# Patient Record
Sex: MALE | Race: WHITE | Employment: OTHER | URBAN - METROPOLITAN AREA
[De-identification: names, ages, dates, MRNs, and addresses within clinical notes are randomized per-mention and may not be internally consistent; named-entity substitution may affect disease eponyms.]

---

## 2017-01-03 ENCOUNTER — TELEPHONE (OUTPATIENT)
Dept: PULMONOLOGY | Age: 77
End: 2017-01-03

## 2017-01-04 ENCOUNTER — CARE COORDINATION (OUTPATIENT)
Dept: CARE COORDINATION | Age: 77
End: 2017-01-04

## 2017-01-05 DIAGNOSIS — J44.9 COPD (CHRONIC OBSTRUCTIVE PULMONARY DISEASE) (HCC): ICD-10-CM

## 2017-01-05 RX ORDER — FLUTICASONE PROPIONATE AND SALMETEROL XINAFOATE 230; 21 UG/1; UG/1
AEROSOL, METERED RESPIRATORY (INHALATION)
Qty: 1 INHALER | Refills: 3 | Status: SHIPPED | OUTPATIENT
Start: 2017-01-05 | End: 2017-02-23 | Stop reason: CLARIF

## 2017-01-10 ENCOUNTER — OFFICE VISIT (OUTPATIENT)
Dept: INTERNAL MEDICINE CLINIC | Age: 77
End: 2017-01-10

## 2017-01-10 VITALS
BODY MASS INDEX: 41.13 KG/M2 | RESPIRATION RATE: 16 BRPM | HEIGHT: 68 IN | HEART RATE: 91 BPM | SYSTOLIC BLOOD PRESSURE: 144 MMHG | WEIGHT: 271.4 LBS | DIASTOLIC BLOOD PRESSURE: 62 MMHG | OXYGEN SATURATION: 91 %

## 2017-01-10 DIAGNOSIS — A41.9 SEPSIS DUE TO PNEUMONIA (HCC): Primary | ICD-10-CM

## 2017-01-10 DIAGNOSIS — Z79.4 TYPE 2 DIABETES MELLITUS WITHOUT COMPLICATION, WITH LONG-TERM CURRENT USE OF INSULIN (HCC): Chronic | ICD-10-CM

## 2017-01-10 DIAGNOSIS — I10 ESSENTIAL HYPERTENSION: ICD-10-CM

## 2017-01-10 DIAGNOSIS — J18.9 SEPSIS DUE TO PNEUMONIA (HCC): Primary | ICD-10-CM

## 2017-01-10 DIAGNOSIS — N18.30 CKD (CHRONIC KIDNEY DISEASE) STAGE 3, GFR 30-59 ML/MIN (HCC): ICD-10-CM

## 2017-01-10 DIAGNOSIS — E11.9 TYPE 2 DIABETES MELLITUS WITHOUT COMPLICATION, WITH LONG-TERM CURRENT USE OF INSULIN (HCC): Chronic | ICD-10-CM

## 2017-01-10 LAB
A/G RATIO: 1.5 (ref 1.1–2.2)
ALBUMIN SERPL-MCNC: 3.6 G/DL (ref 3.4–5)
ALP BLD-CCNC: 69 U/L (ref 40–129)
ALT SERPL-CCNC: 17 U/L (ref 10–40)
ANION GAP SERPL CALCULATED.3IONS-SCNC: 15 MMOL/L (ref 3–16)
AST SERPL-CCNC: 14 U/L (ref 15–37)
BILIRUB SERPL-MCNC: 0.4 MG/DL (ref 0–1)
BUN BLDV-MCNC: 26 MG/DL (ref 7–20)
CALCIUM SERPL-MCNC: 8.5 MG/DL (ref 8.3–10.6)
CHLORIDE BLD-SCNC: 99 MMOL/L (ref 99–110)
CO2: 23 MMOL/L (ref 21–32)
CREAT SERPL-MCNC: 1.2 MG/DL (ref 0.8–1.3)
GFR AFRICAN AMERICAN: >60
GFR NON-AFRICAN AMERICAN: 59
GLOBULIN: 2.4 G/DL
GLUCOSE BLD-MCNC: 88 MG/DL (ref 70–99)
POTASSIUM SERPL-SCNC: 4.8 MMOL/L (ref 3.5–5.1)
SODIUM BLD-SCNC: 137 MMOL/L (ref 136–145)
TOTAL PROTEIN: 6 G/DL (ref 6.4–8.2)

## 2017-01-10 PROCEDURE — 99214 OFFICE O/P EST MOD 30 MIN: CPT | Performed by: INTERNAL MEDICINE

## 2017-01-12 ENCOUNTER — OFFICE VISIT (OUTPATIENT)
Dept: PULMONOLOGY | Age: 77
End: 2017-01-12

## 2017-01-12 VITALS
DIASTOLIC BLOOD PRESSURE: 60 MMHG | SYSTOLIC BLOOD PRESSURE: 136 MMHG | RESPIRATION RATE: 20 BRPM | HEART RATE: 81 BPM | OXYGEN SATURATION: 94 % | TEMPERATURE: 97.8 F | HEIGHT: 68 IN | BODY MASS INDEX: 40.77 KG/M2 | WEIGHT: 269 LBS

## 2017-01-12 DIAGNOSIS — J31.0 RHINOSINUSITIS: ICD-10-CM

## 2017-01-12 DIAGNOSIS — J18.9 PNEUMONIA OF RIGHT LOWER LOBE DUE TO INFECTIOUS ORGANISM: ICD-10-CM

## 2017-01-12 DIAGNOSIS — J32.9 RHINOSINUSITIS: ICD-10-CM

## 2017-01-12 DIAGNOSIS — J44.9 COPD WITH CHRONIC BRONCHITIS (HCC): Primary | ICD-10-CM

## 2017-01-12 PROCEDURE — 99213 OFFICE O/P EST LOW 20 MIN: CPT | Performed by: NURSE PRACTITIONER

## 2017-02-13 ENCOUNTER — TELEPHONE (OUTPATIENT)
Dept: CARDIOLOGY CLINIC | Age: 77
End: 2017-02-13

## 2017-02-16 ENCOUNTER — TELEPHONE (OUTPATIENT)
Dept: INTERNAL MEDICINE CLINIC | Age: 77
End: 2017-02-16

## 2017-02-16 DIAGNOSIS — J18.9 PNEUMONIA OF LEFT LOWER LOBE DUE TO INFECTIOUS ORGANISM: Primary | ICD-10-CM

## 2017-02-20 ENCOUNTER — HOSPITAL ENCOUNTER (OUTPATIENT)
Dept: CT IMAGING | Age: 77
Discharge: HOME | End: 2017-02-20
Attending: INTERNAL MEDICINE | Admitting: INTERNAL MEDICINE

## 2017-02-20 DIAGNOSIS — J18.9 PNEUMONIA OF LEFT LOWER LOBE DUE TO INFECTIOUS ORGANISM: ICD-10-CM

## 2017-02-20 DIAGNOSIS — J18.9 PNEUMONIA: ICD-10-CM

## 2017-02-21 ENCOUNTER — OFFICE VISIT (OUTPATIENT)
Dept: CARDIOLOGY CLINIC | Age: 77
End: 2017-02-21

## 2017-02-21 ENCOUNTER — TELEPHONE (OUTPATIENT)
Dept: INTERNAL MEDICINE CLINIC | Age: 77
End: 2017-02-21

## 2017-02-21 VITALS
OXYGEN SATURATION: 96 % | HEIGHT: 68 IN | WEIGHT: 272 LBS | HEART RATE: 82 BPM | BODY MASS INDEX: 41.22 KG/M2 | DIASTOLIC BLOOD PRESSURE: 64 MMHG | SYSTOLIC BLOOD PRESSURE: 124 MMHG

## 2017-02-21 DIAGNOSIS — G47.33 OSA (OBSTRUCTIVE SLEEP APNEA): ICD-10-CM

## 2017-02-21 DIAGNOSIS — I10 ESSENTIAL HYPERTENSION: ICD-10-CM

## 2017-02-21 DIAGNOSIS — J42 CHRONIC BRONCHITIS, UNSPECIFIED CHRONIC BRONCHITIS TYPE (HCC): ICD-10-CM

## 2017-02-21 DIAGNOSIS — I48.0 PAROXYSMAL ATRIAL FIBRILLATION (HCC): Primary | ICD-10-CM

## 2017-02-21 PROCEDURE — 99214 OFFICE O/P EST MOD 30 MIN: CPT | Performed by: INTERNAL MEDICINE

## 2017-02-21 PROCEDURE — 93000 ELECTROCARDIOGRAM COMPLETE: CPT | Performed by: INTERNAL MEDICINE

## 2017-02-21 RX ORDER — AMIODARONE HYDROCHLORIDE 200 MG/1
100 TABLET ORAL EVERY OTHER DAY
Qty: 15 TABLET | Refills: 1
Start: 2017-02-21 | End: 2017-04-28

## 2017-02-22 ENCOUNTER — TELEPHONE (OUTPATIENT)
Dept: PULMONOLOGY | Age: 77
End: 2017-02-22

## 2017-02-23 ENCOUNTER — OFFICE VISIT (OUTPATIENT)
Dept: SURGERY | Age: 77
End: 2017-02-23

## 2017-02-23 ENCOUNTER — PROCEDURE VISIT (OUTPATIENT)
Dept: SURGERY | Age: 77
End: 2017-02-23

## 2017-02-23 VITALS
BODY MASS INDEX: 41.37 KG/M2 | HEIGHT: 68 IN | DIASTOLIC BLOOD PRESSURE: 68 MMHG | WEIGHT: 273 LBS | SYSTOLIC BLOOD PRESSURE: 136 MMHG

## 2017-02-23 DIAGNOSIS — E78.00 PURE HYPERCHOLESTEROLEMIA: ICD-10-CM

## 2017-02-23 DIAGNOSIS — I65.23 CAROTID ARTERY STENOSIS WITHOUT CEREBRAL INFARCTION, BILATERAL: Primary | ICD-10-CM

## 2017-02-23 DIAGNOSIS — I10 ESSENTIAL HYPERTENSION: ICD-10-CM

## 2017-02-23 DIAGNOSIS — M79.89 LEG SWELLING: ICD-10-CM

## 2017-02-23 PROCEDURE — 99214 OFFICE O/P EST MOD 30 MIN: CPT | Performed by: NURSE PRACTITIONER

## 2017-02-23 PROCEDURE — 93880 EXTRACRANIAL BILAT STUDY: CPT | Performed by: SURGERY

## 2017-03-13 ENCOUNTER — TELEPHONE (OUTPATIENT)
Dept: INTERNAL MEDICINE CLINIC | Age: 77
End: 2017-03-13

## 2017-03-14 ENCOUNTER — OFFICE VISIT (OUTPATIENT)
Dept: INTERNAL MEDICINE CLINIC | Age: 77
End: 2017-03-14

## 2017-03-14 VITALS
DIASTOLIC BLOOD PRESSURE: 86 MMHG | OXYGEN SATURATION: 92 % | SYSTOLIC BLOOD PRESSURE: 165 MMHG | BODY MASS INDEX: 42.03 KG/M2 | WEIGHT: 276.4 LBS | HEART RATE: 84 BPM | RESPIRATION RATE: 16 BRPM | TEMPERATURE: 98 F

## 2017-03-14 DIAGNOSIS — J20.9 BRONCHITIS WITH BRONCHOSPASM: Primary | ICD-10-CM

## 2017-03-14 PROCEDURE — 99213 OFFICE O/P EST LOW 20 MIN: CPT | Performed by: INTERNAL MEDICINE

## 2017-03-14 RX ORDER — PREDNISONE 10 MG/1
TABLET ORAL
Qty: 22 TABLET | Refills: 0 | Status: SHIPPED | OUTPATIENT
Start: 2017-03-14 | End: 2017-03-20 | Stop reason: ALTCHOICE

## 2017-03-14 RX ORDER — DOXYCYCLINE HYCLATE 100 MG
100 TABLET ORAL 2 TIMES DAILY
Qty: 14 TABLET | Refills: 0 | Status: SHIPPED | OUTPATIENT
Start: 2017-03-14 | End: 2017-03-21

## 2017-03-20 ENCOUNTER — OFFICE VISIT (OUTPATIENT)
Dept: PULMONOLOGY | Age: 77
End: 2017-03-20

## 2017-03-20 VITALS
TEMPERATURE: 97.7 F | SYSTOLIC BLOOD PRESSURE: 132 MMHG | OXYGEN SATURATION: 95 % | BODY MASS INDEX: 40.92 KG/M2 | HEIGHT: 68 IN | RESPIRATION RATE: 16 BRPM | DIASTOLIC BLOOD PRESSURE: 60 MMHG | WEIGHT: 270 LBS | HEART RATE: 75 BPM

## 2017-03-20 DIAGNOSIS — J47.0 BRONCHIECTASIS WITH ACUTE LOWER RESPIRATORY INFECTION (HCC): ICD-10-CM

## 2017-03-20 DIAGNOSIS — J18.9 PNEUMONIA OF RIGHT LOWER LOBE DUE TO INFECTIOUS ORGANISM: ICD-10-CM

## 2017-03-20 DIAGNOSIS — J44.9 COPD WITH CHRONIC BRONCHITIS (HCC): Primary | ICD-10-CM

## 2017-03-20 LAB
IGA: 212 MG/DL (ref 70–400)
IGG: 639 MG/DL (ref 700–1600)
IGM: 11 MG/DL (ref 40–230)
RHEUMATOID FACTOR: <10 IU/ML

## 2017-03-20 PROCEDURE — 99214 OFFICE O/P EST MOD 30 MIN: CPT | Performed by: INTERNAL MEDICINE

## 2017-03-20 RX ORDER — PANTOPRAZOLE SODIUM 40 MG/1
40 TABLET, DELAYED RELEASE ORAL DAILY
Qty: 30 TABLET | Refills: 3 | Status: SHIPPED | OUTPATIENT
Start: 2017-03-20 | End: 2017-07-05 | Stop reason: SDUPTHER

## 2017-03-21 LAB
ANA INTERPRETATION: NORMAL
ANTI-NUCLEAR ANTIBODY (ANA): NEGATIVE

## 2017-03-22 LAB
ALPHA-1 ANTITRYPSIN PHENOTYPE: NORMAL
ALPHA-1 ANTITRYPSIN: 92 MG/DL (ref 90–200)
CCP IGG ANTIBODIES: 3 UNITS (ref 0–19)

## 2017-03-22 RX ORDER — LEVALBUTEROL INHALATION SOLUTION 1.25 MG/3ML
SOLUTION RESPIRATORY (INHALATION)
Qty: 216 ML | Refills: 0 | Status: SHIPPED | OUTPATIENT
Start: 2017-03-22 | End: 2017-05-25 | Stop reason: SDUPTHER

## 2017-03-23 ENCOUNTER — TELEPHONE (OUTPATIENT)
Dept: PULMONOLOGY | Age: 77
End: 2017-03-23

## 2017-03-24 LAB
ENA TO SSA (RO) ANTIBODY: NEGATIVE EU
ENA TO SSB (LA) ANTIBODY: NEGATIVE EU

## 2017-04-28 ENCOUNTER — OFFICE VISIT (OUTPATIENT)
Dept: INTERNAL MEDICINE CLINIC | Age: 77
End: 2017-04-28

## 2017-04-28 VITALS
DIASTOLIC BLOOD PRESSURE: 60 MMHG | HEIGHT: 68 IN | BODY MASS INDEX: 41.34 KG/M2 | HEART RATE: 84 BPM | OXYGEN SATURATION: 93 % | WEIGHT: 272.8 LBS | SYSTOLIC BLOOD PRESSURE: 132 MMHG | RESPIRATION RATE: 16 BRPM

## 2017-04-28 DIAGNOSIS — J44.9 CHRONIC OBSTRUCTIVE PULMONARY DISEASE, UNSPECIFIED COPD TYPE (HCC): ICD-10-CM

## 2017-04-28 DIAGNOSIS — I10 ESSENTIAL HYPERTENSION: ICD-10-CM

## 2017-04-28 DIAGNOSIS — N18.30 CKD (CHRONIC KIDNEY DISEASE) STAGE 3, GFR 30-59 ML/MIN (HCC): ICD-10-CM

## 2017-04-28 DIAGNOSIS — E11.9 TYPE 2 DIABETES MELLITUS WITHOUT COMPLICATION, WITH LONG-TERM CURRENT USE OF INSULIN (HCC): Primary | Chronic | ICD-10-CM

## 2017-04-28 DIAGNOSIS — Z79.4 TYPE 2 DIABETES MELLITUS WITHOUT COMPLICATION, WITH LONG-TERM CURRENT USE OF INSULIN (HCC): Primary | Chronic | ICD-10-CM

## 2017-04-28 LAB
A/G RATIO: 1.8 (ref 1.1–2.2)
ALBUMIN SERPL-MCNC: 3.9 G/DL (ref 3.4–5)
ALP BLD-CCNC: 78 U/L (ref 40–129)
ALT SERPL-CCNC: 16 U/L (ref 10–40)
ANION GAP SERPL CALCULATED.3IONS-SCNC: 16 MMOL/L (ref 3–16)
AST SERPL-CCNC: 14 U/L (ref 15–37)
BILIRUB SERPL-MCNC: <0.2 MG/DL (ref 0–1)
BUN BLDV-MCNC: 25 MG/DL (ref 7–20)
CALCIUM SERPL-MCNC: 8.7 MG/DL (ref 8.3–10.6)
CHLORIDE BLD-SCNC: 102 MMOL/L (ref 99–110)
CO2: 24 MMOL/L (ref 21–32)
CREAT SERPL-MCNC: 1.5 MG/DL (ref 0.8–1.3)
GFR AFRICAN AMERICAN: 55
GFR NON-AFRICAN AMERICAN: 45
GLOBULIN: 2.2 G/DL
GLUCOSE BLD-MCNC: 100 MG/DL (ref 70–99)
LDL CHOLESTEROL DIRECT: 46 MG/DL
POTASSIUM SERPL-SCNC: 4.4 MMOL/L (ref 3.5–5.1)
SODIUM BLD-SCNC: 142 MMOL/L (ref 136–145)
TOTAL PROTEIN: 6.1 G/DL (ref 6.4–8.2)

## 2017-04-28 PROCEDURE — 99214 OFFICE O/P EST MOD 30 MIN: CPT | Performed by: INTERNAL MEDICINE

## 2017-04-28 RX ORDER — AMIODARONE HYDROCHLORIDE 100 MG/1
100 TABLET ORAL EVERY OTHER DAY
COMMUNITY
Start: 2017-04-06 | End: 2017-05-11 | Stop reason: SDUPTHER

## 2017-04-28 ASSESSMENT — ENCOUNTER SYMPTOMS: RESPIRATORY NEGATIVE: 1

## 2017-04-29 LAB
ESTIMATED AVERAGE GLUCOSE: 137 MG/DL
HBA1C MFR BLD: 6.4 %

## 2017-05-03 DIAGNOSIS — J18.9 PNEUMONIA OF LEFT LOWER LOBE DUE TO INFECTIOUS ORGANISM: Primary | ICD-10-CM

## 2017-05-05 ENCOUNTER — TELEPHONE (OUTPATIENT)
Dept: INTERNAL MEDICINE CLINIC | Age: 77
End: 2017-05-05

## 2017-05-05 RX ORDER — MONTELUKAST SODIUM 10 MG/1
TABLET ORAL
Qty: 30 TABLET | Refills: 11 | Status: SHIPPED | OUTPATIENT
Start: 2017-05-05 | End: 2017-10-26 | Stop reason: ALTCHOICE

## 2017-05-11 RX ORDER — AMIODARONE HYDROCHLORIDE 100 MG/1
100 TABLET ORAL EVERY OTHER DAY
Qty: 30 TABLET | Refills: 2 | Status: SHIPPED | OUTPATIENT
Start: 2017-05-11 | End: 2018-03-20 | Stop reason: SDUPTHER

## 2017-05-16 ENCOUNTER — HOSPITAL ENCOUNTER (OUTPATIENT)
Dept: CT IMAGING | Age: 77
Discharge: HOME | End: 2017-05-16
Attending: INTERNAL MEDICINE | Admitting: INTERNAL MEDICINE

## 2017-05-16 DIAGNOSIS — J18.9 PNEUMONIA OF LEFT LOWER LOBE DUE TO INFECTIOUS ORGANISM: ICD-10-CM

## 2017-06-30 ENCOUNTER — TELEPHONE (OUTPATIENT)
Dept: INTERNAL MEDICINE CLINIC | Age: 77
End: 2017-06-30

## 2017-07-05 DIAGNOSIS — J47.0 BRONCHIECTASIS WITH ACUTE LOWER RESPIRATORY INFECTION (HCC): ICD-10-CM

## 2017-07-06 RX ORDER — PANTOPRAZOLE SODIUM 40 MG/1
TABLET, DELAYED RELEASE ORAL
Qty: 30 TABLET | Refills: 3 | Status: SHIPPED | OUTPATIENT
Start: 2017-07-06 | End: 2017-11-01 | Stop reason: SDUPTHER

## 2017-07-11 ENCOUNTER — OFFICE VISIT (OUTPATIENT)
Dept: PULMONOLOGY | Age: 77
End: 2017-07-11

## 2017-07-11 VITALS
WEIGHT: 269 LBS | OXYGEN SATURATION: 96 % | HEART RATE: 77 BPM | DIASTOLIC BLOOD PRESSURE: 52 MMHG | SYSTOLIC BLOOD PRESSURE: 120 MMHG | RESPIRATION RATE: 20 BRPM | HEIGHT: 68 IN | TEMPERATURE: 97.7 F | BODY MASS INDEX: 40.77 KG/M2

## 2017-07-11 DIAGNOSIS — J47.9 BRONCHIECTASIS WITHOUT COMPLICATION (HCC): ICD-10-CM

## 2017-07-11 DIAGNOSIS — J32.9 RHINOSINUSITIS: ICD-10-CM

## 2017-07-11 DIAGNOSIS — J44.9 COPD WITH CHRONIC BRONCHITIS (HCC): Primary | ICD-10-CM

## 2017-07-11 DIAGNOSIS — J31.0 RHINOSINUSITIS: ICD-10-CM

## 2017-07-11 PROCEDURE — 99214 OFFICE O/P EST MOD 30 MIN: CPT | Performed by: INTERNAL MEDICINE

## 2017-07-11 RX ORDER — AZELASTINE 1 MG/ML
1 SPRAY, METERED NASAL 2 TIMES DAILY
Qty: 1 BOTTLE | Refills: 3 | Status: SHIPPED | OUTPATIENT
Start: 2017-07-11 | End: 2018-02-28 | Stop reason: CLARIF

## 2017-07-18 ENCOUNTER — TELEPHONE (OUTPATIENT)
Dept: INTERNAL MEDICINE CLINIC | Age: 77
End: 2017-07-18

## 2017-07-27 DIAGNOSIS — J47.9 BRONCHIECTASIS WITHOUT COMPLICATION (HCC): ICD-10-CM

## 2017-07-27 DIAGNOSIS — J44.9 CHRONIC OBSTRUCTIVE PULMONARY DISEASE, UNSPECIFIED COPD TYPE (HCC): ICD-10-CM

## 2017-07-27 RX ORDER — ALBUTEROL SULFATE 90 UG/1
2 AEROSOL, METERED RESPIRATORY (INHALATION) EVERY 4 HOURS PRN
Qty: 1 INHALER | Refills: 3 | Status: SHIPPED | OUTPATIENT
Start: 2017-07-27 | End: 2018-06-23 | Stop reason: SDUPTHER

## 2017-07-28 RX ORDER — AMIODARONE HYDROCHLORIDE 100 MG/1
100 TABLET ORAL EVERY OTHER DAY
Qty: 15 TABLET | Refills: 3 | Status: SHIPPED | OUTPATIENT
Start: 2017-07-28 | End: 2017-08-18 | Stop reason: SDUPTHER

## 2017-07-29 LAB
CULTURE, RESPIRATORY: ABNORMAL
CULTURE, RESPIRATORY: ABNORMAL
GRAM STAIN RESULT: ABNORMAL
ORGANISM: ABNORMAL

## 2017-07-31 ENCOUNTER — TELEPHONE (OUTPATIENT)
Dept: PULMONOLOGY | Age: 77
End: 2017-07-31

## 2017-07-31 DIAGNOSIS — A49.8 PSEUDOMONAS INFECTION: Primary | ICD-10-CM

## 2017-07-31 RX ORDER — LEVOFLOXACIN 500 MG/1
500 TABLET, FILM COATED ORAL DAILY
Qty: 14 TABLET | Refills: 0 | Status: SHIPPED | OUTPATIENT
Start: 2017-07-31 | End: 2017-08-14

## 2017-08-02 ENCOUNTER — TELEPHONE (OUTPATIENT)
Dept: INFECTIOUS DISEASES | Age: 77
End: 2017-08-02

## 2017-08-02 ENCOUNTER — TELEPHONE (OUTPATIENT)
Dept: PULMONOLOGY | Age: 77
End: 2017-08-02

## 2017-08-02 DIAGNOSIS — I49.9 CARDIAC ARRHYTHMIA, UNSPECIFIED CARDIAC ARRHYTHMIA TYPE: ICD-10-CM

## 2017-08-02 DIAGNOSIS — A49.8 PSEUDOMONAS INFECTION: Primary | ICD-10-CM

## 2017-08-02 RX ORDER — LEVOFLOXACIN 500 MG/1
500 TABLET, FILM COATED ORAL DAILY
Qty: 21 TABLET | Refills: 0 | Status: SHIPPED | OUTPATIENT
Start: 2017-08-02 | End: 2017-08-23

## 2017-08-11 ENCOUNTER — HOSPITAL ENCOUNTER (OUTPATIENT)
Dept: OTHER | Age: 77
Discharge: HOME | End: 2017-08-11
Attending: INTERNAL MEDICINE | Admitting: INTERNAL MEDICINE

## 2017-08-11 ENCOUNTER — TELEPHONE (OUTPATIENT)
Dept: PULMONOLOGY | Age: 77
End: 2017-08-11

## 2017-08-11 LAB
EKG ATRIAL RATE: 82 BPM
EKG DIAGNOSIS: NORMAL
EKG P-R INTERVAL: 168 MS
EKG Q-T INTERVAL: 378 MS
EKG QRS DURATION: 96 MS
EKG QTC CALCULATION (BAZETT): 441 MS
EKG R AXIS: -37 DEGREES
EKG T AXIS: 40 DEGREES
EKG VENTRICULAR RATE: 82 BPM

## 2017-08-11 PROCEDURE — 93010 ELECTROCARDIOGRAM REPORT: CPT | Performed by: INTERNAL MEDICINE

## 2017-08-18 ENCOUNTER — OFFICE VISIT (OUTPATIENT)
Dept: CARDIOLOGY CLINIC | Age: 77
End: 2017-08-18

## 2017-08-18 VITALS
HEIGHT: 68 IN | BODY MASS INDEX: 40.32 KG/M2 | DIASTOLIC BLOOD PRESSURE: 60 MMHG | OXYGEN SATURATION: 97 % | HEART RATE: 84 BPM | WEIGHT: 266 LBS | SYSTOLIC BLOOD PRESSURE: 108 MMHG

## 2017-08-18 DIAGNOSIS — Z79.899 ON AMIODARONE THERAPY: ICD-10-CM

## 2017-08-18 DIAGNOSIS — G47.33 OBSTRUCTIVE SLEEP APNEA SYNDROME: ICD-10-CM

## 2017-08-18 DIAGNOSIS — J44.9 CHRONIC OBSTRUCTIVE PULMONARY DISEASE, UNSPECIFIED COPD TYPE (HCC): ICD-10-CM

## 2017-08-18 DIAGNOSIS — I48.0 PAROXYSMAL ATRIAL FIBRILLATION (HCC): Primary | ICD-10-CM

## 2017-08-18 DIAGNOSIS — N18.30 CKD (CHRONIC KIDNEY DISEASE) STAGE 3, GFR 30-59 ML/MIN (HCC): ICD-10-CM

## 2017-08-18 DIAGNOSIS — I10 ESSENTIAL HYPERTENSION: ICD-10-CM

## 2017-08-18 DIAGNOSIS — D64.9 ANEMIA, UNSPECIFIED TYPE: ICD-10-CM

## 2017-08-18 PROCEDURE — 99214 OFFICE O/P EST MOD 30 MIN: CPT | Performed by: NURSE PRACTITIONER

## 2017-08-23 ENCOUNTER — TELEPHONE (OUTPATIENT)
Dept: INFECTIOUS DISEASES | Age: 77
End: 2017-08-23

## 2017-08-23 ENCOUNTER — TELEPHONE (OUTPATIENT)
Dept: PULMONOLOGY | Age: 77
End: 2017-08-23

## 2017-08-23 DIAGNOSIS — J44.9 COPD WITH CHRONIC BRONCHITIS (HCC): ICD-10-CM

## 2017-08-31 ENCOUNTER — OFFICE VISIT (OUTPATIENT)
Dept: SURGERY | Age: 77
End: 2017-08-31

## 2017-08-31 ENCOUNTER — PROCEDURE VISIT (OUTPATIENT)
Dept: SURGERY | Age: 77
End: 2017-08-31

## 2017-08-31 VITALS
HEART RATE: 72 BPM | BODY MASS INDEX: 40.92 KG/M2 | SYSTOLIC BLOOD PRESSURE: 147 MMHG | HEIGHT: 68 IN | WEIGHT: 270 LBS | DIASTOLIC BLOOD PRESSURE: 70 MMHG

## 2017-08-31 DIAGNOSIS — M79.89 LEG SWELLING: ICD-10-CM

## 2017-08-31 DIAGNOSIS — I65.23 CAROTID ARTERY STENOSIS WITHOUT CEREBRAL INFARCTION, BILATERAL: Primary | ICD-10-CM

## 2017-08-31 DIAGNOSIS — E78.5 HYPERLIPIDEMIA, UNSPECIFIED HYPERLIPIDEMIA TYPE: ICD-10-CM

## 2017-08-31 DIAGNOSIS — I10 ESSENTIAL HYPERTENSION: ICD-10-CM

## 2017-08-31 PROCEDURE — 93880 EXTRACRANIAL BILAT STUDY: CPT | Performed by: SURGERY

## 2017-08-31 PROCEDURE — 99214 OFFICE O/P EST MOD 30 MIN: CPT | Performed by: NURSE PRACTITIONER

## 2017-09-01 ENCOUNTER — OFFICE VISIT (OUTPATIENT)
Dept: INTERNAL MEDICINE CLINIC | Age: 77
End: 2017-09-01

## 2017-09-01 VITALS
BODY MASS INDEX: 40.75 KG/M2 | SYSTOLIC BLOOD PRESSURE: 140 MMHG | DIASTOLIC BLOOD PRESSURE: 72 MMHG | HEART RATE: 85 BPM | WEIGHT: 268 LBS | RESPIRATION RATE: 24 BRPM | OXYGEN SATURATION: 93 %

## 2017-09-01 DIAGNOSIS — I48.20 CHRONIC ATRIAL FIBRILLATION (HCC): ICD-10-CM

## 2017-09-01 DIAGNOSIS — Z83.49 FAMILY HISTORY OF B12 DEFICIENCY: ICD-10-CM

## 2017-09-01 DIAGNOSIS — N18.30 CKD (CHRONIC KIDNEY DISEASE) STAGE 3, GFR 30-59 ML/MIN (HCC): ICD-10-CM

## 2017-09-01 DIAGNOSIS — Z79.4 TYPE 2 DIABETES MELLITUS WITHOUT COMPLICATION, WITH LONG-TERM CURRENT USE OF INSULIN (HCC): Primary | Chronic | ICD-10-CM

## 2017-09-01 DIAGNOSIS — E78.5 HYPERLIPIDEMIA, UNSPECIFIED HYPERLIPIDEMIA TYPE: ICD-10-CM

## 2017-09-01 DIAGNOSIS — E11.9 TYPE 2 DIABETES MELLITUS WITHOUT COMPLICATION, WITH LONG-TERM CURRENT USE OF INSULIN (HCC): Primary | Chronic | ICD-10-CM

## 2017-09-01 DIAGNOSIS — Z23 NEED FOR INFLUENZA VACCINATION: ICD-10-CM

## 2017-09-01 DIAGNOSIS — E55.9 VITAMIN D INSUFFICIENCY: ICD-10-CM

## 2017-09-01 DIAGNOSIS — I10 ESSENTIAL HYPERTENSION: ICD-10-CM

## 2017-09-01 LAB
A/G RATIO: 1.7 (ref 1.1–2.2)
ALBUMIN SERPL-MCNC: 3.9 G/DL (ref 3.4–5)
ALP BLD-CCNC: 72 U/L (ref 40–129)
ALT SERPL-CCNC: 14 U/L (ref 10–40)
ANION GAP SERPL CALCULATED.3IONS-SCNC: 16 MMOL/L (ref 3–16)
AST SERPL-CCNC: 15 U/L (ref 15–37)
BILIRUB SERPL-MCNC: 0.4 MG/DL (ref 0–1)
BUN BLDV-MCNC: 30 MG/DL (ref 7–20)
CALCIUM SERPL-MCNC: 8.8 MG/DL (ref 8.3–10.6)
CHLORIDE BLD-SCNC: 100 MMOL/L (ref 99–110)
CHOLESTEROL, TOTAL: 102 MG/DL (ref 0–199)
CO2: 24 MMOL/L (ref 21–32)
CREAT SERPL-MCNC: 1.5 MG/DL (ref 0.8–1.3)
CREATININE URINE: 90.3 MG/DL (ref 39–259)
GFR AFRICAN AMERICAN: 55
GFR NON-AFRICAN AMERICAN: 45
GLOBULIN: 2.3 G/DL
GLUCOSE BLD-MCNC: 96 MG/DL (ref 70–99)
HDLC SERPL-MCNC: 36 MG/DL (ref 40–60)
LDL CHOLESTEROL CALCULATED: 49 MG/DL
MICROALBUMIN UR-MCNC: 2.8 MG/DL
MICROALBUMIN/CREAT UR-RTO: 31 MG/G (ref 0–30)
POTASSIUM SERPL-SCNC: 4.4 MMOL/L (ref 3.5–5.1)
SODIUM BLD-SCNC: 140 MMOL/L (ref 136–145)
TOTAL PROTEIN: 6.2 G/DL (ref 6.4–8.2)
TRIGL SERPL-MCNC: 85 MG/DL (ref 0–150)
TSH SERPL DL<=0.05 MIU/L-ACNC: 1.96 UIU/ML (ref 0.27–4.2)
VITAMIN B-12: 159 PG/ML (ref 211–911)
VITAMIN D 25-HYDROXY: 19.6 NG/ML
VLDLC SERPL CALC-MCNC: 17 MG/DL

## 2017-09-01 PROCEDURE — 90686 IIV4 VACC NO PRSV 0.5 ML IM: CPT | Performed by: INTERNAL MEDICINE

## 2017-09-01 PROCEDURE — 90471 IMMUNIZATION ADMIN: CPT | Performed by: INTERNAL MEDICINE

## 2017-09-01 PROCEDURE — 99214 OFFICE O/P EST MOD 30 MIN: CPT | Performed by: INTERNAL MEDICINE

## 2017-09-02 LAB
ESTIMATED AVERAGE GLUCOSE: 128.4 MG/DL
HBA1C MFR BLD: 6.1 %

## 2017-09-11 RX ORDER — NAPROXEN SODIUM 220 MG
TABLET ORAL
Qty: 100 EACH | Refills: 5 | Status: SHIPPED | OUTPATIENT
Start: 2017-09-11 | End: 2018-07-09 | Stop reason: SDUPTHER

## 2017-10-10 ENCOUNTER — TELEPHONE (OUTPATIENT)
Dept: INTERNAL MEDICINE CLINIC | Age: 77
End: 2017-10-10

## 2017-10-11 NOTE — TELEPHONE ENCOUNTER
Pt states he is still congested and his head is \"on fire\" and he said he feels the same as last night    WW#[phone]

## 2017-10-11 NOTE — TELEPHONE ENCOUNTER
still sounds viral.  I would continue with the same treatment as I recommended yesterday. Does not sound like antibodies are needed. Have him see a doctor today if he feels he needs to. Or I can see him tomorrow.

## 2017-10-12 ENCOUNTER — OFFICE VISIT (OUTPATIENT)
Dept: INTERNAL MEDICINE CLINIC | Age: 77
End: 2017-10-12

## 2017-10-12 VITALS
OXYGEN SATURATION: 91 % | HEIGHT: 68 IN | BODY MASS INDEX: 39.92 KG/M2 | WEIGHT: 263.4 LBS | SYSTOLIC BLOOD PRESSURE: 130 MMHG | DIASTOLIC BLOOD PRESSURE: 70 MMHG | TEMPERATURE: 98.8 F | HEART RATE: 100 BPM | RESPIRATION RATE: 16 BRPM

## 2017-10-12 DIAGNOSIS — J44.1 COPD EXACERBATION (HCC): ICD-10-CM

## 2017-10-12 DIAGNOSIS — J20.9 BRONCHITIS WITH BRONCHOSPASM: Primary | ICD-10-CM

## 2017-10-12 PROCEDURE — 99213 OFFICE O/P EST LOW 20 MIN: CPT | Performed by: INTERNAL MEDICINE

## 2017-10-12 RX ORDER — DOXYCYCLINE HYCLATE 100 MG
100 TABLET ORAL 2 TIMES DAILY
Qty: 14 TABLET | Refills: 0 | Status: SHIPPED | OUTPATIENT
Start: 2017-10-12 | End: 2017-10-19

## 2017-10-12 RX ORDER — PREDNISONE 10 MG/1
TABLET ORAL
Qty: 22 TABLET | Refills: 0 | Status: SHIPPED | OUTPATIENT
Start: 2017-10-12 | End: 2017-10-26 | Stop reason: ALTCHOICE

## 2017-10-14 RX ORDER — POTASSIUM CHLORIDE 750 MG/1
TABLET, EXTENDED RELEASE ORAL
Qty: 30 TABLET | Refills: 0 | Status: SHIPPED | OUTPATIENT
Start: 2017-10-14 | End: 2017-11-13 | Stop reason: SDUPTHER

## 2017-10-26 ENCOUNTER — OFFICE VISIT (OUTPATIENT)
Dept: PULMONOLOGY | Age: 77
End: 2017-10-26

## 2017-10-26 VITALS
SYSTOLIC BLOOD PRESSURE: 134 MMHG | RESPIRATION RATE: 24 BRPM | WEIGHT: 267 LBS | DIASTOLIC BLOOD PRESSURE: 62 MMHG | BODY MASS INDEX: 40.47 KG/M2 | TEMPERATURE: 97.1 F | HEART RATE: 87 BPM | OXYGEN SATURATION: 94 % | HEIGHT: 68 IN

## 2017-10-26 DIAGNOSIS — J32.9 RHINOSINUSITIS: ICD-10-CM

## 2017-10-26 DIAGNOSIS — J31.0 RHINOSINUSITIS: ICD-10-CM

## 2017-10-26 DIAGNOSIS — J44.9 COPD WITH CHRONIC BRONCHITIS (HCC): Primary | ICD-10-CM

## 2017-10-26 DIAGNOSIS — A49.8 PSEUDOMONAS INFECTION: ICD-10-CM

## 2017-10-26 PROCEDURE — 99214 OFFICE O/P EST MOD 30 MIN: CPT | Performed by: INTERNAL MEDICINE

## 2017-10-26 NOTE — PROGRESS NOTES
Umeclidinium Bromide (INCRUSE ELLIPTA) 62.5 MCG/INH AEPB Inhale 1 puff into the lungs daily 1 each 3    ACCU-CHEK COMPACT PLUS strip TEST BLOOD SUGAR THREE TIMES DAILY 102 strip 5    metFORMIN (GLUCOPHAGE) 1000 MG tablet TAKE 1 TABLET BY MOUTH TWICE DAILY WITH FOOD 180 tablet 3    albuterol sulfate HFA (VENTOLIN HFA) 108 (90 Base) MCG/ACT inhaler Inhale 2 puffs into the lungs every 4 hours as needed for Wheezing 1 Inhaler 3    atorvastatin (LIPITOR) 40 MG tablet TAKE 1 TABLET BY MOUTH DAILY 30 tablet 11    cloNIDine (CATAPRES) 0.2 MG tablet TAKE 1 TABLET BY MOUTH TWICE DAILY 60 tablet 5    verapamil (CALAN SR) 240 MG extended release tablet TAKE 1 TABLET BY MOUTH TWICE DAILY 180 tablet 3    levalbuterol (XOPENEX) 1.25 MG/3ML nebulizer solution USE 1 VIAL VIA NEBULIZER FOUR TIMES DAILY 216 mL 3    amiodarone (PACERONE) 100 MG tablet Take 1 tablet by mouth every other day 30 tablet 2    LANTUS 100 UNIT/ML injection vial INJECT 50 UNITS SUBCUTANEOUS DAILY AS DIRECTED 20 mL 11    furosemide (LASIX) 20 MG tablet TAKE 1 TABLET BY MOUTH DAILY 30 tablet 11    valsartan-hydrochlorothiazide (DIOVAN-HCT) 320-25 MG per tablet TAKE 1 TABLET BY MOUTH EVERY DAY 30 tablet 11    Accu-Chek Softclix Lancets MISC USE AS DIRECTED 100 each 11    Misc. Devices (ACAPELLA) MISC 1 Device by Does not apply route as needed 1 each 0    alfuzosin (UROXATRAL) 10 MG SR tablet Take 10 mg by mouth daily.  aspirin EC 81 MG EC tablet Take 2 tablets by mouth daily. 30 tablet 3    ferrous sulfate 325 (65 FE) MG tablet Take 325 mg by mouth 3 times daily.  azelastine (ASTELIN) 0.1 % nasal spray 1 spray by Nasal route 2 times daily Use in each nostril as directed 1 Bottle 3    pantoprazole (PROTONIX) 40 MG tablet TAKE 1 TABLET BY MOUTH DAILY 30 tablet 3     No current facility-administered medications for this visit. ROS:  GENERAL:  No fevers, chills, or night sweats;    HEENT:  Continued sinus drainage, nose bleeding from

## 2017-10-26 NOTE — PATIENT INSTRUCTIONS
Continue with Incruse    Use xopenex as needed    Use astelin every other day or as needed     Pulmonary Rehab referral    Follow up in 3 months

## 2017-11-01 DIAGNOSIS — J47.0 BRONCHIECTASIS WITH ACUTE LOWER RESPIRATORY INFECTION (HCC): ICD-10-CM

## 2017-11-01 RX ORDER — PANTOPRAZOLE SODIUM 40 MG/1
TABLET, DELAYED RELEASE ORAL
Qty: 30 TABLET | Refills: 0 | Status: SHIPPED | OUTPATIENT
Start: 2017-11-01 | End: 2017-11-26 | Stop reason: SDUPTHER

## 2017-11-03 ENCOUNTER — HOSPITAL ENCOUNTER (OUTPATIENT)
Dept: CARDIAC REHAB | Age: 77
Discharge: HOME | End: 2017-11-03
Attending: INTERNAL MEDICINE | Admitting: INTERNAL MEDICINE

## 2017-11-03 DIAGNOSIS — J44.9 COPD, MILD (HCC): ICD-10-CM

## 2017-11-03 PROCEDURE — 94620 PR PULMONARY STRESS TESTING,SIMPLE: CPT | Performed by: INTERNAL MEDICINE

## 2017-11-03 NOTE — PROGRESS NOTES
[facility] Cardiopulmonary Rehabilitation   Six Minute Walk Test    Hang Francisco NERIB: 1940  Account Number: [de-identified]  AGE: 67 y.o.     OP test []   Pulmonary Rehab PRE [x]  POST   []    Diagnosis: Mild COPD      Referring Physician: Dr Phil Zhou    Gender [x]  male [] female AGE:76 Race:C    Height: 5 ft 6 in 167.5 cm    Weight: 267.5 lbs  121 kg  Blood Pressure 112/62    Medications affecting heart rate:  2 puffs albuterol Q4hrs prn, 240 mgs verapamil 2x daily      Supplemental O2 during the test [x]  no [] yes  lpm     [] continuous []  intermittent    Device : [] NC []  HFNC    []  oximizer  [] mask      Laps completed: 4 (1 lap = 100 ft)        Baseline (resting) Walking End of Test (recovery)      Heart Rate 90   98  80    BP  112/62   170/60  110/60    Dyspnea 0   4  0 (Amber scale)    Fatigue 0   0  0 (Amber scale)    SpO2  95%   90%  96%    Stopped or paused before 6 mins? []  no [x] yes How long? 55 secs    Symptoms at end of exercise:[] angina  [] dizziness  []  hip, leg, calf, back pain       []  no complaints [x]  Other bilateral knee pain    Number of laps 4 (x 30.48 meters) + final partial lap: Total distance walked in 6 mins: 122 meters    Predicted distance: 359.5 meters  Percent predicted:34%              [] normal       [x] reduced     Summary: This is a pre-rehab test, performed on room air. The patient ambulated 122 meters which is abnormal- 34-% predicted. His  heart rate response was normal, the blood pressure response was normal, oxygen saturations were normal.   The degree of symptoms based on the Amber Dyspnea/Fatigue scale were increased with testing. This test does not indicate the need for supplemental oxygen.           Hollie Telles DO  Pulmonary Rehab Director

## 2017-11-08 NOTE — PROGRESS NOTES
[address] [facility] PULMONARY REHABILITATION ORDER  Medical Director:  Dr. Travis Servin  (X)Phase II Pulmonary Rehabilitation [facility] Facility-based, supervised exercise with SpO2 / HR monitoring and Oxygen Titration (if necessary) each session, 2-3 times weekly, with individualized education sessions. Patient Name: Patricia Lemon  : 1940  Referring Physician: Dr. Anisa Crowder  Date: 2017    Medically Necessary Pulmonary Rehab for: Moderate COPD (from my dictation or the PFT report), which is GOLD Stage 2     Physician Prescribed Exercise:  Length of program:  Up to 36 sessions, subject to insurance limitations. Program to include aerobic endurance conditioning, resistance training (RT), step training (ST), flexibility training, and education (all relevant topics including psychosocial assessment). FITT Principles + Progression for Exercise Prescription (also found on the ITP):     Frequency: 2-3x / wk for up to 36 sessions    Intensity:   Set from Initial 6MWT (Feet achieved converted to METs)   Type:          Aerobic and Strength (Treadmill, AD, NuStep, SciFit, UBE, RT, ST)   Time:        15-60 min. of Treatment; Aerobic, RT, ST, Rests and Education  Progression:  1-2 minute increase in Time, per Type, per session, 5-20% increase in Intensity per week if SpO2 >88 AND Amber RPE/RPD is <14      Note:  These are guidelines. The Pulmonary Rehab staff may adjust the treatment to suit the patient's individual needs, goals, oxygen saturation and functional level. Plan of Care:  Pulmonary Rehab aerobic endurance and strength training sessions for a total of 30-91 min/day, including Education time, 2-3 days/week with suggested supplemented matching minutes of walking at home on most days not participating in Pulmonary Rehab. Patient is willing to cooperate and participate in the plan of care.  Patient is physically able, motivated, and willing to participate in IN, and I expect this patient to improve in a reasonable and predictable time frame. Other Program Components:   (X)6 Minute Walk Test (6 MWT) at program discharge   (X)Evaluation for oxygen needs while exercising   (X)Titrate Oxygen to maintain >88 SpO2   (X)Stop Exercise or Apply Oxygen if patient desaturates <88%   (x)May continue in the Maintenance Program upon completion    Measurable Endurance Goal:    -Aerobic endurance goal to be measured in minutes. Start endurance training per patient tolerance at 1-15 minutes per exercise type, progressing to a total of 31-60 minutes using various modes of training (see Exercise Prescription on Individualized Treatment Plan 'ITP'). -Measurable Muscular Strength Goal: Starting at 1-3 lbs x 8 reps, progressing daily/weekly to 5-10 lbs x 15 reps    NOTE: Gilmar Suresh's tobacco History:   History   Smoking Status    Former Smoker    Packs/day: 0.50    Years: 18.00    Quit date: 1/1/1988   Smokeless Tobacco    Never Used     If patient is a current smoker, patient will participate in tobacco cessation program during Pulmonary Rehab.       Physician Signature/Date/Time:

## 2017-11-09 ENCOUNTER — HOSPITAL ENCOUNTER (OUTPATIENT)
Dept: OTHER | Age: 77
Discharge: HOME | End: 2017-11-30
Attending: INTERNAL MEDICINE | Admitting: INTERNAL MEDICINE

## 2017-11-09 ENCOUNTER — HOSPITAL ENCOUNTER (OUTPATIENT)
Dept: CARDIAC REHAB | Age: 77
Discharge: HOME | End: 2017-11-14
Attending: INTERNAL MEDICINE | Admitting: INTERNAL MEDICINE

## 2017-11-09 VITALS — BODY MASS INDEX: 40.6 KG/M2 | WEIGHT: 267 LBS

## 2017-11-09 LAB
GLUCOSE BLD-MCNC: 118 MG/DL (ref 70–99)
GLUCOSE BLD-MCNC: 152 MG/DL (ref 70–99)
PERFORMED ON: ABNORMAL
PERFORMED ON: ABNORMAL

## 2017-11-09 NOTE — PROGRESS NOTES
other: Heart Rate 79             Resp Rate 18     Breath Sounds: Clear/ mild expir wheeze RUL    Blood Pressures Sitting: Rt arm 162/62  Left arm: 165/65       Standing Rt arm 164/55  Left arm: 151/58    Resting SpO2: 95  Resp effort/pattern: Easy/Regular      Peripheral pulses: Yes    Edema:  Yes 1+ RLE/2+ LLE    Numbness/tingling:  No    Orthopedic/exercise limitations:  No      Psychosocial assessment:    Support System:Family-lives with wife. Has 4 daughters and 1 son, several grandchildren. All local.    Physical/Behavioral signs of abuse/neglect:  No    Advance Directives:  No  [] info given    Learning Preferences: Primary Language:English        Preferred method of learning []  video []  handouts        [] listening/lecture   [x]  all    Memory impairment? No     EXERCISE  Initial Assessment  Day 1 EXERCISE  Intervention  Day 2-30 EXERCISE  Re-Assessment  Day 31-60 EXERCISE  Re-Assessment  Day 61-90+ EXERCISE  Last Day   Date:   11/9/17 Date:  Date:  Date:  Date:           Pre Rehab  6 MWT  400 ft = 34% pred (reduced)  1.6 METs  SpO2: 90%   0 .76  MPH   HR: 98bpm      RPD: 4, RPE: 0                                           Post Rehab   6 MWT  ft = % pred   METs  SpO2: %  MPH  HR:  bpm      RPD:  , RPE:                                        GOALS  List at least 2 patient specific goals    1. Would like to lose some weight    2. Would like to be able to walk further and do more with grandchildren without getting short of breath. Learning Barrier(s)  [] Speech           [] Literacy              [] Hearing          [] Cognitive            [] Vision             [x]  Ready to Learn          Balance Issues  [] Y  [x] N            Orthopedic Issues  [x]  Y[]  N  Osteoarthritis in both knees that produces pain.     Rate your Physical   Fitness (PF)?    6/10    Handgrip:  Right: 29  Left: 28    EDUCATION  [x]  Staff Introduction  []  Proper setup/O2 use  [x]  Equipment Poyen'n  [x] RPD/RPE Explain  [x]  SpO2/HR Explain  [x]  Breathing Retraining  [x]  Explain Intensity  [x] Initial Levels set GOALS                                        If Patient has a Learning Barrier, action:                   If pt has balance or orthopedic issues:  Interventions:                    Rate your physical   fitness (PF)?:   /10        -30 day PF goal:  /10    EDUCATION  [] Understands proper set/up O2 use  []  Understands SpO2 and RPD? [] Aerobic progression explained? []  Intensity progression explained? GOALS                                                                                           Rate your physical   fitness (PF)?:   /10        -30 day PF goal:  /10    EDUCATION   []  Home Activity education offered  [] Stretching/ Flexibility education offered  [] Attended Benefits of Exercise Class  []  Attended Resistance Training Class                                                    GOALS                                                                                           Rate your physical   fitness (PF)?:   /10    -30 day PF goal:  /10    EDUCATION  [] Attended all individually relevant exercise education sessions? []  Knows current exercise goals and recmndtns?:                                                  Goals Achieved? Rate your physical fitness now?:     /10  Handgrip:  Right:  Left:    Discharge  EDUCATION  []  Attended all individually relevant exercise education sessions?    [] Knows current exercise goals and recmndtns?:                                                                                        PHYSICIAN DIRECTED   EXERCISE RX     RPE : 11 - 14  Titrate O2 to keep SpO2 >89%    Frequency (F): 2-3x/wk in Rehab,         Initial Intensity : 1.6 METs (from 6MWT)   1.0-1.3 (60-80% of walk speed for TM)    Type (T): Aerobic (TM,NS, SF, AE, AB, RB, EL, Arc), RT 1-3#, 8-16 reps    CAN USE ALL EQUIPMENT EXCEPT       Time (Ti): Aerobic:   15-40 min               Progression: 1-2 min total time for TM, AB, NS, SF or Arm ergometer per session or when a steady state has occurred in RPE if  SpO2 and HR levels are acceptable           PHYSICIAN DIRECTED   EXERCISE RX       Titrate O2 to keep SpO2 >89%    Frequency (F): 2-3x/wk in Rehab, 1-3x/wk home walking       Intensity (I):  Initial + 5-10% increase each week or when a steady state has occurred in RPE if  SpO2 and HR levels are acceptable  Type (T)  Aerobic (TM,NS, SFR,SFS, AE, AB, RB), RT   8-16 reps    CAN USE ALL EQUIPMENT EXCEPT        Time (Ti): Aerobic:   30-40 min        Progression:   1-2 min total time for TM, AB, NS, SF or Arm ergometer per session or when a steady state has occurred in RPE if  SpO2 and HR levels are acceptable        Is pt. progressing in rehab?:               PHYSICIAN DIRECTED   EXERCISE RX       Titrate O2 to keep SpO2 >89%    Frequency (F): 2-3x/wk in Rehab, 1-3x/wk home walking       Intensity (I):  Initial + 5-10% increase each week when a steady state has occurred in RPE if  SpO2 and HR levels are acceptable  Type (T)  Aerobic (TM,NS, SFR,SFS, AE, AB, RB), RT   8-16 reps    CAN USE ALL EQUIPMENT EXCEPT        Time (Ti): Aerobic:   30-50 min     Progression:   1-2 min total time for TM, AB, NS, SF or Arm ergometer per session or when a steady state has occurred in RPE if  SpO2 and HR levels are acceptable              Is pt. progressing in rehab?:                 PHYSICIAN DIRECTED   EXERCISE RX       Titrate O2 to keep SpO2 >89%    Frequency (F): 2-3x/wk in Rehab, 1-3x/wk home walking       Intensity (I):  Initial + 5-10% increase each week when a steady state has occurred in RPE if  SpO2 and HR levels are acceptable  Type (T):   Aerobic (TM,NS, SFR,SFS, AE, AB, RB), RT   8-16 reps    CAN USE ALL EQUIPMENT EXCEPT          Time (Ti): Aerobic:   30-58 min         Progression:   1-2 min total time for TM, AB, NS, SF or Arm ergometer per session or when a steady state has occurred in RPE if  SpO2 and HR levels are acceptable            Is pt. progressing in rehab?:               Final Intensity (I): See below and final 6MWT above            ACHIEVED TOTAL AEROBIC EXERCISE TIME:  min         FINAL LEVELS:  [] TM: min  [] Nustep: level  min  [] Arm ergometer: west min  [] Scifit: level min  [] HW :  # x  reps  [] Dy:    X   reps  [] Cybex RT:    # x   Reps  [] Eliptical:level  X  Min  [] Arc: level   X    mins  [] RB:  Level  X   mins  [] AB: level   X     Min              Did pt. progress in rehab?:     30 DAY TARGET GOAL  [x] Start slowly but progress each week  [x] Increase duration on the equipment  [x] Start resistance training    -30 day PF goal: 7/10                 Current Home Exercise :None    Frequency:      Type:                     Health Knowledge   Test Score:  23/25     Current Home Exercise:   Frequency:      Type:         Time:  min                                  Current Home Exercise:   Frequency:       Type:         Time:  min                                Current Home Exercise:   Frequency:       Type:         Time:  min                                                      Discharge exercise plan                                  Repeat Health Knowledge Test Score :    /25     [facility]'s INDIVIDUAL TREATMENT PLAN  SMOKING AND   OTHER COMPONENTS    Smoking/Other  Components   Initial Assessment  Day 1 Smoking/Other  Components  Intervention  Day 2-30 Smoking/Other  Components  Re-Assessment  Day 31-60 Smoking/Other  Components  Re-Assessment Day 61-90+ Smoking/Other  Components  Discharge  Final Day   Tobacco Use Hx  [x] Y  [] N?:   Quit Date: 1/1/88  Cig/Day: 10  Years: 18  Tobacco Use  Any change in Smoking Status?:  no  []  not smoking  Quit Date:   (if applicable)  Intervention  []  Information/ed material given on cessation techniques  []  smoking cessation class schedule given Tobacco Use  Any change in Smoking Status?:      Quit Date:   (if applicable)          Intervention  [] Referral to Tobacco Cessation Specialist (TCS) Tobacco Use  Any change in Smoking Status?:     Quit Date:   (if applicable)        Intervention Tobacco Use  Any change in Smoking Status?:     Quit Date:   (if applicable)          Intervention Tobacco Use  Any change in Smoking Status?:   Quit Date:   (if applicable)            Intervention  [] Pt used TCS counseling           Other  Components:    [x]  Environmental Factors    [x]  Prevention Management of Exacerbations    []  CHF Management    []  Hypertension Management     Intervention/  Education                [facility]'s INDIVIDUAL TREATMENT PLAN  OXYGEN AND MEDICATIONS    OXYGEN  MEDICATIONS   Initial Assessment  Day 1 OXYGEN  MEDICATIONS  Intervention  Day 2-30 OXYGEN  MEDICATION  Re-Assessment  Day 31-60 OXYGEN  MEDICATION  ReAssessment Day 61-90+ OXYGEN  MEDICATION  Discharge  Final Day    11/9/17                Medications  [x]  Uses as prescribed  []  Non- compliant with prescribed meds    Respiratory Medications    [x]  Proper use   and technique of MDI, DPI and/or nebs  []  Incorrect use and technique of MDI, DPI and /or nebs    Hypoxemia  Problem:    [x]  No problem  [] Noncompliant with O2 use  []  Oxygen in [place] only  []  No home O2  []  No portable O2  []  Needs O2 prescription and O2 qualifying data sent for setup   ()Poor knowledge of O2 use/safety    Current Oxygen Prescription: N/A   l/min rest   l/min exercise   titration   plan/weaning plan:  CPAP/BIPAP:    Goals:     []  Manage Hypoxemia  []  receive adequate portable system  []  Compliant with use as prescribed  []  Learn O2 safety guidelines   Medications  []  Uses as prescribed  []  Non- compliant with prescribed meds    Respiratory Medications    [] Proper use and technique of MDI, DPI and/or nebs  []  Incorrect use and technique of MDI, DPI and /or nebs    Hypoxemia  Problem:      Current Oxygen Re-Assessment  Day 61-90+ Psychosocial Discharge  Final Day   Psychosocial Screening Tools    (X) PHQ-9 score:7      (X) SF-36: 68       (X) UCSD SOB score: 41         PHQ-9 Score: If score >or =15, Action:     SF-36 Mental Score:     UCSD Score: Any action on Screening Tools?:                  Psychosocial Screening   Tools    Repeat PHQ-9 Score if alfreda:    Repeat SF-36      Repeat UCSD SOB Score:       Assessment  []  S/S of depression identified? No    []  PCP notified of PHQ-9 results      []  On Anti-anxiety / anti-depression meds?: No           Intervention  If S/S of depression present, action taken:     Is the patient receiving support for the NM program at home?:  []  Y    [] N    Is the patient tolerating the intensity and duration of the NM program?:     [] Y   [] N Re-Assessment  [] S/S of depression present?   If [x] , action:         Med Changes?:   [] Y   [] N        Is the patient tolerating the intensity and duration of the NM program?:    []  Y    [] N  Re-Assessment  Psych Issue notes:        Is the patient receiving support for the NM program at home?:  []  Y  [] N    Is the patient tolerating the intensity and duration of the NM program?:  []  Y   []  N     Final Assessment                       Results of any Physician/ Counselor Intervention?:     [] Y   [] N         GOALS        30 DAY TARGET GOAL    [x]  Assess Mental Score using   SF-36 and PHQ-9   [x]  Teach PLB  [x]  Reassure pt that (s)he can stop and rest, adjust the speeds, and/or switch modalities from our suggestions  -    (0 being 'None', 10 being 'Very'),  -How would you rate your Anxiety Level: 3/10      -How would you rate your level of  depression: 0/10    -How would you rate your mood: (0 is negative all the time, 10 is positive all the time):  8/10          ADL's  Assess PF score on SF-36    Score= 25    From UCSD, ADL pt struggles with most: Bringing garbage cans in from curb    Out of 10, rate your ability to do use  []  review PFT  [] Inhalers   []Home Activity/Warm up/ stretches  Attend Classes:  [] Nutrition  [] Panic control, relaxation, managing depression  [] A&P of the Respiratory System   [] Environ. Issues+ Travel   [] Bronchial Hygiene / Preventing Infection  [] Resistance Training  []  Benefits of Exercise  [] Energy Cons        Education  Individual instruction  [] PLB  [] RPD/RPE   [] O2 use  []  review PFT  [] Inhalers   [] Home Activity/Warm up/ stretches  Attend Classes:  [] Nutrition  []  Panic conntrol, relaxation, managing depression  []  A&P of the Respiratory System   [] Environ. Issues+ Travel   [] Bronchial Hygiene / Preventing Infection  []  Resistance Training  [] Benefits of Exercise  [] Energy Cons    Education  Individual instruction  [] PLB  [] RPD/RPE   []  O2 use  []  review PFT  [] Inhalers   [] Home Activity/Warm up/ stretches  Attend Classes:  [] Nutrition  []  Panic conntrol, relaxation, managing depression  [] A&P of the Respiratory System   [] Environ. Issues+ Travel   [] Bronchial Hygiene / Preventing Infection  []  Resistance Training  [] Benefits of Exercise  [] Energy Cons   Education  []  Addressed pt questions on Education Topics                                                         Physician Interaction / Response:  (If none, continue on present care plan)     Physician Interaction / Response:   (If none, continue on present care plan)   Physician Interaction / Response:   (If none, continue on present care plan)   Physician Interaction / Response:   (If none, continue on present care plan)   Physician Interaction / Response:       Discharge the patient. Rehab Potential:  []  Good [x] Fair [] Poor    Summary: Vickye Libman is a 76yr old gentleman with Hx of DM, HTN, Hyperlipidemia, AFib, Asthma, JOANN and COPD. He is referred to Pulmonary Rehab by his pulmonologist. He c/o shortness of breath with exertion and walking distances.  He feels that some of his activities are

## 2017-11-09 NOTE — PROGRESS NOTES
Patient's Initial NH eval/interview performed. Pt instructed on class routine, warm ups/cool downs and participated in 20 min of exercise while being monitored. Patient remained in NSR. BS before and after stable. Patient tolerated well.

## 2017-11-14 ENCOUNTER — HOSPITAL ENCOUNTER (OUTPATIENT)
Dept: CARDIAC REHAB | Age: 77
Discharge: HOME | End: 2017-11-15
Admitting: INTERNAL MEDICINE

## 2017-11-14 VITALS — BODY MASS INDEX: 40.45 KG/M2 | WEIGHT: 266 LBS

## 2017-11-14 LAB
GLUCOSE BLD-MCNC: 124 MG/DL (ref 70–99)
GLUCOSE BLD-MCNC: 92 MG/DL (ref 70–99)
PERFORMED ON: ABNORMAL
PERFORMED ON: NORMAL

## 2017-11-16 ENCOUNTER — HOSPITAL ENCOUNTER (OUTPATIENT)
Dept: CARDIAC REHAB | Age: 77
Discharge: HOME | End: 2017-11-17
Admitting: INTERNAL MEDICINE

## 2017-11-16 VITALS — BODY MASS INDEX: 40.9 KG/M2 | WEIGHT: 269 LBS

## 2017-11-16 LAB
GLUCOSE BLD-MCNC: 106 MG/DL (ref 70–99)
GLUCOSE BLD-MCNC: 108 MG/DL (ref 70–99)
PERFORMED ON: ABNORMAL
PERFORMED ON: ABNORMAL

## 2017-11-21 ENCOUNTER — HOSPITAL ENCOUNTER (OUTPATIENT)
Dept: CARDIAC REHAB | Age: 77
Discharge: HOME | End: 2017-11-22
Admitting: INTERNAL MEDICINE

## 2017-11-21 VITALS — BODY MASS INDEX: 40.79 KG/M2 | WEIGHT: 268.3 LBS

## 2017-11-21 LAB
GLUCOSE BLD-MCNC: 94 MG/DL (ref 70–99)
PERFORMED ON: NORMAL

## 2017-11-26 DIAGNOSIS — J47.0 BRONCHIECTASIS WITH ACUTE LOWER RESPIRATORY INFECTION (HCC): ICD-10-CM

## 2017-11-27 RX ORDER — PANTOPRAZOLE SODIUM 40 MG/1
TABLET, DELAYED RELEASE ORAL
Qty: 30 TABLET | Refills: 0 | Status: SHIPPED | OUTPATIENT
Start: 2017-11-27 | End: 2017-12-24 | Stop reason: SDUPTHER

## 2017-11-28 ENCOUNTER — HOSPITAL ENCOUNTER (OUTPATIENT)
Dept: CARDIAC REHAB | Age: 77
Discharge: HOME | End: 2017-11-29
Admitting: INTERNAL MEDICINE

## 2017-11-28 LAB
GLUCOSE BLD-MCNC: 86 MG/DL (ref 70–99)
PERFORMED ON: NORMAL

## 2017-11-29 RX ORDER — AMIODARONE HYDROCHLORIDE 100 MG/1
100 TABLET ORAL EVERY OTHER DAY
Qty: 45 TABLET | Refills: 0 | Status: SHIPPED | OUTPATIENT
Start: 2017-11-29 | End: 2018-02-01 | Stop reason: SDUPTHER

## 2017-11-30 ENCOUNTER — HOSPITAL ENCOUNTER (OUTPATIENT)
Dept: CARDIAC REHAB | Age: 77
Discharge: HOME | End: 2017-12-01
Admitting: INTERNAL MEDICINE

## 2017-11-30 VITALS — WEIGHT: 266.3 LBS | BODY MASS INDEX: 40.49 KG/M2

## 2017-11-30 VITALS — WEIGHT: 267 LBS | BODY MASS INDEX: 40.6 KG/M2

## 2017-11-30 LAB
GLUCOSE BLD-MCNC: 106 MG/DL (ref 70–99)
PERFORMED ON: ABNORMAL

## 2017-12-01 ENCOUNTER — HOSPITAL ENCOUNTER (OUTPATIENT)
Dept: OTHER | Age: 77
Discharge: HOME | End: 2017-12-31
Attending: INTERNAL MEDICINE | Admitting: INTERNAL MEDICINE

## 2017-12-12 ENCOUNTER — HOSPITAL ENCOUNTER (OUTPATIENT)
Dept: CARDIAC REHAB | Age: 77
Discharge: HOME | End: 2017-12-13
Admitting: INTERNAL MEDICINE

## 2017-12-12 VITALS — WEIGHT: 264 LBS | BODY MASS INDEX: 40.14 KG/M2

## 2017-12-14 ENCOUNTER — HOSPITAL ENCOUNTER (OUTPATIENT)
Dept: CARDIAC REHAB | Age: 77
Discharge: HOME | End: 2017-12-15
Admitting: INTERNAL MEDICINE

## 2017-12-14 VITALS — WEIGHT: 266.5 LBS | BODY MASS INDEX: 40.52 KG/M2

## 2017-12-19 ENCOUNTER — OFFICE VISIT (OUTPATIENT)
Dept: INTERNAL MEDICINE CLINIC | Age: 77
End: 2017-12-19

## 2017-12-19 VITALS
RESPIRATION RATE: 16 BRPM | SYSTOLIC BLOOD PRESSURE: 140 MMHG | WEIGHT: 264.6 LBS | OXYGEN SATURATION: 90 % | TEMPERATURE: 98.8 F | BODY MASS INDEX: 40.1 KG/M2 | HEIGHT: 68 IN | DIASTOLIC BLOOD PRESSURE: 60 MMHG | HEART RATE: 96 BPM

## 2017-12-19 DIAGNOSIS — J44.1 COPD EXACERBATION (HCC): Primary | ICD-10-CM

## 2017-12-19 PROCEDURE — 99213 OFFICE O/P EST LOW 20 MIN: CPT | Performed by: INTERNAL MEDICINE

## 2017-12-19 RX ORDER — PREDNISONE 10 MG/1
TABLET ORAL
Qty: 22 TABLET | Refills: 0 | Status: SHIPPED | OUTPATIENT
Start: 2017-12-19 | End: 2018-01-02

## 2017-12-19 RX ORDER — CEFDINIR 300 MG/1
300 CAPSULE ORAL 2 TIMES DAILY
Qty: 14 CAPSULE | Refills: 0 | Status: SHIPPED | OUTPATIENT
Start: 2017-12-19 | End: 2017-12-26

## 2017-12-19 ASSESSMENT — PATIENT HEALTH QUESTIONNAIRE - PHQ9
1. LITTLE INTEREST OR PLEASURE IN DOING THINGS: 0
SUM OF ALL RESPONSES TO PHQ9 QUESTIONS 1 & 2: 0
SUM OF ALL RESPONSES TO PHQ QUESTIONS 1-9: 0
2. FEELING DOWN, DEPRESSED OR HOPELESS: 0

## 2017-12-19 NOTE — PROGRESS NOTES
Subjective:      Patient ID: Rosanna Quick is a 67 y.o. male. HPI  Here for evaluation of a URI. He has been with cough and sputum, turning purulent, with some chills and wheezing. Pulse ox is a little lower than usual, now running 90% mostly. No fever noted at home. Review of Systems   All other systems reviewed and are negative.    meds reviewed    Objective:   Physical Exam   Constitutional: He appears well-developed and well-nourished. HENT:   Head: Normocephalic and atraumatic. Left Ear: External ear normal.   Nose: Nose normal.   Mouth/Throat: Oropharynx is clear and moist. No oropharyngeal exudate. Neck: Normal range of motion. Neck supple. No thyromegaly present. Cardiovascular: Normal rate, regular rhythm and normal heart sounds. Pulmonary/Chest: Effort normal and breath sounds normal.   SOB with minimal activity. Scattered rhonchi bilaterally without caleb wheezing   Lymphadenopathy:     He has no cervical adenopathy. Vitals reviewed. Assessment:      COPD exacerbation-  Prednisone and omnicef.       Plan:      As above

## 2017-12-21 DIAGNOSIS — J44.9 CHRONIC OBSTRUCTIVE PULMONARY DISEASE, UNSPECIFIED COPD TYPE (HCC): ICD-10-CM

## 2017-12-21 RX ORDER — ALBUTEROL SULFATE 90 UG/1
2 AEROSOL, METERED RESPIRATORY (INHALATION) EVERY 4 HOURS PRN
Qty: 1 INHALER | Refills: 5 | Status: SHIPPED | OUTPATIENT
Start: 2017-12-21 | End: 2018-06-08 | Stop reason: SDUPTHER

## 2017-12-24 DIAGNOSIS — J47.0 BRONCHIECTASIS WITH ACUTE LOWER RESPIRATORY INFECTION (HCC): ICD-10-CM

## 2017-12-26 RX ORDER — PANTOPRAZOLE SODIUM 40 MG/1
TABLET, DELAYED RELEASE ORAL
Qty: 30 TABLET | Refills: 0 | Status: SHIPPED | OUTPATIENT
Start: 2017-12-26 | End: 2018-01-26 | Stop reason: SDUPTHER

## 2018-01-01 ENCOUNTER — HOSPITAL ENCOUNTER (OUTPATIENT)
Dept: OTHER | Age: 78
Discharge: HOME | End: 2018-01-31
Attending: INTERNAL MEDICINE | Admitting: INTERNAL MEDICINE

## 2018-01-02 ENCOUNTER — OFFICE VISIT (OUTPATIENT)
Dept: INTERNAL MEDICINE CLINIC | Age: 78
End: 2018-01-02

## 2018-01-02 VITALS
HEART RATE: 92 BPM | RESPIRATION RATE: 16 BRPM | BODY MASS INDEX: 40.48 KG/M2 | WEIGHT: 266.2 LBS | SYSTOLIC BLOOD PRESSURE: 128 MMHG | OXYGEN SATURATION: 91 % | DIASTOLIC BLOOD PRESSURE: 58 MMHG

## 2018-01-02 DIAGNOSIS — J44.9 CHRONIC OBSTRUCTIVE PULMONARY DISEASE, UNSPECIFIED COPD TYPE (HCC): Primary | ICD-10-CM

## 2018-01-02 DIAGNOSIS — E53.8 B12 DEFICIENCY: ICD-10-CM

## 2018-01-02 DIAGNOSIS — I10 ESSENTIAL HYPERTENSION: ICD-10-CM

## 2018-01-02 DIAGNOSIS — Z79.4 TYPE 2 DIABETES MELLITUS WITHOUT COMPLICATION, WITH LONG-TERM CURRENT USE OF INSULIN (HCC): Chronic | ICD-10-CM

## 2018-01-02 DIAGNOSIS — E11.9 TYPE 2 DIABETES MELLITUS WITHOUT COMPLICATION, WITH LONG-TERM CURRENT USE OF INSULIN (HCC): Chronic | ICD-10-CM

## 2018-01-02 DIAGNOSIS — N18.30 CKD (CHRONIC KIDNEY DISEASE) STAGE 3, GFR 30-59 ML/MIN (HCC): ICD-10-CM

## 2018-01-02 DIAGNOSIS — E55.9 VITAMIN D DEFICIENCY: ICD-10-CM

## 2018-01-02 DIAGNOSIS — N18.30 CKD (CHRONIC KIDNEY DISEASE) STAGE 3, GFR 30-59 ML/MIN (HCC): Primary | ICD-10-CM

## 2018-01-02 LAB
A/G RATIO: 1.7 (ref 1.1–2.2)
ALBUMIN SERPL-MCNC: 3.8 G/DL (ref 3.4–5)
ALP BLD-CCNC: 62 U/L (ref 40–129)
ALT SERPL-CCNC: 15 U/L (ref 10–40)
ANION GAP SERPL CALCULATED.3IONS-SCNC: 19 MMOL/L (ref 3–16)
AST SERPL-CCNC: 13 U/L (ref 15–37)
BILIRUB SERPL-MCNC: 0.4 MG/DL (ref 0–1)
BUN BLDV-MCNC: 38 MG/DL (ref 7–20)
CALCIUM SERPL-MCNC: 9.1 MG/DL (ref 8.3–10.6)
CHLORIDE BLD-SCNC: 98 MMOL/L (ref 99–110)
CO2: 24 MMOL/L (ref 21–32)
CREAT SERPL-MCNC: 1.7 MG/DL (ref 0.8–1.3)
ESTIMATED AVERAGE GLUCOSE: 134.1 MG/DL
GFR AFRICAN AMERICAN: 47
GFR NON-AFRICAN AMERICAN: 39
GLOBULIN: 2.3 G/DL
GLUCOSE BLD-MCNC: 89 MG/DL (ref 70–99)
HBA1C MFR BLD: 6.3 %
POTASSIUM SERPL-SCNC: 4.8 MMOL/L (ref 3.5–5.1)
SODIUM BLD-SCNC: 141 MMOL/L (ref 136–145)
TOTAL PROTEIN: 6.1 G/DL (ref 6.4–8.2)
VITAMIN B-12: 594 PG/ML (ref 211–911)
VITAMIN D 25-HYDROXY: 32.6 NG/ML

## 2018-01-02 PROCEDURE — 99214 OFFICE O/P EST MOD 30 MIN: CPT | Performed by: INTERNAL MEDICINE

## 2018-01-02 RX ORDER — ALFUZOSIN HYDROCHLORIDE 10 MG/1
10 TABLET, EXTENDED RELEASE ORAL DAILY
Qty: 90 TABLET | Refills: 3 | Status: SHIPPED | OUTPATIENT
Start: 2018-01-02 | End: 2018-08-19 | Stop reason: SDUPTHER

## 2018-01-02 NOTE — PROGRESS NOTES
Subjective:      Patient ID: Jessie Lua is a 65 y.o. male. HPI  Here for f/u of his htn and COPD and DM. He has been doing ok. Continues to cough with white sputum. No fever. He is not fasting at this point. 1. Chronic obstructive pulmonary disease, unspecified COPD type ([address]  - still with some sputum but acute exacerbation looks ok   2. Essential hypertension  - Denies chest pain or shortness of breath. Denies PND or orthopnea. No lower extremity edema noted. 3. Type 2 diabetes mellitus without complication, with long-term current use of insulin (HCC)  - No chest pain or shortness of breath. Denies polyuria, polydipsia, or polyphagia. The sugars have been up a little higher with the steroids obviously   4. CKD (chronic kidney disease) stage 3, GFR 30-59 ml/min  - no edema   5. B12 deficiency  - taking b12 supplements   6. Vitamin D deficiency  - taking supplements as well      Review of Systems   All other systems reviewed and are negative.     Current Outpatient Prescriptions   Medication Sig Dispense Refill    pantoprazole (PROTONIX) 40 MG tablet TAKE 1 TABLET BY MOUTH DAILY 30 tablet 0    ACCU-CHEK SOFTCLIX LANCETS MISC USE AS DIRECTED 100 each 11    albuterol sulfate HFA (VENTOLIN HFA) 108 (90 Base) MCG/ACT inhaler Inhale 2 puffs into the lungs every 4 hours as needed for Wheezing or Shortness of Breath 1 Inhaler 5    furosemide (LASIX) 20 MG tablet TAKE 1 TABLET BY MOUTH DAILY 30 tablet 5    amiodarone (PACERONE) 100 MG tablet TAKE 1 TABLET BY MOUTH EVERY OTHER DAY 45 tablet 0    potassium chloride (KLOR-CON M) 10 MEQ extended release tablet TAKE 1 TABLET BY MOUTH DAILY 30 tablet 11    valsartan-hydrochlorothiazide (DIOVAN-HCT) 320-25 MG per tablet TAKE 1 TABLET BY MOUTH EVERY DAY 30 tablet 11    Cyanocobalamin (VITAMIN B-12 PO) Take by mouth      Cholecalciferol (VITAMIN D3 PO) Take by mouth      Insulin Syringe-Needle U-100 (INSULIN SYRINGE .5CC/31GX5/16\") 31G X 5/16\" 0.5 ML MISC USE AS DIRECTED 100 each 5    Umeclidinium Bromide (INCRUSE ELLIPTA) 62.5 MCG/INH AEPB Inhale 1 puff into the lungs daily 1 each 3    ACCU-CHEK COMPACT PLUS strip TEST BLOOD SUGAR THREE TIMES DAILY 102 strip 5    metFORMIN (GLUCOPHAGE) 1000 MG tablet TAKE 1 TABLET BY MOUTH TWICE DAILY WITH FOOD 180 tablet 3    albuterol sulfate HFA (VENTOLIN HFA) 108 (90 Base) MCG/ACT inhaler Inhale 2 puffs into the lungs every 4 hours as needed for Wheezing 1 Inhaler 3    atorvastatin (LIPITOR) 40 MG tablet TAKE 1 TABLET BY MOUTH DAILY 30 tablet 11    cloNIDine (CATAPRES) 0.2 MG tablet TAKE 1 TABLET BY MOUTH TWICE DAILY 60 tablet 5    verapamil (CALAN SR) 240 MG extended release tablet TAKE 1 TABLET BY MOUTH TWICE DAILY 180 tablet 3    azelastine (ASTELIN) 0.1 % nasal spray 1 spray by Nasal route 2 times daily Use in each nostril as directed 1 Bottle 3    levalbuterol (XOPENEX) 1.25 MG/3ML nebulizer solution USE 1 VIAL VIA NEBULIZER FOUR TIMES DAILY 216 mL 3    amiodarone (PACERONE) 100 MG tablet Take 1 tablet by mouth every other day 30 tablet 2    LANTUS 100 UNIT/ML injection vial INJECT 50 UNITS SUBCUTANEOUS DAILY AS DIRECTED 20 mL 11    Misc. Devices (ACAPELLA) MISC 1 Device by Does not apply route as needed 1 each 0    alfuzosin (UROXATRAL) 10 MG SR tablet Take 10 mg by mouth daily.  aspirin EC 81 MG EC tablet Take 2 tablets by mouth daily. 30 tablet 3    ferrous sulfate 325 (65 FE) MG tablet Take 325 mg by mouth 3 times daily. No current facility-administered medications for this visit. Allergies:  Hytrin [terazosin hcl]; Penicillins; Shrimp flavor; Sulfa antibiotics;  Tekturna [aliskiren fumarate]; and Vancomycin      Past Medical History:   Diagnosis Date    Allergic rhinitis     Asthma     Atrial fibrillation (HCC)     Hyperlipidemia     Hypertension     Iron deficiency anemia     Obstructive sleep apnea     Type II or unspecified type diabetes mellitus without mention of complication, not stated as uncontrolled          Past Surgical History:   Procedure Laterality Date    CATARACT REMOVAL  2/2012    bilateral    COLONOSCOPY  7/10/2009    diverticulosis    hemorrhoids    GALLBLADDER SURGERY  1981    UPPER GASTROINTESTINAL ENDOSCOPY  7-2005    7/10/2009    normal         Social History   Substance Use Topics    Smoking status: Former Smoker     Packs/day: 0.50     Years: 18.00     Quit date: 1/1/1988    Smokeless tobacco: Never Used    Alcohol use No         Family History   Problem Relation Age of Onset    Heart Disease Mother     Stroke Mother     Vision Loss Mother     High Blood Pressure Father     High Blood Pressure Brother     Arthritis Paternal Grandmother     Diabetes Paternal Grandmother          Vitals:    01/02/18 1003 01/02/18 1005 01/02/18 1019   BP: (!) 160/60 (!) 160/60 (!) 128/58   Site: Right Arm     Position: Sitting     Cuff Size: Medium Adult     Pulse: 92     Resp: 16     SpO2: 91%     Weight: 266 lb 3.2 oz (120.7 kg)          Objective:   Physical Exam   Constitutional: He is oriented to person, place, and time. He appears well-developed and well-nourished. Cardiovascular: Normal rate, regular rhythm and normal heart sounds. Pulmonary/Chest: Effort normal and breath sounds normal. He has no wheezes. He has no rales. Musculoskeletal: He exhibits no edema. Neurological: He is alert and oriented to person, place, and time. Vitals reviewed. Assessment:      1. Chronic obstructive pulmonary disease, unspecified COPD type ([address]  -he has chronic sputum, but it does not seem purulent. Add Mucinex. Will humidify the air in his bedroom. 2. Essential hypertension  - is well controlled. No changes are needed. Follow.    3. Type 2 diabetes mellitus without complication, with long-term current use of insulin (HCC)  - blood sugars were high with the steroids but seem to be improving now repeat A1c.   4. CKD (chronic kidney disease) stage 3, GFR 30-59 ml/min  - minimal edema with compression stockings. Continue the current regimen. 5. B12 deficiency  - has been taking supplements as directed. Repeat the level today. 6. Vitamin D deficiency  -has been taking supplements as directed. Repeat the level today.           Plan:      As above    F/u in 4 months

## 2018-01-22 ENCOUNTER — TELEPHONE (OUTPATIENT)
Dept: INTERNAL MEDICINE CLINIC | Age: 78
End: 2018-01-22

## 2018-01-26 DIAGNOSIS — J47.0 BRONCHIECTASIS WITH ACUTE LOWER RESPIRATORY INFECTION (HCC): ICD-10-CM

## 2018-01-26 RX ORDER — PANTOPRAZOLE SODIUM 40 MG/1
TABLET, DELAYED RELEASE ORAL
Qty: 30 TABLET | Refills: 0 | Status: SHIPPED | OUTPATIENT
Start: 2018-01-26 | End: 2018-02-28 | Stop reason: CLARIF

## 2018-02-01 ENCOUNTER — OFFICE VISIT (OUTPATIENT)
Dept: PULMONOLOGY | Age: 78
End: 2018-02-01

## 2018-02-01 VITALS
RESPIRATION RATE: 20 BRPM | SYSTOLIC BLOOD PRESSURE: 144 MMHG | TEMPERATURE: 97.3 F | BODY MASS INDEX: 40.77 KG/M2 | HEART RATE: 78 BPM | WEIGHT: 269 LBS | OXYGEN SATURATION: 94 % | DIASTOLIC BLOOD PRESSURE: 69 MMHG | HEIGHT: 68 IN

## 2018-02-01 DIAGNOSIS — J40 BRONCHITIS: ICD-10-CM

## 2018-02-01 DIAGNOSIS — J44.9 COPD WITH CHRONIC BRONCHITIS (HCC): Primary | ICD-10-CM

## 2018-02-01 DIAGNOSIS — J31.0 RHINOSINUSITIS: ICD-10-CM

## 2018-02-01 DIAGNOSIS — J32.9 RHINOSINUSITIS: ICD-10-CM

## 2018-02-01 DIAGNOSIS — K21.9 GASTROESOPHAGEAL REFLUX DISEASE WITHOUT ESOPHAGITIS: ICD-10-CM

## 2018-02-01 DIAGNOSIS — J47.0 BRONCHIECTASIS WITH ACUTE LOWER RESPIRATORY INFECTION (HCC): ICD-10-CM

## 2018-02-01 PROCEDURE — 99214 OFFICE O/P EST MOD 30 MIN: CPT | Performed by: INTERNAL MEDICINE

## 2018-02-01 RX ORDER — PANTOPRAZOLE SODIUM 40 MG/1
40 TABLET, DELAYED RELEASE ORAL DAILY
Qty: 30 TABLET | Refills: 6 | Status: SHIPPED | OUTPATIENT
Start: 2018-02-01 | End: 2018-09-23 | Stop reason: SDUPTHER

## 2018-02-01 RX ORDER — DOXYCYCLINE HYCLATE 100 MG/1
100 CAPSULE ORAL 2 TIMES DAILY
Qty: 20 CAPSULE | Refills: 0 | Status: SHIPPED | OUTPATIENT
Start: 2018-02-01 | End: 2018-02-28 | Stop reason: CLARIF

## 2018-02-01 NOTE — PROGRESS NOTES
Chief complaint  This is a 65y.o. year old male  who comes to see me with a chief complaint of   Chief Complaint   Patient presents with    COPD       HPI  Here with follow up on COPD, chronic bronchitis     He had been doing well until he came down with bronchitis at the end of December. Sputum was green and yellow. Was on omnicef and sputum cleared up but then started up again with more mucus. Mucus is thick and white. Previously he had prolonged course of levaquin for recurrent pseudomonal infection. He was doing pulmonary rehab and was doing well. He is not in rehab since the bronchitis and feels like he is losing some of his gains. He is using incruse and albuterol.   Stopped using astelin due to bloody nose    Past Medical History:   Diagnosis Date    Allergic rhinitis     Asthma     Atrial fibrillation ([address]     Hyperlipidemia     Hypertension     Iron deficiency anemia     Obstructive sleep apnea     Type II or unspecified type diabetes mellitus without mention of complication, not stated as uncontrolled        Past Surgical History:   Procedure Laterality Date    CATARACT REMOVAL  2/2012    bilateral    COLONOSCOPY  7/10/2009    diverticulosis    hemorrhoids    GALLBLADDER SURGERY  1981    UPPER GASTROINTESTINAL ENDOSCOPY  7-2005    7/10/2009    normal     Current Outpatient Prescriptions   Medication Sig Dispense Refill    pantoprazole (PROTONIX) 40 MG tablet TAKE 1 TABLET BY MOUTH DAILY 30 tablet 0    cloNIDine (CATAPRES) 0.2 MG tablet TAKE 1 TABLET BY MOUTH TWICE DAILY 60 tablet 5    alfuzosin (UROXATRAL) 10 MG extended release tablet Take 1 tablet by mouth daily 90 tablet 3    ACCU-CHEK SOFTCLIX LANCETS MISC USE AS DIRECTED 100 each 11    albuterol sulfate HFA (VENTOLIN HFA) 108 (90 Base) MCG/ACT inhaler Inhale 2 puffs into the lungs every 4 hours as needed for Wheezing or Shortness of Breath 1 Inhaler 5    furosemide (LASIX) 20 MG tablet TAKE 1 TABLET BY MOUTH DAILY 30 tablet 5    potassium chloride (KLOR-CON M) 10 MEQ extended release tablet TAKE 1 TABLET BY MOUTH DAILY 30 tablet 11    valsartan-hydrochlorothiazide (DIOVAN-HCT) 320-25 MG per tablet TAKE 1 TABLET BY MOUTH EVERY DAY 30 tablet 11    Cyanocobalamin (VITAMIN B-12 PO) Take by mouth      Cholecalciferol (VITAMIN D3 PO) Take by mouth      Insulin Syringe-Needle U-100 (INSULIN SYRINGE .5CC/31GX5/16\") 31G X 5/16\" 0.5 ML MISC USE AS DIRECTED 100 each 5    Umeclidinium Bromide (INCRUSE ELLIPTA) 62.5 MCG/INH AEPB Inhale 1 puff into the lungs daily 1 each 3    ACCU-CHEK COMPACT PLUS strip TEST BLOOD SUGAR THREE TIMES DAILY 102 strip 5    albuterol sulfate HFA (VENTOLIN HFA) 108 (90 Base) MCG/ACT inhaler Inhale 2 puffs into the lungs every 4 hours as needed for Wheezing 1 Inhaler 3    atorvastatin (LIPITOR) 40 MG tablet TAKE 1 TABLET BY MOUTH DAILY 30 tablet 11    verapamil (CALAN SR) 240 MG extended release tablet TAKE 1 TABLET BY MOUTH TWICE DAILY 180 tablet 3    azelastine (ASTELIN) 0.1 % nasal spray 1 spray by Nasal route 2 times daily Use in each nostril as directed 1 Bottle 3    levalbuterol (XOPENEX) 1.25 MG/3ML nebulizer solution USE 1 VIAL VIA NEBULIZER FOUR TIMES DAILY 216 mL 3    amiodarone (PACERONE) 100 MG tablet Take 1 tablet by mouth every other day 30 tablet 2    LANTUS 100 UNIT/ML injection vial INJECT 50 UNITS SUBCUTANEOUS DAILY AS DIRECTED 20 mL 11    Misc. Devices (ACAPELLA) MISC 1 Device by Does not apply route as needed 1 each 0    aspirin EC 81 MG EC tablet Take 2 tablets by mouth daily. 30 tablet 3    ferrous sulfate 325 (65 FE) MG tablet Take 325 mg by mouth 3 times daily. No current facility-administered medications for this visit. ROS:  GENERAL:  No fevers, chills, or night sweats;    HEENT:  Still with sinus issues, stopped astelin due to nose bleeding  HEART:  No chest pain, no palpitations  LUNGS:  Bronchitis again, more mucus although it is white, more SOB  ABDOMEN:  No abdominal pains, no changes in stools  GENITOURINARY:  No increased frequency, no blood in urine  EXTREMITIES:  no swelling, no lesions  NEURO:  No numbness or tingling, no seizures  SKIN:  No new rashes, no changes in hair or nails  LYMPH:  No masses, no swelling neck, armpits, or groin    PHYSICAL EXAM:  Vitals:    02/01/18 1052   BP: (!) 143/66   Pulse: 78   Resp: 20   Temp: 97.3 °F (36.3 °C)   SpO2: 94%       GENERAL:  Well nourished, alert, appears stated age, no distress; obese  HEENT:  No scleral icterus, no conjunctival irritation; Mallampati IV  NECK:  No thyromegaly, no bruits  LYMPH:  No cervical or supraclavicular adenopathy  HEART:  Regular rate and rhythm, no murmurs  LUNGS:  Few wheezes and rhonchi  ABDOMEN:  No distention, no organomegaly  EXTREMITIES:  No edema  no digital clubbing  NEURO:  No localizing deficits    Pulmonary Function Testing 8/2012  FEV1 1.87 L at 69% predicted (reduced)  FVC 3.36 L at 90% predicted (normal)  FEV1/FVC ratio at 56 (reduced)  TLC 6.07 L at 110% predicted (normal)  DLCO 23.5 at 98% predicted (normal)  Overall Mild obstruction    Chest imaging from 5/2017 is reviewed. Mild bronchiectasis with nodular opacities and patchy infiltrates. Improved from previous image    ECHO  Left ventricle size is normal.  Normal left ventricular wall thickness. Ejection fraction is visually estimated to be 55-60 %. No regional wall motion abnormalities are noted. There is reversal of E/A inflow velocities across the mitral valve  suggesting impaired left ventricular relaxation. The right ventricle is not well visualized.   Lab Results   Component Value Date    WBC 6.4 01/03/2017    HGB 10.4 (L) 01/03/2017    HCT 32.4 (L) 01/03/2017    MCV 78.3 (L) 01/03/2017     01/03/2017       No results found for: BNP    Lab Results   Component Value Date    CREATININE 1.6 (H) 01/19/2018    BUN 34 (H) 01/19/2018     01/19/2018    K 4.7 01/19/2018     01/19/2018    CO2 26

## 2018-02-05 LAB
CULTURE, RESPIRATORY: NORMAL
GRAM STAIN RESULT: NORMAL

## 2018-02-06 ENCOUNTER — TELEPHONE (OUTPATIENT)
Dept: INTERNAL MEDICINE CLINIC | Age: 78
End: 2018-02-06

## 2018-02-19 RX ORDER — INSULIN GLARGINE 100 [IU]/ML
INJECTION, SOLUTION SUBCUTANEOUS
Qty: 30 ML | Refills: 11 | Status: SHIPPED | OUTPATIENT
Start: 2018-02-19 | End: 2018-05-13

## 2018-02-20 ENCOUNTER — TELEPHONE (OUTPATIENT)
Dept: INTERNAL MEDICINE CLINIC | Age: 78
End: 2018-02-20

## 2018-02-28 ENCOUNTER — OFFICE VISIT (OUTPATIENT)
Dept: SURGERY | Age: 78
End: 2018-02-28

## 2018-02-28 ENCOUNTER — PROCEDURE VISIT (OUTPATIENT)
Dept: SURGERY | Age: 78
End: 2018-02-28

## 2018-02-28 VITALS
HEART RATE: 82 BPM | HEIGHT: 68 IN | BODY MASS INDEX: 40.32 KG/M2 | DIASTOLIC BLOOD PRESSURE: 66 MMHG | SYSTOLIC BLOOD PRESSURE: 125 MMHG | WEIGHT: 266 LBS

## 2018-02-28 DIAGNOSIS — M79.89 LEG SWELLING: ICD-10-CM

## 2018-02-28 DIAGNOSIS — I10 ESSENTIAL HYPERTENSION: ICD-10-CM

## 2018-02-28 DIAGNOSIS — I65.23 BILATERAL CAROTID ARTERY STENOSIS WITHOUT CEREBRAL INFARCTION: Primary | ICD-10-CM

## 2018-02-28 DIAGNOSIS — E78.5 HYPERLIPIDEMIA, UNSPECIFIED HYPERLIPIDEMIA TYPE: ICD-10-CM

## 2018-02-28 PROCEDURE — 93880 EXTRACRANIAL BILAT STUDY: CPT | Performed by: SURGERY

## 2018-02-28 PROCEDURE — 99214 OFFICE O/P EST MOD 30 MIN: CPT | Performed by: NURSE PRACTITIONER

## 2018-02-28 NOTE — PATIENT INSTRUCTIONS
PATIENT EDUCATION focused on signs/symptoms of stroke:  - Watch for one eye going dark like a shade was pulled down. - Watch for one side of the body or face not functioning correctly. - Difficulty with speech, such as slurring your words. - Difficulty finding the right words, such as calling an object by the wrong name when you know the real name. If you have any of these symptoms, do not call us or your family doctor. Call 911 and go immediately to the hospital.  You have a 4-6 hour window from the point when symptoms start to get to the hospital, get a CT scan done and initiate clot dissolving drugs. Return in about 6 months (around 8/28/2018) for CDS and office visit.

## 2018-02-28 NOTE — PROGRESS NOTES
Alison Knox MD   albuterol sulfate HFA (VENTOLIN HFA) 108 (90 Base) MCG/ACT inhaler Inhale 2 puffs into the lungs every 4 hours as needed for Wheezing or Shortness of Breath Yes Saman Nguyen DO   furosemide (LASIX) 20 MG tablet TAKE 1 TABLET BY MOUTH DAILY Yes Alison Knox MD   potassium chloride (KLOR-CON M) 10 MEQ extended release tablet TAKE 1 TABLET BY MOUTH DAILY Yes Alison Knox MD   valsartan-hydrochlorothiazide (DIOVAN-HCT) 320-25 MG per tablet TAKE 1 TABLET BY MOUTH EVERY DAY Yes Alison Knox MD   Cyanocobalamin (VITAMIN B-12 PO) Take by mouth Yes Historical Provider, MD   Cholecalciferol (VITAMIN D3 PO) Take by mouth Yes Historical Provider, MD   Insulin Syringe-Needle U-100 (INSULIN SYRINGE .5CC/31GX5/16\") 31G X 5/16\" 0.5 ML MISC USE AS DIRECTED Yes Alison Knox MD   Umeclidinium Bromide (INCRUSE ELLIPTA) 62.5 MCG/INH AEPB Inhale 1 puff into the lungs daily Yes Saman Nguyen DO   ACCU-CHEK COMPACT PLUS strip TEST BLOOD SUGAR THREE TIMES DAILY Yes Alison Knox MD   albuterol sulfate HFA (VENTOLIN HFA) 108 (90 Base) MCG/ACT inhaler Inhale 2 puffs into the lungs every 4 hours as needed for Wheezing Yes Saman Nguyen DO   atorvastatin (LIPITOR) 40 MG tablet TAKE 1 TABLET BY MOUTH DAILY Yes Alison Knox MD   verapamil (CALAN SR) 240 MG extended release tablet TAKE 1 TABLET BY MOUTH TWICE DAILY Yes Alison Knox MD   levalbuterol (XOPENEX) 1.25 MG/3ML nebulizer solution USE 1 VIAL VIA NEBULIZER FOUR TIMES DAILY Yes Alison Knox MD   amiodarone (PACERONE) 100 MG tablet Take 1 tablet by mouth every other day Yes Caryle Knoll, MD   Misc. Devices (ACAPELLA) MISC 1 Device by Does not apply route as needed Yes Saman Nguyen DO   aspirin EC 81 MG EC tablet Take 2 tablets by mouth daily.  Yes Jacquie Barnett MD   ferrous sulfate 325 (65 FE) MG tablet Take 325

## 2018-03-21 RX ORDER — AMIODARONE HYDROCHLORIDE 100 MG/1
100 TABLET ORAL EVERY OTHER DAY
Qty: 30 TABLET | Refills: 6 | Status: SHIPPED | OUTPATIENT
Start: 2018-03-21 | End: 2018-06-18 | Stop reason: SDUPTHER

## 2018-04-12 ENCOUNTER — OFFICE VISIT (OUTPATIENT)
Dept: INTERNAL MEDICINE CLINIC | Age: 78
End: 2018-04-12

## 2018-04-12 VITALS
BODY MASS INDEX: 40.68 KG/M2 | DIASTOLIC BLOOD PRESSURE: 52 MMHG | TEMPERATURE: 98.8 F | WEIGHT: 268.4 LBS | HEIGHT: 68 IN | HEART RATE: 88 BPM | SYSTOLIC BLOOD PRESSURE: 126 MMHG | OXYGEN SATURATION: 92 % | RESPIRATION RATE: 16 BRPM

## 2018-04-12 DIAGNOSIS — J20.9 BRONCHITIS WITH BRONCHOSPASM: Primary | ICD-10-CM

## 2018-04-12 PROCEDURE — 99213 OFFICE O/P EST LOW 20 MIN: CPT | Performed by: INTERNAL MEDICINE

## 2018-04-12 RX ORDER — CEFDINIR 300 MG/1
300 CAPSULE ORAL 2 TIMES DAILY
Qty: 14 CAPSULE | Refills: 0 | Status: SHIPPED | OUTPATIENT
Start: 2018-04-12 | End: 2018-04-19

## 2018-04-12 RX ORDER — PREDNISONE 10 MG/1
TABLET ORAL
Qty: 22 TABLET | Refills: 0 | Status: SHIPPED | OUTPATIENT
Start: 2018-04-12 | End: 2018-05-04

## 2018-04-12 ASSESSMENT — ENCOUNTER SYMPTOMS: RHINORRHEA: 1

## 2018-04-19 RX ORDER — MONTELUKAST SODIUM 10 MG/1
TABLET ORAL
Qty: 30 TABLET | Refills: 0 | Status: SHIPPED | OUTPATIENT
Start: 2018-04-19 | End: 2018-05-22 | Stop reason: SDUPTHER

## 2018-05-04 ENCOUNTER — OFFICE VISIT (OUTPATIENT)
Dept: INTERNAL MEDICINE CLINIC | Age: 78
End: 2018-05-04

## 2018-05-04 VITALS
HEART RATE: 88 BPM | BODY MASS INDEX: 40.8 KG/M2 | HEIGHT: 68 IN | WEIGHT: 269.2 LBS | SYSTOLIC BLOOD PRESSURE: 122 MMHG | RESPIRATION RATE: 16 BRPM | DIASTOLIC BLOOD PRESSURE: 52 MMHG | OXYGEN SATURATION: 90 %

## 2018-05-04 DIAGNOSIS — E78.00 PURE HYPERCHOLESTEROLEMIA: ICD-10-CM

## 2018-05-04 DIAGNOSIS — E53.8 B12 DEFICIENCY: ICD-10-CM

## 2018-05-04 DIAGNOSIS — K21.9 GASTROESOPHAGEAL REFLUX DISEASE, ESOPHAGITIS PRESENCE NOT SPECIFIED: ICD-10-CM

## 2018-05-04 DIAGNOSIS — N18.30 CKD (CHRONIC KIDNEY DISEASE) STAGE 3, GFR 30-59 ML/MIN (HCC): ICD-10-CM

## 2018-05-04 DIAGNOSIS — I65.23 BILATERAL CAROTID ARTERY STENOSIS WITHOUT CEREBRAL INFARCTION: ICD-10-CM

## 2018-05-04 DIAGNOSIS — I48.20 CHRONIC ATRIAL FIBRILLATION (HCC): ICD-10-CM

## 2018-05-04 DIAGNOSIS — D50.9 MICROCYTIC ANEMIA: ICD-10-CM

## 2018-05-04 DIAGNOSIS — Z79.4 TYPE 2 DIABETES MELLITUS WITHOUT COMPLICATION, WITH LONG-TERM CURRENT USE OF INSULIN (HCC): Primary | Chronic | ICD-10-CM

## 2018-05-04 DIAGNOSIS — J44.9 CHRONIC OBSTRUCTIVE PULMONARY DISEASE, UNSPECIFIED COPD TYPE (HCC): ICD-10-CM

## 2018-05-04 DIAGNOSIS — E11.9 TYPE 2 DIABETES MELLITUS WITHOUT COMPLICATION, WITH LONG-TERM CURRENT USE OF INSULIN (HCC): Primary | Chronic | ICD-10-CM

## 2018-05-04 DIAGNOSIS — I10 ESSENTIAL HYPERTENSION: ICD-10-CM

## 2018-05-04 LAB
A/G RATIO: 1.5 (ref 1.1–2.2)
ALBUMIN SERPL-MCNC: 3.6 G/DL (ref 3.4–5)
ALP BLD-CCNC: 78 U/L (ref 40–129)
ALT SERPL-CCNC: 24 U/L (ref 10–40)
ANION GAP SERPL CALCULATED.3IONS-SCNC: 17 MMOL/L (ref 3–16)
AST SERPL-CCNC: 17 U/L (ref 15–37)
BASOPHILS ABSOLUTE: 0 K/UL (ref 0–0.2)
BASOPHILS RELATIVE PERCENT: 0.3 %
BILIRUB SERPL-MCNC: 0.4 MG/DL (ref 0–1)
BUN BLDV-MCNC: 37 MG/DL (ref 7–20)
CALCIUM SERPL-MCNC: 8.3 MG/DL (ref 8.3–10.6)
CHLORIDE BLD-SCNC: 100 MMOL/L (ref 99–110)
CHOLESTEROL, TOTAL: 109 MG/DL (ref 0–199)
CO2: 23 MMOL/L (ref 21–32)
CREAT SERPL-MCNC: 1.7 MG/DL (ref 0.8–1.3)
CREATININE URINE: 62.8 MG/DL (ref 39–259)
EOSINOPHILS ABSOLUTE: 0.2 K/UL (ref 0–0.6)
EOSINOPHILS RELATIVE PERCENT: 2.7 %
FERRITIN: 61.4 NG/ML (ref 30–400)
GFR AFRICAN AMERICAN: 47
GFR NON-AFRICAN AMERICAN: 39
GLOBULIN: 2.4 G/DL
GLUCOSE BLD-MCNC: 106 MG/DL (ref 70–99)
HCT VFR BLD CALC: 35.4 % (ref 40.5–52.5)
HDLC SERPL-MCNC: 39 MG/DL (ref 40–60)
HEMOGLOBIN: 11.5 G/DL (ref 13.5–17.5)
IRON SATURATION: 15 % (ref 20–50)
IRON: 46 UG/DL (ref 59–158)
LDL CHOLESTEROL CALCULATED: 49 MG/DL
LYMPHOCYTES ABSOLUTE: 1.3 K/UL (ref 1–5.1)
LYMPHOCYTES RELATIVE PERCENT: 20.8 %
MCH RBC QN AUTO: 25.2 PG (ref 26–34)
MCHC RBC AUTO-ENTMCNC: 32.5 G/DL (ref 31–36)
MCV RBC AUTO: 77.5 FL (ref 80–100)
MICROALBUMIN UR-MCNC: <1.2 MG/DL
MICROALBUMIN/CREAT UR-RTO: NORMAL MG/G (ref 0–30)
MONOCYTES ABSOLUTE: 0.5 K/UL (ref 0–1.3)
MONOCYTES RELATIVE PERCENT: 8.6 %
NEUTROPHILS ABSOLUTE: 4.3 K/UL (ref 1.7–7.7)
NEUTROPHILS RELATIVE PERCENT: 67.6 %
PDW BLD-RTO: 17.8 % (ref 12.4–15.4)
PLATELET # BLD: 163 K/UL (ref 135–450)
PMV BLD AUTO: 8.1 FL (ref 5–10.5)
POTASSIUM SERPL-SCNC: 4.2 MMOL/L (ref 3.5–5.1)
RBC # BLD: 4.56 M/UL (ref 4.2–5.9)
SODIUM BLD-SCNC: 140 MMOL/L (ref 136–145)
TOTAL IRON BINDING CAPACITY: 312 UG/DL (ref 260–445)
TOTAL PROTEIN: 6 G/DL (ref 6.4–8.2)
TRIGL SERPL-MCNC: 103 MG/DL (ref 0–150)
TSH SERPL DL<=0.05 MIU/L-ACNC: 1.7 UIU/ML (ref 0.27–4.2)
VITAMIN B-12: 1204 PG/ML (ref 211–911)
VITAMIN D 25-HYDROXY: 38.5 NG/ML
VLDLC SERPL CALC-MCNC: 21 MG/DL
WBC # BLD: 6.4 K/UL (ref 4–11)

## 2018-05-04 PROCEDURE — 99215 OFFICE O/P EST HI 40 MIN: CPT | Performed by: INTERNAL MEDICINE

## 2018-05-04 ASSESSMENT — ENCOUNTER SYMPTOMS
BLOOD IN STOOL: 0
WHEEZING: 1
SHORTNESS OF BREATH: 1

## 2018-05-05 LAB
ESTIMATED AVERAGE GLUCOSE: 159.9 MG/DL
HBA1C MFR BLD: 7.2 %

## 2018-05-08 ENCOUNTER — TELEPHONE (OUTPATIENT)
Dept: INTERNAL MEDICINE CLINIC | Age: 78
End: 2018-05-08

## 2018-05-11 ENCOUNTER — TELEPHONE (OUTPATIENT)
Dept: INTERNAL MEDICINE CLINIC | Age: 78
End: 2018-05-11

## 2018-05-17 ENCOUNTER — TELEPHONE (OUTPATIENT)
Dept: INTERNAL MEDICINE CLINIC | Age: 78
End: 2018-05-17

## 2018-05-17 RX ORDER — INSULIN GLARGINE 100 [IU]/ML
INJECTION, SOLUTION SUBCUTANEOUS
Qty: 20 ML | Refills: 5 | Status: SHIPPED | OUTPATIENT
Start: 2018-05-17 | End: 2018-10-19

## 2018-05-29 ENCOUNTER — OFFICE VISIT (OUTPATIENT)
Dept: INTERNAL MEDICINE CLINIC | Age: 78
End: 2018-05-29

## 2018-05-29 VITALS
WEIGHT: 267.2 LBS | OXYGEN SATURATION: 90 % | HEIGHT: 68 IN | SYSTOLIC BLOOD PRESSURE: 178 MMHG | RESPIRATION RATE: 16 BRPM | HEART RATE: 94 BPM | BODY MASS INDEX: 40.5 KG/M2 | TEMPERATURE: 100.3 F | DIASTOLIC BLOOD PRESSURE: 58 MMHG

## 2018-05-29 DIAGNOSIS — J44.1 COPD EXACERBATION (HCC): Primary | ICD-10-CM

## 2018-05-29 PROCEDURE — 99213 OFFICE O/P EST LOW 20 MIN: CPT | Performed by: INTERNAL MEDICINE

## 2018-05-29 RX ORDER — CEFDINIR 300 MG/1
300 CAPSULE ORAL 2 TIMES DAILY
Qty: 14 CAPSULE | Refills: 0 | Status: SHIPPED | OUTPATIENT
Start: 2018-05-29 | End: 2018-06-05

## 2018-05-29 RX ORDER — DOXYCYCLINE HYCLATE 100 MG
100 TABLET ORAL 2 TIMES DAILY
Qty: 14 TABLET | Refills: 0 | Status: SHIPPED | OUTPATIENT
Start: 2018-05-29 | End: 2018-06-05

## 2018-05-29 RX ORDER — PREDNISONE 10 MG/1
TABLET ORAL
Qty: 22 TABLET | Refills: 0 | Status: SHIPPED | OUTPATIENT
Start: 2018-05-29 | End: 2018-06-08 | Stop reason: ALTCHOICE

## 2018-06-01 ENCOUNTER — TELEPHONE (OUTPATIENT)
Dept: INTERNAL MEDICINE CLINIC | Age: 78
End: 2018-06-01

## 2018-06-08 PROBLEM — J18.9 PNEUMONIA OF RIGHT LOWER LOBE DUE TO INFECTIOUS ORGANISM: Status: ACTIVE | Noted: 2018-06-08

## 2018-06-08 PROBLEM — R91.1 PULMONARY NODULE: Status: ACTIVE | Noted: 2018-06-08

## 2018-06-08 PROBLEM — J18.9 PNEUMONIA: Status: ACTIVE | Noted: 2018-06-08

## 2018-06-08 PROBLEM — J18.9 PNEUMONIA: Status: RESOLVED | Noted: 2018-06-08 | Resolved: 2018-06-08

## 2018-06-11 ENCOUNTER — TELEPHONE (OUTPATIENT)
Dept: INTERNAL MEDICINE CLINIC | Age: 78
End: 2018-06-11

## 2018-06-11 ENCOUNTER — TELEPHONE (OUTPATIENT)
Dept: SLEEP MEDICINE | Age: 78
End: 2018-06-11

## 2018-06-12 RX ORDER — AMIODARONE HYDROCHLORIDE 100 MG/1
100 TABLET ORAL EVERY OTHER DAY
Qty: 45 TABLET | Refills: 0 | Status: SHIPPED | OUTPATIENT
Start: 2018-06-12 | End: 2018-06-18 | Stop reason: SDUPTHER

## 2018-06-15 ENCOUNTER — OFFICE VISIT (OUTPATIENT)
Dept: INTERNAL MEDICINE CLINIC | Age: 78
End: 2018-06-15

## 2018-06-15 VITALS
HEIGHT: 68 IN | RESPIRATION RATE: 16 BRPM | SYSTOLIC BLOOD PRESSURE: 132 MMHG | OXYGEN SATURATION: 92 % | HEART RATE: 93 BPM | DIASTOLIC BLOOD PRESSURE: 52 MMHG | BODY MASS INDEX: 41.52 KG/M2 | WEIGHT: 274 LBS

## 2018-06-15 DIAGNOSIS — N18.30 CKD (CHRONIC KIDNEY DISEASE) STAGE 3, GFR 30-59 ML/MIN (HCC): ICD-10-CM

## 2018-06-15 DIAGNOSIS — J44.1 COPD EXACERBATION (HCC): ICD-10-CM

## 2018-06-15 DIAGNOSIS — Z79.4 TYPE 2 DIABETES MELLITUS WITHOUT COMPLICATION, WITH LONG-TERM CURRENT USE OF INSULIN (HCC): Chronic | ICD-10-CM

## 2018-06-15 DIAGNOSIS — R91.1 PULMONARY NODULE: ICD-10-CM

## 2018-06-15 DIAGNOSIS — I48.20 CHRONIC ATRIAL FIBRILLATION (HCC): ICD-10-CM

## 2018-06-15 DIAGNOSIS — J47.0 BRONCHIECTASIS WITH ACUTE LOWER RESPIRATORY INFECTION (HCC): ICD-10-CM

## 2018-06-15 DIAGNOSIS — E11.9 TYPE 2 DIABETES MELLITUS WITHOUT COMPLICATION, WITH LONG-TERM CURRENT USE OF INSULIN (HCC): Chronic | ICD-10-CM

## 2018-06-15 DIAGNOSIS — J18.9 PNEUMONIA OF RIGHT LOWER LOBE DUE TO INFECTIOUS ORGANISM: Primary | ICD-10-CM

## 2018-06-15 PROCEDURE — 99214 OFFICE O/P EST MOD 30 MIN: CPT | Performed by: INTERNAL MEDICINE

## 2018-06-18 ENCOUNTER — OFFICE VISIT (OUTPATIENT)
Dept: CARDIOLOGY CLINIC | Age: 78
End: 2018-06-18

## 2018-06-18 VITALS
HEIGHT: 68 IN | SYSTOLIC BLOOD PRESSURE: 112 MMHG | HEART RATE: 85 BPM | DIASTOLIC BLOOD PRESSURE: 64 MMHG | OXYGEN SATURATION: 94 % | BODY MASS INDEX: 40.83 KG/M2 | WEIGHT: 269.4 LBS

## 2018-06-18 DIAGNOSIS — I65.29 CAROTID ARTERY STENOSIS WITHOUT CEREBRAL INFARCTION, UNSPECIFIED LATERALITY: ICD-10-CM

## 2018-06-18 DIAGNOSIS — I87.2 CHRONIC VENOUS INSUFFICIENCY: ICD-10-CM

## 2018-06-18 DIAGNOSIS — N18.30 CKD (CHRONIC KIDNEY DISEASE) STAGE 3, GFR 30-59 ML/MIN (HCC): ICD-10-CM

## 2018-06-18 DIAGNOSIS — I48.0 PAROXYSMAL ATRIAL FIBRILLATION (HCC): Primary | ICD-10-CM

## 2018-06-18 DIAGNOSIS — I10 ESSENTIAL HYPERTENSION: ICD-10-CM

## 2018-06-18 PROCEDURE — 99214 OFFICE O/P EST MOD 30 MIN: CPT | Performed by: NURSE PRACTITIONER

## 2018-06-18 RX ORDER — AMIODARONE HYDROCHLORIDE 100 MG/1
100 TABLET ORAL EVERY OTHER DAY
Qty: 30 TABLET | Refills: 6 | Status: SHIPPED | OUTPATIENT
Start: 2018-06-18 | End: 2018-09-01 | Stop reason: SDUPTHER

## 2018-06-22 ENCOUNTER — OFFICE VISIT (OUTPATIENT)
Dept: PULMONOLOGY | Age: 78
End: 2018-06-22

## 2018-06-22 VITALS
SYSTOLIC BLOOD PRESSURE: 124 MMHG | BODY MASS INDEX: 40.41 KG/M2 | RESPIRATION RATE: 20 BRPM | WEIGHT: 266.6 LBS | HEART RATE: 84 BPM | OXYGEN SATURATION: 92 % | HEIGHT: 68 IN | TEMPERATURE: 97.7 F | DIASTOLIC BLOOD PRESSURE: 62 MMHG

## 2018-06-22 DIAGNOSIS — G47.10 HYPERSOMNIA: ICD-10-CM

## 2018-06-22 DIAGNOSIS — J18.9 PNEUMONIA OF RIGHT LOWER LOBE DUE TO INFECTIOUS ORGANISM: Primary | ICD-10-CM

## 2018-06-22 DIAGNOSIS — J47.0 BRONCHIECTASIS WITH ACUTE LOWER RESPIRATORY INFECTION (HCC): ICD-10-CM

## 2018-06-22 DIAGNOSIS — J18.9 PNEUMONIA OF RIGHT LOWER LOBE DUE TO INFECTIOUS ORGANISM: ICD-10-CM

## 2018-06-22 DIAGNOSIS — J44.9 COPD WITH CHRONIC BRONCHITIS (HCC): ICD-10-CM

## 2018-06-22 DIAGNOSIS — R06.83 SNORING: ICD-10-CM

## 2018-06-22 DIAGNOSIS — G47.33 OSA (OBSTRUCTIVE SLEEP APNEA): ICD-10-CM

## 2018-06-22 DIAGNOSIS — I48.20 CHRONIC A-FIB (HCC): ICD-10-CM

## 2018-06-22 LAB
BASOPHILS ABSOLUTE: 0 K/UL (ref 0–0.2)
BASOPHILS RELATIVE PERCENT: 0.4 %
EOSINOPHILS ABSOLUTE: 0.1 K/UL (ref 0–0.6)
EOSINOPHILS RELATIVE PERCENT: 1.8 %
HCT VFR BLD CALC: 37.9 % (ref 40.5–52.5)
HEMOGLOBIN: 12.3 G/DL (ref 13.5–17.5)
LYMPHOCYTES ABSOLUTE: 0.9 K/UL (ref 1–5.1)
LYMPHOCYTES RELATIVE PERCENT: 12.9 %
MCH RBC QN AUTO: 25.5 PG (ref 26–34)
MCHC RBC AUTO-ENTMCNC: 32.6 G/DL (ref 31–36)
MCV RBC AUTO: 78.4 FL (ref 80–100)
MONOCYTES ABSOLUTE: 0.7 K/UL (ref 0–1.3)
MONOCYTES RELATIVE PERCENT: 10.6 %
NEUTROPHILS ABSOLUTE: 4.9 K/UL (ref 1.7–7.7)
NEUTROPHILS RELATIVE PERCENT: 74.3 %
PDW BLD-RTO: 19.7 % (ref 12.4–15.4)
PLATELET # BLD: 158 K/UL (ref 135–450)
PMV BLD AUTO: 8.3 FL (ref 5–10.5)
RBC # BLD: 4.84 M/UL (ref 4.2–5.9)
WBC # BLD: 6.6 K/UL (ref 4–11)

## 2018-06-22 PROCEDURE — 99215 OFFICE O/P EST HI 40 MIN: CPT | Performed by: INTERNAL MEDICINE

## 2018-06-22 RX ORDER — SODIUM CHLORIDE FOR INHALATION 3 %
4 VIAL, NEBULIZER (ML) INHALATION 2 TIMES DAILY
Qty: 240 ML | Refills: 3 | Status: SHIPPED | OUTPATIENT
Start: 2018-06-22 | End: 2019-06-24 | Stop reason: ALTCHOICE

## 2018-06-22 RX ORDER — CIPROFLOXACIN 500 MG/1
500 TABLET, FILM COATED ORAL 2 TIMES DAILY
Qty: 28 TABLET | Refills: 0 | Status: SHIPPED | OUTPATIENT
Start: 2018-06-22 | End: 2018-07-06

## 2018-06-23 DIAGNOSIS — J44.9 CHRONIC OBSTRUCTIVE PULMONARY DISEASE, UNSPECIFIED COPD TYPE (HCC): ICD-10-CM

## 2018-06-25 ENCOUNTER — TELEPHONE (OUTPATIENT)
Dept: PULMONOLOGY | Age: 78
End: 2018-06-25

## 2018-06-25 RX ORDER — LEVOFLOXACIN 500 MG/1
500 TABLET, FILM COATED ORAL DAILY
Qty: 7 TABLET | Refills: 0 | Status: SHIPPED | OUTPATIENT
Start: 2018-06-25 | End: 2018-07-02

## 2018-06-26 ENCOUNTER — TELEPHONE (OUTPATIENT)
Dept: PULMONOLOGY | Age: 78
End: 2018-06-26

## 2018-07-09 RX ORDER — LANCETS
EACH MISCELLANEOUS
Qty: 100 EACH | Refills: 11 | Status: SHIPPED | OUTPATIENT
Start: 2018-07-09 | End: 2018-07-10 | Stop reason: SDUPTHER

## 2018-07-09 RX ORDER — LEVALBUTEROL INHALATION SOLUTION 1.25 MG/3ML
SOLUTION RESPIRATORY (INHALATION)
Qty: 360 ML | Refills: 11 | Status: SHIPPED | OUTPATIENT
Start: 2018-07-09 | End: 2018-07-10 | Stop reason: SDUPTHER

## 2018-07-09 RX ORDER — VERAPAMIL HYDROCHLORIDE 240 MG/1
TABLET, FILM COATED, EXTENDED RELEASE ORAL
Qty: 180 TABLET | Refills: 3 | Status: SHIPPED | OUTPATIENT
Start: 2018-07-09 | End: 2019-06-22 | Stop reason: SDUPTHER

## 2018-07-09 RX ORDER — NAPROXEN SODIUM 220 MG
TABLET ORAL
Qty: 100 EACH | Refills: 5 | Status: SHIPPED | OUTPATIENT
Start: 2018-07-09 | End: 2019-10-14

## 2018-07-09 RX ORDER — CLONIDINE HYDROCHLORIDE 0.2 MG/1
TABLET ORAL
Qty: 180 TABLET | Refills: 3 | Status: SHIPPED | OUTPATIENT
Start: 2018-07-09 | End: 2018-07-17 | Stop reason: SDUPTHER

## 2018-07-10 ENCOUNTER — TELEPHONE (OUTPATIENT)
Dept: INTERNAL MEDICINE CLINIC | Age: 78
End: 2018-07-10

## 2018-07-10 DIAGNOSIS — J44.1 COPD EXACERBATION (HCC): ICD-10-CM

## 2018-07-10 DIAGNOSIS — E11.9 DIABETES MELLITUS WITHOUT COMPLICATION (HCC): Primary | ICD-10-CM

## 2018-07-10 RX ORDER — LANCETS
EACH MISCELLANEOUS
Qty: 100 EACH | Refills: 11 | Status: SHIPPED | OUTPATIENT
Start: 2018-07-10 | End: 2018-07-14 | Stop reason: SDUPTHER

## 2018-07-10 RX ORDER — LEVALBUTEROL INHALATION SOLUTION 1.25 MG/3ML
SOLUTION RESPIRATORY (INHALATION)
Qty: 360 ML | Refills: 11 | Status: SHIPPED | OUTPATIENT
Start: 2018-07-10 | End: 2018-10-01 | Stop reason: SDUPTHER

## 2018-07-10 NOTE — TELEPHONE ENCOUNTER
Patient said pharmacy advised for these prescriptions  they must have the dx and the dx code on them, states it's required by the insurance company. The prescriptions were written yesterday. 1. Accu check softclix lancets  2. accu check test strips  3. lev albuterol 1.25     Please advise. Thanks      [facility]Y #[id number] [id number].  Gabe Rick [phone] - F [phone]

## 2018-07-14 DIAGNOSIS — E11.9 DIABETES MELLITUS WITHOUT COMPLICATION (HCC): ICD-10-CM

## 2018-07-14 RX ORDER — LANCETS
EACH MISCELLANEOUS
Qty: 100 EACH | Refills: 11 | Status: SHIPPED | OUTPATIENT
Start: 2018-07-14 | End: 2018-07-16 | Stop reason: SDUPTHER

## 2018-07-16 DIAGNOSIS — E11.9 DIABETES MELLITUS WITHOUT COMPLICATION (HCC): ICD-10-CM

## 2018-07-16 RX ORDER — LANCETS
EACH MISCELLANEOUS
Qty: 100 EACH | Refills: 11 | Status: SHIPPED | OUTPATIENT
Start: 2018-07-16 | End: 2018-07-17

## 2018-07-17 ENCOUNTER — OFFICE VISIT (OUTPATIENT)
Dept: INTERNAL MEDICINE CLINIC | Age: 78
End: 2018-07-17

## 2018-07-17 VITALS
DIASTOLIC BLOOD PRESSURE: 58 MMHG | BODY MASS INDEX: 40.25 KG/M2 | WEIGHT: 265.6 LBS | RESPIRATION RATE: 16 BRPM | SYSTOLIC BLOOD PRESSURE: 108 MMHG | OXYGEN SATURATION: 92 % | HEART RATE: 88 BPM | HEIGHT: 68 IN

## 2018-07-17 DIAGNOSIS — I10 ESSENTIAL HYPERTENSION: ICD-10-CM

## 2018-07-17 DIAGNOSIS — J44.9 CHRONIC OBSTRUCTIVE PULMONARY DISEASE, UNSPECIFIED COPD TYPE (HCC): Primary | ICD-10-CM

## 2018-07-17 PROCEDURE — 1036F TOBACCO NON-USER: CPT | Performed by: INTERNAL MEDICINE

## 2018-07-17 PROCEDURE — G8598 ASA/ANTIPLAT THER USED: HCPCS | Performed by: INTERNAL MEDICINE

## 2018-07-17 PROCEDURE — 99213 OFFICE O/P EST LOW 20 MIN: CPT | Performed by: INTERNAL MEDICINE

## 2018-07-17 PROCEDURE — G8417 CALC BMI ABV UP PARAM F/U: HCPCS | Performed by: INTERNAL MEDICINE

## 2018-07-17 PROCEDURE — G8427 DOCREV CUR MEDS BY ELIG CLIN: HCPCS | Performed by: INTERNAL MEDICINE

## 2018-07-17 PROCEDURE — 3023F SPIROM DOC REV: CPT | Performed by: INTERNAL MEDICINE

## 2018-07-17 PROCEDURE — 1101F PT FALLS ASSESS-DOCD LE1/YR: CPT | Performed by: INTERNAL MEDICINE

## 2018-07-17 PROCEDURE — 4040F PNEUMOC VAC/ADMIN/RCVD: CPT | Performed by: INTERNAL MEDICINE

## 2018-07-17 PROCEDURE — G8926 SPIRO NO PERF OR DOC: HCPCS | Performed by: INTERNAL MEDICINE

## 2018-07-17 PROCEDURE — 1123F ACP DISCUSS/DSCN MKR DOCD: CPT | Performed by: INTERNAL MEDICINE

## 2018-07-17 RX ORDER — CLONIDINE HYDROCHLORIDE 0.1 MG/1
TABLET ORAL
Qty: 180 TABLET | Refills: 3 | Status: SHIPPED | OUTPATIENT
Start: 2018-07-17 | End: 2019-06-22 | Stop reason: SDUPTHER

## 2018-07-18 ENCOUNTER — OFFICE VISIT (OUTPATIENT)
Dept: PULMONOLOGY | Age: 78
End: 2018-07-18

## 2018-07-18 VITALS
WEIGHT: 268 LBS | OXYGEN SATURATION: 96 % | BODY MASS INDEX: 40.75 KG/M2 | HEART RATE: 75 BPM | TEMPERATURE: 98 F | DIASTOLIC BLOOD PRESSURE: 76 MMHG | SYSTOLIC BLOOD PRESSURE: 122 MMHG | RESPIRATION RATE: 20 BRPM

## 2018-07-18 DIAGNOSIS — J44.9 COPD WITH CHRONIC BRONCHITIS (HCC): Primary | ICD-10-CM

## 2018-07-18 DIAGNOSIS — J47.9 BRONCHIECTASIS WITHOUT COMPLICATION (HCC): ICD-10-CM

## 2018-07-18 DIAGNOSIS — R93.89 ABNORMAL CT OF THE CHEST: ICD-10-CM

## 2018-07-18 DIAGNOSIS — G47.33 OSA (OBSTRUCTIVE SLEEP APNEA): ICD-10-CM

## 2018-07-18 PROCEDURE — G8598 ASA/ANTIPLAT THER USED: HCPCS | Performed by: INTERNAL MEDICINE

## 2018-07-18 PROCEDURE — 3023F SPIROM DOC REV: CPT | Performed by: INTERNAL MEDICINE

## 2018-07-18 PROCEDURE — 99214 OFFICE O/P EST MOD 30 MIN: CPT | Performed by: INTERNAL MEDICINE

## 2018-07-18 PROCEDURE — G8427 DOCREV CUR MEDS BY ELIG CLIN: HCPCS | Performed by: INTERNAL MEDICINE

## 2018-07-18 PROCEDURE — G8926 SPIRO NO PERF OR DOC: HCPCS | Performed by: INTERNAL MEDICINE

## 2018-07-18 PROCEDURE — 4040F PNEUMOC VAC/ADMIN/RCVD: CPT | Performed by: INTERNAL MEDICINE

## 2018-07-18 PROCEDURE — 1036F TOBACCO NON-USER: CPT | Performed by: INTERNAL MEDICINE

## 2018-07-18 PROCEDURE — 1123F ACP DISCUSS/DSCN MKR DOCD: CPT | Performed by: INTERNAL MEDICINE

## 2018-07-18 PROCEDURE — 1101F PT FALLS ASSESS-DOCD LE1/YR: CPT | Performed by: INTERNAL MEDICINE

## 2018-07-18 PROCEDURE — G8417 CALC BMI ABV UP PARAM F/U: HCPCS | Performed by: INTERNAL MEDICINE

## 2018-07-18 NOTE — PROGRESS NOTES
Chief complaint  This is a 65y.o. year old male  who comes to see me with a chief complaint of   Chief Complaint   Patient presents with    Pneumonia     follow up        HPI  Here with follow up on COPD    Last time I added two more weeks of ciprofloxacin due to history of pseudomonas and took him off trelegy as I did not want him to be on an ICS due to recurrent infections. He is here for follow up after finishing therapy    He had to change from cipro to levaquin because he may have been having a drug reaction. He is much better. He used sodium chloride nebs with good results and wants to stop them know since his cough and mucus is clear. Using Anoro and xopenex. Feels good. Doing his HST next week. No other issues. He said Dr. Jon Jeronimo will be getting CT Chest in the next month or so.           Past Medical History:   Diagnosis Date    Allergic rhinitis     Asthma     Atrial fibrillation (HCC)     Hyperlipidemia     Hypertension     Iron deficiency anemia     Obstructive sleep apnea     Type II or unspecified type diabetes mellitus without mention of complication, not stated as uncontrolled        Past Surgical History:   Procedure Laterality Date    CATARACT REMOVAL  2/2012    bilateral    COLONOSCOPY  7/10/2009    diverticulosis    hemorrhoids    GALLBLADDER SURGERY  1981    UPPER GASTROINTESTINAL ENDOSCOPY  7-2005    7/10/2009    normal     Current Outpatient Prescriptions   Medication Sig Dispense Refill    cloNIDine (CATAPRES) 0.1 MG tablet TAKE 1 TABLET BY MOUTH TWICE DAILY 180 tablet 3    levalbuterol (XOPENEX) 1.25 MG/3ML nebulizer solution USE 1 VIAL VIA NEBULIZER FOUR TIMES DAILY 360 mL 11    verapamil (CALAN SR) 240 MG extended release tablet TAKE 1 TABLET BY MOUTH TWICE DAILY 180 tablet 3    Insulin Syringe-Needle U-100 (INSULIN SYRINGE .5CC/31GX5/16\") 31G X 5/16\" 0.5 ML MISC USE AS DIRECTED 100 each 5    VENTOLIN  (90 Base) MCG/ACT inhaler INHALE 2 PUFFS INTO THE LUNGS EVERY 4 HOURS AS NEEDED FOR WHEEZING 1 Inhaler 3    sodium chloride, Inhalant, 3 % nebulizer solution Take 4 mLs by nebulization 2 times daily 240 mL 3    umeclidinium-vilanterol (ANORO ELLIPTA) 62.5-25 MCG/INH AEPB inhaler Inhale 1 puff into the lungs daily One puff daily 1 each 11    umeclidinium-vilanterol (ANORO ELLIPTA) 62.5-25 MCG/INH AEPB inhaler Inhale 1 puff into the lungs daily 1 each 0    amiodarone (PACERONE) 100 MG tablet Take 1 tablet by mouth every other day 30 tablet 6    furosemide (LASIX) 20 MG tablet TAKE 1 TABLET BY MOUTH DAILY 30 tablet 5    polyethylene glycol (GLYCOLAX) packet Take 17 g by mouth daily      montelukast (SINGULAIR) 10 MG tablet TAKE 1 TABLET BY MOUTH DAILY 30 tablet 11    insulin glargine (LANTUS) 100 UNIT/ML injection vial INJECT 50 UNITS SUBCUTANEOUS DAILY OR AS DIRECTED (Patient taking differently: 36 Units 2 times daily ) 20 mL 5    pantoprazole (PROTONIX) 40 MG tablet Take 1 tablet by mouth daily 30 tablet 6    alfuzosin (UROXATRAL) 10 MG extended release tablet Take 1 tablet by mouth daily 90 tablet 3    valsartan-hydrochlorothiazide (DIOVAN-HCT) 320-25 MG per tablet TAKE 1 TABLET BY MOUTH EVERY DAY 30 tablet 11    Cyanocobalamin (VITAMIN B-12 PO) Take 500 mcg by mouth every other day       Cholecalciferol (VITAMIN D3 PO) Take 2,000 Units by mouth daily       atorvastatin (LIPITOR) 40 MG tablet TAKE 1 TABLET BY MOUTH DAILY 30 tablet 11    Misc. Devices (ACAPELLA) MISC 1 Device by Does not apply route as needed 1 each 0    aspirin EC 81 MG EC tablet Take 2 tablets by mouth daily. 30 tablet 3    ferrous sulfate 325 (65 FE) MG tablet Take 325 mg by mouth 2 times daily        No current facility-administered medications for this visit.         ROS:  GENERAL:  No fevers, chills, or night sweats; fever from cipro  HEENT:  Less sinus congestion  HEART:  No chest pain, no palpitations  LUNGS:  Less SOB, less coughing, less mucus  ABDOMEN:  No abdominal pains, no changes in stools  GENITOURINARY:  No increased frequency, no blood in urine  EXTREMITIES:  no swelling, no lesions  NEURO:  No numbness or tingling, no seizures  SKIN:  No new rashes, no changes in hair or nails  LYMPH:  No masses, no swelling neck, armpits, or groin    PHYSICAL EXAM:  Vitals:    07/18/18 1331   BP: 122/76   Pulse: 75   Resp: 20   Temp: 98 °F (36.7 °C)   SpO2: 96%       GENERAL:  Well nourished, alert, appears stated age, no distress; obese  HEENT:  No scleral icterus, no conjunctival irritation; Mallampati IV  NECK:  No thyromegaly, no bruits  LYMPH:  No cervical or supraclavicular adenopathy  HEART:  Regular rate and rhythm, no murmurs  LUNGS:  Clearing up, faint rhonchi in the RLL  ABDOMEN:  No distention, no organomegaly  EXTREMITIES:  Trace edema  no digital clubbing  NEURO:  No localizing deficits    Pulmonary Function Testing 8/2012  FEV1 1.87 L at 69% predicted (reduced)  FVC 3.36 L at 90% predicted (normal)  FEV1/FVC ratio at 56 (reduced)  TLC 6.07 L at 110% predicted (normal)  DLCO 23.5 at 98% predicted (normal)  Overall Mild obstruction    Chest imaging from 6/2018 is reviewed. Patchy, nodular opacities in the RUL, RML and RLL. Bilateral lower lobe bronchiectasis    ECHO 2014  Left ventricle size is normal.  Normal left ventricular wall thickness. Ejection fraction is visually estimated to be 55-60 %. No regional wall motion abnormalities are noted. There is reversal of E/A inflow velocities across the mitral valve  suggesting impaired left ventricular relaxation. The right ventricle is not well visualized.   Lab Results   Component Value Date    WBC 6.6 06/22/2018    HGB 12.3 (L) 06/22/2018    HCT 37.9 (L) 06/22/2018    MCV 78.4 (L) 06/22/2018     06/22/2018       No results found for: BNP    Lab Results   Component Value Date    CREATININE 1.7 (H) 06/10/2018    BUN 62 (H) 06/10/2018     (L) 06/10/2018    K 5.8 (H) 06/10/2018    CL 96 (L) 06/10/2018    CO2 20 (L)

## 2018-08-15 ENCOUNTER — HOSPITAL ENCOUNTER (OUTPATIENT)
Dept: OTHER | Age: 78
Discharge: HOME | End: 2018-08-17
Attending: INTERNAL MEDICINE | Admitting: INTERNAL MEDICINE

## 2018-08-16 PROCEDURE — 95806 SLEEP STUDY UNATT&RESP EFFT: CPT | Performed by: PSYCHIATRY & NEUROLOGY

## 2018-08-21 DIAGNOSIS — G47.10 HYPERSOMNIA: ICD-10-CM

## 2018-08-21 DIAGNOSIS — R06.83 SNORING: ICD-10-CM

## 2018-08-21 DIAGNOSIS — G47.33 OSA (OBSTRUCTIVE SLEEP APNEA): ICD-10-CM

## 2018-08-21 DIAGNOSIS — I48.20 CHRONIC A-FIB (HCC): ICD-10-CM

## 2018-08-22 ENCOUNTER — TELEPHONE (OUTPATIENT)
Dept: PULMONOLOGY | Age: 78
End: 2018-08-22

## 2018-09-01 DIAGNOSIS — I48.0 PAROXYSMAL ATRIAL FIBRILLATION (HCC): ICD-10-CM

## 2018-09-04 ENCOUNTER — TELEPHONE (OUTPATIENT)
Dept: INTERNAL MEDICINE CLINIC | Age: 78
End: 2018-09-04

## 2018-09-04 ENCOUNTER — TELEPHONE (OUTPATIENT)
Dept: PULMONOLOGY | Age: 78
End: 2018-09-04

## 2018-09-04 NOTE — TELEPHONE ENCOUNTER
He just had questions about using the machine when being sick. I answered his questions and told him if he does not use it for a few days while sick, it likely won't affect his compliance.

## 2018-09-04 NOTE — TELEPHONE ENCOUNTER
Patient called in over weekend and Dr. Khari Arriaza on call Dr prescribed for him an antibiotic Levoxloxacin 500 mg  to be taken for 7 days     Patient took two antibiotic tablets and stopped taking amiodarone 100 mg.     Patient would like to discuss medication with Dr. Laure Villasenor #[phone]    Please Advise

## 2018-09-05 RX ORDER — AMIODARONE HYDROCHLORIDE 100 MG/1
100 TABLET ORAL EVERY OTHER DAY
Qty: 15 TABLET | Refills: 1 | Status: SHIPPED | OUTPATIENT
Start: 2018-09-05 | End: 2018-10-29 | Stop reason: SDUPTHER

## 2018-09-06 ENCOUNTER — TELEPHONE (OUTPATIENT)
Dept: INTERNAL MEDICINE CLINIC | Age: 78
End: 2018-09-06

## 2018-09-06 NOTE — TELEPHONE ENCOUNTER
Patient was asked to call Dr Emily Carrillo let him know how he was feeling    Breathing is fine it is in the mid 90's  Patient still has  some sinus congestion  Would like to know if he could use flonase for that    Please Advise

## 2018-09-08 DIAGNOSIS — R91.1 PULMONARY NODULE: Primary | ICD-10-CM

## 2018-09-10 ENCOUNTER — TELEPHONE (OUTPATIENT)
Dept: INTERNAL MEDICINE CLINIC | Age: 78
End: 2018-09-10

## 2018-09-10 NOTE — TELEPHONE ENCOUNTER
----- Message from Andrea Alonso MD sent at 9/8/2018 11:20 AM EDT -----  Please get the CT scan set up. The order has been entered.   ----- Message -----  From: Andrea Alonso MD  Sent: 9/1/2018  To: Andrea Alonso MD    Repeat CT of the chest in 3 months

## 2018-09-20 ENCOUNTER — TELEPHONE (OUTPATIENT)
Dept: INTERNAL MEDICINE CLINIC | Age: 78
End: 2018-09-20

## 2018-09-20 ENCOUNTER — OFFICE VISIT (OUTPATIENT)
Dept: INTERNAL MEDICINE CLINIC | Age: 78
End: 2018-09-20

## 2018-09-20 VITALS
HEART RATE: 90 BPM | TEMPERATURE: 98.6 F | WEIGHT: 272.8 LBS | OXYGEN SATURATION: 92 % | RESPIRATION RATE: 16 BRPM | BODY MASS INDEX: 41.48 KG/M2 | SYSTOLIC BLOOD PRESSURE: 138 MMHG | DIASTOLIC BLOOD PRESSURE: 60 MMHG

## 2018-09-20 DIAGNOSIS — J20.9 BRONCHITIS WITH BRONCHOSPASM: Primary | ICD-10-CM

## 2018-09-20 PROCEDURE — G8417 CALC BMI ABV UP PARAM F/U: HCPCS | Performed by: INTERNAL MEDICINE

## 2018-09-20 PROCEDURE — 99213 OFFICE O/P EST LOW 20 MIN: CPT | Performed by: INTERNAL MEDICINE

## 2018-09-20 PROCEDURE — G8598 ASA/ANTIPLAT THER USED: HCPCS | Performed by: INTERNAL MEDICINE

## 2018-09-20 PROCEDURE — 1123F ACP DISCUSS/DSCN MKR DOCD: CPT | Performed by: INTERNAL MEDICINE

## 2018-09-20 PROCEDURE — 1101F PT FALLS ASSESS-DOCD LE1/YR: CPT | Performed by: INTERNAL MEDICINE

## 2018-09-20 PROCEDURE — 4040F PNEUMOC VAC/ADMIN/RCVD: CPT | Performed by: INTERNAL MEDICINE

## 2018-09-20 PROCEDURE — G8427 DOCREV CUR MEDS BY ELIG CLIN: HCPCS | Performed by: INTERNAL MEDICINE

## 2018-09-20 PROCEDURE — 1036F TOBACCO NON-USER: CPT | Performed by: INTERNAL MEDICINE

## 2018-09-20 RX ORDER — PREDNISONE 10 MG/1
TABLET ORAL
Qty: 22 TABLET | Refills: 0 | Status: SHIPPED | OUTPATIENT
Start: 2018-09-20 | End: 2018-10-19

## 2018-09-20 RX ORDER — CEFDINIR 300 MG/1
300 CAPSULE ORAL 2 TIMES DAILY
Qty: 14 CAPSULE | Refills: 0 | Status: SHIPPED | OUTPATIENT
Start: 2018-09-20 | End: 2018-09-27

## 2018-09-20 ASSESSMENT — PATIENT HEALTH QUESTIONNAIRE - PHQ9
SUM OF ALL RESPONSES TO PHQ QUESTIONS 1-9: 0
SUM OF ALL RESPONSES TO PHQ9 QUESTIONS 1 & 2: 0
1. LITTLE INTEREST OR PLEASURE IN DOING THINGS: 0
SUM OF ALL RESPONSES TO PHQ QUESTIONS 1-9: 0
2. FEELING DOWN, DEPRESSED OR HOPELESS: 0

## 2018-09-20 NOTE — PROGRESS NOTES
Subjective:      Patient ID: Dustin Brunner is a 65 y.o. male. HPI  Here with one week of chest and nasal congestion. On 9/3 was given steroids and levaquin for a cough and bronchitis. He improved nicely and now in the last few days has cough and purulent coryza and post nasal drip. No fever noted. Some wheezing and mild SOB.     Current Outpatient Prescriptions on File Prior to Visit   Medication Sig Dispense Refill    amiodarone (PACERONE) 100 MG tablet TAKE 1 TABLET BY MOUTH EVERY OTHER DAY 15 tablet 1    alfuzosin (UROXATRAL) 10 MG extended release tablet TAKE 1 TABLET BY MOUTH EVERY DAY 90 tablet 3    atorvastatin (LIPITOR) 40 MG tablet TAKE 1 TABLET BY MOUTH DAILY 90 tablet 3    cloNIDine (CATAPRES) 0.1 MG tablet TAKE 1 TABLET BY MOUTH TWICE DAILY 180 tablet 3    levalbuterol (XOPENEX) 1.25 MG/3ML nebulizer solution USE 1 VIAL VIA NEBULIZER FOUR TIMES DAILY 360 mL 11    verapamil (CALAN SR) 240 MG extended release tablet TAKE 1 TABLET BY MOUTH TWICE DAILY 180 tablet 3    Insulin Syringe-Needle U-100 (INSULIN SYRINGE .5CC/31GX5/16\") 31G X 5/16\" 0.5 ML MISC USE AS DIRECTED 100 each 5    VENTOLIN  (90 Base) MCG/ACT inhaler INHALE 2 PUFFS INTO THE LUNGS EVERY 4 HOURS AS NEEDED FOR WHEEZING 1 Inhaler 3    sodium chloride, Inhalant, 3 % nebulizer solution Take 4 mLs by nebulization 2 times daily 240 mL 3    umeclidinium-vilanterol (ANORO ELLIPTA) 62.5-25 MCG/INH AEPB inhaler Inhale 1 puff into the lungs daily One puff daily 1 each 11    umeclidinium-vilanterol (ANORO ELLIPTA) 62.5-25 MCG/INH AEPB inhaler Inhale 1 puff into the lungs daily 1 each 0    furosemide (LASIX) 20 MG tablet TAKE 1 TABLET BY MOUTH DAILY 30 tablet 5    polyethylene glycol (GLYCOLAX) packet Take 17 g by mouth daily      montelukast (SINGULAIR) 10 MG tablet TAKE 1 TABLET BY MOUTH DAILY 30 tablet 11    insulin glargine (LANTUS) 100 UNIT/ML injection vial INJECT 50 UNITS SUBCUTANEOUS DAILY OR AS DIRECTED (Patient

## 2018-09-23 DIAGNOSIS — K21.9 GASTROESOPHAGEAL REFLUX DISEASE WITHOUT ESOPHAGITIS: ICD-10-CM

## 2018-09-24 RX ORDER — PANTOPRAZOLE SODIUM 40 MG/1
40 TABLET, DELAYED RELEASE ORAL DAILY
Qty: 30 TABLET | Refills: 2 | Status: SHIPPED | OUTPATIENT
Start: 2018-09-24 | End: 2018-10-29 | Stop reason: SDUPTHER

## 2018-09-25 ENCOUNTER — HOSPITAL ENCOUNTER (OUTPATIENT)
Dept: CT IMAGING | Age: 78
Discharge: HOME | End: 2018-09-25
Payer: MEDICARE

## 2018-09-25 DIAGNOSIS — R91.1 PULMONARY NODULE: ICD-10-CM

## 2018-09-25 PROCEDURE — 71250 CT THORAX DX C-: CPT

## 2018-10-01 ENCOUNTER — PROCEDURE VISIT (OUTPATIENT)
Dept: SURGERY | Age: 78
End: 2018-10-01
Payer: MEDICARE

## 2018-10-01 ENCOUNTER — TELEPHONE (OUTPATIENT)
Dept: SURGERY | Age: 78
End: 2018-10-01

## 2018-10-01 ENCOUNTER — OFFICE VISIT (OUTPATIENT)
Dept: SURGERY | Age: 78
End: 2018-10-01
Payer: MEDICARE

## 2018-10-01 VITALS
WEIGHT: 276 LBS | DIASTOLIC BLOOD PRESSURE: 64 MMHG | HEIGHT: 68 IN | BODY MASS INDEX: 41.83 KG/M2 | SYSTOLIC BLOOD PRESSURE: 136 MMHG

## 2018-10-01 DIAGNOSIS — I10 ESSENTIAL HYPERTENSION: ICD-10-CM

## 2018-10-01 DIAGNOSIS — M79.89 LEG SWELLING: ICD-10-CM

## 2018-10-01 DIAGNOSIS — E78.00 PURE HYPERCHOLESTEROLEMIA: ICD-10-CM

## 2018-10-01 DIAGNOSIS — I65.23 BILATERAL CAROTID ARTERY STENOSIS WITHOUT CEREBRAL INFARCTION: Primary | ICD-10-CM

## 2018-10-01 PROCEDURE — G8484 FLU IMMUNIZE NO ADMIN: HCPCS | Performed by: NURSE PRACTITIONER

## 2018-10-01 PROCEDURE — 1101F PT FALLS ASSESS-DOCD LE1/YR: CPT | Performed by: NURSE PRACTITIONER

## 2018-10-01 PROCEDURE — G8417 CALC BMI ABV UP PARAM F/U: HCPCS | Performed by: NURSE PRACTITIONER

## 2018-10-01 PROCEDURE — 4040F PNEUMOC VAC/ADMIN/RCVD: CPT | Performed by: NURSE PRACTITIONER

## 2018-10-01 PROCEDURE — 99214 OFFICE O/P EST MOD 30 MIN: CPT | Performed by: NURSE PRACTITIONER

## 2018-10-01 PROCEDURE — G8598 ASA/ANTIPLAT THER USED: HCPCS | Performed by: NURSE PRACTITIONER

## 2018-10-01 PROCEDURE — 93880 EXTRACRANIAL BILAT STUDY: CPT | Performed by: SURGERY

## 2018-10-01 PROCEDURE — G8427 DOCREV CUR MEDS BY ELIG CLIN: HCPCS | Performed by: NURSE PRACTITIONER

## 2018-10-01 PROCEDURE — 1036F TOBACCO NON-USER: CPT | Performed by: NURSE PRACTITIONER

## 2018-10-01 PROCEDURE — 1123F ACP DISCUSS/DSCN MKR DOCD: CPT | Performed by: NURSE PRACTITIONER

## 2018-10-01 NOTE — TELEPHONE ENCOUNTER
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 Pt would like you to send an RX to [facility].

## 2018-10-01 NOTE — LETTER
[facility]  [facility] 935  Phone: [phone]  Fax: [phone]    Emiliano Buckner CNP        October 1, 2018      [address] [place] [address], [address] [place] [address]     Patient: Jayden Epperson    MR Number: [id number]    YOB: 1940    Date of Visit: 10/01/2018        Dear [address] [place] [address] Road:    Dr. Ector Brody patient, was in the office today following his carotid duplex scan. My assessment is as follows:    Carotid artery stenosis, w/o mention of cerebral infarction   Current plans       * The patient has a 50-80% DERICK stenosis by velocity criteria; DERICK 59% by             image. * The patient has a 93-13% LICA stenosis by velocity criteria; LICA 98% by              image. * The patient has not experienced any symptoms related to his carotid disease. * Follow up in 6 months with carotid doppler scan and office visit. I will keep you posted regarding his progress.     Sincerely,      Emiliano Buckner CNP

## 2018-10-01 NOTE — PROGRESS NOTES
Subjective:      Patient ID: Raj Pearce is a 65 y.o. male. Pain Assessment  Gilmar Suresh has a pain level on 0/10 scale:  0  Location:  BLE  Description:  pinching  Radiation:   No  Duration:  4 week(s)  Time:  intermittent      HPI    The patient is a 65 y.o. male who presents with a complaint of carotid stenosis follow up. The patient has been previously diagnosed with Left carotid artery stenosis and Right carotid artery stenosis. Date last seen: 2/28/2018. Patient denies numbness/weakness on any one side, speech disturbances or visual disturbances. Since last visit patient has had left CDS to be review today and right CDS to be reviewed today. The patient has not previously undergone surgical intervention for this problem. Review of Systems   HENT: Positive for hearing loss. Eyes: Negative for visual disturbance. Cardiovascular: Leg swelling: controlled with compression stockings. Musculoskeletal: Negative for neck pain. Neurological: Negative for dizziness, speech difficulty, weakness, light-headedness, numbness and headaches. All other systems reviewed and are negative. Allergies   Allergen Reactions    Hytrin [Terazosin Hcl]      Ineffective for BP control    Penicillins      Unknown reaction during childhood; Patient tolerated cephalosporins in the past    Shrimp Flavor Hives    Sulfa Antibiotics      Unknown reaction in childhood    Tekturna [Aliskiren Fumarate]      Ineffective    Vancomycin      Fever    Ciprofloxacin Rash     Fever and rash          Prior to Visit Medications    Medication Sig Taking?  Authorizing Provider   pantoprazole (PROTONIX) 40 MG tablet TAKE 1 TABLET BY MOUTH DAILY Yes Loree Rico DO   predniSONE (DELTASONE) 10 MG tablet Take 4 tabs po today in a single dose, then 2 po bid for 3 days, then 1 po bid for 3 days Yes Randy Pagan MD   amiodarone (PACERONE) 100 MG tablet TAKE 1 TABLET BY MOUTH EVERY OTHER DAY Yes Steffen Martinez, APRN - CNP   alfuzosin (UROXATRAL) 10 MG extended release tablet TAKE 1 TABLET BY MOUTH EVERY DAY Yes Jesus Bradford MD   atorvastatin (LIPITOR) 40 MG tablet TAKE 1 TABLET BY MOUTH DAILY Yes Jesus Bradford MD   cloNIDine (CATAPRES) 0.1 MG tablet TAKE 1 TABLET BY MOUTH TWICE DAILY  Patient taking differently: 0.1 mg TAKE 1 TABLET BY MOUTH TWICE DAILY Yes Jesus Bradford MD   levalbuterol [facility]) 1.25 MG/3ML nebulizer solution USE 1 VIAL VIA NEBULIZER FOUR TIMES DAILY Yes Jesus Bradford MD   verapamil (CALAN SR) 240 MG extended release tablet TAKE 1 TABLET BY MOUTH TWICE DAILY Yes Jesus Bradford MD   Insulin Syringe-Needle U-100 (INSULIN SYRINGE .5CC/31GX5/16\") 31G X 5/16\" 0.5 ML MISC USE AS DIRECTED Yes Jesus Bradford MD   VENTOLIN  (90 Base) MCG/ACT inhaler INHALE 2 PUFFS INTO THE LUNGS EVERY 4 HOURS AS NEEDED FOR WHEEZING Yes Tasia Franks, APRN - CNP   sodium chloride, Inhalant, 3 % nebulizer solution Take 4 mLs by nebulization 2 times daily Yes Latasha Knutson DO   umeclidinium-vilanterol (ANORO ELLIPTA) 62.5-25 MCG/INH AEPB inhaler Inhale 1 puff into the lungs daily One puff daily Yes Latasha Knutson DO   umeclidinium-vilanterol (ANORO ELLIPTA) 62.5-25 MCG/INH AEPB inhaler Inhale 1 puff into the lungs daily Yes Latasha Knutson DO   furosemide (LASIX) 20 MG tablet TAKE 1 TABLET BY MOUTH DAILY Yes Jesus Bradford MD   polyethylene glycol (GLYCOLAX) packet Take 17 g by mouth daily Yes Historical Provider, MD   montelukast (SINGULAIR) 10 MG tablet TAKE 1 TABLET BY MOUTH DAILY Yes Jesus Bradford MD   insulin glargine (LANTUS) 100 UNIT/ML injection vial INJECT 50 UNITS SUBCUTANEOUS DAILY OR AS DIRECTED  Patient taking differently: 36 Units 2 times daily  Yes Jesus Bradford MD   valsartan-hydrochlorothiazide (DIOVAN-HCT) 320-25 MG per tablet TAKE 1 TABLET BY MOUTH EVERY DAY Yes Silviano Muñoz MD   Cyanocobalamin (VITAMIN B-12 PO) Take 500 mcg by mouth every other day  Yes Historical Provider, MD   Cholecalciferol (VITAMIN D3 PO) Take 2,000 Units by mouth daily  Yes Historical Provider, MD   Misc. Devices (ACAPELLA) MISC 1 Device by Does not apply route as needed Yes Delilah Common, DO   aspirin EC 81 MG EC tablet Take 2 tablets by mouth daily. Yes Thais Minaya MD   ferrous sulfate 325 (65 FE) MG tablet Take 325 mg by mouth 2 times daily  Yes Historical Provider, MD     History reviewed. Objective:   Physical Exam   Constitutional: He is oriented to person, place, and time. He appears well-developed and well-nourished. He is active and cooperative. No distress. obese   HENT:   Head: Normocephalic. Right Ear: Decreased hearing is noted. Left Ear: Decreased hearing (bilateral hearing aids) is noted. Eyes: Pupils are equal, round, and reactive to light. Conjunctivae and lids are normal.   Neck: Trachea normal and normal range of motion. Neck supple. Carotid bruit is not present. No tracheal deviation present. Cardiovascular: Normal rate, regular rhythm and normal heart sounds. Exam reveals no gallop and no friction rub. No murmur heard. Pulses:       Carotid pulses are 2+ on the right side, and 2+ on the left side. Radial pulses are 2+ on the right side, and 2+ on the left side. Femoral pulses are 2+ on the right side, and 2+ on the left side. Popliteal pulses are 2+ on the right side, and 2+ on the left side. Dorsalis pedis pulses are 2+ on the right side, and 2+ on the left side. Posterior tibial pulses are 0 on the right side, and 0 on the left side. Pulmonary/Chest: Effort normal. No respiratory distress. Abdominal: Soft. Normal appearance and bowel sounds are normal. He exhibits no distension and no mass. There is no tenderness. There is no rigidity, no rebound and no guarding.    Musculoskeletal: Normal

## 2018-10-12 PROBLEM — J18.9 PNEUMONIA OF RIGHT LOWER LOBE DUE TO INFECTIOUS ORGANISM: Status: RESOLVED | Noted: 2018-06-08 | Resolved: 2018-10-12

## 2018-10-23 ENCOUNTER — OFFICE VISIT (OUTPATIENT)
Dept: PULMONOLOGY | Age: 78
End: 2018-10-23
Payer: MEDICARE

## 2018-10-23 VITALS
RESPIRATION RATE: 20 BRPM | OXYGEN SATURATION: 95 % | SYSTOLIC BLOOD PRESSURE: 138 MMHG | WEIGHT: 271 LBS | BODY MASS INDEX: 41.07 KG/M2 | TEMPERATURE: 97.8 F | HEIGHT: 68 IN | HEART RATE: 73 BPM | DIASTOLIC BLOOD PRESSURE: 67 MMHG

## 2018-10-23 DIAGNOSIS — G47.33 OSA ON CPAP: Primary | ICD-10-CM

## 2018-10-23 DIAGNOSIS — Z99.89 OSA ON CPAP: Primary | ICD-10-CM

## 2018-10-23 DIAGNOSIS — J44.9 COPD WITH CHRONIC BRONCHITIS (HCC): ICD-10-CM

## 2018-10-23 DIAGNOSIS — R93.89 ABNORMAL CT OF THE CHEST: ICD-10-CM

## 2018-10-23 PROCEDURE — 1123F ACP DISCUSS/DSCN MKR DOCD: CPT | Performed by: INTERNAL MEDICINE

## 2018-10-23 PROCEDURE — 1036F TOBACCO NON-USER: CPT | Performed by: INTERNAL MEDICINE

## 2018-10-23 PROCEDURE — G8482 FLU IMMUNIZE ORDER/ADMIN: HCPCS | Performed by: INTERNAL MEDICINE

## 2018-10-23 PROCEDURE — G8417 CALC BMI ABV UP PARAM F/U: HCPCS | Performed by: INTERNAL MEDICINE

## 2018-10-23 PROCEDURE — G8926 SPIRO NO PERF OR DOC: HCPCS | Performed by: INTERNAL MEDICINE

## 2018-10-23 PROCEDURE — G8598 ASA/ANTIPLAT THER USED: HCPCS | Performed by: INTERNAL MEDICINE

## 2018-10-23 PROCEDURE — 1101F PT FALLS ASSESS-DOCD LE1/YR: CPT | Performed by: INTERNAL MEDICINE

## 2018-10-23 PROCEDURE — G8427 DOCREV CUR MEDS BY ELIG CLIN: HCPCS | Performed by: INTERNAL MEDICINE

## 2018-10-23 PROCEDURE — 3023F SPIROM DOC REV: CPT | Performed by: INTERNAL MEDICINE

## 2018-10-23 PROCEDURE — 99214 OFFICE O/P EST MOD 30 MIN: CPT | Performed by: INTERNAL MEDICINE

## 2018-10-23 PROCEDURE — 4040F PNEUMOC VAC/ADMIN/RCVD: CPT | Performed by: INTERNAL MEDICINE

## 2018-10-23 ASSESSMENT — SLEEP AND FATIGUE QUESTIONNAIRES
HOW LIKELY ARE YOU TO NOD OFF OR FALL ASLEEP WHILE SITTING AND TALKING TO SOMEONE: 0
NECK CIRCUMFERENCE (INCHES): 18.5
HOW LIKELY ARE YOU TO NOD OFF OR FALL ASLEEP WHILE WATCHING TV: 1
HOW LIKELY ARE YOU TO NOD OFF OR FALL ASLEEP IN A CAR, WHILE STOPPED FOR A FEW MINUTES IN TRAFFIC: 0
HOW LIKELY ARE YOU TO NOD OFF OR FALL ASLEEP WHEN YOU ARE A PASSENGER IN A CAR FOR AN HOUR WITHOUT A BREAK: 0
HOW LIKELY ARE YOU TO NOD OFF OR FALL ASLEEP WHILE SITTING QUIETLY AFTER LUNCH WITHOUT ALCOHOL: 1
ESS TOTAL SCORE: 4
HOW LIKELY ARE YOU TO NOD OFF OR FALL ASLEEP WHILE SITTING INACTIVE IN A PUBLIC PLACE: 0
HOW LIKELY ARE YOU TO NOD OFF OR FALL ASLEEP WHILE LYING DOWN TO REST IN THE AFTERNOON WHEN CIRCUMSTANCES PERMIT: 2
HOW LIKELY ARE YOU TO NOD OFF OR FALL ASLEEP WHILE SITTING AND READING: 0

## 2018-10-23 NOTE — PROGRESS NOTES
Surgical History:   Procedure Laterality Date    CATARACT REMOVAL  2/2012    bilateral    COLONOSCOPY  7/10/2009    diverticulosis    hemorrhoids    GALLBLADDER SURGERY  1981    UPPER GASTROINTESTINAL ENDOSCOPY  7-2005    7/10/2009    normal     Current Outpatient Prescriptions   Medication Sig Dispense Refill    insulin glargine (BASAGLAR KWIKPEN) 100 UNIT/ML injection pen Inject 36 Units into the skin 2 times daily 10 pen 11    Respiratory Therapy Supplies (NEBULIZER/TUBING/MOUTHPIECE) KIT 1 kit by Does not apply route daily as needed (SOB) 1 kit 11    levalbuterol (XOPENEX) 1.25 MG/3ML nebulizer solution USE 1 VIAL VIA NEBULIZER FOUR TIMES DAILY 360 mL 11    pantoprazole (PROTONIX) 40 MG tablet TAKE 1 TABLET BY MOUTH DAILY 30 tablet 2    amiodarone (PACERONE) 100 MG tablet TAKE 1 TABLET BY MOUTH EVERY OTHER DAY 15 tablet 1    alfuzosin (UROXATRAL) 10 MG extended release tablet TAKE 1 TABLET BY MOUTH EVERY DAY 90 tablet 3    atorvastatin (LIPITOR) 40 MG tablet TAKE 1 TABLET BY MOUTH DAILY 90 tablet 3    cloNIDine (CATAPRES) 0.1 MG tablet TAKE 1 TABLET BY MOUTH TWICE DAILY (Patient taking differently: 0.1 mg TAKE 1 TABLET BY MOUTH TWICE DAILY) 180 tablet 3    verapamil (CALAN SR) 240 MG extended release tablet TAKE 1 TABLET BY MOUTH TWICE DAILY 180 tablet 3    Insulin Syringe-Needle U-100 (INSULIN SYRINGE .5CC/31GX5/16\") 31G X 5/16\" 0.5 ML MISC USE AS DIRECTED 100 each 5    VENTOLIN  (90 Base) MCG/ACT inhaler INHALE 2 PUFFS INTO THE LUNGS EVERY 4 HOURS AS NEEDED FOR WHEEZING 1 Inhaler 3    sodium chloride, Inhalant, 3 % nebulizer solution Take 4 mLs by nebulization 2 times daily 240 mL 3    umeclidinium-vilanterol (ANORO ELLIPTA) 62.5-25 MCG/INH AEPB inhaler Inhale 1 puff into the lungs daily One puff daily 1 each 11    furosemide (LASIX) 20 MG tablet TAKE 1 TABLET BY MOUTH DAILY 30 tablet 5    polyethylene glycol (GLYCOLAX) packet Take 17 g by mouth daily      montelukast (SINGULAIR) 10

## 2018-10-25 ENCOUNTER — TELEPHONE (OUTPATIENT)
Dept: PULMONOLOGY | Age: 78
End: 2018-10-25

## 2018-10-25 ENCOUNTER — HOSPITAL ENCOUNTER (OUTPATIENT)
Dept: ULTRASOUND IMAGING | Age: 78
Discharge: HOME | End: 2018-10-25
Payer: MEDICARE

## 2018-10-25 DIAGNOSIS — N17.9 AKI (ACUTE KIDNEY INJURY) (HCC): ICD-10-CM

## 2018-10-25 PROCEDURE — 76770 US EXAM ABDO BACK WALL COMP: CPT

## 2018-10-29 DIAGNOSIS — K21.9 GASTROESOPHAGEAL REFLUX DISEASE WITHOUT ESOPHAGITIS: ICD-10-CM

## 2018-10-29 DIAGNOSIS — I48.0 PAROXYSMAL ATRIAL FIBRILLATION (HCC): ICD-10-CM

## 2018-10-29 RX ORDER — AMIODARONE HYDROCHLORIDE 100 MG/1
100 TABLET ORAL EVERY OTHER DAY
Qty: 15 TABLET | Refills: 2 | Status: SHIPPED | OUTPATIENT
Start: 2018-10-29 | End: 2018-11-13 | Stop reason: SDUPTHER

## 2018-10-29 RX ORDER — AMIODARONE HYDROCHLORIDE 100 MG/1
100 TABLET ORAL EVERY OTHER DAY
Qty: 15 TABLET | Refills: 2 | Status: SHIPPED | OUTPATIENT
Start: 2018-10-29 | End: 2018-10-30 | Stop reason: SDUPTHER

## 2018-10-29 RX ORDER — PANTOPRAZOLE SODIUM 40 MG/1
40 TABLET, DELAYED RELEASE ORAL DAILY
Qty: 90 TABLET | Refills: 1 | Status: SHIPPED | OUTPATIENT
Start: 2018-10-29 | End: 2019-04-22 | Stop reason: SDUPTHER

## 2018-10-30 ENCOUNTER — TELEPHONE (OUTPATIENT)
Dept: LAB | Age: 78
End: 2018-10-30

## 2018-10-30 DIAGNOSIS — I48.0 PAROXYSMAL ATRIAL FIBRILLATION (HCC): ICD-10-CM

## 2018-11-01 RX ORDER — AMIODARONE HYDROCHLORIDE 100 MG/1
100 TABLET ORAL EVERY OTHER DAY
Qty: 15 TABLET | Refills: 2 | Status: SHIPPED | OUTPATIENT
Start: 2018-11-01 | End: 2019-07-19

## 2018-11-13 ENCOUNTER — TELEPHONE (OUTPATIENT)
Dept: SURGERY | Age: 78
End: 2018-11-13

## 2018-11-13 DIAGNOSIS — I48.0 PAROXYSMAL ATRIAL FIBRILLATION (HCC): ICD-10-CM

## 2018-11-13 RX ORDER — AMIODARONE HYDROCHLORIDE 100 MG/1
100 TABLET ORAL EVERY OTHER DAY
Qty: 15 TABLET | Refills: 2 | Status: SHIPPED | OUTPATIENT
Start: 2018-11-13 | End: 2018-11-19 | Stop reason: SDUPTHER

## 2018-11-13 NOTE — TELEPHONE ENCOUNTER
Patient is calling today because an RX was written incorrectly for his compression stockings. He wears 20/30 on a daily basis and the RX was written for 30/40. He would like a new RX sent to [facility] for the 20/30. He would also like this corrected in his chart.

## 2018-11-19 ENCOUNTER — OFFICE VISIT (OUTPATIENT)
Dept: CARDIOLOGY CLINIC | Age: 78
End: 2018-11-19
Payer: MEDICARE

## 2018-11-19 VITALS
HEIGHT: 68 IN | BODY MASS INDEX: 41.22 KG/M2 | HEART RATE: 74 BPM | DIASTOLIC BLOOD PRESSURE: 64 MMHG | WEIGHT: 272 LBS | SYSTOLIC BLOOD PRESSURE: 138 MMHG | OXYGEN SATURATION: 96 %

## 2018-11-19 DIAGNOSIS — I48.0 PAROXYSMAL ATRIAL FIBRILLATION (HCC): Primary | ICD-10-CM

## 2018-11-19 DIAGNOSIS — I10 ESSENTIAL HYPERTENSION: ICD-10-CM

## 2018-11-19 DIAGNOSIS — E78.5 HYPERLIPIDEMIA LDL GOAL <70: ICD-10-CM

## 2018-11-19 DIAGNOSIS — G47.33 OSA (OBSTRUCTIVE SLEEP APNEA): ICD-10-CM

## 2018-11-19 DIAGNOSIS — J44.9 CHRONIC OBSTRUCTIVE PULMONARY DISEASE, UNSPECIFIED COPD TYPE (HCC): ICD-10-CM

## 2018-11-19 PROCEDURE — G8417 CALC BMI ABV UP PARAM F/U: HCPCS | Performed by: INTERNAL MEDICINE

## 2018-11-19 PROCEDURE — G8598 ASA/ANTIPLAT THER USED: HCPCS | Performed by: INTERNAL MEDICINE

## 2018-11-19 PROCEDURE — 1036F TOBACCO NON-USER: CPT | Performed by: INTERNAL MEDICINE

## 2018-11-19 PROCEDURE — 1123F ACP DISCUSS/DSCN MKR DOCD: CPT | Performed by: INTERNAL MEDICINE

## 2018-11-19 PROCEDURE — 3023F SPIROM DOC REV: CPT | Performed by: INTERNAL MEDICINE

## 2018-11-19 PROCEDURE — 99214 OFFICE O/P EST MOD 30 MIN: CPT | Performed by: INTERNAL MEDICINE

## 2018-11-19 PROCEDURE — 1101F PT FALLS ASSESS-DOCD LE1/YR: CPT | Performed by: INTERNAL MEDICINE

## 2018-11-19 PROCEDURE — 4040F PNEUMOC VAC/ADMIN/RCVD: CPT | Performed by: INTERNAL MEDICINE

## 2018-11-19 PROCEDURE — G8926 SPIRO NO PERF OR DOC: HCPCS | Performed by: INTERNAL MEDICINE

## 2018-11-19 PROCEDURE — G8482 FLU IMMUNIZE ORDER/ADMIN: HCPCS | Performed by: INTERNAL MEDICINE

## 2018-11-19 PROCEDURE — G8427 DOCREV CUR MEDS BY ELIG CLIN: HCPCS | Performed by: INTERNAL MEDICINE

## 2018-11-19 PROCEDURE — 93000 ELECTROCARDIOGRAM COMPLETE: CPT | Performed by: INTERNAL MEDICINE

## 2018-11-19 NOTE — PATIENT INSTRUCTIONS
a pneumococcal vaccine shot. If you have had one before, ask your doctor whether you need another dose. Get a flu shot every year. If you must be around people with colds or flu, wash your hands often. Activity    · If your doctor recommends it, get more exercise. Walking is a good choice. Bit by bit, increase the amount you walk every day. Try for at least 30 minutes on most days of the week. You also may want to swim, bike, or do other activities. Your doctor may suggest that you join a cardiac rehabilitation program so that you can have help increasing your physical activity safely.     · Start light exercise if your doctor says it is okay. Even a small amount will help you get stronger, have more energy, and manage stress. Walking is an easy way to get exercise. Start out by walking a little more than you did in the hospital. Gradually increase the amount you walk.     · When you exercise, watch for signs that your heart is working too hard. You are pushing too hard if you cannot talk while you are exercising. If you become short of breath or dizzy or have chest pain, sit down and rest immediately.     · Check your pulse regularly. Place two fingers on the artery at the palm side of your wrist, in line with your thumb. If your heartbeat seems uneven or fast, talk to your doctor. When should you call for help? Call 911 anytime you think you may need emergency care. For example, call if:    · You have symptoms of a heart attack. These may include:  ? Chest pain or pressure, or a strange feeling in the chest.  ? Sweating. ? Shortness of breath. ? Nausea or vomiting. ? Pain, pressure, or a strange feeling in the back, neck, jaw, or upper belly or in one or both shoulders or arms. ? Lightheadedness or sudden weakness. ? A fast or irregular heartbeat. After you call 911, the  may tell you to chew 1 adult-strength or 2 to 4 low-dose aspirin. Wait for an ambulance.  Do not try to drive yourself.

## 2018-11-29 ENCOUNTER — OFFICE VISIT (OUTPATIENT)
Dept: ORTHOPEDIC SURGERY | Age: 78
End: 2018-11-29
Payer: MEDICARE

## 2018-11-29 VITALS
SYSTOLIC BLOOD PRESSURE: 155 MMHG | TEMPERATURE: 97.4 F | WEIGHT: 272 LBS | BODY MASS INDEX: 41.22 KG/M2 | HEART RATE: 76 BPM | DIASTOLIC BLOOD PRESSURE: 72 MMHG | HEIGHT: 68 IN

## 2018-11-29 DIAGNOSIS — M25.562 PAIN IN BOTH KNEES, UNSPECIFIED CHRONICITY: Primary | ICD-10-CM

## 2018-11-29 DIAGNOSIS — M17.0 PRIMARY OSTEOARTHRITIS OF BOTH KNEES: ICD-10-CM

## 2018-11-29 DIAGNOSIS — M25.561 PAIN IN BOTH KNEES, UNSPECIFIED CHRONICITY: Primary | ICD-10-CM

## 2018-11-29 PROCEDURE — G8598 ASA/ANTIPLAT THER USED: HCPCS | Performed by: ORTHOPAEDIC SURGERY

## 2018-11-29 PROCEDURE — G8482 FLU IMMUNIZE ORDER/ADMIN: HCPCS | Performed by: ORTHOPAEDIC SURGERY

## 2018-11-29 PROCEDURE — G8427 DOCREV CUR MEDS BY ELIG CLIN: HCPCS | Performed by: ORTHOPAEDIC SURGERY

## 2018-11-29 PROCEDURE — 1036F TOBACCO NON-USER: CPT | Performed by: ORTHOPAEDIC SURGERY

## 2018-11-29 PROCEDURE — G8417 CALC BMI ABV UP PARAM F/U: HCPCS | Performed by: ORTHOPAEDIC SURGERY

## 2018-11-29 PROCEDURE — 1101F PT FALLS ASSESS-DOCD LE1/YR: CPT | Performed by: ORTHOPAEDIC SURGERY

## 2018-11-29 PROCEDURE — 1123F ACP DISCUSS/DSCN MKR DOCD: CPT | Performed by: ORTHOPAEDIC SURGERY

## 2018-11-29 PROCEDURE — 4040F PNEUMOC VAC/ADMIN/RCVD: CPT | Performed by: ORTHOPAEDIC SURGERY

## 2018-11-29 PROCEDURE — 99203 OFFICE O/P NEW LOW 30 MIN: CPT | Performed by: ORTHOPAEDIC SURGERY

## 2018-11-29 NOTE — LETTER
CONSENT, PATIENT RESPONSIBILITY AND NOTICE OF PRIVACY PRACTICE. Gilmar JENNINGS Kerwin    1940        Patients Signature: ____________________________________________________         DATE: ____/____/___      [facility] / Support Persons Signature (if applicable) _________________________     DATE: __/__/__    **Each person will be asked to sign this commitment before each Shared Medical Appointment. Thank You ! SURGERY SCHEDULING TIMES                                                                                                                                                      Gilmar JENNINGS Kerwin                                                                                       1940                                                                           SURGERY DATE: ___/___/___                                                                      SURGERY TIME:  ___:___ AM/PM                                                                      ARRIVAL TIME:   ___:___  AM/PM                                                                                                                        **PLEASE REPORT TO THE                                                       INFORMATION DESK IN THE MAIN LOBBY                                                          OF THE HOSPITAL.         [address] [facility]                                                          JET INFO     PT NAME:  Ron Overton              Patient Phone:  [phone] (home)                                           1940    PCP:  PCP:  Matthew Boyle MD                APPT DATE:  ___/___/___      DATE:  18                                                              DR. Ivana Martinez   ____

## 2018-12-26 ENCOUNTER — APPOINTMENT (OUTPATIENT)
Dept: CT IMAGING | Age: 78
DRG: 872 | End: 2018-12-26
Payer: MEDICARE

## 2018-12-26 ENCOUNTER — APPOINTMENT (OUTPATIENT)
Dept: GENERAL RADIOLOGY | Age: 78
DRG: 872 | End: 2018-12-26
Payer: MEDICARE

## 2018-12-26 ENCOUNTER — HOSPITAL ENCOUNTER (INPATIENT)
Age: 78
LOS: 5 days | Discharge: HOME HEALTH | DRG: 872 | End: 2018-12-31
Attending: EMERGENCY MEDICINE | Admitting: INTERNAL MEDICINE
Payer: MEDICARE

## 2018-12-26 DIAGNOSIS — N17.9 AKI (ACUTE KIDNEY INJURY) (HCC): ICD-10-CM

## 2018-12-26 DIAGNOSIS — N39.0 URINARY TRACT INFECTION WITHOUT HEMATURIA, SITE UNSPECIFIED: Primary | ICD-10-CM

## 2018-12-26 PROBLEM — J44.9 COPD, MILD (HCC): Status: RESOLVED | Noted: 2017-11-03 | Resolved: 2018-12-26

## 2018-12-26 PROBLEM — N30.90 CYSTITIS: Status: ACTIVE | Noted: 2018-12-26

## 2018-12-26 PROBLEM — K86.89 PANCREATIC MASS: Status: ACTIVE | Noted: 2018-12-26

## 2018-12-26 PROBLEM — K59.01 SLOW TRANSIT CONSTIPATION: Status: ACTIVE | Noted: 2018-12-26

## 2018-12-26 LAB
A/G RATIO: 1.4 (ref 1.1–2.2)
ALBUMIN SERPL-MCNC: 3.8 G/DL (ref 3.4–5)
ALP BLD-CCNC: 103 U/L (ref 40–129)
ALT SERPL-CCNC: 23 U/L (ref 10–40)
ANION GAP SERPL CALCULATED.3IONS-SCNC: 16 MMOL/L (ref 3–16)
AST SERPL-CCNC: 18 U/L (ref 15–37)
BACTERIA: ABNORMAL /HPF
BASOPHILS ABSOLUTE: 0 K/UL (ref 0–0.2)
BASOPHILS RELATIVE PERCENT: 0.3 %
BILIRUB SERPL-MCNC: 0.7 MG/DL (ref 0–1)
BILIRUBIN URINE: NEGATIVE
BLOOD, URINE: ABNORMAL
BUN BLDV-MCNC: 49 MG/DL (ref 7–20)
CALCIUM SERPL-MCNC: 9 MG/DL (ref 8.3–10.6)
CHLORIDE BLD-SCNC: 98 MMOL/L (ref 99–110)
CLARITY: ABNORMAL
CO2: 24 MMOL/L (ref 21–32)
COLOR: YELLOW
CREAT SERPL-MCNC: 2.1 MG/DL (ref 0.8–1.3)
EOSINOPHILS ABSOLUTE: 0 K/UL (ref 0–0.6)
EOSINOPHILS RELATIVE PERCENT: 0.2 %
EPITHELIAL CELLS, UA: 0 /HPF (ref 0–5)
GFR AFRICAN AMERICAN: 37
GFR NON-AFRICAN AMERICAN: 31
GLOBULIN: 2.8 G/DL
GLUCOSE BLD-MCNC: 103 MG/DL (ref 70–99)
GLUCOSE BLD-MCNC: 107 MG/DL (ref 70–99)
GLUCOSE BLD-MCNC: 114 MG/DL (ref 70–99)
GLUCOSE BLD-MCNC: 118 MG/DL (ref 70–99)
GLUCOSE BLD-MCNC: 123 MG/DL (ref 70–99)
GLUCOSE URINE: NEGATIVE MG/DL
HCT VFR BLD CALC: 39.5 % (ref 40.5–52.5)
HEMOGLOBIN: 12.5 G/DL (ref 13.5–17.5)
HYALINE CASTS: 9 /LPF (ref 0–8)
KETONES, URINE: 15 MG/DL
LACTIC ACID: 1.2 MMOL/L (ref 0.4–2)
LEUKOCYTE ESTERASE, URINE: ABNORMAL
LIPASE: 20 U/L (ref 13–60)
LYMPHOCYTES ABSOLUTE: 0.8 K/UL (ref 1–5.1)
LYMPHOCYTES RELATIVE PERCENT: 5.1 %
MCH RBC QN AUTO: 26.3 PG (ref 26–34)
MCHC RBC AUTO-ENTMCNC: 31.8 G/DL (ref 31–36)
MCV RBC AUTO: 82.9 FL (ref 80–100)
MICROSCOPIC EXAMINATION: YES
MONOCYTES ABSOLUTE: 1.1 K/UL (ref 0–1.3)
MONOCYTES RELATIVE PERCENT: 7.3 %
NEUTROPHILS ABSOLUTE: 13.6 K/UL (ref 1.7–7.7)
NEUTROPHILS RELATIVE PERCENT: 87.1 %
NITRITE, URINE: POSITIVE
PDW BLD-RTO: 17.9 % (ref 12.4–15.4)
PERFORMED ON: ABNORMAL
PH UA: 5.5
PLATELET # BLD: 157 K/UL (ref 135–450)
PMV BLD AUTO: 8.2 FL (ref 5–10.5)
POTASSIUM REFLEX MAGNESIUM: 4.6 MMOL/L (ref 3.5–5.1)
PROTEIN UA: ABNORMAL MG/DL
RBC # BLD: 4.76 M/UL (ref 4.2–5.9)
RBC UA: 1 /HPF (ref 0–4)
SODIUM BLD-SCNC: 138 MMOL/L (ref 136–145)
SPECIFIC GRAVITY UA: 1.02
TOTAL PROTEIN: 6.6 G/DL (ref 6.4–8.2)
TSH REFLEX FT4: 1.31 UIU/ML (ref 0.27–4.2)
URINE REFLEX TO CULTURE: YES
URINE TYPE: ABNORMAL
UROBILINOGEN, URINE: 1 E.U./DL
WBC # BLD: 15.6 K/UL (ref 4–11)
WBC UA: 41 /HPF (ref 0–5)

## 2018-12-26 PROCEDURE — 6360000002 HC RX W HCPCS: Performed by: INTERNAL MEDICINE

## 2018-12-26 PROCEDURE — 94664 DEMO&/EVAL PT USE INHALER: CPT

## 2018-12-26 PROCEDURE — 83690 ASSAY OF LIPASE: CPT

## 2018-12-26 PROCEDURE — 94761 N-INVAS EAR/PLS OXIMETRY MLT: CPT

## 2018-12-26 PROCEDURE — 74022 RADEX COMPL AQT ABD SERIES: CPT

## 2018-12-26 PROCEDURE — 84443 ASSAY THYROID STIM HORMONE: CPT

## 2018-12-26 PROCEDURE — 99285 EMERGENCY DEPT VISIT HI MDM: CPT

## 2018-12-26 PROCEDURE — 51798 US URINE CAPACITY MEASURE: CPT

## 2018-12-26 PROCEDURE — 93010 ELECTROCARDIOGRAM REPORT: CPT | Performed by: INTERNAL MEDICINE

## 2018-12-26 PROCEDURE — 87086 URINE CULTURE/COLONY COUNT: CPT

## 2018-12-26 PROCEDURE — 87186 SC STD MICRODIL/AGAR DIL: CPT

## 2018-12-26 PROCEDURE — 6360000002 HC RX W HCPCS: Performed by: PHYSICIAN ASSISTANT

## 2018-12-26 PROCEDURE — 87040 BLOOD CULTURE FOR BACTERIA: CPT

## 2018-12-26 PROCEDURE — 94640 AIRWAY INHALATION TREATMENT: CPT

## 2018-12-26 PROCEDURE — 6370000000 HC RX 637 (ALT 250 FOR IP): Performed by: INTERNAL MEDICINE

## 2018-12-26 PROCEDURE — 2580000003 HC RX 258: Performed by: INTERNAL MEDICINE

## 2018-12-26 PROCEDURE — 83605 ASSAY OF LACTIC ACID: CPT

## 2018-12-26 PROCEDURE — 74176 CT ABD & PELVIS W/O CONTRAST: CPT

## 2018-12-26 PROCEDURE — 99223 1ST HOSP IP/OBS HIGH 75: CPT | Performed by: INTERNAL MEDICINE

## 2018-12-26 PROCEDURE — 87077 CULTURE AEROBIC IDENTIFY: CPT

## 2018-12-26 PROCEDURE — 1200000000 HC SEMI PRIVATE

## 2018-12-26 PROCEDURE — 81001 URINALYSIS AUTO W/SCOPE: CPT

## 2018-12-26 PROCEDURE — 80053 COMPREHEN METABOLIC PANEL: CPT

## 2018-12-26 PROCEDURE — 2580000003 HC RX 258: Performed by: PHYSICIAN ASSISTANT

## 2018-12-26 PROCEDURE — 6370000000 HC RX 637 (ALT 250 FOR IP): Performed by: PHYSICIAN ASSISTANT

## 2018-12-26 PROCEDURE — 96365 THER/PROPH/DIAG IV INF INIT: CPT

## 2018-12-26 PROCEDURE — 36415 COLL VENOUS BLD VENIPUNCTURE: CPT

## 2018-12-26 PROCEDURE — 96360 HYDRATION IV INFUSION INIT: CPT

## 2018-12-26 PROCEDURE — 94760 N-INVAS EAR/PLS OXIMETRY 1: CPT

## 2018-12-26 PROCEDURE — 93005 ELECTROCARDIOGRAM TRACING: CPT | Performed by: PHYSICIAN ASSISTANT

## 2018-12-26 PROCEDURE — 85025 COMPLETE CBC W/AUTO DIFF WBC: CPT

## 2018-12-26 RX ORDER — POLYETHYLENE GLYCOL 3350 17 G/17G
17 POWDER, FOR SOLUTION ORAL 3 TIMES DAILY
Status: DISCONTINUED | OUTPATIENT
Start: 2018-12-26 | End: 2018-12-26

## 2018-12-26 RX ORDER — AMIODARONE HYDROCHLORIDE 200 MG/1
100 TABLET ORAL EVERY OTHER DAY
Status: DISCONTINUED | OUTPATIENT
Start: 2018-12-26 | End: 2018-12-31 | Stop reason: HOSPADM

## 2018-12-26 RX ORDER — MONTELUKAST SODIUM 10 MG/1
10 TABLET ORAL NIGHTLY
Status: DISCONTINUED | OUTPATIENT
Start: 2018-12-26 | End: 2018-12-31 | Stop reason: HOSPADM

## 2018-12-26 RX ORDER — VERAPAMIL HYDROCHLORIDE 240 MG/1
240 TABLET, FILM COATED, EXTENDED RELEASE ORAL 2 TIMES DAILY
Status: DISCONTINUED | OUTPATIENT
Start: 2018-12-26 | End: 2018-12-31 | Stop reason: HOSPADM

## 2018-12-26 RX ORDER — INSULIN GLARGINE 100 [IU]/ML
36 INJECTION, SOLUTION SUBCUTANEOUS 2 TIMES DAILY
Status: DISCONTINUED | OUTPATIENT
Start: 2018-12-26 | End: 2018-12-26

## 2018-12-26 RX ORDER — FORMOTEROL FUMARATE 20 UG/2ML
20 SOLUTION RESPIRATORY (INHALATION) 2 TIMES DAILY
Status: DISCONTINUED | OUTPATIENT
Start: 2018-12-26 | End: 2018-12-31 | Stop reason: HOSPADM

## 2018-12-26 RX ORDER — SODIUM CHLORIDE 9 MG/ML
INJECTION, SOLUTION INTRAVENOUS CONTINUOUS
Status: DISCONTINUED | OUTPATIENT
Start: 2018-12-26 | End: 2018-12-27

## 2018-12-26 RX ORDER — INSULIN GLARGINE 100 [IU]/ML
20 INJECTION, SOLUTION SUBCUTANEOUS NIGHTLY
Status: DISCONTINUED | OUTPATIENT
Start: 2018-12-27 | End: 2018-12-29

## 2018-12-26 RX ORDER — NICOTINE POLACRILEX 4 MG
15 LOZENGE BUCCAL PRN
Status: DISCONTINUED | OUTPATIENT
Start: 2018-12-26 | End: 2018-12-31 | Stop reason: HOSPADM

## 2018-12-26 RX ORDER — SODIUM CHLORIDE FOR INHALATION 3 %
4 VIAL, NEBULIZER (ML) INHALATION 2 TIMES DAILY
Status: DISCONTINUED | OUTPATIENT
Start: 2018-12-26 | End: 2018-12-31 | Stop reason: HOSPADM

## 2018-12-26 RX ORDER — VALSARTAN 160 MG/1
320 TABLET ORAL DAILY
Status: DISCONTINUED | OUTPATIENT
Start: 2018-12-26 | End: 2018-12-31 | Stop reason: HOSPADM

## 2018-12-26 RX ORDER — 0.9 % SODIUM CHLORIDE 0.9 %
1000 INTRAVENOUS SOLUTION INTRAVENOUS ONCE
Status: COMPLETED | OUTPATIENT
Start: 2018-12-26 | End: 2018-12-26

## 2018-12-26 RX ORDER — DEXTROSE MONOHYDRATE 50 MG/ML
100 INJECTION, SOLUTION INTRAVENOUS PRN
Status: DISCONTINUED | OUTPATIENT
Start: 2018-12-26 | End: 2018-12-31 | Stop reason: HOSPADM

## 2018-12-26 RX ORDER — PANTOPRAZOLE SODIUM 40 MG/1
40 TABLET, DELAYED RELEASE ORAL DAILY
Status: DISCONTINUED | OUTPATIENT
Start: 2018-12-26 | End: 2018-12-31 | Stop reason: HOSPADM

## 2018-12-26 RX ORDER — ATORVASTATIN CALCIUM 40 MG/1
40 TABLET, FILM COATED ORAL DAILY
Status: DISCONTINUED | OUTPATIENT
Start: 2018-12-26 | End: 2018-12-31 | Stop reason: HOSPADM

## 2018-12-26 RX ORDER — DEXTROSE MONOHYDRATE 25 G/50ML
12.5 INJECTION, SOLUTION INTRAVENOUS PRN
Status: DISCONTINUED | OUTPATIENT
Start: 2018-12-26 | End: 2018-12-31 | Stop reason: HOSPADM

## 2018-12-26 RX ORDER — SODIUM CHLORIDE 0.9 % (FLUSH) 0.9 %
10 SYRINGE (ML) INJECTION PRN
Status: DISCONTINUED | OUTPATIENT
Start: 2018-12-26 | End: 2018-12-31 | Stop reason: HOSPADM

## 2018-12-26 RX ORDER — ONDANSETRON 2 MG/ML
4 INJECTION INTRAMUSCULAR; INTRAVENOUS EVERY 6 HOURS PRN
Status: DISCONTINUED | OUTPATIENT
Start: 2018-12-26 | End: 2018-12-31 | Stop reason: HOSPADM

## 2018-12-26 RX ORDER — ACETAMINOPHEN 325 MG/1
650 TABLET ORAL EVERY 4 HOURS PRN
Status: DISCONTINUED | OUTPATIENT
Start: 2018-12-26 | End: 2018-12-31 | Stop reason: HOSPADM

## 2018-12-26 RX ORDER — CLONIDINE HYDROCHLORIDE 0.1 MG/1
0.1 TABLET ORAL 2 TIMES DAILY
Status: DISCONTINUED | OUTPATIENT
Start: 2018-12-26 | End: 2018-12-31 | Stop reason: HOSPADM

## 2018-12-26 RX ORDER — ALBUTEROL SULFATE 2.5 MG/3ML
2.5 SOLUTION RESPIRATORY (INHALATION) 4 TIMES DAILY
Status: DISCONTINUED | OUTPATIENT
Start: 2018-12-26 | End: 2018-12-28

## 2018-12-26 RX ORDER — TAMSULOSIN HYDROCHLORIDE 0.4 MG/1
0.4 CAPSULE ORAL DAILY
Status: DISCONTINUED | OUTPATIENT
Start: 2018-12-26 | End: 2018-12-31 | Stop reason: HOSPADM

## 2018-12-26 RX ORDER — SODIUM CHLORIDE 0.9 % (FLUSH) 0.9 %
10 SYRINGE (ML) INJECTION EVERY 12 HOURS SCHEDULED
Status: DISCONTINUED | OUTPATIENT
Start: 2018-12-26 | End: 2018-12-31 | Stop reason: HOSPADM

## 2018-12-26 RX ADMIN — ALBUTEROL SULFATE 2.5 MG: 2.5 SOLUTION RESPIRATORY (INHALATION) at 13:31

## 2018-12-26 RX ADMIN — PANTOPRAZOLE SODIUM 40 MG: 40 TABLET, DELAYED RELEASE ORAL at 12:28

## 2018-12-26 RX ADMIN — CLONIDINE HYDROCHLORIDE 0.1 MG: 0.1 TABLET ORAL at 20:40

## 2018-12-26 RX ADMIN — SODIUM CHLORIDE: 9 INJECTION, SOLUTION INTRAVENOUS at 12:37

## 2018-12-26 RX ADMIN — VERAPAMIL HYDROCHLORIDE 240 MG: 240 TABLET, FILM COATED, EXTENDED RELEASE ORAL at 12:28

## 2018-12-26 RX ADMIN — Medication 10 ML: at 12:31

## 2018-12-26 RX ADMIN — ALBUTEROL SULFATE 2.5 MG: 2.5 SOLUTION RESPIRATORY (INHALATION) at 16:53

## 2018-12-26 RX ADMIN — ACETAMINOPHEN 650 MG: 325 TABLET ORAL at 16:11

## 2018-12-26 RX ADMIN — ATORVASTATIN CALCIUM 40 MG: 40 TABLET, FILM COATED ORAL at 12:28

## 2018-12-26 RX ADMIN — SODIUM CHLORIDE SOLN NEBU 3% 4 ML: 3 NEBU SOLN at 20:14

## 2018-12-26 RX ADMIN — CLONIDINE HYDROCHLORIDE 0.1 MG: 0.1 TABLET ORAL at 12:28

## 2018-12-26 RX ADMIN — SODIUM CHLORIDE SOLN NEBU 3% 4 ML: 3 NEBU SOLN at 13:31

## 2018-12-26 RX ADMIN — ALBUTEROL SULFATE 2.5 MG: 2.5 SOLUTION RESPIRATORY (INHALATION) at 20:14

## 2018-12-26 RX ADMIN — ENOXAPARIN SODIUM 40 MG: 40 INJECTION SUBCUTANEOUS at 20:40

## 2018-12-26 RX ADMIN — MONTELUKAST SODIUM 10 MG: 10 TABLET, FILM COATED ORAL at 20:40

## 2018-12-26 RX ADMIN — AMIODARONE HYDROCHLORIDE 100 MG: 200 TABLET ORAL at 12:28

## 2018-12-26 RX ADMIN — POLYETHYLENE GLYCOL 3350, SODIUM SULFATE ANHYDROUS, SODIUM BICARBONATE, SODIUM CHLORIDE, POTASSIUM CHLORIDE 4000 ML: 236; 22.74; 6.74; 5.86; 2.97 POWDER, FOR SOLUTION ORAL at 16:14

## 2018-12-26 RX ADMIN — SODIUM CHLORIDE 1000 ML: 9 INJECTION, SOLUTION INTRAVENOUS at 09:38

## 2018-12-26 RX ADMIN — POLYETHYLENE GLYCOL 3350 17 G: 17 POWDER, FOR SOLUTION ORAL at 12:29

## 2018-12-26 RX ADMIN — TAMSULOSIN HYDROCHLORIDE 0.4 MG: 0.4 CAPSULE ORAL at 12:28

## 2018-12-26 RX ADMIN — FORMOTEROL FUMARATE DIHYDRATE 20 MCG: 20 SOLUTION RESPIRATORY (INHALATION) at 13:31

## 2018-12-26 RX ADMIN — FORMOTEROL FUMARATE DIHYDRATE 20 MCG: 20 SOLUTION RESPIRATORY (INHALATION) at 20:14

## 2018-12-26 RX ADMIN — SODIUM CHLORIDE: 9 INJECTION, SOLUTION INTRAVENOUS at 22:44

## 2018-12-26 RX ADMIN — VALSARTAN 320 MG: 160 TABLET, FILM COATED ORAL at 16:11

## 2018-12-26 RX ADMIN — TIOTROPIUM BROMIDE 18 MCG: 18 CAPSULE ORAL; RESPIRATORY (INHALATION) at 13:31

## 2018-12-26 RX ADMIN — CEFTRIAXONE 1 G: 1 INJECTION, POWDER, FOR SOLUTION INTRAMUSCULAR; INTRAVENOUS at 09:38

## 2018-12-26 RX ADMIN — VERAPAMIL HYDROCHLORIDE 240 MG: 240 TABLET, FILM COATED, EXTENDED RELEASE ORAL at 20:40

## 2018-12-26 ASSESSMENT — PAIN DESCRIPTION - FREQUENCY: FREQUENCY: CONTINUOUS

## 2018-12-26 ASSESSMENT — PAIN SCALES - GENERAL
PAINLEVEL_OUTOF10: 0
PAINLEVEL_OUTOF10: 4
PAINLEVEL_OUTOF10: 2
PAINLEVEL_OUTOF10: 3
PAINLEVEL_OUTOF10: 5

## 2018-12-26 ASSESSMENT — PAIN DESCRIPTION - LOCATION: LOCATION: ABDOMEN

## 2018-12-26 ASSESSMENT — PAIN DESCRIPTION - PAIN TYPE: TYPE: ACUTE PAIN

## 2018-12-26 ASSESSMENT — PAIN DESCRIPTION - DESCRIPTORS: DESCRIPTORS: PRESSURE

## 2018-12-26 ASSESSMENT — PAIN DESCRIPTION - ORIENTATION: ORIENTATION: LOWER

## 2018-12-27 ENCOUNTER — APPOINTMENT (OUTPATIENT)
Dept: MRI IMAGING | Age: 78
DRG: 872 | End: 2018-12-27
Payer: MEDICARE

## 2018-12-27 ENCOUNTER — APPOINTMENT (OUTPATIENT)
Dept: GENERAL RADIOLOGY | Age: 78
DRG: 872 | End: 2018-12-27
Payer: MEDICARE

## 2018-12-27 LAB
ANION GAP SERPL CALCULATED.3IONS-SCNC: 16 MMOL/L (ref 3–16)
BUN BLDV-MCNC: 44 MG/DL (ref 7–20)
CALCIUM SERPL-MCNC: 7.7 MG/DL (ref 8.3–10.6)
CHLORIDE BLD-SCNC: 97 MMOL/L (ref 99–110)
CO2: 21 MMOL/L (ref 21–32)
CREAT SERPL-MCNC: 2 MG/DL (ref 0.8–1.3)
EKG ATRIAL RATE: 87 BPM
EKG DIAGNOSIS: NORMAL
EKG P-R INTERVAL: 192 MS
EKG Q-T INTERVAL: 382 MS
EKG QRS DURATION: 144 MS
EKG QTC CALCULATION (BAZETT): 459 MS
EKG R AXIS: -58 DEGREES
EKG T AXIS: 17 DEGREES
EKG VENTRICULAR RATE: 87 BPM
GFR AFRICAN AMERICAN: 39
GFR NON-AFRICAN AMERICAN: 32
GLUCOSE BLD-MCNC: 110 MG/DL (ref 70–99)
GLUCOSE BLD-MCNC: 116 MG/DL (ref 70–99)
GLUCOSE BLD-MCNC: 117 MG/DL (ref 70–99)
GLUCOSE BLD-MCNC: 119 MG/DL (ref 70–99)
GLUCOSE BLD-MCNC: 121 MG/DL (ref 70–99)
HCT VFR BLD CALC: 32.6 % (ref 40.5–52.5)
HEMOGLOBIN: 10.6 G/DL (ref 13.5–17.5)
MCH RBC QN AUTO: 26.7 PG (ref 26–34)
MCHC RBC AUTO-ENTMCNC: 32.4 G/DL (ref 31–36)
MCV RBC AUTO: 82.5 FL (ref 80–100)
PDW BLD-RTO: 18.1 % (ref 12.4–15.4)
PERFORMED ON: ABNORMAL
PLATELET # BLD: 134 K/UL (ref 135–450)
PMV BLD AUTO: 8.2 FL (ref 5–10.5)
POTASSIUM REFLEX MAGNESIUM: 3.8 MMOL/L (ref 3.5–5.1)
RBC # BLD: 3.95 M/UL (ref 4.2–5.9)
SODIUM BLD-SCNC: 134 MMOL/L (ref 136–145)
WBC # BLD: 9.7 K/UL (ref 4–11)

## 2018-12-27 PROCEDURE — 1200000000 HC SEMI PRIVATE

## 2018-12-27 PROCEDURE — 6370000000 HC RX 637 (ALT 250 FOR IP): Performed by: INTERNAL MEDICINE

## 2018-12-27 PROCEDURE — 85027 COMPLETE CBC AUTOMATED: CPT

## 2018-12-27 PROCEDURE — 94760 N-INVAS EAR/PLS OXIMETRY 1: CPT

## 2018-12-27 PROCEDURE — 36415 COLL VENOUS BLD VENIPUNCTURE: CPT

## 2018-12-27 PROCEDURE — 74181 MRI ABDOMEN W/O CONTRAST: CPT

## 2018-12-27 PROCEDURE — 6360000002 HC RX W HCPCS: Performed by: INTERNAL MEDICINE

## 2018-12-27 PROCEDURE — 80048 BASIC METABOLIC PNL TOTAL CA: CPT

## 2018-12-27 PROCEDURE — 74018 RADEX ABDOMEN 1 VIEW: CPT

## 2018-12-27 PROCEDURE — 2580000003 HC RX 258: Performed by: INTERNAL MEDICINE

## 2018-12-27 PROCEDURE — 94640 AIRWAY INHALATION TREATMENT: CPT

## 2018-12-27 PROCEDURE — 99233 SBSQ HOSP IP/OBS HIGH 50: CPT | Performed by: INTERNAL MEDICINE

## 2018-12-27 RX ORDER — SODIUM CHLORIDE AND POTASSIUM CHLORIDE .9; .15 G/100ML; G/100ML
SOLUTION INTRAVENOUS CONTINUOUS
Status: DISCONTINUED | OUTPATIENT
Start: 2018-12-27 | End: 2018-12-29

## 2018-12-27 RX ADMIN — MONTELUKAST SODIUM 10 MG: 10 TABLET, FILM COATED ORAL at 20:34

## 2018-12-27 RX ADMIN — VERAPAMIL HYDROCHLORIDE 240 MG: 240 TABLET, FILM COATED, EXTENDED RELEASE ORAL at 11:26

## 2018-12-27 RX ADMIN — SODIUM CHLORIDE SOLN NEBU 3% 4 ML: 3 NEBU SOLN at 09:35

## 2018-12-27 RX ADMIN — ALBUTEROL SULFATE 2.5 MG: 2.5 SOLUTION RESPIRATORY (INHALATION) at 13:27

## 2018-12-27 RX ADMIN — TAMSULOSIN HYDROCHLORIDE 0.4 MG: 0.4 CAPSULE ORAL at 11:26

## 2018-12-27 RX ADMIN — ACETAMINOPHEN 650 MG: 325 TABLET ORAL at 20:33

## 2018-12-27 RX ADMIN — Medication 10 ML: at 11:30

## 2018-12-27 RX ADMIN — TIOTROPIUM BROMIDE 18 MCG: 18 CAPSULE ORAL; RESPIRATORY (INHALATION) at 09:36

## 2018-12-27 RX ADMIN — ALBUTEROL SULFATE 2.5 MG: 2.5 SOLUTION RESPIRATORY (INHALATION) at 15:37

## 2018-12-27 RX ADMIN — VERAPAMIL HYDROCHLORIDE 240 MG: 240 TABLET, FILM COATED, EXTENDED RELEASE ORAL at 20:34

## 2018-12-27 RX ADMIN — FORMOTEROL FUMARATE DIHYDRATE 20 MCG: 20 SOLUTION RESPIRATORY (INHALATION) at 09:35

## 2018-12-27 RX ADMIN — ATORVASTATIN CALCIUM 40 MG: 40 TABLET, FILM COATED ORAL at 11:26

## 2018-12-27 RX ADMIN — CLONIDINE HYDROCHLORIDE 0.1 MG: 0.1 TABLET ORAL at 20:33

## 2018-12-27 RX ADMIN — ENOXAPARIN SODIUM 40 MG: 40 INJECTION SUBCUTANEOUS at 20:33

## 2018-12-27 RX ADMIN — SODIUM CHLORIDE: 9 INJECTION, SOLUTION INTRAVENOUS at 07:56

## 2018-12-27 RX ADMIN — VALSARTAN 320 MG: 160 TABLET, FILM COATED ORAL at 11:28

## 2018-12-27 RX ADMIN — ACETAMINOPHEN 650 MG: 325 TABLET ORAL at 11:34

## 2018-12-27 RX ADMIN — CEFTRIAXONE 1 G: 1 INJECTION, POWDER, FOR SOLUTION INTRAMUSCULAR; INTRAVENOUS at 08:43

## 2018-12-27 RX ADMIN — CLONIDINE HYDROCHLORIDE 0.1 MG: 0.1 TABLET ORAL at 11:29

## 2018-12-27 RX ADMIN — PANTOPRAZOLE SODIUM 40 MG: 40 TABLET, DELAYED RELEASE ORAL at 11:28

## 2018-12-27 RX ADMIN — POTASSIUM CHLORIDE AND SODIUM CHLORIDE: 900; 150 INJECTION, SOLUTION INTRAVENOUS at 22:17

## 2018-12-27 RX ADMIN — ALBUTEROL SULFATE 2.5 MG: 2.5 SOLUTION RESPIRATORY (INHALATION) at 09:35

## 2018-12-27 RX ADMIN — ACETAMINOPHEN 650 MG: 325 TABLET ORAL at 04:30

## 2018-12-27 RX ADMIN — POTASSIUM CHLORIDE AND SODIUM CHLORIDE: 900; 150 INJECTION, SOLUTION INTRAVENOUS at 11:23

## 2018-12-27 ASSESSMENT — PAIN DESCRIPTION - LOCATION: LOCATION: BACK

## 2018-12-27 ASSESSMENT — PAIN DESCRIPTION - ORIENTATION: ORIENTATION: LOWER

## 2018-12-27 ASSESSMENT — PAIN SCALES - GENERAL
PAINLEVEL_OUTOF10: 3
PAINLEVEL_OUTOF10: 0
PAINLEVEL_OUTOF10: 3
PAINLEVEL_OUTOF10: 0
PAINLEVEL_OUTOF10: 1
PAINLEVEL_OUTOF10: 2
PAINLEVEL_OUTOF10: 0

## 2018-12-27 ASSESSMENT — PAIN DESCRIPTION - ONSET: ONSET: GRADUAL

## 2018-12-27 ASSESSMENT — PAIN DESCRIPTION - PAIN TYPE: TYPE: ACUTE PAIN

## 2018-12-27 ASSESSMENT — PAIN DESCRIPTION - DESCRIPTORS: DESCRIPTORS: ACHING

## 2018-12-27 ASSESSMENT — PAIN DESCRIPTION - FREQUENCY: FREQUENCY: INTERMITTENT

## 2018-12-28 ENCOUNTER — APPOINTMENT (OUTPATIENT)
Dept: GENERAL RADIOLOGY | Age: 78
DRG: 872 | End: 2018-12-28
Payer: MEDICARE

## 2018-12-28 LAB
ANION GAP SERPL CALCULATED.3IONS-SCNC: 16 MMOL/L (ref 3–16)
BUN BLDV-MCNC: 38 MG/DL (ref 7–20)
CALCIUM SERPL-MCNC: 8.1 MG/DL (ref 8.3–10.6)
CHLORIDE BLD-SCNC: 100 MMOL/L (ref 99–110)
CO2: 21 MMOL/L (ref 21–32)
CREAT SERPL-MCNC: 1.7 MG/DL (ref 0.8–1.3)
GFR AFRICAN AMERICAN: 47
GFR NON-AFRICAN AMERICAN: 39
GLUCOSE BLD-MCNC: 112 MG/DL (ref 70–99)
GLUCOSE BLD-MCNC: 115 MG/DL (ref 70–99)
GLUCOSE BLD-MCNC: 97 MG/DL (ref 70–99)
ORGANISM: ABNORMAL
PERFORMED ON: ABNORMAL
PERFORMED ON: NORMAL
POTASSIUM REFLEX MAGNESIUM: 4.3 MMOL/L (ref 3.5–5.1)
SODIUM BLD-SCNC: 137 MMOL/L (ref 136–145)
URINE CULTURE, ROUTINE: ABNORMAL
URINE CULTURE, ROUTINE: ABNORMAL

## 2018-12-28 PROCEDURE — 80048 BASIC METABOLIC PNL TOTAL CA: CPT

## 2018-12-28 PROCEDURE — 2580000003 HC RX 258: Performed by: INTERNAL MEDICINE

## 2018-12-28 PROCEDURE — G8988 SELF CARE GOAL STATUS: HCPCS

## 2018-12-28 PROCEDURE — 94760 N-INVAS EAR/PLS OXIMETRY 1: CPT

## 2018-12-28 PROCEDURE — 94640 AIRWAY INHALATION TREATMENT: CPT

## 2018-12-28 PROCEDURE — 71045 X-RAY EXAM CHEST 1 VIEW: CPT

## 2018-12-28 PROCEDURE — 97162 PT EVAL MOD COMPLEX 30 MIN: CPT

## 2018-12-28 PROCEDURE — 6360000002 HC RX W HCPCS: Performed by: INTERNAL MEDICINE

## 2018-12-28 PROCEDURE — 97535 SELF CARE MNGMENT TRAINING: CPT

## 2018-12-28 PROCEDURE — 6370000000 HC RX 637 (ALT 250 FOR IP): Performed by: PHYSICIAN ASSISTANT

## 2018-12-28 PROCEDURE — G8978 MOBILITY CURRENT STATUS: HCPCS

## 2018-12-28 PROCEDURE — 36415 COLL VENOUS BLD VENIPUNCTURE: CPT

## 2018-12-28 PROCEDURE — 97530 THERAPEUTIC ACTIVITIES: CPT

## 2018-12-28 PROCEDURE — 6370000000 HC RX 637 (ALT 250 FOR IP): Performed by: INTERNAL MEDICINE

## 2018-12-28 PROCEDURE — G8987 SELF CARE CURRENT STATUS: HCPCS

## 2018-12-28 PROCEDURE — G8989 SELF CARE D/C STATUS: HCPCS

## 2018-12-28 PROCEDURE — 1200000000 HC SEMI PRIVATE

## 2018-12-28 PROCEDURE — 99233 SBSQ HOSP IP/OBS HIGH 50: CPT | Performed by: INTERNAL MEDICINE

## 2018-12-28 PROCEDURE — G8979 MOBILITY GOAL STATUS: HCPCS

## 2018-12-28 PROCEDURE — 97165 OT EVAL LOW COMPLEX 30 MIN: CPT

## 2018-12-28 PROCEDURE — 97116 GAIT TRAINING THERAPY: CPT

## 2018-12-28 RX ORDER — POLYETHYLENE GLYCOL 3350 17 G/17G
17 POWDER, FOR SOLUTION ORAL 2 TIMES DAILY
Status: DISCONTINUED | OUTPATIENT
Start: 2018-12-28 | End: 2018-12-28

## 2018-12-28 RX ORDER — POLYETHYLENE GLYCOL 3350 17 G/17G
17 POWDER, FOR SOLUTION ORAL 3 TIMES DAILY
Status: DISCONTINUED | OUTPATIENT
Start: 2018-12-28 | End: 2018-12-31 | Stop reason: HOSPADM

## 2018-12-28 RX ORDER — ALBUTEROL SULFATE 2.5 MG/3ML
2.5 SOLUTION RESPIRATORY (INHALATION) EVERY 4 HOURS PRN
Status: DISCONTINUED | OUTPATIENT
Start: 2018-12-28 | End: 2018-12-29

## 2018-12-28 RX ADMIN — VERAPAMIL HYDROCHLORIDE 240 MG: 240 TABLET, FILM COATED, EXTENDED RELEASE ORAL at 08:48

## 2018-12-28 RX ADMIN — TAMSULOSIN HYDROCHLORIDE 0.4 MG: 0.4 CAPSULE ORAL at 08:48

## 2018-12-28 RX ADMIN — INSULIN GLARGINE 20 UNITS: 100 INJECTION, SOLUTION SUBCUTANEOUS at 02:04

## 2018-12-28 RX ADMIN — MEROPENEM 500 MG: 500 INJECTION, POWDER, FOR SOLUTION INTRAVENOUS at 15:58

## 2018-12-28 RX ADMIN — VALSARTAN 320 MG: 160 TABLET, FILM COATED ORAL at 08:49

## 2018-12-28 RX ADMIN — SODIUM CHLORIDE SOLN NEBU 3% 4 ML: 3 NEBU SOLN at 20:06

## 2018-12-28 RX ADMIN — SODIUM CHLORIDE SOLN NEBU 3% 4 ML: 3 NEBU SOLN at 08:21

## 2018-12-28 RX ADMIN — POLYETHYLENE GLYCOL 3350 17 G: 17 POWDER, FOR SOLUTION ORAL at 08:49

## 2018-12-28 RX ADMIN — INSULIN GLARGINE 20 UNITS: 100 INJECTION, SOLUTION SUBCUTANEOUS at 21:20

## 2018-12-28 RX ADMIN — AMIODARONE HYDROCHLORIDE 100 MG: 200 TABLET ORAL at 08:48

## 2018-12-28 RX ADMIN — ENOXAPARIN SODIUM 40 MG: 40 INJECTION SUBCUTANEOUS at 21:13

## 2018-12-28 RX ADMIN — POLYETHYLENE GLYCOL 3350 17 G: 17 POWDER, FOR SOLUTION ORAL at 21:13

## 2018-12-28 RX ADMIN — MEROPENEM 500 MG: 500 INJECTION, POWDER, FOR SOLUTION INTRAVENOUS at 08:47

## 2018-12-28 RX ADMIN — FORMOTEROL FUMARATE DIHYDRATE 20 MCG: 20 SOLUTION RESPIRATORY (INHALATION) at 08:21

## 2018-12-28 RX ADMIN — FORMOTEROL FUMARATE DIHYDRATE 20 MCG: 20 SOLUTION RESPIRATORY (INHALATION) at 20:06

## 2018-12-28 RX ADMIN — VERAPAMIL HYDROCHLORIDE 240 MG: 240 TABLET, FILM COATED, EXTENDED RELEASE ORAL at 21:13

## 2018-12-28 RX ADMIN — CLONIDINE HYDROCHLORIDE 0.1 MG: 0.1 TABLET ORAL at 21:13

## 2018-12-28 RX ADMIN — CLONIDINE HYDROCHLORIDE 0.1 MG: 0.1 TABLET ORAL at 08:48

## 2018-12-28 RX ADMIN — TIOTROPIUM BROMIDE 18 MCG: 18 CAPSULE ORAL; RESPIRATORY (INHALATION) at 08:21

## 2018-12-28 RX ADMIN — MONTELUKAST SODIUM 10 MG: 10 TABLET, FILM COATED ORAL at 21:13

## 2018-12-28 RX ADMIN — PANTOPRAZOLE SODIUM 40 MG: 40 TABLET, DELAYED RELEASE ORAL at 08:48

## 2018-12-28 RX ADMIN — ATORVASTATIN CALCIUM 40 MG: 40 TABLET, FILM COATED ORAL at 08:48

## 2018-12-28 RX ADMIN — POLYETHYLENE GLYCOL 3350 17 G: 17 POWDER, FOR SOLUTION ORAL at 14:05

## 2018-12-28 ASSESSMENT — PAIN SCALES - GENERAL: PAINLEVEL_OUTOF10: 2

## 2018-12-28 ASSESSMENT — PAIN DESCRIPTION - ORIENTATION: ORIENTATION: RIGHT;LEFT

## 2018-12-28 ASSESSMENT — PAIN DESCRIPTION - LOCATION: LOCATION: ABDOMEN

## 2018-12-29 ENCOUNTER — APPOINTMENT (OUTPATIENT)
Dept: GENERAL RADIOLOGY | Age: 78
DRG: 872 | End: 2018-12-29
Payer: MEDICARE

## 2018-12-29 LAB
A/G RATIO: 1.2 (ref 1.1–2.2)
ALBUMIN SERPL-MCNC: 3.4 G/DL (ref 3.4–5)
ALP BLD-CCNC: 96 U/L (ref 40–129)
ALT SERPL-CCNC: 76 U/L (ref 10–40)
ANION GAP SERPL CALCULATED.3IONS-SCNC: 13 MMOL/L (ref 3–16)
AST SERPL-CCNC: 71 U/L (ref 15–37)
BASOPHILS ABSOLUTE: 0 K/UL (ref 0–0.2)
BASOPHILS RELATIVE PERCENT: 0.5 %
BILIRUB SERPL-MCNC: 0.4 MG/DL (ref 0–1)
BUN BLDV-MCNC: 32 MG/DL (ref 7–20)
CALCIUM SERPL-MCNC: 8.9 MG/DL (ref 8.3–10.6)
CHLORIDE BLD-SCNC: 105 MMOL/L (ref 99–110)
CO2: 20 MMOL/L (ref 21–32)
CREAT SERPL-MCNC: 1.4 MG/DL (ref 0.8–1.3)
EOSINOPHILS ABSOLUTE: 0.1 K/UL (ref 0–0.6)
EOSINOPHILS RELATIVE PERCENT: 2.4 %
GFR AFRICAN AMERICAN: 59
GFR NON-AFRICAN AMERICAN: 49
GLOBULIN: 2.8 G/DL
GLUCOSE BLD-MCNC: 105 MG/DL (ref 70–99)
GLUCOSE BLD-MCNC: 108 MG/DL (ref 70–99)
GLUCOSE BLD-MCNC: 108 MG/DL (ref 70–99)
GLUCOSE BLD-MCNC: 87 MG/DL (ref 70–99)
GLUCOSE BLD-MCNC: 90 MG/DL (ref 70–99)
HCT VFR BLD CALC: 37.8 % (ref 40.5–52.5)
HEMOGLOBIN: 12.3 G/DL (ref 13.5–17.5)
LYMPHOCYTES ABSOLUTE: 0.8 K/UL (ref 1–5.1)
LYMPHOCYTES RELATIVE PERCENT: 16.9 %
MCH RBC QN AUTO: 26.4 PG (ref 26–34)
MCHC RBC AUTO-ENTMCNC: 32.4 G/DL (ref 31–36)
MCV RBC AUTO: 81.5 FL (ref 80–100)
MONOCYTES ABSOLUTE: 0.8 K/UL (ref 0–1.3)
MONOCYTES RELATIVE PERCENT: 16.7 %
NEUTROPHILS ABSOLUTE: 2.9 K/UL (ref 1.7–7.7)
NEUTROPHILS RELATIVE PERCENT: 63.5 %
PDW BLD-RTO: 18 % (ref 12.4–15.4)
PERFORMED ON: ABNORMAL
PERFORMED ON: ABNORMAL
PERFORMED ON: NORMAL
PERFORMED ON: NORMAL
PLATELET # BLD: 155 K/UL (ref 135–450)
PMV BLD AUTO: 8.1 FL (ref 5–10.5)
POTASSIUM REFLEX MAGNESIUM: 4.8 MMOL/L (ref 3.5–5.1)
POTASSIUM SERPL-SCNC: 4.8 MMOL/L (ref 3.5–5.1)
RBC # BLD: 4.64 M/UL (ref 4.2–5.9)
SODIUM BLD-SCNC: 138 MMOL/L (ref 136–145)
TOTAL PROTEIN: 6.2 G/DL (ref 6.4–8.2)
WBC # BLD: 4.6 K/UL (ref 4–11)

## 2018-12-29 PROCEDURE — 6360000002 HC RX W HCPCS: Performed by: INTERNAL MEDICINE

## 2018-12-29 PROCEDURE — 85025 COMPLETE CBC W/AUTO DIFF WBC: CPT

## 2018-12-29 PROCEDURE — 2580000003 HC RX 258: Performed by: INTERNAL MEDICINE

## 2018-12-29 PROCEDURE — 94760 N-INVAS EAR/PLS OXIMETRY 1: CPT

## 2018-12-29 PROCEDURE — 6370000000 HC RX 637 (ALT 250 FOR IP): Performed by: INTERNAL MEDICINE

## 2018-12-29 PROCEDURE — 1200000000 HC SEMI PRIVATE

## 2018-12-29 PROCEDURE — 6370000000 HC RX 637 (ALT 250 FOR IP): Performed by: PHYSICIAN ASSISTANT

## 2018-12-29 PROCEDURE — 80053 COMPREHEN METABOLIC PANEL: CPT

## 2018-12-29 PROCEDURE — 99233 SBSQ HOSP IP/OBS HIGH 50: CPT | Performed by: INTERNAL MEDICINE

## 2018-12-29 PROCEDURE — 71045 X-RAY EXAM CHEST 1 VIEW: CPT

## 2018-12-29 PROCEDURE — 94640 AIRWAY INHALATION TREATMENT: CPT

## 2018-12-29 PROCEDURE — 94664 DEMO&/EVAL PT USE INHALER: CPT

## 2018-12-29 RX ORDER — INSULIN GLARGINE 100 [IU]/ML
10 INJECTION, SOLUTION SUBCUTANEOUS NIGHTLY
Status: DISCONTINUED | OUTPATIENT
Start: 2018-12-29 | End: 2018-12-31 | Stop reason: HOSPADM

## 2018-12-29 RX ORDER — SODIUM CHLORIDE FOR INHALATION 0.9 %
3 VIAL, NEBULIZER (ML) INHALATION EVERY 8 HOURS PRN
Status: DISCONTINUED | OUTPATIENT
Start: 2018-12-29 | End: 2018-12-31 | Stop reason: HOSPADM

## 2018-12-29 RX ORDER — LEVALBUTEROL 1.25 MG/.5ML
1.25 SOLUTION, CONCENTRATE RESPIRATORY (INHALATION) EVERY 8 HOURS PRN
Status: DISCONTINUED | OUTPATIENT
Start: 2018-12-29 | End: 2018-12-31 | Stop reason: HOSPADM

## 2018-12-29 RX ADMIN — CLONIDINE HYDROCHLORIDE 0.1 MG: 0.1 TABLET ORAL at 08:59

## 2018-12-29 RX ADMIN — POTASSIUM CHLORIDE AND SODIUM CHLORIDE: 900; 150 INJECTION, SOLUTION INTRAVENOUS at 09:31

## 2018-12-29 RX ADMIN — MONTELUKAST SODIUM 10 MG: 10 TABLET, FILM COATED ORAL at 21:03

## 2018-12-29 RX ADMIN — INSULIN GLARGINE 10 UNITS: 100 INJECTION, SOLUTION SUBCUTANEOUS at 21:07

## 2018-12-29 RX ADMIN — POTASSIUM CHLORIDE AND SODIUM CHLORIDE: 900; 150 INJECTION, SOLUTION INTRAVENOUS at 00:46

## 2018-12-29 RX ADMIN — MEROPENEM 500 MG: 500 INJECTION, POWDER, FOR SOLUTION INTRAVENOUS at 15:45

## 2018-12-29 RX ADMIN — FORMOTEROL FUMARATE DIHYDRATE 20 MCG: 20 SOLUTION RESPIRATORY (INHALATION) at 20:33

## 2018-12-29 RX ADMIN — SODIUM CHLORIDE SOLN NEBU 3% 4 ML: 3 NEBU SOLN at 12:15

## 2018-12-29 RX ADMIN — FORMOTEROL FUMARATE DIHYDRATE 20 MCG: 20 SOLUTION RESPIRATORY (INHALATION) at 12:15

## 2018-12-29 RX ADMIN — VERAPAMIL HYDROCHLORIDE 240 MG: 240 TABLET, FILM COATED, EXTENDED RELEASE ORAL at 21:03

## 2018-12-29 RX ADMIN — TAMSULOSIN HYDROCHLORIDE 0.4 MG: 0.4 CAPSULE ORAL at 08:59

## 2018-12-29 RX ADMIN — VERAPAMIL HYDROCHLORIDE 240 MG: 240 TABLET, FILM COATED, EXTENDED RELEASE ORAL at 08:59

## 2018-12-29 RX ADMIN — VALSARTAN 320 MG: 160 TABLET, FILM COATED ORAL at 09:30

## 2018-12-29 RX ADMIN — Medication 10 ML: at 21:04

## 2018-12-29 RX ADMIN — MEROPENEM 500 MG: 500 INJECTION, POWDER, FOR SOLUTION INTRAVENOUS at 08:59

## 2018-12-29 RX ADMIN — TIOTROPIUM BROMIDE 18 MCG: 18 CAPSULE ORAL; RESPIRATORY (INHALATION) at 12:15

## 2018-12-29 RX ADMIN — CLONIDINE HYDROCHLORIDE 0.1 MG: 0.1 TABLET ORAL at 21:03

## 2018-12-29 RX ADMIN — POLYETHYLENE GLYCOL 3350 17 G: 17 POWDER, FOR SOLUTION ORAL at 13:50

## 2018-12-29 RX ADMIN — PANTOPRAZOLE SODIUM 40 MG: 40 TABLET, DELAYED RELEASE ORAL at 08:59

## 2018-12-29 RX ADMIN — ATORVASTATIN CALCIUM 40 MG: 40 TABLET, FILM COATED ORAL at 08:59

## 2018-12-29 RX ADMIN — POLYETHYLENE GLYCOL 3350 17 G: 17 POWDER, FOR SOLUTION ORAL at 08:59

## 2018-12-29 RX ADMIN — MEROPENEM 500 MG: 500 INJECTION, POWDER, FOR SOLUTION INTRAVENOUS at 00:46

## 2018-12-29 RX ADMIN — SODIUM CHLORIDE SOLN NEBU 3% 4 ML: 3 NEBU SOLN at 20:33

## 2018-12-29 RX ADMIN — ENOXAPARIN SODIUM 40 MG: 40 INJECTION SUBCUTANEOUS at 21:02

## 2018-12-30 LAB
ANION GAP SERPL CALCULATED.3IONS-SCNC: 12 MMOL/L (ref 3–16)
BUN BLDV-MCNC: 25 MG/DL (ref 7–20)
CALCIUM SERPL-MCNC: 8.9 MG/DL (ref 8.3–10.6)
CHLORIDE BLD-SCNC: 101 MMOL/L (ref 99–110)
CO2: 23 MMOL/L (ref 21–32)
CREAT SERPL-MCNC: 1.5 MG/DL (ref 0.8–1.3)
GFR AFRICAN AMERICAN: 55
GFR NON-AFRICAN AMERICAN: 45
GLUCOSE BLD-MCNC: 100 MG/DL (ref 70–99)
GLUCOSE BLD-MCNC: 108 MG/DL (ref 70–99)
GLUCOSE BLD-MCNC: 117 MG/DL (ref 70–99)
GLUCOSE BLD-MCNC: 127 MG/DL (ref 70–99)
GLUCOSE BLD-MCNC: 92 MG/DL (ref 70–99)
PERFORMED ON: ABNORMAL
PERFORMED ON: NORMAL
POTASSIUM REFLEX MAGNESIUM: 4.6 MMOL/L (ref 3.5–5.1)
SODIUM BLD-SCNC: 136 MMOL/L (ref 136–145)

## 2018-12-30 PROCEDURE — 94640 AIRWAY INHALATION TREATMENT: CPT

## 2018-12-30 PROCEDURE — 6360000002 HC RX W HCPCS: Performed by: INTERNAL MEDICINE

## 2018-12-30 PROCEDURE — 2580000003 HC RX 258: Performed by: INTERNAL MEDICINE

## 2018-12-30 PROCEDURE — 1200000000 HC SEMI PRIVATE

## 2018-12-30 PROCEDURE — 6370000000 HC RX 637 (ALT 250 FOR IP): Performed by: INTERNAL MEDICINE

## 2018-12-30 PROCEDURE — 6370000000 HC RX 637 (ALT 250 FOR IP): Performed by: PHYSICIAN ASSISTANT

## 2018-12-30 PROCEDURE — 99232 SBSQ HOSP IP/OBS MODERATE 35: CPT | Performed by: INTERNAL MEDICINE

## 2018-12-30 PROCEDURE — 99223 1ST HOSP IP/OBS HIGH 75: CPT | Performed by: INTERNAL MEDICINE

## 2018-12-30 PROCEDURE — 80048 BASIC METABOLIC PNL TOTAL CA: CPT

## 2018-12-30 RX ORDER — LIDOCAINE HYDROCHLORIDE 10 MG/ML
5 INJECTION, SOLUTION EPIDURAL; INFILTRATION; INTRACAUDAL; PERINEURAL ONCE
Status: DISCONTINUED | OUTPATIENT
Start: 2018-12-30 | End: 2018-12-31 | Stop reason: HOSPADM

## 2018-12-30 RX ORDER — SODIUM CHLORIDE 0.9 % (FLUSH) 0.9 %
10 SYRINGE (ML) INJECTION EVERY 12 HOURS SCHEDULED
Status: DISCONTINUED | OUTPATIENT
Start: 2018-12-30 | End: 2018-12-30 | Stop reason: SDUPTHER

## 2018-12-30 RX ORDER — SODIUM CHLORIDE 0.9 % (FLUSH) 0.9 %
10 SYRINGE (ML) INJECTION PRN
Status: DISCONTINUED | OUTPATIENT
Start: 2018-12-30 | End: 2018-12-30 | Stop reason: SDUPTHER

## 2018-12-30 RX ORDER — LACTOBACILLUS RHAMNOSUS GG 10B CELL
1 CAPSULE ORAL 2 TIMES DAILY WITH MEALS
Status: DISCONTINUED | OUTPATIENT
Start: 2018-12-30 | End: 2018-12-31 | Stop reason: HOSPADM

## 2018-12-30 RX ADMIN — VALSARTAN 320 MG: 160 TABLET, FILM COATED ORAL at 07:39

## 2018-12-30 RX ADMIN — CLONIDINE HYDROCHLORIDE 0.1 MG: 0.1 TABLET ORAL at 19:57

## 2018-12-30 RX ADMIN — CLONIDINE HYDROCHLORIDE 0.1 MG: 0.1 TABLET ORAL at 07:39

## 2018-12-30 RX ADMIN — AMIODARONE HYDROCHLORIDE 100 MG: 200 TABLET ORAL at 07:39

## 2018-12-30 RX ADMIN — VERAPAMIL HYDROCHLORIDE 240 MG: 240 TABLET, FILM COATED, EXTENDED RELEASE ORAL at 19:58

## 2018-12-30 RX ADMIN — SODIUM CHLORIDE SOLN NEBU 3% 4 ML: 3 NEBU SOLN at 20:40

## 2018-12-30 RX ADMIN — PANTOPRAZOLE SODIUM 40 MG: 40 TABLET, DELAYED RELEASE ORAL at 07:39

## 2018-12-30 RX ADMIN — Medication 1 CAPSULE: at 10:46

## 2018-12-30 RX ADMIN — FORMOTEROL FUMARATE DIHYDRATE 20 MCG: 20 SOLUTION RESPIRATORY (INHALATION) at 08:24

## 2018-12-30 RX ADMIN — VERAPAMIL HYDROCHLORIDE 240 MG: 240 TABLET, FILM COATED, EXTENDED RELEASE ORAL at 07:39

## 2018-12-30 RX ADMIN — FORMOTEROL FUMARATE DIHYDRATE 20 MCG: 20 SOLUTION RESPIRATORY (INHALATION) at 20:40

## 2018-12-30 RX ADMIN — POLYETHYLENE GLYCOL 3350 17 G: 17 POWDER, FOR SOLUTION ORAL at 07:39

## 2018-12-30 RX ADMIN — ATORVASTATIN CALCIUM 40 MG: 40 TABLET, FILM COATED ORAL at 07:40

## 2018-12-30 RX ADMIN — MEROPENEM 500 MG: 500 INJECTION, POWDER, FOR SOLUTION INTRAVENOUS at 00:15

## 2018-12-30 RX ADMIN — TAMSULOSIN HYDROCHLORIDE 0.4 MG: 0.4 CAPSULE ORAL at 07:39

## 2018-12-30 RX ADMIN — MONTELUKAST SODIUM 10 MG: 10 TABLET, FILM COATED ORAL at 19:55

## 2018-12-30 RX ADMIN — TIOTROPIUM BROMIDE 18 MCG: 18 CAPSULE ORAL; RESPIRATORY (INHALATION) at 08:24

## 2018-12-30 RX ADMIN — POLYETHYLENE GLYCOL 3350 17 G: 17 POWDER, FOR SOLUTION ORAL at 19:54

## 2018-12-30 RX ADMIN — INSULIN GLARGINE 10 UNITS: 100 INJECTION, SOLUTION SUBCUTANEOUS at 19:59

## 2018-12-30 RX ADMIN — Medication 10 ML: at 08:45

## 2018-12-30 RX ADMIN — Medication 10 ML: at 19:59

## 2018-12-30 RX ADMIN — ENOXAPARIN SODIUM 40 MG: 40 INJECTION SUBCUTANEOUS at 19:58

## 2018-12-30 RX ADMIN — SODIUM CHLORIDE SOLN NEBU 3% 4 ML: 3 NEBU SOLN at 08:24

## 2018-12-30 RX ADMIN — Medication 1 CAPSULE: at 16:58

## 2018-12-30 RX ADMIN — POLYETHYLENE GLYCOL 3350 17 G: 17 POWDER, FOR SOLUTION ORAL at 13:34

## 2018-12-30 RX ADMIN — MEROPENEM 500 MG: 500 INJECTION, POWDER, FOR SOLUTION INTRAVENOUS at 23:55

## 2018-12-30 RX ADMIN — MEROPENEM 500 MG: 500 INJECTION, POWDER, FOR SOLUTION INTRAVENOUS at 07:39

## 2018-12-30 RX ADMIN — MEROPENEM 500 MG: 500 INJECTION, POWDER, FOR SOLUTION INTRAVENOUS at 15:36

## 2018-12-30 ASSESSMENT — ENCOUNTER SYMPTOMS
DIARRHEA: 0
ABDOMINAL PAIN: 0
CONSTIPATION: 0
RHINORRHEA: 0
SHORTNESS OF BREATH: 0
EYE DISCHARGE: 0
BACK PAIN: 0
WHEEZING: 0
NAUSEA: 0
COUGH: 0
SORE THROAT: 0
EYE REDNESS: 0
TROUBLE SWALLOWING: 0

## 2018-12-31 ENCOUNTER — APPOINTMENT (OUTPATIENT)
Dept: GENERAL RADIOLOGY | Age: 78
DRG: 872 | End: 2018-12-31
Payer: MEDICARE

## 2018-12-31 VITALS
OXYGEN SATURATION: 94 % | SYSTOLIC BLOOD PRESSURE: 152 MMHG | DIASTOLIC BLOOD PRESSURE: 80 MMHG | HEIGHT: 68 IN | RESPIRATION RATE: 14 BRPM | HEART RATE: 76 BPM | BODY MASS INDEX: 39.13 KG/M2 | TEMPERATURE: 98.3 F | WEIGHT: 258.16 LBS

## 2018-12-31 LAB
ANION GAP SERPL CALCULATED.3IONS-SCNC: 13 MMOL/L (ref 3–16)
BLOOD CULTURE, ROUTINE: NORMAL
BUN BLDV-MCNC: 26 MG/DL (ref 7–20)
CALCIUM SERPL-MCNC: 8.6 MG/DL (ref 8.3–10.6)
CHLORIDE BLD-SCNC: 102 MMOL/L (ref 99–110)
CO2: 21 MMOL/L (ref 21–32)
CREAT SERPL-MCNC: 1.3 MG/DL (ref 0.8–1.3)
CULTURE, BLOOD 2: NORMAL
GFR AFRICAN AMERICAN: >60
GFR NON-AFRICAN AMERICAN: 53
GLUCOSE BLD-MCNC: 89 MG/DL (ref 70–99)
GLUCOSE BLD-MCNC: 91 MG/DL (ref 70–99)
GLUCOSE BLD-MCNC: 97 MG/DL (ref 70–99)
PERFORMED ON: NORMAL
PERFORMED ON: NORMAL
POTASSIUM REFLEX MAGNESIUM: 4.6 MMOL/L (ref 3.5–5.1)
SODIUM BLD-SCNC: 136 MMOL/L (ref 136–145)

## 2018-12-31 PROCEDURE — 36415 COLL VENOUS BLD VENIPUNCTURE: CPT

## 2018-12-31 PROCEDURE — C1769 GUIDE WIRE: HCPCS

## 2018-12-31 PROCEDURE — 99239 HOSP IP/OBS DSCHRG MGMT >30: CPT | Performed by: INTERNAL MEDICINE

## 2018-12-31 PROCEDURE — 80048 BASIC METABOLIC PNL TOTAL CA: CPT

## 2018-12-31 PROCEDURE — 76937 US GUIDE VASCULAR ACCESS: CPT

## 2018-12-31 PROCEDURE — 36569 INSJ PICC 5 YR+ W/O IMAGING: CPT

## 2018-12-31 PROCEDURE — 6370000000 HC RX 637 (ALT 250 FOR IP): Performed by: INTERNAL MEDICINE

## 2018-12-31 PROCEDURE — 2580000003 HC RX 258: Performed by: INTERNAL MEDICINE

## 2018-12-31 PROCEDURE — C1751 CATH, INF, PER/CENT/MIDLINE: HCPCS

## 2018-12-31 PROCEDURE — 6370000000 HC RX 637 (ALT 250 FOR IP): Performed by: PHYSICIAN ASSISTANT

## 2018-12-31 PROCEDURE — 71045 X-RAY EXAM CHEST 1 VIEW: CPT

## 2018-12-31 PROCEDURE — 02HV33Z INSERTION OF INFUSION DEVICE INTO SUPERIOR VENA CAVA, PERCUTANEOUS APPROACH: ICD-10-PCS | Performed by: INTERNAL MEDICINE

## 2018-12-31 PROCEDURE — 6360000002 HC RX W HCPCS: Performed by: INTERNAL MEDICINE

## 2018-12-31 RX ORDER — POLYETHYLENE GLYCOL 3350 17 G/17G
17 POWDER, FOR SOLUTION ORAL DAILY
Qty: 1530 G | Refills: 1 | Status: SHIPPED | OUTPATIENT
Start: 2018-12-31 | End: 2019-09-17 | Stop reason: SDUPTHER

## 2018-12-31 RX ORDER — LACTOBACILLUS RHAMNOSUS GG 10B CELL
1 CAPSULE ORAL 2 TIMES DAILY WITH MEALS
Qty: 60 CAPSULE | Refills: 0 | Status: SHIPPED | OUTPATIENT
Start: 2018-12-31 | End: 2019-02-10 | Stop reason: SDUPTHER

## 2018-12-31 RX ADMIN — VERAPAMIL HYDROCHLORIDE 240 MG: 240 TABLET, FILM COATED, EXTENDED RELEASE ORAL at 10:54

## 2018-12-31 RX ADMIN — POLYETHYLENE GLYCOL 3350 17 G: 17 POWDER, FOR SOLUTION ORAL at 14:32

## 2018-12-31 RX ADMIN — ATORVASTATIN CALCIUM 40 MG: 40 TABLET, FILM COATED ORAL at 10:54

## 2018-12-31 RX ADMIN — PANTOPRAZOLE SODIUM 40 MG: 40 TABLET, DELAYED RELEASE ORAL at 10:54

## 2018-12-31 RX ADMIN — VALSARTAN 320 MG: 160 TABLET, FILM COATED ORAL at 11:00

## 2018-12-31 RX ADMIN — CLONIDINE HYDROCHLORIDE 0.1 MG: 0.1 TABLET ORAL at 10:54

## 2018-12-31 RX ADMIN — TAMSULOSIN HYDROCHLORIDE 0.4 MG: 0.4 CAPSULE ORAL at 10:54

## 2018-12-31 RX ADMIN — MEROPENEM 500 MG: 500 INJECTION, POWDER, FOR SOLUTION INTRAVENOUS at 14:32

## 2018-12-31 RX ADMIN — MEROPENEM 500 MG: 500 INJECTION, POWDER, FOR SOLUTION INTRAVENOUS at 10:49

## 2018-12-31 RX ADMIN — Medication 10 ML: at 10:49

## 2018-12-31 RX ADMIN — POLYETHYLENE GLYCOL 3350 17 G: 17 POWDER, FOR SOLUTION ORAL at 10:49

## 2018-12-31 RX ADMIN — Medication 1 CAPSULE: at 10:54

## 2019-01-03 ENCOUNTER — TELEPHONE (OUTPATIENT)
Dept: INFECTIOUS DISEASES | Age: 79
End: 2019-01-03

## 2019-01-03 ENCOUNTER — HOSPITAL ENCOUNTER (EMERGENCY)
Age: 79
Discharge: HOME | End: 2019-01-03
Attending: EMERGENCY MEDICINE
Payer: MEDICARE

## 2019-01-03 VITALS
RESPIRATION RATE: 16 BRPM | HEIGHT: 68 IN | TEMPERATURE: 98.2 F | HEART RATE: 75 BPM | BODY MASS INDEX: 38.96 KG/M2 | OXYGEN SATURATION: 97 % | WEIGHT: 257.06 LBS | SYSTOLIC BLOOD PRESSURE: 154 MMHG | DIASTOLIC BLOOD PRESSURE: 98 MMHG

## 2019-01-03 DIAGNOSIS — R33.9 URINARY RETENTION: Primary | ICD-10-CM

## 2019-01-03 DIAGNOSIS — N18.9 CHRONIC KIDNEY DISEASE, UNSPECIFIED CKD STAGE: ICD-10-CM

## 2019-01-03 LAB
ANION GAP SERPL CALCULATED.3IONS-SCNC: 13 MMOL/L (ref 3–16)
BASOPHILS ABSOLUTE: 0.1 K/UL (ref 0–0.2)
BASOPHILS RELATIVE PERCENT: 0.8 %
BILIRUBIN URINE: NEGATIVE
BLOOD, URINE: NEGATIVE
BUN BLDV-MCNC: 31 MG/DL (ref 7–20)
CALCIUM SERPL-MCNC: 8.8 MG/DL (ref 8.3–10.6)
CHLORIDE BLD-SCNC: 100 MMOL/L (ref 99–110)
CLARITY: CLEAR
CO2: 23 MMOL/L (ref 21–32)
COLOR: YELLOW
CREAT SERPL-MCNC: 1.4 MG/DL (ref 0.8–1.3)
EOSINOPHILS ABSOLUTE: 0.2 K/UL (ref 0–0.6)
EOSINOPHILS RELATIVE PERCENT: 1.1 %
GFR AFRICAN AMERICAN: 59
GFR NON-AFRICAN AMERICAN: 49
GLUCOSE BLD-MCNC: 101 MG/DL (ref 70–99)
GLUCOSE URINE: NEGATIVE MG/DL
HCT VFR BLD CALC: 37.3 % (ref 40.5–52.5)
HEMOGLOBIN: 11.9 G/DL (ref 13.5–17.5)
KETONES, URINE: NEGATIVE MG/DL
LEUKOCYTE ESTERASE, URINE: NEGATIVE
LYMPHOCYTES ABSOLUTE: 1.2 K/UL (ref 1–5.1)
LYMPHOCYTES RELATIVE PERCENT: 7.8 %
MCH RBC QN AUTO: 26.2 PG (ref 26–34)
MCHC RBC AUTO-ENTMCNC: 32 G/DL (ref 31–36)
MCV RBC AUTO: 81.9 FL (ref 80–100)
MICROSCOPIC EXAMINATION: NORMAL
MONOCYTES ABSOLUTE: 0.7 K/UL (ref 0–1.3)
MONOCYTES RELATIVE PERCENT: 4.5 %
NEUTROPHILS ABSOLUTE: 12.7 K/UL (ref 1.7–7.7)
NEUTROPHILS RELATIVE PERCENT: 85.8 %
NITRITE, URINE: NEGATIVE
PDW BLD-RTO: 17.8 % (ref 12.4–15.4)
PH UA: 5.5
PLATELET # BLD: 208 K/UL (ref 135–450)
PMV BLD AUTO: 7.3 FL (ref 5–10.5)
POTASSIUM SERPL-SCNC: 4.4 MMOL/L (ref 3.5–5.1)
PROTEIN UA: NEGATIVE MG/DL
RBC # BLD: 4.55 M/UL (ref 4.2–5.9)
SODIUM BLD-SCNC: 136 MMOL/L (ref 136–145)
SPECIFIC GRAVITY UA: 1.01
URINE REFLEX TO CULTURE: NORMAL
URINE TYPE: NORMAL
UROBILINOGEN, URINE: 0.2 E.U./DL
WBC # BLD: 14.8 K/UL (ref 4–11)

## 2019-01-03 PROCEDURE — 85025 COMPLETE CBC W/AUTO DIFF WBC: CPT

## 2019-01-03 PROCEDURE — 80048 BASIC METABOLIC PNL TOTAL CA: CPT

## 2019-01-03 PROCEDURE — 51798 US URINE CAPACITY MEASURE: CPT

## 2019-01-03 PROCEDURE — 99283 EMERGENCY DEPT VISIT LOW MDM: CPT

## 2019-01-03 PROCEDURE — 81003 URINALYSIS AUTO W/O SCOPE: CPT

## 2019-01-03 PROCEDURE — 51702 INSERT TEMP BLADDER CATH: CPT

## 2019-01-03 ASSESSMENT — PAIN DESCRIPTION - ORIENTATION: ORIENTATION: LOWER

## 2019-01-03 ASSESSMENT — PAIN DESCRIPTION - PAIN TYPE: TYPE: ACUTE PAIN

## 2019-01-03 ASSESSMENT — PAIN SCALES - GENERAL: PAINLEVEL_OUTOF10: 4

## 2019-01-03 ASSESSMENT — PAIN DESCRIPTION - LOCATION: LOCATION: ABDOMEN

## 2019-01-10 ENCOUNTER — OFFICE VISIT (OUTPATIENT)
Dept: INFECTIOUS DISEASES | Age: 79
End: 2019-01-10
Payer: MEDICARE

## 2019-01-10 VITALS
HEIGHT: 68 IN | WEIGHT: 255 LBS | HEART RATE: 76 BPM | BODY MASS INDEX: 38.65 KG/M2 | OXYGEN SATURATION: 96 % | DIASTOLIC BLOOD PRESSURE: 60 MMHG | TEMPERATURE: 97.6 F | SYSTOLIC BLOOD PRESSURE: 126 MMHG

## 2019-01-10 DIAGNOSIS — Z79.4 TYPE 2 DIABETES MELLITUS WITHOUT COMPLICATION, WITH LONG-TERM CURRENT USE OF INSULIN (HCC): Chronic | ICD-10-CM

## 2019-01-10 DIAGNOSIS — Z79.2 RECEIVING INTRAVENOUS ANTIBIOTIC TREATMENT AS OUTPATIENT: ICD-10-CM

## 2019-01-10 DIAGNOSIS — K86.89 PANCREATIC MASS: ICD-10-CM

## 2019-01-10 DIAGNOSIS — Z71.3 WEIGHT LOSS COUNSELING, ENCOUNTER FOR: ICD-10-CM

## 2019-01-10 DIAGNOSIS — R91.1 PULMONARY NODULE: ICD-10-CM

## 2019-01-10 DIAGNOSIS — G47.33 OSA (OBSTRUCTIVE SLEEP APNEA): ICD-10-CM

## 2019-01-10 DIAGNOSIS — I48.20 CHRONIC ATRIAL FIBRILLATION (HCC): ICD-10-CM

## 2019-01-10 DIAGNOSIS — J44.9 CHRONIC OBSTRUCTIVE PULMONARY DISEASE, UNSPECIFIED COPD TYPE (HCC): ICD-10-CM

## 2019-01-10 DIAGNOSIS — E53.8 B12 DEFICIENCY: ICD-10-CM

## 2019-01-10 DIAGNOSIS — I65.23 BILATERAL CAROTID ARTERY STENOSIS WITHOUT CEREBRAL INFARCTION: ICD-10-CM

## 2019-01-10 DIAGNOSIS — B96.29 UTI DUE TO EXTENDED-SPECTRUM BETA LACTAMASE (ESBL) PRODUCING ESCHERICHIA COLI: Primary | ICD-10-CM

## 2019-01-10 DIAGNOSIS — N39.0 COMPLICATED UTI (URINARY TRACT INFECTION): ICD-10-CM

## 2019-01-10 DIAGNOSIS — I10 ESSENTIAL HYPERTENSION: ICD-10-CM

## 2019-01-10 DIAGNOSIS — E66.9 OBESITY, CLASS II, BMI 35-39.9: ICD-10-CM

## 2019-01-10 DIAGNOSIS — N39.0 UTI DUE TO EXTENDED-SPECTRUM BETA LACTAMASE (ESBL) PRODUCING ESCHERICHIA COLI: Primary | ICD-10-CM

## 2019-01-10 DIAGNOSIS — Z45.2 PICC (PERIPHERALLY INSERTED CENTRAL CATHETER) REMOVAL: ICD-10-CM

## 2019-01-10 DIAGNOSIS — R80.9 MICROALBUMINURIA: ICD-10-CM

## 2019-01-10 DIAGNOSIS — Z16.12 UTI DUE TO EXTENDED-SPECTRUM BETA LACTAMASE (ESBL) PRODUCING ESCHERICHIA COLI: Primary | ICD-10-CM

## 2019-01-10 DIAGNOSIS — K21.9 GASTROESOPHAGEAL REFLUX DISEASE, ESOPHAGITIS PRESENCE NOT SPECIFIED: ICD-10-CM

## 2019-01-10 DIAGNOSIS — E11.9 TYPE 2 DIABETES MELLITUS WITHOUT COMPLICATION, WITH LONG-TERM CURRENT USE OF INSULIN (HCC): Chronic | ICD-10-CM

## 2019-01-10 DIAGNOSIS — J44.9 MODERATE COPD (CHRONIC OBSTRUCTIVE PULMONARY DISEASE) (HCC): ICD-10-CM

## 2019-01-10 PROCEDURE — G8599 NO ASA/ANTIPLAT THER USE RNG: HCPCS | Performed by: INTERNAL MEDICINE

## 2019-01-10 PROCEDURE — 1123F ACP DISCUSS/DSCN MKR DOCD: CPT | Performed by: INTERNAL MEDICINE

## 2019-01-10 PROCEDURE — G8427 DOCREV CUR MEDS BY ELIG CLIN: HCPCS | Performed by: INTERNAL MEDICINE

## 2019-01-10 PROCEDURE — 4040F PNEUMOC VAC/ADMIN/RCVD: CPT | Performed by: INTERNAL MEDICINE

## 2019-01-10 PROCEDURE — G8926 SPIRO NO PERF OR DOC: HCPCS | Performed by: INTERNAL MEDICINE

## 2019-01-10 PROCEDURE — 3023F SPIROM DOC REV: CPT | Performed by: INTERNAL MEDICINE

## 2019-01-10 PROCEDURE — 1101F PT FALLS ASSESS-DOCD LE1/YR: CPT | Performed by: INTERNAL MEDICINE

## 2019-01-10 PROCEDURE — 1111F DSCHRG MED/CURRENT MED MERGE: CPT | Performed by: INTERNAL MEDICINE

## 2019-01-10 PROCEDURE — 99214 OFFICE O/P EST MOD 30 MIN: CPT | Performed by: INTERNAL MEDICINE

## 2019-01-10 PROCEDURE — 1036F TOBACCO NON-USER: CPT | Performed by: INTERNAL MEDICINE

## 2019-01-10 PROCEDURE — G8482 FLU IMMUNIZE ORDER/ADMIN: HCPCS | Performed by: INTERNAL MEDICINE

## 2019-01-10 PROCEDURE — G8417 CALC BMI ABV UP PARAM F/U: HCPCS | Performed by: INTERNAL MEDICINE

## 2019-01-10 ASSESSMENT — ENCOUNTER SYMPTOMS
SHORTNESS OF BREATH: 0
BACK PAIN: 0
VOMITING: 0
DIARRHEA: 0
CONSTIPATION: 0
EYE REDNESS: 0
WHEEZING: 0
COUGH: 0
EYE DISCHARGE: 0
RHINORRHEA: 0
ABDOMINAL PAIN: 0
ABDOMINAL PAIN: 0
NAUSEA: 0
SORE THROAT: 0
BACK PAIN: 0
TROUBLE SWALLOWING: 0
NAUSEA: 0

## 2019-01-12 DIAGNOSIS — I48.0 PAROXYSMAL ATRIAL FIBRILLATION (HCC): ICD-10-CM

## 2019-01-15 RX ORDER — AMIODARONE HYDROCHLORIDE 100 MG/1
100 TABLET ORAL EVERY OTHER DAY
Qty: 15 TABLET | Refills: 2 | Status: SHIPPED | OUTPATIENT
Start: 2019-01-15 | End: 2019-01-30

## 2019-01-17 DIAGNOSIS — I48.0 PAROXYSMAL ATRIAL FIBRILLATION (HCC): ICD-10-CM

## 2019-01-21 RX ORDER — AMIODARONE HYDROCHLORIDE 100 MG/1
100 TABLET ORAL EVERY OTHER DAY
Qty: 15 TABLET | Refills: 2 | Status: SHIPPED | OUTPATIENT
Start: 2019-01-21 | End: 2019-11-25 | Stop reason: SDUPTHER

## 2019-02-25 ENCOUNTER — OFFICE VISIT (OUTPATIENT)
Dept: PULMONOLOGY | Age: 79
End: 2019-02-25
Payer: MEDICARE

## 2019-02-25 VITALS
BODY MASS INDEX: 35.92 KG/M2 | WEIGHT: 237 LBS | DIASTOLIC BLOOD PRESSURE: 65 MMHG | RESPIRATION RATE: 20 BRPM | SYSTOLIC BLOOD PRESSURE: 131 MMHG | HEART RATE: 76 BPM | TEMPERATURE: 97 F | OXYGEN SATURATION: 95 % | HEIGHT: 68 IN

## 2019-02-25 DIAGNOSIS — J44.9 COPD WITH CHRONIC BRONCHITIS (HCC): ICD-10-CM

## 2019-02-25 DIAGNOSIS — G47.33 OSA ON CPAP: Primary | ICD-10-CM

## 2019-02-25 DIAGNOSIS — Z01.818 PRE-OP EVALUATION: ICD-10-CM

## 2019-02-25 DIAGNOSIS — Z99.89 OSA ON CPAP: Primary | ICD-10-CM

## 2019-02-25 PROCEDURE — 1036F TOBACCO NON-USER: CPT | Performed by: INTERNAL MEDICINE

## 2019-02-25 PROCEDURE — 4040F PNEUMOC VAC/ADMIN/RCVD: CPT | Performed by: INTERNAL MEDICINE

## 2019-02-25 PROCEDURE — G8599 NO ASA/ANTIPLAT THER USE RNG: HCPCS | Performed by: INTERNAL MEDICINE

## 2019-02-25 PROCEDURE — 99214 OFFICE O/P EST MOD 30 MIN: CPT | Performed by: INTERNAL MEDICINE

## 2019-02-25 PROCEDURE — G8482 FLU IMMUNIZE ORDER/ADMIN: HCPCS | Performed by: INTERNAL MEDICINE

## 2019-02-25 PROCEDURE — 3023F SPIROM DOC REV: CPT | Performed by: INTERNAL MEDICINE

## 2019-02-25 PROCEDURE — 1123F ACP DISCUSS/DSCN MKR DOCD: CPT | Performed by: INTERNAL MEDICINE

## 2019-02-25 PROCEDURE — G8427 DOCREV CUR MEDS BY ELIG CLIN: HCPCS | Performed by: INTERNAL MEDICINE

## 2019-02-25 PROCEDURE — 1101F PT FALLS ASSESS-DOCD LE1/YR: CPT | Performed by: INTERNAL MEDICINE

## 2019-02-25 PROCEDURE — G8417 CALC BMI ABV UP PARAM F/U: HCPCS | Performed by: INTERNAL MEDICINE

## 2019-02-25 PROCEDURE — G8926 SPIRO NO PERF OR DOC: HCPCS | Performed by: INTERNAL MEDICINE

## 2019-02-25 ASSESSMENT — SLEEP AND FATIGUE QUESTIONNAIRES
HOW LIKELY ARE YOU TO NOD OFF OR FALL ASLEEP WHILE SITTING AND TALKING TO SOMEONE: 1
HOW LIKELY ARE YOU TO NOD OFF OR FALL ASLEEP WHILE LYING DOWN TO REST IN THE AFTERNOON WHEN CIRCUMSTANCES PERMIT: 2
HOW LIKELY ARE YOU TO NOD OFF OR FALL ASLEEP WHILE SITTING INACTIVE IN A PUBLIC PLACE: 1
HOW LIKELY ARE YOU TO NOD OFF OR FALL ASLEEP WHILE SITTING AND READING: 1
HOW LIKELY ARE YOU TO NOD OFF OR FALL ASLEEP WHILE WATCHING TV: 2
HOW LIKELY ARE YOU TO NOD OFF OR FALL ASLEEP WHILE SITTING QUIETLY AFTER LUNCH WITHOUT ALCOHOL: 1
HOW LIKELY ARE YOU TO NOD OFF OR FALL ASLEEP WHEN YOU ARE A PASSENGER IN A CAR FOR AN HOUR WITHOUT A BREAK: 1
ESS TOTAL SCORE: 9
HOW LIKELY ARE YOU TO NOD OFF OR FALL ASLEEP IN A CAR, WHILE STOPPED FOR A FEW MINUTES IN TRAFFIC: 0

## 2019-04-08 ENCOUNTER — PROCEDURE VISIT (OUTPATIENT)
Dept: SURGERY | Age: 79
End: 2019-04-08
Payer: MEDICARE

## 2019-04-08 ENCOUNTER — OFFICE VISIT (OUTPATIENT)
Dept: SURGERY | Age: 79
End: 2019-04-08
Payer: MEDICARE

## 2019-04-08 VITALS
BODY MASS INDEX: 36.68 KG/M2 | HEART RATE: 80 BPM | HEIGHT: 68 IN | DIASTOLIC BLOOD PRESSURE: 70 MMHG | SYSTOLIC BLOOD PRESSURE: 145 MMHG | WEIGHT: 242 LBS

## 2019-04-08 DIAGNOSIS — I65.23 BILATERAL CAROTID ARTERY STENOSIS WITHOUT CEREBRAL INFARCTION: Primary | ICD-10-CM

## 2019-04-08 DIAGNOSIS — I10 ESSENTIAL HYPERTENSION: ICD-10-CM

## 2019-04-08 DIAGNOSIS — M79.89 LEG SWELLING: ICD-10-CM

## 2019-04-08 DIAGNOSIS — E78.00 PURE HYPERCHOLESTEROLEMIA: ICD-10-CM

## 2019-04-08 PROCEDURE — G8417 CALC BMI ABV UP PARAM F/U: HCPCS | Performed by: NURSE PRACTITIONER

## 2019-04-08 PROCEDURE — 93880 EXTRACRANIAL BILAT STUDY: CPT | Performed by: SURGERY

## 2019-04-08 PROCEDURE — 4040F PNEUMOC VAC/ADMIN/RCVD: CPT | Performed by: NURSE PRACTITIONER

## 2019-04-08 PROCEDURE — 1036F TOBACCO NON-USER: CPT | Performed by: NURSE PRACTITIONER

## 2019-04-08 PROCEDURE — G8427 DOCREV CUR MEDS BY ELIG CLIN: HCPCS | Performed by: NURSE PRACTITIONER

## 2019-04-08 PROCEDURE — 99214 OFFICE O/P EST MOD 30 MIN: CPT | Performed by: NURSE PRACTITIONER

## 2019-04-08 PROCEDURE — G8599 NO ASA/ANTIPLAT THER USE RNG: HCPCS | Performed by: NURSE PRACTITIONER

## 2019-04-08 PROCEDURE — 1123F ACP DISCUSS/DSCN MKR DOCD: CPT | Performed by: NURSE PRACTITIONER

## 2019-04-08 NOTE — PATIENT INSTRUCTIONS
PATIENT EDUCATION focused on signs/symptoms of stroke:  - Watch for one eye going dark like a shade was pulled down. - Watch for one side of the body or face not functioning correctly. - Difficulty with speech, such as slurring your words. - Difficulty finding the right words, such as calling an object by the wrong name when you know the real name. If you have any of these symptoms, do not call us or your family doctor. Call 911 and go immediately to the hospital.  You have a 4-6 hour window from the point when symptoms start to get to the hospital, get a CT scan done and initiate clot dissolving drugs. Return in about 6 months (around 10/8/2019) for CDS and office visit.

## 2019-04-08 NOTE — PROGRESS NOTES
DAY Yes Vera Gomes MD   Lactobacillus Rhamnosus, GG, ([employer] PROBIOTIC, LACTOBACILLUS,) CAPS TAKE 1 CAPSULE BY MOUTH 2 TIMES DAILY (WITH MEALS) Yes Vera Gomes MD   amiodarone (PACERONE) 100 MG tablet TAKE 1 TABLET BY MOUTH EVERY OTHER DAY Yes Carol Pascal MD   linaclotide Reginia Stefania) 145 MCG capsule Take 1 capsule by mouth every morning (before breakfast) Yes Vera Gomes MD   insulin glargine (BASAGLAR KWIKPEN) 100 UNIT/ML injection pen Inject 10 Units into the skin nightly Yes Vera Gomes MD   amiodarone (PACERONE) 100 MG tablet Take 1 tablet by mouth every other day Yes Carol Pascal MD   pantoprazole (PROTONIX) 40 MG tablet Take 1 tablet by mouth daily Yes Cami Gonzales DO   furosemide (LASIX) 20 MG tablet TAKE 1 TABLET BY MOUTH DAILY  Patient taking differently: TAKE 1 TABLET  Every other day Yes Vera Gomes MD   Respiratory Therapy Supplies (NEBULIZER/TUBING/MOUTHPIECE) KIT 1 kit by Does not apply route daily as needed (SOB) Yes Vera Gomes MD   levalbuterol Lani Odor) 1.25 MG/3ML nebulizer solution USE 1 VIAL VIA NEBULIZER FOUR TIMES DAILY Yes Vera Gomes MD   alfuzosin (UROXATRAL) 10 MG extended release tablet TAKE 1 TABLET BY MOUTH EVERY DAY Yes Vera Gomes MD   atorvastatin (LIPITOR) 40 MG tablet TAKE 1 TABLET BY MOUTH DAILY Yes Vera Gomes MD   cloNIDine (CATAPRES) 0.1 MG tablet TAKE 1 TABLET BY MOUTH TWICE DAILY  Patient taking differently: 0.1 mg TAKE 1 TABLET BY MOUTH TWICE DAILY Yes Vera Gomes MD   verapamil (CALAN SR) 240 MG extended release tablet TAKE 1 TABLET BY MOUTH TWICE DAILY Yes Vera Gomes MD   Insulin Syringe-Needle U-100 (INSULIN SYRINGE .5CC/31GX5/16\") 31G X 5/16\" 0.5 ML MISC USE AS DIRECTED Yes Vera Gomes MD   VENTOLIN  (90 Base) MCG/ACT inhaler INHALE 2 PUFFS INTO THE LUNGS EVERY 4 HOURS AS NEEDED FOR WHEEZING Yes Yesenia Garrett APRN - CNP   sodium chloride, Inhalant, 3 % nebulizer solution Take 4 mLs by nebulization 2 times daily Yes Ron Kirk, DO   umeclidinium-vilanterol (ANORO ELLIPTA) 62.5-25 MCG/INH AEPB inhaler Inhale 1 puff into the lungs daily One puff daily Yes Ron Kirk, DO   polyethylene glycol (GLYCOLAX) packet Take 17 g by mouth daily Yes Historical Provider, MD   montelukast (SINGULAIR) 10 MG tablet TAKE 1 TABLET BY MOUTH DAILY Yes Ree Harris MD   Cyanocobalamin (VITAMIN B-12 PO) Take 500 mcg by mouth every other day  Yes Historical Provider, MD   Cholecalciferol (VITAMIN D3 PO) Take 2,000 Units by mouth daily  Yes Historical Provider, MD   Misc. Devices (ACAPELLA) MISC 1 Device by Does not apply route as needed Yes Ron Kirk, DO   aspirin EC 81 MG EC tablet Take 2 tablets by mouth daily. Yes Gideon Azar MD   ferrous sulfate 325 (65 FE) MG tablet Take 325 mg by mouth 2 times daily  Yes Historical Provider, MD     History reviewed. Objective:   Physical Exam   Constitutional: He is oriented to person, place, and time. He appears well-developed and well-nourished. He is active and cooperative. No distress. obese   HENT:   Head: Normocephalic. Right Ear: Decreased hearing is noted. Left Ear: Decreased hearing (bilateral hearing aids) is noted. Eyes: Pupils are equal, round, and reactive to light. Conjunctivae and lids are normal.   Neck: Trachea normal and normal range of motion. Neck supple. Carotid bruit is not present. No tracheal deviation present. Cardiovascular: Normal rate, regular rhythm and normal heart sounds. Exam reveals no gallop and no friction rub. No murmur heard. Pulses:       Carotid pulses are 2+ on the right side, and 2+ on the left side. Radial pulses are 2+ on the right side, and 2+ on the left side. Femoral pulses are 2+ on the right side, and 2+ on the left side.        Popliteal pulses are 2+ on the right side, and 2+ on the left side. Dorsalis pedis pulses are 2+ on the right side, and 2+ on the left side. Posterior tibial pulses are 0 on the right side, and 0 on the left side. Pulmonary/Chest: Effort normal. No respiratory distress. Abdominal: Soft. Normal appearance and bowel sounds are normal. He exhibits no distension and no mass. There is no tenderness. There is no rigidity, no rebound and no guarding. Musculoskeletal: Normal range of motion. He exhibits edema (controlled by compression stockings; right ankle measures 21.2 cm, calf 40.1 cm; left ankle measures 21.4 cm, calf 37.7 cm). He exhibits no tenderness. Neurological: He is alert and oriented to person, place, and time. He has normal strength. No cranial nerve deficit or sensory deficit. Coordination and gait normal.   Skin: Skin is warm, dry and intact. No bruising and no rash noted. No erythema. Psychiatric: He has a normal mood and affect. His speech is normal and behavior is normal. Thought content normal. Cognition and memory are normal.   Vitals reviewed. Assessment:      1. Carotid artery stenosis, bilateral     * The patient has a 50-80% DERICK stenosis by velocity criteria; DERICK 59% by image. * The patient has a 98-58% LICA stenosis by velocity criteria; LICA 75% by image. * The patient has not experienced any symptoms related to his carotid disease. * Follow up in 6 months with carotid doppler scan and office visit. 2. Hyperlipidemia - stable, on Lipitor 40 mg   3. Hypertension, benign - stable, on Diovan-HCT, verapamil 240 mg, clonidine 0.1 mg, furosemide 20 mg   4. Leg swelling - wears compression stockings (20-30 mmHg) on a daily basis      PATIENT EDUCATION focused on signs/symptoms of stroke:  - Watch for one eye going dark like a shade was pulled down. - Watch for one side of the body or face not functioning correctly. - Difficulty with speech, such as slurring your words.   - Difficulty finding the right words, such as calling an object by the wrong name when you know the real name. If you have any of these symptoms, do not call us or your family doctor. Call 911 and go immediately to the hospital.  You have a 4-6 hour window from the point when symptoms start to get to the hospital, get a CT scan done and initiate clot dissolving drugs. Plan:       Return in about 6 months (around 10/8/2019) for CDS and office visit. Omar Felipe MA am scribing for and in the presence of Chloe Bourne CNP on this date of 04/08/19 at 10:31 AM    I Chloe Bourne CNP, personally performed the services described in this documentation as scribed by the Medical Assistant Cameron Louie in my presence and it is both accurate and complete.

## 2019-04-08 NOTE — LETTER
[facility]  [facility] [address]35  Phone: [phone]  Fax: [phone]    Nuris Taveras CNP        April 8, 2019      [address], [address] [facility]     Patient: Alison Abrams    MR Number: [id number]    YOB: 1940    Date of Visit: 04/08/2019        Dear [address]:    Dr. Blake Coughlin patient, was in the office today following his carotid duplex scan. My assessment is as follows:    Carotid artery stenosis, w/o mention of cerebral infarction   Current plans       * The patient has a 50-80% DERICK stenosis by velocity criteria; DERICK 59% by             image. * The patient has a 38-58% LICA stenosis by velocity criteria; LICA 12% by              image. * The patient has not experienced any symptoms related to his carotid disease. * Follow up in 6 months with carotid doppler scan and office visit. I will keep you posted regarding his progress.     Sincerely,      Nuris Taveras CNP

## 2019-04-22 DIAGNOSIS — K21.9 GASTROESOPHAGEAL REFLUX DISEASE WITHOUT ESOPHAGITIS: ICD-10-CM

## 2019-04-22 RX ORDER — PANTOPRAZOLE SODIUM 40 MG/1
TABLET, DELAYED RELEASE ORAL
Qty: 90 TABLET | Refills: 1 | Status: SHIPPED | OUTPATIENT
Start: 2019-04-22 | End: 2019-10-11 | Stop reason: SDUPTHER

## 2019-06-24 ENCOUNTER — OFFICE VISIT (OUTPATIENT)
Dept: PULMONOLOGY | Age: 79
End: 2019-06-24
Payer: MEDICARE

## 2019-06-24 VITALS
BODY MASS INDEX: 37.59 KG/M2 | OXYGEN SATURATION: 96 % | TEMPERATURE: 97.3 F | WEIGHT: 248 LBS | RESPIRATION RATE: 20 BRPM | HEIGHT: 68 IN | SYSTOLIC BLOOD PRESSURE: 139 MMHG | DIASTOLIC BLOOD PRESSURE: 72 MMHG | HEART RATE: 76 BPM

## 2019-06-24 DIAGNOSIS — J44.9 COPD WITH CHRONIC BRONCHITIS (HCC): Primary | ICD-10-CM

## 2019-06-24 PROCEDURE — G8926 SPIRO NO PERF OR DOC: HCPCS | Performed by: INTERNAL MEDICINE

## 2019-06-24 PROCEDURE — G8599 NO ASA/ANTIPLAT THER USE RNG: HCPCS | Performed by: INTERNAL MEDICINE

## 2019-06-24 PROCEDURE — 4040F PNEUMOC VAC/ADMIN/RCVD: CPT | Performed by: INTERNAL MEDICINE

## 2019-06-24 PROCEDURE — G8417 CALC BMI ABV UP PARAM F/U: HCPCS | Performed by: INTERNAL MEDICINE

## 2019-06-24 PROCEDURE — 99213 OFFICE O/P EST LOW 20 MIN: CPT | Performed by: INTERNAL MEDICINE

## 2019-06-24 PROCEDURE — 3023F SPIROM DOC REV: CPT | Performed by: INTERNAL MEDICINE

## 2019-06-24 PROCEDURE — 1123F ACP DISCUSS/DSCN MKR DOCD: CPT | Performed by: INTERNAL MEDICINE

## 2019-06-24 PROCEDURE — 1036F TOBACCO NON-USER: CPT | Performed by: INTERNAL MEDICINE

## 2019-06-24 PROCEDURE — G8427 DOCREV CUR MEDS BY ELIG CLIN: HCPCS | Performed by: INTERNAL MEDICINE

## 2019-06-24 RX ORDER — VERAPAMIL HYDROCHLORIDE 240 MG/1
TABLET, FILM COATED, EXTENDED RELEASE ORAL
Qty: 180 TABLET | Refills: 3 | Status: SHIPPED | OUTPATIENT
Start: 2019-06-24 | End: 2020-06-07

## 2019-06-24 RX ORDER — DOCUSATE SODIUM 100 MG/1
CAPSULE, LIQUID FILLED ORAL DAILY
Status: ON HOLD | COMMUNITY

## 2019-06-24 RX ORDER — CLONIDINE HYDROCHLORIDE 0.1 MG/1
TABLET ORAL
Qty: 180 TABLET | Refills: 3 | Status: SHIPPED | OUTPATIENT
Start: 2019-06-24 | End: 2020-06-07

## 2019-06-24 NOTE — PROGRESS NOTES
tablet 3    Docusate Sodium (COLACE PO) Take by mouth daily      LINZESS 145 MCG capsule TAKE 1 CAPSULE BY MOUTH EVERY DAY IN THE MORNING BEFORE BREAKFAST 90 capsule 3    pantoprazole (PROTONIX) 40 MG tablet TAKE 1 TABLET BY MOUTH EVERY DAY 90 tablet 1    ACCU-CHEK GUIDE strip TEST AS DIRECTED 3 TIMES A  strip 5    montelukast (SINGULAIR) 10 MG tablet TAKE 1 TABLET BY MOUTH EVERY DAY 30 tablet 9    Insulin Pen Needle 31G X 5 MM MISC 1 each by Does not apply route 3 times daily 100 each 3    valsartan-hydrochlorothiazide (DIOVAN-HCT) 320-25 MG per tablet TAKE 1 TABLET BY MOUTH EVERY DAY 90 tablet 3    Lactobacillus Rhamnosus, GG, ([employer]S PROBIOTIC, LACTOBACILLUS,) CAPS TAKE 1 CAPSULE BY MOUTH 2 TIMES DAILY (WITH MEALS) 60 capsule 0    amiodarone (PACERONE) 100 MG tablet TAKE 1 TABLET BY MOUTH EVERY OTHER DAY 15 tablet 2    insulin glargine (BASAGLAR KWIKPEN) 100 UNIT/ML injection pen Inject 10 Units into the skin nightly 10 pen 11    amiodarone (PACERONE) 100 MG tablet Take 1 tablet by mouth every other day 15 tablet 2    furosemide (LASIX) 20 MG tablet TAKE 1 TABLET BY MOUTH DAILY (Patient taking differently: TAKE 1 TABLET  Every other day) 30 tablet 5    Respiratory Therapy Supplies (NEBULIZER/TUBING/MOUTHPIECE) KIT 1 kit by Does not apply route daily as needed (SOB) 1 kit 11    levalbuterol (XOPENEX) 1.25 MG/3ML nebulizer solution USE 1 VIAL VIA NEBULIZER FOUR TIMES DAILY 360 mL 11    alfuzosin (UROXATRAL) 10 MG extended release tablet TAKE 1 TABLET BY MOUTH EVERY DAY 90 tablet 3    atorvastatin (LIPITOR) 40 MG tablet TAKE 1 TABLET BY MOUTH DAILY 90 tablet 3    Insulin Syringe-Needle U-100 (INSULIN SYRINGE .5CC/31GX5/16\") 31G X 5/16\" 0.5 ML MISC USE AS DIRECTED 100 each 5    VENTOLIN  (90 Base) MCG/ACT inhaler INHALE 2 PUFFS INTO THE LUNGS EVERY 4 HOURS AS NEEDED FOR WHEEZING 1 Inhaler 3    polyethylene glycol (GLYCOLAX) packet Take 17 g by mouth daily      Cyanocobalamin (VITAMIN B-12 PO) Take 500 mcg by mouth every other day       Cholecalciferol (VITAMIN D3 PO) Take 2,000 Units by mouth daily       Misc. Devices (ACAPELLA) MISC 1 Device by Does not apply route as needed 1 each 0    aspirin EC 81 MG EC tablet Take 2 tablets by mouth daily. 30 tablet 3    ferrous sulfate 325 (65 FE) MG tablet Take 325 mg by mouth 2 times daily        No current facility-administered medications for this visit. PHYSICAL EXAM:  Vitals:    06/24/19 0955   BP: (!) 150/71   Pulse: 76   Resp: 20   Temp: 97.3 °F (36.3 °C)   SpO2: 96%       GENERAL:  Well nourished, alert, appears stated age, no distress  HEENT:  No scleral icterus, no conjunctival irritation; Mallampati IV, elongated uvula  NECK:  No thyromegaly, no bruits  LYMPH:  No cervical or supraclavicular adenopathy  HEART:  Regular rate and rhythm, no murmurs. Distant heart sounds   LUNGS:  Scattered wheezing   ABDOMEN:  No distention, no organomegaly  EXTREMITIES:  Trace edema  no digital clubbing  NEURO:  No localizing deficits    Pulmonary Function Testing 8/2012  FEV1 1.87 L at 69% predicted (reduced)  FVC 3.36 L at 90% predicted (normal)  FEV1/FVC ratio at 56 (reduced)  TLC 6.07 L at 110% predicted (normal)  DLCO 23.5 at 98% predicted (normal)  Overall Mild obstruction    Chest imaging from 12/2018 is reviewed. My impression is scarring in the lower lobes bilaterally. More so on the right     ECHO 2014  Left ventricle size is normal.  Normal left ventricular wall thickness. Ejection fraction is visually estimated to be 55-60 %. No regional wall motion abnormalities are noted. There is reversal of E/A inflow velocities across the mitral valve  suggesting impaired left ventricular relaxation. The right ventricle is not well visualized.   Lab Results   Component Value Date    WBC 6.5 04/10/2019    HGB 12.3 (L) 04/10/2019    HCT 38.2 (L) 04/10/2019    MCV 83.1 04/10/2019     04/10/2019       No results found for: BNP    Lab Results Component Value Date    CREATININE 1.8 (H) 04/10/2019    BUN 44 (H) 04/10/2019     04/10/2019    K 4.6 04/10/2019     04/10/2019    CO2 25 04/10/2019     Alpha-1 Anti-trypsin levels:  92, Phenotype:  M1S       I reviewed the above labs and images    COPD Assessment Test    1. I never cough vs I cough all the time:  2  2. I have no phlegm vs I am completely full of phlegm. 2  3. My chest does not feel tight vs my chest feel vs tight. 2  4. Not breathless with inclines vs breathless with inclines. 2  5. Not limited with ADLs vs Very limited with ADLs. 3  6. Confidence leaving home vs no confidence. 1  7. I sleep soundly vs I don't sleep soundly. 1  8. I have lots of energy vs I have no energy. 2    TOTAL POINTS:  15    Scoring:    A) >30. Very high impact on quality of life. Optimize therapy including rehab  B) >20. High impact on quality of life. C) 10-20. Medium impact on qualify of life  D) <10. Low impact on life  E)  5.  Upper limit of normal in health non-smoker    Assessment/Plan:  1. COPD with chronic bronchitis ([address]  Controlled for now. Using xopenex prn. I told him to resume Anoro if using xopenex every day. Has sodium chloride nebs to use prn. He is not using them a lot. Avoid ICS due to repeated infections. Pulmonary Rehab:  Was enrolled in the past    Lung Cancer Screening CT:  Does not qualify  History of pseudomonas infection.   Needs pseudomonal coverage with antibiotics when he has bronchitis     Avoid ICS due to repeated infections    Might benefit from chest vest but girth of body may be too big for vest    Follow up in 4 months

## 2019-06-24 NOTE — PATIENT INSTRUCTIONS
Ok to use xopenex as needed.   If using it a lot, need to go back to [facility] active    Use CPAP at night    Follow up in 4 months

## 2019-06-27 ENCOUNTER — HOSPITAL ENCOUNTER (OUTPATIENT)
Age: 79
Discharge: HOME | End: 2019-06-27
Payer: MEDICARE

## 2019-06-27 ENCOUNTER — HOSPITAL ENCOUNTER (OUTPATIENT)
Dept: GENERAL RADIOLOGY | Age: 79
Discharge: HOME | End: 2019-06-27
Payer: MEDICARE

## 2019-06-27 DIAGNOSIS — S61.412A LACERATION OF LEFT HAND, FOREIGN BODY PRESENCE UNSPECIFIED, INITIAL ENCOUNTER: ICD-10-CM

## 2019-06-27 PROCEDURE — 73130 X-RAY EXAM OF HAND: CPT

## 2019-07-23 RX ORDER — ATORVASTATIN CALCIUM 40 MG/1
TABLET, FILM COATED ORAL
Qty: 90 TABLET | Refills: 3 | Status: SHIPPED | OUTPATIENT
Start: 2019-07-23 | End: 2020-07-17

## 2019-07-31 RX ORDER — ALFUZOSIN HYDROCHLORIDE 10 MG/1
TABLET, EXTENDED RELEASE ORAL
Qty: 90 TABLET | Refills: 3 | Status: SHIPPED | OUTPATIENT
Start: 2019-07-31 | End: 2020-07-19

## 2019-08-14 NOTE — PROGRESS NOTES
differently: TAKE 1 TABLET  Every other day  Opal Ward MD   valsartan-hydrochlorothiazide (DIOVAN-HCT) 320-25 MG per tablet TAKE 1 TABLET BY MOUTH EVERY DAY  Opal Ward MD   insulin glargine [facility]) 100 UNIT/ML injection pen Inject 36 Units into the skin 2 times daily  Opal Ward MD   Respiratory Therapy Supplies (NEBULIZER/TUBING/MOUTHPIECE) KIT 1 kit by Does not apply route daily as needed (SOB)  Opal Ward MD   levalbuterol Nissa Main) 1.25 MG/3ML nebulizer solution USE 1 VIAL VIA NEBULIZER FOUR TIMES DAILY  Opal Ward MD   alfuzosin (UROXATRAL) 10 MG extended release tablet TAKE 1 TABLET BY MOUTH EVERY DAY  Opal Ward MD   atorvastatin (LIPITOR) 40 MG tablet TAKE 1 TABLET BY MOUTH DAILY  Opal Ward MD   cloNIDine (CATAPRES) 0.1 MG tablet TAKE 1 TABLET BY MOUTH TWICE DAILY  Opal Ward MD   verapamil (CALAN SR) 240 MG extended release tablet TAKE 1 TABLET BY MOUTH TWICE DAILY  Opal Ward MD   Insulin Syringe-Needle U-100 (INSULIN SYRINGE .5CC/31GX5/16\") 31G X 5/16\" 0.5 ML MISC USE AS DIRECTED  Opal Ward MD   VENTOLIN  (90 Base) MCG/ACT inhaler INHALE 2 PUFFS INTO THE LUNGS EVERY 4 HOURS AS NEEDED FOR WHEEZING  Tasia Franks, APRN - CNP   sodium chloride, Inhalant, 3 % nebulizer solution Take 4 mLs by nebulization 2 times daily  Kimberly Fernandes DO   umeclidinium-vilanterol (ANORO ELLIPTA) 62.5-25 MCG/INH AEPB inhaler Inhale 1 puff into the lungs daily One puff daily  Kimberly Fernandes DO   polyethylene glycol (GLYCOLAX) packet Take 17 g by mouth daily  Historical Provider, MD   montelukast (SINGULAIR) 10 MG tablet TAKE 1 TABLET BY MOUTH DAILY  Opal Ward MD   Cyanocobalamin (VITAMIN B-12 PO) Take 500 mcg by mouth every other day   Historical Provider, MD   Cholecalciferol (VITAMIN D3 PO) Take 2,000 Units by mouth daily   Historical preoperative evaluation and its postoperative pain management and follow-up. We went over the surgical procedure risks and complications including bleeding, infection,  neurovascular injury, decreased range of motion, persistent pain, instability, DVT, pulmonary embolism, leg length inequality, change in mental status, and need for further surgical procedures. The patient understands this and we have answered all of their questions and concerns. This patient has a preoperative comorbid risk including BMI of over 40. We discussed with the patient the increased chance of infection related to this and we gave him some weight management options and he needs to follow through with this before any surgical correction is in order. We will follow him up in a preoperative shared medical appointment. Patient

## 2019-08-20 ENCOUNTER — TELEPHONE (OUTPATIENT)
Dept: ORTHOPEDIC SURGERY | Age: 79
End: 2019-08-20

## 2019-10-11 DIAGNOSIS — K21.9 GASTROESOPHAGEAL REFLUX DISEASE WITHOUT ESOPHAGITIS: ICD-10-CM

## 2019-10-11 RX ORDER — PANTOPRAZOLE SODIUM 40 MG/1
TABLET, DELAYED RELEASE ORAL
Qty: 90 TABLET | Refills: 1 | Status: SHIPPED | OUTPATIENT
Start: 2019-10-11 | End: 2020-03-30

## 2019-10-14 ENCOUNTER — OFFICE VISIT (OUTPATIENT)
Dept: SURGERY | Age: 79
End: 2019-10-14
Payer: MEDICARE

## 2019-10-14 ENCOUNTER — PROCEDURE VISIT (OUTPATIENT)
Dept: SURGERY | Age: 79
End: 2019-10-14
Payer: MEDICARE

## 2019-10-14 VITALS
HEART RATE: 70 BPM | BODY MASS INDEX: 39.84 KG/M2 | SYSTOLIC BLOOD PRESSURE: 131 MMHG | WEIGHT: 262 LBS | DIASTOLIC BLOOD PRESSURE: 68 MMHG

## 2019-10-14 DIAGNOSIS — M79.89 LEG SWELLING: ICD-10-CM

## 2019-10-14 DIAGNOSIS — I10 ESSENTIAL HYPERTENSION: ICD-10-CM

## 2019-10-14 DIAGNOSIS — I65.23 BILATERAL CAROTID ARTERY STENOSIS WITHOUT CEREBRAL INFARCTION: Primary | ICD-10-CM

## 2019-10-14 DIAGNOSIS — E78.5 HYPERLIPIDEMIA, UNSPECIFIED HYPERLIPIDEMIA TYPE: ICD-10-CM

## 2019-10-14 PROCEDURE — 1036F TOBACCO NON-USER: CPT | Performed by: NURSE PRACTITIONER

## 2019-10-14 PROCEDURE — 93880 EXTRACRANIAL BILAT STUDY: CPT | Performed by: SURGERY

## 2019-10-14 PROCEDURE — 1123F ACP DISCUSS/DSCN MKR DOCD: CPT | Performed by: NURSE PRACTITIONER

## 2019-10-14 PROCEDURE — G8417 CALC BMI ABV UP PARAM F/U: HCPCS | Performed by: NURSE PRACTITIONER

## 2019-10-14 PROCEDURE — G8427 DOCREV CUR MEDS BY ELIG CLIN: HCPCS | Performed by: NURSE PRACTITIONER

## 2019-10-14 PROCEDURE — 4040F PNEUMOC VAC/ADMIN/RCVD: CPT | Performed by: NURSE PRACTITIONER

## 2019-10-14 PROCEDURE — G8599 NO ASA/ANTIPLAT THER USE RNG: HCPCS | Performed by: NURSE PRACTITIONER

## 2019-10-14 PROCEDURE — 99214 OFFICE O/P EST MOD 30 MIN: CPT | Performed by: NURSE PRACTITIONER

## 2019-10-14 PROCEDURE — G8484 FLU IMMUNIZE NO ADMIN: HCPCS | Performed by: NURSE PRACTITIONER

## 2019-10-17 ENCOUNTER — OFFICE VISIT (OUTPATIENT)
Dept: PULMONOLOGY | Age: 79
End: 2019-10-17
Payer: MEDICARE

## 2019-10-17 VITALS
BODY MASS INDEX: 39.86 KG/M2 | OXYGEN SATURATION: 94 % | RESPIRATION RATE: 16 BRPM | SYSTOLIC BLOOD PRESSURE: 136 MMHG | WEIGHT: 263 LBS | HEART RATE: 76 BPM | HEIGHT: 68 IN | DIASTOLIC BLOOD PRESSURE: 68 MMHG | TEMPERATURE: 96.9 F

## 2019-10-17 DIAGNOSIS — J44.9 COPD WITH CHRONIC BRONCHITIS (HCC): Primary | ICD-10-CM

## 2019-10-17 DIAGNOSIS — Z99.89 OSA ON CPAP: ICD-10-CM

## 2019-10-17 DIAGNOSIS — G47.33 OSA ON CPAP: ICD-10-CM

## 2019-10-17 PROCEDURE — 99213 OFFICE O/P EST LOW 20 MIN: CPT | Performed by: INTERNAL MEDICINE

## 2019-10-17 PROCEDURE — 4040F PNEUMOC VAC/ADMIN/RCVD: CPT | Performed by: INTERNAL MEDICINE

## 2019-10-17 PROCEDURE — G8599 NO ASA/ANTIPLAT THER USE RNG: HCPCS | Performed by: INTERNAL MEDICINE

## 2019-10-17 PROCEDURE — G8417 CALC BMI ABV UP PARAM F/U: HCPCS | Performed by: INTERNAL MEDICINE

## 2019-10-17 PROCEDURE — G8427 DOCREV CUR MEDS BY ELIG CLIN: HCPCS | Performed by: INTERNAL MEDICINE

## 2019-10-17 PROCEDURE — G8926 SPIRO NO PERF OR DOC: HCPCS | Performed by: INTERNAL MEDICINE

## 2019-10-17 PROCEDURE — 3023F SPIROM DOC REV: CPT | Performed by: INTERNAL MEDICINE

## 2019-10-17 PROCEDURE — 1123F ACP DISCUSS/DSCN MKR DOCD: CPT | Performed by: INTERNAL MEDICINE

## 2019-10-17 PROCEDURE — 1036F TOBACCO NON-USER: CPT | Performed by: INTERNAL MEDICINE

## 2019-10-17 PROCEDURE — G8484 FLU IMMUNIZE NO ADMIN: HCPCS | Performed by: INTERNAL MEDICINE

## 2019-10-17 ASSESSMENT — SLEEP AND FATIGUE QUESTIONNAIRES
HOW LIKELY ARE YOU TO NOD OFF OR FALL ASLEEP WHILE SITTING AND READING: 1
HOW LIKELY ARE YOU TO NOD OFF OR FALL ASLEEP WHILE SITTING INACTIVE IN A PUBLIC PLACE: 1
ESS TOTAL SCORE: 7
HOW LIKELY ARE YOU TO NOD OFF OR FALL ASLEEP WHILE SITTING AND TALKING TO SOMEONE: 0
HOW LIKELY ARE YOU TO NOD OFF OR FALL ASLEEP WHILE LYING DOWN TO REST IN THE AFTERNOON WHEN CIRCUMSTANCES PERMIT: 2
HOW LIKELY ARE YOU TO NOD OFF OR FALL ASLEEP WHEN YOU ARE A PASSENGER IN A CAR FOR AN HOUR WITHOUT A BREAK: 1
HOW LIKELY ARE YOU TO NOD OFF OR FALL ASLEEP WHILE SITTING QUIETLY AFTER LUNCH WITHOUT ALCOHOL: 1
HOW LIKELY ARE YOU TO NOD OFF OR FALL ASLEEP WHILE WATCHING TV: 1
HOW LIKELY ARE YOU TO NOD OFF OR FALL ASLEEP IN A CAR, WHILE STOPPED FOR A FEW MINUTES IN TRAFFIC: 0

## 2019-11-25 ENCOUNTER — OFFICE VISIT (OUTPATIENT)
Dept: CARDIOLOGY CLINIC | Age: 79
End: 2019-11-25
Payer: MEDICARE

## 2019-11-25 VITALS
DIASTOLIC BLOOD PRESSURE: 65 MMHG | BODY MASS INDEX: 39.56 KG/M2 | WEIGHT: 261 LBS | HEIGHT: 68 IN | SYSTOLIC BLOOD PRESSURE: 130 MMHG | HEART RATE: 76 BPM | OXYGEN SATURATION: 94 %

## 2019-11-25 DIAGNOSIS — I48.0 PAROXYSMAL ATRIAL FIBRILLATION (HCC): Primary | ICD-10-CM

## 2019-11-25 DIAGNOSIS — E66.01 MORBID OBESITY (HCC): ICD-10-CM

## 2019-11-25 DIAGNOSIS — G47.33 OSA (OBSTRUCTIVE SLEEP APNEA): ICD-10-CM

## 2019-11-25 PROCEDURE — G8427 DOCREV CUR MEDS BY ELIG CLIN: HCPCS | Performed by: INTERNAL MEDICINE

## 2019-11-25 PROCEDURE — 93000 ELECTROCARDIOGRAM COMPLETE: CPT | Performed by: INTERNAL MEDICINE

## 2019-11-25 PROCEDURE — 99214 OFFICE O/P EST MOD 30 MIN: CPT | Performed by: INTERNAL MEDICINE

## 2019-11-25 PROCEDURE — G8599 NO ASA/ANTIPLAT THER USE RNG: HCPCS | Performed by: INTERNAL MEDICINE

## 2019-11-25 PROCEDURE — G8484 FLU IMMUNIZE NO ADMIN: HCPCS | Performed by: INTERNAL MEDICINE

## 2019-11-25 PROCEDURE — G8417 CALC BMI ABV UP PARAM F/U: HCPCS | Performed by: INTERNAL MEDICINE

## 2019-11-25 PROCEDURE — 1123F ACP DISCUSS/DSCN MKR DOCD: CPT | Performed by: INTERNAL MEDICINE

## 2019-11-25 PROCEDURE — 4040F PNEUMOC VAC/ADMIN/RCVD: CPT | Performed by: INTERNAL MEDICINE

## 2019-11-25 PROCEDURE — 1036F TOBACCO NON-USER: CPT | Performed by: INTERNAL MEDICINE

## 2019-11-25 RX ORDER — AMIODARONE HYDROCHLORIDE 100 MG/1
100 TABLET ORAL EVERY OTHER DAY
Qty: 45 TABLET | Refills: 3 | Status: SHIPPED | OUTPATIENT
Start: 2019-11-25 | End: 2020-02-20

## 2019-12-02 ENCOUNTER — TELEPHONE (OUTPATIENT)
Dept: PULMONOLOGY | Age: 79
End: 2019-12-02

## 2019-12-06 ENCOUNTER — TELEPHONE (OUTPATIENT)
Dept: CARDIOLOGY CLINIC | Age: 79
End: 2019-12-06

## 2019-12-06 DIAGNOSIS — I48.0 PAROXYSMAL ATRIAL FIBRILLATION (HCC): Primary | ICD-10-CM

## 2019-12-10 ENCOUNTER — TELEPHONE (OUTPATIENT)
Dept: CARDIOLOGY CLINIC | Age: 79
End: 2019-12-10

## 2019-12-12 ENCOUNTER — TELEPHONE (OUTPATIENT)
Dept: CARDIOLOGY CLINIC | Age: 79
End: 2019-12-12

## 2019-12-12 PROCEDURE — 0296T PR EXT ECG > 48HR TO 21 DAY RCRD W/CONECT INTL RCRD: CPT | Performed by: INTERNAL MEDICINE

## 2019-12-24 ENCOUNTER — HOSPITAL ENCOUNTER (OUTPATIENT)
Age: 79
Discharge: HOME | End: 2019-12-24
Payer: MEDICARE

## 2019-12-24 ENCOUNTER — HOSPITAL ENCOUNTER (OUTPATIENT)
Dept: GENERAL RADIOLOGY | Age: 79
Discharge: HOME | End: 2019-12-24
Payer: MEDICARE

## 2019-12-24 DIAGNOSIS — J40 BRONCHITIS: ICD-10-CM

## 2019-12-24 PROCEDURE — 71046 X-RAY EXAM CHEST 2 VIEWS: CPT

## 2019-12-31 ENCOUNTER — TELEPHONE (OUTPATIENT)
Dept: CARDIOLOGY CLINIC | Age: 79
End: 2019-12-31

## 2019-12-31 NOTE — TELEPHONE ENCOUNTER
Pt called this morning to ask about his results from his cardiac event monitor. He states he turned it back into us on 12/19.     Please call Gilmar jones at [phone]

## 2020-01-02 NOTE — TELEPHONE ENCOUNTER
Pt called this morning stating he still hasn't received a call about results and would like a call back today about it.     Please call pt [phone]

## 2020-01-03 PROCEDURE — 0298T PR EXT ECG > 48HR TO 21 DAY REVIEW AND INTERPRETATN: CPT | Performed by: INTERNAL MEDICINE

## 2020-02-03 ENCOUNTER — TELEPHONE (OUTPATIENT)
Dept: CARDIOLOGY CLINIC | Age: 80
End: 2020-02-03

## 2020-02-03 NOTE — TELEPHONE ENCOUNTER
Form filled out (scanned under media from Dr Tubbs [place] office). Will fax to [facility] after Dr Dee Palomino signs.

## 2020-02-10 NOTE — TELEPHONE ENCOUNTER
Pt called to check on the paperwork for [facility]. Pt states he \"is not pleased with the way this is being handled and it has been going on for a while when it should have already been done\"    Pt states he would like to speak to someone about this.     Please call Gilmar at [phone]

## 2020-02-20 ENCOUNTER — TELEPHONE (OUTPATIENT)
Dept: CARDIOLOGY CLINIC | Age: 80
End: 2020-02-20

## 2020-02-20 ENCOUNTER — OFFICE VISIT (OUTPATIENT)
Dept: PULMONOLOGY | Age: 80
End: 2020-02-20
Payer: MEDICARE

## 2020-02-20 VITALS
BODY MASS INDEX: 40.92 KG/M2 | SYSTOLIC BLOOD PRESSURE: 139 MMHG | RESPIRATION RATE: 16 BRPM | OXYGEN SATURATION: 97 % | WEIGHT: 270 LBS | HEART RATE: 79 BPM | DIASTOLIC BLOOD PRESSURE: 68 MMHG | HEIGHT: 68 IN | TEMPERATURE: 97.1 F

## 2020-02-20 PROCEDURE — 3023F SPIROM DOC REV: CPT | Performed by: INTERNAL MEDICINE

## 2020-02-20 PROCEDURE — 1123F ACP DISCUSS/DSCN MKR DOCD: CPT | Performed by: INTERNAL MEDICINE

## 2020-02-20 PROCEDURE — G8427 DOCREV CUR MEDS BY ELIG CLIN: HCPCS | Performed by: INTERNAL MEDICINE

## 2020-02-20 PROCEDURE — G8417 CALC BMI ABV UP PARAM F/U: HCPCS | Performed by: INTERNAL MEDICINE

## 2020-02-20 PROCEDURE — G8484 FLU IMMUNIZE NO ADMIN: HCPCS | Performed by: INTERNAL MEDICINE

## 2020-02-20 PROCEDURE — G8926 SPIRO NO PERF OR DOC: HCPCS | Performed by: INTERNAL MEDICINE

## 2020-02-20 PROCEDURE — 1036F TOBACCO NON-USER: CPT | Performed by: INTERNAL MEDICINE

## 2020-02-20 PROCEDURE — 4040F PNEUMOC VAC/ADMIN/RCVD: CPT | Performed by: INTERNAL MEDICINE

## 2020-02-20 PROCEDURE — 99213 OFFICE O/P EST LOW 20 MIN: CPT | Performed by: INTERNAL MEDICINE

## 2020-02-20 RX ORDER — AMIODARONE HYDROCHLORIDE 200 MG/1
100 TABLET ORAL EVERY OTHER DAY
Qty: 45 TABLET | Refills: 1 | Status: SHIPPED | OUTPATIENT
Start: 2020-02-20 | End: 2020-10-12

## 2020-02-20 ASSESSMENT — SLEEP AND FATIGUE QUESTIONNAIRES
HOW LIKELY ARE YOU TO NOD OFF OR FALL ASLEEP WHILE SITTING QUIETLY AFTER LUNCH WITHOUT ALCOHOL: 0
HOW LIKELY ARE YOU TO NOD OFF OR FALL ASLEEP IN A CAR, WHILE STOPPED FOR A FEW MINUTES IN TRAFFIC: 0
HOW LIKELY ARE YOU TO NOD OFF OR FALL ASLEEP WHILE LYING DOWN TO REST IN THE AFTERNOON WHEN CIRCUMSTANCES PERMIT: 1
HOW LIKELY ARE YOU TO NOD OFF OR FALL ASLEEP WHILE SITTING INACTIVE IN A PUBLIC PLACE: 0
HOW LIKELY ARE YOU TO NOD OFF OR FALL ASLEEP WHILE SITTING AND READING: 1
HOW LIKELY ARE YOU TO NOD OFF OR FALL ASLEEP WHILE SITTING AND TALKING TO SOMEONE: 0
HOW LIKELY ARE YOU TO NOD OFF OR FALL ASLEEP WHILE WATCHING TV: 1
HOW LIKELY ARE YOU TO NOD OFF OR FALL ASLEEP WHEN YOU ARE A PASSENGER IN A CAR FOR AN HOUR WITHOUT A BREAK: 0
ESS TOTAL SCORE: 3

## 2020-02-20 NOTE — PROGRESS NOTES
Chief complaint  This is a [de-identified]y.o. year old male  who comes to see me with a chief complaint of   Chief Complaint   Patient presents with    Follow-up     copd & cpap        HPI  Here with follow up on JOANN and COPD    Had really bad bronchitis/sinus infection for at least the past month. Coughing with mucus. Finally better. Still with residual cough and nasal congestion. Has not been able to use CPAP much because of that. Remains on xopenex and sodium chloride nebs. Sodium not helping as much as it did in the past.  Feels better. Wife is present     Machine:  Patient is using a APAP machine. Average usage is 4 hrs 1 min 00 sec. He is meeting 4 or more hours per night 33% of the time. Average AHI is 0.8.   Having trouble using it due to sinus congestion     Did get flu shot       Sleep Medicine 2/20/2020 10/17/2019 2/25/2019 10/23/2018   Sitting and reading 1 1 1 0   Watching TV 1 1 2 1   Sitting, inactive in a public place (e.g. a theatre or a meeting) 0 1 1 0   As a passenger in a car for an hour without a break 0 1 1 0   Lying down to rest in the afternoon when circumstances permit 1 2 2 2   Sitting and talking to someone 0 0 1 0   Sitting quietly after a lunch without alcohol 0 1 1 1   In a car, while stopped for a few minutes in traffic 0 0 0 0   Total score 3 7 9 4   Neck circumference - - - 18.5         Past Medical History:   Diagnosis Date    Allergic rhinitis     Asthma     Atrial fibrillation ([address]     COPD ([facility]ronic obstructive pulmonary disease) [facility]     ESBL (extended spectrum beta-lactamase) producing bacteria infection 12/26/2018    urine    Hyperlipidemia     Hypertension     Iron deficiency anemia     Obstructive sleep apnea     Type II or unspecified type diabetes mellitus without mention of complication, not stated as uncontrolled        Past Surgical History:   Procedure Laterality Date    CATARACT REMOVAL  2/2012    bilateral    COLONOSCOPY  7/10/2009    diverticulosis hemorrhoids    GALLBLADDER SURGERY  1981    UPPER GASTROINTESTINAL ENDOSCOPY  7-2005    7/10/2009    normal     Current Outpatient Medications   Medication Sig Dispense Refill    blood glucose test strips (ACCU-CHEK GUIDE) strip TEST AS DIRECTED 3 TIMES A  strip 5    montelukast (SINGULAIR) 10 MG tablet TAKE 1 TABLET BY MOUTH EVERY DAY 90 tablet 3    levalbuterol (XOPENEX) 1.25 MG/3ML nebulizer solution USE 1 VIAL VIA NEBULIZER FOUR TIMES DAILY 360 mL 11    albuterol sulfate HFA (VENTOLIN HFA) 108 (90 Base) MCG/ACT inhaler Inhale 2 puffs into the lungs every 4 hours as needed for Wheezing 1 Inhaler 11    amiodarone (PACERONE) 100 MG tablet Take 1 tablet by mouth every other day 45 tablet 3    fluticasone (FLONASE) 50 MCG/ACT nasal spray 2 sprays by Nasal route daily 1 Bottle 5    ACCU-CHEK FASTCLIX LANCETS MISC USE TO TEST 3 TIMES A DAY dx code E11.9 300 each 5    insulin glargine (BASAGLAR KWIKPEN) 100 UNIT/ML injection pen Inject 36 Units into the skin 2 times daily 10 pen 11    pantoprazole (PROTONIX) 40 MG tablet TAKE 1 TABLET BY MOUTH EVERY DAY 90 tablet 1    CVS PURELAX powder TAKE 17 G BY MOUTH DAILY 1020 g 3    B-D UF III MINI PEN NEEDLES 31G X 5 MM MISC 1 EACH BY DOES NOT APPLY ROUTE 3 TIMES DAILY 100 each 11    alfuzosin (UROXATRAL) 10 MG extended release tablet TAKE 1 TABLET BY MOUTH EVERY DAY 90 tablet 3    atorvastatin (LIPITOR) 40 MG tablet TAKE 1 TABLET BY MOUTH EVERY DAY 90 tablet 3    verapamil (CALAN SR) 240 MG extended release tablet TAKE 1 TABLET BY MOUTH TWICE A  tablet 3    cloNIDine (CATAPRES) 0.1 MG tablet TAKE 1 TABLET BY MOUTH TWICE A  tablet 3    Docusate Sodium (COLACE PO) Take by mouth daily      LINZESS 145 MCG capsule TAKE 1 CAPSULE BY MOUTH EVERY DAY IN THE MORNING BEFORE BREAKFAST 90 capsule 3    valsartan-hydrochlorothiazide (DIOVAN-HCT) 320-25 MG per tablet TAKE 1 TABLET BY MOUTH EVERY DAY 90 tablet 3    Lactobacillus Rhamnosus, GG, (CVS PROBIOTIC, LACTOBACILLUS,) CAPS TAKE 1 CAPSULE BY MOUTH 2 TIMES DAILY (WITH MEALS) 60 capsule 0    furosemide (LASIX) 20 MG tablet TAKE 1 TABLET BY MOUTH DAILY (Patient taking differently: TAKE 1 TABLET  Every other day) 30 tablet 5    Respiratory Therapy Supplies (NEBULIZER/TUBING/MOUTHPIECE) KIT 1 kit by Does not apply route daily as needed (SOB) 1 kit 11    Cyanocobalamin (VITAMIN B-12 PO) Take 500 mcg by mouth every other day       Cholecalciferol (VITAMIN D3 PO) Take 2,000 Units by mouth daily       Misc. Devices (ACAPELLA) MISC 1 Device by Does not apply route as needed 1 each 0    aspirin EC 81 MG EC tablet Take 2 tablets by mouth daily. 30 tablet 3    ferrous sulfate 325 (65 FE) MG tablet Take 325 mg by mouth 2 times daily        No current facility-administered medications for this visit. PHYSICAL EXAM:  Vitals:    02/20/20 1056   BP: 139/68   Pulse:    Resp:    Temp:    SpO2:        GENERAL:  Well nourished, alert, appears stated age, no distress  HEENT:  No scleral icterus, no conjunctival irritation; Mallampati IV, elongated uvula  NECK:  No thyromegaly, no bruits  LYMPH:  No cervical or supraclavicular adenopathy  HEART:  Regular rate and rhythm, no murmurs. Distant heart sounds   LUNGS:  Upper airway rhonchi, clears with coughing   ABDOMEN:  No distention, no organomegaly  EXTREMITIES:  Trace edema  no digital clubbing  NEURO:  No localizing deficits    Pulmonary Function Testing 8/2012  FEV1 1.87 L at 69% predicted (reduced)  FVC 3.36 L at 90% predicted (normal)  FEV1/FVC ratio at 56 (reduced)  TLC 6.07 L at 110% predicted (normal)  DLCO 23.5 at 98% predicted (normal)  Overall Mild obstruction    Chest imaging from 12/2018 is reviewed. My impression is scarring in the lower lobes bilaterally. More so on the right     ECHO 2014  Left ventricle size is normal.  Normal left ventricular wall thickness. Ejection fraction is visually estimated to be 55-60 %.   No regional wall motion

## 2020-02-27 RX ORDER — AMIODARONE HYDROCHLORIDE 100 MG/1
100 TABLET ORAL EVERY OTHER DAY
Qty: 45 TABLET | Refills: 0 | OUTPATIENT
Start: 2020-02-27

## 2020-03-30 RX ORDER — PANTOPRAZOLE SODIUM 40 MG/1
40 TABLET, DELAYED RELEASE ORAL DAILY
Qty: 90 TABLET | Refills: 1 | Status: SHIPPED | OUTPATIENT
Start: 2020-03-30 | End: 2020-08-10

## 2020-05-26 NOTE — PROGRESS NOTES
Diagnosis Date    Allergic rhinitis     Asthma     Atrial fibrillation (HCC)     COPD (chronic obstructive pulmonary disease) (HCC)     ESBL (extended spectrum beta-lactamase) producing bacteria infection 2018    urine    Hyperlipidemia     Hypertension     Iron deficiency anemia     Obstructive sleep apnea     Type II or unspecified type diabetes mellitus without mention of complication, not stated as uncontrolled      Past Surgical History:   Procedure Laterality Date    CATARACT REMOVAL  2012    bilateral    COLONOSCOPY  7/10/2009    diverticulosis    hemorrhoids    GALLBLADDER SURGERY  1981    UPPER GASTROINTESTINAL ENDOSCOPY  7-2005    7/10/2009    normal     Family History   Problem Relation Age of Onset    Heart Disease Mother     Stroke Mother     Vision Loss Mother     High Blood Pressure Father     High Blood Pressure Brother     Diabetes Brother     Sleep Apnea Brother     Arthritis Paternal Grandmother     Diabetes Paternal Grandmother      Social History     Tobacco Use    Smoking status: Former Smoker     Packs/day: 0.50     Years: 18.00     Pack years: 9.00     Start date: 1958     Last attempt to quit: 1988     Years since quittin.4    Smokeless tobacco: Never Used   Substance Use Topics    Alcohol use: No     Alcohol/week: 0.0 standard drinks    Drug use: No       Allergies   Allergen Reactions    Hytrin [Terazosin Hcl]      Ineffective for BP control    Penicillins      Unknown reaction during childhood; Patient tolerated cephalosporins in the past    Shrimp Flavor Hives    Sulfa Antibiotics      Unknown reaction in childhood    Tekturna [Aliskiren Fumarate]      Ineffective    Vancomycin      Fever    Ciprofloxacin Rash     Fever and rash      Current Outpatient Medications   Medication Sig Dispense Refill    Ascorbic Acid (SUPER C COMPLEX PO) Take by mouth daily      Multiple Vitamins-Minerals (MULTIVITAMIN ADULT PO) Take by mouth daily apnea)  -uses CPAP nightly    4. Hyperlipidemia LDL goal <70  -on statin  -LDL @ goal on labs from Feb 2020    5. Bilateral carotid artery stenosis without cerebral infarction  -last carotid US 10/2019  -follows vascular surgery    6. Morbid obesity (HCC)  -BMI 40  -weight loss encouraged  6. Morbid Obesity    Plan:  Labs from 2/2020 reviewed  Continue verapamil, Diovan/HCT, lasix, clonidine, statin, ASA and low dose amiodarone  Discussed low fat/low sodium diet and reinforced regular activity/exercise as tolerated (limited by arthritic knee pain)  Carotid US scheduled for July 2020 with Vascular surgery  Will d/w Dr. Tabitha Gilliam:  ? Why he has not been on anticoagulation; has maintained SR on low dose amiodarone  Follow up with Dr. Tabitha Gilliam in 6 months or sooner if needed    Return in about 6 months (around 11/28/2020) for with Dr. Tabitha Gilliam. Thanks for allowing me to participate in the care of this patient.       Xavier Quintanilla, APRN-CNP  [facility], [address] [place] [address] [address] [place] [address]) [place], [address] [place], [place] [address]  Office: [phone]  Fax: [phone]

## 2020-05-28 ENCOUNTER — OFFICE VISIT (OUTPATIENT)
Dept: CARDIOLOGY CLINIC | Age: 80
End: 2020-05-28
Payer: MEDICARE

## 2020-05-28 VITALS
SYSTOLIC BLOOD PRESSURE: 128 MMHG | HEART RATE: 72 BPM | OXYGEN SATURATION: 96 % | DIASTOLIC BLOOD PRESSURE: 70 MMHG | BODY MASS INDEX: 40.77 KG/M2 | HEIGHT: 68 IN | TEMPERATURE: 98.5 F | WEIGHT: 269 LBS

## 2020-05-28 PROCEDURE — 1123F ACP DISCUSS/DSCN MKR DOCD: CPT | Performed by: NURSE PRACTITIONER

## 2020-05-28 PROCEDURE — 4040F PNEUMOC VAC/ADMIN/RCVD: CPT | Performed by: NURSE PRACTITIONER

## 2020-05-28 PROCEDURE — G8417 CALC BMI ABV UP PARAM F/U: HCPCS | Performed by: NURSE PRACTITIONER

## 2020-05-28 PROCEDURE — 99213 OFFICE O/P EST LOW 20 MIN: CPT | Performed by: NURSE PRACTITIONER

## 2020-05-28 PROCEDURE — 1036F TOBACCO NON-USER: CPT | Performed by: NURSE PRACTITIONER

## 2020-05-28 PROCEDURE — G8427 DOCREV CUR MEDS BY ELIG CLIN: HCPCS | Performed by: NURSE PRACTITIONER

## 2020-06-07 RX ORDER — VERAPAMIL HYDROCHLORIDE 240 MG/1
TABLET, FILM COATED, EXTENDED RELEASE ORAL
Qty: 180 TABLET | Refills: 3 | Status: SHIPPED | OUTPATIENT
Start: 2020-06-07 | End: 2021-03-22

## 2020-06-07 RX ORDER — CLONIDINE HYDROCHLORIDE 0.1 MG/1
TABLET ORAL
Qty: 180 TABLET | Refills: 3 | Status: SHIPPED | OUTPATIENT
Start: 2020-06-07 | End: 2021-03-22

## 2020-06-17 ENCOUNTER — OFFICE VISIT (OUTPATIENT)
Dept: PULMONOLOGY | Age: 80
End: 2020-06-17
Payer: MEDICARE

## 2020-06-17 VITALS
HEIGHT: 68 IN | BODY MASS INDEX: 41.07 KG/M2 | DIASTOLIC BLOOD PRESSURE: 67 MMHG | WEIGHT: 271 LBS | OXYGEN SATURATION: 94 % | HEART RATE: 95 BPM | TEMPERATURE: 98.2 F | RESPIRATION RATE: 20 BRPM | SYSTOLIC BLOOD PRESSURE: 179 MMHG

## 2020-06-17 PROCEDURE — G8427 DOCREV CUR MEDS BY ELIG CLIN: HCPCS | Performed by: INTERNAL MEDICINE

## 2020-06-17 PROCEDURE — 3023F SPIROM DOC REV: CPT | Performed by: INTERNAL MEDICINE

## 2020-06-17 PROCEDURE — 4040F PNEUMOC VAC/ADMIN/RCVD: CPT | Performed by: INTERNAL MEDICINE

## 2020-06-17 PROCEDURE — G8926 SPIRO NO PERF OR DOC: HCPCS | Performed by: INTERNAL MEDICINE

## 2020-06-17 PROCEDURE — 99213 OFFICE O/P EST LOW 20 MIN: CPT | Performed by: INTERNAL MEDICINE

## 2020-06-17 PROCEDURE — 1036F TOBACCO NON-USER: CPT | Performed by: INTERNAL MEDICINE

## 2020-06-17 PROCEDURE — G8417 CALC BMI ABV UP PARAM F/U: HCPCS | Performed by: INTERNAL MEDICINE

## 2020-06-17 PROCEDURE — 1123F ACP DISCUSS/DSCN MKR DOCD: CPT | Performed by: INTERNAL MEDICINE

## 2020-06-17 RX ORDER — LEVALBUTEROL INHALATION SOLUTION 1.25 MG/3ML
SOLUTION RESPIRATORY (INHALATION)
Qty: 360 ML | Refills: 11 | Status: SHIPPED | OUTPATIENT
Start: 2020-06-17 | End: 2021-06-21

## 2020-06-17 NOTE — PROGRESS NOTES
extended release tablet TAKE 1 TABLET BY MOUTH EVERY DAY 90 tablet 3    atorvastatin (LIPITOR) 40 MG tablet TAKE 1 TABLET BY MOUTH EVERY DAY 90 tablet 3    Docusate Sodium (COLACE PO) Take by mouth daily      Respiratory Therapy Supplies (NEBULIZER/TUBING/MOUTHPIECE) KIT 1 kit by Does not apply route daily as needed (SOB) 1 kit 11    Cyanocobalamin (VITAMIN B-12 PO) Take 500 mcg by mouth every other day       Cholecalciferol (VITAMIN D3 PO) Take 2,000 Units by mouth daily       Misc. Devices (ACAPELLA) MISC 1 Device by Does not apply route as needed 1 each 0    aspirin EC 81 MG EC tablet Take 2 tablets by mouth daily. 30 tablet 3    ferrous sulfate 325 (65 FE) MG tablet Take 325 mg by mouth 2 times daily        No current facility-administered medications for this visit. PHYSICAL EXAM:  Vitals:    06/17/20 1020   BP: (!) 179/67   Pulse: 95   Resp: 20   Temp: 98.2 °F (36.8 °C)   SpO2: 94%       GENERAL:  Well nourished, alert, appears stated age, no distress  HEENT:  No scleral icterus, no conjunctival irritation; Mallampati IV, elongated uvula  NECK:  No thyromegaly, no bruits  LYMPH:  No cervical or supraclavicular adenopathy  HEART:  Regular rate and rhythm, no murmurs. Distant heart sounds   LUNGS:  Expiratory wheezing that improves with coughing   ABDOMEN:  No distention, no organomegaly  EXTREMITIES:  Trace edema  no digital clubbing  NEURO:  No localizing deficits    Pulmonary Function Testing 8/2012  FEV1 1.87 L at 69% predicted (reduced)  FVC 3.36 L at 90% predicted (normal)  FEV1/FVC ratio at 56 (reduced)  TLC 6.07 L at 110% predicted (normal)  DLCO 23.5 at 98% predicted (normal)  Overall Mild obstruction    Chest imaging from 12/2018 is reviewed. My impression is scarring in the lower lobes bilaterally. More so on the right     ECHO 2014  Left ventricle size is normal.  Normal left ventricular wall thickness. Ejection fraction is visually estimated to be 55-60 %.   No regional wall motion

## 2020-07-17 RX ORDER — ATORVASTATIN CALCIUM 40 MG/1
TABLET, FILM COATED ORAL
Qty: 90 TABLET | Refills: 3 | Status: SHIPPED | OUTPATIENT
Start: 2020-07-17 | End: 2021-04-13

## 2020-07-19 RX ORDER — ALFUZOSIN HYDROCHLORIDE 10 MG/1
TABLET, EXTENDED RELEASE ORAL
Qty: 90 TABLET | Refills: 3 | Status: SHIPPED | OUTPATIENT
Start: 2020-07-19 | End: 2021-04-13

## 2020-07-21 ENCOUNTER — PROCEDURE VISIT (OUTPATIENT)
Dept: SURGERY | Age: 80
End: 2020-07-21
Payer: MEDICARE

## 2020-07-21 PROCEDURE — 93880 EXTRACRANIAL BILAT STUDY: CPT | Performed by: SURGERY

## 2020-07-22 ENCOUNTER — TELEPHONE (OUTPATIENT)
Dept: SURGERY | Age: 80
End: 2020-07-22

## 2020-07-22 NOTE — TELEPHONE ENCOUNTER
Informed patient of his Carotid Study Results and to follow up in six months. Patient aware and agreed.

## 2020-08-10 RX ORDER — PANTOPRAZOLE SODIUM 40 MG/1
TABLET, DELAYED RELEASE ORAL
Qty: 90 TABLET | Refills: 1 | Status: SHIPPED | OUTPATIENT
Start: 2020-08-10 | End: 2021-03-01

## 2020-10-12 RX ORDER — AMIODARONE HYDROCHLORIDE 200 MG/1
TABLET ORAL
Qty: 15 TABLET | Refills: 5 | Status: SHIPPED | OUTPATIENT
Start: 2020-10-12 | End: 2021-01-28

## 2020-10-28 ENCOUNTER — OFFICE VISIT (OUTPATIENT)
Dept: PULMONOLOGY | Age: 80
End: 2020-10-28
Payer: MEDICARE

## 2020-10-28 VITALS
TEMPERATURE: 98.2 F | DIASTOLIC BLOOD PRESSURE: 64 MMHG | BODY MASS INDEX: 41.34 KG/M2 | WEIGHT: 272.8 LBS | OXYGEN SATURATION: 95 % | HEIGHT: 68 IN | SYSTOLIC BLOOD PRESSURE: 133 MMHG | HEART RATE: 78 BPM | RESPIRATION RATE: 20 BRPM

## 2020-10-28 PROCEDURE — G8427 DOCREV CUR MEDS BY ELIG CLIN: HCPCS | Performed by: INTERNAL MEDICINE

## 2020-10-28 PROCEDURE — 3023F SPIROM DOC REV: CPT | Performed by: INTERNAL MEDICINE

## 2020-10-28 PROCEDURE — G8482 FLU IMMUNIZE ORDER/ADMIN: HCPCS | Performed by: INTERNAL MEDICINE

## 2020-10-28 PROCEDURE — 1123F ACP DISCUSS/DSCN MKR DOCD: CPT | Performed by: INTERNAL MEDICINE

## 2020-10-28 PROCEDURE — 1036F TOBACCO NON-USER: CPT | Performed by: INTERNAL MEDICINE

## 2020-10-28 PROCEDURE — 4040F PNEUMOC VAC/ADMIN/RCVD: CPT | Performed by: INTERNAL MEDICINE

## 2020-10-28 PROCEDURE — 99213 OFFICE O/P EST LOW 20 MIN: CPT | Performed by: INTERNAL MEDICINE

## 2020-10-28 PROCEDURE — G8417 CALC BMI ABV UP PARAM F/U: HCPCS | Performed by: INTERNAL MEDICINE

## 2020-10-28 PROCEDURE — G8926 SPIRO NO PERF OR DOC: HCPCS | Performed by: INTERNAL MEDICINE

## 2020-10-28 NOTE — PROGRESS NOTES
Chief complaint  This is a [de-identified]y.o. year old male  who comes to see me with a chief complaint of   Chief Complaint   Patient presents with    Follow-up     copd & JOANN        HPI  Here with follow up on JOANN and COPD    Doing well. Remains on xopenex and albuterol. Stopped Anoro months ago on his own and has not noticed any change for the worse. Feels good. Occasional cough. Minimal mucus at this time     Machine:  Patient is using a APAP machine. Average usage is 6 hrs 16 min 00 sec. He is meeting 4 or more hours per night 94% of the time. Average AHI is 0.8.    Sleeps well with machine     Flu shot up to date     Sleep Medicine 2/20/2020 10/17/2019 2/25/2019 10/23/2018   Sitting and reading 1 1 1 0   Watching TV 1 1 2 1   Sitting, inactive in a public place (e.g. a theatre or a meeting) 0 1 1 0   As a passenger in a car for an hour without a break 0 1 1 0   Lying down to rest in the afternoon when circumstances permit 1 2 2 2   Sitting and talking to someone 0 0 1 0   Sitting quietly after a lunch without alcohol 0 1 1 1   In a car, while stopped for a few minutes in traffic 0 0 0 0   Total score 3 7 9 4   Neck circumference - - - 18.5         Past Medical History:   Diagnosis Date    Allergic rhinitis     Asthma     Atrial fibrillation ([address]     COPD (chronic obstructive pulmonary disease) ([address]     CPAP (continuous positive airway pressure) dependence     ESBL (extended spectrum beta-lactamase) producing bacteria infection 12/26/2018    urine    Hyperlipidemia     Hypertension     Iron deficiency anemia     Obstructive sleep apnea     Type II or unspecified type diabetes mellitus without mention of complication, not stated as uncontrolled        Past Surgical History:   Procedure Laterality Date    CATARACT REMOVAL  2/2012    bilateral    COLONOSCOPY  7/10/2009    diverticulosis    hemorrhoids    GALLBLADDER SURGERY  1981    UPPER GASTROINTESTINAL ENDOSCOPY  7-2005    7/10/2009    normal Current Outpatient Medications   Medication Sig Dispense Refill    CPAP Machine MISC by Does not apply route      montelukast (SINGULAIR) 10 MG tablet TAKE 1 TABLET BY MOUTH EVERY DAY 90 tablet 3    amiodarone (CORDARONE) 200 MG tablet TAKE 1 TABLET BY MOUTH EVERY OTHER DAY 15 tablet 5    pantoprazole (PROTONIX) 40 MG tablet TAKE 1 TABLET BY MOUTH EVERY DAY 90 tablet 1    B-D UF III MINI PEN NEEDLES 31G X 5 MM MISC USE AS DIRECTED 3 TIMES A  each 11    alfuzosin (UROXATRAL) 10 MG extended release tablet TAKE 1 TABLET BY MOUTH EVERY DAY 90 tablet 3    atorvastatin (LIPITOR) 40 MG tablet TAKE 1 TABLET BY MOUTH EVERY DAY 90 tablet 3    levalbuterol (XOPENEX) 1.25 MG/3ML nebulizer solution USE 1 VIAL VIA NEBULIZER FOUR TIMES DAILY 360 mL 11    cloNIDine (CATAPRES) 0.1 MG tablet TAKE 1 TABLET BY MOUTH TWICE A  tablet 3    verapamil (CALAN SR) 240 MG extended release tablet TAKE 1 TABLET BY MOUTH TWICE A  tablet 3    Ascorbic Acid (SUPER C COMPLEX PO) Take by mouth daily      Multiple Vitamins-Minerals (MULTIVITAMIN ADULT PO) Take by mouth daily      valsartan-hydrochlorothiazide (DIOVAN-HCT) 320-25 MG per tablet TAKE 1 TABLET BY MOUTH EVERY DAY 90 tablet 3    [facility] PURELAX powder TAKE 17 G BY MOUTH DAILY 510 g 7    LINZESS 145 MCG capsule TAKE 1 CAPSULE BY MOUTH EVERY DAY IN THE MORNING BEFORE BREAKFAST 30 capsule 11    Lactobacillus Rhamnosus, GG, ([facility]S PROBIOTIC, LACTOBACILLUS,) CAPS TAKE 1 CAPSULE BY MOUTH 2 TIMES DAILY (WITH MEALS) 60 capsule 11    furosemide (LASIX) 20 MG tablet TAKE 1 TABLET BY MOUTH EVERY DAY 90 tablet 3    albuterol sulfate HFA (VENTOLIN HFA) 108 (90 Base) MCG/ACT inhaler Inhale 2 puffs into the lungs every 4 hours as needed for Wheezing 1 Inhaler 11    fluticasone (FLONASE) 50 MCG/ACT nasal spray 2 sprays by Nasal route daily 1 Bottle 5    insulin glargine (BASAGLAR KWIKPEN) 100 UNIT/ML injection pen Inject 36 Units into the skin 2 times daily 10 pen 11  Docusate Sodium (COLACE PO) Take by mouth daily      Cyanocobalamin (VITAMIN B-12 PO) Take 500 mcg by mouth every other day       Cholecalciferol (VITAMIN D3 PO) Take 2,000 Units by mouth daily       aspirin EC 81 MG EC tablet Take 2 tablets by mouth daily. 30 tablet 3    ferrous sulfate 325 (65 FE) MG tablet Take 325 mg by mouth 2 times daily        No current facility-administered medications for this visit. PHYSICAL EXAM:  Vitals:    10/28/20 1112   BP: 133/64   Pulse:    Resp:    Temp:    SpO2:        GENERAL:  Well nourished, alert, appears stated age, no distress  HEENT:  No scleral icterus, no conjunctival irritation; Mallampati IV, elongated uvula  NECK:  No thyromegaly, no bruits  LYMPH:  No cervical or supraclavicular adenopathy  HEART:  Regular rate and rhythm, no murmurs. Distant heart sounds   LUNGS:  Right expiratory wheezing that clears with coughing   ABDOMEN:  No distention, no organomegaly  EXTREMITIES:  Trace edema  no digital clubbing  NEURO:  No localizing deficits    Pulmonary Function Testing 8/2012  FEV1 1.87 L at 69% predicted (reduced)  FVC 3.36 L at 90% predicted (normal)  FEV1/FVC ratio at 56 (reduced)  TLC 6.07 L at 110% predicted (normal)  DLCO 23.5 at 98% predicted (normal)  Overall Mild obstruction    Chest imaging from 12/2018 is reviewed. My impression is scarring in the lower lobes bilaterally. More so on the right     ECHO 2014  Left ventricle size is normal.  Normal left ventricular wall thickness. Ejection fraction is visually estimated to be 55-60 %. No regional wall motion abnormalities are noted. There is reversal of E/A inflow velocities across the mitral valve  suggesting impaired left ventricular relaxation. The right ventricle is not well visualized.   Lab Results   Component Value Date    WBC 6.4 02/12/2020    HGB 13.1 (L) 02/12/2020    HCT 40.5 02/12/2020    MCV 86.3 02/12/2020     02/12/2020       No results found for: BNP    Lab Results Component Value Date    CREATININE 1.9 (H) 07/28/2020    BUN 41 (H) 07/28/2020     07/28/2020    K 4.7 07/28/2020    CL 99 07/28/2020    CO2 23 07/28/2020     Alpha-1 Anti-trypsin levels:  92, Phenotype:  M1S       I reviewed the above labs and images      Assessment/Plan:  1. COPD with chronic bronchitis ([address]  Controlled. On an abnormal regimen but it works. Continue with xopenex nebs and albuterol HFA. He will continue to hold off on Anoro. No ICS inhalers due to repeated infections in the past when on them    2. JOANN on CPAP  Benefiting and compliant. Benefiting from therapy by a low Lazbuddie score, good energy levels, low fatigue, and control of chronic medical problems    3. Rhinosinusitis  Flonase    Flu shot up to date     Pulmonary Rehab:  Completed a portion of it before    Lung Cancer Screening CT:  Does not qualify    History of pseudomonas infection.   Needs pseudomonal coverage when he has bronchitis     Avoid ICS inhalers due to repeated infections    Follow up in 4 months

## 2020-11-03 PROBLEM — E66.9 OBESITY: Status: RESOLVED | Noted: 2020-11-03 | Resolved: 2020-11-03

## 2020-12-03 NOTE — PROGRESS NOTES
[address] - Cardiology    CC: \"I feel well\"     HPI:   Kaylin Lindo is a [de-identified] y.o. patient with a past medical history significant for obesity hypoventilation syndrome as well as moderate COPD of asthma type who presents for the evaluation of atrial fibrillation noted during pulmonary rehabilitation. This was noted incidentally on telemetry monitoring during therapy. The patient did have some shortness of breath with it that he felt was out of proportion to his level of exertion even with his pulmonary disease. The episodes were short-lived and resolved spontaneously with rest. He does not notice any abnormality in his heart rhythm at rest. He denies any palpitations. He is on amiodarone 100mg daily and aspirin 162mg daily for his atrial fibrillation. He returns to the office today in follow-up. Today, he reports feeling well overall. He states his breathing has been comfortable overall. He has now been following with Dr. Lara Villagomez for pulmonary. He reports only occasional feelings of palpitations with over exertion. Patient denies exertional chest pain/pressure, dyspnea at rest, GIBBONS, PND, orthopnea, palpitations, lightheadedness, weight changes, changes in LE edema, and syncope. He reports medication compliance and is tolerating. Review of Systems:  Constitutional: No fatigue, weakness, night sweats or fever. HEENT: No new vision difficulties or ringing in the ears. Respiratory: No new SOB, PND, orthopnea or cough. Cardiovascular: See HPI   GI: No n/v, diarrhea, constipation, abdominal pain or changes in bowel habits. No melena, no hematochezia  : No urinary frequency, urgency, incontinence, hematuria or dysuria. Skin: No cyanosis or skin lesions. Musculoskeletal: No new muscle or joint pain. Neurological: No syncope or TIA-like symptoms.   Psychiatric: No anxiety, insomnia or depression     Current Outpatient Medications   Medication Sig Dispense Refill tablet Take 2 tablets by mouth daily. 30 tablet 3    ferrous sulfate 325 (65 FE) MG tablet Take 325 mg by mouth 2 times daily       alfuzosin (UROXATRAL) 10 MG extended release tablet TAKE 1 TABLET BY MOUTH EVERY DAY 90 tablet 3     No current facility-administered medications for this visit. [id number] Score for Atrial Fibrillation Stroke Risk   Risk   Factors  Component Value   C CHF No 0   H HTN Yes 1   A2 Age >= 76 Yes,  [de-identified] y.o.) 2   D DM Yes 1   S2 Prior Stroke/TIA No 0   V Vascular Disease No 0   A Age 74-69 No,  [de-identified] y.o.) 0   Sc Sex male 0    [id number]  Score  4   Score last updated 80/41/03 06:34 AM    Click here for a link to the UpToDate guideline \"Atrial Fibrillation: Anticoagulation therapy to prevent embolization    Disclaimer: Risk Score calculation is dependent on accuracy of patient problem list and past encounter diagnosis.       Allergies   Allergen Reactions    Hytrin [Terazosin Hcl]      Ineffective for BP control    Penicillins      Unknown reaction during childhood; Patient tolerated cephalosporins in the past    Shrimp Flavor Hives    Sulfa Antibiotics      Unknown reaction in childhood    Tekturna [Aliskiren Fumarate]      Ineffective    Vancomycin      Fever    Ciprofloxacin Rash     Fever and rash        Physical Exam:  BP (!) 148/62   Pulse 73   Temp 98.2 °F (36.8 °C)   Ht 5' 8\" (1.727 m)   Wt 272 lb 9.6 oz (123.7 kg)   SpO2 96%   BMI 41.45 kg/m²    Wt Readings from Last 2 Encounters:   12/04/20 272 lb 9.6 oz (123.7 kg)   10/28/20 272 lb 12.8 oz (123.7 kg)     General Appearance:  Alert, cooperative, no distress, appears stated age, obese   Head:  Normocephalic, without obvious abnormality, atraumatic   Eyes:  PERRL, conjunctiva/corneas clear       Nose: Nares normal, no drainage or sinus tenderness   Throat: Lips, mucosa, and tongue normal   Neck: Supple, symmetrical, trachea midline, no adenopathy, thyroid: not enlarged, symmetric, no tenderness/mass/nodules, no

## 2020-12-04 ENCOUNTER — OFFICE VISIT (OUTPATIENT)
Dept: CARDIOLOGY CLINIC | Age: 80
End: 2020-12-04
Payer: MEDICARE

## 2020-12-04 VITALS
SYSTOLIC BLOOD PRESSURE: 148 MMHG | DIASTOLIC BLOOD PRESSURE: 62 MMHG | OXYGEN SATURATION: 96 % | HEART RATE: 73 BPM | HEIGHT: 68 IN | WEIGHT: 272.6 LBS | TEMPERATURE: 98.2 F | BODY MASS INDEX: 41.31 KG/M2

## 2020-12-04 PROCEDURE — 99214 OFFICE O/P EST MOD 30 MIN: CPT | Performed by: INTERNAL MEDICINE

## 2020-12-04 PROCEDURE — 1123F ACP DISCUSS/DSCN MKR DOCD: CPT | Performed by: INTERNAL MEDICINE

## 2020-12-04 PROCEDURE — G8417 CALC BMI ABV UP PARAM F/U: HCPCS | Performed by: INTERNAL MEDICINE

## 2020-12-04 PROCEDURE — 93000 ELECTROCARDIOGRAM COMPLETE: CPT | Performed by: INTERNAL MEDICINE

## 2020-12-04 PROCEDURE — 1036F TOBACCO NON-USER: CPT | Performed by: INTERNAL MEDICINE

## 2020-12-04 PROCEDURE — 4040F PNEUMOC VAC/ADMIN/RCVD: CPT | Performed by: INTERNAL MEDICINE

## 2020-12-04 PROCEDURE — G8482 FLU IMMUNIZE ORDER/ADMIN: HCPCS | Performed by: INTERNAL MEDICINE

## 2020-12-04 PROCEDURE — G8427 DOCREV CUR MEDS BY ELIG CLIN: HCPCS | Performed by: INTERNAL MEDICINE

## 2021-01-22 ENCOUNTER — IMMUNIZATION (OUTPATIENT)
Dept: PRIMARY CARE CLINIC | Age: 81
End: 2021-01-22
Payer: MEDICARE

## 2021-01-22 PROCEDURE — 91300 COVID-19, PFIZER VACCINE 30MCG/0.3ML DOSE: CPT | Performed by: FAMILY MEDICINE

## 2021-01-22 PROCEDURE — 0001A COVID-19, PFIZER VACCINE 30MCG/0.3ML DOSE: CPT | Performed by: FAMILY MEDICINE

## 2021-01-25 DIAGNOSIS — N17.9 ACUTE RENAL FAILURE SUPERIMPOSED ON STAGE 3 CHRONIC KIDNEY DISEASE, UNSPECIFIED ACUTE RENAL FAILURE TYPE, UNSPECIFIED WHETHER STAGE 3A OR 3B CKD (HCC): ICD-10-CM

## 2021-01-25 DIAGNOSIS — D64.9 ANEMIA, UNSPECIFIED TYPE: ICD-10-CM

## 2021-01-25 DIAGNOSIS — N18.30 ACUTE RENAL FAILURE SUPERIMPOSED ON STAGE 3 CHRONIC KIDNEY DISEASE, UNSPECIFIED ACUTE RENAL FAILURE TYPE, UNSPECIFIED WHETHER STAGE 3A OR 3B CKD (HCC): ICD-10-CM

## 2021-01-25 LAB — FOLATE: 17.23 NG/ML (ref 4.78–24.2)

## 2021-01-26 LAB
KAPPA, FREE LIGHT CHAINS, SERUM: 35.25 MG/L (ref 3.3–19.4)
KAPPA/LAMBDA RATIO: 1.49 (ref 0.26–1.65)
KAPPA/LAMBDA TEST COMMENT: ABNORMAL
LAMBDA, FREE LIGHT CHAINS, SERUM: 23.7 MG/L (ref 5.71–26.3)

## 2021-01-27 LAB
ALBUMIN SERPL-MCNC: 3.2 G/DL (ref 3.1–4.9)
ALPHA-1-GLOBULIN: 0.3 G/DL (ref 0.2–0.4)
ALPHA-2-GLOBULIN: 1 G/DL (ref 0.4–1.1)
BETA GLOBULIN: 1.2 G/DL (ref 0.9–1.6)
GAMMA GLOBULIN: 0.7 G/DL (ref 0.6–1.8)
SPE/IFE INTERPRETATION: NORMAL
TOTAL PROTEIN: 6.3 G/DL (ref 6.4–8.2)

## 2021-01-28 DIAGNOSIS — I48.0 PAROXYSMAL ATRIAL FIBRILLATION (HCC): ICD-10-CM

## 2021-01-28 RX ORDER — AMIODARONE HYDROCHLORIDE 200 MG/1
TABLET ORAL
Qty: 45 TABLET | Refills: 1 | Status: SHIPPED | OUTPATIENT
Start: 2021-01-28 | End: 2021-08-02

## 2021-01-28 NOTE — TELEPHONE ENCOUNTER
Last OV: 5/28/2020  Next OV: 6/4/2021  Labs: TSH-1/21/2021, CMP-1/21/2021  Last EKG (if needed): 12/4/2020

## 2021-02-12 ENCOUNTER — IMMUNIZATION (OUTPATIENT)
Dept: PRIMARY CARE CLINIC | Age: 81
End: 2021-02-12
Payer: MEDICARE

## 2021-02-12 PROCEDURE — 91300 COVID-19, PFIZER VACCINE 30MCG/0.3ML DOSE: CPT | Performed by: FAMILY MEDICINE

## 2021-02-12 PROCEDURE — 0002A COVID-19, PFIZER VACCINE 30MCG/0.3ML DOSE: CPT | Performed by: FAMILY MEDICINE

## 2021-02-24 ENCOUNTER — PROCEDURE VISIT (OUTPATIENT)
Dept: SURGERY | Age: 81
End: 2021-02-24
Payer: MEDICARE

## 2021-02-24 ENCOUNTER — OFFICE VISIT (OUTPATIENT)
Dept: SURGERY | Age: 81
End: 2021-02-24
Payer: MEDICARE

## 2021-02-24 ENCOUNTER — TELEPHONE (OUTPATIENT)
Dept: PULMONOLOGY | Age: 81
End: 2021-02-24

## 2021-02-24 VITALS
DIASTOLIC BLOOD PRESSURE: 70 MMHG | WEIGHT: 274 LBS | BODY MASS INDEX: 41.52 KG/M2 | HEIGHT: 68 IN | SYSTOLIC BLOOD PRESSURE: 170 MMHG

## 2021-02-24 DIAGNOSIS — I65.23 BILATERAL CAROTID ARTERY STENOSIS WITHOUT CEREBRAL INFARCTION: Primary | ICD-10-CM

## 2021-02-24 DIAGNOSIS — M79.89 LEG SWELLING: ICD-10-CM

## 2021-02-24 DIAGNOSIS — I10 ESSENTIAL HYPERTENSION: ICD-10-CM

## 2021-02-24 DIAGNOSIS — E78.5 HYPERLIPIDEMIA, UNSPECIFIED HYPERLIPIDEMIA TYPE: ICD-10-CM

## 2021-02-24 PROCEDURE — G8482 FLU IMMUNIZE ORDER/ADMIN: HCPCS | Performed by: NURSE PRACTITIONER

## 2021-02-24 PROCEDURE — 93880 EXTRACRANIAL BILAT STUDY: CPT | Performed by: SURGERY

## 2021-02-24 PROCEDURE — G8417 CALC BMI ABV UP PARAM F/U: HCPCS | Performed by: NURSE PRACTITIONER

## 2021-02-24 PROCEDURE — 1036F TOBACCO NON-USER: CPT | Performed by: NURSE PRACTITIONER

## 2021-02-24 PROCEDURE — G8427 DOCREV CUR MEDS BY ELIG CLIN: HCPCS | Performed by: NURSE PRACTITIONER

## 2021-02-24 PROCEDURE — 99214 OFFICE O/P EST MOD 30 MIN: CPT | Performed by: NURSE PRACTITIONER

## 2021-02-24 PROCEDURE — 1123F ACP DISCUSS/DSCN MKR DOCD: CPT | Performed by: NURSE PRACTITIONER

## 2021-02-24 PROCEDURE — 4040F PNEUMOC VAC/ADMIN/RCVD: CPT | Performed by: NURSE PRACTITIONER

## 2021-02-24 NOTE — LETTER
[address] [facility]  [address]  Phone: [phone]  Fax: [phone]    Lilliana Uribe CNP        February 24, 2021      [address] [facility]     Patient: Lora Garcia    MR Number: [id number]    YOB: 1940    Date of Visit: 2/24/2021        Dear Corinna Peoples:    Gela Irvin, Dr. Cristóbal Gomez patient, was in the office for a carotid duplex scan. The assessment is as follows:    Carotid artery stenosis, w/o mention of cerebral infarction   Current plans       * The patient has a 50-80% DERICK stenosis by velocity criteria; DERICK 67% by             image. * The patient has a 32-73% LICA stenosis by velocity criteria; B-Mode                      measurement of stenosis could not be obtained due to acoustic shadowing           from calcified plaque. There has been some increase in LICA velocities                compared to the previous study of 7/21/20. * The patient has not experienced any symptoms related to his carotid disease. * Follow up in 6 months with carotid doppler scan and office visit. I will keep you posted regarding his progress.     Sincerely,      Lilliana Uribe CNP

## 2021-02-24 NOTE — PATIENT INSTRUCTIONS
Return in about 6 months (around 8/24/2021) for CDS and office visit. PATIENT EDUCATION focused on signs/symptoms of stroke:  - Watch for one eye going dark like a shade was pulled down. - Watch for one side of the body or face not functioning correctly. - Difficulty with speech, such as slurring your words. - Difficulty finding the right words, such as calling an object by the wrong name when you know the real name. If you have any of these symptoms, do not call us or your family doctor. Call 911 and go immediately to the hospital.  You have a 4-6 hour window from the point when symptoms start to get to the hospital, get a CT scan done and initiate clot dissolving drugs.

## 2021-02-24 NOTE — PROGRESS NOTES
Subjective:      Patient ID: Boogie Cox is a 79 y.o. male. Chief Complaint   Patient presents with    6 Month Follow-Up     Pt is here today for a 6 mo followup visit. Pain Assessment  Gilmar Suresh has a pain level on 0/10 scale:  3  Location:  BLE knees  Description:  aching  Radiation:   No  Duration:+  years  Time:  intermittent    HPI     The patient is a 79 y.o. male who presents with a complaint of carotid stenosis follow up. The patient has been previously diagnosed with Left carotid artery stenosis and Right carotid artery stenosis. Date last seen: 10/14/2019. Patient denies numbness/weakness on any one side, speech disturbances or visual disturbances. Since last visit patient has had left CDS to be review today and right CDS to be reviewed today. The patient has not previously undergone surgical intervention for this problem. Review of Systems   HENT: Positive for hearing loss. Eyes: Negative for visual disturbance. Cardiovascular: Leg swelling: controlled with compression stockings. Musculoskeletal: Negative for neck pain. Neurological: Negative for dizziness, speech difficulty, weakness, light-headedness, numbness and headaches. All other systems reviewed and are negative. Allergies   Allergen Reactions    Hytrin [Terazosin Hcl]      Ineffective for BP control    Penicillins      Unknown reaction during childhood; Patient tolerated cephalosporins in the past    Shrimp Flavor Hives    Sulfa Antibiotics      Unknown reaction in childhood    Tekturna [Aliskiren Fumarate]      Ineffective    Vancomycin      Fever    Ciprofloxacin Rash     Fever and rash          Prior to Visit Medications    Medication Sig Taking?  Authorizing Provider   amiodarone (CORDARONE) 200 MG tablet TAKE 1 TABLET BY MOUTH EVERY OTHER DAY Yes Fortino Ngo MD   blood glucose test strips (ACCU-CHEK GUIDE) strip TEST AS DIRECTED 3 TIMES A DAY Yes Vladimir Marti MD   albuterol sulfate  (90 Base) MCG/ACT inhaler INHALE 2 PUFFS BY MOUTH EVERY 4 HOURS AS NEEDED FOR WHEEZE Yes Frida Purchase, MD   ZINC PO Take by mouth Yes Historical Provider, MD   CPAP Machine MISC by Does not apply route Yes Historical Provider, MD   montelukast (SINGULAIR) 10 MG tablet TAKE 1 TABLET BY MOUTH EVERY DAY Yes Frida Mederos MD   pantoprazole ([address]) 40 MG tablet TAKE 1 TABLET BY MOUTH EVERY DAY Yes Juan M Echevarria,    B-D UF III MINI PEN NEEDLES 31G X 5 MM MISC USE AS DIRECTED 3 TIMES A DAY Yes SILVIANO Herron - CNP   alfuzosin (UROXATRAL) 10 MG extended release tablet TAKE 1 TABLET BY MOUTH EVERY DAY Yes Frida Mederos MD   atorvastatin (LIPITOR) 40 MG tablet TAKE 1 TABLET BY MOUTH EVERY DAY Yes Frida Mederos MD   levalbuterol (XOPENEX) 1.25 MG/3ML nebulizer solution USE 1 VIAL VIA NEBULIZER FOUR TIMES DAILY Yes Juan M Echevarria DO   cloNIDine (CATAPRES) 0.1 MG tablet TAKE 1 TABLET BY MOUTH TWICE A DAY Yes Frida Mederos MD   verapamil (CALAN SR) 240 MG extended release tablet TAKE 1 TABLET BY MOUTH TWICE A DAY Yes Frdia Mederos MD   Ascorbic Acid (SUPER C COMPLEX PO) Take by mouth daily Yes Historical Provider, MD   Multiple Vitamins-Minerals (MULTIVITAMIN ADULT PO) Take by mouth daily Yes Historical Provider, MD   valsartan-hydrochlorothiazide (DIOVAN-HCT) 320-25 MG per tablet TAKE 1 Silva Orosco Yes Frida Mederos MD   [facility]S PURELAX powder TAKE 17 G BY MOUTH DAILY Yes MD MOY Le 145 MCG capsule TAKE 1 CAPSULE BY MOUTH EVERY DAY IN THE MORNING BEFORE BREAKFAST Yes Frida Mederos MD   Lactobacillus Rhamnosus, GG, (CVS PROBIOTIC, LACTOBACILLUS,) CAPS TAKE 1 CAPSULE BY MOUTH 2 TIMES DAILY (WITH MEALS) Yes Frida Mederos MD   furosemide (LASIX) 20 MG tablet TAKE 1 TABLET BY MOUTH EVERY DAY Yes Frida Mederos MD   insulin glargine (BASAGLAR KWIKPEN) 100 UNIT/ML injection pen Inject 36 Units into the skin 2 times daily Yes Xiomara Burgos MD   Docusate Sodium (COLACE PO) Take by mouth daily Yes Historical Provider, MD   Cyanocobalamin (VITAMIN B-12 PO) Take 500 mcg by mouth every other day  Yes Historical Provider, MD   Cholecalciferol (VITAMIN D3 PO) Take 2,000 Units by mouth daily  Yes Historical Provider, MD   aspirin EC 81 MG EC tablet Take 2 tablets by mouth daily. Yes Samuel Madden MD   ferrous sulfate 325 (65 FE) MG tablet Take 325 mg by mouth 2 times daily  Yes Historical Provider, MD     History reviewed. Objective:   Physical Exam  Vitals signs reviewed. Constitutional:       General: He is not in acute distress. Appearance: Normal appearance. He is well-developed. Comments: obese   HENT:      Head: Normocephalic. Right Ear: Decreased hearing noted. Left Ear: Decreased hearing (bilateral hearing aids) noted. Eyes:      General: Lids are normal.      Conjunctiva/sclera: Conjunctivae normal.      Pupils: Pupils are equal, round, and reactive to light. Neck:      Musculoskeletal: Normal range of motion and neck supple. Vascular: No carotid bruit. Trachea: Trachea normal. No tracheal deviation. Cardiovascular:      Rate and Rhythm: Normal rate and regular rhythm. Pulses:           Carotid pulses are 2+ on the right side and 2+ on the left side. Radial pulses are 2+ on the right side and 2+ on the left side. Femoral pulses are 2+ on the right side and 2+ on the left side. Popliteal pulses are 2+ on the right side and 2+ on the left side. Dorsalis pedis pulses are 2+ on the right side and 2+ on the left side. Posterior tibial pulses are 0 on the right side and 0 on the left side. Heart sounds: Normal heart sounds. No murmur. No friction rub. No gallop. Pulmonary:      Effort: Pulmonary effort is normal. No respiratory distress.    Musculoskeletal: Normal range of motion. General: No tenderness. Right lower leg: Right lower leg edema: controlled with compression stockings; right ankle measures 22.3  cm; calf 39 cm. Left lower leg: Left lower leg edema: controlled with compression stockings; left ankle measures 24.2 cm, calf 38.9 cm. Skin:     General: Skin is warm and dry. Findings: No bruising, erythema or rash. Neurological:      Mental Status: He is alert and oriented to person, place, and time. Cranial Nerves: No cranial nerve deficit. Sensory: No sensory deficit. Coordination: Coordination normal.      Gait: Gait normal.   Psychiatric:         Speech: Speech normal.         Behavior: Behavior normal. Behavior is cooperative. Thought Content: Thought content normal.         Assessment:      1. Carotid artery stenosis, bilateral     * The patient has a 50-80% DERICK stenosis by velocity criteria; DERICK 67% by image. * The patient has a 32-63% LICA stenosis by velocity criteria. * The patient has not experienced any symptoms related to his carotid disease. * Follow up in 6 months with carotid doppler scan and office visit. 2. Hyperlipidemia - stable, on Lipitor 40 mg   3. Hypertension, benign - stable, on Diovan--25 mg, verapamil 240 mg, clonidine 0.1 mg, furosemide 20 mg   4. Leg swelling - wears compression stockings (20-30 mmHg) on a daily basis  He does not need a new prescription for stockings today        PATIENT EDUCATION focused on signs/symptoms of stroke:  - Watch for one eye going dark like a shade was pulled down. - Watch for one side of the body or face not functioning correctly. - Difficulty with speech, such as slurring your words. - Difficulty finding the right words, such as calling an object by the wrong name when you know the real name. If you have any of these symptoms, do not call us or your family doctor.   Call 911 and go immediately to the hospital.  You have a 4-6 hour window from the point when symptoms start to get to the hospital, get a CT scan done and initiate clot dissolving drugs. Plan:         Return in about 6 months (around 8/24/2021) for CDS and office visit.

## 2021-02-24 NOTE — TELEPHONE ENCOUNTER
Gilmar called to request Rx for cpap supplies be faxed to [facility]  Rx faxed

## 2021-03-01 ENCOUNTER — OFFICE VISIT (OUTPATIENT)
Dept: PULMONOLOGY | Age: 81
End: 2021-03-01
Payer: MEDICARE

## 2021-03-01 VITALS
RESPIRATION RATE: 14 BRPM | HEART RATE: 76 BPM | OXYGEN SATURATION: 96 % | WEIGHT: 277 LBS | DIASTOLIC BLOOD PRESSURE: 60 MMHG | BODY MASS INDEX: 41.98 KG/M2 | TEMPERATURE: 97.5 F | SYSTOLIC BLOOD PRESSURE: 130 MMHG | HEIGHT: 68 IN

## 2021-03-01 DIAGNOSIS — Z99.89 OSA ON CPAP: ICD-10-CM

## 2021-03-01 DIAGNOSIS — K21.9 GASTROESOPHAGEAL REFLUX DISEASE WITHOUT ESOPHAGITIS: ICD-10-CM

## 2021-03-01 DIAGNOSIS — J32.9 RHINOSINUSITIS: ICD-10-CM

## 2021-03-01 DIAGNOSIS — J44.9 COPD WITH CHRONIC BRONCHITIS (HCC): Primary | ICD-10-CM

## 2021-03-01 DIAGNOSIS — J31.0 RHINOSINUSITIS: ICD-10-CM

## 2021-03-01 DIAGNOSIS — G47.33 OSA ON CPAP: ICD-10-CM

## 2021-03-01 PROCEDURE — 1036F TOBACCO NON-USER: CPT | Performed by: INTERNAL MEDICINE

## 2021-03-01 PROCEDURE — 3023F SPIROM DOC REV: CPT | Performed by: INTERNAL MEDICINE

## 2021-03-01 PROCEDURE — 99213 OFFICE O/P EST LOW 20 MIN: CPT | Performed by: INTERNAL MEDICINE

## 2021-03-01 PROCEDURE — G8482 FLU IMMUNIZE ORDER/ADMIN: HCPCS | Performed by: INTERNAL MEDICINE

## 2021-03-01 PROCEDURE — 1123F ACP DISCUSS/DSCN MKR DOCD: CPT | Performed by: INTERNAL MEDICINE

## 2021-03-01 PROCEDURE — 4040F PNEUMOC VAC/ADMIN/RCVD: CPT | Performed by: INTERNAL MEDICINE

## 2021-03-01 PROCEDURE — G8926 SPIRO NO PERF OR DOC: HCPCS | Performed by: INTERNAL MEDICINE

## 2021-03-01 PROCEDURE — G8417 CALC BMI ABV UP PARAM F/U: HCPCS | Performed by: INTERNAL MEDICINE

## 2021-03-01 PROCEDURE — G8427 DOCREV CUR MEDS BY ELIG CLIN: HCPCS | Performed by: INTERNAL MEDICINE

## 2021-03-01 ASSESSMENT — COPD QUESTIONNAIRES
QUESTION1_COUGHFREQUENCY: 2
QUESTION5_HOMEACTIVITIES: 3
QUESTION7_SLEEPQUALITY: 2
QUESTION6_LEAVINGHOUSE: 1
CAT_TOTALSCORE: 15

## 2021-03-01 ASSESSMENT — SLEEP AND FATIGUE QUESTIONNAIRES
ESS TOTAL SCORE: 3
HOW LIKELY ARE YOU TO NOD OFF OR FALL ASLEEP WHILE SITTING AND TALKING TO SOMEONE: 0
HOW LIKELY ARE YOU TO NOD OFF OR FALL ASLEEP WHILE LYING DOWN TO REST IN THE AFTERNOON WHEN CIRCUMSTANCES PERMIT: 0
HOW LIKELY ARE YOU TO NOD OFF OR FALL ASLEEP IN A CAR, WHILE STOPPED FOR A FEW MINUTES IN TRAFFIC: 0

## 2021-03-01 NOTE — PROGRESS NOTES
Chief complaint  This is a 79y.o. year old male  who comes to see me with a chief complaint of   Chief Complaint   Patient presents with    Sleep Apnea    COPD       HPI  Here with follow up on JOANN and COPD    Doing well with albuterol and xopenex. Uses is semi daily. More use during the colder months than warmer months. Machine:  Patient is using a APAP machine. Average usage is 6 hrs 47 min 00 sec. He is meeting 4 or more hours per night 90% of the time. Average AHI is 0.8.         No other changes     Sleep Medicine 3/1/2021 2/20/2020 10/17/2019 2/25/2019 10/23/2018   Sitting and reading 1 1 1 1 0   Watching TV 1 1 1 2 1   Sitting, inactive in a public place (e.g. a theatre or a meeting) 0 0 1 1 0   As a passenger in a car for an hour without a break 1 0 1 1 0   Lying down to rest in the afternoon when circumstances permit 0 1 2 2 2   Sitting and talking to someone 0 0 0 1 0   Sitting quietly after a lunch without alcohol 0 0 1 1 1   In a car, while stopped for a few minutes in traffic 0 0 0 0 0   Total score 3 3 7 9 4   Neck circumference - - - - 18.5         Past Medical History:   Diagnosis Date    Allergic rhinitis     Asthma     Atrial fibrillation (HCC)     Carotid artery stenosis     Chronic kidney disease     COPD (chronic obstructive pulmonary disease) ([facility] [address]     CPAP (continuous positive airway pressure) dependence     ESBL (extended spectrum beta-lactamase) producing bacteria infection 12/26/2018    urine    Hyperlipidemia     Hypertension     Iron deficiency anemia     Obstructive sleep apnea     Type II or unspecified type diabetes mellitus without mention of complication, not stated as uncontrolled        Past Surgical History:   Procedure Laterality Date    CATARACT REMOVAL  2/2012    bilateral    COLONOSCOPY  7/10/2009    diverticulosis    hemorrhoids    GALLBLADDER SURGERY  1981    UPPER GASTROINTESTINAL ENDOSCOPY  7-2005    7/10/2009    normal     Current Outpatient Medications   Medication Sig Dispense Refill    amiodarone (CORDARONE) 200 MG tablet TAKE 1 TABLET BY MOUTH EVERY OTHER DAY 45 tablet 1    blood glucose test strips (ACCU-CHEK GUIDE) strip TEST AS DIRECTED 3 TIMES A  strip 5    albuterol sulfate  (90 Base) MCG/ACT inhaler INHALE 2 PUFFS BY MOUTH EVERY 4 HOURS AS NEEDED FOR WHEEZE 18 Inhaler 11    ZINC PO Take by mouth      CPAP Machine MISC by Does not apply route      montelukast (SINGULAIR) 10 MG tablet TAKE 1 TABLET BY MOUTH EVERY DAY 90 tablet 3    pantoprazole (PROTONIX) 40 MG tablet TAKE 1 TABLET BY MOUTH EVERY DAY 90 tablet 1    B-D UF III MINI PEN NEEDLES 31G X 5 MM MISC USE AS DIRECTED 3 TIMES A  each 11    alfuzosin (UROXATRAL) 10 MG extended release tablet TAKE 1 TABLET BY MOUTH EVERY DAY 90 tablet 3    atorvastatin (LIPITOR) 40 MG tablet TAKE 1 TABLET BY MOUTH EVERY DAY 90 tablet 3    levalbuterol (XOPENEX) 1.25 MG/3ML nebulizer solution USE 1 VIAL VIA NEBULIZER FOUR TIMES DAILY 360 mL 11    cloNIDine (CATAPRES) 0.1 MG tablet TAKE 1 TABLET BY MOUTH TWICE A  tablet 3    verapamil (CALAN SR) 240 MG extended release tablet TAKE 1 TABLET BY MOUTH TWICE A  tablet 3    Ascorbic Acid (SUPER C COMPLEX PO) Take by mouth daily      Multiple Vitamins-Minerals (MULTIVITAMIN ADULT PO) Take by mouth daily      valsartan-hydrochlorothiazide (DIOVAN-HCT) 320-25 MG per tablet TAKE 1 TABLET BY MOUTH EVERY DAY 90 tablet 3    [employer]S PURELAX powder TAKE 17 G BY MOUTH DAILY 510 g 7    LINZESS 145 MCG capsule TAKE 1 CAPSULE BY MOUTH EVERY DAY IN THE MORNING BEFORE BREAKFAST 30 capsule 11    Lactobacillus Rhamnosus, GG, (CVS PROBIOTIC, LACTOBACILLUS,) CAPS TAKE 1 CAPSULE BY MOUTH 2 TIMES DAILY (WITH MEALS) 60 capsule 11    furosemide (LASIX) 20 MG tablet TAKE 1 TABLET BY MOUTH EVERY DAY 90 tablet 3    insulin glargine (BASAGLAR KWIKPEN) 100 UNIT/ML injection pen Inject 36 Units into the skin 2 times daily 10 pen 11    Docusate Sodium (COLACE PO) Take by mouth daily      Cyanocobalamin (VITAMIN B-12 PO) Take 500 mcg by mouth every other day       Cholecalciferol (VITAMIN D3 PO) Take 2,000 Units by mouth daily       aspirin EC 81 MG EC tablet Take 2 tablets by mouth daily. 30 tablet 3    ferrous sulfate 325 (65 FE) MG tablet Take 325 mg by mouth 2 times daily        No current facility-administered medications for this visit. PHYSICAL EXAM:  Vitals:    03/01/21 1052   BP: 130/60   Pulse: 76   Resp: 14   Temp: 97.5 °F (36.4 °C)   SpO2: 96%       GENERAL:  Well nourished, alert, appears stated age, no distress  HEENT:  No scleral icterus, no conjunctival irritation; Mallampati IV, elongated uvula  NECK:  No thyromegaly, no bruits  LYMPH:  No cervical or supraclavicular adenopathy  HEART:  Regular rate and rhythm, no murmurs. Distant heart sounds   LUNGS:  Better aeration with less wheezing   ABDOMEN:  No distention, no organomegaly  EXTREMITIES:  Trace edema  no digital clubbing  NEURO:  No localizing deficits    Pulmonary Function Testing 8/2012  FEV1 1.87 L at 69% predicted (reduced)  FVC 3.36 L at 90% predicted (normal)  FEV1/FVC ratio at 56 (reduced)  TLC 6.07 L at 110% predicted (normal)  DLCO 23.5 at 98% predicted (normal)  Overall Mild obstruction    Chest imaging from 12/2018 is reviewed. My impression is scarring in the lower lobes bilaterally. More so on the right     ECHO 2014  Left ventricle size is normal.  Normal left ventricular wall thickness. Ejection fraction is visually estimated to be 55-60 %. No regional wall motion abnormalities are noted. There is reversal of E/A inflow velocities across the mitral valve  suggesting impaired left ventricular relaxation. The right ventricle is not well visualized.   Lab Results   Component Value Date    WBC 6.2 01/21/2021    HGB 10.9 (L) 01/21/2021    HCT 34.3 (L) 01/21/2021    MCV 87.1 01/21/2021     01/21/2021       No results found for: BNP    Lab Results   Component Value Date    CREATININE 2.0 (H) 01/21/2021    BUN 47 (H) 01/21/2021     01/21/2021    K 4.8 01/21/2021     01/21/2021    CO2 26 01/21/2021     Alpha-1 Anti-trypsin levels:  92, Phenotype:  M1S       I reviewed the above labs and images      Assessment/Plan:  1. COPD with chronic bronchitis (HCC)  Stable on albuterol/xopenex    2. JOANN on CPAP  Benefiting and compliant. Benefiting from therapy by a low Sabula score, good energy levels, low fatigue, and control of chronic medical problems    3. Rhinosinusitis  Flonase       Flu shot/COVID vaccine up to date     Pulmonary Rehab:  Completed a portion in the past     Lung Cancer Screening CT:  Does not qualify    History of pseudomonas infection.   Needs pseudomonal coverage when he has bronchitis     Avoid ICS inhalers due to repeated infections    Follow up in 4 months

## 2021-03-02 RX ORDER — PANTOPRAZOLE SODIUM 40 MG/1
40 TABLET, DELAYED RELEASE ORAL DAILY
Qty: 90 TABLET | Refills: 1 | Status: SHIPPED | OUTPATIENT
Start: 2021-03-02 | End: 2021-06-21

## 2021-03-22 RX ORDER — POLYETHYLENE GLYCOL 3350 17 G/DOSE
POWDER (GRAM) ORAL
Qty: 510 G | Refills: 3 | Status: SHIPPED | OUTPATIENT
Start: 2021-03-22 | End: 2021-08-05

## 2021-03-22 RX ORDER — VERAPAMIL HYDROCHLORIDE 240 MG/1
TABLET, FILM COATED, EXTENDED RELEASE ORAL
Qty: 180 TABLET | Refills: 3 | Status: SHIPPED | OUTPATIENT
Start: 2021-03-22 | End: 2022-02-14

## 2021-03-22 RX ORDER — CLONIDINE HYDROCHLORIDE 0.1 MG/1
TABLET ORAL
Qty: 180 TABLET | Refills: 3 | Status: SHIPPED | OUTPATIENT
Start: 2021-03-22 | End: 2022-02-04

## 2021-04-13 RX ORDER — ATORVASTATIN CALCIUM 40 MG/1
TABLET, FILM COATED ORAL
Qty: 90 TABLET | Refills: 3 | Status: SHIPPED | OUTPATIENT
Start: 2021-04-13 | End: 2022-04-26

## 2021-04-13 RX ORDER — ALFUZOSIN HYDROCHLORIDE 10 MG/1
TABLET, EXTENDED RELEASE ORAL
Qty: 90 TABLET | Refills: 3 | Status: SHIPPED | OUTPATIENT
Start: 2021-04-13 | End: 2022-04-04

## 2021-05-19 DIAGNOSIS — N18.32 TYPE 2 DIABETES MELLITUS WITH STAGE 3B CHRONIC KIDNEY DISEASE, WITH LONG-TERM CURRENT USE OF INSULIN (HCC): ICD-10-CM

## 2021-05-19 DIAGNOSIS — Z79.4 TYPE 2 DIABETES MELLITUS WITH STAGE 3B CHRONIC KIDNEY DISEASE, WITH LONG-TERM CURRENT USE OF INSULIN (HCC): ICD-10-CM

## 2021-05-19 DIAGNOSIS — E11.22 TYPE 2 DIABETES MELLITUS WITH STAGE 3B CHRONIC KIDNEY DISEASE, WITH LONG-TERM CURRENT USE OF INSULIN (HCC): ICD-10-CM

## 2021-05-19 DIAGNOSIS — E78.00 PURE HYPERCHOLESTEROLEMIA: ICD-10-CM

## 2021-05-19 DIAGNOSIS — D50.9 MICROCYTIC ANEMIA: ICD-10-CM

## 2021-05-19 DIAGNOSIS — N18.32 STAGE 3B CHRONIC KIDNEY DISEASE (HCC): ICD-10-CM

## 2021-05-19 LAB
A/G RATIO: 1.7 (ref 1.1–2.2)
ALBUMIN SERPL-MCNC: 3.9 G/DL (ref 3.4–5)
ALP BLD-CCNC: 122 U/L (ref 40–129)
ALT SERPL-CCNC: 17 U/L (ref 10–40)
ANION GAP SERPL CALCULATED.3IONS-SCNC: 15 MMOL/L (ref 3–16)
AST SERPL-CCNC: 17 U/L (ref 15–37)
BASOPHILS ABSOLUTE: 0 K/UL (ref 0–0.2)
BASOPHILS RELATIVE PERCENT: 0.4 %
BILIRUB SERPL-MCNC: 0.4 MG/DL (ref 0–1)
BUN BLDV-MCNC: 37 MG/DL (ref 7–20)
CALCIUM SERPL-MCNC: 8.6 MG/DL (ref 8.3–10.6)
CHLORIDE BLD-SCNC: 101 MMOL/L (ref 99–110)
CO2: 24 MMOL/L (ref 21–32)
CREAT SERPL-MCNC: 1.7 MG/DL (ref 0.8–1.3)
EOSINOPHILS ABSOLUTE: 0.2 K/UL (ref 0–0.6)
EOSINOPHILS RELATIVE PERCENT: 3.5 %
GFR AFRICAN AMERICAN: 47
GFR NON-AFRICAN AMERICAN: 39
GLOBULIN: 2.3 G/DL
GLUCOSE BLD-MCNC: 74 MG/DL (ref 70–99)
HCT VFR BLD CALC: 35.2 % (ref 40.5–52.5)
HEMOGLOBIN: 11.3 G/DL (ref 13.5–17.5)
LDL CHOLESTEROL DIRECT: 52 MG/DL
LYMPHOCYTES ABSOLUTE: 1.2 K/UL (ref 1–5.1)
LYMPHOCYTES RELATIVE PERCENT: 17.3 %
MCH RBC QN AUTO: 27.5 PG (ref 26–34)
MCHC RBC AUTO-ENTMCNC: 32.1 G/DL (ref 31–36)
MCV RBC AUTO: 85.6 FL (ref 80–100)
MONOCYTES ABSOLUTE: 0.6 K/UL (ref 0–1.3)
MONOCYTES RELATIVE PERCENT: 8.8 %
NEUTROPHILS ABSOLUTE: 5 K/UL (ref 1.7–7.7)
NEUTROPHILS RELATIVE PERCENT: 70 %
PDW BLD-RTO: 16.4 % (ref 12.4–15.4)
PLATELET # BLD: 155 K/UL (ref 135–450)
PMV BLD AUTO: 8.9 FL (ref 5–10.5)
POTASSIUM SERPL-SCNC: 4.9 MMOL/L (ref 3.5–5.1)
RBC # BLD: 4.11 M/UL (ref 4.2–5.9)
SODIUM BLD-SCNC: 140 MMOL/L (ref 136–145)
TOTAL PROTEIN: 6.2 G/DL (ref 6.4–8.2)
WBC # BLD: 7.2 K/UL (ref 4–11)

## 2021-05-20 LAB
ESTIMATED AVERAGE GLUCOSE: 125.5 MG/DL
HBA1C MFR BLD: 6 %

## 2021-06-04 ENCOUNTER — OFFICE VISIT (OUTPATIENT)
Dept: CARDIOLOGY CLINIC | Age: 81
End: 2021-06-04
Payer: MEDICARE

## 2021-06-04 VITALS
WEIGHT: 266 LBS | BODY MASS INDEX: 40.32 KG/M2 | OXYGEN SATURATION: 98 % | SYSTOLIC BLOOD PRESSURE: 128 MMHG | DIASTOLIC BLOOD PRESSURE: 60 MMHG | HEART RATE: 73 BPM | HEIGHT: 68 IN

## 2021-06-04 DIAGNOSIS — I48.0 PAROXYSMAL ATRIAL FIBRILLATION (HCC): Primary | ICD-10-CM

## 2021-06-04 DIAGNOSIS — R06.02 SOB (SHORTNESS OF BREATH): ICD-10-CM

## 2021-06-04 PROCEDURE — 1036F TOBACCO NON-USER: CPT | Performed by: INTERNAL MEDICINE

## 2021-06-04 PROCEDURE — G8417 CALC BMI ABV UP PARAM F/U: HCPCS | Performed by: INTERNAL MEDICINE

## 2021-06-04 PROCEDURE — 1123F ACP DISCUSS/DSCN MKR DOCD: CPT | Performed by: INTERNAL MEDICINE

## 2021-06-04 PROCEDURE — 93000 ELECTROCARDIOGRAM COMPLETE: CPT | Performed by: INTERNAL MEDICINE

## 2021-06-04 PROCEDURE — 99214 OFFICE O/P EST MOD 30 MIN: CPT | Performed by: INTERNAL MEDICINE

## 2021-06-04 PROCEDURE — G8427 DOCREV CUR MEDS BY ELIG CLIN: HCPCS | Performed by: INTERNAL MEDICINE

## 2021-06-04 PROCEDURE — 4040F PNEUMOC VAC/ADMIN/RCVD: CPT | Performed by: INTERNAL MEDICINE

## 2021-06-04 RX ORDER — VALSARTAN 40 MG/1
TABLET ORAL
COMMUNITY
End: 2021-10-13

## 2021-06-04 NOTE — PROGRESS NOTES
10 MG extended release tablet TAKE 1 TABLET BY MOUTH EVERY DAY 90 tablet 3    BASAGLAR KWIKPEN 100 UNIT/ML injection pen INJECT 36 UNITS INTO THE SKIN 2 TIMES DAILY 5 pen 4    LINZESS 145 MCG capsule TAKE 1 CAPSULE BY MOUTH EVERY DAY IN THE MORNING BEFORE BREAKFAST 30 capsule 11    cloNIDine (CATAPRES) 0.1 MG tablet TAKE 1 TABLET BY MOUTH TWICE A  tablet 3    verapamil (CALAN SR) 240 MG extended release tablet TAKE 1 TABLET BY MOUTH TWICE A  tablet 3    CVS PURELAX 17 GM/SCOOP powder TAKE 17G BY MOUTH DAILY MIX WITH 8 OZ OF WATER 510 g 3    pantoprazole (PROTONIX) 40 MG tablet Take 1 tablet by mouth daily 90 tablet 1    furosemide (LASIX) 20 MG tablet TAKE 1 TABLET BY MOUTH EVERY DAY 90 tablet 3    amiodarone (CORDARONE) 200 MG tablet TAKE 1 TABLET BY MOUTH EVERY OTHER DAY (Patient taking differently: Patent takes 0.5 tablet every other day) 45 tablet 1    blood glucose test strips (ACCU-CHEK GUIDE) strip TEST AS DIRECTED 3 TIMES A  strip 5    albuterol sulfate  (90 Base) MCG/ACT inhaler INHALE 2 PUFFS BY MOUTH EVERY 4 HOURS AS NEEDED FOR WHEEZE 18 Inhaler 11    ZINC PO Take by mouth      CPAP Machine MISC by Does not apply route      montelukast (SINGULAIR) 10 MG tablet TAKE 1 TABLET BY MOUTH EVERY DAY 90 tablet 3    B-D UF III MINI PEN NEEDLES 31G X 5 MM MISC USE AS DIRECTED 3 TIMES A  each 11    levalbuterol (XOPENEX) 1.25 MG/3ML nebulizer solution USE 1 VIAL VIA NEBULIZER FOUR TIMES DAILY 360 mL 11    Ascorbic Acid (SUPER C COMPLEX PO) Take by mouth daily      Multiple Vitamins-Minerals (MULTIVITAMIN ADULT PO) Take by mouth daily      Lactobacillus Rhamnosus, GG, (CVS PROBIOTIC, LACTOBACILLUS,) CAPS TAKE 1 CAPSULE BY MOUTH 2 TIMES DAILY (WITH MEALS) 60 capsule 11    Docusate Sodium (COLACE PO) Take by mouth daily      Cyanocobalamin (VITAMIN B-12 PO) Take 500 mcg by mouth every other day       Cholecalciferol (VITAMIN D3 PO) Take 2,000 Units by mouth daily  aspirin EC 81 MG EC tablet Take 2 tablets by mouth daily. 30 tablet 3    ferrous sulfate 325 (65 FE) MG tablet Take 325 mg by mouth 2 times daily        No current facility-administered medications for this visit. [id number] Score for Atrial Fibrillation Stroke Risk   Risk   Factors  Component Value   C CHF No 0   H HTN Yes 1   A2 Age >= 76 Yes,  (79 y.o.) 2   D DM Yes 1   S2 Prior Stroke/TIA No 0   V Vascular Disease No 0   A Age 74-69 No,  (79 y.o.) 0   Sc Sex male 0    [id number]  Score  4   Score last updated 6/4/21 83:27 PM EDT    Click here for a link to the UpToDate guideline \"Atrial Fibrillation: Anticoagulation therapy to prevent embolization    Disclaimer: Risk Score calculation is dependent on accuracy of patient problem list and past encounter diagnosis.        Allergies   Allergen Reactions    Hytrin [Terazosin Hcl]      Ineffective for BP control    Penicillins      Unknown reaction during childhood; Patient tolerated cephalosporins in the past    Shrimp Flavor Hives    Sulfa Antibiotics      Unknown reaction in childhood    Tekturna [Aliskiren Fumarate]      Ineffective    Vancomycin      Fever    Ciprofloxacin Rash     Fever and rash        Physical Exam:  /60   Pulse 73   Ht 5' 8\" (1.727 m)   Wt 266 lb (120.7 kg)   SpO2 98%   BMI 40.45 kg/m²    Wt Readings from Last 2 Encounters:   06/04/21 266 lb (120.7 kg)   05/25/21 266 lb 6.4 oz (120.8 kg)     General Appearance:  Alert, cooperative, no distress, appears stated age, obese   Head:  Normocephalic, without obvious abnormality, atraumatic   Eyes:  PERRL, conjunctiva/corneas clear       Nose: Nares normal, no drainage or sinus tenderness   Throat: Lips, mucosa, and tongue normal   Neck: Supple, symmetrical, trachea midline, no adenopathy, thyroid: not enlarged, symmetric, no tenderness/mass/nodules, no carotid bruit or JVD       Lungs:   Clear to auscultation bilaterally, respirations unlabored   Chest Wall:  No tenderness or deformity   Heart:  Regular rate and rhythm, S1, S2 normal, no murmur, rub or gallop   Abdomen:   Soft, non-tender, bowel sounds active all four quadrants,  no masses, no organomegaly   Extremities: Extremities normal, atraumatic, no cyanosis or edema   Pulses: Carotid      L   2      R2  Radial       L    2     R2     Skin: Skin color, texture, turgor normal, no rashes or lesions   Pysch: Normal mood and affect   Neurologic: Normal     Lab Results   Component Value Date    TRIG 46 01/21/2021    HDL 45 01/21/2021    HDL 42 12/15/2011    LDLCALC 35 01/21/2021    LDLDIRECT 52 05/19/2021    LABVLDL 9 01/21/2021     Lab Results   Component Value Date     05/19/2021    K 4.9 05/19/2021    K 4.6 12/31/2018    BUN 37 05/19/2021    CREATININE 1.7 05/19/2021       EKG 11/19/2018 Sinus rhythm   EKG 12/4/20: Sinus rhythm   EKG 6/4/21: Sinus rhythm     7 day event monitor 12/12-12/18/19  No episodes of atrial fibrillation noted       Assessment:  1. Paroxysmal Atrial Fibrillation  2. Shortness of breath  2. Essential Hypertension  4. Hyperlipidemia with LDL goal of <70 mg/dL  5. COPD  6. Sleep apnea  7. Morbid Obesity    Plan:  He continues in a regular rhythm by EKG today in the office. He continues with aspirin 162 mg daily as his history of atrial fibrillation was a lone documented episode. Given his worsening shortness of breath, I will have him complete a stress perfusion to rule out obstructive coronary disease. I will see him in office for follow up in 6 months. This note was scribed in the presence of Amadeo Acuna MD by [employer], RN. Physician Attestation:  The scribes documentation has been prepared under my direction and personally reviewed by me in its entirety.      I, Dr. Brunilda Fairbanks personally performed the services described in this documentation as scribed by my RN in my presence, and I confirm that the note above accurately reflects all work, treatment, procedures, and

## 2021-06-18 ENCOUNTER — HOSPITAL ENCOUNTER (OUTPATIENT)
Dept: NON INVASIVE DIAGNOSTICS | Age: 81
Discharge: HOME | End: 2021-06-18
Payer: MEDICARE

## 2021-06-18 DIAGNOSIS — R06.02 SOB (SHORTNESS OF BREATH): ICD-10-CM

## 2021-06-18 LAB
LV EF: 76 %
LVEF MODALITY: NORMAL

## 2021-06-18 PROCEDURE — 78452 HT MUSCLE IMAGE SPECT MULT: CPT

## 2021-06-18 PROCEDURE — 3430000000 HC RX DIAGNOSTIC RADIOPHARMACEUTICAL: Performed by: INTERNAL MEDICINE

## 2021-06-18 PROCEDURE — A9502 TC99M TETROFOSMIN: HCPCS | Performed by: INTERNAL MEDICINE

## 2021-06-18 PROCEDURE — 6360000002 HC RX W HCPCS: Performed by: INTERNAL MEDICINE

## 2021-06-18 PROCEDURE — 93017 CV STRESS TEST TRACING ONLY: CPT

## 2021-06-18 RX ADMIN — REGADENOSON 0.4 MG: 0.08 INJECTION, SOLUTION INTRAVENOUS at 10:30

## 2021-06-18 RX ADMIN — TETROFOSMIN 10 MILLICURIE: 1.38 INJECTION, POWDER, LYOPHILIZED, FOR SOLUTION INTRAVENOUS at 08:50

## 2021-06-18 RX ADMIN — TETROFOSMIN 30 MILLICURIE: 1.38 INJECTION, POWDER, LYOPHILIZED, FOR SOLUTION INTRAVENOUS at 10:33

## 2021-06-20 DIAGNOSIS — K21.9 GASTROESOPHAGEAL REFLUX DISEASE WITHOUT ESOPHAGITIS: ICD-10-CM

## 2021-06-20 DIAGNOSIS — J44.9 COPD WITH CHRONIC BRONCHITIS (HCC): ICD-10-CM

## 2021-06-21 RX ORDER — PANTOPRAZOLE SODIUM 40 MG/1
TABLET, DELAYED RELEASE ORAL
Qty: 90 TABLET | Refills: 1 | Status: SHIPPED | OUTPATIENT
Start: 2021-06-21 | End: 2021-12-07

## 2021-06-21 RX ORDER — LEVALBUTEROL INHALATION SOLUTION 1.25 MG/3ML
SOLUTION RESPIRATORY (INHALATION)
Qty: 360 ML | Refills: 11 | Status: ON HOLD | OUTPATIENT
Start: 2021-06-21

## 2021-06-25 ENCOUNTER — TELEPHONE (OUTPATIENT)
Dept: CARDIOLOGY CLINIC | Age: 81
End: 2021-06-25

## 2021-06-25 DIAGNOSIS — R06.02 SOB (SHORTNESS OF BREATH): Primary | ICD-10-CM

## 2021-06-25 DIAGNOSIS — R94.39 ABNORMAL CARDIOVASCULAR STRESS TEST: ICD-10-CM

## 2021-06-25 DIAGNOSIS — R06.02 SOB (SHORTNESS OF BREATH): ICD-10-CM

## 2021-06-25 LAB
ANION GAP SERPL CALCULATED.3IONS-SCNC: 13 MMOL/L (ref 3–16)
BUN BLDV-MCNC: 49 MG/DL (ref 7–20)
CALCIUM SERPL-MCNC: 8.5 MG/DL (ref 8.3–10.6)
CHLORIDE BLD-SCNC: 107 MMOL/L (ref 99–110)
CO2: 23 MMOL/L (ref 21–32)
CREAT SERPL-MCNC: 1.8 MG/DL (ref 0.8–1.3)
GFR AFRICAN AMERICAN: 44
GFR NON-AFRICAN AMERICAN: 36
GLUCOSE BLD-MCNC: 106 MG/DL (ref 70–99)
HCT VFR BLD CALC: 37.6 % (ref 40.5–52.5)
HEMOGLOBIN: 12.2 G/DL (ref 13.5–17.5)
MCH RBC QN AUTO: 27.8 PG (ref 26–34)
MCHC RBC AUTO-ENTMCNC: 32.5 G/DL (ref 31–36)
MCV RBC AUTO: 85.5 FL (ref 80–100)
PDW BLD-RTO: 16.3 % (ref 12.4–15.4)
PLATELET # BLD: 155 K/UL (ref 135–450)
PMV BLD AUTO: 8.4 FL (ref 5–10.5)
POTASSIUM SERPL-SCNC: 4.8 MMOL/L (ref 3.5–5.1)
RBC # BLD: 4.39 M/UL (ref 4.2–5.9)
SODIUM BLD-SCNC: 143 MMOL/L (ref 136–145)
WBC # BLD: 8.2 K/UL (ref 4–11)

## 2021-06-25 NOTE — TELEPHONE ENCOUNTER
Scheduled for left heart catheterization on 7/1/21 at 1:00 pm with 11:30 am arrival time. The morning of your procedure you will park in the hospital parking lot and report directly to the cath lab to check in.     Pre-Procedure Instructions   1. You will need to fast for at least 8 hours prior to procedure. No caffeine the morning of.   2. Hold your diuretic, Lasix the morning of procedure. 3. All other medications can be taken in the morning with sips of water. 4. You will need to take 325 mg aspirin the morning of. If you are currently taking 81 mg please take 4 tablets that morning. 5. Do not use any lotions, creams or perfume the morning of procedure. 6. Pre-procedure lab work will need to be completed 5-7 days prior to procedure. 7. Please have a responsible adult to drive you home after procedure. We advise you have someone stay with you for the first 24 hours for precautionary measures. Depending on your procedure you may require an overnight stay. 8. Cath lab will provide you with all post procedure instructions. If you have any questions regarding the procedure itself or medications, please call [phone] and ask to speak with a nurse. Case published to Rachid and form emailed to Eyal Herron in the cath lab.

## 2021-07-01 ENCOUNTER — HOSPITAL ENCOUNTER (OUTPATIENT)
Dept: CARDIAC CATH/INVASIVE PROCEDURES | Age: 81
Discharge: HOME | End: 2021-07-01
Payer: MEDICARE

## 2021-07-01 VITALS
WEIGHT: 268 LBS | HEART RATE: 77 BPM | BODY MASS INDEX: 40.62 KG/M2 | DIASTOLIC BLOOD PRESSURE: 55 MMHG | RESPIRATION RATE: 13 BRPM | OXYGEN SATURATION: 97 % | SYSTOLIC BLOOD PRESSURE: 137 MMHG | TEMPERATURE: 97.9 F | HEIGHT: 68 IN

## 2021-07-01 DIAGNOSIS — R06.02 SHORTNESS OF BREATH: ICD-10-CM

## 2021-07-01 DIAGNOSIS — R94.39 ABNORMAL CARDIOVASCULAR STRESS TEST: ICD-10-CM

## 2021-07-01 PROCEDURE — 2500000003 HC RX 250 WO HCPCS

## 2021-07-01 PROCEDURE — 99152 MOD SED SAME PHYS/QHP 5/>YRS: CPT

## 2021-07-01 PROCEDURE — 93460 R&L HRT ART/VENTRICLE ANGIO: CPT | Performed by: INTERNAL MEDICINE

## 2021-07-01 PROCEDURE — 2709999900 HC NON-CHARGEABLE SUPPLY

## 2021-07-01 PROCEDURE — C1887 CATHETER, GUIDING: HCPCS

## 2021-07-01 PROCEDURE — C1769 GUIDE WIRE: HCPCS

## 2021-07-01 PROCEDURE — C1894 INTRO/SHEATH, NON-LASER: HCPCS

## 2021-07-01 PROCEDURE — 99152 MOD SED SAME PHYS/QHP 5/>YRS: CPT | Performed by: INTERNAL MEDICINE

## 2021-07-01 PROCEDURE — 6360000004 HC RX CONTRAST MEDICATION: Performed by: INTERNAL MEDICINE

## 2021-07-01 PROCEDURE — 6360000002 HC RX W HCPCS

## 2021-07-01 PROCEDURE — 2580000003 HC RX 258

## 2021-07-01 PROCEDURE — C1751 CATH, INF, PER/CENT/MIDLINE: HCPCS

## 2021-07-01 PROCEDURE — 99153 MOD SED SAME PHYS/QHP EA: CPT

## 2021-07-01 PROCEDURE — 93460 R&L HRT ART/VENTRICLE ANGIO: CPT

## 2021-07-01 RX ORDER — ACETAMINOPHEN 325 MG/1
650 TABLET ORAL EVERY 4 HOURS PRN
Status: DISCONTINUED | OUTPATIENT
Start: 2021-07-01 | End: 2021-07-02 | Stop reason: HOSPADM

## 2021-07-01 RX ORDER — SODIUM CHLORIDE 0.9 % (FLUSH) 0.9 %
5-40 SYRINGE (ML) INJECTION EVERY 12 HOURS SCHEDULED
Status: DISCONTINUED | OUTPATIENT
Start: 2021-07-01 | End: 2021-07-01 | Stop reason: SDUPTHER

## 2021-07-01 RX ORDER — SODIUM CHLORIDE 9 MG/ML
25 INJECTION, SOLUTION INTRAVENOUS PRN
Status: DISCONTINUED | OUTPATIENT
Start: 2021-07-01 | End: 2021-07-01 | Stop reason: SDUPTHER

## 2021-07-01 RX ORDER — SODIUM CHLORIDE 9 MG/ML
25 INJECTION, SOLUTION INTRAVENOUS PRN
Status: DISCONTINUED | OUTPATIENT
Start: 2021-07-01 | End: 2021-07-02 | Stop reason: HOSPADM

## 2021-07-01 RX ORDER — SODIUM CHLORIDE 0.9 % (FLUSH) 0.9 %
5-40 SYRINGE (ML) INJECTION PRN
Status: DISCONTINUED | OUTPATIENT
Start: 2021-07-01 | End: 2021-07-01 | Stop reason: SDUPTHER

## 2021-07-01 RX ORDER — SODIUM CHLORIDE 0.9 % (FLUSH) 0.9 %
5-40 SYRINGE (ML) INJECTION EVERY 12 HOURS SCHEDULED
Status: DISCONTINUED | OUTPATIENT
Start: 2021-07-01 | End: 2021-07-02 | Stop reason: HOSPADM

## 2021-07-01 RX ORDER — SODIUM CHLORIDE 0.9 % (FLUSH) 0.9 %
5-40 SYRINGE (ML) INJECTION PRN
Status: DISCONTINUED | OUTPATIENT
Start: 2021-07-01 | End: 2021-07-02 | Stop reason: HOSPADM

## 2021-07-01 RX ORDER — SODIUM CHLORIDE 9 MG/ML
INJECTION, SOLUTION INTRAVENOUS CONTINUOUS
Status: DISCONTINUED | OUTPATIENT
Start: 2021-07-01 | End: 2021-07-02 | Stop reason: HOSPADM

## 2021-07-01 RX ADMIN — IOPAMIDOL 85 ML: 755 INJECTION, SOLUTION INTRAVENOUS at 15:48

## 2021-07-01 NOTE — PROCEDURES
[address] [place] [address]                            CARDIAC CATHETERIZATION    PATIENT NAME: Manjeet Davies                    :        1940  MED REC NO:   [id number]                          ROOM:  ACCOUNT NO:   [de-identified]                           ADMIT DATE: 2021  PROVIDER:     Sarita Mccarthy MD    DATE OF PROCEDURE:  2021    INDICATION FOR PROCEDURE:  Abnormal cardiovascular stress test,  shortness of breath. PROCEDURE:  The risks and benefits of the procedure were explained to  the patient and informed consent was obtained. An Austin Art test had been  performed on the right wrist to ensure an intact palmar arch. Anesthesia was provided in the volar surface of the right wrist in the  antecubital fossa with 1% lidocaine injected subcutaneously. Using a  micropuncture kit, the right radial artery was accessed and cannulated  and a 5-Rwandan sheath inserted using Seldinger technique. We then used  ultrasound guidance and a micropuncture kit to access the  brachiocephalic vein. A 7-Rwandan sheath was then inserted using  Seldinger technique. The pulmonary artery catheter was then advanced into the superior vena  cava for an oxygen saturation and down to the right atrium for an oxygen  saturation. Catheter was flushed and placed on pressure. After right  atrial pressure tracings were recorded, the catheter was advanced  sequentially into the right ventricle, pulmonary artery and pulmonary  capillary wedge positions. The balloon was deflated and a pulmonary  arterial oxygen saturation was obtained. At this point, cardiac outputs  by thermodilution were performed. The pulmonary artery catheter was  then removed. Next, we turned our attention towards the coronary  arteries.     Over the 0.035 J-wire, the JL3.5, 5-Rwandan diagnostic catheter was  advanced to the ostium of the left main coronary artery. Coronary  angiography was performed to this system. There was difficulty imaging  this due to tortuosity and calcification in the right subclavian and  innominate arteries. Ultimately, we put in a 6-Portuguese 45-cm Destination  sheath to aid us. We were able to cross the aortic valve with a  4-Portuguese AL1 catheter and obtain left ventricular end-diastolic  pressures. No left ventriculogram was done due to concerns over  contrast usage. We used a JR4 catheter to adequately visualize the  right coronary artery although this was quite difficult. Of note, I  would not access for coronary angiography from the arm anymore. The catheter was removed as was the Destination sheath. A nonocclusive  pressure band was applied for hemostasis. The right venous sheath was  also removed and manual pressure applied for hemostasis. There were no  complications from the procedure and estimated blood loss was less than  50 mL. Total contrast usage was 85 mL. Moderate sedation was performed for the procedure. The patient had an  ASA grade of III and a Mallampati score of III. He received a total of  2 mg of IV Versed and 100 mcg of fentanyl. Vital signs were monitored  throughout the sedation period and remained stable. Sedation was  administered by an independent agent at my direction and supervision,  and I was present the entire time during the case. There were no  complications from sedation. FINDINGS:  1. Right-dominant coronary arterial circulation. The right coronary  artery is not as well visualized as the left but there are no  obstructive lesions present in this vessel. There is moderate  calcification. In the left system, there is no left main disease or  circumflex disease. There is moderate calcification in the left  anterior descending artery in the proximal and midportions. There is  tortuosity in the left anterior descending artery with a 50% mid lesion.   No left ventriculogram was performed. 2.  Elevated right-sided filling pressures with a right atrial pressure  of 10 and a pulmonary capillary wedge pressure of 18 mmHg. This  corresponds to a left ventricular end-diastolic pressure of 16 mmHg. 3.  Pulmonary hypertension with an instantaneous pressure of 50/18 for a  mean pulmonary arterial pressure of 28 mmHg. 4.  Cardiac output by thermodilution 8.53 liters per minute with a  cardiac index of 3.68 liters per kilogram per minute. 5.  Pulmonary vascular resistance 104 dynes per second. 6.  Normal mixed venous oxygen saturations. Systemic arterial  saturation was 91% on room air. This procedure was performed on room  air. SVC was 69% and pulmonary artery was 69%.         Juliette Cifuentes MD    D: 07/01/2021 16:07:16       T: 07/01/2021 16:15:13     [email or web address]  Job#: [id number]     Doc#: [id number]    CC:  Tata Garcia MD

## 2021-07-01 NOTE — ANESTHESIA PRE-OP
Brief Pre-Op Note/Sedation Assessment      Gilmar Suresh  1940  Room/bed info not found      [id number]  2:16 PM    Planned Procedure: Cardiac Catheterization Procedure    Post Procedure Plan: Return to same level of care    Consent: I have discussed with the patient and/or the patient representative the indication, alternatives, and the possible risks and/or complications of the planned procedure and the anesthesia methods. The patient and/or patient representative appear to understand and agree to proceed. Chief Complaint: Dyspnea on Exertion      Indications for Cath Procedure: Worsening Angina  Anginal Classification within 2 weeks:  CCS III - Symptoms with everyday living activities, i.e., moderate limitation  NYHA Heart Failure Class within 2 weeks: Class II - Symptoms of HF on ordinary exertion, Newly Diagnosed? No, Heart Failure Type: Diastolic  Is Cath Lab Visit Valve-related?: No  Surgical Risk: N/A  Functional Type: N/A    Anti- Anginal Meds within 2 weeks:   Yes: Aspirin and Statin (Any)    Stress or Imaging Studies Performed (within 6 months):  Stress Test with SPECT Result: Positive:  inferior Risk/Extent of Ischemia:  Intermediate     Vital Signs:  BP (!) 137/55   Pulse 77   Temp 97.9 °F (36.6 °C) (Infrared)   Resp 13   Ht 5' 8\" (1.727 m)   Wt 268 lb (121.6 kg)   SpO2 97%   BMI 40.75 kg/m²     Allergies:   Allergies   Allergen Reactions    Hytrin [Terazosin Hcl]      Ineffective for BP control    Penicillins      Unknown reaction during childhood; Patient tolerated cephalosporins in the past    Shrimp Flavor Hives    Sulfa Antibiotics      Unknown reaction in childhood    Tekturna [Aliskiren Fumarate]      Ineffective    Vancomycin      Fever    Ciprofloxacin Rash     Fever and rash        Past Medical History:  Past Medical History:   Diagnosis Date    Allergic rhinitis     Asthma     Atrial fibrillation (HCC)     Carotid artery stenosis     Chronic kidney disease  COPD (chronic obstructive pulmonary disease) (HCC)     CPAP (continuous positive airway pressure) dependence     ESBL (extended spectrum beta-lactamase) producing bacteria infection 12/26/2018    urine    Hyperlipidemia     Hypertension     Iron deficiency anemia     Obstructive sleep apnea     Type II or unspecified type diabetes mellitus without mention of complication, not stated as uncontrolled          Surgical History:  Past Surgical History:   Procedure Laterality Date    CATARACT REMOVAL  2/2012    bilateral    COLONOSCOPY  7/10/2009    diverticulosis    hemorrhoids    GALLBLADDER SURGERY  1981    UPPER GASTROINTESTINAL ENDOSCOPY  7-2005    7/10/2009    normal         Medications:  Current Outpatient Medications   Medication Sig Dispense Refill    pantoprazole (PROTONIX) 40 MG tablet TAKE 1 TABLET BY MOUTH EVERY DAY 90 tablet 1    levalbuterol (XOPENEX) 1.25 MG/3ML nebulizer solution USE 1 VIAL VIA NEBULIZER FOUR TIMES DAILY 360 mL 11    valsartan (DIOVAN) 40 MG tablet Take by mouth      valsartan-hydroCHLOROthiazide (DIOVAN-HCT) 320-25 MG per tablet TAKE 1 TABLET BY MOUTH EVERY DAY 90 tablet 3    atorvastatin (LIPITOR) 40 MG tablet TAKE 1 TABLET BY MOUTH EVERY DAY 90 tablet 3    alfuzosin (UROXATRAL) 10 MG extended release tablet TAKE 1 TABLET BY MOUTH EVERY DAY 90 tablet 3    BASAGLAR KWIKPEN 100 UNIT/ML injection pen INJECT 36 UNITS INTO THE SKIN 2 TIMES DAILY 5 pen 4    LINZESS 145 MCG capsule TAKE 1 CAPSULE BY MOUTH EVERY DAY IN THE MORNING BEFORE BREAKFAST 30 capsule 11    cloNIDine (CATAPRES) 0.1 MG tablet TAKE 1 TABLET BY MOUTH TWICE A  tablet 3    verapamil (CALAN SR) 240 MG extended release tablet TAKE 1 TABLET BY MOUTH TWICE A  tablet 3    CVS PURELAX 17 GM/SCOOP powder TAKE 17G BY MOUTH DAILY MIX WITH 8 OZ OF WATER 510 g 3    furosemide (LASIX) 20 MG tablet TAKE 1 TABLET BY MOUTH EVERY DAY 90 tablet 3    amiodarone (CORDARONE) 200 MG tablet TAKE 1 TABLET none    Modified Mallampati:  II (soft palate, uvula, fauces visible)    ASA Classification:  Class 3 - A patient with severe systemic disease that limits activity but is not incapacitating      Clau Scale: Activity:  2 - Able to move 4 extremities voluntarily on command  Respiration:  2 - Able to breathe deeply and cough freely  Circulation:  2 - BP+/- 20mmHg of normal  Consciousness:  2 - Fully awake  Oxygen Saturation (color):  2 - Able to maintain oxygen saturation >92% on room air    Sedation/Anesthesia Plan:  Guard the patient's safety and welfare. Minimize physical discomfort and pain. Minimize negative psychological responses to treatment by providing sedation and analgesia and maximize the potential amnesia. Patient to meet pre-procedure discharge plan.     Medication Planned:  midazolam intravenously and fentanyl intravenously    Patient is an appropriate candidate for plan of sedation: yes      Electronically signed by Varun Sanford MD on 7/1/2021 at 2:16 PM

## 2021-07-01 NOTE — H&P
[facility] - Cardiology     CC: \"I feel my heart race at times\"      HPI:   Daniel Jiménez is a 79 y.o. patient with a past medical history significant for obesity, hypoventilation syndrome as well as moderate COPD of asthma type and atrial fibrillation noted during pulmonary rehabilitation. His episode of atrial fibrillation was noted incidentally on telemetry monitoring during therapy. He is on amiodarone 100mg daily and aspirin 162mg daily for his atrial fibrillation. He returns to the office today in follow-up.      Today, he reports feeling well overall. He admits to occasionally feeling his heart racing. This will present with overexertion. He denies chest pain with rest or exertion. He admits to some chest discomfort when his breathing becomes more difficult. This will resolve with use of his inhalers. He presents with his wife and she states he is unable to walk longer distances due to shortness of breath. He continues to follow with Dr. Raven Miranda for pulmonary.      Review of Systems:  Constitutional: No fatigue, weakness, night sweats or fever. HEENT: No new vision difficulties or ringing in the ears. Respiratory: No new SOB, PND, orthopnea or cough. Cardiovascular: See HPI   GI: No n/v, diarrhea, constipation, abdominal pain or changes in bowel habits. No melena, no hematochezia  : No urinary frequency, urgency, incontinence, hematuria or dysuria. Skin: No cyanosis or skin lesions. Musculoskeletal: No new muscle or joint pain. Neurological: No syncope or TIA-like symptoms.   Psychiatric: No anxiety, insomnia or depression     Current Facility-Administered Medications          Current Outpatient Medications   Medication Sig Dispense Refill    valsartan (DIOVAN) 40 MG tablet Take by mouth        valsartan-hydroCHLOROthiazide (DIOVAN-HCT) 320-25 MG per tablet TAKE 1 TABLET BY MOUTH EVERY DAY 90 tablet 3    atorvastatin (LIPITOR) 40 MG tablet TAKE 1 TABLET BY MOUTH EVERY DAY 90 tablet 3    alfuzosin (UROXATRAL) 10 MG extended release tablet TAKE 1 TABLET BY MOUTH EVERY DAY 90 tablet 3    BASAGLAR KWIKPEN 100 UNIT/ML injection pen INJECT 36 UNITS INTO THE SKIN 2 TIMES DAILY 5 pen 4    LINZESS 145 MCG capsule TAKE 1 CAPSULE BY MOUTH EVERY DAY IN THE MORNING BEFORE BREAKFAST 30 capsule 11    cloNIDine (CATAPRES) 0.1 MG tablet TAKE 1 TABLET BY MOUTH TWICE A  tablet 3    verapamil (CALAN SR) 240 MG extended release tablet TAKE 1 TABLET BY MOUTH TWICE A  tablet 3    [employer] PURELAX 17 GM/SCOOP powder TAKE 17G BY MOUTH DAILY MIX WITH 8 OZ OF WATER 510 g 3    pantoprazole (PROTONIX) 40 MG tablet Take 1 tablet by mouth daily 90 tablet 1    furosemide (LASIX) 20 MG tablet TAKE 1 TABLET BY MOUTH EVERY DAY 90 tablet 3    amiodarone (CORDARONE) 200 MG tablet TAKE 1 TABLET BY MOUTH EVERY OTHER DAY (Patient taking differently: Patent takes 0.5 tablet every other day) 45 tablet 1    blood glucose test strips (ACCU-CHEK GUIDE) strip TEST AS DIRECTED 3 TIMES A  strip 5    albuterol sulfate  (90 Base) MCG/ACT inhaler INHALE 2 PUFFS BY MOUTH EVERY 4 HOURS AS NEEDED FOR WHEEZE 18 Inhaler 11    ZINC PO Take by mouth        CPAP Machine MISC by Does not apply route        montelukast (SINGULAIR) 10 MG tablet TAKE 1 TABLET BY MOUTH EVERY DAY 90 tablet 3    B-D UF III MINI PEN NEEDLES 31G X 5 MM MISC USE AS DIRECTED 3 TIMES A  each 11    levalbuterol (XOPENEX) 1.25 MG/3ML nebulizer solution USE 1 VIAL VIA NEBULIZER FOUR TIMES DAILY 360 mL 11    Ascorbic Acid (SUPER C COMPLEX PO) Take by mouth daily        Multiple Vitamins-Minerals (MULTIVITAMIN ADULT PO) Take by mouth daily        Lactobacillus Rhamnosus, GG, (CVS PROBIOTIC, LACTOBACILLUS,) CAPS TAKE 1 CAPSULE BY MOUTH 2 TIMES DAILY (WITH MEALS) 60 capsule 11    Docusate Sodium (COLACE PO) Take by mouth daily        Cyanocobalamin (VITAMIN B-12 PO) Take 500 mcg by mouth every other day       Cholecalciferol (VITAMIN D3 PO) Take 2,000 Units by mouth daily         aspirin EC 81 MG EC tablet Take 2 tablets by mouth daily. 30 tablet 3    ferrous sulfate 325 (65 FE) MG tablet Take 325 mg by mouth 2 times daily           No current facility-administered medications for this visit.         [id number] Score for Atrial Fibrillation Stroke Risk    Risk   Factors   Component Value   C CHF No 0   H HTN Yes 1   A2 Age >= 76 Yes,  (79 y.o.) 2   D DM Yes 1   S2 Prior Stroke/TIA No 0   V Vascular Disease No 0   A Age 74-69 No,  (79 y.o.) 0   Sc Sex male 0     [id number]  Score   4   Score last updated 6/4/21 15:39 PM EDT     Click here for a link to the UpToDate guideline \"Atrial Fibrillation: Anticoagulation therapy to prevent embolization     Disclaimer: Risk Score calculation is dependent on accuracy of patient problem list and past encounter diagnosis.                Allergies   Allergen Reactions    Hytrin [Terazosin Hcl]         Ineffective for BP control    Penicillins         Unknown reaction during childhood; Patient tolerated cephalosporins in the past    Shrimp Flavor Hives    Sulfa Antibiotics         Unknown reaction in childhood    Tekturna [Aliskiren Fumarate]         Ineffective    Vancomycin         Fever    Ciprofloxacin Rash       Fever and rash          Physical Exam:  /60   Pulse 73   Ht 5' 8\" (1.727 m)   Wt 266 lb (120.7 kg)   SpO2 98%   BMI 40.45 kg/m²        Wt Readings from Last 2 Encounters:   06/04/21 266 lb (120.7 kg)   05/25/21 266 lb 6.4 oz (120.8 kg)      General Appearance:  Alert, cooperative, no distress, appears stated age, obese   Head:  Normocephalic, without obvious abnormality, atraumatic   Eyes:  PERRL, conjunctiva/corneas clear         Nose: Nares normal, no drainage or sinus tenderness   Throat: Lips, mucosa, and tongue normal   Neck: Supple, symmetrical, trachea midline, no adenopathy, thyroid: not enlarged, symmetric, no tenderness/mass/nodules, no carotid bruit or JVD         Lungs:   Clear to auscultation bilaterally, respirations unlabored   Chest Wall:  No tenderness or deformity   Heart:  Regular rate and rhythm, S1, S2 normal, no murmur, rub or gallop   Abdomen:   Soft, non-tender, bowel sounds active all four quadrants,  no masses, no organomegaly   Extremities: Extremities normal, atraumatic, no cyanosis or edema   Pulses: Carotid      L   2      R2  Radial       L    2     R2      Skin: Skin color, texture, turgor normal, no rashes or lesions   Pysch: Normal mood and affect   Neurologic: Normal            Lab Results   Component Value Date     TRIG 46 01/21/2021     HDL 45 01/21/2021     HDL 42 12/15/2011     LDLCALC 35 01/21/2021     LDLDIRECT 52 05/19/2021     LABVLDL 9 01/21/2021            Lab Results   Component Value Date      05/19/2021     K 4.9 05/19/2021     K 4.6 12/31/2018     BUN 37 05/19/2021     CREATININE 1.7 05/19/2021         EKG 11/19/2018 Sinus rhythm   EKG 12/4/20: Sinus rhythm   EKG 6/4/21: Sinus rhythm      7 day event monitor 12/12-12/18/19  No episodes of atrial fibrillation noted         Assessment:  1. Paroxysmal Atrial Fibrillation  2. Shortness of breath  2. Essential Hypertension  4. Hyperlipidemia with LDL goal of <70 mg/dL  5. COPD  6. Sleep apnea  7. Morbid Obesity     Plan:  He continues in a regular rhythm by EKG today in the office. He continues with aspirin 162 mg daily as his history of atrial fibrillation was a lone documented episode. Given his worsening shortness of breath, I will have him complete a stress perfusion to rule out obstructive coronary disease.      I will see him in office for follow up in 6 months. Vitals:    07/01/21 1230   BP: (!) 137/55   Pulse: 77   Resp: 13   Temp: 97.9 °F (36.6 °C)   SpO2: 97%     I have reviewed the most recent H&P for this patient and independently examined the patient. There have been no changes. We will proceed with the planned procedure.     Shraddha Almaraz, MD

## 2021-07-06 ENCOUNTER — OFFICE VISIT (OUTPATIENT)
Dept: PULMONOLOGY | Age: 81
End: 2021-07-06
Payer: MEDICARE

## 2021-07-06 VITALS
SYSTOLIC BLOOD PRESSURE: 146 MMHG | DIASTOLIC BLOOD PRESSURE: 66 MMHG | WEIGHT: 264 LBS | RESPIRATION RATE: 16 BRPM | TEMPERATURE: 97 F | HEART RATE: 88 BPM | OXYGEN SATURATION: 93 % | HEIGHT: 68 IN | BODY MASS INDEX: 40.01 KG/M2

## 2021-07-06 DIAGNOSIS — G47.33 OSA ON CPAP: ICD-10-CM

## 2021-07-06 DIAGNOSIS — I27.20 PULMONARY HTN (HCC): ICD-10-CM

## 2021-07-06 DIAGNOSIS — J31.0 RHINOSINUSITIS: ICD-10-CM

## 2021-07-06 DIAGNOSIS — J44.9 COPD WITH CHRONIC BRONCHITIS (HCC): Primary | ICD-10-CM

## 2021-07-06 DIAGNOSIS — Z99.89 OSA ON CPAP: ICD-10-CM

## 2021-07-06 DIAGNOSIS — J32.9 RHINOSINUSITIS: ICD-10-CM

## 2021-07-06 DIAGNOSIS — R06.09 DOE (DYSPNEA ON EXERTION): ICD-10-CM

## 2021-07-06 LAB
ANION GAP SERPL CALCULATED.3IONS-SCNC: 14 MMOL/L (ref 3–16)
BUN BLDV-MCNC: 49 MG/DL (ref 7–20)
CALCIUM SERPL-MCNC: 8.4 MG/DL (ref 8.3–10.6)
CHLORIDE BLD-SCNC: 101 MMOL/L (ref 99–110)
CO2: 23 MMOL/L (ref 21–32)
CREAT SERPL-MCNC: 2 MG/DL (ref 0.8–1.3)
GFR AFRICAN AMERICAN: 39
GFR NON-AFRICAN AMERICAN: 32
GLUCOSE BLD-MCNC: 114 MG/DL (ref 70–99)
POTASSIUM SERPL-SCNC: 4.6 MMOL/L (ref 3.5–5.1)
SODIUM BLD-SCNC: 138 MMOL/L (ref 136–145)

## 2021-07-06 PROCEDURE — 3023F SPIROM DOC REV: CPT | Performed by: INTERNAL MEDICINE

## 2021-07-06 PROCEDURE — G8417 CALC BMI ABV UP PARAM F/U: HCPCS | Performed by: INTERNAL MEDICINE

## 2021-07-06 PROCEDURE — 1036F TOBACCO NON-USER: CPT | Performed by: INTERNAL MEDICINE

## 2021-07-06 PROCEDURE — 1123F ACP DISCUSS/DSCN MKR DOCD: CPT | Performed by: INTERNAL MEDICINE

## 2021-07-06 PROCEDURE — 4040F PNEUMOC VAC/ADMIN/RCVD: CPT | Performed by: INTERNAL MEDICINE

## 2021-07-06 PROCEDURE — 99214 OFFICE O/P EST MOD 30 MIN: CPT | Performed by: INTERNAL MEDICINE

## 2021-07-06 PROCEDURE — G8427 DOCREV CUR MEDS BY ELIG CLIN: HCPCS | Performed by: INTERNAL MEDICINE

## 2021-07-06 PROCEDURE — G8926 SPIRO NO PERF OR DOC: HCPCS | Performed by: INTERNAL MEDICINE

## 2021-07-06 ASSESSMENT — COPD QUESTIONNAIRES
CAT_TOTALSCORE: 17
QUESTION1_COUGHFREQUENCY: 3
QUESTION6_LEAVINGHOUSE: 0
QUESTION5_HOMEACTIVITIES: 3
QUESTION4_WALKINCLINE: 3
QUESTION2_CHESTPHLEGM: 3
QUESTION8_ENERGYLEVEL: 3
QUESTION7_SLEEPQUALITY: 2
QUESTION3_CHESTTIGHTNESS: 0

## 2021-07-06 NOTE — PROGRESS NOTES
Chief complaint  This is a 79y.o. year old male  who comes to see me with a chief complaint of   Chief Complaint   Patient presents with    COPD    Sleep Apnea       HPI  Here with follow up on JOANN and COPD    Breathing is doing well. No new issues. Still SOB with exertion but that has not changed in some time. Machine:  Patient is using a APAP machine. Average usage is 5 hrs 38 min 00 sec. He is meeting 4 or more hours per night 79% of the time. Average AHI is 0.8. Recently had an angiogram and has bruising on arm.   No significant findings on said angiogram     Sleep Medicine 3/1/2021 2/20/2020 10/17/2019 2/25/2019 10/23/2018   Sitting and reading 1 1 1 1 0   Watching TV 1 1 1 2 1   Sitting, inactive in a public place (e.g. a theatre or a meeting) 0 0 1 1 0   As a passenger in a car for an hour without a break 1 0 1 1 0   Lying down to rest in the afternoon when circumstances permit 0 1 2 2 2   Sitting and talking to someone 0 0 0 1 0   Sitting quietly after a lunch without alcohol 0 0 1 1 1   In a car, while stopped for a few minutes in traffic 0 0 0 0 0   Total score 3 3 7 9 4   Neck circumference - - - - 18.5         Current Outpatient Medications   Medication Sig Dispense Refill    pantoprazole (PROTONIX) 40 MG tablet TAKE 1 TABLET BY MOUTH EVERY DAY 90 tablet 1    levalbuterol (XOPENEX) 1.25 MG/3ML nebulizer solution USE 1 VIAL VIA NEBULIZER FOUR TIMES DAILY 360 mL 11    valsartan (DIOVAN) 40 MG tablet Take by mouth      valsartan-hydroCHLOROthiazide (DIOVAN-HCT) 320-25 MG per tablet TAKE 1 TABLET BY MOUTH EVERY DAY 90 tablet 3    atorvastatin (LIPITOR) 40 MG tablet TAKE 1 TABLET BY MOUTH EVERY DAY 90 tablet 3    alfuzosin (UROXATRAL) 10 MG extended release tablet TAKE 1 TABLET BY MOUTH EVERY DAY 90 tablet 3    BASAGLAR KWIKPEN 100 UNIT/ML injection pen INJECT 36 UNITS INTO THE SKIN 2 TIMES DAILY 5 pen 4    LINZESS 145 MCG capsule TAKE 1 CAPSULE BY MOUTH EVERY DAY IN THE MORNING BEFORE BREAKFAST 30 capsule 11    cloNIDine (CATAPRES) 0.1 MG tablet TAKE 1 TABLET BY MOUTH TWICE A  tablet 3    verapamil (CALAN SR) 240 MG extended release tablet TAKE 1 TABLET BY MOUTH TWICE A  tablet 3    [employer]S PURELAX 17 GM/SCOOP powder TAKE 17G BY MOUTH DAILY MIX WITH 8 OZ OF WATER 510 g 3    furosemide (LASIX) 20 MG tablet TAKE 1 TABLET BY MOUTH EVERY DAY 90 tablet 3    amiodarone (CORDARONE) 200 MG tablet TAKE 1 TABLET BY MOUTH EVERY OTHER DAY (Patient taking differently: Patent takes 0.5 tablet every other day) 45 tablet 1    blood glucose test strips (ACCU-CHEK GUIDE) strip TEST AS DIRECTED 3 TIMES A  strip 5    albuterol sulfate  (90 Base) MCG/ACT inhaler INHALE 2 PUFFS BY MOUTH EVERY 4 HOURS AS NEEDED FOR WHEEZE 18 Inhaler 11    ZINC PO Take by mouth      CPAP Machine MISC by Does not apply route      montelukast (SINGULAIR) 10 MG tablet TAKE 1 TABLET BY MOUTH EVERY DAY 90 tablet 3    B-D UF III MINI PEN NEEDLES 31G X 5 MM MISC USE AS DIRECTED 3 TIMES A  each 11    Ascorbic Acid (SUPER C COMPLEX PO) Take by mouth daily      Multiple Vitamins-Minerals (MULTIVITAMIN ADULT PO) Take by mouth daily      Lactobacillus Rhamnosus, GG, (CVS PROBIOTIC, LACTOBACILLUS,) CAPS TAKE 1 CAPSULE BY MOUTH 2 TIMES DAILY (WITH MEALS) 60 capsule 11    Docusate Sodium (COLACE PO) Take by mouth daily      Cyanocobalamin (VITAMIN B-12 PO) Take 500 mcg by mouth every other day       Cholecalciferol (VITAMIN D3 PO) Take 2,000 Units by mouth daily       aspirin EC 81 MG EC tablet Take 2 tablets by mouth daily. 30 tablet 3    ferrous sulfate 325 (65 FE) MG tablet Take 325 mg by mouth 2 times daily        No current facility-administered medications for this visit.        PHYSICAL EXAM:  Vitals:    07/06/21 1311   BP: (!) 146/66   Pulse: 88   Resp: 16   Temp: 97 °F (36.1 °C)   SpO2: 93%       GENERAL:  Well nourished, alert, appears stated age, no distress  HEENT:  No scleral icterus, no conjunctival irritation; Mallampati IV, elongated uvula  NECK:  No thyromegaly, no bruits  LYMPH:  No cervical or supraclavicular adenopathy  HEART:  Regular rate and rhythm, no murmurs. Distant heart sounds   LUNGS:  Scattered wheezing   ABDOMEN:  No distention, no organomegaly  EXTREMITIES:  Trace edema  no digital clubbing  NEURO:  No localizing deficits    Pulmonary Function Testing 8/2012  FEV1 1.87 L at 69% predicted (reduced)  FVC 3.36 L at 90% predicted (normal)  FEV1/FVC ratio at 56 (reduced)  TLC 6.07 L at 110% predicted (normal)  DLCO 23.5 at 98% predicted (normal)  Overall Mild obstruction    Chest imaging from 12/2018 is reviewed. My impression is scarring in the lower lobes bilaterally. More so on the right     Alpha-1 Anti-trypsin levels:  92, Phenotype:  M1S       I reviewed the above labs and images      Assessment/Plan:  1. COPD with chronic bronchitis ([address]  Seemingly controlled with albuterol and xopenex    2. JOANN on CPAP  Benefiting and compliant. Benefiting from therapy by a low [place] score, good energy levels, low fatigue, and control of chronic medical problems    3. Rhinosinusitis  Flonase       Pulmonary Rehab:  Completed a portion in the past     Lung Cancer Screening CT:  Does not qualify    History of pseudomonas infection.   Needs pseudomonal coverage when he has bronchitis     Avoid ICS inhalers due to repeated infections    Follow up in 6 months

## 2021-07-12 NOTE — PROGRESS NOTES
[address]  Office Visit    Lynette Mcpherson  1940    July 15, 2021    CC:   Chief Complaint   Patient presents with    Follow-up     sob on exertion      HPI:  The patient is 79 y.o. male with a past medical history significant for PAF, SOB, HTN, HLP, COPD, sleep apnea and morbid obesity who is here for follow up after undergoing R and LHC on 7/1/2021. He was recently seen by Dr. Marta Sinha and c/o Angel Manzano along with chest pain on occasion and therefore he was sent for stress perfusion. Results were abnormal and he had a R and L heart cath on 7/1/2021 at direction of Dr. Marta Sinha. Overall doing okay. He had large area of ecchymosis on his R forearm after procedure and is slowly improving. Has some soreness with movement. SOB okay as long as he doesn't walk any distance (> 50 feet). Mobility limited by bilateral knee pain. Has SOB related to pain as well. Denies chest pain/discomfort, orthopnea/PND, cough, palpitations, dizziness, syncope,  weight change or claudication. He increased his lasix for 1 week and then decreased Lasix to 2 tablets alternating with 1 tablet every other day. Edema worsens as day progresses, but down in the AM. Wears compression hose. Review of Systems:  Constitutional: Denies  fatigue, weakness, night sweats or fever. HEENT: Denies new visual changes, ringing in ears, nosebleeds,nasal congestion  Respiratory: Denies new or change in SOB, PND, orthopnea or cough. Cardiovascular: see HPI  GI: Denies N/V, diarrhea, constipation, abdominal pain, change in bowel habits, melena or hematochezia  : Denies urinary frequency, urgency, incontinence, hematuria or dysuria. Skin: Denies rash, hives, or cyanosis  Musculoskeletal: + bilateral knee pain  Neurological: Denies syncope or TIA-like symptoms.   Psychiatric: Denies anxiety, insomnia or depression     Past Medical History:   Diagnosis Date    Allergic rhinitis     Asthma     Atrial fibrillation ([facility] [address] [facility]     Carotid artery stenosis     Chronic kidney disease     COPD (chronic obstructive pulmonary disease) (HCC)     CPAP (continuous positive airway pressure) dependence     ESBL (extended spectrum beta-lactamase) producing bacteria infection 2018    urine    Hyperlipidemia     Hypertension     Iron deficiency anemia     Obstructive sleep apnea     Type II or unspecified type diabetes mellitus without mention of complication, not stated as uncontrolled      Past Surgical History:   Procedure Laterality Date    CATARACT REMOVAL  2012    bilateral    COLONOSCOPY  7/10/2009    diverticulosis    hemorrhoids    GALLBLADDER SURGERY  1981    UPPER GASTROINTESTINAL ENDOSCOPY  7-2005    7/10/2009    normal     Family History   Problem Relation Age of Onset    Heart Disease Mother     Stroke Mother     Vision Loss Mother     High Blood Pressure Father     High Blood Pressure Brother     Diabetes Brother     Sleep Apnea Brother     Arthritis Paternal Grandmother     Diabetes Paternal Grandmother      Social History     Tobacco Use    Smoking status: Former Smoker     Packs/day: 0.50     Years: 18.00     Pack years: 9.00     Start date: 1958     Quit date: 1988     Years since quittin.5    Smokeless tobacco: Never Used   Vaping Use    Vaping Use: Never used   Substance Use Topics    Alcohol use: No     Alcohol/week: 0.0 standard drinks    Drug use: No       Allergies   Allergen Reactions    Hytrin [Terazosin Hcl]      Ineffective for BP control    Penicillins      Unknown reaction during childhood; Patient tolerated cephalosporins in the past    Shrimp Flavor Hives    Sulfa Antibiotics      Unknown reaction in childhood    Tekturna [Aliskiren Fumarate]      Ineffective    Vancomycin      Fever    Ciprofloxacin Rash     Fever and rash      Current Outpatient Medications   Medication Sig Dispense Refill    pantoprazole (PROTONIX) 40 MG tablet TAKE 1 TABLET BY MOUTH EVERY DAY 90 tablet 1    levalbuterol (XOPENEX) 1.25 MG/3ML nebulizer solution USE 1 VIAL VIA NEBULIZER FOUR TIMES DAILY 360 mL 11    valsartan (DIOVAN) 40 MG tablet Take by mouth      valsartan-hydroCHLOROthiazide (DIOVAN-HCT) 320-25 MG per tablet TAKE 1 TABLET BY MOUTH EVERY DAY 90 tablet 3    atorvastatin (LIPITOR) 40 MG tablet TAKE 1 TABLET BY MOUTH EVERY DAY 90 tablet 3    alfuzosin (UROXATRAL) 10 MG extended release tablet TAKE 1 TABLET BY MOUTH EVERY DAY 90 tablet 3    BASAGLAR KWIKPEN 100 UNIT/ML injection pen INJECT 36 UNITS INTO THE SKIN 2 TIMES DAILY 5 pen 4    LINZESS 145 MCG capsule TAKE 1 CAPSULE BY MOUTH EVERY DAY IN THE MORNING BEFORE BREAKFAST 30 capsule 11    cloNIDine (CATAPRES) 0.1 MG tablet TAKE 1 TABLET BY MOUTH TWICE A  tablet 3    verapamil (CALAN SR) 240 MG extended release tablet TAKE 1 TABLET BY MOUTH TWICE A  tablet 3    [employer] PURELAX 17 GM/SCOOP powder TAKE 17G BY MOUTH DAILY MIX WITH 8 OZ OF WATER 510 g 3    furosemide (LASIX) 20 MG tablet TAKE 1 TABLET BY MOUTH EVERY DAY (Patient taking differently: TAKE 40mg every other day for one week and 20mg on the days that pt is not taking 40mg.) 90 tablet 3    amiodarone (CORDARONE) 200 MG tablet TAKE 1 TABLET BY MOUTH EVERY OTHER DAY (Patient taking differently: Patent takes 0.5 tablet every other day) 45 tablet 1    blood glucose test strips (ACCU-CHEK GUIDE) strip TEST AS DIRECTED 3 TIMES A  strip 5    albuterol sulfate  (90 Base) MCG/ACT inhaler INHALE 2 PUFFS BY MOUTH EVERY 4 HOURS AS NEEDED FOR WHEEZE 18 Inhaler 11    ZINC PO Take by mouth      CPAP Machine MISC by Does not apply route      montelukast (SINGULAIR) 10 MG tablet TAKE 1 TABLET BY MOUTH EVERY DAY 90 tablet 3    B-D UF III MINI PEN NEEDLES 31G X 5 MM MISC USE AS DIRECTED 3 TIMES A  each 11    Ascorbic Acid (SUPER C COMPLEX PO) Take by mouth daily      Multiple Vitamins-Minerals (MULTIVITAMIN ADULT PO) Take by mouth daily      Lactobacillus Rhamnosus, GG, (CVS PROBIOTIC, LACTOBACILLUS,) CAPS TAKE 1 CAPSULE BY MOUTH 2 TIMES DAILY (WITH MEALS) 60 capsule 11    Docusate Sodium (COLACE PO) Take by mouth daily      Cyanocobalamin (VITAMIN B-12 PO) Take 500 mcg by mouth every other day       Cholecalciferol (VITAMIN D3 PO) Take 2,000 Units by mouth daily       aspirin EC 81 MG EC tablet Take 2 tablets by mouth daily. 30 tablet 3    ferrous sulfate 325 (65 FE) MG tablet Take 325 mg by mouth 2 times daily        No current facility-administered medications for this visit. Physical Exam:   /60   Pulse 72   Ht 5' 8\" (1.727 m)   Wt 266 lb (120.7 kg)   SpO2 97%   BMI 40.45 kg/m²   Wt Readings from Last 2 Encounters:   07/15/21 266 lb (120.7 kg)   07/06/21 267 lb 3.2 oz (121.2 kg)     Constitutional: He is oriented to person, place, and time. He appears well-developed and well-nourished. In no acute distress. HEENT: Normocephalic and atraumatic. Sclerae anicteric. No xanthelasmas. EOM's intact. Neck: Supple. No JVD present. No thyromegaly present. Cardiovascular: RRR, normal S1 and S2; soft systolic murmur  Pulmonary/Chest: Effort normal.  Lungs clear to auscultation. Chest wall nontender  Abdominal: obese,soft, nontender, nondistended. + bowel sounds  Extremities: No cyanosis, or clubbing. Pulses are 2+ radial/brachial/carotid/DP bilaterally. Cap refill brisk. R forearm with large area of fading ecchymosis extending from  R wrist to above the elbow. No hematoma. Neurological: No focal deficit. Skin: Skin is warm and dry. Psychiatric: He has a normal mood and affect.  His speech is normal and behavior is normal.     Lab Review:   Lab Results   Component Value Date    TRIG 46 01/21/2021    HDL 45 01/21/2021    HDL 42 12/15/2011    LDLCALC 35 01/21/2021    LDLDIRECT 52 05/19/2021    LABVLDL 9 01/21/2021     Lab Results   Component Value Date     07/13/2021    K 5.0 07/13/2021    K 4.6 12/31/2018     07/13/2021 CO2 22 07/13/2021    BUN 58 07/13/2021    CREATININE 2.3 07/13/2021    GLUCOSE 106 07/13/2021    GLUCOSE 96 06/16/2011    CALCIUM 8.1 07/13/2021      Lab Results   Component Value Date    WBC 8.2 06/25/2021    HGB 12.2 (L) 06/25/2021    HCT 37.6 (L) 06/25/2021    MCV 85.5 06/25/2021     06/25/2021 7/1/2021 R and L heart cath:  1. Right-dominant coronary arterial circulation. The right coronary  artery is not as well visualized as the left but there are no  obstructive lesions present in this vessel. There is moderate  calcification. In the left system, there is no left main disease or  circumflex disease. There is moderate calcification in the left  anterior descending artery in the proximal and midportions. There is  tortuosity in the left anterior descending artery with a 50% mid lesion. No left ventriculogram was performed. 2.  Elevated right-sided filling pressures with a right atrial pressure  of 10 and a pulmonary capillary wedge pressure of 18 mmHg. This  corresponds to a left ventricular end-diastolic pressure of 16 mmHg. 3.  Pulmonary hypertension with an instantaneous pressure of 50/18 for a  mean pulmonary arterial pressure of 28 mmHg. 4.  Cardiac output by thermodilution 8.53 liters per minute with a  cardiac index of 3.68 liters per kilogram per minute. 5.  Pulmonary vascular resistance 104 dynes per second. 6.  Normal mixed venous oxygen saturations. Systemic arterial  saturation was 91% on room air. This procedure was performed on room  air. SVC was 69% and pulmonary artery was 69%. 6/18/2021 Lexiscan-Myoview:   Moderate sized moderate intensity reversible inferior wall defect suggestive    of ischemia        Normal LV size and systolic function.    Overall findings represent a intermediate risk study. 12/2019 CAM monitor:  SR without atrial fib noted    9/26/2014 Echo:  Left ventricle size is normal.   Normal left ventricular wall thickness.    Ejection fraction is visually estimated to be 55-60 %. No regional wall motion abnormalities are noted. There is reversal of E/A inflow velocities across the mitral valve   suggesting impaired left ventricular relaxation. The right ventricle is not well visualized. Assessment:    1. PAF (paroxysmal atrial fibrillation) (HCC)  -maintaining SR on amiodarone  -continue verapamil  -continue ASA    2. SOB (shortness of breath)  -chronic and without much change  -multifactorial and secondary to COPD+obesity+ deconditioning  -continue lasix  -continue inhaler    3. Chronic obstructive pulmonary disease, unspecified COPD type ([facility] [address]  -Rx per Primary MD    4. [facility]  -noted on recent RHC  -multifactorial and secondary to obesity+ JOANN+ COPD  -continue diuretic and avoid hypoxia    5. JOANN (obstructive sleep apnea)  -uses CPAP    6. Essential hypertension  -controlled  -continue medial management    7. Hyperlipidemia LDL goal <70  -continue statin     Plan:  Continue amiodarone, ASA, statin, clonidine, lasix, valsartan/HCTZ and verapamil  Discussed low fat/low sodium diet and reinforced regular aerobic exercise (recognize limited by pain). Follow up with 6 months or sooner with Dr. Terry Diggs    Return in about 6 months (around 1/15/2022) for with Dr. Terry Diggs. Thanks for allowing me to participate in the care of this patient.       FELICIA Gonsalves  [address], [address], [place] [address]  Office: [phone]  Fax: [phone]      Electronically signed by SILVIANO Easley CNP on 7/15/2021 at 5:01 PM

## 2021-07-13 DIAGNOSIS — I27.20 PULMONARY HTN (HCC): ICD-10-CM

## 2021-07-13 LAB
ANION GAP SERPL CALCULATED.3IONS-SCNC: 17 MMOL/L (ref 3–16)
BUN BLDV-MCNC: 58 MG/DL (ref 7–20)
CALCIUM SERPL-MCNC: 8.1 MG/DL (ref 8.3–10.6)
CHLORIDE BLD-SCNC: 105 MMOL/L (ref 99–110)
CO2: 22 MMOL/L (ref 21–32)
CREAT SERPL-MCNC: 2.3 MG/DL (ref 0.8–1.3)
GFR AFRICAN AMERICAN: 33
GFR NON-AFRICAN AMERICAN: 27
GLUCOSE BLD-MCNC: 106 MG/DL (ref 70–99)
POTASSIUM SERPL-SCNC: 5 MMOL/L (ref 3.5–5.1)
SODIUM BLD-SCNC: 144 MMOL/L (ref 136–145)

## 2021-07-15 ENCOUNTER — OFFICE VISIT (OUTPATIENT)
Dept: CARDIOLOGY CLINIC | Age: 81
End: 2021-07-15
Payer: MEDICARE

## 2021-07-15 VITALS
WEIGHT: 266 LBS | SYSTOLIC BLOOD PRESSURE: 138 MMHG | HEART RATE: 72 BPM | HEIGHT: 68 IN | BODY MASS INDEX: 40.32 KG/M2 | OXYGEN SATURATION: 97 % | DIASTOLIC BLOOD PRESSURE: 60 MMHG

## 2021-07-15 DIAGNOSIS — R06.02 SOB (SHORTNESS OF BREATH): ICD-10-CM

## 2021-07-15 DIAGNOSIS — I10 ESSENTIAL HYPERTENSION: ICD-10-CM

## 2021-07-15 DIAGNOSIS — G47.33 OSA (OBSTRUCTIVE SLEEP APNEA): ICD-10-CM

## 2021-07-15 DIAGNOSIS — I48.0 PAF (PAROXYSMAL ATRIAL FIBRILLATION) (HCC): Primary | ICD-10-CM

## 2021-07-15 DIAGNOSIS — J44.9 CHRONIC OBSTRUCTIVE PULMONARY DISEASE, UNSPECIFIED COPD TYPE (HCC): ICD-10-CM

## 2021-07-15 DIAGNOSIS — E78.5 HYPERLIPIDEMIA LDL GOAL <70: ICD-10-CM

## 2021-07-15 DIAGNOSIS — I27.20 PULMONARY HTN (HCC): ICD-10-CM

## 2021-07-15 PROCEDURE — 3023F SPIROM DOC REV: CPT | Performed by: NURSE PRACTITIONER

## 2021-07-15 PROCEDURE — 1036F TOBACCO NON-USER: CPT | Performed by: NURSE PRACTITIONER

## 2021-07-15 PROCEDURE — G8417 CALC BMI ABV UP PARAM F/U: HCPCS | Performed by: NURSE PRACTITIONER

## 2021-07-15 PROCEDURE — 4040F PNEUMOC VAC/ADMIN/RCVD: CPT | Performed by: NURSE PRACTITIONER

## 2021-07-15 PROCEDURE — G8926 SPIRO NO PERF OR DOC: HCPCS | Performed by: NURSE PRACTITIONER

## 2021-07-15 PROCEDURE — 1123F ACP DISCUSS/DSCN MKR DOCD: CPT | Performed by: NURSE PRACTITIONER

## 2021-07-15 PROCEDURE — G8427 DOCREV CUR MEDS BY ELIG CLIN: HCPCS | Performed by: NURSE PRACTITIONER

## 2021-07-15 PROCEDURE — 99213 OFFICE O/P EST LOW 20 MIN: CPT | Performed by: NURSE PRACTITIONER

## 2021-07-19 DIAGNOSIS — N18.32 STAGE 3B CHRONIC KIDNEY DISEASE (HCC): ICD-10-CM

## 2021-07-19 LAB
ANION GAP SERPL CALCULATED.3IONS-SCNC: 15 MMOL/L (ref 3–16)
BUN BLDV-MCNC: 50 MG/DL (ref 7–20)
CALCIUM SERPL-MCNC: 8.4 MG/DL (ref 8.3–10.6)
CHLORIDE BLD-SCNC: 99 MMOL/L (ref 99–110)
CO2: 24 MMOL/L (ref 21–32)
CREAT SERPL-MCNC: 2 MG/DL (ref 0.8–1.3)
GFR AFRICAN AMERICAN: 39
GFR NON-AFRICAN AMERICAN: 32
GLUCOSE BLD-MCNC: 88 MG/DL (ref 70–99)
POTASSIUM SERPL-SCNC: 4.8 MMOL/L (ref 3.5–5.1)
SODIUM BLD-SCNC: 138 MMOL/L (ref 136–145)

## 2021-08-01 DIAGNOSIS — I48.0 PAROXYSMAL ATRIAL FIBRILLATION (HCC): ICD-10-CM

## 2021-08-02 DIAGNOSIS — N18.32 STAGE 3B CHRONIC KIDNEY DISEASE (HCC): ICD-10-CM

## 2021-08-02 LAB
ANION GAP SERPL CALCULATED.3IONS-SCNC: 16 MMOL/L (ref 3–16)
BUN BLDV-MCNC: 37 MG/DL (ref 7–20)
CALCIUM SERPL-MCNC: 8.5 MG/DL (ref 8.3–10.6)
CHLORIDE BLD-SCNC: 103 MMOL/L (ref 99–110)
CO2: 24 MMOL/L (ref 21–32)
CREAT SERPL-MCNC: 1.9 MG/DL (ref 0.8–1.3)
GFR AFRICAN AMERICAN: 41
GFR NON-AFRICAN AMERICAN: 34
GLUCOSE BLD-MCNC: 95 MG/DL (ref 70–99)
POTASSIUM SERPL-SCNC: 4.4 MMOL/L (ref 3.5–5.1)
SODIUM BLD-SCNC: 143 MMOL/L (ref 136–145)

## 2021-08-02 NOTE — TELEPHONE ENCOUNTER
Last OV: 7/15/21  Next OV: 6 mth f/u 1/2022  Last refill:1/28/21  Most recent Labs: 7/27/21  Last PT/INR (if needed):  Last EKG (if needed):

## 2021-08-03 RX ORDER — AMIODARONE HYDROCHLORIDE 200 MG/1
TABLET ORAL
Qty: 90 TABLET | Refills: 2 | Status: SHIPPED | OUTPATIENT
Start: 2021-08-03 | End: 2022-08-16

## 2021-08-05 RX ORDER — POLYETHYLENE GLYCOL 3350 17 G/17G
POWDER, FOR SOLUTION ORAL
Qty: 510 G | Refills: 3 | Status: SHIPPED | OUTPATIENT
Start: 2021-08-05 | End: 2021-12-06

## 2021-09-03 ENCOUNTER — OFFICE VISIT (OUTPATIENT)
Dept: SURGERY | Age: 81
End: 2021-09-03
Payer: MEDICARE

## 2021-09-03 ENCOUNTER — PROCEDURE VISIT (OUTPATIENT)
Dept: SURGERY | Age: 81
End: 2021-09-03
Payer: MEDICARE

## 2021-09-03 VITALS — SYSTOLIC BLOOD PRESSURE: 150 MMHG | DIASTOLIC BLOOD PRESSURE: 58 MMHG

## 2021-09-03 DIAGNOSIS — I10 ESSENTIAL HYPERTENSION: ICD-10-CM

## 2021-09-03 DIAGNOSIS — I65.23 BILATERAL CAROTID ARTERY STENOSIS WITHOUT CEREBRAL INFARCTION: Primary | ICD-10-CM

## 2021-09-03 DIAGNOSIS — E78.5 HYPERLIPIDEMIA, UNSPECIFIED HYPERLIPIDEMIA TYPE: ICD-10-CM

## 2021-09-03 DIAGNOSIS — M79.89 LEG SWELLING: ICD-10-CM

## 2021-09-03 PROCEDURE — 1036F TOBACCO NON-USER: CPT | Performed by: NURSE PRACTITIONER

## 2021-09-03 PROCEDURE — 93880 EXTRACRANIAL BILAT STUDY: CPT | Performed by: SURGERY

## 2021-09-03 PROCEDURE — 99214 OFFICE O/P EST MOD 30 MIN: CPT | Performed by: NURSE PRACTITIONER

## 2021-09-03 PROCEDURE — 1123F ACP DISCUSS/DSCN MKR DOCD: CPT | Performed by: NURSE PRACTITIONER

## 2021-09-03 PROCEDURE — G8417 CALC BMI ABV UP PARAM F/U: HCPCS | Performed by: NURSE PRACTITIONER

## 2021-09-03 PROCEDURE — 4040F PNEUMOC VAC/ADMIN/RCVD: CPT | Performed by: NURSE PRACTITIONER

## 2021-09-03 PROCEDURE — G8427 DOCREV CUR MEDS BY ELIG CLIN: HCPCS | Performed by: NURSE PRACTITIONER

## 2021-09-03 NOTE — PATIENT INSTRUCTIONS
PATIENT EDUCATION focused on signs/symptoms of stroke:  - Watch for one eye going dark like a shade was pulled down. - Watch for one side of the body or face not functioning correctly. - Difficulty with speech, such as slurring your words. - Difficulty finding the right words, such as calling an object by the wrong name when you know the real name. If you have any of these symptoms, do not call us or your family doctor. Call 911 and go immediately to the hospital.  You have a 4-6 hour window from the point when symptoms start to get to the hospital, get a CT scan done and initiate clot dissolving drugs. Return in about 6 months (around 3/3/2022) for CDS and office visit .

## 2021-09-03 NOTE — LETTER
[address] [facility]  [facility] [address]  [place] [address] [address]  Phone: [phone]  Fax: [phone]    SILVIANO Valderrama CNP        September 3, 2021      [address] [place] [address], [address]     Patient: Daria Cameron    MR Number: <[id number]>    YOB: 1940    Date of Visit: 9/3/2021        Dear [address] [place] [address] Road:    Dr. Lorraine Woodson patient, was in the office today following his carotid duplex scan. My assessment is as follows:    Carotid artery stenosis, w/o mention of cerebral infarction  Current plans       * The patient has a 50-80% DERICK stenosis by velocity criteria; DERICK 67% by            image. * The patient has a 56-72% LICA stenosis by velocity criteria. * The patient has not experienced any symptoms related to his carotid disease. * Follow up in 6 months with carotid doppler scan and office visit. I will keep you posted regarding his progress.     Sincerely,      SILVIANO Valderrama CNP

## 2021-09-04 NOTE — PROGRESS NOTES
Subjective:      Patient ID: Michael Hinojosa is a 79 y.o. male. Chief Complaint   Patient presents with    Carotid Disease     6 month follow up for CDS and office visit        Pain Assessment  Gilmar Suresh has a pain level on 0/10 scale:  5  Location:  BLE knees  Description:  aching  Radiation:   No  Duration:+  years  Time:  intermittent    HPI     The patient is a 79 y.o. male who presents with a complaint of carotid stenosis follow up. The patient has been previously diagnosed with Left carotid artery stenosis and Right carotid artery stenosis. Date last seen: 2/24/2021. Patient denies numbness/weakness on any one side, speech disturbances or visual disturbances. Since last visit patient has had left CDS to be review today and right CDS to be reviewed today. The patient has not previously undergone surgical intervention for this problem. Review of Systems   HENT: Positive for hearing loss. Eyes: Negative for visual disturbance. Cardiovascular: Leg swelling: controlled with compression stockings. Musculoskeletal: Positive for arthralgias (bilateral knees - plans to discuss ablation with Dr. Sb Ochoa). Negative for neck pain. Neurological: Negative for dizziness, speech difficulty, weakness, light-headedness, numbness and headaches. All other systems reviewed and are negative. Allergies   Allergen Reactions    Hytrin [Terazosin Hcl]      Ineffective for BP control    Penicillins      Unknown reaction during childhood; Patient tolerated cephalosporins in the past    Shrimp Flavor Hives    Sulfa Antibiotics      Unknown reaction in childhood    Tekturna [Aliskiren Fumarate]      Ineffective    Vancomycin      Fever    Ciprofloxacin Rash     Fever and rash          Prior to Visit Medications    Medication Sig Taking?  Authorizing Provider   B-D UF III MINI PEN NEEDLES 31G X 5 MM MISC USE AS DIRECTED 3 TIMES A DAY Yes Juan Miguel Lopes MD   polyethylene glycol [facility]) 17 GM/SCOOP powder TAKE 17G BY MOUTH DAILY MIX WITH 8 OZ OF WATER Yes Stephanie Pena MD   amiodarone (CORDARONE) 200 MG tablet TAKE 1 TABLET BY MOUTH Darek Villegas MD   furosemide (LASIX) 20 MG tablet TAKE 40mg every other day for one week and 20mg on the days that pt is not taking 40mg.  Yes Stephanie Pena MD   Accu-Chek FastClix Lancets MISC USE TO TEST 3 TIMES A DAY DX CODE E11.9 Yes Stephanie Pena MD   pantoprazole (PROTONIX) 40 MG tablet TAKE 1 TABLET BY MOUTH EVERY DAY Yes Ga Jackson DO   levalbuterol (XOPENEX) 1.25 MG/3ML nebulizer solution USE 1 VIAL VIA NEBULIZER FOUR TIMES DAILY Yes Ga Jackson DO   valsartan (DIOVAN) 40 MG tablet Take by mouth Yes Historical Provider, MD   valsartan-hydroCHLOROthiazide (DIOVAN-HCT) 320-25 MG per tablet TAKE 1 TABLET BY MOUTH EVERY DAY Yes Stephanie Pena MD   atorvastatin (LIPITOR) 40 MG tablet TAKE 1 TABLET BY MOUTH EVERY DAY Yes Stephanie Pena MD   alfuzosin (UROXATRAL) 10 MG extended release tablet TAKE 1 TABLET BY MOUTH EVERY DAY Yes Stephanie Pena MD   BASAGLAR KWIKPEN 100 UNIT/ML injection pen INJECT 36 UNITS INTO THE SKIN 2 TIMES DAILY Yes Stephanie Pena MD   LINZESS 145 MCG capsule TAKE 1 CAPSULE BY MOUTH EVERY DAY IN THE MORNING BEFORE BREAKFAST Yes Stephanie Pena MD   cloNIDine (CATAPRES) 0.1 MG tablet TAKE 1 TABLET BY MOUTH TWICE A DAY Yes Stephanie Pena MD   verapamil (CALAN SR) 240 MG extended release tablet TAKE 1 TABLET BY MOUTH TWICE A DAY Yes Stephanie Pena MD   blood glucose test strips (ACCU-CHEK GUIDE) strip TEST AS DIRECTED 3 TIMES A DAY Yes Lincoln Norris MD   albuterol sulfate  (90 Base) MCG/ACT inhaler INHALE 2 PUFFS BY MOUTH EVERY 4 HOURS AS NEEDED FOR WHEEZE Yes Stephanie Pena MD   ZINC PO Take by mouth Yes Historical Provider, MD   CPAP Machine MISC by Does not apply route Yes Historical Provider, MD   montelukast (SINGULAIR) 10 MG tablet TAKE 1 TABLET BY MOUTH EVERY DAY Yes Addi Watson MD   Ascorbic Acid (SUPER C COMPLEX PO) Take by mouth daily Yes Historical Provider, MD   Multiple Vitamins-Minerals (MULTIVITAMIN ADULT PO) Take by mouth daily Yes Historical Provider, MD   Lactobacillus Rhamnosus, GG, ([employer]S PROBIOTIC, LACTOBACILLUS,) CAPS TAKE 1 CAPSULE BY MOUTH 2 TIMES DAILY (WITH MEALS) Yes Addi Watson MD   Docusate Sodium (COLACE PO) Take by mouth daily Yes Historical Provider, MD   Cyanocobalamin (VITAMIN B-12 PO) Take 500 mcg by mouth every other day  Yes Historical Provider, MD   Cholecalciferol (VITAMIN D3 PO) Take 2,000 Units by mouth daily  Yes Historical Provider, MD   aspirin EC 81 MG EC tablet Take 2 tablets by mouth daily. Yes Kishan Bautista MD   ferrous sulfate 325 (65 FE) MG tablet Take 325 mg by mouth 2 times daily  Yes Historical Provider, MD     History reviewed. Objective:   Physical Exam  Vitals reviewed. Constitutional:       General: He is not in acute distress. Appearance: Normal appearance. He is well-developed. Comments: obese   HENT:      Head: Normocephalic. Right Ear: Decreased hearing noted. Left Ear: Decreased hearing (bilateral hearing aids) noted. Eyes:      General: Lids are normal.      Conjunctiva/sclera: Conjunctivae normal.      Pupils: Pupils are equal, round, and reactive to light. Neck:      Vascular: No carotid bruit. Trachea: Trachea normal. No tracheal deviation. Cardiovascular:      Rate and Rhythm: Normal rate and regular rhythm. Pulses:           Carotid pulses are 2+ on the right side and 2+ on the left side. Radial pulses are 2+ on the right side and 2+ on the left side. Popliteal pulses are 2+ on the right side and 2+ on the left side. Dorsalis pedis pulses are 2+ on the right side and 2+ on the left side.         Posterior tibial pulses are 0 on the right side and 0 on the left side. Heart sounds: Normal heart sounds. No murmur heard. No friction rub. No gallop. Pulmonary:      Effort: Pulmonary effort is normal. No respiratory distress. Breath sounds: Normal breath sounds. Musculoskeletal:         General: No tenderness. Normal range of motion. Cervical back: Normal range of motion and neck supple. Right lower leg: Right lower leg edema: controlled with compression stockings; right ankle measures 20.9 cm; calf 36.6 cm. Left lower leg: Left lower leg edema: controlled with compression stockings; left ankle measures 22.3 cm, calf 37.7 cm. Skin:     General: Skin is warm and dry. Findings: No bruising, erythema or rash. Neurological:      Mental Status: He is alert and oriented to person, place, and time. Cranial Nerves: No cranial nerve deficit. Sensory: No sensory deficit. Coordination: Coordination normal.      Gait: Gait normal.   Psychiatric:         Speech: Speech normal.         Behavior: Behavior normal. Behavior is cooperative. Thought Content: Thought content normal.         Assessment:      1. Carotid artery stenosis, bilateral     * The patient has a 50-80% DERICK stenosis by velocity criteria; DERICK 67% by image. * The patient has a 22-77% LICA stenosis by velocity criteria. * The patient has not experienced any symptoms related to his carotid disease. * Follow up in 6 months with carotid doppler scan and office visit. 2. Hyperlipidemia - stable, on Lipitor 40 mg   3. Hypertension, benign - stable, on Diovan 40 mg, verapamil 240 mg, clonidine 0.1 mg, furosemide 20 mg   4. Leg swelling - wears compression stockings (20-30 mmHg) on a daily basis  He does not need a new prescription for stockings today        PATIENT EDUCATION focused on signs/symptoms of stroke:  - Watch for one eye going dark like a shade was pulled down. - Watch for one side of the body or face not functioning correctly.   - Difficulty with speech, such as slurring your words. - Difficulty finding the right words, such as calling an object by the wrong name when you know the real name. If you have any of these symptoms, do not call us or your family doctor. Call 911 and go immediately to the hospital.  You have a 4-6 hour window from the point when symptoms start to get to the hospital, get a CT scan done and initiate clot dissolving drugs. Plan:         Return in about 6 months (around 3/3/2022) for CDS and office visit .

## 2021-09-07 ENCOUNTER — OFFICE VISIT (OUTPATIENT)
Dept: ORTHOPEDIC SURGERY | Age: 81
End: 2021-09-07
Payer: MEDICARE

## 2021-09-07 VITALS
BODY MASS INDEX: 39.25 KG/M2 | SYSTOLIC BLOOD PRESSURE: 170 MMHG | HEART RATE: 78 BPM | DIASTOLIC BLOOD PRESSURE: 71 MMHG | WEIGHT: 259 LBS | HEIGHT: 68 IN

## 2021-09-07 DIAGNOSIS — M17.11 PRIMARY OSTEOARTHRITIS OF RIGHT KNEE: ICD-10-CM

## 2021-09-07 DIAGNOSIS — M17.12 PRIMARY OSTEOARTHRITIS OF LEFT KNEE: Primary | ICD-10-CM

## 2021-09-07 PROCEDURE — G8417 CALC BMI ABV UP PARAM F/U: HCPCS | Performed by: ORTHOPAEDIC SURGERY

## 2021-09-07 PROCEDURE — 1036F TOBACCO NON-USER: CPT | Performed by: ORTHOPAEDIC SURGERY

## 2021-09-07 PROCEDURE — 99213 OFFICE O/P EST LOW 20 MIN: CPT | Performed by: ORTHOPAEDIC SURGERY

## 2021-09-07 PROCEDURE — G8427 DOCREV CUR MEDS BY ELIG CLIN: HCPCS | Performed by: ORTHOPAEDIC SURGERY

## 2021-09-07 PROCEDURE — 1123F ACP DISCUSS/DSCN MKR DOCD: CPT | Performed by: ORTHOPAEDIC SURGERY

## 2021-09-07 PROCEDURE — 4040F PNEUMOC VAC/ADMIN/RCVD: CPT | Performed by: ORTHOPAEDIC SURGERY

## 2021-09-07 NOTE — PROGRESS NOTES
INITIAL EVALUATION OF KNEE                                                                    9/7/2021  Gilmar DICK Suresh     1940       History of Present Illness:    Martina Berg is seen for Knee Pain (Bilateral Knee Pain  LOV 10/2/15)      Martina Berg is now 79years of age and returns after 6 years for evaluation of his bilateral knee pain with right worse than left. In 2015 a right total knee replacement was suggested to the patient however because of his severe pulmonary disease no operation was done and he was treated with a steroid injection. About 18 months ago he did have a Visco series at [facility] which also gave him no lasting relief. He complains of intermittent ache with no pain at rest but pain up to 7 with walking twisting standing. He does require the use of a cane for ambulation. His left knee pain is similar but not as severe. He is limited in ambulation to about 5 minutes at one time. Medical history was reviewed. His chief problem is COPD and obstructive sleep apnea. This makes it very difficult for him to lie flat for any procedures. He also has a history of hypertension and type 2 diabetes. He states that he quit smoking  In 1980. I have today reviewed with Shaji Patel the clinically relevant past medical history, medications, allergies, family history, and Review of Systems from the patients most recent history form, and I have documented any details relevant to today's presenting complaints in my history above. The patient's self recorded documents concerning the above have been scanned  into the chart under the \"Media\" tab.        BP (!) 174/71   Pulse 76   Ht 5' 8\" (1.727 m)   Wt 259 lb (117.5 kg)   BMI 39.38 kg/m²     Physical examination:    General Appearance: no acute distress, alert, oriented x 3  Limps when walking: yes - waddling gait, Right                                       Left  Swelling moderate moderate   Effusion  mild mild   Ecchymosis neg neg   Errythemia neg neg   Warmth neg neg   Deformity Varus  12 Varus  15 degrees    Crepitus mild mild   Patellar Subluxation neg neg   Tenderness  Medial  Medial    Log Roll neg neg   Patellar Apprehension Positive  neg   Patellar Grind positive neg   Extension Lag neg neg     Neurovascular Status:   Range of Motion Extension Flexion Pulses (0-4) Dorsalis  Pedis Posterior  Tibialis   Right 5  113        Degrees Right   2+    Left   10      115    Degrees Left 2+                          Test Including Ligamentous Stability/ Positive or Negative                                       Test          Right         Left   Valgus    neg               neg                Varus neg  5   Lachman neg neg   Anterior Drawer neg 1 +   Posterior Drawer neg neg   Maria D     Pivot Shift     Quadraceps Active     Atrophy                    cm                   cm       X-Ray Findings taken in Office: 4 views of the right knees were read by myself and shows complete loss of medial joint space and dishing out of his medial tibial plateau. Tricompartmental osteoarthritic changes are noted. Sommers angle shows 17 degrees varus. X-Rays of the left knee: 4 views done in the office were read by myself and shows complete loss of medial joint space and marked wearing of his medial plateau. Tricompartmental osteophytic changes are noted. Sommers angle shows varus deformity of 22 degrees. Impression:   1. Severe bilateral osteoarthritis of knee with varus deformity with right knee more symptomatic than left. 2.  COPD which apparently is not related to smoking. 3.  Severe sleep apnea. 4.  Diabetes mellitus and coronary artery disease. Plan/Treatment:   1. Because of his inability to lie flat he is likely not an operative candidate for knee replacement.   2.  Other treatment options including steroid injections and Visco injections have not been helpful. 3.  He is interested in a trial to see if he is a candidate for geniculate nerve ablation procedure. 4.  Expected results were described to the patient. He is aware that his prognosis for pain relief is limited in his case by the severity of disease. 5.  After discussion he has elected to return for a geniculate nerve block to see if he is a candidate for the ablation procedure of his right knee. Lorrie Cano MD  9/7/2021    This dictation was done with [employer] dictation and may contain mechanical errors related to translation.

## 2021-09-07 NOTE — PATIENT INSTRUCTIONS
Impression:   1. Severe bilateral osteoarthritis of knee with varus deformity with right knee more symptomatic than left. 2.  COPD which apparently is not related to smoking. 3.  Severe sleep apnea. 4.  Diabetes mellitus and coronary artery disease. Plan/Treatment:   1. Because of his inability to lie flat he is likely not an operative candidate for knee replacement. 2.  Other treatment options including steroid injections and Visco injections have not been helpful. 3.  He is interested in a trial to see if he is a candidate for geniculate nerve ablation procedure. 4.  Expected results were described to the patient. He is aware that his prognosis for pain relief is limited in his case by the severity of disease. 5.  After discussion he has elected to return for a geniculate nerve block to see if he is a candidate for the ablation procedure of his right knee.           Jarek Peng MD  9/7/2021

## 2021-09-28 ENCOUNTER — OFFICE VISIT (OUTPATIENT)
Dept: ORTHOPEDIC SURGERY | Age: 81
End: 2021-09-28
Payer: MEDICARE

## 2021-09-28 VITALS
BODY MASS INDEX: 39.25 KG/M2 | DIASTOLIC BLOOD PRESSURE: 62 MMHG | HEART RATE: 67 BPM | WEIGHT: 259 LBS | SYSTOLIC BLOOD PRESSURE: 126 MMHG | HEIGHT: 68 IN

## 2021-09-28 DIAGNOSIS — M17.11 PRIMARY OSTEOARTHRITIS OF RIGHT KNEE: Primary | ICD-10-CM

## 2021-09-28 PROCEDURE — 64454 NJX AA&/STRD GNCLR NRV BRNCH: CPT | Performed by: ORTHOPAEDIC SURGERY

## 2021-09-28 RX ORDER — LIDOCAINE HYDROCHLORIDE 20 MG/ML
8 INJECTION, SOLUTION INFILTRATION; PERINEURAL ONCE
Status: COMPLETED | OUTPATIENT
Start: 2021-09-28 | End: 2021-09-28

## 2021-09-28 RX ADMIN — LIDOCAINE HYDROCHLORIDE 8 ML: 20 INJECTION, SOLUTION INFILTRATION; PERINEURAL at 12:12

## 2021-09-28 NOTE — PATIENT INSTRUCTIONS
Impression:  right knee osteoarthritis. Plan:  Geniculate nerve block was recommended to the patient who understands that this is a trial to determine the appropriateness for Cooled Radiofrequency ablation of the knee. Procedures:  I. Inferomedial geniculate nerve block. 2. Superomedial geniculate nerve block. 3. Superolateral geniculate nerve block. 4. Suprapateller geniculate nerve block from the Vastus Lateralis branch of the Femoral nerve . Anesthesia: local    Procedure narrartive:  1. The geniculate nerve block procedure for the  right knee using ultrasound visualization was explained to the patient  . He understands the risks and benefits of the injection procedure,  and describes no potential allergies. Time out was performed to verify correct person, correct procedure, and correct site. 2. A bolster was placed under the  right knee and the knee was draped and cleansed with ChloroPrep. 3. Ultrasound visualization was first performed utilizing the [employer] Ultrasound unit using  18/4 MHz Linear Probe with sterile drape in long axes to the tibia, finding the neurovascular bundle of the inferomedial geniculate nerve at the junction of the diaphysis and metaphysis below the medial tibial plateau. The ultrasound doppler function was utilized to aid in location of the bundle. The location of the needle insertion was determined anterior to the geniculate nerve and the skin overlying the  site was anesthetized with 1 mL of 2% Lidocaine using a 25 gauge needle. After sufficient time was allowed for anesthesia, a second 25-gauge  needle was then introduced under ultrasound control to Inferomedial geniculate neurovascular bundle using out of plane technique. Once the needle was in position, the nerve was injected with 1 mL  of 2% Lidocaine. 4. Attention was turned to the medial aspect of the distal femur.   The transducer was utilized in long axis to identify the superomedial geniculate neurovascular bundle just proximal to  the adductor sharda insertion into the medial femoral epicondyle at the distal femoral metaphyseal diaphyseal junction. Once the injection site was identified, and the skin overlying the site was anesthetized with 1ml of 2% Lidocaine. After allowing sufficient time for analgesia,the superomedial geniculate nerve was injected with 1 ml of 2% Lidocaine using out of plane technique using a 25 gauge needle. 5. Next the attention was turned to the lateral aspect of the distal femur where the superolateral geniculate neurovascular bundle was identified using ultrasound at the distal femoral metaphyseal diaphyseal junction. The overlying skin was anesthetized using a 25 gauge needle with 1ml of 2% Lidocaine, and after allowing sufficient time for analgesia,  the nerve was injected with 1 ml of 2%  Lidocaine using a 25 gauge needle in identical manner. 6. Attention was turned to the Suprapatellar branch of the femoral nerve/ Nerve to the Vastus Lateralis which was located with ultrasound  just superficial to the vastus intermedius muscle and proximal to the supra patellar pouch. The skin over the injection site was first anesthetized with 1ml of 2% Lidocaine using a 25 gauge needle, and after allowing sufficient time for analgesia,  the suprapatellar geniculate nerve anesthetized with 1 ml of 2% lidocaine and 25 gauge needle using in plane technique. 7. Bandaids were applied to the injection sites, and the patient was assisted in transfer to a chair. 8.  Gilmar tolerated the procedure well and  did report 90% relief of  the right knee pain within 5 minutes of the injection. 9.  Based on the injection results, Kaylie Good is  a candidate for the Cooled Radio Frequency Geniculate Nerve Ablation Procedure. 10.  He is aware of risks and benefits of the procedure and desires to proceed.     Porter Palomino MD  9/28/2021

## 2021-09-28 NOTE — PROGRESS NOTES
ULTRASOUND GUIDED GENICULATE NERVE BLOCK  Surgeon: MD Susan Yeh    September 28, 2021      Chief Complaint   Patient presents with    Follow-up     Rt Knee Coolief Nerve Block        /62   Pulse 67   Ht 5' 8\" (1.727 m)   Wt 259 lb (117.5 kg)   BMI 39.38 kg/m²     Gilmar is here for a diagnostic geniculate nerve block of his right knee. He is scheduled for this procedure to determine if he is a candidate for a Cooled Radiofrequency Ablation Procedure for his chronic knee pain. His pain is approximately same as at last office visit of 9/7/2021. He has pain up to 5 with ambulation. He is not a candidate for total knee replacement because of his severe pulmonary disease and inability to lie flat. I have today reviewed with Susan Carrera the clinically relevant past medical history, medications, allergies, family history, and Review of Systems from the patients most recent history form, and I have documented any details relevant to today's presenting complaints in my history above. The patient's self recorded documents concerning the above have been scanned  into the chart under the \"Media\" tab. Physical Exam:   Examination of the right knee shows no sign  of synovitis   No Lesion of the skin is noted over the injection sites    Impression:  right knee osteoarthritis. Plan:  Geniculate nerve block was recommended to the patient who understands that this is a trial to determine the appropriateness for Cooled Radiofrequency ablation of the knee. Procedures:  I. Inferomedial geniculate nerve block. 2. Superomedial geniculate nerve block. 3. Superolateral geniculate nerve block. 4. Suprapateller geniculate nerve block from the Vastus Lateralis branch of the Femoral nerve . Anesthesia: local    Procedure narrartive:  1. The geniculate nerve block procedure for the  right knee using ultrasound visualization was explained to the patient  . He understands the risks and benefits of the injection procedure,  and describes no potential allergies. Time out was performed to verify correct person, correct procedure, and correct site. 2. A bolster was placed under the  right knee and the knee was draped and cleansed with ChloroPrep. 3. Ultrasound visualization was first performed utilizing the [employer] Ultrasound unit using  18/4 MHz Linear Probe with sterile drape in long axes to the tibia, finding the neurovascular bundle of the inferomedial geniculate nerve at the junction of the diaphysis and metaphysis below the medial tibial plateau. The ultrasound doppler function was utilized to aid in location of the bundle. The location of the needle insertion was determined anterior to the geniculate nerve and the skin overlying the  site was anesthetized with 1 mL of 2% Lidocaine using a 25 gauge needle. After sufficient time was allowed for anesthesia, a second 25-gauge  needle was then introduced under ultrasound control to Inferomedial geniculate neurovascular bundle using out of plane technique. Once the needle was in position, the nerve was injected with 1 mL  of 2% Lidocaine. 4. Attention was turned to the medial aspect of the distal femur. The transducer was utilized in long axis to identify the superomedial geniculate neurovascular bundle just proximal to  the adductor sharda insertion into the medial femoral epicondyle at the distal femoral metaphyseal diaphyseal junction. Once the injection site was identified, and the skin overlying the site was anesthetized with 1ml of 2% Lidocaine. After allowing sufficient time for analgesia,the superomedial geniculate nerve was injected with 1 ml of 2% Lidocaine using out of plane technique using a 25 gauge needle.    5. Next the attention was turned to the lateral aspect of the distal femur where the superolateral geniculate neurovascular bundle was identified using ultrasound at the distal femoral metaphyseal diaphyseal junction. The overlying skin was anesthetized using a 25 gauge needle with 1ml of 2% Lidocaine, and after allowing sufficient time for analgesia,  the nerve was injected with 1 ml of 2%  Lidocaine using a 25 gauge needle in identical manner. 6. Attention was turned to the Suprapatellar branch of the femoral nerve/ Nerve to the Vastus Lateralis which was located with ultrasound  just superficial to the vastus intermedius muscle and proximal to the supra patellar pouch. The skin over the injection site was first anesthetized with 1ml of 2% Lidocaine using a 25 gauge needle, and after allowing sufficient time for analgesia,  the suprapatellar geniculate nerve anesthetized with 1 ml of 2% lidocaine and 25 gauge needle using in plane technique. 7. Bandaids were applied to the injection sites, and the patient was assisted in transfer to a chair. 8.  Gilmar tolerated the procedure well and  did report 90% relief of  the right knee pain within 5 minutes of the injection. 9.  Based on the injection results, Estuardo Le is  a candidate for the Cooled Radio Frequency Geniculate Nerve Ablation Procedure. 10.  He is aware of risks and benefits of the procedure and desires to proceed.     Ramonita Silver MD  9/28/2021

## 2021-10-13 NOTE — FLOWSHEET NOTE
[facility] time__10/21/21 1100__________        Surgery time_____1200_______    Take the following medications with a sip of water:    Do not eat or drink anything after 12:00 midnight prior to your surgery. This includes water chewing gum, mints and ice chips. You may brush your teeth and gargle the morning of your surgery, but do not swallow the water     Please see your family doctor/pediatrician for a history and physical and/or concerning medications. Bring any test results/reports from your physicians office. If you are under the care of a heart doctor or specialist doctor, please be aware that you may be asked to them for clearance    You may be asked to stop blood thinners such as Coumadin, Plavix, Fragmin, Lovenox, etc., or any anti-inflammatories such as:  Aspirin, Ibuprofen, Advil, Naproxen prior to your surgery. We also ask that you stop any OTC medications such as fish oil, vitamin E, glucosamine, garlic, Multivitamins, COQ 10, etc.    We ask that you do not smoke 24 hours prior to surgery  We ask that you do not  drink any alcoholic beverages 24 hours prior to surgery     You must make arrangements for a responsible adult to take you home after your surgery. For your safety you will not be allowed to leave alone or drive yourself home. Your surgery will be cancelled if you do not have a ride home. Also for your safety, it is strongly suggested that someone stay with you the first 24 hours after your surgery. A parent or legal guardian must accompany a child scheduled for surgery and plan to stay at the hospital until the child is discharged. Please do not bring other children with you. For your comfort, please wear simple loose fitting clothing to the hospital.  Please do not bring valuables.     Do not wear any make-up or nail polish on your fingers or toes      For your safety, please do not wear any jewelry or body piercing's on the day of surgery. All jewelry must be removed. If you have dentures, they will be removed before going to operating room. For your convenience, we will provide you with a container. If you wear contact lenses or glasses, they will be removed, please bring a case for them. If you have a living will and a durable power of  for healthcare, please bring in a copy. As part of our patient safety program to minimize surgical site infections, we ask you to do the following:    · Please notify your surgeon if you develop any illness between         now and the  day of your surgery. · This includes a cough, cold, fever, sore throat, nausea,         or vomiting, and diarrhea, etc.  ·  Please notify your surgeon if you experience dizziness, shortness         of breath or blurred vision between now and the time of your surgery. Do not shave your operative site 96 hours prior to surgery. For face and neck surgery, men may use an electric razor 48 hours   prior to surgery. You may shower the night before surgery or the morning of   your surgery with an antibacterial soap. You will need to bring a photo ID and insurance card    [facility] has an onsite pharmacy, would you like to utilize our pharmacy     If you will be staying overnight and use a C-pap machine, please bring   your C-pap to hospital     Our goal is to provide you with excellent care, therefore, visitors will be limited to two(2) in the room at a time so that we may focus on providing this care for you. Please contact pre-admission testing if you have any further questions. [facility] phone number:  [phone]  Please note these are generalized instructions for all surgical cases, you may be provided with more specific instructions according to your surgery.

## 2021-10-20 ENCOUNTER — HOSPITAL ENCOUNTER (OUTPATIENT)
Age: 81
Setting detail: OUTPATIENT SURGERY
Discharge: HOME | End: 2021-10-20
Attending: ORTHOPAEDIC SURGERY | Admitting: ORTHOPAEDIC SURGERY
Payer: MEDICARE

## 2021-10-20 VITALS
SYSTOLIC BLOOD PRESSURE: 152 MMHG | RESPIRATION RATE: 20 BRPM | TEMPERATURE: 96.8 F | WEIGHT: 265.25 LBS | OXYGEN SATURATION: 96 % | HEIGHT: 68 IN | BODY MASS INDEX: 40.2 KG/M2 | DIASTOLIC BLOOD PRESSURE: 64 MMHG | HEART RATE: 70 BPM

## 2021-10-20 PROCEDURE — 64624 DSTRJ NULYT AGT GNCLR NRV: CPT | Performed by: ORTHOPAEDIC SURGERY

## 2021-10-20 PROCEDURE — 3610000056 HC PAIN LEVEL 4 BASE (NON-OR): Performed by: ORTHOPAEDIC SURGERY

## 2021-10-20 PROCEDURE — 2709999900 HC NON-CHARGEABLE SUPPLY: Performed by: ORTHOPAEDIC SURGERY

## 2021-10-20 PROCEDURE — 2500000003 HC RX 250 WO HCPCS: Performed by: ORTHOPAEDIC SURGERY

## 2021-10-20 PROCEDURE — 3610000057 HC PAIN LEVEL 4 ADDL 15 MIN (NON-OR): Performed by: ORTHOPAEDIC SURGERY

## 2021-10-20 PROCEDURE — 2720000010 HC SURG SUPPLY STERILE: Performed by: ORTHOPAEDIC SURGERY

## 2021-10-20 RX ORDER — BUPIVACAINE HYDROCHLORIDE 5 MG/ML
INJECTION, SOLUTION EPIDURAL; INTRACAUDAL
Status: COMPLETED | OUTPATIENT
Start: 2021-10-20 | End: 2021-10-20

## 2021-10-20 RX ORDER — LIDOCAINE HYDROCHLORIDE 20 MG/ML
INJECTION, SOLUTION EPIDURAL; INFILTRATION; INTRACAUDAL; PERINEURAL
Status: COMPLETED | OUTPATIENT
Start: 2021-10-20 | End: 2021-10-20

## 2021-10-20 ASSESSMENT — PAIN SCALES - GENERAL: PAINLEVEL_OUTOF10: 0

## 2021-10-20 ASSESSMENT — PAIN - FUNCTIONAL ASSESSMENT: PAIN_FUNCTIONAL_ASSESSMENT: 0-10

## 2021-10-20 NOTE — H&P
Patient:  Rina Barrow  YOB: 1940  Medical Record #:  [id number]   Place: [address] [facility]  Date:  10/20/2021   Physician:  Murray Gomez MD    History Obtained From: electronic medical record    HISTORY OF Gen Crum is an 79year old getleman with a long history of  Right knee pain associated with radiographic evidence of severe grade 4 osteoarthritis. He has failed conservative treatment including a visco series . He is not a candidate for total knee replacement because of his history of pulmonary disease. He had a successful geniculate nerve block on 7-28-21, and is scheduled for the Coolief procedure. Past Medical History:        Diagnosis Date    Allergic rhinitis     Asthma     Atrial fibrillation (HCC)     Carotid artery stenosis     Chronic kidney disease     COPD (chronic obstructive pulmonary disease) (HCC)     CPAP (continuous positive airway pressure) dependence     ESBL (extended spectrum beta-lactamase) producing bacteria infection 12/26/2018    urine    Hyperlipidemia     Hypertension     Iron deficiency anemia     Obstructive sleep apnea     Type II or unspecified type diabetes mellitus without mention of complication, not stated as uncontrolled      Past Surgical History:        Procedure Laterality Date    CATARACT REMOVAL  2/2012    bilateral    COLONOSCOPY  7/10/2009    diverticulosis    hemorrhoids    GALLBLADDER SURGERY  1981    UPPER GASTROINTESTINAL ENDOSCOPY  7-2005    7/10/2009    normal     Medications Prior to Admission:   No current facility-administered medications on file prior to encounter.      Current Outpatient Medications on File Prior to Encounter   Medication Sig Dispense Refill    B-D UF III MINI PEN NEEDLES 31G X 5 MM MISC USE AS DIRECTED 3 TIMES A  each 11    polyethylene glycol (GLYCOLAX) 17 GM/SCOOP powder TAKE 17G BY MOUTH DAILY MIX WITH 8 OZ OF WATER 510 g 3    amiodarone (CORDARONE) 200 MG tablet TAKE 1 TABLET BY MOUTH EVERY OTHER DAY 90 tablet 2    furosemide (LASIX) 20 MG tablet TAKE 40mg every other day for one week and 20mg on the days that pt is not taking 40mg.  90 tablet 3    Accu-Chek FastClix Lancets MISC USE TO TEST 3 TIMES A DAY DX CODE E11.9 102 each 17    pantoprazole (PROTONIX) 40 MG tablet TAKE 1 TABLET BY MOUTH EVERY DAY 90 tablet 1    levalbuterol (XOPENEX) 1.25 MG/3ML nebulizer solution USE 1 VIAL VIA NEBULIZER FOUR TIMES DAILY 360 mL 11    valsartan-hydroCHLOROthiazide (DIOVAN-HCT) 320-25 MG per tablet TAKE 1 TABLET BY MOUTH EVERY DAY 90 tablet 3    atorvastatin (LIPITOR) 40 MG tablet TAKE 1 TABLET BY MOUTH EVERY DAY 90 tablet 3    alfuzosin (UROXATRAL) 10 MG extended release tablet TAKE 1 TABLET BY MOUTH EVERY DAY 90 tablet 3    BASAGLAR KWIKPEN 100 UNIT/ML injection pen INJECT 36 UNITS INTO THE SKIN 2 TIMES DAILY 5 pen 4    LINZESS 145 MCG capsule TAKE 1 CAPSULE BY MOUTH EVERY DAY IN THE MORNING BEFORE BREAKFAST 30 capsule 11    cloNIDine (CATAPRES) 0.1 MG tablet TAKE 1 TABLET BY MOUTH TWICE A  tablet 3    verapamil (CALAN SR) 240 MG extended release tablet TAKE 1 TABLET BY MOUTH TWICE A  tablet 3    blood glucose test strips (ACCU-CHEK GUIDE) strip TEST AS DIRECTED 3 TIMES A  strip 5    albuterol sulfate  (90 Base) MCG/ACT inhaler INHALE 2 PUFFS BY MOUTH EVERY 4 HOURS AS NEEDED FOR WHEEZE 18 Inhaler 11    ZINC PO Take by mouth      CPAP Machine MISC by Does not apply route      montelukast (SINGULAIR) 10 MG tablet TAKE 1 TABLET BY MOUTH EVERY DAY 90 tablet 3    Ascorbic Acid (SUPER C COMPLEX PO) Take by mouth daily      Multiple Vitamins-Minerals (MULTIVITAMIN ADULT PO) Take by mouth daily      Lactobacillus Rhamnosus, GG, ([employer]S PROBIOTIC, LACTOBACILLUS,) CAPS TAKE 1 CAPSULE BY MOUTH 2 TIMES DAILY (WITH MEALS) 60 capsule 11    Docusate Sodium (COLACE PO) Take by mouth daily      Cyanocobalamin (VITAMIN B-12 PO) Take 500 mcg by mouth every other day       Cholecalciferol (VITAMIN D3 PO) Take 2,000 Units by mouth daily       aspirin EC 81 MG EC tablet Take 2 tablets by mouth daily.  30 tablet 3    ferrous sulfate 325 (65 FE) MG tablet Take 325 mg by mouth 2 times daily        Allergies:  Hytrin [terazosin hcl], Penicillins, Shrimp flavor, Sulfa antibiotics, Tekturna [aliskiren fumarate], Vancomycin, and Ciprofloxacin  Social History     Socioeconomic History    Marital status:      Spouse name: Not on file    Number of children: 11    Years of education: Not on file    Highest education level: Not on file   Occupational History    Occupation: retired   Tobacco Use    Smoking status: Former Smoker     Packs/day: 0.50     Years: 18.00     Pack years: 9.00     Start date: 1958     Quit date: 1988     Years since quittin.8    Smokeless tobacco: Never Used   Vaping Use    Vaping Use: Never used   Substance and Sexual Activity    Alcohol use: No     Alcohol/week: 0.0 standard drinks    Drug use: No    Sexual activity: Not Currently   Other Topics Concern    Not on file   Social History Narrative    Not on file     Social Determinants of Health     Financial Resource Strain: Unknown    Difficulty of Paying Living Expenses: Patient refused   Food Insecurity: Unknown    Worried About Running Out of Food in the Last Year: Patient refused   [address] in the Last Year: Patient refused   Transportation Needs: Unknown    Lack of Transportation (Medical): Patient refused    Lack of Transportation (Non-Medical): Patient refused   Physical Activity:     Days of Exercise per Week:     Minutes of Exercise per Session:    Stress:     Feeling of Stress :    Social Connections:     Frequency of Communication with Friends and Family:     Frequency of Social Gatherings with Friends and Family:     Attends Congregational Services:     Active Member of Clubs or Organizations:     Attends Club or Organization Meetings:  Marital Status:    Intimate Partner Violence:     Fear of Current or Ex-Partner:     Emotionally Abused:     Physically Abused:     Sexually Abused:      Family History   Problem Relation Age of Onset    Heart Disease Mother     Stroke Mother     Vision Loss Mother     High Blood Pressure Father     High Blood Pressure Brother     Diabetes Brother     Sleep Apnea Brother     Arthritis Paternal Grandmother     Diabetes Paternal Grandmother      Review of System:    I have today reviewed with Gilmar Suresh the clinically relevant past medical history, medications, allergies, family history, and Review of Systems from the patients most recent history form, and I have documented any details relevant to today's presenting complaints in my history above. The patient's self recorded documents concerning the above have been scanned  into the chart under the \"Media\" tab. PHYSICAL EXAM:      BP (!) 171/50   Pulse 80   Temp 97.6 °F (36.4 °C) (Temporal)   Resp 28   Ht 5' 8\" (1.727 m)   Wt 265 lb 4 oz (120.3 kg)   SpO2 98%   BMI 40.33 kg/m²  I    General Appearance: no acute distress, alert, oriented x 3  Examination of the right knee reveals no sign of synovitis. Vascular exam of right foot intact. Cardiac: Regular rate and rhythm without murmur  Pulmonary: Lungs clear to ausculation bilaterally    ASSESSMENT        1. Osteorarthritis of  right knee       PLAN:  Procedure(s):  COOLIEF RADIOFREQUENCY ABLATION - RIGHT KNEE    1. Coolief Cooled Geniculate Nerve Ablation right      No name on file.

## 2021-10-20 NOTE — OP NOTE
Operative Note      Patient: Merrily Mcardle  YOB: 1940  MRN: [id number]    Date of Procedure: 10/20/2021    Pre-Op Diagnosis: Osteoarthritis of right knee    Post-Op Diagnosis: Same       Procedure(s):  COOLIEF RADIOFREQUENCY ABLATION - RIGHT KNEE    Surgeon(s):  Dawson Kamara MD    Anesthesia: Local    Estimated Blood Loss (mL): 0 ml    Complications: None     Specimens: None    Implants: None      Drains: N/A    Procedure narrartive:  1. The geniculate nerve ablation procedure for the right knee using ultrasound visualization was explained to the patient  . He understands the risks and benefits of the injection procedure,  and describes no potential allergies. Time out was performed to verify correct person, correct procedure, and correct site. 2. A bolster was placed under the  right knee and the knee was prepped with ChloroPrep and draped in a sterile manner   3. Ultrasound visualization was first performed utilizing the [facility] Ultrasound unit using  18/4 MHz Linear Probe with sterile drape in long axes to the tibia, finding the neurovascular bundle of the inferomedial geniculate nerve at the junction of the diaphysis and metaphysis below the medial tibial plateau. The ultrasound doppler function was utilized to aid in location of the bundle. The location of the needle insertion was determined anterior to the geniculate nerve and the site was anesthetized with 4 ml of a 50/50 mixture of 2% Lidocaine, and 0.5 % Marcaine using a 25 gauge needle. After sufficient time was allowed for anesthesia, the Coolief procedure needle was then introduced under ultrasound control to Inferomedial geniculate neurovascular bundle using out of plane technique. Once the needle was in position, the radiofrequency probe was placed through the needle. Using the [facility], ablation was performed at 80 degrees for 2.5 minutes.   20 mg of Depomedrol was introduced into the site prior to removing the needle. 4. Attention was turned to the medial aspect of the distal femur. The transducer was utilized in long axis to identify the superomedial geniculate neurovascular bundle just proximal to  the adductor sharda insertion into the medial femoral epicondyle at the distal femoral metaphyseal diaphyseal junction. The ultrasound doppler function was utilized to aid in location of the bundle. The location of the needle insertion was determined anterior to the geniculate nerve and the site was anesthetized with 4 ml of a 50/50 mixture of 2% Lidocaine, and 0.5 % Marcaine using a 25 gauge needle. After sufficient time was allowed for anesthesia, the Coolief procedure needle was then introduced under ultrasound control to superomedial geniculate neurovascular bundle using out of plane technique. Once the needle was in position, the radiofrequency probe was placed through the needle. Using the [facility] Unit, ablation was performed at 80 degrees for 2.5 minutes. 20 mg of Depomedrol was introduced into the site prior to removing the needle. 5. Next the attention was turned to the lateral aspect of the distal femur where the superolateral geniculate neurovascular bundle was identified using ultrasound at the distal femoral metaphyseal diaphyseal junction. The ultrasound doppler function was utilized to aid in location of the bundle. The location of the needle insertion was determined anterior to the geniculate nerve and the site was anesthetized with 4 ml of a 50/50 mixture of 2% Lidocaine, and 0.5 % Marcaine using a 25 gauge needle. After sufficient time was allowed for anesthesia, the Coolief procedure needle was then introduced under ultrasound control to the superolaeral geniculate neurovascular bundle using out of plane technique. Once the needle was in position, the radiofrequency probe was placed through the needle. Using the [facility] Unit, ablation was performed at 80 degrees for 2.5 minutes.  20 mg of Depomedrol was introduced into the site prior to removing the needle. 6. Attention was turned to the Suprapatellar branch of the femoral nerve/ Nerve to the vastus lateralis which was located with ultrasound  just superficial to the vastus intermedius muscle and proximal to the supra patellar pouch. The location of the needle insertion was determined lateral to the geniculate nerve and the site was anesthetized with 4 ml of a 50/50 mixture of 2% Lidocaine  and 0.5 % Marcaine using a 25 gauge needle. After sufficient time was allowed for anesthesia, the Coolief procedure needle was then introduced under ultrasound control to the suprapatellar geniculate neurovascular bundle utilizing in-plane technique. Using the [facility], ablation was performed at 80 degrees for 2.5 minutes. 20 mg of Depomedrol was introduced into the site prior to removing the needle. 7. Bandaids were applied to the injection sites, and the patient was taken to the recovery room in satisfactory condition.      Electronically signed by Nina Guillory MD on 10/20/2021 at 12:41 PM

## 2021-10-27 ENCOUNTER — IMMUNIZATION (OUTPATIENT)
Dept: PRIMARY CARE CLINIC | Age: 81
End: 2021-10-27
Payer: MEDICARE

## 2021-10-27 PROCEDURE — 91300 COVID-19, PFIZER VACCINE 30MCG/0.3ML DOSE: CPT | Performed by: FAMILY MEDICINE

## 2021-10-27 PROCEDURE — 0004A COVID-19, PFIZER VACCINE 30MCG/0.3ML DOSE: CPT | Performed by: FAMILY MEDICINE

## 2021-11-23 ENCOUNTER — OFFICE VISIT (OUTPATIENT)
Dept: ORTHOPEDIC SURGERY | Age: 81
End: 2021-11-23
Payer: MEDICARE

## 2021-11-23 VITALS — WEIGHT: 259 LBS | BODY MASS INDEX: 39.25 KG/M2 | HEIGHT: 68 IN

## 2021-11-23 DIAGNOSIS — M17.12 PRIMARY OSTEOARTHRITIS OF LEFT KNEE: Primary | ICD-10-CM

## 2021-11-23 PROCEDURE — G8484 FLU IMMUNIZE NO ADMIN: HCPCS | Performed by: ORTHOPAEDIC SURGERY

## 2021-11-23 PROCEDURE — 1036F TOBACCO NON-USER: CPT | Performed by: ORTHOPAEDIC SURGERY

## 2021-11-23 PROCEDURE — 64454 NJX AA&/STRD GNCLR NRV BRNCH: CPT | Performed by: ORTHOPAEDIC SURGERY

## 2021-11-23 PROCEDURE — G8417 CALC BMI ABV UP PARAM F/U: HCPCS | Performed by: ORTHOPAEDIC SURGERY

## 2021-11-23 PROCEDURE — 1123F ACP DISCUSS/DSCN MKR DOCD: CPT | Performed by: ORTHOPAEDIC SURGERY

## 2021-11-23 PROCEDURE — G8427 DOCREV CUR MEDS BY ELIG CLIN: HCPCS | Performed by: ORTHOPAEDIC SURGERY

## 2021-11-23 PROCEDURE — 4040F PNEUMOC VAC/ADMIN/RCVD: CPT | Performed by: ORTHOPAEDIC SURGERY

## 2021-11-23 PROCEDURE — 99212 OFFICE O/P EST SF 10 MIN: CPT | Performed by: ORTHOPAEDIC SURGERY

## 2021-11-23 RX ORDER — LIDOCAINE HYDROCHLORIDE 20 MG/ML
8 INJECTION, SOLUTION INFILTRATION; PERINEURAL ONCE
Status: COMPLETED | OUTPATIENT
Start: 2021-11-23 | End: 2021-11-23

## 2021-11-23 RX ADMIN — LIDOCAINE HYDROCHLORIDE 8 ML: 20 INJECTION, SOLUTION INFILTRATION; PERINEURAL at 10:45

## 2021-11-23 NOTE — PROGRESS NOTES
ULTRASOUND GUIDED GENICULATE NERVE BLOCK  Surgeon: MD Camille Cole    November 23, 2021      Chief Complaint   Patient presents with   [facility] Knee Pain     RT Knee Coolief done on 10/20/21    Knee Pain     LT Knee GNB       Ht 5' 8\" (1.727 m)   Wt 259 lb (117.5 kg)   BMI 39.38 kg/m²     Gilmar is now approaching 4 weeks post Coolief geniculate nerve ablation procedure for his right knee. He states that he is about 80% overall improved for his right knee pain. He does have some persistent medial joint and medial plateau pain with activities. He states that he had soreness only for a couple of days. He had no drainage redness or any signs of infection. He states he did not have any bruising. Because of his good results he wishes to have the same procedure now for his left knee. Sofya Rodriguez is here for a diagnostic geniculate nerve block of his left knee. He is scheduled for this procedure to determine if he is a candidate for a Cooled Radiofrequency Ablation Procedure for his chronic knee pain. He has a long history of severe left knee osteoarthritis with grade 4 Kellgren-Atilio classification radiographic changes. He has been treated conservatively in the past and utilizes a cane for ambulation. He does complain of pain up to 6 with ambulation and especially pivoting motions. He is not a candidate for total knee replacement because of his severe pulmonary disease apparently not related to smoking. He cannot lie flat and always sleeps in a chair. I have today reviewed with Camille Snowden the clinically relevant past medical history, medications, allergies, family history, and Review of Systems from the patients most recent history form, and I have documented any details relevant to today's presenting complaints in my history above. The patient's self recorded documents concerning the above have been scanned  into the chart under the \"Media\" tab.   Physical Exam: Examination of the right knee shows his puncture wounds from the ablation procedure are well-healed without signs of infection there is no ecchymosis or hematomas present. Examination of the left knee shows varus deformity with mild to moderate swelling but no intra-articular effusion. No Lesion of the skin is noted over the injection sites    Impression:  1. Successful geniculate nerve ablation procedure for severe osteoarthritis of his right knee with 80% improvement. 2.  Severe grade 4 Kellgren-Atilio classification left knee osteoarthritis. Based upon results of today's geniculate nerve block for his left knee he is a candidate for the geniculate nerve ablation procedure. 3.  He is not an operative candidate because of severe pulmonary disease. Plan:  Geniculate nerve block was recommended to the patient who understands that this is a trial to determine the appropriateness for Cooled Radiofrequency ablation of the knee. Procedures:  I. Inferomedial geniculate nerve block. 2. Superomedial geniculate nerve block. 3. Superolateral geniculate nerve block. Anesthesia: local    Procedure narrartive:  1. The geniculate nerve block procedure for the  left knee using ultrasound visualization was explained to the patient  . He understands the risks and benefits of the injection procedure,  and describes no potential allergies. Time out was performed to verify correct person, correct procedure, and correct site. 2. A bolster was placed under the  left knee and the knee was draped and cleansed with ChloroPrep. 3. Ultrasound visualization was first performed utilizing the [facility] Ultrasound unit using  18/4 MHz Linear Probe with sterile drape in long axes to the tibia, finding the neurovascular bundle of the inferomedial geniculate nerve at the junction of the diaphysis and metaphysis below the medial tibial plateau.  The ultrasound doppler function was utilized to aid in location of the injection results, Genesis Snowden is  a candidate for the Cooled Radio Frequency Geniculate Nerve Ablation Procedure. 10.  He is aware of risks and benefits and will be scheduled for a cool leaf procedure for his left knee in the near future. We plan on doing 2 ablation for his inferomedial geniculate nerve, in addition to the superior medial and superior lateral genicular nerve.     Ronnie Hamilton MD  11/23/2021

## 2021-11-23 NOTE — PATIENT INSTRUCTIONS
Impression:  1. Successful geniculate nerve ablation procedure for severe osteoarthritis of his right knee with 80% improvement. 2.  Severe grade 4 Kellgren-Atilio classification left knee osteoarthritis. Based upon results of today's geniculate nerve block for his left knee he is a candidate for the geniculate nerve ablation procedure. 3.  He is not an operative candidate because of severe pulmonary disease. Plan:  Geniculate nerve block was recommended to the patient who understands that this is a trial to determine the appropriateness for Cooled Radiofrequency ablation of the knee. Procedures:  I. Inferomedial geniculate nerve block. 2. Superomedial geniculate nerve block. 3. Superolateral geniculate nerve block. Anesthesia: local    Procedure narrartive:  1. The geniculate nerve block procedure for the  left knee using ultrasound visualization was explained to the patient  . He understands the risks and benefits of the injection procedure,  and describes no potential allergies. Time out was performed to verify correct person, correct procedure, and correct site. 2. A bolster was placed under the  left knee and the knee was draped and cleansed with ChloroPrep. 3. Ultrasound visualization was first performed utilizing the [facility] Ultrasound unit using  18/4 MHz Linear Probe with sterile drape in long axes to the tibia, finding the neurovascular bundle of the inferomedial geniculate nerve at the junction of the diaphysis and metaphysis below the medial tibial plateau. The ultrasound doppler function was utilized to aid in location of the bundle. The location of the needle insertion was determined anterior to the geniculate nerve and the skin overlying the  site was anesthetized with 1 mL of 2% Lidocaine using a 25 gauge needle.  After sufficient time was allowed for anesthesia, a second 25-gauge  needle was then introduced under ultrasound control to Inferomedial geniculate neurovascular bundle using out of plane technique. Once the needle was in position, the nerve was injected with 1 mL  of 2% Lidocaine. 4. Attention was turned to the medial aspect of the distal femur. The transducer was utilized in long axis to identify the superomedial geniculate neurovascular bundle just proximal to  the adductor sharda insertion into the medial femoral epicondyle at the distal femoral metaphyseal diaphyseal junction. Once the injection site was identified, and the skin overlying the site was anesthetized with 1ml of 2% Lidocaine. After allowing sufficient time for analgesia,the superomedial geniculate nerve was injected with 1 ml of 2% Lidocaine using out of plane technique using a 25 gauge needle. 5. Next the attention was turned to the lateral aspect of the distal femur where the superolateral geniculate neurovascular bundle was identified using ultrasound at the distal femoral metaphyseal diaphyseal junction. The overlying skin was anesthetized using a 25 gauge needle with 1ml of 2% Lidocaine, and after allowing sufficient time for analgesia,  the nerve was injected with 1 ml of 2%  Lidocaine using a 25 gauge needle in identical manner. 6.  The the branch to the vastus lateralis was not anesthetized today as I believe he would benefit from a double ablation to the inferomedial geniculate nerve. 7. Bandaids were applied to the injection sites, and the patient was assisted in transfer to a chair. 8.  Gilmar tolerated the procedure well and  did report complete relief of  the left knee pain within 5 minutes of the injection. 9.  Based on the injection results, Coleen Ortiz is  a candidate for the Cooled Radio Frequency Geniculate Nerve Ablation Procedure. 10.  He is aware of risks and benefits and will be scheduled for a cool leaf procedure for his left knee in the near future.   We plan on doing 2 ablation for his inferomedial geniculate nerve, in addition to the superior medial and superior lateral genicular nerve.     Stan Antunez MD  11/23/2021

## 2021-12-06 DIAGNOSIS — K21.9 GASTROESOPHAGEAL REFLUX DISEASE WITHOUT ESOPHAGITIS: ICD-10-CM

## 2021-12-06 RX ORDER — POLYETHYLENE GLYCOL 3350 17 G/DOSE
POWDER (GRAM) ORAL
Qty: 510 G | Refills: 3 | Status: SHIPPED | OUTPATIENT
Start: 2021-12-06 | End: 2022-03-23

## 2021-12-06 NOTE — PROGRESS NOTES
[facility] [facility] time_____12/8/21 1300_______        Surgery time___1400_________    Take the following medications with a sip of water:    Do not eat or drink anything after 12:00 midnight prior to your surgery. This includes water chewing gum, mints and ice chips. You may brush your teeth and gargle the morning of your surgery, but do not swallow the water     Please see your family doctor/pediatrician for a history and physical and/or concerning medications. Bring any test results/reports from your physicians office. If you are under the care of a heart doctor or specialist doctor, please be aware that you may be asked to them for clearance    You may be asked to stop blood thinners such as Coumadin, Plavix, Fragmin, Lovenox, etc., or any anti-inflammatories such as:  Aspirin, Ibuprofen, Advil, Naproxen prior to your surgery. We also ask that you stop any OTC medications such as fish oil, vitamin E, glucosamine, garlic, Multivitamins, COQ 10, etc.    We ask that you do not smoke 24 hours prior to surgery  We ask that you do not  drink any alcoholic beverages 24 hours prior to surgery     You must make arrangements for a responsible adult to take you home after your surgery. For your safety you will not be allowed to leave alone or drive yourself home. Your surgery will be cancelled if you do not have a ride home. Also for your safety, it is strongly suggested that someone stay with you the first 24 hours after your surgery. A parent or legal guardian must accompany a child scheduled for surgery and plan to stay at the hospital until the child is discharged. Please do not bring other children with you. For your comfort, please wear simple loose fitting clothing to the hospital.  Please do not bring valuables.     Do not wear any make-up or nail polish on your fingers or toes      For your safety, please do not wear any jewelry or body piercing's on the day of surgery. All jewelry must be removed. If you have dentures, they will be removed before going to operating room. For your convenience, we will provide you with a container. If you wear contact lenses or glasses, they will be removed, please bring a case for them. If you have a living will and a durable power of  for healthcare, please bring in a copy. As part of our patient safety program to minimize surgical site infections, we ask you to do the following:    · Please notify your surgeon if you develop any illness between         now and the  day of your surgery. · This includes a cough, cold, fever, sore throat, nausea,         or vomiting, and diarrhea, etc.  ·  Please notify your surgeon if you experience dizziness, shortness         of breath or blurred vision between now and the time of your surgery. Do not shave your operative site 96 hours prior to surgery. For face and neck surgery, men may use an electric razor 48 hours   prior to surgery. You may shower the night before surgery or the morning of   your surgery with an antibacterial soap. You will need to bring a photo ID and insurance card    [facility] has an onsite pharmacy, would you like to utilize our pharmacy     If you will be staying overnight and use a C-pap machine, please bring   your C-pap to hospital     Our goal is to provide you with excellent care, therefore, visitors will be limited to two(2) in the room at a time so that we may focus on providing this care for you. Please contact pre-admission testing if you have any further questions. [facility] phone number:  [phone]  Please note these are generalized instructions for all surgical cases, you may be provided with more specific instructions according to your surgery.

## 2021-12-07 RX ORDER — PANTOPRAZOLE SODIUM 40 MG/1
TABLET, DELAYED RELEASE ORAL
Qty: 90 TABLET | Refills: 1 | Status: SHIPPED | OUTPATIENT
Start: 2021-12-07 | End: 2022-08-15

## 2021-12-08 ENCOUNTER — HOSPITAL ENCOUNTER (OUTPATIENT)
Age: 81
Setting detail: OUTPATIENT SURGERY
Discharge: HOME | End: 2021-12-08
Attending: ORTHOPAEDIC SURGERY | Admitting: ORTHOPAEDIC SURGERY
Payer: MEDICARE

## 2021-12-08 VITALS
WEIGHT: 270 LBS | HEART RATE: 75 BPM | RESPIRATION RATE: 14 BRPM | BODY MASS INDEX: 40.92 KG/M2 | HEIGHT: 68 IN | SYSTOLIC BLOOD PRESSURE: 170 MMHG | DIASTOLIC BLOOD PRESSURE: 63 MMHG | TEMPERATURE: 97.2 F | OXYGEN SATURATION: 97 %

## 2021-12-08 PROCEDURE — 3610000056 HC PAIN LEVEL 4 BASE (NON-OR): Performed by: ORTHOPAEDIC SURGERY

## 2021-12-08 PROCEDURE — 64624 DSTRJ NULYT AGT GNCLR NRV: CPT | Performed by: ORTHOPAEDIC SURGERY

## 2021-12-08 PROCEDURE — 2709999900 HC NON-CHARGEABLE SUPPLY: Performed by: ORTHOPAEDIC SURGERY

## 2021-12-08 PROCEDURE — 2500000003 HC RX 250 WO HCPCS: Performed by: ORTHOPAEDIC SURGERY

## 2021-12-08 PROCEDURE — 2720000010 HC SURG SUPPLY STERILE: Performed by: ORTHOPAEDIC SURGERY

## 2021-12-08 PROCEDURE — 3610000057 HC PAIN LEVEL 4 ADDL 15 MIN (NON-OR): Performed by: ORTHOPAEDIC SURGERY

## 2021-12-08 RX ORDER — LIDOCAINE HYDROCHLORIDE 20 MG/ML
INJECTION, SOLUTION EPIDURAL; INFILTRATION; INTRACAUDAL; PERINEURAL
Status: COMPLETED | OUTPATIENT
Start: 2021-12-08 | End: 2021-12-08

## 2021-12-08 RX ORDER — BUPIVACAINE HYDROCHLORIDE 5 MG/ML
INJECTION, SOLUTION EPIDURAL; INTRACAUDAL
Status: COMPLETED | OUTPATIENT
Start: 2021-12-08 | End: 2021-12-08

## 2021-12-08 ASSESSMENT — PAIN SCALES - GENERAL: PAINLEVEL_OUTOF10: 0

## 2021-12-08 NOTE — OP NOTE
Postoperative Note      Patient: Jb Stanley  YOB: 1940  MRN: [id number]    Date of Procedure: 12/8/2021    Pre-Op Diagnosis: Osteoarthritis of left knee    Post-Op Diagnosis: Same       Procedure(s):  COOLIEF RADIOFREQUENCY ABLATION - LEFT KNEE    Surgeon(s):  Vladimir Basurto MD    Anesthesia: Local    Estimated Blood Loss (mL): 0 ml    Complications: None     Specimens: None    Implants: None      Drains: N/A    Procedure narrartive:  1. The geniculate nerve ablation procedure for the left knee using ultrasound visualization was explained to the patient  . He understands the risks and benefits of the injection procedure,  and describes no potential allergies. Time out was performed to verify correct person, correct procedure, and correct site. 2. A bolster was placed under the  left knee and the knee was prepped with ChloroPrep and draped in a sterile manner   3. Ultrasound visualization was first performed utilizing the [facility] Ultrasound unit using  18/4 MHz Linear Probe with sterile drape in long axes to the tibia, finding the neurovascular bundle of the inferomedial geniculate nerve at the junction of the diaphysis and metaphysis below the medial tibial plateau. The ultrasound doppler function was utilized to aid in location of the bundle. The location of the needle insertion was determined anterior to the geniculate nerve and the site was anesthetized with 4 ml of a 50/50 mixture of 2% Lidocaine, and 0.5 % Marcaine using a 25 gauge needle. After sufficient time was allowed for anesthesia, the Coolief procedure needle was then introduced under ultrasound control to Inferomedial geniculate neurovascular bundle using out of plane technique. Once the needle was in position, the radiofrequency probe was placed through the needle. Using the [facility], ablation was performed at 80 degrees for 2.5 minutes.   20 mg of Depomedrol was introduced into the site prior to removing the needle. A second ablation was performed  in identical manner  5mm posterior to the first ablation   4. Attention was turned to the medial aspect of the distal femur. The transducer was utilized in long axis to identify the superomedial geniculate neurovascular bundle just proximal to  the adductor sharda insertion into the medial femoral epicondyle at the distal femoral metaphyseal diaphyseal junction. The ultrasound doppler function was utilized to aid in location of the bundle. The location of the needle insertion was determined anterior to the geniculate nerve and the site was anesthetized with 4 ml of a 50/50 mixture of 2% Lidocaine, and 0.5 % Marcaine using a 25 gauge needle. After sufficient time was allowed for anesthesia, the Coolief procedure needle was then introduced under ultrasound control to superomedial geniculate neurovascular bundle using out of plane technique. Once the needle was in position, the radiofrequency probe was placed through the needle. Using the [facility] Unit, ablation was performed at 80 degrees for 2.5 minutes. 20 mg of Depomedrol was introduced into the site prior to removing the needle. 5. Next the attention was turned to the lateral aspect of the distal femur where the superolateral geniculate neurovascular bundle was identified using ultrasound at the distal femoral metaphyseal diaphyseal junction. The ultrasound doppler function was utilized to aid in location of the bundle. The location of the needle insertion was determined anterior to the geniculate nerve and the site was anesthetized with 4 ml of a 50/50 mixture of 2% Lidocaine, and 0.5 % Marcaine using a 25 gauge needle. After sufficient time was allowed for anesthesia, the Coolief procedure needle was then introduced under ultrasound control to the superolaeral geniculate neurovascular bundle using out of plane technique. Once the needle was in position, the radiofrequency probe was placed through the needle.   Using the Coolief Unit, ablation was performed at 80 degrees for 2.5 minutes. 20 mg of Depomedrol was introduced into the site prior to removing the needle. 7. Bandaids were applied to the injection sites, and the patient was taken to the recovery room in satisfactory condition.    Electronically signed by Bakari Nur MD on 12/8/2021 at 2:52 PM

## 2021-12-08 NOTE — H&P
Patient:  Monalisa Hogan  YOB: 1940  Medical Record #:  [id number]   Place: [address] [place] [address]  Date:  12/8/2021   Physician:  Pino Hernandez MD    History Obtained From: electronic medical record    HISTORY OF Lennette Crigler is an 79year old male with a long history of left knee pain treated conservatively. He has radiographic evidence of grade 4 osteoarthritis, he had a successful geniculate nerve block  on 11-23-21 with complete relief of his left knee. He had a previous Coolief procedure done on the right knee on 10-20-21 and desires the same for the left knee. Past Medical History:        Diagnosis Date    Allergic rhinitis     Asthma     Atrial fibrillation (HCC)     Carotid artery stenosis     Chronic kidney disease     [facility] obstructive pulmonary disease) [facility])     CPAP (continuous positive airway pressure) dependence     ESBL (extended spectrum beta-lactamase) producing bacteria infection 12/26/2018    urine    Hyperlipidemia     Hypertension     Iron deficiency anemia     Obstructive sleep apnea     Type II or unspecified type diabetes mellitus without mention of complication, not stated as uncontrolled      Past Surgical History:        Procedure Laterality Date    CATARACT REMOVAL  2/2012    bilateral    COLONOSCOPY  7/10/2009    diverticulosis    hemorrhoids    GALLBLADDER SURGERY  1981    PAIN MANAGEMENT PROCEDURE Right 10/20/2021    COOLIEF RADIOFREQUENCY ABLATION - RIGHT KNEE performed by Marion Nathan MD at [address] [place] [address]  7-2005    7/10/2009    normal     Medications Prior to Admission:   No current facility-administered medications on file prior to encounter.      Current Outpatient Medications on File Prior to Encounter   Medication Sig Dispense Refill    insulin glargine (BASAGLAR KWIKPEN) 100 UNIT/ML injection pen Inject 50 Units into the skin 2 times daily (or as directed) 10 pen 5    montelukast (SINGULAIR) 10 MG tablet TAKE 1 TABLET BY MOUTH EVERY DAY 90 tablet 3    furosemide (LASIX) 20 MG tablet TAKE 40MG EVERY OTHER DAY FOR ONE WEEK AND 20MG ON THE DAYS THAT PT IS NOT TAKING 40MG. 135 tablet 2    B-D UF III MINI PEN NEEDLES 31G X 5 MM MISC USE AS DIRECTED 3 TIMES A  each 11    amiodarone (CORDARONE) 200 MG tablet TAKE 1 TABLET BY MOUTH EVERY OTHER DAY 90 tablet 2    levalbuterol (XOPENEX) 1.25 MG/3ML nebulizer solution USE 1 VIAL VIA NEBULIZER FOUR TIMES DAILY 360 mL 11    valsartan-hydroCHLOROthiazide (DIOVAN-HCT) 320-25 MG per tablet TAKE 1 TABLET BY MOUTH EVERY DAY 90 tablet 3    atorvastatin (LIPITOR) 40 MG tablet TAKE 1 TABLET BY MOUTH EVERY DAY 90 tablet 3    alfuzosin (UROXATRAL) 10 MG extended release tablet TAKE 1 TABLET BY MOUTH EVERY DAY 90 tablet 3    LINZESS 145 MCG capsule TAKE 1 CAPSULE BY MOUTH EVERY DAY IN THE MORNING BEFORE BREAKFAST 30 capsule 11    cloNIDine (CATAPRES) 0.1 MG tablet TAKE 1 TABLET BY MOUTH TWICE A  tablet 3    verapamil (CALAN SR) 240 MG extended release tablet TAKE 1 TABLET BY MOUTH TWICE A  tablet 3    blood glucose test strips (ACCU-CHEK GUIDE) strip TEST AS DIRECTED 3 TIMES A  strip 5    ZINC PO Take by mouth      CPAP Machine MISC by Does not apply route      Ascorbic Acid (SUPER C COMPLEX PO) Take by mouth daily      Multiple Vitamins-Minerals (MULTIVITAMIN ADULT PO) Take by mouth daily      Lactobacillus Rhamnosus, GG, (CVS PROBIOTIC, LACTOBACILLUS,) CAPS TAKE 1 CAPSULE BY MOUTH 2 TIMES DAILY (WITH MEALS) 60 capsule 11    Docusate Sodium (COLACE PO) Take by mouth daily      Cyanocobalamin (VITAMIN B-12 PO) Take 500 mcg by mouth every other day       Cholecalciferol (VITAMIN D3 PO) Take 2,000 Units by mouth daily       aspirin EC 81 MG EC tablet Take 2 tablets by mouth daily.  30 tablet 3    ferrous sulfate 325 (65 FE) MG tablet Take 325 mg by mouth 2 times daily       Accu-Chek FastClix Lancets MISC USE TO TEST 3 TIMES A DAY DX CODE E11.9 102 each 17     Allergies:  Hytrin [terazosin hcl], Penicillins, Shrimp flavor, Sulfa antibiotics, Tekturna [aliskiren fumarate], Vancomycin, and Ciprofloxacin  Social History     Socioeconomic History    Marital status:      Spouse name: Not on file    Number of children: 11    Years of education: Not on file    Highest education level: Not on file   Occupational History    Occupation: retired   Tobacco Use    Smoking status: Former Smoker     Packs/day: 0.50     Years: 18.00     Pack years: 9.00     Start date: 1958     Quit date: 1988     Years since quittin.9    Smokeless tobacco: Never Used   Vaping Use    Vaping Use: Never used   Substance and Sexual Activity    Alcohol use: No     Alcohol/week: 0.0 standard drinks    Drug use: No    Sexual activity: Yes     Partners: Female   Other Topics Concern    Not on file   Social History Narrative    Not on file     Social Determinants of Health     Financial Resource Strain: Unknown    Difficulty of Paying Living Expenses: Patient refused   Food Insecurity: Unknown    Worried About Running Out of Food in the Last Year: Patient refused   Shaka Fatoumata in the Last Year: Patient refused   Transportation Needs: Unknown    Lack of Transportation (Medical): Patient refused    Lack of Transportation (Non-Medical): Patient refused   Physical Activity:     Days of Exercise per Week: Not on file   [employer] of Exercise per Session: Not on file   Stress:     Feeling of Stress : Not on file   Social Connections:     Frequency of Communication with Friends and Family: Not on file    Frequency of Social Gatherings with Friends and Family: Not on file    Attends Voodoo Services: Not on file    Active Member of [address] [place] [address] [employer] or Organizations: Not on file    Attends Club or Organization Meetings: Not on file    Marital Status: Not on file   Intimate Partner Violence:     Fear of Current or Ex-Partner: Not on file    Emotionally Abused: Not on file    Physically Abused: Not on file    Sexually Abused: Not on file   Housing Stability:     Unable to Pay for Housing in the Last Year: Not on file    Number of Places Lived in the Last Year: Not on file    Unstable Housing in the Last Year: Not on file     Family History   Problem Relation Age of Onset    Heart Disease Mother     Stroke Mother     Vision Loss Mother     High Blood Pressure Father     High Blood Pressure Brother     Diabetes Brother     Sleep Apnea Brother     Arthritis Paternal Grandmother     Diabetes Paternal Grandmother      Review of System:    I have today reviewed with Jimena Jennings the clinically relevant past medical history, medications, allergies, family history, and Review of Systems from the patients most recent history form, and I have documented any details relevant to today's presenting complaints in my history above. The patient's self recorded documents concerning the above have been scanned  into the chart under the \"Media\" tab. PHYSICAL EXAM:      BP (!) 159/57   Pulse 84   Temp 97.1 °F (36.2 °C) (Temporal)   Resp 18   Ht 5' 8\" (1.727 m)   Wt 270 lb (122.5 kg)   SpO2 98%   BMI 41.05 kg/m²  I    General Appearance: no acute distress, alert, oriented x 3  Examination of the left knee reveals no sign of synovitis. Vascular exam of left foot intact. Cardiac: Regular rate and rhythm without murmur  Pulmonary: Lungs clear to ausculation bilaterally    ASSESSMENT        1. Osteorarthritis of  left knee       PLAN:  Procedure(s):  COOLIEF RADIOFREQUENCY ABLATION - LEFT KNEE    1. Coolief Cooled Geniculate Nerve Ablation left      No name on file.

## 2022-01-01 ENCOUNTER — CLINICAL DOCUMENTATION ONLY (OUTPATIENT)
Facility: CLINIC | Age: 82
End: 2022-01-01

## 2022-01-11 ENCOUNTER — OFFICE VISIT (OUTPATIENT)
Dept: ORTHOPEDIC SURGERY | Age: 82
End: 2022-01-11
Payer: MEDICARE

## 2022-01-11 ENCOUNTER — OFFICE VISIT (OUTPATIENT)
Dept: PULMONOLOGY | Age: 82
End: 2022-01-11
Payer: MEDICARE

## 2022-01-11 VITALS
WEIGHT: 268 LBS | OXYGEN SATURATION: 95 % | TEMPERATURE: 96 F | HEART RATE: 78 BPM | DIASTOLIC BLOOD PRESSURE: 60 MMHG | BODY MASS INDEX: 40.62 KG/M2 | RESPIRATION RATE: 20 BRPM | HEIGHT: 68 IN | SYSTOLIC BLOOD PRESSURE: 142 MMHG

## 2022-01-11 VITALS — WEIGHT: 260 LBS | BODY MASS INDEX: 39.4 KG/M2 | HEIGHT: 68 IN

## 2022-01-11 DIAGNOSIS — G47.33 OSA ON CPAP: ICD-10-CM

## 2022-01-11 DIAGNOSIS — S90.02XA CONTUSION OF LEFT ANKLE, INITIAL ENCOUNTER: Primary | ICD-10-CM

## 2022-01-11 DIAGNOSIS — Z99.89 OSA ON CPAP: ICD-10-CM

## 2022-01-11 DIAGNOSIS — J44.9 COPD WITH CHRONIC BRONCHITIS (HCC): Primary | ICD-10-CM

## 2022-01-11 DIAGNOSIS — M19.072 ARTHRITIS OF LEFT ANKLE: ICD-10-CM

## 2022-01-11 DIAGNOSIS — M17.11 PRIMARY OSTEOARTHRITIS OF RIGHT KNEE: ICD-10-CM

## 2022-01-11 DIAGNOSIS — M17.12 PRIMARY OSTEOARTHRITIS OF LEFT KNEE: ICD-10-CM

## 2022-01-11 DIAGNOSIS — M25.572 ACUTE LEFT ANKLE PAIN: ICD-10-CM

## 2022-01-11 DIAGNOSIS — J31.0 RHINOSINUSITIS: ICD-10-CM

## 2022-01-11 DIAGNOSIS — J32.9 RHINOSINUSITIS: ICD-10-CM

## 2022-01-11 PROCEDURE — G8484 FLU IMMUNIZE NO ADMIN: HCPCS | Performed by: ORTHOPAEDIC SURGERY

## 2022-01-11 PROCEDURE — 1123F ACP DISCUSS/DSCN MKR DOCD: CPT | Performed by: INTERNAL MEDICINE

## 2022-01-11 PROCEDURE — 3023F SPIROM DOC REV: CPT | Performed by: INTERNAL MEDICINE

## 2022-01-11 PROCEDURE — 1036F TOBACCO NON-USER: CPT | Performed by: INTERNAL MEDICINE

## 2022-01-11 PROCEDURE — 1036F TOBACCO NON-USER: CPT | Performed by: ORTHOPAEDIC SURGERY

## 2022-01-11 PROCEDURE — G8484 FLU IMMUNIZE NO ADMIN: HCPCS | Performed by: INTERNAL MEDICINE

## 2022-01-11 PROCEDURE — 99213 OFFICE O/P EST LOW 20 MIN: CPT | Performed by: ORTHOPAEDIC SURGERY

## 2022-01-11 PROCEDURE — 99214 OFFICE O/P EST MOD 30 MIN: CPT | Performed by: INTERNAL MEDICINE

## 2022-01-11 PROCEDURE — 1123F ACP DISCUSS/DSCN MKR DOCD: CPT | Performed by: ORTHOPAEDIC SURGERY

## 2022-01-11 PROCEDURE — G8417 CALC BMI ABV UP PARAM F/U: HCPCS | Performed by: INTERNAL MEDICINE

## 2022-01-11 PROCEDURE — G8417 CALC BMI ABV UP PARAM F/U: HCPCS | Performed by: ORTHOPAEDIC SURGERY

## 2022-01-11 PROCEDURE — G8427 DOCREV CUR MEDS BY ELIG CLIN: HCPCS | Performed by: ORTHOPAEDIC SURGERY

## 2022-01-11 PROCEDURE — 4040F PNEUMOC VAC/ADMIN/RCVD: CPT | Performed by: ORTHOPAEDIC SURGERY

## 2022-01-11 PROCEDURE — 4040F PNEUMOC VAC/ADMIN/RCVD: CPT | Performed by: INTERNAL MEDICINE

## 2022-01-11 PROCEDURE — G8428 CUR MEDS NOT DOCUMENT: HCPCS | Performed by: INTERNAL MEDICINE

## 2022-01-11 ASSESSMENT — COPD QUESTIONNAIRES
CAT_TOTALSCORE: 16
QUESTION3_CHESTTIGHTNESS: 2
QUESTION6_LEAVINGHOUSE: 1
QUESTION2_CHESTPHLEGM: 2
QUESTION8_ENERGYLEVEL: 2
QUESTION7_SLEEPQUALITY: 1
QUESTION5_HOMEACTIVITIES: 3
QUESTION4_WALKINCLINE: 3
QUESTION1_COUGHFREQUENCY: 2
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
## 2022-01-11 NOTE — PROGRESS NOTES
Chief complaint  This is a 79y.o. year old male  who comes to see me with a chief complaint of   Chief Complaint   Patient presents with    Follow-up       HPI  Here with follow up on JOANN and COPD      Machine:  Patient is using a APAP machine. Average usage is 5 hrs 35 min 00 sec. He is meeting 4 or more hours per night 80% of the time. Average AHI is 0.8. Still sleeps mostly every night with machine and it does help    Breathing is about the same. No new issues. Uses albuterol or xopenex when needed. Also has nebulizer as well    Fully vaccinated       Sleep Medicine 3/1/2021 2/20/2020 10/17/2019 2/25/2019 10/23/2018   Sitting and reading 1 1 1 1 0   Watching TV 1 1 1 2 1   Sitting, inactive in a public place (e.g. a theatre or a meeting) 0 0 1 1 0   As a passenger in a car for an hour without a break 1 0 1 1 0   Lying down to rest in the afternoon when circumstances permit 0 1 2 2 2   Sitting and talking to someone 0 0 0 1 0   Sitting quietly after a lunch without alcohol 0 0 1 1 1   In a car, while stopped for a few minutes in traffic 0 0 0 0 0   Total score 3 3 7 9 4   Neck circumference - - - - 18.5         Current Outpatient Medications   Medication Sig Dispense Refill    pantoprazole (PROTONIX) 40 MG tablet TAKE 1 TABLET BY MOUTH EVERY DAY 90 tablet 1    [facility]S PURELAX 17 GM/SCOOP powder TAKE 17G BY MOUTH DAILY MIX WITH 8 OZ OF WATER 510 g 3    albuterol sulfate  (90 Base) MCG/ACT inhaler INHALE 2 PUFFS BY MOUTH EVERY 4 HOURS AS NEEDED FOR WHEEZING 18 each 11    insulin glargine (BASAGLAR KWIKPEN) 100 UNIT/ML injection pen Inject 50 Units into the skin 2 times daily (or as directed) 10 pen 5    montelukast (SINGULAIR) 10 MG tablet TAKE 1 TABLET BY MOUTH EVERY DAY 90 tablet 3    furosemide (LASIX) 20 MG tablet TAKE 40MG EVERY OTHER DAY FOR ONE WEEK AND 20MG ON THE DAYS THAT PT IS NOT TAKING 40MG.  135 tablet 2    B-D UF III MINI PEN NEEDLES 31G X 5 MM MISC USE AS DIRECTED 3 TIMES A  each 11    amiodarone (CORDARONE) 200 MG tablet TAKE 1 TABLET BY MOUTH EVERY OTHER DAY 90 tablet 2    Accu-Chek FastClix Lancets MISC USE TO TEST 3 TIMES A DAY DX CODE E11.9 102 each 17    levalbuterol (XOPENEX) 1.25 MG/3ML nebulizer solution USE 1 VIAL VIA NEBULIZER FOUR TIMES DAILY 360 mL 11    valsartan-hydroCHLOROthiazide (DIOVAN-HCT) 320-25 MG per tablet TAKE 1 TABLET BY MOUTH EVERY DAY 90 tablet 3    atorvastatin (LIPITOR) 40 MG tablet TAKE 1 TABLET BY MOUTH EVERY DAY 90 tablet 3    alfuzosin (UROXATRAL) 10 MG extended release tablet TAKE 1 TABLET BY MOUTH EVERY DAY 90 tablet 3    LINZESS 145 MCG capsule TAKE 1 CAPSULE BY MOUTH EVERY DAY IN THE MORNING BEFORE BREAKFAST 30 capsule 11    cloNIDine (CATAPRES) 0.1 MG tablet TAKE 1 TABLET BY MOUTH TWICE A  tablet 3    verapamil (CALAN SR) 240 MG extended release tablet TAKE 1 TABLET BY MOUTH TWICE A  tablet 3    blood glucose test strips (ACCU-CHEK GUIDE) strip TEST AS DIRECTED 3 TIMES A  strip 5    ZINC PO Take by mouth      CPAP Machine MISC by Does not apply route      Ascorbic Acid (SUPER C COMPLEX PO) Take by mouth daily      Multiple Vitamins-Minerals (MULTIVITAMIN ADULT PO) Take by mouth daily      Lactobacillus Rhamnosus, GG, (CVS PROBIOTIC, LACTOBACILLUS,) CAPS TAKE 1 CAPSULE BY MOUTH 2 TIMES DAILY (WITH MEALS) 60 capsule 11    Cyanocobalamin (VITAMIN B-12 PO) Take 500 mcg by mouth every other day       Cholecalciferol (VITAMIN D3 PO) Take 2,000 Units by mouth daily       aspirin EC 81 MG EC tablet Take 2 tablets by mouth daily. 30 tablet 3    ferrous sulfate 325 (65 FE) MG tablet Take 325 mg by mouth 2 times daily       Docusate Sodium (COLACE PO) Take by mouth daily       No current facility-administered medications for this visit.        PHYSICAL EXAM:  Vitals:    01/11/22 1323   BP: (!) 142/60   Pulse: 78   Resp: 20   Temp: 96 °F (35.6 °C)   SpO2: 95%       GENERAL:  Well nourished, alert, appears stated age, no

## 2022-01-11 NOTE — PATIENT INSTRUCTIONS
Impression:   1. He is doing well at 3 months post geniculate nerve ablation procedure of his right knee. 2.  He is doing well at approaching 5 weeks post geniculate nerve ablation procedure of his left knee although not quite as good as the right. 3. He has improved function as he is now ambulating with a cane. 4. contusion with secondary hematoma formation medial aspect of his left ankle which occurred 5 days ago. This is superimposed on moderate osteoarthritis of his left ankle joint. Plan/Treatment:   1. He was reassured concerning his improvement related to the geniculate nerve ablation procedures of his severely arthritic knees. 2.  Long-term prognosis was discussed. Unfortunately he is likely not a candidate for total knee replacements due to his severe pulmonary disease. A repeat ablation procedure for bilateral knee arthritis is still possible. 3.  He was reassured concerning his injury to his left ankle. He is placed on an exercise program including range of motion and strengthening with use of Thera-Band. He was given an exercise sheet as well as a portion of Thera-Band for these exercises. 4.  He will return as needed.       Annika Escobar MD  1/11/2022

## 2022-01-15 ENCOUNTER — CLINICAL DOCUMENTATION (OUTPATIENT)
Dept: OTHER | Age: 82
End: 2022-01-15

## 2022-01-27 NOTE — PROGRESS NOTES
[address] - Cardiology    CC: \"I feel good\"     HPI: Faraz Locke is a 79 y.o. patient with a past medical history significant for CAD, obesity, hypoventilation syndrome as well as moderate COPD of asthma type and atrial fibrillation noted during pulmonary rehabilitation. His episode of atrial fibrillation was noted incidentally on telemetry monitoring during therapy. He is on amiodarone 100mg daily and aspirin 162mg daily for his atrial fibrillation. He complete stress perfusion study with abnormal results and underwent LHC on 7/1/21 revealing nonobstructive disease. He presents today for follow up. He reports feeling well overall. His breathing has been overall comfortable without overt shortness of breath. He does have to use his inhaler on occasion. He continues to follow with Dr. Blaine Pinon for pulmonology. Patient denies exertional chest pain/pressure, dyspnea at rest, GIBBONS, PND, orthopnea, palpitations, lightheadedness, weight changes, changes in LE edema, and syncope. He reports medication compliance and is tolerating. Review of Systems:  Constitutional: No fatigue, weakness, night sweats or fever. HEENT: No new vision difficulties or ringing in the ears. Respiratory: No new SOB, PND, orthopnea or cough. Cardiovascular: See HPI   GI: No n/v, diarrhea, constipation, abdominal pain or changes in bowel habits. No melena, no hematochezia  : No urinary frequency, urgency, incontinence, hematuria or dysuria. Skin: No cyanosis or skin lesions. Musculoskeletal: No new muscle or joint pain. Neurological: No syncope or TIA-like symptoms.   Psychiatric: No anxiety, insomnia or depression     Current Outpatient Medications   Medication Sig Dispense Refill    ACCU-CHEK GUIDE strip TEST AS DIRECTED 3 TIMES A  strip 5    pantoprazole (PROTONIX) 40 MG tablet TAKE 1 TABLET BY MOUTH EVERY DAY 90 tablet 1    [employer]S PURELAX 17 GM/SCOOP powder TAKE 17G BY MOUTH DAILY MIX WITH 8 OZ OF WATER 510 g 3    albuterol sulfate  (90 Base) MCG/ACT inhaler INHALE 2 PUFFS BY MOUTH EVERY 4 HOURS AS NEEDED FOR WHEEZING 18 each 11    insulin glargine (BASAGLAR KWIKPEN) 100 UNIT/ML injection pen Inject 50 Units into the skin 2 times daily (or as directed) 10 pen 5    montelukast (SINGULAIR) 10 MG tablet TAKE 1 TABLET BY MOUTH EVERY DAY 90 tablet 3    furosemide (LASIX) 20 MG tablet TAKE 40MG EVERY OTHER DAY FOR ONE WEEK AND 20MG ON THE DAYS THAT PT IS NOT TAKING 40MG.  135 tablet 2    B-D UF III MINI PEN NEEDLES 31G X 5 MM MISC USE AS DIRECTED 3 TIMES A  each 11    amiodarone (CORDARONE) 200 MG tablet TAKE 1 TABLET BY MOUTH EVERY OTHER DAY 90 tablet 2    Accu-Chek FastClix Lancets MISC USE TO TEST 3 TIMES A DAY DX CODE E11.9 102 each 17    levalbuterol (XOPENEX) 1.25 MG/3ML nebulizer solution USE 1 VIAL VIA NEBULIZER FOUR TIMES DAILY 360 mL 11    valsartan-hydroCHLOROthiazide (DIOVAN-HCT) 320-25 MG per tablet TAKE 1 TABLET BY MOUTH EVERY DAY 90 tablet 3    atorvastatin (LIPITOR) 40 MG tablet TAKE 1 TABLET BY MOUTH EVERY DAY 90 tablet 3    alfuzosin (UROXATRAL) 10 MG extended release tablet TAKE 1 TABLET BY MOUTH EVERY DAY 90 tablet 3    LINZESS 145 MCG capsule TAKE 1 CAPSULE BY MOUTH EVERY DAY IN THE MORNING BEFORE BREAKFAST 30 capsule 11    cloNIDine (CATAPRES) 0.1 MG tablet TAKE 1 TABLET BY MOUTH TWICE A  tablet 3    verapamil (CALAN SR) 240 MG extended release tablet TAKE 1 TABLET BY MOUTH TWICE A  tablet 3    ZINC PO Take by mouth      CPAP Machine MISC by Does not apply route      Ascorbic Acid (SUPER C COMPLEX PO) Take by mouth daily      Multiple Vitamins-Minerals (MULTIVITAMIN ADULT PO) Take by mouth daily      Lactobacillus Rhamnosus, GG, (CVS PROBIOTIC, LACTOBACILLUS,) CAPS TAKE 1 CAPSULE BY MOUTH 2 TIMES DAILY (WITH MEALS) 60 capsule 11    Docusate Sodium (COLACE PO) Take by mouth daily      Cyanocobalamin (VITAMIN B-12 PO) Take 500 mcg by mouth every other day       Cholecalciferol (VITAMIN D3 PO) Take 2,000 Units by mouth daily       aspirin EC 81 MG EC tablet Take 2 tablets by mouth daily. 30 tablet 3    ferrous sulfate 325 (65 FE) MG tablet Take 325 mg by mouth daily        No current facility-administered medications for this visit. [id number] Score for Atrial Fibrillation Stroke Risk   Risk   Factors  Component Value   C CHF No 0   H HTN Yes 1   A2 Age >= 76 Yes,  (79 y.o.) 2   D DM Yes 1   S2 Prior Stroke/TIA No 0   V Vascular Disease No 0   A Age 74-69 No,  (79 y.o.) 0   Sc Sex male 0    [id number]  Score  4   Score last updated 2/2/22 0:53 PM EST    Click here for a link to the UpToDate guideline \"Atrial Fibrillation: Anticoagulation therapy to prevent embolization    Disclaimer: Risk Score calculation is dependent on accuracy of patient problem list and past encounter diagnosis.     Allergies   Allergen Reactions    Hytrin [Terazosin Hcl]      Ineffective for BP control    Penicillins      Unknown reaction during childhood; Patient tolerated cephalosporins in the past    Shrimp Flavor Hives    Sulfa Antibiotics      Unknown reaction in childhood    Tekturna [Aliskiren Fumarate]      Ineffective    Vancomycin      Fever    Ciprofloxacin Rash     Fever and rash        Physical Exam:  /76   Pulse 85   Ht 5' 8\" (1.727 m)   Wt 264 lb (119.7 kg)   SpO2 95%   BMI 40.14 kg/m²    Wt Readings from Last 2 Encounters:   02/02/22 264 lb (119.7 kg)   01/11/22 260 lb (117.9 kg)     General Appearance:  Alert, cooperative, no distress, appears stated age, obese   Head:  Normocephalic, without obvious abnormality, atraumatic   Eyes:  PERRL, conjunctiva/corneas clear       Nose: Nares normal, no drainage or sinus tenderness   Throat: Lips, mucosa, and tongue normal   Neck: Supple, symmetrical, trachea midline, no adenopathy, thyroid: not enlarged, symmetric, no tenderness/mass/nodules, no carotid bruit or JVD Lungs:   Clear to auscultation bilaterally, respirations unlabored   Chest Wall:  No tenderness or deformity   Heart:  Regular rate and rhythm, S1, S2 normal, no murmur, rub or gallop   Abdomen:   Soft, non-tender, bowel sounds active all four quadrants,  no masses, no organomegaly   Extremities: Extremities normal, atraumatic, no cyanosis or edema   Pulses: Carotid      L   2      R2  Radial       L    2     R2     Skin: Skin color, texture, turgor normal, no rashes or lesions   Pysch: Normal mood and affect   Neurologic: Normal     Lab Results   Component Value Date    TRIG 76 11/17/2021    HDL 47 11/17/2021    HDL 42 12/15/2011    LDLCALC 54 11/17/2021    LDLDIRECT 52 05/19/2021    LABVLDL 15 11/17/2021     Lab Results   Component Value Date     11/17/2021    K 5.0 11/17/2021    K 4.6 12/31/2018    BUN 52 11/17/2021    CREATININE 2.0 11/17/2021     Personally reviewed and interpreted   EKG 11/19/2018 Sinus rhythm   EKG 12/4/20: Sinus rhythm   EKG 6/4/21: Sinus rhythm   EKG 2/2/22: Sinus rhythm with normal axis and intervals      7 day event monitor 12/12-12/18/19  No episodes of atrial fibrillation noted       Procedures:     [facility] 7/1/21  1. Right-dominant coronary arterial circulation. The right coronary  artery is not as well visualized as the left but there are no  obstructive lesions present in this vessel. There is moderate  calcification. In the left system, there is no left main disease or  circumflex disease. There is moderate calcification in the left  anterior descending artery in the proximal and midportions. There is  tortuosity in the left anterior descending artery with a 50% mid lesion. No left ventriculogram was performed. 2.  Elevated right-sided filling pressures with a right atrial pressure  of 10 and a pulmonary capillary wedge pressure of 18 mmHg. This  corresponds to a left ventricular end-diastolic pressure of 16 mmHg.   3.  Pulmonary hypertension with an instantaneous pressure of 50/18 for a  mean pulmonary arterial pressure of 28 mmHg. 4.  Cardiac output by thermodilution 8.53 liters per minute with a  cardiac index of 3.68 liters per kilogram per minute. 5.  Pulmonary vascular resistance 104 dynes per second. 6.  Normal mixed venous oxygen saturations. Systemic arterial  saturation was 91% on room air. This procedure was performed on room  air. SVC was 69% and pulmonary artery was 69%. Imaging:     Stress perfusion 6/18/21  Moderate sized moderate intensity reversible inferior wall defect suggestive    of ischemia        Normal LV size and systolic function.    Overall findings represent a intermediate risk study. Assessment:  1. Paroxysmal Atrial Fibrillation  2. Essential Hypertension  3. Hyperlipidemia with LDL goal of <70 mg/dL  4. COPD  5. JOANN   6. Morbid Obesity  7. CAD of native coronary arteries without angina    Plan:  He is not endorsing any symptoms representing angina and his blood pressure is well controlled today in the office. He remains in a regular rhythm today by EKG and is currently taking aspirin 162 mg daily as he has not had recurrence of atrial fibrillation since his initial episode. I have encouraged him to increase his aerobic activity as tolerated and adhere to a heart healthy diet. I have personally reviewed all previous testing for this visit today including imaging, lab results and EKG as detailed above. I will see him in office for follow up in 1 year. This note was scribed in the presence of Kika Elmore MD by [employer], RN. Physician Attestation:  The scribes documentation has been prepared under my direction and personally reviewed by me in its entirety. I, Dr. Gray Busch personally performed the services described in this documentation as scribed by my RN in my presence, and I confirm that the note above accurately reflects all work, treatment, procedures, and medical decision making performed by me.

## 2022-02-02 ENCOUNTER — OFFICE VISIT (OUTPATIENT)
Dept: CARDIOLOGY CLINIC | Age: 82
End: 2022-02-02
Payer: MEDICARE

## 2022-02-02 VITALS
DIASTOLIC BLOOD PRESSURE: 76 MMHG | SYSTOLIC BLOOD PRESSURE: 124 MMHG | HEART RATE: 85 BPM | OXYGEN SATURATION: 95 % | WEIGHT: 264 LBS | BODY MASS INDEX: 40.01 KG/M2 | HEIGHT: 68 IN

## 2022-02-02 DIAGNOSIS — I48.0 PAROXYSMAL ATRIAL FIBRILLATION (HCC): Primary | ICD-10-CM

## 2022-02-02 DIAGNOSIS — I25.10 CORONARY ARTERY DISEASE INVOLVING NATIVE CORONARY ARTERY OF NATIVE HEART WITHOUT ANGINA PECTORIS: ICD-10-CM

## 2022-02-02 DIAGNOSIS — I10 ESSENTIAL HYPERTENSION: ICD-10-CM

## 2022-02-02 DIAGNOSIS — E78.5 HYPERLIPIDEMIA LDL GOAL <70: ICD-10-CM

## 2022-02-02 DIAGNOSIS — I48.0 PAROXYSMAL ATRIAL FIBRILLATION (HCC): ICD-10-CM

## 2022-02-02 DIAGNOSIS — G47.33 OSA (OBSTRUCTIVE SLEEP APNEA): ICD-10-CM

## 2022-02-02 LAB
ALBUMIN SERPL-MCNC: 4.1 G/DL (ref 3.4–5)
ALP BLD-CCNC: 122 U/L (ref 40–129)
ALT SERPL-CCNC: 30 U/L (ref 10–40)
AST SERPL-CCNC: 25 U/L (ref 15–37)
BILIRUB SERPL-MCNC: 0.3 MG/DL (ref 0–1)
BILIRUBIN DIRECT: <0.2 MG/DL (ref 0–0.3)
BILIRUBIN, INDIRECT: NORMAL MG/DL (ref 0–1)
TOTAL PROTEIN: 6.8 G/DL (ref 6.4–8.2)
TSH REFLEX: 2.52 UIU/ML (ref 0.27–4.2)

## 2022-02-02 PROCEDURE — G8427 DOCREV CUR MEDS BY ELIG CLIN: HCPCS | Performed by: INTERNAL MEDICINE

## 2022-02-02 PROCEDURE — 93000 ELECTROCARDIOGRAM COMPLETE: CPT | Performed by: INTERNAL MEDICINE

## 2022-02-02 PROCEDURE — 1123F ACP DISCUSS/DSCN MKR DOCD: CPT | Performed by: INTERNAL MEDICINE

## 2022-02-02 PROCEDURE — G8417 CALC BMI ABV UP PARAM F/U: HCPCS | Performed by: INTERNAL MEDICINE

## 2022-02-02 PROCEDURE — 1036F TOBACCO NON-USER: CPT | Performed by: INTERNAL MEDICINE

## 2022-02-02 PROCEDURE — 99214 OFFICE O/P EST MOD 30 MIN: CPT | Performed by: INTERNAL MEDICINE

## 2022-02-02 PROCEDURE — 4040F PNEUMOC VAC/ADMIN/RCVD: CPT | Performed by: INTERNAL MEDICINE

## 2022-02-02 PROCEDURE — G8484 FLU IMMUNIZE NO ADMIN: HCPCS | Performed by: INTERNAL MEDICINE

## 2022-05-05 ENCOUNTER — OFFICE VISIT (OUTPATIENT)
Dept: VASCULAR SURGERY | Age: 82
End: 2022-05-05
Payer: MEDICARE

## 2022-05-05 ENCOUNTER — PROCEDURE VISIT (OUTPATIENT)
Dept: SURGERY | Age: 82
End: 2022-05-05
Payer: MEDICARE

## 2022-05-05 VITALS
HEIGHT: 68 IN | DIASTOLIC BLOOD PRESSURE: 70 MMHG | SYSTOLIC BLOOD PRESSURE: 148 MMHG | WEIGHT: 263 LBS | BODY MASS INDEX: 39.86 KG/M2

## 2022-05-05 DIAGNOSIS — I65.23 BILATERAL CAROTID ARTERY STENOSIS WITHOUT CEREBRAL INFARCTION: Primary | ICD-10-CM

## 2022-05-05 DIAGNOSIS — E78.5 HYPERLIPIDEMIA, UNSPECIFIED HYPERLIPIDEMIA TYPE: ICD-10-CM

## 2022-05-05 DIAGNOSIS — I10 ESSENTIAL HYPERTENSION: ICD-10-CM

## 2022-05-05 DIAGNOSIS — M79.89 LEG SWELLING: ICD-10-CM

## 2022-05-05 PROCEDURE — 93880 EXTRACRANIAL BILAT STUDY: CPT | Performed by: SURGERY

## 2022-05-05 PROCEDURE — 99214 OFFICE O/P EST MOD 30 MIN: CPT | Performed by: NURSE PRACTITIONER

## 2022-05-05 PROCEDURE — 1123F ACP DISCUSS/DSCN MKR DOCD: CPT | Performed by: NURSE PRACTITIONER

## 2022-05-05 PROCEDURE — 1036F TOBACCO NON-USER: CPT | Performed by: NURSE PRACTITIONER

## 2022-05-05 PROCEDURE — 4040F PNEUMOC VAC/ADMIN/RCVD: CPT | Performed by: NURSE PRACTITIONER

## 2022-05-05 PROCEDURE — G8427 DOCREV CUR MEDS BY ELIG CLIN: HCPCS | Performed by: NURSE PRACTITIONER

## 2022-05-05 PROCEDURE — G8417 CALC BMI ABV UP PARAM F/U: HCPCS | Performed by: NURSE PRACTITIONER

## 2022-05-05 RX ORDER — B-COMPLEX WITH VITAMIN C
TABLET ORAL
COMMUNITY
End: 2022-05-05

## 2022-05-05 NOTE — LETTER
[address] [facility] Vascular Surgery  [facility] [address]  Phone: [phone]  Fax: [phone]    Michael Finley CNP        May 5, 2022      [address] [place] [address], [place] [address] [address] [facility][1]     Patient: Kaylin Lindo    MR Number: [id number]    YOB: 1940    Date of Visit: 5/5/2022        Dear [address] [place] [address] Road:    Kinza Jaime, Dr. Felicity Rodriguez patient, was in the office for a carotid duplex scan. The assessment is as follows:    Carotid artery stenosis, w/o mention of cerebral infarction   Current plans       * The patient has a 50-80% DERICK stenosis by velocity criteria; DERICK 61% by             image. * The patient has a 37-35% LICA stenosis by velocity criteria; B-Mode                      measurement of stenosis could not be obtained due to acoustic shadowing           from calcified plaque. There has been some increase in LICA velocities                compared to the previous study of 9/3/21. * The patient has not experienced any symptoms related to his carotid disease. * Follow up in 6 months with carotid doppler scan and office visit. I will keep you posted regarding his progress.     Sincerely,      Michael Finley CNP

## 2022-05-05 NOTE — PROGRESS NOTES
Subjective:      Patient ID: Yasmany Sam is a 79 y.o. male. Chief Complaint   Patient presents with    Carotid Disease     six month follow up for CDS and office visit       Pain Assessment  Gilmar Suresh has a pain level on 0/10 scale:  5  Location:  BLE knees  Description:  aching  Radiation:   No  Duration:+  years  Time:  intermittent    HPI     The patient is a 79 y.o. male who presents with a complaint of carotid stenosis follow up. The patient has been previously diagnosed with Left carotid artery stenosis and Right carotid artery stenosis. Date last seen: 9/3/2021. Patient denies numbness/weakness on any one side, speech disturbances or visual disturbances. Since last visit patient has had left CDS to be review today and right CDS to be reviewed today. The patient has not previously undergone surgical intervention for this problem. Review of Systems   HENT: Positive for hearing loss. Eyes: Negative for visual disturbance. Cardiovascular: Leg swelling: controlled with compression stockings. Musculoskeletal: Positive for arthralgias (bilateral knees - plans to discuss ablation with Dr. Rufino Braden). Negative for neck pain. Neurological: Negative for dizziness, speech difficulty, weakness, light-headedness, numbness and headaches. All other systems reviewed and are negative. Allergies   Allergen Reactions    Hytrin [Terazosin Hcl]      Ineffective for BP control    Penicillins      Unknown reaction during childhood; Patient tolerated cephalosporins in the past    Shrimp Flavor Hives    Sulfa Antibiotics      Unknown reaction in childhood    Tekturna [Aliskiren Fumarate]      Ineffective    Vancomycin      Fever    Ciprofloxacin Rash     Fever and rash          Prior to Visit Medications    Medication Sig Taking?  Authorizing Provider   atorvastatin (LIPITOR) 40 MG tablet TAKE 1 TABLET BY MOUTH EVERY DAY Yes Shawn Gaitan MD   alfuzosin (UROXATRAL) 10 MG extended release tablet TAKE 1 TABLET BY MOUTH EVERY DAY Yes Adriano Alicia MD   CVS PURELAX 17 GM/SCOOP powder TAKE 17G BY MOUTH DAILY MIX WITH 8 OZ OF WATER Yes Adriano Alicia MD   LINZESS 145 MCG capsule TAKE 1 CAPSULE BY MOUTH EVERY DAY IN THE MORNING BEFORE BREAKFAST Yes Adriano Alicia MD   verapamil (CALAN SR) 240 MG extended release tablet TAKE 1 TABLET BY MOUTH TWICE A DAY Yes Adriano Alicia MD   valsartan-hydroCHLOROthiazide (DIOVAN-HCT) 320-25 MG per tablet TAKE 1 TABLET BY MOUTH EVERY DAY Yes Adriano Alicia MD   cloNIDine (CATAPRES) 0.1 MG tablet TAKE 1 TABLET BY MOUTH TWICE A DAY Yes Adriano Alicia MD   ACCU-CHEGALLO GUIDE strip TEST AS DIRECTED 3 TIMES A DAY Yes Adriano Alicia MD   pantoprazole (PROTONIX) 40 MG tablet TAKE 1 TABLET BY MOUTH EVERY DAY Yes Micaela José, DO   albuterol sulfate  (90 Base) MCG/ACT inhaler INHALE 2 PUFFS BY MOUTH EVERY 4 HOURS AS NEEDED FOR WHEEZING Yes Adriano Alicia MD   insulin glargine (BASAGLAR KWIKPEN) 100 UNIT/ML injection pen Inject 50 Units into the skin 2 times daily (or as directed) Yes Adriano Alicia MD   montelukast (SINGULAIR) 10 MG tablet TAKE 1 TABLET BY MOUTH EVERY DAY Yes Adriano Alicia MD   furosemide (LASIX) 20 MG tablet TAKE 40MG EVERY OTHER DAY FOR ONE WEEK AND 20MG ON THE DAYS THAT PT IS NOT TAKING 40MG.  Yes Adriano Alicia MD   B-D UF III MINI PEN NEEDLES 31G X 5 MM MISC USE AS DIRECTED 3 TIMES A DAY Yes Adriano Alicia MD   amiodarone (CORDARONE) 200 MG tablet TAKE 1 TABLET BY MOUTH EVERY OTHER DAY Yes Ruthann Kunz MD   Accu-Chek FastClix Lancets MISC USE TO TEST 3 TIMES A DAY DX CODE E11.9 Yes Adriano Alicia MD   levalbuterol Gladys Locket) 1.25 MG/3ML nebulizer solution USE 1 VIAL VIA NEBULIZER FOUR TIMES DAILY Yes Micaela José, DO   ZINC PO Take by mouth Yes Historical Provider, MD   CPAP Machine MISC by Does not apply route Yes Historical Provider, MD   Ascorbic Acid (SUPER C COMPLEX PO) Take by mouth daily Yes Historical Provider, MD   Multiple Vitamins-Minerals (MULTIVITAMIN ADULT PO) Take by mouth daily Yes Historical Provider, MD   Lactobacillus Rhamnosus, GG, (CVS PROBIOTIC, LACTOBACILLUS,) CAPS TAKE 1 CAPSULE BY MOUTH 2 TIMES DAILY (WITH MEALS) Yes Edyta Hewitt MD   Docusate Sodium (COLACE PO) Take by mouth daily Yes Historical Provider, MD   Cyanocobalamin (VITAMIN B-12 PO) Take 500 mcg by mouth every other day  Yes Historical Provider, MD   Cholecalciferol (VITAMIN D3 PO) Take 2,000 Units by mouth daily  Yes Historical Provider, MD   aspirin EC 81 MG EC tablet Take 2 tablets by mouth daily. Yes Phong Pereira MD   ferrous sulfate 325 (65 FE) MG tablet Take 325 mg by mouth daily  Yes Historical Provider, MD     History reviewed. Objective:   Physical Exam  Vitals reviewed. Constitutional:       General: He is not in acute distress. Appearance: Normal appearance. He is well-developed. Comments: obese   HENT:      Head: Normocephalic. Right Ear: Decreased hearing noted. Left Ear: Decreased hearing (bilateral hearing aids) noted. Eyes:      General: Lids are normal.      Conjunctiva/sclera: Conjunctivae normal.      Pupils: Pupils are equal, round, and reactive to light. Neck:      Vascular: No carotid bruit. Trachea: Trachea normal. No tracheal deviation. Cardiovascular:      Rate and Rhythm: Normal rate and regular rhythm. Pulses:           Carotid pulses are 2+ on the right side and 2+ on the left side. Radial pulses are 2+ on the right side and 2+ on the left side. Popliteal pulses are 2+ on the right side and 2+ on the left side. Dorsalis pedis pulses are 2+ on the right side and 2+ on the left side. Posterior tibial pulses are 0 on the right side and 0 on the left side. Heart sounds: Normal heart sounds. No murmur heard.   No friction rub. No gallop. Pulmonary:      Effort: Pulmonary effort is normal. No respiratory distress. Breath sounds: Normal breath sounds. Musculoskeletal:         General: No tenderness. Normal range of motion. Cervical back: Normal range of motion and neck supple. Right lower leg: Right lower leg edema: controlled with compression stockings; right ankle measures 21.8 cm; calf 38 cm. Left lower leg: Left lower leg edema: controlled with compression stockings; left ankle measures 23 cm, calf 38.4 cm. Skin:     General: Skin is warm and dry. Findings: No bruising, erythema or rash. Neurological:      Mental Status: He is alert and oriented to person, place, and time. Cranial Nerves: No cranial nerve deficit. Sensory: No sensory deficit. Coordination: Coordination normal.      Gait: Gait normal.   Psychiatric:         Speech: Speech normal.         Behavior: Behavior normal. Behavior is cooperative. Thought Content: Thought content normal.         Assessment:      1. Carotid artery stenosis, bilateral     * The patient has a 50-80% DERICK stenosis by velocity criteria; DERICK 61% by image. * The patient has a 58-94% LICA stenosis by velocity criteria. * The patient has not experienced any symptoms related to his carotid disease. * Follow up in 6 months with carotid doppler scan and office visit. 2. Hyperlipidemia - stable, on Lipitor 40 mg   3. Essential Hypertension - stable, on Diovan/HCTZ 320/25 mg, verapamil 240 mg, clonidine 0.1 mg, furosemide 20 mg   4. Leg swelling - wears compression stockings (20-30 mmHg) on a daily basis  Contacted [facility] regarding stockings that he has ordered in the past.  Gave patient [facility]'s card and prescription for new stockings, 20-30 mmHg. PATIENT EDUCATION focused on signs/symptoms of stroke:  - Watch for one eye going dark like a shade was pulled down.   - Watch for one side of the body or face not functioning correctly. - Difficulty with speech, such as slurring your words. - Difficulty finding the right words, such as calling an object by the wrong name when you know the real name. If you have any of these symptoms, do not call us or your family doctor. Call 911 and go immediately to the hospital.  You have a 4-6 hour window from the point when symptoms start to get to the hospital, get a CT scan done and initiate clot dissolving drugs. Plan:         Return in about 6 months (around 11/5/2022) for bilateral CDS and office visit.

## 2022-05-05 NOTE — PATIENT INSTRUCTIONS
Return in six months for a bilateral carotid duplex scan and office visit. Signs/Symptoms of Stroke:  - Watch for one eye going dark like a shade was pulled down over it. - Watch for one side of the body or face not working.  - Difficulty with speech such as slurring your words  - Difficulty finding the right words, i.e. Calling an object by the wrong name when you know the real name. If you have any of these symptoms, do not call us, or your family doctor. Call 911, and go immediately to the hospital. You have a 4-6 hour window from the point when symptoms start to get to the hospital, get a CT scan done, and initiate clot dissolving drugs.

## 2022-07-14 ENCOUNTER — OFFICE VISIT (OUTPATIENT)
Dept: PULMONOLOGY | Age: 82
End: 2022-07-14
Payer: MEDICARE

## 2022-07-14 VITALS
WEIGHT: 256 LBS | DIASTOLIC BLOOD PRESSURE: 60 MMHG | BODY MASS INDEX: 38.8 KG/M2 | HEIGHT: 68 IN | OXYGEN SATURATION: 96 % | HEART RATE: 96 BPM | RESPIRATION RATE: 16 BRPM | TEMPERATURE: 97.1 F | SYSTOLIC BLOOD PRESSURE: 130 MMHG

## 2022-07-14 DIAGNOSIS — G47.33 OSA ON CPAP: ICD-10-CM

## 2022-07-14 DIAGNOSIS — J32.9 RHINOSINUSITIS: ICD-10-CM

## 2022-07-14 DIAGNOSIS — J44.9 COPD WITH CHRONIC BRONCHITIS (HCC): Primary | ICD-10-CM

## 2022-07-14 DIAGNOSIS — Z99.89 OSA ON CPAP: ICD-10-CM

## 2022-07-14 DIAGNOSIS — J31.0 RHINOSINUSITIS: ICD-10-CM

## 2022-07-14 PROCEDURE — 1123F ACP DISCUSS/DSCN MKR DOCD: CPT | Performed by: INTERNAL MEDICINE

## 2022-07-14 PROCEDURE — 99214 OFFICE O/P EST MOD 30 MIN: CPT | Performed by: INTERNAL MEDICINE

## 2022-07-14 PROCEDURE — G8417 CALC BMI ABV UP PARAM F/U: HCPCS | Performed by: INTERNAL MEDICINE

## 2022-07-14 PROCEDURE — 3023F SPIROM DOC REV: CPT | Performed by: INTERNAL MEDICINE

## 2022-07-14 PROCEDURE — G8427 DOCREV CUR MEDS BY ELIG CLIN: HCPCS | Performed by: INTERNAL MEDICINE

## 2022-07-14 PROCEDURE — 1036F TOBACCO NON-USER: CPT | Performed by: INTERNAL MEDICINE

## 2022-07-14 ASSESSMENT — SLEEP AND FATIGUE QUESTIONNAIRES
HOW LIKELY ARE YOU TO NOD OFF OR FALL ASLEEP IN A CAR, WHILE STOPPED FOR A FEW MINUTES IN TRAFFIC: 0
HOW LIKELY ARE YOU TO NOD OFF OR FALL ASLEEP WHILE SITTING AND READING: 1
HOW LIKELY ARE YOU TO NOD OFF OR FALL ASLEEP WHEN YOU ARE A PASSENGER IN A CAR FOR AN HOUR WITHOUT A BREAK: 0
ESS TOTAL SCORE: 3
HOW LIKELY ARE YOU TO NOD OFF OR FALL ASLEEP WHILE WATCHING TV: 1
HOW LIKELY ARE YOU TO NOD OFF OR FALL ASLEEP WHILE SITTING INACTIVE IN A PUBLIC PLACE: 0
HOW LIKELY ARE YOU TO NOD OFF OR FALL ASLEEP WHILE SITTING QUIETLY AFTER LUNCH WITHOUT ALCOHOL: 0
HOW LIKELY ARE YOU TO NOD OFF OR FALL ASLEEP WHILE SITTING AND TALKING TO SOMEONE: 0
HOW LIKELY ARE YOU TO NOD OFF OR FALL ASLEEP WHILE LYING DOWN TO REST IN THE AFTERNOON WHEN CIRCUMSTANCES PERMIT: 1

## 2022-07-14 ASSESSMENT — COPD QUESTIONNAIRES
QUESTION1_COUGHFREQUENCY: 2
QUESTION8_ENERGYLEVEL: 1
QUESTION6_LEAVINGHOUSE: 1
QUESTION4_WALKINCLINE: 2
CAT_TOTALSCORE: 12
QUESTION3_CHESTTIGHTNESS: 1
QUESTION5_HOMEACTIVITIES: 2
QUESTION2_CHESTPHLEGM: 2
QUESTION7_SLEEPQUALITY: 1

## 2022-07-14 NOTE — PATIENT INSTRUCTIONS
Continue with inhalers.   May need to use nebuilzers more often    Continue with singulair and flonase    Use machine every night    Follow up in 6 months

## 2022-08-12 DIAGNOSIS — I48.0 PAROXYSMAL ATRIAL FIBRILLATION (HCC): ICD-10-CM

## 2022-08-12 DIAGNOSIS — K21.9 GASTROESOPHAGEAL REFLUX DISEASE WITHOUT ESOPHAGITIS: ICD-10-CM

## 2022-08-15 RX ORDER — PANTOPRAZOLE SODIUM 40 MG/1
TABLET, DELAYED RELEASE ORAL
Qty: 90 TABLET | Refills: 1 | Status: ON HOLD | OUTPATIENT
Start: 2022-08-15

## 2022-08-16 RX ORDER — AMIODARONE HYDROCHLORIDE 200 MG/1
TABLET ORAL
Qty: 45 TABLET | Refills: 3 | Status: ON HOLD | OUTPATIENT
Start: 2022-08-16

## 2022-08-16 NOTE — TELEPHONE ENCOUNTER
Last OV: 2/2/22  Next OV: 1 y rf/u 2023  Last refill:8/3/21  Most recent Labs: 5/27/22  Last EKG (if needed):2/24/22

## 2022-08-29 ENCOUNTER — HOSPITAL ENCOUNTER (OUTPATIENT)
Dept: GENERAL RADIOLOGY | Age: 82
Discharge: HOME | End: 2022-08-29
Payer: MEDICARE

## 2022-08-29 ENCOUNTER — HOSPITAL ENCOUNTER (OUTPATIENT)
Age: 82
Discharge: HOME | End: 2022-08-29
Payer: MEDICARE

## 2022-08-29 DIAGNOSIS — R07.81 RIB PAIN ON RIGHT SIDE: ICD-10-CM

## 2022-08-29 DIAGNOSIS — M25.561 ACUTE PAIN OF RIGHT KNEE: ICD-10-CM

## 2022-08-29 PROCEDURE — 71101 X-RAY EXAM UNILAT RIBS/CHEST: CPT

## 2022-08-29 PROCEDURE — 73560 X-RAY EXAM OF KNEE 1 OR 2: CPT

## 2022-09-07 ENCOUNTER — TELEPHONE (OUTPATIENT)
Dept: PULMONOLOGY | Age: 82
End: 2022-09-07

## 2022-09-08 NOTE — TELEPHONE ENCOUNTER
Gilmar called in [employer] does not have his order. Advised him it was faxed about 20 minutes ago, and that it can take anywhere from 5-45 minutes for a success page to come back to us that it is confirmed.

## 2022-10-30 ENCOUNTER — HOSPITAL ENCOUNTER (INPATIENT)
Age: 82
LOS: 8 days | Discharge: HOME HEALTH | DRG: 698 | End: 2022-11-08
Attending: EMERGENCY MEDICINE | Admitting: STUDENT IN AN ORGANIZED HEALTH CARE EDUCATION/TRAINING PROGRAM
Payer: MEDICARE

## 2022-10-30 ENCOUNTER — APPOINTMENT (OUTPATIENT)
Dept: GENERAL RADIOLOGY | Age: 82
DRG: 698 | End: 2022-10-30
Payer: MEDICARE

## 2022-10-30 DIAGNOSIS — R53.1 GENERAL WEAKNESS: Primary | ICD-10-CM

## 2022-10-30 DIAGNOSIS — R77.8 ELEVATED TROPONIN: ICD-10-CM

## 2022-10-30 LAB
ALBUMIN SERPL-MCNC: 3.9 G/DL (ref 3.4–5)
ALP BLD-CCNC: 120 U/L (ref 40–129)
ALT SERPL-CCNC: 41 U/L (ref 10–40)
ANION GAP SERPL CALCULATED.3IONS-SCNC: 14 MMOL/L (ref 3–16)
AST SERPL-CCNC: 28 U/L (ref 15–37)
BASE EXCESS VENOUS: 1.9 MMOL/L
BASOPHILS ABSOLUTE: 0 K/UL (ref 0–0.2)
BASOPHILS RELATIVE PERCENT: 0.4 %
BILIRUB SERPL-MCNC: <0.2 MG/DL (ref 0–1)
BILIRUBIN DIRECT: <0.2 MG/DL (ref 0–0.3)
BILIRUBIN, INDIRECT: ABNORMAL MG/DL (ref 0–1)
BUN BLDV-MCNC: 61 MG/DL (ref 7–20)
CALCIUM SERPL-MCNC: 8.7 MG/DL (ref 8.3–10.6)
CARBOXYHEMOGLOBIN: 1.9 %
CHLORIDE BLD-SCNC: 102 MMOL/L (ref 99–110)
CO2: 25 MMOL/L (ref 21–32)
CREAT SERPL-MCNC: 2.5 MG/DL (ref 0.8–1.3)
EOSINOPHILS ABSOLUTE: 0.1 K/UL (ref 0–0.6)
EOSINOPHILS RELATIVE PERCENT: 1.2 %
GFR SERPL CREATININE-BSD FRML MDRD: 25 ML/MIN/{1.73_M2}
GLUCOSE BLD-MCNC: 133 MG/DL (ref 70–99)
HCO3 VENOUS: 27 MMOL/L (ref 23–29)
HCT VFR BLD CALC: 26.7 % (ref 40.5–52.5)
HEMOGLOBIN: 8.4 G/DL (ref 13.5–17.5)
LYMPHOCYTES ABSOLUTE: 0.7 K/UL (ref 1–5.1)
LYMPHOCYTES RELATIVE PERCENT: 13.8 %
MCH RBC QN AUTO: 27.5 PG (ref 26–34)
MCHC RBC AUTO-ENTMCNC: 31.6 G/DL (ref 31–36)
MCV RBC AUTO: 87 FL (ref 80–100)
METHEMOGLOBIN VENOUS: 0.8 %
MONOCYTES ABSOLUTE: 0.4 K/UL (ref 0–1.3)
MONOCYTES RELATIVE PERCENT: 7.2 %
NEUTROPHILS ABSOLUTE: 4 K/UL (ref 1.7–7.7)
NEUTROPHILS RELATIVE PERCENT: 77.4 %
O2 SAT, VEN: 94 %
O2 THERAPY: NORMAL
PCO2, VEN: 46.3 MMHG (ref 40–50)
PDW BLD-RTO: 18.2 % (ref 12.4–15.4)
PH VENOUS: 7.38 (ref 7.35–7.45)
PLATELET # BLD: 137 K/UL (ref 135–450)
PMV BLD AUTO: 8.2 FL (ref 5–10.5)
PO2, VEN: 68 MMHG
POTASSIUM REFLEX MAGNESIUM: 4.8 MMOL/L (ref 3.5–5.1)
PRO-BNP: 517 PG/ML (ref 0–449)
RAPID INFLUENZA  B AGN: NEGATIVE
RAPID INFLUENZA A AGN: NEGATIVE
RBC # BLD: 3.07 M/UL (ref 4.2–5.9)
SARS-COV-2, NAAT: NOT DETECTED
SODIUM BLD-SCNC: 141 MMOL/L (ref 136–145)
TCO2 CALC VENOUS: 29 MMOL/L
TOTAL PROTEIN: 6.6 G/DL (ref 6.4–8.2)
TROPONIN: 0.03 NG/ML
WBC # BLD: 5.2 K/UL (ref 4–11)

## 2022-10-30 PROCEDURE — 93005 ELECTROCARDIOGRAM TRACING: CPT | Performed by: EMERGENCY MEDICINE

## 2022-10-30 PROCEDURE — 80076 HEPATIC FUNCTION PANEL: CPT

## 2022-10-30 PROCEDURE — 80048 BASIC METABOLIC PNL TOTAL CA: CPT

## 2022-10-30 PROCEDURE — 71045 X-RAY EXAM CHEST 1 VIEW: CPT

## 2022-10-30 PROCEDURE — 83880 ASSAY OF NATRIURETIC PEPTIDE: CPT

## 2022-10-30 PROCEDURE — 84484 ASSAY OF TROPONIN QUANT: CPT

## 2022-10-30 PROCEDURE — 82803 BLOOD GASES ANY COMBINATION: CPT

## 2022-10-30 PROCEDURE — 87804 INFLUENZA ASSAY W/OPTIC: CPT

## 2022-10-30 PROCEDURE — 87635 SARS-COV-2 COVID-19 AMP PRB: CPT

## 2022-10-30 PROCEDURE — 85025 COMPLETE CBC W/AUTO DIFF WBC: CPT

## 2022-10-30 PROCEDURE — 82550 ASSAY OF CK (CPK): CPT

## 2022-10-30 PROCEDURE — 99285 EMERGENCY DEPT VISIT HI MDM: CPT

## 2022-10-31 PROBLEM — N30.90 CYSTITIS: Status: RESOLVED | Noted: 2018-12-26 | Resolved: 2022-01-01

## 2022-10-31 PROBLEM — R53.1 GENERALIZED WEAKNESS: Status: ACTIVE | Noted: 2022-10-31

## 2022-10-31 PROBLEM — R94.39 ABNORMAL CARDIOVASCULAR STRESS TEST: Status: RESOLVED | Noted: 2021-07-01 | Resolved: 2022-01-01

## 2022-10-31 PROBLEM — R06.02 SHORTNESS OF BREATH: Status: RESOLVED | Noted: 2021-07-01 | Resolved: 2022-01-01

## 2022-10-31 LAB
A/G RATIO: 1.4 (ref 1.1–2.2)
ALBUMIN SERPL-MCNC: 3.7 G/DL (ref 3.4–5)
ALP BLD-CCNC: 126 U/L (ref 40–129)
ALT SERPL-CCNC: 41 U/L (ref 10–40)
ANION GAP SERPL CALCULATED.3IONS-SCNC: 12 MMOL/L (ref 3–16)
AST SERPL-CCNC: 27 U/L (ref 15–37)
BACTERIA: NORMAL /HPF
BASOPHILS ABSOLUTE: 0 K/UL (ref 0–0.2)
BASOPHILS RELATIVE PERCENT: 0.2 %
BILIRUB SERPL-MCNC: <0.2 MG/DL (ref 0–1)
BILIRUBIN URINE: NEGATIVE
BLOOD, URINE: NEGATIVE
BUN BLDV-MCNC: 69 MG/DL (ref 7–20)
CALCIUM SERPL-MCNC: 8.7 MG/DL (ref 8.3–10.6)
CHLORIDE BLD-SCNC: 105 MMOL/L (ref 99–110)
CLARITY: CLEAR
CO2: 26 MMOL/L (ref 21–32)
COLOR: YELLOW
CREAT SERPL-MCNC: 2.5 MG/DL (ref 0.8–1.3)
EKG ATRIAL RATE: 80 BPM
EKG DIAGNOSIS: NORMAL
EKG P-R INTERVAL: 230 MS
EKG Q-T INTERVAL: 410 MS
EKG QRS DURATION: 154 MS
EKG QTC CALCULATION (BAZETT): 472 MS
EKG R AXIS: 222 DEGREES
EKG T AXIS: 196 DEGREES
EKG VENTRICULAR RATE: 80 BPM
EOSINOPHILS ABSOLUTE: 0 K/UL (ref 0–0.6)
EOSINOPHILS RELATIVE PERCENT: 0.3 %
EPITHELIAL CELLS, UA: 0 /HPF (ref 0–5)
GFR SERPL CREATININE-BSD FRML MDRD: 25 ML/MIN/{1.73_M2}
GLUCOSE BLD-MCNC: 116 MG/DL (ref 70–99)
GLUCOSE BLD-MCNC: 131 MG/DL (ref 70–99)
GLUCOSE BLD-MCNC: 163 MG/DL (ref 70–99)
GLUCOSE BLD-MCNC: 263 MG/DL (ref 70–99)
GLUCOSE URINE: NEGATIVE MG/DL
HCT VFR BLD CALC: 25.6 % (ref 40.5–52.5)
HEMOGLOBIN: 8.2 G/DL (ref 13.5–17.5)
HYALINE CASTS: 1 /LPF (ref 0–8)
KETONES, URINE: NEGATIVE MG/DL
LEUKOCYTE ESTERASE, URINE: NEGATIVE
LYMPHOCYTES ABSOLUTE: 0.4 K/UL (ref 1–5.1)
LYMPHOCYTES RELATIVE PERCENT: 6.9 %
MCH RBC QN AUTO: 27.8 PG (ref 26–34)
MCHC RBC AUTO-ENTMCNC: 32.2 G/DL (ref 31–36)
MCV RBC AUTO: 86.4 FL (ref 80–100)
MICROSCOPIC EXAMINATION: YES
MONOCYTES ABSOLUTE: 0.1 K/UL (ref 0–1.3)
MONOCYTES RELATIVE PERCENT: 1.2 %
NEUTROPHILS ABSOLUTE: 5.3 K/UL (ref 1.7–7.7)
NEUTROPHILS RELATIVE PERCENT: 91.4 %
NITRITE, URINE: NEGATIVE
PDW BLD-RTO: 18.1 % (ref 12.4–15.4)
PERFORMED ON: ABNORMAL
PH UA: 6 (ref 5–8)
PLATELET # BLD: 129 K/UL (ref 135–450)
PMV BLD AUTO: 8.5 FL (ref 5–10.5)
POTASSIUM REFLEX MAGNESIUM: 5.1 MMOL/L (ref 3.5–5.1)
PROTEIN UA: ABNORMAL MG/DL
RBC # BLD: 2.96 M/UL (ref 4.2–5.9)
RBC UA: 0 /HPF (ref 0–4)
SODIUM BLD-SCNC: 143 MMOL/L (ref 136–145)
SPECIFIC GRAVITY UA: 1.01 (ref 1–1.03)
TOTAL CK: 47 U/L (ref 39–308)
TOTAL PROTEIN: 6.4 G/DL (ref 6.4–8.2)
TROPONIN: 0.02 NG/ML
TROPONIN: 0.03 NG/ML
URINE REFLEX TO CULTURE: ABNORMAL
URINE TYPE: ABNORMAL
UROBILINOGEN, URINE: 0.2 E.U./DL
WBC # BLD: 5.9 K/UL (ref 4–11)
WBC UA: 0 /HPF (ref 0–5)

## 2022-10-31 PROCEDURE — 6370000000 HC RX 637 (ALT 250 FOR IP): Performed by: EMERGENCY MEDICINE

## 2022-10-31 PROCEDURE — 36415 COLL VENOUS BLD VENIPUNCTURE: CPT

## 2022-10-31 PROCEDURE — 6360000002 HC RX W HCPCS: Performed by: STUDENT IN AN ORGANIZED HEALTH CARE EDUCATION/TRAINING PROGRAM

## 2022-10-31 PROCEDURE — 93010 ELECTROCARDIOGRAM REPORT: CPT | Performed by: INTERNAL MEDICINE

## 2022-10-31 PROCEDURE — 94640 AIRWAY INHALATION TREATMENT: CPT

## 2022-10-31 PROCEDURE — 2700000000 HC OXYGEN THERAPY PER DAY

## 2022-10-31 PROCEDURE — 85025 COMPLETE CBC W/AUTO DIFF WBC: CPT

## 2022-10-31 PROCEDURE — 97535 SELF CARE MNGMENT TRAINING: CPT

## 2022-10-31 PROCEDURE — 80053 COMPREHEN METABOLIC PANEL: CPT

## 2022-10-31 PROCEDURE — 2580000003 HC RX 258: Performed by: STUDENT IN AN ORGANIZED HEALTH CARE EDUCATION/TRAINING PROGRAM

## 2022-10-31 PROCEDURE — 99223 1ST HOSP IP/OBS HIGH 75: CPT | Performed by: INTERNAL MEDICINE

## 2022-10-31 PROCEDURE — 84484 ASSAY OF TROPONIN QUANT: CPT

## 2022-10-31 PROCEDURE — 1200000000 HC SEMI PRIVATE

## 2022-10-31 PROCEDURE — 6370000000 HC RX 637 (ALT 250 FOR IP): Performed by: INTERNAL MEDICINE

## 2022-10-31 PROCEDURE — 97530 THERAPEUTIC ACTIVITIES: CPT

## 2022-10-31 PROCEDURE — 94760 N-INVAS EAR/PLS OXIMETRY 1: CPT

## 2022-10-31 PROCEDURE — 2580000003 HC RX 258: Performed by: INTERNAL MEDICINE

## 2022-10-31 PROCEDURE — 97166 OT EVAL MOD COMPLEX 45 MIN: CPT

## 2022-10-31 PROCEDURE — 81001 URINALYSIS AUTO W/SCOPE: CPT

## 2022-10-31 RX ORDER — MONTELUKAST SODIUM 10 MG/1
10 TABLET ORAL DAILY
Status: DISCONTINUED | OUTPATIENT
Start: 2022-10-31 | End: 2022-11-09 | Stop reason: HOSPADM

## 2022-10-31 RX ORDER — SODIUM CHLORIDE 0.9 % (FLUSH) 0.9 %
5-40 SYRINGE (ML) INJECTION PRN
Status: DISCONTINUED | OUTPATIENT
Start: 2022-10-31 | End: 2022-11-09 | Stop reason: HOSPADM

## 2022-10-31 RX ORDER — VERAPAMIL HYDROCHLORIDE 240 MG/1
240 TABLET, FILM COATED, EXTENDED RELEASE ORAL 2 TIMES DAILY
Status: DISCONTINUED | OUTPATIENT
Start: 2022-10-31 | End: 2022-11-09 | Stop reason: HOSPADM

## 2022-10-31 RX ORDER — ASPIRIN 81 MG/1
162 TABLET ORAL DAILY
Status: DISCONTINUED | OUTPATIENT
Start: 2022-10-31 | End: 2022-11-09 | Stop reason: HOSPADM

## 2022-10-31 RX ORDER — ENOXAPARIN SODIUM 100 MG/ML
40 INJECTION SUBCUTANEOUS DAILY
Status: DISCONTINUED | OUTPATIENT
Start: 2022-10-31 | End: 2022-10-31 | Stop reason: DRUGHIGH

## 2022-10-31 RX ORDER — ENOXAPARIN SODIUM 100 MG/ML
30 INJECTION SUBCUTANEOUS DAILY
Status: DISCONTINUED | OUTPATIENT
Start: 2022-10-31 | End: 2022-11-02

## 2022-10-31 RX ORDER — ASPIRIN 325 MG
325 TABLET ORAL ONCE
Status: COMPLETED | OUTPATIENT
Start: 2022-10-31 | End: 2022-10-31

## 2022-10-31 RX ORDER — IPRATROPIUM BROMIDE AND ALBUTEROL SULFATE 2.5; .5 MG/3ML; MG/3ML
1 SOLUTION RESPIRATORY (INHALATION) ONCE
Status: COMPLETED | OUTPATIENT
Start: 2022-10-31 | End: 2022-10-31

## 2022-10-31 RX ORDER — AMIODARONE HYDROCHLORIDE 200 MG/1
200 TABLET ORAL DAILY
Status: DISCONTINUED | OUTPATIENT
Start: 2022-10-31 | End: 2022-11-09 | Stop reason: HOSPADM

## 2022-10-31 RX ORDER — ACETAMINOPHEN 325 MG/1
650 TABLET ORAL EVERY 6 HOURS PRN
Status: DISCONTINUED | OUTPATIENT
Start: 2022-10-31 | End: 2022-11-09 | Stop reason: HOSPADM

## 2022-10-31 RX ORDER — ONDANSETRON 2 MG/ML
4 INJECTION INTRAMUSCULAR; INTRAVENOUS EVERY 6 HOURS PRN
Status: DISCONTINUED | OUTPATIENT
Start: 2022-10-31 | End: 2022-11-09 | Stop reason: HOSPADM

## 2022-10-31 RX ORDER — PREDNISONE 20 MG/1
60 TABLET ORAL ONCE
Status: COMPLETED | OUTPATIENT
Start: 2022-10-31 | End: 2022-10-31

## 2022-10-31 RX ORDER — LACTOBACILLUS RHAMNOSUS GG 10B CELL
1 CAPSULE ORAL 2 TIMES DAILY WITH MEALS
Status: DISCONTINUED | OUTPATIENT
Start: 2022-10-31 | End: 2022-11-09 | Stop reason: HOSPADM

## 2022-10-31 RX ORDER — PANTOPRAZOLE SODIUM 40 MG/1
40 TABLET, DELAYED RELEASE ORAL DAILY
Status: DISCONTINUED | OUTPATIENT
Start: 2022-10-31 | End: 2022-11-09 | Stop reason: HOSPADM

## 2022-10-31 RX ORDER — INSULIN LISPRO 100 [IU]/ML
0-4 INJECTION, SOLUTION INTRAVENOUS; SUBCUTANEOUS NIGHTLY
Status: DISCONTINUED | OUTPATIENT
Start: 2022-10-31 | End: 2022-11-09 | Stop reason: HOSPADM

## 2022-10-31 RX ORDER — IPRATROPIUM BROMIDE AND ALBUTEROL SULFATE 2.5; .5 MG/3ML; MG/3ML
1 SOLUTION RESPIRATORY (INHALATION)
Status: DISCONTINUED | OUTPATIENT
Start: 2022-10-31 | End: 2022-11-09 | Stop reason: HOSPADM

## 2022-10-31 RX ORDER — PREDNISONE 20 MG/1
40 TABLET ORAL DAILY
Status: COMPLETED | OUTPATIENT
Start: 2022-10-31 | End: 2022-11-04

## 2022-10-31 RX ORDER — TAMSULOSIN HYDROCHLORIDE 0.4 MG/1
0.4 CAPSULE ORAL DAILY
Status: DISCONTINUED | OUTPATIENT
Start: 2022-10-31 | End: 2022-11-07

## 2022-10-31 RX ORDER — POLYETHYLENE GLYCOL 3350 17 G/17G
17 POWDER, FOR SOLUTION ORAL DAILY
Status: DISCONTINUED | OUTPATIENT
Start: 2022-10-31 | End: 2022-11-09 | Stop reason: HOSPADM

## 2022-10-31 RX ORDER — ATORVASTATIN CALCIUM 40 MG/1
40 TABLET, FILM COATED ORAL DAILY
Status: DISCONTINUED | OUTPATIENT
Start: 2022-10-31 | End: 2022-11-09 | Stop reason: HOSPADM

## 2022-10-31 RX ORDER — SODIUM CHLORIDE 9 MG/ML
INJECTION, SOLUTION INTRAVENOUS PRN
Status: DISCONTINUED | OUTPATIENT
Start: 2022-10-31 | End: 2022-11-09 | Stop reason: HOSPADM

## 2022-10-31 RX ORDER — POLYETHYLENE GLYCOL 3350 17 G/17G
17 POWDER, FOR SOLUTION ORAL DAILY PRN
Status: DISCONTINUED | OUTPATIENT
Start: 2022-10-31 | End: 2022-11-09 | Stop reason: HOSPADM

## 2022-10-31 RX ORDER — ACETAMINOPHEN 650 MG/1
650 SUPPOSITORY RECTAL EVERY 6 HOURS PRN
Status: DISCONTINUED | OUTPATIENT
Start: 2022-10-31 | End: 2022-11-09 | Stop reason: HOSPADM

## 2022-10-31 RX ORDER — INSULIN LISPRO 100 [IU]/ML
0-4 INJECTION, SOLUTION INTRAVENOUS; SUBCUTANEOUS
Status: DISCONTINUED | OUTPATIENT
Start: 2022-10-31 | End: 2022-11-09 | Stop reason: HOSPADM

## 2022-10-31 RX ORDER — DOCUSATE SODIUM 100 MG/1
100 CAPSULE, LIQUID FILLED ORAL DAILY
Status: DISCONTINUED | OUTPATIENT
Start: 2022-10-31 | End: 2022-11-09 | Stop reason: HOSPADM

## 2022-10-31 RX ORDER — ONDANSETRON 4 MG/1
4 TABLET, ORALLY DISINTEGRATING ORAL EVERY 8 HOURS PRN
Status: DISCONTINUED | OUTPATIENT
Start: 2022-10-31 | End: 2022-11-09 | Stop reason: HOSPADM

## 2022-10-31 RX ORDER — FERROUS SULFATE TAB EC 324 MG (65 MG FE EQUIVALENT) 324 (65 FE) MG
324 TABLET DELAYED RESPONSE ORAL DAILY
Status: DISCONTINUED | OUTPATIENT
Start: 2022-10-31 | End: 2022-11-09 | Stop reason: HOSPADM

## 2022-10-31 RX ORDER — SODIUM CHLORIDE 0.9 % (FLUSH) 0.9 %
5-40 SYRINGE (ML) INJECTION EVERY 12 HOURS SCHEDULED
Status: DISCONTINUED | OUTPATIENT
Start: 2022-10-31 | End: 2022-11-09 | Stop reason: HOSPADM

## 2022-10-31 RX ORDER — VALSARTAN 160 MG/1
320 TABLET ORAL DAILY
Status: DISCONTINUED | OUTPATIENT
Start: 2022-10-31 | End: 2022-11-01

## 2022-10-31 RX ORDER — SODIUM CHLORIDE 9 MG/ML
INJECTION, SOLUTION INTRAVENOUS CONTINUOUS
Status: ACTIVE | OUTPATIENT
Start: 2022-10-31 | End: 2022-10-31

## 2022-10-31 RX ORDER — INSULIN GLARGINE 100 [IU]/ML
40 INJECTION, SOLUTION SUBCUTANEOUS 2 TIMES DAILY
Status: DISCONTINUED | OUTPATIENT
Start: 2022-10-31 | End: 2022-11-01

## 2022-10-31 RX ORDER — CLONIDINE HYDROCHLORIDE 0.1 MG/1
0.1 TABLET ORAL 2 TIMES DAILY
Status: DISCONTINUED | OUTPATIENT
Start: 2022-10-31 | End: 2022-11-09 | Stop reason: HOSPADM

## 2022-10-31 RX ORDER — DEXTROSE MONOHYDRATE 100 MG/ML
INJECTION, SOLUTION INTRAVENOUS CONTINUOUS PRN
Status: DISCONTINUED | OUTPATIENT
Start: 2022-10-31 | End: 2022-11-09 | Stop reason: HOSPADM

## 2022-10-31 RX ADMIN — FERROUS SULFATE TAB EC 324 MG (65 MG FE EQUIVALENT) 324 MG: 324 (65 FE) TABLET DELAYED RESPONSE at 10:17

## 2022-10-31 RX ADMIN — PREDNISONE 40 MG: 20 TABLET ORAL at 10:16

## 2022-10-31 RX ADMIN — SODIUM CHLORIDE, PRESERVATIVE FREE 10 ML: 5 INJECTION INTRAVENOUS at 10:19

## 2022-10-31 RX ADMIN — PREDNISONE 60 MG: 20 TABLET ORAL at 00:51

## 2022-10-31 RX ADMIN — Medication 1 CAPSULE: at 10:16

## 2022-10-31 RX ADMIN — INSULIN GLARGINE 40 UNITS: 100 INJECTION, SOLUTION SUBCUTANEOUS at 10:20

## 2022-10-31 RX ADMIN — SODIUM CHLORIDE: 9 INJECTION, SOLUTION INTRAVENOUS at 19:51

## 2022-10-31 RX ADMIN — INSULIN GLARGINE 40 UNITS: 100 INJECTION, SOLUTION SUBCUTANEOUS at 20:22

## 2022-10-31 RX ADMIN — CLONIDINE HYDROCHLORIDE 0.1 MG: 0.1 TABLET ORAL at 10:17

## 2022-10-31 RX ADMIN — IPRATROPIUM BROMIDE AND ALBUTEROL SULFATE 1 AMPULE: .5; 3 SOLUTION RESPIRATORY (INHALATION) at 16:24

## 2022-10-31 RX ADMIN — PANTOPRAZOLE SODIUM 40 MG: 40 TABLET, DELAYED RELEASE ORAL at 10:17

## 2022-10-31 RX ADMIN — ASPIRIN 162 MG: 81 TABLET, COATED ORAL at 10:16

## 2022-10-31 RX ADMIN — Medication 1 CAPSULE: at 16:38

## 2022-10-31 RX ADMIN — VALSARTAN 320 MG: 160 TABLET, FILM COATED ORAL at 10:16

## 2022-10-31 RX ADMIN — AMIODARONE HYDROCHLORIDE 200 MG: 200 TABLET ORAL at 10:16

## 2022-10-31 RX ADMIN — ASPIRIN 325 MG: 325 TABLET ORAL at 00:52

## 2022-10-31 RX ADMIN — SODIUM CHLORIDE: 9 INJECTION, SOLUTION INTRAVENOUS at 10:54

## 2022-10-31 RX ADMIN — DOCUSATE SODIUM 100 MG: 100 CAPSULE, LIQUID FILLED ORAL at 10:16

## 2022-10-31 RX ADMIN — CLONIDINE HYDROCHLORIDE 0.1 MG: 0.1 TABLET ORAL at 20:22

## 2022-10-31 RX ADMIN — ATORVASTATIN CALCIUM 40 MG: 40 TABLET, FILM COATED ORAL at 10:16

## 2022-10-31 RX ADMIN — VERAPAMIL HYDROCHLORIDE 240 MG: 240 TABLET, FILM COATED, EXTENDED RELEASE ORAL at 10:16

## 2022-10-31 RX ADMIN — INSULIN LISPRO 2 UNITS: 100 INJECTION, SOLUTION INTRAVENOUS; SUBCUTANEOUS at 11:49

## 2022-10-31 RX ADMIN — IPRATROPIUM BROMIDE AND ALBUTEROL SULFATE 1 AMPULE: .5; 3 SOLUTION RESPIRATORY (INHALATION) at 12:29

## 2022-10-31 RX ADMIN — TAMSULOSIN HYDROCHLORIDE 0.4 MG: 0.4 CAPSULE ORAL at 10:17

## 2022-10-31 RX ADMIN — ENOXAPARIN SODIUM 30 MG: 100 INJECTION SUBCUTANEOUS at 10:15

## 2022-10-31 RX ADMIN — IPRATROPIUM BROMIDE AND ALBUTEROL SULFATE 1 AMPULE: .5; 3 SOLUTION RESPIRATORY (INHALATION) at 00:34

## 2022-10-31 RX ADMIN — IPRATROPIUM BROMIDE AND ALBUTEROL SULFATE 1 AMPULE: .5; 3 SOLUTION RESPIRATORY (INHALATION) at 08:41

## 2022-10-31 RX ADMIN — POLYETHYLENE GLYCOL 3350 17 G: 17 POWDER, FOR SOLUTION ORAL at 10:18

## 2022-10-31 RX ADMIN — VERAPAMIL HYDROCHLORIDE 240 MG: 240 TABLET, FILM COATED, EXTENDED RELEASE ORAL at 20:22

## 2022-10-31 RX ADMIN — MONTELUKAST 10 MG: 10 TABLET, FILM COATED ORAL at 10:16

## 2022-10-31 ASSESSMENT — ENCOUNTER SYMPTOMS
RHINORRHEA: 0
PHOTOPHOBIA: 0
SHORTNESS OF BREATH: 1
NAUSEA: 0
WHEEZING: 1
VOMITING: 0
COLOR CHANGE: 0
COUGH: 1
BACK PAIN: 0

## 2022-10-31 NOTE — PLAN OF CARE
Problem: Discharge Planning  Goal: Discharge to home or other facility with appropriate resources  Outcome: Progressing  Flowsheets  Taken 10/31/2022 1045 by Mercedes Garcia RN  Discharge to home or other facility with appropriate resources: Identify barriers to discharge with patient and caregiver  Taken 10/31/2022 0349 by Ignacia Jacobsen RN  Discharge to home or other facility with appropriate resources: Identify barriers to discharge with patient and caregiver     Problem: Safety - Adult  Goal: Free from fall injury  Outcome: Progressing  Flowsheets (Taken 10/31/2022 1251)  Free From Fall Injury: Instruct family/caregiver on patient safety     Problem: ABCDS Injury Assessment  Goal: Absence of physical injury  Outcome: Progressing  Flowsheets (Taken 10/31/2022 1251)  Absence of Physical Injury: Implement safety measures based on patient assessment     Problem: Skin/Tissue Integrity  Goal: Absence of new skin breakdown  Description: 1. Monitor for areas of redness and/or skin breakdown  2. Assess vascular access sites hourly  3. Every 4-6 hours minimum:  Change oxygen saturation probe site  4. Every 4-6 hours:  If on nasal continuous positive airway pressure, respiratory therapy assess nares and determine need for appliance change or resting period.   Outcome: Progressing

## 2022-10-31 NOTE — PROGRESS NOTES
10/31/22 1501   Encounter Summary   Encounter Overview/Reason  Spiritual/Emotional Needs   Service Provided For: Patient and family together   Referral/Consult From: Rounding   Support System Spouse; Children   Last Encounter  10/31/22  (follow up ,provided HCPOA form to the patient, fr tabares)   Complexity of Encounter Low   Begin Time 1420   End Time  1455   Total Time Calculated 35 min   Encounter    Type Follow up   Spiritual/Emotional needs   Type Spiritual Support   Rituals, Rites and Sacraments   Type   (patient still waiting for his  for sos)   Advance Care Planning   Type ACP conversation   Assessment/Intervention/Outcome   Assessment Calm;Coping; Hopeful;Peaceful   Intervention Active listening;Confronted/Challenged   Outcome Comfort;Coping;Encouraged;Engaged in conversation;Expressed feelings, needs, and concerns;Expressed Gratitude   Electronically signed by Graeme Wade on 10/31/2022 at 3:08 PM

## 2022-10-31 NOTE — PROGRESS NOTES
Occupational Therapy  Facility/Department: [address] [facility]  Occupational Therapy Initial Assessment    Name: León Reno  : 1940  MRN: [id number]  Date of Service: 10/31/2022    Discharge Recommendations:  Continue to assess pending progress, 2-3 sessions per week, 3-5 sessions per week  OT Equipment Recommendations  Other: TBD     Gilmar Suresh scored a 16/24 on the AM-PAC ADL Inpatient form. Current research shows that an AM-PAC score of 17 or less is typically not associated with a discharge to the patient's home setting. Based on the patient's AM-PAC score and their current ADL deficits, it is recommended that the patient have 3-5 sessions per week vs 2-3 sessions per week pending progress of Occupational Therapy at d/c to increase the patient's independence. Please see assessment section for further patient specific details. If patient discharges prior to next session this note will serve as a discharge summary. Please see below for the latest assessment towards goals. Patient Diagnosis(es): The primary encounter diagnosis was General weakness. A diagnosis of Elevated troponin was also pertinent to this visit. 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 Past Surgical History:  has a past surgical history that includes Gallbladder surgery (); Upper gastrointestinal endoscopy (7-2005    7/10/2009); Colonoscopy (7/10/2009); Cataract removal (2012);  Pain management procedure (Right, 10/20/2021); and Pain management procedure (Left, 12/8/2021). Assessment   Performance deficits / Impairments: Decreased functional mobility ; Decreased safe awareness;Decreased balance;Decreased coordination;Decreased ADL status; Decreased endurance;Decreased high-level IADLs  Assessment: Pt is an 80 yo male admitted with generalized weakness. He stated he just feels wiped out, noted tremors throughout when attempting ambulation. Pt lives with his spouse, stated he was completely indep wiht self care, was the main cook, completed all medication management and bill paying. Pt is currently functioning below baseline in above areas. Anticipate he will require max assist LB bathing and dressing, toileting, Required min assist for transfers and functional mobility using RW with notable tremors UE and LE when up. Pt required max assist for bed mobiltiy. Pt wants to return home, but will need to determine discharge plan pending progress - 3-5 vs 2-3 times per week  Prognosis: Good  Decision Making: Medium Complexity  REQUIRES OT FOLLOW-UP: Yes  Activity Tolerance  Activity Tolerance: Patient limited by fatigue;Treatment limited secondary to medical complications (free text)  Activity Tolerance Comments: SOB although oxygen sats 96%        Plan   Occupational Therapy Plan  Times Per Week: 3-5  Times Per Day: Once a day  Current Treatment Recommendations: Balance training, Functional mobility training, Endurance training, Safety education & training, Patient/Caregiver education & training, Equipment evaluation, education, & procurement, Self-Care / ADL     Restrictions  Restrictions/Precautions  Restrictions/Precautions: Contact Precautions (urine)  Position Activity Restriction  Other position/activity restrictions: IV left hand, heart monitor,  oxygen 1 L per NC, Contact prec ESBL urine    Subjective   General  Chart Reviewed: Yes, Orders, Progress Notes, History and Physical  Additional Pertinent Hx:  This is an 40-year-old white male with multiple medical problems who presented to the emergency room with weakness and shakiness with walking. Patient stated that over the last several days he is in has not been feeling himself and has had a hard time walking. He has had no specific symptoms but just overall weakness. He states that things progressed over the course of 2-3 days and so eventually last night came to the emergency room. He denied any fever or chills. No cough or sputum aside from his usual.  He is chronically short of breath and this was no different. He had no increase in his leg edema. He denied chest pain. No dysuria. No change in his bowel habits. He has been using his CPAP normally. Sugars have been okay. (copied per note Dr Toan Tracy 10/30)  Family / Caregiver Present: No  Referring Practitioner: Toan Tracy  Diagnosis: generalized weakness  Subjective  Subjective: pt met bedside, agreeable to OT     Social/Functional History  Social/Functional History  Lives With: Spouse  Type of Home: House  Home Layout: Able to Live on Main level with bedroom/bathroom, Multi-level ([facility] 1 1/2 story)  Home Access: Stairs to enter with rails  Entrance Stairs - Number of Steps: 1+1  Entrance Stairs - Rails: Both  Bathroom Shower/Tub: Tub/Shower unit  Bathroom Toilet: Standard (comfort height commode)  Bathroom Equipment: Shower chair (sliding doors on shower with bars)  Home Equipment: Cane  ADL Assistance: Independent  Homemaking Assistance: Independent (but wife cleans.   Pt completes bill paying, medication management)  Ambulation Assistance: Independent  Transfer Assistance: Independent  Active : Yes  Occupation: Retired  Additional Comments: Pt states he is completely indep, but does have assist of family if needed       Objective                   Bed Mobility Training  Bed Mobility Training: Yes  Balance  Sitting: Impaired  Sitting - Static: Fair (occasional)  Sitting - Dynamic: Fair (occasional)  Standing: Impaired  Standing - Static: Constant support  Standing - Dynamic: Constant support     AROM: Within functional limits  Strength: Within functional limits  Coordination: Generally decreased, functional (tremors BUE - stated he had occassional tremors LUE PTA)  Sensation: Intact  ADL  Feeding: Independent  Feeding Skilled Clinical Factors: pt eating lunch when OT arrived, had completed set up per self  LE Dressing Skilled Clinical Factors: pt is unable to reach feet for LB dressing - he did cross his legs,but very difficult and becomes SOB. Additional Comments: full lADL not complete , but anticipate pt will require min assist for grooming and UB dressing, but max for LB dressing, toileting as seen by decreased activity tolerance, balance, tremors        Bed mobility  Supine to Sit: Moderate assistance (assist to scoot hips around to get feet onto the floor)  Sit to Supine: Maximum assistance (difficulty getting legs all the way into bed, scooting to center of bed)  Bed Mobility Comments: pt has grea difficulty moving his body on the bed, gets stuck in the fold of the bed, feet do not touch the floor. SOB when lying flat  Transfers  Sit to stand: Minimal assistance  Stand to sit: Minimal assistance  Transfer Comments: pt stood from bed with min assist, amb 2 ft forward, then backward to bed using rolling walker. Appeared to have tremors in BUE and BLE, unsteady on his feet, so did not amb further.   Pt requested to return to bed as he stated he hasn't slept for 2 days  Vision  Vision: Within Functional Limits (had cataract surgery)  Hearing  Hearing: Exceptions to [facility]  Hearing Exceptions: Bilateral hearing aid;Hard of hearing/hearing concerns  Cognition  Overall Cognitive Status: [facility]  Cognition Comment: answers all questions easily  Orientation  Overall Orientation Status: Within Functional Limits                  Education Given To: Patient  Education Provided: Role of Therapy;Plan of Care;Transfer Training;Equipment  Education Method: Demonstration;Verbal  Barriers to Learning: None  Education Outcome: Verbalized understanding;Demonstrated understanding;Continued education needed                                                                          AM-PAC Score        AM-PAC Inpatient Daily Activity Raw Score: 16 (10/31/22 1531)  AM-PAC Inpatient ADL T-Scale Score : 35.96 (10/31/22 1531)  ADL Inpatient CMS 0-100% Score: 53.32 (10/31/22 1531)  ADL Inpatient CMS G-Code Modifier : CK (10/31/22 1531)           Goals  Short Term Goals  Time Frame for Short Term Goals: by discharge  Short Term Goal 1: self care with SBA , AD as needed  Short Term Goal 2: toilet with SBA and AD  Short Term Goal 3: transfer with with SBA and AD  Short Term Goal 4: functional mobility with SBA and AD  Short Term Goal 5: increase activity tolerance to stand 3 min for ADL tasks  Short Term Goal 6: decrease UE tremors to complete self care and mobiltiy with SBA  Short Term Goal 7: bed mobility with SBA  Long Term Goals  Time Frame for Long Term Goals : same as stg  Patient Goals   Patient goals : Pt stated he wants to go home, wants the tremors to go away       Therapy Time   Individual Concurrent Group Co-treatment   Time In 1145         Time Out 1230         Minutes 45         Timed Code Treatment Minutes: MOR MarcanoR/L 770

## 2022-10-31 NOTE — H&P
[address] [facility] Internal Medicine  History and Physical      CHIEF COMPLAINT:  I feel shaky and weak    History of Present Illness: This is an 79-year-old white male with multiple medical problems who presented to the emergency room with weakness and shakiness with walking. Patient stated that over the last several days he is in has not been feeling himself and has had a hard time walking. He has had no specific symptoms but just overall weakness. He states that things progressed over the course of 2-3 days and so eventually last night came to the emergency room. He denied any fever or chills. No cough or sputum aside from his usual.  He is chronically short of breath and this was no different. He had no increase in his leg edema. He denied chest pain. No dysuria. No change in his bowel habits. He has been using his CPAP normally. Sugars have been okay. In the ER is found to have worsened anemia and mildly elevated creatinine from baseline. Otherwise no significant abnormality was identified.         Past Medical History:   Diagnosis Date    Allergic rhinitis     Asthma     Atrial fibrillation ([address]     Carotid artery stenosis     Chronic kidney disease     COPD (chronic obstructive pulmonary disease) ([facility])     CPAP (continuous positive airway pressure) dependence     ESBL (extended spectrum beta-lactamase) producing bacteria infection 12/26/2018    urine    Hyperlipidemia     Hypertension     Iron deficiency anemia     Obstructive sleep apnea     Type II or unspecified type diabetes mellitus without mention of complication, not stated as uncontrolled          Past Surgical History:   Procedure Laterality Date    CATARACT REMOVAL  2/2012    bilateral    COLONOSCOPY  7/10/2009    diverticulosis    hemorrhoids    GALLBLADDER SURGERY  1981    PAIN MANAGEMENT PROCEDURE Right 10/20/2021    COOLIEF RADIOFREQUENCY ABLATION - RIGHT KNEE performed by Keshia Bacon MD at [place] (MULTIVITAMIN ADULT PO), Take by mouth daily  Lactobacillus Rhamnosus, GG, ([employer] PROBIOTIC, LACTOBACILLUS,) CAPS, TAKE 1 CAPSULE BY MOUTH 2 TIMES DAILY (WITH MEALS)  Docusate Sodium (COLACE PO), Take by mouth daily  Cyanocobalamin (VITAMIN B-12 PO), Take 500 mcg by mouth every other day   Cholecalciferol (VITAMIN D3 PO), Take 2,000 Units by mouth daily   aspirin EC 81 MG EC tablet, Take 2 tablets by mouth daily. ferrous sulfate 325 (65 FE) MG tablet, Take 325 mg by mouth daily     Allergies:    Hytrin [terazosin hcl], Penicillins, Shrimp flavor, Sulfa antibiotics, Tekturna [aliskiren fumarate], Vancomycin, and Ciprofloxacin    Social History:    reports that he quit smoking about 34 years ago. His smoking use included cigarettes. He started smoking about 64 years ago. He has a 9.00 pack-year smoking history. He has never used smokeless tobacco. He reports that he does not drink alcohol and does not use drugs. Family History:   family history includes Arthritis in his paternal grandmother; Diabetes in his brother and paternal grandmother; Heart Disease in his mother; High Blood Pressure in his brother and father; Sleep Apnea in his brother; Stroke in his mother; Vision Loss in his mother. REVIEW OF SYSTEMS:  As above in the HPI, otherwise negative    PHYSICAL EXAM:    Vitals:  BP (!) 160/88   Pulse 66   Temp 97.3 °F (36.3 °C) (Oral)   Resp 16   Ht 5' 8\" (1.727 m)   Wt 251 lb 12.3 oz (114.2 kg)   SpO2 95%   BMI 38.28 kg/m²   Temp  Av.6 °F (36.4 °C)  Min: 97.3 °F (36.3 °C)  Max: 97.9 °F (36.6 °C)    General:  Awake, alert, oriented X 3. Well developed, well nourished. No apparent distress. Morbidly obese. HEENT:  Normocephalic, atraumatic. Pupils equal, round, reactive to light. No scleral icterus. No conjunctival injection. Normal lips, teeth, and gums. No nasal discharge. Neck:  Supple. No carotid bruit, carotid upstroke normal.  Heart:  RRR, no murmurs, gallops, or rubs.     Lungs:  CTA bilaterally, bilat symmetrical expansion, no rales or rhonchi. He does have a few scattered wheezes. Respirations easy. Abdomen: Bowel sounds present, normoactive. Soft, nontender/nondistended. No masses, no peritoneal signs. Extremities:  No clubbing, cyanosis, or edema. Peripheral pulses 2+ x4 extremities. Skin:  Warm and dry, no open lesions or rash. No evidence of cellulitis. Neuro:  Cranial nerves 2-12 intact, no focal deficits. Moves all extremities without difficulty.     LABS and Diagnostics reviewed:    Recent Results (from the past 24 hour(s))   EKG 12 Lead    Collection Time: 10/30/22  9:38 PM   Result Value Ref Range    Ventricular Rate 80 BPM    Atrial Rate 80 BPM    P-R Interval 230 ms    QRS Duration 154 ms    Q-T Interval 410 ms    QTc Calculation (Bazett) 472 ms    R Axis 222 degrees    T Axis 196 degrees    Diagnosis       Sinus rhythm with 1st degree A-V block with Premature atrial complexesRight bundle branch blockSuspect limb lead reversalConfirmed by Moises BLAND MD ([id number]) on 10/31/2022 8:01:13 AM   CBC with Auto Differential    Collection Time: 10/30/22  9:56 PM   Result Value Ref Range    WBC 5.2 4.0 - 11.0 K/uL    RBC 3.07 (L) 4.20 - 5.90 M/uL    Hemoglobin 8.4 (L) 13.5 - 17.5 g/dL    Hematocrit 26.7 (L) 40.5 - 52.5 %    MCV 87.0 80.0 - 100.0 fL    MCH 27.5 26.0 - 34.0 pg    MCHC 31.6 31.0 - 36.0 g/dL    RDW 18.2 (H) 12.4 - 15.4 %    Platelets 259 649 - 657 K/uL    MPV 8.2 5.0 - 10.5 fL    Neutrophils % 77.4 %    Lymphocytes % 13.8 %    Monocytes % 7.2 %    Eosinophils % 1.2 %    Basophils % 0.4 %    Neutrophils Absolute 4.0 1.7 - 7.7 K/uL    Lymphocytes Absolute 0.7 (L) 1.0 - 5.1 K/uL    Monocytes Absolute 0.4 0.0 - 1.3 K/uL    Eosinophils Absolute 0.1 0.0 - 0.6 K/uL    Basophils Absolute 0.0 0.0 - 0.2 K/uL   Basic Metabolic Panel w/ Reflex to MG    Collection Time: 10/30/22  9:56 PM   Result Value Ref Range    Sodium 141 136 - 145 mmol/L    Potassium reflex Magnesium 4.8 3.5 - 5.1 mmol/L    Chloride 102 99 - 110 mmol/L    CO2 25 21 - 32 mmol/L    Anion Gap 14 3 - 16    Glucose 133 (H) 70 - 99 mg/dL    BUN 61 (H) 7 - 20 mg/dL    Creatinine 2.5 (H) 0.8 - 1.3 mg/dL    Est, Glom Filt Rate 25 (A) >60    Calcium 8.7 8.3 - 10.6 mg/dL   COVID-19, Rapid    Collection Time: 10/30/22  9:56 PM    Specimen: Nasopharyngeal Swab   Result Value Ref Range    SARS-CoV-2, NAAT Not Detected Not Detected   Rapid influenza A/B antigens    Collection Time: 10/30/22  9:56 PM    Specimen: Nasopharyngeal   Result Value Ref Range    Rapid Influenza A Ag Negative Negative    Rapid Influenza B Ag Negative Negative   Brain Natriuretic Peptide    Collection Time: 10/30/22  9:56 PM   Result Value Ref Range    Pro- (H) 0 - 449 pg/mL   Troponin    Collection Time: 10/30/22  9:56 PM   Result Value Ref Range    Troponin 0.03 (H) <0.01 ng/mL   Hepatic Function Panel    Collection Time: 10/30/22  9:56 PM   Result Value Ref Range    Total Protein 6.6 6.4 - 8.2 g/dL    Albumin 3.9 3.4 - 5.0 g/dL    Alkaline Phosphatase 120 40 - 129 U/L    ALT 41 (H) 10 - 40 U/L    AST 28 15 - 37 U/L    Total Bilirubin <0.2 0.0 - 1.0 mg/dL    Bilirubin, Direct <0.2 0.0 - 0.3 mg/dL    Bilirubin, Indirect see below 0.0 - 1.0 mg/dL   CK    Collection Time: 10/30/22  9:56 PM   Result Value Ref Range    Total CK 47 39 - 308 U/L   Blood Gas, Venous    Collection Time: 10/30/22 10:13 PM   Result Value Ref Range    pH, Jarrett 7.379 7.350 - 7.450    pCO2, Jarrett 46.3 40.0 - 50.0 mmHg    pO2, Jarrett 68 Not Established mmHg    HCO3, Venous 27 23 - 29 mmol/L    Base Excess, Jarrett 1.9 Not Established mmol/L    O2 Sat, Jarrett 94 Not Established %    Carboxyhemoglobin 1.9 %    MetHgb, Jarrett 0.8 <1.5 %    TC02 (Calc), Jarrett 29 Not Established mmol/L    O2 Therapy Unknown    CBC with Auto Differential    Collection Time: 10/31/22  6:05 AM   Result Value Ref Range    WBC 5.9 4.0 - 11.0 K/uL    RBC 2.96 (L) 4.20 - 5.90 M/uL    Hemoglobin 8.2 (L) 13.5 - 17.5 g/dL Hematocrit 25.6 (L) 40.5 - 52.5 %    MCV 86.4 80.0 - 100.0 fL    MCH 27.8 26.0 - 34.0 pg    MCHC 32.2 31.0 - 36.0 g/dL    RDW 18.1 (H) 12.4 - 15.4 %    Platelets 916 (L) 392 - 450 K/uL    MPV 8.5 5.0 - 10.5 fL    Neutrophils % 91.4 %    Lymphocytes % 6.9 %    Monocytes % 1.2 %    Eosinophils % 0.3 %    Basophils % 0.2 %    Neutrophils Absolute 5.3 1.7 - 7.7 K/uL    Lymphocytes Absolute 0.4 (L) 1.0 - 5.1 K/uL    Monocytes Absolute 0.1 0.0 - 1.3 K/uL    Eosinophils Absolute 0.0 0.0 - 0.6 K/uL    Basophils Absolute 0.0 0.0 - 0.2 K/uL   Comprehensive Metabolic Panel w/ Reflex to MG    Collection Time: 10/31/22  6:05 AM   Result Value Ref Range    Sodium 143 136 - 145 mmol/L    Potassium reflex Magnesium 5.1 3.5 - 5.1 mmol/L    Chloride 105 99 - 110 mmol/L    CO2 26 21 - 32 mmol/L    Anion Gap 12 3 - 16    Glucose 131 (H) 70 - 99 mg/dL    BUN 69 (H) 7 - 20 mg/dL    Creatinine 2.5 (H) 0.8 - 1.3 mg/dL    Est, Glom Filt Rate 25 (A) >60    Calcium 8.7 8.3 - 10.6 mg/dL    Total Protein 6.4 6.4 - 8.2 g/dL    Albumin 3.7 3.4 - 5.0 g/dL    Albumin/Globulin Ratio 1.4 1.1 - 2.2    Total Bilirubin <0.2 0.0 - 1.0 mg/dL    Alkaline Phosphatase 126 40 - 129 U/L    ALT 41 (H) 10 - 40 U/L    AST 27 15 - 37 U/L       ASSESSMENT:      Principal Problem:    Generalized weakness  Active Problems:    DEISY (acute kidney injury) (HCC)    JOANN (obstructive sleep apnea)    Type 2 diabetes mellitus with stage 3 chronic kidney disease, with long-term current use of insulin (HCC)    Microcytic anemia    Stage 3b chronic kidney disease (HCC)    Essential hypertension    Moderate COPD (chronic obstructive pulmonary disease) (HCC)    Slow transit constipation    Chronic GERD    Hyperlipidemia    PLAN:    Cause of his symptoms is likely multifactorial.  Consult Dr. Tricia Jean-Baptiste for possible erythropoietin injections. Follow blood counts. Hydrate with 1 L of IV fluids and repeat renal function in the morning. Hold diuretics.   Continue insulin and add sliding scale insulin. Dose reduced while he is in the hospital since diet will be controlled mostly. Rest insulin doses as needed. Consider renal ultrasound if renal function does not improve  Send urinalysis. Home blood pressure regimen reviewed and reordered. Family to bring CPAP from home. Consider nephrology consultation. PT/OT will evaluate and treat. Send serial troponins given mildly elevated troponin, although this is below his baseline.       Abel Eldridge MD  8:39 AM  10/31/2022

## 2022-10-31 NOTE — PROGRESS NOTES
4 Eyes Skin Assessment     NAME:  Gilmar Suresh  YOB: 1940  MEDICAL RECORD NUMBER:  [id number]    The patient is being assess for  Admission    I agree that 2 RN's have performed a thorough Head to Toe Skin Assessment on the patient. ALL assessment sites listed below have been assessed. Areas assessed by both nurses:    Head, Face, Ears, Shoulders, Back, Chest, Arms, Elbows, Hands, Sacrum. Buttock, Coccyx, Ischium, and Legs. Feet and Heels        Does the Patient have a Wound?  No noted wound(s)       Jerardo Prevention initiated:  Yes   Wound Care Orders initiated:  No    Pressure Injury (Stage 3,4, Unstageable, DTI, NWPT, and Complex wounds) if present place referral/consult order under [de-identified] No    New and Established Ostomies if present place consult order under : No      Nurse 1 eSignature: Electronically signed by Dawson Fierro RN on 10/31/22 at 5:40 AM EDT    **SHARE this note so that the co-signing nurse is able to place an eSignature**    Nurse 2 eSignature: Electronically signed by Ciara Angelo RN on 10/31/22 at 6:08 AM EDT

## 2022-10-31 NOTE — PROGRESS NOTES
Clinical Pharmacy Note  Renal Dose Adjustment    Gilmar DICK Suresh is receiving enoxaparin. This medication is renally eliminated. Based on the patient's Estimated Creatinine Clearance: 28 mL/min (A) (based on SCr of 2.5 mg/dL (H)). and weight, the dose has been adjusted to enoxaparin 30mg daily per protocol. Pharmacy will continue to monitor and adjust dose as needed for changes in renal function.       Anne-Marie Almeida, PharmD  10/31/2022 4:27 AM

## 2022-10-31 NOTE — ED PROVIDER NOTES
[facility]  EMERGENCY DEPARTMENTENCOUNTER      Pt Name: Sasha Clinton  MRN: [id number]  [place] 1940  Date ofevaluation: 10/30/2022  Provider: Kallie Knowles MD    CHIEF COMPLAINT       Chief Complaint   Patient presents with    Dizziness     States he has been feeling light headed intermittently since saturday    Extremity Weakness    Shortness of Breath     Hx of copd, states always SOB but today more than usual         HISTORY OF PRESENT ILLNESS   (Location/Symptom, Timing/Onset,Context/Setting, Quality, Duration, Modifying Factors, Severity)  Note limiting factors. Sasha Clinton is a 79 y.o. male  who  has a past medical history of Allergic rhinitis, Asthma, Atrial fibrillation ([address] Carotid artery stenosis, Chronic kidney disease, COPD (chronic obstructive pulmonary disease) ([facility] [address] CPAP (continuous positive airway pressure) dependence, ESBL (extended spectrum beta-lactamase) producing bacteria infection, Hyperlipidemia, Hypertension, Iron deficiency anemia, Obstructive sleep apnea, and Type II or unspecified type diabetes mellitus without mention of complication, not stated as uncontrolled. who presents to the emergency department with chief complaint of generalized fatigue and weakness in the lower extremities. Patient reports that since Thursday has noticed weakness and shaking when he ambulates or exerts himself. States it has progressively worsened. Reports he has a history of COPD which appears to be at baseline. States this is a nonproductive cough at the baseline. Denies chest pains or abdominal pains. Denies fevers nausea or vomiting. Denies close sick contacts. Denies a history of previous. Denies unilateral weakness or numbness. HPI    NursingNotes were reviewed. REVIEW OF SYSTEMS    (2-9 systems for level 4, 10 or more for level 5)     Review of Systems   Constitutional:  Positive for fatigue.  Negative for activity change, chills and fever.   HENT:  Negative for congestion and rhinorrhea. Eyes:  Negative for photophobia and visual disturbance. Respiratory:  Positive for cough, shortness of breath and wheezing. Cardiovascular:  Negative for chest pain, palpitations and leg swelling. Gastrointestinal:  Negative for nausea and vomiting. Endocrine: Negative for polydipsia and polyuria. Genitourinary:  Negative for difficulty urinating and frequency. Musculoskeletal:  Negative for back pain and gait problem. Skin:  Negative for color change and rash. Neurological:  Positive for tremors and weakness. Negative for light-headedness, numbness and headaches. Psychiatric/Behavioral:  Negative for confusion. The patient is not nervous/anxious. Except as noted above the remainder of the review of systems was reviewed and negative.        PAST MEDICAL HISTORY     Past Medical History:   Diagnosis Date    Allergic rhinitis     Asthma     Atrial fibrillation ([facility] [address]     Carotid artery stenosis     Chronic kidney disease     COPD (chronic obstructive pulmonary disease) ([facility])     CPAP (continuous positive airway pressure) dependence     ESBL (extended spectrum beta-lactamase) producing bacteria infection 12/26/2018    urine    Hyperlipidemia     Hypertension     Iron deficiency anemia     Obstructive sleep apnea     Type II or unspecified type diabetes mellitus without mention of complication, not stated as uncontrolled          SURGICALHISTORY       Past Surgical History:   Procedure Laterality Date    CATARACT REMOVAL  2/2012    bilateral    COLONOSCOPY  7/10/2009    diverticulosis    hemorrhoids    GALLBLADDER SURGERY  1981    PAIN MANAGEMENT PROCEDURE Right 10/20/2021    COOLIEF RADIOFREQUENCY ABLATION - RIGHT KNEE performed by Eliceo Kam MD at [address] Left 12/8/2021    [address] - LEFT KNEE performed by Eliceo Kam MD at [address] GASTROINTESTINAL ENDOSCOPY  7-2005    7/10/2009    normal         CURRENT MEDICATIONS       Previous Medications    ACCU-CHEK FASTCLIX LANCETS MISC    USE TO TEST 3 TIMES A DAY DX CODE E11.9    ACCU-CHEK GUIDE STRIP    TEST AS DIRECTED 3 TIMES A DAY    ALBUTEROL SULFATE HFA (PROVENTIL;VENTOLIN;PROAIR) 108 (90 BASE) MCG/ACT INHALER    INHALE 2 PUFFS BY MOUTH EVERY 4 HOURS AS NEEDED FOR WHEEZING    ALFUZOSIN (UROXATRAL) 10 MG EXTENDED RELEASE TABLET    TAKE 1 TABLET BY MOUTH EVERY DAY    AMIODARONE (CORDARONE) 200 MG TABLET    TAKE 1 TABLET BY MOUTH EVERY OTHER DAY    ASCORBIC ACID (SUPER C COMPLEX PO)    Take by mouth daily    ASPIRIN EC 81 MG EC TABLET    Take 2 tablets by mouth daily. ATORVASTATIN (LIPITOR) 40 MG TABLET    TAKE 1 TABLET BY MOUTH EVERY DAY    B-D UF III MINI PEN NEEDLES 31G X 5 MM MISC    USE AS DIRECTED 3 TIMES A DAY    CHOLECALCIFEROL (VITAMIN D3 PO)    Take 2,000 Units by mouth daily     CLONIDINE (CATAPRES) 0.1 MG TABLET    TAKE 1 TABLET BY MOUTH TWICE A DAY    CPAP MACHINE MISC    by Does not apply route    [employer] PURELAX 17 GM/SCOOP POWDER    TAKE 17G BY MOUTH DAILY MIX WITH 8 OZ OF WATER    CYANOCOBALAMIN (VITAMIN B-12 PO)    Take 500 mcg by mouth every other day     DOCUSATE SODIUM (COLACE PO)    Take by mouth daily    FERROUS SULFATE 325 (65 FE) MG TABLET    Take 325 mg by mouth daily     FUROSEMIDE (LASIX) 20 MG TABLET    TAKE 40MG EVERY OTHER DAY FOR ONE WEEK AND 20MG ON THE DAYS THAT PT IS NOT TAKING 40MG.     INSULIN GLARGINE (BASAGLAR KWIKPEN) 100 UNIT/ML INJECTION PEN    Inject 50 Units into the skin 2 times daily (or as directed)    LACTOBACILLUS RHAMNOSUS, GG, ([employer] PROBIOTIC, LACTOBACILLUS,) CAPS    TAKE 1 CAPSULE BY MOUTH 2 TIMES DAILY (WITH MEALS)    LEVALBUTEROL (XOPENEX) 1.25 MG/3ML NEBULIZER SOLUTION    USE 1 VIAL VIA NEBULIZER FOUR TIMES DAILY    LINZESS 145 MCG CAPSULE    TAKE 1 CAPSULE BY MOUTH EVERY DAY IN THE MORNING BEFORE BREAKFAST    MONTELUKAST (SINGULAIR) 10 MG TABLET TAKE 1 TABLET BY MOUTH EVERY DAY    MULTIPLE VITAMINS-MINERALS (MULTIVITAMIN ADULT PO)    Take by mouth daily    PANTOPRAZOLE (PROTONIX) 40 MG TABLET    TAKE 1 TABLET BY MOUTH EVERY DAY    VALSARTAN-HYDROCHLOROTHIAZIDE (DIOVAN-HCT) 320-25 MG PER TABLET    TAKE 1 TABLET BY MOUTH EVERY DAY    VERAPAMIL (CALAN SR) 240 MG EXTENDED RELEASE TABLET    TAKE 1 TABLET BY MOUTH TWICE A DAY    ZINC PO    Take by mouth            Hytrin [terazosin hcl], Penicillins, Shrimp flavor, Sulfa antibiotics, [place] [aliskiren fumarate], Vancomycin, and Ciprofloxacin    FAMILY HISTORY       Family History   Problem Relation Age of Onset    Heart Disease Mother     Stroke Mother     Vision Loss Mother     High Blood Pressure Father     High Blood Pressure Brother     Diabetes Brother     Sleep Apnea Brother     Arthritis Paternal Grandmother     Diabetes Paternal Grandmother           SOCIAL HISTORY       Social History     Socioeconomic History    Marital status:      Spouse name: None    Number of children: 5    Years of education: None    Highest education level: None   Occupational History    Occupation: retired   Tobacco Use    Smoking status: Former     Packs/day: 0.50     Years: 18.00     Pack years: 9.00     Types: Cigarettes     Start date: 1958     Quit date: 1988     Years since quittin.8    Smokeless tobacco: Never   Vaping Use    Vaping Use: Never used   Substance and Sexual Activity    Alcohol use: No     Alcohol/week: 0.0 standard drinks    Drug use: No    Sexual activity: Yes     Partners: Female     Social Determinants of Health     Financial Resource Strain: Low Risk     Difficulty of Paying Living Expenses: Not hard at all   Food Insecurity: No Food Insecurity    Worried About Running Out of Food in the Last Year: Never true    Ran Out of Food in the Last Year: Never true       SCREENINGS    [place] Coma Scale  Eye Opening: Spontaneous  Best Verbal Response: Oriented  Best Motor Response: Obeys commands  [place] Coma Scale Score: 15        PHYSICAL EXAM    (up to 7 for level 4, 8 or more for level 5)     ED Triage Vitals [10/30/22 2130]   BP Temp Temp Source Heart Rate Resp SpO2 Height Weight   (!) 164/66 97.9 °F (36.6 °C) Temporal 80 18 98 % -- --       Physical Exam  Constitutional:       General: He is not in acute distress. Appearance: He is well-developed. HENT:      Head: Normocephalic and atraumatic. Eyes:      Conjunctiva/sclera: Conjunctivae normal.   Neck:      Trachea: No tracheal deviation. Cardiovascular:      Rate and Rhythm: Normal rate and regular rhythm. Pulmonary:      Effort: Pulmonary effort is normal. Tachypnea present. Breath sounds: Wheezing present. No decreased breath sounds or rales. Abdominal:      General: There is no distension. Palpations: Abdomen is soft. Tenderness: There is no abdominal tenderness. Musculoskeletal:         General: No deformity. Normal range of motion. Cervical back: Normal range of motion. Skin:     General: Skin is warm and dry. Capillary Refill: Capillary refill takes less than 2 seconds. Neurological:      General: No focal deficit present. Mental Status: He is alert. He is disoriented. RESULTS     EKG: All EKG's are interpreted by the Emergency Department Physician who either signs or Co-signsthis chart in the absence of a cardiologist.    EKG demonstrates a sinus rhythm at a rate of 80 bpm.  ND interval is prolonged QTc interval within normal limits. Patient has left axis deviation. Patient has known right bundle branch block. There are no significant ST elevations or depressions EKG is nondiagnostic for ACS. Milady Paul Compared EKG from 2/22/2022 there are nonspecific ST changes to the inferior leads.      RADIOLOGY:   Non-plain filmimages such as CT, Ultrasound and MRI are read by the radiologist. Plain radiographic images are visualized and preliminarily interpreted by the emergency physician with the below findings:      Interpretation per the Radiologist below, if available at the time ofthis note:    XR CHEST PORTABLE   Final Result   Chronic pattern of interstitial change. Perihilar and mild bibasilar   ground-glass opacities have improved suggesting a mild degree of underlying   edema. ED BEDSIDE ULTRASOUND:   Performed by ED Physician - none    LABS:  Labs Reviewed   CBC WITH AUTO DIFFERENTIAL - Abnormal; Notable for the following components:       Result Value    RBC 3.07 (*)     Hemoglobin 8.4 (*)     Hematocrit 26.7 (*)     RDW 18.2 (*)     Lymphocytes Absolute 0.7 (*)     All other components within normal limits   BASIC METABOLIC PANEL W/ REFLEX TO MG FOR LOW K - Abnormal; Notable for the following components:    Glucose 133 (*)     BUN 61 (*)     Creatinine 2.5 (*)     Est, Glom Filt Rate 25 (*)     All other components within normal limits   BRAIN NATRIURETIC PEPTIDE - Abnormal; Notable for the following components:    Pro- (*)     All other components within normal limits   TROPONIN - Abnormal; Notable for the following components:    Troponin 0.03 (*)     All other components within normal limits   HEPATIC FUNCTION PANEL - Abnormal; Notable for the following components:    ALT 41 (*)     All other components within normal limits   COVID-19, RAPID   RAPID INFLUENZA A/B ANTIGENS   BLOOD GAS, VENOUS   CK       All other labs were within normal range or not returned as of this dictation.     EMERGENCY DEPARTMENT COURSE and DIFFERENTIAL DIAGNOSIS/MDM:   Vitals:    Vitals:    10/30/22 2130   BP: (!) 164/66   Pulse: 80   Resp: 18   Temp: 97.9 °F (36.6 °C)   TempSrc: Temporal   SpO2: 98%       Patient was given thefollowing medications:  Medications   sodium chloride flush 0.9 % injection 5-40 mL (has no administration in time range)   sodium chloride flush 0.9 % injection 5-40 mL (has no administration in time range)   0.9 % sodium chloride infusion (has no administration in time range)   enoxaparin (LOVENOX) injection 40 mg (has no administration in time range)   ondansetron (ZOFRAN-ODT) disintegrating tablet 4 mg (has no administration in time range)     Or   ondansetron (ZOFRAN) injection 4 mg (has no administration in time range)   polyethylene glycol (GLYCOLAX) packet 17 g (has no administration in time range)   acetaminophen (TYLENOL) tablet 650 mg (has no administration in time range)     Or   acetaminophen (TYLENOL) suppository 650 mg (has no administration in time range)   ipratropium-albuterol (DUONEB) nebulizer solution 1 ampule (1 ampule Inhalation Given 10/31/22 0034)   predniSONE (DELTASONE) tablet 60 mg (60 mg Oral Given 10/31/22 0051)   aspirin tablet 325 mg (325 mg Oral Given 10/31/22 0052)       ED COURSE & MEDICAL DECISION MAKING    Pertinent Labs & Imaging studies reviewed. (See chart for details)   -  Patient seen and evaluated in the emergency department. -  Triage and nursing notes reviewed and incorporated. -  Old chart records reviewed and incorporated. -  Differential diagnosis includes: Differential Diagnosis: Acute Coronary Syndrome, Congestive Heart Failure, Myocardial Infarction, Pulmonary Embolus, Pneumonia, Pneumothorax, other    -  Work-up included:  See above  -  ED treatment included: See above  -  Results discussed with patient. 79-year-old male with a known history of COPD presents to the ED for evaluation of generalized fatigue and weakness as well as shortness of breath. Reports since Thursday he has noticed bilateral lower extremity weakness that is worse with exertion. Patient became hypoxic when ambulating in the back. On exam patient has no focal deficits. No drift in the upper or lower extremities. He has palpable pulses in all extremities. labs show elevated troponin at 0.03 which is elevated compared to previous. Renal function slightly elevated compared to previous. VBG unremarkable without signs of hypercapnia.   BNP slightly elevated. . Imaging studies show no acute cardiopulmonary disease. Due to patient's generalized fatigue elevated troponin and shortness of breath.  patient feels slightly improved on reevaluation. The patient is agreeable with plan of care and disposition. Is this patient to be included in the SEP-1 Core Measure due to severe sepsis or septic shock? No   Exclusion criteria - the patient is NOT to be included for SEP-1 Core Measure due to: Infection is not suspected      REASSESSMENT          CRITICAL CARE TIME   Total Critical Care time was 30 minutes, excluding separately reportable procedures. There was a high probability of clinically significant/life threatening deterioration in the patient's condition which required my urgent intervention. CONSULTS:  IP CONSULT TO PRIMARY CARE PROVIDER    PROCEDURES:  Unless otherwise noted below, none     Procedures    FINAL IMPRESSION      1. General weakness    2. Elevated troponin          DISPOSITION/PLAN   DISPOSITION Admitted 10/31/2022 12:33:37 AM      PATIENT REFERREDTO:  No follow-up provider specified.     DISCHARGEMEDICATIONS:  New Prescriptions    No medications on file          (Please note that portions of this note were completed with a voice recognition program.  Efforts were made to edit the dictations but occasionally words are mis-transcribed.)    Veto Tang MD (electronically signed)  Attending Emergency Physician          Veto Tang MD  10/31/22 9918

## 2022-10-31 NOTE — PROGRESS NOTES
Pt admitted to [facility] from ED via stretcher with SOB and general weakness under Dr. Naima Cochran. Pt awake and AAO. Denies pain. Pt up from stretcher to bed with CGA. Gait is wobbly. Lungs decreased with exp wheezes noted posteriorly. No ocugh. SOB at times. Pt does use CPAP at home but does not have it with him. Belly round and distended with active BS. Continent B and B. No edema noted. Skin intact. HL to L wrist WDL. Pt and wife and daughter at bedside. Oriented to room, call light, and phone and POC. Call light in reach.

## 2022-10-31 NOTE — PROGRESS NOTES
Patient admitted to [facility] with generalized weakness. A/Ox4. On reg diet, tolerating well. Medications taken whole with thins. On Lovenox & ASA for DVT prophylaxis. Skin: intact. Oxygen: 1 L nasal canula. LDA: PIV L hand, running 100 ml/hr normal saline. Has been continent of bowel and continent of bladder. Being followed bu Dr. Davin Peguero hem/onc. Uses CPAP from home at night. Needs to be NPO after midnight d/t bone biopsy tomorrow. Chair/bed alarms in use and call light in reach.

## 2022-10-31 NOTE — ED NOTES
When moving patient from wheel chair to bed his O2 levels were between 88 and 91% after sitting for about 5 to 7 mins his O2 went up to 97%     [employer]  10/30/22 9412

## 2022-10-31 NOTE — PROGRESS NOTES
10/31/22 1132   Encounter Summary   Encounter Overview/Reason  Initial Encounter   Service Provided For: Patient   Referral/Consult From: Nurse   Support System Spouse; Children   Last Encounter  10/31/22  (prayer , patient said his  is coming for sos,fr cm)   Complexity of Encounter Moderate   Begin Time 1120   End Time  1135   Total Time Calculated 15 min   Encounter    Type Initial Screen/Assessment   Spiritual/Emotional needs   Type Spiritual Support   Assessment/Intervention/Outcome   Assessment Calm;Coping; Hopeful   Intervention Active listening;Confronted/Challenged;Explored/Affirmed feelings, thoughts, concerns;Explored Coping Skills/Resources   Outcome Comfort;Coping;Encouraged;Engaged in conversation;Expressed feelings, needs, and concerns   Electronically signed by Michelle Mcgee on 10/31/2022 at 11:37 AM

## 2022-11-01 ENCOUNTER — APPOINTMENT (OUTPATIENT)
Dept: ULTRASOUND IMAGING | Age: 82
DRG: 698 | End: 2022-11-01
Payer: MEDICARE

## 2022-11-01 ENCOUNTER — APPOINTMENT (OUTPATIENT)
Dept: CT IMAGING | Age: 82
DRG: 698 | End: 2022-11-01
Payer: MEDICARE

## 2022-11-01 LAB
ANION GAP SERPL CALCULATED.3IONS-SCNC: 12 MMOL/L (ref 3–16)
BACTERIA: NORMAL /HPF
BASOPHILS ABSOLUTE: 0 K/UL (ref 0–0.2)
BASOPHILS RELATIVE PERCENT: 0.1 %
BILIRUBIN URINE: NEGATIVE
BLOOD, URINE: NEGATIVE
BUN BLDV-MCNC: 82 MG/DL (ref 7–20)
CALCIUM SERPL-MCNC: 8 MG/DL (ref 8.3–10.6)
CHLORIDE BLD-SCNC: 101 MMOL/L (ref 99–110)
CLARITY: CLEAR
CO2: 24 MMOL/L (ref 21–32)
COLOR: YELLOW
CREAT SERPL-MCNC: 2.8 MG/DL (ref 0.8–1.3)
CREATININE URINE: 100.8 MG/DL (ref 39–259)
CREATININE URINE: 101.3 MG/DL (ref 39–259)
EOSINOPHILS ABSOLUTE: 0 K/UL (ref 0–0.6)
EOSINOPHILS RELATIVE PERCENT: 0 %
EPITHELIAL CELLS, UA: 1 /HPF (ref 0–5)
FERRITIN: 256.2 NG/ML (ref 30–400)
FOLATE: 10.24 NG/ML (ref 4.78–24.2)
GFR SERPL CREATININE-BSD FRML MDRD: 22 ML/MIN/{1.73_M2}
GLUCOSE BLD-MCNC: 103 MG/DL (ref 70–99)
GLUCOSE BLD-MCNC: 111 MG/DL (ref 70–99)
GLUCOSE BLD-MCNC: 193 MG/DL (ref 70–99)
GLUCOSE BLD-MCNC: 35 MG/DL (ref 70–99)
GLUCOSE BLD-MCNC: 51 MG/DL (ref 70–99)
GLUCOSE BLD-MCNC: 85 MG/DL (ref 70–99)
GLUCOSE BLD-MCNC: 85 MG/DL (ref 70–99)
GLUCOSE URINE: NEGATIVE MG/DL
HAPTOGLOBIN: 196 MG/DL (ref 30–200)
HCT VFR BLD CALC: 24.3 % (ref 40.5–52.5)
HCT VFR BLD CALC: 24.4 % (ref 40.5–52.5)
HEMOGLOBIN: 7.7 G/DL (ref 13.5–17.5)
HYALINE CASTS: 7 /LPF (ref 0–8)
IMMATURE RETIC FRACT: 0.62 (ref 0.21–0.37)
INR BLD: 1.38 (ref 0.87–1.14)
IRON SATURATION: 22 % (ref 20–50)
IRON: 51 UG/DL (ref 59–158)
KAPPA, FREE LIGHT CHAINS, SERUM: 74.01 MG/L (ref 3.3–19.4)
KAPPA/LAMBDA RATIO: 2.38 (ref 0.26–1.65)
KAPPA/LAMBDA TEST COMMENT: ABNORMAL
KETONES, URINE: NEGATIVE MG/DL
LAMBDA, FREE LIGHT CHAINS, SERUM: 31.1 MG/L (ref 5.71–26.3)
LEUKOCYTE ESTERASE, URINE: ABNORMAL
LYMPHOCYTES ABSOLUTE: 0.6 K/UL (ref 1–5.1)
LYMPHOCYTES RELATIVE PERCENT: 7.3 %
MCH RBC QN AUTO: 27.7 PG (ref 26–34)
MCHC RBC AUTO-ENTMCNC: 31.6 G/DL (ref 31–36)
MCV RBC AUTO: 87.6 FL (ref 80–100)
MICROALBUMIN UR-MCNC: 3.1 MG/DL
MICROALBUMIN/CREAT UR-RTO: 30.6 MG/G (ref 0–30)
MICROSCOPIC EXAMINATION: YES
MONOCYTES ABSOLUTE: 0.6 K/UL (ref 0–1.3)
MONOCYTES RELATIVE PERCENT: 6.3 %
NEUTROPHILS ABSOLUTE: 7.7 K/UL (ref 1.7–7.7)
NEUTROPHILS RELATIVE PERCENT: 86.3 %
NITRITE, URINE: NEGATIVE
PDW BLD-RTO: 18 % (ref 12.4–15.4)
PERFORMED ON: ABNORMAL
PERFORMED ON: NORMAL
PERFORMED ON: NORMAL
PH UA: 5 (ref 5–8)
PLATELET # BLD: 128 K/UL (ref 135–450)
PMV BLD AUTO: 8.2 FL (ref 5–10.5)
POTASSIUM SERPL-SCNC: 4.6 MMOL/L (ref 3.5–5.1)
PROTEIN UA: NEGATIVE MG/DL
PROTHROMBIN TIME: 16.9 SEC (ref 11.7–14.5)
RBC # BLD: 2.77 M/UL (ref 4.2–5.9)
RBC UA: 0 /HPF (ref 0–4)
RETICULOCYTE ABSOLUTE COUNT: 0.07 M/UL
RETICULOCYTE COUNT PCT: 2.33 % (ref 0.5–2.18)
SODIUM BLD-SCNC: 137 MMOL/L (ref 136–145)
SODIUM URINE: 35 MMOL/L
SPECIFIC GRAVITY UA: 1.01 (ref 1–1.03)
TOTAL IRON BINDING CAPACITY: 231 UG/DL (ref 260–445)
TROPONIN: 0.02 NG/ML
URINE REFLEX TO CULTURE: ABNORMAL
URINE TYPE: ABNORMAL
UROBILINOGEN, URINE: 0.2 E.U./DL
VITAMIN B-12: 1711 PG/ML (ref 211–911)
WBC # BLD: 8.9 K/UL (ref 4–11)
WBC UA: 1 /HPF (ref 0–5)

## 2022-11-01 PROCEDURE — 81001 URINALYSIS AUTO W/SCOPE: CPT

## 2022-11-01 PROCEDURE — 94640 AIRWAY INHALATION TREATMENT: CPT

## 2022-11-01 PROCEDURE — 84484 ASSAY OF TROPONIN QUANT: CPT

## 2022-11-01 PROCEDURE — 94760 N-INVAS EAR/PLS OXIMETRY 1: CPT

## 2022-11-01 PROCEDURE — 77012 CT SCAN FOR NEEDLE BIOPSY: CPT

## 2022-11-01 PROCEDURE — 36415 COLL VENOUS BLD VENIPUNCTURE: CPT

## 2022-11-01 PROCEDURE — 2700000000 HC OXYGEN THERAPY PER DAY

## 2022-11-01 PROCEDURE — 88184 FLOWCYTOMETRY/ TC 1 MARKER: CPT

## 2022-11-01 PROCEDURE — 82607 VITAMIN B-12: CPT

## 2022-11-01 PROCEDURE — 82728 ASSAY OF FERRITIN: CPT

## 2022-11-01 PROCEDURE — 88185 FLOWCYTOMETRY/TC ADD-ON: CPT

## 2022-11-01 PROCEDURE — 9990000010 HC NO CHARGE VISIT

## 2022-11-01 PROCEDURE — 83540 ASSAY OF IRON: CPT

## 2022-11-01 PROCEDURE — 85025 COMPLETE CBC W/AUTO DIFF WBC: CPT

## 2022-11-01 PROCEDURE — 80048 BASIC METABOLIC PNL TOTAL CA: CPT

## 2022-11-01 PROCEDURE — 88311 DECALCIFY TISSUE: CPT

## 2022-11-01 PROCEDURE — 1200000000 HC SEMI PRIVATE

## 2022-11-01 PROCEDURE — 84300 ASSAY OF URINE SODIUM: CPT

## 2022-11-01 PROCEDURE — 2580000003 HC RX 258: Performed by: INTERNAL MEDICINE

## 2022-11-01 PROCEDURE — 85610 PROTHROMBIN TIME: CPT

## 2022-11-01 PROCEDURE — 88313 SPECIAL STAINS GROUP 2: CPT

## 2022-11-01 PROCEDURE — 82043 UR ALBUMIN QUANTITATIVE: CPT

## 2022-11-01 PROCEDURE — 6360000002 HC RX W HCPCS: Performed by: STUDENT IN AN ORGANIZED HEALTH CARE EDUCATION/TRAINING PROGRAM

## 2022-11-01 PROCEDURE — 38221 DX BONE MARROW BIOPSIES: CPT

## 2022-11-01 PROCEDURE — 51798 US URINE CAPACITY MEASURE: CPT

## 2022-11-01 PROCEDURE — 82746 ASSAY OF FOLIC ACID SERUM: CPT

## 2022-11-01 PROCEDURE — 6370000000 HC RX 637 (ALT 250 FOR IP): Performed by: INTERNAL MEDICINE

## 2022-11-01 PROCEDURE — 84155 ASSAY OF PROTEIN SERUM: CPT

## 2022-11-01 PROCEDURE — 83550 IRON BINDING TEST: CPT

## 2022-11-01 PROCEDURE — 76770 US EXAM ABDO BACK WALL COMP: CPT

## 2022-11-01 PROCEDURE — 88305 TISSUE EXAM BY PATHOLOGIST: CPT

## 2022-11-01 PROCEDURE — 99232 SBSQ HOSP IP/OBS MODERATE 35: CPT | Performed by: INTERNAL MEDICINE

## 2022-11-01 PROCEDURE — 85045 AUTOMATED RETICULOCYTE COUNT: CPT

## 2022-11-01 PROCEDURE — 84165 PROTEIN E-PHORESIS SERUM: CPT

## 2022-11-01 PROCEDURE — 07DR3ZX EXTRACTION OF ILIAC BONE MARROW, PERCUTANEOUS APPROACH, DIAGNOSTIC: ICD-10-PCS | Performed by: STUDENT IN AN ORGANIZED HEALTH CARE EDUCATION/TRAINING PROGRAM

## 2022-11-01 PROCEDURE — 83883 ASSAY NEPHELOMETRY NOT SPEC: CPT

## 2022-11-01 PROCEDURE — 82570 ASSAY OF URINE CREATININE: CPT

## 2022-11-01 PROCEDURE — 83010 ASSAY OF HAPTOGLOBIN QUANT: CPT

## 2022-11-01 RX ORDER — INSULIN GLARGINE 100 [IU]/ML
20 INJECTION, SOLUTION SUBCUTANEOUS 2 TIMES DAILY
Status: DISCONTINUED | OUTPATIENT
Start: 2022-11-01 | End: 2022-11-09 | Stop reason: HOSPADM

## 2022-11-01 RX ORDER — DEXTROSE AND SODIUM CHLORIDE 5; .9 G/100ML; G/100ML
INJECTION, SOLUTION INTRAVENOUS CONTINUOUS
Status: DISCONTINUED | OUTPATIENT
Start: 2022-11-01 | End: 2022-11-03

## 2022-11-01 RX ORDER — FENTANYL CITRATE 50 UG/ML
INJECTION, SOLUTION INTRAMUSCULAR; INTRAVENOUS DAILY PRN
Status: COMPLETED | OUTPATIENT
Start: 2022-11-01 | End: 2022-11-01

## 2022-11-01 RX ORDER — MIDAZOLAM HYDROCHLORIDE 1 MG/ML
INJECTION INTRAMUSCULAR; INTRAVENOUS DAILY PRN
Status: COMPLETED | OUTPATIENT
Start: 2022-11-01 | End: 2022-11-01

## 2022-11-01 RX ADMIN — Medication 1 CAPSULE: at 17:27

## 2022-11-01 RX ADMIN — DOCUSATE SODIUM 100 MG: 100 CAPSULE, LIQUID FILLED ORAL at 09:19

## 2022-11-01 RX ADMIN — TAMSULOSIN HYDROCHLORIDE 0.4 MG: 0.4 CAPSULE ORAL at 09:19

## 2022-11-01 RX ADMIN — PANTOPRAZOLE SODIUM 40 MG: 40 TABLET, DELAYED RELEASE ORAL at 09:19

## 2022-11-01 RX ADMIN — FENTANYL CITRATE 50 MCG: 50 INJECTION INTRAMUSCULAR; INTRAVENOUS at 13:54

## 2022-11-01 RX ADMIN — ATORVASTATIN CALCIUM 40 MG: 40 TABLET, FILM COATED ORAL at 09:19

## 2022-11-01 RX ADMIN — AMIODARONE HYDROCHLORIDE 200 MG: 200 TABLET ORAL at 09:19

## 2022-11-01 RX ADMIN — Medication 1 CAPSULE: at 09:19

## 2022-11-01 RX ADMIN — IPRATROPIUM BROMIDE AND ALBUTEROL SULFATE 1 AMPULE: .5; 3 SOLUTION RESPIRATORY (INHALATION) at 16:56

## 2022-11-01 RX ADMIN — MONTELUKAST 10 MG: 10 TABLET, FILM COATED ORAL at 09:19

## 2022-11-01 RX ADMIN — DEXTROSE AND SODIUM CHLORIDE: 5; 900 INJECTION, SOLUTION INTRAVENOUS at 12:03

## 2022-11-01 RX ADMIN — MIDAZOLAM 1 MG: 1 INJECTION INTRAMUSCULAR; INTRAVENOUS at 13:54

## 2022-11-01 RX ADMIN — FERROUS SULFATE TAB EC 324 MG (65 MG FE EQUIVALENT) 324 MG: 324 (65 FE) TABLET DELAYED RESPONSE at 09:19

## 2022-11-01 RX ADMIN — IPRATROPIUM BROMIDE AND ALBUTEROL SULFATE 1 AMPULE: .5; 3 SOLUTION RESPIRATORY (INHALATION) at 08:12

## 2022-11-01 RX ADMIN — INSULIN GLARGINE 20 UNITS: 100 INJECTION, SOLUTION SUBCUTANEOUS at 22:00

## 2022-11-01 RX ADMIN — DEXTROSE AND SODIUM CHLORIDE: 5; 900 INJECTION, SOLUTION INTRAVENOUS at 12:06

## 2022-11-01 RX ADMIN — IPRATROPIUM BROMIDE AND ALBUTEROL SULFATE 1 AMPULE: .5; 3 SOLUTION RESPIRATORY (INHALATION) at 20:20

## 2022-11-01 RX ADMIN — VERAPAMIL HYDROCHLORIDE 240 MG: 240 TABLET, FILM COATED, EXTENDED RELEASE ORAL at 22:00

## 2022-11-01 RX ADMIN — MIDAZOLAM 1 MG: 1 INJECTION INTRAMUSCULAR; INTRAVENOUS at 13:45

## 2022-11-01 RX ADMIN — FENTANYL CITRATE 50 MCG: 50 INJECTION INTRAMUSCULAR; INTRAVENOUS at 13:45

## 2022-11-01 RX ADMIN — PREDNISONE 40 MG: 20 TABLET ORAL at 09:18

## 2022-11-01 RX ADMIN — CLONIDINE HYDROCHLORIDE 0.1 MG: 0.1 TABLET ORAL at 22:00

## 2022-11-01 NOTE — CONSULTS
Hematology Consult    See dictation    A/P:  Anemia. His baseline Hb is in the 10 range. The reason for the acute drop is unclear--? DEISY effect. Will order BM biopsy to rule out various BM conditions and check labs. Will order EPO after BM biopsy. Thanks for the consult. Will follow closely.     Rosas An MD

## 2022-11-01 NOTE — PLAN OF CARE
Problem: Safety - Adult  Goal: Free from fall injury  11/1/2022 1049 by Jacques Turner RN  Outcome: Progressing     Problem: ABCDS Injury Assessment  Goal: Absence of physical injury  11/1/2022 1049 by Jacques Turner RN  Outcome: Progressing

## 2022-11-01 NOTE — PROGRESS NOTES
[facility] Internal Medicine Note      Chief Complaint: I feel stronger    Subjective/Interval History: This morning the patient is sitting up in bed. He has no new complaints. Eating and drinking okay. Breathing is okay. He states he feels stronger. N.p.o. this morning for bone marrow biopsy. Dr London Izaguirre note appreciated. OT score patient  on [facility] score yesterday. PT note still pending. Patient states he cannot go somewhere for rehab. He must go home because he is worried about his wife's mild cognitive impairment. No chest pain or shortness breath. No cough or sputum. No nausea, vomiting, diarrhea. No abdominal pain. No dysuria. The remainder of the review of systems is negative. PMH, PSH, FH/SH reviewed and unchanged as documented in the H&P personally documented at admission 10/31/22    Medication list reviewed    Objective:    BP (!) 117/50   Pulse 66   Temp 97.8 °F (36.6 °C) (Oral)   Resp 18   Ht 5' 8\" (1.727 m)   Wt 255 lb 11.7 oz (116 kg)   SpO2 94%   BMI 38.88 kg/m²   Temp  Av.9 °F (36.6 °C)  Min: 97.6 °F (36.4 °C)  Max: 98.3 °F (36.8 °C)    RRR  Chest-respirations are easy. There are no wheezes or rhonchi or crackles.   Abd- BS+, soft, NTND  Ext- no edema    The Following Labs Were Reviewed Today:    Most of the labs pending for this morning    Recent Results (from the past 24 hour(s))   Urinalysis with Reflex to Culture    Collection Time: 10/31/22 10:48 AM    Specimen: Urine, clean catch   Result Value Ref Range    Color, UA Yellow Straw/Yellow    Clarity, UA Clear Clear    Glucose, Ur Negative Negative mg/dL    Bilirubin Urine Negative Negative    Ketones, Urine Negative Negative mg/dL    Specific Gravity, UA 1.013 1.005 - 1.030    Blood, Urine Negative Negative    pH, UA 6.0 5.0 - 8.0    Protein, UA TRACE (A) Negative mg/dL    Urobilinogen, Urine 0.2 <2.0 E.U./dL    Nitrite, Urine Negative Negative    Leukocyte Esterase, Urine Negative Negative Microscopic Examination YES     Urine Type NotGiven     Urine Reflex to Culture Not Indicated    Microscopic Urinalysis    Collection Time: 10/31/22 10:48 AM   Result Value Ref Range    Bacteria, UA None Seen None Seen /HPF    Hyaline Casts, UA 1 0 - 8 /LPF    WBC, UA 0 0 - 5 /HPF    RBC, UA 0 0 - 4 /HPF    Epithelial Cells, UA 0 0 - 5 /HPF   POCT Glucose    Collection Time: 10/31/22 11:31 AM   Result Value Ref Range    POC Glucose 263 (H) 70 - 99 mg/dl    Performed on ACCU-CHEK    Troponin    Collection Time: 10/31/22  2:21 PM   Result Value Ref Range    Troponin 0.03 (H) <0.01 ng/mL   POCT Glucose    Collection Time: 10/31/22  4:27 PM   Result Value Ref Range    POC Glucose 116 (H) 70 - 99 mg/dl    Performed on ACCU-CHEK    Troponin    Collection Time: 10/31/22  8:02 PM   Result Value Ref Range    Troponin 0.02 (H) <0.01 ng/mL   POCT Glucose    Collection Time: 10/31/22  8:20 PM   Result Value Ref Range    POC Glucose 163 (H) 70 - 99 mg/dl    Performed on ACCU-CHEK    [place]/Lambda Quantitative Free Light Chains, Serum    Collection Time: 10/31/22 10:49 PM   Result Value Ref Range    KAPPA/LAMBDA TEST COMMENT see below    Troponin    Collection Time: 11/01/22  2:20 AM   Result Value Ref Range    Troponin 0.02 (H) <0.01 ng/mL   POCT Glucose    Collection Time: 11/01/22  6:53 AM   Result Value Ref Range    POC Glucose 85 70 - 99 mg/dl    Performed on ACCU-CHEK        ASSESSMENT/PLAN:      Principal Problem:    Generalized weakness-likely multifactorial, being contributed to primarily by anemia and acute kidney injury/dehydration. Await therapy reevaluation today. Active Problems:    DEISY -patient did receive 1 L of IV fluids yesterday. Await repeat kidney function. Diuretics on hold. JOANN (obstructive sleep apnea)-used CPAP last night without difficulty. Type 2 diabetes mellitus with stage 3 chronic kidney disease, with long-term current use of insulin-blood sugars low today. Hold insulin this morning. N.p.o. for bone marrow biopsy. Lantus dose reduced. Microcytic [id number] and bone marrow biopsy pending currently. Appreciate Dr. Paulina Dudley input. Stage 3b chronic kidney disease -await repeat renal function    Essential hypertension-blood pressure is doing okay today. Continue with current regimen. Moderate COPD-seems to be at baseline. Continue current regimen. Slow transit constipation-continue MiraLAX and Linzess. Chronic GERD-currently asymptomatic. Hyperlipidemia-stable on current statin dosing. Disposition-continue to monitor kidney function and blood counts. Await further therapy input and may be able to discharge as soon as tomorrow. Patient fairly adamant about going home, not going to a rehab center.     Time > 25 minutes reviewing chart and patient data, examining and interviewing patient, and discussing with nursing staff, family, etc.        Evan Morgan MD, FACP  8:00 AM  11/1/2022

## 2022-11-01 NOTE — PROGRESS NOTES
Dr. Zoraida Lesch paged regarding blood glucose level and morning scheduled Lantus. Pt to have BM biopsy completed today and is NPO. Dr. Zoraida Lesch to make to adjustments order. Will continue to monitor.

## 2022-11-01 NOTE — CARE COORDINATION
Chart Reviewed. Order rec'd for dc planning needs. Gowned and gloved to meet with patient to introduce  role, initiate dc planning and to complete ACP. During this visit; wife and daughter arrived to the room. INITIAL CASE MANAGEMENT ASSESSMENT    Reviewed chart, met with patient to assess possible discharge needs. Explained Case Management role/services. Living Situation: Pt and wife live in a house with 1 + 1 step entry; first floor living. Wife has memory issues and he is her caretaker. He is very stoic about his answers; his plan is to return home. Dgtr in room is concerned about his ability. 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 He ambulates with cane and uses a shower chair. DME: cane, shower chair, bipap    PT/OT Recs: to be done this morning     Active Services: none     Transportation: family     Medications: [facility] [address]    PCP: [facility]      HD/PD: none    PLAN/COMMENTS: Dgtr reports she is concerned about his ability to manage at home with his weakness. He cesilia have therapies this morning.     [facility]     Case Management   [phone]    11/1/2022  8:56 AM

## 2022-11-01 NOTE — CONSULTS
Nephrology Consult Note  [phone] [address] [facility]        Reason for Consult:  DEISY    HISTORY OF PRESENT ILLNESS:                This is a patient with significant past medical history of DM2, anemia,Afib, COPD, and CKD baseline Cr looks to be 2.0 going back to 2021 who presents with weakness,dyspnea  and worsening anemia   We are asked to see him for worsening azotemia and Cr up to 2.8, he was on Diovan  until today . being seen by Hematology and will have BM biopsy today     Past Medical History:        Diagnosis Date    Allergic rhinitis     Asthma     Atrial fibrillation ([facility] [address]     Carotid artery stenosis     Chronic kidney disease     COPD (chronic obstructive pulmonary disease) ([facility])     CPAP (continuous positive airway pressure) dependence     ESBL (extended spectrum beta-lactamase) producing bacteria infection 12/26/2018    urine    Hyperlipidemia     Hypertension     Iron deficiency anemia     Obstructive sleep apnea     Type II or unspecified type diabetes mellitus without mention of complication, not stated as uncontrolled        Past Surgical History:        Procedure Laterality Date    CATARACT REMOVAL  2/2012    bilateral    COLONOSCOPY  7/10/2009    diverticulosis    hemorrhoids    GALLBLADDER SURGERY  1981    PAIN MANAGEMENT PROCEDURE Right 10/20/2021    COOLIEF RADIOFREQUENCY ABLATION - RIGHT KNEE performed by Ana Eisenberg MD at [address] Left 12/8/2021    [address] - LEFT KNEE performed by Ana Eisenberg MD at [address]  7-2005    7/10/2009    normal       Current Medications:    No current facility-administered medications on file prior to encounter.      Current Outpatient Medications on File Prior to Encounter   Medication Sig Dispense Refill    albuterol sulfate HFA (PROVENTIL;VENTOLIN;PROAIR) 108 (90 Base) MCG/ACT inhaler INHALE 2 PUFFS BY MOUTH EVERY 4 HOURS AS NEEDED FOR WHEEZING 18 each 11    montelukast (SINGULAIR) 10 MG tablet TAKE 1 TABLET BY MOUTH EVERY DAY 90 tablet 3    [employer] PURELAX 17 GM/SCOOP powder TAKE 17G BY MOUTH DAILY MIX WITH 8 OZ OF WATER 510 g 3    amiodarone (CORDARONE) 200 MG tablet TAKE 1 TABLET BY MOUTH EVERY OTHER DAY 45 tablet 3    pantoprazole (PROTONIX) 40 MG tablet TAKE 1 TABLET BY MOUTH EVERY DAY 90 tablet 1    B-D UF III MINI PEN NEEDLES 31G X 5 MM MISC USE AS DIRECTED 3 TIMES A  each 3    furosemide (LASIX) 20 MG tablet TAKE 40MG EVERY OTHER DAY FOR ONE WEEK AND 20MG ON THE DAYS THAT PT IS NOT TAKING 40MG.  135 tablet 2    atorvastatin (LIPITOR) 40 MG tablet TAKE 1 TABLET BY MOUTH EVERY DAY 90 tablet 3    alfuzosin (UROXATRAL) 10 MG extended release tablet TAKE 1 TABLET BY MOUTH EVERY DAY 90 tablet 3    LINZESS 145 MCG capsule TAKE 1 CAPSULE BY MOUTH EVERY DAY IN THE MORNING BEFORE BREAKFAST 30 capsule 11    verapamil (CALAN SR) 240 MG extended release tablet TAKE 1 TABLET BY MOUTH TWICE A  tablet 3    valsartan-hydroCHLOROthiazide (DIOVAN-HCT) 320-25 MG per tablet TAKE 1 TABLET BY MOUTH EVERY DAY 90 tablet 3    cloNIDine (CATAPRES) 0.1 MG tablet TAKE 1 TABLET BY MOUTH TWICE A  tablet 3    ACCU-CHEK GUIDE strip TEST AS DIRECTED 3 TIMES A  strip 5    insulin glargine (BASAGLAR KWIKPEN) 100 UNIT/ML injection pen Inject 50 Units into the skin 2 times daily (or as directed) 10 pen 5    Accu-Chek FastClix Lancets MISC USE TO TEST 3 TIMES A DAY DX CODE E11.9 102 each 17    levalbuterol (XOPENEX) 1.25 MG/3ML nebulizer solution USE 1 VIAL VIA NEBULIZER FOUR TIMES DAILY 360 mL 11    ZINC PO Take by mouth      CPAP Machine MISC by Does not apply route      Ascorbic Acid (SUPER C COMPLEX PO) Take by mouth daily      Multiple Vitamins-Minerals (MULTIVITAMIN ADULT PO) Take by mouth daily      Lactobacillus Rhamnosus, GG, (CVS PROBIOTIC, LACTOBACILLUS,) CAPS TAKE 1 CAPSULE BY MOUTH 2 TIMES DAILY (WITH MEALS) 60 capsule 11 Docusate Sodium (COLACE PO) Take by mouth daily      Cyanocobalamin (VITAMIN B-12 PO) Take 500 mcg by mouth every other day       Cholecalciferol (VITAMIN D3 PO) Take 2,000 Units by mouth daily       aspirin EC 81 MG EC tablet Take 2 tablets by mouth daily.  30 tablet 3    ferrous sulfate 325 (65 FE) MG tablet Take 325 mg by mouth daily          Allergies:  Hytrin [terazosin hcl], Penicillins, Shrimp flavor, Sulfa antibiotics, Tekturna [aliskiren fumarate], Vancomycin, and Ciprofloxacin    Social History:    Social History     Socioeconomic History    Marital status:      Spouse name: Not on file    Number of children: 5    Years of education: Not on file    Highest education level: Not on file   Occupational History    Occupation: retired   Tobacco Use    Smoking status: Former     Packs/day: 0.50     Years: 18.00     Pack years: 9.00     Types: Cigarettes     Start date: 1958     Quit date: 1988     Years since quittin.8    Smokeless tobacco: Never   Vaping Use    Vaping Use: Never used   Substance and Sexual Activity    Alcohol use: No     Alcohol/week: 0.0 standard drinks    Drug use: No    Sexual activity: Yes     Partners: Female   Other Topics Concern    Not on file   Social History Narrative    Not on file     Social Determinants of Health     Financial Resource Strain: Low Risk     Difficulty of Paying Living Expenses: Not hard at all   Food Insecurity: No Food Insecurity    Worried About Running Out of Food in the Last Year: Never true    Ran Out of Food in the Last Year: Never true   Transportation Needs: Not on file   Physical Activity: Not on file   Stress: Not on file   Social Connections: Not on file   Intimate Partner Violence: Not on file   Housing Stability: Not on file       Family History:       Problem Relation Age of Onset    Heart Disease Mother     Stroke Mother     Vision Loss Mother     High Blood Pressure Father     High Blood Pressure Brother     Diabetes Brother Sleep Apnea Brother     Arthritis Paternal Grandmother     Diabetes Paternal Grandmother          Review of Systems:  a comprehensive Review of systems was negative except as noted in HPI     PHYSICAL EXAM:    Vitals:    BP (!) 118/54   Pulse 61   Temp 97.2 °F (36.2 °C) (Axillary)   Resp 16   Ht 5' 8\" (1.727 m)   Wt 255 lb 11.7 oz (116 kg)   SpO2 95%   BMI 38.88 kg/m²   I/O last 3 completed shifts: In: 8974 [P. O.:660; I.V.:512]  Out: 800 [Urine:800]  I/O this shift:  In: 359.4 [I.V.:359.4]  Out: 0     Physical Exam:  Gen: Resting in bed, NAD. HEENT: MMM, OP clear. CV: RRR no m/r/g. No S3.  Lungs: Good respiratory effort, clear air entry   Abd: S/NT +BS  Ext: No edema, no cyanosis  Skin: Warm. No rashes appreciated. : No TTP over bladder, nondistended. Neuro: Alert and oriented x 3, nonfocal.  Joints: No erythema noted over joints.     DATA:    CBC:   Lab Results   Component Value Date/Time    WBC 8.9 11/01/2022 07:25 AM    RBC 2.77 11/01/2022 07:25 AM    HGB 7.7 11/01/2022 07:25 AM    HCT 24.4 11/01/2022 08:27 AM    MCV 87.6 11/01/2022 07:25 AM    MCH 27.7 11/01/2022 07:25 AM    MCHC 31.6 11/01/2022 07:25 AM    RDW 18.0 11/01/2022 07:25 AM     11/01/2022 07:25 AM    MPV 8.2 11/01/2022 07:25 AM     BMP:    Lab Results   Component Value Date/Time     11/01/2022 07:25 AM    K 4.6 11/01/2022 07:25 AM    K 5.1 10/31/2022 06:05 AM     11/01/2022 07:25 AM    CO2 24 11/01/2022 07:25 AM    BUN 82 11/01/2022 07:25 AM    LABALBU 3.7 10/31/2022 06:05 AM    CREATININE 2.8 11/01/2022 07:25 AM    CALCIUM 8.0 11/01/2022 07:25 AM    GFRAA 37 05/27/2022 02:05 PM    GFRAA >60 04/19/2013 10:46 AM    LABGLOM 22 11/01/2022 07:25 AM    GLUCOSE 35 11/01/2022 07:25 AM    GLUCOSE 96 06/16/2011 10:37 AM       IMPRESSION/RECOMMENDATIONS:      DEISY looks to have a pre renal component, STOP Diovan/HCT for now   Give IV fluids   Obtain renal U/S   Check urine chemistry   Urine for UPCR   Rule out Myeloma   DM2 he is running low blood sugars, per RN got lantus last night and has been NPO today will give D5W for now ,may need to reduce Insulin for now will leave this to attending team   Anemia per hematology   CKD suspect due to diabetes    Thank you for allowing me to participate in the care of this patient. I will continue to follow along. Please call with questions.     Regine Bosch MD

## 2022-11-01 NOTE — PROGRESS NOTES
Patient with worsening renal function and critical BUN today, glucose critically low as well. Nephrology consulted. Hold Diovan. Check urinalysis, urine sodium and urine creatinine. Check renal ultrasound. Check postvoid residual.  Lantus insulin placed on hold.   Electronically signed by Kal Manning MD on 11/1/2022 at 9:06 AM

## 2022-11-01 NOTE — PROGRESS NOTES
Hematology Note    Pt out of room-presumably at bone marrow biopsy. Iron studies, B12, folate, haptoglobin ok. Will start EPO after BM biopsy.     Dusty Vazquez MD

## 2022-11-01 NOTE — PROGRESS NOTES
Physical Therapy-pt unavailable  Varun Gupta  [phone]  Patient not available for therapy at this time. Pt off floor for procedure. Will likely follow up 11-2-22 for services. If patient is discharged prior to the next Physical Therapy visit, please see last written PT note for discharge status.   Electronically signed by Pratik Becker PT on 11/1/2022 at 2:07 PM

## 2022-11-01 NOTE — PROGRESS NOTES
Critical results received from Lab at 8:47 am, regarding BUN 81.7 and Glucose 35. Finger stick completed and results were 51. Dextrose 10% 125 mL bolus given via IV. Dr. Zoraida Lesch notified of results, new orders place. Recheck of glucose after 15 mins, results of 111. Pt remains NPO. Pt currently of off unit for renal US with telemetry monitor attached.

## 2022-11-01 NOTE — PROGRESS NOTES
Patient admitted to room 3263. Patient is alert and oriented x4. New diet order placed, regular 4 carbs. Patient updated on new diet order. IV dextrose 5%, NS 0.9% infusing at 75 mL/hr. Patient lying in bed post bone marrow biopsy, there are no complaints or concerns voiced. Will continue to monitor.

## 2022-11-01 NOTE — PLAN OF CARE
Problem: Safety - Adult  Goal: Free from fall injury  10/31/2022 2130 by Porter Hutton RN  Outcome: Progressing  Note: Fall risk band on patient. Orange light on outside of room. Non skid footwear in place. Alarms used appropriately. Patient instructed to call and wait for staff before getting up. Rounding done to anticipate needs. Appropriate safety devices used for transfers.

## 2022-11-01 NOTE — ACP (ADVANCE CARE PLANNING)
Advance Care Planning     Advance Care Planning Activator (Inpatient)  Conversation Note      Date of ACP Conversation: 11/1/2022     Conversation Conducted with: Korina Jiang with Decision Making Capacity    ACP Activator: [id number] [facility] Decision Maker:     Current Designated Health Care Decision Maker:     Primary Decision Maker: Roselia Suresh - Spouse - [phone]  Click here to complete Healthcare Decision Makers including section of the Healthcare Decision Maker Relationship (ie \"Primary\")      Care Preferences    Ventilation: \"If you were in your present state of health and suddenly became very ill and were unable to breathe on your own, what would your preference be about the use of a ventilator (breathing machine) if it were available to you? \"      Would the patient desire the use of ventilator (breathing machine)?: yes    \"If your health worsens and it becomes clear that your chance of recovery is unlikely, what would your preference be about the use of a ventilator (breathing machine) if it were available to you? \"     Would the patient desire the use of ventilator (breathing machine)?: No      Resuscitation  \"CPR works best to restart the heart when there is a sudden event, like a heart attack, in someone who is otherwise healthy. Unfortunately, CPR does not typically restart the heart for people who have serious health conditions or who are very sick. \"    \"In the event your heart stopped as a result of an underlying serious health condition, would you want attempts to be made to restart your heart (answer \"yes\" for attempt to resuscitate) or would you prefer a natural death (answer \"no\" for do not attempt to resuscitate)? \" yes       [] Yes   [] No   Educated Patient / Bela Dowd regarding differences between Advance Directives and portable DNR orders.     Length of ACP Conversation in minutes:  1.5 minute    Conversation Outcomes:  [x] ACP discussion completed  [] Existing advance directive reviewed with patient; no changes to patient's previously recorded wishes  [] New Advance Directive completed  [] Portable Do Not Rescitate prepared for Provider review and signature  [] POLST/POST/MOLST/MOST prepared for Provider review and signature      Follow-up plan:    [] Schedule follow-up conversation to continue planning  [] Referred individual to Provider for additional questions/concerns   [] Advised patient/agent/surrogate to review completed ACP document and update if needed with changes in condition, patient preferences or care setting    [x] This note routed to one or more involved healthcare providers      Routed to Dr Simon Lan.       [place], [place]     Case Management   [phone]    11/1/2022  8:52 AM

## 2022-11-01 NOTE — PRE SEDATION
Sedation Pre-Procedure Note    Patient Name: Alejandro Hobson   YOB: 1940  Room/Bed: D3Q-1558/3263-01  Medical Record Number: [id number]  Date: 11/1/2022   Time: 1:37 PM       Indication:  anemia    Consent: I have discussed with the patient and/or the patient representative the indication, alternatives, and the possible risks and/or complications of the planned procedure and the anesthesia methods. The patient and/or patient representative appear to understand and agree to proceed. Vital Signs:   Vitals:    11/01/22 1157   BP: 122/60   Pulse: 74   Resp: 18   Temp: 98 °F (36.7 °C)   SpO2: 97%       Past Medical History:   has a past medical history of Allergic rhinitis, Asthma, Atrial fibrillation ([facility] [address] Carotid artery stenosis, Chronic kidney disease, COPD (chronic obstructive pulmonary disease) ([facility]), CPAP (continuous positive airway pressure) dependence, ESBL (extended spectrum beta-lactamase) producing bacteria infection, Hyperlipidemia, Hypertension, Iron deficiency anemia, Obstructive sleep apnea, and Type II or unspecified type diabetes mellitus without mention of complication, not stated as uncontrolled. Past Surgical History:   has a past surgical history that includes Gallbladder surgery (1981); Upper gastrointestinal endoscopy (7-2005    7/10/2009); Colonoscopy (7/10/2009); Cataract removal (2/2012); Pain management procedure (Right, 10/20/2021); and Pain management procedure (Left, 12/8/2021).     Medications:   Scheduled Meds:    insulin glargine  20 Units SubCUTAneous BID    sodium chloride flush  5-40 mL IntraVENous 2 times per day    [Held by provider] enoxaparin  30 mg SubCUTAneous Daily    insulin lispro  0-4 Units SubCUTAneous TID WC    insulin lispro  0-4 Units SubCUTAneous Nightly    ipratropium-albuterol  1 ampule Inhalation Q4H WA    predniSONE  40 mg Oral Daily    tamsulosin  0.4 mg Oral Daily    amiodarone  200 mg Oral Daily    aspirin EC  162 mg Oral Daily    atorvastatin 40 mg Oral Daily    cloNIDine  0.1 mg Oral BID    polyethylene glycol  17 g Oral Daily    docusate sodium  100 mg Oral Daily    ferrous sulfate  324 mg Oral Daily    lactobacillus  1 capsule Oral BID WC    linaclotide  145 mcg Oral QAM AC    montelukast  10 mg Oral Daily    pantoprazole  40 mg Oral Daily    verapamil  240 mg Oral BID     Continuous Infusions:    dextrose 5 % and 0.9 % NaCl 75 mL/hr at 11/01/22 1206    sodium chloride Stopped (11/01/22 1205)    dextrose Stopped (11/01/22 0852)     PRN Meds: sodium chloride flush, sodium chloride, ondansetron **OR** ondansetron, polyethylene glycol, acetaminophen **OR** acetaminophen, glucose, dextrose bolus **OR** dextrose bolus, glucagon (rDNA), dextrose  Home Meds:   Prior to Admission medications    Medication Sig Start Date End Date Taking? Authorizing Provider   albuterol sulfate HFA (PROVENTIL;VENTOLIN;PROAIR) 108 (90 Base) MCG/ACT inhaler INHALE 2 PUFFS BY MOUTH EVERY 4 HOURS AS NEEDED FOR WHEEZING 10/21/22   Good Carrillo MD   montelukast (SINGULAIR) 10 MG tablet TAKE 1 TABLET BY MOUTH EVERY DAY 10/21/22   Good Carrillo MD   [employer]S PURELAX 17 GM/SCOOP powder TAKE 17G BY MOUTH DAILY MIX WITH 8 OZ OF WATER 9/6/22   Good Carrillo MD   amiodarone (CORDARONE) 200 MG tablet TAKE 1 TABLET BY MOUTH EVERY OTHER DAY 8/16/22   Altaf Humphrey MD   pantoprazole (PROTONIX) 40 MG tablet TAKE 1 TABLET BY MOUTH EVERY DAY 8/15/22   Carolyn Yanez DO   B-D UF III MINI PEN NEEDLES 31G X 5 MM MISC USE AS DIRECTED 3 TIMES A DAY 8/13/22   Michael Kramer MD   furosemide (LASIX) 20 MG tablet TAKE 40MG EVERY OTHER DAY FOR ONE WEEK AND 20MG ON THE DAYS THAT PT IS NOT TAKING 40MG.  8/12/22   Good Carrillo MD   atorvastatin (LIPITOR) 40 MG tablet TAKE 1 TABLET BY MOUTH EVERY DAY 4/26/22   Good Carrillo MD   alfuzosin (UROXATRAL) 10 MG extended release tablet TAKE 1 TABLET BY MOUTH EVERY DAY 4/4/22   [facility] Karen Zelaya MD   LINZESS 145 MCG capsule TAKE 1 CAPSULE BY MOUTH EVERY DAY IN THE MORNING BEFORE BREAKFAST 3/23/22   Good Carrillo MD   verapamil (CALAN SR) 240 MG extended release tablet TAKE 1 TABLET BY MOUTH TWICE A DAY 2/14/22   Good Carrillo MD   valsartan-hydroCHLOROthiazide (DIOVAN-HCT) 320-25 MG per tablet TAKE 1 TABLET BY MOUTH EVERY DAY 2/14/22   Good Carrillo MD   cloNIDine (CATAPRES) 0.1 MG tablet TAKE 1 TABLET BY MOUTH TWICE A DAY 2/4/22   Good Carrillo MD   ACCU-CHEK GUIDE strip TEST AS DIRECTED 3 TIMES A DAY 1/29/22   Good Carrillo MD   insulin glargine [facility]) 100 UNIT/ML injection pen Inject 50 Units into the skin 2 times daily (or as directed) 11/23/21   Good Carrillo MD   Accu-Chek FastClix Lancets MISC USE TO TEST 3 TIMES A DAY DX CODE E11.9 7/20/21   Good Carrillo MD   levalbuterol Tasha Mutters) 1.25 MG/3ML nebulizer solution USE 1 VIAL VIA NEBULIZER FOUR TIMES DAILY 6/21/21   Carolyn Yanez DO   ZINC PO Take by mouth    Historical Provider, MD   CPAP Machine MISC by Does not apply route    Historical Provider, MD   Ascorbic Acid (SUPER C COMPLEX PO) Take by mouth daily    Historical Provider, MD   Multiple Vitamins-Minerals (MULTIVITAMIN ADULT PO) Take by mouth daily    Historical Provider, MD   Lactobacillus Rhamnosus, GG, (CVS PROBIOTIC, LACTOBACILLUS,) CAPS TAKE 1 CAPSULE BY MOUTH 2 TIMES DAILY (WITH MEALS) 3/15/20   Good Carrillo MD   Docusate Sodium (COLACE PO) Take by mouth daily    Historical Provider, MD   Cyanocobalamin (VITAMIN B-12 PO) Take 500 mcg by mouth every other day     Historical Provider, MD   Cholecalciferol (VITAMIN D3 PO) Take 2,000 Units by mouth daily     Historical Provider, MD   aspirin EC 81 MG EC tablet Take 2 tablets by mouth daily.  1/24/13   Altaf Humphrey MD   ferrous sulfate 325 (65 FE) MG tablet Take 325 mg by mouth daily     Historical Provider, MD Coumadin Use Last 7 Days:  no  Antiplatelet drug therapy use last 7 days: no  Other anticoagulant use last 7 days: no  Additional Medication Information:  N/A      Pre-Sedation Documentation and Exam:   I have personally completed a history, physical exam & review of systems for this patient (see notes).     Mallampati Airway Assessment:  normal, Mallampati Class III - (soft palate & base of uvula are visible)    Prior History of Anesthesia Complications:   none    ASA Classification:  Class 2 - A normal healthy patient with mild systemic disease    Sedation/ Anesthesia Plan:   intravenous sedation    Medications Planned:   midazolam (Versed) intravenously and fentanyl intravenously    Patient is an appropriate candidate for plan of sedation: yes    Electronically signed by Rea Winston DO on 11/1/2022 at 1:37 PM

## 2022-11-01 NOTE — PROGRESS NOTES
Patient has a carb control diet ordered. FSBS every 4 hrs discontinued. Resume FSBS every AC and HS.

## 2022-11-01 NOTE — CONSULTS
[address] [place] [address]                                  CONSULTATION    PATIENT NAME: Shorty Dunham                    :        1940  MED REC NO:   [id number]                          ROOM:       3263  ACCOUNT NO:   [de-identified]                           ADMIT DATE: 10/30/2022  PROVIDER:     Brandi Thomason MD    CONSULT DATE:  10/31/2022    HEMATOLOGY CONSULTATION    CONSULTING PROVIDER:  Keary Seip, MD    REASON FOR CONSULTATION:  Worsening anemia. HISTORY OF PRESENT ILLNESS:  The patient is an 79-year-old gentleman  that I followed for anemia who presented to the hospital with worsening  dyspnea on exertion, fatigue, weakness, dizziness. His hemoglobin was  8.4, down from his baseline in the 10-11 range. Therefore, Hematology  was consulted. He denies any recent melena, hematochezia. He denies  any fevers, chills, or sweats. He was also diagnosed with acute kidney  injury. PAST MEDICAL HISTORY:  1. Anemia. 2.  Chronic kidney disease  3. Atrial fibrillation. 4.  COPD. 5.  Diabetes. 6.  Morbid obesity  7. Hypertension. 8.  Hypercholesterolemia. PAST SURGICAL HISTORY:  As above. FAMILY HISTORY:  Noncontributory. SOCIAL HISTORY:  He is . He does not smoke. He drinks  occasionally. He is retired. MEDICATIONS:  Please see the list in the chart.     REVIEW OF SYSTEMS:  He denies any recent fever, chills, sweats, nausea,  vomiting, abdominal pain, chest pain, shortness of breath, headaches,  any new bone aches, dysphagia, odynophagia, diarrhea, constipation,  hemoptysis, hematemesis, change in vision/hearing/smell/taste,  neuropathy, skin rashes, productive cough, urinary or bowel prolapse or  incontinence, petechiae, skin rashes, pruritus, hallucinations, nasal  congestion or drainage, seizure, stroke, syncope, depression, anxiety,  suicidal ideations, melena, or hematochezia. He has mild-to-moderate  fatigue and mild-to-moderate generalized weakness. His 10-system review  of systems is otherwise negative. PHYSICAL EXAMINATION:  GENERAL:  He is in no acute distress. VITAL SIGNS:  He is afebrile with normal vital signs. HEENT:  His pupils are round and reactive to light and accommodation. Extraocular muscles are intact. His oropharynx is clear. NECK:  He has no jugular venous distention. No thyromegaly. He has no  carotid bruits. He has no palpable lymphadenopathy. CHEST:  Lungs are clear to auscultation bilaterally. CARDIOVASCULAR:  Heart is regular rate and rhythm. ABDOMEN:  Nondistended and nontender with bowel sounds x4. No  hepatosplenomegaly. EXTREMITIES:  He has trace lower extremities edema bilaterally. NEUROLOGIC:  Nonfocal.    LABORATORY DATA:  His white blood cell count is 5.9, hemoglobin 8.2, and  platelets of 647. ASSESSMENT AND PLAN:  Worsening anemia. His baseline hemoglobin is in  the 10-11 range. His anemia perhaps has been attributed to his chronic  kidney disease. It is acutely worse. This could be partially due to  his acute kidney injury. I will check volume deficiency as well as B12  and folate deficiency. I will check the haptoglobin for hemolysis. I  will check an SPEP and light chain analysis to rule out myeloma. I have  also ordered bone marrow biopsy to rule out myelodysplastic syndrome and  other bone marrow conditions. I will order erythropoietin after the  bone marrow biopsy since I do not want affect the results. Thank you for the consultation. I will follow closely. Alfonso Vaz MD    D: 10/31/2022 23:02:34       T: 11/01/2022 5:01:12     [email or web address]  Job#: [id number]     Doc#: [id number]    CC:   Sole Martin MD

## 2022-11-01 NOTE — PROGRESS NOTES
Physical Therapy-Attempt/Hold, ?try later  Bry Caceres  [phone]  Attempted PT evaluation with pt this morning, however, nursing reported pt not able to participate in PT session at this time due to low blood sugars and nursing present for meds. Will attempt another time if/as schedule permits. If patient is discharged prior to the next Physical Therapy visit, please see last written PT note for discharge status.     Electronically signed by Deep Bansal PT on 11/1/2022 at 8:50 AM

## 2022-11-01 NOTE — CARE COORDINATION
Was called back to the room to request his children listed as contacts:   London Uriarte:  [address]:   [facility] [address] [place] [address] [phone]  Tariq Hernández, [place]     Case [facility]   [phone]    11/1/2022  9:42 AM

## 2022-11-01 NOTE — PROGRESS NOTES
Physician Progress Note      Tangela Diaz  CSN #:                  [id number]  :                       1940  ADMIT DATE:       10/30/2022 9:36 PM  [address] [place] DATE:  RESPONDING  PROVIDER #:        Lorenz Gaucher MD          QUERY TEXT:    Patient admitted with DEISY, noted to have atrial fibrillation. If possible,   please document in progress notes and discharge summary further specificity   regarding the type of atrial fibrillation: The medical record reflects the following:  Risk Factors: hx of afib  Clinical Indicators: weakness - SR- 80  Treatment: amiodarone, EKG    Chronic: nonspecific term that could be referring to paroxysmal, persistent,   or permanent  Longstanding persistent: persistent and continuous, lasting > 1 year. Paroxysmal - self-terminating or intermittent; resolves with or without   intervention within 7 days of onset; may recur with various frequency. Persistent - Fails to terminate within 7 days; Often requires meds or   cardioversion to restore to NSR. Permanent - longstanding & persistent; Medication has been ineffective in   restoring NSR &/or cardioversion is contraindicated    Definitions per MS-DRG Training Guide and Quick Reference Guide, Devaughn Her[facility] 112 5   Diseases and Disorders of the Circulatory System; 2019; [employer]. Software content   from the [employer]? Advanced CDI Transformation Program  Options provided:  -- Paroxysmal Atrial Fibrillation  -- Longstanding Persistent Atrial Fibrillation  -- Permanent Atrial Fibrillation  -- Persistent Atrial Fibrillation  -- Chronic Atrial Fibrillation, unspecified  -- Other - I will add my own diagnosis  -- Disagree - Not applicable / Not valid  -- Disagree - Clinically unable to determine / Unknown  -- Refer to Clinical Documentation Reviewer    PROVIDER RESPONSE TEXT:    This patient has unspecified chronic atrial fibrillation.     Query created by: Buster Crooks on 10/31/2022 12:08 PM      Electronically signed by:  Siomara Perez Ambrosio Pedraza MD 11/1/2022 7:24 AM

## 2022-11-02 LAB
ALBUMIN SERPL-MCNC: 3.2 G/DL (ref 3.1–4.9)
ALBUMIN SERPL-MCNC: 3.9 G/DL (ref 3.4–5)
ALPHA-1-GLOBULIN: 0.3 G/DL (ref 0.2–0.4)
ALPHA-2-GLOBULIN: 0.8 G/DL (ref 0.4–1.1)
ANION GAP SERPL CALCULATED.3IONS-SCNC: 14 MMOL/L (ref 3–16)
BASOPHILS ABSOLUTE: 0 K/UL (ref 0–0.2)
BASOPHILS RELATIVE PERCENT: 0.1 %
BETA GLOBULIN: 1.1 G/DL (ref 0.9–1.6)
BUN BLDV-MCNC: 85 MG/DL (ref 7–20)
CALCIUM SERPL-MCNC: 7.8 MG/DL (ref 8.3–10.6)
CHLORIDE BLD-SCNC: 105 MMOL/L (ref 99–110)
CO2: 21 MMOL/L (ref 21–32)
CREAT SERPL-MCNC: 2.6 MG/DL (ref 0.8–1.3)
EOSINOPHILS ABSOLUTE: 0 K/UL (ref 0–0.6)
EOSINOPHILS RELATIVE PERCENT: 0.1 %
GAMMA GLOBULIN: 0.6 G/DL (ref 0.6–1.8)
GFR SERPL CREATININE-BSD FRML MDRD: 24 ML/MIN/{1.73_M2}
GLUCOSE BLD-MCNC: 118 MG/DL (ref 70–99)
GLUCOSE BLD-MCNC: 143 MG/DL (ref 70–99)
GLUCOSE BLD-MCNC: 145 MG/DL (ref 70–99)
GLUCOSE BLD-MCNC: 191 MG/DL (ref 70–99)
GLUCOSE BLD-MCNC: 215 MG/DL (ref 70–99)
HCT VFR BLD CALC: 22.8 % (ref 40.5–52.5)
HEMOGLOBIN: 7.5 G/DL (ref 13.5–17.5)
LYMPHOCYTES ABSOLUTE: 0.7 K/UL (ref 1–5.1)
LYMPHOCYTES RELATIVE PERCENT: 9.3 %
MCH RBC QN AUTO: 28 PG (ref 26–34)
MCHC RBC AUTO-ENTMCNC: 33 G/DL (ref 31–36)
MCV RBC AUTO: 84.9 FL (ref 80–100)
MONOCYTES ABSOLUTE: 0.6 K/UL (ref 0–1.3)
MONOCYTES RELATIVE PERCENT: 7.8 %
NEUTROPHILS ABSOLUTE: 6.4 K/UL (ref 1.7–7.7)
NEUTROPHILS RELATIVE PERCENT: 82.7 %
PDW BLD-RTO: 18 % (ref 12.4–15.4)
PERFORMED ON: ABNORMAL
PHOSPHORUS: 4.2 MG/DL (ref 2.5–4.9)
PLATELET # BLD: 127 K/UL (ref 135–450)
PMV BLD AUTO: 8.6 FL (ref 5–10.5)
POTASSIUM SERPL-SCNC: 4.5 MMOL/L (ref 3.5–5.1)
RBC # BLD: 2.69 M/UL (ref 4.2–5.9)
SODIUM BLD-SCNC: 140 MMOL/L (ref 136–145)
SPE/IFE INTERPRETATION: NORMAL
TOTAL PROTEIN: 5.9 G/DL (ref 6.4–8.2)
WBC # BLD: 7.8 K/UL (ref 4–11)

## 2022-11-02 PROCEDURE — 6370000000 HC RX 637 (ALT 250 FOR IP): Performed by: STUDENT IN AN ORGANIZED HEALTH CARE EDUCATION/TRAINING PROGRAM

## 2022-11-02 PROCEDURE — 94761 N-INVAS EAR/PLS OXIMETRY MLT: CPT

## 2022-11-02 PROCEDURE — 2580000003 HC RX 258: Performed by: STUDENT IN AN ORGANIZED HEALTH CARE EDUCATION/TRAINING PROGRAM

## 2022-11-02 PROCEDURE — 6370000000 HC RX 637 (ALT 250 FOR IP): Performed by: INTERNAL MEDICINE

## 2022-11-02 PROCEDURE — 97162 PT EVAL MOD COMPLEX 30 MIN: CPT

## 2022-11-02 PROCEDURE — 6360000002 HC RX W HCPCS: Performed by: INTERNAL MEDICINE

## 2022-11-02 PROCEDURE — 97530 THERAPEUTIC ACTIVITIES: CPT

## 2022-11-02 PROCEDURE — 97116 GAIT TRAINING THERAPY: CPT

## 2022-11-02 PROCEDURE — 1200000000 HC SEMI PRIVATE

## 2022-11-02 PROCEDURE — 94640 AIRWAY INHALATION TREATMENT: CPT

## 2022-11-02 PROCEDURE — 82668 ASSAY OF ERYTHROPOIETIN: CPT

## 2022-11-02 PROCEDURE — 99232 SBSQ HOSP IP/OBS MODERATE 35: CPT | Performed by: INTERNAL MEDICINE

## 2022-11-02 PROCEDURE — 36415 COLL VENOUS BLD VENIPUNCTURE: CPT

## 2022-11-02 PROCEDURE — 97535 SELF CARE MNGMENT TRAINING: CPT

## 2022-11-02 PROCEDURE — 85025 COMPLETE CBC W/AUTO DIFF WBC: CPT

## 2022-11-02 PROCEDURE — 80069 RENAL FUNCTION PANEL: CPT

## 2022-11-02 PROCEDURE — 2580000003 HC RX 258: Performed by: INTERNAL MEDICINE

## 2022-11-02 PROCEDURE — 2700000000 HC OXYGEN THERAPY PER DAY

## 2022-11-02 PROCEDURE — 94660 CPAP INITIATION&MGMT: CPT

## 2022-11-02 RX ORDER — HEPARIN SODIUM 5000 [USP'U]/ML
5000 INJECTION, SOLUTION INTRAVENOUS; SUBCUTANEOUS EVERY 8 HOURS SCHEDULED
Status: DISCONTINUED | OUTPATIENT
Start: 2022-11-02 | End: 2022-11-09 | Stop reason: HOSPADM

## 2022-11-02 RX ADMIN — POLYETHYLENE GLYCOL 3350 17 G: 17 POWDER, FOR SOLUTION ORAL at 08:26

## 2022-11-02 RX ADMIN — Medication 1 CAPSULE: at 08:18

## 2022-11-02 RX ADMIN — CLONIDINE HYDROCHLORIDE 0.1 MG: 0.1 TABLET ORAL at 08:19

## 2022-11-02 RX ADMIN — VERAPAMIL HYDROCHLORIDE 240 MG: 240 TABLET, FILM COATED, EXTENDED RELEASE ORAL at 20:50

## 2022-11-02 RX ADMIN — EPOETIN ALFA-EPBX 40000 UNITS: 40000 INJECTION, SOLUTION INTRAVENOUS; SUBCUTANEOUS at 09:00

## 2022-11-02 RX ADMIN — AMIODARONE HYDROCHLORIDE 200 MG: 200 TABLET ORAL at 08:20

## 2022-11-02 RX ADMIN — PREDNISONE 40 MG: 20 TABLET ORAL at 08:20

## 2022-11-02 RX ADMIN — FERROUS SULFATE TAB EC 324 MG (65 MG FE EQUIVALENT) 324 MG: 324 (65 FE) TABLET DELAYED RESPONSE at 08:20

## 2022-11-02 RX ADMIN — POLYETHYLENE GLYCOL 3350 17 G: 17 POWDER, FOR SOLUTION ORAL at 20:47

## 2022-11-02 RX ADMIN — VERAPAMIL HYDROCHLORIDE 240 MG: 240 TABLET, FILM COATED, EXTENDED RELEASE ORAL at 08:18

## 2022-11-02 RX ADMIN — CLONIDINE HYDROCHLORIDE 0.1 MG: 0.1 TABLET ORAL at 20:50

## 2022-11-02 RX ADMIN — SODIUM CHLORIDE, PRESERVATIVE FREE 10 ML: 5 INJECTION INTRAVENOUS at 20:52

## 2022-11-02 RX ADMIN — PANTOPRAZOLE SODIUM 40 MG: 40 TABLET, DELAYED RELEASE ORAL at 08:20

## 2022-11-02 RX ADMIN — IPRATROPIUM BROMIDE AND ALBUTEROL SULFATE 1 AMPULE: .5; 3 SOLUTION RESPIRATORY (INHALATION) at 20:23

## 2022-11-02 RX ADMIN — INSULIN GLARGINE 20 UNITS: 100 INJECTION, SOLUTION SUBCUTANEOUS at 08:34

## 2022-11-02 RX ADMIN — IPRATROPIUM BROMIDE AND ALBUTEROL SULFATE 1 AMPULE: .5; 3 SOLUTION RESPIRATORY (INHALATION) at 08:30

## 2022-11-02 RX ADMIN — IPRATROPIUM BROMIDE AND ALBUTEROL SULFATE 1 AMPULE: .5; 3 SOLUTION RESPIRATORY (INHALATION) at 11:54

## 2022-11-02 RX ADMIN — HEPARIN SODIUM 5000 UNITS: 5000 INJECTION INTRAVENOUS; SUBCUTANEOUS at 22:08

## 2022-11-02 RX ADMIN — DEXTROSE AND SODIUM CHLORIDE: 5; 900 INJECTION, SOLUTION INTRAVENOUS at 00:55

## 2022-11-02 RX ADMIN — ASPIRIN 162 MG: 81 TABLET, COATED ORAL at 08:18

## 2022-11-02 RX ADMIN — IPRATROPIUM BROMIDE AND ALBUTEROL SULFATE 1 AMPULE: .5; 3 SOLUTION RESPIRATORY (INHALATION) at 15:57

## 2022-11-02 RX ADMIN — Medication 1 CAPSULE: at 17:00

## 2022-11-02 RX ADMIN — HEPARIN SODIUM 5000 UNITS: 5000 INJECTION INTRAVENOUS; SUBCUTANEOUS at 15:05

## 2022-11-02 RX ADMIN — MONTELUKAST 10 MG: 10 TABLET, FILM COATED ORAL at 08:20

## 2022-11-02 RX ADMIN — TAMSULOSIN HYDROCHLORIDE 0.4 MG: 0.4 CAPSULE ORAL at 08:26

## 2022-11-02 RX ADMIN — SODIUM CHLORIDE, PRESERVATIVE FREE 10 ML: 5 INJECTION INTRAVENOUS at 08:34

## 2022-11-02 RX ADMIN — ATORVASTATIN CALCIUM 40 MG: 40 TABLET, FILM COATED ORAL at 08:20

## 2022-11-02 RX ADMIN — DOCUSATE SODIUM 100 MG: 100 CAPSULE, LIQUID FILLED ORAL at 08:19

## 2022-11-02 NOTE — PLAN OF CARE
Problem: Safety - Adult  Goal: Free from fall injury  11/2/2022 0048 by Antony Bernal RN  Outcome: Progressing  Note: Fall risk band on patient. Orange light on outside of room. Non skid footwear in place. Alarms used appropriately. Patient instructed to call and wait for staff before getting up. Rounding done to anticipate needs. Appropriate safety devices used for transfers.

## 2022-11-02 NOTE — PROGRESS NOTES
Pt up to chair with therapy using front wheel walker, Tolerated fairly well. No complaints of pain. Call light and BS table in reach. Will continue to monitor.  Electronically signed by Alma Hatchet, RN on 11/2/2022 at 9:10 AM

## 2022-11-02 NOTE — PROGRESS NOTES
Hematology Oncology Daily Progress Note    Admit Date: 10/30/2022  Hospital day several    Subjective:     Patient has complaints of stable fatigue and GIBBONS--denies sob/cp  Medication side effects: none    Scheduled Meds:   insulin glargine  20 Units SubCUTAneous BID    sodium chloride flush  5-40 mL IntraVENous 2 times per day    [Held by provider] enoxaparin  30 mg SubCUTAneous Daily    insulin lispro  0-4 Units SubCUTAneous TID WC    insulin lispro  0-4 Units SubCUTAneous Nightly    ipratropium-albuterol  1 ampule Inhalation Q4H WA    predniSONE  40 mg Oral Daily    tamsulosin  0.4 mg Oral Daily    amiodarone  200 mg Oral Daily    aspirin EC  162 mg Oral Daily    atorvastatin  40 mg Oral Daily    cloNIDine  0.1 mg Oral BID    polyethylene glycol  17 g Oral Daily    docusate sodium  100 mg Oral Daily    ferrous sulfate  324 mg Oral Daily    lactobacillus  1 capsule Oral BID WC    linaclotide  145 mcg Oral QAM AC    montelukast  10 mg Oral Daily    pantoprazole  40 mg Oral Daily    verapamil  240 mg Oral BID     Continuous Infusions:   dextrose 5 % and 0.9 % NaCl 75 mL/hr at 11/02/22 0055    sodium chloride Stopped (11/01/22 1205)    dextrose Stopped (11/01/22 0852)     PRN Meds:sodium chloride flush, sodium chloride, ondansetron **OR** ondansetron, polyethylene glycol, acetaminophen **OR** acetaminophen, glucose, dextrose bolus **OR** dextrose bolus, glucagon (rDNA), dextrose    Review of Systems  Pertinent items are noted in HPI. REVIEW OF SYSTEMS:         Constitutional: Denies fever, sweats, weight loss     Eyes: No visual changes or diplopia. No scleral icterus. ENT: No Headaches, hearing loss or vertigo. No mouth sores or sore throat. Cardiovascular: No chest pain, dyspnea on exertion, palpitations or loss of consciousness. Respiratory: No cough or wheezing, no sputum production. No hemoptysis. .    Gastrointestinal: No abdominal pain, appetite loss, blood in stools.  No change in bowel habits. Genitourinary: No dysuria, trouble voiding, or hematuria. Musculoskeletal:  Generalized weakness. No joint complaints. Integumentary: No rash or pruritis. Neurological: No headache, diplopia. No change in gait, balance, or coordination. No paresthesias. Endocrine: No temperature intolerance. No excessive thirst, fluid intake, or urination. Hematologic/Lymphatic: No abnormal bruising or ecchymoses, blood clots or swollen lymph nodes. Allergic/Immunologic: No nasal congestion or hives. Objective:     Patient Vitals for the past 8 hrs:   BP Temp Temp src Pulse Resp SpO2 Weight   11/02/22 0815 (!) 133/90 98.6 °F (37 °C) Oral 65 18 97 % --   11/02/22 0501 113/68 97.6 °F (36.4 °C) Oral 67 18 95 % 256 lb 9.9 oz (116.4 kg)   11/02/22 0024 (!) 147/64 97.8 °F (36.6 °C) Oral 72 18 93 % --     I/O last 3 completed shifts: In: 979.1 [P.O.:240; I.V.:739.1]  Out: 400 [Urine:400]  No intake/output data recorded.     BP (!) 133/90   Pulse 65   Temp 98.6 °F (37 °C) (Oral)   Resp 18   Ht 5' 8\" (1.727 m)   Wt 256 lb 9.9 oz (116.4 kg)   SpO2 97%   BMI 39.02 kg/m²     General Appearance:    Alert, cooperative, no distress, appears stated age   Head:    Normocephalic, without obvious abnormality, atraumatic   Eyes:    PERRL, conjunctiva/corneas clear, EOM's intact, fundi     benign, both eyes        Ears:    Normal TM's and external ear canals, both ears   Nose:   Nares normal, septum midline, mucosa normal, no drainage    or sinus tenderness   Throat:   Lips, mucosa, and tongue normal; teeth and gums normal   Neck:   Supple, symmetrical, trachea midline, no adenopathy;        thyroid:  No enlargement/tenderness/nodules; no carotid    bruit or JVD   Back:     Symmetric, no curvature, ROM normal, no CVA tenderness   Lungs:     Clear to auscultation bilaterally, respirations unlabored   Chest wall:    No tenderness or deformity   Heart:    Regular rate and rhythm, S1 and S2 normal, no murmur, rub   or gallop Abdomen:     Soft, non-tender, bowel sounds active all four quadrants,     no masses, no organomegaly           Extremities:   Extremities normal, atraumatic, no cyanosis or edema   Pulses:   2+ and symmetric all extremities   Skin:   Skin color, texture, turgor normal, no rashes or lesions   Lymph nodes:   Cervical, supraclavicular, and axillary nodes normal   Neurologic:   CNII-XII intact. Normal strength, sensation and reflexes       throughout         Data ReviewCBC:   Lab Results   Component Value Date/Time    WBC 8.9 11/01/2022 07:25 AM    RBC 2.77 11/01/2022 07:25 AM       Assessment:     Principal Problem:    Generalized weakness  Active Problems:    JOANN (obstructive sleep apnea)    Type 2 diabetes mellitus with stage 3 chronic kidney disease, with long-term current use of insulin ([facility])    Microcytic anemia    Chronic GERD    Hyperlipidemia    Stage 3b chronic kidney disease (HCC)    Essential hypertension    Moderate COPD (chronic obstructive pulmonary disease) ([facility])    DEISY (acute kidney injury) ([address]    Slow transit constipation  Resolved Problems:    * No resolved hospital problems. *      Plan:     1. Anemia. His bone marrow biopsy was done yesterday. His laboratory work-up was relatively unremarkable. I will go ahead and give a dose of Procrit due to his chronic kidney disease. He will need to continue this every 2 weeks as an outpatient. It may take 2 to 3 doses to start working. I explained the potential side effects including a slightly higher risk of strokes, heart attacks, blood clots and he is willing to proceed.         Electronically signed by Samantha Miranda MD on 11/2/2022 at 8:17 AM

## 2022-11-02 NOTE — PLAN OF CARE
Problem: Discharge Planning  Goal: Discharge to home or other facility with appropriate resources  11/2/2022 0908 by Tramaine Carter RN  Outcome: Progressing  11/2/2022 0048 by Kevon Barr RN  Outcome: Progressing  Flowsheets (Taken 11/1/2022 2200)  Discharge to home or other facility with appropriate resources: Identify barriers to discharge with patient and caregiver     Problem: Safety - Adult  Goal: Free from fall injury  11/2/2022 0908 by Tramaine Carter RN  Outcome: Progressing  11/2/2022 0048 by Kevon Barr RN  Outcome: Progressing  Note: Fall risk band on patient. Orange light on outside of room. Non skid footwear in place. Alarms used appropriately. Patient instructed to call and wait for staff before getting up. Rounding done to anticipate needs. Appropriate safety devices used for transfers. Problem: ABCDS Injury Assessment  Goal: Absence of physical injury  11/2/2022 0908 by Tramaine Carter RN  Outcome: Progressing  11/2/2022 0048 by Kevon Barr RN  Outcome: Progressing     Problem: Skin/Tissue Integrity  Goal: Absence of new skin breakdown  Description: 1. Monitor for areas of redness and/or skin breakdown  2. Assess vascular access sites hourly  3. Every 4-6 hours minimum:  Change oxygen saturation probe site  4. Every 4-6 hours:  If on nasal continuous positive airway pressure, respiratory therapy assess nares and determine need for appliance change or resting period.   11/2/2022 0908 by Tramaine Carter RN  Outcome: Progressing  11/2/2022 0048 by Kevon Barr RN  Outcome: Progressing

## 2022-11-02 NOTE — PROGRESS NOTES
Occupational Therapy  Facility/Department: [facility]N   Occupational Daily Treatment Note      Name: Edyta Piper  : 1940  MRN: [id number]  Date of Service: 2022    Discharge Recommendations:  3-5 sessions per week          Patient Diagnosis(es): The primary encounter diagnosis was General weakness. A diagnosis of Elevated troponin was also pertinent to this visit. Past Medical History:  has a past medical history of Allergic rhinitis, Asthma, Atrial fibrillation ([facility] Carotid artery stenosis, Chronic kidney disease, COPD (chronic obstructive pulmonary disease) ([facility] CPAP (continuous positive airway pressure) dependence, ESBL (extended spectrum beta-lactamase) producing bacteria infection, Hyperlipidemia, Hypertension, Iron deficiency anemia, Obstructive sleep apnea, and Type II or unspecified type diabetes mellitus without mention of complication, not stated as uncontrolled. Past Surgical History:  has a past surgical history that includes Gallbladder surgery (); Upper gastrointestinal endoscopy (7-2005    7/10/2009); Colonoscopy (7/10/2009); Cataract removal (2012); Pain management procedure (Right, 10/20/2021); Pain management procedure (Left, 2021); and CT BIOPSY BONE MARROW (2022). Assessment   Performance deficits / Impairments: Decreased functional mobility ; Decreased safe awareness;Decreased balance;Decreased coordination;Decreased ADL status; Decreased endurance;Decreased high-level IADLs  Assessment: Discussed with OTR am pac score is 16 which indicates need for continued skilled OT to incresae [place] and decrease caregiver burden. Patient completed supine to sit with Mod A with HOB elevated and side rail. Min A for sit<>stand from EOB to RW to recliner chair with cues for hand placement. Functional mobility with RW short distance from bed to recliner chair and ~10 feet with one turn, no overt LOB noted.  SOB after mobility, o2 sats on RA 88%, RN in room and placed patient on 1L of o2. Patient is a high fall risk due to decreased endurance and noted tremors. Anticipate that patient would benefit from low to moderate therapy to increase [place], decrease caregiver burden and increase endurance. Cont with POC. REQUIRES OT FOLLOW-UP: Yes  Activity Tolerance  Activity Tolerance: Patient Tolerated treatment well  Activity Tolerance Comments: SOB oxygen sats 88%, RN placed on 1L of o2          Gilmar Suresh scored a 16/24 on the AM-PAC ADL Inpatient form. Current research shows that an AM-PAC score of 17 or less is typically not associated with a discharge to the patient's home setting. Based on the patient's AM-PAC score and their current ADL deficits, it is recommended that the patient have 3-5 sessions per week of Occupational Therapy at d/c to increase the patient's independence. Please see assessment section for further patient specific details. If patient discharges prior to next session this note will serve as a discharge summary. Please see below for the latest assessment towards goals. Plan   Occupational Therapy Plan  Times Per Week: 3-5  Times Per Day: Once a day  Current Treatment Recommendations: Balance training, Functional mobility training, Endurance training, Safety education & training, Patient/Caregiver education & training, Equipment evaluation, education, & procurement, Self-Care / ADL     Restrictions  Restrictions/Precautions  Restrictions/Precautions: Contact Precautions  Position Activity Restriction  Other position/activity restrictions: IV left hand, heart monitor,  oxygen 1 L per NC, Contact prec ESBL urine    Subjective   General  Chart Reviewed: Yes  Patient assessed for rehabilitation services?: Yes  Additional Pertinent Hx: This is an 79-year-old white male with multiple medical problems who presented to the emergency room with weakness and shakiness with walking.   Patient stated that over the last several days he is in has not been feeling himself and has had a hard time walking. He has had no specific symptoms but just overall weakness. He states that things progressed over the course of 2-3 days and so eventually last night came to the emergency room. He denied any fever or chills. No cough or sputum aside from his usual.  He is chronically short of breath and this was no different. He had no increase in his leg edema. He denied chest pain. No dysuria. No change in his bowel habits. He has been using his CPAP normally. Sugars have been okay. (copied per note Dr Rizwan Shi 10/30)  Response to previous treatment: Patient with no complaints from previous session  Family / Caregiver Present: No  Referring Practitioner: Rizwan Shi  Diagnosis: generalized weakness  Subjective  Subjective: Patient supine in bed upon arrival to room. Patient agreeable to OT. No reports of pain     Social/Functional History  Social/Functional History  Lives With: Spouse  Type of Home: House  Home Layout: Able to Live on Main level with bedroom/bathroom, Multi-level ([facility] 1[address] story)  Home Access: Stairs to enter with rails  Entrance Stairs - Number of Steps: 1+1  Entrance Stairs - Rails: Both  Bathroom Shower/Tub: Tub/Shower unit  Bathroom Toilet: Standard (comfort height commode)  Bathroom Equipment: Shower chair (sliding doors on shower with bars)  Home Equipment: Cane  ADL Assistance: Independent  Homemaking Assistance: Independent (but wife cleans. Pt completes bill paying, medication management)  Ambulation Assistance: Independent  Transfer Assistance: Independent  Active : Yes  Occupation: Retired  Additional Comments: Pt states he is completely indep, but does have assist of family if needed       Objective      Safety Devices  Type of Devices: Call light within reach; Chair alarm in place; Left in chair;Nurse notified  Balance  Sitting: Intact  Standing: With support (Stood with RW with Min A)        ADL  Grooming: Setup;Stand by assistance  Grooming Skilled Clinical Factors: seated in recliner chair to wash face and hands  Additional Comments: full lADL not complete , but anticipate pt will require Max for LB dressing, toileting as seen by decreased activity tolerance, balance, tremors        Bed mobility  Supine to Sit: Moderate assistance (HOB elevated and side rail)  Sit to Supine: Unable to assess (up in chair at end of session)  Transfers  Sit to stand: Minimal assistance  Stand to sit: Minimal assistance  Transfer Comments: Min A for sit<>stand from EOB to RW to recliner chair with cues for hand placement. Functional mobility with RW short distance from bed to recliner chair and from recliner chair ~10 feet with one turn. Completed mobility on RA. SOB after mobility. RN in room, o2 check 88%.  RN placed patient on 1L of o2  Vision  Vision: Within Functional Limits (had cataract surgery)  Hearing  Hearing: Exceptions to [facility]  Hearing Exceptions: Bilateral hearing aid;Hard of hearing/hearing concerns  Cognition  Overall Cognitive Status: [facility]  Cognition Comment: answers all questions easily  Orientation  Overall Orientation Status: Within Functional Limits                  Education Given To: Patient  Education Provided: Role of Therapy;Plan of Care;Transfer Training;Equipment         AM-PAC Score        AM-PAC Inpatient Daily Activity Raw Score: 16 (11/02/22 1145)  AM-PAC Inpatient ADL T-Scale Score : 35.96 (11/02/22 1145)  ADL Inpatient CMS 0-100% Score: 53.32 (11/02/22 1145)  ADL Inpatient CMS G-Code Modifier : CK (11/02/22 1145)        Goals  Short Term Goals  Time Frame for Short Term Goals: by discharge: all goals ongoing  Short Term Goal 1: self care with SBA , AD as needed  Short Term Goal 2: toilet with SBA and AD  Short Term Goal 3: transfer with with SBA and AD  Short Term Goal 4: functional mobility with SBA and AD  Short Term Goal 5: increase activity tolerance to stand 3 min for ADL tasks  Short Term Goal 6: decrease UE tremors to complete self care and mobiltiy with SBA  Short Term Goal 7: bed mobility with SBA  Long Term Goals  Time Frame for Long Term Goals : same as stg  Patient Goals   Patient goals : Pt stated he wants to go home, wants the tremors to go away       Therapy Time   Individual Concurrent Group Co-treatment   Time In [address]         Time Out 0835         Minutes 40                 Electronically signed by Ilda Chapman, [id number] on 11/2/2022 at 11:59 AM

## 2022-11-02 NOTE — PROGRESS NOTES
Physical Therapy  Facility/Department: [facility]  Physical Therapy Initial Assessment    Name: Maritza Neville  : 1940  MRN: [id number]  Date of Service: 2022    Discharge Recommendations:  Home with Home health PT, Patient would benefit from continued therapy after discharge   PT Equipment Recommendations  Equipment Needed: Yes  Mobility Devices: [place]: Vashti Neville scored a 18/24 on the AM-PAC short mobility form. Current research shows that an AM-PAC score of 18 or greater is typically associated with a discharge to the patient's home setting. Based on the patient's AM-PAC score and their current functional mobility deficits, it is recommended that the patient have 2-3 sessions per week of Physical Therapy at d/c to increase the patient's independence. At this time, this patient demonstrates the endurance and safety to discharge home with home PT and a follow up treatment frequency of 2-3x/wk. Please see assessment section for further patient specific details. If patient discharges prior to next session this note will serve as a discharge summary. Please see below for the latest assessment towards goals. Patient Diagnosis(es): The primary encounter diagnosis was General weakness. A diagnosis of Elevated troponin was also pertinent to this visit. 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   Past Surgical History:  has a past surgical history that includes Gallbladder surgery (); Upper gastrointestinal endoscopy (7-2005    7/10/2009); Colonoscopy (7/10/2009); Cataract removal (2/2012); Pain management procedure (Right, 10/20/2021); Pain management procedure (Left, 12/8/2021); and CT BIOPSY BONE MARROW (11/1/2022). Assessment   Body Structures, Functions, Activity Limitations Requiring Skilled Therapeutic Intervention: Decreased functional mobility   Assessment: Pt is an 78 y/o male admitted with generalized weakness and anemia. He lives with his spouse on the main level of the house with 1 EZEQUIEL and was indep ambulating with a cane PTA. Today, pt limited by decreased activity tolerance but only required CGA for transfers and 80' of ambulation with RW. Pt is below his functional baseline but anticipate that he will be safe to d/c home with increased assistance from family, use of RW and home health PT. Will continue to follow. Treatment Diagnosis: decreased activity tolerance  Therapy Prognosis: Good  Decision Making: Medium Complexity  History: Per H&P, \"This is an 28-year-old white male with multiple medical problems who presented to the emergency room with weakness and shakiness with walking. Patient stated that over the last several days he is in has not been feeling himself and has had a hard time walking. He has had no specific symptoms but just overall weakness. He states that things progressed over the course of 2-3 days and so eventually last night came to the emergency room. He denied any fever or chills. No cough or sputum aside from his usual. He is chronically short of breath and this was no different. He had no increase in his leg edema. He denied chest pain. No dysuria. No change in his bowel habits. He has been using his CPAP normally. \" Diagnosed with General Weakness.   Exam: functional mobility, ROM, strength  Clinical Presentation: evolving  Barriers to Learning: [place]  Requires PT Follow-Up: Yes  Activity Tolerance  Activity Tolerance: Patient tolerated evaluation without incident     Plan   Physcial Therapy Plan  General Plan: 3-5 times per week  Current Treatment Recommendations: Strengthening, ROM, Balance training, Functional mobility training, Gait training, Transfer training, Endurance training, Home exercise program, Equipment evaluation, education, & procurement, Therapeutic activities, Patient/Caregiver education & training, Safety education & training  Safety Devices  Type of Devices: Call light within reach, Chair alarm in place, Left in chair, Nurse notified  Restraints  Restraints Initially in Place: No     Restrictions  Restrictions/Precautions  Restrictions/Precautions: Contact Precautions  Position Activity Restriction  Other position/activity restrictions: IV left hand, heart monitor, room air, Contact prec ESBL urine     Subjective   General  Chart Reviewed: Yes  Patient assessed for rehabilitation services?: Yes  Additional Pertinent Hx: Per H&P, \"This is an 42-year-old white male with multiple medical problems who presented to the emergency room with weakness and shakiness with walking. Patient stated that over the last several days he is in has not been feeling himself and has had a hard time walking. He has had no specific symptoms but just overall weakness. He states that things progressed over the course of 2-3 days and so eventually last night came to the emergency room. He denied any fever or chills. No cough or sputum aside from his usual.  He is chronically short of breath and this was no different. He had no increase in his leg edema. He denied chest pain. No dysuria. No change in his bowel habits. He has been using his CPAP normally. \"  Diagnosed with General Weakness.   Response To Previous Treatment: Not applicable  Family / Caregiver Present: No  Referring Practitioner: Hermes Liao MD  Referral Date : 10/31/22  Diagnosis: generalized weakness, anemia  Follows Commands: Within Functional Limits  Subjective  Subjective: Pt pleasant and agreeable to PT eval and treat. No c/o pain. Now on room air. In recliner chair upon arrival.         Social/Functional History  Social/Functional History  Lives With: Spouse  Type of Home: House  Home Layout: Able to Live on Main level with bedroom/bathroom, Multi-level ([place] 1 1/2 story)  Home Access: Stairs to enter with rails  Entrance Stairs - Number of Steps: 1+1  Entrance Stairs - Rails: Both  Bathroom Shower/Tub: Tub/Shower unit  Bathroom Toilet: Standard (comfort height commode)  Bathroom Equipment: Shower chair (sliding doors on shower with bars)  Home Equipment: Jackelyn Billy  Has the patient had two or more falls in the past year or any fall with injury in the past year?:  (one recent fall- tripped)  ADL Assistance: [address] [place] [address] Avenue: Independent (but wife cleans.   Pt completes bill paying, medication management)  Ambulation Assistance: Independent  Transfer Assistance: Independent  Active : Yes  Occupation: Retired  Additional Comments: Pt states he is completely indep, but does have assist of family if needed  Vision/Hearing  Vision  Vision: Within Functional Limits (had cataract surgery)  Hearing  Hearing: Exceptions to [facility]  Hearing Exceptions: Bilateral hearing aid;Hard of hearing/hearing concerns    Cognition   Orientation  Overall Orientation Status: Within Functional Limits     Objective         Gross Assessment  Sensation: Intact (reports \"twitching\" in feet that he attributes to diabetes but denies numbness)     AROM RLE (degrees)  RLE AROM: WFL  AROM LLE (degrees)  LLE AROM : WFL  Strength RLE  Strength RLE: WFL  Strength LLE  Strength LLE: WFL        Bed mobility  Bed Mobility Comments: not observed    Transfers  Sit to Stand: Contact guard assistance  Stand to Sit: Contact guard assistance    Ambulation  Surface: Level tile  Device: Rolling Walker  Other Apparatus:  (therapist managed IV and tele)  Assistance: Contact guard assistance  Quality of Gait: shaky but no LOB  Gait Deviations: Slow Riri;Decreased step length;Decreased step height  Distance: approx [de-identified]' with multiple turns  Comments: mild GIBBONS; Sp02 92% on room air after ambulation  More Ambulation?: No  Stairs/Curb  Stairs?: No     Balance  Posture: Fair  Sitting - Static: Good  Sitting - Dynamic: Good  Standing - Static: Fair  Standing - Dynamic: Fair           AM-PAC Score  AM-PAC Inpatient Mobility Raw Score : 18 (11/02/22 1507)  AM-PAC Inpatient T-Scale Score : 43.63 (11/02/22 1507)  Mobility Inpatient CMS 0-100% Score: 46.58 (11/02/22 1507)  Mobility Inpatient CMS G-Code Modifier : CK (11/02/22 1507)             Goals  Short Term Goals  Time Frame for Short Term Goals: upon d/c  Short Term Goal 1: bed mobility mod I  Short Term Goal 2: transfers mod I  Short Term Goal 3: ambulate 48' with LRAD and mod I  Patient Goals   Patient Goals : \"to go home\"       Education  Patient Education  Education Given To: Patient; Family  Education Provided: Equipment;Home Exercise Program;Role of Therapy;Plan of Care;Transfer Training;Energy Conservation  Education Provided Comments: use of RW upon d/c  Education Method: Verbal  Barriers to Learning: Hearing  Education Outcome: Verbalized understanding;Continued education needed      Therapy Time   Individual Concurrent Group Co-treatment   Time In 1430         Time Out 1508         Minutes 38         Timed Code Treatment Minutes: 23 Minutes       Electronically signed by Iris Peacock, PT [id number] on 11/2/2022 at 3:12 PM

## 2022-11-02 NOTE — PROGRESS NOTES
[facility] Internal Medicine Note      Chief Complaint: I feel ok    Subjective/Interval History: This morning the patient was able to ambulate from the bathroom to the bedside chair. I personally escorted him along the walk and he is somewhat shaky in ambulating but was able to do with a walker. Patient is feeling constipated but no other complaints. He denies pain. He is eating and drinking okay. We discussed his therapy eval's and again the potential need to go for additional rehab at an [facility]. He is somewhat open to the idea at this point. No new problems otherwise. Discussed with nursing. No new concerns overnight. No chest pain or shortness breath. No cough or sputum. No nausea, vomiting, diarrhea. No abdominal pain. No dysuria. The remainder of the review of systems is negative. PMH, PSH, FH/SH reviewed and unchanged as documented in the H&P personally documented at admission 10/31/22    Medication list reviewed    Objective:    BP (!) 133/90   Pulse 65   Temp 98.6 °F (37 °C) (Oral)   Resp 18   Ht 5' 8\" (1.727 m)   Wt 256 lb 9.9 oz (116.4 kg)   SpO2 96%   BMI 39.02 kg/m²   Temp  Av °F (36.7 °C)  Min: 97.6 °F (36.4 °C)  Max: 98.6 °F (37 °C)    Exam unremarkable:  RRR  Chest-respirations are easy. There are no wheezes or rhonchi or crackles.   Abd- BS+, soft, NTND  Ext- no edema    The Following Labs Were Reviewed Today:    Recent Results (from the past 24 hour(s))   POCT Glucose    Collection Time: 22 11:09 AM   Result Value Ref Range    POC Glucose 85 70 - 99 mg/dl    Performed on ACCU-CHEK    Urinalysis with Reflex to Culture    Collection Time: 22 11:35 AM    Specimen: Urine, clean catch   Result Value Ref Range    Color, UA Yellow Straw/Yellow    Clarity, UA Clear Clear    Glucose, Ur Negative Negative mg/dL    Bilirubin Urine Negative Negative    Ketones, Urine Negative Negative mg/dL    Specific Gravity, UA 1.014 1.005 - 1.030    Blood, Urine Negative Negative pH, UA 5.0 5.0 - 8.0    Protein, UA Negative Negative mg/dL    Urobilinogen, Urine 0.2 <2.0 E.U./dL    Nitrite, Urine Negative Negative    Leukocyte Esterase, Urine TRACE (A) Negative    Microscopic Examination YES     Urine Type NotGiven     Urine Reflex to Culture Not Indicated    Sodium, urine, random    Collection Time: 11/01/22 11:35 AM   Result Value Ref Range    Sodium, Ur 35 Not Established mmol/L   Creatinine, Random Urine    Collection Time: 11/01/22 11:35 AM   Result Value Ref Range    Creatinine, Ur 100.8 39.0 - 259.0 mg/dL   Microalbumin / Creatinine Urine Ratio    Collection Time: 11/01/22 11:35 AM   Result Value Ref Range    Microalbumin, Random Urine 3.10 (H) <2.0 mg/dL    Creatinine, Ur 101.3 39.0 - 259.0 mg/dL    Microalbumin Creatinine Ratio 30.6 (H) 0.0 - 30.0 mg/g   Microscopic Urinalysis    Collection Time: 11/01/22 11:35 AM   Result Value Ref Range    Bacteria, UA None Seen None Seen /HPF    Hyaline Casts, UA 7 0 - 8 /LPF    WBC, UA 1 0 - 5 /HPF    RBC, UA 0 0 - 4 /HPF    Epithelial Cells, UA 1 0 - 5 /HPF   POCT Glucose    Collection Time: 11/01/22  4:25 PM   Result Value Ref Range    POC Glucose 103 (H) 70 - 99 mg/dl    Performed on ACCU-CHEK    POCT Glucose    Collection Time: 11/01/22  8:45 PM   Result Value Ref Range    POC Glucose 193 (H) 70 - 99 mg/dl    Performed on ACCU-CHEK    CBC with Auto Differential    Collection Time: 11/02/22  6:35 AM   Result Value Ref Range    WBC 7.8 4.0 - 11.0 K/uL    RBC 2.69 (L) 4.20 - 5.90 M/uL    Hemoglobin 7.5 (L) 13.5 - 17.5 g/dL    Hematocrit 22.8 (L) 40.5 - 52.5 %    MCV 84.9 80.0 - 100.0 fL    MCH 28.0 26.0 - 34.0 pg    MCHC 33.0 31.0 - 36.0 g/dL    RDW 18.0 (H) 12.4 - 15.4 %    Platelets 561 (L) 575 - 450 K/uL    MPV 8.6 5.0 - 10.5 fL    Neutrophils % 82.7 %    Lymphocytes % 9.3 %    Monocytes % 7.8 %    Eosinophils % 0.1 %    Basophils % 0.1 %    Neutrophils Absolute 6.4 1.7 - 7.7 K/uL    Lymphocytes Absolute 0.7 (L) 1.0 - 5.1 K/uL    Monocytes Absolute 0.6 0.0 - 1.3 K/uL    Eosinophils Absolute 0.0 0.0 - 0.6 K/uL    Basophils Absolute 0.0 0.0 - 0.2 K/uL   Renal Function Panel    Collection Time: 11/02/22  6:35 AM   Result Value Ref Range    Sodium 140 136 - 145 mmol/L    Potassium 4.5 3.5 - 5.1 mmol/L    Chloride 105 99 - 110 mmol/L    CO2 21 21 - 32 mmol/L    Anion Gap 14 3 - 16    Glucose 145 (H) 70 - 99 mg/dL    BUN 85 (HH) 7 - 20 mg/dL    Creatinine 2.6 (H) 0.8 - 1.3 mg/dL    Est, Glom Filt Rate 24 (A) >60    Calcium 7.8 (L) 8.3 - 10.6 mg/dL    Phosphorus 4.2 2.5 - 4.9 mg/dL    Albumin 3.9 3.4 - 5.0 g/dL   POCT Glucose    Collection Time: 11/02/22  7:28 AM   Result Value Ref Range    POC Glucose 143 (H) 70 - 99 mg/dl    Performed on ACCU-CHEK        ASSESSMENT/PLAN:      Principal Problem:    Generalized weakness-continue therapy. Likely multifactorial related to anemia and dehydration. Active Problems:    DEISY -appreciate nephrology input. Continue with IV fluids. Renal function slightly improved today. Hypoglycemia-remains on D5. Hold Lantus for now. JOANN (obstructive sleep apnea)-used CPAP last night without difficulty. Type 2 diabetes mellitus with stage 3 chronic kidney disease, with long-term current use of insulin-as above. Continue to hold Lantus. Sliding scale as needed. Consider stopping D5 soon. Microcytic anemia-bone marrow biopsy pending. Stage 3b chronic kidney disease -slightly improved today. Continue to monitor. Essential hypertension-blood pressure is doing okay today. ARB remains on hold. Moderate COPD-seems to be at baseline. Continue current regimen. Slow transit constipation-continue MiraLAX and Linzess. Chronic GERD-currently asymptomatic. Hyperlipidemia-stable on current statin dosing. Disposition-patient now somewhat open to the idea of possibly needing rehab. Await return of renal function to baseline and await ongoing therapy eval's.     Time > 25 minutes reviewing chart and patient data, examining and interviewing patient, and discussing with nursing staff, family, etc.        Stephenie Westbrook MD, [place]   10:34 AM  11/2/2022

## 2022-11-02 NOTE — PROGRESS NOTES
Department of Internal Medicine  Nephrology Progress Note  This is a patient with significant past medical history of DM2, anemia,Afib, COPD, and CKD baseline Cr looks to be 2.0 going back to 2021 who presents with weakness,dyspnea  and worsening anemia   We are asked to see him for worsening azotemia and Cr up to 2.8. REVIEW OF SYSTEMS:  No CP/SOB , feels weak . Appetite poor ? Physical Exam:    VITALS:  BP (!) 133/90   Pulse 65   Temp 98.6 °F (37 °C) (Oral)   Resp 18   Ht 5' 8\" (1.727 m)   Wt 256 lb 9.9 oz (116.4 kg)   SpO2 96%   BMI 39.02 kg/m²   24HR INTAKE/OUTPUT:    Intake/Output Summary (Last 24 hours) at 11/2/2022 1139  Last data filed at 11/2/2022 1021  Gross per 24 hour   Intake 739.72 ml   Output 400 ml   Net 339.72 ml       Constitutional:  resting   Respiratory:  CTA  Gastrointestinal:  No  tenderness.   Normal Bowel Sounds  Cardiovascular:  S1, S2 RRR   Edema:  Lower Extremity has no edema    DATA:    CBC:  Lab Results   Component Value Date/Time    WBC 7.8 11/02/2022 06:35 AM    RBC 2.69 11/02/2022 06:35 AM    HGB 7.5 11/02/2022 06:35 AM    HCT 22.8 11/02/2022 06:35 AM    MCV 84.9 11/02/2022 06:35 AM    MCH 28.0 11/02/2022 06:35 AM    MCHC 33.0 11/02/2022 06:35 AM    RDW 18.0 11/02/2022 06:35 AM     11/02/2022 06:35 AM    MPV 8.6 11/02/2022 06:35 AM     CMP:  Lab Results   Component Value Date/Time     11/02/2022 06:35 AM    K 4.5 11/02/2022 06:35 AM    K 5.1 10/31/2022 06:05 AM     11/02/2022 06:35 AM    CO2 21 11/02/2022 06:35 AM    BUN 85 11/02/2022 06:35 AM    CREATININE 2.6 11/02/2022 06:35 AM    GFRAA 37 05/27/2022 02:05 PM    GFRAA >60 04/19/2013 10:46 AM    AGRATIO 1.4 10/31/2022 06:05 AM    LABGLOM 24 11/02/2022 06:35 AM    GLUCOSE 145 11/02/2022 06:35 AM    GLUCOSE 96 06/16/2011 10:37 AM    PROT 5.9 11/01/2022 07:25 AM    PROT 6.4 12/12/2012 08:05 AM    CALCIUM 7.8 11/02/2022 06:35 AM    BILITOT <0.2 10/31/2022 06:05 AM    ALKPHOS 126 10/31/2022 06:05 AM AST 27 10/31/2022 06:05 AM    ALT 41 10/31/2022 06:05 AM      Hepatic Function Panel:   Lab Results   Component Value Date/Time    ALKPHOS 126 10/31/2022 06:05 AM    ALT 41 10/31/2022 06:05 AM    AST 27 10/31/2022 06:05 AM    PROT 5.9 11/01/2022 07:25 AM    PROT 6.4 12/12/2012 08:05 AM    BILITOT <0.2 10/31/2022 06:05 AM    BILIDIR <0.2 10/30/2022 09:56 PM    IBILI see below 10/30/2022 09:56 PM      Phosphorus:   Lab Results   Component Value Date/Time    PHOS 4.2 11/02/2022 06:35 AM       ASSESSMENT:  Principal Problem:    Generalized weakness  Active Problems:    JOANN (obstructive sleep apnea)    Type 2 diabetes mellitus with stage 3 chronic kidney disease, with long-term current use of insulin (HCC)    Microcytic anemia    Chronic GERD    Hyperlipidemia    Stage 3b chronic kidney disease (HCC)    Essential hypertension    Moderate COPD (chronic obstructive pulmonary disease) (HCC)    DEISY (acute kidney injury) ([facility] [address]    Slow transit constipation  Resolved Problems:    * No resolved hospital problems. *      PLAN:  DEISY looks to have a pre renal R/o ATN , off Diovan/HCT . Tolerating IVF . renal U/S No hydro . Rule out Myeloma   DM2 plan per admitting team .  Anemia per hematology   CKD suspect due to diabetes. Out pt follow up advised .        Carmen Padilla MD, FACP

## 2022-11-03 LAB
ALBUMIN SERPL-MCNC: 4.1 G/DL (ref 3.4–5)
ALP BLD-CCNC: 112 U/L (ref 40–129)
ALT SERPL-CCNC: 36 U/L (ref 10–40)
ANION GAP SERPL CALCULATED.3IONS-SCNC: 11 MMOL/L (ref 3–16)
AST SERPL-CCNC: 26 U/L (ref 15–37)
BASOPHILS ABSOLUTE: 0 K/UL (ref 0–0.2)
BASOPHILS RELATIVE PERCENT: 0 %
BILIRUB SERPL-MCNC: 0.3 MG/DL (ref 0–1)
BILIRUBIN DIRECT: <0.2 MG/DL (ref 0–0.3)
BILIRUBIN, INDIRECT: ABNORMAL MG/DL (ref 0–1)
BUN BLDV-MCNC: 78 MG/DL (ref 7–20)
CALCIUM SERPL-MCNC: 7.7 MG/DL (ref 8.3–10.6)
CHLORIDE BLD-SCNC: 103 MMOL/L (ref 99–110)
CO2: 23 MMOL/L (ref 21–32)
CREAT SERPL-MCNC: 2.3 MG/DL (ref 0.8–1.3)
EOSINOPHILS ABSOLUTE: 0 K/UL (ref 0–0.6)
EOSINOPHILS RELATIVE PERCENT: 0.1 %
ERYTHROPOIETIN: 21 MU/ML (ref 4–27)
GFR SERPL CREATININE-BSD FRML MDRD: 28 ML/MIN/{1.73_M2}
GLUCOSE BLD-MCNC: 118 MG/DL (ref 70–99)
GLUCOSE BLD-MCNC: 133 MG/DL (ref 70–99)
GLUCOSE BLD-MCNC: 139 MG/DL (ref 70–99)
GLUCOSE BLD-MCNC: 179 MG/DL (ref 70–99)
GLUCOSE BLD-MCNC: 207 MG/DL (ref 70–99)
HCT VFR BLD CALC: 24.7 % (ref 40.5–52.5)
HEMOGLOBIN: 7.9 G/DL (ref 13.5–17.5)
LYMPHOCYTES ABSOLUTE: 0.8 K/UL (ref 1–5.1)
LYMPHOCYTES RELATIVE PERCENT: 8.3 %
MCH RBC QN AUTO: 28 PG (ref 26–34)
MCHC RBC AUTO-ENTMCNC: 32.1 G/DL (ref 31–36)
MCV RBC AUTO: 87.1 FL (ref 80–100)
MONOCYTES ABSOLUTE: 0.8 K/UL (ref 0–1.3)
MONOCYTES RELATIVE PERCENT: 8.7 %
NEUTROPHILS ABSOLUTE: 7.6 K/UL (ref 1.7–7.7)
NEUTROPHILS RELATIVE PERCENT: 82.9 %
PDW BLD-RTO: 17.9 % (ref 12.4–15.4)
PERFORMED ON: ABNORMAL
PHOSPHORUS: 4 MG/DL (ref 2.5–4.9)
PLATELET # BLD: 130 K/UL (ref 135–450)
PMV BLD AUTO: 8.3 FL (ref 5–10.5)
POTASSIUM SERPL-SCNC: 4.4 MMOL/L (ref 3.5–5.1)
PRO-BNP: 1221 PG/ML (ref 0–449)
RBC # BLD: 2.83 M/UL (ref 4.2–5.9)
SODIUM BLD-SCNC: 137 MMOL/L (ref 136–145)
TOTAL PROTEIN: 6.3 G/DL (ref 6.4–8.2)
WBC # BLD: 9.1 K/UL (ref 4–11)

## 2022-11-03 PROCEDURE — 6370000000 HC RX 637 (ALT 250 FOR IP): Performed by: INTERNAL MEDICINE

## 2022-11-03 PROCEDURE — 1200000000 HC SEMI PRIVATE

## 2022-11-03 PROCEDURE — 6360000002 HC RX W HCPCS: Performed by: INTERNAL MEDICINE

## 2022-11-03 PROCEDURE — 36415 COLL VENOUS BLD VENIPUNCTURE: CPT

## 2022-11-03 PROCEDURE — 80076 HEPATIC FUNCTION PANEL: CPT

## 2022-11-03 PROCEDURE — 85025 COMPLETE CBC W/AUTO DIFF WBC: CPT

## 2022-11-03 PROCEDURE — 97535 SELF CARE MNGMENT TRAINING: CPT

## 2022-11-03 PROCEDURE — 94640 AIRWAY INHALATION TREATMENT: CPT

## 2022-11-03 PROCEDURE — 80069 RENAL FUNCTION PANEL: CPT

## 2022-11-03 PROCEDURE — 94760 N-INVAS EAR/PLS OXIMETRY 1: CPT

## 2022-11-03 PROCEDURE — 97530 THERAPEUTIC ACTIVITIES: CPT

## 2022-11-03 PROCEDURE — 2580000003 HC RX 258: Performed by: INTERNAL MEDICINE

## 2022-11-03 PROCEDURE — 83880 ASSAY OF NATRIURETIC PEPTIDE: CPT

## 2022-11-03 PROCEDURE — 97110 THERAPEUTIC EXERCISES: CPT

## 2022-11-03 PROCEDURE — 99232 SBSQ HOSP IP/OBS MODERATE 35: CPT | Performed by: INTERNAL MEDICINE

## 2022-11-03 PROCEDURE — 2580000003 HC RX 258: Performed by: STUDENT IN AN ORGANIZED HEALTH CARE EDUCATION/TRAINING PROGRAM

## 2022-11-03 PROCEDURE — 97116 GAIT TRAINING THERAPY: CPT

## 2022-11-03 RX ADMIN — IPRATROPIUM BROMIDE AND ALBUTEROL SULFATE 1 AMPULE: .5; 3 SOLUTION RESPIRATORY (INHALATION) at 07:46

## 2022-11-03 RX ADMIN — VERAPAMIL HYDROCHLORIDE 240 MG: 240 TABLET, FILM COATED, EXTENDED RELEASE ORAL at 20:51

## 2022-11-03 RX ADMIN — DOCUSATE SODIUM 100 MG: 100 CAPSULE, LIQUID FILLED ORAL at 08:39

## 2022-11-03 RX ADMIN — IPRATROPIUM BROMIDE AND ALBUTEROL SULFATE 1 AMPULE: .5; 3 SOLUTION RESPIRATORY (INHALATION) at 16:46

## 2022-11-03 RX ADMIN — Medication 1 CAPSULE: at 17:32

## 2022-11-03 RX ADMIN — IPRATROPIUM BROMIDE AND ALBUTEROL SULFATE 1 AMPULE: .5; 3 SOLUTION RESPIRATORY (INHALATION) at 11:28

## 2022-11-03 RX ADMIN — VERAPAMIL HYDROCHLORIDE 240 MG: 240 TABLET, FILM COATED, EXTENDED RELEASE ORAL at 08:40

## 2022-11-03 RX ADMIN — HEPARIN SODIUM 5000 UNITS: 5000 INJECTION INTRAVENOUS; SUBCUTANEOUS at 14:50

## 2022-11-03 RX ADMIN — ASPIRIN 162 MG: 81 TABLET, COATED ORAL at 08:39

## 2022-11-03 RX ADMIN — Medication 1 CAPSULE: at 08:40

## 2022-11-03 RX ADMIN — CLONIDINE HYDROCHLORIDE 0.1 MG: 0.1 TABLET ORAL at 08:40

## 2022-11-03 RX ADMIN — BISACODYL 5 MG: 5 TABLET, COATED ORAL at 20:53

## 2022-11-03 RX ADMIN — PREDNISONE 40 MG: 20 TABLET ORAL at 08:40

## 2022-11-03 RX ADMIN — SODIUM CHLORIDE, PRESERVATIVE FREE 10 ML: 5 INJECTION INTRAVENOUS at 20:57

## 2022-11-03 RX ADMIN — DEXTROSE AND SODIUM CHLORIDE 1000 ML: 5; 900 INJECTION, SOLUTION INTRAVENOUS at 10:51

## 2022-11-03 RX ADMIN — INSULIN LISPRO 1 UNITS: 100 INJECTION, SOLUTION INTRAVENOUS; SUBCUTANEOUS at 17:33

## 2022-11-03 RX ADMIN — FERROUS SULFATE TAB EC 324 MG (65 MG FE EQUIVALENT) 324 MG: 324 (65 FE) TABLET DELAYED RESPONSE at 08:40

## 2022-11-03 RX ADMIN — MONTELUKAST 10 MG: 10 TABLET, FILM COATED ORAL at 08:40

## 2022-11-03 RX ADMIN — ATORVASTATIN CALCIUM 40 MG: 40 TABLET, FILM COATED ORAL at 08:40

## 2022-11-03 RX ADMIN — POLYETHYLENE GLYCOL 3350 17 G: 17 POWDER, FOR SOLUTION ORAL at 08:40

## 2022-11-03 RX ADMIN — IPRATROPIUM BROMIDE AND ALBUTEROL SULFATE 1 AMPULE: .5; 3 SOLUTION RESPIRATORY (INHALATION) at 20:21

## 2022-11-03 RX ADMIN — CLONIDINE HYDROCHLORIDE 0.1 MG: 0.1 TABLET ORAL at 20:51

## 2022-11-03 RX ADMIN — TAMSULOSIN HYDROCHLORIDE 0.4 MG: 0.4 CAPSULE ORAL at 08:40

## 2022-11-03 RX ADMIN — AMIODARONE HYDROCHLORIDE 200 MG: 200 TABLET ORAL at 10:40

## 2022-11-03 RX ADMIN — HEPARIN SODIUM 5000 UNITS: 5000 INJECTION INTRAVENOUS; SUBCUTANEOUS at 20:58

## 2022-11-03 RX ADMIN — PANTOPRAZOLE SODIUM 40 MG: 40 TABLET, DELAYED RELEASE ORAL at 08:40

## 2022-11-03 RX ADMIN — HEPARIN SODIUM 5000 UNITS: 5000 INJECTION INTRAVENOUS; SUBCUTANEOUS at 05:37

## 2022-11-03 NOTE — PROGRESS NOTES
[facility] Internal Medicine Note      Chief Complaint: When can I go home? Subjective/Interval History: This morning the patient is up in the bedside chair. He looks and sounds better each day. Patient performed better on PT yesterday but OT is still suggesting patient might benefit from ECF. PT stating home care would be okay. Patient eating and drinking well. He did move his bowels yesterday but still would like to try Dulcolax as well. No other new problems overnight. Nursing at the bedside. No new concerns from a nursing perspective. No chest pain or shortness breath. No cough or sputum. No nausea, vomiting, diarrhea. No abdominal pain. No dysuria. The remainder of the review of systems is negative. PMH, PSH, FH/SH reviewed and unchanged as documented in the H&P personally documented at admission 10/31/22    Medication list reviewed    Objective:    BP (!) 153/70   Pulse 68   Temp 97.5 °F (36.4 °C) (Oral)   Resp 16   Ht 5' 8\" (1.727 m)   Wt 257 lb 11.5 oz (116.9 kg)   SpO2 96%   BMI 39.19 kg/m²   Temp  Av.5 °F (36.4 °C)  Min: 97.2 °F (36.2 °C)  Max: 97.7 °F (36.5 °C)    Exam unremarkable:  RRR  Chest-respirations are easy. The lungs are clear. There are no wheezes or rhonchi or crackles. Abd- BS+, soft, NTND  Ext-trace pretibial edema today.     The Following Labs Were Reviewed Today:    Recent Results (from the past 24 hour(s))   POCT Glucose    Collection Time: 22 11:18 AM   Result Value Ref Range    POC Glucose 118 (H) 70 - 99 mg/dl    Performed on ACCU-CHEK    POCT Glucose    Collection Time: 22  4:29 PM   Result Value Ref Range    POC Glucose 191 (H) 70 - 99 mg/dl    Performed on ACCU-CHEK    POCT Glucose    Collection Time: 22  8:28 PM   Result Value Ref Range    POC Glucose 215 (H) 70 - 99 mg/dl    Performed on ACCU-CHEK    POCT Glucose    Collection Time: 22  6:57 AM   Result Value Ref Range    POC Glucose 139 (H) 70 - 99 mg/dl    Performed on ACCU-CHEK    CBC with Auto Differential    Collection Time: 11/03/22  7:31 AM   Result Value Ref Range    WBC 9.1 4.0 - 11.0 K/uL    RBC 2.83 (L) 4.20 - 5.90 M/uL    Hemoglobin 7.9 (L) 13.5 - 17.5 g/dL    Hematocrit 24.7 (L) 40.5 - 52.5 %    MCV 87.1 80.0 - 100.0 fL    MCH 28.0 26.0 - 34.0 pg    MCHC 32.1 31.0 - 36.0 g/dL    RDW 17.9 (H) 12.4 - 15.4 %    Platelets 016 (L) 831 - 450 K/uL    MPV 8.3 5.0 - 10.5 fL    Neutrophils % 82.9 %    Lymphocytes % 8.3 %    Monocytes % 8.7 %    Eosinophils % 0.1 %    Basophils % 0.0 %    Neutrophils Absolute 7.6 1.7 - 7.7 K/uL    Lymphocytes Absolute 0.8 (L) 1.0 - 5.1 K/uL    Monocytes Absolute 0.8 0.0 - 1.3 K/uL    Eosinophils Absolute 0.0 0.0 - 0.6 K/uL    Basophils Absolute 0.0 0.0 - 0.2 K/uL   Brain Natriuretic Peptide    Collection Time: 11/03/22  7:31 AM   Result Value Ref Range    Pro-BNP 1,221 (H) 0 - 449 pg/mL       ASSESSMENT/PLAN:      Principal Problem:    Generalized weakness-continue therapy. Likely multifactorial related to anemia and dehydration. Continue therapy. Will discuss plan for disposition with family. Patient would much prefer to go home. Active Problems:    DEISY -renal function pending for today. Nephrology to manage IV fluids. Hypoglycemia-remains on D5. Lantus remains on hold. Continue with sliding scale. JOANN (obstructive sleep apnea)-using CPAP. Type 2 diabetes mellitus with stage 3 chronic kidney disease, with long-term current use of insulin-as above. Watch sugars today. Will likely need Lantus again once he goes back home to his normal diet. Microcytic anemia-bone marrow biopsy without any evidence of malignancy. Await input from Dr. Phan Sullivan but I suspect his renal function is the primary factor in his chronic anemia. We will need to continue Procrit as an outpatient. Stage 3b chronic kidney disease -labs pending for today. Essential hypertension-blood pressure is elevated the last 24 hours.   Await nephrology input.    Moderate COPD-seems to be at baseline. Continue current regimen. Slow transit constipation-continue MiraLAX and Linzess. Add Dulcolax as needed. Chronic GERD-currently asymptomatic. Hyperlipidemia-stable on current statin dosing. Disposition-renal function still pending. I suspect we will give him 1 more day to ensure that his renal function is stable. Probably would benefit from 1 more day of inpatient therapy before we make a decision about ECF or not. Patient would likely be opposed to placement.     Time > 25 minutes reviewing chart and patient data, examining and interviewing patient, and discussing with nursing staff, family, etc.        Genoveva De La Rosa MD, FACP  9:02 AM  11/3/2022

## 2022-11-03 NOTE — PROGRESS NOTES
Patient admitted with generalized weakness. A/Ox4. Transfers with walker x1. Mobility restrictions: weakness. On regular, 4 carb diet, tolerating well. Medications taken whole with thins. On heparin for DVT prophylaxis. Skin: intact. Oxygen: CPAP at HS. LDA: PIV LFA. Has been continent of bowel and continent of bladder. LBM 11/3/22. Chair/bed alarms in use and call light in reach. Will monitor for safety.  Electronically signed by Glenn Hernández RN on 11/3/2022 at 1:12 PM

## 2022-11-03 NOTE — CARE COORDINATION
11/03/22 1207   IMM Letter   IMM Letter given to Patient/Family/Significant other/Guardian/POA/by: presented to patient and family in the room, patient and family verbalized understanding by Stan Angel   IMM Letter date given: 11/03/22   IMM Letter time given: 1204

## 2022-11-03 NOTE — PROGRESS NOTES
Occupational Therapy  [facility]   Occupational Daily Treatment Note      Name: Kelly James  : 1940  MRN: [id number]  Date of Service: 11/3/2022    Discharge Recommendations:  24 hour supervision or assist, 2-3 sessions per week, S Level 3          Patient Diagnosis(es): The primary encounter diagnosis was General weakness. A diagnosis of Elevated troponin was also pertinent to this visit. Past Medical History:  has a past medical history of Allergic rhinitis, Asthma, Atrial fibrillation ([address] Carotid artery stenosis, Chronic kidney disease, COPD (chronic obstructive pulmonary disease) ([facility] [address] CPAP (continuous positive airway pressure) dependence, ESBL (extended spectrum beta-lactamase) producing bacteria infection, Hyperlipidemia, Hypertension, Iron deficiency anemia, Obstructive sleep apnea, and Type II or unspecified type diabetes mellitus without mention of complication, not stated as uncontrolled. Past Surgical History:  has a past surgical history that includes Gallbladder surgery (); Upper gastrointestinal endoscopy (7-2005    7/10/2009); Colonoscopy (7/10/2009); Cataract removal (2012); Pain management procedure (Right, 10/20/2021); Pain management procedure (Left, 2021); and CT BIOPSY BONE MARROW (2022). Gilmar JENNINGS Kerwin scored a 19/24 on the AM-PAC ADL Inpatient form. Current research shows that an AM-PAC score of 18 or greater is typically associated with a discharge to the patient's home setting. Based on the patient's AM-PAC score, and their current ADL deficits, it is recommended that the patient have 2-3 sessions per week of Occupational Therapy at d/c to increase the patient's independence. At this time, this patient demonstrates the endurance and safety to discharge home with home (home vs OP services) and a follow up treatment frequency of 2-3x/wk. Please see assessment section for further patient specific details.     If patient discharges prior to next session this note will serve as a discharge summary. Please see below for the latest assessment towards goals. HOME HEALTH CARE: LEVEL 3 SAFETY       -Initial home health evaluation to occur within 24-48 hours, in patient home   -Home health agency to establish plan of care for patient over 60 day period   -Medication Reconciliation   -PT/OT/Speech evaluations in home within 24-48 hours of discharge; including  -DME and home safety   -Frontload therapy 5 days, then 3x a week   -OT to evaluate if patient has [address] needs for personal care   - evaluation within 24-48 hours, includes evaluation of resources   and insurance to determine [place], [place], LTC, and Medicaid options   -PCP Visit scheduled within three to seven days of discharge         Assessment   Performance deficits / Impairments: Decreased functional mobility ; Decreased safe awareness;Decreased balance;Decreased coordination;Decreased ADL status; Decreased endurance;Decreased high-level IADLs  Assessment: Discussed with OTR am pac score has incresaed to 19. Anticipate thart patient will be safe to return home with family support and home OT. Patient completed sit<>stand from recliner chair to RW to recliner chair to rollator to recliner chair with mild cues for hand placement. Patient completed functional mobility with RW and rollator with slow steady gait, no overt LOB noted. Less SOB noted with rollator and no standing rest break required. Donned pants seated in recliner chair with SBA, SBA/CGA for over hips. Cont with POC. REQUIRES OT FOLLOW-UP: Yes  Activity Tolerance  Activity Tolerance: Patient Tolerated treatment well        Plan   Occupational Therapy Plan  Times Per Week: 3-5  Times Per Day:  Once a day  Current Treatment Recommendations: Balance training, Functional mobility training, Endurance training, Safety education & training, Patient/Caregiver education & training, Equipment evaluation, education, & procurement, Self-Care / ADL     Restrictions  Restrictions/Precautions  Restrictions/Precautions: Contact Precautions, Fall Risk  Position Activity Restriction  Other position/activity restrictions: IV left hand, room air, Contact prec ESBL urine    Subjective   General  Chart Reviewed: Yes  Patient assessed for rehabilitation services?: Yes  Additional Pertinent Hx: This is an 79-year-old white male with multiple medical problems who presented to the emergency room with weakness and shakiness with walking. Patient stated that over the last several days he is in has not been feeling himself and has had a hard time walking. He has had no specific symptoms but just overall weakness. He states that things progressed over the course of 2-3 days and so eventually last night came to the emergency room. He denied any fever or chills. No cough or sputum aside from his usual.  He is chronically short of breath and this was no different. He had no increase in his leg edema. He denied chest pain. No dysuria. No change in his bowel habits. He has been using his CPAP normally. Sugars have been okay. (copied per note Dr Román Barraza 10/30)  Response to previous treatment: Patient with no complaints from previous session  Family / Caregiver Present: Yes (wife and daughter)  Referring Practitioner: Román Barraza  Diagnosis: generalized weakness  Subjective  Subjective: Patient seated in recliner chair upon arrival to room. Patient agreeable to therapy.  No reports of pain     Social/Functional History  Social/Functional History  Lives With: Spouse  Type of Home: House  Home Layout: Able to Live on Main level with bedroom/bathroom, Multi-level ([place] 1 1/2 story)  Home Access: Stairs to enter with rails  Entrance Stairs - Number of Steps: 1+1  Entrance Stairs - Rails: Both  Bathroom Shower/Tub: Tub/Shower unit  Bathroom Toilet: Standard (comfort height commode)  Bathroom Equipment: Shower chair (sliding doors on shower with bars)  Home Equipment: Joseline Melendez  Has the patient had two or more falls in the past year or any fall with injury in the past year?:  (one recent fall- tripped)  ADL Assistance: Rina: Independent (but wife cleans. Pt completes bill paying, medication management)  Ambulation Assistance: Independent  Transfer Assistance: Independent  Active : Yes  Occupation: Retired  Additional Comments: Pt states he is completely indep, but does have assist of family if needed       Objective     Safety Devices  Type of Devices: Call light within reach; Chair alarm in place; Left in chair;Nurse notified  Restraints  Restraints Initially in Place: No  Balance  Sitting: Intact  Standing: With support (SBA/close SBA with RW)        ADL  LE Dressing: Stand by assistance;Contact guard assistance  LE Dressing Skilled Clinical Factors: Able to thread pants over feet with SBA, SBA/close SBA for balance for over hips     Activity Tolerance  Activity Tolerance: Patient limited by endurance  Activity Tolerance Comments: Limited by GIBBONS, SpO2 90% or > throughout session with Spot checks while on RA  Bed mobility  Supine to Sit: Unable to assess  Sit to Supine: Unable to assess  Bed Mobility Comments: UP in chair pre/post session  Transfers  Sit to stand: Stand by assistance  Stand to sit: Stand by assistance  Transfer Comments: SBA for sit<>stand from recliner chair to RW and to rollator with cues for hand placement and safety. Functional mobility with RW with close SBA, slow steady gait with standing rest break and noted SOB. Completed functional mobility with rollator with close SBA, slow steady gait, no needed rest break, mild cues for safety. Cognition  Overall Cognitive Status: [facility]  Cognition Comment: answers all questions easily  Orientation  Overall Orientation Status: Within Functional Limits                  Education Given To: Patient; Family  Education Provided: Role of Therapy;Plan of Care;Transfer Training;Equipment;ADL Adaptive Strategies; Energy Conservation         AM-PAC Score        AM-PAC Inpatient Daily Activity Raw Score: 19 (11/03/22 1451)  AM-PAC Inpatient ADL T-Scale Score : 40.22 (11/03/22 1451)  ADL Inpatient CMS 0-100% Score: 42.8 (11/03/22 1451)  ADL Inpatient CMS G-Code Modifier : CK (11/03/22 1451)      Goals  Short Term Goals  Time Frame for Short Term Goals: by discharge: all goals ongoing  Short Term Goal 1: self care with SBA , AD as needed  Short Term Goal 2: toilet with SBA and AD  Short Term Goal 3: transfer with with SBA and AD  Short Term Goal 4: functional mobility with SBA and AD  Short Term Goal 5: increase activity tolerance to stand 3 min for ADL tasks  Short Term Goal 6: decrease UE tremors to complete self care and mobiltiy with SBA  Short Term Goal 7: bed mobility with SBA  Long Term Goals  Time Frame for Long Term Goals : same as stg  Patient Goals   Patient goals : Pt stated he wants to go home, wants the tremors to go away       Therapy Time   Individual Concurrent Group Co-treatment   Time In 1400         Time Out 1453         Minutes 53                 Electronically signed by Alli Mendez [id number] on 11/3/2022 at 3:03 PM

## 2022-11-03 NOTE — PLAN OF CARE
Problem: Discharge Planning  Goal: Discharge to home or other facility with appropriate resources  11/3/2022 1307 by Emile Mcadams RN  Outcome: Progressing  Flowsheets  Taken 11/3/2022 0830 by Emile Mcadams RN  Discharge to home or other facility with appropriate resources:   Identify barriers to discharge with patient and caregiver   Arrange for needed discharge resources and transportation as appropriate   Identify discharge learning needs (meds, wound care, etc)   Refer to discharge planning if patient needs post-hospital services based on physician order or complex needs related to functional status, cognitive ability or social support system    Problem: Safety - Adult  Goal: Free from fall injury  11/3/2022 1307 by Emile Mcadams RN  Outcome: Progressing  Flowsheets (Taken 11/3/2022 1258)  Free From Fall Injury: Instruct family/caregiver on patient safety     Problem: ABCDS Injury Assessment  Goal: Absence of physical injury  11/3/2022 1307 by Emile Mcadams RN  Outcome: Progressing  Flowsheets (Taken 11/3/2022 1258)  Absence of Physical Injury: Implement safety measures based on patient assessment     Problem: Skin/Tissue Integrity  Goal: Absence of new skin breakdown  Description: 1. Monitor for areas of redness and/or skin breakdown  2. Assess vascular access sites hourly  3. Every 4-6 hours minimum:  Change oxygen saturation probe site  4. Every 4-6 hours:  If on nasal continuous positive airway pressure, respiratory therapy assess nares and determine need for appliance change or resting period.   11/3/2022 1307 by Emile Mcadams RN  Outcome: Progressing

## 2022-11-03 NOTE — CARE COORDINATION
Referral made to Nicole of [address] [place] [address] for 4 wh rollator. Will need MD order.   [facility]     Case Management   [phone]    11/3/2022  3:21 PM    [facility] Can accept this referral.  [facility]     Case [facility]   [phone]    11/3/2022  3:21 PM

## 2022-11-03 NOTE — CARE COORDINATION
Gowned and gloved to enter room to review DC planning. Informed them of recommendation for continued therapy at either home vs snf. Pt states he is going home and wants home care. Provided them with CMS star rated list of agencies for his review. Education given regarding CMS Star rating system. He wants to use [facility]. IMM letter presnted;pt and family verbalized understanding. [place], [place]     Case Management   [phone]    11/3/2022  12:10 PM    The Plan for Transition of Care is related to the following treatment goals: to continue his progress in personal care and ambulation needs in home setting      The Patient and/or patient representative pt, wife and daughter was provided with a choice of provider and agrees   with the discharge plan. [x] Yes [] No    Freedom of choice list was provided with basic dialogue that supports the patient's individualized plan of care/goals, treatment preferences and shares the quality data associated with the providers.  [x] Yes [] No  [place], [place]     Case Management   [phone]    11/3/2022  12:13 PM

## 2022-11-03 NOTE — DISCHARGE INSTR - COC
Continuity of Care Form    Patient Name: Suze Castaneda   :  1940  MRN:  [id number]    Admit date:  10/30/2022  Discharge date:  2022    Code Status Order: Full Code   Advance Directives:     Admitting Physician:  Dameon Gonzales MD  PCP: Mono Hernández MD    Discharging Nurse: [facility] Unit/Room#: V7E-3837/1614-51  Discharging Unit Phone Number: [phone]    Emergency Contact:   Extended Emergency Contact Information  Primary Emergency Contact: Roselia Suresh  Address: [address] [place] [address] AnitaErickson [address] Phone: [phone]  Work Phone: [phone]  Mobile Phone: [phone]  Relation: Spouse  Secondary Emergency Contact: AngieJosephine estrada  Mobile Phone: [phone]  Relation: Child   needed?  No    Past Surgical History:  Past Surgical History:   Procedure Laterality Date    CATARACT REMOVAL  2012    bilateral    COLONOSCOPY  7/10/2009    diverticulosis    hemorrhoids    CT BONE MARROW BIOPSY  2022    CT BONE MARROW BIOPSY 2022 [facility] CT    GALLBLADDER SURGERY      PAIN MANAGEMENT PROCEDURE Right 10/20/2021    COOLIEF RADIOFREQUENCY ABLATION - RIGHT KNEE performed by Rich Quiñones MD at [address] Left 2021    [address] - LEFT KNEE performed by Rich Quiñones MD at [facility]  7-2005    7/10/2009    normal       Immunization History:   Immunization History   Administered Date(s) Administered    COVID-19, PFIZER GRAY top, DO NOT Dilute, (age 15 y+), IM, 30 mcg/0.3 mL 2022    COVID-19, PFIZER PURPLE top, DILUTE for use, (age 15 y+), 30mcg/0.3mL 2021, 2021, 10/27/2021    Influenza 10/19/2011, 10/09/2013    Influenza A ([id number]) Vaccine PF IM 2009    Influenza Vaccine, unspecified formulation 2009, 10/25/2010, 10/19/2011, 10/09/2013, 10/21/2016, 09/01/2017    Influenza Virus Vaccine 09/15/2012, 10/01/2014, 11/07/2014, 09/11/2015    Influenza Whole 10/25/2010    Influenza, AFLURIA (age 1 yrs+), FLUZONE, (age 10 mo+), MDV, 0.5mL 09/15/2012, 10/01/2014    Influenza, FLUAD, (age 72 y+), Adjuvanted, 0.5mL 09/27/2021, 09/14/2022    Influenza, FLUARIX, FLULAVAL, FLUZONE (age 10 mo+) AND AFLURIA, (age 1 y+), PF, 0.5mL 10/21/2016, 09/01/2017    Influenza, FLUZONE (age 72 y+), High Dose, 0.7mL 08/19/2020    Influenza, High Dose (Fluzone 65 yrs and older) 10/11/2018, 09/05/2019, 08/19/2020    PPD Test 06/18/2005    Pneumococcal Conjugate 13-valent ([id number]) 12/01/2014, 01/02/2015    Pneumococcal Polysaccharide ([id number]) 10/19/2011, 01/01/2014    Td, unspecified formulation 11/03/2010    Tdap (Boostrix, Adacel) 11/03/2010    Zoster Live (Zostavax) 07/01/2012, 09/01/2012       Active Problems:  Patient Active Problem List   Diagnosis Code    JOANN (obstructive sleep apnea) G47.33    Chronic GERD K21.9    Type 2 diabetes mellitus with stage 3 chronic kidney disease, with long-term current use of insulin ([facility]) E11.22, N18.30, Z79.4    Microcytic anemia D50.9    Microalbuminuria R80.9    Hyperlipidemia E78.5    A-fib I48.91    Edema R60.9    Bronchitis J40    Carotid artery stenosis without cerebral infarction I65.29    Leg swelling M79.89    Chronic venous insufficiency I87.2    Stage 3b chronic kidney disease (HCC) N18.32    Essential hypertension I10    B12 deficiency E53.8    Pulmonary nodule R91.1    Moderate COPD (chronic obstructive pulmonary disease) ([facility] [address] J44.9    DEISY (acute kidney injury) ([facility] [address] N17.9    Slow transit constipation K59.01    Pancreatic mass K86.89    Obesity, Class II, BMI 35-39.9 E66.9    Generalized weakness R53.1       Isolation/Infection:   Isolation            Contact          Patient Infection Status       Infection Onset Added Last Indicated Last Indicated By Review Planned Expiration Resolved Resolved By    ESBL (Extended Spectrum Beta Lactamase) 12/26/18 12/28/18 12/26/18 Urine Culture        Resolved    COVID-19 (Rule Out) 10/30/22 10/30/22 10/30/22 COVID-19, Rapid (Ordered)   10/30/22 Rule-Out Test Resulted            Nurse Assessment:  Last Vital Signs: BP (!) 153/70   Pulse 68   Temp 97.5 °F (36.4 °C) (Oral)   Resp 16   Ht 5' 8\" (1.727 m)   Wt 257 lb 11.5 oz (116.9 kg)   SpO2 96%   BMI 39.19 kg/m²     Last documented pain score (0-10 scale):    Last Weight:   Wt Readings from Last 1 Encounters:   11/03/22 257 lb 11.5 oz (116.9 kg)     Mental Status:  oriented and alert    IV Access:  - None    Nursing Mobility/ADLs:  Walking   Assisted  Transfer  Assisted  Bathing  Assisted  Dressing  Assisted  Toileting  Independent  Feeding  [address]  Independent  Med Delivery   whole    Wound Care Documentation and Therapy:        Elimination:  Continence: Bowel: Yes  Bladder: Yes  Urinary Catheter: None   Colostomy/Ileostomy/Ileal Conduit: No       Date of Last BM: 11/8/22    Intake/Output Summary (Last 24 hours) at 11/3/2022 1522  Last data filed at 11/3/2022 1220  Gross per 24 hour   Intake 3380.08 ml   Output 800 ml   Net 2580.08 ml     I/O last 3 completed shifts: In: 1080 [P.O.:1080]  Out: 1200 [Urine:1200]    Safety Concerns: At Risk for Falls    Impairments/Disabilities:      Hearing    Nutrition Therapy:  Current Nutrition Therapy:   - Oral Diet:  General and Carb Control 4 carbs/meal (1800kcals/day)    Routes of Feeding: Oral  Liquids: No Restrictions  Daily Fluid Restriction: no  Last Modified Barium Swallow with Video (Video Swallowing Test): not done    Treatments at the Time of Hospital Discharge:   Respiratory Treatments: incentive spirometer  Oxygen Therapy:  is on oxygen at 2 L/min per nasal cannula.   Ventilator:    - No ventilator support    Rehab Therapies: Physical Therapy and Occupational Therapy  Weight Bearing Status/Restrictions: No weight bearing restrictions  Other Medical Equipment (for information only, NOT a DME order):  walker  Other Treatments:     Patient's personal belongings (please select all that are sent with patient):  Hearing Aides bilateral    RN SIGNATURE:  Electronically signed by Sosa Barnes RN on 11/8/22 at 5:51 PM EST    CASE MANAGEMENT/SOCIAL WORK SECTION    Inpatient Status Date: 10-    Readmission Risk Assessment Score:  Readmission Risk              Risk of Unplanned Readmission:  30       Discharging to Facility/ Agency   Name:  [facility]    Address: [address] [place] [address]., [address] [place] [address]., [place] [address]  Phone: [phone]  Fax: [phone] [facility]ION [facility]Y:  Name:     [facility]  Address: [facility]. [facility]. [address]  Phone:    [phone]  Fax:        [phone]    / signature: Electronically signed by Marilia Jasso on 11/3/22 at 3:22 PM EDT    PHYSICIAN SECTION    Prognosis: Fair    Condition at Discharge: Stable    Rehab Potential (if transferring to Rehab): Fair    Recommended Labs or Other Treatments After Discharge: CBC, BMP 93/68/79    Physician Certification: I certify the above information and transfer of Alejandro Hobson  is necessary for the continuing treatment of the diagnosis listed and that he requires [address] for less 30 days.      Update Admission H&P: No change in H&P    PHYSICIAN SIGNATURE:  Electronically signed by Hemalatha Garza MD on 11/8/22 at 5:34 PM EST

## 2022-11-03 NOTE — PROGRESS NOTES
Physical Therapy  Facility/Department: [facility]  Physical Therapy Treatment session  Name: Cinthya Blanc  : 1940  MRN: [id number]  Date of Service: 11/3/2022  Cinthya Blanc scored a 18/24 on the AM-PAC short mobility form. Current research shows that an AM-PAC score of 18 or greater is typically associated with a discharge to the patient's home setting. Based on the patient's AM-PAC score and their current functional mobility deficits, it is recommended that the patient have 2-3 sessions per week of Physical Therapy at d/c to increase the patient's independence. At this time, this patient demonstrates the endurance and safety to discharge home with Home PT and a follow up treatment frequency of 2-3x/wk. Please see assessment section for further patient specific details. HOME HEALTH CARE: LEVEL 1 STANDARD    - Initial home health evaluation to occur within 24-48 hours, in patient home   - Therapy to evaluate with goal of regaining prior level of functioning   - Therapy to evaluate if patient has [address] needs for personal care     If patient discharges prior to next session this note will serve as a discharge summary. Please see below for the latest assessment towards goals. Discharge Recommendations:  Home with Home health PT, Patient would benefit from continued therapy after discharge (2-3x)   PT Equipment Recommendations  Equipment Needed: Yes  Mobility Devices: Jerardo Leaver: Rolling  Other: Pt. will now need FWW to ambulate safely in the home      Patient Diagnosis(es): The primary encounter diagnosis was General weakness. A diagnosis of Elevated troponin was also pertinent to this visit.   Past Medical History:  has a past medical history of Allergic rhinitis, Asthma, Atrial fibrillation ([address] Carotid artery stenosis, Chronic kidney disease, COPD (chronic obstructive pulmonary disease) ([facility] [address] CPAP (continuous positive airway pressure) dependence, ESBL (extended spectrum beta-lactamase) producing bacteria infection, Hyperlipidemia, Hypertension, Iron deficiency anemia, Obstructive sleep apnea, and Type II or unspecified type diabetes mellitus without mention of complication, not stated as uncontrolled. Past Surgical History:  has a past surgical history that includes Gallbladder surgery (1981); Upper gastrointestinal endoscopy (7-2005    7/10/2009); Colonoscopy (7/10/2009); Cataract removal (2/2012); Pain management procedure (Right, 10/20/2021); Pain management procedure (Left, 12/8/2021); and CT BIOPSY BONE MARROW (11/1/2022). Assessment   Assessment: Pt is an 78 y/o male admitted with generalized weakness and anemia. He lives with his spouse on the main level of the house with 1 EZEQUIEL and was indep ambulating with a cane PTA. Today, 11/3, Pt. is improving with mobility. Now at a Supervision level and tolerating a bit more ambulation with FWW. Pt. is highly motivated to return home and is agreable to use of RW and home health PT. Will continue to follow.   Treatment Diagnosis: decreased activity tolerance  Therapy Prognosis: Good  Activity Tolerance  Activity Tolerance: Patient limited by endurance  Activity Tolerance Comments: Limited by GIBBONS, SpO2 90% or > throughout session with Spot checks while on RA     Plan   Physcial Therapy Plan  General Plan: 3-5 times per week  Current Treatment Recommendations: Strengthening, ROM, Balance training, Functional mobility training, Gait training, Transfer training, Endurance training, Home exercise program, Equipment evaluation, education, & procurement, Therapeutic activities, Patient/Caregiver education & training, Safety education & training  Safety Devices  Type of Devices: Call light within reach, Chair alarm in place, Left in chair, Nurse notified  Restraints  Restraints Initially in Place: No     Restrictions  Restrictions/Precautions  Restrictions/Precautions: Contact Precautions, Fall Risk (ESBL)  Position Activity Restriction  Other position/activity restrictions: IV left hand, heart monitor, room air, Contact prec ESBL urine     Subjective   General  Chart Reviewed: Yes  Additional Pertinent Hx: Per H&P, \"This is an 58-year-old white male with multiple medical problems who presented to the emergency room with weakness and shakiness with walking. Patient stated that over the last several days he is in has not been feeling himself and has had a hard time walking. He has had no specific symptoms but just overall weakness. He states that things progressed over the course of 2-3 days and so eventually last night came to the emergency room. He denied any fever or chills. No cough or sputum aside from his usual.  He is chronically short of breath and this was no different. He had no increase in his leg edema. He denied chest pain. No dysuria. No change in his bowel habits. He has been using his CPAP normally. \"  Diagnosed with General Weakness. Response To Previous Treatment: Patient with no complaints from previous session. Family / Caregiver Present: No  Diagnosis: generalized weakness, anemia  Follows Commands: Within Functional Limits  Subjective  Subjective: Pt pleasant and agreeable to PT eval and treat. No c/o pain. Now on room air.   In recliner chair upon arrival.         Social/Functional History  Social/Functional History  Lives With: Spouse  Type of Home: House  Home Layout: Able to Live on Main level with bedroom/bathroom, Multi-level ([facility] 1 1/2 story)  Home Access: Stairs to enter with rails  Entrance Stairs - Number of Steps: 1+1  Entrance Stairs - Rails: Both  Bathroom Shower/Tub: Tub/Shower unit  Bathroom Toilet: Standard (comfort height commode)  Bathroom Equipment: Shower chair (sliding doors on shower with bars)  Home Equipment: Sandee Helm  Has the patient had two or more falls in the past year or any fall with injury in the past year?:  (one recent fall- tripped)  ADL Assistance: Independent  Homemaking Assistance: Independent (but wife cleans. Pt completes bill paying, medication management)  Ambulation Assistance: Independent  Transfer Assistance: Independent  Active : Yes  Occupation: Retired  Additional Comments: Pt states he is completely indep, but does have assist of family if needed  Vision/Hearing       Cognition   Orientation  Overall Orientation Status: Within Functional Limits  Cognition  Overall Cognitive Status: WFL     Objective      Observation/Palpation  Observation: Distended abdomen, SOB with activity                       Bed mobility  Supine to Sit: Unable to assess  Sit to Supine: Unable to assess  Bed Mobility Comments: UP in chair pre/post session  Transfers  Sit to Stand: Supervision  Stand to Sit: Supervision  Ambulation  Surface: Level tile  Device: Rolling Walker  Assistance: Supervision (Close Supervision)  Quality of Gait: mild shaky but no LOB, severe genu varum B LE and fallen arches B LE  Gait Deviations: Slow Riri;Decreased step length;Decreased step height  Distance: 90' x 1 and 120' x 1 with multiple turns  Comments: GIBBONS; Sp02 96% on room air at rest and 90 % after ambulation, Recovers to 92% within a minute  More Ambulation?: No  Stairs/Curb  Stairs?: No     Balance  Posture: Fair (Rounded shoulders)  Sitting - Static: Good  Sitting - Dynamic: Good  Standing - Static: Good;-  Standing - Dynamic: Fair;+  Comments: Pt. steady with Walker with Supervision  Exercise Treatment: Seated exercises:  Marching x 10, LAQ x 10 and AP x 10;  Standing exercises:  marching x 10, heel raises x 10, hip abd/add x 10.   Needs increased rest break after completing all 3 exercises of seated and standing d/t SOB                                                        AM-PAC Score  AM-PAC Inpatient Mobility Raw Score : 18 (11/03/22 1010)  AM-PAC Inpatient T-Scale Score : 43.63 (11/03/22 1010)  Mobility Inpatient CMS 0-100% Score: 46.58 (11/03/22 1010)  Mobility Inpatient CMS G-Code Modifier : CK (11/03/22 1010)                 Goals  Short Term Goals  Time Frame for Short Term Goals: upon d/c - goals ongoing 11/3  Short Term Goal 1: bed mobility mod I  Short Term Goal 2: transfers mod I  Short Term Goal 3: ambulate 48' with LRAD and mod I  Patient Goals   Patient Goals : \"to go home\"       Education  Patient Education  Education Given To: Patient; Family  Education Provided: Equipment;Home Exercise Program;Role of Therapy;Plan of Care;Transfer Training;Energy Conservation  Education Provided Comments: use of RW upon d/c, PLB  Education Method: Verbal  Barriers to Learning: Hearing  Education Outcome: Verbalized understanding;Continued education needed      Therapy Time   Individual Concurrent Group Co-treatment   Time In 0945         Time Out 1010         Minutes 25         Timed Code Treatment Minutes: [address] [place], [place], [id number]

## 2022-11-03 NOTE — PROGRESS NOTES
Department of Internal Medicine  Nephrology Progress Note  This is a patient with significant past medical history of DM2, anemia,Afib, COPD, and CKD baseline Cr looks to be 2.0 going back to 2021 who presents with weakness,dyspnea  and worsening anemia   We are asked to see him for worsening azotemia and Cr up to 2.8. REVIEW OF SYSTEMS:  No CP, some GIBBONS/ SOB , feels weak . Appetite better . Physical Exam:    VITALS:  BP (!) 153/70   Pulse 68   Temp 97.5 °F (36.4 °C) (Oral)   Resp 16   Ht 5' 8\" (1.727 m)   Wt 257 lb 11.5 oz (116.9 kg)   SpO2 96%   BMI 39.19 kg/m²   24HR INTAKE/OUTPUT:    Intake/Output Summary (Last 24 hours) at 11/3/2022 1230  Last data filed at 11/3/2022 1220  Gross per 24 hour   Intake 3500.08 ml   Output 800 ml   Net 2700.08 ml         Constitutional:  resting   Respiratory:  CTA  Gastrointestinal:  No  tenderness.   Normal Bowel Sounds  Cardiovascular:  S1, S2 RRR   Edema:  +  edema    DATA:    CBC:  Lab Results   Component Value Date/Time    WBC 9.1 11/03/2022 07:31 AM    RBC 2.83 11/03/2022 07:31 AM    HGB 7.9 11/03/2022 07:31 AM    HCT 24.7 11/03/2022 07:31 AM    MCV 87.1 11/03/2022 07:31 AM    MCH 28.0 11/03/2022 07:31 AM    MCHC 32.1 11/03/2022 07:31 AM    RDW 17.9 11/03/2022 07:31 AM     11/03/2022 07:31 AM    MPV 8.3 11/03/2022 07:31 AM     CMP:  Lab Results   Component Value Date/Time     11/03/2022 07:31 AM    K 4.4 11/03/2022 07:31 AM    K 5.1 10/31/2022 06:05 AM     11/03/2022 07:31 AM    CO2 23 11/03/2022 07:31 AM    BUN 78 11/03/2022 07:31 AM    CREATININE 2.3 11/03/2022 07:31 AM    GFRAA 37 05/27/2022 02:05 PM    GFRAA >60 04/19/2013 10:46 AM    AGRATIO 1.4 10/31/2022 06:05 AM    LABGLOM 28 11/03/2022 07:31 AM    GLUCOSE 133 11/03/2022 07:31 AM    GLUCOSE 96 06/16/2011 10:37 AM    PROT 6.3 11/03/2022 07:31 AM    PROT 6.4 12/12/2012 08:05 AM    CALCIUM 7.7 11/03/2022 07:31 AM    BILITOT 0.3 11/03/2022 07:31 AM    ALKPHOS 112 11/03/2022 07:31 AM AST 26 11/03/2022 07:31 AM    ALT 36 11/03/2022 07:31 AM      Hepatic Function Panel:   Lab Results   Component Value Date/Time    ALKPHOS 112 11/03/2022 07:31 AM    ALT 36 11/03/2022 07:31 AM    AST 26 11/03/2022 07:31 AM    PROT 6.3 11/03/2022 07:31 AM    PROT 6.4 12/12/2012 08:05 AM    BILITOT 0.3 11/03/2022 07:31 AM    BILIDIR <0.2 11/03/2022 07:31 AM    IBILI see below 11/03/2022 07:31 AM      Phosphorus:   Lab Results   Component Value Date/Time    PHOS 4.0 11/03/2022 07:31 AM       ASSESSMENT:  Principal Problem:    Generalized weakness  Active Problems:    JOANN (obstructive sleep apnea)    Type 2 diabetes mellitus with stage 3 chronic kidney disease, with long-term current use of insulin (HCC)    Microcytic anemia    Chronic GERD    Hyperlipidemia    Stage 3b chronic kidney disease (HCC)    Essential hypertension    Moderate COPD (chronic obstructive pulmonary disease) (HCC)    DEISY (acute kidney injury) ([facility] [address]    Slow transit constipation  Resolved Problems:    * No resolved hospital problems. *      PLAN:  DEISY looks to have a pre renal R/o ATN , off Diovan/HCT . DC IVF . renal U/S No hydro . Rule out Myeloma   DM2 plan per admitting team .  Anemia per hematology   CKD suspect due to diabetes. Out pt follow up advised . D/w pt and family in detail .       Simon Jeffery MD, FACP

## 2022-11-03 NOTE — PLAN OF CARE
Problem: Discharge Planning  Goal: Discharge to home or other facility with appropriate resources  Outcome: Progressing     Problem: Safety - Adult  Goal: Free from fall injury  Outcome: Progressing     Problem: ABCDS Injury Assessment  Goal: Absence of physical injury  Outcome: Progressing     Problem: Skin/Tissue Integrity  Goal: Absence of new skin breakdown  Description: 1. Monitor for areas of redness and/or skin breakdown  2. Assess vascular access sites hourly  3. Every 4-6 hours minimum:  Change oxygen saturation probe site  4. Every 4-6 hours:  If on nasal continuous positive airway pressure, respiratory therapy assess nares and determine need for appliance change or resting period.   Outcome: Progressing     Problem: Neurosensory - Adult  Goal: Achieves stable or improved neurological status  Outcome: Progressing  Goal: Absence of seizures  Outcome: Progressing  Goal: Remains free of injury related to seizures activity  Outcome: Progressing  Goal: Achieves maximal functionality and self care  Outcome: Progressing     Problem: Respiratory - Adult  Goal: Achieves optimal ventilation and oxygenation  Outcome: Progressing     Problem: Cardiovascular - Adult  Goal: Maintains optimal cardiac output and hemodynamic stability  Outcome: Progressing  Goal: Absence of cardiac dysrhythmias or at baseline  Outcome: Progressing     Problem: Skin/Tissue Integrity - Adult  Goal: Skin integrity remains intact  Outcome: Progressing  Goal: Incisions, wounds, or drain sites healing without S/S of infection  Outcome: Progressing  Goal: Oral mucous membranes remain intact  Outcome: Progressing     Problem: Musculoskeletal - Adult  Goal: Return mobility to safest level of function  Outcome: Progressing  Goal: Maintain proper alignment of affected body part  Outcome: Progressing  Goal: Return ADL status to a safe level of function  Outcome: Progressing     Problem: Gastrointestinal - Adult  Goal: Minimal or absence of nausea and vomiting  Outcome: Progressing  Goal: Maintains or returns to baseline bowel function  Outcome: Progressing  Goal: Maintains adequate nutritional intake  Outcome: Progressing  Goal: Establish and maintain optimal ostomy function  Outcome: Progressing     Problem: Genitourinary - Adult  Goal: Absence of urinary retention  Outcome: Progressing  Goal: Urinary catheter remains patent  Outcome: Progressing     Problem: Infection - Adult  Goal: Absence of infection at discharge  Outcome: Progressing  Goal: Absence of infection during hospitalization  Outcome: Progressing  Goal: Absence of fever/infection during anticipated neutropenic period  Outcome: Progressing     Problem: Metabolic/Fluid and Electrolytes - Adult  Goal: Electrolytes maintained within normal limits  Outcome: Progressing  Goal: Hemodynamic stability and optimal renal function maintained  Outcome: Progressing  Goal: Glucose maintained within prescribed range  Outcome: Progressing     Problem: Hematologic - Adult  Goal: Maintains hematologic stability  Outcome: Progressing

## 2022-11-03 NOTE — FLOWSHEET NOTE
Patient had fair sleep last night. Used his CPAP without any asst needed. No pain complaint. Up only once in attempt to use his urinal  without calling for asst, triggering the bed alarm. Patient was reminded to use call light. Needs asst in standing up to use his urinal while holding on to his walker. Patient is on telemonitoring which is on sinus rhythm. He is a CG x 1 with a walker when going to the bathroom. HE stated he had a BM the day before 11/1/22. He is on routine glycolax during the day, but he asked for this prn last night. He stated he would like the doctor to prescribe him dulcolax tablet to help him move his bowels everyday as he said that is his regular routine. Leaving note for Dr. Walter Casas, his attending doc as [facility] patient.

## 2022-11-04 LAB
ALBUMIN SERPL-MCNC: 3.7 G/DL (ref 3.4–5)
ANION GAP SERPL CALCULATED.3IONS-SCNC: 13 MMOL/L (ref 3–16)
BASOPHILS ABSOLUTE: 0 K/UL (ref 0–0.2)
BASOPHILS RELATIVE PERCENT: 0 %
BILIRUBIN URINE: NEGATIVE
BLOOD, URINE: NEGATIVE
BUN BLDV-MCNC: 81 MG/DL (ref 7–20)
CALCIUM SERPL-MCNC: 7.9 MG/DL (ref 8.3–10.6)
CHLORIDE BLD-SCNC: 102 MMOL/L (ref 99–110)
CLARITY: CLEAR
CO2: 21 MMOL/L (ref 21–32)
COLOR: YELLOW
CREAT SERPL-MCNC: 2.7 MG/DL (ref 0.8–1.3)
EOSINOPHILS ABSOLUTE: 0 K/UL (ref 0–0.6)
EOSINOPHILS RELATIVE PERCENT: 0.1 %
GFR SERPL CREATININE-BSD FRML MDRD: 23 ML/MIN/{1.73_M2}
GLUCOSE BLD-MCNC: 104 MG/DL (ref 70–99)
GLUCOSE BLD-MCNC: 124 MG/DL (ref 70–99)
GLUCOSE BLD-MCNC: 140 MG/DL (ref 70–99)
GLUCOSE BLD-MCNC: 193 MG/DL (ref 70–99)
GLUCOSE BLD-MCNC: 233 MG/DL (ref 70–99)
GLUCOSE URINE: NEGATIVE MG/DL
HCT VFR BLD CALC: 25.5 % (ref 40.5–52.5)
HEMOGLOBIN: 8.1 G/DL (ref 13.5–17.5)
KETONES, URINE: NEGATIVE MG/DL
LEUKOCYTE ESTERASE, URINE: NEGATIVE
LYMPHOCYTES ABSOLUTE: 0.8 K/UL (ref 1–5.1)
LYMPHOCYTES RELATIVE PERCENT: 8.3 %
MCH RBC QN AUTO: 27.7 PG (ref 26–34)
MCHC RBC AUTO-ENTMCNC: 31.7 G/DL (ref 31–36)
MCV RBC AUTO: 87.4 FL (ref 80–100)
MICROSCOPIC EXAMINATION: NORMAL
MONOCYTES ABSOLUTE: 0.9 K/UL (ref 0–1.3)
MONOCYTES RELATIVE PERCENT: 9.1 %
NEUTROPHILS ABSOLUTE: 7.9 K/UL (ref 1.7–7.7)
NEUTROPHILS RELATIVE PERCENT: 82.5 %
NITRITE, URINE: NEGATIVE
PDW BLD-RTO: 18.6 % (ref 12.4–15.4)
PERFORMED ON: ABNORMAL
PH UA: 5 (ref 5–8)
PHOSPHORUS: 4 MG/DL (ref 2.5–4.9)
PLATELET # BLD: 132 K/UL (ref 135–450)
PMV BLD AUTO: 8.1 FL (ref 5–10.5)
POTASSIUM SERPL-SCNC: 4.5 MMOL/L (ref 3.5–5.1)
PROTEIN UA: NEGATIVE MG/DL
RBC # BLD: 2.92 M/UL (ref 4.2–5.9)
SODIUM BLD-SCNC: 136 MMOL/L (ref 136–145)
SPECIFIC GRAVITY UA: 1.01 (ref 1–1.03)
URINE REFLEX TO CULTURE: NORMAL
URINE TYPE: NORMAL
UROBILINOGEN, URINE: 0.2 E.U./DL
WBC # BLD: 9.6 K/UL (ref 4–11)

## 2022-11-04 PROCEDURE — 6360000002 HC RX W HCPCS: Performed by: INTERNAL MEDICINE

## 2022-11-04 PROCEDURE — 80069 RENAL FUNCTION PANEL: CPT

## 2022-11-04 PROCEDURE — 99233 SBSQ HOSP IP/OBS HIGH 50: CPT | Performed by: INTERNAL MEDICINE

## 2022-11-04 PROCEDURE — 2700000000 HC OXYGEN THERAPY PER DAY

## 2022-11-04 PROCEDURE — 6370000000 HC RX 637 (ALT 250 FOR IP): Performed by: INTERNAL MEDICINE

## 2022-11-04 PROCEDURE — 2580000003 HC RX 258: Performed by: STUDENT IN AN ORGANIZED HEALTH CARE EDUCATION/TRAINING PROGRAM

## 2022-11-04 PROCEDURE — 51798 US URINE CAPACITY MEASURE: CPT

## 2022-11-04 PROCEDURE — 85025 COMPLETE CBC W/AUTO DIFF WBC: CPT

## 2022-11-04 PROCEDURE — 9990000010 HC NO CHARGE VISIT

## 2022-11-04 PROCEDURE — 2580000003 HC RX 258: Performed by: INTERNAL MEDICINE

## 2022-11-04 PROCEDURE — 36415 COLL VENOUS BLD VENIPUNCTURE: CPT

## 2022-11-04 PROCEDURE — 81003 URINALYSIS AUTO W/O SCOPE: CPT

## 2022-11-04 PROCEDURE — 94640 AIRWAY INHALATION TREATMENT: CPT

## 2022-11-04 PROCEDURE — 1200000000 HC SEMI PRIVATE

## 2022-11-04 PROCEDURE — 94760 N-INVAS EAR/PLS OXIMETRY 1: CPT

## 2022-11-04 RX ORDER — SODIUM CHLORIDE 9 MG/ML
INJECTION, SOLUTION INTRAVENOUS CONTINUOUS
Status: ACTIVE | OUTPATIENT
Start: 2022-11-04 | End: 2022-11-05

## 2022-11-04 RX ORDER — PREDNISONE 20 MG/1
40 TABLET ORAL DAILY
Status: DISPENSED | OUTPATIENT
Start: 2022-11-04 | End: 2022-11-05

## 2022-11-04 RX ADMIN — ASPIRIN 162 MG: 81 TABLET, COATED ORAL at 09:45

## 2022-11-04 RX ADMIN — POLYETHYLENE GLYCOL 3350 17 G: 17 POWDER, FOR SOLUTION ORAL at 08:27

## 2022-11-04 RX ADMIN — ATORVASTATIN CALCIUM 40 MG: 40 TABLET, FILM COATED ORAL at 08:26

## 2022-11-04 RX ADMIN — IPRATROPIUM BROMIDE AND ALBUTEROL SULFATE 1 AMPULE: .5; 3 SOLUTION RESPIRATORY (INHALATION) at 12:35

## 2022-11-04 RX ADMIN — FERROUS SULFATE TAB EC 324 MG (65 MG FE EQUIVALENT) 324 MG: 324 (65 FE) TABLET DELAYED RESPONSE at 08:26

## 2022-11-04 RX ADMIN — AMIODARONE HYDROCHLORIDE 200 MG: 200 TABLET ORAL at 08:27

## 2022-11-04 RX ADMIN — Medication 1 CAPSULE: at 08:26

## 2022-11-04 RX ADMIN — VERAPAMIL HYDROCHLORIDE 240 MG: 240 TABLET, FILM COATED, EXTENDED RELEASE ORAL at 21:36

## 2022-11-04 RX ADMIN — MONTELUKAST 10 MG: 10 TABLET, FILM COATED ORAL at 08:26

## 2022-11-04 RX ADMIN — PANTOPRAZOLE SODIUM 40 MG: 40 TABLET, DELAYED RELEASE ORAL at 08:26

## 2022-11-04 RX ADMIN — HEPARIN SODIUM 5000 UNITS: 5000 INJECTION INTRAVENOUS; SUBCUTANEOUS at 13:13

## 2022-11-04 RX ADMIN — HEPARIN SODIUM 5000 UNITS: 5000 INJECTION INTRAVENOUS; SUBCUTANEOUS at 21:36

## 2022-11-04 RX ADMIN — IPRATROPIUM BROMIDE AND ALBUTEROL SULFATE 1 AMPULE: .5; 3 SOLUTION RESPIRATORY (INHALATION) at 08:05

## 2022-11-04 RX ADMIN — DOCUSATE SODIUM 100 MG: 100 CAPSULE, LIQUID FILLED ORAL at 08:26

## 2022-11-04 RX ADMIN — IPRATROPIUM BROMIDE AND ALBUTEROL SULFATE 1 AMPULE: .5; 3 SOLUTION RESPIRATORY (INHALATION) at 19:34

## 2022-11-04 RX ADMIN — CLONIDINE HYDROCHLORIDE 0.1 MG: 0.1 TABLET ORAL at 21:36

## 2022-11-04 RX ADMIN — HEPARIN SODIUM 5000 UNITS: 5000 INJECTION INTRAVENOUS; SUBCUTANEOUS at 05:29

## 2022-11-04 RX ADMIN — PREDNISONE 40 MG: 20 TABLET ORAL at 08:27

## 2022-11-04 RX ADMIN — CLONIDINE HYDROCHLORIDE 0.1 MG: 0.1 TABLET ORAL at 08:26

## 2022-11-04 RX ADMIN — SODIUM CHLORIDE, PRESERVATIVE FREE 10 ML: 5 INJECTION INTRAVENOUS at 08:27

## 2022-11-04 RX ADMIN — Medication 1 CAPSULE: at 17:48

## 2022-11-04 RX ADMIN — IPRATROPIUM BROMIDE AND ALBUTEROL SULFATE 1 AMPULE: .5; 3 SOLUTION RESPIRATORY (INHALATION) at 16:21

## 2022-11-04 RX ADMIN — VERAPAMIL HYDROCHLORIDE 240 MG: 240 TABLET, FILM COATED, EXTENDED RELEASE ORAL at 08:26

## 2022-11-04 RX ADMIN — SODIUM CHLORIDE 500 ML: 9 INJECTION, SOLUTION INTRAVENOUS at 13:19

## 2022-11-04 RX ADMIN — TAMSULOSIN HYDROCHLORIDE 0.4 MG: 0.4 CAPSULE ORAL at 08:27

## 2022-11-04 NOTE — PROGRESS NOTES
Hematology Oncology Daily Progress Note    Admit Date: 10/30/2022  Hospital day several    Subjective:     Patient has complaints of improved fatigue/GIBBONS--denies sob/cp. Medication side effects: none    Scheduled Meds:   heparin (porcine)  5,000 Units SubCUTAneous 3 times per day    [Held by provider] insulin glargine  20 Units SubCUTAneous BID    sodium chloride flush  5-40 mL IntraVENous 2 times per day    insulin lispro  0-4 Units SubCUTAneous TID WC    insulin lispro  0-4 Units SubCUTAneous Nightly    ipratropium-albuterol  1 ampule Inhalation Q4H WA    predniSONE  40 mg Oral Daily    tamsulosin  0.4 mg Oral Daily    amiodarone  200 mg Oral Daily    aspirin EC  162 mg Oral Daily    atorvastatin  40 mg Oral Daily    cloNIDine  0.1 mg Oral BID    polyethylene glycol  17 g Oral Daily    docusate sodium  100 mg Oral Daily    ferrous sulfate  324 mg Oral Daily    lactobacillus  1 capsule Oral BID WC    linaclotide  145 mcg Oral QAM AC    montelukast  10 mg Oral Daily    pantoprazole  40 mg Oral Daily    verapamil  240 mg Oral BID     Continuous Infusions:   sodium chloride Stopped (11/01/22 1205)    dextrose Stopped (11/01/22 0844)     PRN Meds:bisacodyl, sodium chloride flush, sodium chloride, ondansetron **OR** ondansetron, polyethylene glycol, acetaminophen **OR** acetaminophen, glucose, dextrose bolus **OR** dextrose bolus, glucagon (rDNA), dextrose    Review of Systems  Pertinent items are noted in HPI. REVIEW OF SYSTEMS:         Constitutional: Denies fever, sweats, weight loss     Eyes: No visual changes or diplopia. No scleral icterus. ENT: No Headaches, hearing loss or vertigo. No mouth sores or sore throat. Cardiovascular: No chest pain, dyspnea on exertion, palpitations or loss of consciousness. Respiratory: No cough or wheezing, no sputum production. No hemoptysis. .    Gastrointestinal: No abdominal pain, appetite loss, blood in stools. No change in bowel habits.   Genitourinary: No dysuria, trouble voiding, or hematuria. Musculoskeletal:  Generalized weakness. No joint complaints. Integumentary: No rash or pruritis. Neurological: No headache, diplopia. No change in gait, balance, or coordination. No paresthesias. Endocrine: No temperature intolerance. No excessive thirst, fluid intake, or urination. Hematologic/Lymphatic: No abnormal bruising or ecchymoses, blood clots or swollen lymph nodes. Allergic/Immunologic: No nasal congestion or hives. Objective:     Patient Vitals for the past 8 hrs:   BP Temp Temp src Pulse Resp SpO2 Weight   11/04/22 0810 -- -- -- 70 16 95 % --   11/04/22 0626 -- -- -- -- -- -- 258 lb (117 kg)   11/04/22 0545 (!) 154/72 97.3 °F (36.3 °C) Oral 72 16 93 % --     I/O last 3 completed shifts:   In: 3740.1 [P.O.:1440; I.V.:2300.1]  Out: 800 [Urine:800]  I/O this shift:  In: 300 [P.O.:300]  Out: -     BP (!) 154/72   Pulse 70   Temp 97.3 °F (36.3 °C) (Oral)   Resp 16   Ht 5' 8\" (1.727 m)   Wt 258 lb (117 kg)   SpO2 95%   BMI 39.23 kg/m²     General Appearance:    Alert, cooperative, no distress, appears stated age   Head:    Normocephalic, without obvious abnormality, atraumatic   Eyes:    PERRL, conjunctiva/corneas clear, EOM's intact, fundi     benign, both eyes        Ears:    Normal TM's and external ear canals, both ears   Nose:   Nares normal, septum midline, mucosa normal, no drainage    or sinus tenderness   Throat:   Lips, mucosa, and tongue normal; teeth and gums normal   Neck:   Supple, symmetrical, trachea midline, no adenopathy;        thyroid:  No enlargement/tenderness/nodules; no carotid    bruit or JVD   Back:     Symmetric, no curvature, ROM normal, no CVA tenderness   Lungs:     Clear to auscultation bilaterally, respirations unlabored   Chest wall:    No tenderness or deformity   Heart:    Regular rate and rhythm, S1 and S2 normal, no murmur, rub   or gallop   Abdomen:     Soft, non-tender, bowel sounds active all four quadrants,     no masses, no organomegaly           Extremities:   Extremities normal, atraumatic, no cyanosis or edema   Pulses:   2+ and symmetric all extremities   Skin:   Skin color, texture, turgor normal, no rashes or lesions   Lymph nodes:   Cervical, supraclavicular, and axillary nodes normal   Neurologic:   CNII-XII intact. Normal strength, sensation and reflexes       throughout         Data ReviewCBC:   Lab Results   Component Value Date/Time    WBC 9.1 11/03/2022 07:31 AM    RBC 2.83 11/03/2022 07:31 AM       Assessment:     Principal Problem:    Generalized weakness  Active Problems:    JOANN (obstructive sleep apnea)    Type 2 diabetes mellitus with stage 3 chronic kidney disease, with long-term current use of insulin ([facility])    Microcytic anemia    Chronic GERD    Hyperlipidemia    Stage 3b chronic kidney disease (HCC)    Essential hypertension    Moderate COPD (chronic obstructive pulmonary disease) ([facility])    DEISY (acute kidney injury) ([facility] [address]    Slow transit constipation  Resolved Problems:    * No resolved hospital problems. *      Plan:     1. Anemia. I reviewed his bone marrow biopsy which was relatively unremarkable with no bone marrow malignancies and no signs of dysplasia. I do feel that his anemia is likely due to his chronic kidney disease. He did get a dose of Retacrit 2 days ago. His CBC is pending. He will need to continue this in my office every few weeks. It can take 2 to 3 doses before it starts working. He is very interested in going home today. That is okay from my perspective. He should follow-up with me next week. 2.  Acute kidney injury on chronic kidney disease. His creatinine from today is pending. Nephrology is following.         Electronically signed by Samantha Miranda MD on 11/4/2022 at 8:21 AM

## 2022-11-04 NOTE — PROGRESS NOTES
[facility] Internal Medicine Note      Chief Complaint: When can I go home? Subjective/Interval History: This morning the patient states he is feeling well. He is hoping to go home today. No new complaints noted. Feels like he is getting stronger with therapy. Eating and drinking without difficulty. Feels like he is voiding without difficulty. No chest pain or shortness breath. No cough or sputum. No nausea, vomiting, diarrhea. No abdominal pain. No dysuria. The remainder of the review of systems is negative. PMH, PSH, FH/SH reviewed and unchanged as documented in the H&P personally documented at admission 10/31/22    Medication list reviewed    Objective:    BP (!) 151/87   Pulse 68   Temp 97.6 °F (36.4 °C) (Oral)   Resp 16   Ht 5' 8\" (1.727 m)   Wt 258 lb (117 kg)   SpO2 93%   BMI 39.23 kg/m²   Temp  Av.5 °F (36.4 °C)  Min: 97.3 °F (36.3 °C)  Max: 97.6 °F (36.4 °C)    Exam unremarkable:  RRR  Chest-respirations are easy. The lungs are clear. There are no wheezes or rhonchi or crackles.   Abd- BS+, soft, NTND  Ext- no edema today    The Following Labs Were Reviewed Today:    Recent Results (from the past 24 hour(s))   POCT Glucose    Collection Time: 22 11:22 AM   Result Value Ref Range    POC Glucose 118 (H) 70 - 99 mg/dl    Performed on ACCU-CHEK    POCT Glucose    Collection Time: 22  4:27 PM   Result Value Ref Range    POC Glucose 207 (H) 70 - 99 mg/dl    Performed on ACCU-CHEK    POCT Glucose    Collection Time: 22  8:08 PM   Result Value Ref Range    POC Glucose 179 (H) 70 - 99 mg/dl    Performed on ACCU-CHEK    POCT Glucose    Collection Time: 22  7:07 AM   Result Value Ref Range    POC Glucose 124 (H) 70 - 99 mg/dl    Performed on ACCU-CHEK    CBC with Auto Differential    Collection Time: 22  7:39 AM   Result Value Ref Range    WBC 9.6 4.0 - 11.0 K/uL    RBC 2.92 (L) 4.20 - 5.90 M/uL    Hemoglobin 8.1 (L) 13.5 - 17.5 g/dL    Hematocrit 25.5 (L) 40.5 - 52.5 %    MCV 87.4 80.0 - 100.0 fL    MCH 27.7 26.0 - 34.0 pg    MCHC 31.7 31.0 - 36.0 g/dL    RDW 18.6 (H) 12.4 - 15.4 %    Platelets 820 (L) 958 - 450 K/uL    MPV 8.1 5.0 - 10.5 fL    Neutrophils % 82.5 %    Lymphocytes % 8.3 %    Monocytes % 9.1 %    Eosinophils % 0.1 %    Basophils % 0.0 %    Neutrophils Absolute 7.9 (H) 1.7 - 7.7 K/uL    Lymphocytes Absolute 0.8 (L) 1.0 - 5.1 K/uL    Monocytes Absolute 0.9 0.0 - 1.3 K/uL    Eosinophils Absolute 0.0 0.0 - 0.6 K/uL    Basophils Absolute 0.0 0.0 - 0.2 K/uL       ASSESSMENT/PLAN:      Principal Problem:    Generalized weakness-continue therapy. PT and OT now saying patient is okay for home. Active Problems:    DEISY -creatinine is worse again today off of IV fluids. Hold discharge and discussed with nephrology. Hypoglycemia-off D5. Glucose 104 today. Continue sliding scale. Continue to hold Lantus. JOANN (obstructive sleep apnea)-using CPAP. Type 2 diabetes mellitus with stage 3 chronic kidney disease, with long-term current use of insulin-as above. Watch sugars today. Will likely need Lantus again once he goes back home to his normal diet. Microcytic anemia-bone marrow biopsy without any evidence of malignancy. Continue outpatient follow-up and continue erythropoietin injections likely. Stage 3b chronic kidney disease -creatinine worse again today. Await nephrology input. Essential hypertension-blood pressure is elevated the last 24 hours. Await nephrology input. Moderate COPD-seems to be at baseline. Continue current regimen. Slow transit constipation-continue MiraLAX and Linzess. Add Dulcolax as needed. Chronic GERD-currently asymptomatic. Hyperlipidemia-stable on current statin dosing. Disposition-patient now able to go home with therapy but creatinine worse today. Hold discharge for now.     Time > 35 minutes reviewing chart and patient data, examining and interviewing patient, and discussing with nursing staff, family, etc.        Michelle Arellano MD, Ashley Galdamez  8:37 AM  11/4/2022

## 2022-11-04 NOTE — PROGRESS NOTES
Medications were dropped on the floor after they had been scanned and opened. I went to [facility] and took out all medications again and went through medication verification process again to ensure safety. Medications given with supervision to ensure safety and patient tolerated well. Call light within reach. Will monitor for safety.  Electronically signed by Andrew Alan RN on 11/4/2022 at 10:18 AM

## 2022-11-04 NOTE — CARE COORDINATION
Called Dr. David Reyes office for order for Rollator prior to possible weekend discharge.   Keith Roberts [facility]  [phone]

## 2022-11-04 NOTE — PLAN OF CARE
Problem: Discharge Planning  Goal: Discharge to home or other facility with appropriate resources  11/4/2022 1421 by Hawa Rivera RN  Outcome: Progressing  Flowsheets  Taken 11/4/2022 0815 by Hawa Rivera RN  Discharge to home or other facility with appropriate resources:   Identify barriers to discharge with patient and caregiver   Arrange for needed discharge resources and transportation as appropriate   Identify discharge learning needs (meds, wound care, etc)   Refer to discharge planning if patient needs post-hospital services based on physician order or complex needs related to functional status, cognitive ability or social support system    Problem: Safety - Adult  Goal: Free from fall injury  11/4/2022 1421 by Hawa Rivera RN  Outcome: Progressing  Flowsheets (Taken 11/4/2022 1420)  Free From Fall Injury: Instruct family/caregiver on patient safety     Problem: Skin/Tissue Integrity  Goal: Absence of new skin breakdown  Description: 1. Monitor for areas of redness and/or skin breakdown  2. Assess vascular access sites hourly  3. Every 4-6 hours minimum:  Change oxygen saturation probe site  4. Every 4-6 hours:  If on nasal continuous positive airway pressure, respiratory therapy assess nares and determine need for appliance change or resting period.   11/4/2022 1421 by Hawa Rivera RN  Outcome: Progressing     Problem: Metabolic/Fluid and Electrolytes - Adult  Goal: Electrolytes maintained within normal limits  11/4/2022 1421 by Hawa Rivera RN  Outcome: Progressing  Flowsheets  Taken 11/4/2022 0815 by Hawa Rivera RN  Electrolytes maintained within normal limits: Monitor labs and assess patient for signs and symptoms of electrolyte imbalances    Problem: Metabolic/Fluid and Electrolytes - Adult  Goal: Hemodynamic stability and optimal renal function maintained  11/4/2022 1421 by Hawa Rivera RN  Outcome: Progressing  Flowsheets  Taken 11/4/2022 0815 by Hawa Rivera RN  Hemodynamic stability and optimal renal function maintained: Monitor labs and assess for signs and symptoms of volume excess or deficit

## 2022-11-04 NOTE — FLOWSHEET NOTE
Patient admitted as Med Surg patient to room 3263 on 10/30/22 with admitted Dx of generalized weakness and elevated troponin. Came to the ER for SOB and dizziness as well. He is still currently on room air. Uses his own CPAP from home without assistance. Prefers to sleep on his recliner tonight. He only asked for his prn dulcolax tablet as he likes to have a regular BM daily. He had a BM during dayshift. He calls appropriately when he needs to use a urinal, He needs to stand up and use his walker to steady self x 1 asst.    Walks to BR x 1 with gaitbelt. Pleasant, no pain complaint. Awaiting results of bone marrow biopsy from 11/3/22. IVF d/c'd . Blood sugar WNL.

## 2022-11-04 NOTE — PROGRESS NOTES
Physical Therapy  Attempt Note    Name:Gilmar Suresh  :1940  MXA:[id number]  Room: [place] [address]    Date of Service: 2022    Patient chart reviewed. PT attempted to see for PT tx at this time. Pt sitting in recliner upon arrival to room, refused PT initially since it has been a bad day and does not want any therapy at this time. Reports frustration about d/c. Educated on benefit of PT treatment session for practice with rollator at this time. Family (spouse and dtr) present reports patient with poor activity tolerance needing rest break every 20-40 feet needing seated rest breaks and would really benefit from purchase of rollator. Agreed with assessment at this time, patient continues to refuse any participation with therapy at this time. Will attempt again as therapy schedule permits. If patient is discharged prior to the next physical therapy visit; Please see last written PT note for discharge status.        Electronically signed by Tam Aguirre, PT on 2022 at 1:29 PM        German Chamorro PT, DPT [id number] 22 1:33 PM

## 2022-11-04 NOTE — PROGRESS NOTES
Patient admitted with generalized weakness. A/Ox4. Transfers with walker x1. On regular; 4 carb diet, tolerating well. Medications taken whole with thins. On heparin for DVT prophylaxis. Skin: intact. Oxygen: RA. LDA: PIV LFA. Has been continent of bowel and continent of bladder. LBM 11/3/22. Chair/bed alarms in use and call light in reach. Will monitor for safety.  Electronically signed by Stanley Salazar RN on 11/4/2022 at 2:23 PM

## 2022-11-04 NOTE — PLAN OF CARE
Problem: Discharge Planning  Goal: Discharge to home or other facility with appropriate resources  11/4/2022 0127 by Lissy Riddle RN  Outcome: Progressing  11/3/2022 1307 by Michelle Lovett RN  Outcome: Progressing  Flowsheets  Taken 11/3/2022 0830 by Michelle Lovett RN  Discharge to home or other facility with appropriate resources:   Identify barriers to discharge with patient and caregiver   Arrange for needed discharge resources and transportation as appropriate   Identify discharge learning needs (meds, wound care, etc)   Refer to discharge planning if patient needs post-hospital services based on physician order or complex needs related to functional status, cognitive ability or social support system  Taken 11/3/2022 0038 by Lissy Riddle RN  Discharge to home or other facility with appropriate resources: Identify barriers to discharge with patient and caregiver     Problem: Safety - Adult  Goal: Free from fall injury  11/4/2022 0127 by Lissy Riddle RN  Outcome: Progressing  11/3/2022 1307 by Michelle Lovett RN  Outcome: Progressing  Flowsheets (Taken 11/3/2022 1258)  Free From Fall Injury: Instruct family/caregiver on patient safety     Problem: ABCDS Injury Assessment  Goal: Absence of physical injury  11/4/2022 0127 by Lissy Riddle RN  Outcome: Progressing  11/3/2022 1307 by Michelle Lovett RN  Outcome: Progressing  Flowsheets (Taken 11/3/2022 1258)  Absence of Physical Injury: Implement safety measures based on patient assessment     Problem: Skin/Tissue Integrity  Goal: Absence of new skin breakdown  Description: 1. Monitor for areas of redness and/or skin breakdown  2. Assess vascular access sites hourly  3. Every 4-6 hours minimum:  Change oxygen saturation probe site  4. Every 4-6 hours:  If on nasal continuous positive airway pressure, respiratory therapy assess nares and determine need for appliance change or resting period.   11/4/2022 0127 by Lissy Riddle RN  Outcome: Progressing  11/3/2022 1307 by Carole Hollingsworth RN  Outcome: Progressing     Problem: Neurosensory - Adult  Goal: Achieves stable or improved neurological status  11/4/2022 0127 by Manju Cardona RN  Outcome: Progressing  11/3/2022 1307 by Carole Hollingsworth RN  Outcome: Progressing  Flowsheets  Taken 11/3/2022 0830 by Carole Hollingsworth RN  Achieves stable or improved neurological status: Assess for and report changes in neurological status  Taken 11/3/2022 0038 by Manju Cardona RN  Achieves stable or improved neurological status: Assess for and report changes in neurological status  Goal: Absence of seizures  11/4/2022 0127 by Manju Cardona RN  Outcome: Progressing  11/3/2022 1307 by Carole Hollingsworth RN  Outcome: Progressing  Flowsheets (Taken 11/3/2022 0038 by Manju Cardona RN)  Absence of seizures: If seizure occurs, turn head to side and suction secretions as needed  Goal: Remains free of injury related to seizures activity  11/4/2022 0127 by Manju Cardona RN  Outcome: Progressing  11/3/2022 1307 by Carole Hollingsworth RN  Outcome: Progressing  Flowsheets (Taken 11/3/2022 0038 by Manju Cardona RN)  Remains free of injury related to seizure activity: If seizure occurs, turn patient to side and suction secretions as needed  Goal: Achieves maximal functionality and self care  11/4/2022 0127 by Manju Cardona RN  Outcome: Progressing  11/3/2022 1307 by Carole Hollingsworth RN  Outcome: Progressing  Flowsheets (Taken 11/3/2022 0038 by Manju Cardona RN)  Achieves maximal functionality and self care: Encourage and assist patient to increase activity and self care with guidance from physical therapy/occupational therapy     Problem: Respiratory - Adult  Goal: Achieves optimal ventilation and oxygenation  11/4/2022 0127 by Manju Cardona RN  Outcome: Progressing  11/3/2022 1307 by Carole Hollingsworth RN  Outcome: Progressing     Problem: Cardiovascular - Adult  Goal: Maintains optimal cardiac output and hemodynamic stability  11/4/2022 0127 by William Russell RN  Outcome: Progressing  11/3/2022 1307 by Hawa Rivera RN  Outcome: Progressing  Flowsheets  Taken 11/3/2022 0830 by Hawa Rivera RN  Maintains optimal cardiac output and hemodynamic stability: Monitor blood pressure and heart rate  Taken 11/3/2022 0038 by William Russell RN  Maintains optimal cardiac output and hemodynamic stability: Monitor blood pressure and heart rate  Goal: Absence of cardiac dysrhythmias or at baseline  11/4/2022 0127 by William Russell RN  Outcome: Progressing  11/3/2022 1307 by Hawa Rivera RN  Outcome: Progressing  Flowsheets  Taken 11/3/2022 0830 by Hawa Rivera RN  Absence of cardiac dysrhythmias or at baseline:   Assess for signs of decreased cardiac output   Monitor cardiac rate and rhythm  Taken 11/3/2022 0038 by William Russell RN  Absence of cardiac dysrhythmias or at baseline: Monitor cardiac rate and rhythm     Problem: Skin/Tissue Integrity - Adult  Goal: Skin integrity remains intact  11/4/2022 0127 by William Russell RN  Outcome: Progressing  11/3/2022 1307 by Hawa Rivera RN  Outcome: Progressing  Flowsheets  Taken 11/3/2022 1258 by Hawa Rivera RN  Skin Integrity Remains Intact: Monitor for areas of redness and/or skin breakdown  Taken 11/3/2022 0830 by Hawa Rivera RN  Skin Integrity Remains Intact: Monitor for areas of redness and/or skin breakdown  Taken 11/3/2022 0038 by William Russell RN  Skin Integrity Remains Intact: Monitor for areas of redness and/or skin breakdown  Goal: Incisions, wounds, or drain sites healing without S/S of infection  11/4/2022 0127 by William Russell RN  Outcome: Progressing  11/3/2022 1307 by Hawa Rivera RN  Outcome: Progressing  Flowsheets (Taken 11/3/2022 1258)  Incisions, Wounds, or Drain Sites Healing Without Sign and Symptoms of Infection: ADMISSION and DAILY: Assess and document risk factors for pressure ulcer development  Goal: Oral mucous membranes remain intact  11/4/2022 0127 by Iliana Parker RN  Outcome: Progressing  11/3/2022 1307 by Liss Hanson RN  Outcome: Progressing  Flowsheets  Taken 11/3/2022 1258 by Liss Hanson RN  Oral Mucous Membranes Remain Intact: Assess oral mucosa and hygiene practices  Taken 11/3/2022 0830 by Liss Hanson RN  Oral Mucous Membranes Remain Intact: Assess oral mucosa and hygiene practices  Taken 11/3/2022 0038 by Iliana Parker RN  Oral Mucous Membranes Remain Intact: Assess oral mucosa and hygiene practices     Problem: Musculoskeletal - Adult  Goal: Return mobility to safest level of function  11/4/2022 0127 by Iliana Parker RN  Outcome: Progressing  11/3/2022 1307 by Liss Hanson RN  Outcome: Progressing  Flowsheets  Taken 11/3/2022 0830 by Liss Hanson RN  Return Mobility to Safest Level of Function:   Assess patient stability and activity tolerance for standing, transferring and ambulating with or without assistive devices   Assist with transfers and ambulation using safe patient handling equipment as needed  Taken 11/3/2022 0038 by Iliana Parker RN  Return Mobility to Safest Level of Function: Assess patient stability and activity tolerance for standing, transferring and ambulating with or without assistive devices  Goal: Maintain proper alignment of affected body part  11/4/2022 0127 by Iliana Parker RN  Outcome: Progressing  11/3/2022 1307 by Liss Hanson RN  Outcome: Progressing  Flowsheets  Taken 11/3/2022 0830 by Liss Hanson RN  Maintain proper alignment of affected body part: Support and protect limb and body alignment per provider's orders  Taken 11/3/2022 0038 by Iliana Parker RN  Maintain proper alignment of affected body part: Support and protect limb and body alignment per provider's orders  Goal: Return ADL status to a safe level of function  11/4/2022 0127 by Iliana Parker RN  Outcome: Progressing  11/3/2022 1307 by Liss Hanson RN  Outcome: Progressing  Flowsheets  Taken 11/3/2022 0830 by Willard Avelar Enrique Grewal RN  Return ADL Status to a Safe Level of Function:   Administer medication as ordered   Assess activities of daily living deficits and provide assistive devices as needed  Taken 11/3/2022 0038 by Elizabeth Claire RN  Return ADL Status to a Safe Level of Function: Assist and instruct patient to increase activity and self care as tolerated     Problem: Gastrointestinal - Adult  Goal: Minimal or absence of nausea and vomiting  11/4/2022 0127 by Elizabeth Claire RN  Outcome: Progressing  11/3/2022 1307 by Gio Fang RN  Outcome: Progressing  Flowsheets  Taken 11/3/2022 0830 by Gio Fang RN  Minimal or absence of nausea and vomiting: Administer IV fluids as ordered to ensure adequate hydration  Taken 11/3/2022 0038 by Elizabeth Claire RN  Minimal or absence of nausea and vomiting: Administer ordered antiemetic medications as needed  Goal: Maintains or returns to baseline bowel function  11/4/2022 0127 by Elizabeth Claire RN  Outcome: Progressing  11/3/2022 1307 by Gio Fang RN  Outcome: Progressing  Flowsheets  Taken 11/3/2022 0830 by Gio Fang RN  Maintains or returns to baseline bowel function:   Assess bowel function   Encourage oral fluids to ensure adequate hydration   Administer IV fluids as ordered to ensure adequate hydration   Administer ordered medications as needed   Encourage mobilization and activity  Taken 11/3/2022 0038 by Elizabeth Claire [address] [place] [address] or returns to baseline bowel function: Assess bowel function  Goal: Maintains adequate nutritional intake  11/4/2022 0127 by Elizabeth Claire RN  Outcome: Progressing  11/3/2022 1307 by Gio Fang RN  Outcome: Progressing  Flowsheets  Taken 11/3/2022 0830 by Gio Fang RN  Maintains adequate nutritional intake:   Monitor percentage of each meal consumed   Monitor intake and output, weight and lab values  Taken 11/3/2022 0038 by Elizabeth Claire RN  Maintains adequate nutritional intake: Monitor percentage of each meal consumed  Goal: Establish and maintain optimal ostomy function  11/4/2022 0127 by Candi Savage RN  Outcome: Progressing  11/3/2022 1307 by Travon Armando RN  Outcome: Progressing     Problem: Genitourinary - Adult  Goal: Absence of urinary retention  11/4/2022 0127 by Candi Savage RN  Outcome: Progressing  11/3/2022 1307 by Travon Armando RN  Outcome: Progressing  Flowsheets  Taken 11/3/2022 0830 by Travon Armando RN  Absence of urinary retention: Assess patients ability to void and empty bladder  Taken 11/3/2022 0038 by Candi Savage RN  Absence of urinary retention: Assess patients ability to void and empty bladder  Goal: Urinary catheter remains patent  11/4/2022 0127 by Candi Savage RN  Outcome: Progressing  11/3/2022 1307 by Travon Armando RN  Outcome: Progressing     Problem: Infection - Adult  Goal: Absence of infection at discharge  11/4/2022 0127 by Cnadi Savage RN  Outcome: Progressing  11/3/2022 1307 by Travon Armando RN  Outcome: Progressing  Flowsheets  Taken 11/3/2022 0830 by Travon Armando RN  Absence of infection at discharge: Assess and monitor for signs and symptoms of infection  Taken 11/3/2022 0038 by Candi Savage RN  Absence of infection at discharge: Assess and monitor for signs and symptoms of infection  Goal: Absence of infection during hospitalization  11/4/2022 0127 by Candi Savage RN  Outcome: Progressing  11/3/2022 1307 by Travon Armando RN  Outcome: Progressing  Flowsheets  Taken 11/3/2022 0830 by Travon Armando RN  Absence of infection during hospitalization: Assess and monitor for signs and symptoms of infection  Taken 11/3/2022 0038 by Candi Savage RN  Absence of infection during hospitalization: Assess and monitor for signs and symptoms of infection  Goal: Absence of fever/infection during anticipated neutropenic period  11/4/2022 0127 by Candi Savage RN  Outcome: Progressing  11/3/2022 1307 by Travon Armando RN  Outcome: Progressing  Flowsheets (Taken 11/3/2022 0830)  Absence of fever/infection during anticipated neutropenic period: Monitor white blood cell count     Problem: Metabolic/Fluid and Electrolytes - Adult  Goal: Electrolytes maintained within normal limits  11/4/2022 0127 by Edmond Sims RN  Outcome: Progressing  11/3/2022 1307 by Gokul Rodriguez RN  Outcome: Progressing  Flowsheets  Taken 11/3/2022 0830 by Gokul Rodriguez RN  Electrolytes maintained within normal limits: Monitor labs and assess patient for signs and symptoms of electrolyte imbalances  Taken 11/3/2022 0038 by Edmond Sims RN  Electrolytes maintained within normal limits: Monitor labs and assess patient for signs and symptoms of electrolyte imbalances  Goal: Hemodynamic stability and optimal renal function maintained  11/4/2022 0127 by Edmond Sims RN  Outcome: Progressing  11/3/2022 1307 by Gokul Rodriguez RN  Outcome: Progressing  Flowsheets  Taken 11/3/2022 0830 by Gokul Rodriguez RN  Hemodynamic stability and optimal renal function maintained: Monitor labs and assess for signs and symptoms of volume excess or deficit  Taken 11/3/2022 0038 by Edmond Sims RN  Hemodynamic stability and optimal renal function maintained: Monitor intake, output and patient weight  Goal: Glucose maintained within prescribed range  11/4/2022 0127 by Edmond Sims RN  Outcome: Progressing  11/3/2022 1307 by Gokul Rodriguez RN  Outcome: Progressing  Flowsheets  Taken 11/3/2022 0830 by Gokul Rodriguez RN  Glucose maintained within prescribed range:   Monitor blood glucose as ordered   Assess for signs and symptoms of hyperglycemia and hypoglycemia   Administer ordered medications to maintain glucose within target range  Taken 11/3/2022 0038 by Edmond Sims RN  Glucose maintained within prescribed range: Monitor blood glucose as ordered     Problem: Hematologic - Adult  Goal: Maintains hematologic stability  11/4/2022 0127 by Edmond Sims RN  Outcome: Progressing  11/3/2022 1307 by Ora Neil Mehrdad Hill RN  Outcome: Progressing  Flowsheets  Taken 11/3/2022 0830 by Salvador Cervantes RN  Maintains hematologic stability: Assess for signs and symptoms of bleeding or hemorrhage  Taken 11/3/2022 0038 by Rodríguez Tejeda RN  Maintains hematologic stability: Assess for signs and symptoms of bleeding or hemorrhage

## 2022-11-04 NOTE — PROGRESS NOTES
Department of Internal Medicine  Nephrology Progress Note  This is a patient with significant past medical history of DM2, anemia,Afib, COPD, and CKD baseline Cr looks to be 2.0 going back to 2021 who presents with weakness,dyspnea  and worsening anemia   We are asked to see him for worsening azotemia and Cr up to 2.8. Labs noted ,UOP ? ? REVIEW OF SYSTEMS:  No CP, some GIBBONS/ SOB , feels weak . Appetite better . Physical Exam:    VITALS:  BP (!) 151/87   Pulse 68   Temp 97.6 °F (36.4 °C) (Oral)   Resp 16   Ht 5' 8\" (1.727 m)   Wt 258 lb (117 kg)   SpO2 93%   BMI 39.23 kg/m²   24HR INTAKE/OUTPUT:    Intake/Output Summary (Last 24 hours) at 11/4/2022 1125  Last data filed at 11/4/2022 0819  Gross per 24 hour   Intake 960 ml   Output --   Net 960 ml         Constitutional:  resting   Respiratory:  CTA  Gastrointestinal:  No  tenderness.   Normal Bowel Sounds  Cardiovascular:  S1, S2 RRR   Edema:  +  edema    DATA:    CBC:  Lab Results   Component Value Date/Time    WBC 9.6 11/04/2022 07:39 AM    RBC 2.92 11/04/2022 07:39 AM    HGB 8.1 11/04/2022 07:39 AM    HCT 25.5 11/04/2022 07:39 AM    MCV 87.4 11/04/2022 07:39 AM    MCH 27.7 11/04/2022 07:39 AM    MCHC 31.7 11/04/2022 07:39 AM    RDW 18.6 11/04/2022 07:39 AM     11/04/2022 07:39 AM    MPV 8.1 11/04/2022 07:39 AM     CMP:  Lab Results   Component Value Date/Time     11/04/2022 07:39 AM    K 4.5 11/04/2022 07:39 AM    K 5.1 10/31/2022 06:05 AM     11/04/2022 07:39 AM    CO2 21 11/04/2022 07:39 AM    BUN 81 11/04/2022 07:39 AM    CREATININE 2.7 11/04/2022 07:39 AM    GFRAA 37 05/27/2022 02:05 PM    GFRAA >60 04/19/2013 10:46 AM    AGRATIO 1.4 10/31/2022 06:05 AM    LABGLOM 23 11/04/2022 07:39 AM    GLUCOSE 104 11/04/2022 07:39 AM    GLUCOSE 96 06/16/2011 10:37 AM    PROT 6.3 11/03/2022 07:31 AM    PROT 6.4 12/12/2012 08:05 AM    CALCIUM 7.9 11/04/2022 07:39 AM    BILITOT 0.3 11/03/2022 07:31 AM    ALKPHOS 112 11/03/2022 07:31 AM AST 26 11/03/2022 07:31 AM    ALT 36 11/03/2022 07:31 AM      Hepatic Function Panel:   Lab Results   Component Value Date/Time    ALKPHOS 112 11/03/2022 07:31 AM    ALT 36 11/03/2022 07:31 AM    AST 26 11/03/2022 07:31 AM    PROT 6.3 11/03/2022 07:31 AM    PROT 6.4 12/12/2012 08:05 AM    BILITOT 0.3 11/03/2022 07:31 AM    BILIDIR <0.2 11/03/2022 07:31 AM    IBILI see below 11/03/2022 07:31 AM      Phosphorus:   Lab Results   Component Value Date/Time    PHOS 4.0 11/04/2022 07:39 AM       ASSESSMENT:  Principal Problem:    Generalized weakness  Active Problems:    JOANN (obstructive sleep apnea)    Type 2 diabetes mellitus with stage 3 chronic kidney disease, with long-term current use of insulin (HCC)    Microcytic anemia    Chronic GERD    Hyperlipidemia    Stage 3b chronic kidney disease (HCC)    Essential hypertension    Moderate COPD (chronic obstructive pulmonary disease) ([facility])    DEISY (acute kidney injury) ([facility] [address]    Slow transit constipation  Resolved Problems:    * No resolved hospital problems. *      PLAN:  DEISY etiology pre renal R/o ATN , off Diovan/HCT . Increase Cr , etiology ? Chck PVR  , resume IVF . renal U/S No hydro. DM2 plan per admitting team .  Anemia per hematology . Bone marrow neg for MM . CKD suspect due to diabetes. Out pt follow up advised . D/w pt and family in detail . Hold dc home  . May need kidney Bx  vs RRT if decline continue . Plan dw pt ,family and Dr Karen Zelaya .        Tova Verde MD, FACP

## 2022-11-05 LAB
ALBUMIN SERPL-MCNC: 3.4 G/DL (ref 3.4–5)
ANION GAP SERPL CALCULATED.3IONS-SCNC: 11 MMOL/L (ref 3–16)
BUN BLDV-MCNC: 76 MG/DL (ref 7–20)
CALCIUM SERPL-MCNC: 7.7 MG/DL (ref 8.3–10.6)
CHLORIDE BLD-SCNC: 104 MMOL/L (ref 99–110)
CO2: 22 MMOL/L (ref 21–32)
CREAT SERPL-MCNC: 2.4 MG/DL (ref 0.8–1.3)
CREATININE URINE: 75.1 MG/DL (ref 39–259)
GFR SERPL CREATININE-BSD FRML MDRD: 26 ML/MIN/{1.73_M2}
GLUCOSE BLD-MCNC: 129 MG/DL (ref 70–99)
GLUCOSE BLD-MCNC: 131 MG/DL (ref 70–99)
GLUCOSE BLD-MCNC: 132 MG/DL (ref 70–99)
GLUCOSE BLD-MCNC: 161 MG/DL (ref 70–99)
GLUCOSE BLD-MCNC: 171 MG/DL (ref 70–99)
HCT VFR BLD CALC: 23.5 % (ref 40.5–52.5)
HEMOGLOBIN: 7.7 G/DL (ref 13.5–17.5)
MCH RBC QN AUTO: 27.8 PG (ref 26–34)
MCHC RBC AUTO-ENTMCNC: 32.6 G/DL (ref 31–36)
MCV RBC AUTO: 85.4 FL (ref 80–100)
PDW BLD-RTO: 18.4 % (ref 12.4–15.4)
PERFORMED ON: ABNORMAL
PHOSPHORUS: 4.6 MG/DL (ref 2.5–4.9)
PLATELET # BLD: 123 K/UL (ref 135–450)
PMV BLD AUTO: 8.6 FL (ref 5–10.5)
POTASSIUM SERPL-SCNC: 5 MMOL/L (ref 3.5–5.1)
PROTEIN PROTEIN: 12 MG/DL
PROTEIN/CREAT RATIO: 0.2 MG/DL
RBC # BLD: 2.75 M/UL (ref 4.2–5.9)
SODIUM BLD-SCNC: 137 MMOL/L (ref 136–145)
WBC # BLD: 9 K/UL (ref 4–11)

## 2022-11-05 PROCEDURE — 36415 COLL VENOUS BLD VENIPUNCTURE: CPT

## 2022-11-05 PROCEDURE — 99232 SBSQ HOSP IP/OBS MODERATE 35: CPT | Performed by: STUDENT IN AN ORGANIZED HEALTH CARE EDUCATION/TRAINING PROGRAM

## 2022-11-05 PROCEDURE — 94640 AIRWAY INHALATION TREATMENT: CPT

## 2022-11-05 PROCEDURE — 51798 US URINE CAPACITY MEASURE: CPT

## 2022-11-05 PROCEDURE — 2580000003 HC RX 258: Performed by: STUDENT IN AN ORGANIZED HEALTH CARE EDUCATION/TRAINING PROGRAM

## 2022-11-05 PROCEDURE — 1200000000 HC SEMI PRIVATE

## 2022-11-05 PROCEDURE — 6360000002 HC RX W HCPCS: Performed by: INTERNAL MEDICINE

## 2022-11-05 PROCEDURE — 80069 RENAL FUNCTION PANEL: CPT

## 2022-11-05 PROCEDURE — 82570 ASSAY OF URINE CREATININE: CPT

## 2022-11-05 PROCEDURE — 6370000000 HC RX 637 (ALT 250 FOR IP): Performed by: INTERNAL MEDICINE

## 2022-11-05 PROCEDURE — 94760 N-INVAS EAR/PLS OXIMETRY 1: CPT

## 2022-11-05 PROCEDURE — 84156 ASSAY OF PROTEIN URINE: CPT

## 2022-11-05 PROCEDURE — 2700000000 HC OXYGEN THERAPY PER DAY

## 2022-11-05 PROCEDURE — 85027 COMPLETE CBC AUTOMATED: CPT

## 2022-11-05 RX ADMIN — ATORVASTATIN CALCIUM 40 MG: 40 TABLET, FILM COATED ORAL at 08:37

## 2022-11-05 RX ADMIN — SODIUM CHLORIDE, PRESERVATIVE FREE 5 ML: 5 INJECTION INTRAVENOUS at 08:41

## 2022-11-05 RX ADMIN — POLYETHYLENE GLYCOL 3350 17 G: 17 POWDER, FOR SOLUTION ORAL at 08:35

## 2022-11-05 RX ADMIN — HEPARIN SODIUM 5000 UNITS: 5000 INJECTION INTRAVENOUS; SUBCUTANEOUS at 21:08

## 2022-11-05 RX ADMIN — TAMSULOSIN HYDROCHLORIDE 0.4 MG: 0.4 CAPSULE ORAL at 08:38

## 2022-11-05 RX ADMIN — SODIUM CHLORIDE, PRESERVATIVE FREE 10 ML: 5 INJECTION INTRAVENOUS at 21:10

## 2022-11-05 RX ADMIN — MONTELUKAST 10 MG: 10 TABLET, FILM COATED ORAL at 08:37

## 2022-11-05 RX ADMIN — IPRATROPIUM BROMIDE AND ALBUTEROL SULFATE 1 AMPULE: .5; 3 SOLUTION RESPIRATORY (INHALATION) at 11:37

## 2022-11-05 RX ADMIN — CLONIDINE HYDROCHLORIDE 0.1 MG: 0.1 TABLET ORAL at 21:09

## 2022-11-05 RX ADMIN — FERROUS SULFATE TAB EC 324 MG (65 MG FE EQUIVALENT) 324 MG: 324 (65 FE) TABLET DELAYED RESPONSE at 08:37

## 2022-11-05 RX ADMIN — ASPIRIN 162 MG: 81 TABLET, COATED ORAL at 08:36

## 2022-11-05 RX ADMIN — CLONIDINE HYDROCHLORIDE 0.1 MG: 0.1 TABLET ORAL at 08:38

## 2022-11-05 RX ADMIN — PANTOPRAZOLE SODIUM 40 MG: 40 TABLET, DELAYED RELEASE ORAL at 08:37

## 2022-11-05 RX ADMIN — VERAPAMIL HYDROCHLORIDE 240 MG: 240 TABLET, FILM COATED, EXTENDED RELEASE ORAL at 21:09

## 2022-11-05 RX ADMIN — IPRATROPIUM BROMIDE AND ALBUTEROL SULFATE 1 AMPULE: .5; 3 SOLUTION RESPIRATORY (INHALATION) at 19:37

## 2022-11-05 RX ADMIN — HEPARIN SODIUM 5000 UNITS: 5000 INJECTION INTRAVENOUS; SUBCUTANEOUS at 05:03

## 2022-11-05 RX ADMIN — DOCUSATE SODIUM 100 MG: 100 CAPSULE, LIQUID FILLED ORAL at 08:35

## 2022-11-05 RX ADMIN — Medication 1 CAPSULE: at 17:10

## 2022-11-05 RX ADMIN — VERAPAMIL HYDROCHLORIDE 240 MG: 240 TABLET, FILM COATED, EXTENDED RELEASE ORAL at 08:37

## 2022-11-05 RX ADMIN — AMIODARONE HYDROCHLORIDE 200 MG: 200 TABLET ORAL at 08:37

## 2022-11-05 RX ADMIN — IPRATROPIUM BROMIDE AND ALBUTEROL SULFATE 1 AMPULE: .5; 3 SOLUTION RESPIRATORY (INHALATION) at 07:36

## 2022-11-05 RX ADMIN — HEPARIN SODIUM 5000 UNITS: 5000 INJECTION INTRAVENOUS; SUBCUTANEOUS at 15:00

## 2022-11-05 RX ADMIN — Medication 1 CAPSULE: at 08:37

## 2022-11-05 RX ADMIN — IPRATROPIUM BROMIDE AND ALBUTEROL SULFATE 1 AMPULE: .5; 3 SOLUTION RESPIRATORY (INHALATION) at 16:09

## 2022-11-05 NOTE — PLAN OF CARE
Problem: Safety - Adult  Goal: Free from fall injury  11/5/2022 0003 by Edilma Farah RN  Outcome: Progressing  11/4/2022 1421 by Teetee Rain RN  Outcome: Progressing  Flowsheets (Taken 11/4/2022 1420)  Free From Fall Injury: Instruct family/caregiver on patient safety     Problem: ABCDS Injury Assessment  Goal: Absence of physical injury  11/5/2022 0003 by Edilma Farah RN  Outcome: Progressing  11/4/2022 1421 by Teetee Rain RN  Outcome: Progressing  Flowsheets (Taken 11/4/2022 1420)  Absence of Physical Injury: Implement safety measures based on patient assessment     Problem: Skin/Tissue Integrity  Goal: Absence of new skin breakdown  Description: 1. Monitor for areas of redness and/or skin breakdown  2. Assess vascular access sites hourly  3. Every 4-6 hours minimum:  Change oxygen saturation probe site  4. Every 4-6 hours:  If on nasal continuous positive airway pressure, respiratory therapy assess nares and determine need for appliance change or resting period.   11/5/2022 0003 by Edilma Farah RN  Outcome: Progressing  11/4/2022 1421 by Teetee Rain RN  Outcome: Progressing

## 2022-11-05 NOTE — PROGRESS NOTES
Department of Internal Medicine  Nephrology Progress Note  This is a patient with significant past medical history of DM2, anemia,Afib, COPD, and CKD baseline Cr looks to be 2.0 going back to 2021 who presents with weakness,dyspnea  and worsening anemia   We are asked to see him for worsening azotemia and Cr up to 2.8.    SUbjective:  -pt seen and examined  -PMSHx and meds reviewed  -family at bedside      No acute events ON  Cr improved    REVIEW OF SYSTEMS:  No CP, some GIBBONS/ SOB , feels weak . Appetite better . Physical Exam:    VITALS:  /68   Pulse 64   Temp 97.4 °F (36.3 °C) (Oral)   Resp 18   Ht 5' 8\" (1.727 m)   Wt 258 lb (117 kg)   SpO2 95%   BMI 39.23 kg/m²   24HR INTAKE/OUTPUT:    Intake/Output Summary (Last 24 hours) at 11/5/2022 1545  Last data filed at 11/5/2022 0910  Gross per 24 hour   Intake 240 ml   Output 200 ml   Net 40 ml         Constitutional:  resting, obese  HEENT: anciteric, NC/AT  Neck: supple  Respiratory:  CTA  Gastrointestinal:  No  tenderness.   Normal Bowel Sounds  Cardiovascular:  S1, S2 RRR   Edema:  +  edema    DATA:    CBC:  Lab Results   Component Value Date/Time    WBC 9.0 11/05/2022 06:32 AM    RBC 2.75 11/05/2022 06:32 AM    HGB 7.7 11/05/2022 06:32 AM    HCT 23.5 11/05/2022 06:32 AM    MCV 85.4 11/05/2022 06:32 AM    MCH 27.8 11/05/2022 06:32 AM    MCHC 32.6 11/05/2022 06:32 AM    RDW 18.4 11/05/2022 06:32 AM     11/05/2022 06:32 AM    MPV 8.6 11/05/2022 06:32 AM     CMP:  Lab Results   Component Value Date/Time     11/05/2022 06:32 AM    K 5.0 11/05/2022 06:32 AM    K 5.1 10/31/2022 06:05 AM     11/05/2022 06:32 AM    CO2 22 11/05/2022 06:32 AM    BUN 76 11/05/2022 06:32 AM    CREATININE 2.4 11/05/2022 06:32 AM    GFRAA 37 05/27/2022 02:05 PM    GFRAA >60 04/19/2013 10:46 AM    AGRATIO 1.4 10/31/2022 06:05 AM    LABGLOM 26 11/05/2022 06:32 AM    GLUCOSE 129 11/05/2022 06:32 AM    GLUCOSE 96 06/16/2011 10:37 AM    PROT 6.3 11/03/2022 07:31 AM PROT 6.4 12/12/2012 08:05 AM    CALCIUM 7.7 11/05/2022 06:32 AM    BILITOT 0.3 11/03/2022 07:31 AM    ALKPHOS 112 11/03/2022 07:31 AM    AST 26 11/03/2022 07:31 AM    ALT 36 11/03/2022 07:31 AM      Hepatic Function Panel:   Lab Results   Component Value Date/Time    ALKPHOS 112 11/03/2022 07:31 AM    ALT 36 11/03/2022 07:31 AM    AST 26 11/03/2022 07:31 AM    PROT 6.3 11/03/2022 07:31 AM    PROT 6.4 12/12/2012 08:05 AM    BILITOT 0.3 11/03/2022 07:31 AM    BILIDIR <0.2 11/03/2022 07:31 AM    IBILI see below 11/03/2022 07:31 AM      Phosphorus:   Lab Results   Component Value Date/Time    PHOS 4.6 11/05/2022 06:32 AM       ASSESSMENT:  Principal Problem:    Generalized weakness  Active Problems:    JOANN (obstructive sleep apnea)    Type 2 diabetes mellitus with stage 3 chronic kidney disease, with long-term current use of insulin (HCC)    Microcytic anemia    Chronic GERD    Hyperlipidemia    Stage 3b chronic kidney disease (HCC)    Essential hypertension    Moderate COPD (chronic obstructive pulmonary disease) ([facility])    DEISY (acute kidney injury) ([facility] [address]    Slow transit constipation  Resolved Problems:    * No resolved hospital problems. *      PLAN:  DEISY on CKD: base Cr 2.0-2.1-peak Cr 2.8-likely pre-renal   -UA is bland, off Diovan/HCT . -will check UPC/SPEP negative  -off IVF  -further work up based on urine studies    CKD stage 3b: baseline Cr low 2 range, presumed DKD/HTN NS/obesity  -follow up with Dr. Ely Berger at discharge    DM2 plan per admitting team .    Anemia per hematology . Bone marrow neg for MM .          Jaya Stewart MD,

## 2022-11-05 NOTE — PROGRESS NOTES
Name: Adalberto Horn  /Age/Sex: 1940 (79 y.o. male)   MRN & CSN: [id number] & [id number]  Admission Date/Time: 10/30/2022  9:36 PM   Location: [id number]  Current Hospital Day: Hospital Day: 7   Principal Problem: Generalized weakness  HPI     Patient seen and examined. No issues overnight. Patient reports poor appetite today. He is frustrated he is in the hospital.  Says it is hard to drink fluids. He is afebrile. He was on 2 L of nasal cannula overnight. Discussed with family at bedside. All other review of systems negative unless noted above.   VITALS     Vitals:    22 0734   BP:    Pulse:    Resp:    Temp:    SpO2: 96%         PHYSICAL EXAM     General: No acute distress   HEENT: PERRL, EOMI  Cardiac: Regular rate and rhythm, no murmurs  Lungs: Clear to auscultation bilaterally  Abdomen: Soft nontender, nondistended  Musculoskeletal: No joint effusions  Neuro: Nonfocal    LABS   BMP  Recent Labs     22  0731 22  0739    136   K 4.4 4.5    102   CO2 23 21   BUN 78* 81*   CREATININE 2.3* 2.7*   CALCIUM 7.7* 7.9*   PHOS 4.0 4.0     CBC/COAGS  Recent Labs     22  0731 22  0739 22  0632   WBC 9.1 9.6 9.0   HCT 24.7* 25.5* 23.5*   * 132* 123*     Liver & Pancreas  Recent Labs     22  0731   AST 26   ALT 36   ALKPHOS 112   BILIDIR <0.2          IMAGING   No new imaging   Above studies and images were personally reviewed by myself    MEDS   Scheduled Meds:   predniSONE  40 mg Oral Daily    heparin (porcine)  5,000 Units SubCUTAneous 3 times per day    [Held by provider] insulin glargine  20 Units SubCUTAneous BID    sodium chloride flush  5-40 mL IntraVENous 2 times per day    insulin lispro  0-4 Units SubCUTAneous TID WC    insulin lispro  0-4 Units SubCUTAneous Nightly    ipratropium-albuterol  1 ampule Inhalation Q4H WA    tamsulosin  0.4 mg Oral Daily    amiodarone  200 mg Oral Daily    aspirin EC  162 mg Oral Daily atorvastatin  40 mg Oral Daily    cloNIDine  0.1 mg Oral BID    polyethylene glycol  17 g Oral Daily    docusate sodium  100 mg Oral Daily    ferrous sulfate  324 mg Oral Daily    lactobacillus  1 capsule Oral BID WC    linaclotide  145 mcg Oral QAM AC    montelukast  10 mg Oral Daily    pantoprazole  40 mg Oral Daily    verapamil  240 mg Oral BID     Continuous Infusions:   sodium chloride Stopped (11/01/22 1205)    dextrose Stopped (11/01/22 0852)     PRN Meds:bisacodyl, sodium chloride flush, sodium chloride, ondansetron **OR** ondansetron, polyethylene glycol, acetaminophen **OR** acetaminophen, glucose, dextrose bolus **OR** dextrose bolus, glucagon (rDNA), dextrose     CURRENT HOSPITAL PROBLEMS   Principal Problem:    Generalized weakness  -He may be able to go home with PT/OT    DEISY (acute kidney injury) ([facility] [address]  -His creatinine worsened when IV fluids were discontinued, they were resumed yesterday at 40 cc/h for 13 hours. His creatinine has improved slightly today. He may require a biopsy this admission. Appreciate nephrology assistance    JOANN (obstructive sleep apnea)  -Continue home CPAP    Type 2 diabetes mellitus with stage 3 chronic kidney disease, with long-term current use of insulin ([facility])  -He is on sliding scale insulin, his long-acting Lantus has been held. His blood sugars are well controlled this morning. Microcytic anemia  -Patient had a bone marrow biopsy that was unremarkable this admission. Anemia suspected from CKD.   He has received a dose of this admission    Chronic GERD    Hyperlipidemia    Stage 3b chronic kidney disease ([facility] [address]    Essential hypertension    Moderate COPD (chronic obstructive pulmonary disease) ([facility])  -Continue current medications, not in exacerbation    Slow transit constipation  -Continue MiraLAX           Dispo: Pending stability and renal function    Jaspal Joel MD 11/5/2022 7:50 AM

## 2022-11-05 NOTE — PLAN OF CARE
Problem: Skin/Tissue Integrity  Goal: Absence of new skin breakdown  Description: 1. Monitor for areas of redness and/or skin breakdown  2. Assess vascular access sites hourly  3. Every 4-6 hours minimum:  Change oxygen saturation probe site  4. Every 4-6 hours:  If on nasal continuous positive airway pressure, respiratory therapy assess nares and determine need for appliance change or resting period.   Outcome: Progressing     Problem: Cardiovascular - Adult  Goal: Maintains optimal cardiac output and hemodynamic stability  Outcome: Progressing     Problem: Musculoskeletal - Adult  Goal: Return mobility to safest level of function  Outcome: Progressing     Problem: Gastrointestinal - Adult  Goal: Minimal or absence of nausea and vomiting  Outcome: Progressing

## 2022-11-05 NOTE — PROGRESS NOTES
This is a 79 y.o.  male admitted on 10/30/2022 with Generalized weakness. Pt A&O X4. Denies any chest pain or shortness of breath. Abd soft and nontender. Active bowel sounds. Last BM 11/03/22. Continent of bowel & bladder. Depends on. Denies any abd discomfort. Saline lock to PIV LFA. Transfers with walker x1. Pills whole with thin. HS medication given. Tolerated well. Call light in reach. Patient instructed to call if there is any needs or changes.   Electronically signed by Guicho Rodriguze RN on 11/5/2022 at 4:15 AM

## 2022-11-06 ENCOUNTER — APPOINTMENT (OUTPATIENT)
Dept: GENERAL RADIOLOGY | Age: 82
DRG: 698 | End: 2022-11-06
Payer: MEDICARE

## 2022-11-06 LAB
ALBUMIN SERPL-MCNC: 3.4 G/DL (ref 3.4–5)
ANION GAP SERPL CALCULATED.3IONS-SCNC: 12 MMOL/L (ref 3–16)
BASE EXCESS VENOUS: -4.8 MMOL/L
BASOPHILS ABSOLUTE: 0 K/UL (ref 0–0.2)
BASOPHILS RELATIVE PERCENT: 0 %
BUN BLDV-MCNC: 75 MG/DL (ref 7–20)
CALCIUM SERPL-MCNC: 7.8 MG/DL (ref 8.3–10.6)
CARBOXYHEMOGLOBIN: 1.8 %
CHLORIDE BLD-SCNC: 106 MMOL/L (ref 99–110)
CO2: 20 MMOL/L (ref 21–32)
CREAT SERPL-MCNC: 2.3 MG/DL (ref 0.8–1.3)
EOSINOPHILS ABSOLUTE: 0 K/UL (ref 0–0.6)
EOSINOPHILS RELATIVE PERCENT: 0.4 %
GFR SERPL CREATININE-BSD FRML MDRD: 28 ML/MIN/{1.73_M2}
GLUCOSE BLD-MCNC: 122 MG/DL (ref 70–99)
GLUCOSE BLD-MCNC: 122 MG/DL (ref 70–99)
GLUCOSE BLD-MCNC: 123 MG/DL (ref 70–99)
GLUCOSE BLD-MCNC: 152 MG/DL (ref 70–99)
GLUCOSE BLD-MCNC: 154 MG/DL (ref 70–99)
HCO3 VENOUS: 22 MMOL/L (ref 23–29)
HCT VFR BLD CALC: 24.1 % (ref 40.5–52.5)
HEMOGLOBIN: 7.9 G/DL (ref 13.5–17.5)
LACTIC ACID: 1.5 MMOL/L (ref 0.4–2)
LYMPHOCYTES ABSOLUTE: 0.5 K/UL (ref 1–5.1)
LYMPHOCYTES RELATIVE PERCENT: 4.8 %
MCH RBC QN AUTO: 27.8 PG (ref 26–34)
MCHC RBC AUTO-ENTMCNC: 32.9 G/DL (ref 31–36)
MCV RBC AUTO: 84.6 FL (ref 80–100)
METHEMOGLOBIN VENOUS: 1.1 %
MONOCYTES ABSOLUTE: 0.8 K/UL (ref 0–1.3)
MONOCYTES RELATIVE PERCENT: 6.7 %
NEUTROPHILS ABSOLUTE: 10 K/UL (ref 1.7–7.7)
NEUTROPHILS RELATIVE PERCENT: 88.1 %
O2 SAT, VEN: 73 %
O2 THERAPY: ABNORMAL
PCO2, VEN: 44.9 MMHG (ref 40–50)
PDW BLD-RTO: 18.5 % (ref 12.4–15.4)
PERFORMED ON: ABNORMAL
PH VENOUS: 7.29 (ref 7.35–7.45)
PHOSPHORUS: 4.2 MG/DL (ref 2.5–4.9)
PLATELET # BLD: 126 K/UL (ref 135–450)
PMV BLD AUTO: 8.6 FL (ref 5–10.5)
PO2, VEN: 44 MMHG
POTASSIUM SERPL-SCNC: 4.7 MMOL/L (ref 3.5–5.1)
PRO-BNP: 1515 PG/ML (ref 0–449)
PROCALCITONIN: 0.31 NG/ML (ref 0–0.15)
RBC # BLD: 2.84 M/UL (ref 4.2–5.9)
SODIUM BLD-SCNC: 138 MMOL/L (ref 136–145)
TCO2 CALC VENOUS: 23 MMOL/L
WBC # BLD: 11.3 K/UL (ref 4–11)

## 2022-11-06 PROCEDURE — 94760 N-INVAS EAR/PLS OXIMETRY 1: CPT

## 2022-11-06 PROCEDURE — 36415 COLL VENOUS BLD VENIPUNCTURE: CPT

## 2022-11-06 PROCEDURE — 82803 BLOOD GASES ANY COMBINATION: CPT

## 2022-11-06 PROCEDURE — 6360000002 HC RX W HCPCS: Performed by: INTERNAL MEDICINE

## 2022-11-06 PROCEDURE — 6370000000 HC RX 637 (ALT 250 FOR IP): Performed by: INTERNAL MEDICINE

## 2022-11-06 PROCEDURE — 99233 SBSQ HOSP IP/OBS HIGH 50: CPT | Performed by: INTERNAL MEDICINE

## 2022-11-06 PROCEDURE — 83605 ASSAY OF LACTIC ACID: CPT

## 2022-11-06 PROCEDURE — 80069 RENAL FUNCTION PANEL: CPT

## 2022-11-06 PROCEDURE — 2500000003 HC RX 250 WO HCPCS: Performed by: INTERNAL MEDICINE

## 2022-11-06 PROCEDURE — 2700000000 HC OXYGEN THERAPY PER DAY

## 2022-11-06 PROCEDURE — 87205 SMEAR GRAM STAIN: CPT

## 2022-11-06 PROCEDURE — 87040 BLOOD CULTURE FOR BACTERIA: CPT

## 2022-11-06 PROCEDURE — 85025 COMPLETE CBC W/AUTO DIFF WBC: CPT

## 2022-11-06 PROCEDURE — 83880 ASSAY OF NATRIURETIC PEPTIDE: CPT

## 2022-11-06 PROCEDURE — 87070 CULTURE OTHR SPECIMN AEROBIC: CPT

## 2022-11-06 PROCEDURE — 87641 MR-STAPH DNA AMP PROBE: CPT

## 2022-11-06 PROCEDURE — 94640 AIRWAY INHALATION TREATMENT: CPT

## 2022-11-06 PROCEDURE — 2580000003 HC RX 258: Performed by: STUDENT IN AN ORGANIZED HEALTH CARE EDUCATION/TRAINING PROGRAM

## 2022-11-06 PROCEDURE — 71046 X-RAY EXAM CHEST 2 VIEWS: CPT

## 2022-11-06 PROCEDURE — 1200000000 HC SEMI PRIVATE

## 2022-11-06 PROCEDURE — 84145 PROCALCITONIN (PCT): CPT

## 2022-11-06 PROCEDURE — 51798 US URINE CAPACITY MEASURE: CPT

## 2022-11-06 PROCEDURE — 2580000003 HC RX 258: Performed by: INTERNAL MEDICINE

## 2022-11-06 RX ORDER — FUROSEMIDE 10 MG/ML
40 INJECTION INTRAMUSCULAR; INTRAVENOUS ONCE
Status: COMPLETED | OUTPATIENT
Start: 2022-11-06 | End: 2022-11-06

## 2022-11-06 RX ORDER — CEFEPIME HYDROCHLORIDE 1 G/1
2000 INJECTION, POWDER, FOR SOLUTION INTRAMUSCULAR; INTRAVENOUS EVERY 12 HOURS
Status: DISCONTINUED | OUTPATIENT
Start: 2022-11-06 | End: 2022-11-06 | Stop reason: SDUPTHER

## 2022-11-06 RX ADMIN — TAMSULOSIN HYDROCHLORIDE 0.4 MG: 0.4 CAPSULE ORAL at 08:42

## 2022-11-06 RX ADMIN — HEPARIN SODIUM 5000 UNITS: 5000 INJECTION INTRAVENOUS; SUBCUTANEOUS at 05:41

## 2022-11-06 RX ADMIN — POLYETHYLENE GLYCOL 3350 17 G: 17 POWDER, FOR SOLUTION ORAL at 08:41

## 2022-11-06 RX ADMIN — CLONIDINE HYDROCHLORIDE 0.1 MG: 0.1 TABLET ORAL at 22:14

## 2022-11-06 RX ADMIN — PANTOPRAZOLE SODIUM 40 MG: 40 TABLET, DELAYED RELEASE ORAL at 08:42

## 2022-11-06 RX ADMIN — DOCUSATE SODIUM 100 MG: 100 CAPSULE, LIQUID FILLED ORAL at 08:42

## 2022-11-06 RX ADMIN — ATORVASTATIN CALCIUM 40 MG: 40 TABLET, FILM COATED ORAL at 08:42

## 2022-11-06 RX ADMIN — IPRATROPIUM BROMIDE AND ALBUTEROL SULFATE 1 AMPULE: .5; 3 SOLUTION RESPIRATORY (INHALATION) at 08:36

## 2022-11-06 RX ADMIN — IPRATROPIUM BROMIDE AND ALBUTEROL SULFATE 1 AMPULE: .5; 3 SOLUTION RESPIRATORY (INHALATION) at 16:19

## 2022-11-06 RX ADMIN — HEPARIN SODIUM 5000 UNITS: 5000 INJECTION INTRAVENOUS; SUBCUTANEOUS at 14:43

## 2022-11-06 RX ADMIN — VERAPAMIL HYDROCHLORIDE 240 MG: 240 TABLET, FILM COATED, EXTENDED RELEASE ORAL at 08:42

## 2022-11-06 RX ADMIN — SODIUM CHLORIDE 25 ML: 9 INJECTION, SOLUTION INTRAVENOUS at 13:38

## 2022-11-06 RX ADMIN — CLONIDINE HYDROCHLORIDE 0.1 MG: 0.1 TABLET ORAL at 08:42

## 2022-11-06 RX ADMIN — FUROSEMIDE 40 MG: 10 INJECTION, SOLUTION INTRAMUSCULAR; INTRAVENOUS at 12:04

## 2022-11-06 RX ADMIN — SODIUM CHLORIDE, PRESERVATIVE FREE 10 ML: 5 INJECTION INTRAVENOUS at 08:46

## 2022-11-06 RX ADMIN — SODIUM CHLORIDE: 9 INJECTION, SOLUTION INTRAVENOUS at 12:45

## 2022-11-06 RX ADMIN — MONTELUKAST 10 MG: 10 TABLET, FILM COATED ORAL at 08:42

## 2022-11-06 RX ADMIN — HEPARIN SODIUM 5000 UNITS: 5000 INJECTION INTRAVENOUS; SUBCUTANEOUS at 21:05

## 2022-11-06 RX ADMIN — VERAPAMIL HYDROCHLORIDE 240 MG: 240 TABLET, FILM COATED, EXTENDED RELEASE ORAL at 22:14

## 2022-11-06 RX ADMIN — FERROUS SULFATE TAB EC 324 MG (65 MG FE EQUIVALENT) 324 MG: 324 (65 FE) TABLET DELAYED RESPONSE at 08:42

## 2022-11-06 RX ADMIN — Medication 1 CAPSULE: at 16:33

## 2022-11-06 RX ADMIN — IPRATROPIUM BROMIDE AND ALBUTEROL SULFATE 1 AMPULE: .5; 3 SOLUTION RESPIRATORY (INHALATION) at 20:41

## 2022-11-06 RX ADMIN — Medication 1 CAPSULE: at 08:42

## 2022-11-06 RX ADMIN — AMIODARONE HYDROCHLORIDE 200 MG: 200 TABLET ORAL at 08:42

## 2022-11-06 RX ADMIN — IPRATROPIUM BROMIDE AND ALBUTEROL SULFATE 1 AMPULE: .5; 3 SOLUTION RESPIRATORY (INHALATION) at 12:17

## 2022-11-06 RX ADMIN — CEFEPIME 2000 MG: 2 INJECTION, POWDER, FOR SOLUTION INTRAVENOUS at 12:50

## 2022-11-06 RX ADMIN — ASPIRIN 162 MG: 81 TABLET, COATED ORAL at 08:42

## 2022-11-06 RX ADMIN — DOXYCYCLINE 100 MG: 100 INJECTION, POWDER, LYOPHILIZED, FOR SOLUTION INTRAVENOUS at 13:41

## 2022-11-06 RX ADMIN — SODIUM CHLORIDE, PRESERVATIVE FREE 10 ML: 5 INJECTION INTRAVENOUS at 22:15

## 2022-11-06 NOTE — PROGRESS NOTES
This is a 79 y.o.  male admitted on 10/30/2022 with Generalized weakness. Pt A&O X4. Pt. in bed eye closed. Appears comfortable. Respiration unlabored. Bed alarm. Call light and personal belonging in reach. Hourly rounding. Will continue to monitor.    Electronically signed by Keo Pereira RN on 11/6/2022 at 2:38 AM

## 2022-11-06 NOTE — PLAN OF CARE
Problem: Safety - Adult  Goal: Free from fall injury  Outcome: Progressing     Problem: ABCDS Injury Assessment  Goal: Absence of physical injury  Outcome: Progressing     Problem: Skin/Tissue Integrity  Goal: Absence of new skin breakdown  Description: 1. Monitor for areas of redness and/or skin breakdown  2. Assess vascular access sites hourly  3. Every 4-6 hours minimum:  Change oxygen saturation probe site  4. Every 4-6 hours:  If on nasal continuous positive airway pressure, respiratory therapy assess nares and determine need for appliance change or resting period.   11/5/2022 2257 by Isabel Sesay RN  Outcome: Progressing  11/5/2022 1546 by Paramjit Hollis RN  Outcome: Progressing

## 2022-11-06 NOTE — PROGRESS NOTES
Department of Internal Medicine  Nephrology Progress Note  This is a patient with significant past medical history of DM2, anemia,Afib, COPD, and CKD baseline Cr looks to be 2.0 going back to 2021 who presents with weakness,dyspnea  and worsening anemia   We are asked to see him for worsening azotemia and Cr up to 2.8.    SUbjective:  -pt seen and examined  -PMSHx and meds reviewed  -family at bedside      No acute events ON  Cr improving    REVIEW OF SYSTEMS:  No CP, some GIBBONS/ SOB , feels weak . Appetite better . Physical Exam:    VITALS:  BP (!) 151/63   Pulse 75   Temp 98.5 °F (36.9 °C) (Oral)   Resp 18   Ht 5' 8\" (1.727 m)   Wt 255 lb 4.7 oz (115.8 kg)   SpO2 (!) 87% Comment: returned to 2lpm O2  BMI 38.82 kg/m²   24HR INTAKE/OUTPUT:    Intake/Output Summary (Last 24 hours) at 11/6/2022 1425  Last data filed at 11/6/2022 1331  Gross per 24 hour   Intake 410 ml   Output 1050 ml   Net -640 ml         Constitutional:  resting, obese  HEENT: anciteric, NC/AT  Neck: supple  Respiratory:  CTA  Gastrointestinal:  No  tenderness.   Normal Bowel Sounds  Cardiovascular:  S1, S2 RRR   Edema:  +  edema    DATA:    CBC:  Lab Results   Component Value Date/Time    WBC 11.3 11/06/2022 06:58 AM    RBC 2.84 11/06/2022 06:58 AM    HGB 7.9 11/06/2022 06:58 AM    HCT 24.1 11/06/2022 06:58 AM    MCV 84.6 11/06/2022 06:58 AM    MCH 27.8 11/06/2022 06:58 AM    MCHC 32.9 11/06/2022 06:58 AM    RDW 18.5 11/06/2022 06:58 AM     11/06/2022 06:58 AM    MPV 8.6 11/06/2022 06:58 AM     CMP:  Lab Results   Component Value Date/Time     11/06/2022 06:58 AM    K 4.7 11/06/2022 06:58 AM    K 5.1 10/31/2022 06:05 AM     11/06/2022 06:58 AM    CO2 20 11/06/2022 06:58 AM    BUN 75 11/06/2022 06:58 AM    CREATININE 2.3 11/06/2022 06:58 AM    GFRAA 37 05/27/2022 02:05 PM    GFRAA >60 04/19/2013 10:46 AM    AGRATIO 1.4 10/31/2022 06:05 AM    LABGLOM 28 11/06/2022 06:58 AM    GLUCOSE 122 11/06/2022 06:58 AM    GLUCOSE 96 06/16/2011 10:37 AM    PROT 6.3 11/03/2022 07:31 AM    PROT 6.4 12/12/2012 08:05 AM    CALCIUM 7.8 11/06/2022 06:58 AM    BILITOT 0.3 11/03/2022 07:31 AM    ALKPHOS 112 11/03/2022 07:31 AM    AST 26 11/03/2022 07:31 AM    ALT 36 11/03/2022 07:31 AM      Hepatic Function Panel:   Lab Results   Component Value Date/Time    ALKPHOS 112 11/03/2022 07:31 AM    ALT 36 11/03/2022 07:31 AM    AST 26 11/03/2022 07:31 AM    PROT 6.3 11/03/2022 07:31 AM    PROT 6.4 12/12/2012 08:05 AM    BILITOT 0.3 11/03/2022 07:31 AM    BILIDIR <0.2 11/03/2022 07:31 AM    IBILI see below 11/03/2022 07:31 AM      Phosphorus:   Lab Results   Component Value Date/Time    PHOS 4.2 11/06/2022 06:58 AM       ASSESSMENT:  Principal Problem:    Generalized weakness  Active Problems:    JOANN (obstructive sleep apnea)    Type 2 diabetes mellitus with stage 3 chronic kidney disease, with long-term current use of insulin (HCC)    Microcytic anemia    Chronic GERD    Hyperlipidemia    Stage 3b chronic kidney disease (HCC)    Essential hypertension    Moderate COPD (chronic obstructive pulmonary disease) (HCC)    DEISY (acute kidney injury) ([facility] [address]    Slow transit constipation  Resolved Problems:    * No resolved hospital problems. *      PLAN:  DEISY on CKD: base Cr 2.0-2.1-peak Cr 2.8-likely pre-renal   -UA is bland, off Diovan/HCT . -does not have overt proteinuria, recent ACR only 30  -no indication for kidney biopsy at this time  -continue supportive care    CKD stage 3b: baseline Cr low 2 range, presumed DKD/HTN NS/obesity  -follow up with Dr. Teodora Alejandre at discharge  -minimal albuminuria, no overt proteinuria  -SPEP/UPEP is pending-though had recent bone marrow biopsy that was negative  -continue BP and BS control    DM2 plan per admitting team .    Anemia per hematology . Bone marrow neg for MM .      Thrombocytopenia: stable    Dispo: can be discharged from nephrology standpoint-follow up with Dr. Cole Roth MD,

## 2022-11-06 NOTE — PROGRESS NOTES
Name: Reese Deshpande  /Age/Sex: 1940 (79 y.o. male)   MRN & CSN: [id number] & [id number]  Admission Date/Time: 10/30/2022  9:36 PM   Location: [id number]  Current Hospital Day: Hospital Day: 8   Principal Problem: Generalized weakness  HPI   Up in chair . Reports has started with a wheeze and a cough productive of green phlegm. Doesn't get much relief from the duonebs. Has history of pneumonia multiple x in the past.  No fever or chills but does have sob. Also has to sleep sitting up prefers recliner to bd. Never sleeps supine. Is not currently on diuretic, decision to be made tomorrow regarding kidney bx      All other review of systems negative unless noted above. VITALS     Vitals:    22 0842   BP: (!) 151/63   Pulse: 75   Resp: 18   Temp: 98.5 °F (36.9 °C)   SpO2: 97%         PHYSICAL EXAM     General: up in chair, audibly wheezing, looks a little uncomfortable  HEENT: PERRL, EOMI  Cardiac: Regular rate and rhythm, no murmurs muffled S1 S2  Lungs: insp and exp wheeze and rhonchi diffusely  Abdomen: Soft nontender, nondistended  Musculoskeletal: No joint effusions  Neuro: Nonfocal    LABS   BMP  Recent Labs     22  0739 22  0632 22  0658    137 138   K 4.5 5.0 4.7    104 106   CO2 21 22 20*   BUN 81* 76* 75*   CREATININE 2.7* 2.4* 2.3*   CALCIUM 7.9* 7.7* 7.8*   PHOS 4.0 4.6 4.2       CBC/COAGS  Recent Labs     22  0739 22  0632 22  0658   WBC 9.6 9.0 11.3*   HCT 25.5* 23.5* 24.1*   * 123* 126*       Liver & Pancreas  No results for input(s): AST, ALT, ALKPHOS, BILI, BILIDIR, ALBUMIN, AMYLASE, LIPASE in the last 72 hours.          IMAGING   No new imaging   Above studies and images were personally reviewed by myself    MEDS   Scheduled Meds:   cefepime  1,000 mg IntraVENous Q12H    heparin (porcine)  5,000 Units SubCUTAneous 3 times per day    [Held by provider] insulin glargine  20 Units SubCUTAneous BID    sodium chloride flush  5-40 mL IntraVENous 2 times per day    insulin lispro  0-4 Units SubCUTAneous TID     insulin lispro  0-4 Units SubCUTAneous Nightly    ipratropium-albuterol  1 ampule Inhalation Q4H WA    tamsulosin  0.4 mg Oral Daily    amiodarone  200 mg Oral Daily    aspirin EC  162 mg Oral Daily    atorvastatin  40 mg Oral Daily    cloNIDine  0.1 mg Oral BID    polyethylene glycol  17 g Oral Daily    docusate sodium  100 mg Oral Daily    ferrous sulfate  324 mg Oral Daily    lactobacillus  1 capsule Oral BID     linaclotide  145 mcg Oral QAM AC    montelukast  10 mg Oral Daily    pantoprazole  40 mg Oral Daily    verapamil  240 mg Oral BID     Continuous Infusions:   sodium chloride Stopped (11/01/22 1205)    dextrose Stopped (11/01/22 0852)     PRN Meds:bisacodyl, sodium chloride flush, sodium chloride, ondansetron **OR** ondansetron, polyethylene glycol, acetaminophen **OR** acetaminophen, glucose, dextrose bolus **OR** dextrose bolus, glucagon (rDNA), dextrose     CURRENT HOSPITAL PROBLEMS   Principal Problem:    Generalized weakness  -He may be able to go home with PT/OT eventually    DEISY (acute kidney injury) ([facility] [address]  -His creatinine worsened when IV fluids were discontinued, they were resumed yesterday at 40 cc/h for 13 hours. His creatinine has improved slightly today. He may require a biopsy this admission. Appreciate nephrology assistance    JOANN (obstructive sleep apnea)  -Continue home CPAP    Type 2 diabetes mellitus with stage 3 chronic kidney disease, with long-term current use of insulin ([facility])  -He is on sliding scale insulin, his long-acting Lantus has been held. His blood sugars are well controlled this morning. Microcytic anemia  -Patient had a bone marrow biopsy that was unremarkable this admission. Anemia suspected from CKD.   He has received a dose of epo this admission    Chronic GERD    Hyperlipidemia    Stage 3b chronic kidney disease (HCC)    Essential hypertension    Moderate COPD (chronic obstructive pulmonary disease) (HCC)  -Continue current medications, not in exacerbation    Slow transit constipation  -Continue MiraLAX     Productive cough : a new development will check cxr procalcitonin and bnp    Dispo: Pending stability and renal function    Malgorzata Abreu MD 11/6/2022 11:39 AM      Addendum : cxr reveals bibasilar opacities and pleural effusions. Procalcitonin is a little elevated. BNP not really elevated. Will culture up and start cefepime. Has allergy to vanco listed will check nasal swab for MRSA. Will also give 1 dose of lasix.

## 2022-11-06 NOTE — PROGRESS NOTES
Patient admitted to room 3263. Patient alert and oriented x4 ; oriented to call light system, telephone, television, thermostat, bed controls, bathroom, and emergency cord. Patient verbalized and demonstrated understanding of all. Call light in reach, bed in lowest position, alarm activated, and bedside table in reach. Patient has no complaints or concerns voiced. Will continue to monitor.

## 2022-11-06 NOTE — PLAN OF CARE
Problem: Safety - Adult  Goal: Free from fall injury  11/6/2022 0900 by Annabelle Em RN  Outcome: Progressing     Problem: ABCDS Injury Assessment  Goal: Absence of physical injury  11/6/2022 0900 by Annabelle Em RN  Outcome: Progressing     Problem: Skin/Tissue Integrity  Goal: Absence of new skin breakdown  Description: 1. Monitor for areas of redness and/or skin breakdown  2. Assess vascular access sites hourly  3. Every 4-6 hours minimum:  Change oxygen saturation probe site  4. Every 4-6 hours:  If on nasal continuous positive airway pressure, respiratory therapy assess nares and determine need for appliance change or resting period.   11/6/2022 0900 by Annabelle Em RN  Outcome: Progressing     Problem: Hematologic - Adult  Goal: Maintains hematologic stability  11/6/2022 0900 by Annabelle Em RN  Outcome: Progressing

## 2022-11-07 ENCOUNTER — APPOINTMENT (OUTPATIENT)
Dept: GENERAL RADIOLOGY | Age: 82
DRG: 698 | End: 2022-11-07
Payer: MEDICARE

## 2022-11-07 LAB
ALBUMIN SERPL-MCNC: 3.3 G/DL (ref 3.4–5)
ANION GAP SERPL CALCULATED.3IONS-SCNC: 17 MMOL/L (ref 3–16)
BASOPHILS ABSOLUTE: 0 K/UL (ref 0–0.2)
BASOPHILS RELATIVE PERCENT: 0.1 %
BUN BLDV-MCNC: 81 MG/DL (ref 7–20)
CALCIUM SERPL-MCNC: 7.9 MG/DL (ref 8.3–10.6)
CHLORIDE BLD-SCNC: 101 MMOL/L (ref 99–110)
CO2: 17 MMOL/L (ref 21–32)
CREAT SERPL-MCNC: 2.8 MG/DL (ref 0.8–1.3)
EOSINOPHILS ABSOLUTE: 0.1 K/UL (ref 0–0.6)
EOSINOPHILS RELATIVE PERCENT: 0.9 %
GFR SERPL CREATININE-BSD FRML MDRD: 22 ML/MIN/{1.73_M2}
GLUCOSE BLD-MCNC: 116 MG/DL (ref 70–99)
GLUCOSE BLD-MCNC: 122 MG/DL (ref 70–99)
GLUCOSE BLD-MCNC: 133 MG/DL (ref 70–99)
GLUCOSE BLD-MCNC: 150 MG/DL (ref 70–99)
GLUCOSE BLD-MCNC: 176 MG/DL (ref 70–99)
HCT VFR BLD CALC: 24.8 % (ref 40.5–52.5)
HEMOGLOBIN: 7.9 G/DL (ref 13.5–17.5)
LYMPHOCYTES ABSOLUTE: 0.7 K/UL (ref 1–5.1)
LYMPHOCYTES RELATIVE PERCENT: 7.1 %
MCH RBC QN AUTO: 27.9 PG (ref 26–34)
MCHC RBC AUTO-ENTMCNC: 31.8 G/DL (ref 31–36)
MCV RBC AUTO: 87.5 FL (ref 80–100)
MONOCYTES ABSOLUTE: 0.8 K/UL (ref 0–1.3)
MONOCYTES RELATIVE PERCENT: 8.2 %
MRSA SCREEN RT-PCR: NORMAL
NEUTROPHILS ABSOLUTE: 7.9 K/UL (ref 1.7–7.7)
NEUTROPHILS RELATIVE PERCENT: 83.7 %
PDW BLD-RTO: 18.9 % (ref 12.4–15.4)
PERFORMED ON: ABNORMAL
PHOSPHORUS: 4.6 MG/DL (ref 2.5–4.9)
PLATELET # BLD: 127 K/UL (ref 135–450)
PMV BLD AUTO: 8 FL (ref 5–10.5)
POTASSIUM SERPL-SCNC: 4.3 MMOL/L (ref 3.5–5.1)
PROCALCITONIN: 0.51 NG/ML (ref 0–0.15)
RBC # BLD: 2.83 M/UL (ref 4.2–5.9)
SODIUM BLD-SCNC: 135 MMOL/L (ref 136–145)
WBC # BLD: 9.4 K/UL (ref 4–11)

## 2022-11-07 PROCEDURE — 97116 GAIT TRAINING THERAPY: CPT

## 2022-11-07 PROCEDURE — 97530 THERAPEUTIC ACTIVITIES: CPT

## 2022-11-07 PROCEDURE — 6360000002 HC RX W HCPCS: Performed by: INTERNAL MEDICINE

## 2022-11-07 PROCEDURE — 94760 N-INVAS EAR/PLS OXIMETRY 1: CPT

## 2022-11-07 PROCEDURE — 51798 US URINE CAPACITY MEASURE: CPT

## 2022-11-07 PROCEDURE — 80069 RENAL FUNCTION PANEL: CPT

## 2022-11-07 PROCEDURE — 1200000000 HC SEMI PRIVATE

## 2022-11-07 PROCEDURE — 2700000000 HC OXYGEN THERAPY PER DAY

## 2022-11-07 PROCEDURE — 6370000000 HC RX 637 (ALT 250 FOR IP): Performed by: INTERNAL MEDICINE

## 2022-11-07 PROCEDURE — 71045 X-RAY EXAM CHEST 1 VIEW: CPT

## 2022-11-07 PROCEDURE — 2500000003 HC RX 250 WO HCPCS: Performed by: INTERNAL MEDICINE

## 2022-11-07 PROCEDURE — 84145 PROCALCITONIN (PCT): CPT

## 2022-11-07 PROCEDURE — 2580000003 HC RX 258: Performed by: INTERNAL MEDICINE

## 2022-11-07 PROCEDURE — 36415 COLL VENOUS BLD VENIPUNCTURE: CPT

## 2022-11-07 PROCEDURE — 85025 COMPLETE CBC W/AUTO DIFF WBC: CPT

## 2022-11-07 PROCEDURE — 94640 AIRWAY INHALATION TREATMENT: CPT

## 2022-11-07 PROCEDURE — 99233 SBSQ HOSP IP/OBS HIGH 50: CPT | Performed by: INTERNAL MEDICINE

## 2022-11-07 PROCEDURE — 97535 SELF CARE MNGMENT TRAINING: CPT

## 2022-11-07 PROCEDURE — 2580000003 HC RX 258: Performed by: STUDENT IN AN ORGANIZED HEALTH CARE EDUCATION/TRAINING PROGRAM

## 2022-11-07 RX ORDER — SODIUM BICARBONATE 650 MG/1
650 TABLET ORAL 3 TIMES DAILY
Status: DISCONTINUED | OUTPATIENT
Start: 2022-11-07 | End: 2022-11-09 | Stop reason: HOSPADM

## 2022-11-07 RX ORDER — TAMSULOSIN HYDROCHLORIDE 0.4 MG/1
0.8 CAPSULE ORAL DAILY
Status: DISCONTINUED | OUTPATIENT
Start: 2022-11-07 | End: 2022-11-09 | Stop reason: HOSPADM

## 2022-11-07 RX ADMIN — CLONIDINE HYDROCHLORIDE 0.1 MG: 0.1 TABLET ORAL at 20:05

## 2022-11-07 RX ADMIN — DOCUSATE SODIUM 100 MG: 100 CAPSULE, LIQUID FILLED ORAL at 09:20

## 2022-11-07 RX ADMIN — POLYETHYLENE GLYCOL 3350 17 G: 17 POWDER, FOR SOLUTION ORAL at 09:20

## 2022-11-07 RX ADMIN — SODIUM CHLORIDE 25 ML: 9 INJECTION, SOLUTION INTRAVENOUS at 15:13

## 2022-11-07 RX ADMIN — FERROUS SULFATE TAB EC 324 MG (65 MG FE EQUIVALENT) 324 MG: 324 (65 FE) TABLET DELAYED RESPONSE at 09:20

## 2022-11-07 RX ADMIN — MONTELUKAST 10 MG: 10 TABLET, FILM COATED ORAL at 09:20

## 2022-11-07 RX ADMIN — PANTOPRAZOLE SODIUM 40 MG: 40 TABLET, DELAYED RELEASE ORAL at 09:20

## 2022-11-07 RX ADMIN — VERAPAMIL HYDROCHLORIDE 240 MG: 240 TABLET, FILM COATED, EXTENDED RELEASE ORAL at 09:20

## 2022-11-07 RX ADMIN — SODIUM CHLORIDE: 9 INJECTION, SOLUTION INTRAVENOUS at 00:32

## 2022-11-07 RX ADMIN — HEPARIN SODIUM 5000 UNITS: 5000 INJECTION INTRAVENOUS; SUBCUTANEOUS at 22:22

## 2022-11-07 RX ADMIN — HEPARIN SODIUM 5000 UNITS: 5000 INJECTION INTRAVENOUS; SUBCUTANEOUS at 15:03

## 2022-11-07 RX ADMIN — IPRATROPIUM BROMIDE AND ALBUTEROL SULFATE 1 AMPULE: .5; 3 SOLUTION RESPIRATORY (INHALATION) at 08:51

## 2022-11-07 RX ADMIN — IPRATROPIUM BROMIDE AND ALBUTEROL SULFATE 1 AMPULE: .5; 3 SOLUTION RESPIRATORY (INHALATION) at 15:53

## 2022-11-07 RX ADMIN — IPRATROPIUM BROMIDE AND ALBUTEROL SULFATE 1 AMPULE: .5; 3 SOLUTION RESPIRATORY (INHALATION) at 19:45

## 2022-11-07 RX ADMIN — DOXYCYCLINE 100 MG: 100 INJECTION, POWDER, LYOPHILIZED, FOR SOLUTION INTRAVENOUS at 00:33

## 2022-11-07 RX ADMIN — SODIUM CHLORIDE: 9 INJECTION, SOLUTION INTRAVENOUS at 02:03

## 2022-11-07 RX ADMIN — SODIUM BICARBONATE 650 MG: 650 TABLET ORAL at 15:03

## 2022-11-07 RX ADMIN — SODIUM BICARBONATE 650 MG: 650 TABLET ORAL at 20:05

## 2022-11-07 RX ADMIN — SODIUM CHLORIDE, PRESERVATIVE FREE 10 ML: 5 INJECTION INTRAVENOUS at 09:24

## 2022-11-07 RX ADMIN — SODIUM CHLORIDE 25 ML: 9 INJECTION, SOLUTION INTRAVENOUS at 13:01

## 2022-11-07 RX ADMIN — CEFEPIME 2000 MG: 2 INJECTION, POWDER, FOR SOLUTION INTRAVENOUS at 02:04

## 2022-11-07 RX ADMIN — ATORVASTATIN CALCIUM 40 MG: 40 TABLET, FILM COATED ORAL at 09:20

## 2022-11-07 RX ADMIN — IPRATROPIUM BROMIDE AND ALBUTEROL SULFATE 1 AMPULE: .5; 3 SOLUTION RESPIRATORY (INHALATION) at 12:15

## 2022-11-07 RX ADMIN — Medication 1 CAPSULE: at 09:20

## 2022-11-07 RX ADMIN — CLONIDINE HYDROCHLORIDE 0.1 MG: 0.1 TABLET ORAL at 09:19

## 2022-11-07 RX ADMIN — HEPARIN SODIUM 5000 UNITS: 5000 INJECTION INTRAVENOUS; SUBCUTANEOUS at 05:53

## 2022-11-07 RX ADMIN — CEFEPIME 1000 MG: 1 INJECTION, POWDER, FOR SOLUTION INTRAMUSCULAR; INTRAVENOUS at 15:14

## 2022-11-07 RX ADMIN — Medication 1 CAPSULE: at 18:05

## 2022-11-07 RX ADMIN — TAMSULOSIN HYDROCHLORIDE 0.8 MG: 0.4 CAPSULE ORAL at 09:19

## 2022-11-07 RX ADMIN — EPOETIN ALFA-EPBX 20000 UNITS: 10000 INJECTION, SOLUTION INTRAVENOUS; SUBCUTANEOUS at 10:32

## 2022-11-07 RX ADMIN — AMIODARONE HYDROCHLORIDE 200 MG: 200 TABLET ORAL at 09:20

## 2022-11-07 RX ADMIN — ASPIRIN 162 MG: 81 TABLET, COATED ORAL at 09:20

## 2022-11-07 RX ADMIN — VERAPAMIL HYDROCHLORIDE 240 MG: 240 TABLET, FILM COATED, EXTENDED RELEASE ORAL at 20:05

## 2022-11-07 RX ADMIN — DOXYCYCLINE 100 MG: 100 INJECTION, POWDER, LYOPHILIZED, FOR SOLUTION INTRAVENOUS at 13:03

## 2022-11-07 NOTE — PROGRESS NOTES
Hematology Oncology Daily Progress Note    Admit Date: 10/30/2022  Hospital day several    Subjective:     Patient has complaints of improved GIBBONS/gen weakness--denies sob/cp. Medication side effects: none    Scheduled Meds:   tamsulosin  0.8 mg Oral Daily    doxycycline (VIBRAMYCIN) IV  100 mg IntraVENous Q12H    cefepime  2,000 mg IntraVENous Q12H    heparin (porcine)  5,000 Units SubCUTAneous 3 times per day    [Held by provider] insulin glargine  20 Units SubCUTAneous BID    sodium chloride flush  5-40 mL IntraVENous 2 times per day    insulin lispro  0-4 Units SubCUTAneous TID     insulin lispro  0-4 Units SubCUTAneous Nightly    ipratropium-albuterol  1 ampule Inhalation Q4H WA    amiodarone  200 mg Oral Daily    aspirin EC  162 mg Oral Daily    atorvastatin  40 mg Oral Daily    cloNIDine  0.1 mg Oral BID    polyethylene glycol  17 g Oral Daily    docusate sodium  100 mg Oral Daily    ferrous sulfate  324 mg Oral Daily    lactobacillus  1 capsule Oral BID WC    linaclotide  145 mcg Oral QAM AC    montelukast  10 mg Oral Daily    pantoprazole  40 mg Oral Daily    verapamil  240 mg Oral BID     Continuous Infusions:   sodium chloride 21 mL/hr at 11/07/22 0203    dextrose Stopped (11/01/22 0852)     PRN Meds:bisacodyl, sodium chloride flush, sodium chloride, ondansetron **OR** ondansetron, polyethylene glycol, acetaminophen **OR** acetaminophen, glucose, dextrose bolus **OR** dextrose bolus, glucagon (rDNA), dextrose    Review of Systems  Pertinent items are noted in HPI. REVIEW OF SYSTEMS:         Constitutional: Denies fever, sweats, weight loss     Eyes: No visual changes or diplopia. No scleral icterus. ENT: No Headaches, hearing loss or vertigo. No mouth sores or sore throat. Cardiovascular: No chest pain, dyspnea on exertion, palpitations or loss of consciousness. Respiratory: No cough or wheezing, no sputum production. No hemoptysis. .    Gastrointestinal: No abdominal pain, appetite loss, blood in stools. No change in bowel habits. Genitourinary: No dysuria, trouble voiding, or hematuria. Musculoskeletal:  Generalized weakness. No joint complaints. Integumentary: No rash or pruritis. Neurological: No headache, diplopia. No change in gait, balance, or coordination. No paresthesias. Endocrine: No temperature intolerance. No excessive thirst, fluid intake, or urination. Hematologic/Lymphatic: No abnormal bruising or ecchymoses, blood clots or swollen lymph nodes. Allergic/Immunologic: No nasal congestion or hives. Objective:     Patient Vitals for the past 8 hrs:   BP Temp Temp src Pulse Resp SpO2 Height Weight   11/07/22 0550 127/76 97.2 °F (36.2 °C) Oral 62 16 94 % 5' 8\" (1.727 m) 255 lb 4.7 oz (115.8 kg)     I/O last 3 completed shifts: In: 765.6 [P.O.:600; I.V.:15.6; IV Piggyback:150]  Out: 750 [Urine:750]  No intake/output data recorded.     /76   Pulse 62   Temp 97.2 °F (36.2 °C) (Oral)   Resp 16   Ht 5' 8\" (1.727 m)   Wt 255 lb 4.7 oz (115.8 kg)   SpO2 94%   BMI 38.82 kg/m²     General Appearance:    Alert, cooperative, no distress, appears stated age   Head:    Normocephalic, without obvious abnormality, atraumatic   Eyes:    PERRL, conjunctiva/corneas clear, EOM's intact, fundi     benign, both eyes        Ears:    Normal TM's and external ear canals, both ears   Nose:   Nares normal, septum midline, mucosa normal, no drainage    or sinus tenderness   Throat:   Lips, mucosa, and tongue normal; teeth and gums normal   Neck:   Supple, symmetrical, trachea midline, no adenopathy;        thyroid:  No enlargement/tenderness/nodules; no carotid    bruit or JVD   Back:     Symmetric, no curvature, ROM normal, no CVA tenderness   Lungs:     Clear to auscultation bilaterally, respirations unlabored   Chest wall:    No tenderness or deformity   Heart:    Regular rate and rhythm, S1 and S2 normal, no murmur, rub   or gallop   Abdomen:     Soft, non-tender, bowel sounds active all four quadrants,     no masses, no organomegaly           Extremities:   Extremities normal, atraumatic, no cyanosis or edema   Pulses:   2+ and symmetric all extremities   Skin:   Skin color, texture, turgor normal, no rashes or lesions   Lymph nodes:   Cervical, supraclavicular, and axillary nodes normal   Neurologic:   Stable          Data ReviewCBC:   Lab Results   Component Value Date/Time    WBC 9.4 11/07/2022 06:53 AM    RBC 2.83 11/07/2022 06:53 AM       Assessment:     Principal Problem:    Generalized weakness  Active Problems:    JOANN (obstructive sleep apnea)    Type 2 diabetes mellitus with stage 3 chronic kidney disease, with long-term current use of insulin (HCC)    Microcytic anemia    Chronic GERD    Hyperlipidemia    Stage 3b chronic kidney disease ([facility])    Essential hypertension    Moderate COPD (chronic obstructive pulmonary disease) ([facility])    DEISY (acute kidney injury) ([facility] [address]    Slow transit constipation  Resolved Problems:    * No resolved hospital problems. *      Plan:     1. Anemia. His bone marrow biopsy was relatively unremarkable with no signs of myelodysplasia. His anemia is at least partially due to his chronic kidney disease. Procrit was started last week. I will give another dose today. It can take several doses to start working. It is okay to discharge at any time from my standpoint.         Electronically signed by Reyes Busch MD on 11/7/2022 at 8:29 AM

## 2022-11-07 NOTE — PROGRESS NOTES
Occupational Therapy  Facility/Department: [facility]   Occupational Therapy Initial Assessment    Name: Marcelino Carson  : 1940  MRN: [id number]  Date of Service: 2022    Discharge Recommendations:  24 hour supervision or assist, 2-3 sessions per week, S Level 3          Patient Diagnosis(es): The primary encounter diagnosis was General weakness. A diagnosis of Elevated troponin was also pertinent to this visit. Past Medical History:  has a past medical history of Allergic rhinitis, Asthma, Atrial fibrillation ([address] Carotid artery stenosis, Chronic kidney disease, COPD (chronic obstructive pulmonary disease) ([facility] [address] CPAP (continuous positive airway pressure) dependence, ESBL (extended spectrum beta-lactamase) producing bacteria infection, Hyperlipidemia, Hypertension, Iron deficiency anemia, Obstructive sleep apnea, and Type II or unspecified type diabetes mellitus without mention of complication, not stated as uncontrolled. Past Surgical History:  has a past surgical history that includes Gallbladder surgery (); Upper gastrointestinal endoscopy (7-2005    7/10/2009); Colonoscopy (7/10/2009); Cataract removal (2012); Pain management procedure (Right, 10/20/2021); Pain management procedure (Left, 2021); and CT BIOPSY BONE MARROW (2022). Gilmar DICK Suresh scored a 19/24 on the AM-PAC ADL Inpatient form. Current research shows that an AM-PAC score of 18 or greater is typically associated with a discharge to the patient's home setting. Based on the patient's AM-PAC score, and their current ADL deficits, it is recommended that the patient have 2-3 sessions per week of Occupational Therapy at d/c to increase the patient's independence. At this time, this patient demonstrates the endurance and safety to discharge home with home (home vs OP services) and a follow up treatment frequency of 2-3x/wk. Please see assessment section for further patient specific details.     If patient discharges prior to next session this note will serve as a discharge summary. Please see below for the latest assessment towards goals. HOME HEALTH CARE: LEVEL 3 SAFETY       -Initial home health evaluation to occur within 24-48 hours, in patient home   -Home health agency to establish plan of care for patient over 60 day period   -Medication Reconciliation   -PT/OT/Speech evaluations in home within 24-48 hours of discharge; including  -DME and home safety   -Frontload therapy 5 days, then 3x a week   -OT to evaluate if patient has [address] needs for personal care   - evaluation within 24-48 hours, includes evaluation of resources   and insurance to determine [place], [place], LTC, and Medicaid options   -PCP Visit scheduled within three to seven days of discharge         Assessment   Performance deficits / Impairments: Decreased functional mobility ; Decreased safe awareness;Decreased balance;Decreased coordination;Decreased ADL status; Decreased endurance;Decreased high-level IADLs  Assessment: Discussed with OTR am pac score has incresaed to 19. Anticipate thart patient will be safe to return home with family support and home OT. Patient completed sit<>stand from recliner chair to recliner to rollator to recliner chair with mild cues for hand placement. Patient completed functional mobility with rollator with slow steady gait, no overt LOB noted. Less SOB noted with rollator and no standing rest break required. Sit<>stand from locked rollator with close SBA, completes safely. Seated to wash face and hands with SBA. Cont with POC. REQUIRES OT FOLLOW-UP: Yes  Activity Tolerance  Activity Tolerance: Patient limited by fatigue  Activity Tolerance Comments: completed IS with cues for tech, 5 reps        Plan   Occupational Therapy Plan  Times Per Week: 3-5  Times Per Day:  Once a day  Current Treatment Recommendations: Balance training, Functional mobility training, Endurance training, Safety education & training, Patient/Caregiver education & training, Equipment evaluation, education, & procurement, Self-Care / ADL     Restrictions  Restrictions/Precautions  Restrictions/Precautions: Contact Precautions, Fall Risk  Position Activity Restriction  Other position/activity restrictions: IV left hand, room air, Contact prec ESBL urine    Subjective   General  Chart Reviewed: Yes  Patient assessed for rehabilitation services?: Yes  Additional Pertinent Hx: This is an 57-year-old white male with multiple medical problems who presented to the emergency room with weakness and shakiness with walking. Patient stated that over the last several days he is in has not been feeling himself and has had a hard time walking. He has had no specific symptoms but just overall weakness. He states that things progressed over the course of 2-3 days and so eventually last night came to the emergency room. He denied any fever or chills. No cough or sputum aside from his usual.  He is chronically short of breath and this was no different. He had no increase in his leg edema. He denied chest pain. No dysuria. No change in his bowel habits. He has been using his CPAP normally. Sugars have been okay. (copied per note Dr Huma Don 10/30)  Response to previous treatment: Patient reporting fatigue but able to participate  Family / Caregiver Present: Yes (wife)  Referring Practitioner: Huma Don  Diagnosis: generalized weakness  Subjective  Subjective: Patient seated in recliner chair upon arrival to room. Patient agreeable to therapy.  No reports of pain     Social/Functional History  Social/Functional History  Lives With: Spouse  Type of Home: House  Home Layout: Able to Live on Main level with bedroom/bathroom, Multi-level ([place] 1 1/2 story)  Home Access: Stairs to enter with rails  Entrance Stairs - Number of Steps: 1+1  Entrance Stairs - Rails: Both  Bathroom Shower/Tub: Tub/Shower unit  Bathroom Toilet: Standard (comfort height commode)  Bathroom Equipment: Shower chair (sliding doors on shower with bars)  Home Equipment: [address]  Has the patient had two or more falls in the past year or any fall with injury in the past year?:  (one recent fall- tripped)  ADL Assistance: [address]: Independent (but wife cleans. Pt completes bill paying, medication management)  Ambulation Assistance: Independent  Transfer Assistance: Independent  Active : Yes  Occupation: Retired  Additional Comments: Pt states he is completely indep, but does have assist of family if needed       Objective      Safety Devices  Type of Devices: Gait belt; All fall risk precautions in place; Patient at risk for falls; Left in chair;Nurse notified; Chair alarm in place;Call light within reach  Restraints  Restraints Initially in Place: No  Balance  Sitting: Intact  Standing: With support (close SBA with rollator)        ADL  Grooming: Setup;Stand by assistance  Grooming Skilled Clinical Factors: seated in recliner chair to wash face and hands  Additional Comments: Declined further ADL tasks     Activity Tolerance  Activity Tolerance: Patient limited by endurance  Activity Tolerance Comments: Limited by GIBBONS, requires seated rest break on rollator due to significant fatigue and SOB with short ambulation distances. Bed mobility  Supine to Sit: Unable to assess  Sit to Supine: Unable to assess  Bed Mobility Comments: UP in chair pre/post session  Transfers  Sit to stand: Stand by assistance  Stand to sit: Stand by assistance  Transfer Comments: SBA for sit<>stand from recliner chair  to rollator with cues for hand placement and safety. Functional mobility with rollator with close SBA, slow steady gait, no needed rest break, mild cues for safety.  Sit<>stand from locked rollator with close SBA, completes safely     Cognition  Overall Cognitive Status: [facility]  Cognition Comment: answers all questions easily  Orientation  Overall Orientation Status: Within

## 2022-11-07 NOTE — PLAN OF CARE
Problem: Safety - Adult  Goal: Free from fall injury  Outcome: Progressing     Problem: ABCDS Injury Assessment  Goal: Absence of physical injury  Outcome: Progressing     Problem: Skin/Tissue Integrity  Goal: Absence of new skin breakdown  Description: 1. Monitor for areas of redness and/or skin breakdown  2. Assess vascular access sites hourly  3. Every 4-6 hours minimum:  Change oxygen saturation probe site  4. Every 4-6 hours:  If on nasal continuous positive airway pressure, respiratory therapy assess nares and determine need for appliance change or resting period.   Outcome: Progressing     Problem: Neurosensory - Adult  Goal: Achieves stable or improved neurological status  Outcome: Progressing  Goal: Absence of seizures  Outcome: Progressing  Goal: Remains free of injury related to seizures activity  Outcome: Progressing  Goal: Achieves maximal functionality and self care  Outcome: Progressing

## 2022-11-07 NOTE — CARE COORDINATION
Chart Reviewed. Will need IV ABx upon discharge. Gowned and gloved to meet with patient and wife to review. Informed them that [facility] Does NOT do home infusion services and that he will need to select another agency. He chose [facility]. The Plan for Transition of Care is related to the following treatment goals: to continue his infusion needs in home setting    The Patient and/or patient representative wife was provided with a choice of provider and agrees   with the discharge plan. [x] Yes [] No    Freedom of choice list was provided with basic dialogue that supports the patient's individualized plan of care/goals, treatment preferences and shares the quality data associated with the providers. [x] Yes [] No  He is hopeful for DC tomorrow. IMM letter presented.   Referrals made to [facility] for IV ABX costs, to [facility], to make referral for home infusion needs and to [facility] to cancel the original referral.  Helio Julian [place]     Case Management   [phone]    11/7/2022  1:07 PM

## 2022-11-07 NOTE — PROGRESS NOTES
Patient admitted with generalized weakness. A/Ox4. Transfers with walker with x1 assist. On regular, 4 carb diet, tolerating well. Medications taken whole with thins. Skin: scattered bruising. Oxygen: 1 L NC. LDA: PIV LFA. Has been continent of bowel and continent of bladder. LBM 11/6/22. Chair/bed alarms in use and call light in reach. Will monitor for safety.  Electronically signed by Nathanael Sidhu RN on 11/7/2022 at 11:57 AM

## 2022-11-07 NOTE — PROGRESS NOTES
[facility] Internal Medicine Note      Chief Complaint: I'm feeling better    Subjective/Interval History:    Events of the weekend reviewed. Renal function has improved but the patient developed cough and increased shortness of breath yesterday. Chest x-ray revealed bibasilar infiltrates and the patient has an increased procalcitonin suggestive of pneumonia. BNP only mildly elevated. Patient states his cough is minimally productive today. States he feels a bit better. Patient denies PND or orthopnea. He feels like he is getting a bit stronger. Eating and drinking okay. He does report difficulty with urinating today. No chest pain or shortness breath. No cough or sputum. No nausea, vomiting, diarrhea. No abdominal pain. No dysuria. The remainder of the review of systems is negative. PMH, PSH, FH/SH reviewed and unchanged as documented in the H&P personally documented at admission 10/31/22    Medication list reviewed    Objective:    /76   Pulse 62   Temp 97.2 °F (36.2 °C) (Oral)   Resp 16   Ht 5' 8\" (1.727 m)   Wt 255 lb 4.7 oz (115.8 kg)   SpO2 94%   BMI 38.82 kg/m²   Temp  Av.7 °F (36.5 °C)  Min: 97.2 °F (36.2 °C)  Max: 98.5 °F (36.9 °C)    RRR  Chest-respirations are easy. No wheezes or rhonchi or crackles are noted. Lungs are clear. Abd- BS+, soft, NTND  Ext- no edema today    The Following Labs Were Reviewed Today:    Recent Results (from the past 24 hour(s))   POCT Glucose    Collection Time: 22 11:16 AM   Result Value Ref Range    POC Glucose 152 (H) 70 - 99 mg/dl    Performed on ACCU-CHEK    MRSA DNA Probe, Nasal    Collection Time: 22 12:12 PM    Specimen: Nares   Result Value Ref Range    MRSA SCREEN RT-PCR       Negative  MRSA DNA not detected.   Normal Range: Not detected     Blood Gas, Venous    Collection Time: 22 12:29 PM   Result Value Ref Range    pH, Jarrett 7.289 (L) 7.350 - 7.450    pCO2, Jarrett 44.9 40.0 - 50.0 mmHg    pO2, Jarrett 44 Not Established mmHg    HCO3, Venous 22 (L) 23 - 29 mmol/L    Base Excess, Jarrett -4.8 Not Established mmol/L    O2 Sat, Jarrett 73 Not Established %    Carboxyhemoglobin 1.8 %    MetHgb, Jarrett 1.1 <1.5 %    TC02 (Calc), Jarrett 23 Not Established mmol/L    O2 Therapy Unknown    Lactic Acid    Collection Time: 11/06/22 12:29 PM   Result Value Ref Range    Lactic Acid 1.5 0.4 - 2.0 mmol/L   POCT Glucose    Collection Time: 11/06/22  4:21 PM   Result Value Ref Range    POC Glucose 154 (H) 70 - 99 mg/dl    Performed on ACCU-CHEK    POCT Glucose    Collection Time: 11/06/22  9:00 PM   Result Value Ref Range    POC Glucose 123 (H) 70 - 99 mg/dl    Performed on ACCU-CHEK    CBC with Auto Differential    Collection Time: 11/07/22  6:53 AM   Result Value Ref Range    WBC 9.4 4.0 - 11.0 K/uL    RBC 2.83 (L) 4.20 - 5.90 M/uL    Hemoglobin 7.9 (L) 13.5 - 17.5 g/dL    Hematocrit 24.8 (L) 40.5 - 52.5 %    MCV 87.5 80.0 - 100.0 fL    MCH 27.9 26.0 - 34.0 pg    MCHC 31.8 31.0 - 36.0 g/dL    RDW 18.9 (H) 12.4 - 15.4 %    Platelets 732 (L) 643 - 450 K/uL    MPV 8.0 5.0 - 10.5 fL    Neutrophils % 83.7 %    Lymphocytes % 7.1 %    Monocytes % 8.2 %    Eosinophils % 0.9 %    Basophils % 0.1 %    Neutrophils Absolute 7.9 (H) 1.7 - 7.7 K/uL    Lymphocytes Absolute 0.7 (L) 1.0 - 5.1 K/uL    Monocytes Absolute 0.8 0.0 - 1.3 K/uL    Eosinophils Absolute 0.1 0.0 - 0.6 K/uL    Basophils Absolute 0.0 0.0 - 0.2 K/uL   Procalcitonin    Collection Time: 11/07/22  6:53 AM   Result Value Ref Range    Procalcitonin 0.51 (H) 0.00 - 0.15 ng/mL   POCT Glucose    Collection Time: 11/07/22  6:56 AM   Result Value Ref Range    POC Glucose 122 (H) 70 - 99 mg/dl    Performed on ACCU-CHEK        ASSESSMENT/PLAN:      Principal Problem:    Generalized weakness-overall improved. Continue with PT/OT at discharge. Active Problems:    DEISY -renal function is pending for today but kidney function did improve over the weekend.   Nephrology was considering renal biopsy but currently the thought is on hold. Bibasilar pneumonia-patient is on doxycycline and cefepime currently. Repeat chest x-ray today. Continue with duo nebs. JOANN (obstructive sleep apnea)-using CPAP. Type 2 diabetes mellitus with stage 3 chronic kidney disease, with long-term current use of insulin-Lantus remains on hold. Blood sugars doing fairly well with sliding scale only. Continue to monitor. Microcytic anemia-bone marrow biopsy without any evidence of malignancy. Hemoglobin 7.9 today. Stage 3b chronic kidney disease -awaiting renal function for today. We will obviously need outpatient follow-up    Essential hypertension- blood pressures currently fairly well controlled    Moderate COPD-seems to be at baseline. Continue current regimen. Slow transit constipation-continue MiraLAX and Linzess. Add Dulcolax as needed. Chronic GERD-currently asymptomatic. Hyperlipidemia-stable on current statin dosing. Disposition-await repeat chest x-ray and monitor respiratory status today. May be able to be discharged tomorrow.     Time > 35 minutes reviewing chart and patient data, examining and interviewing patient, and discussing with nursing staff, family, etc.        Stephenie Westbrook MD, FACP  7:59 AM  11/7/2022

## 2022-11-07 NOTE — PROGRESS NOTES
Comprehensive Nutrition Assessment    Type and Reason for Visit:  Initial, RD Nutrition Re-Screen/LOS    Nutrition Recommendations/Plan:   Continue CCC (4) diet     Malnutrition Assessment:  Malnutrition Status:  No malnutrition (11/07/22 1056)    Context:  Acute Illness         Nutrition Assessment:    Pt screened for LOS. PMH includes; DM, CKD (3), COPD, HLD. Pt adm r/t generalized fatigue and BLE weakness. Diet adv to [facility] (4). Pt does not care for the food served but is eating > 50 % most meals. Pt with no nutritional concerns at this time. Will continue to follow. Nutrition Related Findings:    Labs reviewed. Noted BM on 11/6. Noted +1 generalized edema. Wound Type: None       Current Nutrition Intake & Therapies:    Average Meal Intake: 51-75%, %     ADULT DIET; Regular; 4 carb choices (60 gm/meal)    Anthropometric Measures:  Height: 5' 8\" (172.7 cm)  Ideal Body Weight (IBW): 154 lbs (70 kg)    Admission Body Weight: 251 lb (113.9 kg)  Current Body Weight: 255 lb (115.7 kg), 165.6 % IBW.  Weight Source: Bed Scale  Current BMI (kg/m2): 38.8                          BMI Categories: Obese Class 2 (BMI 35.0 -39.9)        Nutrition Diagnosis:   No nutrition diagnosis at this time related to   as evidenced by      Nutrition Interventions:   Food and/or Nutrient Delivery: Continue Current Diet  Nutrition Education/Counseling: Education not indicated  Coordination of Nutrition Care: Continue to monitor while inpatient       Goals:     Goals: PO intake 50% or greater       Nutrition Monitoring and Evaluation:   Behavioral-Environmental Outcomes: None Identified  Food/Nutrient Intake Outcomes: Food and Nutrient Intake  Physical Signs/Symptoms Outcomes: Biochemical Data, Constipation, Diarrhea, Fluid Status or Edema, Skin, Weight    Discharge Planning:    Continue current diet     Irma Stewart, [address],   Contact: [phone]

## 2022-11-07 NOTE — PROGRESS NOTES
This is a 79 y.o.  male admitted on 10/30/2022 with Generalized weakness. Pt A&O X4. Pt. In chair eye closed. Appears comfortable. Respiration unlabored. Chair alarm. Call light and personal belonging in reach. Hourly rounding. Will continue to monitor.    Electronically signed by Joselo Zarate RN on 11/7/2022 at 2:31 AM

## 2022-11-07 NOTE — CARE COORDINATION
11/07/22 1301   IMM Letter   IMM Letter given to Patient/Family/Significant other/Guardian/POA/by: presented to patient and wife by magdalena Roth   IMM Letter date given: 11/07/22   IMM Letter time given: 1300

## 2022-11-07 NOTE — PROGRESS NOTES
Assisted pt at bedside. Pt couldn't void. Bladder scanned 423 ml. There is no order to straight cath. [facility]  Nish Perez MD. Waiting for response.    Electronically signed by Mac Alarcon RN on 11/7/2022 at 7:16 AM

## 2022-11-07 NOTE — PLAN OF CARE
Problem: Discharge Planning  Goal: Discharge to home or other facility with appropriate resources  11/7/2022 1155 by Glenn Hernández RN  Outcome: Progressing  Flowsheets (Taken 11/7/2022 0900)  Discharge to home or other facility with appropriate resources:   Identify barriers to discharge with patient and caregiver   Arrange for needed discharge resources and transportation as appropriate   Identify discharge learning needs (meds, wound care, etc)   Refer to discharge planning if patient needs post-hospital services based on physician order or complex needs related to functional status, cognitive ability or social support system     Problem: Safety - Adult  Goal: Free from fall injury  11/7/2022 1155 by Glenn Hernández RN  Outcome: Progressing  Flowsheets (Taken 11/7/2022 1151)  Free From Fall Injury: Instruct family/caregiver on patient safety     Problem: ABCDS Injury Assessment  Goal: Absence of physical injury  11/7/2022 1155 by Glenn Hernández RN  Outcome: Progressing  Flowsheets (Taken 11/7/2022 1151)  Absence of Physical Injury: Implement safety measures based on patient assessment     Problem: Skin/Tissue Integrity  Goal: Absence of new skin breakdown  Description: 1. Monitor for areas of redness and/or skin breakdown  2. Assess vascular access sites hourly  3. Every 4-6 hours minimum:  Change oxygen saturation probe site  4. Every 4-6 hours:  If on nasal continuous positive airway pressure, respiratory therapy assess nares and determine need for appliance change or resting period.   11/7/2022 1155 by Glenn Hernández RN  Outcome: Progressing     Problem: Respiratory - Adult  Goal: Achieves optimal ventilation and oxygenation  11/7/2022 1155 by Glenn Hernández RN  Outcome: Progressing  Flowsheets (Taken 11/7/2022 0900)  Achieves optimal ventilation and oxygenation:   Assess for changes in respiratory status   Assess for changes in mentation and behavior   Position to facilitate oxygenation and minimize

## 2022-11-07 NOTE — PROGRESS NOTES
Department of Internal Medicine  Nephrology Progress Note    This is a patient with significant past medical history of DM2, anemia, Afib, COPD, and CKD IV - baseline Cr looks to be 2.0 going back to 2021 who presents with weakness,dyspnea  and worsening anemia   We are asked to see him for worsening azotemia and Cr up to 2.8. Subjective:  -pt seen and examined  -PMSHx and meds reviewed  -family at bedside      REVIEW OF SYSTEMS:  No CP, some GIBBONS / SOB, feels weak. Appetite better . Physical Exam:    VITALS:  /63   Pulse 72   Temp 97.3 °F (36.3 °C) (Oral)   Resp 16   Ht 5' 8\" (1.727 m)   Wt 255 lb 4.7 oz (115.8 kg)   SpO2 93%   BMI 38.82 kg/m²   24HR INTAKE/OUTPUT:    Intake/Output Summary (Last 24 hours) at 11/7/2022 1248  Last data filed at 11/7/2022 1215  Gross per 24 hour   Intake 765.64 ml   Output 250 ml   Net 515.64 ml         Constitutional:  awake, alert, obese  HEENT: anciteric, NC/AT  Neck: supple  Respiratory:  CTA  Gastrointestinal:  No  tenderness.   Normal Bowel Sounds  Cardiovascular:  RRR   Edema:  +  edema    Medications:   tamsulosin  0.8 mg Oral Daily    cefepime  1,000 mg IntraVENous Q12H    sodium bicarbonate  650 mg Oral TID    doxycycline (VIBRAMYCIN) IV  100 mg IntraVENous Q12H    heparin (porcine)  5,000 Units SubCUTAneous 3 times per day    [Held by provider] insulin glargine  20 Units SubCUTAneous BID    sodium chloride flush  5-40 mL IntraVENous 2 times per day    insulin lispro  0-4 Units SubCUTAneous TID     insulin lispro  0-4 Units SubCUTAneous Nightly    ipratropium-albuterol  1 ampule Inhalation Q4H WA    amiodarone  200 mg Oral Daily    aspirin EC  162 mg Oral Daily    atorvastatin  40 mg Oral Daily    cloNIDine  0.1 mg Oral BID    polyethylene glycol  17 g Oral Daily    docusate sodium  100 mg Oral Daily    ferrous sulfate  324 mg Oral Daily    lactobacillus  1 capsule Oral BID WC    linaclotide  145 mcg Oral QAM AC    montelukast  10 mg Oral Daily pantoprazole  40 mg Oral Daily    verapamil  240 mg Oral BID       DATA:    CBC:  Lab Results   Component Value Date/Time    WBC 9.4 11/07/2022 06:53 AM    RBC 2.83 11/07/2022 06:53 AM    HGB 7.9 11/07/2022 06:53 AM    HCT 24.8 11/07/2022 06:53 AM    MCV 87.5 11/07/2022 06:53 AM    MCH 27.9 11/07/2022 06:53 AM    MCHC 31.8 11/07/2022 06:53 AM    RDW 18.9 11/07/2022 06:53 AM     11/07/2022 06:53 AM    MPV 8.0 11/07/2022 06:53 AM     CMP:  Lab Results   Component Value Date/Time     11/07/2022 06:53 AM    K 4.3 11/07/2022 06:53 AM    K 5.1 10/31/2022 06:05 AM     11/07/2022 06:53 AM    CO2 17 11/07/2022 06:53 AM    BUN 81 11/07/2022 06:53 AM    CREATININE 2.8 11/07/2022 06:53 AM    GFRAA 37 05/27/2022 02:05 PM    GFRAA >60 04/19/2013 10:46 AM    AGRATIO 1.4 10/31/2022 06:05 AM    LABGLOM 22 11/07/2022 06:53 AM    GLUCOSE 116 11/07/2022 06:53 AM    GLUCOSE 96 06/16/2011 10:37 AM    PROT 6.3 11/03/2022 07:31 AM    PROT 6.4 12/12/2012 08:05 AM    CALCIUM 7.9 11/07/2022 06:53 AM    BILITOT 0.3 11/03/2022 07:31 AM    ALKPHOS 112 11/03/2022 07:31 AM    AST 26 11/03/2022 07:31 AM    ALT 36 11/03/2022 07:31 AM      Hepatic Function Panel:   Lab Results   Component Value Date/Time    ALKPHOS 112 11/03/2022 07:31 AM    ALT 36 11/03/2022 07:31 AM    AST 26 11/03/2022 07:31 AM    PROT 6.3 11/03/2022 07:31 AM    PROT 6.4 12/12/2012 08:05 AM    BILITOT 0.3 11/03/2022 07:31 AM    BILIDIR <0.2 11/03/2022 07:31 AM    IBILI see below 11/03/2022 07:31 AM      Phosphorus:   Lab Results   Component Value Date/Time    PHOS 4.6 11/07/2022 06:53 AM       ASSESSMENT:  Principal Problem:    Generalized weakness  Active Problems:    JOANN (obstructive sleep apnea)    Type 2 diabetes mellitus with stage 3 chronic kidney disease, with long-term current use of insulin ([facility])    Microcytic anemia    Chronic GERD    Hyperlipidemia    Stage 3b chronic kidney disease ([facility] [address]    Essential hypertension    Moderate COPD (chronic obstructive

## 2022-11-07 NOTE — CARE COORDINATION
[facility]    Referral received from  to follow for home care services. I will follow for needs, and speak with patient to verify demos.       Eder Caba RN, BSN CTN  [facility] [phone]

## 2022-11-07 NOTE — PROGRESS NOTES
Occupational Therapy    Ginny Luna  [phone]  [id number]    Attempted to see for OT follow up session this am. Patient eating lunch and just completed PT. Will see later this date for OT session. If discharged prior to next OT session please see last daily note for discharge status.     Electronically signed by DANA [id number] on 11/7/2022 at 11:51 AM

## 2022-11-07 NOTE — PROGRESS NOTES
Physical Therapy  Facility/Department: [facility]  Physical Therapy Treatment Session    Name: Magan Castelan  : 1940  MRN: [id number]  Date of Service: 2022    Discharge Recommendations:  Home with Home health PT, Patient would benefit from continued therapy after discharge (2-3x)   PT Equipment Recommendations  Equipment Needed: Yes  Mobility Devices: Omega Slim: Rollator ([phone])  Other: baratric seat to allow for increased hip clearance for safety with functional mobility tasks promoting independence with use      Patient Diagnosis(es): The primary encounter diagnosis was General weakness. A diagnosis of Elevated troponin was also pertinent to this visit. Past Medical History:  has a past medical history of Allergic rhinitis, Asthma, Atrial fibrillation ([facility] [address] Carotid artery stenosis, Chronic kidney disease, COPD (chronic obstructive pulmonary disease) ([facility] [address] CPAP (continuous positive airway pressure) dependence, ESBL (extended spectrum beta-lactamase) producing bacteria infection, Hyperlipidemia, Hypertension, Iron deficiency anemia, Obstructive sleep apnea, and Type II or unspecified type diabetes mellitus without mention of complication, not stated as uncontrolled. Past Surgical History:  has a past surgical history that includes Gallbladder surgery (); Upper gastrointestinal endoscopy (7-2005    7/10/2009); Colonoscopy (7/10/2009); Cataract removal (2012); Pain management procedure (Right, 10/20/2021); Pain management procedure (Left, 2021); and CT BIOPSY BONE MARROW (2022). Assessment   Body Structures, Functions, Activity Limitations Requiring Skilled Therapeutic Intervention: Decreased functional mobility ; Decreased endurance;Decreased strength  Assessment: Pt is an 78 y/o male admitted with generalized weakness and anemia. He lives with his spouse on the main level of the house with 1 EZEQUIEL and was indep ambulating with a cane PTA. Today, , Pt.  is improving with mobility but continues to be significantly limited with mobility distances due to GIBBONS with desaturation requires seated reast break on device to allow for recovery and safety throughout. Standard rollator does not fit patient proper requires significant assistance to stand for safety at this time, recommend bariatric rollator aero care rep informed this morning. Now at a [address] level with proper fitted equipment  but continues to be limited with ambulation significant benefit of rollator for energy conservation and safety with mobility tasks. Pt. is highly motivated to return home and is agreable to use of RW and home health PT. Will continue to follow. Treatment Diagnosis: decreased activity tolerance  Therapy Prognosis: Good  Decision Making: Medium Complexity  Barriers to Learning: [place]  Requires PT Follow-Up: Yes  Activity Tolerance  Activity Tolerance: Patient limited by endurance  Activity Tolerance Comments: Limited by GIBBONS, requires seated rest break on rollator due to significant fatigue and SOB with short ambulation distances.      Plan   Physcial Therapy Plan  General Plan: 3-5 times per week  Current Treatment Recommendations: Strengthening, ROM, Balance training, Functional mobility training, Gait training, Transfer training, Endurance training, Home exercise program, Equipment evaluation, education, & procurement, Therapeutic activities, Patient/Caregiver education & training, Safety education & training  Safety Devices  Type of Devices: Gait belt, All fall risk precautions in place, Patient at risk for falls, Left in chair, Nurse notified, Chair alarm in place, Call light within reach  Restraints  Restraints Initially in Place: No     Restrictions  Restrictions/Precautions  Restrictions/Precautions: Contact Precautions, Fall Risk  Position Activity Restriction  Other position/activity restrictions: IV left hand, room air, Contact prec ESBL urine     Subjective   General  Chart Reviewed: Yes  Patient assessed for rehabilitation services?: Yes  Additional Pertinent Hx: Per H&P, \"This is an 79-year-old white male with multiple medical problems who presented to the emergency room with weakness and shakiness with walking. Patient stated that over the last several days he is in has not been feeling himself and has had a hard time walking. He has had no specific symptoms but just overall weakness. He states that things progressed over the course of 2-3 days and so eventually last night came to the emergency room. He denied any fever or chills. No cough or sputum aside from his usual.  He is chronically short of breath and this was no different. He had no increase in his leg edema. He denied chest pain. No dysuria. No change in his bowel habits. He has been using his CPAP normally. \"  Diagnosed with General Weakness. Response To Previous Treatment: Patient with no complaints from previous session. Family / Caregiver Present: Yes (wife present in room)  Referring Practitioner: Lexx Agarwal MD  Referral Date : 10/31/22  Diagnosis: generalized weakness, anemia  Follows Commands: Within Functional Limits  Subjective  Subjective: Pt reports frustration with still being in hospital now with diagnosis of pneumonia. Pt agreeable to PT treatment to assess use of rollator. Pt reports increased fatigue at this time.          Social/Functional History  Social/Functional History  Lives With: Spouse  Type of Home: House  Home Layout: Able to Live on Main level with bedroom/bathroom, Multi-level ([facility] 1 1/2 story)  Home Access: Stairs to enter with rails  Entrance Stairs - Number of Steps: 1+1  Entrance Stairs - Rails: Both  Bathroom Shower/Tub: Tub/Shower unit  Bathroom Toilet: Standard (comfort height commode)  Bathroom Equipment: Shower chair (sliding doors on shower with bars)  Home Equipment: [employer]  Has the patient had two or more falls in the past year or any fall with injury in the past year?: (one recent fall- tripped)  ADL Assistance: [address]: Independent (but wife cleans. Pt completes bill paying, medication management)  Ambulation Assistance: Independent  Transfer Assistance: Independent  Active : Yes  Occupation: Retired  Additional Comments: Pt states he is completely indep, but does have assist of family if needed      Cognition   Orientation  Overall Orientation Status: Within Functional Limits  Cognition  Overall Cognitive Status: WFL  Cognition Comment: answers all questions easily     Objective                                Bed mobility  Supine to Sit: Unable to assess  Sit to Supine: Unable to assess  Bed Mobility Comments: UP in chair pre/post session  Transfers  Sit to Stand: Supervision; Moderate Assistance (from standard recliner chair, due to poor clearance/fit of rollator at this time patient requires up to moderate assistance to stand after sitting on rollator due to body make up with wide hips)  Stand to Sit: Supervision (rollator and recliner)  Ambulation  Surface: Level tile  Device: Rollator (standard)  Assistance: Stand by assistance  Quality of Gait: mild shaky but no LOB, severe genu varum B LE and fallen arches B LE, quickly SOB with mobility with slight wheezing noted  Gait Deviations: Slow Riri;Decreased step length;Decreased step height  Distance: 30' X 2 trials (requires seated rest break on seat of rollator due to significant GIBBONS with fatigue. Comments: GIBBONS room aire throughout session 86-87% with cues for pursed lip improved to 92% within a minute. Provided patient with IS for breathing exercises, performed X 5 trials initially 750-100 ml improving 1250ml with practice. Pt receptive to training at this time.  MAHNAZ Waggoner Mail informed of respiratory status  More Ambulation?: No  Stairs/Curb  Stairs?: No     Balance  Posture: Fair (rounded shoulders)  Sitting - Static: Good  Sitting - Dynamic: Good  Standing - Static: Good;-  Standing - Dynamic: Fair;+  Comments: Pt able to stand at RW able to turn and sit onto seat with supervision, difficulty with sit to stand from rollotar due to width of hips does not allow for proper clearance of seat, once standing turns with SBA increased time. AM-PAC Score  AM-PAC Inpatient Mobility Raw Score : 18 (11/07/22 1323)  AM-PAC Inpatient T-Scale Score : 43.63 (11/07/22 1323)  Mobility Inpatient CMS 0-100% Score: 46.58 (11/07/22 1323)  Mobility Inpatient CMS G-Code Modifier : CK (11/07/22 1323)        Gilmar Suresh scored a 18/24 on the AM-PAC short mobility form. Current research shows that an AM-PAC score of 18 or greater is typically associated with a discharge to the patient's home setting. Based on the patient's AM-PAC score and their current functional mobility deficits, it is recommended that the patient have 2-3 sessions per week of Physical Therapy at d/c to increase the patient's independence. At this time, this patient demonstrates the endurance and safety to discharge home with home PT level 1 (home vs OP services) and a follow up treatment frequency of 2-3x/wk. Please see assessment section for further patient specific details. If patient discharges prior to next session this note will serve as a discharge summary. Please see below for the latest assessment towards goals. Goals  Short Term Goals  Time Frame for Short Term Goals: upon d/c - goals ongoing 11/3  Short Term Goal 1: bed mobility mod I  Short Term Goal 2: transfers mod I  Short Term Goal 3: ambulate 48' with LRAD and mod I  Patient Goals   Patient Goals : \"to go home\"       Education  Patient Education  Education Given To: Patient; Family  Education Provided: Equipment;Home Exercise Program;Role of Therapy;Plan of Care;Transfer Training;Energy Conservation  Education Provided Comments: proper use of rollator brakes/equipment for all mobility tasks including placement for safety with transfers, use of IS for breathing exercises.   Education Method: Verbal;Demonstration  Barriers to Learning: Hearing  Education Outcome: Verbalized understanding;Continued education needed;Demonstrated understanding      Therapy Time   Individual Concurrent Group Co-treatment   Time In 1125         Time Out 1150         Minutes 25         Timed Code Treatment Minutes: [address] [place], [place] [id number] 11/07/22 1:25 PM

## 2022-11-08 VITALS
OXYGEN SATURATION: 94 % | RESPIRATION RATE: 16 BRPM | HEART RATE: 67 BPM | HEIGHT: 68 IN | WEIGHT: 267.42 LBS | DIASTOLIC BLOOD PRESSURE: 70 MMHG | BODY MASS INDEX: 40.53 KG/M2 | TEMPERATURE: 97.3 F | SYSTOLIC BLOOD PRESSURE: 146 MMHG

## 2022-11-08 LAB
ABO/RH: NORMAL
ABO/RH: NORMAL
ALBUMIN SERPL-MCNC: 3.1 G/DL (ref 3.4–5)
ANION GAP SERPL CALCULATED.3IONS-SCNC: 11 MMOL/L (ref 3–16)
ANTIBODY SCREEN: NORMAL
BASOPHILS ABSOLUTE: 0 K/UL (ref 0–0.2)
BASOPHILS RELATIVE PERCENT: 0.2 %
BLOOD BANK DISPENSE STATUS: NORMAL
BLOOD BANK PRODUCT CODE: NORMAL
BPU ID: NORMAL
BUN BLDV-MCNC: 82 MG/DL (ref 7–20)
CALCIUM SERPL-MCNC: 7.7 MG/DL (ref 8.3–10.6)
CHLORIDE BLD-SCNC: 102 MMOL/L (ref 99–110)
CO2: 20 MMOL/L (ref 21–32)
CREAT SERPL-MCNC: 2.9 MG/DL (ref 0.8–1.3)
CULTURE, RESPIRATORY: NORMAL
DESCRIPTION BLOOD BANK: NORMAL
EOSINOPHILS ABSOLUTE: 0.1 K/UL (ref 0–0.6)
EOSINOPHILS RELATIVE PERCENT: 1.3 %
GFR SERPL CREATININE-BSD FRML MDRD: 21 ML/MIN/{1.73_M2}
GLUCOSE BLD-MCNC: 124 MG/DL (ref 70–99)
GLUCOSE BLD-MCNC: 126 MG/DL (ref 70–99)
GLUCOSE BLD-MCNC: 133 MG/DL (ref 70–99)
GLUCOSE BLD-MCNC: 134 MG/DL (ref 70–99)
GRAM STAIN RESULT: NORMAL
HCT VFR BLD CALC: 21.4 % (ref 40.5–52.5)
HEMOGLOBIN: 7 G/DL (ref 13.5–17.5)
LYMPHOCYTES ABSOLUTE: 0.5 K/UL (ref 1–5.1)
LYMPHOCYTES RELATIVE PERCENT: 6.9 %
MCH RBC QN AUTO: 28 PG (ref 26–34)
MCHC RBC AUTO-ENTMCNC: 33 G/DL (ref 31–36)
MCV RBC AUTO: 85 FL (ref 80–100)
MONOCYTES ABSOLUTE: 0.5 K/UL (ref 0–1.3)
MONOCYTES RELATIVE PERCENT: 7.5 %
NEUTROPHILS ABSOLUTE: 5.9 K/UL (ref 1.7–7.7)
NEUTROPHILS RELATIVE PERCENT: 84.1 %
PDW BLD-RTO: 18.4 % (ref 12.4–15.4)
PERFORMED ON: ABNORMAL
PHOSPHORUS: 4.9 MG/DL (ref 2.5–4.9)
PLATELET # BLD: 122 K/UL (ref 135–450)
PMV BLD AUTO: 8.6 FL (ref 5–10.5)
POTASSIUM SERPL-SCNC: 4.1 MMOL/L (ref 3.5–5.1)
RBC # BLD: 2.51 M/UL (ref 4.2–5.9)
SODIUM BLD-SCNC: 133 MMOL/L (ref 136–145)
WBC # BLD: 7 K/UL (ref 4–11)

## 2022-11-08 PROCEDURE — 86900 BLOOD TYPING SEROLOGIC ABO: CPT

## 2022-11-08 PROCEDURE — 6360000002 HC RX W HCPCS: Performed by: INTERNAL MEDICINE

## 2022-11-08 PROCEDURE — 6370000000 HC RX 637 (ALT 250 FOR IP): Performed by: INTERNAL MEDICINE

## 2022-11-08 PROCEDURE — 1200000000 HC SEMI PRIVATE

## 2022-11-08 PROCEDURE — 94640 AIRWAY INHALATION TREATMENT: CPT

## 2022-11-08 PROCEDURE — 86901 BLOOD TYPING SEROLOGIC RH(D): CPT

## 2022-11-08 PROCEDURE — 2580000003 HC RX 258: Performed by: INTERNAL MEDICINE

## 2022-11-08 PROCEDURE — 99239 HOSP IP/OBS DSCHRG MGMT >30: CPT | Performed by: INTERNAL MEDICINE

## 2022-11-08 PROCEDURE — 85025 COMPLETE CBC W/AUTO DIFF WBC: CPT

## 2022-11-08 PROCEDURE — 86923 COMPATIBILITY TEST ELECTRIC: CPT

## 2022-11-08 PROCEDURE — 36415 COLL VENOUS BLD VENIPUNCTURE: CPT

## 2022-11-08 PROCEDURE — 2580000003 HC RX 258: Performed by: STUDENT IN AN ORGANIZED HEALTH CARE EDUCATION/TRAINING PROGRAM

## 2022-11-08 PROCEDURE — 2700000000 HC OXYGEN THERAPY PER DAY

## 2022-11-08 PROCEDURE — 51798 US URINE CAPACITY MEASURE: CPT

## 2022-11-08 PROCEDURE — P9016 RBC LEUKOCYTES REDUCED: HCPCS

## 2022-11-08 PROCEDURE — 94760 N-INVAS EAR/PLS OXIMETRY 1: CPT

## 2022-11-08 PROCEDURE — 80069 RENAL FUNCTION PANEL: CPT

## 2022-11-08 PROCEDURE — 2500000003 HC RX 250 WO HCPCS: Performed by: INTERNAL MEDICINE

## 2022-11-08 PROCEDURE — 86850 RBC ANTIBODY SCREEN: CPT

## 2022-11-08 PROCEDURE — 36430 TRANSFUSION BLD/BLD COMPNT: CPT

## 2022-11-08 RX ORDER — DOXYCYCLINE HYCLATE 100 MG
100 TABLET ORAL 2 TIMES DAILY
Qty: 14 TABLET | Refills: 0 | Status: SHIPPED | OUTPATIENT
Start: 2022-11-08 | End: 2022-11-15

## 2022-11-08 RX ORDER — INSULIN GLARGINE 100 [IU]/ML
10 INJECTION, SOLUTION SUBCUTANEOUS NIGHTLY
Qty: 10 ML | Refills: 3 | COMMUNITY
Start: 2022-11-08 | End: 2022-11-09 | Stop reason: SDUPTHER

## 2022-11-08 RX ORDER — CEFDINIR 300 MG/1
300 CAPSULE ORAL DAILY
Qty: 7 CAPSULE | Refills: 0 | Status: SHIPPED | OUTPATIENT
Start: 2022-11-08 | End: 2022-11-15

## 2022-11-08 RX ORDER — SODIUM BICARBONATE 650 MG/1
650 TABLET ORAL 3 TIMES DAILY
Qty: 90 TABLET | Refills: 5 | Status: ON HOLD | OUTPATIENT
Start: 2022-11-08 | End: 2022-11-30

## 2022-11-08 RX ORDER — FUROSEMIDE 10 MG/ML
20 INJECTION INTRAMUSCULAR; INTRAVENOUS ONCE
Status: COMPLETED | OUTPATIENT
Start: 2022-11-08 | End: 2022-11-08

## 2022-11-08 RX ORDER — SODIUM CHLORIDE 9 MG/ML
INJECTION, SOLUTION INTRAVENOUS PRN
Status: COMPLETED | OUTPATIENT
Start: 2022-11-08 | End: 2022-11-08

## 2022-11-08 RX ADMIN — FERROUS SULFATE TAB EC 324 MG (65 MG FE EQUIVALENT) 324 MG: 324 (65 FE) TABLET DELAYED RESPONSE at 09:50

## 2022-11-08 RX ADMIN — TAMSULOSIN HYDROCHLORIDE 0.8 MG: 0.4 CAPSULE ORAL at 09:50

## 2022-11-08 RX ADMIN — SODIUM CHLORIDE, PRESERVATIVE FREE 10 ML: 5 INJECTION INTRAVENOUS at 09:52

## 2022-11-08 RX ADMIN — IPRATROPIUM BROMIDE AND ALBUTEROL SULFATE 1 AMPULE: .5; 3 SOLUTION RESPIRATORY (INHALATION) at 09:24

## 2022-11-08 RX ADMIN — PANTOPRAZOLE SODIUM 40 MG: 40 TABLET, DELAYED RELEASE ORAL at 09:50

## 2022-11-08 RX ADMIN — Medication 1 CAPSULE: at 16:59

## 2022-11-08 RX ADMIN — AMIODARONE HYDROCHLORIDE 200 MG: 200 TABLET ORAL at 09:50

## 2022-11-08 RX ADMIN — ASPIRIN 162 MG: 81 TABLET, COATED ORAL at 09:50

## 2022-11-08 RX ADMIN — CLONIDINE HYDROCHLORIDE 0.1 MG: 0.1 TABLET ORAL at 09:51

## 2022-11-08 RX ADMIN — SODIUM CHLORIDE: 9 INJECTION, SOLUTION INTRAVENOUS at 13:09

## 2022-11-08 RX ADMIN — Medication 1 CAPSULE: at 10:00

## 2022-11-08 RX ADMIN — ATORVASTATIN CALCIUM 40 MG: 40 TABLET, FILM COATED ORAL at 09:50

## 2022-11-08 RX ADMIN — FUROSEMIDE 20 MG: 10 INJECTION, SOLUTION INTRAMUSCULAR; INTRAVENOUS at 10:00

## 2022-11-08 RX ADMIN — SODIUM CHLORIDE 25 ML: 9 INJECTION, SOLUTION INTRAVENOUS at 01:14

## 2022-11-08 RX ADMIN — MONTELUKAST 10 MG: 10 TABLET, FILM COATED ORAL at 09:50

## 2022-11-08 RX ADMIN — CEFEPIME 1000 MG: 1 INJECTION, POWDER, FOR SOLUTION INTRAMUSCULAR; INTRAVENOUS at 02:35

## 2022-11-08 RX ADMIN — VERAPAMIL HYDROCHLORIDE 240 MG: 240 TABLET, FILM COATED, EXTENDED RELEASE ORAL at 09:49

## 2022-11-08 RX ADMIN — DOXYCYCLINE 100 MG: 100 INJECTION, POWDER, LYOPHILIZED, FOR SOLUTION INTRAVENOUS at 01:16

## 2022-11-08 RX ADMIN — IPRATROPIUM BROMIDE AND ALBUTEROL SULFATE 1 AMPULE: .5; 3 SOLUTION RESPIRATORY (INHALATION) at 16:08

## 2022-11-08 RX ADMIN — DOCUSATE SODIUM 100 MG: 100 CAPSULE, LIQUID FILLED ORAL at 09:51

## 2022-11-08 RX ADMIN — SODIUM BICARBONATE 650 MG: 650 TABLET ORAL at 14:55

## 2022-11-08 RX ADMIN — SODIUM BICARBONATE 650 MG: 650 TABLET ORAL at 09:50

## 2022-11-08 RX ADMIN — HEPARIN SODIUM 5000 UNITS: 5000 INJECTION INTRAVENOUS; SUBCUTANEOUS at 06:06

## 2022-11-08 NOTE — DISCHARGE SUMMARY
[address] [facility]                               DISCHARGE SUMMARY    PATIENT NAME: Ceferino Current                    :        1940  MED REC NO:   [id number]                          ROOM:       3263  ACCOUNT NO:   [de-identified]                           ADMIT DATE: 10/30/2022  PROVIDER:     Patel Jaffe MD                  DISCHARGE DATE:  2022    REASON FOR ADMISSION:  Weakness, acute kidney injury. HISTORY OF PRESENT ILLNESS:  This is an 79-year-old white male with  multiple problems, who presented to the emergency room with weakness and  shakiness with walking. The patient stated over the last several days  He just was not feeling himself and had a hard-time walking. He had no specific  symptoms, just felt bad everywhere. He sees things progressed over the  two to three days eventually came to the ER. He denied any fever or  chills. He had no cough or sputum. He denied chest pain or dysuria. In the ER, he was found to have a worsened anemia and a mildly elevated  creatinine from baseline. No other significant abnormality was found. Hematology was consulted as was Nephrology. Renal ultrasound was  ordered and was negative. HOSPITAL COURSE:  1.  Generalized weakness. This was felt to be multifactorial related to  his anemia, probably some dehydration and the patient did noted to have  a pneumonia, which was treated. Over the course of the evaluation  physical therapy and occupational therapy worked with the patient and he  was deemed safe enough to go home with a rollator walker. 2.  Acute-on-chronic kidney disease. The patient's baseline creatinine  is approximately 2.1, he presented with a creatinine of 2.4. The  patient was given a liter of IV fluids and his angiotensin receptor  blocker and his diuretics were held. With a liter of fluid, his  creatinine did worsen up to 2.8. Nephrology was consulted. Renal  ultrasound failed to show any evidence of hydronephrosis. Nephrology  did give the patient some slow IV fluids and his renal function  improved, but once again came back to a creatinine of 2.8. It is felt  that his renal function probably is just some gradual worsening of his  chronic kidney disease stage IV and he will need close outpatient follow  up with Nephrology. He has been placed on a sodium bicarbonate tablets  and his Diovan and diuretics will remain on hold currently. 3.  Anemia. The patient was seen by Dr. Kemar Washington and due to his  worsening anemia a bone marrow biopsy was performed. No evidence of  malignancy was identified. The patient was given a dose of Procrit here  in the hospital.  His blood counts were monitored throughout the  hospital stay and on the day of discharge, the patient was noted to have  a hemoglobin of 7. The patient did receive a second dose of Procrit and  he did get transfused 1 unit of packed red blood cells and followup will  be with Dr. Kemar Washington as an outpatient. 4.  Diabetes mellitus type 2. The patient was typically on basal  insulin 40 units twice daily, but he became very hypoglycemic here in  the hospital.  The basal insulin was discontinued and the patient  actually did need some IV D5W for a period of time until the insulin was  clear from the system. The patient was followed with just a sliding  scale insulin during the remainder of the hospital stay and be  discharged to home on 10 units of Lantus at nighttime only with the  anticipation that the patient's diet at home will be much different and  he will need more insulin in a relatively short period time. We will  monitor that closely. 5.  Hypertension. The patient's blood pressures have been reasonably  well controlled even though he has been off diuretics and off his  valsartan. 6.  Obstructive sleep apnea. The patient will continue to use a CPAP at  home.   7. Bibasilar pneumonia. Two days prior to discharge the patient did  develop a cough and sputum and chest x-ray showed bibasilar infiltrates  and the patient developed an oxygen requirement. The patient was placed  on cefepime and doxycycline and the patient did have symptomatic  improvement with some improvement in his chest x-ray over the course of  the next two days. The patient will be sent home on Omnicef 300 mg once  daily and doxycycline 100 mg twice daily for seven more days. The  Omnicef has been dose adjusted for his creatinine clearance, which is  calculated at 25. The remainder of the patient's chronic medical conditions are stable at  the time of discharge. Followup will be with Dr. Cindy Palmer in one week,  Dr. Bishop Perry in two weeks from Nephrology, and Dr. Varsha Schulz in  one week for followup of his anemia. Home care has been ordered as well  as home oxygen. Condition on discharge is guarded.         Shannan Kowalski MD    D: 11/08/2022 17:47:34       T: 11/08/2022 17:51:34     [id number]  Job#: [id number]     Doc#: [id number]    CC:

## 2022-11-08 NOTE — PLAN OF CARE
Problem: Discharge Planning  Goal: Discharge to home or other facility with appropriate resources  11/7/2022 2012 by Vladimir Jenkins RN  Outcome: Progressing  11/7/2022 1155 by Tianna Bucio RN  Outcome: Progressing  Flowsheets (Taken 11/7/2022 0900)  Discharge to home or other facility with appropriate resources:   Identify barriers to discharge with patient and caregiver   Arrange for needed discharge resources and transportation as appropriate   Identify discharge learning needs (meds, wound care, etc)   Refer to discharge planning if patient needs post-hospital services based on physician order or complex needs related to functional status, cognitive ability or social support system     Problem: Safety - Adult  Goal: Free from fall injury  11/7/2022 2012 by Vladimir Jenkins RN  Outcome: Progressing  11/7/2022 1155 by Tianna Bucio RN  Outcome: Progressing  Flowsheets (Taken 11/7/2022 1151)  Free From Fall Injury: Instruct family/caregiver on patient safety     Problem: ABCDS Injury Assessment  Goal: Absence of physical injury  11/7/2022 2012 by Vladimir Jenkins RN  Outcome: Progressing  11/7/2022 1155 by Tianna Bucio RN  Outcome: Progressing  Flowsheets (Taken 11/7/2022 1151)  Absence of Physical Injury: Implement safety measures based on patient assessment     Problem: Skin/Tissue Integrity  Goal: Absence of new skin breakdown  Description: 1. Monitor for areas of redness and/or skin breakdown  2. Assess vascular access sites hourly  3. Every 4-6 hours minimum:  Change oxygen saturation probe site  4. Every 4-6 hours:  If on nasal continuous positive airway pressure, respiratory therapy assess nares and determine need for appliance change or resting period.   11/7/2022 2012 by Vladimir Jenkins RN  Outcome: Progressing  11/7/2022 1155 by Tianna Bucio RN  Outcome: Progressing     Problem: Neurosensory - Adult  Goal: Achieves stable or improved neurological status  11/7/2022 2012 by John Mckinley MAHNAZ Wells  Outcome: Progressing  11/7/2022 1155 by Travon Armando RN  Outcome: Progressing  Flowsheets (Taken 11/7/2022 0900)  Achieves stable or improved neurological status: Assess for and report changes in neurological status  Goal: Absence of seizures  11/7/2022 2012 by Saul Shea RN  Outcome: Progressing  11/7/2022 1155 by Travon Armando RN  Outcome: Progressing  Goal: Remains free of injury related to seizures activity  11/7/2022 2012 by Saul Shea RN  Outcome: Progressing  11/7/2022 1155 by Travon Armando RN  Outcome: Progressing  Goal: Achieves maximal functionality and self care  11/7/2022 2012 by Saul Shea RN  Outcome: Progressing  11/7/2022 1155 by Travon Armando RN  Outcome: Progressing     Problem: Respiratory - Adult  Goal: Achieves optimal ventilation and oxygenation  11/7/2022 2012 by Saul Shea RN  Outcome: Progressing  11/7/2022 1155 by Travon Armando RN  Outcome: Progressing  Flowsheets (Taken 11/7/2022 0900)  Achieves optimal ventilation and oxygenation:   Assess for changes in respiratory status   Assess for changes in mentation and behavior   Position to facilitate oxygenation and minimize respiratory effort   Oxygen supplementation based on oxygen saturation or arterial blood gases   Assess and instruct to report shortness of breath or any respiratory difficulty     Problem: Cardiovascular - Adult  Goal: Maintains optimal cardiac output and hemodynamic stability  11/7/2022 2012 by Saul Shea RN  Outcome: Progressing  11/7/2022 1155 by Travon Armando RN  Outcome: Progressing  Flowsheets (Taken 11/7/2022 0900)  Maintains optimal cardiac output and hemodynamic stability: Monitor blood pressure and heart rate  Goal: Absence of cardiac dysrhythmias or at baseline  11/7/2022 2012 by Saul Shea RN  Outcome: Progressing  11/7/2022 1155 by Travon Armando RN  Outcome: Progressing  Flowsheets (Taken 11/7/2022 0900)  Absence of cardiac dysrhythmias or at baseline: Assess for signs of decreased cardiac output     Problem: Skin/Tissue Integrity - Adult  Goal: Skin integrity remains intact  11/7/2022 2012 by Adebayo Phillips RN  Outcome: Progressing  11/7/2022 1155 by Liss Hanson RN  Outcome: Progressing  Flowsheets  Taken 11/7/2022 1151  Skin Integrity Remains Intact: Monitor for areas of redness and/or skin breakdown  Taken 11/7/2022 0900  Skin Integrity Remains Intact: Monitor for areas of redness and/or skin breakdown  Goal: Incisions, wounds, or drain sites healing without S/S of infection  11/7/2022 2012 by Adebayo Phillips RN  Outcome: Progressing  11/7/2022 1155 by Liss Hanson RN  Outcome: Progressing  Flowsheets  Taken 11/7/2022 1151  Incisions, Wounds, or Drain Sites Healing Without Sign and Symptoms of Infection:   ADMISSION and DAILY: Assess and document risk factors for pressure ulcer development   TWICE DAILY: Assess and document skin integrity  Taken 11/7/2022 0900  Incisions, Wounds, or Drain Sites Healing Without Sign and Symptoms of Infection:   ADMISSION and DAILY: Assess and document risk factors for pressure ulcer development   TWICE DAILY: Assess and document skin integrity  Goal: Oral mucous membranes remain intact  11/7/2022 2012 by Adebayo Phillips RN  Outcome: Progressing  11/7/2022 1155 by Liss Hanson RN  Outcome: Progressing  Flowsheets  Taken 11/7/2022 1151  Oral Mucous Membranes Remain Intact: Assess oral mucosa and hygiene practices  Taken 11/7/2022 0900  Oral Mucous Membranes Remain Intact: Assess oral mucosa and hygiene practices     Problem: Musculoskeletal - Adult  Goal: Return mobility to safest level of function  11/7/2022 2012 by Adebayo Phillips RN  Outcome: Progressing  11/7/2022 1155 by Liss Hanson RN  Outcome: Progressing  Flowsheets (Taken 11/7/2022 0900)  Return Mobility to Safest Level of Function:   Assess patient stability and activity tolerance for standing, transferring and ambulating with or without assistive devices   Assist with transfers and ambulation using safe patient handling equipment as needed  Goal: Maintain proper alignment of affected body part  11/7/2022 2012 by Chandler Maldonado RN  Outcome: Progressing  11/7/2022 1155 by Teetee Rain RN  Outcome: Progressing  Flowsheets (Taken 11/7/2022 0900)  Maintain proper alignment of affected body part: Support and protect limb and body alignment per provider's orders  Goal: Return ADL status to a safe level of function  11/7/2022 2012 by Chandler Maldonado RN  Outcome: Progressing  11/7/2022 1155 by Teetee Rain RN  Outcome: Progressing  Flowsheets (Taken 11/7/2022 0900)  Return ADL Status to a Safe Level of Function:   Administer medication as ordered   Assess activities of daily living deficits and provide assistive devices as needed     Problem: Gastrointestinal - Adult  Goal: Minimal or absence of nausea and vomiting  11/7/2022 2012 by Chandler Maldonado RN  Outcome: Progressing  11/7/2022 1155 by Teetee Rain RN  Outcome: Progressing  Flowsheets (Taken 11/7/2022 0900)  Minimal or absence of nausea and vomiting: Administer IV fluids as ordered to ensure adequate hydration  Goal: Maintains or returns to baseline bowel function  11/7/2022 2012 by Chandler Maldonado RN  Outcome: Progressing  11/7/2022 1155 by Teetee Rain RN  Outcome: Progressing  Flowsheets (Taken 11/7/2022 0900)  Maintains or returns to baseline bowel function:   Assess bowel function   Encourage oral fluids to ensure adequate hydration   Administer IV fluids as ordered to ensure adequate hydration  Goal: Maintains adequate nutritional intake  11/7/2022 2012 by Chandler Maldonado RN  Outcome: Progressing  11/7/2022 1155 by Teetee Rain RN  Outcome: Progressing  Flowsheets (Taken 11/7/2022 0900)  Maintains adequate nutritional intake:   Monitor percentage of each meal consumed   Monitor intake and output, weight and lab values  Goal: Establish and maintain optimal ostomy function  11/7/2022 2012 by Alessandra Morrow RN  Outcome: Progressing  11/7/2022 1155 by Glenn Hernández RN  Outcome: Progressing     Problem: Genitourinary - Adult  Goal: Absence of urinary retention  11/7/2022 2012 by Alessandra Morrow RN  Outcome: Progressing  11/7/2022 1155 by Glenn Hernández RN  Outcome: Progressing  Flowsheets (Taken 11/7/2022 0900)  Absence of urinary retention: Assess patients ability to void and empty bladder  Goal: Urinary catheter remains patent  11/7/2022 2012 by Alessandra Morrow RN  Outcome: Progressing  11/7/2022 1155 by Glenn Hernández RN  Outcome: Progressing     Problem: Infection - Adult  Goal: Absence of infection at discharge  11/7/2022 2012 by Alessandra Morrow RN  Outcome: Progressing  11/7/2022 1155 by Glenn Hernández RN  Outcome: Progressing  Flowsheets (Taken 11/7/2022 0900)  Absence of infection at discharge: Assess and monitor for signs and symptoms of infection  Goal: Absence of infection during hospitalization  11/7/2022 2012 by Alessandra Morrow RN  Outcome: Progressing  11/7/2022 1155 by Glenn Hernández RN  Outcome: Progressing  Flowsheets (Taken 11/7/2022 0900)  Absence of infection during hospitalization: Assess and monitor for signs and symptoms of infection  Goal: Absence of fever/infection during anticipated neutropenic period  11/7/2022 2012 by Alessandra Morrow RN  Outcome: Progressing  11/7/2022 1155 by Glenn Hernández RN  Outcome: Progressing  Flowsheets (Taken 11/7/2022 0900)  Absence of fever/infection during anticipated neutropenic period: Monitor white blood cell count     Problem: Metabolic/Fluid and Electrolytes - Adult  Goal: Electrolytes maintained within normal limits  11/7/2022 2012 by Alessandra Morrow RN  Outcome: Progressing  11/7/2022 1155 by Glenn Hernández RN  Outcome: Progressing  Flowsheets (Taken 11/7/2022 0900)  Electrolytes maintained within normal limits: Monitor labs and assess patient for signs and symptoms of electrolyte imbalances  Goal: Hemodynamic stability and optimal renal function maintained  11/7/2022 2012 by Renetta Arzola RN  Outcome: Progressing  11/7/2022 1155 by Eliberto Bernheim, RN  Outcome: Progressing  Flowsheets (Taken 11/7/2022 0900)  Hemodynamic stability and optimal renal function maintained: Monitor labs and assess for signs and symptoms of volume excess or deficit  Goal: Glucose maintained within prescribed range  11/7/2022 2012 by Renetta Arzola RN  Outcome: Progressing  11/7/2022 1155 by Eliberto Bernheim, RN  Outcome: Progressing  Flowsheets (Taken 11/7/2022 0900)  Glucose maintained within prescribed range:   Monitor blood glucose as ordered   Assess for signs and symptoms of hyperglycemia and hypoglycemia     Problem: Hematologic - Adult  Goal: Maintains hematologic stability  11/7/2022 2012 by Renetta Arzola RN  Outcome: Progressing  11/7/2022 1155 by Eliberto Bernheim, RN  Outcome: Progressing  Flowsheets (Taken 11/7/2022 0900)  Maintains hematologic stability: Assess for signs and symptoms of bleeding or hemorrhage

## 2022-11-08 NOTE — DISCHARGE SUMMARY
Discharge Summary Dictated    Dictation # [id number]    Time > 30 minutes coordinating discharge,  reviewing chart and patient data, examining and interviewing patient, and discussing with nursing staff, case management/social work, family, etc.

## 2022-11-08 NOTE — PROGRESS NOTES
Patient admitted with generalized weakness. A/Ox4. Transfers with walker with x1 assist. On regular, 4 carb diet, tolerating well. Medications taken whole with thins. Skin: scattered bruising. Oxygen: 1 L NC. LDA: PIV LFA. Has been continent of bowel and continent of bladder. LBM 11/6/22. Chair/bed alarms in use and call light in reach. Will monitor for safety. Electronically signed by Felecia Reid RN on 11/7/2022 at 8:15 PM

## 2022-11-08 NOTE — PROGRESS NOTES
Patient exerted after walking to bathroom. 02 93 percent on 1 L of Oxygen. Lung sounds diminished. Patient want to go to bed, HOB at semi fowlers for comfort. Bedside table and call light in reach. Will continue to monitor.  Electronically signed by Isidro Barry RN on 11/7/2022 at 10:37 PM

## 2022-11-08 NOTE — PLAN OF CARE
Problem: Discharge Planning  Goal: Discharge to home or other facility with appropriate resources  Outcome: Progressing  Flowsheets (Taken 11/8/2022 0945)  Discharge to home or other facility with appropriate resources:   Identify barriers to discharge with patient and caregiver   Arrange for needed discharge resources and transportation as appropriate   Identify discharge learning needs (meds, wound care, etc)   Refer to discharge planning if patient needs post-hospital services based on physician order or complex needs related to functional status, cognitive ability or social support system     Problem: Safety - Adult  Goal: Free from fall injury  Outcome: Progressing  Flowsheets (Taken 11/8/2022 1348)  Free From Fall Injury: Instruct family/caregiver on patient safety     Problem: ABCDS Injury Assessment  Goal: Absence of physical injury  Outcome: Progressing  Flowsheets (Taken 11/8/2022 1348)  Absence of Physical Injury: Implement safety measures based on patient assessment     Problem: Skin/Tissue Integrity  Goal: Absence of new skin breakdown  Description: 1. Monitor for areas of redness and/or skin breakdown  2. Assess vascular access sites hourly  3. Every 4-6 hours minimum:  Change oxygen saturation probe site  4. Every 4-6 hours:  If on nasal continuous positive airway pressure, respiratory therapy assess nares and determine need for appliance change or resting period.   Outcome: Progressing     Problem: Musculoskeletal - Adult  Goal: Return mobility to safest level of function  Outcome: Progressing  Flowsheets (Taken 11/8/2022 0908)  Return Mobility to Safest Level of Function:   Assess patient stability and activity tolerance for standing, transferring and ambulating with or without assistive devices   Assist with transfers and ambulation using safe patient handling equipment as needed     Problem: Metabolic/Fluid and Electrolytes - Adult  Goal: Hemodynamic stability and optimal renal function maintained  Outcome: Progressing  Flowsheets (Taken 11/8/2022 9328)  Hemodynamic stability and optimal renal function maintained:   Monitor labs and assess for signs and symptoms of volume excess or deficit   Monitor intake, output and patient weight

## 2022-11-08 NOTE — CARE COORDINATION
Will need oxygen orders if going home per respiratory therapy.   Tariq Hernández [place]     Case Management   [phone]    11/8/2022  4:07 PM

## 2022-11-08 NOTE — PROGRESS NOTES
Patient admitted with generalized weakness. A/Ox4. Transfers with walker with assist x1. Mobility restrictions: none. On regular, 4 carb diet, tolerating well. Medications taken whole with thins. On heparin for DVT prophylaxis. Skin: intact; scattered bruising. Oxygen: 2L NC. LDA: PIV RFA. Has been continent of bowel and continent of bladder. LBM 11/6/22. Chair/bed alarms in use and call light in reach. Will monitor for safety.  Electronically signed by Eliberto Bernheim, RN on 11/8/2022 at 3:00 PM

## 2022-11-08 NOTE — PROGRESS NOTES
Hematology Oncology Daily Progress Note    Admit Date: 10/30/2022  Hospital day several    Subjective:     Patient has complaints of stable gen weakness--denies sob/cp. Medication side effects: none    Scheduled Meds:   tamsulosin  0.8 mg Oral Daily    cefepime  1,000 mg IntraVENous Q12H    sodium bicarbonate  650 mg Oral TID    doxycycline (VIBRAMYCIN) IV  100 mg IntraVENous Q12H    heparin (porcine)  5,000 Units SubCUTAneous 3 times per day    [Held by provider] insulin glargine  20 Units SubCUTAneous BID    sodium chloride flush  5-40 mL IntraVENous 2 times per day    insulin lispro  0-4 Units SubCUTAneous TID WC    insulin lispro  0-4 Units SubCUTAneous Nightly    ipratropium-albuterol  1 ampule Inhalation Q4H WA    amiodarone  200 mg Oral Daily    aspirin EC  162 mg Oral Daily    atorvastatin  40 mg Oral Daily    cloNIDine  0.1 mg Oral BID    polyethylene glycol  17 g Oral Daily    docusate sodium  100 mg Oral Daily    ferrous sulfate  324 mg Oral Daily    lactobacillus  1 capsule Oral BID WC    linaclotide  145 mcg Oral QAM AC    montelukast  10 mg Oral Daily    pantoprazole  40 mg Oral Daily    verapamil  240 mg Oral BID     Continuous Infusions:   sodium chloride      sodium chloride 25 mL (11/08/22 0114)    dextrose Stopped (11/01/22 0852)     PRN Meds:sodium chloride, bisacodyl, sodium chloride flush, sodium chloride, ondansetron **OR** ondansetron, polyethylene glycol, acetaminophen **OR** acetaminophen, glucose, dextrose bolus **OR** dextrose bolus, glucagon (rDNA), dextrose    Review of Systems  Pertinent items are noted in HPI. REVIEW OF SYSTEMS:         Constitutional: Denies fever, sweats, weight loss     Eyes: No visual changes or diplopia. No scleral icterus. ENT: No Headaches, hearing loss or vertigo. No mouth sores or sore throat. Cardiovascular: No chest pain, dyspnea on exertion, palpitations or loss of consciousness. Respiratory: No cough or wheezing, no sputum production.  No hemoptysis. .    Gastrointestinal: No abdominal pain, appetite loss, blood in stools. No change in bowel habits. Genitourinary: No dysuria, trouble voiding, or hematuria. Musculoskeletal:  Generalized weakness. No joint complaints. Integumentary: No rash or pruritis. Neurological: No headache, diplopia. No change in gait, balance, or coordination. No paresthesias. Endocrine: No temperature intolerance. No excessive thirst, fluid intake, or urination. Hematologic/Lymphatic: No abnormal bruising or ecchymoses, blood clots or swollen lymph nodes. Allergic/Immunologic: No nasal congestion or hives. Objective:     Patient Vitals for the past 8 hrs:   BP Temp Temp src Pulse Resp SpO2 Weight   11/08/22 0945 137/69 97.3 °F (36.3 °C) Oral 64 17 96 % --   11/08/22 0927 -- -- -- 82 18 94 % --   11/08/22 0435 (!) 128/58 (!) 96.7 °F (35.9 °C) Oral 70 19 94 % 267 lb 6.7 oz (121.3 kg)     I/O last 3 completed shifts: In: 120 [P.O.:120]  Out: 364 [Urine:364]  No intake/output data recorded.     /69   Pulse 64   Temp 97.3 °F (36.3 °C) (Oral)   Resp 17   Ht 5' 8\" (1.727 m)   Wt 267 lb 6.7 oz (121.3 kg)   SpO2 96%   BMI 40.66 kg/m²     General Appearance:    Alert, cooperative, no distress, appears stated age   Head:    Normocephalic, without obvious abnormality, atraumatic   Eyes:    PERRL, conjunctiva/corneas clear, EOM's intact, fundi     benign, both eyes        Ears:    Normal TM's and external ear canals, both ears   Nose:   Nares normal, septum midline, mucosa normal, no drainage    or sinus tenderness   Throat:   Lips, mucosa, and tongue normal; teeth and gums normal   Neck:   Supple, symmetrical, trachea midline, no adenopathy;        thyroid:  No enlargement/tenderness/nodules; no carotid    bruit or JVD   Back:     Symmetric, no curvature, ROM normal, no CVA tenderness   Lungs:     Clear to auscultation bilaterally, respirations unlabored   Chest wall:    No tenderness or deformity   Heart: Regular rate and rhythm, S1 and S2 normal, no murmur, rub   or gallop   Abdomen:     Soft, non-tender, bowel sounds active all four quadrants,     no masses, no organomegaly           Extremities:   Extremities normal, atraumatic, no cyanosis or edema   Pulses:   2+ and symmetric all extremities   Skin:   Skin color, texture, turgor normal, no rashes or lesions   Lymph nodes:   Cervical, supraclavicular, and axillary nodes normal   Neurologic:   CNII-XII intact. Normal strength, sensation and reflexes       throughout       Data ReviewCBC:   Lab Results   Component Value Date/Time    WBC 7.0 11/08/2022 07:14 AM    RBC 2.51 11/08/2022 07:14 AM       Assessment:     Principal Problem:    Generalized weakness  Active Problems:    JOANN (obstructive sleep apnea)    Type 2 diabetes mellitus with stage 3 chronic kidney disease, with long-term current use of insulin (HCC)    Microcytic anemia    Chronic GERD    Hyperlipidemia    Stage 3b chronic kidney disease (HCC)    Essential hypertension    Moderate COPD (chronic obstructive pulmonary disease) ([facility])    DEISY (acute kidney injury) ([facility] [address]    Slow transit constipation  Resolved Problems:    * No resolved hospital problems. *      Plan:     `1. Anemia--partially due to DEISY on CKD. BM biopsy unremarkable. I will order 1 unit rbc. He could be discharged later today after blood if desired. It can take 4 to 6 weeks for Procrit to start working.         Electronically signed by Ericka Johnson MD on 11/8/2022 at 10:39 AM

## 2022-11-08 NOTE — PROGRESS NOTES
Department of Internal Medicine  Nephrology Progress Note    This is a patient with significant past medical history of DM2, anemia, Afib, COPD, and CKD IV - baseline Cr looks to be 2.0 going back to 2021 who presents with weakness,dyspnea  and worsening anemia   We are asked to see him for worsening azotemia and Cr up to 2.8. Subjective:  -pt seen and examined  -PMSHx and meds reviewed  -family at bedside      REVIEW OF SYSTEMS:  No CP, some GIBBONS / SOB, feels weak. Appetite better . Physical Exam:    VITALS:  /69   Pulse 64   Temp 97.3 °F (36.3 °C) (Oral)   Resp 17   Ht 5' 8\" (1.727 m)   Wt 267 lb 6.7 oz (121.3 kg)   SpO2 96%   BMI 40.66 kg/m²   24HR INTAKE/OUTPUT:    Intake/Output Summary (Last 24 hours) at 11/8/2022 1200  Last data filed at 11/7/2022 2238  Gross per 24 hour   Intake --   Output 364 ml   Net -364 ml         Constitutional:  awake, alert, obese  HEENT: anciteric, NC/AT  Neck: supple  Respiratory:  CTA  Gastrointestinal:  No  tenderness.   Normal Bowel Sounds  Cardiovascular:  RRR   Edema:  +  edema    Medications:   tamsulosin  0.8 mg Oral Daily    cefepime  1,000 mg IntraVENous Q12H    sodium bicarbonate  650 mg Oral TID    doxycycline (VIBRAMYCIN) IV  100 mg IntraVENous Q12H    heparin (porcine)  5,000 Units SubCUTAneous 3 times per day    [Held by provider] insulin glargine  20 Units SubCUTAneous BID    sodium chloride flush  5-40 mL IntraVENous 2 times per day    insulin lispro  0-4 Units SubCUTAneous TID WC    insulin lispro  0-4 Units SubCUTAneous Nightly    ipratropium-albuterol  1 ampule Inhalation Q4H WA    amiodarone  200 mg Oral Daily    aspirin EC  162 mg Oral Daily    atorvastatin  40 mg Oral Daily    cloNIDine  0.1 mg Oral BID    polyethylene glycol  17 g Oral Daily    docusate sodium  100 mg Oral Daily    ferrous sulfate  324 mg Oral Daily    lactobacillus  1 capsule Oral BID WC    linaclotide  145 mcg Oral QAM AC    montelukast  10 mg Oral Daily    pantoprazole  40 mg Oral Daily    verapamil  240 mg Oral BID       DATA:    CBC:  Lab Results   Component Value Date/Time    WBC 7.0 11/08/2022 07:14 AM    RBC 2.51 11/08/2022 07:14 AM    HGB 7.0 11/08/2022 07:14 AM    HCT 21.4 11/08/2022 07:14 AM    MCV 85.0 11/08/2022 07:14 AM    MCH 28.0 11/08/2022 07:14 AM    MCHC 33.0 11/08/2022 07:14 AM    RDW 18.4 11/08/2022 07:14 AM     11/08/2022 07:14 AM    MPV 8.6 11/08/2022 07:14 AM     CMP:  Lab Results   Component Value Date/Time     11/08/2022 07:14 AM    K 4.1 11/08/2022 07:14 AM    K 5.1 10/31/2022 06:05 AM     11/08/2022 07:14 AM    CO2 20 11/08/2022 07:14 AM    BUN 82 11/08/2022 07:14 AM    CREATININE 2.9 11/08/2022 07:14 AM    GFRAA 37 05/27/2022 02:05 PM    GFRAA >60 04/19/2013 10:46 AM    AGRATIO 1.4 10/31/2022 06:05 AM    LABGLOM 21 11/08/2022 07:14 AM    GLUCOSE 126 11/08/2022 07:14 AM    GLUCOSE 96 06/16/2011 10:37 AM    PROT 6.3 11/03/2022 07:31 AM    PROT 6.4 12/12/2012 08:05 AM    CALCIUM 7.7 11/08/2022 07:14 AM    BILITOT 0.3 11/03/2022 07:31 AM    ALKPHOS 112 11/03/2022 07:31 AM    AST 26 11/03/2022 07:31 AM    ALT 36 11/03/2022 07:31 AM      Hepatic Function Panel:   Lab Results   Component Value Date/Time    ALKPHOS 112 11/03/2022 07:31 AM    ALT 36 11/03/2022 07:31 AM    AST 26 11/03/2022 07:31 AM    PROT 6.3 11/03/2022 07:31 AM    PROT 6.4 12/12/2012 08:05 AM    BILITOT 0.3 11/03/2022 07:31 AM    BILIDIR <0.2 11/03/2022 07:31 AM    IBILI see below 11/03/2022 07:31 AM      Phosphorus:   Lab Results   Component Value Date/Time    PHOS 4.9 11/08/2022 07:14 AM       ASSESSMENT:  Principal Problem:    Generalized weakness  Active Problems:    JOANN (obstructive sleep apnea)    Type 2 diabetes mellitus with stage 3 chronic kidney disease, with long-term current use of insulin (HCC)    Microcytic anemia    Chronic GERD    Hyperlipidemia    Stage 3b chronic kidney disease (HCC)    Essential hypertension    Moderate COPD (chronic obstructive pulmonary disease) ([facility]    DEISY (acute kidney injury) ([facility]    Slow transit constipation  Resolved Problems:    * No resolved hospital problems. *      PLAN:  DEISY on CKD: base Cr 2.0-2.1-Improved then worsened again without obvious cause - flomax restarted 11/7/22 - Creatinine relatively stable since yesterday and should improve with unit of PRBC that he is scheduled to receive later today  -UA is bland, off Diovan/HCT - maintain off both for now  -does not have overt proteinuria, recent ACR only 30  -no indication for kidney biopsy at this time  -continue supportive care    CKD stage 3b: baseline Cr low 2 range, presumed DKD/HTN NS/obesity  -follow up with Dr. Brynn Viramontes at discharge  -minimal albuminuria, no overt proteinuria  -SPEP no monoclonal protein-though had recent bone marrow biopsy that was negative  -continue BP and BS control    DM2 plan per admitting team .    Anemia per hematology . Bone marrow neg for MM .      Thrombocytopenia: stable    Acidosis - Improved after starting sodium bicarbonate tablets    Can be discharged off Diovan HCTZ and follow up with Dr Juany Lopez MD,

## 2022-11-08 NOTE — PROGRESS NOTES
[facility]    Respiratory Therapy   Home Oxygen Evaluation        Name: Alyssa Yost Record Number: [id number]  Age: 79 y.o.   Gender:  male   : 1940  Today's date: 2022  Room: O4T-1992/3263-01      Assessment        BP (!) 128/58   Pulse 82   Temp (!) 96.7 °F (35.9 °C) (Oral)   Resp 18   Ht 5' 8\" (1.727 m)   Wt 267 lb 6.7 oz (121.3 kg)   SpO2 94%   BMI 40.66 kg/m²     Patient Active Problem List   Diagnosis    JOANN (obstructive sleep apnea)    Chronic GERD    Type 2 diabetes mellitus with stage 3 chronic kidney disease, with long-term current use of insulin (HCC)    Microcytic anemia    Microalbuminuria    Hyperlipidemia    A-fib    Edema    Bronchitis    Carotid artery stenosis without cerebral infarction    Leg swelling    Chronic venous insufficiency    Stage 3b chronic kidney disease (HCC)    Essential hypertension    B12 deficiency    Pulmonary nodule    Moderate COPD (chronic obstructive pulmonary disease) (HCC)    DEISY (acute kidney injury) ([facility])    Slow transit constipation    Pancreatic mass    Obesity, Class II, BMI 35-39.9    Generalized weakness       Social History:  Social History     Tobacco Use    Smoking status: Former     Packs/day: 0.50     Years: 18.00     Pack years: 9.00     Types: Cigarettes     Start date: 1958     Quit date: 1988     Years since quittin.8    Smokeless tobacco: Never   Vaping Use    Vaping Use: Never used   Substance Use Topics    Alcohol use: No     Alcohol/week: 0.0 standard drinks    Drug use: No       Patient Room Air saturation at rest 86  %    Patent refused to ambulate stating that he was tired of doing that and he gets short of breath when he walsk so \"just deal with it\"     Oxygen saturations of 88% or less on RA qualifies patient for Home Oxygen    Patient resting on 2  lmp  with an oxygen saturation of  93 %       Qualifying patient for home oxygen with ambulation and continuous flow  @ 2 lpm. In your clinical assessment does the Patient Require Portable Oxygen Tanks?     Yes              [facility] [place] contacted to follow care of patients home oxygen needs on 11/8/2022 at 9:29 AM         Carlos Wilson RCP on 11/8/2022 at 9:29 AM

## 2022-11-08 NOTE — PROGRESS NOTES
Patient has been discharged per MD orders. Patient verbalizes understanding of all instructions, medications, and follow up. IV has been removed, catheter intact, patient tolerated well. All belongings have been gathered and packed.  Family in route to transport patient from hospital. Electronically signed by Carole Hollingsworth RN on 11/8/2022 at 6:47 PM

## 2022-11-08 NOTE — PROGRESS NOTES
[facility] Internal Medicine Note      Chief Complaint: I'm feeling ok    Subjective/Interval History: This morning the patient states he is feeling okay. He denies pain. Breathing is okay, still on 1 L of oxygen. Coughing up some sputum. It is blood-tinged at times. Eating and drinking okay. Not getting good sleep here in the hospital.  No other new complaints. No chest pain or shortness breath. No cough or sputum. No nausea, vomiting, diarrhea. No abdominal pain. No dysuria. The remainder of the review of systems is negative. PMH, PSH, FH/SH reviewed and unchanged as documented in the H&P personally documented at admission 10/31/22    Medication list reviewed    Objective:    BP (!) 128/58   Pulse 70   Temp (!) 96.7 °F (35.9 °C) (Oral)   Resp 19   Ht 5' 8\" (1.727 m)   Wt 267 lb 6.7 oz (121.3 kg)   SpO2 94%   BMI 40.66 kg/m²   Temp  Av.2 °F (36.2 °C)  Min: 96.7 °F (35.9 °C)  Max: 97.4 °F (36.3 °C)    Exam unchanged:  RRR  Chest-respirations are easy. No wheezes or rhonchi or crackles are noted. Lungs are clear. Abd- BS+, soft, NTND  Ext- no edema today    The Following Labs Were Reviewed Today:    Recent Results (from the past 24 hour(s))   POCT Glucose    Collection Time: 22 11:26 AM   Result Value Ref Range    POC Glucose 133 (H) 70 - 99 mg/dl    Performed on ACCU-CHEK    POCT Glucose    Collection Time: 22  4:21 PM   Result Value Ref Range    POC Glucose 150 (H) 70 - 99 mg/dl    Performed on ACCU-CHEK    POCT Glucose    Collection Time: 22  9:08 PM   Result Value Ref Range    POC Glucose 176 (H) 70 - 99 mg/dl    Performed on ACCU-CHEK        ASSESSMENT/PLAN:      Principal Problem:    Generalized weakness-overall improved. Continue with PT/OT at discharge. Active Problems:    DEISY -renal function pending for today. If stable or improved we will likely discharge. Bibasilar pneumonia-patient is on doxycycline and cefepime currently.   Likely home on doxycycline and Omnicef. Sputum culture is pending. JOANN (obstructive sleep apnea)-using CPAP. Type 2 diabetes mellitus with stage 3 chronic kidney disease, with long-term current use of insulin-basal insulin remains on hold here in the hospital but likely he will have a much different diet at home. Will send home on a reduced dose of Lantus and adjust as needed. Microcytic anemia-bone marrow biopsy without any evidence of malignancy. Patient received another dose of Procrit yesterday. Will need outpatient follow-up with Dr. Ching Barahona    Stage 3b chronic kidney disease -renal function pending for today. Essential hypertension- blood pressures currently fairly well controlled, continue to monitor as an outpatient. Moderate COPD-seems to be at baseline. Continue current regimen. Slow transit constipation-continue MiraLAX and Linzess with as needed Dulcolax    Chronic GERD-currently asymptomatic. Hyperlipidemia-stable on current statin dosing. Disposition-hopefully discharge later today. Await renal function.       Carmen Garcia MD, FACP  7:39 AM  11/8/2022

## 2022-11-09 ENCOUNTER — TELEPHONE (OUTPATIENT)
Dept: CARDIOLOGY CLINIC | Age: 82
End: 2022-11-09

## 2022-11-09 ENCOUNTER — CARE COORDINATION (OUTPATIENT)
Dept: CASE MANAGEMENT | Age: 82
End: 2022-11-09

## 2022-11-09 DIAGNOSIS — I48.0 PAROXYSMAL ATRIAL FIBRILLATION (HCC): ICD-10-CM

## 2022-11-09 DIAGNOSIS — I87.2 CHRONIC VENOUS INSUFFICIENCY: Primary | ICD-10-CM

## 2022-11-09 PROCEDURE — 1111F DSCHRG MED/CURRENT MED MERGE: CPT | Performed by: INTERNAL MEDICINE

## 2022-11-09 NOTE — TELEPHONE ENCOUNTER
Pt states he was d/c from the hospital yesterday and his AMIODARONE was originally 100mg every other day. Now it states 200 mg every other day. He wants to know if this correct.  Dr Noreen Ruiz d/c him from the   [facility] # [phone]

## 2022-11-09 NOTE — TELEPHONE ENCOUNTER
Dr. Magalis Ma,      Please review and advise? Thanks. Patient recent hospitalization. Called because he reports his amiodarone dose changed. ... from 100 mg every other day to 200 mg every other day. Upon further review of the chart. Amiodarone has not been 100 mg every other day since 12/2018. Then amiodarone 200 mg every day, now 200 mg every other day as reported in your last progress note 2/2022 with no changes made at this time. Next f/u planned 2/2023.      TSH2.54 5/27/22  No recent PFT

## 2022-11-09 NOTE — CARE COORDINATION
[facility] Care Transitions Initial Follow Up Call    Call within 2 business days of discharge: Yes    LPN Care Coordinator contacted the patient by telephone to perform post hospital discharge assessment. Verified name and  with patient as identifiers. Provided introduction to self, and explanation of the LPN Care Coordinator role. Patient: Magan Castelan Patient : 1940   MRN: [id number]  Reason for Admission: weakness  Discharge Date: 22 RARS: Readmission Risk Score: 22.9      Last Discharge [address]       Date Complaint Diagnosis Description Type Department Provider    10/30/22 Dizziness; Extremity Weakness; Shortness of Breath General weakness . .. ED to Hosp-Admission (Discharged) (ADMITTED) NITZA Hamiltno MD; Salas Kimbrough . .. Was this an external facility discharge? No Discharge Facility: NA    Challenges to be reviewed by the provider   Additional needs identified to be addressed with provider: No  none               Method of communication with provider: none. OLGA WINSTON spoke with patient. States he is fine. States he still has some weakness, some dizziness. LPN CC encouraged the use of compression stockings & cautious hydration (48-64 oz per day). SPO2 96-97% on 2 lpm NC, 92-94% RA at rest. Monitors glucose 3x per days. States he has not had a chance to check it today. Has BP cuff at home. States he monitors BP occ. SOB is at baseline. Denies CP, palpitations. Appetite good. Denies problems with bowels or bladder. Full medication reconciliation completed. Has not heard from [facility] [employer] and is not sure which agency he is with. OLGA WINSTON spoke with [facility] who states they have referral for patient's care & that they are planning on seeing him today or tomorrow. Advised patient that [facility] [employer] [facility]. will call to schedule [facility] for today or tomorrow. Verbalized understanding. Denies needs. States he will call nephro & [facility] for scheduling f/u. PCP f/u scheduled.    Adair Regalado LPN CC  Care Transitions  [phone]    LPN Care Coordinator reviewed discharge instructions, medical action plan, and red flags with patient who verbalized understanding. The patient was given an opportunity to ask questions and does not have any further questions or concerns at this time. Were discharge instructions available to patient? Yes. Reviewed appropriate site of care based on symptoms and resources available to patient including: PCP  Specialist  Urgent care clinics  [place] [facility]  When to call 911. The patient agrees to contact the PCP office for questions related to their healthcare. Advance Care Planning:   Does patient have an Advance Directive: not on file. Medication reconciliation was performed with patient, who verbalizes understanding of administration of home medications. Medications reviewed, 1111F entered: yes    Was patient discharged with a pulse oximeter? no    Non-face-to-face services provided:  Obtained and reviewed discharge summary and/or continuity of care documents  Education of patient/family/caregiver/guardian to support self-management-BP monitoring, glucose monitoring, f/u  Assessment and support for treatment adherence and medication management-full medication reconciliation completed    Offered patient enrollment in the Remote Patient Monitoring (RPM) program for in-home monitoring: Yes, but did not enroll at this time.     Care Transitions 24 Hour Call    Do you have a copy of your discharge instructions?: Yes  Do you have all of your prescriptions and are they filled?: Yes  Have you been contacted by a [address]?: No  Have you scheduled your follow up appointment?: Yes  How are you going to get to your appointment?: Car - family or friend to transport  Do you feel like you have everything you need to keep you well at home?: Yes  Care Transitions Interventions         Follow Up  Future Appointments   Date Time Provider Damian Oviedo   11/10/2022  2:00 PM SCHEDULE, VASCULAR LAB 1 [place] VASC/EN [facility]   11/10/2022  2:45 PM Yuki Figueroa APRN - CNP [place] VASC/EN [facility]   11/17/2022  2:45 PM Dash Hubbard MD [facility]   12/1/2022  1:00 PM Dash Hubbard MD [facility]   1/26/2023  1:00 PM Dereje [facility] Care Coordinator provided contact information. Plan for follow-up call in 5-7 days based on severity of symptoms and risk factors. Plan for next call: symptom management-weakness, SOB, cough, CP, fever/chills  self management-O2 use, SPO2, glucose, BP, ambulation rollator  follow-up appointment-PCP 11/17, nephro? [facility]?   medication management-new or changed?  community resources-Tere VERDE  RPM, ACP docs    Aggie Jain [facility]

## 2022-11-09 NOTE — CARE COORDINATION
Noted DC last night. Send orders for home care to [facility] as he desired. Sent [employer] the order for Home oxygen.   [place], [place]     Case Management   [phone] 11/9/2022  8:39 AM

## 2022-11-10 LAB
BLOOD CULTURE, ROUTINE: NORMAL
CULTURE, BLOOD 2: NORMAL

## 2022-11-11 RX ORDER — AMIODARONE HYDROCHLORIDE 200 MG/1
100 TABLET ORAL DAILY
Qty: 45 TABLET | Refills: 3 | Status: ON HOLD
Start: 2022-11-11

## 2022-11-11 NOTE — TELEPHONE ENCOUNTER
Called and spoke to patient and relayed Dr. Eliezer Klein instructions, he verbalized understanding. Medication list states 200 mg every other day,   but patient taking 100mg (1/2 tablet) daily.

## 2022-11-14 LAB
Lab: NORMAL
REPORT: NORMAL
THIS TEST SENT TO: NORMAL

## 2022-11-15 ENCOUNTER — APPOINTMENT (OUTPATIENT)
Dept: GENERAL RADIOLOGY | Age: 82
DRG: 682 | End: 2022-11-15
Payer: MEDICARE

## 2022-11-15 ENCOUNTER — HOSPITAL ENCOUNTER (INPATIENT)
Age: 82
LOS: 14 days | Discharge: NURSING, REHAB OR LONG-TERM CARE | DRG: 682 | End: 2022-11-30
Attending: EMERGENCY MEDICINE | Admitting: INTERNAL MEDICINE
Payer: MEDICARE

## 2022-11-15 ENCOUNTER — TELEPHONE (OUTPATIENT)
Dept: OTHER | Facility: CLINIC | Age: 82
End: 2022-11-15

## 2022-11-15 DIAGNOSIS — N17.9 ACUTE RENAL FAILURE SUPERIMPOSED ON CHRONIC KIDNEY DISEASE, UNSPECIFIED CKD STAGE, UNSPECIFIED ACUTE RENAL FAILURE TYPE (HCC): ICD-10-CM

## 2022-11-15 DIAGNOSIS — R09.02 HYPOXIA: Primary | ICD-10-CM

## 2022-11-15 DIAGNOSIS — J44.9 COPD WITH CHRONIC BRONCHITIS (HCC): ICD-10-CM

## 2022-11-15 DIAGNOSIS — T68.XXXA HYPOTHERMIA, INITIAL ENCOUNTER: ICD-10-CM

## 2022-11-15 DIAGNOSIS — N18.9 ACUTE RENAL FAILURE SUPERIMPOSED ON CHRONIC KIDNEY DISEASE, UNSPECIFIED CKD STAGE, UNSPECIFIED ACUTE RENAL FAILURE TYPE (HCC): ICD-10-CM

## 2022-11-15 DIAGNOSIS — R60.1 ANASARCA: ICD-10-CM

## 2022-11-15 LAB
A/G RATIO: 1 (ref 1.1–2.2)
ALBUMIN SERPL-MCNC: 3 G/DL (ref 3.4–5)
ALP BLD-CCNC: 109 U/L (ref 40–129)
ALT SERPL-CCNC: 105 U/L (ref 10–40)
ANION GAP SERPL CALCULATED.3IONS-SCNC: 11 MMOL/L (ref 3–16)
AST SERPL-CCNC: 43 U/L (ref 15–37)
BASE EXCESS VENOUS: -2.8 MMOL/L
BASOPHILS ABSOLUTE: 0 K/UL (ref 0–0.2)
BASOPHILS RELATIVE PERCENT: 0.3 %
BETA-HYDROXYBUTYRATE: 0.42 MMOL/L (ref 0–0.27)
BILIRUB SERPL-MCNC: 0.4 MG/DL (ref 0–1)
BUN BLDV-MCNC: 93 MG/DL (ref 7–20)
CALCIUM SERPL-MCNC: 8.7 MG/DL (ref 8.3–10.6)
CARBOXYHEMOGLOBIN: 2 %
CHLORIDE BLD-SCNC: 106 MMOL/L (ref 99–110)
CO2: 22 MMOL/L (ref 21–32)
CREAT SERPL-MCNC: 2.8 MG/DL (ref 0.8–1.3)
EOSINOPHILS ABSOLUTE: 0.1 K/UL (ref 0–0.6)
EOSINOPHILS RELATIVE PERCENT: 1.1 %
GFR SERPL CREATININE-BSD FRML MDRD: 22 ML/MIN/{1.73_M2}
GLUCOSE BLD-MCNC: 160 MG/DL (ref 70–99)
HCO3 VENOUS: 25 MMOL/L (ref 23–29)
HCT VFR BLD CALC: 26.7 % (ref 40.5–52.5)
HEMOGLOBIN: 8.3 G/DL (ref 13.5–17.5)
INR BLD: 1.51 (ref 0.87–1.14)
LACTIC ACID, SEPSIS: 1.3 MMOL/L (ref 0.4–1.9)
LYMPHOCYTES ABSOLUTE: 0.5 K/UL (ref 1–5.1)
LYMPHOCYTES RELATIVE PERCENT: 8.5 %
MCH RBC QN AUTO: 27.3 PG (ref 26–34)
MCHC RBC AUTO-ENTMCNC: 30.9 G/DL (ref 31–36)
MCV RBC AUTO: 88.3 FL (ref 80–100)
METHEMOGLOBIN VENOUS: 0.6 %
MONOCYTES ABSOLUTE: 0.3 K/UL (ref 0–1.3)
MONOCYTES RELATIVE PERCENT: 5 %
NEUTROPHILS ABSOLUTE: 4.6 K/UL (ref 1.7–7.7)
NEUTROPHILS RELATIVE PERCENT: 85.1 %
O2 SAT, VEN: 70 %
O2 THERAPY: ABNORMAL
PCO2, VEN: 58 MMHG (ref 40–50)
PDW BLD-RTO: 18.6 % (ref 12.4–15.4)
PH VENOUS: 7.24 (ref 7.35–7.45)
PLATELET # BLD: 116 K/UL (ref 135–450)
PMV BLD AUTO: 8.5 FL (ref 5–10.5)
PO2, VEN: 41 MMHG
POTASSIUM SERPL-SCNC: 5.6 MMOL/L (ref 3.5–5.1)
PRO-BNP: 755 PG/ML (ref 0–449)
PROCALCITONIN: 0.15 NG/ML (ref 0–0.15)
PROTHROMBIN TIME: 18.2 SEC (ref 11.7–14.5)
RAPID INFLUENZA  B AGN: NEGATIVE
RAPID INFLUENZA A AGN: NEGATIVE
RBC # BLD: 3.02 M/UL (ref 4.2–5.9)
SARS-COV-2, NAAT: NOT DETECTED
SODIUM BLD-SCNC: 139 MMOL/L (ref 136–145)
TCO2 CALC VENOUS: 27 MMOL/L
TOTAL PROTEIN: 6 G/DL (ref 6.4–8.2)
TROPONIN: 0.02 NG/ML
TSH REFLEX: 2.05 UIU/ML (ref 0.27–4.2)
WBC # BLD: 5.4 K/UL (ref 4–11)

## 2022-11-15 PROCEDURE — 99285 EMERGENCY DEPT VISIT HI MDM: CPT

## 2022-11-15 PROCEDURE — 96374 THER/PROPH/DIAG INJ IV PUSH: CPT

## 2022-11-15 PROCEDURE — 94761 N-INVAS EAR/PLS OXIMETRY MLT: CPT

## 2022-11-15 PROCEDURE — 6360000002 HC RX W HCPCS: Performed by: EMERGENCY MEDICINE

## 2022-11-15 PROCEDURE — 80053 COMPREHEN METABOLIC PANEL: CPT

## 2022-11-15 PROCEDURE — 85025 COMPLETE CBC W/AUTO DIFF WBC: CPT

## 2022-11-15 PROCEDURE — 71045 X-RAY EXAM CHEST 1 VIEW: CPT

## 2022-11-15 PROCEDURE — 94640 AIRWAY INHALATION TREATMENT: CPT

## 2022-11-15 PROCEDURE — 83880 ASSAY OF NATRIURETIC PEPTIDE: CPT

## 2022-11-15 PROCEDURE — 85610 PROTHROMBIN TIME: CPT

## 2022-11-15 PROCEDURE — 84484 ASSAY OF TROPONIN QUANT: CPT

## 2022-11-15 PROCEDURE — 82010 KETONE BODYS QUAN: CPT

## 2022-11-15 PROCEDURE — 87040 BLOOD CULTURE FOR BACTERIA: CPT

## 2022-11-15 PROCEDURE — 82803 BLOOD GASES ANY COMBINATION: CPT

## 2022-11-15 PROCEDURE — 2700000000 HC OXYGEN THERAPY PER DAY

## 2022-11-15 PROCEDURE — 83605 ASSAY OF LACTIC ACID: CPT

## 2022-11-15 PROCEDURE — 84443 ASSAY THYROID STIM HORMONE: CPT

## 2022-11-15 PROCEDURE — 87635 SARS-COV-2 COVID-19 AMP PRB: CPT

## 2022-11-15 PROCEDURE — 36415 COLL VENOUS BLD VENIPUNCTURE: CPT

## 2022-11-15 PROCEDURE — 84145 PROCALCITONIN (PCT): CPT

## 2022-11-15 PROCEDURE — 93005 ELECTROCARDIOGRAM TRACING: CPT | Performed by: PHYSICIAN ASSISTANT

## 2022-11-15 PROCEDURE — 6370000000 HC RX 637 (ALT 250 FOR IP): Performed by: PHYSICIAN ASSISTANT

## 2022-11-15 PROCEDURE — 87804 INFLUENZA ASSAY W/OPTIC: CPT

## 2022-11-15 RX ORDER — FUROSEMIDE 10 MG/ML
40 INJECTION INTRAMUSCULAR; INTRAVENOUS ONCE
Status: COMPLETED | OUTPATIENT
Start: 2022-11-15 | End: 2022-11-15

## 2022-11-15 RX ORDER — IPRATROPIUM BROMIDE AND ALBUTEROL SULFATE 2.5; .5 MG/3ML; MG/3ML
2 SOLUTION RESPIRATORY (INHALATION)
Status: COMPLETED | OUTPATIENT
Start: 2022-11-15 | End: 2022-11-15

## 2022-11-15 RX ORDER — LEVALBUTEROL INHALATION SOLUTION 1.25 MG/3ML
SOLUTION RESPIRATORY (INHALATION)
Qty: 360 ML | Refills: 11 | Status: ON HOLD | OUTPATIENT
Start: 2022-11-15

## 2022-11-15 RX ADMIN — IPRATROPIUM BROMIDE AND ALBUTEROL SULFATE 2 AMPULE: .5; 2.5 SOLUTION RESPIRATORY (INHALATION) at 22:36

## 2022-11-15 RX ADMIN — FUROSEMIDE 40 MG: 10 INJECTION, SOLUTION INTRAMUSCULAR; INTRAVENOUS at 23:07

## 2022-11-15 ASSESSMENT — PAIN SCALES - GENERAL: PAINLEVEL_OUTOF10: 0

## 2022-11-16 PROBLEM — N28.9 WORSENING RENAL FUNCTION: Status: ACTIVE | Noted: 2022-11-16

## 2022-11-16 PROBLEM — R60.1 ANASARCA: Status: ACTIVE | Noted: 2022-11-16

## 2022-11-16 LAB
ANION GAP SERPL CALCULATED.3IONS-SCNC: 12 MMOL/L (ref 3–16)
BACTERIA: ABNORMAL /HPF
BILIRUBIN URINE: NEGATIVE
BLOOD, URINE: ABNORMAL
BUN BLDV-MCNC: 95 MG/DL (ref 7–20)
CALCIUM SERPL-MCNC: 8.4 MG/DL (ref 8.3–10.6)
CHLORIDE BLD-SCNC: 105 MMOL/L (ref 99–110)
CLARITY: ABNORMAL
CO2: 23 MMOL/L (ref 21–32)
COLOR: YELLOW
CREAT SERPL-MCNC: 3 MG/DL (ref 0.8–1.3)
CREATININE URINE: 72.7 MG/DL (ref 39–259)
EKG DIAGNOSIS: NORMAL
EKG Q-T INTERVAL: 468 MS
EKG QRS DURATION: 156 MS
EKG QTC CALCULATION (BAZETT): 459 MS
EKG R AXIS: -26 DEGREES
EKG T AXIS: 10 DEGREES
EKG VENTRICULAR RATE: 58 BPM
EPITHELIAL CELLS, UA: 2 /HPF (ref 0–5)
GFR SERPL CREATININE-BSD FRML MDRD: 20 ML/MIN/{1.73_M2}
GLUCOSE BLD-MCNC: 100 MG/DL (ref 70–99)
GLUCOSE BLD-MCNC: 102 MG/DL (ref 70–99)
GLUCOSE BLD-MCNC: 123 MG/DL (ref 70–99)
GLUCOSE BLD-MCNC: 139 MG/DL (ref 70–99)
GLUCOSE BLD-MCNC: 84 MG/DL (ref 70–99)
GLUCOSE BLD-MCNC: 94 MG/DL (ref 70–99)
GLUCOSE URINE: NEGATIVE MG/DL
HYALINE CASTS: 1 /LPF (ref 0–8)
KETONES, URINE: NEGATIVE MG/DL
LACTIC ACID, SEPSIS: 0.7 MMOL/L (ref 0.4–1.9)
LEUKOCYTE ESTERASE, URINE: ABNORMAL
LV EF: 63 %
LVEF MODALITY: NORMAL
MICROSCOPIC EXAMINATION: YES
NITRITE, URINE: NEGATIVE
PERFORMED ON: ABNORMAL
PERFORMED ON: NORMAL
PH UA: 5 (ref 5–8)
POTASSIUM SERPL-SCNC: 5.3 MMOL/L (ref 3.5–5.1)
PROTEIN UA: 30 MG/DL
RBC UA: 823 /HPF (ref 0–4)
SODIUM BLD-SCNC: 140 MMOL/L (ref 136–145)
SODIUM URINE: 43 MMOL/L
SPECIFIC GRAVITY UA: 1.01 (ref 1–1.03)
UREA NITROGEN, UR: 660.2 MG/DL (ref 800–1666)
URINE REFLEX TO CULTURE: ABNORMAL
URINE TYPE: ABNORMAL
UROBILINOGEN, URINE: 0.2 E.U./DL
WBC UA: 8 /HPF (ref 0–5)

## 2022-11-16 PROCEDURE — C8929 TTE W OR WO FOL WCON,DOPPLER: HCPCS

## 2022-11-16 PROCEDURE — 6360000002 HC RX W HCPCS: Performed by: STUDENT IN AN ORGANIZED HEALTH CARE EDUCATION/TRAINING PROGRAM

## 2022-11-16 PROCEDURE — 6360000002 HC RX W HCPCS: Performed by: INTERNAL MEDICINE

## 2022-11-16 PROCEDURE — 82570 ASSAY OF URINE CREATININE: CPT

## 2022-11-16 PROCEDURE — 97530 THERAPEUTIC ACTIVITIES: CPT

## 2022-11-16 PROCEDURE — 84300 ASSAY OF URINE SODIUM: CPT

## 2022-11-16 PROCEDURE — 1200000000 HC SEMI PRIVATE

## 2022-11-16 PROCEDURE — 51798 US URINE CAPACITY MEASURE: CPT

## 2022-11-16 PROCEDURE — 6360000004 HC RX CONTRAST MEDICATION: Performed by: INTERNAL MEDICINE

## 2022-11-16 PROCEDURE — 2580000003 HC RX 258: Performed by: STUDENT IN AN ORGANIZED HEALTH CARE EDUCATION/TRAINING PROGRAM

## 2022-11-16 PROCEDURE — 94760 N-INVAS EAR/PLS OXIMETRY 1: CPT

## 2022-11-16 PROCEDURE — 97166 OT EVAL MOD COMPLEX 45 MIN: CPT

## 2022-11-16 PROCEDURE — 2700000000 HC OXYGEN THERAPY PER DAY

## 2022-11-16 PROCEDURE — 80048 BASIC METABOLIC PNL TOTAL CA: CPT

## 2022-11-16 PROCEDURE — 6370000000 HC RX 637 (ALT 250 FOR IP): Performed by: STUDENT IN AN ORGANIZED HEALTH CARE EDUCATION/TRAINING PROGRAM

## 2022-11-16 PROCEDURE — 36415 COLL VENOUS BLD VENIPUNCTURE: CPT

## 2022-11-16 PROCEDURE — 97535 SELF CARE MNGMENT TRAINING: CPT

## 2022-11-16 PROCEDURE — 81001 URINALYSIS AUTO W/SCOPE: CPT

## 2022-11-16 PROCEDURE — 99223 1ST HOSP IP/OBS HIGH 75: CPT | Performed by: INTERNAL MEDICINE

## 2022-11-16 PROCEDURE — 93010 ELECTROCARDIOGRAM REPORT: CPT | Performed by: INTERNAL MEDICINE

## 2022-11-16 PROCEDURE — 83605 ASSAY OF LACTIC ACID: CPT

## 2022-11-16 PROCEDURE — 94640 AIRWAY INHALATION TREATMENT: CPT

## 2022-11-16 PROCEDURE — 97162 PT EVAL MOD COMPLEX 30 MIN: CPT

## 2022-11-16 PROCEDURE — 84540 ASSAY OF URINE/UREA-N: CPT

## 2022-11-16 RX ORDER — CLONIDINE HYDROCHLORIDE 0.1 MG/1
0.1 TABLET ORAL 2 TIMES DAILY
Status: DISCONTINUED | OUTPATIENT
Start: 2022-11-16 | End: 2022-11-27

## 2022-11-16 RX ORDER — SODIUM CHLORIDE 0.9 % (FLUSH) 0.9 %
5-40 SYRINGE (ML) INJECTION PRN
Status: DISCONTINUED | OUTPATIENT
Start: 2022-11-16 | End: 2022-11-30 | Stop reason: HOSPADM

## 2022-11-16 RX ORDER — ASPIRIN 81 MG/1
162 TABLET ORAL DAILY
Status: DISCONTINUED | OUTPATIENT
Start: 2022-11-16 | End: 2022-11-30 | Stop reason: HOSPADM

## 2022-11-16 RX ORDER — LEVALBUTEROL 1.25 MG/.5ML
1.25 SOLUTION, CONCENTRATE RESPIRATORY (INHALATION) 3 TIMES DAILY
Status: DISCONTINUED | OUTPATIENT
Start: 2022-11-16 | End: 2022-11-17

## 2022-11-16 RX ORDER — SODIUM BICARBONATE 650 MG/1
650 TABLET ORAL DAILY
Status: DISCONTINUED | OUTPATIENT
Start: 2022-11-17 | End: 2022-11-18

## 2022-11-16 RX ORDER — SODIUM CHLORIDE 9 MG/ML
INJECTION, SOLUTION INTRAVENOUS PRN
Status: DISCONTINUED | OUTPATIENT
Start: 2022-11-16 | End: 2022-11-30 | Stop reason: HOSPADM

## 2022-11-16 RX ORDER — ALBUTEROL SULFATE 90 UG/1
2 AEROSOL, METERED RESPIRATORY (INHALATION) EVERY 4 HOURS PRN
Status: DISCONTINUED | OUTPATIENT
Start: 2022-11-16 | End: 2022-11-30 | Stop reason: HOSPADM

## 2022-11-16 RX ORDER — HEPARIN SODIUM 5000 [USP'U]/ML
5000 INJECTION, SOLUTION INTRAVENOUS; SUBCUTANEOUS EVERY 8 HOURS SCHEDULED
Status: DISCONTINUED | OUTPATIENT
Start: 2022-11-16 | End: 2022-11-16

## 2022-11-16 RX ORDER — POLYETHYLENE GLYCOL 3350 17 G/17G
17 POWDER, FOR SOLUTION ORAL DAILY PRN
Status: DISCONTINUED | OUTPATIENT
Start: 2022-11-16 | End: 2022-11-29

## 2022-11-16 RX ORDER — FUROSEMIDE 10 MG/ML
10 INJECTION INTRAMUSCULAR; INTRAVENOUS 2 TIMES DAILY
Status: DISCONTINUED | OUTPATIENT
Start: 2022-11-16 | End: 2022-11-19

## 2022-11-16 RX ORDER — MONTELUKAST SODIUM 10 MG/1
10 TABLET ORAL DAILY
Status: DISCONTINUED | OUTPATIENT
Start: 2022-11-16 | End: 2022-11-30 | Stop reason: HOSPADM

## 2022-11-16 RX ORDER — ENOXAPARIN SODIUM 100 MG/ML
30 INJECTION SUBCUTANEOUS DAILY
Status: DISCONTINUED | OUTPATIENT
Start: 2022-11-16 | End: 2022-11-16

## 2022-11-16 RX ORDER — VERAPAMIL HYDROCHLORIDE 240 MG/1
240 TABLET, FILM COATED, EXTENDED RELEASE ORAL 2 TIMES DAILY
Status: DISCONTINUED | OUTPATIENT
Start: 2022-11-16 | End: 2022-11-30 | Stop reason: HOSPADM

## 2022-11-16 RX ORDER — ONDANSETRON 4 MG/1
4 TABLET, ORALLY DISINTEGRATING ORAL EVERY 8 HOURS PRN
Status: DISCONTINUED | OUTPATIENT
Start: 2022-11-16 | End: 2022-11-30 | Stop reason: HOSPADM

## 2022-11-16 RX ORDER — TAMSULOSIN HYDROCHLORIDE 0.4 MG/1
0.4 CAPSULE ORAL DAILY
Status: DISCONTINUED | OUTPATIENT
Start: 2022-11-16 | End: 2022-11-30 | Stop reason: HOSPADM

## 2022-11-16 RX ORDER — FERROUS SULFATE TAB EC 324 MG (65 MG FE EQUIVALENT) 324 (65 FE) MG
325 TABLET DELAYED RESPONSE ORAL DAILY
Status: DISCONTINUED | OUTPATIENT
Start: 2022-11-16 | End: 2022-11-16

## 2022-11-16 RX ORDER — ACETAMINOPHEN 650 MG/1
650 SUPPOSITORY RECTAL EVERY 6 HOURS PRN
Status: DISCONTINUED | OUTPATIENT
Start: 2022-11-16 | End: 2022-11-30 | Stop reason: HOSPADM

## 2022-11-16 RX ORDER — INSULIN LISPRO 100 [IU]/ML
0-4 INJECTION, SOLUTION INTRAVENOUS; SUBCUTANEOUS NIGHTLY
Status: DISCONTINUED | OUTPATIENT
Start: 2022-11-16 | End: 2022-11-30 | Stop reason: HOSPADM

## 2022-11-16 RX ORDER — ONDANSETRON 2 MG/ML
4 INJECTION INTRAMUSCULAR; INTRAVENOUS EVERY 6 HOURS PRN
Status: DISCONTINUED | OUTPATIENT
Start: 2022-11-16 | End: 2022-11-30 | Stop reason: HOSPADM

## 2022-11-16 RX ORDER — SODIUM CHLORIDE FOR INHALATION 0.9 %
3 VIAL, NEBULIZER (ML) INHALATION EVERY 8 HOURS PRN
Status: DISCONTINUED | OUTPATIENT
Start: 2022-11-16 | End: 2022-11-16

## 2022-11-16 RX ORDER — SODIUM CHLORIDE 0.9 % (FLUSH) 0.9 %
5-40 SYRINGE (ML) INJECTION EVERY 12 HOURS SCHEDULED
Status: DISCONTINUED | OUTPATIENT
Start: 2022-11-16 | End: 2022-11-30 | Stop reason: HOSPADM

## 2022-11-16 RX ORDER — PANTOPRAZOLE SODIUM 40 MG/1
40 TABLET, DELAYED RELEASE ORAL DAILY
Status: DISCONTINUED | OUTPATIENT
Start: 2022-11-16 | End: 2022-11-30 | Stop reason: HOSPADM

## 2022-11-16 RX ORDER — ACETAMINOPHEN 325 MG/1
650 TABLET ORAL EVERY 6 HOURS PRN
Status: DISCONTINUED | OUTPATIENT
Start: 2022-11-16 | End: 2022-11-30 | Stop reason: HOSPADM

## 2022-11-16 RX ORDER — ATORVASTATIN CALCIUM 40 MG/1
40 TABLET, FILM COATED ORAL DAILY
Status: DISCONTINUED | OUTPATIENT
Start: 2022-11-16 | End: 2022-11-30 | Stop reason: HOSPADM

## 2022-11-16 RX ORDER — HEPARIN SODIUM 5000 [USP'U]/ML
5000 INJECTION, SOLUTION INTRAVENOUS; SUBCUTANEOUS EVERY 8 HOURS SCHEDULED
Status: DISCONTINUED | OUTPATIENT
Start: 2022-11-16 | End: 2022-11-23

## 2022-11-16 RX ORDER — DEXTROSE MONOHYDRATE 100 MG/ML
INJECTION, SOLUTION INTRAVENOUS CONTINUOUS PRN
Status: DISCONTINUED | OUTPATIENT
Start: 2022-11-16 | End: 2022-11-30 | Stop reason: HOSPADM

## 2022-11-16 RX ORDER — INSULIN GLARGINE 100 [IU]/ML
10 INJECTION, SOLUTION SUBCUTANEOUS NIGHTLY
COMMUNITY

## 2022-11-16 RX ORDER — INSULIN LISPRO 100 [IU]/ML
0-4 INJECTION, SOLUTION INTRAVENOUS; SUBCUTANEOUS
Status: DISCONTINUED | OUTPATIENT
Start: 2022-11-16 | End: 2022-11-30 | Stop reason: HOSPADM

## 2022-11-16 RX ORDER — AMIODARONE HYDROCHLORIDE 200 MG/1
100 TABLET ORAL DAILY
Status: DISCONTINUED | OUTPATIENT
Start: 2022-11-16 | End: 2022-11-30 | Stop reason: HOSPADM

## 2022-11-16 RX ORDER — SODIUM BICARBONATE 650 MG/1
650 TABLET ORAL 3 TIMES DAILY
Status: DISCONTINUED | OUTPATIENT
Start: 2022-11-16 | End: 2022-11-16

## 2022-11-16 RX ADMIN — SODIUM CHLORIDE, PRESERVATIVE FREE 10 ML: 5 INJECTION INTRAVENOUS at 23:18

## 2022-11-16 RX ADMIN — MONTELUKAST SODIUM 10 MG: 10 TABLET, COATED ORAL at 09:54

## 2022-11-16 RX ADMIN — SODIUM BICARBONATE 650 MG: 650 TABLET ORAL at 09:53

## 2022-11-16 RX ADMIN — PANTOPRAZOLE SODIUM 40 MG: 40 TABLET, DELAYED RELEASE ORAL at 09:53

## 2022-11-16 RX ADMIN — VERAPAMIL HYDROCHLORIDE 240 MG: 240 TABLET, FILM COATED, EXTENDED RELEASE ORAL at 09:53

## 2022-11-16 RX ADMIN — HEPARIN SODIUM 5000 UNITS: 5000 INJECTION INTRAVENOUS; SUBCUTANEOUS at 05:51

## 2022-11-16 RX ADMIN — VERAPAMIL HYDROCHLORIDE 240 MG: 240 TABLET, FILM COATED, EXTENDED RELEASE ORAL at 21:54

## 2022-11-16 RX ADMIN — LEVALBUTEROL 1.25 MG: 1.25 SOLUTION, CONCENTRATE RESPIRATORY (INHALATION) at 19:48

## 2022-11-16 RX ADMIN — ATORVASTATIN CALCIUM 40 MG: 40 TABLET, FILM COATED ORAL at 09:53

## 2022-11-16 RX ADMIN — SODIUM CHLORIDE, PRESERVATIVE FREE 10 ML: 5 INJECTION INTRAVENOUS at 09:00

## 2022-11-16 RX ADMIN — PERFLUTREN 1.5 ML: 6.52 INJECTION, SUSPENSION INTRAVENOUS at 10:56

## 2022-11-16 RX ADMIN — HEPARIN SODIUM 5000 UNITS: 5000 INJECTION INTRAVENOUS; SUBCUTANEOUS at 15:10

## 2022-11-16 RX ADMIN — POLYETHYLENE GLYCOL 3350 17 G: 17 POWDER, FOR SOLUTION ORAL at 21:54

## 2022-11-16 RX ADMIN — FERROUS SULFATE TAB EC 324 MG (65 MG FE EQUIVALENT) 325 MG: 324 (65 FE) TABLET DELAYED RESPONSE at 09:54

## 2022-11-16 RX ADMIN — AMIODARONE HYDROCHLORIDE 100 MG: 200 TABLET ORAL at 09:53

## 2022-11-16 RX ADMIN — FUROSEMIDE 10 MG: 10 INJECTION, SOLUTION INTRAMUSCULAR; INTRAVENOUS at 18:53

## 2022-11-16 RX ADMIN — ASPIRIN 162 MG: 81 TABLET, COATED ORAL at 09:53

## 2022-11-16 RX ADMIN — CLONIDINE HYDROCHLORIDE 0.1 MG: 0.1 TABLET ORAL at 09:54

## 2022-11-16 RX ADMIN — HEPARIN SODIUM 5000 UNITS: 5000 INJECTION INTRAVENOUS; SUBCUTANEOUS at 21:54

## 2022-11-16 RX ADMIN — CLONIDINE HYDROCHLORIDE 0.1 MG: 0.1 TABLET ORAL at 21:54

## 2022-11-16 RX ADMIN — EPOETIN ALFA-EPBX 10000 UNITS: 10000 INJECTION, SOLUTION INTRAVENOUS; SUBCUTANEOUS at 16:24

## 2022-11-16 RX ADMIN — TAMSULOSIN HYDROCHLORIDE 0.4 MG: 0.4 CAPSULE ORAL at 09:54

## 2022-11-16 RX ADMIN — CLONIDINE HYDROCHLORIDE 0.1 MG: 0.1 TABLET ORAL at 01:23

## 2022-11-16 RX ADMIN — FUROSEMIDE 10 MG: 10 INJECTION, SOLUTION INTRAMUSCULAR; INTRAVENOUS at 15:10

## 2022-11-16 ASSESSMENT — PAIN SCALES - GENERAL
PAINLEVEL_OUTOF10: 0
PAINLEVEL_OUTOF10: 0

## 2022-11-16 NOTE — ED PROVIDER NOTES
I was not asked to see this patient. I was available for consultation if deemed necessary. I was only asked to interpret the EKG. Please see URVASHI Weiner's and Dr. Heike Mcclellan notes for care of the patient while in the emergency department and for final disposition. The Ekg interpreted by me shows  Wide QRS rhythm most likely sinus rhythm  with a rate of 58  Axis is   Normal  QTc is  within an acceptable range  Intervals and Durations are unremarkable.       ST Segments: no acute change and nonspecific changes  No significant change from prior EKG dated - 10/30/22  No STEMI, RBBB present today similar to old EKG          Leonela Loredo MD  11/16/22 [id number]

## 2022-11-16 NOTE — CARE COORDINATION
INITIAL CASE MANAGEMENT ASSESSMENT    Met with patient to assess possible discharge needs. Explained Case Management role/services. Living Situation: Lives with his spouse in [place] style house with 1+1 EZEQUIEL. ADLs: Independent with ADL's, normally takes care of the bills and cooking. For the last week his son has had to assist him with his bathing. DME: rollator, shower transfer bench with back, cane, oxygen at 2L, continuous, supplier is [employer]. Has a cpap machine however has not been using it since he discharged with oxygen. PT/OT Recs: Recommendations for 3-5 sessions per week, skilled nursing facility. Active Services: Active with [facility] for PT/OT/RN     Transportation: Active . Medications: [facility] on [address]. Active with [facility] for medications. PCP: Richard Potter MD      HD/PD: N/A    PLAN/COMMENTS: Current discharge plan is to a skilled nursing facility for continued therapy and medical care. [facility] score is 9/24 for PT. List of SNF's provided to patient. Requested Tere, asked to review list and choose two more facilities. Referral faxed to [facility]. No precert needed. The Plan for Transition of Care is related to the following treatment goals: SNF    The Patient and/or patient representative was provided with a choice of provider and agrees   with the discharge plan. [x] Yes [] No    Freedom of choice list was provided with basic dialogue that supports the patient's individualized plan of care/goals, treatment preferences and shares the quality data associated with the providers. [x] Yes [] No      Provided contact information for patient or family to call with any questions. Will follow and assist as needed.     Adrian ANDERSENN RN  Case Management  [phone]    Electronically signed by Adrian Adame RN on 11/16/2022 at 3:25 PM

## 2022-11-16 NOTE — ACP (ADVANCE CARE PLANNING)
Advance Care Planning     Advance Care Planning Activator (Inpatient)  Conversation Note      Date of ACP Conversation: 11/16/2022     Conversation Conducted with: Patient with Decision Making Capacity    ACP Activator: Cayla Pérez 44 Decision Maker:     Current Designated Health Care Decision Maker:     Primary Decision Maker: Ruben Ely Spouse - [phone]    Care Preferences    Ventilation: \"If you were in your present state of health and suddenly became very ill and were unable to breathe on your own, what would your preference be about the use of a ventilator (breathing machine) if it were available to you? \"      Would the patient desire the use of ventilator (breathing machine)?: yes    \"If your health worsens and it becomes clear that your chance of recovery is unlikely, what would your preference be about the use of a ventilator (breathing machine) if it were available to you? \"     Would the patient desire the use of ventilator (breathing machine)?: No      Resuscitation  \"CPR works best to restart the heart when there is a sudden event, like a heart attack, in someone who is otherwise healthy. Unfortunately, CPR does not typically restart the heart for people who have serious health conditions or who are very sick. \"    \"In the event your heart stopped as a result of an underlying serious health condition, would you want attempts to be made to restart your heart (answer \"yes\" for attempt to resuscitate) or would you prefer a natural death (answer \"no\" for do not attempt to resuscitate)? \" yes       [] Yes   [x] No   Educated Patient / Bibi Dsouza regarding differences between Advance Directives and portable DNR orders.     Length of ACP Conversation in minutes:  5    Conversation Outcomes:  [x] ACP discussion completed  [] Existing advance directive reviewed with patient; no changes to patient's previously recorded wishes  [] New Advance Directive completed  [] Portable Do Not Med rec complete per patient.     Rescitate prepared for Provider review and signature  [] POLST/POST/MOLST/MOST prepared for Provider review and signature      Follow-up plan:    [] Schedule follow-up conversation to continue planning  [] Referred individual to Provider for additional questions/concerns   [] Advised patient/agent/surrogate to review completed ACP document and update if needed with changes in condition, patient preferences or care setting    [] This note routed to one or more involved healthcare providers    Darya GALINDO RN  Case Management  [phone]    Electronically signed by Darya Byrne RN on 11/16/2022 at 3:35 PM

## 2022-11-16 NOTE — PROGRESS NOTES
Physical Therapy  Facility/Department: [address] MED SURG  Physical Therapy Initial Assessment  If patient discharges prior to next session this note will serve as a discharge summary. Please see below for the latest assessment towards goals. Name: Francesca Mcginnis  : 1940  MRN: [id number]  Date of Service: 2022    Discharge Recommendations:  Patient would benefit from continued therapy after discharge (3-5sessions/week)   Francesca Mcginnis scored a / on the AM-PAC short mobility form. Current research shows that an AM-PAC score of 17 or less is typically not associated with a discharge to the patient's home setting. Based on the patient's AM-PAC score and their current functional mobility deficits, it is recommended that the patient have 3-5 sessions per week of Physical Therapy at d/c to increase the patient's independence. Please see assessment section for further patient specific details. PT Equipment Recommendations  Equipment Needed: No      Patient Diagnosis(es): The primary encounter diagnosis was Hypoxia. Diagnoses of Anasarca, Acute renal failure superimposed on chronic kidney disease, unspecified CKD stage, unspecified acute renal failure type ([address] and Hypothermia, initial encounter were also pertinent to this visit. 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 Past Surgical History:  has a past surgical history that includes Gallbladder surgery ();  Upper gastrointestinal endoscopy (7-2005    7/10/2009); Colonoscopy (7/10/2009); Cataract removal (2/2012); Pain management procedure (Right, 10/20/2021); Pain management procedure (Left, 12/8/2021); and CT BIOPSY BONE MARROW (11/1/2022). Assessment   Assessment: The pt is an 80 yo male who presented to the ED with weakness, increasing edema and inability to urinate. The pt recently in the hospital for weakness and DEISY and pna. In the ED the pt's renal function wa worse and he was volume overloaded; he was SOB with crackles. The pt just home for 1 week from recent hospital stay. He was needing asisst for all mobility and self-care and was using a rollator for ambulation. Prior to last hospital stay the pt was indep in mobility using a cane; indep in self-care, drove and assisted with homemaking tasks. PMHx: asthma, a-fib, carotid artery stenosis, CKD, COPD, HTN, anemia, JOANN on c-pap, DM    Today, the pt demonstrated that he is functioning well below his baseline and below the level he was d/c to home with 1 week ago. Today, the pt is limited by his decreased activity tolerance, his generalized weakness and his need for LIFT equipment for mobility. Today, the pt required mod A of 2 for bed mobility, mod A of 2 for transfers and the stedy was used to get the pt to the commode and then to the chair. Anticipate that the pt will need con't skilled PT services at d/c at a moderate intensity. The pt and family are agreeable. Will con't to follow.   Therapy Prognosis: Good  Decision Making: Medium Complexity  Barriers to Learning: fatigue  Requires PT Follow-Up: Yes  Activity Tolerance  Activity Tolerance: Patient limited by fatigue;Patient limited by endurance  Activity Tolerance Comments: the pt on 2L O2 and appeared to get SOB with activity; difficulty getting SpO2 readings     Plan   Physcial Therapy Plan  General Plan: 3-5 times per week  Current Treatment Recommendations: Strengthening, Balance training, Functional mobility training, Transfer training, Gait training, Therapeutic activities, Patient/Caregiver education & training, Safety education & training  Safety Devices  Type of Devices: Call light within reach, Chair alarm in place, Gait belt, Patient at risk for falls, Left in chair, Nurse notified     Restrictions  Restrictions/Precautions  Restrictions/Precautions: Fall Risk, Bed Alarm, Up as Tolerated  Position Activity Restriction  Other position/activity restrictions: 2L O2; fluid restriction, low sodium/low potassium diet     Subjective   General  Chart Reviewed: Yes  Additional Pertinent Hx: Per Brooke Currie MD H&P on 11-: The pt is an 78 yo male who presented to the ED with weakness, increasing edema and inability to urinate. The pt recently in the hospital for weakness and DEISY and pna. In the ED the pt's renal function wa worse and he was volume overloaded; he was SOB with crackles.     PMHx: asthma, a-fib, carotid artery stenosis, CKD, COPD, HTN, anemia, JOANN on c-pap, DM  Response To Previous Treatment: Not applicable  Family / Caregiver Present: Yes (2 family members)  Referring Practitioner: Brooke Currie MD  Referral Date : 11/16/22  Diagnosis: Anasarca, generalized weakness, worsening renal function  Follows Commands: Within Functional Limits  Subjective  Subjective: the pt was found to be in the bed and requesting to get up to the commode for a BM         Social/Functional History  Social/Functional History  Lives With: Spouse  Type of Home: House  Home Layout: Able to Live on Main level with bedroom/bathroom, Multi-level ([facility] 1 1/2 story)  Home Access: Stairs to enter with rails  Entrance Stairs - Number of Steps: 1+1  Entrance Stairs - Rails: Both  Bathroom Shower/Tub: Tub/Shower unit  Bathroom Toilet: Standard (comfort height commode)  Bathroom Equipment: Shower chair (sliding doors on shower with bars)  Home Equipment: Cane, Rollator, Oxygen (c-pap; home O2 since last hospital stay, 2L continuous)  Has the patient had two or more falls in the past year or any fall with injury in the past year?: No  Receives Help From: Home health (home PT and OT)  ADL Assistance: Needs assistance (son assisting with shower in the past week; used BSC for BM and urinals (prior to last hospital stay the pt was indep))  Homemaking Assistance: Independent (wife cleans. Pt completes bill paying, medication management, cooking  (needing assist for all in the past week))  Ambulation Assistance: Independent (the pt using the rollator in the past week but used a cane prior to last hospital stay)  Transfer Assistance: Independent  Active : Yes (not since last hospital stay)  Occupation: Retired  IADL Comments: sleeping in a [address]: Impaired (had cataract surgery)  Hearing  Hearing: Exceptions to [facility]  Hearing Exceptions: Bilateral hearing aid;Hard of hearing/hearing concerns    Cognition   Orientation  Overall Orientation Status: Within Functional Limits  Cognition  Safety Judgement: Decreased awareness of need for assistance (slightly impulsive)  Insights: Decreased awareness of deficits     Objective        Observation/Palpation  Observation: noted to have tremors in B UE's with L arm being worse and new tremors in B LE's  Gross Assessment  AROM: Generally decreased, functional  Strength: Generally decreased, functional  Coordination: Generally decreased, functional  Sensation: Intact     Bed mobility  Supine to Sit: Moderate assistance;2 Person assistance (HOB elevated, use of rail)  Sit to Supine: Unable to assess (pt seated in recliner at end of session)  Scooting: Stand by assistance  Transfers  Sit to Stand:  Moderate Assistance;2 Person Assistance  Stand to Sit: Moderate Assistance;2 Person Assistance  Bed to Chair: Dependent/Total  Comment: caden used to get the pt to the commode in the bathroom and then to the recliner chair        Balance  Sitting - Static: Good (on EOB once feet on the floor)  Sitting - Dynamic: Fair  Standing - Static: Fair;+ (in the stedy)  Comments: CGA/min A static standing in the stedy x 45 seconds for niko-care             AM-PAC Score  AM-PAC Inpatient Mobility Raw Score : 9 (11/16/22 1354)  AM-PAC Inpatient T-Scale Score : 30.55 (11/16/22 1354)  Mobility Inpatient CMS 0-100% Score: 81.38 (11/16/22 1354)  Mobility Inpatient CMS G-Code Modifier : CM (11/16/22 1354)          Goals  Short Term Goals  Time Frame for Short Term Goals: upon d/c  Short Term Goal 1: Bed mobility with min A. Short Term Goal 2: Transfers sit <> stand with min A. Short Term Goal 3: Ambulate with walker 25 feet with min A.   Patient Goals   Patient Goals : to be able to walk again       Education  Patient Education  Education Given To: Patient  Education Provided: Role of Therapy;Plan of Care  Education Method: Verbal;Demonstration  Barriers to Learning: None  Education Outcome: Demonstrated understanding;Continued education needed      Therapy Time   Individual Concurrent Group Co-treatment   Time In 1300         Time Out 1355         Minutes 55         Timed Code Treatment Minutes: 40 Minutes     Electronically signed by Marlin Greenwood [id number] on 11/16/2022 at 2:03 PM

## 2022-11-16 NOTE — PLAN OF CARE
Problem: Discharge Planning  Goal: Discharge to home or other facility with appropriate resources  Outcome: Progressing     Problem: Pain  Goal: Verbalizes/displays adequate comfort level or baseline comfort level  Outcome: Progressing     Problem: Safety - Adult  Goal: Free from fall injury  Outcome: Progressing     Problem: ABCDS Injury Assessment  Goal: Absence of physical injury  Outcome: Progressing     Problem: Respiratory - Adult  Goal: Achieves optimal ventilation and oxygenation  Outcome: Progressing     Problem: Cardiovascular - Adult  Goal: Maintains optimal cardiac output and hemodynamic stability  Outcome: Progressing  Goal: Absence of cardiac dysrhythmias or at baseline  Outcome: Progressing     Problem: Skin/Tissue Integrity - Adult  Goal: Skin integrity remains intact  Outcome: Progressing  Goal: Incisions, wounds, or drain sites healing without S/S of infection  Outcome: Progressing     Problem: Musculoskeletal - Adult  Goal: Return mobility to safest level of function  Outcome: Progressing  Goal: Maintain proper alignment of affected body part  Outcome: Progressing  Goal: Return ADL status to a safe level of function  Outcome: Progressing     Problem: Genitourinary - Adult  Goal: Absence of urinary retention  Outcome: Progressing  Goal: Urinary catheter remains patent  Outcome: Progressing     Problem: Infection - Adult  Goal: Absence of infection at discharge  Outcome: Progressing  Goal: Absence of infection during hospitalization  Outcome: Progressing  Goal: Absence of fever/infection during anticipated neutropenic period  Outcome: Progressing     Problem: Metabolic/Fluid and Electrolytes - Adult  Goal: Electrolytes maintained within normal limits  Outcome: Progressing  Goal: Hemodynamic stability and optimal renal function maintained  Outcome: Progressing  Goal: Glucose maintained within prescribed range  Outcome: Progressing

## 2022-11-16 NOTE — ED PROVIDER NOTES
[address]        Pt Name: Leesa Ramirez  MRN: [id number]  [place] 1940  Date of evaluation: 11/15/2022  Provider: Kennth Moritz, PA-C  PCP: Lissette Neff MD  Note Started: 8:51 PM EST11/16/2022        I have seen and evaluated this patient with my supervising physician Beau Grewal MD.      Chepe Alarcon [address]       Chief Complaint   Patient presents with    Shortness of Breath     Pt in via EMS from home with \"shortness of breath and weakness for the last couple of days\". Pt denies any pain but reports shortness of breath that worsens with exertion. Pt recently admitted and discharged on 2 L nasal cannula for home. Pt presents to ED on 6 L nasal cannula with oxygen saturation of \"90%\" at this time. HISTORY OF PRESENT ILLNESS   (Location/Symptom, Timing/Onset, Context/Setting, Quality, Duration, Modifying Factors, Severity)  Note limiting factors. Chief Complaint: Shortness of breath weakness, general malaise for the last couple of days or so after being discharged home    Leesa Ramirez is a 79 y.o. male who presents stating that last week he was in the hospital in this area for pneumonia and to check on his kidney function. He states that by the end of his hospital stay he was feeling quite a bit better. However they still needed to discharge him home on 2 L oxygen supplementation which was not previously case for him. He states that over the last few days his appetite has been down, he does not know if he might have a little bit more swelling in the legs than usual, but he feels short of breath compared to usual with general malaise. No pain in the chest.  No abdominal pain or vomiting. No extremity acute loss of range of motion or strength. No syncope or near syncope. Because he was not doing as well he decided to come back to the ER. His wife is present at the bedside as well.   Patient indicates that when he was in the hospital he was getting heparin shots to keep from getting DVTs. He states that is why he has the purplish bruising on his abdomen and arm. He states that he takes a couple of aspirin by mouth daily as he has for quite some time and they did not send him home on any other anticoagulant. Nursing Notes were all reviewed and agreed with or any disagreements were addressed in the HPI. REVIEW OF SYSTEMS    (2-9 systems for level 4, 10 or more for level 5)     Review of Systems  Positive history as above with no fevers or chills, positive for cough with some productive mucus and a few bloody streaks he states from his nose. He does not have moisturized home oxygen supplementation but just the plain O2 supplementation. No chest pain. No syncope or near syncope or abdominal pain or vomiting or diarrhea. No burning in urination or extremity acute loss range of motion or strength or sensation.       PAST MEDICAL HISTORY     Past Medical History:   Diagnosis Date    Allergic rhinitis     Asthma     Atrial fibrillation ([facility] [address]     Carotid artery stenosis     Chronic kidney disease     COPD (chronic obstructive pulmonary disease) ([facility])     CPAP (continuous positive airway pressure) dependence     ESBL (extended spectrum beta-lactamase) producing bacteria infection 12/26/2018    urine    Hyperlipidemia     Hypertension     Iron deficiency anemia     Obstructive sleep apnea     Type II or unspecified type diabetes mellitus without mention of complication, not stated as uncontrolled        SURGICAL HISTORY     Past Surgical History:   Procedure Laterality Date    CATARACT REMOVAL  2/2012    bilateral    COLONOSCOPY  7/10/2009    diverticulosis    hemorrhoids    CT BONE MARROW BIOPSY  11/1/2022    CT BONE MARROW BIOPSY 11/1/2022 [facility] CT    GALLBLADDER SURGERY  1981    PAIN MANAGEMENT PROCEDURE Right 10/20/2021    COOLIEF RADIOFREQUENCY ABLATION - RIGHT KNEE performed by [employer] Jamie Barr MD at [address] Left 12/8/2021    [address] - LEFT KNEE performed by Eliceo Kam MD at [address]  7-2005    7/10/2009    normal       CURRENTMEDICATIONS       Previous Medications    ACCU-CHEK FASTCLIX LANCETS MISC    USE TO TEST 3 TIMES A DAY DX CODE E11.9    ACCU-CHEK GUIDE STRIP    TEST AS DIRECTED 3 TIMES A DAY    ALBUTEROL SULFATE HFA (PROVENTIL;VENTOLIN;PROAIR) 108 (90 BASE) MCG/ACT INHALER    INHALE 2 PUFFS BY MOUTH EVERY 4 HOURS AS NEEDED FOR WHEEZING    ALFUZOSIN (UROXATRAL) 10 MG EXTENDED RELEASE TABLET    TAKE 1 TABLET BY MOUTH EVERY DAY    AMIODARONE (CORDARONE) 200 MG TABLET    Take 0.5 tablets by mouth daily    ASCORBIC ACID (SUPER C COMPLEX PO)    Take by mouth daily    ASPIRIN EC 81 MG EC TABLET    Take 2 tablets by mouth daily.     ATORVASTATIN (LIPITOR) 40 MG TABLET    TAKE 1 TABLET BY MOUTH EVERY DAY    B-D UF III MINI PEN NEEDLES 31G X 5 MM MISC    USE AS DIRECTED 3 TIMES A DAY    CHOLECALCIFEROL (VITAMIN D3 PO)    Take 2,000 Units by mouth daily     CLONIDINE (CATAPRES) 0.1 MG TABLET    TAKE 1 TABLET BY MOUTH TWICE A DAY    CPAP MACHINE MISC    by Does not apply route    [employer] PURELAX 17 GM/SCOOP POWDER    TAKE 17G BY MOUTH DAILY MIX WITH 8 OZ OF WATER    CYANOCOBALAMIN (VITAMIN B-12 PO)    Take 500 mcg by mouth every other day     DOCUSATE SODIUM (COLACE) 100 MG CAPSULE    Take by mouth daily    FERROUS SULFATE 325 (65 FE) MG TABLET    Take 325 mg by mouth daily     INSULIN GLARGINE (LANTUS) 100 UNIT/ML INJECTION VIAL    Inject 10 Units into the skin nightly    INSULIN SYRINGE .5CC/29G 29G X 1/2\" 0.5 ML MISC    1 each by Does not apply route daily    LACTOBACILLUS RHAMNOSUS, GG, ([employer] PROBIOTIC, LACTOBACILLUS,) CAPS    TAKE 1 CAPSULE BY MOUTH 2 TIMES DAILY (WITH MEALS)    LEVALBUTEROL (XOPENEX) 1.25 MG/3ML NEBULIZER SOLUTION    USE 1 VIAL VIA NEBULIZER FOUR TIMES DAILY LINZESS 145 MCG CAPSULE    TAKE 1 CAPSULE BY MOUTH EVERY DAY IN THE MORNING BEFORE BREAKFAST    MONTELUKAST (SINGULAIR) 10 MG TABLET    TAKE 1 TABLET BY MOUTH EVERY DAY    MULTIPLE VITAMINS-MINERALS (MULTIVITAMIN ADULT PO)    Take by mouth daily    PANTOPRAZOLE (PROTONIX) 40 MG TABLET    TAKE 1 TABLET BY MOUTH EVERY DAY    SODIUM BICARBONATE 650 MG TABLET    Take 1 tablet by mouth 3 times daily    VERAPAMIL (CALAN SR) 240 MG EXTENDED RELEASE TABLET    TAKE 1 TABLET BY MOUTH TWICE A DAY    ZINC PO    Take by mouth       ALLERGIES     Hytrin [terazosin hcl], Penicillins, Shrimp flavor, Sulfa antibiotics, Sulfasalazine, Tekturna [aliskiren fumarate], Vancomycin, and Ciprofloxacin    FAMILYHISTORY       Family History   Problem Relation Age of Onset    Heart Disease Mother     Stroke Mother     Vision Loss Mother     High Blood Pressure Father     High Blood Pressure Brother     Diabetes Brother     Sleep Apnea Brother     Arthritis Paternal Grandmother     Diabetes Paternal Grandmother         SOCIAL HISTORY       Social History     Socioeconomic History    Marital status:      Spouse name: None    Number of children: 5    Years of education: None    Highest education level: None   Occupational History    Occupation: retired   Tobacco Use    Smoking status: Former     Packs/day: 0.50     Years: 18.00     Pack years: 9.00     Types: Cigarettes     Start date: 1958     Quit date: 1988     Years since quittin.8    Smokeless tobacco: Never   Vaping Use    Vaping Use: Never used   Substance and Sexual Activity    Alcohol use: No     Alcohol/week: 0.0 standard drinks    Drug use: No    Sexual activity: Yes     Partners: Female     Social Determinants of Health     Financial Resource Strain: Low Risk     Difficulty of Paying Living Expenses: Not hard at all   Food Insecurity: No Food Insecurity    Worried About Running Out of Food in the Last Year: Never true    Ran Out of Food in the Last Year: Never true SCREENINGS    Iza Coma Scale  Eye Opening: Spontaneous  Best Verbal Response: Oriented  Best Motor Response: Obeys commands  Iza Coma Scale Score: 15        PHYSICAL EXAM    (up to 7 for level 4, 8 or more for level 5)     ED Triage Vitals [11/15/22 2030]   BP Temp Temp src Heart Rate Resp SpO2 Height Weight   (!) 137/48 -- -- 60 18 91 % -- 274 lb 0.5 oz (124.3 kg)       Physical Exam  Vitals and nursing note reviewed. Constitutional:       Appearance: He is obese. He is ill-appearing. He is not diaphoretic. HENT:      Head: Normocephalic and atraumatic. Right Ear: External ear normal.      Left Ear: External ear normal.      Nose: Nose normal.   Eyes:      General:         Right eye: No discharge. Left eye: No discharge. Conjunctiva/sclera: Conjunctivae normal.   Cardiovascular:      Rate and Rhythm: Normal rate and regular rhythm. Pulses: Normal pulses. Pulmonary:      Effort: Respiratory distress present. Breath sounds: Decreased breath sounds present. No rales. Comments: Patient requiring 6 L O2 supplementation to keep oxygen at around 90%, mild increased respiratory effort still at this level of support  Abdominal:      General: Bowel sounds are normal.      Palpations: Abdomen is soft. Tenderness: There is no abdominal tenderness. Musculoskeletal:         General: Normal range of motion. Cervical back: Normal range of motion and neck supple. No rigidity. Right lower leg: No tenderness. Edema present. Left lower leg: No tenderness. Edema present. Skin:     General: Skin is warm and dry. Findings: Rash present. Comments: Ecchymosis on the anterior abdomen and right upper extremity   Neurological:      General: No focal deficit present. Mental Status: He is alert and oriented to person, place, and time.    Psychiatric:         Mood and Affect: Mood normal.         Behavior: Behavior normal.       DIAGNOSTIC RESULTS LABS:    Labs Reviewed   CBC WITH AUTO DIFFERENTIAL - Abnormal; Notable for the following components:       Result Value    RBC 3.02 (*)     Hemoglobin 8.3 (*)     Hematocrit 26.7 (*)     MCHC 30.9 (*)     RDW 18.6 (*)     Platelets 283 (*)     Lymphocytes Absolute 0.5 (*)     All other components within normal limits   COMPREHENSIVE METABOLIC PANEL - Abnormal; Notable for the following components:    Potassium 5.6 (*)     Glucose 160 (*)     BUN 93 (*)     Creatinine 2.8 (*)     Est, Glom Filt Rate 22 (*)     Total Protein 6.0 (*)     Albumin 3.0 (*)     Albumin/Globulin Ratio 1.0 (*)      (*)     AST 43 (*)     All other components within normal limits    Narrative:     CALL  Travon AJ tel. [phone],  Chemistry results called to and read back by Jayla Willis RN, 11/15/2022  22:14, by Levern Bence - Abnormal; Notable for the following components:    Protime 18.2 (*)     INR 1.51 (*)     All other components within normal limits   BRAIN NATRIURETIC PEPTIDE - Abnormal; Notable for the following components:    Pro- (*)     All other components within normal limits   TROPONIN - Abnormal; Notable for the following components:    Troponin 0.02 (*)     All other components within normal limits   BETA-HYDROXYBUTYRATE - Abnormal; Notable for the following components:    Beta-Hydroxybutyrate 0.42 (*)     All other components within normal limits    Narrative:     James Edna tel. [phone],  Chemistry results called to and read back by Jayla Willis RN, 11/15/2022  22:14, by [facility]   BLOOD GAS, VENOUS - Abnormal; Notable for the following components:    pH, Jarrett 7.243 (*)     pCO2, Jarrett 58.0 (*)     All other components within normal limits   COVID-19, RAPID   RAPID INFLUENZA A/B ANTIGENS   CULTURE, BLOOD 1   CULTURE, BLOOD 2   LACTATE, SEPSIS   PROCALCITONIN   TSH WITH REFLEX    Narrative:     add on   LACTATE, SEPSIS       When ordered, only abnormal lab results are displayed.  All other labs were within normal range or not returned as of this dictation. EKG: When ordered, EKG's are interpreted by the Emergency Department Physician in the absence of a cardiologist.  Please see their note for interpretation of EKG. RADIOLOGY:   Non-plain film images such as CT, Ultrasound and MRI are read by the radiologist. Plain radiographic images are visualized andpreliminarily interpreted by the  ED Provider with the below findings:        Interpretation [facility] Radiologist below, if available at the time of this note:    XR CHEST PORTABLE   Final Result   Cardiomegaly with vascular congestion and patchy mid and lower lung field   infiltrates, worsened from the previous exam with suspected small effusions. Worsening patchy infiltrates may represent atelectasis or pneumonia. No results found. PROCEDURES   Unless otherwise noted below, none     Procedures    CRITICAL CARE TIME   Critical Care  There was a high probability of life-threatening clinical deterioration in the patient's condition requiring my urgent intervention. I personally saw the patient and independently provided greater than 40 minutes of non-concurrent critical care out of the total shared critical care time provided. Critical care required due to patients acute hypoxia requiring increased O2 supplementation, fluid overload requiring diuresis, hypothermia requiring Ronan hugger, and patient requiring inpatient admission for further care and treatment in particular with regard to his acute kidney injury on chronic kidney disease.       CONSULTS:  IP CONSULT TO PRIMARY CARE PROVIDER      EMERGENCY DEPARTMENT COURSE and DIFFERENTIAL DIAGNOSIS/MDM:   Vitals:    Vitals:    11/15/22 2030 11/15/22 2230 11/15/22 2236 11/15/22 2245   BP: (!) 137/48 (!) 164/66  (!) 175/70   Pulse: 60 57 58 58   Resp: 18 22 22 20   Temp: (!) 95.8 °F (35.4 °C)   (!) 92.5 °F (33.6 °C)   TempSrc: Temporal   Rectal   SpO2: 91% 100% 98% 97%   Weight: 274 lb 0.5 oz (124.3 kg)          Patient was given thefollowing medications:  Medications   ipratropium-albuterol (DUONEB) nebulizer solution 2 ampule (2 ampules Inhalation Given 11/15/22 2236)   furosemide (LASIX) injection 40 mg (40 mg IntraVENous Given 11/15/22 2307)         Is this patient to be included in the SEP-1 Core Measure due to severe sepsis or septic shock? No   Exclusion criteria - the patient is NOT to be included for SEP-1 Core Measure due to: Infection is not suspected    This patient presents as above and evaluation and treatment is begun here. Chest x-ray obtained as well as EKG. EKG interpreted by ED physician. Please see that note for details. No evidence for atrial fibrillation or STEMI. Patient remains on 6 L oxygen O2 supplementation which is more than what has been needed at home with a baseline of 2 L O2 supplementation. Venous blood gas shows venous pH of 7.243 with CO2 of 58. CBC shows hemoglobin 8.3, hematocrit 6.7 no elevated white cell count, platelets 715. INR 1.5 and prothrombin time 18.2. Chest x-ray reveals worse basilar infiltrates per radiology, possibly fluid overload versus infection. Please see that note above. Patient's troponin of 0.02 is consistent with his baseline according electronic medical record. Patient's kidney function has comes back shows acute kidney injury on chronic kidney disease with BUN 93 and creatinine 2.8. Glucose only 160. Patient's BNP is high at 755. No lactic acid elevation. Diuresis begun by Dr. Claudine Jacobsen. Procalcitonin not elevated. Along with no elevated lactic acid and a picture appearing more like fluid overload than the likely bacterial pneumonia, no suspicion of sepsis at this time. Influenza testing is negative as well as COVID-19. Patient is somewhat hypothermic with no low TSH. It could potentially be from patient's home environment. Patient is given a bear hugger here.   Patient does need inpatient further care and treatment for his hypoxia requiring increased oxygen supplementation with anasarca acute renal failure superimposed on chronic kidney disease and hyperthermia and Dr. Shima Cheng consults the hospitalist for admission. Patient is in fair condition at this time. I am the Primary Clinician of Record. FINAL IMPRESSION      1. Hypoxia    2. Anasarca    3. Acute renal failure superimposed on chronic kidney disease, unspecified CKD stage, unspecified acute renal failure type ([facility] [address]    4. Hypothermia, initial encounter          DISPOSITION/PLAN   DISPOSITION Decision To Admit 11/16/2022 12:03:40 AM      PATIENT REFERREDTO:  No follow-up provider specified.     DISCHARGE MEDICATIONS:  New Prescriptions    No medications on file       DISCONTINUED MEDICATIONS:  Discontinued Medications    No medications on file              (Please note that portions ofthis note were completed with a voice recognition program.  Efforts were made to edit the dictations but occasionally words are mis-transcribed.)    Paulette Aranda PA-C (electronically signed)             Paulette Aranda PA-C  11/16/22 0019

## 2022-11-16 NOTE — ED PROVIDER NOTES
I independently performed a history and physical on Gilmar Surseh. All diagnostic, treatment, and disposition decisions were made by myself in conjunction with the advanced practice provider. For further details of [facility] emergency department encounter, please see Puneet Zambrano PA-C's documentation. Patient is here for worsening shortness of breath. He was admitted to the hospital recently related to some anemia and a fatigue and while in the hospital he was diagnosed with pneumonia. He was discharged on the ninth and he states he improved for several days although the family reports he was still very weak. For the last 3 days however he has been unable to get up and walk even with a walker. He has been sitting in a chair all the time. He denies any chest pain or fevers. He reports that his diuretic was stopped while he was in the hospital and he was never told to restart it. For this reason he has not been taking his Lasix. He has been on 2 L of oxygen since discharge from the hospital which is new for him following that admission. On exam he is obese but also has pitting lower extremity edema and even pitting abdominal wall edema. He does have rales on lung exam as well. Labs Reviewed   CBC WITH AUTO DIFFERENTIAL - Abnormal; Notable for the following components:       Result Value    RBC 3.02 (*)     Hemoglobin 8.3 (*)     Hematocrit 26.7 (*)     MCHC 30.9 (*)     RDW 18.6 (*)     Platelets 450 (*)     Lymphocytes Absolute 0.5 (*)     All other components within normal limits   COMPREHENSIVE METABOLIC PANEL - Abnormal; Notable for the following components:    Potassium 5.6 (*)     Glucose 160 (*)     BUN 93 (*)     Creatinine 2.8 (*)     Est, Glom Filt Rate 22 (*)     Total Protein 6.0 (*)     Albumin 3.0 (*)     Albumin/Globulin Ratio 1.0 (*)      (*)     AST 43 (*)     All other components within normal limits    Narrative:     CALL  Cool  SKERK tel. [phone],  Chemistry results called to and read back by Andre Smith RN, 11/15/2022  22:14, by Faisal Bryant - Abnormal; Notable for the following components:    Protime 18.2 (*)     INR 1.51 (*)     All other components within normal limits   BRAIN NATRIURETIC PEPTIDE - Abnormal; Notable for the following components:    Pro- (*)     All other components within normal limits   TROPONIN - Abnormal; Notable for the following components:    Troponin 0.02 (*)     All other components within normal limits   BETA-HYDROXYBUTYRATE - Abnormal; Notable for the following components:    Beta-Hydroxybutyrate 0.42 (*)     All other components within normal limits    Narrative:     Giovani Stauffer tel. [phone],  Chemistry results called to and read back by Andre Smith RN, 11/15/2022  22:14, by [facility]   BLOOD GAS, VENOUS - Abnormal; Notable for the following components:    pH, Jarrett 7.243 (*)     pCO2, Jarrett 58.0 (*)     All other components within normal limits   LACTATE, SEPSIS   LACTATE, SEPSIS     XR CHEST PORTABLE   Final Result   Cardiomegaly with vascular congestion and patchy mid and lower lung field   infiltrates, worsened from the previous exam with suspected small effusions. Worsening patchy infiltrates may represent atelectasis or pneumonia. I personally saw this patient and performed a substantive portion of the visit including all aspects of the medical decision making. MDM  It appears that the patient is primarily having a fluid overload/CHF crisis at this time. I believe this is the reason for his hypoxia and his severe edema diffusely. For this reason I did give him IV Lasix. From respiratory standpoint he is stable on 2 L at this time    I personally saw this patient and independently provided 45 minutes of non-concurrent critical care out of the total shared critical care time provided. I spoke with Dr. Aditya Dowling.  We thoroughly discussed the history, physical exam, laboratory and imaging studies, as well as, emergency department course. Based upon that discussion, we've decided to admit Hyde Smoke for further observation and evaluation of Gilmar Suresh's dyspnea. As I have deemed necessary from their history, physical, and studies, I have considered and evaluated Hyde Smoke for the following diagnoses:  ACUTE CORONARY SYNDROME, CHRONIC OBSTRUCTIVE PULMONARY DISEASE, CONGESTIVE HEART FAILURE, PERICARDIAL TAMPONADE, PNEUMONIA, PNEUMOTHORAX, PULMONARY EMBOLISM, SEPSIS, and THORACIC DISSECTION. FINAL IMPRESSION  1. Hypoxia    2. Anasarca    3. Acute renal failure superimposed on chronic kidney disease, unspecified CKD stage, unspecified acute renal failure type ([facility] [address]    4. Hypothermia, initial encounter        Vitals:  Blood pressure (!) 143/86, pulse 65, temperature (!) 93.5 °F (34.2 °C), temperature source Rectal, resp. rate 18, weight 274 lb 0.5 oz (124.3 kg), SpO2 95 %.        Celio Yanez MD  11/16/22 0088

## 2022-11-16 NOTE — PROGRESS NOTES
Occupational Therapy  Facility/Department: [facility]  Occupational Therapy Initial Assessment    Name: Marcelino Carson  : 1940  MRN: [id number]  Date of Service: 2022    Discharge Recommendations:  3-5 sessions per week, Patient would benefit from continued therapy after discharge        Marcelino Carson scored a  on the AM-PAC ADL Inpatient form. Current research shows that an AM-PAC score of 17 or less is typically not associated with a discharge to the patient's home setting. Based on the patient's AM-PAC score and their current ADL deficits, it is recommended that the patient have 3-5 sessions per week of Occupational Therapy at d/c to increase the patient's independence. Please see assessment section for further patient specific details. If patient discharges prior to next session this note will serve as a discharge summary. Please see below for the latest assessment towards goals. Patient Diagnosis(es): The primary encounter diagnosis was Hypoxia. Diagnoses of Anasarca, Acute renal failure superimposed on chronic kidney disease, unspecified CKD stage, unspecified acute renal failure type ([address] and Hypothermia, initial encounter were also pertinent to this visit. 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 Past Surgical History:  has a past surgical history that includes Gallbladder surgery (); Upper gastrointestinal endoscopy (7-2005    7/10/2009); Colonoscopy (7/10/2009);  Cataract removal (2/2012); Pain management procedure (Right, 10/20/2021); Pain management procedure (Left, 12/8/2021); and CT BIOPSY BONE MARROW (11/1/2022). Assessment   Performance deficits / Impairments: Decreased functional mobility ; Decreased ADL status; Decreased endurance;Decreased fine motor control;Decreased balance;Decreased strength  Assessment: Pt is an 79 y.o. male Anasarca, Worsening renal function, Generalized weakness. Pt recent admit with weakness and DEISY, d/c'd home on 11/8. Prior to recent admits, pt living with wife and independent ADLs and fxl mobility with cane. Since last admission, pt has been requiring assist for ADLs and use of rollator for fxl mobility. Pt currently functioning well below baseline d/t the above deficits, today requiring mod A x2 for bed mobility, mod A x2 for fxl transfers with benoit stedy lift, total A toileting, and anticipate pt would require overall max A for ADLs. Pt demonstrates need for ongoing skilled OT at d/c to maximize pt's safety and independence prior to return home. Prognosis: Fair;Good  Decision Making: Medium Complexity  History: see above  REQUIRES OT FOLLOW-UP: Yes  Activity Tolerance  Activity Tolerance: Patient limited by fatigue        Plan   Occupational Therapy Plan  Times Per Week: 3-5  Times Per Day: Once a day  Current Treatment Recommendations: Balance training, Functional mobility training, Endurance training, Strengthening, Safety education & training, Self-Care / ADL, Equipment evaluation, education, & procurement, Patient/Caregiver education & training, ROM     Restrictions  Restrictions/Precautions  Restrictions/Precautions: Fall Risk, Bed Alarm, Up as Tolerated  Position Activity Restriction  Other position/activity restrictions: 2L O2; fluid restriction, low sodium/low potassium diet    Subjective   General  Chart Reviewed:  Yes  Additional Pertinent Hx: Per Farnaz Forrester MD's H&P 11/16/2022: \"This is an 79-year-old white male who was recently admitted to the hospital with weakness and acute kidney injury who presented back to the emergency room with diffuse weakness, inability to urinate and increasing edema. Patient was just admitted with weakness and worsened kidney function. Patient was felt to be intravascularly dry although he did have some peripheral edema. He was given some fluids with some improvement in his renal function but if he received any diuretics he would have a worsening of his kidney function. Patient's Diovan and diuretics were held at discharge. Additionally the patient had developed a bibasilar pneumonia and this was treated with antibiotics and he seemed to recover well. Patient presented back to the emergency room last evening with approximately 24 hours of increasing weakness and difficulty with urination. Upon presentation his renal function was again a bit worse and his fluid volume overload was worsened. He was short of breath and does have some crackles. \"  Family / Caregiver Present: Yes (spouse, daughter)  Referring Practitioner: Roberto Leonard MD  Diagnosis: Anasarca, Worsening renal function, Generalized weakness  Subjective  Subjective: Pt met b/s for OT eval/cotx with PT. Pt in bed on arrival, reporting he needs to have a BM. Pt agreeable to participate in therapy and OOB to BR for toileting.      Social/Functional History  Social/Functional History  Lives With: Spouse  Type of Home: House  Home Layout: Able to Live on Main level with bedroom/bathroom, Multi-level ([facility] 1 1/2 story)  Home Access: Stairs to enter with rails  Entrance Stairs - Number of Steps: 1+1  Entrance Stairs - Rails: Both  Bathroom Shower/Tub: Tub/Shower unit  Bathroom Toilet: Standard (comfort height commode)  Bathroom Equipment: Shower chair (sliding doors on shower with bars)  Home Equipment: Cane, Rollator, Oxygen (c-pap; home O2 since last hospital stay, 2L continuous)  Has the patient had two or more falls in the past year or any fall with injury in the past year?: No  Receives Help From: Home health (home PT and OT)  ADL Assistance: Needs assistance (son assisting with shower in the past week; used BSC for BM and urinals (prior to last hospital stay the pt was indep))  Homemaking Assistance: Independent (wife cleans. Pt completes bill paying, medication management, cooking  (needing assist for all in the past week))  Ambulation Assistance: Independent (the pt using the rollator in the past week but used a cane prior to last hospital stay)  Transfer Assistance: Independent  Active : Yes (not since last hospital stay)  Occupation: Retired  IADL Comments: sleeping in a recliner       Objective       Observation/Palpation  Observation: noted to have tremors in B UE's with L arm being worse and new tremors in B LE's    Safety Devices  Type of Devices: Call light within reach; Chair alarm in place;Gait belt;Patient at risk for falls; Left in chair;Nurse notified    Balance  Sitting:  (seated EOB with SBA, UE support on bedrail)  Standing: With support (CGA/min A in benoit negrete ~45 seconds for toileting hygiene)    Toilet Transfers  Toilet Transfer: Moderate assistance;2 Person assistance  Toilet Transfers Comments: with benoit negrete    AROM: Within functional limits  Strength: Within functional limits  Coordination: Generally decreased, functional (tremors B UE's L>R)    ADL  Feeding: Setup  Toileting: Dependent/Total  Toileting Skilled Clinical Factors: small BM at commode, no clothing management, assist for hygiene in stance in stedy  Additional Comments: Anticipate pt is independent feeding, total A LB ADLs, mod A UB ADLs, min A grooming based on ROM/strength, balance, endurance.     Activity Tolerance  Activity Tolerance: Patient limited by fatigue;Patient limited by endurance  Activity Tolerance Comments: the pt on 2L O2 and appeared to get SOB with activity; difficulty getting SpO2 readings    Bed mobility  Supine to Sit: Moderate assistance;2 Person assistance (HOB elevated, use of rail)  Sit to Supine: Unable to assess (pt seated in recliner at end of session)  Scooting: Stand by assistance    Transfers  Sit to stand: Moderate assistance;2 Person assistance (EOB and commode >benoit stedy)  Stand to sit: Moderate assistance;2 Person assistance (benoit stedy>commode and recliner)  Transfer Comments: total A with benoit stedy bed>BR commode>recliner    Vision  Vision: Impaired (had cataract surgery)  Hearing  Hearing: Exceptions to [facility]  Hearing Exceptions: Bilateral hearing aid;Hard of hearing/hearing concerns    Cognition  Overall Cognitive Status: Exceptions  Safety Judgement: Decreased awareness of need for assistance (slighty impulsive)  Insights: Decreased awareness of deficits  Orientation  Overall Orientation Status: Within Functional Limits    Education Given To: Patient  Education Provided: Role of Therapy;Plan of Care;Transfer Training;ADL Adaptive Strategies  Barriers to Learning: Cognition  Education Outcome: Verbalized understanding;Demonstrated understanding                          AM-PAC Score        AM-[facility] Inpatient Daily Activity Raw Score: 12 (11/16/22 [facility])  AM-PAC Inpatient ADL T-Scale Score : 30.6 (11/16/22 [facility])  ADL Inpatient CMS 0-100% Score: 66.57 (11/16/22 [facility])  ADL Inpatient CMS G-Code Modifier : CL (11/16/22 1353)           Goals  Short Term Goals  Time Frame for Short Term Goals: Prior to d/c:  Short Term Goal 1: Pt will complete UB bathing/dressing with SBA. Short Term Goal 2: Pt will complete LB bathing/dressing with mod A using A/E and DME. Short Term Goal 3: Pt will complete toileting with mod A. Short Term Goal 4: Pt will complete fxl mobility and fxl transfers to/from ADL surfaces with min A x1-2 using AD. Short Term Goal 5: Pt will increase activity tolerance to achieve the above goals.   Long Term Goals  Time Frame for Long Term Goals : STGs=LTGs  Patient Goals   Patient goals : to eventually return home.        Therapy Time   Individual Concurrent Group Co-treatment   Time In 1300         Time Out 1355         Minutes 55         Timed Code Treatment Minutes: [address], OTR/L 7111

## 2022-11-16 NOTE — CARE COORDINATION
11/16/22 1257   Readmission Assessment   Number of Days since last admission? 8-30 days   Previous Disposition Home with Home Health   Who is being Interviewed Caregiver   What was the patient's/caregiver's perception as to why they think they needed to return back to the hospital? Other (Comment)  (SOB)   Did you visit your Primary Care Physician after you left the hospital, before you returned this time? Yes  (Multiple phone calls to PCP)   Did you see a specialist, such as Cardiac, Pulmonary, Orthopedic Physician, etc. after you left the hospital? No   Who advised the patient to return to the hospital? Self-referral   Does the patient report anything that got in the way of taking their medications? No   In our efforts to provide the best possible care to you and others like you, can you think of anything that we could have done to help you after you left the hospital the first time, so that you might not have needed to return so soon?  Other (Comment)  (No)

## 2022-11-16 NOTE — CARE COORDINATION
Discharge Planning:  SW received a call from [facility] stating this facility is able to accept this pt for skilled care upon d/c.  DUDLEY Foster LSHEENA  [phone]  Electronically signed by Nicholas Murillo on 11/16/2022 at 4:26 PM

## 2022-11-16 NOTE — ED NOTES
Pt placed on Ronan Hugger at this time due to rectal temperature of 92.5 F.  Verbal order from Laura Coronado RN  11/15/22 1237

## 2022-11-16 NOTE — ED NOTES
Report called to Rosalia Rangel RN on 4W. Denies further questions.      Mihaela Tabor RN  11/16/22 0104

## 2022-11-16 NOTE — TELEPHONE ENCOUNTER
Writer contacted Dr. Cammy Granda to inform of 30 day readmission risk. Dr. Cammy Granda informed writer of readmission.

## 2022-11-16 NOTE — CONSULTS
Nephrology ([facility]) Consult Note    Reshma Ochoa is a 79 y.o. male whom we were asked to see for DEISY on CKD. The patient presents back with worsened weakness and worsened edema. He reports not being able to urinate. During the last hospitalization, his serum creatinine fluctuated between 2.4 and 2.8, and he received IVF. In the ER, he was noted to be hypothermic. Normal procalcitonin and lack of leukocytosis would argue against infection. CXR suggestive of pulmonary edema. His daughter reports that he had a \"scan\" of 300 cc before gordon was placed. He reports that he is on his \"second bag\". Currently frustrated because he hasn't been getting meals, so we discussed calling his order down, but he didn't get a new patient folder, so he didn't have a menu. He wants to get up to go have a bowel movement, but he has to wait until he is evaluated by PT/OT. I spoke to them, and he is on their schedule to be see at 1 PM today, and this was relayed to family. He reports worsened tremors. He has been sleeping poorly and all the time. Past Medical History:  CKD   - baseline Cr 2.0  AF  COPD  HTN  HLP  DM2  JOANN    Review of System:  Otherwise unremarkable    Allergies:  Hytrin [terazosin hcl], Penicillins, Shrimp flavor, Sulfa antibiotics, Sulfasalazine, Tekturna [aliskiren fumarate], Vancomycin, and Ciprofloxacin    Medications:  Current medications reviewed. Social History:  former tobacco  Family Medical History:  Negative for kidney disase    Physical Exam:  Blood pressure (!) 144/64, pulse 75, temperature 97.3 °F (36.3 °C), temperature source Oral, resp. rate 16, height 5' 8\" (1.727 m), weight 270 lb 4.5 oz (122.6 kg), SpO2 96 %.     General:  NAD, A+Ox3, ill-appearing, obese body habitus  HEENT:  PERRL, EOMI  Neck:  Supple, normal range of movement, thyroid unremarkable  Chest:  CTAB, good respiratory effort, good air movement  CV:  RRR, no murmurs or rubs, no carotid bruit, no abdominal bruits  Abdomen:  NTND, soft, +BS, no hepatosplenomegaly  Extremities:  1+ edema  Neurological:  Moving all four extremities, CN II-XII grossly intact  Lymphatics:  No palpable lymph nodes  Skin:  No rash, no jaundice  Psychiatric:  Normal insight and judgement, good recall    Laboratory Studies:  Lab Results   Component Value Date/Time     11/16/2022 05:03 AM    K 5.3 (H) 11/16/2022 05:03 AM    K 5.1 10/31/2022 06:05 AM     11/16/2022 05:03 AM    CO2 23 11/16/2022 05:03 AM    BUN 95 (HH) 11/16/2022 05:03 AM    CREATININE 3.0 (H) 11/16/2022 05:03 AM    CALCIUM 8.4 11/16/2022 05:03 AM    PHOS 4.9 11/08/2022 07:14 AM    MG 2.10 01/21/2021 07:50 AM     Lab Results   Component Value Date/Time    WBC 5.4 11/15/2022 09:04 PM    HGB 8.3 (L) 11/15/2022 09:04 PM    HCT 26.7 (L) 11/15/2022 09:04 PM     (L) 11/15/2022 09:04 PM       Assessment/Plan:  Reviewed old records and labs.      1) DEISY on CKD   - baseline Cr 2.0   - ddx:  obstructive uropathy vs. CRS vs. pre-renal vs. ATN   - check urine studies   - patient is total body volume overloaded and will diurese   - will monitor renal function and may need to tolerate some degree of worsened renal function as he does seem sensitive to lasix    2) obstructive uropathy   - gordon in place   - on flomax    3) CHF   - agree with echo   - discussed concept of fluid balance   - start fluid restriction of 1.2 liters/day   - discussed lower sodium diet    4) respiratory   - wean O2 as tolerated    5) anemia   - iron studies okay earlier this month  - will start retacrit     6) FEN  - hyperkalemia   - potassium restricted diet  - metabolic acidosis   - decrease NaHCO3    6) JOANN   - CPAP    d/w Dr. Dale Stallings

## 2022-11-16 NOTE — H&P
225 [facility] [place] Internal Medicine  History and Physical      CHIEF COMPLAINT:  I'm so weak    History of Present Illness: This is an 50-year-old white male who was recently admitted to the hospital with weakness and acute kidney injury who presented back to the emergency room with diffuse weakness, inability to urinate and increasing edema. Patient was just admitted with weakness and worsened kidney function. Patient was felt to be intravascularly dry although he did have some peripheral edema. He was given some fluids with some improvement in his renal function but if he received any diuretics he would have a worsening of his kidney function. Patient's Diovan and diuretics were held at discharge. Additionally the patient had developed a bibasilar pneumonia and this was treated with antibiotics and he seemed to recover well. Patient presented back to the emergency room last evening with approximately 24 hours of increasing weakness and difficulty with urination. Upon presentation his renal function was again a bit worse and his fluid volume overload was worsened. He was short of breath and does have some crackles. He was given a single dose of Lasix, septic work-up so far has been unremarkable. He has a low procalcitonin. BUN and creatinine again are slightly worse today. Patient had shortness of breath but denied chest pain. He is very weak all over. He has no nausea or vomiting or diarrhea. He does report some constipation. Does report some urine retention.         Past Medical History:   Diagnosis Date    Allergic rhinitis     Asthma     Atrial fibrillation (HCC)     Carotid artery stenosis     Chronic kidney disease     COPD (chronic obstructive pulmonary disease) ([facility])     CPAP (continuous positive airway pressure) dependence     ESBL (extended spectrum beta-lactamase) producing bacteria infection 12/26/2018    urine    Hyperlipidemia     Hypertension     Iron deficiency anemia     Obstructive sleep apnea     Type II or unspecified type diabetes mellitus without mention of complication, not stated as uncontrolled          Past Surgical History:   Procedure Laterality Date    CATARACT REMOVAL  2/2012    bilateral    COLONOSCOPY  7/10/2009    diverticulosis    hemorrhoids    CT BONE MARROW BIOPSY  11/1/2022    CT BONE MARROW BIOPSY 11/1/2022 [facility] CT    GALLBLADDER SURGERY  1981    PAIN MANAGEMENT PROCEDURE Right 10/20/2021    COOLIEF RADIOFREQUENCY ABLATION - RIGHT KNEE performed by Ana Carbajal MD at [address] Left 12/8/2021    [address] - LEFT KNEE performed by Ana Carbajal MD at [address] [facility]  7-2005    7/10/2009    normal       Medications Prior to Admission:    Medications Prior to Admission: insulin glargine (BASAGLAR KWIKPEN) 100 UNIT/ML injection pen, Inject 10 Units into the skin nightly  levalbuterol (XOPENEX) 1.25 MG/3ML nebulizer solution, USE 1 VIAL VIA NEBULIZER FOUR TIMES DAILY  amiodarone (CORDARONE) 200 MG tablet, Take 0.5 tablets by mouth daily  INSULIN SYRINGE .5CC/29G 29G X 1/2\" 0.5 ML MISC, 1 each by Does not apply route daily (Patient not taking: Reported on 11/9/2022)  [DISCONTINUED] insulin glargine (LANTUS) 100 UNIT/ML injection vial, Inject 10 Units into the skin nightly (Patient not taking: Reported on 11/9/2022)  sodium bicarbonate 650 MG tablet, Take 1 tablet by mouth 3 times daily  albuterol sulfate HFA (PROVENTIL;VENTOLIN;PROAIR) 108 (90 Base) MCG/ACT inhaler, INHALE 2 PUFFS BY MOUTH EVERY 4 HOURS AS NEEDED FOR WHEEZING  montelukast (SINGULAIR) 10 MG tablet, TAKE 1 TABLET BY MOUTH EVERY DAY  [employer] PURELAX 17 GM/SCOOP powder, TAKE 17G BY MOUTH DAILY MIX WITH 8 OZ OF WATER  pantoprazole (PROTONIX) 40 MG tablet, TAKE 1 TABLET BY MOUTH EVERY DAY  B-D UF III MINI PEN NEEDLES 31G X 5 MM MISC, USE AS DIRECTED 3 TIMES A DAY  atorvastatin (LIPITOR) 40 MG tablet, TAKE 1 TABLET BY MOUTH EVERY DAY  alfuzosin (UROXATRAL) 10 MG extended release tablet, TAKE 1 TABLET BY MOUTH EVERY DAY  LINZESS 145 MCG capsule, TAKE 1 CAPSULE BY MOUTH EVERY DAY IN THE MORNING BEFORE BREAKFAST  verapamil (CALAN SR) 240 MG extended release tablet, TAKE 1 TABLET BY MOUTH TWICE A DAY  cloNIDine (CATAPRES) 0.1 MG tablet, TAKE 1 TABLET BY MOUTH TWICE A DAY  ACCU-CHEK GUIDE strip, TEST AS DIRECTED 3 TIMES A DAY  Accu-Chek FastClix Lancets MISC, USE TO TEST 3 TIMES A DAY DX CODE E11.9  ZINC PO, Take by mouth  CPAP Machine MISC, by Does not apply route  Ascorbic Acid (SUPER C COMPLEX PO), Take by mouth daily  Multiple Vitamins-Minerals (MULTIVITAMIN ADULT PO), Take by mouth daily  Lactobacillus Rhamnosus, GG, ([employer]S PROBIOTIC, LACTOBACILLUS,) CAPS, TAKE 1 CAPSULE BY MOUTH 2 TIMES DAILY (WITH MEALS)  docusate sodium (COLACE) 100 MG capsule, Take by mouth daily  Cyanocobalamin (VITAMIN B-12 PO), Take 500 mcg by mouth every other day   Cholecalciferol (VITAMIN D3 PO), Take 2,000 Units by mouth daily   aspirin EC 81 MG EC tablet, Take 2 tablets by mouth daily. ferrous sulfate 325 (65 FE) MG tablet, Take 325 mg by mouth daily     Allergies:    Hytrin [terazosin hcl], Penicillins, Shrimp flavor, Sulfa antibiotics, Sulfasalazine, Tekturna [aliskiren fumarate], Vancomycin, and Ciprofloxacin    Social History:    reports that he quit smoking about 34 years ago. His smoking use included cigarettes. He started smoking about 64 years ago. He has a 9.00 pack-year smoking history. He has never used smokeless tobacco. He reports that he does not drink alcohol and does not use drugs. Family History:   family history includes Arthritis in his paternal grandmother; Diabetes in his brother and paternal grandmother; Heart Disease in his mother; High Blood Pressure in his brother and father; Sleep Apnea in his brother; Stroke in his mother; Vision Loss in his mother.     REVIEW OF SYSTEMS:  As above in the HPI, otherwise negative    PHYSICAL EXAM:    Vitals:  BP (!) 144/64   Pulse 75   Temp 97.3 °F (36.3 °C) (Oral)   Resp 16   Ht 5' 8\" (1.727 m)   Wt 270 lb 4.5 oz (122.6 kg)   SpO2 96%   BMI 41.10 kg/m²   Temp  Av °F (35 °C)  Min: 92.5 °F (33.6 °C)  Max: 97.4 °F (36.3 °C)    General:  Awake, alert, oriented X 3. Well developed, well nourished. No apparent distress. HEENT:  Normocephalic, atraumatic. Pupils equal, round, reactive to light. No scleral icterus. No conjunctival injection. Normal lips, teeth, and gums. No nasal discharge. Neck:  Supple. No carotid bruit, carotid upstroke normal.  Heart:  REGULAR RHYTHM, no murmurs, gallops, or rubs. PMI difficult to assess. Lungs:  CTA bilaterally, bilat symmetrical expansion, no wheeze, rales, or rhonchi. Respirations easy. Abdomen: Bowel sounds present, normoactive. Soft, nontender/nondistended. No masses, no peritoneal signs. Extremities:  No clubbing, cyanosis. With anasarca from his umbilicus to his feet. Skin:  Warm and dry, no open lesions or rash. Neuro:  Cranial nerves 2-12 intact, no focal deficits. Moves all extremities without difficulty.     LABS AND DIAGNOSTIC REVIEWED:    Recent Results (from the past 24 hour(s))   EKG 12 Lead    Collection Time: 11/15/22  8:35 PM   Result Value Ref Range    Ventricular Rate 58 BPM    QRS Duration 156 ms    Q-T Interval 468 ms    QTc Calculation (Bazett) 459 ms    R Axis -26 degrees    T Axis 10 degrees    Diagnosis       Sinus bradycardia with First degree AV blockRight bundle branch blockConfirmed by Amadeo BLAND MD ([id number]) on 2022 8:30:15 AM   CBC with Auto Differential    Collection Time: 11/15/22  9:04 PM   Result Value Ref Range    WBC 5.4 4.0 - 11.0 K/uL    RBC 3.02 (L) 4.20 - 5.90 M/uL    Hemoglobin 8.3 (L) 13.5 - 17.5 g/dL    Hematocrit 26.7 (L) 40.5 - 52.5 %    MCV 88.3 80.0 - 100.0 fL    MCH 27.3 26.0 - 34.0 pg    MCHC 30.9 (L) 31.0 - 36.0 g/dL    RDW 18.6 (H) 12.4 - 15.4 %    Platelets 193 (L) 135 - 450 K/uL    MPV 8.5 5.0 - 10.5 fL    Neutrophils % 85.1 %    Lymphocytes % 8.5 %    Monocytes % 5.0 %    Eosinophils % 1.1 %    Basophils % 0.3 %    Neutrophils Absolute 4.6 1.7 - 7.7 K/uL    Lymphocytes Absolute 0.5 (L) 1.0 - 5.1 K/uL    Monocytes Absolute 0.3 0.0 - 1.3 K/uL    Eosinophils Absolute 0.1 0.0 - 0.6 K/uL    Basophils Absolute 0.0 0.0 - 0.2 K/uL   Comprehensive Metabolic Panel    Collection Time: 11/15/22  9:04 PM   Result Value Ref Range    Sodium 139 136 - 145 mmol/L    Potassium 5.6 (H) 3.5 - 5.1 mmol/L    Chloride 106 99 - 110 mmol/L    CO2 22 21 - 32 mmol/L    Anion Gap 11 3 - 16    Glucose 160 (H) 70 - 99 mg/dL    BUN 93 (HH) 7 - 20 mg/dL    Creatinine 2.8 (H) 0.8 - 1.3 mg/dL    Est, Glom Filt Rate 22 (A) >60    Calcium 8.7 8.3 - 10.6 mg/dL    Total Protein 6.0 (L) 6.4 - 8.2 g/dL    Albumin 3.0 (L) 3.4 - 5.0 g/dL    Albumin/Globulin Ratio 1.0 (L) 1.1 - 2.2    Total Bilirubin 0.4 0.0 - 1.0 mg/dL    Alkaline Phosphatase 109 40 - 129 U/L     (H) 10 - 40 U/L    AST 43 (H) 15 - 37 U/L   Protime-INR    Collection Time: 11/15/22  9:04 PM   Result Value Ref Range    Protime 18.2 (H) 11.7 - 14.5 sec    INR 1.51 (H) 0.87 - 1.14   Brain Natriuretic Peptide    Collection Time: 11/15/22  9:04 PM   Result Value Ref Range    Pro- (H) 0 - 449 pg/mL   Troponin    Collection Time: 11/15/22  9:04 PM   Result Value Ref Range    Troponin 0.02 (H) <0.01 ng/mL   Lactate, Sepsis    Collection Time: 11/15/22  9:04 PM   Result Value Ref Range    Lactic Acid, Sepsis 1.3 0.4 - 1.9 mmol/L   Beta-Hydroxybutyrate    Collection Time: 11/15/22  9:04 PM   Result Value Ref Range    Beta-Hydroxybutyrate 0.42 (H) 0.00 - 0.27 mmol/L   Procalcitonin    Collection Time: 11/15/22  9:04 PM   Result Value Ref Range    Procalcitonin 0.15 0.00 - 0.15 ng/mL   TSH with Reflex    Collection Time: 11/15/22  9:04 PM   Result Value Ref Range    TSH 2.05 0.27 - 4.20 uIU/mL   Blood Gas, Venous    Collection Time: 11/15/22 9:05 PM   Result Value Ref Range    pH, Jarrett 7.243 (L) 7.350 - 7.450    pCO2, Jarrett 58.0 (H) 40.0 - 50.0 mmHg    pO2, Jarrett 41 Not Established mmHg    HCO3, Venous 25 23 - 29 mmol/L    Base Excess, Jarrett -2.8 Not Established mmol/L    O2 Sat, Jarrett 70 Not Established %    Carboxyhemoglobin 2.0 %    MetHgb, Jarrett 0.6 <1.5 %    TC02 (Calc), Jarrett 27 Not Established mmol/L    O2 Therapy Unknown    COVID-19, Rapid    Collection Time: 11/15/22 10:34 PM    Specimen: Nasopharyngeal Swab   Result Value Ref Range    SARS-CoV-2, NAAT Not Detected Not Detected   Rapid influenza A/B antigens    Collection Time: 11/15/22 10:34 PM    Specimen: Nasopharyngeal   Result Value Ref Range    Rapid Influenza A Ag Negative Negative    Rapid Influenza B Ag Negative Negative   POCT Glucose    Collection Time: 11/16/22  1:34 AM   Result Value Ref Range    POC Glucose 123 (H) 70 - 99 mg/dl    Performed on ACCU-CHEK    Lactate, Sepsis    Collection Time: 11/16/22  2:02 AM   Result Value Ref Range    Lactic Acid, Sepsis 0.7 0.4 - 1.9 mmol/L   Basic Metabolic Panel    Collection Time: 11/16/22  5:03 AM   Result Value Ref Range    Sodium 140 136 - 145 mmol/L    Potassium 5.3 (H) 3.5 - 5.1 mmol/L    Chloride 105 99 - 110 mmol/L    CO2 23 21 - 32 mmol/L    Anion Gap 12 3 - 16    Glucose 94 70 - 99 mg/dL    BUN 95 (HH) 7 - 20 mg/dL    Creatinine 3.0 (H) 0.8 - 1.3 mg/dL    Est, Glom Filt Rate 20 (A) >60    Calcium 8.4 8.3 - 10.6 mg/dL       ASSESSMENT:      Principal Problem:    Anasarca  Active Problems:    Worsening renal function    Generalized weakness    JOANN (obstructive sleep apnea)    Type 2 diabetes mellitus with stage 4 chronic kidney disease, with long-term current use of insulin (HCC)    Paroxysmal atrial fibrillation (HCC)    Essential hypertension    Slow transit constipation    Hypothermia    PLAN:    Patient was initially admitted with a warming blanket given his hypothermia.   Procalcitonin was low and his lactic acid was normal.  Patient has significantly worsened edema/anasarca. His renal function is a little bit worse again today. He did receive a single dose of IV Lasix in the ER last night  Nephrology has been consulted. He may need dialysis soon. Patient is not opposed to dialysis if needed. Continue with antihypertensives. Sliding scale has been ordered, basal insulin currently on hold. Troponin just outside of the normal range but this is at his baseline. BNP was essentially normal.  Beta hydroxybutyrate was mildly elevated but no anion gap is present and blood sugars remain fairly low. PT/OT will be consulted. CODE STATUS discussed with the patient. He prefers to be full code as well.       Raegan Bueno MD  8:31 AM  11/16/2022

## 2022-11-17 LAB
ANION GAP SERPL CALCULATED.3IONS-SCNC: 15 MMOL/L (ref 3–16)
ANISOCYTOSIS: ABNORMAL
BASOPHILS ABSOLUTE: 0 K/UL (ref 0–0.2)
BASOPHILS RELATIVE PERCENT: 0 %
BUN BLDV-MCNC: 93 MG/DL (ref 7–20)
CALCIUM SERPL-MCNC: 8.9 MG/DL (ref 8.3–10.6)
CHLORIDE BLD-SCNC: 104 MMOL/L (ref 99–110)
CO2: 22 MMOL/L (ref 21–32)
CREAT SERPL-MCNC: 3 MG/DL (ref 0.8–1.3)
EOSINOPHILS ABSOLUTE: 0.2 K/UL (ref 0–0.6)
EOSINOPHILS RELATIVE PERCENT: 3 %
GFR SERPL CREATININE-BSD FRML MDRD: 20 ML/MIN/{1.73_M2}
GLUCOSE BLD-MCNC: 105 MG/DL (ref 70–99)
GLUCOSE BLD-MCNC: 108 MG/DL (ref 70–99)
GLUCOSE BLD-MCNC: 137 MG/DL (ref 70–99)
GLUCOSE BLD-MCNC: 140 MG/DL (ref 70–99)
GLUCOSE BLD-MCNC: 162 MG/DL (ref 70–99)
HCT VFR BLD CALC: 27 % (ref 40.5–52.5)
HEMOGLOBIN: 8.4 G/DL (ref 13.5–17.5)
LYMPHOCYTES ABSOLUTE: 0.7 K/UL (ref 1–5.1)
LYMPHOCYTES RELATIVE PERCENT: 13 %
MCH RBC QN AUTO: 27.3 PG (ref 26–34)
MCHC RBC AUTO-ENTMCNC: 31.2 G/DL (ref 31–36)
MCV RBC AUTO: 87.5 FL (ref 80–100)
METAMYELOCYTES RELATIVE PERCENT: 1 %
MONOCYTES ABSOLUTE: 0.3 K/UL (ref 0–1.3)
MONOCYTES RELATIVE PERCENT: 5 %
NEUTROPHILS ABSOLUTE: 4.1 K/UL (ref 1.7–7.7)
NEUTROPHILS RELATIVE PERCENT: 78 %
NUCLEATED RED BLOOD CELLS: 3 /100 WBC
OVALOCYTES: ABNORMAL
PDW BLD-RTO: 18.1 % (ref 12.4–15.4)
PERFORMED ON: ABNORMAL
PLATELET # BLD: 126 K/UL (ref 135–450)
PLATELET SLIDE REVIEW: ABNORMAL
PMV BLD AUTO: 8.5 FL (ref 5–10.5)
POIKILOCYTES: ABNORMAL
POTASSIUM SERPL-SCNC: 5.3 MMOL/L (ref 3.5–5.1)
RBC # BLD: 3.09 M/UL (ref 4.2–5.9)
SLIDE REVIEW: ABNORMAL
SODIUM BLD-SCNC: 141 MMOL/L (ref 136–145)
WBC # BLD: 5.2 K/UL (ref 4–11)

## 2022-11-17 PROCEDURE — 6370000000 HC RX 637 (ALT 250 FOR IP): Performed by: INTERNAL MEDICINE

## 2022-11-17 PROCEDURE — 6370000000 HC RX 637 (ALT 250 FOR IP): Performed by: STUDENT IN AN ORGANIZED HEALTH CARE EDUCATION/TRAINING PROGRAM

## 2022-11-17 PROCEDURE — 6360000002 HC RX W HCPCS: Performed by: INTERNAL MEDICINE

## 2022-11-17 PROCEDURE — 1200000000 HC SEMI PRIVATE

## 2022-11-17 PROCEDURE — 94761 N-INVAS EAR/PLS OXIMETRY MLT: CPT

## 2022-11-17 PROCEDURE — 2580000003 HC RX 258: Performed by: STUDENT IN AN ORGANIZED HEALTH CARE EDUCATION/TRAINING PROGRAM

## 2022-11-17 PROCEDURE — 80048 BASIC METABOLIC PNL TOTAL CA: CPT

## 2022-11-17 PROCEDURE — 36415 COLL VENOUS BLD VENIPUNCTURE: CPT

## 2022-11-17 PROCEDURE — 99232 SBSQ HOSP IP/OBS MODERATE 35: CPT | Performed by: INTERNAL MEDICINE

## 2022-11-17 PROCEDURE — 94640 AIRWAY INHALATION TREATMENT: CPT

## 2022-11-17 PROCEDURE — 6360000002 HC RX W HCPCS: Performed by: STUDENT IN AN ORGANIZED HEALTH CARE EDUCATION/TRAINING PROGRAM

## 2022-11-17 PROCEDURE — 2700000000 HC OXYGEN THERAPY PER DAY

## 2022-11-17 PROCEDURE — 85025 COMPLETE CBC W/AUTO DIFF WBC: CPT

## 2022-11-17 RX ORDER — LEVALBUTEROL 1.25 MG/.5ML
1.25 SOLUTION, CONCENTRATE RESPIRATORY (INHALATION) EVERY 6 HOURS
Status: DISCONTINUED | OUTPATIENT
Start: 2022-11-17 | End: 2022-11-18

## 2022-11-17 RX ORDER — SODIUM CHLORIDE FOR INHALATION 0.9 %
3 VIAL, NEBULIZER (ML) INHALATION 3 TIMES DAILY
Status: DISCONTINUED | OUTPATIENT
Start: 2022-11-17 | End: 2022-11-30 | Stop reason: HOSPADM

## 2022-11-17 RX ADMIN — LEVALBUTEROL 1.25 MG: 1.25 SOLUTION, CONCENTRATE RESPIRATORY (INHALATION) at 19:49

## 2022-11-17 RX ADMIN — VERAPAMIL HYDROCHLORIDE 240 MG: 240 TABLET, FILM COATED, EXTENDED RELEASE ORAL at 20:58

## 2022-11-17 RX ADMIN — HEPARIN SODIUM 5000 UNITS: 5000 INJECTION INTRAVENOUS; SUBCUTANEOUS at 20:58

## 2022-11-17 RX ADMIN — CLONIDINE HYDROCHLORIDE 0.1 MG: 0.1 TABLET ORAL at 20:58

## 2022-11-17 RX ADMIN — HEPARIN SODIUM 5000 UNITS: 5000 INJECTION INTRAVENOUS; SUBCUTANEOUS at 15:12

## 2022-11-17 RX ADMIN — CLONIDINE HYDROCHLORIDE 0.1 MG: 0.1 TABLET ORAL at 09:52

## 2022-11-17 RX ADMIN — LEVALBUTEROL 1.25 MG: 1.25 SOLUTION, CONCENTRATE RESPIRATORY (INHALATION) at 13:35

## 2022-11-17 RX ADMIN — LEVALBUTEROL 1.25 MG: 1.25 SOLUTION, CONCENTRATE RESPIRATORY (INHALATION) at 09:05

## 2022-11-17 RX ADMIN — ASPIRIN 162 MG: 81 TABLET, COATED ORAL at 09:52

## 2022-11-17 RX ADMIN — SODIUM CHLORIDE, PRESERVATIVE FREE 10 ML: 5 INJECTION INTRAVENOUS at 09:53

## 2022-11-17 RX ADMIN — MONTELUKAST SODIUM 10 MG: 10 TABLET, COATED ORAL at 09:52

## 2022-11-17 RX ADMIN — ISODIUM CHLORIDE 3 ML: 0.03 SOLUTION RESPIRATORY (INHALATION) at 13:35

## 2022-11-17 RX ADMIN — SODIUM CHLORIDE, PRESERVATIVE FREE 10 ML: 5 INJECTION INTRAVENOUS at 20:58

## 2022-11-17 RX ADMIN — HEPARIN SODIUM 5000 UNITS: 5000 INJECTION INTRAVENOUS; SUBCUTANEOUS at 06:34

## 2022-11-17 RX ADMIN — LEVALBUTEROL 1.25 MG: 1.25 SOLUTION, CONCENTRATE RESPIRATORY (INHALATION) at 09:06

## 2022-11-17 RX ADMIN — ATORVASTATIN CALCIUM 40 MG: 40 TABLET, FILM COATED ORAL at 09:52

## 2022-11-17 RX ADMIN — FUROSEMIDE 10 MG: 10 INJECTION, SOLUTION INTRAMUSCULAR; INTRAVENOUS at 18:52

## 2022-11-17 RX ADMIN — PANTOPRAZOLE SODIUM 40 MG: 40 TABLET, DELAYED RELEASE ORAL at 09:52

## 2022-11-17 RX ADMIN — ISODIUM CHLORIDE 3 ML: 0.03 SOLUTION RESPIRATORY (INHALATION) at 19:49

## 2022-11-17 RX ADMIN — SODIUM BICARBONATE 650 MG: 650 TABLET ORAL at 09:52

## 2022-11-17 RX ADMIN — VERAPAMIL HYDROCHLORIDE 240 MG: 240 TABLET, FILM COATED, EXTENDED RELEASE ORAL at 09:52

## 2022-11-17 RX ADMIN — TAMSULOSIN HYDROCHLORIDE 0.4 MG: 0.4 CAPSULE ORAL at 09:52

## 2022-11-17 RX ADMIN — ISODIUM CHLORIDE 3 ML: 0.03 SOLUTION RESPIRATORY (INHALATION) at 09:05

## 2022-11-17 RX ADMIN — FUROSEMIDE 10 MG: 10 INJECTION, SOLUTION INTRAMUSCULAR; INTRAVENOUS at 09:52

## 2022-11-17 ASSESSMENT — PAIN SCALES - GENERAL: PAINLEVEL_OUTOF10: 0

## 2022-11-17 NOTE — PROGRESS NOTES
Nephrology ([facility]) Progress Note    CC: DEISY on CKD    Subjective:    HPI:  Breathing unchanged. No CP.  O > I. Renal function stable. ROS:  In bed. No fever. [address]:  medications reviewed. Objective:  Blood pressure 134/67, pulse 69, temperature 97.5 °F (36.4 °C), temperature source Oral, resp. rate 16, height 5' 8\" (1.727 m), weight 268 lb 4.8 oz (121.7 kg), SpO2 92 %. Intake/Output Summary (Last 24 hours) at 11/17/2022 1641  Last data filed at 11/17/2022 1426  Gross per 24 hour   Intake 600 ml   Output 1725 ml   Net -1125 ml     General:  NAD, A+Ox3  Chest:  CTAB  CVS:  RRR  Abdominal:  NTND, soft, +BS  Extremities:  2+ edema  Skin:  no rash    Labs:  Renal panel:  Lab Results   Component Value Date/Time     11/17/2022 06:04 AM    K 5.3 (H) 11/17/2022 06:04 AM    K 5.1 10/31/2022 06:05 AM    CO2 22 11/17/2022 06:04 AM    BUN 93 (HH) 11/17/2022 06:04 AM    CREATININE 3.0 (H) 11/17/2022 06:04 AM    CALCIUM 8.9 11/17/2022 06:04 AM    PHOS 4.9 11/08/2022 07:14 AM    MG 2.10 01/21/2021 07:50 AM     CBC:  Lab Results   Component Value Date/Time    WBC 5.2 11/17/2022 06:04 AM    HGB 8.4 (L) 11/17/2022 06:04 AM    HCT 27.0 (L) 11/17/2022 06:04 AM     (L) 11/17/2022 06:04 AM       Assessment/Plan:  Reviewed old records and labs. 1) DEISY on CKD              - baseline Cr 2.0              - ddx:  CRS vs. ATN              - patient is total body volume overloaded and will diurese              - renal function stable.    - will switch to lasix gtt     2) obstructive uropathy              - gordon in place              - on flomax     3) ACDHF              - echo shows LVEF 83-28%, diastolic dysfunction, elevated PASP 40 mmHg              - discussed concept of fluid balance              - fluid restriction of 1.2 liters/day              - discussed lower sodium diet     4) respiratory              - wean O2 as tolerated     5) anemia              - iron studies okay earlier this month  - will start retacrit      6) FEN  - hyperkalemia              - potassium restricted diet  - metabolic acidosis              - decrease NaHCO3     6) JOANN              - CPAP

## 2022-11-17 NOTE — CARE COORDINATION
KRYSTAL placed phone call to [facility] to find out if they have space to accommodate this patient. SW left a message requesting a call back. Respectfully submitted,    ERENDIRA Puentes  [facility]   [phone]    Electronically signed by ERENDIRA Melissa on 11/17/2022 at 4:28 PM

## 2022-11-17 NOTE — PROGRESS NOTES
225 [facility] Internal Medicine Note      Chief Complaint: I am a little SOB    Subjective/Interval History: This morning the patient is sitting up in bed, remains on 2 L of oxygen. He appears to be a little short of breath at rest, his wife is at the bedside. No new problems overnight. Patient received Lasix twice yesterday. Weight is down slightly. I's/O's -1.5 L since admission, weight is down 2 pounds from yesterday. No chest pain. No cough or sputum. No nausea, vomiting, diarrhea. No abdominal pain. No dysuria. The remainder of the review of systems is negative. PMH, PSH, FH/SH reviewed and unchanged as documented in the H&P personally documented at admission 22    Medication list reviewed    Objective:    BP (!) 114/58   Pulse 66   Temp 97.4 °F (36.3 °C) (Axillary)   Resp 22   Ht 5' 8\" (1.727 m)   Wt 268 lb 4.8 oz (121.7 kg)   SpO2 95%   BMI 40.79 kg/m²   Temp  Av.4 °F (36.3 °C)  Min: 97.4 °F (36.3 °C)  Max: 97.5 °F (36.4 °C)    RRR  Chest-patient with scattered wheezing throughout. Few crackles in the bases. Oxygen at 2 L. Abd- BS+, soft, NTND  Ext -patient with anasarca extending all the way up to his nipple line. Extensive edema of all extremities.     The Following Labs Were Reviewed Today:    Recent Results (from the past 24 hour(s))   POCT Glucose    Collection Time: 22 12:31 PM   Result Value Ref Range    POC Glucose 100 (H) 70 - 99 mg/dl    Performed on ACCU-CHEK    Creatinine, Random Urine    Collection Time: 22  2:50 PM   Result Value Ref Range    Creatinine, Ur 72.7 39.0 - 259.0 mg/dL   Urinalysis with Reflex to Culture    Collection Time: 22  2:56 PM    Specimen: Urine, clean catch   Result Value Ref Range    Color, UA Yellow Straw/Yellow    Clarity, UA CLOUDY (A) Clear    Glucose, Ur Negative Negative mg/dL    Bilirubin Urine Negative Negative    Ketones, Urine Negative Negative mg/dL    Specific Gravity, UA 1.013 1.005 - 1.030    Blood, Urine LARGE (A) Negative    pH, UA 5.0 5.0 - 8.0    Protein, UA 30 (A) Negative mg/dL    Urobilinogen, Urine 0.2 <2.0 E.U./dL    Nitrite, Urine Negative Negative    Leukocyte Esterase, Urine SMALL (A) Negative    Microscopic Examination YES     Urine Type NotGiven     Urine Reflex to Culture Not Indicated    Sodium, Urine, Random    Collection Time: 11/16/22  2:56 PM   Result Value Ref Range    Sodium, Ur 43 Not Established mmol/L   Urea Nitrogen, Urine    Collection Time: 11/16/22  2:56 PM   Result Value Ref Range    Urea Nitrogen, Ur 660.2 (L) 800.0 - 1666.0 mg/dL   Microscopic Urinalysis    Collection Time: 11/16/22  2:56 PM   Result Value Ref Range    Bacteria, UA None Seen None Seen /HPF    Hyaline Casts, UA 1 0 - 8 /LPF    WBC, UA 8 (H) 0 - 5 /HPF    RBC,  (H) 0 - 4 /HPF    Epithelial Cells, UA 2 0 - 5 /HPF   POCT Glucose    Collection Time: 11/16/22  4:08 PM   Result Value Ref Range    POC Glucose 102 (H) 70 - 99 mg/dl    Performed on ACCU-CHEK    POCT Glucose    Collection Time: 11/16/22  8:21 PM   Result Value Ref Range    POC Glucose 139 (H) 70 - 99 mg/dl    Performed on ACCU-CHEK    Basic Metabolic Panel    Collection Time: 11/17/22  6:04 AM   Result Value Ref Range    Sodium 141 136 - 145 mmol/L    Potassium 5.3 (H) 3.5 - 5.1 mmol/L    Chloride 104 99 - 110 mmol/L    CO2 22 21 - 32 mmol/L    Anion Gap 15 3 - 16    Glucose 105 (H) 70 - 99 mg/dL    BUN 93 (HH) 7 - 20 mg/dL    Creatinine 3.0 (H) 0.8 - 1.3 mg/dL    Est, Glom Filt Rate 20 (A) >60    Calcium 8.9 8.3 - 10.6 mg/dL   CBC with Auto Differential    Collection Time: 11/17/22  6:04 AM   Result Value Ref Range    RBC 3.09 (L) 4.20 - 5.90 M/uL    Hemoglobin 8.4 (L) 13.5 - 17.5 g/dL    Hematocrit 27.0 (L) 40.5 - 52.5 %    MCV 87.5 80.0 - 100.0 fL    MCH 27.3 26.0 - 34.0 pg    MCHC 31.2 31.0 - 36.0 g/dL    RDW 18.1 (H) 12.4 - 15.4 %    Platelets 156 (L) 566 - 450 K/uL    MPV 8.5 5.0 - 10.5 fL   POCT Glucose    Collection Time: 11/17/22  7:44 AM   Result Value Ref Range    POC Glucose 108 (H) 70 - 99 mg/dl    Performed on ACCU-CHEK        ASSESSMENT/PLAN:      Principal Problem:    Anasarca-appreciate nephrology input. Continue with diuresis as the patient's renal function tolerates. Echocardiogram yesterday revealed normal ejection fraction. Active Problems:    Worsening renal function-BUN/creatinine stable today. Patient has had some reduction in weight since yesterday. Generalized weakness-PT/OT have worked with the patient. JOANN (obstructive sleep apnea)-continues with CPAP at night. Type 2 diabetes mellitus with stage 4 chronic kidney disease, with long-term current use of insulin-continue with sliding scale. Sugars okay    Paroxysmal atrial fibrillation-appears to be in sinus rhythm today. Patient is not on long-term anticoagulation. Continue with subcu heparin for DVT prophylaxis. Essential hypertension-blood pressure is well controlled. No changes needed. Slow transit constipation-continue with Linzess. Anemia-  continue with Procrit, follow.     Time > 25 minutes reviewing chart and patient data, examining and interviewing patient, and discussing with nursing staff, family, etc.     Nicolle Francis MD, [address]  8:51 AM  11/17/2022

## 2022-11-17 NOTE — PROGRESS NOTES
Pt did not tolerate v60 CPAP. Pt said he does not like the full face mask and prefers his nasal pillows on his home unit. Spoke with family members about bringing in pt's home machine. They said they will be able to bring it in the next day or so. Pt resting on 2 L NC, SpO2 95%. RN notified. Will continue to monitor.

## 2022-11-18 LAB
ANION GAP SERPL CALCULATED.3IONS-SCNC: 14 MMOL/L (ref 3–16)
BUN BLDV-MCNC: 86 MG/DL (ref 7–20)
CALCIUM SERPL-MCNC: 8.5 MG/DL (ref 8.3–10.6)
CHLORIDE BLD-SCNC: 106 MMOL/L (ref 99–110)
CO2: 23 MMOL/L (ref 21–32)
CREAT SERPL-MCNC: 3.4 MG/DL (ref 0.8–1.3)
GFR SERPL CREATININE-BSD FRML MDRD: 17 ML/MIN/{1.73_M2}
GLUCOSE BLD-MCNC: 106 MG/DL (ref 70–99)
GLUCOSE BLD-MCNC: 121 MG/DL (ref 70–99)
GLUCOSE BLD-MCNC: 129 MG/DL (ref 70–99)
GLUCOSE BLD-MCNC: 137 MG/DL (ref 70–99)
GLUCOSE BLD-MCNC: 186 MG/DL (ref 70–99)
PERFORMED ON: ABNORMAL
POTASSIUM SERPL-SCNC: 5.3 MMOL/L (ref 3.5–5.1)
SODIUM BLD-SCNC: 143 MMOL/L (ref 136–145)

## 2022-11-18 PROCEDURE — 6370000000 HC RX 637 (ALT 250 FOR IP): Performed by: INTERNAL MEDICINE

## 2022-11-18 PROCEDURE — 80048 BASIC METABOLIC PNL TOTAL CA: CPT

## 2022-11-18 PROCEDURE — 6370000000 HC RX 637 (ALT 250 FOR IP): Performed by: STUDENT IN AN ORGANIZED HEALTH CARE EDUCATION/TRAINING PROGRAM

## 2022-11-18 PROCEDURE — 6360000002 HC RX W HCPCS: Performed by: STUDENT IN AN ORGANIZED HEALTH CARE EDUCATION/TRAINING PROGRAM

## 2022-11-18 PROCEDURE — 36415 COLL VENOUS BLD VENIPUNCTURE: CPT

## 2022-11-18 PROCEDURE — 6360000002 HC RX W HCPCS: Performed by: INTERNAL MEDICINE

## 2022-11-18 PROCEDURE — 94640 AIRWAY INHALATION TREATMENT: CPT

## 2022-11-18 PROCEDURE — 97116 GAIT TRAINING THERAPY: CPT

## 2022-11-18 PROCEDURE — 1200000000 HC SEMI PRIVATE

## 2022-11-18 PROCEDURE — 2580000003 HC RX 258: Performed by: STUDENT IN AN ORGANIZED HEALTH CARE EDUCATION/TRAINING PROGRAM

## 2022-11-18 PROCEDURE — 94760 N-INVAS EAR/PLS OXIMETRY 1: CPT

## 2022-11-18 PROCEDURE — 97530 THERAPEUTIC ACTIVITIES: CPT

## 2022-11-18 PROCEDURE — 2700000000 HC OXYGEN THERAPY PER DAY

## 2022-11-18 PROCEDURE — 99233 SBSQ HOSP IP/OBS HIGH 50: CPT | Performed by: INTERNAL MEDICINE

## 2022-11-18 RX ORDER — LEVALBUTEROL 1.25 MG/.5ML
1.25 SOLUTION, CONCENTRATE RESPIRATORY (INHALATION) 3 TIMES DAILY
Status: DISCONTINUED | OUTPATIENT
Start: 2022-11-18 | End: 2022-11-30 | Stop reason: HOSPADM

## 2022-11-18 RX ADMIN — LEVALBUTEROL 1.25 MG: 1.25 SOLUTION, CONCENTRATE RESPIRATORY (INHALATION) at 20:20

## 2022-11-18 RX ADMIN — FUROSEMIDE 10 MG: 10 INJECTION, SOLUTION INTRAMUSCULAR; INTRAVENOUS at 08:31

## 2022-11-18 RX ADMIN — HEPARIN SODIUM 5000 UNITS: 5000 INJECTION INTRAVENOUS; SUBCUTANEOUS at 22:16

## 2022-11-18 RX ADMIN — VERAPAMIL HYDROCHLORIDE 240 MG: 240 TABLET, FILM COATED, EXTENDED RELEASE ORAL at 22:13

## 2022-11-18 RX ADMIN — LEVALBUTEROL 1.25 MG: 1.25 SOLUTION, CONCENTRATE RESPIRATORY (INHALATION) at 08:26

## 2022-11-18 RX ADMIN — VERAPAMIL HYDROCHLORIDE 240 MG: 240 TABLET, FILM COATED, EXTENDED RELEASE ORAL at 08:30

## 2022-11-18 RX ADMIN — MONTELUKAST SODIUM 10 MG: 10 TABLET, COATED ORAL at 08:31

## 2022-11-18 RX ADMIN — ISODIUM CHLORIDE 3 ML: 0.03 SOLUTION RESPIRATORY (INHALATION) at 13:02

## 2022-11-18 RX ADMIN — FUROSEMIDE 10 MG: 10 INJECTION, SOLUTION INTRAMUSCULAR; INTRAVENOUS at 17:51

## 2022-11-18 RX ADMIN — ATORVASTATIN CALCIUM 40 MG: 40 TABLET, FILM COATED ORAL at 08:31

## 2022-11-18 RX ADMIN — LEVALBUTEROL 1.25 MG: 1.25 SOLUTION, CONCENTRATE RESPIRATORY (INHALATION) at 13:02

## 2022-11-18 RX ADMIN — HEPARIN SODIUM 5000 UNITS: 5000 INJECTION INTRAVENOUS; SUBCUTANEOUS at 06:38

## 2022-11-18 RX ADMIN — CLONIDINE HYDROCHLORIDE 0.1 MG: 0.1 TABLET ORAL at 08:31

## 2022-11-18 RX ADMIN — ISODIUM CHLORIDE 3 ML: 0.03 SOLUTION RESPIRATORY (INHALATION) at 20:20

## 2022-11-18 RX ADMIN — AMIODARONE HYDROCHLORIDE 100 MG: 200 TABLET ORAL at 08:30

## 2022-11-18 RX ADMIN — CLONIDINE HYDROCHLORIDE 0.1 MG: 0.1 TABLET ORAL at 22:13

## 2022-11-18 RX ADMIN — ISODIUM CHLORIDE 3 ML: 0.03 SOLUTION RESPIRATORY (INHALATION) at 08:26

## 2022-11-18 RX ADMIN — SODIUM CHLORIDE, PRESERVATIVE FREE 10 ML: 5 INJECTION INTRAVENOUS at 22:00

## 2022-11-18 RX ADMIN — SODIUM BICARBONATE 650 MG: 650 TABLET ORAL at 08:31

## 2022-11-18 RX ADMIN — ASPIRIN 162 MG: 81 TABLET, COATED ORAL at 08:30

## 2022-11-18 RX ADMIN — PANTOPRAZOLE SODIUM 40 MG: 40 TABLET, DELAYED RELEASE ORAL at 08:31

## 2022-11-18 RX ADMIN — SODIUM CHLORIDE, PRESERVATIVE FREE 10 ML: 5 INJECTION INTRAVENOUS at 08:35

## 2022-11-18 RX ADMIN — HEPARIN SODIUM 5000 UNITS: 5000 INJECTION INTRAVENOUS; SUBCUTANEOUS at 15:36

## 2022-11-18 RX ADMIN — TAMSULOSIN HYDROCHLORIDE 0.4 MG: 0.4 CAPSULE ORAL at 08:30

## 2022-11-18 NOTE — PROGRESS NOTES
Occupational Therapy  Facility/Department: [facility]  Occupational Therapy Daily Treatment Note    Name: Candace Stallings  : 1940  MRN: [id number]  Date of Service: 2022    Discharge Recommendations:  3-5 sessions per week, Patient would benefit from continued therapy after discharge          Patient Diagnosis(es): The primary encounter diagnosis was Hypoxia. Diagnoses of Anasarca, Acute renal failure superimposed on chronic kidney disease, unspecified CKD stage, unspecified acute renal failure type ([address] and Hypothermia, initial encounter were also pertinent to this visit. Past Medical History:  has a past medical history of Allergic rhinitis, Asthma, Atrial fibrillation ([address] Carotid artery stenosis, Chronic kidney disease, COPD (chronic obstructive pulmonary disease) ([facility] CPAP (continuous positive airway pressure) dependence, ESBL (extended spectrum beta-lactamase) producing bacteria infection, Hyperlipidemia, Hypertension, Iron deficiency anemia, Obstructive sleep apnea, and Type II or unspecified type diabetes mellitus without mention of complication, not stated as uncontrolled. Past Surgical History:  has a past surgical history that includes Gallbladder surgery (); Upper gastrointestinal endoscopy (7-2005    7/10/2009); Colonoscopy (7/10/2009); Cataract removal (2012); Pain management procedure (Right, 10/20/2021); Pain management procedure (Left, 2021); and CT BIOPSY BONE MARROW (2022). Assessment   Performance deficits / Impairments: Decreased functional mobility ; Decreased ADL status; Decreased endurance;Decreased fine motor control;Decreased balance;Decreased strength  Assessment: Pt is an 79 y.o. male Anasarca, Worsening renal function, Generalized weakness. Pt recent admit with weakness and DESIY, d/c'd home on . Prior to recent admits, pt living with wife and independent ADLs and fxl mobility with cane.  Since last admission, pt has been requiring assist for ADLs and use of rollator for fxl mobility. Pt currently functioning well below baseline d/t the above deficits, today requiring max A x2 for bed mobility, min A x2 to stand to the walker, min assist to stand to the stedy, total A toileting. Pt's oxygen sats dropped to 82% with activity and required 3-4 mintues to recover to 90%. Pt demonstrates need for ongoing skilled OT at d/c to maximize pt's safety and independence prior to return home. Prognosis: Fair;Good  Activity Tolerance  Activity Tolerance: Patient limited by fatigue  Activity Tolerance Comments: Pt's oxygen sats dropped to 82% with activity. Recovered to 90% within 3-4 minutes of rest.        Plan   Occupational Therapy Plan  Times Per Week: 3-5  Times Per Day: Once a day  Current Treatment Recommendations: Balance training, Functional mobility training, Endurance training, Strengthening, Safety education & training, Self-Care / ADL, Equipment evaluation, education, & procurement, Patient/Caregiver education & training, ROM     Restrictions  Restrictions/Precautions  Restrictions/Precautions: Fall Risk, Bed Alarm, Up as Tolerated  Position Activity Restriction  Other position/activity restrictions: 2L O2; fluid restriction, low sodium/low potassium diet    Subjective   General  Chart Reviewed: Yes, Progress Notes  Additional Pertinent Hx: Per Carter Mcrae MD's H&P 11/16/2022: \"This is an 79-year-old white male who was recently admitted to the hospital with weakness and acute kidney injury who presented back to the emergency room with diffuse weakness, inability to urinate and increasing edema. Patient was just admitted with weakness and worsened kidney function. Patient was felt to be intravascularly dry although he did have some peripheral edema. He was given some fluids with some improvement in his renal function but if he received any diuretics he would have a worsening of his kidney function.   Patient's Diovan and diuretics were held at discharge. Additionally the patient had developed a bibasilar pneumonia and this was treated with antibiotics and he seemed to recover well. Patient presented back to the emergency room last evening with approximately 24 hours of increasing weakness and difficulty with urination. Upon presentation his renal function was again a bit worse and his fluid volume overload was worsened. He was short of breath and does have some crackles. \"  Family / Caregiver Present: Yes (spouse)  Referring Practitioner: Farnaz Forrester MD  Diagnosis: Anasarca, Worsening renal function, Generalized weakness  Subjective  Subjective: Pt seen bedside and agreed to OT treatment. Social/Functional History  Social/Functional History  Lives With: Spouse  Type of Home: House  Home Layout: Able to Live on Main level with bedroom/bathroom, Multi-level ([place] 1 1/2 story)  Home Access: Stairs to enter with rails  Entrance Stairs - Number of Steps: 1+1  Entrance Stairs - Rails: Both  Bathroom Shower/Tub: Tub/Shower unit  Bathroom Toilet: Standard (comfort height commode)  Bathroom Equipment: Shower chair (sliding doors on shower with bars)  Home Equipment: Cane, Rollator, Oxygen (c-pap; home O2 since last hospital stay, 2L continuous)  Has the patient had two or more falls in the past year or any fall with injury in the past year?: No  Receives Help From: Home health (home PT and OT)  ADL Assistance: Needs assistance (son assisting with shower in the past week; used BSC for BM and urinals (prior to last hospital stay the pt was indep))  Homemaking Assistance: Independent (wife cleans.   Pt completes bill paying, medication management, cooking  (needing assist for all in the past week))  Ambulation Assistance: Independent (the pt using the rollator in the past week but used a cane prior to last hospital stay)  Transfer Assistance: Independent  Active : Yes (not since last hospital stay)  Occupation: (11/18/22 1411)  ADL Inpatient CMS G-Code Modifier : CL (11/18/22 1411)      Goals  Short Term Goals  Time Frame for Short Term Goals: Prior to d/c:  Short Term Goal 1: Pt will complete UB bathing/dressing with SBA. Short Term Goal 2: Pt will complete LB bathing/dressing with mod A using A/E and DME. Short Term Goal 3: Pt will complete toileting with mod A. Short Term Goal 4: Pt will complete fxl mobility and fxl transfers to/from ADL surfaces with min A x1-2 using AD. Short Term Goal 5: Pt will increase activity tolerance to achieve the above goals. Long Term Goals  Time Frame for Long Term Goals : STGs=LTGs  Patient Goals   Patient goals : to eventually return home. Therapy Time   Individual Concurrent Group Co-treatment   Time In 1300         Time Out 1345         Minutes 45             This note to serve as OT d/c summary if pt is d/c-ed from hospital prior to next OT session.       Bry Maddox, [address] [place] [address]

## 2022-11-18 NOTE — PROGRESS NOTES
Nephrology ([facility]) Progress Note    CC: DEISY on CKD    Subjective:    HPI:  Breathing unchanged. No CP.  O > I. Renal function worsening. ROS:  In chair. No fever. [address]:  medications reviewed. Wife present. Objective:  Blood pressure (!) 142/95, pulse 67, temperature 97.5 °F (36.4 °C), temperature source Oral, resp. rate 18, height 5' 8\" (1.727 m), weight 268 lb 8.3 oz (121.8 kg), SpO2 92 %. Intake/Output Summary (Last 24 hours) at 11/18/2022 1215  Last data filed at 11/17/2022 2336  Gross per 24 hour   Intake 240 ml   Output 1925 ml   Net -1685 ml       General:  NAD, A+Ox3  Chest:  CTAB  CVS:  RRR  Abdominal:  NTND, soft, +BS  Extremities:  2+ edema  Skin:  no rash    Labs:  Renal panel:  Lab Results   Component Value Date/Time     11/18/2022 04:40 AM    K 5.3 (H) 11/18/2022 04:40 AM    K 5.1 10/31/2022 06:05 AM    CO2 23 11/18/2022 04:40 AM    BUN 86 (HH) 11/18/2022 04:40 AM    CREATININE 3.4 (H) 11/18/2022 04:40 AM    CALCIUM 8.5 11/18/2022 04:40 AM    PHOS 4.9 11/08/2022 07:14 AM    MG 2.10 01/21/2021 07:50 AM     CBC:  Lab Results   Component Value Date/Time    WBC 5.2 11/17/2022 06:04 AM    HGB 8.4 (L) 11/17/2022 06:04 AM    HCT 27.0 (L) 11/17/2022 06:04 AM     (L) 11/17/2022 06:04 AM       Assessment/Plan:  Reviewed old records and labs. 1) DEISY on CKD              - baseline Cr 2.0              - ddx:  CRS vs. ATN   - check urine protein although UA would argue that it is not significant              - patient is total body volume overloaded and will diurese              - renal function worsening, and we may need to tolerate it in order to diurese patient.    - leaving on lasix 10 mg IV bid   - concerned about patient needing dialysis     2) obstructive uropathy              - gordon in place              - on flomax     3) ACDHF              - echo shows LVEF 63-90%, diastolic dysfunction, elevated PASP 40 mmHg              - discussed concept of fluid balance              - fluid restriction of 1.2 liters/day              - discussed lower sodium diet   - *bed scale* weight going down (if it is to be believed), but need standing weights   - given his total gross body volume overload, I think we are committed to diuresing him at (almost) any cost because his anasarca needs to be addressed, and the patient and his wife agree     4) respiratory              - wean O2 as tolerated     5) anemia              - iron studies okay earlier this month  - retacrit 10,000 units qweek     6) FEN  - hyperkalemia              - potassium restricted diet  - metabolic acidosis              - d/c NaHCO3     6) JOANN              - CPAP has not been worn because he doesn't like the face mash   - he will have his home CPAP brought in   - we discussed the importance of using his CPAP as I wonder if the cor pulmonale is contributing to his intolerance to diuresis

## 2022-11-18 NOTE — PROGRESS NOTES
Physical Therapy  Facility/Department: [address] [facility]  Physical Therapy Daily Note  ([facility])  If patient discharges prior to next session this note will serve as a discharge summary. Please see below for the latest assessment towards goals. Name: Sasha Clinton  : 1940  MRN: [id number]  Date of Service: 2022    Discharge Recommendations:  Patient would benefit from continued therapy after discharge (3-5sessions/week)   Sasha Clinton scored a  on the AM-PAC short mobility form. Current research shows that an AM-PAC score of 17 or less is typically not associated with a discharge to the patient's home setting. Based on the patient's AM-PAC score and their current functional mobility deficits, it is recommended that the patient have 3-5 sessions per week of Physical Therapy at d/c to increase the patient's independence. Please see assessment section for further patient specific details. PT Equipment Recommendations  Other: will monitor      Patient Diagnosis(es): The primary encounter diagnosis was Hypoxia. Diagnoses of Anasarca, Acute renal failure superimposed on chronic kidney disease, unspecified CKD stage, unspecified acute renal failure type ([address] and Hypothermia, initial encounter were also pertinent to this visit. 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 Past Surgical History:  has a past surgical history that includes Gallbladder surgery ();  Upper gastrointestinal endoscopy (7-2005    7/10/2009); Colonoscopy (7/10/2009); Cataract removal (2/2012); Pain management procedure (Right, 10/20/2021); Pain management procedure (Left, 12/8/2021); and CT BIOPSY BONE MARROW (11/1/2022). Assessment   Assessment: Today, the pt showed slight improvement and was able to stand to a walker but was only able to take 2 small steps forward and then 2 small steps backward with min A of 2. With activity, the pt's SpO2 on 2L went down to 82/83% and he took 2-3 minutes for recovery. The pt con't to demonstrate that he is functioning well below his functional baseline and is needing significant assist for mobility. Anticipate that he will need con't skilled PT services at d/c to address his deficits and to maximize his functional potential with the goal of returning home. Will con't to follow. Therapy Prognosis: Good  Barriers to Learning: fatigue; decreased activity tolerance  Requires PT Follow-Up: Yes  Activity Tolerance  Activity Tolerance: Patient limited by fatigue;Patient limited by endurance  Activity Tolerance Comments: SpO2 on 2L O2 with activity down to 82/83% with activity needing ~2-3 minute recovery with PLB     Plan   Physcial Therapy Plan  General Plan: 3-5 times per week  Current Treatment Recommendations: Strengthening, Balance training, Functional mobility training, Transfer training, Gait training, Therapeutic activities, Patient/Caregiver education & training, Safety education & training  Safety Devices  Type of Devices: Bed alarm in place, Call light within reach, Gait belt, Patient at risk for falls, Left in bed, Nurse notified     Restrictions  Restrictions/Precautions  Restrictions/Precautions: Fall Risk, Bed Alarm, Up as Tolerated  Position Activity Restriction  Other position/activity restrictions: 2L O2; fluid restriction, low sodium/low potassium diet     Subjective   General  Chart Reviewed:  Yes  Additional Pertinent Hx: Cortez Stacy MD H&P on 11-: The pt is an 80 yo male who presented to the ED with weakness, increasing edema and inability to urinate. The pt recently in the hospital for weakness and DEISY and pna. In the ED the pt's renal function wa worse and he was volume overloaded; he was SOB with crackles. PMHx: asthma, a-fib, carotid artery stenosis, CKD, COPD, HTN, anemia, JOANN on c-pap, DM  Response To Previous Treatment: Patient with no complaints from previous session. Family / Caregiver Present: Yes (wife)  Referring Practitioner: Sully Nolen MD  Referral Date : 11/16/22  Diagnosis: Anasarca, generalized weakness, worsening renal function  Follows Commands: Within Functional Limits  Subjective  Subjective: the pt was found to be up in the chair upon arrival to the room; the pt was agreeable to therapy; no reports of pain         Social/Functional History  Social/Functional History  Lives With: Spouse  Type of Home: House  Home Layout: Able to Live on Main level with bedroom/bathroom, Multi-level ([place] 1 1/2 story)  Home Access: Stairs to enter with rails  Entrance Stairs - Number of Steps: 1+1  Entrance Stairs - Rails: Both  Bathroom Shower/Tub: Tub/Shower unit  Bathroom Toilet: Standard (comfort height commode)  Bathroom Equipment: Shower chair (sliding doors on shower with bars)  Home Equipment: Cane, Rollator, Oxygen (c-pap; home O2 since last hospital stay, 2L continuous)  Has the patient had two or more falls in the past year or any fall with injury in the past year?: No  Receives Help From: Home health (home PT and OT)  ADL Assistance: Needs assistance (son assisting with shower in the past week; used BSC for BM and urinals (prior to last hospital stay the pt was indep))  Homemaking Assistance: Independent (wife cleans.   Pt completes bill paying, medication management, cooking  (needing assist for all in the past week))  Ambulation Assistance: Independent (the pt using the rollator in the past week but used a cane prior to last hospital stay)  Transfer Assistance: Independent  Active : Yes (not since last hospital stay)  Occupation: Retired  IADL Comments: sleeping in a recliner      Objective     Bed mobility  Supine to Sit: Unable to assess (the pt started session up in the chair)  Sit to Supine: Maximum assistance;2 Person assistance  Scooting: Maximal assistance;2 Person assistance  Transfers  Sit to Stand: Minimal Assistance;2 Person Assistance (min A of 1 from the chair > stedy but min A of 2 from chair > walker)  Stand to Sit: Minimal Assistance  Comment: stedy used to get the pt from the chair > commode and from the commode > bed; the pt unable to walk that distance  Ambulation  Surface: Level tile  Device: [address]: Minimal assistance;2 Person assistance  Quality of Gait: VERY shaky while standing and taking steps; easily fatigued  Distance: 2 steps forward and 2 steps backward from the chair  Comments: SpO2 on 2L O2 with activity down to 82/83% with activity needing ~2-3 minute recovery with PLB  More Ambulation?: No  Stairs/Curb  Stairs?: No     Balance  Sitting - Static: Good  Sitting - Dynamic: Fair;+ (on the EOB)  Standing - Static: Good;- (static standing in the stedy; fair- at the walker due to tremors)  Standing - Dynamic: Fair;- (at the walker)  Comments: static standing in the stedy for niko-care following a BM with CGA; static standing at the walker with at least min A due to worsening tremors          AM-PAC Score  -PAC Inpatient Mobility Raw Score : 9 (11/18/22 1349)  AM-PAC Inpatient T-Scale Score : 30.55 (11/18/22 1349)  Mobility Inpatient CMS 0-100% Score: 81.38 (11/18/22 1349)  Mobility Inpatient CMS G-Code Modifier : CM (11/18/22 1349)          Goals  Short Term Goals  Time Frame for Short Term Goals: upon d/c  (goals are ongoing)  Short Term Goal 1: Bed mobility with min A.   Short Term Goal 2: Transfers sit <> stand with min A. (met when standing to the stedy)  Short Term Goal 3: Ambulate with walker 25 feet with min A.   Patient Goals   Patient Goals : to be able to walk again       Education  Patient Education  Education Given To: Patient  Education Provided: Role of Therapy;Plan of Care;Energy Conservation  Education Provided Comments: PLB  Education Method: Verbal;Demonstration  Barriers to Learning: None  Education Outcome: Demonstrated understanding;Continued education needed      Therapy Time   Individual Concurrent Group Co-treatment   Time In 1300         Time Out 1345         Minutes 45            Electronically signed by Cinthya Sommers, PT [id number] on 11/18/2022 at 2:20 PM

## 2022-11-18 NOTE — PLAN OF CARE
Problem: Pain  Goal: Verbalizes/displays adequate comfort level or baseline comfort level  11/17/2022 2322 by Denys Mccloud RN  Outcome: Progressing  11/17/2022 1641 by Gabe Webb RN  Outcome: Progressing

## 2022-11-18 NOTE — PROGRESS NOTES
Pt refusing our bipap machine tonight. Cannot tolerate our mask. Pt family to bring in home cpap machine.

## 2022-11-18 NOTE — PROGRESS NOTES
[facility] Internal Medicine Note      Chief Complaint: I am ok    Subjective/Interval History: This morning the patient is up in the bedside chair. His breathing seems comfortable but he seems a bit nervous. No new problems overnight. States he is eating okay. He denies pain. Salazar catheter remains in place. I's/O's -3 L since admission, weight is unchanged from yesterday. No chest pain. No cough or sputum. No nausea, vomiting, diarrhea. No abdominal pain. No dysuria. The remainder of the review of systems is negative. PMH, PSH, FH/SH reviewed and unchanged as documented in the H&P personally documented at admission 22    Medication list reviewed    Objective:    BP (!) 142/95   Pulse 67   Temp 97.5 °F (36.4 °C) (Oral)   Resp 18   Ht 5' 8\" (1.727 m)   Wt 268 lb 8.3 oz (121.8 kg)   SpO2 92%   BMI 40.83 kg/m²   Temp  Av.5 °F (36.4 °C)  Min: 97.4 °F (36.3 °C)  Max: 97.5 °F (36.4 °C)    RRR  Chest-respirations are easy. No wheezing noted today. Diminished in the bases. No crackles. Oxygen at 2 L. Abd- BS+, soft, NTND  Ext -patient with anasarca extending all the way up to his umbilicus. Extensive edema of all 4 extremities.     The Following Labs Were Reviewed Today:    Recent Results (from the past 24 hour(s))   POCT Glucose    Collection Time: 22 11:27 AM   Result Value Ref Range    POC Glucose 137 (H) 70 - 99 mg/dl    Performed on ACCU-CHEK    POCT Glucose    Collection Time: 22  5:53 PM   Result Value Ref Range    POC Glucose 162 (H) 70 - 99 mg/dl    Performed on ACCU-CHEK    POCT Glucose    Collection Time: 22  7:23 PM   Result Value Ref Range    POC Glucose 140 (H) 70 - 99 mg/dl    Performed on ACCU-CHEK    Basic Metabolic Panel    Collection Time: 22  4:40 AM   Result Value Ref Range    Sodium 143 136 - 145 mmol/L    Potassium 5.3 (H) 3.5 - 5.1 mmol/L    Chloride 106 99 - 110 mmol/L    CO2 23 21 - 32 mmol/L    Anion Gap 14 3 - 16    Glucose 106 (H) 70 - 99 mg/dL    BUN 86 (HH) 7 - 20 mg/dL    Creatinine 3.4 (H) 0.8 - 1.3 mg/dL    Est, Glom Filt Rate 17 (A) >60    Calcium 8.5 8.3 - 10.6 mg/dL   POCT Glucose    Collection Time: 11/18/22  8:20 AM   Result Value Ref Range    POC Glucose 121 (H) 70 - 99 mg/dl    Performed on ACCU-CHEK        ASSESSMENT/PLAN:      Principal Problem:    Anasarca/Acute on Chronic Diastolic CHF-nephrology considering Lasix drip. Lasix yesterday was 10 mg IV twice daily and creatinine still bumped with an approximately 1.5 L negative fluid balance since yesterday. Await nephrology input. Patient's renal function has been very tenuous and very sensitive to any Lasix administration. Active Problems:    Worsening renal function-creatinine little worse today. Continue to monitor daily. Generalized weakness-PT/OT have worked with the patient. JOANN (obstructive sleep apnea)-continues with CPAP at night. Type 2 diabetes mellitus with stage 4 chronic kidney disease, with long-term current use of insulin-continue with sliding scale. Sugars okay    Paroxysmal atrial fibrillation-clinically seems to be in sinus rhythm. Essential hypertension-blood pressure is well controlled. No changes needed. Slow transit constipation-continue with Linzess. Anemia-  continue with Procrit, follow.     Time > 35 minutes reviewing chart and patient data, examining and interviewing patient, and discussing with nursing staff, family, etc.     Susan Walter MD, FACP  8:28 AM  11/18/2022

## 2022-11-18 NOTE — DISCHARGE INSTR - COC
Continuity of Care Form    Patient Name: Kelly Jamse   :  1940  MRN:  [id number]    Admit date:  11/15/2022  Discharge date:  ***    Code Status Order: Full Code   Advance Directives:     Admitting Physician:  Kika Tsang MD  PCP: Genoveva De La Rosa MD    Discharging Nurse: [facility] Unit/Room#: G4Q-3574/1742-23  Discharging Unit Phone Number: ***    Emergency Contact:   Extended Emergency Contact Information  Primary Emergency Contact: Roselia Suresh  Address: [address] [place] [address] Phone: [phone]  Work Phone: [phone]  Mobile Phone: [phone]  Relation: Spouse  Secondary Emergency Contact: Josephine Suresh  Mobile Phone: [phone]  Relation: Child   needed?  No    Past Surgical History:  Past Surgical History:   Procedure Laterality Date    CATARACT REMOVAL  2012    bilateral    COLONOSCOPY  7/10/2009    diverticulosis    hemorrhoids    CT BONE MARROW BIOPSY  2022    CT BONE MARROW BIOPSY 2022 [facility]    GALLBLADDER SURGERY  1981    PAIN MANAGEMENT PROCEDURE Right 10/20/2021    COOLIEF RADIOFREQUENCY ABLATION - RIGHT KNEE performed by Eliceo Kam MD at [address] Left 2021    [address] - LEFT KNEE performed by Eliceo Kam MD at [facility] 13  -2005    7/10/2009    normal       Immunization History:   Immunization History   Administered Date(s) Administered    COVID-19, PFIZER GRAY top, DO NOT Dilute, (age 15 y+), IM, 30 mcg/0.3 mL 2022    COVID-19, PFIZER PURPLE top, DILUTE for use, (age 15 y+), 30mcg/0.3mL 2021, 2021, 10/27/2021    Influenza 10/19/2011, 10/09/2013    Influenza A (V1S1-55) Vaccine PF IM 2009    Influenza Vaccine, unspecified formulation 2009, 10/25/2010, 10/19/2011, 10/09/2013, 10/21/2016, 2017 Influenza Virus Vaccine 09/15/2012, 10/01/2014, 11/07/2014, 09/11/2015    Influenza Whole 10/25/2010    Influenza, AFLURIA (age 1 yrs+), FLUZONE, (age 10 mo+), MDV, 0.5mL 09/15/2012, 10/01/2014    Influenza, FLUAD, (age 72 y+), Adjuvanted, 0.5mL 09/27/2021, 09/14/2022    Influenza, FLUARIX, FLULAVAL, FLUZONE (age 10 mo+) AND AFLURIA, (age 1 y+), PF, 0.5mL 10/21/2016, 09/01/2017    Influenza, FLUZONE (age 72 y+), High Dose, 0.7mL 08/19/2020    Influenza, High Dose (Fluzone 65 yrs and older) 10/11/2018, 09/05/2019, 08/19/2020    PPD Test 06/18/2005    Pneumococcal Conjugate 13-valent (Mpjeekz12) 12/01/2014, 01/02/2015    Pneumococcal Polysaccharide (Ysyqulcme48) 10/19/2011, 01/01/2014    Td, unspecified formulation 11/03/2010    Tdap (Boostrix, Adacel) 11/03/2010    Zoster Live (Zostavax) 07/01/2012, 09/01/2012       Active Problems:  Patient Active Problem List   Diagnosis Code    JOANN (obstructive sleep apnea) G47.33    Chronic GERD K21.9    Type 2 diabetes mellitus with stage 4 chronic kidney disease, with long-term current use of insulin ([facility]) E11.22, N18.4, Z79.4    Microcytic anemia D50.9    Microalbuminuria R80.9    Hyperlipidemia E78.5    Paroxysmal atrial fibrillation (HCC) I48.0    Edema R60.9    Bronchitis J40    Carotid artery stenosis without cerebral infarction I65.29    Leg swelling M79.89    Chronic venous insufficiency I87.2    Stage 3b chronic kidney disease ([facility]) N18.32    Essential hypertension I10    B12 deficiency E53.8    Pulmonary nodule R91.1    Moderate COPD (chronic obstructive pulmonary disease) ([facility] J44.9    DEISY (acute kidney injury) ([facility] N17.9    Slow transit constipation K59.01    Pancreatic mass K86.89    Obesity, Class II, BMI 35-39.9 E66.9    Generalized weakness R53.1    Anasarca R60.1    Worsening renal function N28.9       Isolation/Infection:   Isolation            Contact          Patient Infection Status       Infection Onset Added Last Indicated Last Indicated By Review Planned Expiration Resolved Resolved By    ESBL (Extended Spectrum Beta Lactamase) 12/26/18 12/28/18 12/26/18 Urine Culture        Resolved    COVID-19 (Rule Out) 11/15/22 11/15/22 11/15/22 COVID-19, Rapid (Ordered)   11/15/22 Rule-Out Test Resulted    COVID-19 (Rule Out) 10/30/22 10/30/22 10/30/22 COVID-19, Rapid (Ordered)   10/30/22 Rule-Out Test Resulted            Nurse Assessment:  Last Vital Signs: BP (!) 167/77   Pulse 64   Temp 97.4 °F (36.3 °C) (Oral)   Resp 16   Ht 5' 8\" (1.727 m)   Wt 268 lb 8.3 oz (121.8 kg)   SpO2 94%   BMI 40.83 kg/m²     Last documented pain score (0-10 scale): Pain Level: 0  Last Weight:   Wt Readings from Last 1 Encounters:   11/18/22 268 lb 8.3 oz (121.8 kg)     Mental Status:  {IP PT MENTAL STATUS:20030}    IV Access:  { TRUONG IV ACCESS:597155962}    Nursing Mobility/ADLs:  Walking   {CHP DME QDFC:282520381}  Transfer  {P DME LMTY:625538308}  Bathing  {CHP DME ERPX:763127082}  Dressing  {CHP DME GGMW:770232216}  Toileting  {CHP DME YRVS:666890773}  Feeding  {P DME NNNV:417755905}  Med Admin  {P DME JIGP:951543501}  Med Delivery   { TRUONG MED Delivery:613399419}    Wound Care Documentation and Therapy:        Elimination:  Continence: Bowel: {YES / OS:65009}  Bladder: {YES / HT:23523}  Urinary Catheter: {Urinary Catheter:569534863}   Colostomy/Ileostomy/Ileal Conduit: {YES / PO:00075}       Date of Last BM: ***    Intake/Output Summary (Last 24 hours) at 11/18/2022 1613  Last data filed at 11/17/2022 2336  Gross per 24 hour   Intake 120 ml   Output 1450 ml   Net -1330 ml     I/O last 3 completed shifts:   In: 5 [P.O.:720]  Out: 3525 [Urine:2775]    Safety Concerns:     [address] TRUONG Safety Concerns:502191639}    Impairments/Disabilities:      [address] GUERIN Impairments/Disabilities:091455538}    Nutrition Therapy:  Current Nutrition Therapy:   [address] GUERIN Diet List:108219592}    Routes of Feeding: {CHP DME Other Feedings:314703327}  Liquids: {Slp liquid thickness:72045}  Daily Fluid Restriction: {CHP DME Yes amt example:347645102}  Last Modified Barium Swallow with Video (Video Swallowing Test): {Done Not Done SOYF:598256260}    Treatments at the Time of Hospital Discharge:   Respiratory Treatments: ***  Oxygen Therapy:  {Therapy; copd oxygen:88033}  Ventilator:    { CC Vent WNSZ:359762514}    Rehab Therapies: {THERAPEUTIC INTERVENTION:2735285132}  Weight Bearing Status/Restrictions: { CC Weight Bearin}  Other Medical Equipment (for information only, NOT a DME order):  {EQUIPMENT:644100140}  Other Treatments:    Heart Failure Instructions for Daily Management  Patient was treated for acute on chronic diastolic heart failure. he  will require the following:    Please weigh daily on the same scale and approximately the same time of day. Report weight gain of 3 pounds/day or 5 pounds/week to : genevieve BEARDEN and Deanna Bauman [phone]. Please use hospital discharge weight as baseline reference. Please monitor for signs and symptoms of and report to MD:  Worsening Heart Failure: sudden weight gain, shortness of breath, lower extremity or general edema/swelling, abdominal bloating/swelling, inability to lie flat, intolerance to usual activity, or cough (especially at night). Report these finding even if no increase in weight. Dehydration:  having difficulty or a decrease in urination, dizziness, worsening fatigue, or new onset/worsening of generalized weakness. Please continue a LOW SODIUM diet and LIMIT fluid intake to 48 - 64 ounces ( 1.5 - 2 liters) per day. Call Deanna Bauman [phone] and genevieve BEARDEN and/or [address] @ [phone] with any questions or concerns. Please continue heart failure education to patient and family/support system. See After Visit Summary for hospital follow up appointment details.   Consider spiritual care referral for support and/or completion of advance directives [phone]. Consider: having the facility MD complete required 7 day follow up and palliative care consult for ongoing goals of care, end of life, and/or chronic disease management discussions. Patient's personal belongings (please select all that are sent with patient):  {CHP DME Belongings:125610274}    RN SIGNATURE:  {Esignature:547155155}    CASE MANAGEMENT/SOCIAL WORK SECTION    Inpatient Status Date: 11/16/22    Readmission Risk Assessment Score:  Readmission Risk              Risk of Unplanned Readmission:  27           Discharging to Facility/ [address]    Dialysis Facility (if applicable)   Name:  Address:  Dialysis Schedule:  Phone:  Fax:    / signature: Electronically signed by Yaneli Cruz RN on 11/30/22 at 9:29 AM EST    PHYSICIAN SECTION    Prognosis: {Prognosis:8374002082}    Condition at Discharge: Namrata Arcos Patient Condition:653080763}    Rehab Potential (if transferring to Rehab): {Prognosis:8600203668}    Recommended Labs or Other Treatments After Discharge: ***    Physician Certification: I certify the above information and transfer of Reese Deshpande  is necessary for the continuing treatment of the diagnosis listed and that he requires {Admit to Appropriate Level of Care:11739} for {GREATER/LESS:813717285} 30 days.      Update Admission H&P: {CHP DME Changes in OONAM:221216343}    PHYSICIAN SIGNATURE:  {Esignature:788725829}

## 2022-11-19 LAB
ANION GAP SERPL CALCULATED.3IONS-SCNC: 12 MMOL/L (ref 3–16)
BUN BLDV-MCNC: 84 MG/DL (ref 7–20)
CALCIUM SERPL-MCNC: 8.9 MG/DL (ref 8.3–10.6)
CHLORIDE BLD-SCNC: 102 MMOL/L (ref 99–110)
CO2: 25 MMOL/L (ref 21–32)
CREAT SERPL-MCNC: 2.9 MG/DL (ref 0.8–1.3)
GFR SERPL CREATININE-BSD FRML MDRD: 21 ML/MIN/{1.73_M2}
GLUCOSE BLD-MCNC: 110 MG/DL (ref 70–99)
GLUCOSE BLD-MCNC: 117 MG/DL (ref 70–99)
GLUCOSE BLD-MCNC: 117 MG/DL (ref 70–99)
GLUCOSE BLD-MCNC: 130 MG/DL (ref 70–99)
GLUCOSE BLD-MCNC: 138 MG/DL (ref 70–99)
PERFORMED ON: ABNORMAL
POTASSIUM REFLEX MAGNESIUM: 5 MMOL/L (ref 3.5–5.1)
SODIUM BLD-SCNC: 139 MMOL/L (ref 136–145)

## 2022-11-19 PROCEDURE — 2580000003 HC RX 258: Performed by: INTERNAL MEDICINE

## 2022-11-19 PROCEDURE — 2580000003 HC RX 258: Performed by: STUDENT IN AN ORGANIZED HEALTH CARE EDUCATION/TRAINING PROGRAM

## 2022-11-19 PROCEDURE — 6360000002 HC RX W HCPCS: Performed by: INTERNAL MEDICINE

## 2022-11-19 PROCEDURE — 94640 AIRWAY INHALATION TREATMENT: CPT

## 2022-11-19 PROCEDURE — 94660 CPAP INITIATION&MGMT: CPT

## 2022-11-19 PROCEDURE — 6370000000 HC RX 637 (ALT 250 FOR IP): Performed by: STUDENT IN AN ORGANIZED HEALTH CARE EDUCATION/TRAINING PROGRAM

## 2022-11-19 PROCEDURE — 94760 N-INVAS EAR/PLS OXIMETRY 1: CPT

## 2022-11-19 PROCEDURE — 36415 COLL VENOUS BLD VENIPUNCTURE: CPT

## 2022-11-19 PROCEDURE — 99232 SBSQ HOSP IP/OBS MODERATE 35: CPT | Performed by: STUDENT IN AN ORGANIZED HEALTH CARE EDUCATION/TRAINING PROGRAM

## 2022-11-19 PROCEDURE — 6370000000 HC RX 637 (ALT 250 FOR IP): Performed by: INTERNAL MEDICINE

## 2022-11-19 PROCEDURE — 6360000002 HC RX W HCPCS: Performed by: STUDENT IN AN ORGANIZED HEALTH CARE EDUCATION/TRAINING PROGRAM

## 2022-11-19 PROCEDURE — 1200000000 HC SEMI PRIVATE

## 2022-11-19 PROCEDURE — 2700000000 HC OXYGEN THERAPY PER DAY

## 2022-11-19 PROCEDURE — 80048 BASIC METABOLIC PNL TOTAL CA: CPT

## 2022-11-19 RX ADMIN — VERAPAMIL HYDROCHLORIDE 240 MG: 240 TABLET, FILM COATED, EXTENDED RELEASE ORAL at 20:27

## 2022-11-19 RX ADMIN — PANTOPRAZOLE SODIUM 40 MG: 40 TABLET, DELAYED RELEASE ORAL at 09:56

## 2022-11-19 RX ADMIN — ISODIUM CHLORIDE 3 ML: 0.03 SOLUTION RESPIRATORY (INHALATION) at 20:54

## 2022-11-19 RX ADMIN — ISODIUM CHLORIDE 3 ML: 0.03 SOLUTION RESPIRATORY (INHALATION) at 09:48

## 2022-11-19 RX ADMIN — FUROSEMIDE 10 MG: 10 INJECTION, SOLUTION INTRAMUSCULAR; INTRAVENOUS at 09:56

## 2022-11-19 RX ADMIN — HEPARIN SODIUM 5000 UNITS: 5000 INJECTION INTRAVENOUS; SUBCUTANEOUS at 07:11

## 2022-11-19 RX ADMIN — LEVALBUTEROL 1.25 MG: 1.25 SOLUTION, CONCENTRATE RESPIRATORY (INHALATION) at 20:53

## 2022-11-19 RX ADMIN — ASPIRIN 162 MG: 81 TABLET, COATED ORAL at 09:56

## 2022-11-19 RX ADMIN — SODIUM CHLORIDE, PRESERVATIVE FREE 10 ML: 5 INJECTION INTRAVENOUS at 09:57

## 2022-11-19 RX ADMIN — AMIODARONE HYDROCHLORIDE 100 MG: 200 TABLET ORAL at 09:56

## 2022-11-19 RX ADMIN — FUROSEMIDE 5 MG/HR: 10 INJECTION, SOLUTION INTRAMUSCULAR; INTRAVENOUS at 15:08

## 2022-11-19 RX ADMIN — TAMSULOSIN HYDROCHLORIDE 0.4 MG: 0.4 CAPSULE ORAL at 09:56

## 2022-11-19 RX ADMIN — ATORVASTATIN CALCIUM 40 MG: 40 TABLET, FILM COATED ORAL at 09:56

## 2022-11-19 RX ADMIN — HEPARIN SODIUM 5000 UNITS: 5000 INJECTION INTRAVENOUS; SUBCUTANEOUS at 20:27

## 2022-11-19 RX ADMIN — CLONIDINE HYDROCHLORIDE 0.1 MG: 0.1 TABLET ORAL at 09:56

## 2022-11-19 RX ADMIN — MONTELUKAST SODIUM 10 MG: 10 TABLET, COATED ORAL at 09:56

## 2022-11-19 RX ADMIN — CLONIDINE HYDROCHLORIDE 0.1 MG: 0.1 TABLET ORAL at 20:27

## 2022-11-19 RX ADMIN — HEPARIN SODIUM 5000 UNITS: 5000 INJECTION INTRAVENOUS; SUBCUTANEOUS at 15:04

## 2022-11-19 RX ADMIN — LEVALBUTEROL 1.25 MG: 1.25 SOLUTION, CONCENTRATE RESPIRATORY (INHALATION) at 13:36

## 2022-11-19 RX ADMIN — ISODIUM CHLORIDE 3 ML: 0.03 SOLUTION RESPIRATORY (INHALATION) at 13:36

## 2022-11-19 RX ADMIN — LEVALBUTEROL 1.25 MG: 1.25 SOLUTION, CONCENTRATE RESPIRATORY (INHALATION) at 09:48

## 2022-11-19 RX ADMIN — VERAPAMIL HYDROCHLORIDE 240 MG: 240 TABLET, FILM COATED, EXTENDED RELEASE ORAL at 09:56

## 2022-11-19 RX ADMIN — SODIUM CHLORIDE, PRESERVATIVE FREE 10 ML: 5 INJECTION INTRAVENOUS at 20:27

## 2022-11-19 ASSESSMENT — PAIN SCALES - GENERAL: PAINLEVEL_OUTOF10: 0

## 2022-11-19 NOTE — PROGRESS NOTES
[facility]    Patient:  Hernán Escobar 79 y.o. male MRN: [id number]     Date of Service: 11/19/2022    Allergy: Hytrin [terazosin hcl], Penicillins, Shrimp flavor, Sulfa antibiotics, Sulfasalazine, Tekturna [aliskiren fumarate], Vancomycin, and Ciprofloxacin  CC: F/u:   Brief Course   Is an 69-year-old male who was recently admitted to the hospital for weakness and inability to urinate and increased edema. He has been treated with IV Lasix. Nephrology is following. Creatinine has bumped with Lasix    24 hr interval hx:  Patient feels well today. He feels like he is breathing better. He reports he feels better than he did yesterday. Creatinine is down to 2.9 from above 3. His weight did go up but it was a bad weight. Objective     Physical Exam:  Vitals: BP (!) 141/73   Pulse 67   Temp (!) 94.2 °F (34.6 °C) (Oral)   Resp 22   Ht 5' 8\" (1.727 m)   Wt 272 lb 3.2 oz (123.5 kg)   SpO2 94%   BMI 41.39 kg/m²     Physical Exam  Constitutional:       General: He is not in acute distress. HENT:      Mouth/Throat:      Mouth: Mucous membranes are moist.   Eyes:      Pupils: Pupils are equal, round, and reactive to light. Cardiovascular:      Rate and Rhythm: Normal rate and regular rhythm. Pulses: Normal pulses. Pulmonary:      Effort: Pulmonary effort is normal. No respiratory distress. Breath sounds: Normal breath sounds. No wheezing, rhonchi or rales. Abdominal:      General: Abdomen is flat. There is no distension. Palpations: Abdomen is soft. Tenderness: There is no abdominal tenderness. Musculoskeletal:      Right lower leg: Edema present. Left lower leg: Edema present. Skin:     General: Skin is warm and dry. Coloration: Skin is not jaundiced or pale. Findings: No erythema. Neurological:      General: No focal deficit present. Mental Status: He is alert and oriented to person, place, and time.          Pertinent/ New Labs and Imaging Studies   CBC:   Lab Results   Component Value Date/Time    WBC 5.2 11/17/2022 06:04 AM    RBC 3.09 11/17/2022 06:04 AM    HGB 8.4 11/17/2022 06:04 AM    HCT 27.0 11/17/2022 06:04 AM    MCV 87.5 11/17/2022 06:04 AM    MCH 27.3 11/17/2022 06:04 AM    MCHC 31.2 11/17/2022 06:04 AM    RDW 18.1 11/17/2022 06:04 AM     11/17/2022 06:04 AM    MPV 8.5 11/17/2022 06:04 AM     BMP:    Lab Results   Component Value Date/Time     11/19/2022 09:04 AM    K 5.0 11/19/2022 09:04 AM     11/19/2022 09:04 AM    CO2 25 11/19/2022 09:04 AM    BUN 84 11/19/2022 09:04 AM    LABALBU 3.0 11/15/2022 09:04 PM    CREATININE 2.9 11/19/2022 09:04 AM    CALCIUM 8.9 11/19/2022 09:04 AM    GFRAA 37 05/27/2022 02:05 PM    GFRAA >60 04/19/2013 10:46 AM    LABGLOM 21 11/19/2022 09:04 AM    GLUCOSE 110 11/19/2022 09:04 AM    GLUCOSE 96 06/16/2011 10:37 AM       Imaging:  XR CHEST (2 VW)    Result Date: 11/6/2022  EXAMINATION: TWO XRAY VIEWS OF THE CHEST 11/6/2022 9:37 am COMPARISON: 10/30/2022 HISTORY: ORDERING SYSTEM PROVIDED HISTORY: productive cough wheeze ho pneumonia TECHNOLOGIST PROVIDED HISTORY: Reason for exam:->productive cough wheeze ho pneumonia FINDINGS: Increasing basilar opacities and small pleural effusions appear increased. No pneumothorax identified. No acute osseous abnormality appreciated. Cardiac and mediastinal contours appear unchanged. Increasing basilar opacities and small pleural effusions, for which multifocal infection or developing edema are considerations.      CT GUIDED NEEDLE PLACEMENT    Addendum Date: 11/11/2022    ADDENDUM: Estimated blood loss: Minimal (less than 5 mL)     Result Date: 11/11/2022  PROCEDURE: CT GUIDED BONE MARROW ASPIRATION AND CORE NEEDLE BONE BIOPSY OF THE RIGHT ILIAC BONE CT GUIDED NDL PLACEMENT MODERATE CONSCIOUS SEDATION 11/1/2022 HISTORY: ORDERING SYSTEM PROVIDED HISTORY: anemia PHYSICIANS: Devendra Leon D.O. CONTRAST: None SEDATION: A total of 2 mg versed and 100 mcg fentanyl were titrated intravenously for moderate sedation monitored under my direction. Total intra-service time of sedation was 15 minutes. The patient's vital signs were monitored throughout the procedure and recorded in the patient's medical record by the nurse. Sedation monitoring started at 1:45 p.m. and ended at 2:00 p.m. TECHNIQUE: Informed consent was obtained following a detailed explanation of the procedure including risks, benefits, and alternatives. Universal protocol was followed. Sterile gowns, masks, hats and gloves utilized for maximal sterile barrier. Axial images were obtained through the iliac bones using CT guidance and a suitable skin site was prepped and draped in sterile fashion. Local anesthesia was achieved with lidocaine. An 11 gauge [facility] bone marrow biopsy needle was advanced into the right iliac bone and approximately 15 mL of bone marrow aspirate was obtained. A single core biopsy specimen was obtained and the biopsy needle was removed. The patient tolerated the procedure well. Dose modulation, iterative reconstruction, and/or weight based adjustment of the mA/kV was utilized to reduce the radiation dose to as low as reasonably achievable. Total exam DLP: 246.31 mGy-cm     Successful CT guided bone marrow aspiration and core biopsy of the right iliac bone. US RENAL COMPLETE    Result Date: 11/1/2022  EXAMINATION: RETROPERITONEAL ULTRASOUND OF THE KIDNEYS AND URINARY BLADDER 11/1/2022 COMPARISON: None HISTORY: ORDERING SYSTEM PROVIDED HISTORY: DEISY TECHNOLOGIST PROVIDED HISTORY: Reason for exam:->DEISY FINDINGS: Kidneys: The right kidney measures 10.4 cm in length and the left kidney measures 8 cm in length. Kidneys demonstrate increased cortical echogenicity. No evidence of hydronephrosis or intrarenal stones. Bladder: The bladder is decompressed and thus not evaluated. Medical renal disease.      XR CHEST PORTABLE    Result Date: 11/15/2022  EXAMINATION: ONE XRAY VIEW OF THE CHEST 11/15/2022 8:46 pm COMPARISON: 11/07/2022 HISTORY: ORDERING SYSTEM PROVIDED HISTORY: palpitations TECHNOLOGIST PROVIDED HISTORY: Reason for exam:->palpitations FINDINGS: Cardiomegaly. Vascular congestion. Patchy infiltrates seen in the mid and lower lung fields. No pneumothorax. Small pleural effusions. No acute osseous abnormality. Degenerative changes seen in the spine and shoulders. Cardiomegaly with vascular congestion and patchy mid and lower lung field infiltrates, worsened from the previous exam with suspected small effusions. Worsening patchy infiltrates may represent atelectasis or pneumonia. XR CHEST PORTABLE    Result Date: 11/7/2022  EXAMINATION: ONE XRAY VIEW OF THE CHEST 11/7/2022 10:02 am COMPARISON: 11/06/2022 radiograph HISTORY: ORDERING SYSTEM PROVIDED HISTORY: F/U Pneumonia TECHNOLOGIST PROVIDED HISTORY: Reason for exam:->F/U Pneumonia Reason for Exam: F/U Pneumonia FINDINGS: Borderline cardiomegaly. Mediastinum is normal.  Improving aeration in the lower lung zones bilaterally. Persistent bibasilar ground-glass opacities with probable trace pleural fluid on the left. Bibasilar airspace opacities with improving aeration from prior exam.     XR CHEST PORTABLE    Result Date: 10/30/2022  EXAMINATION: ONE XRAY VIEW OF THE CHEST 10/30/2022 9:50 pm COMPARISON: 08/29/2022 radiograph HISTORY: ORDERING SYSTEM PROVIDED HISTORY: sob TECHNOLOGIST PROVIDED HISTORY: Reason for exam:->sob Reason for Exam: Dizziness; Extremity Weakness; Shortness of Breath FINDINGS: Heart and mediastinum normal.  Mild perihilar opacities have slightly improved centrally. Minimal bibasilar ground-glass opacities also improved. The lungs are otherwise lucent and mildly hyperinflated. No significant skeletal finding. Chronic pattern of interstitial change. Perihilar and mild bibasilar ground-glass opacities have improved suggesting a mild degree of underlying edema.      CT BIOPSY BONE following.  -Strict I's and O's  -Daily weights  -Daily renal panel    #Type 2 diabetes with stage IV kidney disease  -Continue low-dose sliding scale insulin    #Atrial fibrillation  -Continue amiodarone 100 mg p.o. daily and verapamil 240 mg p.o. daily    #Obstructive sleep apnea  -CPAP therapy at night    #Slow transit constipation  -Continue linaclotide 145 mcg p.o. daily        DVT Prophylaxis: Heparin  Code: Full  Disposition: Continued admission    Fabrizio Vargas MD  [facility]  Office [phone]  Cell [phone]

## 2022-11-19 NOTE — PLAN OF CARE
Problem: Pain  Goal: Verbalizes/displays adequate comfort level or baseline comfort level  11/19/2022 1203 by Marilyn Jauregui RN  Outcome: Progressing     Problem: Safety - Adult  Goal: Free from fall injury  11/19/2022 1203 by Marilyn Jauregui RN  Outcome: Progressing     Problem: ABCDS Injury Assessment  Goal: Absence of physical injury  11/19/2022 1203 by Marilyn Jauregui RN  Outcome: Progressing     Problem: Respiratory - Adult  Goal: Achieves optimal ventilation and oxygenation  11/19/2022 1203 by Marilyn Jauregui RN  Outcome: Progressing     Problem: Cardiovascular - Adult  Goal: Maintains optimal cardiac output and hemodynamic stability  11/19/2022 1203 by Marilyn Jauregui RN  Outcome: Progressing     Problem: Cardiovascular - Adult  Goal: Absence of cardiac dysrhythmias or at baseline  11/19/2022 1203 by Marilyn Jauregui RN  Outcome: Progressing     Problem: Skin/Tissue Integrity - Adult  Goal: Skin integrity remains intact  11/19/2022 1203 by Marilyn Jauregui RN  Outcome: Progressing     Problem: Skin/Tissue Integrity - Adult  Goal: Incisions, wounds, or drain sites healing without S/S of infection  11/19/2022 1203 by Marilyn Jauregui RN  Outcome: Progressing     Problem: Musculoskeletal - Adult  Goal: Return mobility to safest level of function  11/19/2022 1203 by Marilyn Jauregui RN  Outcome: Progressing     Problem: Musculoskeletal - Adult  Goal: Maintain proper alignment of affected body part  11/19/2022 1203 by Marilyn Jauregui RN  Outcome: Progressing     Problem: Musculoskeletal - Adult  Goal: Return ADL status to a safe level of function  11/19/2022 1203 by Marilyn Jauregui RN  Outcome: Progressing     Problem: Genitourinary - Adult  Goal: Absence of urinary retention  11/19/2022 1203 by Marilyn Jauregui RN  Outcome: Progressing     Problem: Genitourinary - Adult  Goal: Urinary catheter remains patent  11/19/2022 1203 by Chiquita Garduno Jakob Busch RN  Outcome: Progressing     Problem: Infection - Adult  Goal: Absence of infection at discharge  11/19/2022 1203 by Josey Perdue RN  Outcome: Progressing     Problem: Infection - Adult  Goal: Absence of infection during hospitalization  11/19/2022 1203 by Josey Perdue RN  Outcome: Progressing     Problem: Infection - Adult  Goal: Absence of fever/infection during anticipated neutropenic period  11/19/2022 1203 by Josey Perdue RN  Outcome: Progressing     Problem: Metabolic/Fluid and Electrolytes - Adult  Goal: Electrolytes maintained within normal limits  11/19/2022 1203 by Josey Perdue RN  Outcome: Progressing     Problem: Metabolic/Fluid and Electrolytes - Adult  Goal: Hemodynamic stability and optimal renal function maintained  11/19/2022 1203 by Josey Perdue RN  Outcome: Progressing     Problem: Metabolic/Fluid and Electrolytes - Adult  Goal: Glucose maintained within prescribed range  11/19/2022 1203 by Josey Perdue RN  Outcome: Progressing     Problem: Skin/Tissue Integrity  Goal: Absence of new skin breakdown  Description: 1. Monitor for areas of redness and/or skin breakdown  2. Assess vascular access sites hourly  3. Every 4-6 hours minimum:  Change oxygen saturation probe site  4. Every 4-6 hours:  If on nasal continuous positive airway pressure, respiratory therapy assess nares and determine need for appliance change or resting period.   11/19/2022 1203 by Josey Perdue RN  Outcome: Progressing     Problem: Discharge Planning  Goal: Discharge to home or other facility with appropriate resources  11/19/2022 1203 by Josey Perdue RN  Outcome: Progressing

## 2022-11-19 NOTE — PROGRESS NOTES
Nephrology ([facility] Progress Note    CC: DEISY on CKD    Subjective:    HPI:  Breathing unchanged. He used the CPAP last night. Weight is up but I/O's were negative. ROS:  Sujey fever, no chest pain. [address]:  Wife present. Objective:  Blood pressure (!) 141/73, pulse 67, temperature (!) 94.2 °F (34.6 °C), temperature source Oral, resp. rate 18, height 5' 8\" (1.727 m), weight 272 lb 3.2 oz (123.5 kg), SpO2 92 %. Intake/Output Summary (Last 24 hours) at 11/19/2022 1336  Last data filed at 11/19/2022 1008  Gross per 24 hour   Intake 240 ml   Output 1975 ml   Net -1735 ml       General:  NAD, A+Ox3, in chair. Chest:  CTAB  CVS:  RRR, no S3. Abdominal:  NTND, soft, +BS  Extremities:  2-3+ pitting edema  Skin:  no rash, warm. : +gordon    Labs:  Renal panel:  Lab Results   Component Value Date/Time     11/19/2022 09:04 AM    K 5.0 11/19/2022 09:04 AM    CO2 25 11/19/2022 09:04 AM    BUN 84 (HH) 11/19/2022 09:04 AM    CREATININE 2.9 (H) 11/19/2022 09:04 AM    CALCIUM 8.9 11/19/2022 09:04 AM    PHOS 4.9 11/08/2022 07:14 AM    MG 2.10 01/21/2021 07:50 AM     CBC:  Lab Results   Component Value Date/Time    WBC 5.2 11/17/2022 06:04 AM    HGB 8.4 (L) 11/17/2022 06:04 AM    HCT 27.0 (L) 11/17/2022 06:04 AM     (L) 11/17/2022 06:04 AM       Assessment/Plan:  Reviewed old records and labs.     1) DEISY on CKD              - baseline Cr 2.0              - ddx:  CRS vs. ATN              - patient is total body volume overloaded              - renal function better today - watch closely as we diurese.   - change to a low dose lasix gtt - 5mg/hr   - concerned about patient needing dialysis     2) obstructive uropathy              - gordon in place              - on flomax     3) ACDHF              - echo shows LVEF 23-40%, diastolic dysfunction, elevated PASP 40 mmHg              - continue fluid restriction of 1.2 liters/day              - low sodium diet   - weight up today despite negative on I/O's - will continue to trend.   - given his total gross body volume overload, I think we are committed to diuresing him at (almost) any cost because his anasarca needs to be addressed, and the patient and his wife agree     4) respiratory              - wean O2 as tolerated     5) anemia              - iron studies okay earlier this month  - retacrit 10,000 units qweek     6) FEN  - hyperkalemia              - potassium restricted diet  - metabolic acidosis              - off NaHCO3     6) JOANN              - CPAP has not been worn because he doesn't like the face mash   - using his CPAP now - the cor pulmonale is probably contributing to his intolerance to diuresis    Nalini Us MD

## 2022-11-19 NOTE — PLAN OF CARE
Problem: Discharge Planning  Goal: Discharge to home or other facility with appropriate resources  11/19/2022 0651 by Winston Bartlett RN  Outcome: Progressing  11/18/2022 1717 by Ron Arguelles RN  Outcome: Progressing  Flowsheets (Taken 11/18/2022 1659)  Discharge to home or other facility with appropriate resources:   Identify barriers to discharge with patient and caregiver   Arrange for needed discharge resources and transportation as appropriate   Identify discharge learning needs (meds, wound care, etc)  Note: Pt planning to return home with wife     Problem: Pain  Goal: Verbalizes/displays adequate comfort level or baseline comfort level  11/19/2022 0651 by Winston Bartlett RN  Outcome: Progressing  11/18/2022 1717 by Ron Arguelles RN  Outcome: Progressing  Flowsheets (Taken 11/18/2022 1659)  Verbalizes/displays adequate comfort level or baseline comfort level:   Encourage patient to monitor pain and request assistance   Assess pain using appropriate pain scale  Note: Pt denied pain this shift     Problem: Safety - Adult  Goal: Free from fall injury  11/19/2022 0651 by Winston Bartlett RN  Outcome: Progressing  11/18/2022 1717 by Ron Arguelles RN  Outcome: Progressing  [address] (Taken 11/18/2022 1659)  Free From Fall Injury: Instruct family/caregiver on patient safety  Note: Talked about safety with benoit magaly     Problem: ABCDS Injury Assessment  Goal: Absence of physical injury  11/19/2022 0651 by Winston Bartlett RN  Outcome: Progressing  11/18/2022 1717 by Ron Arguelles RN  Outcome: Progressing     Problem: Respiratory - Adult  Goal: Achieves optimal ventilation and oxygenation  11/19/2022 0651 by Winston Bartlett RN  Outcome: Progressing  Flowsheets (Taken 11/18/2022 2110)  Achieves optimal ventilation and oxygenation:   Assess for changes in respiratory status   Assess for changes in mentation and behavior   Oxygen supplementation based on oxygen saturation or arterial blood gases  11/18/2022 1717 by Simeon Hammonds RN  Outcome: Progressing     Problem: Cardiovascular - Adult  Goal: Maintains optimal cardiac output and hemodynamic stability  11/19/2022 0651 by Stan Rutledge RN  Outcome: Progressing  Flowsheets (Taken 11/18/2022 2110)  Maintains optimal cardiac output and hemodynamic stability: Monitor urine output and notify Licensed Independent Practitioner for values outside of normal range  11/18/2022 1717 by Simeon Hammonds RN  Outcome: Progressing  Goal: Absence of cardiac dysrhythmias or at baseline  11/19/2022 0651 by Stan Rutledge RN  Outcome: Progressing  11/18/2022 1717 by Simeon Hammonds RN  Outcome: Progressing     Problem: Skin/Tissue Integrity - Adult  Goal: Skin integrity remains intact  11/19/2022 0651 by Stan Rutledge RN  Outcome: Progressing  11/18/2022 1717 by Simeon Hammonds RN  Outcome: Progressing  Goal: Incisions, wounds, or drain sites healing without S/S of infection  11/19/2022 0651 by Stan Rutledge RN  Outcome: Progressing  11/18/2022 1717 by Simeon Hammonds RN  Outcome: Progressing     Problem: Musculoskeletal - Adult  Goal: Return mobility to safest level of function  11/19/2022 0651 by Stan Rutledge RN  Outcome: Progressing  11/18/2022 1717 by Simeon Hammonds RN  Outcome: Progressing  Goal: Maintain proper alignment of affected body part  11/19/2022 0651 by Stan Rutledge RN  Outcome: Progressing  11/18/2022 1717 by Simeon Hammonds RN  Outcome: Progressing  Goal: Return ADL status to a safe level of function  11/19/2022 0651 by Stan Rutledge RN  Outcome: Progressing  11/18/2022 1717 by Simeon Hammonds RN  Outcome: Progressing     Problem: Genitourinary - Adult  Goal: Absence of urinary retention  11/19/2022 0651 by Stan Rutledge RN  Outcome: Progressing  11/18/2022 1717 by Simeon Hammonds RN  Outcome: Progressing  Goal: Urinary catheter remains patent  11/19/2022 0651 by Stan Rutledge RN  Outcome: Progressing  11/18/2022 1717 by Simeon Hammonds RN  Outcome: Progressing Problem: Infection - Adult  Goal: Absence of infection at discharge  11/19/2022 0651 by Guille Humphrey RN  Outcome: Progressing  11/18/2022 1717 by Frantz Lal RN  Outcome: Progressing  Goal: Absence of infection during hospitalization  11/19/2022 0651 by Guille Humphrey RN  Outcome: Progressing  11/18/2022 1717 by Frantz Lal RN  Outcome: Progressing  Goal: Absence of fever/infection during anticipated neutropenic period  11/19/2022 0651 by Guille Humphrey RN  Outcome: Progressing  11/18/2022 1717 by Frantz Lal RN  Outcome: Progressing     Problem: Metabolic/Fluid and Electrolytes - Adult  Goal: Electrolytes maintained within normal limits  11/19/2022 0651 by Guille Humphrey RN  Outcome: Progressing  11/18/2022 1717 by Frantz Lal RN  Outcome: Progressing  Goal: Hemodynamic stability and optimal renal function maintained  11/19/2022 0651 by Guille Humphrey RN  Outcome: Progressing  11/18/2022 1717 by Frantz Lal RN  Outcome: Progressing  Goal: Glucose maintained within prescribed range  11/19/2022 0651 by Guille Humphrey RN  Outcome: Progressing  11/18/2022 1717 by Frantz Lal RN  Outcome: Progressing     Problem: Skin/Tissue Integrity  Goal: Absence of new skin breakdown  Description: 1. Monitor for areas of redness and/or skin breakdown  2. Assess vascular access sites hourly  3. Every 4-6 hours minimum:  Change oxygen saturation probe site  4. Every 4-6 hours:  If on nasal continuous positive airway pressure, respiratory therapy assess nares and determine need for appliance change or resting period.   11/19/2022 0651 by Guille Humphrey RN  Outcome: Progressing  11/18/2022 1717 by Frantz Lal RN  Outcome: Progressing

## 2022-11-20 ENCOUNTER — APPOINTMENT (OUTPATIENT)
Dept: GENERAL RADIOLOGY | Age: 82
DRG: 682 | End: 2022-11-20
Payer: MEDICARE

## 2022-11-20 LAB
ANION GAP SERPL CALCULATED.3IONS-SCNC: 13 MMOL/L (ref 3–16)
BASOPHILS ABSOLUTE: 0 K/UL (ref 0–0.2)
BASOPHILS RELATIVE PERCENT: 0.4 %
BLOOD CULTURE, ROUTINE: NORMAL
BUN BLDV-MCNC: 89 MG/DL (ref 7–20)
CALCIUM SERPL-MCNC: 8.7 MG/DL (ref 8.3–10.6)
CHLORIDE BLD-SCNC: 106 MMOL/L (ref 99–110)
CO2: 25 MMOL/L (ref 21–32)
CREAT SERPL-MCNC: 3.1 MG/DL (ref 0.8–1.3)
CULTURE, BLOOD 2: NORMAL
EOSINOPHILS ABSOLUTE: 0.1 K/UL (ref 0–0.6)
EOSINOPHILS RELATIVE PERCENT: 1.4 %
GFR SERPL CREATININE-BSD FRML MDRD: 19 ML/MIN/{1.73_M2}
GLUCOSE BLD-MCNC: 113 MG/DL (ref 70–99)
GLUCOSE BLD-MCNC: 118 MG/DL (ref 70–99)
GLUCOSE BLD-MCNC: 122 MG/DL (ref 70–99)
GLUCOSE BLD-MCNC: 127 MG/DL (ref 70–99)
GLUCOSE BLD-MCNC: 138 MG/DL (ref 70–99)
HCT VFR BLD CALC: 24.3 % (ref 40.5–52.5)
HEMOGLOBIN: 7.7 G/DL (ref 13.5–17.5)
LYMPHOCYTES ABSOLUTE: 0.5 K/UL (ref 1–5.1)
LYMPHOCYTES RELATIVE PERCENT: 11.2 %
MAGNESIUM: 2.5 MG/DL (ref 1.8–2.4)
MCH RBC QN AUTO: 27.1 PG (ref 26–34)
MCHC RBC AUTO-ENTMCNC: 31.5 G/DL (ref 31–36)
MCV RBC AUTO: 85.9 FL (ref 80–100)
MONOCYTES ABSOLUTE: 0.4 K/UL (ref 0–1.3)
MONOCYTES RELATIVE PERCENT: 8.2 %
NEUTROPHILS ABSOLUTE: 3.7 K/UL (ref 1.7–7.7)
NEUTROPHILS RELATIVE PERCENT: 78.8 %
PDW BLD-RTO: 17.9 % (ref 12.4–15.4)
PERFORMED ON: ABNORMAL
PLATELET # BLD: 108 K/UL (ref 135–450)
PMV BLD AUTO: 8.1 FL (ref 5–10.5)
POTASSIUM REFLEX MAGNESIUM: 4.7 MMOL/L (ref 3.5–5.1)
RBC # BLD: 2.83 M/UL (ref 4.2–5.9)
SODIUM BLD-SCNC: 144 MMOL/L (ref 136–145)
WBC # BLD: 4.7 K/UL (ref 4–11)

## 2022-11-20 PROCEDURE — 80048 BASIC METABOLIC PNL TOTAL CA: CPT

## 2022-11-20 PROCEDURE — 1200000000 HC SEMI PRIVATE

## 2022-11-20 PROCEDURE — 6360000002 HC RX W HCPCS: Performed by: INTERNAL MEDICINE

## 2022-11-20 PROCEDURE — 2580000003 HC RX 258: Performed by: STUDENT IN AN ORGANIZED HEALTH CARE EDUCATION/TRAINING PROGRAM

## 2022-11-20 PROCEDURE — 6370000000 HC RX 637 (ALT 250 FOR IP): Performed by: INTERNAL MEDICINE

## 2022-11-20 PROCEDURE — 99232 SBSQ HOSP IP/OBS MODERATE 35: CPT | Performed by: STUDENT IN AN ORGANIZED HEALTH CARE EDUCATION/TRAINING PROGRAM

## 2022-11-20 PROCEDURE — 85025 COMPLETE CBC W/AUTO DIFF WBC: CPT

## 2022-11-20 PROCEDURE — 94640 AIRWAY INHALATION TREATMENT: CPT

## 2022-11-20 PROCEDURE — 94761 N-INVAS EAR/PLS OXIMETRY MLT: CPT

## 2022-11-20 PROCEDURE — 83735 ASSAY OF MAGNESIUM: CPT

## 2022-11-20 PROCEDURE — 36415 COLL VENOUS BLD VENIPUNCTURE: CPT

## 2022-11-20 PROCEDURE — 6370000000 HC RX 637 (ALT 250 FOR IP): Performed by: STUDENT IN AN ORGANIZED HEALTH CARE EDUCATION/TRAINING PROGRAM

## 2022-11-20 PROCEDURE — 94660 CPAP INITIATION&MGMT: CPT

## 2022-11-20 PROCEDURE — 71045 X-RAY EXAM CHEST 1 VIEW: CPT

## 2022-11-20 PROCEDURE — 6360000002 HC RX W HCPCS: Performed by: STUDENT IN AN ORGANIZED HEALTH CARE EDUCATION/TRAINING PROGRAM

## 2022-11-20 PROCEDURE — 2700000000 HC OXYGEN THERAPY PER DAY

## 2022-11-20 RX ORDER — FUROSEMIDE 10 MG/ML
20 INJECTION INTRAMUSCULAR; INTRAVENOUS 2 TIMES DAILY
Status: DISCONTINUED | OUTPATIENT
Start: 2022-11-20 | End: 2022-11-21

## 2022-11-20 RX ADMIN — MONTELUKAST SODIUM 10 MG: 10 TABLET, COATED ORAL at 08:14

## 2022-11-20 RX ADMIN — ISODIUM CHLORIDE 3 ML: 0.03 SOLUTION RESPIRATORY (INHALATION) at 20:59

## 2022-11-20 RX ADMIN — SODIUM CHLORIDE, PRESERVATIVE FREE 10 ML: 5 INJECTION INTRAVENOUS at 08:18

## 2022-11-20 RX ADMIN — CLONIDINE HYDROCHLORIDE 0.1 MG: 0.1 TABLET ORAL at 21:30

## 2022-11-20 RX ADMIN — HEPARIN SODIUM 5000 UNITS: 5000 INJECTION INTRAVENOUS; SUBCUTANEOUS at 15:22

## 2022-11-20 RX ADMIN — LEVALBUTEROL 1.25 MG: 1.25 SOLUTION, CONCENTRATE RESPIRATORY (INHALATION) at 09:01

## 2022-11-20 RX ADMIN — AMIODARONE HYDROCHLORIDE 100 MG: 200 TABLET ORAL at 08:14

## 2022-11-20 RX ADMIN — ATORVASTATIN CALCIUM 40 MG: 40 TABLET, FILM COATED ORAL at 08:14

## 2022-11-20 RX ADMIN — CLONIDINE HYDROCHLORIDE 0.1 MG: 0.1 TABLET ORAL at 08:14

## 2022-11-20 RX ADMIN — HEPARIN SODIUM 5000 UNITS: 5000 INJECTION INTRAVENOUS; SUBCUTANEOUS at 06:10

## 2022-11-20 RX ADMIN — ISODIUM CHLORIDE 3 ML: 0.03 SOLUTION RESPIRATORY (INHALATION) at 09:01

## 2022-11-20 RX ADMIN — LEVALBUTEROL 1.25 MG: 1.25 SOLUTION, CONCENTRATE RESPIRATORY (INHALATION) at 21:00

## 2022-11-20 RX ADMIN — ASPIRIN 162 MG: 81 TABLET, COATED ORAL at 08:14

## 2022-11-20 RX ADMIN — ISODIUM CHLORIDE 3 ML: 0.03 SOLUTION RESPIRATORY (INHALATION) at 13:35

## 2022-11-20 RX ADMIN — LEVALBUTEROL 1.25 MG: 1.25 SOLUTION, CONCENTRATE RESPIRATORY (INHALATION) at 13:35

## 2022-11-20 RX ADMIN — FUROSEMIDE 20 MG: 10 INJECTION, SOLUTION INTRAMUSCULAR; INTRAVENOUS at 17:19

## 2022-11-20 RX ADMIN — PANTOPRAZOLE SODIUM 40 MG: 40 TABLET, DELAYED RELEASE ORAL at 08:14

## 2022-11-20 RX ADMIN — VERAPAMIL HYDROCHLORIDE 240 MG: 240 TABLET, FILM COATED, EXTENDED RELEASE ORAL at 21:30

## 2022-11-20 RX ADMIN — TAMSULOSIN HYDROCHLORIDE 0.4 MG: 0.4 CAPSULE ORAL at 08:14

## 2022-11-20 RX ADMIN — VERAPAMIL HYDROCHLORIDE 240 MG: 240 TABLET, FILM COATED, EXTENDED RELEASE ORAL at 08:14

## 2022-11-20 RX ADMIN — SODIUM CHLORIDE, PRESERVATIVE FREE 10 ML: 5 INJECTION INTRAVENOUS at 21:33

## 2022-11-20 ASSESSMENT — PAIN SCALES - GENERAL: PAINLEVEL_OUTOF10: 0

## 2022-11-20 NOTE — PROGRESS NOTES
[facility] Internal Medicine    Patient:  Fabiana Huff 79 y.o. male MRN: [id number]     Date of Service: 11/20/2022    Allergy: Hytrin [terazosin hcl], Penicillins, Shrimp flavor, Sulfa antibiotics, Sulfasalazine, Tekturna [aliskiren fumarate], Vancomycin, and Ciprofloxacin  CC: F/u:   Brief Course   Is an 41-year-old male who was recently admitted to the hospital for weakness and inability to urinate and increased edema. He has been treated with IV Lasix. Nephrology is following. Creatinine has bumped with Lasix    24 hr interval hx:  Patient is complaining of hemoptysis. He reports his hemoptysis has been present since admission. He denies any fevers or chills. He did have increased oxygen requirement overnight to 3 L. His creatinine is relatively stable. His weight is down 2 pounds. Objective     Physical Exam:  Vitals: BP (!) 146/64   Pulse 64   Temp 98.3 °F (36.8 °C) (Axillary)   Resp 20   Ht 5' 8\" (1.727 m)   Wt 270 lb 11.6 oz (122.8 kg)   SpO2 97%   BMI 41.16 kg/m²     Physical Exam  Constitutional:       General: He is not in acute distress. HENT:      Mouth/Throat:      Mouth: Mucous membranes are moist.   Eyes:      Pupils: Pupils are equal, round, and reactive to light. Cardiovascular:      Rate and Rhythm: Normal rate and regular rhythm. Pulses: Normal pulses. Pulmonary:      Effort: Pulmonary effort is normal. No respiratory distress. Breath sounds: Normal breath sounds. No wheezing, rhonchi or rales. Abdominal:      General: Abdomen is flat. There is no distension. Palpations: Abdomen is soft. Tenderness: There is no abdominal tenderness. Musculoskeletal:      Right lower leg: Edema present. Left lower leg: Edema present. Skin:     General: Skin is warm and dry. Coloration: Skin is not jaundiced or pale. Findings: No erythema. Neurological:      General: No focal deficit present.       Mental Status: He is alert and oriented to person, place, and time. Pertinent/ New Labs and Imaging Studies   CBC:   Lab Results   Component Value Date/Time    WBC 4.7 11/20/2022 06:02 AM    RBC 2.83 11/20/2022 06:02 AM    HGB 7.7 11/20/2022 06:02 AM    HCT 24.3 11/20/2022 06:02 AM    MCV 85.9 11/20/2022 06:02 AM    MCH 27.1 11/20/2022 06:02 AM    MCHC 31.5 11/20/2022 06:02 AM    RDW 17.9 11/20/2022 06:02 AM     11/20/2022 06:02 AM    MPV 8.1 11/20/2022 06:02 AM     BMP:    Lab Results   Component Value Date/Time     11/20/2022 06:02 AM    K 4.7 11/20/2022 06:02 AM     11/20/2022 06:02 AM    CO2 25 11/20/2022 06:02 AM    BUN 89 11/20/2022 06:02 AM    LABALBU 3.0 11/15/2022 09:04 PM    CREATININE 3.1 11/20/2022 06:02 AM    CALCIUM 8.7 11/20/2022 06:02 AM    GFRAA 37 05/27/2022 02:05 PM    GFRAA >60 04/19/2013 10:46 AM    LABGLOM 19 11/20/2022 06:02 AM    GLUCOSE 118 11/20/2022 06:02 AM    GLUCOSE 96 06/16/2011 10:37 AM       Imaging:  XR CHEST (2 VW)    Result Date: 11/6/2022  EXAMINATION: TWO XRAY VIEWS OF THE CHEST 11/6/2022 9:37 am COMPARISON: 10/30/2022 HISTORY: ORDERING SYSTEM PROVIDED HISTORY: productive cough wheeze ho pneumonia TECHNOLOGIST PROVIDED HISTORY: Reason for exam:->productive cough wheeze ho pneumonia FINDINGS: Increasing basilar opacities and small pleural effusions appear increased. No pneumothorax identified. No acute osseous abnormality appreciated. Cardiac and mediastinal contours appear unchanged. Increasing basilar opacities and small pleural effusions, for which multifocal infection or developing edema are considerations.      CT GUIDED NEEDLE PLACEMENT    Addendum Date: 11/11/2022    ADDENDUM: Estimated blood loss: Minimal (less than 5 mL)     Result Date: 11/11/2022  PROCEDURE: CT GUIDED BONE MARROW ASPIRATION AND CORE NEEDLE BONE BIOPSY OF THE RIGHT ILIAC BONE CT GUIDED NDL PLACEMENT MODERATE CONSCIOUS SEDATION 11/1/2022 HISTORY: ORDERING SYSTEM PROVIDED HISTORY: anemia PHYSICIANS: Ardeth Peabody, D.O. CONTRAST: None SEDATION: A total of 2 mg versed and 100 mcg fentanyl were titrated intravenously for moderate sedation monitored under my direction. Total intra-service time of sedation was 15 minutes. The patient's vital signs were monitored throughout the procedure and recorded in the patient's medical record by the nurse. Sedation monitoring started at 1:45 p.m. and ended at 2:00 p.m. TECHNIQUE: Informed consent was obtained following a detailed explanation of the procedure including risks, benefits, and alternatives. Universal protocol was followed. Sterile gowns, masks, hats and gloves utilized for maximal sterile barrier. Axial images were obtained through the iliac bones using CT guidance and a suitable skin site was prepped and draped in sterile fashion. Local anesthesia was achieved with lidocaine. An 11 gauge [employer] bone marrow biopsy needle was advanced into the right iliac bone and approximately 15 mL of bone marrow aspirate was obtained. A single core biopsy specimen was obtained and the biopsy needle was removed. The patient tolerated the procedure well. Dose modulation, iterative reconstruction, and/or weight based adjustment of the mA/kV was utilized to reduce the radiation dose to as low as reasonably achievable. Total exam DLP: 246.31 mGy-cm     Successful CT guided bone marrow aspiration and core biopsy of the right iliac bone. US RENAL COMPLETE    Result Date: 11/1/2022  EXAMINATION: RETROPERITONEAL ULTRASOUND OF THE KIDNEYS AND URINARY BLADDER 11/1/2022 COMPARISON: None HISTORY: ORDERING SYSTEM PROVIDED HISTORY: DEISY TECHNOLOGIST PROVIDED HISTORY: Reason for exam:->DEISY FINDINGS: Kidneys: The right kidney measures 10.4 cm in length and the left kidney measures 8 cm in length. Kidneys demonstrate increased cortical echogenicity. No evidence of hydronephrosis or intrarenal stones. Bladder: The bladder is decompressed and thus not evaluated. Medical renal disease.      XR CHEST PORTABLE    Result Date: 11/15/2022  EXAMINATION: ONE XRAY VIEW OF THE CHEST 11/15/2022 8:46 pm COMPARISON: 11/07/2022 HISTORY: ORDERING SYSTEM PROVIDED HISTORY: palpitations TECHNOLOGIST PROVIDED HISTORY: Reason for exam:->palpitations FINDINGS: Cardiomegaly. Vascular congestion. Patchy infiltrates seen in the mid and lower lung fields. No pneumothorax. Small pleural effusions. No acute osseous abnormality. Degenerative changes seen in the spine and shoulders. Cardiomegaly with vascular congestion and patchy mid and lower lung field infiltrates, worsened from the previous exam with suspected small effusions. Worsening patchy infiltrates may represent atelectasis or pneumonia. XR CHEST PORTABLE    Result Date: 11/7/2022  EXAMINATION: ONE XRAY VIEW OF THE CHEST 11/7/2022 10:02 am COMPARISON: 11/06/2022 radiograph HISTORY: ORDERING SYSTEM PROVIDED HISTORY: F/U Pneumonia TECHNOLOGIST PROVIDED HISTORY: Reason for exam:->F/U Pneumonia Reason for Exam: F/U Pneumonia FINDINGS: Borderline cardiomegaly. Mediastinum is normal.  Improving aeration in the lower lung zones bilaterally. Persistent bibasilar ground-glass opacities with probable trace pleural fluid on the left. Bibasilar airspace opacities with improving aeration from prior exam.     XR CHEST PORTABLE    Result Date: 10/30/2022  EXAMINATION: ONE XRAY VIEW OF THE CHEST 10/30/2022 9:50 pm COMPARISON: 08/29/2022 radiograph HISTORY: ORDERING SYSTEM PROVIDED HISTORY: sob TECHNOLOGIST PROVIDED HISTORY: Reason for exam:->sob Reason for Exam: Dizziness; Extremity Weakness; Shortness of Breath FINDINGS: Heart and mediastinum normal.  Mild perihilar opacities have slightly improved centrally. Minimal bibasilar ground-glass opacities also improved. The lungs are otherwise lucent and mildly hyperinflated. No significant skeletal finding. Chronic pattern of interstitial change.   Perihilar and mild bibasilar ground-glass opacities have improved suggesting a mild degree of underlying edema. CT BIOPSY BONE MARROW    Addendum Date: 11/11/2022    ADDENDUM: Estimated blood loss: Minimal (less than 5 mL)     Result Date: 11/11/2022  PROCEDURE: CT GUIDED BONE MARROW ASPIRATION AND CORE NEEDLE BONE BIOPSY OF THE RIGHT ILIAC BONE CT GUIDED NDL PLACEMENT MODERATE CONSCIOUS SEDATION 11/1/2022 HISTORY: ORDERING SYSTEM PROVIDED HISTORY: anemia PHYSICIANS: Augustus Hartmann D.O. CONTRAST: None SEDATION: A total of 2 mg versed and 100 mcg fentanyl were titrated intravenously for moderate sedation monitored under my direction. Total intra-service time of sedation was 15 minutes. The patient's vital signs were monitored throughout the procedure and recorded in the patient's medical record by the nurse. Sedation monitoring started at 1:45 p.m. and ended at 2:00 p.m. TECHNIQUE: Informed consent was obtained following a detailed explanation of the procedure including risks, benefits, and alternatives. Universal protocol was followed. Sterile gowns, masks, hats and gloves utilized for maximal sterile barrier. Axial images were obtained through the iliac bones using CT guidance and a suitable skin site was prepped and draped in sterile fashion. Local anesthesia was achieved with lidocaine. An 11 gauge [employer] bone marrow biopsy needle was advanced into the right iliac bone and approximately 15 mL of bone marrow aspirate was obtained. A single core biopsy specimen was obtained and the biopsy needle was removed. The patient tolerated the procedure well. Dose modulation, iterative reconstruction, and/or weight based adjustment of the mA/kV was utilized to reduce the radiation dose to as low as reasonably achievable. Total exam DLP: 246.31 mGy-cm     Successful CT guided bone marrow aspiration and core biopsy of the right iliac bone. Assessment and Plan     #Anasarca  Patient with continued swelling.   Nephrology is following.  -Continue Lasix drip per nephrology  -Strict I's and O's  -Daily weights    #CKD  -Nephrology following.  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 daily and verapamil 240 mg p.o. daily    #Obstructive sleep apnea  -CPAP therapy at night    #Slow transit constipation  Patient reports constipation.  -Continue linaclotide 145 mcg p.o. daily  -Continue as needed MiraLAX        DVT Prophylaxis: Heparin  Code: Full  Disposition: Continued admission    Gary Peralta MD  [facility] Internal Medicine  Office [phone]  Cell [phone]

## 2022-11-20 NOTE — PLAN OF CARE
Problem: Discharge Planning  Goal: Discharge to home or other facility with appropriate resources  Outcome: Progressing  Flowsheets (Taken 11/20/2022 0920)  Discharge to home or other facility with appropriate resources:   Identify barriers to discharge with patient and caregiver   Arrange for needed discharge resources and transportation as appropriate   Identify discharge learning needs (meds, wound care, etc)     Problem: Pain  Goal: Verbalizes/displays adequate comfort level or baseline comfort level  Outcome: Progressing     Problem: Safety - Adult  Goal: Free from fall injury  Outcome: Progressing  Flowsheets (Taken 11/20/2022 0920)  Free From Fall Injury: Instruct family/caregiver on patient safety     Problem: ABCDS Injury Assessment  Goal: Absence of physical injury  Outcome: Progressing  Flowsheets (Taken 11/20/2022 0920)  Absence of Physical Injury: Implement safety measures based on patient assessment     Problem: Respiratory - Adult  Goal: Achieves optimal ventilation and oxygenation  Outcome: Progressing     Problem: Cardiovascular - Adult  Goal: Maintains optimal cardiac output and hemodynamic stability  Outcome: Progressing  Flowsheets (Taken 11/20/2022 0920)  Maintains optimal cardiac output and hemodynamic stability: Monitor blood pressure and heart rate  Goal: Absence of cardiac dysrhythmias or at baseline  Outcome: Progressing  Flowsheets (Taken 11/20/2022 0920)  Absence of cardiac dysrhythmias or at baseline: Monitor cardiac rate and rhythm     Problem: Skin/Tissue Integrity - Adult  Goal: Skin integrity remains intact  Outcome: Progressing  Flowsheets (Taken 11/20/2022 0920)  Skin Integrity Remains Intact: Monitor for areas of redness and/or skin breakdown  Goal: Incisions, wounds, or drain sites healing without S/S of infection  Outcome: Progressing     Problem: Musculoskeletal - Adult  Goal: Return mobility to safest level of function  Outcome: Progressing  Flowsheets (Taken 11/20/2022 3463)  Return Mobility to Safest Level of Function: Assess patient stability and activity tolerance for standing, transferring and ambulating with or without assistive devices  Goal: Maintain proper alignment of affected body part  Outcome: Progressing  Flowsheets (Taken 11/20/2022 0920)  Maintain proper alignment of affected body part: Support and protect limb and body alignment per provider's orders  Goal: Return ADL status to a safe level of function  Outcome: Progressing     Problem: Genitourinary - Adult  Goal: Absence of urinary retention  Outcome: Progressing  Flowsheets (Taken 11/20/2022 0920)  Absence of urinary retention: Assess patients ability to void and empty bladder  Goal: Urinary catheter remains patent  Outcome: Progressing  Flowsheets (Taken 11/20/2022 0920)  Urinary catheter remains patent: Assess patency of urinary catheter     Problem: Infection - Adult  Goal: Absence of infection at discharge  Outcome: Progressing  Flowsheets (Taken 11/20/2022 0920)  Absence of infection at discharge:   Assess and monitor for signs and symptoms of infection   Monitor lab/diagnostic results  Goal: Absence of infection during hospitalization  Outcome: Progressing  Flowsheets (Taken 11/20/2022 0920)  Absence of infection during hospitalization:   Assess and monitor for signs and symptoms of infection   Monitor lab/diagnostic results  Goal: Absence of fever/infection during anticipated neutropenic period  Outcome: Progressing  Flowsheets (Taken 11/20/2022 0920)  Absence of fever/infection during anticipated neutropenic period: Monitor white blood cell count     Problem: Metabolic/Fluid and Electrolytes - Adult  Goal: Electrolytes maintained within normal limits  Outcome: Progressing  Flowsheets (Taken 11/20/2022 0920)  Electrolytes maintained within normal limits: Monitor labs and assess patient for signs and symptoms of electrolyte imbalances  Goal: Hemodynamic stability and optimal renal function maintained  Outcome: Progressing  Goal: Glucose maintained within prescribed range  Outcome: Progressing  Flowsheets (Taken 11/20/2022 0920)  Glucose maintained within prescribed range:   Monitor blood glucose as ordered   Assess for signs and symptoms of hyperglycemia and hypoglycemia   Administer ordered medications to maintain glucose within target range   Instruct patient on self management of diabetes and initiate consult as needed   Assess barriers to adequate nutritional intake and initiate nutrition consult as needed     Problem: Skin/Tissue Integrity  Goal: Absence of new skin breakdown  Description: 1. Monitor for areas of redness and/or skin breakdown  2. Assess vascular access sites hourly  3. Every 4-6 hours minimum:  Change oxygen saturation probe site  4. Every 4-6 hours:  If on nasal continuous positive airway pressure, respiratory therapy assess nares and determine need for appliance change or resting period.   Outcome: Progressing     Problem: Neurosensory - Adult  Goal: Achieves stable or improved neurological status  Outcome: Progressing  Flowsheets (Taken 11/20/2022 0920)  Achieves stable or improved neurological status: Assess for and report changes in neurological status  Goal: Achieves maximal functionality and self care  Outcome: Progressing     Problem: Gastrointestinal - Adult  Goal: Minimal or absence of nausea and vomiting  Outcome: Progressing  Goal: Maintains or returns to baseline bowel function  Outcome: Progressing  Flowsheets (Taken 11/20/2022 0920)  Maintains or returns to baseline bowel function:   Assess bowel function   Administer IV fluids as ordered to ensure adequate hydration   Administer ordered medications as needed   Encourage mobilization and activity  Goal: Maintains adequate nutritional intake  Outcome: Progressing  Flowsheets (Taken 11/20/2022 0920)  Maintains adequate nutritional intake:   Monitor intake and output, weight and lab values   Assist with meals as needed

## 2022-11-20 NOTE — PROGRESS NOTES
Nephrology ([facility]) Progress Note    CC: DEISY on CKD    Subjective:    HPI:  Breathing unchanged. He used the CPAP last night. Weight is down a few pounds. ROS:  No fever, no chest pain. [address]:  Wife and daughter present. Objective:  Blood pressure (!) 146/64, pulse 64, temperature 98.3 °F (36.8 °C), temperature source Axillary, resp. rate 20, height 5' 8\" (1.727 m), weight 270 lb 11.6 oz (122.8 kg), SpO2 94 %. Intake/Output Summary (Last 24 hours) at 11/20/2022 1338  Last data filed at 11/20/2022 0609  Gross per 24 hour   Intake 960 ml   Output 1875 ml   Net -915 ml       General:  NAD, A+Ox3, in chair. Chest:  CTAB  CVS:  RRR, no S3. Abdominal:  NTND, soft, +BS  Extremities:  2-3+ pitting edema  Skin:  no rash, warm. : +gordon    Labs:  Renal panel:  Lab Results   Component Value Date/Time     11/20/2022 06:02 AM    K 4.7 11/20/2022 06:02 AM    CO2 25 11/20/2022 06:02 AM    BUN 89 (HH) 11/20/2022 06:02 AM    CREATININE 3.1 (H) 11/20/2022 06:02 AM    CALCIUM 8.7 11/20/2022 06:02 AM    PHOS 4.9 11/08/2022 07:14 AM    MG 2.50 (H) 11/20/2022 06:02 AM     CBC:  Lab Results   Component Value Date/Time    WBC 4.7 11/20/2022 06:02 AM    HGB 7.7 (L) 11/20/2022 06:02 AM    HCT 24.3 (L) 11/20/2022 06:02 AM     (L) 11/20/2022 06:02 AM       Assessment/Plan:  Reviewed old records and labs. 1) DEISY on CKD              - baseline Cr 2.0              - ddx:  CRS vs. ATN              - patient is total body volume overloaded              - renal function slightly worse today - watch closely as we diurese. - Lost a few lbs with lasix gtt - 5mg/hr, will go back to low dose IV push today.    - concerned about patient needing dialysis     2) obstructive uropathy              - gordon in place              - on flomax     3) ACDHF              - echo shows LVEF 95-23%, diastolic dysfunction, elevated PASP 40 mmHg              - continue fluid restriction of 1.2 liters/day - low sodium diet   - given his total gross body volume overload, I think we are committed to diuresing him at (almost) any cost because his anasarca needs to be addressed, and the patient and his wife agree     4) respiratory              - wean O2 as tolerated     5) anemia              - iron studies okay earlier this month  - retacrit 10,000 units qweek     6) FEN  - hyperkalemia              - potassium restricted diet  - metabolic acidosis              - off NaHCO3     6) JOANN              - CPAP has not been worn because he doesn't like the face mash   - using his CPAP now - the cor pulmonale is probably contributing to his intolerance to diuresis    Fredo Cole MD

## 2022-11-20 NOTE — PLAN OF CARE
retention  11/20/2022 0054 by Eliseo Owusu RN  Outcome: Progressing     Problem: Genitourinary - Adult  Goal: Urinary catheter remains patent  11/20/2022 0054 by Eliseo Owusu RN  Outcome: Progressing     Problem: Infection - Adult  Goal: Absence of infection at discharge  11/20/2022 0054 by Eliseo Owusu RN  Outcome: Progressing     Problem: Infection - Adult  Goal: Absence of infection during hospitalization  11/20/2022 0054 by Eliseo Owusu RN  Outcome: Progressing     Problem: Infection - Adult  Goal: Absence of fever/infection during anticipated neutropenic period  11/20/2022 0054 by Eliseo Owusu RN  Outcome: Progressing     Problem: Metabolic/Fluid and Electrolytes - Adult  Goal: Electrolytes maintained within normal limits  11/20/2022 0054 by Eliseo Owusu RN  Outcome: Progressing     Problem: Metabolic/Fluid and Electrolytes - Adult  Goal: Hemodynamic stability and optimal renal function maintained  11/20/2022 0054 by Eliseo Owusu RN  Outcome: Progressing     Problem: Metabolic/Fluid and Electrolytes - Adult  Goal: Glucose maintained within prescribed range  11/20/2022 0054 by Eliseo Owusu RN  Outcome: Progressing     Problem: Neurosensory - Adult  Goal: Achieves stable or improved neurological status  Outcome: Progressing     Problem: Neurosensory - Adult  Goal: Achieves maximal functionality and self care  Outcome: Progressing     Problem: Gastrointestinal - Adult  Goal: Minimal or absence of nausea and vomiting  Outcome: Progressing  Goal: Maintains or returns to baseline bowel function  Outcome: Progressing  Goal: Maintains adequate nutritional intake  Outcome: Progressing     Problem: Skin/Tissue Integrity  Goal: Absence of new skin breakdown  Description: 1. Monitor for areas of redness and/or skin breakdown  2. Assess vascular access sites hourly  3. Every 4-6 hours minimum:  Change oxygen saturation probe site  4.   Every 4-6 hours:  If on nasal continuous positive airway pressure, respiratory therapy assess nares and determine need for appliance change or resting period.   11/20/2022 0054 by Anastasiya Larson RN  Outcome: Progressing

## 2022-11-21 LAB
ANION GAP SERPL CALCULATED.3IONS-SCNC: 12 MMOL/L (ref 3–16)
BUN BLDV-MCNC: 76 MG/DL (ref 7–20)
CALCIUM SERPL-MCNC: 8.6 MG/DL (ref 8.3–10.6)
CHLORIDE BLD-SCNC: 105 MMOL/L (ref 99–110)
CO2: 26 MMOL/L (ref 21–32)
CREAT SERPL-MCNC: 2.9 MG/DL (ref 0.8–1.3)
GFR SERPL CREATININE-BSD FRML MDRD: 21 ML/MIN/{1.73_M2}
GLUCOSE BLD-MCNC: 105 MG/DL (ref 70–99)
GLUCOSE BLD-MCNC: 110 MG/DL (ref 70–99)
GLUCOSE BLD-MCNC: 114 MG/DL (ref 70–99)
GLUCOSE BLD-MCNC: 131 MG/DL (ref 70–99)
GLUCOSE BLD-MCNC: 139 MG/DL (ref 70–99)
PERFORMED ON: ABNORMAL
POTASSIUM REFLEX MAGNESIUM: 4.8 MMOL/L (ref 3.5–5.1)
SODIUM BLD-SCNC: 143 MMOL/L (ref 136–145)

## 2022-11-21 PROCEDURE — 6370000000 HC RX 637 (ALT 250 FOR IP): Performed by: STUDENT IN AN ORGANIZED HEALTH CARE EDUCATION/TRAINING PROGRAM

## 2022-11-21 PROCEDURE — 2580000003 HC RX 258: Performed by: STUDENT IN AN ORGANIZED HEALTH CARE EDUCATION/TRAINING PROGRAM

## 2022-11-21 PROCEDURE — 99233 SBSQ HOSP IP/OBS HIGH 50: CPT | Performed by: INTERNAL MEDICINE

## 2022-11-21 PROCEDURE — 6360000002 HC RX W HCPCS: Performed by: STUDENT IN AN ORGANIZED HEALTH CARE EDUCATION/TRAINING PROGRAM

## 2022-11-21 PROCEDURE — 80048 BASIC METABOLIC PNL TOTAL CA: CPT

## 2022-11-21 PROCEDURE — 2700000000 HC OXYGEN THERAPY PER DAY

## 2022-11-21 PROCEDURE — 6370000000 HC RX 637 (ALT 250 FOR IP): Performed by: INTERNAL MEDICINE

## 2022-11-21 PROCEDURE — 2580000003 HC RX 258: Performed by: INTERNAL MEDICINE

## 2022-11-21 PROCEDURE — 6360000002 HC RX W HCPCS: Performed by: INTERNAL MEDICINE

## 2022-11-21 PROCEDURE — 94660 CPAP INITIATION&MGMT: CPT

## 2022-11-21 PROCEDURE — 36415 COLL VENOUS BLD VENIPUNCTURE: CPT

## 2022-11-21 PROCEDURE — 1200000000 HC SEMI PRIVATE

## 2022-11-21 PROCEDURE — 94640 AIRWAY INHALATION TREATMENT: CPT

## 2022-11-21 PROCEDURE — 94761 N-INVAS EAR/PLS OXIMETRY MLT: CPT

## 2022-11-21 RX ADMIN — ISODIUM CHLORIDE 3 ML: 0.03 SOLUTION RESPIRATORY (INHALATION) at 20:31

## 2022-11-21 RX ADMIN — FUROSEMIDE 20 MG: 10 INJECTION, SOLUTION INTRAMUSCULAR; INTRAVENOUS at 08:27

## 2022-11-21 RX ADMIN — ASPIRIN 162 MG: 81 TABLET, COATED ORAL at 08:26

## 2022-11-21 RX ADMIN — MONTELUKAST SODIUM 10 MG: 10 TABLET, COATED ORAL at 08:26

## 2022-11-21 RX ADMIN — TAMSULOSIN HYDROCHLORIDE 0.4 MG: 0.4 CAPSULE ORAL at 08:27

## 2022-11-21 RX ADMIN — HEPARIN SODIUM 5000 UNITS: 5000 INJECTION INTRAVENOUS; SUBCUTANEOUS at 22:15

## 2022-11-21 RX ADMIN — LEVALBUTEROL 1.25 MG: 1.25 SOLUTION, CONCENTRATE RESPIRATORY (INHALATION) at 20:31

## 2022-11-21 RX ADMIN — HEPARIN SODIUM 5000 UNITS: 5000 INJECTION INTRAVENOUS; SUBCUTANEOUS at 14:40

## 2022-11-21 RX ADMIN — CLONIDINE HYDROCHLORIDE 0.1 MG: 0.1 TABLET ORAL at 21:05

## 2022-11-21 RX ADMIN — FUROSEMIDE 20 MG/HR: 10 INJECTION, SOLUTION INTRAMUSCULAR; INTRAVENOUS at 19:53

## 2022-11-21 RX ADMIN — VERAPAMIL HYDROCHLORIDE 240 MG: 240 TABLET, FILM COATED, EXTENDED RELEASE ORAL at 21:05

## 2022-11-21 RX ADMIN — ATORVASTATIN CALCIUM 40 MG: 40 TABLET, FILM COATED ORAL at 08:27

## 2022-11-21 RX ADMIN — LEVALBUTEROL 1.25 MG: 1.25 SOLUTION, CONCENTRATE RESPIRATORY (INHALATION) at 12:21

## 2022-11-21 RX ADMIN — LEVALBUTEROL 1.25 MG: 1.25 SOLUTION, CONCENTRATE RESPIRATORY (INHALATION) at 08:11

## 2022-11-21 RX ADMIN — AMIODARONE HYDROCHLORIDE 100 MG: 200 TABLET ORAL at 08:27

## 2022-11-21 RX ADMIN — VERAPAMIL HYDROCHLORIDE 240 MG: 240 TABLET, FILM COATED, EXTENDED RELEASE ORAL at 08:27

## 2022-11-21 RX ADMIN — PANTOPRAZOLE SODIUM 40 MG: 40 TABLET, DELAYED RELEASE ORAL at 08:27

## 2022-11-21 RX ADMIN — FUROSEMIDE 20 MG: 10 INJECTION, SOLUTION INTRAMUSCULAR; INTRAVENOUS at 17:20

## 2022-11-21 RX ADMIN — ISODIUM CHLORIDE 3 ML: 0.03 SOLUTION RESPIRATORY (INHALATION) at 08:11

## 2022-11-21 RX ADMIN — CLONIDINE HYDROCHLORIDE 0.1 MG: 0.1 TABLET ORAL at 08:27

## 2022-11-21 RX ADMIN — ISODIUM CHLORIDE 3 ML: 0.03 SOLUTION RESPIRATORY (INHALATION) at 12:21

## 2022-11-21 RX ADMIN — SODIUM CHLORIDE, PRESERVATIVE FREE 10 ML: 5 INJECTION INTRAVENOUS at 09:08

## 2022-11-21 ASSESSMENT — PAIN SCALES - GENERAL: PAINLEVEL_OUTOF10: 0

## 2022-11-21 NOTE — PROGRESS NOTES
[facility] Internal Medicine Note      Chief Complaint: I am frustrated    Subjective/Interval History: This morning the patient is sitting up in bed. He is frustrated with the length of his hospitalization and some of the care he is receiving. Mostly I think this is just frustration with not feeling well. He states his breathing is okay but he does still have an oxygen requirement. Patient complained of some hemoptysis over the weekend, chest x-ray was fairly stable. Oxygen is at 3 L. He remains afebrile    I's/O's -6 L since admission, weight is essentially unchanged from yesterday. No chest pain. No cough or sputum. No nausea, vomiting, diarrhea. No abdominal pain. No dysuria. The remainder of the review of systems is negative. PMH, PSH, FH/SH reviewed and unchanged as documented in the H&P personally documented at admission 22    Medication list reviewed    Objective:    BP (!) 155/72   Pulse 74   Temp 98 °F (36.7 °C) (Oral)   Resp 18   Ht 5' 8\" (1.727 m)   Wt 271 lb 9.7 oz (123.2 kg)   SpO2 92%   BMI 41.30 kg/m²   Temp  Av.3 °F (36.3 °C)  Min: 95.4 °F (35.2 °C)  Max: 98 °F (36.7 °C)    RRR  Chest-respirations are easy. Few wheezes noted throughout. Remains diminished at the bases. Abd- BS+, soft, NTND  Ext -patient with anasarca extending all the way up to his umbilicus. Extensive edema of all 4 extremities essentially unchanged.     The Following Labs Were Reviewed Today:    Recent Results (from the past 24 hour(s))   POCT Glucose    Collection Time: 22  3:45 PM   Result Value Ref Range    POC Glucose 138 (H) 70 - 99 mg/dl    Performed on ACCU-CHEK    POCT Glucose    Collection Time: 22  8:58 PM   Result Value Ref Range    POC Glucose 127 (H) 70 - 99 mg/dl    Performed on ACCU-CHEK    POCT Glucose    Collection Time: 22  7:51 AM   Result Value Ref Range    POC Glucose 114 (H) 70 - 99 mg/dl    Performed on ACCU-CHEK    Basic Metabolic Panel w/ Reflex to MG Collection Time: 11/21/22  9:12 AM   Result Value Ref Range    Sodium 143 136 - 145 mmol/L    Potassium reflex Magnesium 4.8 3.5 - 5.1 mmol/L    Chloride 105 99 - 110 mmol/L    CO2 26 21 - 32 mmol/L    Anion Gap 12 3 - 16    Glucose 105 (H) 70 - 99 mg/dL    BUN 76 (H) 7 - 20 mg/dL    Creatinine 2.9 (H) 0.8 - 1.3 mg/dL    Est, Glom Filt Rate 21 (A) >60    Calcium 8.6 8.3 - 10.6 mg/dL   POCT Glucose    Collection Time: 11/21/22 11:38 AM   Result Value Ref Range    POC Glucose 131 (H) 70 - 99 mg/dl    Performed on ACCU-CHEK        ASSESSMENT/PLAN:      Principal Problem:    Anasarca/Acute on Chronic Diastolic CHF-creatinine a touch better today. Not certain we are making any significant headway in his swelling. Await nephrology input today. Active Problems:    Hemoptysis-mild hemoptysis over the weekend. Chest x-ray chest showed groundglass opacities. No obvious infection at this point. Repeat procalcitonin tomorrow. Worsening renal function-creatinine a little better today. Continue to monitor    Generalized weakness-PT/OT have worked with the patient. He will need skilled care at discharge. JOANN (obstructive sleep apnea)-continues with CPAP at night. Type 2 diabetes mellitus with stage 4 chronic kidney disease, with long-term current use of insulin-continue with sliding scale. Sugars okay. Remains off basal insulin. Paroxysmal atrial fibrillation-he remains in sinus rhythm. Essential hypertension-blood pressure is well controlled. No change is needed. Slow transit constipation-continue with Linzess. Anemia-  continue with Procrit, follow.     Time > 35 minutes reviewing chart and patient data, examining and interviewing patient, and discussing with nursing staff, family, etc.     Renate Eid MD, FACP  1:00 PM  11/21/2022

## 2022-11-21 NOTE — PLAN OF CARE
Problem: Discharge Planning  Goal: Discharge to home or other facility with appropriate resources  11/21/2022 0021 by Kaushik Crum RN  Outcome: Progressing     Problem: Pain  Goal: Verbalizes/displays adequate comfort level or baseline comfort level  11/21/2022 0021 by Kaushik Crum RN  Outcome: Progressing     Problem: Safety - Adult  Goal: Free from fall injury  11/21/2022 0021 by Kaushik Crum RN  Outcome: Progressing     Problem: ABCDS Injury Assessment  Goal: Absence of physical injury  11/21/2022 0021 by Kaushik Crum RN  Outcome: Progressing     Problem: Respiratory - Adult  Goal: Achieves optimal ventilation and oxygenation  11/21/2022 0021 by Kaushik Crum RN  Outcome: Progressing     Problem: Cardiovascular - Adult  Goal: Maintains optimal cardiac output and hemodynamic stability  11/21/2022 0021 by Kaushik Crum RN  Outcome: Progressing     Problem: Cardiovascular - Adult  Goal: Absence of cardiac dysrhythmias or at baseline  11/21/2022 0021 by Kaushik Crum RN  Outcome: Progressing     Problem: Skin/Tissue Integrity - Adult  Goal: Skin integrity remains intact  11/21/2022 0021 by Kaushik Crum RN  Outcome: Progressing     Problem: Skin/Tissue Integrity - Adult  Goal: Incisions, wounds, or drain sites healing without S/S of infection  11/21/2022 0021 by Kaushik Crum RN  Outcome: Progressing     Problem: Musculoskeletal - Adult  Goal: Return mobility to safest level of function  11/21/2022 0021 by Kaushik Crum RN  Outcome: Progressing     Problem: Musculoskeletal - Adult  Goal: Maintain proper alignment of affected body part  11/21/2022 0021 by Kaushik Crum RN  Outcome: Progressing     Problem: Musculoskeletal - Adult  Goal: Return ADL status to a safe level of function  11/21/2022 0021 by Kaushik Crum RN  Outcome: Progressing     Problem: Genitourinary - Adult  Goal: Absence of urinary retention  11/21/2022 0021 by Anastasiya Larson RN  Outcome: Progressing     Problem: Genitourinary - Adult  Goal: Urinary catheter remains patent  11/21/2022 0021 by Anastasiya Larson RN  Outcome: Progressing     Problem: Infection - Adult  Goal: Absence of infection at discharge  11/21/2022 0021 by Anastasiya Larson RN  Outcome: Progressing     Problem: Infection - Adult  Goal: Absence of infection during hospitalization  11/21/2022 0021 by Anastasiya Larson RN  Outcome: Progressing     Problem: Infection - Adult  Goal: Absence of fever/infection during anticipated neutropenic period  11/21/2022 0021 by Anastasiya Larson RN  Outcome: Progressing     Problem: Metabolic/Fluid and Electrolytes - Adult  Goal: Electrolytes maintained within normal limits  11/21/2022 0021 by Anastasiya Larson RN  Outcome: Progressing     Problem: Metabolic/Fluid and Electrolytes - Adult  Goal: Hemodynamic stability and optimal renal function maintained  11/21/2022 0021 by Anastasiya Larson RN  Outcome: Progressing     Problem: Metabolic/Fluid and Electrolytes - Adult  Goal: Glucose maintained within prescribed range  11/21/2022 0021 by Anastasiya Larson RN  Outcome: Progressing     Problem: Neurosensory - Adult  Goal: Achieves stable or improved neurological status  11/21/2022 0021 by Anastasiya Larson RN  Outcome: Progressing     Problem: Neurosensory - Adult  Goal: Achieves maximal functionality and self care  11/21/2022 0021 by Anastasiya Larson RN  Outcome: Progressing     Problem: Gastrointestinal - Adult  Goal: Minimal or absence of nausea and vomiting  11/21/2022 0021 by Anastasiya Larson RN  Outcome: Progressing     Problem: Gastrointestinal - Adult  Goal: Maintains or returns to baseline bowel function  11/21/2022 0021 by Anastasiya Larson RN  Outcome: Progressing     Problem: Gastrointestinal - Adult  Goal: Maintains adequate nutritional intake  11/21/2022 0021 by Laisha Martinez, RN  Outcome: Progressing     Problem: Skin/Tissue Integrity  Goal: Absence of new skin breakdown  Description: 1. Monitor for areas of redness and/or skin breakdown  2. Assess vascular access sites hourly  3. Every 4-6 hours minimum:  Change oxygen saturation probe site  4. Every 4-6 hours:  If on nasal continuous positive airway pressure, respiratory therapy assess nares and determine need for appliance change or resting period.   11/21/2022 0021 by aLisha Martinez, RN  Outcome: Progressing

## 2022-11-21 NOTE — PROGRESS NOTES
Nephrology ([facility] Progress Note    CC: DEISY on CKD    Subjective:    HPI:  Breathing unchanged. Using CPAP. Weight is unchanged. Renal function unchanged. ROS:  No fever, no chest pain. [address]:  medications reviewed. Wife present. Objective:  Blood pressure (!) 147/65, pulse 71, temperature 97.8 °F (36.6 °C), temperature source Oral, resp. rate 16, height 5' 8\" (1.727 m), weight 271 lb 9.7 oz (123.2 kg), SpO2 95 %. Intake/Output Summary (Last 24 hours) at 11/21/2022 1647  Last data filed at 11/21/2022 1411  Gross per 24 hour   Intake 720.06 ml   Output 750 ml   Net -29.94 ml       General:  NAD, A+Ox3, in chair. Chest:  CTAB  CVS:  RRR, no S3. Abdominal:  NTND, soft, +BS  Extremities:  2-3+ pitting edema  Skin:  no rash, warm. : +gordon    Labs:  Renal panel:  Lab Results   Component Value Date/Time     11/21/2022 09:12 AM    K 4.8 11/21/2022 09:12 AM    CO2 26 11/21/2022 09:12 AM    BUN 76 (H) 11/21/2022 09:12 AM    CREATININE 2.9 (H) 11/21/2022 09:12 AM    CALCIUM 8.6 11/21/2022 09:12 AM    PHOS 4.9 11/08/2022 07:14 AM    MG 2.50 (H) 11/20/2022 06:02 AM     CBC:  Lab Results   Component Value Date/Time    WBC 4.7 11/20/2022 06:02 AM    HGB 7.7 (L) 11/20/2022 06:02 AM    HCT 24.3 (L) 11/20/2022 06:02 AM     (L) 11/20/2022 06:02 AM       Assessment/Plan:  Reviewed old records and labs.     1) DEISY on CKD              - baseline Cr 2.0              - ddx:  CRS vs. ATN              - patient is total body volume overloaded              - renal function is unchanged     2) obstructive uropathy              - gordon in place              - on flomax     3) ACDHF              - echo shows LVEF 36-16%, diastolic dysfunction, elevated PASP 40 mmHg              - continue fluid restriction of 1.2 liters/day              - low sodium diet   - given his total gross body volume overload, I think we are committed to diuresing him at (almost) any cost because his anasarca needs to be addressed, and the patient and his wife agree     4) respiratory              - wean O2 as tolerated     5) anemia              - iron studies okay earlier this month  - retacrit 10,000 units qweek     6) FEN  - hyperkalemia              - potassium restricted diet  - metabolic acidosis              - off NaHCO3     6) JOANN              - CPAP has not been worn because he doesn't like the face mash   - using his CPAP now as the cor pulmonale is probably contributing to his intolerance to diuresis      Had a long discussion with patient, wife, and other daughter regarding expectations and goals of care. Option 1) discharge home as is, which I don't recommend as he will very likely be right back. Option 2) continue gentle diuresis, which hasn't worked so far, so I wouldn't expect a different result from the current lack of progress. Option 3) commit to more aggressive diuresis with higher risk of worsened renal function, so resuming a high dose lasix gtt. They were agreeable to option 3 and understood the risk.   [time 60+ min]

## 2022-11-21 NOTE — CARE COORDINATION
11/21 Discharge Plan: [facility]. No precert required. Will need HENS and call for COVID req. From home with wife. Continued diuresis. O2 3L/M. (Baseline is 2L/M). Follow Nephrology for possible need for HD.  Electronically signed by Jeannette Aguirre RN on 11/21/2022 at 11:08 AM

## 2022-11-21 NOTE — PLAN OF CARE
Problem: Discharge Planning  Goal: Discharge to home or other facility with appropriate resources  11/21/2022 1028 by Ginny Greenberg RN  Outcome: Progressing  11/21/2022 1028 by Ginny Greenberg RN  Outcome: Progressing  11/21/2022 0021 by Laisha Martinez RN  Outcome: Progressing     Problem: Pain  Goal: Verbalizes/displays adequate comfort level or baseline comfort level  11/21/2022 1028 by Ginny Greenberg RN  Outcome: Progressing  11/21/2022 1028 by Ginny Greenberg RN  Outcome: Progressing  11/21/2022 0021 by Laisha Martinez RN  Outcome: Progressing     Problem: Safety - Adult  Goal: Free from fall injury  11/21/2022 1028 by Ginny Greenberg RN  Outcome: Progressing  11/21/2022 1028 by Ginny Greenberg RN  Outcome: Progressing  11/21/2022 0021 by Laisha Martinez RN  Outcome: Progressing     Problem: ABCDS Injury Assessment  Goal: Absence of physical injury  11/21/2022 1028 by Ginny Greenberg RN  Outcome: Progressing  11/21/2022 1028 by Ginny Greenberg RN  Outcome: Progressing  11/21/2022 0021 by Laisha Martinez RN  Outcome: Progressing     Problem: Respiratory - Adult  Goal: Achieves optimal ventilation and oxygenation  11/21/2022 1028 by Ginny Greenberg RN  Outcome: Progressing  11/21/2022 1028 by Ginny Greenberg RN  Outcome: Progressing  11/21/2022 0021 by Laisha Martinez RN  Outcome: Progressing     Problem: Cardiovascular - Adult  Goal: Maintains optimal cardiac output and hemodynamic stability  11/21/2022 1028 by Ginny Greenberg RN  Outcome: Progressing  11/21/2022 1028 by Ginny Greenberg RN  Outcome: Progressing  11/21/2022 0021 by Laisha Martinez RN  Outcome: Progressing  Goal: Absence of cardiac dysrhythmias or at baseline  11/21/2022 1028 by Ginny Greenberg RN  Outcome: Progressing  11/21/2022 1028 by Ginny Greenberg RN  Outcome: Progressing  11/21/2022 0021 by Laisha Martinez RN  Outcome: Progressing     Problem: Skin/Tissue Integrity - Adult  Goal: Skin integrity remains intact  11/21/2022 1028 by Maria Dolores Paredes RN  Outcome: Progressing  11/21/2022 1028 by Maria Dolores Paredes RN  Outcome: Progressing  11/21/2022 0021 by Janina Nascimento RN  Outcome: Progressing  Goal: Incisions, wounds, or drain sites healing without S/S of infection  11/21/2022 1028 by Maria Dolores Paredes RN  Outcome: Progressing  11/21/2022 1028 by Maria Dolores Paredes RN  Outcome: Progressing  11/21/2022 0021 by Janina Nascimento RN  Outcome: Progressing     Problem: Musculoskeletal - Adult  Goal: Return mobility to safest level of function  11/21/2022 1028 by Maria Dolores Paredes RN  Outcome: Progressing  11/21/2022 1028 by Maria Dolores Paredes RN  Outcome: Progressing  11/21/2022 0021 by Janina Nascimento RN  Outcome: Progressing  Goal: Maintain proper alignment of affected body part  11/21/2022 1028 by Maria Dolores Paredes RN  Outcome: Progressing  11/21/2022 1028 by Maria Dolores Paredes RN  Outcome: Progressing  11/21/2022 0021 by Jainna Nascimento RN  Outcome: Progressing  Goal: Return ADL status to a safe level of function  11/21/2022 1028 by Maria Dolores Paredes RN  Outcome: Progressing  11/21/2022 1028 by Maria Dolores Paredes RN  Outcome: Progressing  11/21/2022 0021 by Janina Nascimento RN  Outcome: Progressing     Problem: Genitourinary - Adult  Goal: Absence of urinary retention  11/21/2022 1028 by Maria Dolores Paredes RN  Outcome: Progressing  11/21/2022 1028 by Maria Dolores Paredes RN  Outcome: Progressing  11/21/2022 0021 by Janina Nascimento RN  Outcome: Progressing  Goal: Urinary catheter remains patent  11/21/2022 1028 by Maria Dolores Paredes RN  Outcome: Progressing  11/21/2022 1028 by Maria Dolores Paredes RN  Outcome: Progressing  11/21/2022 0021 by Janina Nascimento RN  Outcome: Progressing     Problem: Infection - Adult  Goal: Absence of infection at discharge  11/21/2022 1028 by Maria Dolores Paredes RN  Outcome: Progressing  11/21/2022 1028 by Maria Dolores Paredes RN  Outcome: Progressing  11/21/2022 0021 by Vianca Mendez RN  Outcome: Progressing  Goal: Absence of infection during hospitalization  11/21/2022 1028 by Ruchi Krueger RN  Outcome: Progressing  11/21/2022 1028 by Ruchi Krueger RN  Outcome: Progressing  11/21/2022 0021 by Vianca Mendez RN  Outcome: Progressing  Goal: Absence of fever/infection during anticipated neutropenic period  11/21/2022 1028 by Ruchi Krueger RN  Outcome: Progressing  11/21/2022 1028 by Ruchi Krueger RN  Outcome: Progressing  11/21/2022 0021 by Vianca Mendez RN  Outcome: Progressing     Problem: Metabolic/Fluid and Electrolytes - Adult  Goal: Electrolytes maintained within normal limits  11/21/2022 1028 by Ruchi Krueger RN  Outcome: Progressing  11/21/2022 1028 by Ruchi Krueger RN  Outcome: Progressing  11/21/2022 0021 by Vianca Mendez RN  Outcome: Progressing  Goal: Hemodynamic stability and optimal renal function maintained  11/21/2022 1028 by Ruchi Krueger RN  Outcome: Progressing  11/21/2022 1028 by Ruchi Krueger RN  Outcome: Progressing  11/21/2022 0021 by Vianca Mendez RN  Outcome: Progressing  Goal: Glucose maintained within prescribed range  11/21/2022 1028 by Ruchi Krueger RN  Outcome: Progressing  11/21/2022 1028 by Ruchi Krueger RN  Outcome: Progressing  11/21/2022 0021 by Vianca Mendez RN  Outcome: Progressing     Problem: Skin/Tissue Integrity  Goal: Absence of new skin breakdown  Description: 1. Monitor for areas of redness and/or skin breakdown  2. Assess vascular access sites hourly  3. Every 4-6 hours minimum:  Change oxygen saturation probe site  4. Every 4-6 hours:  If on nasal continuous positive airway pressure, respiratory therapy assess nares and determine need for appliance change or resting period.   11/21/2022 1028 by Ruchi Krueger RN  Outcome: Progressing  11/21/2022 1028 by Ruchi Krueger RN  Outcome: Progressing  11/21/2022 0021 by Adi Valenzuela RN  Outcome: Progressing     Problem: Neurosensory - Adult  Goal: Achieves stable or improved neurological status  11/21/2022 1028 by Donald Vazquez RN  Outcome: Progressing  11/21/2022 0021 by Adi Valenzuela RN  Outcome: Progressing  Goal: Achieves maximal functionality and self care  11/21/2022 1028 by Donald Vazquez RN  Outcome: Progressing  11/21/2022 0021 by Adi Valenzuela RN  Outcome: Progressing     Problem: Gastrointestinal - Adult  Goal: Minimal or absence of nausea and vomiting  11/21/2022 1028 by Donald Vazquez RN  Outcome: Progressing  11/21/2022 0021 by Adi Valenzuela RN  Outcome: Progressing  Goal: Maintains or returns to baseline bowel function  11/21/2022 1028 by Donald Vazquez RN  Outcome: Progressing  11/21/2022 0021 by Adi Valenzuela RN  Outcome: Progressing  Goal: Maintains adequate nutritional intake  11/21/2022 1028 by Donald Vazquez RN  Outcome: Progressing  11/21/2022 0021 by Adi Valenzuela RN  Outcome: Progressing

## 2022-11-22 LAB
ANION GAP SERPL CALCULATED.3IONS-SCNC: 9 MMOL/L (ref 3–16)
BASOPHILS ABSOLUTE: 0 K/UL (ref 0–0.2)
BASOPHILS RELATIVE PERCENT: 0.4 %
BUN BLDV-MCNC: 82 MG/DL (ref 7–20)
CALCIUM SERPL-MCNC: 8.6 MG/DL (ref 8.3–10.6)
CHLORIDE BLD-SCNC: 104 MMOL/L (ref 99–110)
CO2: 30 MMOL/L (ref 21–32)
CREAT SERPL-MCNC: 2.9 MG/DL (ref 0.8–1.3)
EOSINOPHILS ABSOLUTE: 0.1 K/UL (ref 0–0.6)
EOSINOPHILS RELATIVE PERCENT: 1.8 %
GFR SERPL CREATININE-BSD FRML MDRD: 21 ML/MIN/{1.73_M2}
GLUCOSE BLD-MCNC: 122 MG/DL (ref 70–99)
GLUCOSE BLD-MCNC: 124 MG/DL (ref 70–99)
GLUCOSE BLD-MCNC: 126 MG/DL (ref 70–99)
GLUCOSE BLD-MCNC: 130 MG/DL (ref 70–99)
GLUCOSE BLD-MCNC: 149 MG/DL (ref 70–99)
HCT VFR BLD CALC: 23.3 % (ref 40.5–52.5)
HEMOGLOBIN: 7.3 G/DL (ref 13.5–17.5)
LYMPHOCYTES ABSOLUTE: 0.5 K/UL (ref 1–5.1)
LYMPHOCYTES RELATIVE PERCENT: 11.5 %
MCH RBC QN AUTO: 27.2 PG (ref 26–34)
MCHC RBC AUTO-ENTMCNC: 31.5 G/DL (ref 31–36)
MCV RBC AUTO: 86.3 FL (ref 80–100)
MONOCYTES ABSOLUTE: 0.3 K/UL (ref 0–1.3)
MONOCYTES RELATIVE PERCENT: 7.7 %
NEUTROPHILS ABSOLUTE: 3.3 K/UL (ref 1.7–7.7)
NEUTROPHILS RELATIVE PERCENT: 78.6 %
PDW BLD-RTO: 17.9 % (ref 12.4–15.4)
PERFORMED ON: ABNORMAL
PLATELET # BLD: 117 K/UL (ref 135–450)
PMV BLD AUTO: 9 FL (ref 5–10.5)
POTASSIUM REFLEX MAGNESIUM: 4.4 MMOL/L (ref 3.5–5.1)
PROCALCITONIN: 0.18 NG/ML (ref 0–0.15)
RBC # BLD: 2.7 M/UL (ref 4.2–5.9)
SODIUM BLD-SCNC: 143 MMOL/L (ref 136–145)
WBC # BLD: 4.2 K/UL (ref 4–11)

## 2022-11-22 PROCEDURE — 6360000002 HC RX W HCPCS: Performed by: STUDENT IN AN ORGANIZED HEALTH CARE EDUCATION/TRAINING PROGRAM

## 2022-11-22 PROCEDURE — 97116 GAIT TRAINING THERAPY: CPT

## 2022-11-22 PROCEDURE — 1200000000 HC SEMI PRIVATE

## 2022-11-22 PROCEDURE — 94640 AIRWAY INHALATION TREATMENT: CPT

## 2022-11-22 PROCEDURE — 97535 SELF CARE MNGMENT TRAINING: CPT

## 2022-11-22 PROCEDURE — 85025 COMPLETE CBC W/AUTO DIFF WBC: CPT

## 2022-11-22 PROCEDURE — 6360000002 HC RX W HCPCS: Performed by: INTERNAL MEDICINE

## 2022-11-22 PROCEDURE — 80048 BASIC METABOLIC PNL TOTAL CA: CPT

## 2022-11-22 PROCEDURE — 94760 N-INVAS EAR/PLS OXIMETRY 1: CPT

## 2022-11-22 PROCEDURE — 84145 PROCALCITONIN (PCT): CPT

## 2022-11-22 PROCEDURE — 6370000000 HC RX 637 (ALT 250 FOR IP): Performed by: STUDENT IN AN ORGANIZED HEALTH CARE EDUCATION/TRAINING PROGRAM

## 2022-11-22 PROCEDURE — 97530 THERAPEUTIC ACTIVITIES: CPT

## 2022-11-22 PROCEDURE — 6370000000 HC RX 637 (ALT 250 FOR IP): Performed by: INTERNAL MEDICINE

## 2022-11-22 PROCEDURE — 2580000003 HC RX 258: Performed by: STUDENT IN AN ORGANIZED HEALTH CARE EDUCATION/TRAINING PROGRAM

## 2022-11-22 PROCEDURE — 2580000003 HC RX 258: Performed by: INTERNAL MEDICINE

## 2022-11-22 PROCEDURE — 2700000000 HC OXYGEN THERAPY PER DAY

## 2022-11-22 PROCEDURE — 36415 COLL VENOUS BLD VENIPUNCTURE: CPT

## 2022-11-22 PROCEDURE — 99233 SBSQ HOSP IP/OBS HIGH 50: CPT | Performed by: INTERNAL MEDICINE

## 2022-11-22 RX ADMIN — PANTOPRAZOLE SODIUM 40 MG: 40 TABLET, DELAYED RELEASE ORAL at 09:03

## 2022-11-22 RX ADMIN — POLYETHYLENE GLYCOL 3350 17 G: 17 POWDER, FOR SOLUTION ORAL at 20:37

## 2022-11-22 RX ADMIN — FUROSEMIDE 20 MG/HR: 10 INJECTION, SOLUTION INTRAMUSCULAR; INTRAVENOUS at 22:37

## 2022-11-22 RX ADMIN — HEPARIN SODIUM 5000 UNITS: 5000 INJECTION INTRAVENOUS; SUBCUTANEOUS at 21:40

## 2022-11-22 RX ADMIN — HEPARIN SODIUM 5000 UNITS: 5000 INJECTION INTRAVENOUS; SUBCUTANEOUS at 14:50

## 2022-11-22 RX ADMIN — FUROSEMIDE 20 MG/HR: 10 INJECTION, SOLUTION INTRAMUSCULAR; INTRAVENOUS at 05:49

## 2022-11-22 RX ADMIN — LEVALBUTEROL 1.25 MG: 1.25 SOLUTION, CONCENTRATE RESPIRATORY (INHALATION) at 08:54

## 2022-11-22 RX ADMIN — LEVALBUTEROL 1.25 MG: 1.25 SOLUTION, CONCENTRATE RESPIRATORY (INHALATION) at 13:52

## 2022-11-22 RX ADMIN — LEVALBUTEROL 1.25 MG: 1.25 SOLUTION, CONCENTRATE RESPIRATORY (INHALATION) at 20:10

## 2022-11-22 RX ADMIN — MONTELUKAST SODIUM 10 MG: 10 TABLET, COATED ORAL at 09:03

## 2022-11-22 RX ADMIN — ISODIUM CHLORIDE 3 ML: 0.03 SOLUTION RESPIRATORY (INHALATION) at 08:55

## 2022-11-22 RX ADMIN — VERAPAMIL HYDROCHLORIDE 240 MG: 240 TABLET, FILM COATED, EXTENDED RELEASE ORAL at 20:37

## 2022-11-22 RX ADMIN — CLONIDINE HYDROCHLORIDE 0.1 MG: 0.1 TABLET ORAL at 20:37

## 2022-11-22 RX ADMIN — ISODIUM CHLORIDE 3 ML: 0.03 SOLUTION RESPIRATORY (INHALATION) at 20:10

## 2022-11-22 RX ADMIN — TAMSULOSIN HYDROCHLORIDE 0.4 MG: 0.4 CAPSULE ORAL at 09:03

## 2022-11-22 RX ADMIN — HEPARIN SODIUM 5000 UNITS: 5000 INJECTION INTRAVENOUS; SUBCUTANEOUS at 07:02

## 2022-11-22 RX ADMIN — VERAPAMIL HYDROCHLORIDE 240 MG: 240 TABLET, FILM COATED, EXTENDED RELEASE ORAL at 09:03

## 2022-11-22 RX ADMIN — AMIODARONE HYDROCHLORIDE 100 MG: 200 TABLET ORAL at 09:03

## 2022-11-22 RX ADMIN — ATORVASTATIN CALCIUM 40 MG: 40 TABLET, FILM COATED ORAL at 09:03

## 2022-11-22 RX ADMIN — ASPIRIN 162 MG: 81 TABLET, COATED ORAL at 09:04

## 2022-11-22 RX ADMIN — SODIUM CHLORIDE, PRESERVATIVE FREE 10 ML: 5 INJECTION INTRAVENOUS at 09:11

## 2022-11-22 RX ADMIN — ISODIUM CHLORIDE 3 ML: 0.03 SOLUTION RESPIRATORY (INHALATION) at 13:52

## 2022-11-22 RX ADMIN — SODIUM CHLORIDE, PRESERVATIVE FREE 10 ML: 5 INJECTION INTRAVENOUS at 20:37

## 2022-11-22 RX ADMIN — CLONIDINE HYDROCHLORIDE 0.1 MG: 0.1 TABLET ORAL at 09:03

## 2022-11-22 RX ADMIN — FUROSEMIDE 20 MG/HR: 10 INJECTION, SOLUTION INTRAMUSCULAR; INTRAVENOUS at 01:01

## 2022-11-22 ASSESSMENT — PAIN SCALES - GENERAL
PAINLEVEL_OUTOF10: 0
PAINLEVEL_OUTOF10: 0

## 2022-11-22 NOTE — PROGRESS NOTES
Occupational Therapy  Facility/Department: [facility]  Occupational Therapy Daily Treatment Note    Name: Kelly James  : 1940  MRN: [id number]  Date of Service: 2022    Discharge Recommendations:  Patient would benefit from continued therapy after discharge, 5-7 sessions per week      Kelly James scored a 13/24 on the AM-PAC ADL Inpatient form. Current research shows that an AM-PAC score of 17 or less is typically not associated with a discharge to the patient's home setting. Based on the patient's AM-PAC score and their current ADL deficits, it is recommended that the patient have 5-7 sessions per week of Occupational Therapy at d/c to increase the patient's independence. At this time, this patient demonstrates complex nursing, medical, and rehabilitative needs, and would benefit from intensive rehabilitation services upon discharge from the Inpatient setting. This patient demonstrates the ability to participate in and benefit from an intensive therapy program with a coordinated interdisciplinary team approach to foster frequent, structured, and documented communication among disciplines, who will work together to establish, prioritize, and achieve treatment goals. Please see assessment section for further patient specific details. If patient discharges prior to next session this note will serve as a discharge summary. Please see below for the latest assessment towards goals. Patient Diagnosis(es): The primary encounter diagnosis was Hypoxia. Diagnoses of Anasarca, Acute renal failure superimposed on chronic kidney disease, unspecified CKD stage, unspecified acute renal failure type ([address] and Hypothermia, initial encounter were also pertinent to this visit.   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 Past Surgical History:  has a past surgical history that includes Gallbladder surgery (1981); Upper gastrointestinal endoscopy (7-2005    7/10/2009); Colonoscopy (7/10/2009); Cataract removal (2/2012); Pain management procedure (Right, 10/20/2021); Pain management procedure (Left, 12/8/2021); and CT BIOPSY BONE MARROW (11/1/2022). Assessment   Performance deficits / Impairments: Decreased functional mobility ; Decreased ADL status; Decreased endurance;Decreased fine motor control;Decreased balance;Decreased strength  Assessment: Pt is an 79 y.o. male Anasarca, Worsening renal function, Generalized weakness. Pt recent admit with weakness and DEISY, d/c'd home on 11/8. Prior to recent admits, pt living with wife and independent ADLs and fxl mobility with cane. Since last admission, pt has been requiring assist for ADLs and use of rollator for fxl mobility. Pt currently functioning below baseline d/t the above deficits. Today - pt increased bed mobility to mod A x1. Pt still requiring assist x2 stand to the walker, but demo'd improved fxl mobility to ambulate room distances with RW and Min A. Pt still needing up to max A for ADLs d/t decreased strength/ROM/tremors B UE's, B UE/LE edema, and decreased endurance. Pt demonstrates need for ongoing skilled OT at d/c to maximize pt's safety and independence prior to return home. Prognosis: Fair;Good  History: see above  REQUIRES OT FOLLOW-UP: Yes  Activity Tolerance  Activity Tolerance: Patient limited by fatigue        Plan   Occupational Therapy Plan  Times Per Week: 3-5  Times Per Day:  Once a day  Current Treatment Recommendations: Balance training, Functional mobility training, Endurance training, Strengthening, Safety education & training, Self-Care / ADL, Equipment evaluation, education, & procurement, Patient/Caregiver education & training, ROM     Restrictions  Restrictions/Precautions  Restrictions/Precautions: Fall Risk, Bed Alarm, Up as Tolerated  Position Activity Restriction  Other position/activity restrictions: 3 L O2; fluid restriction, low sodium/low potassium diet; IV    Subjective   General  Chart Reviewed: Yes, Progress Notes  Additional Pertinent Hx: Per Travis Zurita MD's H&P 11/16/2022: \"This is an 58-year-old white male who was recently admitted to the hospital with weakness and acute kidney injury who presented back to the emergency room with diffuse weakness, inability to urinate and increasing edema. Patient was just admitted with weakness and worsened kidney function. Patient was felt to be intravascularly dry although he did have some peripheral edema. He was given some fluids with some improvement in his renal function but if he received any diuretics he would have a worsening of his kidney function. Patient's Diovan and diuretics were held at discharge. Additionally the patient had developed a bibasilar pneumonia and this was treated with antibiotics and he seemed to recover well. Patient presented back to the emergency room last evening with approximately 24 hours of increasing weakness and difficulty with urination. Upon presentation his renal function was again a bit worse and his fluid volume overload was worsened. He was short of breath and does have some crackles. \"  Family / Caregiver Present: Yes (spouse)  Referring Practitioner: Travis Zurita MD  Diagnosis: Anasarca, Worsening renal function, Generalized weakness  Subjective  Subjective: Pt met b/s for OT cotx with PT. Pt in bed on arrival, agreeable to participate in therapy. Pt denies pain. Pt reports L UE edema.  .     Social/Functional History  Social/Functional History  Lives With: Spouse  Type of Home: House  Home Layout: Able to Live on Main level with bedroom/bathroom, Multi-level ([place] 1 1/2 story)  Home Access: Stairs to enter with rails  Entrance Stairs - Number of Steps: 1+1  Entrance Stairs - Rails: Both  Bathroom Shower/Tub: Tub/Shower unit  Bathroom Toilet: Standard (comfort height commode)  Bathroom Equipment: Shower chair (sliding doors on shower with bars)  Home Equipment: Cane, Rollator, Oxygen (c-pap; home O2 since last hospital stay, 2L continuous)  Has the patient had two or more falls in the past year or any fall with injury in the past year?: No  Receives Help From: Home health (home PT and OT)  ADL Assistance: Needs assistance (son assisting with shower in the past week; used BSC for BM and urinals (prior to last hospital stay the pt was indep))  Homemaking Assistance: Independent (wife cleans.   Pt completes bill paying, medication management, cooking  (needing assist for all in the past week))  Ambulation Assistance: Independent (the pt using the rollator in the past week but used a cane prior to last hospital stay)  Transfer Assistance: Independent  Active : Yes (not since last hospital stay)  Occupation: Retired  IADL Comments: sleeping in a recliner       Objective       Observation/Palpation  Observation: noted to have tremors in B UE's  Edema: L arm edematous    Safety Devices  Type of Devices: Call light within reach;Gait belt;Left in chair;Chair alarm in place;Nurse notified    Balance  Sitting:  (seated EOB SBA)  Standing: With support (CGA at sink, RW)  Gait  Overall Level of Assistance: Minimum assistance (Pt completed fxl mobility ~10 ft, ~3-4 ft, ~20 ft with RW and Min A.)  Assistive Device: Walker, rolling    Transfers  Sit to stand: Minimal assistance;2 Person assistance (EOB >standing scale, EOB/commode/armchair >RW)  Stand to sit: Minimal assistance (standing scale >EOB, RW >commode, armchair, and recliner)  Toilet Transfers  Toilet - Technique: Ambulating  Equipment Used: Grab bars  Toilet Transfer: Minimal assistance;2 Person assistance    ADL  Grooming: Setup;Stand by assistance  Grooming Skilled Clinical Factors: seated at sink to brush teeth, use mouthwash, wash face, comb hair  UE Bathing: Moderate assistance  UE Bathing Skilled Clinical Factors: sponge bath seated at sink  LE Bathing: Dependent/Total  LE Bathing Skilled Clinical Factors: to wash periarea/buttocks in stance at sink, pt required B UE support on sink for balance  UE Dressing Skilled Clinical Factors: assist to change hospital gown  Toileting: Dependent/Total  Toileting Skilled Clinical Factors: Pt attempted a bowel movement on the toilet without success. Pt did have smear of bowel so assist given for hygiene. Pt has gordon catheter. Activity Tolerance  Activity Tolerance: Patient limited by fatigue;Patient limited by endurance  Activity Tolerance Comments: SpO2 levels on 3L O2 down 89% with activity    Bed mobility  Supine to Sit: Moderate assistance (HOB elevated; the pt needed to pull up on therapist's hands)  Sit to Supine: Unable to assess  Scooting:  Moderate assistance;2 Person assistance (to the EOB)    Vision  Vision: Impaired (had cataract surgery)  Hearing  Hearing: Exceptions to [facility]  Hearing Exceptions: Bilateral hearing aid;Hard of hearing/hearing concerns    Cognition  Overall Cognitive Status: [facility]      Education Given To: Patient  Education Provided: Role of Therapy;Plan of Care;Transfer Training;ADL Adaptive Strategies  Education Outcome: Verbalized understanding;Demonstrated understanding                            AM-PAC Score        AM-PAC Inpatient Daily Activity Raw Score: 13 (11/22/22 1047)  AM-PAC Inpatient ADL T-Scale Score : 32.03 (11/22/22 1047)  ADL Inpatient CMS 0-100% Score: 63.03 (11/22/22 1047)  ADL Inpatient CMS G-Code Modifier : CL (11/22/22 1047)           Goals  Short Term Goals  Time Frame for Short Term Goals: Prior to d/c: status goals 11/22: all goals ongoing  Short Term Goal 1: Pt will complete UB bathing/dressing with SBA. Short Term Goal 2: Pt will complete LB bathing/dressing with mod A using A/E and DME. Short Term Goal 3: Pt will complete toileting with mod A. Short Term Goal 4: Pt will complete fxl mobility and fxl transfers to/from ADL surfaces with min A x1-2 using AD. Short Term Goal 5: Pt will increase activity tolerance to achieve the above goals. Long Term Goals  Time Frame for Long Term Goals : STGs=LTGs  Patient Goals   Patient goals : to eventually return home.        Therapy Time   Individual Concurrent Group Co-treatment   Time In [facility]         Time Out [place] [id number]

## 2022-11-22 NOTE — PLAN OF CARE
Pt A/O, denies pain, or dizziness, and VSS. Meds given per eMAR. Lasix gtt infusing @ 20 ml/hr. Pt is on 4L O2 via NC. Pt is resting with no other complaints at this time. Call light within reach.     Problem: Discharge Planning  Goal: Discharge to home or other facility with appropriate resources  Outcome: Progressing     Problem: Pain  Goal: Verbalizes/displays adequate comfort level or baseline comfort level  Outcome: Progressing     Problem: Safety - Adult  Goal: Free from fall injury  Outcome: Progressing     Problem: ABCDS Injury Assessment  Goal: Absence of physical injury  Outcome: Progressing     Problem: Respiratory - Adult  Goal: Achieves optimal ventilation and oxygenation  Outcome: Progressing     Problem: Cardiovascular - Adult  Goal: Maintains optimal cardiac output and hemodynamic stability  Outcome: Progressing  Flowsheets (Taken 11/21/2022 2100)  Maintains optimal cardiac output and hemodynamic stability:   Monitor blood pressure and heart rate   Monitor urine output and notify Licensed Independent Practitioner for values outside of normal range  Goal: Absence of cardiac dysrhythmias or at baseline  Outcome: Progressing     Problem: Skin/Tissue Integrity - Adult  Goal: Skin integrity remains intact  Outcome: Progressing  Flowsheets (Taken 11/21/2022 2100)  Skin Integrity Remains Intact: Monitor for areas of redness and/or skin breakdown  Goal: Incisions, wounds, or drain sites healing without S/S of infection  Outcome: Progressing     Problem: Musculoskeletal - Adult  Goal: Return mobility to safest level of function  Outcome: Progressing  Flowsheets (Taken 11/21/2022 2100)  Return Mobility to Safest Level of Function: Assess patient stability and activity tolerance for standing, transferring and ambulating with or without assistive devices  Goal: Maintain proper alignment of affected body part  Outcome: Progressing  Goal: Return ADL status to a safe level of function  Outcome: Progressing Problem: Genitourinary - Adult  Goal: Absence of urinary retention  Outcome: Progressing  Goal: Urinary catheter remains patent  Outcome: Progressing     Problem: Infection - Adult  Goal: Absence of infection at discharge  Outcome: Progressing  Flowsheets (Taken 11/21/2022 2100)  Absence of infection at discharge:   Assess and monitor for signs and symptoms of infection   Monitor lab/diagnostic results  Goal: Absence of infection during hospitalization  Outcome: Progressing  Flowsheets (Taken 11/21/2022 2100)  Absence of infection during hospitalization: Assess and monitor for signs and symptoms of infection  Goal: Absence of fever/infection during anticipated neutropenic period  Outcome: Progressing     Problem: Metabolic/Fluid and Electrolytes - Adult  Goal: Electrolytes maintained within normal limits  Outcome: Progressing  Flowsheets (Taken 11/21/2022 2100)  Electrolytes maintained within normal limits: Monitor labs and assess patient for signs and symptoms of electrolyte imbalances  Goal: Hemodynamic stability and optimal renal function maintained  Outcome: Progressing  Goal: Glucose maintained within prescribed range  Outcome: Progressing  Flowsheets (Taken 11/21/2022 2100)  Glucose maintained within prescribed range: Monitor blood glucose as ordered     Problem: Skin/Tissue Integrity  Goal: Absence of new skin breakdown  Description: 1. Monitor for areas of redness and/or skin breakdown  2. Assess vascular access sites hourly  3. Every 4-6 hours minimum:  Change oxygen saturation probe site  4. Every 4-6 hours:  If on nasal continuous positive airway pressure, respiratory therapy assess nares and determine need for appliance change or resting period.   Outcome: Progressing     Problem: Neurosensory - Adult  Goal: Achieves stable or improved neurological status  Outcome: Progressing  Goal: Achieves maximal functionality and self care  Outcome: Progressing     Problem: Gastrointestinal - Adult  Goal:

## 2022-11-22 NOTE — PROGRESS NOTES
Nephrology ([facility] Progress Note    CC: DEISY on CKD    Subjective:    HPI:  Breathing unchanged. Using CPAP. Weight different because it is now standing. Renal function unchanged. ROS:  No fever, no chest pain. [address]:  medications reviewed. Wife present. Objective:  Blood pressure (!) 146/69, pulse 64, temperature 97.5 °F (36.4 °C), temperature source Axillary, resp. rate 18, height 5' 8\" (1.727 m), weight 250 lb (113.4 kg), SpO2 94 %. Intake/Output Summary (Last 24 hours) at 11/22/2022 1245  Last data filed at 11/22/2022 1239  Gross per 24 hour   Intake 460 ml   Output 3050 ml   Net -2590 ml       General:  NAD, A+Ox3, in chair. Chest:  CTAB  CVS:  RRR, no S3. Abdominal:  NTND, soft, +BS  Extremities:  2-3+ pitting edema  Skin:  no rash, warm. : +gordon    Labs:  Renal panel:  Lab Results   Component Value Date/Time     11/22/2022 06:56 AM    K 4.4 11/22/2022 06:56 AM    CO2 30 11/22/2022 06:56 AM    BUN 82 (HH) 11/22/2022 06:56 AM    CREATININE 2.9 (H) 11/22/2022 06:56 AM    CALCIUM 8.6 11/22/2022 06:56 AM    PHOS 4.9 11/08/2022 07:14 AM    MG 2.50 (H) 11/20/2022 06:02 AM     CBC:  Lab Results   Component Value Date/Time    WBC 4.2 11/22/2022 06:56 AM    HGB 7.3 (L) 11/22/2022 06:56 AM    HCT 23.3 (L) 11/22/2022 06:56 AM     (L) 11/22/2022 06:56 AM       Assessment/Plan:  Reviewed old records and labs.     1) DEISY on CKD              - baseline Cr 2.0              - ddx:  CRS vs. ATN              - patient is total body volume overloaded              - renal function is unchanged     2) obstructive uropathy              - gordon in place              - on flomax     3) ACDHF              - echo shows LVEF 75-60%, diastolic dysfunction, elevated PASP 40 mmHg              - continue fluid restriction of 1.2 liters/day              - low sodium diet   - given his total gross body volume overload, I think we are committed to diuresing him at (almost) any cost because

## 2022-11-22 NOTE — PROGRESS NOTES
[address] [facility] Internal Medicine Note      Chief Complaint: I am constipated    Subjective/Interval History: This morning the patient continues to be frustrated with his hospital stay. He reports that he is constipated although nursing reports he had multiple bowel movements overnight. Patient is eating some. No other new problems overnight. Nephrology note reviewed from yesterday. Case discussed with his daughter Amanda Kilgore last night (who is a pediatrician). She was part of the conversation with Dr. Aretha Box yesterday evening about increasing the diuresis at risk of worsening renal function. She is in agreement. Discussed with nursing this morning. No other new problems noted. Oxygen remains at 3 L. He remains afebrile    I's/O's -7.7 L since admission, weight is essentially unchanged from yesterday, this once again was a bed weight. No chest pain. No cough or sputum. No nausea, vomiting, diarrhea. No abdominal pain. No dysuria. The remainder of the review of systems is negative. PMH, PSH, FH/SH reviewed and unchanged as documented in the H&P personally documented at admission 22    Medication list reviewed    Objective:    BP (!) 152/57   Pulse 63   Temp 97.4 °F (36.3 °C) (Oral)   Resp 24   Ht 5' 8\" (1.727 m)   Wt 272 lb 4.3 oz (123.5 kg)   SpO2 93%   BMI 41.40 kg/m²   Temp  Av.5 °F (36.4 °C)  Min: 97.4 °F (36.3 °C)  Max: 97.8 °F (36.6 °C)    RRR  Chest-respirations are easy. Few wheezes noted throughout again. Remains diminished at the bases. Abd- BS+, soft, NTND  Ext -patient with anasarca extending all the way up to his umbilicus. Extensive edema of all 4 extremities essentially unchanged.     The Following Labs Were Reviewed Today:    Recent Results (from the past 24 hour(s))   Basic Metabolic Panel w/ Reflex to MG    Collection Time: 22  9:12 AM   Result Value Ref Range    Sodium 143 136 - 145 mmol/L    Potassium reflex Magnesium 4.8 3.5 - 5.1 mmol/L    Chloride 105 99 - 110 mmol/L    CO2 26 21 - 32 mmol/L    Anion Gap 12 3 - 16    Glucose 105 (H) 70 - 99 mg/dL    BUN 76 (H) 7 - 20 mg/dL    Creatinine 2.9 (H) 0.8 - 1.3 mg/dL    Est, Glom Filt Rate 21 (A) >60    Calcium 8.6 8.3 - 10.6 mg/dL   POCT Glucose    Collection Time: 11/21/22 11:38 AM   Result Value Ref Range    POC Glucose 131 (H) 70 - 99 mg/dl    Performed on ACCU-CHEK    POCT Glucose    Collection Time: 11/21/22  3:09 PM   Result Value Ref Range    POC Glucose 110 (H) 70 - 99 mg/dl    Performed on ACCU-CHEK    POCT Glucose    Collection Time: 11/21/22  9:11 PM   Result Value Ref Range    POC Glucose 139 (H) 70 - 99 mg/dl    Performed on ACCU-CHEK    Basic Metabolic Panel w/ Reflex to MG    Collection Time: 11/22/22  6:56 AM   Result Value Ref Range    Sodium 143 136 - 145 mmol/L    Potassium reflex Magnesium 4.4 3.5 - 5.1 mmol/L    Chloride 104 99 - 110 mmol/L    CO2 30 21 - 32 mmol/L    Anion Gap 9 3 - 16    Glucose 124 (H) 70 - 99 mg/dL    BUN 82 (HH) 7 - 20 mg/dL    Creatinine 2.9 (H) 0.8 - 1.3 mg/dL    Est, Glom Filt Rate 21 (A) >60    Calcium 8.6 8.3 - 10.6 mg/dL   Procalcitonin    Collection Time: 11/22/22  6:56 AM   Result Value Ref Range    Procalcitonin 0.18 (H) 0.00 - 0.15 ng/mL   CBC with Auto Differential    Collection Time: 11/22/22  6:56 AM   Result Value Ref Range    WBC 4.2 4.0 - 11.0 K/uL    RBC 2.70 (L) 4.20 - 5.90 M/uL    Hemoglobin 7.3 (L) 13.5 - 17.5 g/dL    Hematocrit 23.3 (L) 40.5 - 52.5 %    MCV 86.3 80.0 - 100.0 fL    MCH 27.2 26.0 - 34.0 pg    MCHC 31.5 31.0 - 36.0 g/dL    RDW 17.9 (H) 12.4 - 15.4 %    Platelets 794 (L) 784 - 450 K/uL    MPV 9.0 5.0 - 10.5 fL    Neutrophils % 78.6 %    Lymphocytes % 11.5 %    Monocytes % 7.7 %    Eosinophils % 1.8 %    Basophils % 0.4 %    Neutrophils Absolute 3.3 1.7 - 7.7 K/uL    Lymphocytes Absolute 0.5 (L) 1.0 - 5.1 K/uL    Monocytes Absolute 0.3 0.0 - 1.3 K/uL    Eosinophils Absolute 0.1 0.0 - 0.6 K/uL    Basophils Absolute 0.0 0.0 - 0.2 K/uL   POCT Glucose    Collection Time: 11/22/22  7:52 AM   Result Value Ref Range    POC Glucose 122 (H) 70 - 99 mg/dl    Performed on ACCU-CHEK        ASSESSMENT/PLAN:      Principal Problem:    Anasarca/Acute on Chronic Diastolic CHF-creatinine hang in there despite starting on Lasix drip last night. Some progress in urine output since yesterday. Will asked nursing to get a standing weight on the patient today. Active Problems:    Hemoptysis-mild hemoptysis over the weekend. Chest x-ray chest showed groundglass opacities. No obvious infection at this point. No further hemoptysis. Procalcitonin not suggestive of bacterial infection. Patient remains afebrile. Worsening renal function-creatinine stable from yesterday. Generalized weakness-PT/OT have worked with the patient. He will need skilled care at discharge. JOANN (obstructive sleep apnea)-continues with CPAP at night. Type 2 diabetes mellitus with stage 4 chronic kidney disease, with long-term current use of insulin-continue with sliding scale. Sugars okay. Remains off basal insulin. Paroxysmal atrial fibrillation-he remains in sinus rhythm. Essential hypertension-blood pressure is mildly elevated. Continue to monitor. No change is needed. Slow transit constipation-continue with Linzess. Pharmacy now has this and nursing will administer this morning    Anemia-  continue with Procrit, follow. May need to transfuse 1 unit.   Repeat CBC tomorrow    Time > 35 minutes reviewing chart and patient data, examining and interviewing patient, and discussing with nursing staff, family, etc.     Stephenie Westbrook MD, FACP  8:12 AM  11/22/2022

## 2022-11-22 NOTE — CARE COORDINATION
11/22 Back on Lasix  gtt. Follow Nephrology for poss. HD. From home w/Wife. Plan: Tere, zafar precert. Will need HENS.  Electronically signed by Jerad Bolanos RN on 11/22/2022 at 1:54 PM

## 2022-11-22 NOTE — PROGRESS NOTES
Physical Therapy  Facility/Department: [address] MED SURG  Physical Therapy Daily Note  ([facility])  If patient discharges prior to next session this note will serve as a discharge summary. Please see below for the latest assessment towards goals. Name: Sanjuanita Lopez  : 1940  MRN: [id number]  Date of Service: 2022    Discharge Recommendations:  Patient would benefit from continued therapy after discharge, Patient able to tolerate 3hrs of therapy a day   Sanjuanita Lopez scored a 13/24 on the AM-PAC short mobility form. Current research shows that an AM-PAC score of 17 or less is typically not associated with a discharge to the patient's home setting. Based on the patient's AM-PAC score and their current functional mobility deficits, it is recommended that the patient have 5-7 sessions per week of Physical Therapy at d/c to increase the patient's independence. At this time, this patient demonstrates complex nursing, medical, and rehabilitative needs, and would benefit from intensive rehabilitation services upon discharge from the Inpatient setting. This patient demonstrates the ability to participate in and benefit from an intensive therapy program with a coordinated interdisciplinary team approach to foster frequent, structured, and documented communication among disciplines, who will work together to establish, prioritize, and achieve treatment goals. Please see assessment section for further patient specific details. PT Equipment Recommendations  Other: will monitor      Patient Diagnosis(es): The primary encounter diagnosis was Hypoxia. Diagnoses of Anasarca, Acute renal failure superimposed on chronic kidney disease, unspecified CKD stage, unspecified acute renal failure type ([address] and Hypothermia, initial encounter were also pertinent to this visit.   Past Medical History:  has a past medical history of Allergic rhinitis, Asthma, Atrial fibrillation ([address] Carotid artery stenosis, Chronic kidney disease, COPD (chronic obstructive pulmonary disease) (HCC), CPAP (continuous positive airway pressure) dependence, ESBL (extended spectrum beta-lactamase) producing bacteria infection, Hyperlipidemia, Hypertension, Iron deficiency anemia, Obstructive sleep apnea, and Type II or unspecified type diabetes mellitus without mention of complication, not stated as uncontrolled. Past Surgical History:  has a past surgical history that includes Gallbladder surgery (1981); Upper gastrointestinal endoscopy (7-2005    7/10/2009); Colonoscopy (7/10/2009); Cataract removal (2/2012); Pain management procedure (Right, 10/20/2021); Pain management procedure (Left, 12/8/2021); and CT BIOPSY BONE MARROW (11/1/2022). Assessment   Assessment: Today, the pt showed improvement in his ability to walk with a walker and in his tolerance for activity. The pt con't to be on 3L O2 but dropped down to just 89% with activity. He was able to walk in the room with a wh walker and min A of 1-2, 25 feet and he completed a seated sponge bath with asisst from OT. The pt is well motivated and is doing better; anticipate that he could tolerate and benefit from high intensity skilled PT at d/c prior to going home. Will con't to follow.   Therapy Prognosis: Good  Barriers to Learning: decreased activity tolerance  Requires PT Follow-Up: Yes  Activity Tolerance  Activity Tolerance: Patient limited by fatigue;Patient limited by endurance  Activity Tolerance Comments: SpO2 levels on 3L O2 down 89% with activity     Plan   Physcial Therapy Plan  General Plan: 3-5 times per week  Current Treatment Recommendations: Strengthening, Balance training, Functional mobility training, Transfer training, Gait training, Therapeutic activities, Patient/Caregiver education & training, Safety education & training  Safety Devices  Type of Devices: Call light within reach, Gait belt, Left in chair, Chair alarm in place, Nurse notified Restrictions  Restrictions/Precautions  Restrictions/Precautions: Fall Risk, Bed Alarm, Up as Tolerated  Position Activity Restriction  Other position/activity restrictions: 3 L O2; fluid restriction, low sodium/low potassium diet; IV     Subjective   General  Chart Reviewed: Yes  Additional Pertinent Hx: Per Kian Tristan MD H&P on 11-: The pt is an 80 yo male who presented to the ED with weakness, increasing edema and inability to urinate. The pt recently in the hospital for weakness and DEISY and pna. In the ED the pt's renal function wa worse and he was volume overloaded; he was SOB with crackles. PMHx: asthma, a-fib, carotid artery stenosis, CKD, COPD, HTN, anemia, JOANN on c-pap, DM  Response To Previous Treatment: Patient with no complaints from previous session.   Family / Caregiver Present: Yes (wife)  Referring Practitioner: Kian Tristan MD  Referral Date : 11/16/22  Diagnosis: Anasarca, generalized weakness, worsening renal function  Follows Commands: Within Functional Limits  General Comment  Comments: asked earlier by Dr. Pako Maddox to assist pt with bathing today and to get a standing weight  Subjective  Subjective: the pt was found to be up in the chair upon arrival to the room; the pt agreeable to therapy; reporting he does not like the specialty bed he is in; did not report pain and does ask for rest breaks due to his breathing         Social/Functional History  Social/Functional History  Lives With: Spouse  Type of Home: House  Home Layout: Able to Live on Main level with bedroom/bathroom, Multi-level ([place] 1 1/2 story)  Home Access: Stairs to enter with rails  Entrance Stairs - Number of Steps: 1+1  Entrance Stairs - Rails: Both  Bathroom Shower/Tub: Tub/Shower unit  Bathroom Toilet: Standard (comfort height commode)  Bathroom Equipment: Shower chair (sliding doors on shower with bars)  Home Equipment: Cane, Rollator, Oxygen (c-pap; home O2 since last hospital stay, 2L continuous)  Has the patient had two or more falls in the past year or any fall with injury in the past year?: No  Receives Help From: Home health (home PT and OT)  ADL Assistance: Needs assistance (son assisting with shower in the past week; used BSC for BM and urinals (prior to last hospital stay the pt was indep))  Homemaking Assistance: Independent (wife cleans. Pt completes bill paying, medication management, cooking  (needing assist for all in the past week))  Ambulation Assistance: Independent (the pt using the rollator in the past week but used a cane prior to last hospital stay)  Transfer Assistance: Independent  Active : Yes (not since last hospital stay)  Occupation: Retired  IADL Comments: sleeping in a [address]: Impaired (had cataract surgery)  Hearing  Hearing: Exceptions to [facility]  Hearing Exceptions: Bilateral hearing aid;Hard of hearing/hearing concerns        Objective        Observation/Palpation  Observation: noted to have tremors in B UE's  Edema: L arm edematous        Bed mobility  Supine to Sit: Moderate assistance (HOB elevated; the pt needed to pull up on therapist's hands)  Sit to Supine: Unable to assess  Scooting:  Moderate assistance;2 Person assistance (to the EOB)  Transfers  Sit to Stand: Minimal Assistance;2 Person Assistance  Stand to Sit: Contact guard assistance;Minimal Assistance  Comment: cues for hand placement  Ambulation  Surface: Level tile  Device: Rolling Walker  Assistance: Minimal assistance (of 1 and another to manage the multiple lines)  Quality of Gait: shaky but able to advance B feet and manage the walker; distance limited by tolerance  Distance: 10 feet x 1; 2-3 feet to the sink and then 20 feet to the chair  Comments: SpO2 on 3L O2 with activity down to 89% with activity and recovered to 93% within 1 minute  More Ambulation?: No  Stairs/Curb  Stairs?: No     Balance  Sitting - Static: Good  Sitting - Dynamic: Good;-  Standing - Static: Good;- (at the sink)  Standing - Dynamic: Fair (with walker)  Comments: static standing at the sink with CGA and with the walker min A due to shakiness           AM-PAC Score  AM-PAC Inpatient Mobility Raw Score : 13 (11/22/22 1031)  AM-PAC Inpatient T-Scale Score : 36.74 (11/22/22 1031)  Mobility Inpatient CMS 0-100% Score: 64.91 (11/22/22 1031)  Mobility Inpatient CMS G-Code Modifier : CL (11/22/22 1031)          Goals  Short Term Goals  Time Frame for Short Term Goals: upon d/c  (goals are ongoing)  Short Term Goal 1: Bed mobility with min A.   Short Term Goal 2: Transfers sit <> stand with min A. (met when standing to the stedy)  Short Term Goal 3: Ambulate with walker 25 feet with min A. (partially met)  Patient Goals   Patient Goals : to be able to walk again       Education  Patient Education  Education Given To: Patient  Education Provided: Role of Therapy;Plan of Care;Equipment;Transfer Training;Energy Conservation  Education Provided Comments: PLB  Education Method: Verbal;Demonstration  Barriers to Learning: None  Education Outcome: Demonstrated understanding;Continued education needed      Therapy Time   Individual Concurrent Group Co-treatment   Time In 0941         Time Out 1041         Minutes 60               Electronically signed by Brenda Stewart, PT [id number] on 11/22/2022 at 10:50 AM

## 2022-11-22 NOTE — PLAN OF CARE
Problem: Discharge Planning  Goal: Discharge to home or other facility with appropriate resources  11/22/2022 1048 by Ruchi Krueger RN  Outcome: Progressing  11/22/2022 0436 by Roxanne Zuñiga RN  Outcome: Progressing     Problem: Pain  Goal: Verbalizes/displays adequate comfort level or baseline comfort level  11/22/2022 1048 by Ruchi Krueger RN  Outcome: Progressing  11/22/2022 0436 by Roxanne Zuñiga RN  Outcome: Progressing     Problem: Safety - Adult  Goal: Free from fall injury  11/22/2022 1048 by Ruchi Krueger RN  Outcome: Progressing  11/22/2022 0436 by Roxanne Zuñiga RN  Outcome: Progressing     Problem: ABCDS Injury Assessment  Goal: Absence of physical injury  11/22/2022 1048 by Ruchi Krueger RN  Outcome: Progressing  11/22/2022 0436 by Roxanne Zuñiga RN  Outcome: Progressing     Problem: Respiratory - Adult  Goal: Achieves optimal ventilation and oxygenation  11/22/2022 1048 by Ruchi Krueger RN  Outcome: Progressing  11/22/2022 0436 by Roxanne Zuñiga RN  Outcome: Progressing     Problem: Cardiovascular - Adult  Goal: Maintains optimal cardiac output and hemodynamic stability  11/22/2022 1048 by Ruchi Krueger RN  Outcome: Progressing  11/22/2022 0436 by Roxanne Zuñiga RN  Outcome: Progressing  [address] (Taken 11/21/2022 2100)  Maintains optimal cardiac output and hemodynamic stability:   Monitor blood pressure and heart rate   Monitor urine output and notify Licensed Independent Practitioner for values outside of normal range  Goal: Absence of cardiac dysrhythmias or at baseline  11/22/2022 1048 by Ruchi Krueger RN  Outcome: Progressing  11/22/2022 0436 by Roxanne Zuñiga RN  Outcome: Progressing     Problem: Skin/Tissue Integrity - Adult  Goal: Skin integrity remains intact  11/22/2022 1048 by Ruchi Krueger RN  Outcome: Progressing  11/22/2022 0436 by Roxanne Zuñiga RN  Outcome: Progressing  Flowsheets (Taken 11/21/2022 2100)  Skin Integrity Remains Intact: Monitor for areas of redness and/or skin breakdown  Goal: Incisions, wounds, or drain sites healing without S/S of infection  11/22/2022 1048 by Charmaine Bird RN  Outcome: Progressing  11/22/2022 0436 by Yasmeen Alarcon RN  Outcome: Progressing     Problem: Musculoskeletal - Adult  Goal: Return mobility to safest level of function  11/22/2022 1048 by Charmaine Bird RN  Outcome: Progressing  11/22/2022 0436 by Yasmeen Alarcon RN  Outcome: Progressing  Flowsheets (Taken 11/21/2022 2100)  Return Mobility to Safest Level of Function: Assess patient stability and activity tolerance for standing, transferring and ambulating with or without assistive devices  Goal: Maintain proper alignment of affected body part  11/22/2022 1048 by Charmaine Bird RN  Outcome: Progressing  11/22/2022 0436 by Yasmeen Alarcon RN  Outcome: Progressing  Goal: Return ADL status to a safe level of function  11/22/2022 1048 by Charmaine Bird RN  Outcome: Progressing  11/22/2022 0436 by Yasmeen Alarcon RN  Outcome: Progressing     Problem: Genitourinary - Adult  Goal: Absence of urinary retention  11/22/2022 1048 by Charmaine Bird RN  Outcome: Progressing  11/22/2022 0436 by Yasmeen Alarcon RN  Outcome: Progressing  Goal: Urinary catheter remains patent  11/22/2022 1048 by Charmaine Bird RN  Outcome: Progressing  11/22/2022 0436 by Yasmeen Alarcon RN  Outcome: Progressing     Problem: Infection - Adult  Goal: Absence of infection at discharge  11/22/2022 1048 by Charmaine Bird RN  Outcome: Progressing  11/22/2022 0436 by Yasmeen Alarcon RN  Outcome: Progressing  [address] (Taken 11/21/2022 2100)  Absence of infection at discharge:   Assess and monitor for signs and symptoms of infection   Monitor lab/diagnostic results  Goal: Absence of infection during hospitalization  11/22/2022 1048 by Charmaine Bird RN  Outcome: Progressing  11/22/2022 0436 by Yasmeen Alarcon RN  Outcome: Progressing  Flowsheets (Taken 11/21/2022 2100)  Absence of infection during hospitalization: Assess and monitor for signs and symptoms of infection  Goal: Absence of fever/infection during anticipated neutropenic period  11/22/2022 1048 by Maria Dolores Paredes RN  Outcome: Progressing  11/22/2022 0436 by Hipolito Morris RN  Outcome: Progressing     Problem: Metabolic/Fluid and Electrolytes - Adult  Goal: Electrolytes maintained within normal limits  11/22/2022 1048 by Maria Dolores Paredes RN  Outcome: Progressing  11/22/2022 0436 by Hipolito Morris RN  Outcome: Progressing  [address] (Taken 11/21/2022 2100)  Electrolytes maintained within normal limits: Monitor labs and assess patient for signs and symptoms of electrolyte imbalances  Goal: Hemodynamic stability and optimal renal function maintained  11/22/2022 1048 by Maria Dolores Paredes RN  Outcome: Progressing  11/22/2022 0436 by Hipolito Morris RN  Outcome: Progressing  Goal: Glucose maintained within prescribed range  11/22/2022 1048 by Maria Dolores Paredes RN  Outcome: Progressing  11/22/2022 0436 by Hipolito Morris RN  Outcome: Progressing  Flowsheets (Taken 11/21/2022 2100)  Glucose maintained within prescribed range: Monitor blood glucose as ordered     Problem: Skin/Tissue Integrity  Goal: Absence of new skin breakdown  Description: 1. Monitor for areas of redness and/or skin breakdown  2. Assess vascular access sites hourly  3. Every 4-6 hours minimum:  Change oxygen saturation probe site  4. Every 4-6 hours:  If on nasal continuous positive airway pressure, respiratory therapy assess nares and determine need for appliance change or resting period.   11/22/2022 1048 by Maria Dolores Paredes RN  Outcome: Progressing  11/22/2022 0436 by Hipolito Morris RN  Outcome: Progressing     Problem: Neurosensory - Adult  Goal: Achieves stable or improved neurological status  11/22/2022 1048 by Maria Dolores Paredes RN  Outcome: Progressing  11/22/2022 0436 by Hipolito Morris RN  Outcome: Progressing  Goal: Achieves maximal functionality and self care  11/22/2022 1048 by Ricka Loni, RN  Outcome: Progressing  11/22/2022 0436 by Tip Manley RN  Outcome: Progressing     Problem: Gastrointestinal - Adult  Goal: Minimal or absence of nausea and vomiting  11/22/2022 1048 by Rick Ivey RN  Outcome: Progressing  11/22/2022 0436 by Tip Manley RN  Outcome: Progressing  Goal: Maintains or returns to baseline bowel function  11/22/2022 1048 by Rick Ivey RN  Outcome: Progressing  11/22/2022 0436 by Tip Manley RN  Outcome: Progressing  [address] (Taken 11/21/2022 2100)  Maintains or returns to baseline bowel function: Assess bowel function  Goal: Maintains adequate nutritional intake  11/22/2022 1048 by Rick Ivey RN  Outcome: Progressing  11/22/2022 0436 by Tip Manley RN  Outcome: Progressing  Flowsheets (Taken 11/21/2022 2100)  Maintains adequate nutritional intake: Monitor intake and output, weight and lab values

## 2022-11-23 ENCOUNTER — APPOINTMENT (OUTPATIENT)
Dept: CT IMAGING | Age: 82
DRG: 682 | End: 2022-11-23
Payer: MEDICARE

## 2022-11-23 LAB
ANION GAP SERPL CALCULATED.3IONS-SCNC: 11 MMOL/L (ref 3–16)
BASOPHILS ABSOLUTE: 0 K/UL (ref 0–0.2)
BASOPHILS RELATIVE PERCENT: 0.5 %
BUN BLDV-MCNC: 71 MG/DL (ref 7–20)
CALCIUM SERPL-MCNC: 8.3 MG/DL (ref 8.3–10.6)
CHLORIDE BLD-SCNC: 100 MMOL/L (ref 99–110)
CO2: 31 MMOL/L (ref 21–32)
CREAT SERPL-MCNC: 3.1 MG/DL (ref 0.8–1.3)
EOSINOPHILS ABSOLUTE: 0.1 K/UL (ref 0–0.6)
EOSINOPHILS RELATIVE PERCENT: 2.2 %
GFR SERPL CREATININE-BSD FRML MDRD: 19 ML/MIN/{1.73_M2}
GLUCOSE BLD-MCNC: 126 MG/DL (ref 70–99)
GLUCOSE BLD-MCNC: 127 MG/DL (ref 70–99)
GLUCOSE BLD-MCNC: 136 MG/DL (ref 70–99)
GLUCOSE BLD-MCNC: 145 MG/DL (ref 70–99)
GLUCOSE BLD-MCNC: 157 MG/DL (ref 70–99)
HCT VFR BLD CALC: 23.3 % (ref 40.5–52.5)
HEMOGLOBIN: 7.3 G/DL (ref 13.5–17.5)
LYMPHOCYTES ABSOLUTE: 0.4 K/UL (ref 1–5.1)
LYMPHOCYTES RELATIVE PERCENT: 11.1 %
MCH RBC QN AUTO: 26.9 PG (ref 26–34)
MCHC RBC AUTO-ENTMCNC: 31.5 G/DL (ref 31–36)
MCV RBC AUTO: 85.5 FL (ref 80–100)
MONOCYTES ABSOLUTE: 0.3 K/UL (ref 0–1.3)
MONOCYTES RELATIVE PERCENT: 8.6 %
NEUTROPHILS ABSOLUTE: 3.1 K/UL (ref 1.7–7.7)
NEUTROPHILS RELATIVE PERCENT: 77.6 %
PDW BLD-RTO: 18 % (ref 12.4–15.4)
PERFORMED ON: ABNORMAL
PLATELET # BLD: 126 K/UL (ref 135–450)
PMV BLD AUTO: 9.1 FL (ref 5–10.5)
POTASSIUM REFLEX MAGNESIUM: 3.8 MMOL/L (ref 3.5–5.1)
RBC # BLD: 2.72 M/UL (ref 4.2–5.9)
SODIUM BLD-SCNC: 142 MMOL/L (ref 136–145)
WBC # BLD: 4 K/UL (ref 4–11)

## 2022-11-23 PROCEDURE — 36415 COLL VENOUS BLD VENIPUNCTURE: CPT

## 2022-11-23 PROCEDURE — 6370000000 HC RX 637 (ALT 250 FOR IP): Performed by: STUDENT IN AN ORGANIZED HEALTH CARE EDUCATION/TRAINING PROGRAM

## 2022-11-23 PROCEDURE — 6360000002 HC RX W HCPCS: Performed by: STUDENT IN AN ORGANIZED HEALTH CARE EDUCATION/TRAINING PROGRAM

## 2022-11-23 PROCEDURE — 6360000002 HC RX W HCPCS: Performed by: INTERNAL MEDICINE

## 2022-11-23 PROCEDURE — 2580000003 HC RX 258: Performed by: INTERNAL MEDICINE

## 2022-11-23 PROCEDURE — 99233 SBSQ HOSP IP/OBS HIGH 50: CPT | Performed by: INTERNAL MEDICINE

## 2022-11-23 PROCEDURE — 80048 BASIC METABOLIC PNL TOTAL CA: CPT

## 2022-11-23 PROCEDURE — 94640 AIRWAY INHALATION TREATMENT: CPT

## 2022-11-23 PROCEDURE — 1200000000 HC SEMI PRIVATE

## 2022-11-23 PROCEDURE — 2700000000 HC OXYGEN THERAPY PER DAY

## 2022-11-23 PROCEDURE — 94761 N-INVAS EAR/PLS OXIMETRY MLT: CPT

## 2022-11-23 PROCEDURE — 2580000003 HC RX 258: Performed by: STUDENT IN AN ORGANIZED HEALTH CARE EDUCATION/TRAINING PROGRAM

## 2022-11-23 PROCEDURE — 85025 COMPLETE CBC W/AUTO DIFF WBC: CPT

## 2022-11-23 PROCEDURE — 6370000000 HC RX 637 (ALT 250 FOR IP): Performed by: INTERNAL MEDICINE

## 2022-11-23 PROCEDURE — 51702 INSERT TEMP BLADDER CATH: CPT

## 2022-11-23 PROCEDURE — 71250 CT THORAX DX C-: CPT

## 2022-11-23 RX ORDER — HEPARIN SODIUM 5000 [USP'U]/ML
5000 INJECTION, SOLUTION INTRAVENOUS; SUBCUTANEOUS 2 TIMES DAILY
Status: DISCONTINUED | OUTPATIENT
Start: 2022-11-23 | End: 2022-11-30 | Stop reason: HOSPADM

## 2022-11-23 RX ADMIN — ASPIRIN 162 MG: 81 TABLET, COATED ORAL at 08:47

## 2022-11-23 RX ADMIN — SODIUM CHLORIDE, PRESERVATIVE FREE 10 ML: 5 INJECTION INTRAVENOUS at 21:40

## 2022-11-23 RX ADMIN — FUROSEMIDE 20 MG/HR: 10 INJECTION, SOLUTION INTRAMUSCULAR; INTRAVENOUS at 16:18

## 2022-11-23 RX ADMIN — CLONIDINE HYDROCHLORIDE 0.1 MG: 0.1 TABLET ORAL at 20:20

## 2022-11-23 RX ADMIN — ISODIUM CHLORIDE 3 ML: 0.03 SOLUTION RESPIRATORY (INHALATION) at 14:10

## 2022-11-23 RX ADMIN — TAMSULOSIN HYDROCHLORIDE 0.4 MG: 0.4 CAPSULE ORAL at 08:42

## 2022-11-23 RX ADMIN — FUROSEMIDE 20 MG/HR: 10 INJECTION, SOLUTION INTRAMUSCULAR; INTRAVENOUS at 22:18

## 2022-11-23 RX ADMIN — MONTELUKAST SODIUM 10 MG: 10 TABLET, COATED ORAL at 08:42

## 2022-11-23 RX ADMIN — CLONIDINE HYDROCHLORIDE 0.1 MG: 0.1 TABLET ORAL at 08:42

## 2022-11-23 RX ADMIN — ATORVASTATIN CALCIUM 40 MG: 40 TABLET, FILM COATED ORAL at 08:42

## 2022-11-23 RX ADMIN — LEVALBUTEROL 1.25 MG: 1.25 SOLUTION, CONCENTRATE RESPIRATORY (INHALATION) at 14:10

## 2022-11-23 RX ADMIN — FUROSEMIDE 20 MG/HR: 10 INJECTION, SOLUTION INTRAMUSCULAR; INTRAVENOUS at 10:41

## 2022-11-23 RX ADMIN — LEVALBUTEROL 1.25 MG: 1.25 SOLUTION, CONCENTRATE RESPIRATORY (INHALATION) at 20:06

## 2022-11-23 RX ADMIN — FUROSEMIDE 20 MG/HR: 10 INJECTION, SOLUTION INTRAMUSCULAR; INTRAVENOUS at 04:04

## 2022-11-23 RX ADMIN — LEVALBUTEROL 1.25 MG: 1.25 SOLUTION, CONCENTRATE RESPIRATORY (INHALATION) at 08:11

## 2022-11-23 RX ADMIN — EPOETIN ALFA-EPBX 30000 UNITS: 10000 INJECTION, SOLUTION INTRAVENOUS; SUBCUTANEOUS at 15:56

## 2022-11-23 RX ADMIN — HEPARIN SODIUM 5000 UNITS: 5000 INJECTION INTRAVENOUS; SUBCUTANEOUS at 21:39

## 2022-11-23 RX ADMIN — VERAPAMIL HYDROCHLORIDE 240 MG: 240 TABLET, FILM COATED, EXTENDED RELEASE ORAL at 08:42

## 2022-11-23 RX ADMIN — ISODIUM CHLORIDE 3 ML: 0.03 SOLUTION RESPIRATORY (INHALATION) at 20:06

## 2022-11-23 RX ADMIN — ISODIUM CHLORIDE 3 ML: 0.03 SOLUTION RESPIRATORY (INHALATION) at 08:11

## 2022-11-23 RX ADMIN — PANTOPRAZOLE SODIUM 40 MG: 40 TABLET, DELAYED RELEASE ORAL at 08:42

## 2022-11-23 RX ADMIN — HEPARIN SODIUM 5000 UNITS: 5000 INJECTION INTRAVENOUS; SUBCUTANEOUS at 06:02

## 2022-11-23 RX ADMIN — MEROPENEM 1000 MG: 1 INJECTION, POWDER, FOR SOLUTION INTRAVENOUS at 21:37

## 2022-11-23 RX ADMIN — VERAPAMIL HYDROCHLORIDE 240 MG: 240 TABLET, FILM COATED, EXTENDED RELEASE ORAL at 20:20

## 2022-11-23 RX ADMIN — POLYETHYLENE GLYCOL 3350 17 G: 17 POWDER, FOR SOLUTION ORAL at 20:20

## 2022-11-23 RX ADMIN — AMIODARONE HYDROCHLORIDE 100 MG: 200 TABLET ORAL at 08:42

## 2022-11-23 ASSESSMENT — PAIN SCALES - GENERAL
PAINLEVEL_OUTOF10: 0
PAINLEVEL_OUTOF10: 0

## 2022-11-23 NOTE — PLAN OF CARE
Problem: Discharge Planning  Goal: Discharge to home or other facility with appropriate resources  Outcome: Progressing     Problem: Pain  Goal: Verbalizes/displays adequate comfort level or baseline comfort level  Outcome: Progressing     Problem: Safety - Adult  Goal: Free from fall injury  Outcome: Progressing     Problem: ABCDS Injury Assessment  Goal: Absence of physical injury  Outcome: Progressing     Problem: Respiratory - Adult  Goal: Achieves optimal ventilation and oxygenation  Outcome: Progressing     Problem: Cardiovascular - Adult  Goal: Maintains optimal cardiac output and hemodynamic stability  Outcome: Progressing  Goal: Absence of cardiac dysrhythmias or at baseline  Outcome: Progressing     Problem: Skin/Tissue Integrity - Adult  Goal: Skin integrity remains intact  Outcome: Progressing  Goal: Incisions, wounds, or drain sites healing without S/S of infection  Outcome: Progressing     Problem: Musculoskeletal - Adult  Goal: Return mobility to safest level of function  Outcome: Progressing  Goal: Maintain proper alignment of affected body part  Outcome: Progressing  Goal: Return ADL status to a safe level of function  Outcome: Progressing     Problem: Genitourinary - Adult  Goal: Absence of urinary retention  Outcome: Progressing  Goal: Urinary catheter remains patent  Outcome: Progressing     Problem: Infection - Adult  Goal: Absence of infection at discharge  Outcome: Progressing  Goal: Absence of infection during hospitalization  Outcome: Progressing  Goal: Absence of fever/infection during anticipated neutropenic period  Outcome: Progressing     Problem: Metabolic/Fluid and Electrolytes - Adult  Goal: Electrolytes maintained within normal limits  Outcome: Progressing  Goal: Hemodynamic stability and optimal renal function maintained  Outcome: Progressing  Goal: Glucose maintained within prescribed range  Outcome: Progressing     Problem: Neurosensory - Adult  Goal: Achieves stable or improved neurological status  Outcome: Progressing  Goal: Achieves maximal functionality and self care  Outcome: Progressing     Problem: Gastrointestinal - Adult  Goal: Minimal or absence of nausea and vomiting  Outcome: Progressing  Goal: Maintains or returns to baseline bowel function  Outcome: Progressing  Goal: Maintains adequate nutritional intake  Outcome: Progressing     Problem: Skin/Tissue Integrity  Goal: Absence of new skin breakdown  Description: 1. Monitor for areas of redness and/or skin breakdown  2. Assess vascular access sites hourly  3. Every 4-6 hours minimum:  Change oxygen saturation probe site  4. Every 4-6 hours:  If on nasal continuous positive airway pressure, respiratory therapy assess nares and determine need for appliance change or resting period.   Outcome: Progressing

## 2022-11-23 NOTE — PROGRESS NOTES
Nephrology ([facility]) Progress Note    CC: DEISY on CKD    Subjective:    HPI:  Breathing unchanged. Using CPAP. Weight lower. Renal function unchanged. ROS:  No fever, no chest pain. [address]:  medications reviewed. Wife present. Objective:  Blood pressure (!) 144/67, pulse 60, temperature 98 °F (36.7 °C), temperature source Oral, resp. rate 18, height 5' 8\" (1.727 m), weight 244 lb 14.9 oz (111.1 kg), SpO2 98 %. Intake/Output Summary (Last 24 hours) at 11/23/2022 1205  Last data filed at 11/23/2022 0930  Gross per 24 hour   Intake 720 ml   Output 3250 ml   Net -2530 ml       General:  NAD, A+Ox3, in chair. Chest:  CTAB  CVS:  RRR, no S3. Abdominal:  NTND, soft, +BS  Extremities:  2+ pitting edema  Skin:  no rash, warm. : +gordon    Labs:  Renal panel:  Lab Results   Component Value Date/Time     11/23/2022 05:56 AM    K 3.8 11/23/2022 05:56 AM    CO2 31 11/23/2022 05:56 AM    BUN 71 (H) 11/23/2022 05:56 AM    CREATININE 3.1 (H) 11/23/2022 05:56 AM    CALCIUM 8.3 11/23/2022 05:56 AM    PHOS 4.9 11/08/2022 07:14 AM    MG 2.50 (H) 11/20/2022 06:02 AM     CBC:  Lab Results   Component Value Date/Time    WBC 4.0 11/23/2022 05:56 AM    HGB 7.3 (L) 11/23/2022 05:56 AM    HCT 23.3 (L) 11/23/2022 05:56 AM     (L) 11/23/2022 05:56 AM       Assessment/Plan:  Reviewed old records and labs.     1) DEISY on CKD              - baseline Cr 2.0              - ddx:  CRS vs. ATN              - patient is total body volume overloaded              - renal function is essentially unchanged     2) obstructive uropathy              - gordon in place              - on flomax     3) ACDHF              - echo shows LVEF 00-67%, diastolic dysfunction, elevated PASP 40 mmHg              - continue fluid restriction of 1.2 liters/day              - low sodium diet   - given his total gross body volume overload, I think we are committed to diuresing him at (almost) any cost because his anasarca needs to be addressed, and the patient and his wife agree   - lasix gtt 20 mg/hr   - O > I with lower weight     4) respiratory              - wean O2 as tolerated     5) anemia              - iron studies okay earlier this month  - retacrit 10,000 units qweek    6) FEN  - hyperkalemia              - potassium restricted diet  - metabolic acidosis              - off NaHCO3    6) JOANN              - CPAP has not been worn because he doesn't like the face mash   - using his CPAP now as the cor pulmonale is probably contributing to his intolerance to diuresis

## 2022-11-23 NOTE — CONSULTS
Consult Note  Physical Medicine and Rehabilitation    Patient: Candace Stallings  [id number]  Date: 11/23/2022      Chief Complaint: weakness and fatigue    History of Present Illness/Hospital Course:  Patient is an 78 yo M with pmh Afib, CHF, HTN, COPD, DM2, CKD who initially presented 11/16/2022 with diffuse weakness, increasing edema, and inability to urinate. Found to have hypothermia, acute on chronic dCHF exacerbation, obstructive uropathy, and DEISY on CKD. He is being treated with lasix gtt. Course complicated by hemoptysis. Currently, patient reports generalized weakness and fatigue. He has mild GIBBONS and intermittent productive cough which is blood tinged. His swelling is much improved since admission. He reports he has been able to ambulate more over the past few days. He is interested in coming to ARU to improve his function prior to returning home.      Prior Level of Function:  Independent for mobility, ADLs    Current Level of Function:  Cristina x 2 person for mobility  Up to total assist for ADLs    Pertinent Social History:  Support: lives with spouse  Home set-up: multi level with [facility], 1+1 steps to enter    Past Medical History:   Diagnosis Date    Allergic rhinitis     Asthma     Atrial fibrillation ([facility] [address]     Carotid artery stenosis     Chronic kidney disease     COPD (chronic obstructive pulmonary disease) (HCC)     CPAP (continuous positive airway pressure) dependence     ESBL (extended spectrum beta-lactamase) producing bacteria infection 12/26/2018    urine    Hyperlipidemia     Hypertension     Iron deficiency anemia     Obstructive sleep apnea     Type II or unspecified type diabetes mellitus without mention of complication, not stated as uncontrolled        Past Surgical History:   Procedure Laterality Date    CATARACT REMOVAL  2/2012    bilateral    COLONOSCOPY  7/10/2009    diverticulosis    hemorrhoids    CT BONE MARROW BIOPSY  11/1/2022    CT BONE MARROW BIOPSY 11/1/2022 [facility] CT    GALLBLADDER SURGERY  1981    PAIN MANAGEMENT PROCEDURE Right 10/20/2021    COOLIEF RADIOFREQUENCY ABLATION - RIGHT KNEE performed by Rich Quiñones MD at [address] Left 2021    [address] - LEFT KNEE performed by Rich Quiñones MD at [facility]  7-2005    7/10/2009    normal       Family History   Problem Relation Age of Onset    Heart Disease Mother     Stroke Mother     Vision Loss Mother     High Blood Pressure Father     High Blood Pressure Brother     Diabetes Brother     Sleep Apnea Brother     Arthritis Paternal Grandmother     Diabetes Paternal Grandmother        Social History     Socioeconomic History    Marital status:      Spouse name: None    Number of children: 5    Years of education: None    Highest education level: None   Occupational History    Occupation: retired   Tobacco Use    Smoking status: Former     Packs/day: 0.50     Years: 18.00     Pack years: 9.00     Types: Cigarettes     Start date: 1958     Quit date: 1988     Years since quittin.9    Smokeless tobacco: Never   Vaping Use    Vaping Use: Never used   Substance and Sexual Activity    Alcohol use: No     Alcohol/week: 0.0 standard drinks    Drug use: No    Sexual activity: Yes     Partners: Female     Social Determinants of Health     Financial Resource Strain: Low Risk     Difficulty of Paying Living Expenses: Not hard at all   Food Insecurity: No Food Insecurity    Worried About [facility] in the Last Year: Never true    [facility] [employer] in the Last Year: Never true           REVIEW OF SYSTEMS:   CONSTITUTIONAL: negative for fevers, chills, diaphoresis, appetite change, night sweats, unexpected weight change, +fatigue  EYES: negative for blurred vision, eye discharge, visual disturbance and icterus  HEENT: negative for hearing loss, tinnitus, ear drainage, sinus pressure, nasal congestion, epistaxis and snoring  RESPIRATORY: Negative for +hemoptysis, cough, sputum production, GIBBONS  CARDIOVASCULAR: negative for chest pain, palpitations, exertional chest pressure/discomfort, syncope, +edema  GASTROINTESTINAL: negative for nausea, vomiting, diarrhea, blood in stool, abdominal pain, constipation  GENITOURINARY: negative for frequency, dysuria, urinary incontinence, decreased urine volume, and hematuria  HEMATOLOGIC/LYMPHATIC: negative for easy bruising, bleeding and lymphadenopathy  ALLERGIC/IMMUNOLOGIC: negative for recurrent infections, angioedema, anaphylaxis and drug reactions  ENDOCRINE: negative for weight changes and diabetic symptoms including polyuria, polydipsia and polyphagia  MUSCULOSKELETAL: negative for pain, joint swelling, decreased range of motion  NEUROLOGICAL: negative for headaches, slurred speech, unilateral weakness  PSYCHIATRIC/BEHAVIORAL: negative for hallucinations, behavioral problems, confusion and agitation. Physical Examination:  Vitals: Patient Vitals for the past 24 hrs:   BP Temp Temp src Pulse Resp SpO2 Height Weight   11/23/22 1122 -- -- -- -- -- -- 5' 8\" (1.727 m) --   11/23/22 0819 -- -- -- -- -- 98 % -- --   11/23/22 0812 -- -- -- -- 18 96 % -- --   11/23/22 0734 (!) 144/67 98 °F (36.7 °C) Oral 60 14 98 % -- --   11/23/22 0556 -- -- -- -- -- -- -- 244 lb 14.9 oz (111.1 kg)   11/23/22 0448 (!) 113/57 97.4 °F (36.3 °C) Oral 64 16 95 % -- --   11/22/22 2052 134/67 97.3 °F (36.3 °C) Axillary 66 18 91 % -- --   11/22/22 2010 -- -- -- 63 18 96 % -- --   11/22/22 1714 (!) 150/68 97.3 °F (36.3 °C) Oral 60 18 96 % -- --   11/22/22 1414 -- -- -- 60 18 96 % -- --     Const: Alert. WDWN. No distress  Eyes: Conjunctiva noninjected, no icterus noted; pupils equal, round, and reactive to light. HENT: Atraumatic, normocephalic; Oral mucosa moist  Neck: Trachea midline, neck supple. No thyromegaly noted.   CV: Regular rate and rhythm, no murmur rub or gallop noted  Resp: Lungs diminished, no rales wheezes or rhonchi, no retractions. Respirations unlabored. GI: Soft, nontender, nondistended. Normal bowel sounds. No palpable masses. Skin: Normal temperature and turgor. No rashes or breakdown noted on exposed areas. Ext: +LE edema. No varicosities. MSK: No joint tenderness, erythema, warmth noted. AROM intact. Neuro: Alert, oriented, appropriate. No cranial nerve deficits appreciated. Sensation intact to light touch. Motor examination reveals strength 5-/5 in BUE and BLE except 4/5 hip flexion. No abnormalities with finger/nose noted. Reflexes diminished, symmetric. Psych: Stable mood, normal judgement, normal affect     Lab Results   Component Value Date    WBC 4.0 11/23/2022    HGB 7.3 (L) 11/23/2022    HCT 23.3 (L) 11/23/2022    MCV 85.5 11/23/2022     (L) 11/23/2022     Lab Results   Component Value Date    INR 1.51 (H) 11/15/2022    INR 1.38 (H) 11/01/2022    INR 1.29 (H) 06/08/2018    PROTIME 18.2 (H) 11/15/2022    PROTIME 16.9 (H) 11/01/2022    PROTIME 14.7 (H) 06/08/2018     Lab Results   Component Value Date    CREATININE 3.1 (H) 11/23/2022    BUN 71 (H) 11/23/2022     11/23/2022    K 3.8 11/23/2022     11/23/2022    CO2 31 11/23/2022     Lab Results   Component Value Date     (H) 11/15/2022    AST 43 (H) 11/15/2022    ALKPHOS 109 11/15/2022    BILITOT 0.4 11/15/2022       Most recent echocardiogram revealed:  Ejection fraction is visually estimated to be 60-65%. No regional wall motion abnormalities are noted. Grade I diastolic dysfunction with elevated LV filling pressures. The left atrium is moderately dilated. mildly dilated right ventricle.    Estimated pulmonary artery systolic pressure is mildly elevated at 40 mmHg   assuming a right atrial pressure of 15 mmHg    Most recent EKG revealed:  Sinus bradycardia with First degree AV blockRight bundle branch blockConfirmed by Jed BLAND MD ([id number]) on 11/16/2022 8:30:15 AM    Most recent imaging studies revealed:    CT chest  Multifocal pneumonia. Small right and trace left pleural effusion. Diffuse atherosclerosis. On my review, CXR displays bibasilar ground glass opacities, bilateral small pleural effusions. Assessment:  Acute on chronic dCHF  Obstructive uropathy  DEISY on CKD  Acute hypoxic respiratory failure  Hemoptysis  Anemia  PAF  HTN  DM2  JOANN    Impairments: generalized weakness, decreased endurance, balance    Recommendations:    Patient with new functional deficits and ongoing medical complexity. Demonstrates ability to tolerate 3 hours therapy/day. He is a good candidate for acute inpatient rehab when medically appropriate. Thank you for this consult. Please contact me with any questions or concerns. Natalia Magdaleno.  Zeke Hutchinson MD 11/23/2022, 11:37 AM

## 2022-11-23 NOTE — PROGRESS NOTES
Patient is alert and oriented x4, up with walker, call light within reach, bed/chair alarm on. AM meds complete, patient tolerated well. VSS and WDL. No s/s of distress, no further needs noted at this time.    Electronically signed by Chuck Hou RN on 11/23/2022 at 10:17 AM

## 2022-11-23 NOTE — PLAN OF CARE
Problem: Discharge Planning  Goal: Discharge to home or other facility with appropriate resources  11/23/2022 1016 by Chuck Hou RN  Outcome: Progressing  Flowsheets (Taken 11/23/2022 4940)  Discharge to home or other facility with appropriate resources: Identify barriers to discharge with patient and caregiver     Problem: Pain  Goal: Verbalizes/displays adequate comfort level or baseline comfort level  11/23/2022 1016 by Chuck Hou RN  Outcome: Progressing  Flowsheets (Taken 11/23/2022 0842)  Verbalizes/displays adequate comfort level or baseline comfort level: Encourage patient to monitor pain and request assistance   Pain /discomfort being managed with PRN analgesics per MD orders. Patient able to express presence and absence of pain and rate pain appropriately using numerical scale.      Problem: Safety - Adult  Goal: Free from fall injury  11/23/2022 1016 by Chuck Hou RN  Outcome: Progressing  Flowsheets (Taken 11/23/2022 0842)  Free From Fall Injury: Instruct family/caregiver on patient safety     Problem: ABCDS Injury Assessment  Goal: Absence of physical injury  11/23/2022 1016 by Chuck Hou RN  Outcome: Progressing  Flowsheets (Taken 11/23/2022 0842)  Absence of Physical Injury: Implement safety measures based on patient assessment     Problem: Respiratory - Adult  Goal: Achieves optimal ventilation and oxygenation  11/23/2022 1016 by Chuck Hou RN  Outcome: Progressing  Flowsheets (Taken 11/23/2022 0842)  Achieves optimal ventilation and oxygenation: Assess for changes in respiratory status     Problem: Cardiovascular - Adult  Goal: Maintains optimal cardiac output and hemodynamic stability  11/23/2022 1016 by Chuck Hou RN  Outcome: Progressing  Flowsheets (Taken 11/23/2022 0842)  Maintains optimal cardiac output and hemodynamic stability: Monitor blood pressure and heart rate     Problem: Skin/Tissue Integrity - Adult  Goal: Skin integrity remains intact  11/23/2022 1016 by Donavon Parikh RN  Outcome: Progressing  Flowsheets (Taken 11/23/2022 9271)  Skin Integrity Remains Intact: Monitor for areas of redness and/or skin breakdown     Problem: Musculoskeletal - Adult  Goal: Return mobility to safest level of function  11/23/2022 1016 by Donavon Parikh RN  Outcome: Progressing  Flowsheets (Taken 11/23/2022 5388)  Return Mobility to Safest Level of Function: Assess patient stability and activity tolerance for standing, transferring and ambulating with or without assistive devices     Problem: Genitourinary - Adult  Goal: Absence of urinary retention  11/23/2022 1016 by Donavon Parikh RN  Outcome: Progressing  Flowsheets (Taken 11/23/2022 3162)  Absence of urinary retention: Assess patients ability to void and empty bladder     Problem: Infection - Adult  Goal: Absence of infection at discharge  11/23/2022 1016 by Donavon Parikh RN  Outcome: Progressing  Flowsheets (Taken 11/23/2022 0751)  Absence of infection at discharge: Assess and monitor for signs and symptoms of infection     Problem: Metabolic/Fluid and Electrolytes - Adult  Goal: Electrolytes maintained within normal limits  11/23/2022 1016 by Donavon Parikh RN  Outcome: Progressing  Flowsheets (Taken 11/23/2022 0918)  Electrolytes maintained within normal limits: Monitor labs and assess patient for signs and symptoms of electrolyte imbalances     Problem: Skin/Tissue Integrity  Goal: Absence of new skin breakdown  Description: 1. Monitor for areas of redness and/or skin breakdown  2. Assess vascular access sites hourly  3. Every 4-6 hours minimum:  Change oxygen saturation probe site  4. Every 4-6 hours:  If on nasal continuous positive airway pressure, respiratory therapy assess nares and determine need for appliance change or resting period.   11/23/2022 1016 by Donavon Parikh RN  Outcome: Progressing     Problem: Neurosensory - Adult  Goal: Achieves stable or improved

## 2022-11-23 NOTE — PROGRESS NOTES
Nutrition Assessment     Type and Reason for Visit: Initial, RD Nutrition Re-Screen/LOS    Nutrition Recommendations/Plan:   Continue on current diet  Provided menu to help with menu selections  Answered all questions related to both sodium restriction and CCC diet. Malnutrition Assessment:  Malnutrition Status: No malnutrition    Nutrition Assessment:  Pt seen today b/o LOS. Pt's intake has been variable on low sodium, low potassium 1200 ml diet. Pt admitted with anasarca with DEISY on CKD, CHF, DM, A Fib, HTN,  Pt  main concern is the poor quality of food which is affecting his intake. Time was taken to listen to concerns and offer whatever possible suggestions. Pt did have many nutrition related questions as well. He is not on a CCC diet as his A1C he reports as 6.1. He reported his usual eating habits which seem good as far as blood sugar control, but did contain some hi sodium foods which as identified he said he would change. Will continue to [facility]    Estimated Daily Nutrient Needs:  Energy (kcal):  2948-1842 ( 15-18 x  Weight Used for Energy Requirements: Current     Protein (g):   (1.2 -1.5 x IBW 70) Weight Used for Protein Requirements: Ideal        Fluid (ml/day):  per provider      Nutrition Related Findings:   BM 11/21,BLE +3 pitting edema, , K+ 4.4 Wound Type: None    Current Nutrition Therapies:    ADULT DIET; Regular; Low Sodium (2 gm);  Low Potassium (Less than 3000 mg/day); 1200 ml    Anthropometric Measures:  Height: 5' 8\" (172.7 cm)  Current Body Wt: 244 lb 14.9 oz (111.1 kg)   BMI: 37.3    Nutrition Diagnosis:   No nutrition diagnosis at this time related to   as evidenced by      Nutrition Interventions:   Food and/or Nutrient Delivery: Continue Current Diet  Nutrition Education/Counseling: Education initiated  Coordination of Nutrition Care: Continue to monitor while inpatient       Goals:     Goals: PO intake 50% or greater       Nutrition Monitoring and Evaluation: Behavioral-Environmental Outcomes: None Identified  Food/Nutrient Intake Outcomes: Food and Nutrient Intake       Discharge Planning:    Continue current diet     Neva Rider, [address], [place]  Contact: [phone]

## 2022-11-23 NOTE — PROGRESS NOTES
[facility] Internal Medicine Note      Chief Complaint: I am constipated    Subjective/Interval History: This morning the patient is up in the bedside chair. He is still complaining of some constipation. He also is describing some hemoptysis. He has blood-streaked sputum every time his cough is productive. Wife is at the bedside. No other new problems noted. Oxygen remains at 3 L. He remains afebrile    Excellent response to Lasix drip in the last 24 hours. 3 L of urine output and weight is down substantially. Therapy was able to obtain standing weights the last 2 days. I's/O's -10.2 L since admission, standing weight the last 2 days 250 lbs and 244 lbs. No chest pain. No nausea, vomiting, diarrhea. No abdominal pain. No dysuria. The remainder of the review of systems is negative. PMH, PSH, FH/SH reviewed and unchanged as documented in the H&P personally documented at admission 22    Medication list reviewed    Objective:    BP (!) 144/67   Pulse 60   Temp 98 °F (36.7 °C) (Oral)   Resp 18   Ht 5' 8\" (1.727 m)   Wt 244 lb 14.9 oz (111.1 kg)   SpO2 98%   BMI 37.24 kg/m²   Temp  Av.5 °F (36.4 °C)  Min: 97.3 °F (36.3 °C)  Max: 98 °F (36.7 °C)    RRR  Chest-respirations are easy. No wheezing noted. Blood tinged sputum produced today again. Abd- BS+, soft, NTND  Ext -patient with anasarca extending all the way up to his umbilicus. Extensive edema of all 4 extremities remains essentially unchanged despite good diuresis yesterday.     The Following Labs Were Reviewed Today:    Recent Results (from the past 24 hour(s))   POCT Glucose    Collection Time: 22 11:39 AM   Result Value Ref Range    POC Glucose 126 (H) 70 - 99 mg/dl    Performed on ACCU-CHEK    POCT Glucose    Collection Time: 22  5:15 PM   Result Value Ref Range    POC Glucose 149 (H) 70 - 99 mg/dl    Performed on ACCU-CHEK    POCT Glucose    Collection Time: 22  8:54 PM   Result Value Ref Range POC Glucose 130 (H) 70 - 99 mg/dl    Performed on ACCU-CHEK    Basic Metabolic Panel w/ Reflex to MG    Collection Time: 11/23/22  5:56 AM   Result Value Ref Range    Sodium 142 136 - 145 mmol/L    Potassium reflex Magnesium 3.8 3.5 - 5.1 mmol/L    Chloride 100 99 - 110 mmol/L    CO2 31 21 - 32 mmol/L    Anion Gap 11 3 - 16    Glucose 126 (H) 70 - 99 mg/dL    BUN 71 (H) 7 - 20 mg/dL    Creatinine 3.1 (H) 0.8 - 1.3 mg/dL    Est, Glom Filt Rate 19 (A) >60    Calcium 8.3 8.3 - 10.6 mg/dL   CBC with Auto Differential    Collection Time: 11/23/22  5:56 AM   Result Value Ref Range    WBC 4.0 4.0 - 11.0 K/uL    RBC 2.72 (L) 4.20 - 5.90 M/uL    Hemoglobin 7.3 (L) 13.5 - 17.5 g/dL    Hematocrit 23.3 (L) 40.5 - 52.5 %    MCV 85.5 80.0 - 100.0 fL    MCH 26.9 26.0 - 34.0 pg    MCHC 31.5 31.0 - 36.0 g/dL    RDW 18.0 (H) 12.4 - 15.4 %    Platelets 990 (L) 311 - 450 K/uL    MPV 9.1 5.0 - 10.5 fL    Neutrophils % 77.6 %    Lymphocytes % 11.1 %    Monocytes % 8.6 %    Eosinophils % 2.2 %    Basophils % 0.5 %    Neutrophils Absolute 3.1 1.7 - 7.7 K/uL    Lymphocytes Absolute 0.4 (L) 1.0 - 5.1 K/uL    Monocytes Absolute 0.3 0.0 - 1.3 K/uL    Eosinophils Absolute 0.1 0.0 - 0.6 K/uL    Basophils Absolute 0.0 0.0 - 0.2 K/uL   POCT Glucose    Collection Time: 11/23/22  7:35 AM   Result Value Ref Range    POC Glucose 127 (H) 70 - 99 mg/dl    Performed on ACCU-CHEK        ASSESSMENT/PLAN:      Principal Problem:    Anasarca/Acute on Chronic Diastolic CHF-finally have some accurate weights on this patient. Continue with Lasix drip. Creatinine is fairly stable. Active Problems:    Hemoptysis-patient with continued hemoptysis. Reduce subcu heparin to twice daily from 3 times daily, check CT of the chest without contrast to evaluate for areas of pneumonia or some cause of his hemoptysis. Worsening renal function-creatinine up slightly from yesterday. Continue with current regimen.   Tolerating Lasix drip so far    Generalized weakness-PT/OT have worked with the patient. They have changed the recommendation to inpatient rehab. Not certain if he would be a candidate for that or not. We will consult rehab. JOANN (obstructive sleep apnea)-continues with CPAP at night. Type 2 diabetes mellitus with stage 4 chronic kidney disease, with long-term current use of insulin-continue with sliding scale. Sugars okay. Remains off basal insulin. Paroxysmal atrial fibrillation-he remains in sinus rhythm. Essential hypertension-continue to monitor. No changes in the regimen at this point    Slow transit constipation-continue with Linzess. Add MiraLAX daily. Anemia-CBC is stable. Discussed with Dr. Luc Martines today. Increase Procrit to 30,000 units weekly.     Time > 35 minutes reviewing chart and patient data, examining and interviewing patient, and discussing with nursing staff, family, etc.     Michelle Arellano MD, [address]  11:07 AM  11/23/2022

## 2022-11-24 LAB
ANION GAP SERPL CALCULATED.3IONS-SCNC: 15 MMOL/L (ref 3–16)
BASOPHILS ABSOLUTE: 0 K/UL (ref 0–0.2)
BASOPHILS RELATIVE PERCENT: 0.4 %
BUN BLDV-MCNC: 73 MG/DL (ref 7–20)
CALCIUM SERPL-MCNC: 8.5 MG/DL (ref 8.3–10.6)
CHLORIDE BLD-SCNC: 98 MMOL/L (ref 99–110)
CO2: 31 MMOL/L (ref 21–32)
CREAT SERPL-MCNC: 2.9 MG/DL (ref 0.8–1.3)
EOSINOPHILS ABSOLUTE: 0.1 K/UL (ref 0–0.6)
EOSINOPHILS RELATIVE PERCENT: 2.1 %
GFR SERPL CREATININE-BSD FRML MDRD: 21 ML/MIN/{1.73_M2}
GLUCOSE BLD-MCNC: 118 MG/DL (ref 70–99)
GLUCOSE BLD-MCNC: 122 MG/DL (ref 70–99)
GLUCOSE BLD-MCNC: 127 MG/DL (ref 70–99)
GLUCOSE BLD-MCNC: 133 MG/DL (ref 70–99)
GLUCOSE BLD-MCNC: 139 MG/DL (ref 70–99)
HCT VFR BLD CALC: 23.7 % (ref 40.5–52.5)
HEMOGLOBIN: 7.5 G/DL (ref 13.5–17.5)
LYMPHOCYTES ABSOLUTE: 0.5 K/UL (ref 1–5.1)
LYMPHOCYTES RELATIVE PERCENT: 11.5 %
MCH RBC QN AUTO: 26.8 PG (ref 26–34)
MCHC RBC AUTO-ENTMCNC: 31.6 G/DL (ref 31–36)
MCV RBC AUTO: 84.9 FL (ref 80–100)
MONOCYTES ABSOLUTE: 0.4 K/UL (ref 0–1.3)
MONOCYTES RELATIVE PERCENT: 9.7 %
NEUTROPHILS ABSOLUTE: 3.3 K/UL (ref 1.7–7.7)
NEUTROPHILS RELATIVE PERCENT: 76.3 %
PDW BLD-RTO: 17.8 % (ref 12.4–15.4)
PERFORMED ON: ABNORMAL
PLATELET # BLD: 139 K/UL (ref 135–450)
PMV BLD AUTO: 8.9 FL (ref 5–10.5)
POTASSIUM REFLEX MAGNESIUM: 3.9 MMOL/L (ref 3.5–5.1)
PROCALCITONIN: 0.16 NG/ML (ref 0–0.15)
RBC # BLD: 2.79 M/UL (ref 4.2–5.9)
SODIUM BLD-SCNC: 144 MMOL/L (ref 136–145)
WBC # BLD: 4.3 K/UL (ref 4–11)

## 2022-11-24 PROCEDURE — 6360000002 HC RX W HCPCS: Performed by: INTERNAL MEDICINE

## 2022-11-24 PROCEDURE — 1200000000 HC SEMI PRIVATE

## 2022-11-24 PROCEDURE — 85025 COMPLETE CBC W/AUTO DIFF WBC: CPT

## 2022-11-24 PROCEDURE — 6370000000 HC RX 637 (ALT 250 FOR IP): Performed by: STUDENT IN AN ORGANIZED HEALTH CARE EDUCATION/TRAINING PROGRAM

## 2022-11-24 PROCEDURE — 2580000003 HC RX 258: Performed by: INTERNAL MEDICINE

## 2022-11-24 PROCEDURE — 6370000000 HC RX 637 (ALT 250 FOR IP): Performed by: INTERNAL MEDICINE

## 2022-11-24 PROCEDURE — 94761 N-INVAS EAR/PLS OXIMETRY MLT: CPT

## 2022-11-24 PROCEDURE — 99233 SBSQ HOSP IP/OBS HIGH 50: CPT | Performed by: INTERNAL MEDICINE

## 2022-11-24 PROCEDURE — 94640 AIRWAY INHALATION TREATMENT: CPT

## 2022-11-24 PROCEDURE — 36415 COLL VENOUS BLD VENIPUNCTURE: CPT

## 2022-11-24 PROCEDURE — 84145 PROCALCITONIN (PCT): CPT

## 2022-11-24 PROCEDURE — 2700000000 HC OXYGEN THERAPY PER DAY

## 2022-11-24 PROCEDURE — 51702 INSERT TEMP BLADDER CATH: CPT

## 2022-11-24 PROCEDURE — 80048 BASIC METABOLIC PNL TOTAL CA: CPT

## 2022-11-24 RX ADMIN — VERAPAMIL HYDROCHLORIDE 240 MG: 240 TABLET, FILM COATED, EXTENDED RELEASE ORAL at 08:01

## 2022-11-24 RX ADMIN — MEROPENEM 1000 MG: 1 INJECTION, POWDER, FOR SOLUTION INTRAVENOUS at 08:05

## 2022-11-24 RX ADMIN — FUROSEMIDE 20 MG/HR: 10 INJECTION, SOLUTION INTRAMUSCULAR; INTRAVENOUS at 10:59

## 2022-11-24 RX ADMIN — MEROPENEM 1000 MG: 1 INJECTION, POWDER, FOR SOLUTION INTRAVENOUS at 21:03

## 2022-11-24 RX ADMIN — ISODIUM CHLORIDE 3 ML: 0.03 SOLUTION RESPIRATORY (INHALATION) at 13:32

## 2022-11-24 RX ADMIN — TAMSULOSIN HYDROCHLORIDE 0.4 MG: 0.4 CAPSULE ORAL at 08:01

## 2022-11-24 RX ADMIN — MONTELUKAST SODIUM 10 MG: 10 TABLET, COATED ORAL at 08:01

## 2022-11-24 RX ADMIN — ATORVASTATIN CALCIUM 40 MG: 40 TABLET, FILM COATED ORAL at 08:01

## 2022-11-24 RX ADMIN — ISODIUM CHLORIDE 3 ML: 0.03 SOLUTION RESPIRATORY (INHALATION) at 20:31

## 2022-11-24 RX ADMIN — ASPIRIN 162 MG: 81 TABLET, COATED ORAL at 08:01

## 2022-11-24 RX ADMIN — PANTOPRAZOLE SODIUM 40 MG: 40 TABLET, DELAYED RELEASE ORAL at 08:01

## 2022-11-24 RX ADMIN — LEVALBUTEROL 1.25 MG: 1.25 SOLUTION, CONCENTRATE RESPIRATORY (INHALATION) at 13:32

## 2022-11-24 RX ADMIN — LEVALBUTEROL 1.25 MG: 1.25 SOLUTION, CONCENTRATE RESPIRATORY (INHALATION) at 09:07

## 2022-11-24 RX ADMIN — VERAPAMIL HYDROCHLORIDE 240 MG: 240 TABLET, FILM COATED, EXTENDED RELEASE ORAL at 21:04

## 2022-11-24 RX ADMIN — HEPARIN SODIUM 5000 UNITS: 5000 INJECTION INTRAVENOUS; SUBCUTANEOUS at 21:04

## 2022-11-24 RX ADMIN — LEVALBUTEROL 1.25 MG: 1.25 SOLUTION, CONCENTRATE RESPIRATORY (INHALATION) at 20:31

## 2022-11-24 RX ADMIN — HEPARIN SODIUM 5000 UNITS: 5000 INJECTION INTRAVENOUS; SUBCUTANEOUS at 08:02

## 2022-11-24 RX ADMIN — FUROSEMIDE 20 MG/HR: 10 INJECTION, SOLUTION INTRAMUSCULAR; INTRAVENOUS at 04:16

## 2022-11-24 RX ADMIN — AMIODARONE HYDROCHLORIDE 100 MG: 200 TABLET ORAL at 08:01

## 2022-11-24 RX ADMIN — CLONIDINE HYDROCHLORIDE 0.1 MG: 0.1 TABLET ORAL at 08:01

## 2022-11-24 RX ADMIN — CLONIDINE HYDROCHLORIDE 0.1 MG: 0.1 TABLET ORAL at 21:04

## 2022-11-24 RX ADMIN — ISODIUM CHLORIDE 3 ML: 0.03 SOLUTION RESPIRATORY (INHALATION) at 09:07

## 2022-11-24 ASSESSMENT — PAIN SCALES - GENERAL: PAINLEVEL_OUTOF10: 0

## 2022-11-24 NOTE — PROGRESS NOTES
Nephrology ([facility] Progress Note    CC: DEISY on CKD    Subjective:    HPI:  Breathing unchanged. Using CPAP. No weight today. Renal function perhaps slightly lower. ROS:  No fever, no chest pain. [address]:  medications reviewed. Wife present. Objective:  Blood pressure (!) 134/58, pulse 64, temperature 97.5 °F (36.4 °C), temperature source Axillary, resp. rate 18, height 5' 8\" (1.727 m), weight 244 lb 14.9 oz (111.1 kg), SpO2 95 %. Intake/Output Summary (Last 24 hours) at 11/24/2022 1251  Last data filed at 11/24/2022 7320  Gross per 24 hour   Intake 360 ml   Output 2475 ml   Net -2115 ml       General:  NAD, A+Ox3, in chair. Chest:  CTAB  CVS:  RRR, no S3. Abdominal:  NTND, soft, +BS  Extremities:  2+ pitting edema  Skin:  no rash, warm. : +gordon    Labs:  Renal panel:  Lab Results   Component Value Date/Time     11/24/2022 05:35 AM    K 3.9 11/24/2022 05:35 AM    CO2 31 11/24/2022 05:35 AM    BUN 73 (H) 11/24/2022 05:35 AM    CREATININE 2.9 (H) 11/24/2022 05:35 AM    CALCIUM 8.5 11/24/2022 05:35 AM    PHOS 4.9 11/08/2022 07:14 AM    MG 2.50 (H) 11/20/2022 06:02 AM     CBC:  Lab Results   Component Value Date/Time    WBC 4.3 11/24/2022 05:35 AM    HGB 7.5 (L) 11/24/2022 05:35 AM    HCT 23.7 (L) 11/24/2022 05:35 AM     11/24/2022 05:35 AM       Assessment/Plan:  Reviewed old records and labs.     1) DEISY on CKD              - baseline Cr 2.0              - ddx:  CRS vs. ATN              - patient is total body volume overloaded              - renal function is essentially unchanged     2) obstructive uropathy              - gordon in place              - on flomax     3) ACDHF              - echo shows LVEF 45-52%, diastolic dysfunction, elevated PASP 40 mmHg              - continue fluid restriction of 1.2 liters/day              - discussed lower sodium diet   - given his total gross body volume overload, I think we are committed to diuresing him at (almost) any cost because his anasarca needs to be addressed, and the patient and his wife agree   - lasix gtt 20 mg/hr   - O > I with lower weight     4) respiratory              - wean O2 as tolerated     5) anemia              - iron studies okay earlier this month  - retacrit 10,000 units qweek    6) FEN  - hyperkalemia              - potassium restricted diet  - metabolic acidosis              - off NaHCO3    6) JOANN              - CPAP has not been worn because he doesn't like the face mash   - using his CPAP now as the cor pulmonale is probably contributing to his intolerance to diuresis

## 2022-11-24 NOTE — PROGRESS NOTES
Patient is alert and oriented x4, up with walker, call light within reach, bed/chair alarm on. AM meds complete, patient tolerated well. VSS and WDL. No s/s of distress, no further needs noted at this time.    Electronically signed by Radha Lester RN on 11/24/2022 at 10:30 AM

## 2022-11-24 NOTE — PROGRESS NOTES
[facility] Internal Medicine Note      Chief Complaint: doing pretty well today    Subjective/Interval History: This morning the patient is up in the bedside chair. He also is describing some hemoptysis. He has blood-streaked sputum every time his cough is productive. Oxygen remains at 3 L. He remains afebrile    Excellent response to Lasix drip in the last 24 hours. 3 L of urine output and weight is down substantially. Therapy was able to obtain standing weights the last 2 days. I's/O's -10.2 L since admission, standing weight the last 2 days 250 lbs and 244 lbs. No chest pain. No nausea, vomiting, diarrhea. No abdominal pain. No dysuria. The remainder of the review of systems is negative. PMH, PSH, FH/SH reviewed and unchanged as documented in the H&P personally documented at admission 22    Medication list reviewed    Objective:    /72   Pulse 65   Temp 97.5 °F (36.4 °C) (Axillary)   Resp 21   Ht 5' 8\" (1.727 m)   Wt 244 lb 14.9 oz (111.1 kg)   SpO2 96%   BMI 37.24 kg/m²   Temp  Av.5 °F (36.4 °C)  Min: 97.4 °F (36.3 °C)  Max: 97.5 °F (36.4 °C)  Up in chair looks comfortable  RRR muffled s1 s2  Chest-respirations are easy. No wheezing noted. Abd- BS+, soft, NTND  Ext -patient with anasarca extending all the way up to his umbilicus. Extensive edema of all 4 extremities remains essentially unchanged despite good diuresis yesterday.     The Following Labs Were Reviewed Today:    Recent Results (from the past 24 hour(s))   POCT Glucose    Collection Time: 22 11:26 AM   Result Value Ref Range    POC Glucose 145 (H) 70 - 99 mg/dl    Performed on ACCU-CHEK    POCT Glucose    Collection Time: 22  5:55 PM   Result Value Ref Range    POC Glucose 136 (H) 70 - 99 mg/dl    Performed on ACCU-CHEK    POCT Glucose    Collection Time: 22  8:23 PM   Result Value Ref Range    POC Glucose 157 (H) 70 - 99 mg/dl    Performed on ACCU-CHEK    Basic Metabolic Panel w/ Reflex to MG    Collection Time: 11/24/22  5:35 AM   Result Value Ref Range    Sodium 144 136 - 145 mmol/L    Potassium reflex Magnesium 3.9 3.5 - 5.1 mmol/L    Chloride 98 (L) 99 - 110 mmol/L    CO2 31 21 - 32 mmol/L    Anion Gap 15 3 - 16    Glucose 118 (H) 70 - 99 mg/dL    BUN 73 (H) 7 - 20 mg/dL    Creatinine 2.9 (H) 0.8 - 1.3 mg/dL    Est, Glom Filt Rate 21 (A) >60    Calcium 8.5 8.3 - 10.6 mg/dL   Procalcitonin    Collection Time: 11/24/22  5:35 AM   Result Value Ref Range    Procalcitonin 0.16 (H) 0.00 - 0.15 ng/mL   CBC with Auto Differential    Collection Time: 11/24/22  5:35 AM   Result Value Ref Range    WBC 4.3 4.0 - 11.0 K/uL    RBC 2.79 (L) 4.20 - 5.90 M/uL    Hemoglobin 7.5 (L) 13.5 - 17.5 g/dL    Hematocrit 23.7 (L) 40.5 - 52.5 %    MCV 84.9 80.0 - 100.0 fL    MCH 26.8 26.0 - 34.0 pg    MCHC 31.6 31.0 - 36.0 g/dL    RDW 17.8 (H) 12.4 - 15.4 %    Platelets 679 609 - 624 K/uL    MPV 8.9 5.0 - 10.5 fL    Neutrophils % 76.3 %    Lymphocytes % 11.5 %    Monocytes % 9.7 %    Eosinophils % 2.1 %    Basophils % 0.4 %    Neutrophils Absolute 3.3 1.7 - 7.7 K/uL    Lymphocytes Absolute 0.5 (L) 1.0 - 5.1 K/uL    Monocytes Absolute 0.4 0.0 - 1.3 K/uL    Eosinophils Absolute 0.1 0.0 - 0.6 K/uL    Basophils Absolute 0.0 0.0 - 0.2 K/uL   POCT Glucose    Collection Time: 11/24/22  7:43 AM   Result Value Ref Range    POC Glucose 133 (H) 70 - 99 mg/dl    Performed on ACCU-CHEK        ASSESSMENT/PLAN:      Principal Problem:    Anasarca/Acute on Chronic Diastolic CHF-finally have some accurate weights on this patient. Continue with Lasix drip. Creatinine is fairly stable. Active Problems:    Hemoptysis-patient with continued hemoptysis. Reduce subcu heparin to twice daily from 3 times daily, check CT of the chest without contrast to evaluate for areas of pneumonia or some cause of his hemoptysis.   CT revealed findings cw multifocal pneumonia, antibiotic started  Checked procal only slightly up, cont abx for now Worsening renal function-creatinine up slightly from yesterday. Continue with current regimen. Tolerating Lasix drip so far    Generalized weakness-PT/OT have worked with the patient. They have changed the recommendation to inpatient rehab. Not certain if he would be a candidate for that or not. We will consult rehab. JOANN (obstructive sleep apnea)-continues with CPAP at night. Type 2 diabetes mellitus with stage 4 chronic kidney disease, with long-term current use of insulin-continue with sliding scale. Sugars okay. Remains off basal insulin. Paroxysmal atrial fibrillation-he remains in sinus rhythm. Essential hypertension-continue to monitor. No changes in the regimen at this point    Slow transit constipation-continue with Linzess. Add MiraLAX daily. Anemia-CBC is stable. Increase Procrit to 30,000 units weekly. Derrick Gaffney MD, FACP  11:18 AM  11/24/2022

## 2022-11-25 LAB
ANION GAP SERPL CALCULATED.3IONS-SCNC: 14 MMOL/L (ref 3–16)
BUN BLDV-MCNC: 64 MG/DL (ref 7–20)
CALCIUM SERPL-MCNC: 8.6 MG/DL (ref 8.3–10.6)
CHLORIDE BLD-SCNC: 96 MMOL/L (ref 99–110)
CO2: 33 MMOL/L (ref 21–32)
CREAT SERPL-MCNC: 2.9 MG/DL (ref 0.8–1.3)
GFR SERPL CREATININE-BSD FRML MDRD: 21 ML/MIN/{1.73_M2}
GLUCOSE BLD-MCNC: 111 MG/DL (ref 70–99)
GLUCOSE BLD-MCNC: 115 MG/DL (ref 70–99)
GLUCOSE BLD-MCNC: 121 MG/DL (ref 70–99)
GLUCOSE BLD-MCNC: 136 MG/DL (ref 70–99)
GLUCOSE BLD-MCNC: 140 MG/DL (ref 70–99)
PERFORMED ON: ABNORMAL
POTASSIUM REFLEX MAGNESIUM: 4 MMOL/L (ref 3.5–5.1)
SODIUM BLD-SCNC: 143 MMOL/L (ref 136–145)

## 2022-11-25 PROCEDURE — 97116 GAIT TRAINING THERAPY: CPT

## 2022-11-25 PROCEDURE — 87070 CULTURE OTHR SPECIMN AEROBIC: CPT

## 2022-11-25 PROCEDURE — 6370000000 HC RX 637 (ALT 250 FOR IP): Performed by: STUDENT IN AN ORGANIZED HEALTH CARE EDUCATION/TRAINING PROGRAM

## 2022-11-25 PROCEDURE — 94760 N-INVAS EAR/PLS OXIMETRY 1: CPT

## 2022-11-25 PROCEDURE — 2700000000 HC OXYGEN THERAPY PER DAY

## 2022-11-25 PROCEDURE — 97535 SELF CARE MNGMENT TRAINING: CPT

## 2022-11-25 PROCEDURE — 6360000002 HC RX W HCPCS: Performed by: INTERNAL MEDICINE

## 2022-11-25 PROCEDURE — 2580000003 HC RX 258: Performed by: INTERNAL MEDICINE

## 2022-11-25 PROCEDURE — 99232 SBSQ HOSP IP/OBS MODERATE 35: CPT | Performed by: INTERNAL MEDICINE

## 2022-11-25 PROCEDURE — 87205 SMEAR GRAM STAIN: CPT

## 2022-11-25 PROCEDURE — 97530 THERAPEUTIC ACTIVITIES: CPT

## 2022-11-25 PROCEDURE — 6370000000 HC RX 637 (ALT 250 FOR IP): Performed by: INTERNAL MEDICINE

## 2022-11-25 PROCEDURE — 94640 AIRWAY INHALATION TREATMENT: CPT

## 2022-11-25 PROCEDURE — 80048 BASIC METABOLIC PNL TOTAL CA: CPT

## 2022-11-25 PROCEDURE — 1200000000 HC SEMI PRIVATE

## 2022-11-25 PROCEDURE — 36415 COLL VENOUS BLD VENIPUNCTURE: CPT

## 2022-11-25 RX ADMIN — ASPIRIN 162 MG: 81 TABLET, COATED ORAL at 09:13

## 2022-11-25 RX ADMIN — FUROSEMIDE 20 MG/HR: 10 INJECTION, SOLUTION INTRAMUSCULAR; INTRAVENOUS at 13:35

## 2022-11-25 RX ADMIN — VERAPAMIL HYDROCHLORIDE 240 MG: 240 TABLET, FILM COATED, EXTENDED RELEASE ORAL at 20:49

## 2022-11-25 RX ADMIN — ISODIUM CHLORIDE 3 ML: 0.03 SOLUTION RESPIRATORY (INHALATION) at 13:33

## 2022-11-25 RX ADMIN — TAMSULOSIN HYDROCHLORIDE 0.4 MG: 0.4 CAPSULE ORAL at 09:14

## 2022-11-25 RX ADMIN — VERAPAMIL HYDROCHLORIDE 240 MG: 240 TABLET, FILM COATED, EXTENDED RELEASE ORAL at 09:14

## 2022-11-25 RX ADMIN — CLONIDINE HYDROCHLORIDE 0.1 MG: 0.1 TABLET ORAL at 20:50

## 2022-11-25 RX ADMIN — MEROPENEM 1000 MG: 1 INJECTION, POWDER, FOR SOLUTION INTRAVENOUS at 20:49

## 2022-11-25 RX ADMIN — LEVALBUTEROL 1.25 MG: 1.25 SOLUTION, CONCENTRATE RESPIRATORY (INHALATION) at 13:33

## 2022-11-25 RX ADMIN — AMIODARONE HYDROCHLORIDE 100 MG: 200 TABLET ORAL at 09:14

## 2022-11-25 RX ADMIN — ISODIUM CHLORIDE 3 ML: 0.03 SOLUTION RESPIRATORY (INHALATION) at 20:32

## 2022-11-25 RX ADMIN — ATORVASTATIN CALCIUM 40 MG: 40 TABLET, FILM COATED ORAL at 09:14

## 2022-11-25 RX ADMIN — PANTOPRAZOLE SODIUM 40 MG: 40 TABLET, DELAYED RELEASE ORAL at 09:14

## 2022-11-25 RX ADMIN — MONTELUKAST SODIUM 10 MG: 10 TABLET, COATED ORAL at 09:13

## 2022-11-25 RX ADMIN — MEROPENEM 1000 MG: 1 INJECTION, POWDER, FOR SOLUTION INTRAVENOUS at 09:21

## 2022-11-25 RX ADMIN — HEPARIN SODIUM 5000 UNITS: 5000 INJECTION INTRAVENOUS; SUBCUTANEOUS at 09:14

## 2022-11-25 RX ADMIN — HEPARIN SODIUM 5000 UNITS: 5000 INJECTION INTRAVENOUS; SUBCUTANEOUS at 20:50

## 2022-11-25 RX ADMIN — LEVALBUTEROL 1.25 MG: 1.25 SOLUTION, CONCENTRATE RESPIRATORY (INHALATION) at 20:32

## 2022-11-25 RX ADMIN — CLONIDINE HYDROCHLORIDE 0.1 MG: 0.1 TABLET ORAL at 09:14

## 2022-11-25 ASSESSMENT — PAIN SCALES - GENERAL: PAINLEVEL_OUTOF10: 0

## 2022-11-25 NOTE — PROGRESS NOTES
Metabolic Panel w/ Reflex to MG    Collection Time: 11/25/22  9:14 AM   Result Value Ref Range    Sodium 143 136 - 145 mmol/L    Potassium reflex Magnesium 4.0 3.5 - 5.1 mmol/L    Chloride 96 (L) 99 - 110 mmol/L    CO2 33 (H) 21 - 32 mmol/L    Anion Gap 14 3 - 16    Glucose 121 (H) 70 - 99 mg/dL    BUN 64 (H) 7 - 20 mg/dL    Creatinine 2.9 (H) 0.8 - 1.3 mg/dL    Est, Glom Filt Rate 21 (A) >60    Calcium 8.6 8.3 - 10.6 mg/dL       Therapy progress:  Physical therapy:  Bed Mobility:     Sit>supine:     Supine>sit:     Transfers:     Sit>stand:     Stand>sit:     Bed<>chair     Stand Pivot:     Lateral transfer:     Car transfer:     Ambulation:     Stairs:     Curb:      Wheelchair:     Assessment:  Activity Tolerance: Patient limited by fatigue, Patient limited by endurance  Discharge Recommendations: Patient would benefit from continued therapy after discharge, Patient able to tolerate 3hrs of therapy a day      Occupational therapy:   Feeding     Grooming/Oral Hygiene     UE Bathing     LE Bathing     UE Dressing     LE Dressing     Putting On/Taking Off Footwear     Toileting     Assessment:  Activity Tolerance: Patient limited by fatigue, Patient limited by endurance  Discharge Recommendations: Patient would benefit from continued therapy after discharge, 5-7 sessions per week    Speech therapy:            PT  Position Activity Restriction  Other position/activity restrictions: 3 L O2; fluid restriction, low sodium/low potassium diet; IV  Objective     Sit to Stand: Minimal Assistance, 2 Person Assistance  Stand to Sit: Contact guard assistance, Minimal Assistance  Bed to Chair: Dependent/Total  Device: [address]: Minimal assistance (of 1 and another to manage the multiple lines)  Distance: 10 feet x 1; 2-3 feet to the sink and then 20 feet to the chair  OT  PT Equipment Recommendations  Equipment Needed: No  Other: will monitor  Toilet - Technique: Ambulating  Equipment Used: Grab bars  Toilet Transfers Comments: with benoit negrete  Assessment        SLP          Body mass index is 35.58 kg/m². Assessment:  Acute on chronic dCHF  Obstructive uropathy  DEISY on CKD  Acute hypoxic respiratory failure  Hemoptysis  HCAP  Anemia  PAF  HTN  DM2  JOANN     Impairments: generalized weakness, decreased endurance, balance     Recommendations:     Patient remains candidate for ARU when medically ready. Per primary team, anticipating will not be ready until next week. Will f/u Monday to see if still requiring rehab. Thank you for this consult. Please contact me with any questions or concerns. Myrna Fagan.  Michelle White MD 11/25/2022, 11:42 AM

## 2022-11-25 NOTE — PLAN OF CARE
Problem: Discharge Planning  Goal: Discharge to home or other facility with appropriate resources  Outcome: Progressing   Continuing to work with patient and health care team on discharge plan. Discharge instructions and medication management will be reviewed prior to discharge. Problem: Pain  Goal: Verbalizes/displays adequate comfort level or baseline comfort level  Outcome: Progressing   Pt able to express presence/absence of pain and rate pain appropriately using numerical scale. Pain/discomfort being managed with PRN analgesics per MD orders (see MAR). Pain assessed every shift and after interventions. Problem: Safety - Adult  Goal: Free from fall injury  Outcome: Progressing   Pt free from falls this shift. Fall precautions in place at all times. Call light always within reach. Pt able and agreeable to contact for safety appropriately. Problem: ABCDS Injury Assessment  Goal: Absence of physical injury  Outcome: Progressing     Problem: Cardiovascular - Adult  Goal: Maintains optimal cardiac output and hemodynamic stability  Outcome: Progressing  Goal: Absence of cardiac dysrhythmias or at baseline  Outcome: Progressing     Problem: Skin/Tissue Integrity - Adult  Goal: Skin integrity remains intact  Outcome: Progressing  Goal: Incisions, wounds, or drain sites healing without S/S of infection  Outcome: Progressing   Skin assessment performed each shift per protocol. Patient turned and repositioned every two hours and prn with pillow support. Patient checked for incontence every two hours.      Problem: Musculoskeletal - Adult  Goal: Return mobility to safest level of function  Outcome: Progressing  Goal: Maintain proper alignment of affected body part  Outcome: Progressing  Goal: Return ADL status to a safe level of function  Outcome: Progressing     Problem: Genitourinary - Adult  Goal: Absence of urinary retention  Outcome: Progressing  Goal: Urinary catheter remains patent  Outcome: Progressing Problem: Infection - Adult  Goal: Absence of infection at discharge  Outcome: Progressing  Goal: Absence of infection during hospitalization  Outcome: Progressing  Goal: Absence of fever/infection during anticipated neutropenic period  Outcome: Progressing     Problem: Metabolic/Fluid and Electrolytes - Adult  Goal: Electrolytes maintained within normal limits  Outcome: Progressing  Goal: Hemodynamic stability and optimal renal function maintained  Outcome: Progressing  Goal: Glucose maintained within prescribed range  Outcome: Progressing     Problem: Neurosensory - Adult  Goal: Achieves stable or improved neurological status  Outcome: Progressing  Goal: Achieves maximal functionality and self care  Outcome: Progressing     Problem: Gastrointestinal - Adult  Goal: Minimal or absence of nausea and vomiting  Outcome: Progressing  Goal: Maintains or returns to baseline bowel function  Outcome: Progressing  Goal: Maintains adequate nutritional intake  Outcome: Progressing     Problem: Skin/Tissue Integrity  Goal: Absence of new skin breakdown  Description: 1. Monitor for areas of redness and/or skin breakdown  2. Assess vascular access sites hourly  3. Every 4-6 hours minimum:  Change oxygen saturation probe site  4. Every 4-6 hours:  If on nasal continuous positive airway pressure, respiratory therapy assess nares and determine need for appliance change or resting period.   Outcome: Progressing

## 2022-11-25 NOTE — PROGRESS NOTES
Occupational Therapy  Facility/Department: [facility]  Occupational Therapy Daily Treatment Note    Name: Maritza Neville  : 1940  MRN: [id number]  Date of Service: 2022    Discharge Recommendations:  Patient would benefit from continued therapy after discharge, 5-7 sessions per week          Patient Diagnosis(es): The primary encounter diagnosis was Hypoxia. Diagnoses of Anasarca, Acute renal failure superimposed on chronic kidney disease, unspecified CKD stage, unspecified acute renal failure type ([address] and Hypothermia, initial encounter were also pertinent to this visit. 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 Past Surgical History:  has a past surgical history that includes Gallbladder surgery (); Upper gastrointestinal endoscopy (7-2005    7/10/2009); Colonoscopy (7/10/2009); Cataract removal (2012); Pain management procedure (Right, 10/20/2021); Pain management procedure (Left, 2021); and CT BIOPSY BONE MARROW (2022). Assessment   Performance deficits / Impairments: Decreased functional mobility ; Decreased ADL status; Decreased endurance;Decreased fine motor control;Decreased balance;Decreased strength  Assessment: Pt is an 79 y.o. male Anasarca, Worsening renal function, Generalized weakness. Pt recent admit with weakness and DEISY, d/c'd home on . Prior to recent admits, pt living with wife and independent ADLs and fxl mobility with cane.   Pt completed transfers and mobility with CGA.  He completed grooming tasks seated at the sink. Pt agreed to ARU prior to discharge home. He is requiring less assist for transfers than last treatment and is demonstrating improved endurance. Pt demonstrates need for ongoing skilled OT at d/c to maximize pt's safety and independence prior to return home. Prognosis: Fair;Good  Activity Tolerance  Activity Tolerance: Patient Tolerated treatment well        Plan   Occupational Therapy Plan  Times Per Week: 3-5  Times Per Day: Once a day  Current Treatment Recommendations: Balance training, Functional mobility training, Endurance training, Strengthening, Safety education & training, Self-Care / ADL, Equipment evaluation, education, & procurement, Patient/Caregiver education & training, ROM     Restrictions  Restrictions/Precautions  Restrictions/Precautions: Fall Risk, Bed Alarm, Up as Tolerated  Position Activity Restriction  Other position/activity restrictions: 4 L O2; fluid restriction, low sodium/low potassium diet; IV    Subjective   General  Chart Reviewed: Yes, Progress Notes  Additional Pertinent Hx: Per William Hamilton MD's H&P 11/16/2022: \"This is an 79-year-old white male who was recently admitted to the hospital with weakness and acute kidney injury who presented back to the emergency room with diffuse weakness, inability to urinate and increasing edema. Patient was just admitted with weakness and worsened kidney function. Patient was felt to be intravascularly dry although he did have some peripheral edema. He was given some fluids with some improvement in his renal function but if he received any diuretics he would have a worsening of his kidney function. Patient's Diovan and diuretics were held at discharge. Additionally the patient had developed a bibasilar pneumonia and this was treated with antibiotics and he seemed to recover well.      Patient presented back to the emergency room last evening with approximately 24 hours of increasing weakness and difficulty with urination. Upon presentation his renal function was again a bit worse and his fluid volume overload was worsened. He was short of breath and does have some crackles. \"  Family / Caregiver Present: Yes (Spouse)  Referring Practitioner: Dano Winston MD  Diagnosis: Anasarca, Worsening renal function, Generalized weakness  Subjective  Subjective: Pt seen bedside with PT and agreed to therapy with encouragement. Social/Functional History  Social/Functional History  Lives With: Spouse  Type of Home: House  Home Layout: Able to Live on Main level with bedroom/bathroom, Multi-level ([place] 1 1/2 story)  Home Access: Stairs to enter with rails  Entrance Stairs - Number of Steps: 1+1  Entrance Stairs - Rails: Both  Bathroom Shower/Tub: Tub/Shower unit  Bathroom Toilet: Standard (comfort height commode)  Bathroom Equipment: Shower chair (sliding doors on shower with bars)  Home Equipment: Cane, Rollator, Oxygen (c-pap; home O2 since last hospital stay, 2L continuous)  Has the patient had two or more falls in the past year or any fall with injury in the past year?: No  Receives Help From: Home health (home PT and OT)  ADL Assistance: Needs assistance (son assisting with shower in the past week; used BSC for BM and urinals (prior to last hospital stay the pt was indep))  Homemaking Assistance: Independent (wife cleans.   Pt completes bill paying, medication management, cooking  (needing assist for all in the past week))  Ambulation Assistance: Independent (the pt using the rollator in the past week but used a cane prior to last hospital stay)  Transfer Assistance: Independent  Active : Yes (not since last hospital stay)  Occupation: Retired  IADL Comments: sleeping in a recliner       Objective   Heart Rate: [address]: Monitor  BP: (!) 172/65  BP Location: Left upper arm  BP Method: Automatic  Patient Position: Sitting  MAP (Calculated): 101  Resp: 18  SpO2: 92 %  O2 Device: Nasal cannula             Safety Devices  Type of Devices: Call light within reach; Chair alarm in place; Left in chair;Nurse notified  Gait  Overall Level of Assistance: Contact-guard assistance  Assistive Device: Walker, rolling (to/from the bathroom)  Wheelchair Bed Transfers  Wheelchair/Bed - Technique: Ambulating  Equipment Used: Other (recliner)  Level of Asssistance: Contact guard assistance     ADL  Grooming: Setup  Grooming Skilled Clinical Factors: Seated at the sink pt completed oral care, brushed hair. Bed mobility  Supine to Sit: Unable to assess  Sit to Supine: Unable to assess  Bed Mobility Comments: Pt in the recliner at the beginning/end of the session. Transfers  Sit to stand: Contact guard assistance  Stand to sit: Contact guard assistance                                                 AM-PAC Score        [facility] Inpatient Daily Activity Raw Score: 12 (11/18/22 1411)  AM-PAC Inpatient ADL T-Scale Score : 30.6 (11/18/22 1411)  ADL Inpatient CMS 0-100% Score: 66.57 (11/18/22 1411)  ADL Inpatient CMS G-Code Modifier : CL (11/18/22 1411)      Goals  Short Term Goals  Time Frame for Short Term Goals: Prior to d/c:  Short Term Goal 1: Pt will complete UB bathing/dressing with SBA. Short Term Goal 2: Pt will complete LB bathing/dressing with mod A using A/E and DME. Short Term Goal 3: Pt will complete toileting with mod A. Short Term Goal 4: Pt will complete fxl mobility and fxl transfers to/from ADL surfaces with min A x1-2 using AD. Short Term Goal 5: Pt will increase activity tolerance to achieve the above goals. Long Term Goals  Time Frame for Long Term Goals : STGs=LTGs  Patient Goals   Patient goals : to eventually return home. Therapy Time   Individual Concurrent Group Co-treatment   Time In 1130         Time Out 1200         Minutes 30             This note to serve as OT d/c summary if pt is d/c-ed from hospital prior to next OT session.     Chanda Cabot Martha Riley, [address]

## 2022-11-25 NOTE — PROGRESS NOTES
225 [facility] Internal Medicine Note      Chief Complaint: I  had a pretty good day yesterday    Subjective/Interval History:    Patient has no new complaints this morning. Salazar bag is full and patient states his breathing is okay. Still with some hemoptysis, he states it is improved. Sputum production has improved as well. Patient still on 4 L of oxygen per nasal cannula. Eating and drinking okay. He denies pain. No other new problems noted overnight. Patient still needs to be weighed this morning and his Salazar bag is full of urine and still needs to be accounted for in his urine output. I's/O's -13.3 L since admission, standing weight pending for today. Discussed with nursing. No chest pain. No nausea, vomiting, diarrhea. No abdominal pain. No dysuria. The remainder of the review of systems is negative. PMH, PSH, FH/SH reviewed and unchanged as documented in the H&P personally documented at admission 22    Medication list reviewed    Objective:    BP (!) 147/54   Pulse 65   Temp 97 °F (36.1 °C) (Axillary)   Resp 18   Ht 5' 8\" (1.727 m)   Wt 244 lb 14.9 oz (111.1 kg)   SpO2 99%   BMI 37.24 kg/m²   Temp  Av.3 °F (36.3 °C)  Min: 97 °F (36.1 °C)  Max: 97.5 °F (36.4 °C)    RRR  Chest-respirations are easy. No wheezing noted. Few crackles in the bases bilaterally. Remains on 4 L of oxygen  Abd- BS+, soft, NTND  Ext -arm edema seems a bit less. Anasarca persists but also may be a bit less than it was Wednesday.     The Following Labs Were Reviewed Today:    Recent Results (from the past 24 hour(s))   POCT Glucose    Collection Time: 22 12:00 PM   Result Value Ref Range    POC Glucose 122 (H) 70 - 99 mg/dl    Performed on ACCU-CHEK    POCT Glucose    Collection Time: 22  4:43 PM   Result Value Ref Range    POC Glucose 139 (H) 70 - 99 mg/dl    Performed on ACCU-CHEK    POCT Glucose    Collection Time: 22  9:18 PM   Result Value Ref Range    POC Glucose 127 (H) 70 - 99 mg/dl    Performed on ACCU-CHEK        ASSESSMENT/PLAN:      Principal Problem:    Anasarca/Acute on Chronic Diastolic CHF-awaiting renal function today but seems to be making progress with anasarca. Continue Lasix drip assuming his renal function is stable. Active Problems:    Hemoptysis-hemoptysis seems to be less. CT scan of the chest revealed a multifocal pneumonia. The biotics were started Wednesday evening. Multi-focal pneumonia-patient with a hospital-acquired pneumonia. Continue with imipenem. Sputum specimen is still pending. Patient has a history of ESBL UTI. Worsening renal function-creatinine up slightly from yesterday. Continue with current regimen. Tolerating Lasix drip so far    Generalized weakness-inpatient rehab consult appreciated. Hopefully can transition to acute inpatient rehab once patient is medically stable. Hopefully by early next week we will build to transition him. Hopefully his insurance will approve this. JOANN (obstructive sleep apnea)-continues with CPAP at night. Type 2 diabetes mellitus with stage 4 chronic kidney disease, with long-term current use of insulin-continue with sliding scale. Sugars okay. Remains off basal insulin. Paroxysmal atrial fibrillation-he remains in sinus rhythm clinically. Essential hypertension-Blood pressures remain slightly high. Continue to monitor. Slow transit constipation-continue with Linzess  and MiraLAX daily. Anemia-CBC is pending. Continue to monitor. Continue with weekly Procrit.     Time > 25 minutes reviewing chart and patient data, examining and interviewing patient, and discussing with nursing staff, family, etc.     Gordon Marvin MD, FACP  8:15 AM  11/25/2022

## 2022-11-25 NOTE — PROGRESS NOTES
Physical Therapy  Facility/Department: [facility]  Physical Therapy treatment session    Name: Francesca Mcginnis  : 1940  MRN: [id number]  Date of Service: 2022    Discharge Recommendations:  Patient would benefit from continued therapy after discharge, Patient able to tolerate 3hrs of therapy a day (5-7x/wk)   PT Equipment Recommendations  Equipment Needed: No  Other: defer to next level of care    Francesca Mcginnis scored a 15/24 on the AM-PAC short mobility form. Current research shows that an AM-PAC score of 17 or less is typically not associated with a discharge to the patient's home setting. Based on the patient's AM-PAC score and their current functional mobility deficits, it is recommended that the patient have 5-7 sessions per week of Physical Therapy at d/c to increase the patient's independence. At this time, this patient demonstrates complex nursing, medical, and rehabilitative needs, and would benefit from intensive rehabilitation services upon discharge from the Inpatient setting. This patient demonstrates the ability to participate in and benefit from an intensive therapy program with a coordinated interdisciplinary team approach to foster frequent, structured, and documented communication among disciplines, who will work together to establish, prioritize, and achieve treatment goals. Please see assessment section for further patient specific details. If patient discharges prior to next session this note will serve as a discharge summary. Please see below for the latest assessment towards goals. Patient Diagnosis(es): The primary encounter diagnosis was Hypoxia. Diagnoses of Anasarca, Acute renal failure superimposed on chronic kidney disease, unspecified CKD stage, unspecified acute renal failure type ([address] and Hypothermia, initial encounter were also pertinent to this visit.   Past Medical History:  has a past medical history of Allergic rhinitis, Asthma, Atrial fibrillation ([facility] [address] Carotid artery stenosis, Chronic kidney disease, COPD (chronic obstructive pulmonary disease) (HCC), CPAP (continuous positive airway pressure) dependence, ESBL (extended spectrum beta-lactamase) producing bacteria infection, Hyperlipidemia, Hypertension, Iron deficiency anemia, Obstructive sleep apnea, and Type II or unspecified type diabetes mellitus without mention of complication, not stated as uncontrolled. Past Surgical History:  has a past surgical history that includes Gallbladder surgery (1981); Upper gastrointestinal endoscopy (7-2005    7/10/2009); Colonoscopy (7/10/2009); Cataract removal (2/2012); Pain management procedure (Right, 10/20/2021); Pain management procedure (Left, 12/8/2021); and CT BIOPSY BONE MARROW (11/1/2022). Assessment   Body Structures, Functions, Activity Limitations Requiring Skilled Therapeutic Intervention: Decreased functional mobility   Assessment: Today, the pt able to ambulate 25'x2 with RW and CGA but limited by decreased activity tolerance and reported lightheadedness prior to mobilitgy. The pt is well motivated and is doing better; anticipate that he could tolerate and benefit from high intensity skilled PT at d/c prior to going home. Will con't to follow. Treatment Diagnosis: difficulty walking  Therapy Prognosis: Good  Requires PT Follow-Up: Yes  Activity Tolerance  Activity Tolerance: Patient limited by endurance; Patient limited by fatigue     Plan   Physcial Therapy Plan  General Plan: 3-5 times per week  Current Treatment Recommendations: Strengthening, Balance training, Functional mobility training, Transfer training, Gait training, Therapeutic activities, Patient/Caregiver education & training, Safety education & training  Safety Devices  Type of Devices: Call light within reach, Chair alarm in place, Left in chair, Nurse notified, Gait belt  Restraints  Restraints Initially in Place: No Restrictions  Restrictions/Precautions  Restrictions/Precautions: Fall Risk, Bed Alarm, Up as Tolerated, Contact Precautions  Position Activity Restriction  Other position/activity restrictions: 4 L O2; fluid restriction, low sodium/low potassium diet; IV     Subjective   General  Chart Reviewed: Yes  Patient assessed for rehabilitation services?: Yes  Additional Pertinent Hx: Per Tru Harman MD H&P on 11-: The pt is an 78 yo male who presented to the ED with weakness, increasing edema and inability to urinate. The pt recently in the hospital for weakness and DEISY and pna. In the ED the pt's renal function wa worse and he was volume overloaded; he was SOB with crackles. PMHx: asthma, a-fib, carotid artery stenosis, CKD, COPD, HTN, anemia, JOANN on c-pap, DM  Response To Previous Treatment: Patient with no complaints from previous session. Family / Caregiver Present: Yes  Referring Practitioner: Tru Harman MD  Referral Date : 11/16/22  Diagnosis: Anasarca, generalized weakness, worsening renal function  Follows Commands: Within Functional Limits  Subjective  Subjective: Pt agreeable to PT treatment with minimal encouragement.   In chair upon arrival.         Social/Functional History  Social/Functional History  Lives With: Spouse  Type of Home: House  Home Layout: Able to Live on Main level with bedroom/bathroom, Multi-level ([place] 1 1/2 story)  Home Access: Stairs to enter with rails  Entrance Stairs - Number of Steps: 1+1  Entrance Stairs - Rails: Both  Bathroom Shower/Tub: Tub/Shower unit  Bathroom Toilet: Standard (comfort height commode)  Bathroom Equipment: Shower chair (sliding doors on shower with bars)  Home Equipment: Cane, Rollator, Oxygen (c-pap; home O2 since last hospital stay, 2L continuous)  Has the patient had two or more falls in the past year or any fall with injury in the past year?: No  Receives Help From: Home health (home PT and OT)  ADL Assistance: Needs assistance (son assisting with shower in the past week; used BSC for BM and urinals (prior to last hospital stay the pt was indep))  Homemaking Assistance: Independent (wife cleans. Pt completes bill paying, medication management, cooking  (needing assist for all in the past week))  Ambulation Assistance: Independent (the pt using the rollator in the past week but used a cane prior to last hospital stay)  Transfer Assistance: Independent  Active : Yes (not since last hospital stay)  Occupation: Retired  IADL Comments: sleeping in a recliner             Objective      Bed mobility  Supine to Sit: Unable to assess  Sit to Supine: Unable to assess  Bed Mobility Comments: Pt in the recliner at the beginning/end of the session. Transfers  Sit to Stand: Contact guard assistance  Stand to Sit: Contact guard assistance    Ambulation  Surface: Level tile  Device: Rolling Walker  Other Apparatus:  (therapist managed IV and 02)  Assistance: Contact guard assistance  Quality of Gait: shaky but able to advance B feet and manage the walker; distance limited by tolerance  Gait Deviations: Slow Riri;Decreased step length;Decreased step height  Distance: 25'x2  More Ambulation?: No  Stairs/Curb  Stairs?: No     Balance  Posture: Fair  Sitting - Static: Good  Sitting - Dynamic: Good;-  Standing - Static: Fair  Standing - Dynamic: Fair  Comments: pt completed ADLs seated at the sink due to decreased standing tolerance and lightheadedness         AM-PAC Score  -PAC Inpatient Mobility Raw Score : 15 (11/25/22 1257)  AM-PAC Inpatient T-Scale Score : 39.45 (11/25/22 1257)  Mobility Inpatient CMS 0-100% Score: 57.7 (11/25/22 1257)  Mobility Inpatient CMS G-Code Modifier : CK (11/25/22 1257)         Goals  Short Term Goals  Time Frame for Short Term Goals: upon d/c  (goals are ongoing except noted below)  Short Term Goal 1: Bed mobility with min A.   Short Term Goal 2: Transfers sit <> stand with min A. (met when standing to the stedy)- MET, new goal SBA  Short Term Goal 3: Ambulate with walker 25 feet with min A. (partially met)- MET, new goal SBA  Patient Goals   Patient Goals : to be able to walk again       Education  Patient Education  Education Given To: Patient  Education Provided: Role of Therapy;Plan of Care;Equipment;Transfer Training;Energy Conservation  Education Method: Verbal;Demonstration  Barriers to Learning: None  Education Outcome: Demonstrated understanding;Continued education needed      Therapy Time   Individual Concurrent Group Co-treatment   Time In 1130         Time Out 1200         Minutes 30         Timed Code Treatment Minutes: 30 Minutes       Electronically signed by Shiela Hassan [address] [place] [address] on 11/25/2022 at 1:01 PM

## 2022-11-25 NOTE — PROGRESS NOTES
Pt c/o being dizzy and skin being wrinkly RN explained that the skin was due to being edematous and getting the extra fluid off. VSS WNL. RN informed MD per pts request /67, HR 54-63 BMP, if BP cont to drop, may hold LASIX, will recheck vitals as ordered and notify MD if symptoms worsen.  Wife remains at bedside

## 2022-11-25 NOTE — PROGRESS NOTES
Nephrology ([facility]) Progress Note    CC: DEISY on CKD    Subjective:    HPI:  Breathing unchanged. Using CPAP. Weight lower  Renal function stable. ROS:  No fever, no chest pain. [address]:  medications reviewed. Wife present. Objective:  Blood pressure (!) 172/65, pulse 60, temperature 97.1 °F (36.2 °C), temperature source Axillary, resp. rate 18, height 5' 8\" (1.727 m), weight 234 lb (106.1 kg), SpO2 99 %. Intake/Output Summary (Last 24 hours) at 11/25/2022 1419  Last data filed at 11/25/2022 1404  Gross per 24 hour   Intake 240 ml   Output 4225 ml   Net -3985 ml       General:  NAD, A+Ox3, in chair. Chest:  CTAB  CVS:  RRR, no S3. Abdominal:  NTND, soft, +BS  Extremities:  1+ pitting edema  Skin:  no rash, warm. : +gordon    Labs:  Renal panel:  Lab Results   Component Value Date/Time     11/25/2022 09:14 AM    K 4.0 11/25/2022 09:14 AM    CO2 33 (H) 11/25/2022 09:14 AM    BUN 64 (H) 11/25/2022 09:14 AM    CREATININE 2.9 (H) 11/25/2022 09:14 AM    CALCIUM 8.6 11/25/2022 09:14 AM    PHOS 4.9 11/08/2022 07:14 AM    MG 2.50 (H) 11/20/2022 06:02 AM     CBC:  Lab Results   Component Value Date/Time    WBC 4.3 11/24/2022 05:35 AM    HGB 7.5 (L) 11/24/2022 05:35 AM    HCT 23.7 (L) 11/24/2022 05:35 AM     11/24/2022 05:35 AM       Assessment/Plan:  Reviewed old records and labs.     1) DEISY on CKD              - baseline Cr 2.0              - ddx:  CRS vs. ATN              - patient is total body volume overloaded              - renal function is essentially unchanged     2) obstructive uropathy              - gordon in place              - on flomax     3) ACDHF              - echo shows LVEF 37-72%, diastolic dysfunction, elevated PASP 40 mmHg              - continue fluid restriction of 1.2 liters/day              - discussed lower sodium diet   - given his total gross body volume overload, I think we are committed to diuresing him at (almost) any cost because his anasarca needs to be addressed, and the patient and his wife agree   - decrease lasix gtt to 15 mg/hr   - O > I with lower weight     4) respiratory              - wean O2 as tolerated     5) anemia              - iron studies okay earlier this month  - retacrit 10,000 units qweek    6) FEN  - hyperkalemia              - potassium restricted diet  - metabolic acidosis              - off NaHCO3    6) JOANN              - CPAP has not been worn because he doesn't like the face mash   - using his CPAP now as the cor pulmonale is probably contributing to his intolerance to diuresis    Tried to ascertain from patient about how much more fluid he had to lose, but it was not informative. \"My weight is usually 250:lbs. \"  At baseline, he reports sleeping in a recliner because it was hard to sleep in bed. He needed to wear compression stockings due to edema. Will reach out to Dr. Nima Nuñez.

## 2022-11-25 NOTE — CARE COORDINATION
Tere stated that they are following along because [facility]. They need a call back to clarify if he will transition to ARU or come to them when he is ready. [phone]. Jerry Rodrigues. Respectfully submitted,    ERENDIRA Haines  [facility]   [phone]    Electronically signed by ERENDIRA Chavez on 11/25/2022 at 9:25 AM

## 2022-11-26 LAB
ANION GAP SERPL CALCULATED.3IONS-SCNC: 13 MMOL/L (ref 3–16)
BASOPHILS ABSOLUTE: 0 K/UL (ref 0–0.2)
BASOPHILS RELATIVE PERCENT: 0.5 %
BUN BLDV-MCNC: 63 MG/DL (ref 7–20)
CALCIUM SERPL-MCNC: 8.6 MG/DL (ref 8.3–10.6)
CHLORIDE BLD-SCNC: 95 MMOL/L (ref 99–110)
CO2: 34 MMOL/L (ref 21–32)
CREAT SERPL-MCNC: 2.9 MG/DL (ref 0.8–1.3)
EOSINOPHILS ABSOLUTE: 0.1 K/UL (ref 0–0.6)
EOSINOPHILS RELATIVE PERCENT: 2 %
GFR SERPL CREATININE-BSD FRML MDRD: 21 ML/MIN/{1.73_M2}
GLUCOSE BLD-MCNC: 116 MG/DL (ref 70–99)
GLUCOSE BLD-MCNC: 119 MG/DL (ref 70–99)
GLUCOSE BLD-MCNC: 122 MG/DL (ref 70–99)
GLUCOSE BLD-MCNC: 130 MG/DL (ref 70–99)
GLUCOSE BLD-MCNC: 152 MG/DL (ref 70–99)
HCT VFR BLD CALC: 25.8 % (ref 40.5–52.5)
HEMOGLOBIN: 8 G/DL (ref 13.5–17.5)
LYMPHOCYTES ABSOLUTE: 0.5 K/UL (ref 1–5.1)
LYMPHOCYTES RELATIVE PERCENT: 9.7 %
MCH RBC QN AUTO: 26.7 PG (ref 26–34)
MCHC RBC AUTO-ENTMCNC: 31.1 G/DL (ref 31–36)
MCV RBC AUTO: 85.9 FL (ref 80–100)
MONOCYTES ABSOLUTE: 0.4 K/UL (ref 0–1.3)
MONOCYTES RELATIVE PERCENT: 8.9 %
NEUTROPHILS ABSOLUTE: 3.8 K/UL (ref 1.7–7.7)
NEUTROPHILS RELATIVE PERCENT: 78.9 %
PDW BLD-RTO: 17.8 % (ref 12.4–15.4)
PERFORMED ON: ABNORMAL
PLATELET # BLD: 181 K/UL (ref 135–450)
PMV BLD AUTO: 8.8 FL (ref 5–10.5)
POTASSIUM REFLEX MAGNESIUM: 3.6 MMOL/L (ref 3.5–5.1)
RBC # BLD: 3.01 M/UL (ref 4.2–5.9)
SODIUM BLD-SCNC: 142 MMOL/L (ref 136–145)
WBC # BLD: 4.8 K/UL (ref 4–11)

## 2022-11-26 PROCEDURE — 2580000003 HC RX 258: Performed by: INTERNAL MEDICINE

## 2022-11-26 PROCEDURE — 94640 AIRWAY INHALATION TREATMENT: CPT

## 2022-11-26 PROCEDURE — 6360000002 HC RX W HCPCS: Performed by: INTERNAL MEDICINE

## 2022-11-26 PROCEDURE — 2700000000 HC OXYGEN THERAPY PER DAY

## 2022-11-26 PROCEDURE — 85025 COMPLETE CBC W/AUTO DIFF WBC: CPT

## 2022-11-26 PROCEDURE — 99233 SBSQ HOSP IP/OBS HIGH 50: CPT | Performed by: INTERNAL MEDICINE

## 2022-11-26 PROCEDURE — 6370000000 HC RX 637 (ALT 250 FOR IP): Performed by: STUDENT IN AN ORGANIZED HEALTH CARE EDUCATION/TRAINING PROGRAM

## 2022-11-26 PROCEDURE — 36415 COLL VENOUS BLD VENIPUNCTURE: CPT

## 2022-11-26 PROCEDURE — 80048 BASIC METABOLIC PNL TOTAL CA: CPT

## 2022-11-26 PROCEDURE — 6370000000 HC RX 637 (ALT 250 FOR IP): Performed by: INTERNAL MEDICINE

## 2022-11-26 PROCEDURE — 94760 N-INVAS EAR/PLS OXIMETRY 1: CPT

## 2022-11-26 PROCEDURE — 1200000000 HC SEMI PRIVATE

## 2022-11-26 RX ADMIN — HEPARIN SODIUM 5000 UNITS: 5000 INJECTION INTRAVENOUS; SUBCUTANEOUS at 08:30

## 2022-11-26 RX ADMIN — MEROPENEM 500 MG: 500 INJECTION, POWDER, FOR SOLUTION INTRAVENOUS at 20:33

## 2022-11-26 RX ADMIN — VERAPAMIL HYDROCHLORIDE 240 MG: 240 TABLET, FILM COATED, EXTENDED RELEASE ORAL at 08:29

## 2022-11-26 RX ADMIN — ATORVASTATIN CALCIUM 40 MG: 40 TABLET, FILM COATED ORAL at 08:29

## 2022-11-26 RX ADMIN — CLONIDINE HYDROCHLORIDE 0.1 MG: 0.1 TABLET ORAL at 08:29

## 2022-11-26 RX ADMIN — HEPARIN SODIUM 5000 UNITS: 5000 INJECTION INTRAVENOUS; SUBCUTANEOUS at 20:25

## 2022-11-26 RX ADMIN — ISODIUM CHLORIDE 3 ML: 0.03 SOLUTION RESPIRATORY (INHALATION) at 09:00

## 2022-11-26 RX ADMIN — ISODIUM CHLORIDE 3 ML: 0.03 SOLUTION RESPIRATORY (INHALATION) at 13:21

## 2022-11-26 RX ADMIN — MONTELUKAST SODIUM 10 MG: 10 TABLET, COATED ORAL at 08:29

## 2022-11-26 RX ADMIN — ISODIUM CHLORIDE 3 ML: 0.03 SOLUTION RESPIRATORY (INHALATION) at 20:02

## 2022-11-26 RX ADMIN — LEVALBUTEROL 1.25 MG: 1.25 SOLUTION, CONCENTRATE RESPIRATORY (INHALATION) at 20:02

## 2022-11-26 RX ADMIN — MEROPENEM 1000 MG: 1 INJECTION, POWDER, FOR SOLUTION INTRAVENOUS at 08:28

## 2022-11-26 RX ADMIN — ASPIRIN 162 MG: 81 TABLET, COATED ORAL at 08:29

## 2022-11-26 RX ADMIN — TAMSULOSIN HYDROCHLORIDE 0.4 MG: 0.4 CAPSULE ORAL at 08:29

## 2022-11-26 RX ADMIN — LEVALBUTEROL 1.25 MG: 1.25 SOLUTION, CONCENTRATE RESPIRATORY (INHALATION) at 09:00

## 2022-11-26 RX ADMIN — FUROSEMIDE 10 MG/HR: 10 INJECTION, SOLUTION INTRAMUSCULAR; INTRAVENOUS at 20:31

## 2022-11-26 RX ADMIN — LEVALBUTEROL 1.25 MG: 1.25 SOLUTION, CONCENTRATE RESPIRATORY (INHALATION) at 13:20

## 2022-11-26 RX ADMIN — AMIODARONE HYDROCHLORIDE 100 MG: 200 TABLET ORAL at 08:29

## 2022-11-26 RX ADMIN — PANTOPRAZOLE SODIUM 40 MG: 40 TABLET, DELAYED RELEASE ORAL at 08:29

## 2022-11-26 NOTE — PROGRESS NOTES
Nephrology ([facility]) Progress Note    CC: DEISY on CKD    Subjective:    HPI:  Breathing unchanged. Using CPAP. Renal function stable. Having dizziness, so lasix gtt held? No documented hypotension. ROS:  No fever, no chest pain. [address]:  medications reviewed. Wife and daughter present. Objective:  Blood pressure (!) 126/51, pulse 61, temperature 97.8 °F (36.6 °C), temperature source Oral, resp. rate 18, height 5' 8\" (1.727 m), weight 235 lb 6.4 oz (106.8 kg), SpO2 97 %. Intake/Output Summary (Last 24 hours) at 11/26/2022 1550  Last data filed at 11/26/2022 1210  Gross per 24 hour   Intake 690 ml   Output 1650 ml   Net -960 ml       General:  NAD, A+Ox3, in chair. Chest:  CTAB  CVS:  RRR, no S3. Abdominal:  NTND, soft, +BS  Extremities:  1+ pitting edema  Skin:  no rash, warm. : +gordon    Labs:  Renal panel:  Lab Results   Component Value Date/Time     11/26/2022 07:16 AM    K 3.6 11/26/2022 07:16 AM    CO2 34 (H) 11/26/2022 07:16 AM    BUN 63 (H) 11/26/2022 07:16 AM    CREATININE 2.9 (H) 11/26/2022 07:16 AM    CALCIUM 8.6 11/26/2022 07:16 AM    PHOS 4.9 11/08/2022 07:14 AM    MG 2.50 (H) 11/20/2022 06:02 AM     CBC:  Lab Results   Component Value Date/Time    WBC 4.8 11/26/2022 07:16 AM    HGB 8.0 (L) 11/26/2022 07:16 AM    HCT 25.8 (L) 11/26/2022 07:16 AM     11/26/2022 07:16 AM       Assessment/Plan:  Reviewed old records and labs.     1) DEISY on CKD              - baseline Cr 2.0              - ddx:  CRS vs. ATN              - patient is total body volume overloaded              - renal function is essentially unchanged     2) obstructive uropathy              - gordon in place              - on flomax     3) ACDHF              - echo shows LVEF 32-39%, diastolic dysfunction, elevated PASP 40 mmHg              - continue fluid restriction of 1.2 liters/day              - discussed lower sodium diet   - given his total gross body volume overload, I think we are committed to diuresing him at (almost) any cost because his anasarca needs to be addressed, and the patient and his wife agree   - I don't think he diuresed yesterday because lasix gtt was held at some point, so I resumed it   - decrease lasix gtt to 10 mg/hr    4) dizziness   - orthostatic hypotension?   - check orthostatics   - agree with d/c clonidine as there is no secondary benefits   - he could have underlying autonomic dysfunction that was masked by his gross volume overload, so I asked them to bring in compression stocks     5) respiratory              - wean O2 as tolerated     6) anemia              - iron studies okay earlier this month  - retacrit 10,000 units qweek    7) FEN  - hyperkalemia              - potassium restricted diet  - metabolic acidosis              - off NaHCO3    8 JOANN              - CPAP has not been worn because he doesn't like the face mash   - using his CPAP now as the cor pulmonale is probably contributing to his intolerance to diuresis      d/w Dr. Ten Guaman yesterday, and we agreed about the need for further diuresis. d/w with the present sister what I discussed with the other sister the other day. I don't understand his weights prior to admission, but regardless, we discuss monitoring daily weight going forward after discharge. I encouraged her to make sure he is not going overboard with restricting his fluids.

## 2022-11-26 NOTE — PROGRESS NOTES
Clinical Pharmacy Note  Renal Dose Adjustment    Gilmar DICK Suresh is receiving Meropenem 1 gm IVPB Q12H. This medication is renally eliminated. Based on the patient's Estimated Creatinine Clearance: 23 mL/min (A) (based on SCr of 2.9 mg/dL (H)). and urine output, the dose has been adjusted to Meropenem 500 mg IVPB Q12H per protocol. Pharmacy will continue to monitor and adjust dose as needed for changes in renal function.        Ash Tsang RPH, PharmD, BCPS  11/26/2022 11:16 AM

## 2022-11-26 NOTE — PROGRESS NOTES
Pt c/o being dizzy,  vitals at 1014- BP: 114/72 HR: 69. Recheck at 1045-  BP: 111/53, HR 61 , Oxygen 93%.  Lasix held at this time per conversation with Venecia Gipson MD yesterday, will reach out to nephro as well as primary MD   Update 1057: Inés Garvin MD with nephro notified at this time     UPDATE at 1104; CONTINUE CONT IV lasix per order via Inés Garvin MD     UPDATE 1108: Decrease lasix drip from 15mg/hr to 10 mg/hr per order via Inés Garvin MD

## 2022-11-26 NOTE — PROGRESS NOTES
[facility] Internal Medicine Note      Chief Complaint: I am lightheaded    Subjective/Interval History:    Patient primarily concerned about his relatively low blood pressure today. He became very lightheaded walking from the bathroom to the chair. Eating and drinking well. No other complaints. He is amazed at the amount of weight he has lost.    I's/O's -17.2 L since admission, weight down 39 pounds since admission. No chest pain. No nausea, vomiting, diarrhea. No abdominal pain. No dysuria. The remainder of the review of systems is negative. PMH, PSH, FH/SH reviewed and unchanged as documented in the H&P personally documented at admission 22    Medication list reviewed    Objective:    BP (!) 111/52   Pulse 58   Temp 97.7 °F (36.5 °C) (Oral)   Resp 18   Ht 5' 8\" (1.727 m)   Wt 235 lb 6.4 oz (106.8 kg)   SpO2 93%   BMI 35.79 kg/m²   Temp  Av.5 °F (36.4 °C)  Min: 97.4 °F (36.3 °C)  Max: 97.7 °F (36.5 °C)    RRR  Chest-respirations are easy. No wheezing noted. Few crackles in the bases bilaterally.   Remains on 3.5 L of oxygen  Abd- BS+, soft, NTND  Ext -generalized edema is much better but he continues with some dependent edema in his thighs, hips, abdomen and elbows    The Following Labs Were Reviewed Today:    Recent Results (from the past 24 hour(s))   POCT Glucose    Collection Time: 22  4:28 PM   Result Value Ref Range    POC Glucose 140 (H) 70 - 99 mg/dl    Performed on ACCU-CHEK    POCT Glucose    Collection Time: 22  7:54 PM   Result Value Ref Range    POC Glucose 136 (H) 70 - 99 mg/dl    Performed on ACCU-CHEK    Basic Metabolic Panel w/ Reflex to MG    Collection Time: 22  7:16 AM   Result Value Ref Range    Sodium 142 136 - 145 mmol/L    Potassium reflex Magnesium 3.6 3.5 - 5.1 mmol/L    Chloride 95 (L) 99 - 110 mmol/L    CO2 34 (H) 21 - 32 mmol/L    Anion Gap 13 3 - 16    Glucose 116 (H) 70 - 99 mg/dL    BUN 63 (H) 7 - 20 mg/dL    Creatinine 2.9 (H) 0.8 - 1.3 mg/dL    Est, Glom Filt Rate 21 (A) >60    Calcium 8.6 8.3 - 10.6 mg/dL   CBC with Auto Differential    Collection Time: 11/26/22  7:16 AM   Result Value Ref Range    WBC 4.8 4.0 - 11.0 K/uL    RBC 3.01 (L) 4.20 - 5.90 M/uL    Hemoglobin 8.0 (L) 13.5 - 17.5 g/dL    Hematocrit 25.8 (L) 40.5 - 52.5 %    MCV 85.9 80.0 - 100.0 fL    MCH 26.7 26.0 - 34.0 pg    MCHC 31.1 31.0 - 36.0 g/dL    RDW 17.8 (H) 12.4 - 15.4 %    Platelets 890 502 - 366 K/uL    MPV 8.8 5.0 - 10.5 fL    Neutrophils % 78.9 %    Lymphocytes % 9.7 %    Monocytes % 8.9 %    Eosinophils % 2.0 %    Basophils % 0.5 %    Neutrophils Absolute 3.8 1.7 - 7.7 K/uL    Lymphocytes Absolute 0.5 (L) 1.0 - 5.1 K/uL    Monocytes Absolute 0.4 0.0 - 1.3 K/uL    Eosinophils Absolute 0.1 0.0 - 0.6 K/uL    Basophils Absolute 0.0 0.0 - 0.2 K/uL   POCT Glucose    Collection Time: 11/26/22  7:46 AM   Result Value Ref Range    POC Glucose 122 (H) 70 - 99 mg/dl    Performed on ACCU-CHEK    POCT Glucose    Collection Time: 11/26/22 11:10 AM   Result Value Ref Range    POC Glucose 152 (H) 70 - 99 mg/dl    Performed on ACCU-CHEK        ASSESSMENT/PLAN:      Principal Problem:    Anasarca/Acute on Chronic Diastolic CHF-continues to make good progress. Patient is somewhat lightheaded today. Will stop clonidine. Await nephrology input, renal function is stable but might consider reducing Lasix drip again. Active Problems:    Hemoptysis-improving. Continue with imipenem for multifocal pneumonia. Multi-focal pneumonia-patient with a hospital-acquired pneumonia. Continue with imipenem. Sputum specimen is pending. Worsening renal function-renal function is stable. Remarkable how well he has tolerated this amount of diuresis given his baseline renal function. Generalized weakness-inpatient rehab consult appreciated. Hopefully can transition to acute inpatient rehab once patient is medically stable. Hopefully by early next week we will build to transition him. JOANN (obstructive sleep apnea)-continues with CPAP at night. Type 2 diabetes mellitus with stage 4 chronic kidney disease, with long-term current use of insulin-continue with sliding scale. Sugars okay. Remains off basal insulin. Paroxysmal atrial fibrillation-he remains in sinus rhythm clinically. Essential hypertension-blood pressures dropping a bit today. Hold clonidine. We need to reduce Lasix dose. Slow transit constipation-continue with Linzess  and MiraLAX daily. Anemia-hemoglobin is better today. Continue with weekly Procrit.       Raudel Milner MD, FACP  12:35 PM  11/26/2022

## 2022-11-27 LAB
ANION GAP SERPL CALCULATED.3IONS-SCNC: 10 MMOL/L (ref 3–16)
BUN BLDV-MCNC: 67 MG/DL (ref 7–20)
CALCIUM SERPL-MCNC: 8.3 MG/DL (ref 8.3–10.6)
CHLORIDE BLD-SCNC: 96 MMOL/L (ref 99–110)
CO2: 35 MMOL/L (ref 21–32)
CREAT SERPL-MCNC: 2.8 MG/DL (ref 0.8–1.3)
CULTURE, RESPIRATORY: NORMAL
GFR SERPL CREATININE-BSD FRML MDRD: 22 ML/MIN/{1.73_M2}
GLUCOSE BLD-MCNC: 102 MG/DL (ref 70–99)
GLUCOSE BLD-MCNC: 117 MG/DL (ref 70–99)
GLUCOSE BLD-MCNC: 129 MG/DL (ref 70–99)
GLUCOSE BLD-MCNC: 132 MG/DL (ref 70–99)
GLUCOSE BLD-MCNC: 155 MG/DL (ref 70–99)
GRAM STAIN RESULT: NORMAL
MAGNESIUM: 1.7 MG/DL (ref 1.8–2.4)
PERFORMED ON: ABNORMAL
POTASSIUM REFLEX MAGNESIUM: 3.4 MMOL/L (ref 3.5–5.1)
SODIUM BLD-SCNC: 141 MMOL/L (ref 136–145)

## 2022-11-27 PROCEDURE — 6360000002 HC RX W HCPCS: Performed by: INTERNAL MEDICINE

## 2022-11-27 PROCEDURE — 6370000000 HC RX 637 (ALT 250 FOR IP): Performed by: INTERNAL MEDICINE

## 2022-11-27 PROCEDURE — 6370000000 HC RX 637 (ALT 250 FOR IP): Performed by: STUDENT IN AN ORGANIZED HEALTH CARE EDUCATION/TRAINING PROGRAM

## 2022-11-27 PROCEDURE — 1200000000 HC SEMI PRIVATE

## 2022-11-27 PROCEDURE — 83735 ASSAY OF MAGNESIUM: CPT

## 2022-11-27 PROCEDURE — 2580000003 HC RX 258: Performed by: INTERNAL MEDICINE

## 2022-11-27 PROCEDURE — 99232 SBSQ HOSP IP/OBS MODERATE 35: CPT | Performed by: INTERNAL MEDICINE

## 2022-11-27 PROCEDURE — 36415 COLL VENOUS BLD VENIPUNCTURE: CPT

## 2022-11-27 PROCEDURE — 80048 BASIC METABOLIC PNL TOTAL CA: CPT

## 2022-11-27 PROCEDURE — 2500000003 HC RX 250 WO HCPCS: Performed by: INTERNAL MEDICINE

## 2022-11-27 PROCEDURE — 94760 N-INVAS EAR/PLS OXIMETRY 1: CPT

## 2022-11-27 PROCEDURE — 94640 AIRWAY INHALATION TREATMENT: CPT

## 2022-11-27 PROCEDURE — 2700000000 HC OXYGEN THERAPY PER DAY

## 2022-11-27 RX ORDER — POTASSIUM CHLORIDE 20 MEQ/1
20 TABLET, EXTENDED RELEASE ORAL ONCE
Status: COMPLETED | OUTPATIENT
Start: 2022-11-27 | End: 2022-11-27

## 2022-11-27 RX ORDER — MAGNESIUM SULFATE IN WATER 40 MG/ML
2000 INJECTION, SOLUTION INTRAVENOUS ONCE
Status: COMPLETED | OUTPATIENT
Start: 2022-11-27 | End: 2022-11-27

## 2022-11-27 RX ADMIN — MICONAZOLE NITRATE: 2 POWDER TOPICAL at 21:03

## 2022-11-27 RX ADMIN — PANTOPRAZOLE SODIUM 40 MG: 40 TABLET, DELAYED RELEASE ORAL at 09:32

## 2022-11-27 RX ADMIN — LEVALBUTEROL 1.25 MG: 1.25 SOLUTION, CONCENTRATE RESPIRATORY (INHALATION) at 12:43

## 2022-11-27 RX ADMIN — MONTELUKAST SODIUM 10 MG: 10 TABLET, COATED ORAL at 09:32

## 2022-11-27 RX ADMIN — HEPARIN SODIUM 5000 UNITS: 5000 INJECTION INTRAVENOUS; SUBCUTANEOUS at 21:03

## 2022-11-27 RX ADMIN — ATORVASTATIN CALCIUM 40 MG: 40 TABLET, FILM COATED ORAL at 09:32

## 2022-11-27 RX ADMIN — MEROPENEM 500 MG: 500 INJECTION, POWDER, FOR SOLUTION INTRAVENOUS at 09:34

## 2022-11-27 RX ADMIN — ISODIUM CHLORIDE 3 ML: 0.03 SOLUTION RESPIRATORY (INHALATION) at 12:43

## 2022-11-27 RX ADMIN — LEVALBUTEROL 1.25 MG: 1.25 SOLUTION, CONCENTRATE RESPIRATORY (INHALATION) at 20:29

## 2022-11-27 RX ADMIN — TAMSULOSIN HYDROCHLORIDE 0.4 MG: 0.4 CAPSULE ORAL at 09:32

## 2022-11-27 RX ADMIN — MAGNESIUM SULFATE HEPTAHYDRATE 2000 MG: 40 INJECTION, SOLUTION INTRAVENOUS at 13:01

## 2022-11-27 RX ADMIN — AMIODARONE HYDROCHLORIDE 100 MG: 200 TABLET ORAL at 09:32

## 2022-11-27 RX ADMIN — ASPIRIN 162 MG: 81 TABLET, COATED ORAL at 09:32

## 2022-11-27 RX ADMIN — POTASSIUM CHLORIDE 20 MEQ: 1500 TABLET, EXTENDED RELEASE ORAL at 13:01

## 2022-11-27 RX ADMIN — VERAPAMIL HYDROCHLORIDE 240 MG: 240 TABLET, FILM COATED, EXTENDED RELEASE ORAL at 21:07

## 2022-11-27 RX ADMIN — HEPARIN SODIUM 5000 UNITS: 5000 INJECTION INTRAVENOUS; SUBCUTANEOUS at 09:34

## 2022-11-27 RX ADMIN — LEVALBUTEROL 1.25 MG: 1.25 SOLUTION, CONCENTRATE RESPIRATORY (INHALATION) at 08:29

## 2022-11-27 RX ADMIN — MEROPENEM 500 MG: 500 INJECTION, POWDER, FOR SOLUTION INTRAVENOUS at 21:50

## 2022-11-27 RX ADMIN — ISODIUM CHLORIDE 3 ML: 0.03 SOLUTION RESPIRATORY (INHALATION) at 20:29

## 2022-11-27 RX ADMIN — ISODIUM CHLORIDE 3 ML: 0.03 SOLUTION RESPIRATORY (INHALATION) at 08:29

## 2022-11-27 RX ADMIN — VERAPAMIL HYDROCHLORIDE 240 MG: 240 TABLET, FILM COATED, EXTENDED RELEASE ORAL at 09:32

## 2022-11-27 ASSESSMENT — PAIN SCALES - GENERAL: PAINLEVEL_OUTOF10: 0

## 2022-11-27 NOTE — PROGRESS NOTES
[facility] Internal Medicine Note      Chief Complaint: I did not sleep well    Subjective/Interval History: This morning the patient is primarily upset that he did not get good rest last night. Breakfast was not palatable for him today unfortunately either. He continues to be fairly frustrated with his lengthy hospital stay understandably. No other new problems noted overnight. Discussed with nursing, patient needs nasal spray due to some nosebleeds. Patient denies shortness of breath. Mopped assist is better. I's/O's -18 L since admission, weight down 39 pounds since admission, weight unchanged since yesterday. No chest pain. No nausea, vomiting, diarrhea. No abdominal pain. No dysuria. The remainder of the review of systems is negative. PMH, PSH, FH/SH reviewed and unchanged as documented in the H&P personally documented at admission 22    Medication list reviewed    Objective:    /76   Pulse 72   Temp 97.9 °F (36.6 °C) (Oral)   Resp 18   Ht 5' 8\" (1.727 m)   Wt 235 lb 7.2 oz (106.8 kg)   SpO2 96%   BMI 35.80 kg/m²   Temp  Av.1 °F (36.7 °C)  Min: 97.8 °F (36.6 °C)  Max: 98.5 °F (36.9 °C)    RRR  Chest-respirations are easy. No wheezing noted. Few crackles in the bases bilaterally. Remains on 3.5 L of oxygen. Abd- BS+, soft, NTND  Ext -generalized edema is much better but he continues with some dependent edema in his thighs, hips, abdomen and elbows, appears improved from yesterday even.   Skin-patient with tinea cruris present and his lateral abdominal skin folds bilaterally    The Following Labs Were Reviewed Today:    Recent Results (from the past 24 hour(s))   POCT Glucose    Collection Time: 22  4:36 PM   Result Value Ref Range    POC Glucose 119 (H) 70 - 99 mg/dl    Performed on ACCU-CHEK    POCT Glucose    Collection Time: 22  7:31 PM   Result Value Ref Range    POC Glucose 130 (H) 70 - 99 mg/dl    Performed on ACCU-CHEK    POCT Glucose    Collection Time: 11/27/22  7:56 AM   Result Value Ref Range    POC Glucose 117 (H) 70 - 99 mg/dl    Performed on ACCU-CHEK    Basic Metabolic Panel w/ Reflex to MG    Collection Time: 11/27/22  8:13 AM   Result Value Ref Range    Sodium 141 136 - 145 mmol/L    Potassium reflex Magnesium 3.4 (L) 3.5 - 5.1 mmol/L    Chloride 96 (L) 99 - 110 mmol/L    CO2 35 (H) 21 - 32 mmol/L    Anion Gap 10 3 - 16    Glucose 102 (H) 70 - 99 mg/dL    BUN 67 (H) 7 - 20 mg/dL    Creatinine 2.8 (H) 0.8 - 1.3 mg/dL    Est, Glom Filt Rate 22 (A) >60    Calcium 8.3 8.3 - 10.6 mg/dL   Magnesium    Collection Time: 11/27/22  8:13 AM   Result Value Ref Range    Magnesium 1.70 (L) 1.80 - 2.40 mg/dL   POCT Glucose    Collection Time: 11/27/22 11:24 AM   Result Value Ref Range    POC Glucose 129 (H) 70 - 99 mg/dl    Performed on ACCU-CHEK        ASSESSMENT/PLAN:      Principal Problem:    Anasarca/Acute on Chronic Diastolic CHF-patient continues to be a little lightheaded. Blood pressure stable from yesterday. Clonidine was placed on hold due to his lightheadedness. Lasix dose was reduced yesterday. Still with some dependent edema but substantially improved. Nephrology managing dose of Lasix drip. Active Problems:    Hemoptysis-improving. Continue with imipenem for multifocal pneumonia. Patient on day 5 of 7 of imipenem. Multi-focal pneumonia-patient with a hospital-acquired pneumonia. Continue with imipenem. Sputum specimen grew normal maureen. Worsening renal function-renal function is stable. Continue to monitor daily. Hypokalemia-supplement today    Hypomagnesemia-supplement today. Generalized weakness-inpatient rehab consult appreciated. Transition to inpatient rehab likely once we have him off of the Lasix drip. JOANN (obstructive sleep apnea)-continues with CPAP at night. Type 2 diabetes mellitus with stage 4 chronic kidney disease, with long-term current use of insulin-continue with sliding scale. Sugars okay.   Remains off basal insulin. Paroxysmal atrial fibrillation-he remains in sinus rhythm clinically. Essential hypertension-blood pressure is okay. Patient still a bit lightheaded today. Continue to monitor. Clonidine remains on hold. Slow transit constipation-continue with Linzess  and MiraLAX daily. Anemia-hemoglobin is better today. Continue with weekly Procrit.       Jomar Beebe MD, Amanda Owens  12:02 PM  11/27/2022

## 2022-11-27 NOTE — PLAN OF CARE
Problem: Discharge Planning  Goal: Discharge to home or other facility with appropriate resources  Outcome: Progressing   Continuing to work with patient and health care team on discharge plan. Discharge instructions and medication management will be reviewed prior to discharge. Problem: Pain  Goal: Verbalizes/displays adequate comfort level or baseline comfort level  Outcome: Progressing   Pt able to express presence/absence of pain and rate pain appropriately using numerical scale. Pain/discomfort being managed with PRN analgesics per MD orders (see MAR). Pain assessed every shift and after interventions. Problem: Safety - Adult  Goal: Free from fall injury  Outcome: Progressing   Pt free from falls this shift. Fall precautions in place at all times. Call light always within reach. Pt able and agreeable to contact for safety appropriately. Problem: ABCDS Injury Assessment  Goal: Absence of physical injury  Outcome: Progressing     Problem: Respiratory - Adult  Goal: Achieves optimal ventilation and oxygenation  Outcome: Progressing     Problem: Cardiovascular - Adult  Goal: Maintains optimal cardiac output and hemodynamic stability  Outcome: Progressing  Goal: Absence of cardiac dysrhythmias or at baseline  Outcome: Progressing     Problem: Skin/Tissue Integrity - Adult  Goal: Skin integrity remains intact  Outcome: Progressing  Goal: Incisions, wounds, or drain sites healing without S/S of infection  Outcome: Progressing   Skin assessment performed each shift per protocol. Patient turned and repositioned every two hours and prn with pillow support. Patient checked for incontence every two hours.     Problem: Genitourinary - Adult  Goal: Absence of urinary retention  Outcome: Progressing  Goal: Urinary catheter remains patent  Outcome: Progressing     Problem: Infection - Adult  Goal: Absence of infection at discharge  Outcome: Progressing  Goal: Absence of infection during hospitalization  Outcome: Progressing  Goal: Absence of fever/infection during anticipated neutropenic period  Outcome: Progressing     Problem: Metabolic/Fluid and Electrolytes - Adult  Goal: Electrolytes maintained within normal limits  Outcome: Progressing  Goal: Hemodynamic stability and optimal renal function maintained  Outcome: Progressing  Goal: Glucose maintained within prescribed range  Outcome: Progressing     Problem: Neurosensory - Adult  Goal: Achieves stable or improved neurological status  Outcome: Progressing  Goal: Achieves maximal functionality and self care  Outcome: Progressing     Problem: Gastrointestinal - Adult  Goal: Minimal or absence of nausea and vomiting  Outcome: Progressing  Goal: Maintains or returns to baseline bowel function  Outcome: Progressing  Goal: Maintains adequate nutritional intake  Outcome: Progressing     Problem: Skin/Tissue Integrity  Goal: Absence of new skin breakdown  Description: 1. Monitor for areas of redness and/or skin breakdown  2. Assess vascular access sites hourly  3. Every 4-6 hours minimum:  Change oxygen saturation probe site  4. Every 4-6 hours:  If on nasal continuous positive airway pressure, respiratory therapy assess nares and determine need for appliance change or resting period.   Outcome: Progressing     Problem: Chronic Conditions and Co-morbidities  Goal: Patient's chronic conditions and co-morbidity symptoms are monitored and maintained or improved  Outcome: Progressing

## 2022-11-27 NOTE — PROGRESS NOTES
Nephrology ([facility]) Progress Note    CC: DESIY on CKD    Subjective:    HPI:  Breathing unchanged. Using CPAP. Renal function stable. Dizziness better today. BP higher after clonidine stopped. ROS:  In chair. No fever, no chest pain. [address]:  medications reviewed. Wife present. Objective:  Blood pressure (!) 153/96, pulse 65, temperature 98.6 °F (37 °C), temperature source Oral, resp. rate 16, height 5' 8\" (1.727 m), weight 235 lb 7.2 oz (106.8 kg), SpO2 93 %. Intake/Output Summary (Last 24 hours) at 11/27/2022 1548  Last data filed at 11/27/2022 0140  Gross per 24 hour   Intake 120 ml   Output 950 ml   Net -830 ml       General:  NAD, A+Ox3  Chest:  CTAB  CVS:  RRR, no S3. Abdominal:  NTND, soft, +BS  Extremities:  1+ pitting edema, wearing compression stockings  Skin:  no rash, warm. : +gordon    Labs:  Renal panel:  Lab Results   Component Value Date/Time     11/27/2022 08:13 AM    K 3.4 (L) 11/27/2022 08:13 AM    CO2 35 (H) 11/27/2022 08:13 AM    BUN 67 (H) 11/27/2022 08:13 AM    CREATININE 2.8 (H) 11/27/2022 08:13 AM    CALCIUM 8.3 11/27/2022 08:13 AM    PHOS 4.9 11/08/2022 07:14 AM    MG 1.70 (L) 11/27/2022 08:13 AM     CBC:  Lab Results   Component Value Date/Time    WBC 4.8 11/26/2022 07:16 AM    HGB 8.0 (L) 11/26/2022 07:16 AM    HCT 25.8 (L) 11/26/2022 07:16 AM     11/26/2022 07:16 AM       Assessment/Plan:  Reviewed old records and labs.     1) DEISY on CKD              - baseline Cr 2.0              - ddx:  CRS vs. ATN              - patient is total body volume overloaded              - renal function is essentially unchanged     2) obstructive uropathy              - gordon in place              - on flomax     3) ACDHF              - echo shows LVEF 78-02%, diastolic dysfunction, elevated PASP 40 mmHg              - continue fluid restriction of 1.2 liters/day              - discussed lower sodium diet   - given his total gross body volume overload, I think we are committed to diuresing him at (almost) any cost because his anasarca needs to be addressed, and the patient and his wife agree   - I don't think he diuresed yesterday because lasix gtt was held for a time and because the lasix gtt rate is too low   - will increase lasix gtt back to 20 mg/hr   - LE edema is \"better\", but only because he is wearing compression stockings, and I suspect he has at least another 5 lbs (more like 10 lbs) of fluid to be diuresed    4) dizziness   - orthostatic hypotension?   - no orthostatic vitals   - off clonidine as there is no secondary benefits   - he could have underlying autonomic dysfunction that was masked by his gross volume overload, so I asked them to bring in compression stocks     5) respiratory              - wean O2 as tolerated     6) anemia              - iron studies okay earlier this month  - retacrit 10,000 units qweek    7) FEN  - hyperkalemia              - potassium restricted diet  - metabolic acidosis              - off NaHCO3  - magnesium   - I am okay with current magnesium level   - if supplementation is necessary, I would start in on slow-mag in order to avoid increasing urinary magnesium losses    8 JOANN              - CPAP has not been worn because he doesn't like the face mash   - using his CPAP now as the cor pulmonale is probably contributing to his intolerance to diuresis      We reviewed the concept of fluid balance. We discussed a lower sodium diet at home as well as his current fluid restriction and the importance of daily weights. He should actively manage his volume status by adjusting his diuretics to maintain a stable weight.

## 2022-11-28 LAB
ANION GAP SERPL CALCULATED.3IONS-SCNC: 13 MMOL/L (ref 3–16)
BACTERIA: ABNORMAL /HPF
BASOPHILS ABSOLUTE: 0 K/UL (ref 0–0.2)
BASOPHILS RELATIVE PERCENT: 0.8 %
BILIRUBIN URINE: NEGATIVE
BLOOD, URINE: ABNORMAL
BUN BLDV-MCNC: 71 MG/DL (ref 7–20)
CALCIUM SERPL-MCNC: 8.8 MG/DL (ref 8.3–10.6)
CHLORIDE BLD-SCNC: 93 MMOL/L (ref 99–110)
CLARITY: CLEAR
CO2: 34 MMOL/L (ref 21–32)
COLOR: YELLOW
COMMENT UA: ABNORMAL
CREAT SERPL-MCNC: 2.9 MG/DL (ref 0.8–1.3)
EOSINOPHILS ABSOLUTE: 0.1 K/UL (ref 0–0.6)
EOSINOPHILS RELATIVE PERCENT: 2.3 %
EPITHELIAL CELLS, UA: 0 /HPF (ref 0–5)
GFR SERPL CREATININE-BSD FRML MDRD: 21 ML/MIN/{1.73_M2}
GLUCOSE BLD-MCNC: 122 MG/DL (ref 70–99)
GLUCOSE BLD-MCNC: 133 MG/DL (ref 70–99)
GLUCOSE BLD-MCNC: 134 MG/DL (ref 70–99)
GLUCOSE BLD-MCNC: 141 MG/DL (ref 70–99)
GLUCOSE BLD-MCNC: 151 MG/DL (ref 70–99)
GLUCOSE BLD-MCNC: 158 MG/DL (ref 70–99)
GLUCOSE URINE: NEGATIVE MG/DL
HCT VFR BLD CALC: 25.5 % (ref 40.5–52.5)
HEMOGLOBIN: 8 G/DL (ref 13.5–17.5)
HYALINE CASTS: ABNORMAL /LPF (ref 0–2)
KETONES, URINE: NEGATIVE MG/DL
LEUKOCYTE ESTERASE, URINE: NEGATIVE
LYMPHOCYTES ABSOLUTE: 0.6 K/UL (ref 1–5.1)
LYMPHOCYTES RELATIVE PERCENT: 11.9 %
MAGNESIUM: 2.1 MG/DL (ref 1.8–2.4)
MCH RBC QN AUTO: 26.7 PG (ref 26–34)
MCHC RBC AUTO-ENTMCNC: 31.5 G/DL (ref 31–36)
MCV RBC AUTO: 85 FL (ref 80–100)
MICROSCOPIC EXAMINATION: YES
MONOCYTES ABSOLUTE: 0.6 K/UL (ref 0–1.3)
MONOCYTES RELATIVE PERCENT: 11.5 %
NEUTROPHILS ABSOLUTE: 4 K/UL (ref 1.7–7.7)
NEUTROPHILS RELATIVE PERCENT: 73.5 %
NITRITE, URINE: NEGATIVE
PDW BLD-RTO: 18 % (ref 12.4–15.4)
PERFORMED ON: ABNORMAL
PH UA: 5.5 (ref 5–8)
PLATELET # BLD: 202 K/UL (ref 135–450)
PMV BLD AUTO: 8.2 FL (ref 5–10.5)
POTASSIUM REFLEX MAGNESIUM: 3.4 MMOL/L (ref 3.5–5.1)
PROTEIN PROTEIN: 0.01 G/DL
PROTEIN PROTEIN: 13 MG/DL
PROTEIN UA: NEGATIVE MG/DL
RBC # BLD: 3 M/UL (ref 4.2–5.9)
RBC UA: 54 /HPF (ref 0–4)
SODIUM BLD-SCNC: 140 MMOL/L (ref 136–145)
SPECIFIC GRAVITY UA: 1.01 (ref 1–1.03)
URINE TYPE: ABNORMAL
UROBILINOGEN, URINE: 0.2 E.U./DL
WBC # BLD: 5.4 K/UL (ref 4–11)
WBC UA: 1 /HPF (ref 0–5)

## 2022-11-28 PROCEDURE — 6360000002 HC RX W HCPCS: Performed by: INTERNAL MEDICINE

## 2022-11-28 PROCEDURE — 97530 THERAPEUTIC ACTIVITIES: CPT

## 2022-11-28 PROCEDURE — 94640 AIRWAY INHALATION TREATMENT: CPT

## 2022-11-28 PROCEDURE — 85025 COMPLETE CBC W/AUTO DIFF WBC: CPT

## 2022-11-28 PROCEDURE — 36415 COLL VENOUS BLD VENIPUNCTURE: CPT

## 2022-11-28 PROCEDURE — 84166 PROTEIN E-PHORESIS/URINE/CSF: CPT

## 2022-11-28 PROCEDURE — 6370000000 HC RX 637 (ALT 250 FOR IP): Performed by: INTERNAL MEDICINE

## 2022-11-28 PROCEDURE — 2580000003 HC RX 258: Performed by: INTERNAL MEDICINE

## 2022-11-28 PROCEDURE — 84156 ASSAY OF PROTEIN URINE: CPT

## 2022-11-28 PROCEDURE — 83735 ASSAY OF MAGNESIUM: CPT

## 2022-11-28 PROCEDURE — 97535 SELF CARE MNGMENT TRAINING: CPT

## 2022-11-28 PROCEDURE — 2700000000 HC OXYGEN THERAPY PER DAY

## 2022-11-28 PROCEDURE — 6370000000 HC RX 637 (ALT 250 FOR IP): Performed by: STUDENT IN AN ORGANIZED HEALTH CARE EDUCATION/TRAINING PROGRAM

## 2022-11-28 PROCEDURE — 94760 N-INVAS EAR/PLS OXIMETRY 1: CPT

## 2022-11-28 PROCEDURE — 1200000000 HC SEMI PRIVATE

## 2022-11-28 PROCEDURE — 99233 SBSQ HOSP IP/OBS HIGH 50: CPT | Performed by: INTERNAL MEDICINE

## 2022-11-28 PROCEDURE — 80048 BASIC METABOLIC PNL TOTAL CA: CPT

## 2022-11-28 PROCEDURE — 97110 THERAPEUTIC EXERCISES: CPT

## 2022-11-28 PROCEDURE — 51702 INSERT TEMP BLADDER CATH: CPT

## 2022-11-28 PROCEDURE — 81001 URINALYSIS AUTO W/SCOPE: CPT

## 2022-11-28 PROCEDURE — 97116 GAIT TRAINING THERAPY: CPT

## 2022-11-28 RX ORDER — POTASSIUM CHLORIDE 20 MEQ/1
20 TABLET, EXTENDED RELEASE ORAL ONCE
Status: COMPLETED | OUTPATIENT
Start: 2022-11-28 | End: 2022-11-28

## 2022-11-28 RX ADMIN — ASPIRIN 162 MG: 81 TABLET, COATED ORAL at 09:23

## 2022-11-28 RX ADMIN — ISODIUM CHLORIDE 3 ML: 0.03 SOLUTION RESPIRATORY (INHALATION) at 07:40

## 2022-11-28 RX ADMIN — HEPARIN SODIUM 5000 UNITS: 5000 INJECTION INTRAVENOUS; SUBCUTANEOUS at 20:03

## 2022-11-28 RX ADMIN — HEPARIN SODIUM 5000 UNITS: 5000 INJECTION INTRAVENOUS; SUBCUTANEOUS at 09:23

## 2022-11-28 RX ADMIN — LEVALBUTEROL 1.25 MG: 1.25 SOLUTION, CONCENTRATE RESPIRATORY (INHALATION) at 19:38

## 2022-11-28 RX ADMIN — MICONAZOLE NITRATE: 2 POWDER TOPICAL at 09:23

## 2022-11-28 RX ADMIN — VERAPAMIL HYDROCHLORIDE 240 MG: 240 TABLET, FILM COATED, EXTENDED RELEASE ORAL at 20:06

## 2022-11-28 RX ADMIN — MONTELUKAST SODIUM 10 MG: 10 TABLET, COATED ORAL at 09:23

## 2022-11-28 RX ADMIN — AMIODARONE HYDROCHLORIDE 100 MG: 200 TABLET ORAL at 09:23

## 2022-11-28 RX ADMIN — ATORVASTATIN CALCIUM 40 MG: 40 TABLET, FILM COATED ORAL at 09:23

## 2022-11-28 RX ADMIN — MEROPENEM 500 MG: 500 INJECTION, POWDER, FOR SOLUTION INTRAVENOUS at 20:01

## 2022-11-28 RX ADMIN — PANTOPRAZOLE SODIUM 40 MG: 40 TABLET, DELAYED RELEASE ORAL at 09:23

## 2022-11-28 RX ADMIN — LEVALBUTEROL 1.25 MG: 1.25 SOLUTION, CONCENTRATE RESPIRATORY (INHALATION) at 13:43

## 2022-11-28 RX ADMIN — ISODIUM CHLORIDE 3 ML: 0.03 SOLUTION RESPIRATORY (INHALATION) at 13:43

## 2022-11-28 RX ADMIN — MICONAZOLE NITRATE: 2 POWDER TOPICAL at 20:03

## 2022-11-28 RX ADMIN — MEROPENEM 500 MG: 500 INJECTION, POWDER, FOR SOLUTION INTRAVENOUS at 09:34

## 2022-11-28 RX ADMIN — TAMSULOSIN HYDROCHLORIDE 0.4 MG: 0.4 CAPSULE ORAL at 09:23

## 2022-11-28 RX ADMIN — VERAPAMIL HYDROCHLORIDE 240 MG: 240 TABLET, FILM COATED, EXTENDED RELEASE ORAL at 09:23

## 2022-11-28 RX ADMIN — LEVALBUTEROL 1.25 MG: 1.25 SOLUTION, CONCENTRATE RESPIRATORY (INHALATION) at 07:40

## 2022-11-28 RX ADMIN — FUROSEMIDE 20 MG/HR: 10 INJECTION, SOLUTION INTRAMUSCULAR; INTRAVENOUS at 10:48

## 2022-11-28 RX ADMIN — POTASSIUM CHLORIDE 20 MEQ: 20 TABLET, EXTENDED RELEASE ORAL at 14:36

## 2022-11-28 RX ADMIN — ISODIUM CHLORIDE 3 ML: 0.03 SOLUTION RESPIRATORY (INHALATION) at 19:38

## 2022-11-28 ASSESSMENT — PAIN SCALES - GENERAL: PAINLEVEL_OUTOF10: 0

## 2022-11-28 NOTE — PROGRESS NOTES
Department of Physical Medicine & Rehabilitation  Progress Note    Patient Identification:  León Reno  [id number]  : 1940  Admit date: 11/15/2022    Chief Complaint: Anasarca    Subjective:   No acute events overnight. Patient seen this afternoon sitting up in chair. HAd some dizziness/lightheadedness this morning, now resolved. He is still open to rehab if needed. Wife is in agreement. Labs reviewed. ROS: No f/c, n/v, cp     Objective:  Patient Vitals for the past 24 hrs:   BP Temp Temp src Pulse Resp SpO2 Weight   22 0750 133/74 97.4 °F (36.3 °C) Oral 60 20 95 % --   22 0741 -- -- -- -- -- 94 % --   22 0512 -- -- -- -- -- -- 233 lb 3.2 oz (105.8 kg)   22 0418 (!) 132/57 97.5 °F (36.4 °C) Axillary 54 18 96 % 220 lb 6.4 oz (100 kg)   22 2107 (!) 145/71 -- -- -- -- -- --   22 -- -- -- 81 18 94 % --   22 1956 (!) 197/44 98 °F (36.7 °C) Axillary 88 18 91 % --   22 1501 (!) 153/96 98.6 °F (37 °C) Oral 65 16 93 % --     Const: Alert. No distress, pleasant. HEENT: Normocephalic, atraumatic. Normal sclera/conjunctiva. MMM. CV: Regular rate and rhythm. Resp: No respiratory distress. Abd: Soft, nontender, nondistended  Ext: +LE edema  Neuro: Alert, oriented, appropriately interactive. Psych: Cooperative, appropriate mood and affect    Laboratory data: Available via EMR.    Last 24 hour lab  Recent Results (from the past 24 hour(s))   POCT Glucose    Collection Time: 22  3:53 PM   Result Value Ref Range    POC Glucose 132 (H) 70 - 99 mg/dl    Performed on ACCU-CHEK    POCT Glucose    Collection Time: 22  7:57 PM   Result Value Ref Range    POC Glucose 155 (H) 70 - 99 mg/dl    Performed on ACCU-CHEK    Basic Metabolic Panel w/ Reflex to MG    Collection Time: 22  7:26 AM   Result Value Ref Range    Sodium 140 136 - 145 mmol/L    Potassium reflex Magnesium 3.4 (L) 3.5 - 5.1 mmol/L    Chloride 93 (L) 99 - 110 mmol/L    CO2 34 (H) 21 - 32 mmol/L    Anion Gap 13 3 - 16    Glucose 134 (H) 70 - 99 mg/dL    BUN 71 (H) 7 - 20 mg/dL    Creatinine 2.9 (H) 0.8 - 1.3 mg/dL    Est, Glom Filt Rate 21 (A) >60    Calcium 8.8 8.3 - 10.6 mg/dL   CBC with Auto Differential    Collection Time: 11/28/22  7:26 AM   Result Value Ref Range    WBC 5.4 4.0 - 11.0 K/uL    RBC 3.00 (L) 4.20 - 5.90 M/uL    Hemoglobin 8.0 (L) 13.5 - 17.5 g/dL    Hematocrit 25.5 (L) 40.5 - 52.5 %    MCV 85.0 80.0 - 100.0 fL    MCH 26.7 26.0 - 34.0 pg    MCHC 31.5 31.0 - 36.0 g/dL    RDW 18.0 (H) 12.4 - 15.4 %    Platelets 028 096 - 413 K/uL    MPV 8.2 5.0 - 10.5 fL    Neutrophils % 73.5 %    Lymphocytes % 11.9 %    Monocytes % 11.5 %    Eosinophils % 2.3 %    Basophils % 0.8 %    Neutrophils Absolute 4.0 1.7 - 7.7 K/uL    Lymphocytes Absolute 0.6 (L) 1.0 - 5.1 K/uL    Monocytes Absolute 0.6 0.0 - 1.3 K/uL    Eosinophils Absolute 0.1 0.0 - 0.6 K/uL    Basophils Absolute 0.0 0.0 - 0.2 K/uL   Magnesium    Collection Time: 11/28/22  7:26 AM   Result Value Ref Range    Magnesium 2.10 1.80 - 2.40 mg/dL   POCT Glucose    Collection Time: 11/28/22  8:00 AM   Result Value Ref Range    POC Glucose 122 (H) 70 - 99 mg/dl    Performed on ACCU-CHEK    POCT Glucose    Collection Time: 11/28/22 10:59 AM   Result Value Ref Range    POC Glucose 133 (H) 70 - 99 mg/dl    Performed on ACCU-CHEK    Protein Electrophoresis, Urine    Collection Time: 11/28/22 11:00 AM   Result Value Ref Range    Protein, Ur 13.00 (H) <12 mg/dL    Protein, Ur 0.013 <0.012 g/dL   Urinalysis    Collection Time: 11/28/22 11:00 AM   Result Value Ref Range    Color, UA Yellow Straw/Yellow    Clarity, UA Clear Clear    Glucose, Ur Negative Negative mg/dL    Bilirubin Urine Negative Negative    Ketones, Urine Negative Negative mg/dL    Specific Gravity, UA 1.010 1.005 - 1.030    Blood, Urine MODERATE (A) Negative    pH, UA 5.5 5.0 - 8.0    Protein, UA Negative Negative mg/dL    Urobilinogen, Urine 0.2 <2.0 E.U./dL    Nitrite, Urine Negative Negative    Leukocyte Esterase, Urine Negative Negative    Microscopic Examination YES     Urine Type NotGiven    Microscopic Urinalysis    Collection Time: 11/28/22 11:00 AM   Result Value Ref Range    Hyaline Casts, UA 0-2 0 - 2 /LPF    Bacteria, UA None Seen None Seen /HPF    Urinalysis Comments see below     WBC, UA 1 0 - 5 /HPF    RBC, UA 54 (H) 0 - 4 /HPF    Epithelial Cells, UA 0 0 - 5 /HPF       Therapy progress:  Physical therapy:  Bed Mobility:     Sit>supine:     Supine>sit:     Transfers:     Sit>stand:     Stand>sit:     Bed<>chair     Stand Pivot:     Lateral transfer:     Car transfer:     Ambulation:     Stairs:     Curb:      Wheelchair:     Assessment:  Activity Tolerance: Patient limited by endurance, Patient limited by fatigue  Discharge Recommendations: Patient would benefit from continued therapy after discharge, Patient able to tolerate 3hrs of therapy a day (5-7x/wk)      Occupational therapy:   Feeding     Grooming/Oral Hygiene     UE Bathing     LE Bathing     UE Dressing     LE Dressing     Putting On/Taking Off Footwear     Toileting     Assessment:  Activity Tolerance: Patient limited by endurance, Patient limited by fatigue  Discharge Recommendations: Patient would benefit from continued therapy after discharge, 5-7 sessions per week    Speech therapy:            PT  Position Activity Restriction  Other position/activity restrictions: 4 L O2; fluid restriction, low sodium/low potassium diet; IV  Objective     Sit to Stand: Contact guard assistance  Stand to Sit: Contact guard assistance  Bed to Chair: Dependent/Total  Device: Rolling Walker  Other Apparatus:  (therapist managed IV and 02)  Assistance: Contact guard assistance  Distance: 25'x2  OT  PT Equipment Recommendations  Equipment Needed: No  Other: defer to next level of care  Toilet - Technique: Ambulating  Equipment Used: Grab bars  Toilet Transfers Comments: with benoit negrete  Assessment        SLP          Body mass index is 35.46 kg/m². Assessment:  Acute on chronic dCHF  Obstructive uropathy  DEISY on CKD  Acute hypoxic respiratory failure  Hemoptysis  HCAP  Anemia  PAF  HTN  DM2  JOANN     Impairments: generalized weakness, decreased endurance, balance     Recommendations: Following patient for potential ARU transfer once medically ready. Would like to see updated therapy notes to ensure still needs inpatient rehab. Thank you for this consult. Please contact me with any questions or concerns. Alison Rivas.  Anabelle Hyde MD 11/28/2022, 12:58 PM

## 2022-11-28 NOTE — PLAN OF CARE
Level of Function: Assess patient stability and activity tolerance for standing, transferring and ambulating with or without assistive devices  Goal: Maintain proper alignment of affected body part  Outcome: Progressing  Flowsheets (Taken 11/27/2022 2107)  Maintain proper alignment of affected body part: Support and protect limb and body alignment per provider's orders  Goal: Return ADL status to a safe level of function  Outcome: Progressing  Flowsheets (Taken 11/27/2022 2107)  Return ADL Status to a Safe Level of Function: Administer medication as ordered     Problem: Musculoskeletal - Adult  Goal: Maintain proper alignment of affected body part  Outcome: Progressing  Flowsheets (Taken 11/27/2022 2107)  Maintain proper alignment of affected body part: Support and protect limb and body alignment per provider's orders     Problem: Genitourinary - Adult  Goal: Absence of urinary retention  Outcome: Progressing  Flowsheets (Taken 11/27/2022 2107)  Absence of urinary retention: Assess patients ability to void and empty bladder  Goal: Urinary catheter remains patent  Outcome: Progressing  Flowsheets (Taken 11/27/2022 2107)  Urinary catheter remains patent: Assess patency of urinary catheter     Problem: Infection - Adult  Goal: Absence of infection at discharge  Outcome: Progressing  Flowsheets (Taken 11/27/2022 2107)  Absence of infection at discharge: Assess and monitor for signs and symptoms of infection  Goal: Absence of infection during hospitalization  Outcome: Progressing  Flowsheets (Taken 11/27/2022 2107)  Absence of infection during hospitalization: Assess and monitor for signs and symptoms of infection  Goal: Absence of fever/infection during anticipated neutropenic period  Outcome: Progressing  Flowsheets (Taken 11/27/2022 2107)  Absence of fever/infection during anticipated neutropenic period: Monitor white blood cell count     Problem: Infection - Adult  Goal: Absence of infection during hospitalization  Outcome: Progressing  Flowsheets (Taken 11/27/2022 2107)  Absence of infection during hospitalization: Assess and monitor for signs and symptoms of infection     Problem: Metabolic/Fluid and Electrolytes - Adult  Goal: Electrolytes maintained within normal limits  Outcome: Progressing  Flowsheets (Taken 11/27/2022 2107)  Electrolytes maintained within normal limits: Monitor labs and assess patient for signs and symptoms of electrolyte imbalances  Goal: Hemodynamic stability and optimal renal function maintained  Outcome: Progressing  Flowsheets (Taken 11/27/2022 2107)  Hemodynamic stability and optimal renal function maintained: Monitor labs and assess for signs and symptoms of volume excess or deficit  Goal: Glucose maintained within prescribed range  Outcome: Progressing  Flowsheets (Taken 11/27/2022 2107)  Glucose maintained within prescribed range: Monitor blood glucose as ordered     Problem: Neurosensory - Adult  Goal: Achieves stable or improved neurological status  Outcome: Progressing  Flowsheets (Taken 11/27/2022 2107)  Achieves stable or improved neurological status: Assess for and report changes in neurological status  Goal: Achieves maximal functionality and self care  Outcome: Progressing  Flowsheets (Taken 11/27/2022 2107)  Achieves maximal functionality and self care: Monitor swallowing and airway patency with patient fatigue and changes in neurological status     Problem: Gastrointestinal - Adult  Goal: Minimal or absence of nausea and vomiting  Outcome: Progressing  Flowsheets (Taken 11/27/2022 2107)  Minimal or absence of nausea and vomiting: Administer IV fluids as ordered to ensure adequate hydration  Goal: Maintains or returns to baseline bowel function  Outcome: Progressing  Flowsheets (Taken 11/27/2022 2107)  Maintains or returns to baseline bowel function: Assess bowel function  Goal: Maintains adequate nutritional intake  Outcome: Progressing  Flowsheets (Taken 11/27/2022 2107)  Maintains adequate nutritional intake: Monitor percentage of each meal consumed     Problem: Skin/Tissue Integrity  Goal: Absence of new skin breakdown  Description: 1. Monitor for areas of redness and/or skin breakdown  2. Assess vascular access sites hourly  3. Every 4-6 hours minimum:  Change oxygen saturation probe site  4. Every 4-6 hours:  If on nasal continuous positive airway pressure, respiratory therapy assess nares and determine need for appliance change or resting period.   Outcome: Progressing     Problem: Chronic Conditions and Co-morbidities  Goal: Patient's chronic conditions and co-morbidity symptoms are monitored and maintained or improved  Outcome: Progressing  Flowsheets (Taken 11/27/2022 2107)  Care Plan - Patient's Chronic Conditions and Co-Morbidity Symptoms are Monitored and Maintained or Improved: Monitor and assess patient's chronic conditions and comorbid symptoms for stability, deterioration, or improvement

## 2022-11-28 NOTE — PROGRESS NOTES
[facility] Progress Note  11/28/2022 7:29 AM  Subjective:   Admit Date: 11/15/2022      Chief Complaint:I feel a little better --I can't eat this food --it is too dry     Interval History: Still with volume overload--nephrol has restarted lasix drip at 20 mg /hr--would like to diurese an additional 5-10#   Pt/Ot are seeing him  Wt has decr 2# past 24 hr   D#3 of merrem for HAP --sputum cx---nl maureen   Diet: ADULT DIET; Regular; Low Sodium (2 gm); Low Potassium (Less than 3000 mg/day); 1200 ml  Medications:   Scheduled Meds:   miconazole   Topical BID    meropenem  500 mg IntraVENous Q12H    epoetin kenny-epbx  30,000 Units SubCUTAneous Q7 Days    heparin (porcine)  5,000 Units SubCUTAneous BID    levalbuterol  1.25 mg Nebulization TID    sodium chloride nebulizer  3 mL Nebulization TID    sodium chloride flush  5-40 mL IntraVENous 2 times per day    tamsulosin  0.4 mg Oral Daily    amiodarone  100 mg Oral Daily    aspirin EC  162 mg Oral Daily    atorvastatin  40 mg Oral Daily    linaclotide  145 mcg Oral QAM AC    montelukast  10 mg Oral Daily    pantoprazole  40 mg Oral Daily    verapamil  240 mg Oral BID    insulin lispro  0-4 Units SubCUTAneous TID WC    insulin lispro  0-4 Units SubCUTAneous Nightly     Continuous Infusions:   fuosemide 20 mg/hr (11/27/22 1652)    sodium chloride      dextrose         Review of Systems -   General ROS: afebrile  Respiratory ROS: positive for - cough and shortness of breath  Cardiovascular ROS: no chest pain  Musculoskeletal ROS:positive for - low back pain   Neurological ROS: positive for - weakness    Objective:   Vitals: BP (!) 132/57   Pulse 54   Temp 97.5 °F (36.4 °C) (Axillary)   Resp 18   Ht 5' 8\" (1.727 m)   Wt 233 lb 3.2 oz (105.8 kg)   SpO2 96%   BMI 35.46 kg/m²   General appearance: alert and cooperative with exam  HEENT: Head: Normocephalic, no lesions, without obvious abnormality.   Neck: no adenopathy, no carotid bruit, no JVD, supple, symmetrical, trachea midline, and thyroid not enlarged, symmetric, no tenderness/mass/nodules  Lungs: diminished breath sounds bibasilar  Heart: regular rate and rhythm, S1, S2 normal, no murmur, click, rub or gallop  Abdomen: obese--palp fluid   Extremities: 1+ edema--hose in place   Neurologic: Mental status: Alert, oriented, thought content appropriate    No results displayed because visit has over 200 results. Assessment & Plan:   Principal Problem:    Anasarca--cont lasic drip under renal direction--need to diurese another 5-10 pounds   Active Problems:    JOANN (obstructive sleep apnea)--recc CPAP mask     Type 2 diabetes mellitus with stage 4 chronic kidney disease, with long-term current use of insulin (HCC)--cont SSI     Paroxysmal atrial fibrillation (HCC)    Generalized weakness--cont pt/ot     Worsening renal function    Essential hypertension    Slow transit constipation  Resolved Problems:    * No resolved hospital problems.  *  Cont iv lasix drip--cont pt/ot--look to ARU once off lasix drip -will try to enc diet but needs to limit salt   Please note that over 35 minutes was spent in evaluating the patient, review of records and results, discussion with staff/family, etc.    See Santos MD

## 2022-11-28 NOTE — PROGRESS NOTES
Physical Therapy  Facility/Department: [address] MED SURG  Physical Therapy treatment session- Devaughn Pal [address] with OT    Name: Candace Stallings  : 1940  MRN: [id number]  Date of Service: 2022    Discharge Recommendations:  Patient would benefit from continued therapy after discharge, Patient able to tolerate 3hrs of therapy a day (5-7x/wk)   PT Equipment Recommendations  Equipment Needed: No  Other: defer to next level of care    Candace Stallings scored a 15/24 on the AM-PAC short mobility form. Current research shows that an AM-PAC score of 17 or less is typically not associated with a discharge to the patient's home setting. Based on the patient's AM-PAC score and their current functional mobility deficits, it is recommended that the patient have 5-7 sessions per week of Physical Therapy at d/c to increase the patient's independence. At this time, this patient demonstrates complex nursing, medical, and rehabilitative needs, and would benefit from intensive rehabilitation services upon discharge from the Inpatient setting. This patient demonstrates the ability to participate in and benefit from an intensive therapy program with a coordinated interdisciplinary team approach to foster frequent, structured, and documented communication among disciplines, who will work together to establish, prioritize, and achieve treatment goals. Please see assessment section for further patient specific details. If patient discharges prior to next session this note will serve as a discharge summary. Please see below for the latest assessment towards goals. Patient Diagnosis(es): The primary encounter diagnosis was Hypoxia. Diagnoses of Anasarca, Acute renal failure superimposed on chronic kidney disease, unspecified CKD stage, unspecified acute renal failure type ([address] and Hypothermia, initial encounter were also pertinent to this visit.   Past Medical History:  has a past medical history of Allergic rhinitis, Asthma, Atrial fibrillation ([facility] [address] Carotid artery stenosis, Chronic kidney disease, COPD (chronic obstructive pulmonary disease) (HCC), CPAP (continuous positive airway pressure) dependence, ESBL (extended spectrum beta-lactamase) producing bacteria infection, Hyperlipidemia, Hypertension, Iron deficiency anemia, Obstructive sleep apnea, and Type II or unspecified type diabetes mellitus without mention of complication, not stated as uncontrolled. Past Surgical History:  has a past surgical history that includes Gallbladder surgery (1981); Upper gastrointestinal endoscopy (7-2005    7/10/2009); Colonoscopy (7/10/2009); Cataract removal (2/2012); Pain management procedure (Right, 10/20/2021); Pain management procedure (Left, 12/8/2021); and CT BIOPSY BONE MARROW (11/1/2022). Assessment   Body Structures, Functions, Activity Limitations Requiring Skilled Therapeutic Intervention: Decreased functional mobility   Assessment: Today, pt continues to tolerate max distance of 25' due to limited activity tolerance and dizziness. Pt continues to have deficits with functional mobility and would benefit from continued skilled pt post d/c at a high intensity to allow for safe return home.   Treatment Diagnosis: difficulty walking  Therapy Prognosis: Good  Requires PT Follow-Up: Yes  Activity Tolerance  Activity Tolerance: Patient limited by fatigue;Patient limited by endurance     Plan   Physcial Therapy Plan  General Plan: 3-5 times per week  Current Treatment Recommendations: Strengthening, Balance training, Functional mobility training, Transfer training, Gait training, Therapeutic activities, Patient/Caregiver education & training, Safety education & training  Safety Devices  Type of Devices: Call light within reach, Chair alarm in place, Left in chair, Nurse notified, Gait belt  Restraints  Restraints Initially in Place: No     Restrictions  Restrictions/Precautions  Restrictions/Precautions: Fall Risk, Bed Alarm, Up as Tolerated, Contact Precautions  Position Activity Restriction  Other position/activity restrictions: 3 L O2; fluid restriction, low sodium/low potassium diet; IV     Subjective   General  Chart Reviewed: Yes  Patient assessed for rehabilitation services?: Yes  Additional Pertinent Hx: Per Lukas Dove MD H&P on 11-: The pt is an 80 yo male who presented to the ED with weakness, increasing edema and inability to urinate. The pt recently in the hospital for weakness and DEISY and pna. In the ED the pt's renal function wa worse and he was volume overloaded; he was SOB with crackles. PMHx: asthma, a-fib, carotid artery stenosis, CKD, COPD, HTN, anemia, JOANN on c-pap, DM  Response To Previous Treatment: Patient with no complaints from previous session. Family / Caregiver Present: Yes (Spouse)  Referring Practitioner: Lukas Dove MD  Referral Date : 11/16/22  Diagnosis: Anasarca, generalized weakness, worsening renal function  Follows Commands: Within Functional Limits  Subjective  Subjective: Pt pleasant and agreeable to treat. Pt in chair upon entry/exit of room. Pt reporting dizziness, like the room is spinning post initial 20' ambulation. Pt requested BP check post reatment, 129/53.  SpO2 94% on 3L O2 via NC         Social/Functional History  Social/Functional History  Lives With: Spouse  Type of Home: House  Home Layout: Able to Live on Main level with bedroom/bathroom, Multi-level ([place] 1 1/2 story)  Home Access: Stairs to enter with rails  Entrance Stairs - Number of Steps: 1+1  Entrance Stairs - Rails: Both  Bathroom Shower/Tub: Tub/Shower unit  Bathroom Toilet: Standard (comfort height commode)  Bathroom Equipment: Shower chair (sliding doors on shower with bars)  Home Equipment: Cane, Rollator, Oxygen (c-pap; home O2 since last hospital stay, 2L continuous)  Has the patient had two or more falls in the past year or any fall with injury in the past year?: No  Receives Help From: Home health (home PT and OT)  ADL Assistance: Needs assistance (son assisting with shower in the past week; used BSC for BM and urinals (prior to last hospital stay the pt was indep))  Homemaking Assistance: Independent (wife cleans. Pt completes bill paying, medication management, cooking  (needing assist for all in the past week))  Ambulation Assistance: Independent (the pt using the rollator in the past week but used a cane prior to last hospital stay)  Transfer Assistance: Independent  Active : Yes (not since last hospital stay)  Occupation: Retired  IADL Comments: sleeping in a recliner    Objective     Bed mobility  Bed Mobility Comments: Unable to assess. Pt in bed upon entry/exit of room. Transfers  Sit to Stand: Minimal Assistance;2 Person Assistance;Contact guard assistance (Nikhil x1 from recliner, Nikhil x2 from smaller arm chair in room. Verbal cuing for hand placement, CGA from toilet with rocking.)  Stand to Sit: Minimal Assistance;Contact guard assistance (CGA to chair and recliner, Nikhil to toilet)    Ambulation  Surface: Level tile  Device: Rolling Walker  Other Apparatus: O2 (IV, managed by SPTA and OT)  Assistance: Contact guard assistance  Quality of Gait: moderate us of B UEs on RW for support. distance limited by activity tolerance and dizziness. Gait Deviations: Slow Riri;Decreased step length;Decreased step height  Distance: 20', 5', 25'  Comments: SpO2 on 3L O2 via NC at 94% seated post treatment. Chair pulled up behind pt after first 20' to sit due to dizziness.   More Ambulation?: No  Stairs/Curb  Stairs?: No     Balance  Posture: Fair  Sitting - Static: Good  Sitting - Dynamic: Good;-  Standing - Static: Fair (OT performed pericare with pt in standing post pt attempt of BM.)  Standing - Dynamic: Fair    Exercise Treatment: Exercises in longsitting: x20 APs B, x10 quad sets with 5 sec hold B      Other activities: pt used restroom during session    AM-PAC Score  AM-PAC Inpatient Mobility Raw Score : 15 (11/28/22 1608)  AM-PAC Inpatient T-Scale Score : 39.45 (11/28/22 1608)  Mobility Inpatient CMS 0-100% Score: 57.7 (11/28/22 1608)  Mobility Inpatient CMS G-Code Modifier : CK (11/28/22 1608)       Goals  Short Term Goals  Time Frame for Short Term Goals: upon d/c  (all goals ongoing as of 11/28)  Short Term Goal 1: Bed mobility with min A. Short Term Goal 2: Transfers sit <> stand with min A. (met when standing to the stedy)- MET, new goal SBA  Short Term Goal 3: Ambulate with walker 25 feet with min A. (partially met)- MET, new goal SBA  Patient Goals   Patient Goals : to be able to walk again       Education  Patient Education  Education Given To: Patient  Education Provided: Role of Therapy;Plan of Care;Equipment;Transfer Training;Energy Conservation;Home Exercise Program  Education Provided Comments: APs and quad sets to perform while up in chair or in bed to assist with blood circulation of B LEs  Education Method: Verbal;Demonstration  Barriers to Learning: None  Education Outcome: Demonstrated understanding;Continued education needed;Verbalized understanding      Therapy Time   Individual Concurrent Group Co-treatment   Time In 1523         Time Out 1602         Minutes 39         Timed Code Treatment Minutes: BELGICA Bear  Therapist was present, directed the patient's care, made skilled judgement, and was responsible for assessment and treatment of the patient.        Electronically signed by Paulo Dorantes, [place] [id number] on 11/28/2022 at 4:20 PM

## 2022-11-28 NOTE — PROGRESS NOTES
Occupational Therapy  Facility/Department: [facility]  Occupational Therapy Daily Treatment Note    Name: Alejandro Hobson  : 1940  MRN: [id number]  Date of Service: 2022    Discharge Recommendations:  Patient would benefit from continued therapy after discharge, 5-7 sessions per week  OT Equipment Recommendations  Other: TBD by d/c site    Gilmar Suresh scored a 14/24 on the AM-PAC ADL Inpatient form. Current research shows that an AM-PAC score of 17 or less is typically not associated with a discharge to the patient's home setting. Based on the patient's AM-PAC score and their current ADL deficits, it is recommended that the patient have 5-7 sessions per week of Occupational Therapy at d/c to increase the patient's independence. At this time, this patient demonstrates complex nursing, medical, and rehabilitative needs, and would benefit from intensive rehabilitation services upon discharge from the Inpatient setting. This patient demonstrates the ability to participate in and benefit from an intensive therapy program with a coordinated interdisciplinary team approach to foster frequent, structured, and documented communication among disciplines, who will work together to establish, prioritize, and achieve treatment goals. Please see assessment section for further patient specific details. If patient discharges prior to next session this note will serve as a discharge summary. Please see below for the latest assessment towards goals. Patient Diagnosis(es): The primary encounter diagnosis was Hypoxia. Diagnoses of Anasarca, Acute renal failure superimposed on chronic kidney disease, unspecified CKD stage, unspecified acute renal failure type ([address] and Hypothermia, initial encounter were also pertinent to this visit.   Past Medical History:  has a past medical history of Allergic rhinitis, Asthma, Atrial fibrillation ([address] Carotid artery stenosis, Chronic kidney disease, COPD (chronic obstructive pulmonary disease) (HCC), CPAP (continuous positive airway pressure) dependence, ESBL (extended spectrum beta-lactamase) producing bacteria infection, Hyperlipidemia, Hypertension, Iron deficiency anemia, Obstructive sleep apnea, and Type II or unspecified type diabetes mellitus without mention of complication, not stated as uncontrolled. Past Surgical History:  has a past surgical history that includes Gallbladder surgery (1981); Upper gastrointestinal endoscopy (7-2005    7/10/2009); Colonoscopy (7/10/2009); Cataract removal (2/2012); Pain management procedure (Right, 10/20/2021); Pain management procedure (Left, 12/8/2021); and CT BIOPSY BONE MARROW (11/1/2022). Assessment   Performance deficits / Impairments: Decreased functional mobility ; Decreased ADL status; Decreased endurance;Decreased fine motor control;Decreased balance;Decreased strength  Assessment: Pt is an 79 y.o. male Anasarca, Worsening renal function, Generalized weakness. Pt recent admit with weakness and DEISY, d/c'd home on 11/8. Prior to recent admits, pt living with wife and independent ADLs and fxl mobility with cane. Today, pt required increased assist to min A x1-2 for fxl transfers, and min A for fxl mobility with RW limited to shorter room distances with chair follow d/t dizziness and need for sudden rest break. Pt total A for toileting, and anticipate pt would require max A for LB ADLs, min A for UB ADLs. Pt continues to function below baseline d/t the above deficits, and demonstrates need for ongoing skilled OT at d/c to maximize pt's safety and independence prior to return home. Prognosis: Fair;Good  History: see above  REQUIRES OT FOLLOW-UP: Yes  Activity Tolerance  Activity Tolerance: Patient limited by fatigue  Activity Tolerance Comments: dizziness        Plan   Occupational Therapy Plan  Times Per Week: 3-5  Times Per Day:  Once a day  Current Treatment Recommendations: Balance training, Functional mobility training, Endurance training, Strengthening, Safety education & training, Self-Care / ADL, Equipment evaluation, education, & procurement, Patient/Caregiver education & training, ROM     Restrictions  Restrictions/Precautions  Restrictions/Precautions: Fall Risk, Bed Alarm, Up as Tolerated, Contact Precautions  Position Activity Restriction  Other position/activity restrictions: 3 L O2; fluid restriction, low sodium/low potassium diet; IV    Subjective   General  Chart Reviewed: Yes, Progress Notes  Patient assessed for rehabilitation services?: Yes  Additional Pertinent Hx: Per Good Carrillo MD's H&P 11/16/2022: \"This is an 65-year-old white male who was recently admitted to the hospital with weakness and acute kidney injury who presented back to the emergency room with diffuse weakness, inability to urinate and increasing edema. Patient was just admitted with weakness and worsened kidney function. Patient was felt to be intravascularly dry although he did have some peripheral edema. He was given some fluids with some improvement in his renal function but if he received any diuretics he would have a worsening of his kidney function. Patient's Diovan and diuretics were held at discharge. Additionally the patient had developed a bibasilar pneumonia and this was treated with antibiotics and he seemed to recover well. Patient presented back to the emergency room last evening with approximately 24 hours of increasing weakness and difficulty with urination. Upon presentation his renal function was again a bit worse and his fluid volume overload was worsened. He was short of breath and does have some crackles. \"  Family / Caregiver Present: Yes (Spouse)  Referring Practitioner: Good Carrillo MD  Diagnosis: Anasarca, Worsening renal function, Generalized weakness  Subjective  Subjective: Pt met b/s for OT cotx with PT.  Pt seated in recliner on arrival, agreeable to participate in therapy. Pt c/o dizziness with ambulation. Social/Functional History  Social/Functional History  Lives With: Spouse  Type of Home: House  Home Layout: Able to Live on Main level with bedroom/bathroom, Multi-level ([place] 1 1/2 story)  Home Access: Stairs to enter with rails  Entrance Stairs - Number of Steps: 1+1  Entrance Stairs - Rails: Both  Bathroom Shower/Tub: Tub/Shower unit  Bathroom Toilet: Standard (comfort height commode)  Bathroom Equipment: Shower chair (sliding doors on shower with bars)  Home Equipment: Cane, Rollator, Oxygen (c-pap; home O2 since last hospital stay, 2L continuous)  Has the patient had two or more falls in the past year or any fall with injury in the past year?: No  Receives Help From: Home health (home PT and OT)  ADL Assistance: Needs assistance (son assisting with shower in the past week; used BSC for BM and urinals (prior to last hospital stay the pt was indep))  Homemaking Assistance: Independent (wife cleans. Pt completes bill paying, medication management, cooking  (needing assist for all in the past week))  Ambulation Assistance: Independent (the pt using the rollator in the past week but used a cane prior to last hospital stay)  Transfer Assistance: Independent  Active : Yes (not since last hospital stay)  Occupation: Retired  IADL Comments: sleeping in a recliner       Objective       Safety Devices  Type of Devices: Call light within reach; Chair alarm in place; Left in chair;Nurse notified;Gait belt  Restraints  Restraints Initially in Place: No    Transfers  Sit to stand: Minimal assistance;2 Person assistance (min A x1 recliner >RW, Min A x2 from lower armchair, CGA from commode using rocking technique)  Stand to sit: Minimal assistance;Contact guard assistance  Toilet Transfers  Equipment Used: Grab bars (grab bar on L and BSC on R for additional UE support)  Toilet Transfer: Minimal assistance  Toilet Transfers Comments: Min A RW>commode, CGA commode >RW, pt used rocking technique to achieve momentum for transfer    ADL  Toileting: Dependent/Total  Toileting Skilled Clinical Factors: Pt attempted a bowel movement on the toilet without success. Pt did have smear of bowel so assist given for hygiene. Pt has gordon catheter. Bed mobility  Bed Mobility Comments: NT-pt seated in recliner at start/end of session    Balance  Sitting balance: Supervision (seated on toilet); Standing balance: CGA (at RW); Fxl mobility: Pt completed fxl mobility ~20 ft, ~25 ft with RW and Min A with chair follow. Pt required chair to be brought up for seated rest break on first ambulation trial d/t dizziness. Cognition  Overall Cognitive Status: Exceptions  Safety Judgement: Decreased awareness of need for safety;Decreased awareness of need for assistance  Problem Solving: Decreased awareness of errors;Assistance required to identify errors made  Insights: Decreased awareness of deficits      Education Given To: Patient  Education Provided: Role of Therapy;Plan of Care;Transfer Training;ADL Adaptive Strategies  Education Outcome: Verbalized understanding;Demonstrated understanding                          AM-PAC Score        AM-[facility] Inpatient Daily Activity Raw Score: 14 (11/28/22 1601)  AM-PAC Inpatient ADL T-Scale Score : 33.39 (11/28/22 1601)  ADL Inpatient CMS 0-100% Score: 59.67 (11/28/22 1601)  ADL Inpatient CMS G-Code Modifier : CK (11/28/22 1601)           Goals  Short Term Goals  Time Frame for Short Term Goals: Prior to d/c: status goals 11/28: all goals ongoing  Short Term Goal 1: Pt will complete UB bathing/dressing with SBA. Short Term Goal 2: Pt will complete LB bathing/dressing with mod A using A/E and DME. Short Term Goal 3: Pt will complete toileting with mod A. Short Term Goal 4: Pt will complete fxl mobility and fxl transfers to/from ADL surfaces with min A x1-2 using AD. Short Term Goal 5: Pt will increase activity tolerance to achieve the above goals.   Long Term Goals  Time Frame for Long Term Goals : STGs=LTGs  Patient Goals   Patient goals : to eventually return home.        Therapy Time   Individual Concurrent Group Co-treatment   Time In 1523         Time Out 1602         Minutes [place] [id number]

## 2022-11-28 NOTE — PROGRESS NOTES
Nephrology ([facility]) Progress Note    CC: DEISY on CKD    Subjective:    HPI:  Breathing unchanged. Chart reviewed , events noted  . Labs reviewed . ROS:  In chair. No fever, no chest pain. C/o dizziness. [address]:  medications reviewed. Wife present. Objective:  Blood pressure 133/74, pulse 60, temperature 97.4 °F (36.3 °C), temperature source Oral, resp. rate 20, height 5' 8\" (1.727 m), weight 233 lb 3.2 oz (105.8 kg), SpO2 95 %. Intake/Output Summary (Last 24 hours) at 11/28/2022 1152  Last data filed at 11/28/2022 1127  Gross per 24 hour   Intake 240 ml   Output 2100 ml   Net -1860 ml       General:  NAD, A+Ox3  Chest:  CTAB  CVS:  RRR, no S3. Abdominal:  NTND, soft, +BS  Extremities:  1+ pitting edema, wearing compression stockings  Skin:  no rash, warm. : +gordon    Labs:  Renal panel:  Lab Results   Component Value Date/Time     11/28/2022 07:26 AM    K 3.4 (L) 11/28/2022 07:26 AM    CO2 34 (H) 11/28/2022 07:26 AM    BUN 71 (H) 11/28/2022 07:26 AM    CREATININE 2.9 (H) 11/28/2022 07:26 AM    CALCIUM 8.8 11/28/2022 07:26 AM    PHOS 4.9 11/08/2022 07:14 AM    MG 2.10 11/28/2022 07:26 AM     CBC:  Lab Results   Component Value Date/Time    WBC 5.4 11/28/2022 07:26 AM    HGB 8.0 (L) 11/28/2022 07:26 AM    HCT 25.5 (L) 11/28/2022 07:26 AM     11/28/2022 07:26 AM       Assessment/Plan:  Reviewed old records and labs. 1) DEISY on CKD              - baseline Cr 2.0              - ddx:  CRS vs. ATN              - patient is total body volume overloaded              - renal function noted , lasix gtt adjusted . 2) obstructive uropathy              - gordon in place              - on flomax     3) ACDHF              - echo shows LVEF 53-74%, diastolic dysfunction, elevated PASP 40 mmHg              - continue fluid restriction of 1.2 liters/day              - will cut back on  lasix gtt. - LE edema is \"better\"    4) dizziness   - unchanged  .       5) respiratory              - wean O2 as tolerated     6) anemia              - iron studies okay earlier this month  - retacrit 10,000 units qweek    7) FEN  - hypokalemia               - potassium supp   - metabolic acidosis              - off NaHCO3  - magnesium   - noted . 8 JOANN              - CPAP Use advised .      Alberto Lala MD,FACP

## 2022-11-29 LAB
ALBUMIN SERPL-MCNC: 2.9 G/DL (ref 3.4–5)
ALP BLD-CCNC: 96 U/L (ref 40–129)
ALT SERPL-CCNC: 24 U/L (ref 10–40)
ANION GAP SERPL CALCULATED.3IONS-SCNC: 12 MMOL/L (ref 3–16)
AST SERPL-CCNC: 25 U/L (ref 15–37)
BILIRUB SERPL-MCNC: 0.5 MG/DL (ref 0–1)
BILIRUBIN DIRECT: <0.2 MG/DL (ref 0–0.3)
BILIRUBIN, INDIRECT: ABNORMAL MG/DL (ref 0–1)
BUN BLDV-MCNC: 78 MG/DL (ref 7–20)
CALCIUM SERPL-MCNC: 8.3 MG/DL (ref 8.3–10.6)
CHLORIDE BLD-SCNC: 94 MMOL/L (ref 99–110)
CO2: 35 MMOL/L (ref 21–32)
CREAT SERPL-MCNC: 2.7 MG/DL (ref 0.8–1.3)
GFR SERPL CREATININE-BSD FRML MDRD: 23 ML/MIN/{1.73_M2}
GLUCOSE BLD-MCNC: 114 MG/DL (ref 70–99)
GLUCOSE BLD-MCNC: 116 MG/DL (ref 70–99)
GLUCOSE BLD-MCNC: 123 MG/DL (ref 70–99)
GLUCOSE BLD-MCNC: 131 MG/DL (ref 70–99)
GLUCOSE BLD-MCNC: 178 MG/DL (ref 70–99)
HCT VFR BLD CALC: 23.4 % (ref 40.5–52.5)
HEMOGLOBIN: 7.3 G/DL (ref 13.5–17.5)
MAGNESIUM: 2 MG/DL (ref 1.8–2.4)
MCH RBC QN AUTO: 26.7 PG (ref 26–34)
MCHC RBC AUTO-ENTMCNC: 31.5 G/DL (ref 31–36)
MCV RBC AUTO: 85 FL (ref 80–100)
PDW BLD-RTO: 17.6 % (ref 12.4–15.4)
PERFORMED ON: ABNORMAL
PHOSPHORUS: 3.5 MG/DL (ref 2.5–4.9)
PLATELET # BLD: 187 K/UL (ref 135–450)
PMV BLD AUTO: 7.9 FL (ref 5–10.5)
POTASSIUM REFLEX MAGNESIUM: 3.5 MMOL/L (ref 3.5–5.1)
POTASSIUM SERPL-SCNC: 3.5 MMOL/L (ref 3.5–5.1)
PRO-BNP: 2395 PG/ML (ref 0–449)
RBC # BLD: 2.75 M/UL (ref 4.2–5.9)
SODIUM BLD-SCNC: 141 MMOL/L (ref 136–145)
TOTAL PROTEIN: 5.1 G/DL (ref 6.4–8.2)
WBC # BLD: 4.6 K/UL (ref 4–11)

## 2022-11-29 PROCEDURE — 2700000000 HC OXYGEN THERAPY PER DAY

## 2022-11-29 PROCEDURE — 2580000003 HC RX 258: Performed by: STUDENT IN AN ORGANIZED HEALTH CARE EDUCATION/TRAINING PROGRAM

## 2022-11-29 PROCEDURE — 94640 AIRWAY INHALATION TREATMENT: CPT

## 2022-11-29 PROCEDURE — 6370000000 HC RX 637 (ALT 250 FOR IP): Performed by: STUDENT IN AN ORGANIZED HEALTH CARE EDUCATION/TRAINING PROGRAM

## 2022-11-29 PROCEDURE — 80069 RENAL FUNCTION PANEL: CPT

## 2022-11-29 PROCEDURE — 6360000002 HC RX W HCPCS: Performed by: INTERNAL MEDICINE

## 2022-11-29 PROCEDURE — 2580000003 HC RX 258: Performed by: INTERNAL MEDICINE

## 2022-11-29 PROCEDURE — 1200000000 HC SEMI PRIVATE

## 2022-11-29 PROCEDURE — 94760 N-INVAS EAR/PLS OXIMETRY 1: CPT

## 2022-11-29 PROCEDURE — 83880 ASSAY OF NATRIURETIC PEPTIDE: CPT

## 2022-11-29 PROCEDURE — 80076 HEPATIC FUNCTION PANEL: CPT

## 2022-11-29 PROCEDURE — 6370000000 HC RX 637 (ALT 250 FOR IP): Performed by: INTERNAL MEDICINE

## 2022-11-29 PROCEDURE — 83735 ASSAY OF MAGNESIUM: CPT

## 2022-11-29 PROCEDURE — 99233 SBSQ HOSP IP/OBS HIGH 50: CPT | Performed by: INTERNAL MEDICINE

## 2022-11-29 PROCEDURE — 36415 COLL VENOUS BLD VENIPUNCTURE: CPT

## 2022-11-29 PROCEDURE — 85027 COMPLETE CBC AUTOMATED: CPT

## 2022-11-29 RX ORDER — POLYETHYLENE GLYCOL 3350 17 G/17G
17 POWDER, FOR SOLUTION ORAL 2 TIMES DAILY
Status: DISCONTINUED | OUTPATIENT
Start: 2022-11-29 | End: 2022-11-30 | Stop reason: HOSPADM

## 2022-11-29 RX ADMIN — ASPIRIN 162 MG: 81 TABLET, COATED ORAL at 09:39

## 2022-11-29 RX ADMIN — SODIUM CHLORIDE, PRESERVATIVE FREE 10 ML: 5 INJECTION INTRAVENOUS at 21:49

## 2022-11-29 RX ADMIN — VERAPAMIL HYDROCHLORIDE 240 MG: 240 TABLET, FILM COATED, EXTENDED RELEASE ORAL at 21:40

## 2022-11-29 RX ADMIN — MEROPENEM 500 MG: 500 INJECTION, POWDER, FOR SOLUTION INTRAVENOUS at 09:48

## 2022-11-29 RX ADMIN — MEROPENEM 500 MG: 500 INJECTION, POWDER, FOR SOLUTION INTRAVENOUS at 21:48

## 2022-11-29 RX ADMIN — MICONAZOLE NITRATE: 2 POWDER TOPICAL at 09:43

## 2022-11-29 RX ADMIN — SODIUM CHLORIDE 25 ML: 9 INJECTION, SOLUTION INTRAVENOUS at 21:46

## 2022-11-29 RX ADMIN — ATORVASTATIN CALCIUM 40 MG: 40 TABLET, FILM COATED ORAL at 09:39

## 2022-11-29 RX ADMIN — ISODIUM CHLORIDE 3 ML: 0.03 SOLUTION RESPIRATORY (INHALATION) at 08:25

## 2022-11-29 RX ADMIN — HEPARIN SODIUM 5000 UNITS: 5000 INJECTION INTRAVENOUS; SUBCUTANEOUS at 21:43

## 2022-11-29 RX ADMIN — MONTELUKAST SODIUM 10 MG: 10 TABLET, COATED ORAL at 09:39

## 2022-11-29 RX ADMIN — AMIODARONE HYDROCHLORIDE 100 MG: 200 TABLET ORAL at 09:39

## 2022-11-29 RX ADMIN — TAMSULOSIN HYDROCHLORIDE 0.4 MG: 0.4 CAPSULE ORAL at 09:39

## 2022-11-29 RX ADMIN — LEVALBUTEROL 1.25 MG: 1.25 SOLUTION, CONCENTRATE RESPIRATORY (INHALATION) at 08:25

## 2022-11-29 RX ADMIN — PANTOPRAZOLE SODIUM 40 MG: 40 TABLET, DELAYED RELEASE ORAL at 09:39

## 2022-11-29 RX ADMIN — LEVALBUTEROL 1.25 MG: 1.25 SOLUTION, CONCENTRATE RESPIRATORY (INHALATION) at 13:32

## 2022-11-29 RX ADMIN — ISODIUM CHLORIDE 3 ML: 0.03 SOLUTION RESPIRATORY (INHALATION) at 21:05

## 2022-11-29 RX ADMIN — LEVALBUTEROL 1.25 MG: 1.25 SOLUTION, CONCENTRATE RESPIRATORY (INHALATION) at 21:05

## 2022-11-29 RX ADMIN — ISODIUM CHLORIDE 3 ML: 0.03 SOLUTION RESPIRATORY (INHALATION) at 13:32

## 2022-11-29 RX ADMIN — HEPARIN SODIUM 5000 UNITS: 5000 INJECTION INTRAVENOUS; SUBCUTANEOUS at 09:41

## 2022-11-29 RX ADMIN — POLYETHYLENE GLYCOL 3350 17 G: 17 POWDER, FOR SOLUTION ORAL at 09:37

## 2022-11-29 RX ADMIN — MICONAZOLE NITRATE: 2 POWDER TOPICAL at 21:52

## 2022-11-29 RX ADMIN — VERAPAMIL HYDROCHLORIDE 240 MG: 240 TABLET, FILM COATED, EXTENDED RELEASE ORAL at 09:39

## 2022-11-29 NOTE — PROGRESS NOTES
[facility]le PA[facility] Progress Note  11/28/2022 8:59 PM  Subjective:   Admit Date: 11/15/2022      Chief Complaint: I feel OK --I have not had a BM bryan while     Interval History: Acute rehab can accept him   Nephrology has decr iv lasix drip--did diurese  1400 cc but wt has gone up--?? If believable   Will try to stim BM today   Diet: ADULT DIET; Regular; Low Sodium (2 gm); Low Potassium (Less than 3000 mg/day); 1200 ml  Medications:   Scheduled Meds:   miconazole   Topical BID    meropenem  500 mg IntraVENous Q12H    epoetin kenny-epbx  30,000 Units SubCUTAneous Q7 Days    heparin (porcine)  5,000 Units SubCUTAneous BID    levalbuterol  1.25 mg Nebulization TID    sodium chloride nebulizer  3 mL Nebulization TID    sodium chloride flush  5-40 mL IntraVENous 2 times per day    tamsulosin  0.4 mg Oral Daily    amiodarone  100 mg Oral Daily    aspirin EC  162 mg Oral Daily    atorvastatin  40 mg Oral Daily    linaclotide  145 mcg Oral QAM AC    montelukast  10 mg Oral Daily    pantoprazole  40 mg Oral Daily    verapamil  240 mg Oral BID    insulin lispro  0-4 Units SubCUTAneous TID WC    insulin lispro  0-4 Units SubCUTAneous Nightly     Continuous Infusions:   fuosemide 5 mg/hr (11/28/22 1435)    sodium chloride      dextrose         Review of Systems -   General ROS: afebrile  Respiratory ROS: positive for - shortness of breath  Cardiovascular ROS: no chest pain  Musculoskeletal ROS:positive for - low back pain   Neurological ROS: no TIA or stroke symptoms    Objective:   Vitals: /61   Pulse 65   Temp 97.6 °F (36.4 °C) (Oral)   Resp 18   Ht 5' 8\" (1.727 m)   Wt 233 lb 3.2 oz (105.8 kg)   SpO2 97%   BMI 35.46 kg/m²   General appearance: alert and cooperative with exam  HEENT: Head: Normocephalic, no lesions, without obvious abnormality.   Neck: no adenopathy, no carotid bruit, no JVD, supple, symmetrical, trachea midline, and thyroid not enlarged, symmetric, no tenderness/mass/nodules  Lungs: diminished breath sounds bibasilar  Heart: regular rate and rhythm, S1, S2 normal, no murmur, click, rub or gallop  Abdomen: obese--no palp mass   Extremities: 1+ edema --  Neurologic: Mental status: Alert, oriented, thought content appropriate    No results displayed because visit has over 200 results. Assessment & Plan:   Principal Problem:    Anasarca--Still needs diuresis--will defer to nephrol if lasix drip still needed--when off lasix drip--can go to ARU   Active Problems:    JOANN (obstructive sleep apnea)    Type 2 diabetes mellitus with stage 4 chronic kidney disease, with long-term current use of insulin ([facility])---bs are stable     Paroxysmal atrial fibrillation ([facility])    Generalized weakness    Worsening renal function--creat has improved 2.9 to 2.7     Essential hypertension--Continue current therapy      Slow transit constipation  Resolved Problems:    * No resolved hospital problems.  *  Will dose miralax for BM--defer to nephrol re lasix drip---still needs diuresis    Please note that over 35 minutes was spent in evaluating the patient, review of records and results, discussion with staff/family, etc.    Bhavin Ramos MD

## 2022-11-29 NOTE — CARE COORDINATION
Discharge Planning:  Chart reviewed. Per MD \"Pt is not yet medically stable. Plan is ARU once stable. Nephrology has decr iv lasix drip--did diurese  1400 cc but wt has gone up. Worsening renal function. \"  SW will continue to follow to assist with d/c planning needs. Plan: [facility] ARU once medically stable.    Mery Schneider, MSW LS  [phone]  Electronically signed by Leah Johnston on 11/29/2022 at 9:34 AM

## 2022-11-29 NOTE — PROGRESS NOTES
Department of Physical Medicine & Rehabilitation  Progress Note    Patient Identification:  Madiha Cueto  [id number]  : 1940  Admit date: 11/15/2022    Chief Complaint: Anasarca    Subjective:   No acute events overnight. Patient still requiring assist for mobility and ADLs. He attributes to weakness and fatigue. Labs reviewed. ROS: No f/c, n/v, cp     Objective:  Patient Vitals for the past 24 hrs:   BP Temp Temp src Pulse Resp SpO2 Weight   22 1516 (!) 127/52 97.3 °F (36.3 °C) Oral 65 19 95 % --   22 0854 (!) 142/57 97.4 °F (36.3 °C) Axillary 74 20 94 % --   22 0826 -- -- -- 70 18 97 % --   22 0539 -- -- -- -- -- -- 240 lb 15.4 oz (109.3 kg)   22 134/67 97.5 °F (36.4 °C) Oral 68 18 98 % --   22 132/61 97.6 °F (36.4 °C) Oral 65 18 97 % --   22 1940 -- -- -- 65 18 94 % --   22 1734 -- -- -- 65 16 96 % --   22 1629 127/70 97.8 °F (36.6 °C) Oral 64 20 95 % --     Const: Alert. No distress, pleasant. HEENT: Normocephalic, atraumatic. Normal sclera/conjunctiva. MMM. CV: Regular rate and rhythm. Resp: No respiratory distress. Abd: Soft, nontender, nondistended  Ext: +LE edema  Neuro: Alert, oriented, appropriately interactive. Psych: Cooperative, appropriate mood and affect    Laboratory data: Available via EMR.    Last 24 hour lab  Recent Results (from the past 24 hour(s))   POCT Glucose    Collection Time: 22  4:30 PM   Result Value Ref Range    POC Glucose 141 (H) 70 - 99 mg/dl    Performed on ACCU-CHEK    POCT Glucose    Collection Time: 22  7:27 PM   Result Value Ref Range    POC Glucose 158 (H) 70 - 99 mg/dl    Performed on ACCU-CHEK    POCT Glucose    Collection Time: 22  8:13 PM   Result Value Ref Range    POC Glucose 151 (H) 70 - 99 mg/dl    Performed on ACCU-CHEK    Basic Metabolic Panel w/ Reflex to MG    Collection Time: 22  5:17 AM   Result Value Ref Range    Sodium 141 136 - 145 mmol/L Potassium reflex Magnesium 3.5 3.5 - 5.1 mmol/L    Chloride 94 (L) 99 - 110 mmol/L    CO2 35 (H) 21 - 32 mmol/L    Anion Gap 12 3 - 16    Glucose 114 (H) 70 - 99 mg/dL    BUN 78 (H) 7 - 20 mg/dL    Creatinine 2.7 (H) 0.8 - 1.3 mg/dL    Est, Glom Filt Rate 23 (A) >60    Calcium 8.3 8.3 - 10.6 mg/dL   CBC    Collection Time: 11/29/22  5:17 AM   Result Value Ref Range    WBC 4.6 4.0 - 11.0 K/uL    RBC 2.75 (L) 4.20 - 5.90 M/uL    Hemoglobin 7.3 (L) 13.5 - 17.5 g/dL    Hematocrit 23.4 (L) 40.5 - 52.5 %    MCV 85.0 80.0 - 100.0 fL    MCH 26.7 26.0 - 34.0 pg    MCHC 31.5 31.0 - 36.0 g/dL    RDW 17.6 (H) 12.4 - 15.4 %    Platelets 606 851 - 072 K/uL    MPV 7.9 5.0 - 10.5 fL   Renal Function Panel    Collection Time: 11/29/22  5:17 AM   Result Value Ref Range    Potassium 3.5 3.5 - 5.1 mmol/L    Phosphorus 3.5 2.5 - 4.9 mg/dL    Albumin 2.9 (L) 3.4 - 5.0 g/dL   Hepatic Function Panel    Collection Time: 11/29/22  5:17 AM   Result Value Ref Range    Total Protein 5.1 (L) 6.4 - 8.2 g/dL    Alkaline Phosphatase 96 40 - 129 U/L    ALT 24 10 - 40 U/L    AST 25 15 - 37 U/L    Total Bilirubin 0.5 0.0 - 1.0 mg/dL    Bilirubin, Direct <0.2 0.0 - 0.3 mg/dL    Bilirubin, Indirect see below 0.0 - 1.0 mg/dL   Brain Natriuretic Peptide    Collection Time: 11/29/22  5:17 AM   Result Value Ref Range    Pro-BNP 2,395 (H) 0 - 449 pg/mL   Magnesium    Collection Time: 11/29/22  5:17 AM   Result Value Ref Range    Magnesium 2.00 1.80 - 2.40 mg/dL   POCT Glucose    Collection Time: 11/29/22  7:56 AM   Result Value Ref Range    POC Glucose 116 (H) 70 - 99 mg/dl    Performed on ACCU-CHEK    POCT Glucose    Collection Time: 11/29/22 11:29 AM   Result Value Ref Range    POC Glucose 123 (H) 70 - 99 mg/dl    Performed on ACCU-CHEK        Therapy progress:  Physical therapy:  Bed Mobility:     Sit>supine:     Supine>sit:     Transfers:     Sit>stand:     Stand>sit:     Bed<>chair     Stand Pivot:     Lateral transfer:     Car transfer: Ambulation:     Stairs:     Curb: Wheelchair:     Assessment:  Activity Tolerance: Patient limited by fatigue, Patient limited by endurance  Discharge Recommendations: Patient would benefit from continued therapy after discharge, Patient able to tolerate 3hrs of therapy a day (5-7x/wk)      Occupational therapy:   Feeding     Grooming/Oral Hygiene     UE Bathing     LE Bathing     UE Dressing     LE Dressing     Putting On/Taking Off Footwear     Toileting     Assessment:  Activity Tolerance: Patient limited by fatigue, Patient limited by endurance  Discharge Recommendations: Patient would benefit from continued therapy after discharge, 5-7 sessions per week    Speech therapy:            PT  Position Activity Restriction  Other position/activity restrictions: 3 L O2; fluid restriction, low sodium/low potassium diet; IV  Objective     Sit to Stand: Minimal Assistance, 2 Person Assistance, Contact guard assistance (Nikhil x1 from recliner, Nikhil x2 from smaller arm chair in room. Verbal cuing for hand placement, CGA from toilet with rocking.)  Stand to Sit: Minimal Assistance, Contact guard assistance (CGA to chair and recliner, Nikhil to toilet)  Bed to Chair: Dependent/Total  Device: Rolling Walker  Other Apparatus: O2 (IV, managed by SPTA and OT)  Assistance: Contact guard assistance  Distance: 20', 5', 25'  OT  PT Equipment Recommendations  Equipment Needed: No  Other: defer to next level of care  Toilet - Technique: Ambulating  Equipment Used: Grab bars (grab bar on L and BSC on R for additional UE support)  Toilet Transfers Comments: Min A RW>commode, CGA commode >RW, pt used rocking technique to achieve momentum for transfer  Assessment        SLP          Body mass index is 36.64 kg/m².     Assessment:  Acute on chronic dCHF  Obstructive uropathy  DEISY on CKD  Acute hypoxic respiratory failure  Hemoptysis  HCAP  Anemia  PAF  HTN  DM2  JOANN     Impairments: generalized weakness, decreased endurance, balance Recommendations:     Updated therapy notes reviewed. Patient remains a candidate for ARU when medically ready. Thank you for this consult. Please contact me with any questions or concerns. Mian Dorantes.  Mi Rosario MD 11/29/2022, 3:20 PM

## 2022-11-29 NOTE — PROGRESS NOTES
Nephrology ([facility] Progress Note    CC: DEISY on CKD    Subjective:    HPI:  Breathing unchanged. Chart reviewed , events noted  . Labs reviewed . ROS:  In chair. No fever, no chest pain. , improved  dizziness. [address]:  medications reviewed. Wife present. Objective:  Blood pressure (!) 142/57, pulse 74, temperature 97.4 °F (36.3 °C), temperature source Axillary, resp. rate 20, height 5' 8\" (1.727 m), weight 240 lb 15.4 oz (109.3 kg), SpO2 94 %. Intake/Output Summary (Last 24 hours) at 11/29/2022 1213  Last data filed at 11/29/2022 0854  Gross per 24 hour   Intake 600 ml   Output 2000 ml   Net -1400 ml       General:  NAD, A+Ox3  Chest:  CTAB  CVS:  RRR, no S3. Abdominal:  NTND, soft, +BS  Extremities:  1+ pitting edema, wearing compression stockings  Skin:  no rash, warm. : +gordon    Labs:  Renal panel:  Lab Results   Component Value Date/Time     11/29/2022 05:17 AM    K 3.5 11/29/2022 05:17 AM    K 3.5 11/29/2022 05:17 AM    CO2 35 (H) 11/29/2022 05:17 AM    BUN 78 (H) 11/29/2022 05:17 AM    CREATININE 2.7 (H) 11/29/2022 05:17 AM    CALCIUM 8.3 11/29/2022 05:17 AM    PHOS 3.5 11/29/2022 05:17 AM    MG 2.00 11/29/2022 05:17 AM     CBC:  Lab Results   Component Value Date/Time    WBC 4.6 11/29/2022 05:17 AM    HGB 7.3 (L) 11/29/2022 05:17 AM    HCT 23.4 (L) 11/29/2022 05:17 AM     11/29/2022 05:17 AM       Assessment/Plan:  Reviewed old records and labs. 1) DEISY on CKD              - baseline Cr 2.0              - ddx:  CRS vs. ATN              - wt /UOP noted . - renal function noted , dc lasix gtt . 2) obstructive uropathy              - gordon in place              - on flomax     3) ACDHF              - echo shows LVEF 66-09%, diastolic dysfunction, elevated PASP 40 mmHg              - ok to have increase oral fluid intake today  . - LE edema stable     4) dizziness   - unchanged  .       5) respiratory              - wean O2 as tolerated     6) anemia              - iron studies okay earlier this month  - retacrit 10,000 units qweek    7) FEN  - hypokalemia               - potassium supp   - metabolic acidosis              - off NaHCO3  - magnesium   - noted . 8 JOANN              - CPAP Use advised .    Plan dw pt and family in detail     Jeffery Ledesma MD,FACP

## 2022-11-29 NOTE — PLAN OF CARE
Problem: Discharge Planning  Goal: Discharge to home or other facility with appropriate resources  Outcome: Progressing     Problem: Pain  Goal: Verbalizes/displays adequate comfort level or baseline comfort level  Outcome: Progressing     Problem: Safety - Adult  Goal: Free from fall injury  Outcome: Progressing     Problem: ABCDS Injury Assessment  Goal: Absence of physical injury  Outcome: Progressing     Problem: Respiratory - Adult  Goal: Achieves optimal ventilation and oxygenation  Outcome: Progressing  Flowsheets (Taken 11/28/2022 2002)  Achieves optimal ventilation and oxygenation: Assess for changes in respiratory status     Problem: Cardiovascular - Adult  Goal: Maintains optimal cardiac output and hemodynamic stability  Outcome: Progressing  Flowsheets (Taken 11/28/2022 2002)  Maintains optimal cardiac output and hemodynamic stability: Monitor blood pressure and heart rate  Goal: Absence of cardiac dysrhythmias or at baseline  Outcome: Progressing  Flowsheets (Taken 11/28/2022 2002)  Absence of cardiac dysrhythmias or at baseline: Monitor cardiac rate and rhythm     Problem: Skin/Tissue Integrity - Adult  Goal: Skin integrity remains intact  Outcome: Progressing  Flowsheets (Taken 11/28/2022 1123 by Abbie Self RN)  Skin Integrity Remains Intact: Monitor for areas of redness and/or skin breakdown  Goal: Incisions, wounds, or drain sites healing without S/S of infection  Outcome: Progressing     Problem: Musculoskeletal - Adult  Goal: Return mobility to safest level of function  Outcome: Progressing  Flowsheets (Taken 11/28/2022 2002)  Return Mobility to Safest Level of Function: Assess patient stability and activity tolerance for standing, transferring and ambulating with or without assistive devices  Goal: Maintain proper alignment of affected body part  Outcome: Progressing  Flowsheets (Taken 11/28/2022 2002)  Maintain proper alignment of affected body part: Support and protect limb and body alignment per provider's orders  Goal: Return ADL status to a safe level of function  Outcome: Progressing  Flowsheets (Taken 11/28/2022 2002)  Return ADL Status to a Safe Level of Function: Administer medication as ordered     Problem: Infection - Adult  Goal: Absence of infection at discharge  Outcome: Progressing  Goal: Absence of infection during hospitalization  Outcome: Progressing  Goal: Absence of fever/infection during anticipated neutropenic period  Outcome: Progressing     Problem: Neurosensory - Adult  Goal: Achieves stable or improved neurological status  Outcome: Progressing  Flowsheets (Taken 11/28/2022 2002)  Achieves stable or improved neurological status: Assess for and report changes in neurological status  Goal: Achieves maximal functionality and self care  Outcome: Progressing  Flowsheets (Taken 11/28/2022 2002)  Achieves maximal functionality and self care: Monitor swallowing and airway patency with patient fatigue and changes in neurological status     Problem: Gastrointestinal - Adult  Goal: Minimal or absence of nausea and vomiting  Outcome: Progressing  Flowsheets (Taken 11/28/2022 2002)  Minimal or absence of nausea and vomiting: Administer IV fluids as ordered to ensure adequate hydration  Goal: Maintains or returns to baseline bowel function  Outcome: Progressing  Flowsheets (Taken 11/28/2022 2002)  Maintains or returns to baseline bowel function: Assess bowel function  Goal: Maintains adequate nutritional intake  Outcome: Progressing  Flowsheets (Taken 11/28/2022 2002)  Maintains adequate nutritional intake: Monitor percentage of each meal consumed     Problem: Skin/Tissue Integrity  Goal: Absence of new skin breakdown  Description: 1. Monitor for areas of redness and/or skin breakdown  2. Assess vascular access sites hourly  3. Every 4-6 hours minimum:  Change oxygen saturation probe site  4.   Every 4-6 hours:  If on nasal continuous positive airway pressure, respiratory therapy assess nares and determine need for appliance change or resting period.   Outcome: Progressing     Problem: Chronic Conditions and Co-morbidities  Goal: Patient's chronic conditions and co-morbidity symptoms are monitored and maintained or improved  Outcome: Progressing

## 2022-11-30 ENCOUNTER — HOSPITAL ENCOUNTER (INPATIENT)
Age: 82
DRG: 947 | End: 2022-11-30
Attending: PHYSICAL MEDICINE & REHABILITATION | Admitting: PHYSICAL MEDICINE & REHABILITATION
Payer: MEDICARE

## 2022-11-30 VITALS
SYSTOLIC BLOOD PRESSURE: 137 MMHG | TEMPERATURE: 97.8 F | WEIGHT: 228.5 LBS | HEART RATE: 66 BPM | DIASTOLIC BLOOD PRESSURE: 71 MMHG | HEIGHT: 68 IN | BODY MASS INDEX: 34.63 KG/M2 | OXYGEN SATURATION: 95 % | RESPIRATION RATE: 18 BRPM

## 2022-11-30 LAB
ALBUMIN SERPL-MCNC: 2.9 G/DL (ref 3.4–5)
ANION GAP SERPL CALCULATED.3IONS-SCNC: 12 MMOL/L (ref 3–16)
BUN BLDV-MCNC: 70 MG/DL (ref 7–20)
CALCIUM SERPL-MCNC: 8.3 MG/DL (ref 8.3–10.6)
CHLORIDE BLD-SCNC: 93 MMOL/L (ref 99–110)
CO2: 33 MMOL/L (ref 21–32)
CREAT SERPL-MCNC: 2.5 MG/DL (ref 0.8–1.3)
GFR SERPL CREATININE-BSD FRML MDRD: 25 ML/MIN/{1.73_M2}
GLUCOSE BLD-MCNC: 109 MG/DL (ref 70–99)
GLUCOSE BLD-MCNC: 111 MG/DL (ref 70–99)
GLUCOSE BLD-MCNC: 115 MG/DL (ref 70–99)
GLUCOSE BLD-MCNC: 122 MG/DL (ref 70–99)
GLUCOSE BLD-MCNC: 139 MG/DL (ref 70–99)
MAGNESIUM: 1.9 MG/DL (ref 1.8–2.4)
PERFORMED ON: ABNORMAL
PHOSPHORUS: 3.3 MG/DL (ref 2.5–4.9)
POTASSIUM REFLEX MAGNESIUM: 3.5 MMOL/L (ref 3.5–5.1)
POTASSIUM SERPL-SCNC: 3.5 MMOL/L (ref 3.5–5.1)
SODIUM BLD-SCNC: 138 MMOL/L (ref 136–145)
URINE ELECTROPHORESIS INTERP: NORMAL

## 2022-11-30 PROCEDURE — 6370000000 HC RX 637 (ALT 250 FOR IP): Performed by: INTERNAL MEDICINE

## 2022-11-30 PROCEDURE — 2580000003 HC RX 258: Performed by: STUDENT IN AN ORGANIZED HEALTH CARE EDUCATION/TRAINING PROGRAM

## 2022-11-30 PROCEDURE — 6370000000 HC RX 637 (ALT 250 FOR IP): Performed by: STUDENT IN AN ORGANIZED HEALTH CARE EDUCATION/TRAINING PROGRAM

## 2022-11-30 PROCEDURE — 94640 AIRWAY INHALATION TREATMENT: CPT

## 2022-11-30 PROCEDURE — 36415 COLL VENOUS BLD VENIPUNCTURE: CPT

## 2022-11-30 PROCEDURE — 2580000003 HC RX 258: Performed by: INTERNAL MEDICINE

## 2022-11-30 PROCEDURE — 6360000002 HC RX W HCPCS: Performed by: INTERNAL MEDICINE

## 2022-11-30 PROCEDURE — 51798 US URINE CAPACITY MEASURE: CPT

## 2022-11-30 PROCEDURE — 2700000000 HC OXYGEN THERAPY PER DAY

## 2022-11-30 PROCEDURE — 97162 PT EVAL MOD COMPLEX 30 MIN: CPT

## 2022-11-30 PROCEDURE — 99239 HOSP IP/OBS DSCHRG MGMT >30: CPT | Performed by: INTERNAL MEDICINE

## 2022-11-30 PROCEDURE — 6370000000 HC RX 637 (ALT 250 FOR IP): Performed by: PHYSICAL MEDICINE & REHABILITATION

## 2022-11-30 PROCEDURE — 80069 RENAL FUNCTION PANEL: CPT

## 2022-11-30 PROCEDURE — 94760 N-INVAS EAR/PLS OXIMETRY 1: CPT

## 2022-11-30 PROCEDURE — 97116 GAIT TRAINING THERAPY: CPT

## 2022-11-30 PROCEDURE — 83735 ASSAY OF MAGNESIUM: CPT

## 2022-11-30 PROCEDURE — 1280000000 HC REHAB R&B

## 2022-11-30 PROCEDURE — 2500000003 HC RX 250 WO HCPCS: Performed by: INTERNAL MEDICINE

## 2022-11-30 PROCEDURE — 97530 THERAPEUTIC ACTIVITIES: CPT

## 2022-11-30 PROCEDURE — 6360000002 HC RX W HCPCS: Performed by: PHYSICAL MEDICINE & REHABILITATION

## 2022-11-30 RX ORDER — MONTELUKAST SODIUM 10 MG/1
10 TABLET ORAL DAILY
Status: DISCONTINUED | OUTPATIENT
Start: 2022-12-01 | End: 2022-12-08 | Stop reason: HOSPADM

## 2022-11-30 RX ORDER — SODIUM CHLORIDE FOR INHALATION 0.9 %
3 VIAL, NEBULIZER (ML) INHALATION 3 TIMES DAILY
Status: CANCELLED | OUTPATIENT
Start: 2022-11-30

## 2022-11-30 RX ORDER — SODIUM CHLORIDE 9 MG/ML
INJECTION, SOLUTION INTRAVENOUS PRN
Status: DISCONTINUED | OUTPATIENT
Start: 2022-11-30 | End: 2022-12-08 | Stop reason: HOSPADM

## 2022-11-30 RX ORDER — TORSEMIDE 20 MG/1
10 TABLET ORAL DAILY
Status: DISCONTINUED | OUTPATIENT
Start: 2022-12-01 | End: 2022-12-08 | Stop reason: HOSPADM

## 2022-11-30 RX ORDER — ONDANSETRON 4 MG/1
4 TABLET, ORALLY DISINTEGRATING ORAL EVERY 8 HOURS PRN
Status: DISCONTINUED | OUTPATIENT
Start: 2022-11-30 | End: 2022-12-08 | Stop reason: HOSPADM

## 2022-11-30 RX ORDER — ONDANSETRON 4 MG/1
4 TABLET, ORALLY DISINTEGRATING ORAL EVERY 8 HOURS PRN
Status: CANCELLED | OUTPATIENT
Start: 2022-11-30

## 2022-11-30 RX ORDER — SENNA AND DOCUSATE SODIUM 50; 8.6 MG/1; MG/1
1 TABLET, FILM COATED ORAL 2 TIMES DAILY
Status: CANCELLED | OUTPATIENT
Start: 2022-11-30

## 2022-11-30 RX ORDER — POLYETHYLENE GLYCOL 3350 17 G/17G
17 POWDER, FOR SOLUTION ORAL 2 TIMES DAILY
Status: DISCONTINUED | OUTPATIENT
Start: 2022-11-30 | End: 2022-12-08 | Stop reason: HOSPADM

## 2022-11-30 RX ORDER — PANTOPRAZOLE SODIUM 40 MG/1
40 TABLET, DELAYED RELEASE ORAL DAILY
Status: CANCELLED | OUTPATIENT
Start: 2022-11-30

## 2022-11-30 RX ORDER — LANOLIN ALCOHOL/MO/W.PET/CERES
3 CREAM (GRAM) TOPICAL NIGHTLY PRN
Status: CANCELLED | OUTPATIENT
Start: 2022-11-30

## 2022-11-30 RX ORDER — SENNA AND DOCUSATE SODIUM 50; 8.6 MG/1; MG/1
1 TABLET, FILM COATED ORAL 2 TIMES DAILY
Status: DISCONTINUED | OUTPATIENT
Start: 2022-11-30 | End: 2022-12-08 | Stop reason: HOSPADM

## 2022-11-30 RX ORDER — ACETAMINOPHEN 650 MG/1
650 SUPPOSITORY RECTAL EVERY 6 HOURS PRN
Status: CANCELLED | OUTPATIENT
Start: 2022-11-30

## 2022-11-30 RX ORDER — HEPARIN SODIUM 5000 [USP'U]/ML
5000 INJECTION, SOLUTION INTRAVENOUS; SUBCUTANEOUS 2 TIMES DAILY
Status: CANCELLED | OUTPATIENT
Start: 2022-11-30

## 2022-11-30 RX ORDER — AMIODARONE HYDROCHLORIDE 200 MG/1
100 TABLET ORAL DAILY
Status: CANCELLED | OUTPATIENT
Start: 2022-11-30

## 2022-11-30 RX ORDER — HEPARIN SODIUM 5000 [USP'U]/ML
5000 INJECTION, SOLUTION INTRAVENOUS; SUBCUTANEOUS 2 TIMES DAILY
Status: DISCONTINUED | OUTPATIENT
Start: 2022-11-30 | End: 2022-12-08 | Stop reason: HOSPADM

## 2022-11-30 RX ORDER — HYDRALAZINE HYDROCHLORIDE 10 MG/1
10 TABLET, FILM COATED ORAL EVERY 6 HOURS PRN
Status: DISCONTINUED | OUTPATIENT
Start: 2022-11-30 | End: 2022-12-08 | Stop reason: HOSPADM

## 2022-11-30 RX ORDER — BISACODYL 10 MG
10 SUPPOSITORY, RECTAL RECTAL DAILY PRN
Status: CANCELLED | OUTPATIENT
Start: 2022-11-30

## 2022-11-30 RX ORDER — ACETAMINOPHEN 325 MG/1
650 TABLET ORAL EVERY 6 HOURS PRN
Status: DISCONTINUED | OUTPATIENT
Start: 2022-11-30 | End: 2022-12-08 | Stop reason: HOSPADM

## 2022-11-30 RX ORDER — ACETAMINOPHEN 325 MG/1
650 TABLET ORAL EVERY 6 HOURS PRN
Status: CANCELLED | OUTPATIENT
Start: 2022-11-30

## 2022-11-30 RX ORDER — ASPIRIN 81 MG/1
162 TABLET ORAL DAILY
Status: CANCELLED | OUTPATIENT
Start: 2022-11-30

## 2022-11-30 RX ORDER — MECLIZINE HCL 12.5 MG/1
25 TABLET ORAL 2 TIMES DAILY PRN
Status: CANCELLED | OUTPATIENT
Start: 2022-11-30

## 2022-11-30 RX ORDER — ALBUTEROL SULFATE 90 UG/1
2 AEROSOL, METERED RESPIRATORY (INHALATION) EVERY 4 HOURS PRN
Status: CANCELLED | OUTPATIENT
Start: 2022-11-30

## 2022-11-30 RX ORDER — SODIUM CHLORIDE 0.9 % (FLUSH) 0.9 %
5-40 SYRINGE (ML) INJECTION PRN
Status: DISCONTINUED | OUTPATIENT
Start: 2022-11-30 | End: 2022-12-08 | Stop reason: HOSPADM

## 2022-11-30 RX ORDER — DEXTROSE MONOHYDRATE 100 MG/ML
INJECTION, SOLUTION INTRAVENOUS CONTINUOUS PRN
Status: DISCONTINUED | OUTPATIENT
Start: 2022-11-30 | End: 2022-12-08 | Stop reason: HOSPADM

## 2022-11-30 RX ORDER — LEVALBUTEROL 1.25 MG/.5ML
1.25 SOLUTION, CONCENTRATE RESPIRATORY (INHALATION) 3 TIMES DAILY
Status: CANCELLED | OUTPATIENT
Start: 2022-11-30

## 2022-11-30 RX ORDER — TAMSULOSIN HYDROCHLORIDE 0.4 MG/1
0.4 CAPSULE ORAL DAILY
Status: CANCELLED | OUTPATIENT
Start: 2022-11-30

## 2022-11-30 RX ORDER — SODIUM CHLORIDE 9 MG/ML
INJECTION, SOLUTION INTRAVENOUS PRN
Status: CANCELLED | OUTPATIENT
Start: 2022-11-30

## 2022-11-30 RX ORDER — MECLIZINE HCL 12.5 MG/1
25 TABLET ORAL 2 TIMES DAILY PRN
Status: DISCONTINUED | OUTPATIENT
Start: 2022-11-30 | End: 2022-12-08 | Stop reason: HOSPADM

## 2022-11-30 RX ORDER — SODIUM CHLORIDE FOR INHALATION 0.9 %
3 VIAL, NEBULIZER (ML) INHALATION 3 TIMES DAILY
Status: DISCONTINUED | OUTPATIENT
Start: 2022-11-30 | End: 2022-12-08 | Stop reason: HOSPADM

## 2022-11-30 RX ORDER — ALBUTEROL SULFATE 90 UG/1
2 AEROSOL, METERED RESPIRATORY (INHALATION) EVERY 4 HOURS PRN
Status: DISCONTINUED | OUTPATIENT
Start: 2022-11-30 | End: 2022-12-08 | Stop reason: HOSPADM

## 2022-11-30 RX ORDER — MECLIZINE HCL 12.5 MG/1
25 TABLET ORAL 2 TIMES DAILY PRN
Status: DISCONTINUED | OUTPATIENT
Start: 2022-11-30 | End: 2022-11-30 | Stop reason: HOSPADM

## 2022-11-30 RX ORDER — MONTELUKAST SODIUM 10 MG/1
10 TABLET ORAL DAILY
Status: CANCELLED | OUTPATIENT
Start: 2022-11-30

## 2022-11-30 RX ORDER — ONDANSETRON 2 MG/ML
4 INJECTION INTRAMUSCULAR; INTRAVENOUS EVERY 6 HOURS PRN
Status: CANCELLED | OUTPATIENT
Start: 2022-11-30

## 2022-11-30 RX ORDER — POLYETHYLENE GLYCOL 3350 17 G/17G
17 POWDER, FOR SOLUTION ORAL 2 TIMES DAILY
Status: CANCELLED | OUTPATIENT
Start: 2022-11-30

## 2022-11-30 RX ORDER — TAMSULOSIN HYDROCHLORIDE 0.4 MG/1
0.4 CAPSULE ORAL DAILY
Status: DISCONTINUED | OUTPATIENT
Start: 2022-12-01 | End: 2022-12-08 | Stop reason: HOSPADM

## 2022-11-30 RX ORDER — LEVALBUTEROL 1.25 MG/.5ML
1.25 SOLUTION, CONCENTRATE RESPIRATORY (INHALATION) 3 TIMES DAILY
Status: DISCONTINUED | OUTPATIENT
Start: 2022-11-30 | End: 2022-12-08 | Stop reason: HOSPADM

## 2022-11-30 RX ORDER — PANTOPRAZOLE SODIUM 40 MG/1
40 TABLET, DELAYED RELEASE ORAL DAILY
Status: DISCONTINUED | OUTPATIENT
Start: 2022-12-01 | End: 2022-12-08 | Stop reason: HOSPADM

## 2022-11-30 RX ORDER — INSULIN LISPRO 100 [IU]/ML
0-4 INJECTION, SOLUTION INTRAVENOUS; SUBCUTANEOUS NIGHTLY
Status: DISCONTINUED | OUTPATIENT
Start: 2022-11-30 | End: 2022-12-08 | Stop reason: HOSPADM

## 2022-11-30 RX ORDER — ASPIRIN 81 MG/1
162 TABLET ORAL DAILY
Status: DISCONTINUED | OUTPATIENT
Start: 2022-12-01 | End: 2022-12-08 | Stop reason: HOSPADM

## 2022-11-30 RX ORDER — SODIUM CHLORIDE 0.9 % (FLUSH) 0.9 %
5-40 SYRINGE (ML) INJECTION EVERY 12 HOURS SCHEDULED
Status: CANCELLED | OUTPATIENT
Start: 2022-11-30

## 2022-11-30 RX ORDER — AMIODARONE HYDROCHLORIDE 200 MG/1
100 TABLET ORAL DAILY
Status: DISCONTINUED | OUTPATIENT
Start: 2022-12-01 | End: 2022-12-08 | Stop reason: HOSPADM

## 2022-11-30 RX ORDER — POTASSIUM CHLORIDE 20 MEQ/1
20 TABLET, EXTENDED RELEASE ORAL ONCE
Status: COMPLETED | OUTPATIENT
Start: 2022-11-30 | End: 2022-11-30

## 2022-11-30 RX ORDER — BISACODYL 10 MG
10 SUPPOSITORY, RECTAL RECTAL DAILY PRN
Status: DISCONTINUED | OUTPATIENT
Start: 2022-11-30 | End: 2022-12-08 | Stop reason: HOSPADM

## 2022-11-30 RX ORDER — DEXTROSE MONOHYDRATE 100 MG/ML
INJECTION, SOLUTION INTRAVENOUS CONTINUOUS PRN
Status: CANCELLED | OUTPATIENT
Start: 2022-11-30

## 2022-11-30 RX ORDER — LANOLIN ALCOHOL/MO/W.PET/CERES
3 CREAM (GRAM) TOPICAL NIGHTLY PRN
Status: DISCONTINUED | OUTPATIENT
Start: 2022-11-30 | End: 2022-12-08 | Stop reason: HOSPADM

## 2022-11-30 RX ORDER — SODIUM CHLORIDE 0.9 % (FLUSH) 0.9 %
5-40 SYRINGE (ML) INJECTION EVERY 12 HOURS SCHEDULED
Status: DISCONTINUED | OUTPATIENT
Start: 2022-11-30 | End: 2022-12-02

## 2022-11-30 RX ORDER — ATORVASTATIN CALCIUM 40 MG/1
40 TABLET, FILM COATED ORAL DAILY
Status: CANCELLED | OUTPATIENT
Start: 2022-11-30

## 2022-11-30 RX ORDER — VERAPAMIL HYDROCHLORIDE 240 MG/1
240 TABLET, FILM COATED, EXTENDED RELEASE ORAL 2 TIMES DAILY
Status: CANCELLED | OUTPATIENT
Start: 2022-11-30

## 2022-11-30 RX ORDER — ATORVASTATIN CALCIUM 40 MG/1
40 TABLET, FILM COATED ORAL DAILY
Status: DISCONTINUED | OUTPATIENT
Start: 2022-12-01 | End: 2022-12-08 | Stop reason: HOSPADM

## 2022-11-30 RX ORDER — INSULIN LISPRO 100 [IU]/ML
0-4 INJECTION, SOLUTION INTRAVENOUS; SUBCUTANEOUS
Status: DISCONTINUED | OUTPATIENT
Start: 2022-11-30 | End: 2022-12-08 | Stop reason: HOSPADM

## 2022-11-30 RX ORDER — ONDANSETRON 2 MG/ML
4 INJECTION INTRAMUSCULAR; INTRAVENOUS EVERY 6 HOURS PRN
Status: DISCONTINUED | OUTPATIENT
Start: 2022-11-30 | End: 2022-12-08 | Stop reason: HOSPADM

## 2022-11-30 RX ORDER — VERAPAMIL HYDROCHLORIDE 240 MG/1
240 TABLET, FILM COATED, EXTENDED RELEASE ORAL 2 TIMES DAILY
Status: DISCONTINUED | OUTPATIENT
Start: 2022-11-30 | End: 2022-12-08 | Stop reason: HOSPADM

## 2022-11-30 RX ORDER — INSULIN LISPRO 100 [IU]/ML
0-4 INJECTION, SOLUTION INTRAVENOUS; SUBCUTANEOUS NIGHTLY
Status: CANCELLED | OUTPATIENT
Start: 2022-11-30

## 2022-11-30 RX ORDER — INSULIN LISPRO 100 [IU]/ML
0-4 INJECTION, SOLUTION INTRAVENOUS; SUBCUTANEOUS
Status: CANCELLED | OUTPATIENT
Start: 2022-11-30

## 2022-11-30 RX ORDER — HYDRALAZINE HYDROCHLORIDE 10 MG/1
10 TABLET, FILM COATED ORAL EVERY 6 HOURS PRN
Status: CANCELLED | OUTPATIENT
Start: 2022-11-30

## 2022-11-30 RX ORDER — SODIUM CHLORIDE 0.9 % (FLUSH) 0.9 %
5-40 SYRINGE (ML) INJECTION PRN
Status: CANCELLED | OUTPATIENT
Start: 2022-11-30

## 2022-11-30 RX ADMIN — POLYETHYLENE GLYCOL 3350 17 G: 17 POWDER, FOR SOLUTION ORAL at 17:50

## 2022-11-30 RX ADMIN — LEVALBUTEROL 1.25 MG: 1.25 SOLUTION, CONCENTRATE RESPIRATORY (INHALATION) at 19:29

## 2022-11-30 RX ADMIN — POLYETHYLENE GLYCOL 3350 17 G: 17 POWDER, FOR SOLUTION ORAL at 10:09

## 2022-11-30 RX ADMIN — VERAPAMIL HYDROCHLORIDE 240 MG: 240 TABLET, FILM COATED, EXTENDED RELEASE ORAL at 20:34

## 2022-11-30 RX ADMIN — SODIUM CHLORIDE, PRESERVATIVE FREE 10 ML: 5 INJECTION INTRAVENOUS at 10:09

## 2022-11-30 RX ADMIN — MICONAZOLE NITRATE: 2 POWDER TOPICAL at 20:35

## 2022-11-30 RX ADMIN — ISODIUM CHLORIDE 3 ML: 0.03 SOLUTION RESPIRATORY (INHALATION) at 19:29

## 2022-11-30 RX ADMIN — POTASSIUM CHLORIDE 20 MEQ: 20 TABLET, EXTENDED RELEASE ORAL at 12:18

## 2022-11-30 RX ADMIN — SENNOSIDES AND DOCUSATE SODIUM 1 TABLET: 50; 8.6 TABLET ORAL at 12:18

## 2022-11-30 RX ADMIN — MEROPENEM 500 MG: 500 INJECTION, POWDER, FOR SOLUTION INTRAVENOUS at 23:09

## 2022-11-30 RX ADMIN — MICONAZOLE NITRATE: 2 POWDER TOPICAL at 10:09

## 2022-11-30 RX ADMIN — PANTOPRAZOLE SODIUM 40 MG: 40 TABLET, DELAYED RELEASE ORAL at 10:09

## 2022-11-30 RX ADMIN — MECLIZINE 25 MG: 12.5 TABLET ORAL at 01:21

## 2022-11-30 RX ADMIN — EPOETIN ALFA-EPBX 30000 UNITS: 40000 INJECTION, SOLUTION INTRAVENOUS; SUBCUTANEOUS at 14:46

## 2022-11-30 RX ADMIN — SODIUM CHLORIDE: 9 INJECTION, SOLUTION INTRAVENOUS at 23:07

## 2022-11-30 RX ADMIN — SENNOSIDES AND DOCUSATE SODIUM 1 TABLET: 50; 8.6 TABLET ORAL at 20:34

## 2022-11-30 RX ADMIN — LEVALBUTEROL 1.25 MG: 1.25 SOLUTION, CONCENTRATE RESPIRATORY (INHALATION) at 09:26

## 2022-11-30 RX ADMIN — MONTELUKAST SODIUM 10 MG: 10 TABLET, COATED ORAL at 10:09

## 2022-11-30 RX ADMIN — HEPARIN SODIUM 5000 UNITS: 5000 INJECTION INTRAVENOUS; SUBCUTANEOUS at 20:34

## 2022-11-30 RX ADMIN — VERAPAMIL HYDROCHLORIDE 240 MG: 240 TABLET, FILM COATED, EXTENDED RELEASE ORAL at 10:09

## 2022-11-30 RX ADMIN — ATORVASTATIN CALCIUM 40 MG: 40 TABLET, FILM COATED ORAL at 10:09

## 2022-11-30 RX ADMIN — MEROPENEM 500 MG: 500 INJECTION, POWDER, FOR SOLUTION INTRAVENOUS at 10:08

## 2022-11-30 RX ADMIN — ISODIUM CHLORIDE 3 ML: 0.03 SOLUTION RESPIRATORY (INHALATION) at 09:26

## 2022-11-30 RX ADMIN — AMIODARONE HYDROCHLORIDE 100 MG: 200 TABLET ORAL at 10:09

## 2022-11-30 RX ADMIN — TAMSULOSIN HYDROCHLORIDE 0.4 MG: 0.4 CAPSULE ORAL at 10:09

## 2022-11-30 RX ADMIN — SODIUM CHLORIDE, PRESERVATIVE FREE 10 ML: 5 INJECTION INTRAVENOUS at 20:35

## 2022-11-30 RX ADMIN — HEPARIN SODIUM 5000 UNITS: 5000 INJECTION INTRAVENOUS; SUBCUTANEOUS at 10:09

## 2022-11-30 RX ADMIN — ASPIRIN 162 MG: 81 TABLET, COATED ORAL at 10:09

## 2022-11-30 NOTE — DISCHARGE SUMMARY
[address]                               DISCHARGE SUMMARY    PATIENT NAME: Domenica Wynne                    :        1940  MED REC NO:   [id number]                          ROOM:       [id number]  ACCOUNT NO:   [de-identified]                           ADMIT DATE: 11/15/2022  PROVIDER:     Cecelia Rebollar MD                  DISCHARGE DATE:  2022    ATTENDING PROVIDER:  Nish Wheatley MD    DIAGNOSIS ON ADMISSION:  Diastolic congestive heart failure. DIAGNOSES ON DISCHARGE:  1.  Anasarca. 2.  Diastolic congestive heart failure. 3.  Essential hypertension. 4.  Generalized weakness. 5.  Type 2 diabetes. 6.  Acute-on-chronic renal insufficiency. HISTORY OF PRESENT ILLNESS:  The patient is an 21-year-old white male  admitted through the emergency by Dr. Hayder Luciano. He presented with a  history of progressive weakness. He was having difficulty urinating,  increased edema, progressive weakness and presented to the office and  referred over to ER. His baseline creatinine is around 2 or 2.1. He  presented to the ER with numbers slightly elevated from that. He had a  low serum procalcitonin, but was concerned that he may have some ongoing  infection. He was admitted. He was placed on IV Lasix initially and  later switched to a Lasix drip. He was seen in Nephrology consultation  by Dr. Cody Nevarez, who supervised his diuresis and Dr. Steph Mon felt that he  needed significant diuresis. Lasix drip was increased to 20 mg/hour. The patient did diurese approximately 40 pounds about 18-19 L of fluid  through the hospital course. His BUN and creatinine did remain stable. His blood sugars were stable with a sliding scale insulin. Clinically,  he remained quite weak and then arrangements were made for him to go to  the rehab unit for further convalescent care.   Near discharge to the  rehab unit on today 11/30/2022. Laboratory data will include BUN and creatinine of 70 and 2.5, potassium  of 3.5. Blood sugars in the mid 100 range. His white count is 4600,  hemoglobin of 7.3. He is getting IV iron. Plan is for him to go to the rehab unit for further convalescent care. He will be followed by Dr. Ten Guaman and by the Nephrology staff. It is  anticipated, he will need some type of diuretic, but we have to monitor  that versus his fluid status versus BUN and creatinine. At the time of  his transfer to rehab. His medications will include Merrem 500 mg, which should conclude today. Amiodarone 100 mg daily, aspirin 162 mg daily, atorvastatin 40 mg daily,  Renocrit 30,000 units every 7 days, heparin 5000 units subcu b.i.d.,  sliding scale insulin, Xopenex 1.25 mg t.i.d., Linzess 145 mcg daily,  Singulair 10 mg daily, pantoprazole 40 mg daily, tamsulosin 0.4 mg  daily, and Calan  b.i.d. He will be followed by Dr. Ten Guaman of the medical service and by the  doctors on the rehab service. He will remain for full rehab therapy  workup.     Lyla Patterson MD    D: 11/30/2022 9:07:15       T: 11/30/2022 13:39:34     [email or web address]  Job#: [id number]     Doc#: [id number]    CC:

## 2022-11-30 NOTE — PROGRESS NOTES
Nephrology ([facility] Progress Note    CC: DEISY on CKD    Subjective: Tx to rehab ,   Chart reviewed , events noted  . Labs reviewed . ROS:  no chest pain. SOB/GIBBONS , feels weak , tired , sleepy ,  PMFSH:  medications reviewed. Wife present. Objective:  Blood pressure (!) 148/63, pulse 71, temperature 97.6 °F (36.4 °C), temperature source Oral, resp. rate 18, height 5' 8\" (1.727 m), SpO2 96 %. No intake or output data in the 24 hours ending 11/30/22 1123    General:  NAD, A+Ox3  Chest:  CTAB  CVS:  RRR, no S3. Abdominal:  NTND, soft, +BS  Extremities:  1+ pitting edema, wearing compression stockings  Skin:  no rash, warm. : +gordon    Labs:  Renal panel:  Lab Results   Component Value Date/Time     11/30/2022 05:41 AM    K 3.5 11/30/2022 05:41 AM    CO2 33 (H) 11/30/2022 05:41 AM    BUN 70 (H) 11/30/2022 05:41 AM    CREATININE 2.5 (H) 11/30/2022 05:41 AM    CALCIUM 8.3 11/30/2022 05:41 AM    PHOS 3.3 11/30/2022 05:41 AM    MG 1.90 11/30/2022 05:41 AM     CBC:  Lab Results   Component Value Date/Time    WBC 4.6 11/29/2022 05:17 AM    HGB 7.3 (L) 11/29/2022 05:17 AM    HCT 23.4 (L) 11/29/2022 05:17 AM     11/29/2022 05:17 AM       Assessment/Plan:  Reviewed old records and labs. 1) DEISY on CKD              - baseline Cr 2.0              - ddx:  CRS vs. ATN              - renal function better , ok to resume oral diuretics . 2) obstructive uropathy              - gordon in place              - on flomax     3) ACDHF              - echo shows LVEF 93-27%, diastolic dysfunction, elevated PASP 40 mmHg              - low dose diuretics started . - LE edema stable     4) dizziness   - unchanged  .       5) respiratory              - wean O2 as tolerated     6) anemia              - iron studies okay earlier this month  - retacrit 10,000 units qweek    7) FEN  - hypokalemia               - potassium supp   -    8 JOANN              Plan dw pt and family in detail Olga Lidia Lopes MD,FACP

## 2022-11-30 NOTE — PROGRESS NOTES
A complete drug regimen review was completed for this patient.      [x]  No clinically significant medication issue was identified    []   Yes, a clinically significant medication issue was identified     []  Adverse Drug Event:      []  Allergy:      []  Side Effect:      []  Ineffective Therapy:      []  Drug Interaction:     []  Duplicated Therapy:     []  Untreated Indication:      []  Non-adherence:     []  Other:      Nursing/Pharmacy contacted the physician:   Date:  11/30/22  Time: 8343     Actions recommended by physician were completed:  Date : 11/30/22 Time: [address]    Electronically signed by Jacques Turner RN on 11/30/22 at 1:17 PM EST

## 2022-11-30 NOTE — PROGRESS NOTES
Comprehensive Nutrition Assessment    Type and Reason for Visit:  Initial, Consult (ARU admission)    Nutrition Recommendations/Plan:   Consider removing potassium restriction, level has been 3.5   Continue Low sodium diet   Offer nutrition supplements if po intake drops below 50%  Will monitor nutritional adequacy, nutrition-related labs, weights, BMs, and clinical progress      Malnutrition Assessment:  Malnutrition Status:  No malnutrition (11/30/22 1414)        Nutrition Assessment:    Patient admitted to ARU. Currently on a low sodium, low potassium diet regimen eating % meals. Recently admitted to hospital for anasarca, generalized weakness and worsening renal function. No nutrition risks at this time. Will monitor progress and goals of care. Nutrition Related Findings:    BG less than 180 mg/dl; labs reviewed         Current Nutrition Intake & Therapies:    Average Meal Intake: 51-75%, %  Average Supplements Intake: Unable to assess  ADULT DIET; Regular; Low Sodium (2 gm); Low Potassium (Less than 3000 mg/day)    Anthropometric Measures:  Height: 5' 8\" (172.7 cm)  Ideal Body Weight (IBW): 154 lbs (70 kg)       Current Body Weight:  ,   IBW.     Current BMI (kg/m2):                                 Estimated Daily Nutrient Needs:  Energy Requirements Based On: Kcal/kg  Weight Used for Energy Requirements: Ideal  Energy (kcal/day): 6933-8389 (25-30 kcalIBW)  Weight Used for Protein Requirements: Ideal  Protein (g/day):      Fluid (ml/day):      Nutrition Diagnosis:   No nutrition diagnosis at this time related to   as evidenced by      Nutrition Interventions:   Food and/or Nutrient Delivery: Continue Current Diet  Nutrition Education/Counseling: Education not indicated  Coordination of Nutrition Care: Continue to monitor while inpatient       Goals:     Goals: PO intake 50% or greater       Nutrition Monitoring and Evaluation:      Food/Nutrient Intake Outcomes: Food and Nutrient Intake       Discharge Planning:    Continue current diet     BRICENO, [address], RD, LD  Contact: [phone]

## 2022-11-30 NOTE — H&P
Department of Physical Medicine & Rehabilitation  History & Physical      Patient Identification:  Kelly James  : 1940  Admit date: 2022   Attending provider: Bernadine Ivey MD        Primary care provider: Genoveva De La Rosa MD        Chief Complaint: weakness and fatigue     History of Present Illness/Hospital Course:  Patient is an 80 yo M with pmh Afib, CHF, HTN, COPD, DM2, CKD who initially presented 2022 with diffuse weakness, increasing edema, and inability to urinate. Found to have hypothermia, acute on chronic dCHF exacerbation, obstructive uropathy, and DEISY on CKD. He was treated with lasix gtt. Course complicated by hemoptysis and HCAP. Now presents to ARU with impaired mobility and self-care below his baseline. Currently, patient reports generalized weakness and fatigue. He has mild GIBBONS but no shortness of breath at rest. Productive cough with blood tinged sputum has improved. LE swelling much improved since admission. Has had intermittent dizziness/lightheadedness.  He is motivated to start inpatient rehab program.      Prior Level of Function:  Independent for mobility, ADLs     Current Level of Function:  Cristina x 1-2 person for mobility  Up to total assist for ADLs     Pertinent Social History:  Support: lives with spouse  Home set-up: multi level with FF, 1+1 steps to enter    Past Medical History:   Diagnosis Date    Allergic rhinitis     Asthma     Atrial fibrillation ([address]     Carotid artery stenosis     Chronic kidney disease     COPD (chronic obstructive pulmonary disease) (HCC)     CPAP (continuous positive airway pressure) dependence     ESBL (extended spectrum beta-lactamase) producing bacteria infection 2018    urine    Hyperlipidemia     Hypertension     Iron deficiency anemia     Obstructive sleep apnea     Type II or unspecified type diabetes mellitus without mention of complication, not stated as uncontrolled        Past Surgical History: Procedure Laterality Date    CATARACT REMOVAL  2012    bilateral    COLONOSCOPY  7/10/2009    diverticulosis    hemorrhoids    CT BONE MARROW BIOPSY  2022    CT BONE MARROW BIOPSY 2022 [facility] CT    GALLBLADDER SURGERY  1981    PAIN MANAGEMENT PROCEDURE Right 10/20/2021    COOLIEF RADIOFREQUENCY ABLATION - RIGHT KNEE performed by Breonna Dickerson MD at [address] Left 2021    [address] - LEFT KNEE performed by Breonna Dickerson MD at [facility]  7-2005    7/10/2009    normal       Family History   Problem Relation Age of Onset    Heart Disease Mother     Stroke Mother     Vision Loss Mother     High Blood Pressure Father     High Blood Pressure Brother     Diabetes Brother     Sleep Apnea Brother     Arthritis Paternal Grandmother     Diabetes Paternal Grandmother        Social History     Socioeconomic History    Marital status:     Number of children: 11   Occupational History    Occupation: retired   Tobacco Use    Smoking status: Former     Packs/day: 0.50     Years: 18.00     Pack years: 9.00     Types: Cigarettes     Start date: 1958     Quit date: 1988     Years since quittin.9    Smokeless tobacco: Never   Vaping Use    Vaping Use: Never used   Substance and Sexual Activity    Alcohol use: No     Alcohol/week: 0.0 standard drinks    Drug use: No    Sexual activity: Yes     Partners: Female     Social Determinants of Health     Financial Resource Strain: Low Risk     Difficulty of Paying Living Expenses: Not hard at all   Food Insecurity: No Food Insecurity    Worried About Running Out of Food in the Last Year: Never true    Ran Out of Food in the Last Year: Never true       Allergies   Allergen Reactions    Hytrin [Terazosin Hcl]      Ineffective for BP control    Penicillins      Unknown reaction during childhood; Patient tolerated cephalosporins in the past Shrimp Flavor Hives    Sulfa Antibiotics      Unknown reaction in childhood    Sulfasalazine Other (See Comments)    Tekturna [Aliskiren Fumarate]      Ineffective    Vancomycin      Fever    Ciprofloxacin Rash     Fever and rash          Current Facility-Administered Medications   Medication Dose Route Frequency Provider Last Rate Last Admin    sodium chloride flush 0.9 % injection 5-40 mL  5-40 mL IntraVENous 2 times per day Rosas Steward MD        sodium chloride flush 0.9 % injection 5-40 mL  5-40 mL IntraVENous PRN Rosas Steward MD        0.9 % sodium chloride infusion   IntraVENous PRN Rosas Steward MD        ondansetron (ZOFRAN-ODT) disintegrating tablet 4 mg  4 mg Oral Q8H PRN Rosas Steward MD        Or    ondansetron [facility]) injection 4 mg  4 mg IntraVENous Q6H PRN Rosas Steward MD        albuterol sulfate HFA (PROVENTIL;VENTOLIN;PROAIR) 108 (90 Base) MCG/ACT inhaler 2 puff  2 puff Inhalation Q4H PRN Rosas Steward MD        [START ON 12/1/2022] tamsulosin (FLOMAX) capsule 0.4 mg  0.4 mg Oral Daily MD Antoinette Corado ON 12/1/2022] amiodarone (CORDARONE) tablet 100 mg  100 mg Oral Daily Rosas Steward MD        [START ON 12/1/2022] aspirin EC tablet 162 mg  162 mg Oral Daily MD Antoinette Corado ON 12/1/2022] atorvastatin (LIPITOR) tablet 40 mg  40 mg Oral Daily MD Antoinette Corado ON 12/1/2022] linaclotide (LINZESS) capsule 145 mcg (PATIENT SUPPLIED) (Patient Supplied)  145 mcg Oral QAM AC Rosas Steward MD        [START ON 12/1/2022] montelukast (SINGULAIR) tablet 10 mg  10 mg Oral Daily MD Antoinette Corado ON 12/1/2022] pantoprazole (PROTONIX) tablet 40 mg  40 mg Oral Daily Rosas Steward MD        verapamil (CALAN SR) extended release tablet 240 mg  240 mg Oral BID Rosas Steward MD        insulin lispro (HUMALOG) injection vial 0-4 Units  0-4 Units SubCUTAneous TID WC Rosas Steward MD        insulin lispro (HUMALOG) injection vial 0-4 Units  0-4 Units SubCUTAneous Nightly Sheldon Flores MD        glucose chewable tablet 16 g  4 tablet Oral PRN Sheldon Flores MD        dextrose bolus 10% 125 mL  125 mL IntraVENous PRN Sheldon Flores MD        Or    dextrose bolus 10% 250 mL  250 mL IntraVENous PRN Sheldon Flores MD        glucagon (rDNA) injection 1 mg  1 mg SubCUTAneous PRN Sheldon Flores MD        dextrose 10 % infusion   IntraVENous Continuous PRN Sheldon Flores MD        sodium chloride nebulizer 0.9 % solution 3 mL  3 mL Nebulization TID Sheldon Flores MD        levalbuterol Jaswant Glover) nebulizer solution 1.25 mg  1.25 mg Nebulization TID Sheldon Flores MD        epoetin kenny-epbx (RETACRIT) injection 30,000 Units  30,000 Units SubCUTAneous Q7 Days Sheldon Flores MD        meropenem (MERREM) 500 mg IVPB extended (mini-bag)  500 mg IntraVENous Q12H Sheldon Flores MD        sodium chloride (OCEAN, BABY AYR) 0.65 % nasal spray 1 spray  1 spray Each Nostril PRN Sheldon Flores MD        miconazole (MICOTIN) 2 % powder   Topical BID Sheldon Flores MD        polyethylene glycol (GLYCOLAX) packet 17 g  17 g Oral BID Sheldon Flores MD        meclizine (ANTIVERT) tablet 25 mg  25 mg Oral BID PRN Sheldon Folres MD        melatonin tablet 3 mg  3 mg Oral Nightly PRN Mannie Lozano MD        hydrALAZINE (APRESOLINE) tablet 10 mg  10 mg Oral Q6H PRN Mannie Lozano MD        acetaminophen (TYLENOL) tablet 650 mg  650 mg Oral Q6H PRN Mannie Lozano MD        heparin (porcine) injection 5,000 Units  5,000 Units SubCUTAneous BID Mannie Lozano MD        sennosides-docusate sodium (SENOKOT-S) 8.6-50 MG tablet 1 tablet  1 tablet Oral BID Mannie Lozano MD        bisacodyl (DULCOLAX) EC tablet 5 mg  5 mg Oral Daily PRN Mannie Lozano MD        bisacodyl (DULCOLAX) suppository 10 mg  10 mg Rectal Daily PRN Mannie Lozano MD        potassium chloride (KLOR-CON M) extended release tablet 20 mEq 20 mEq Oral Once MD Chaz Ibrahim Sancho ON 12/1/2022] torsemide (DEMADEX) tablet 10 mg  10 mg Oral Daily Vanessa Navarrete MD             REVIEW OF SYSTEMS:   CONSTITUTIONAL: negative for fevers, chills, diaphoresis, appetite change, night sweats, unexpected weight change, +fatigue  EYES: negative for blurred vision, eye discharge, visual disturbance and icterus  HEENT: negative for hearing loss, tinnitus, ear drainage, sinus pressure, nasal congestion, epistaxis and snoring  RESPIRATORY: Negative for +hemoptysis, cough, sputum production, GIBBONS  CARDIOVASCULAR: negative for chest pain, palpitations, exertional chest pressure/discomfort, syncope, +edema  GASTROINTESTINAL: negative for nausea, vomiting, diarrhea, blood in stool, abdominal pain, +constipation  GENITOURINARY: negative for frequency, dysuria, urinary incontinence, decreased urine volume, and hematuria  HEMATOLOGIC/LYMPHATIC: negative for easy bruising, bleeding and lymphadenopathy  ALLERGIC/IMMUNOLOGIC: negative for recurrent infections, angioedema, anaphylaxis and drug reactions  ENDOCRINE: negative for weight changes and diabetic symptoms including polyuria, polydipsia and polyphagia  MUSCULOSKELETAL: negative for pain, joint swelling, decreased range of motion  NEUROLOGICAL: negative for headaches, slurred speech, unilateral weakness  PSYCHIATRIC/BEHAVIORAL: negative for hallucinations, behavioral problems, confusion and agitation. All pertinent positives are noted in the HPI. Physical Examination:  Vitals: Patient Vitals for the past 24 hrs:   BP Temp Temp src Pulse Resp SpO2 Height   11/30/22 1059 (!) 148/63 97.6 °F (36.4 °C) Oral 71 18 96 % 5' 8\" (1.727 m)       Const: Alert. WDWN. No distress  Eyes: Conjunctiva noninjected, no icterus noted; pupils equal, round, and reactive to light. HENT: Atraumatic, normocephalic; Oral mucosa moist  Neck: Trachea midline, neck supple. No thyromegaly noted.   CV: Regular rate and rhythm, no murmur rub or gallop noted  Resp: Lungs diminished, no rales wheezes or rhonchi, no retractions. Respirations unlabored. GI: Soft, nontender, nondistended. Normal bowel sounds. No palpable masses. Skin: Normal temperature and turgor. No rashes or breakdown noted on exposed areas. Ext: +LE edema improving. No varicosities. MSK: No joint tenderness, erythema, warmth noted. AROM intact. Neuro: Alert, oriented, appropriate. No cranial nerve deficits appreciated. Sensation intact to light touch. Motor examination reveals strength 5-/5 in BUE and BLE except 4/5 hip flexion. No abnormalities with finger/nose noted. Reflexes diminished, symmetric. Psych: Stable mood, normal judgement, normal affect     Lab Results   Component Value Date    WBC 4.6 11/29/2022    HGB 7.3 (L) 11/29/2022    HCT 23.4 (L) 11/29/2022    MCV 85.0 11/29/2022     11/29/2022     Lab Results   Component Value Date    INR 1.51 (H) 11/15/2022    INR 1.38 (H) 11/01/2022    INR 1.29 (H) 06/08/2018    PROTIME 18.2 (H) 11/15/2022    PROTIME 16.9 (H) 11/01/2022    PROTIME 14.7 (H) 06/08/2018     Lab Results   Component Value Date    CREATININE 2.5 (H) 11/30/2022    BUN 70 (H) 11/30/2022     11/30/2022    K 3.5 11/30/2022    CL 93 (L) 11/30/2022    CO2 33 (H) 11/30/2022     Lab Results   Component Value Date    ALT 24 11/29/2022    AST 25 11/29/2022    ALKPHOS 96 11/29/2022    BILITOT 0.5 11/29/2022       Ejection fraction is visually estimated to be 60-65%. No regional wall motion abnormalities are noted. Grade I diastolic dysfunction with elevated LV filling pressures. The left atrium is moderately dilated. mildly dilated right ventricle.    Estimated pulmonary artery systolic pressure is mildly elevated at 40 mmHg   assuming a right atrial pressure of 15 mmHg     Most recent EKG revealed:  Sinus bradycardia with First degree AV blockRight bundle branch blockConfirmed by Amadeo BLAND MD ([id number]) on 11/16/2022 8:30:15 AM     Most recent imaging studies revealed:     CT chest  Multifocal pneumonia. Small right and trace left pleural effusion. Diffuse atherosclerosis. On my review, CXR displays bibasilar ground glass opacities, bilateral small pleural effusions. The above laboratory data have been reviewed. The above imaging data have been reviewed. The above medical testing have been reviewed. Body mass index is 34.74 kg/m². POST ADMISSION PHYSICIAN EVALUATION  The patient has agreed to being admitted to our comprehensive inpatient rehabilitation facility and can tolerate the intensity of service consisting of at least:  --180 minutes of therapy a day, 5 out of 7 days a week. OR  --15 hours of intensive therapy within a 7 consecutive day period. The patient/family has a good understanding of our discharge process and will benefit from an interdisciplinary inpatient rehabilitation program. The patient has potential to make improvement and is in need of at least two of the following multidisciplinary therapies including but not limited to physical, occupational, respiratory, and speech, nutritional services, wound care, and prosthetics and orthotics. Given the patients complex condition and risk of further medical complications, rehabilitation services cannot be safely provided at a lower level of care such as a skilled nursing facility. All of the goals listed below were reviewed with the patient and he/she is in agreement. I have compared the patients medical and functional status at the time of the preadmission screening and the same on this date, and there are no significant changes.     By signing this document, I acknowledge that I have personally performed a full physical examination on this patient within 24 hours of admission to this inpatient rehabilitation facility and have determined the patient to be able to tolerate the above course of treatment at an intensive level for a reasonable period of time. I will be completing a detailed individualized  Plan of Care for this patient by day four of the patients stay based upon the Preadmission Screen, this Post-Admission Evaluation, and the therapy evaluations. Barriers: generalized weakness, decreased endurance, balance, medical comorbidities  Services Required: PT, OT  Goals: Anton for mobility and ADLs  Prognosis: Good  Anticipated Dispo: home with wife  ELOS: 10-14 days    Rehabilitation Diagnosis:   16.0, Debility (non-cardiac, non-pulmonary      Assessment and Plan:    Impairments:generalized weakness, decreased endurance, balance    Debility  -PT/OT    Anasarca, acute on chronic dCHF  -torsemide per Nephrology  -Daily wt    Obstructive Uropathy  -Salazar in place, consider void trial  -Flomax    DEISY on CKD  -Nephrology following, appreciate input  -Avoid nephrotoxins, renally dose meds  -Monitor renal function    HCAP, hemoptysis  -meropenem to complete course    Anemia  -epoetin   -Monitor Hgb, transfuse prn <7. PAF  -amiodarone    HTN  -torsemide, verapamil    DM2  -ISS  -was on glargine 10 units qhs at home    COPD  -montelukast, levalbuterol    JOANN  -Home CPAP    Bladder   -Retention with obstructive uropathy as above  -Salazar in place, ? If ok for void trial  -Flomax    Bowel   -Constipation   -Linzess, miralax, senna+colace BID, PRN bisacodyl po and supp. Safety   -fall precautions    Pain control  -acetaminophen prn    PPx  -DVT: heparin  -GI: pantoprazole    FULL CODE    Kristin Gonsalez MD 11/30/2022, 11:50 AM

## 2022-11-30 NOTE — PROGRESS NOTES
Pt dc to [facility] room 3253. Report called to Farhana. MAHNAZ unavailable. Stretcher to transport pt. Pt dc with personal belongings.

## 2022-11-30 NOTE — PROGRESS NOTES
[facility]le PA[facility] Progress Note  11/30/2022 8:15 AM  Subjective:   Admit Date: 11/15/2022      Chief Complaint: I feel OK     Interval History: Off laisx drip--creat down to 2.5 --diuresis of 500 cc -  Has been approved for ARU   DW wife at bedside this Am   Diet: ADULT DIET; Regular; Low Sodium (2 gm); Low Potassium (Less than 3000 mg/day)  Medications:   Scheduled Meds:   polyethylene glycol  17 g Oral BID    miconazole   Topical BID    meropenem  500 mg IntraVENous Q12H    epoetin kenny-epbx  30,000 Units SubCUTAneous Q7 Days    heparin (porcine)  5,000 Units SubCUTAneous BID    levalbuterol  1.25 mg Nebulization TID    sodium chloride nebulizer  3 mL Nebulization TID    sodium chloride flush  5-40 mL IntraVENous 2 times per day    tamsulosin  0.4 mg Oral Daily    amiodarone  100 mg Oral Daily    aspirin EC  162 mg Oral Daily    atorvastatin  40 mg Oral Daily    linaclotide  145 mcg Oral QAM AC    montelukast  10 mg Oral Daily    pantoprazole  40 mg Oral Daily    verapamil  240 mg Oral BID    insulin lispro  0-4 Units SubCUTAneous TID WC    insulin lispro  0-4 Units SubCUTAneous Nightly     Continuous Infusions:   sodium chloride 25 mL (11/29/22 2146)    dextrose         Review of Systems -   General ROS: afebrile  Respiratory ROS: positive for - shortness of breath  Cardiovascular ROS: no chest pain  Musculoskeletal ROS:positive for - low back pain   Neurological ROS: no TIA or stroke symptoms    Objective:   Vitals: /71   Pulse 66   Temp 97.8 °F (36.6 °C) (Oral)   Resp 20   Ht 5' 8\" (1.727 m)   Wt 228 lb 8 oz (103.6 kg)   SpO2 97%   BMI 34.74 kg/m²   General appearance: alert and cooperative with exam  HEENT: Head: Normocephalic, no lesions, without obvious abnormality.   Neck: no adenopathy, no carotid bruit, no JVD, supple, symmetrical, trachea midline, and thyroid not enlarged, symmetric, no tenderness/mass/nodules  Lungs: diminished breath sounds bibasilar  Heart: regular rate and rhythm, S1, S2 normal, no murmur, click, rub or gallop  Abdomen: obese--pos BS   Extremities: tr edema   Neurologic: Mental status: Alert, oriented, thought content appropriate    No results displayed because visit has over 200 results. Assessment & Plan:   Principal Problem:    Anasarca--improved--Ok for rehab transfer   Active Problems:    JOANN (obstructive sleep apnea)    Type 2 diabetes mellitus with stage 4 chronic kidney disease, with long-term current use of insulin (HCC)--Continue current therapy      Paroxysmal atrial fibrillation (HCC)    Generalized weakness    Worsening renal function    Essential hypertension    Slow transit constipation--cont linzess   Resolved Problems:    * No resolved hospital problems.  *  Ok for transfer to ARU today --to finish merrem today for HAP--after 5 days   DS dictated   Please note that over 35 minutes was spent in evaluating the patient, review of records and results, discussion with staff/family, etc.    Tracie Granda MD

## 2022-11-30 NOTE — PLAN OF CARE
Problem: Discharge Planning  Goal: Discharge to home or other facility with appropriate resources  11/30/2022 1020 by Tracie Granda RN  Outcome: Completed  11/30/2022 0706 by Guille Humphrey RN  Outcome: Progressing  Flowsheets (Taken 11/29/2022 2100)  Discharge to home or other facility with appropriate resources: Identify barriers to discharge with patient and caregiver     Problem: Pain  Goal: Verbalizes/displays adequate comfort level or baseline comfort level  11/30/2022 1020 by Tracie Granda RN  Outcome: Completed  11/30/2022 0706 by Guille Humphrey RN  Outcome: Progressing     Problem: Safety - Adult  Goal: Free from fall injury  11/30/2022 1020 by Tracie Granda RN  Outcome: Completed  11/30/2022 0706 by Guille Humphrey RN  Outcome: Progressing     Problem: ABCDS Injury Assessment  Goal: Absence of physical injury  11/30/2022 1020 by Tracie Granda RN  Outcome: Completed  11/30/2022 0706 by Guille Humphrey RN  Outcome: Progressing     Problem: Respiratory - Adult  Goal: Achieves optimal ventilation and oxygenation  11/30/2022 1020 by Tracie Granda RN  Outcome: Completed  11/30/2022 0706 by Guille Humphrey RN  Outcome: Progressing  [address] (Taken 11/29/2022 2100)  Achieves optimal ventilation and oxygenation:   Assess for changes in respiratory status   Assess for changes in mentation and behavior   Position to facilitate oxygenation and minimize respiratory effort     Problem: Cardiovascular - Adult  Goal: Maintains optimal cardiac output and hemodynamic stability  11/30/2022 1020 by Tracie Granda RN  Outcome: Completed  11/30/2022 0706 by Guille Humphrey RN  Outcome: Progressing  [address] (Taken 11/29/2022 2100)  Maintains optimal cardiac output and hemodynamic stability:   Monitor blood pressure and heart rate   Monitor urine output and notify Licensed Independent Practitioner for values outside of normal range  Goal: Absence of cardiac dysrhythmias or at baseline  11/30/2022 1020 by Tracie Granda RN  Outcome: Completed  11/30/2022 0706 by Yasmeen Alarcon RN  Outcome: Progressing     Problem: Skin/Tissue Integrity - Adult  Goal: Skin integrity remains intact  11/30/2022 1020 by Manfred Miranda RN  Outcome: Completed  11/30/2022 0706 by Yasmeen Alarcon RN  Outcome: Progressing  Goal: Incisions, wounds, or drain sites healing without S/S of infection  11/30/2022 1020 by Manfred Miranda RN  Outcome: Completed  11/30/2022 0706 by Yasmeen Alarcon RN  Outcome: Progressing     Problem: Musculoskeletal - Adult  Goal: Return mobility to safest level of function  11/30/2022 1020 by Manfred Miranda RN  Outcome: Completed  11/30/2022 0706 by Yasmeen Alarcon RN  Outcome: Progressing  Flowsheets (Taken 11/29/2022 2100)  Return Mobility to Safest Level of Function:   Assess patient stability and activity tolerance for standing, transferring and ambulating with or without assistive devices   Assist with transfers and ambulation using safe patient handling equipment as needed  Goal: Maintain proper alignment of affected body part  11/30/2022 1020 by Manfred Miranda RN  Outcome: Completed  11/30/2022 0706 by Yasmeen Alarcon RN  Outcome: Progressing  Goal: Return ADL status to a safe level of function  11/30/2022 1020 by Manfred Miranda RN  Outcome: Completed  11/30/2022 0706 by Yasmeen Alarcon RN  Outcome: Progressing     Problem: Genitourinary - Adult  Goal: Absence of urinary retention  11/30/2022 1020 by Manfred Miranda RN  Outcome: Completed  11/30/2022 0706 by Yasmeen Alarcon RN  Outcome: Progressing  Goal: Urinary catheter remains patent  11/30/2022 1020 by Manfred Miranda RN  Outcome: Completed  11/30/2022 0706 by Yasmeen Alarcon RN  Outcome: Progressing  Flowsheets (Taken 11/29/2022 2100)  Urinary catheter remains patent: Assess patency of urinary catheter     Problem: Infection - Adult  Goal: Absence of infection at discharge  11/30/2022 1020 by Manfred Miranda RN  Outcome: Completed  11/30/2022 0706 by Yasmeen Alarcon RN  Outcome: Progressing  Flowsheets (Taken 11/29/2022 2100)  Absence of infection at discharge:   Assess and monitor for signs and symptoms of infection   Monitor lab/diagnostic results  Goal: Absence of infection during hospitalization  11/30/2022 1020 by Alice Araujo RN  Outcome: Completed  11/30/2022 0706 by Roxanne Zuñiga RN  Outcome: Progressing  Flowsheets (Taken 11/29/2022 2100)  Absence of infection during hospitalization:   Assess and monitor for signs and symptoms of infection   Monitor lab/diagnostic results  Goal: Absence of fever/infection during anticipated neutropenic period  11/30/2022 1020 by Alice Araujo RN  Outcome: Completed  11/30/2022 0706 by Roxanne Zuñiga RN  Outcome: Progressing  Flowsheets (Taken 11/29/2022 2100)  Absence of fever/infection during anticipated neutropenic period: Monitor white blood cell count     Problem: Metabolic/Fluid and Electrolytes - Adult  Goal: Electrolytes maintained within normal limits  11/30/2022 1020 by Alice Araujo RN  Outcome: Completed  11/30/2022 0706 by Roxanne Zuñiga RN  Outcome: Progressing  Flowsheets (Taken 11/29/2022 2100)  Electrolytes maintained within normal limits: Monitor labs and assess patient for signs and symptoms of electrolyte imbalances  Goal: Hemodynamic stability and optimal renal function maintained  11/30/2022 1020 by Alice Araujo RN  Outcome: Completed  11/30/2022 0706 by Roxanne Zuñiga RN  Outcome: Progressing  Flowsheets (Taken 11/29/2022 2100)  Hemodynamic stability and optimal renal function maintained:   Monitor labs and assess for signs and symptoms of volume excess or deficit   Monitor intake, output and patient weight  Goal: Glucose maintained within prescribed range  11/30/2022 1020 by Alice Araujo RN  Outcome: Completed  11/30/2022 0706 by Roxanne Zuñiga RN  Outcome: Progressing  Flowsheets (Taken 11/29/2022 2100)  Glucose maintained within prescribed range: Monitor blood glucose as ordered     Problem: Skin/Tissue Integrity  Goal: Absence of new skin breakdown  Description: 1. Monitor for areas of redness and/or skin breakdown  2. Assess vascular access sites hourly  3. Every 4-6 hours minimum:  Change oxygen saturation probe site  4. Every 4-6 hours:  If on nasal continuous positive airway pressure, respiratory therapy assess nares and determine need for appliance change or resting period.   11/30/2022 1020 by Morena Pickering RN  Outcome: Completed  11/30/2022 0706 by Avinash Gipson RN  Outcome: Progressing     Problem: Neurosensory - Adult  Goal: Achieves stable or improved neurological status  11/30/2022 1020 by Morena Pickering RN  Outcome: Completed  11/30/2022 0706 by Avinash Gipson RN  Outcome: Progressing  Flowsheets (Taken 11/29/2022 2100)  Achieves stable or improved neurological status: Assess for and report changes in neurological status  Goal: Achieves maximal functionality and self care  11/30/2022 1020 by Morena Pickering RN  Outcome: Completed  11/30/2022 0706 by Avinash Gipson RN  Outcome: Progressing     Problem: Gastrointestinal - Adult  Goal: Minimal or absence of nausea and vomiting  11/30/2022 1020 by Morena Pickering RN  Outcome: Completed  11/30/2022 0706 by vAinash Gipson RN  Outcome: Progressing  Goal: Maintains or returns to baseline bowel function  11/30/2022 1020 by Morena Pickering RN  Outcome: Completed  11/30/2022 0706 by Avinash Gipson RN  Outcome: Progressing  Goal: Maintains adequate nutritional intake  11/30/2022 1020 by Morena Pickering RN  Outcome: Completed  11/30/2022 0706 by Avinash Gipson RN  Outcome: Progressing     Problem: Chronic Conditions and Co-morbidities  Goal: Patient's chronic conditions and co-morbidity symptoms are monitored and maintained or improved  11/30/2022 1020 by Morena Pickering RN  Outcome: Completed  11/30/2022 0706 by Avinash Gipson RN  Outcome: Progressing  Flowsheets (Taken 11/29/2022 2100)  Care Plan - Patient's Chronic Conditions and Co-Morbidity Symptoms are Monitored and Maintained or Improved: Monitor and assess patient's chronic conditions and comorbid symptoms for stability, deterioration, or improvement

## 2022-11-30 NOTE — PROGRESS NOTES
Physical Therapy  Facility/Department: [address] [facility]  Rehabilitation Physical Therapy Initial Assessment    NAME: Lacy Hope  : 1940 (79 y.o.)  MRN: [id number]  CODE STATUS: Full Code    Date of Service: 22      Past Medical History:   Diagnosis Date    Allergic rhinitis     Asthma     Atrial fibrillation ([facility] [address]     Carotid artery stenosis     Chronic kidney disease     COPD (chronic obstructive pulmonary disease) (HCC)     CPAP (continuous positive airway pressure) dependence     ESBL (extended spectrum beta-lactamase) producing bacteria infection 2018    urine    Hyperlipidemia     Hypertension     Iron deficiency anemia     Obstructive sleep apnea     Type II or unspecified type diabetes mellitus without mention of complication, not stated as uncontrolled      Past Surgical History:   Procedure Laterality Date    CATARACT REMOVAL  2012    bilateral    COLONOSCOPY  7/10/2009    diverticulosis    hemorrhoids    CT BONE MARROW BIOPSY  2022    CT BONE MARROW BIOPSY 2022 [facility] CT    GALLBLADDER SURGERY  1981    PAIN MANAGEMENT PROCEDURE Right 10/20/2021    COOLIEF RADIOFREQUENCY ABLATION - RIGHT KNEE performed by Keyon Khalil MD at [address] Left 2021    [address] - LEFT KNEE performed by Keyon Khalil MD at [address] [place] [address]  7-2005    7/10/2009    normal       Chart Reviewed: Yes  Patient assessed for rehabilitation services?: Yes  Additional Pertinent Hx: Per Kian Tristan MD H&P on 2022: The pt is an 80 yo male who presented to the ED with weakness, increasing edema and inability to urinate. The pt recently in the hospital for weakness and DEISY and pna. In the ED the pt's renal function wa worse and he was volume overloaded; he was SOB with crackles.     PMHx: asthma, a-fib, carotid artery stenosis, CKD, COPD, HTN, anemia, JOANN on c-pap, DM  Family / Caregiver Present: Yes (wife)  Referring Practitioner: Wendy Naik  Referral Date : 11/30/22  Diagnosis: Anasarca, generalized weakness, worsening renal function  General Comment  Comments: pt up in recliner awaiting lunch, agrees to start evaluation    Restrictions:  Restrictions/Precautions: Fall Risk;Bed Alarm;Contact Precautions  Position Activity Restriction  Other position/activity restrictions: 2 L O2; fluid restriction, low sodium/low potassium diet; IV     SUBJECTIVE  Subjective: pt denies pain and dizziness or other discomfort    Prior Level of Function:  Social/Functional History  Lives With: Spouse  Type of Home: House  Home Layout: Able to Live on Main level with bedroom/bathroom, Multi-level  Home Access: Stairs to enter with rails  Entrance Stairs - Number of Steps: 1+1  Entrance Stairs - Rails: Both  Bathroom Shower/Tub: Tub/Shower unit  Bathroom Toilet: Standard (comfort height commode)  Bathroom Equipment: Shower chair  Home Equipment: Cane, Rollator, Oxygen  ADL Assistance: Needs assistance  Homemaking Assistance: Independent (wife cleans, pt manages bill paying)  Ambulation Assistance: Independent (mostly used cane)  Transfer Assistance: Independent  Active : Yes  Occupation: Retired  Type of Occupation: built houses  [address] [place] [address] Avenue: wood carving, kindra saw  IADL Comments: sleeping in a recliner but reports he does gt into his bed on a regular basis      OBJECTIVE  Vision  Vision: Impaired (had cataract surgery)  Vision Exceptions: Wears glasses for reading    Hearing  Hearing: Exceptions to [facility]  Hearing Exceptions: Bilateral hearing aid;Hard of hearing/hearing concerns    Cognition  Overall Cognitive Status: Exceptions  Arousal/Alertness: Appropriate responses to stimuli  Following Commands:  Follows all commands without difficulty  Attention Span: Appears intact  Memory: Appears intact  Safety Judgement: Decreased awareness of need for safety;Decreased awareness of need for assistance  Problem Solving: Decreased awareness of errors;Assistance required to identify errors made  Insights: Decreased awareness of deficits  Initiation: Does not require cues  Sequencing: Does not require cues    ROM  AROM RLE (degrees)  RLE AROM: WFL  AROM LLE (degrees)  LLE AROM : WFL    Strength  Strength RLE  Strength RLE: WFL  Strength LLE  Strength LLE: WFL    Quality of Movement  Tone RLE  RLE Tone: Normotonic  Tone LLE  LLE Tone: Normotonic  Motor Control  Gross Motor?: WFL    Sensation  Overall Sensation Status: WFL    Functional Mobility  Bed mobility  Rolling to Left: Minimal assistance  Rolling to Right: Minimal assistance  Supine to Sit: Moderate assistance  Sit to Supine: Moderate assistance  Scooting: Minimal assistance  Bed Mobility Comments: pt reports catheter is preventing him from moving as he is used to: pt will swing legs for momentum to get legs into and out of bed  Transfers  Sit to Stand: Contact guard assistance;Minimal Assistance (CGA from recliner, min A from bed)  Stand to Sit: Contact guard assistance;Stand by assistance  Bed to Chair: Contact guard assistance  Car Transfer:  (pt too fatigued to try car)  Balance  Posture: Fair  Sitting - Static: Good  Sitting - Dynamic: Good  Standing - Static: Good  Standing - Dynamic: Good    Environmental Mobility  Ambulation  Surface: Level tile  Device: Rolling Walker  Other Apparatus: O2 (2L)  Assistance: Contact guard assistance  Quality of Gait: moderate us of B UEs on RW for support. distance limited by activity tolerance. Gait Deviations: Slow Riri;Decreased step length;Decreased step height  Distance: 5', 25' in small space with 4 turns  Comments: SOB, coughing up sputum after walking  More Ambulation?: No  Stairs/Curb  Stairs?: No (pt too fatigued today)  ASSESSMENT   Activity Tolerance  Activity Tolerance: Patient limited by fatigue;Patient limited by endurance; Patient tolerated evaluation without incident    Assessment  Assessment: Pt presents roday with decreased functional mobility following PNA and fluid overload. Pt with decreased endurance and SOB with activity. Pt required mod A for bed mobility, although he can sleep in his recliner at home. Sit to stand CGA to min A, amb up to 25' with wh walker CGA to SBA, no LOB but mild SOB. Pt would benefit from continued high intensity therapy to increase stamina and safety to allow return home. Treatment Diagnosis: difficulty walking  Therapy Prognosis: Good  Decision Making: Medium Complexity  Barriers to Learning: decreased activity tolerance  Treatment Initiated : 11-30  Discharge Recommendations: Home with Home health PT  PT Equipment Recommendations  Equipment Needed: No (pt has wh walker and cane)    CLINICAL IMPRESSION   Pt presents roday with decreased functional mobility following PNA and fluid overload. Pt with decreased endurance and SOB with activity. Pt required mod A for bed mobility, although he can sleep in his recliner at home. Sit to stand CGA to min A, amb up to 25' with wh walker CGA to SBA, no LOB but mild SOB. Pt would benefit from continued high intensity therapy to increase stamina and safety to allow return home. GOALS  Patient Goals   Patient Goals : to be able to walk again  Short Term Goals  Time Frame for Short Term Goals: 7-10 days  Short Term Goal 1: Bed mobility modif I  Short Term Goal 2: Transfers sit <> stand with modif I  Short Term Goal 3: Ambulate with walker 100 feet with modif I    PLAN OF CARE  Frequency: 1-2 treatment sessions per day, 5-7 days per week  Physcial Therapy Plan  General Plan:  minutes of therapy at least 5 out of 7 days a week  Days Per Week: 5 Days  Hours Per Day: 1.5 hours  Therapy Duration: 4-7 Days  Current Treatment Recommendations: Strengthening;Balance training;Functional mobility training;Transfer training;Gait training; Therapeutic activities; Patient/Caregiver education & training; Safety education & training  Safety Devices  Type of Devices: Call light within reach; Chair alarm in place; Left in chair;Nurse notified;Gait belt    Therapy Time   Individual Concurrent Group Co-treatment   Time In 1300         Time Out 1400         Minutes 60           Timed Code Treatment Minutes: [address] , [address] [facility], 11/30/22 at 2:14 PM

## 2022-11-30 NOTE — PLAN OF CARE
Problem: Discharge Planning  Goal: Discharge to home or other facility with appropriate resources  Outcome: Progressing  Flowsheets (Taken 11/29/2022 2100)  Discharge to home or other facility with appropriate resources: Identify barriers to discharge with patient and caregiver     Problem: Pain  Goal: Verbalizes/displays adequate comfort level or baseline comfort level  Outcome: Progressing     Problem: Safety - Adult  Goal: Free from fall injury  Outcome: Progressing     Problem: ABCDS Injury Assessment  Goal: Absence of physical injury  Outcome: Progressing     Problem: Respiratory - Adult  Goal: Achieves optimal ventilation and oxygenation  Outcome: Progressing  Flowsheets (Taken 11/29/2022 2100)  Achieves optimal ventilation and oxygenation:   Assess for changes in respiratory status   Assess for changes in mentation and behavior   Position to facilitate oxygenation and minimize respiratory effort     Problem: Cardiovascular - Adult  Goal: Maintains optimal cardiac output and hemodynamic stability  Outcome: Progressing  Flowsheets (Taken 11/29/2022 2100)  Maintains optimal cardiac output and hemodynamic stability:   Monitor blood pressure and heart rate   Monitor urine output and notify Licensed Independent Practitioner for values outside of normal range  Goal: Absence of cardiac dysrhythmias or at baseline  Outcome: Progressing     Problem: Skin/Tissue Integrity - Adult  Goal: Skin integrity remains intact  Outcome: Progressing  Goal: Incisions, wounds, or drain sites healing without S/S of infection  Outcome: Progressing     Problem: Musculoskeletal - Adult  Goal: Return mobility to safest level of function  Outcome: Progressing  Flowsheets (Taken 11/29/2022 2100)  Return Mobility to Safest Level of Function:   Assess patient stability and activity tolerance for standing, transferring and ambulating with or without assistive devices   Assist with transfers and ambulation using safe patient handling equipment as needed  Goal: Maintain proper alignment of affected body part  Outcome: Progressing  Goal: Return ADL status to a safe level of function  Outcome: Progressing     Problem: Genitourinary - Adult  Goal: Absence of urinary retention  Outcome: Progressing  Goal: Urinary catheter remains patent  Outcome: Progressing  Flowsheets (Taken 11/29/2022 2100)  Urinary catheter remains patent: Assess patency of urinary catheter     Problem: Infection - Adult  Goal: Absence of infection at discharge  Outcome: Progressing  Flowsheets (Taken 11/29/2022 2100)  Absence of infection at discharge:   Assess and monitor for signs and symptoms of infection   Monitor lab/diagnostic results  Goal: Absence of infection during hospitalization  Outcome: Progressing  Flowsheets (Taken 11/29/2022 2100)  Absence of infection during hospitalization:   Assess and monitor for signs and symptoms of infection   Monitor lab/diagnostic results  Goal: Absence of fever/infection during anticipated neutropenic period  Outcome: Progressing  Flowsheets (Taken 11/29/2022 2100)  Absence of fever/infection during anticipated neutropenic period: Monitor white blood cell count     Problem: Metabolic/Fluid and Electrolytes - Adult  Goal: Electrolytes maintained within normal limits  Outcome: Progressing  Flowsheets (Taken 11/29/2022 2100)  Electrolytes maintained within normal limits: Monitor labs and assess patient for signs and symptoms of electrolyte imbalances  Goal: Hemodynamic stability and optimal renal function maintained  Outcome: Progressing  Flowsheets (Taken 11/29/2022 2100)  Hemodynamic stability and optimal renal function maintained:   Monitor labs and assess for signs and symptoms of volume excess or deficit   Monitor intake, output and patient weight  Goal: Glucose maintained within prescribed range  Outcome: Progressing  Flowsheets (Taken 11/29/2022 2100)  Glucose maintained within prescribed range: Monitor blood glucose as ordered Problem: Skin/Tissue Integrity  Goal: Absence of new skin breakdown  Description: 1. Monitor for areas of redness and/or skin breakdown  2. Assess vascular access sites hourly  3. Every 4-6 hours minimum:  Change oxygen saturation probe site  4. Every 4-6 hours:  If on nasal continuous positive airway pressure, respiratory therapy assess nares and determine need for appliance change or resting period.   Outcome: Progressing     Problem: Neurosensory - Adult  Goal: Achieves stable or improved neurological status  Outcome: Progressing  Flowsheets (Taken 11/29/2022 2100)  Achieves stable or improved neurological status: Assess for and report changes in neurological status  Goal: Achieves maximal functionality and self care  Outcome: Progressing     Problem: Gastrointestinal - Adult  Goal: Minimal or absence of nausea and vomiting  Outcome: Progressing  Goal: Maintains or returns to baseline bowel function  Outcome: Progressing  Goal: Maintains adequate nutritional intake  Outcome: Progressing     Problem: Chronic Conditions and Co-morbidities  Goal: Patient's chronic conditions and co-morbidity symptoms are monitored and maintained or improved  Outcome: Progressing  Flowsheets (Taken 11/29/2022 2100)  Care Plan - Patient's Chronic Conditions and Co-Morbidity Symptoms are Monitored and Maintained or Improved: Monitor and assess patient's chronic conditions and comorbid symptoms for stability, deterioration, or improvement

## 2022-11-30 NOTE — PROGRESS NOTES
4 Eyes Skin Assessment     NAME:  Gilmar Suresh  YOB: 1940  MEDICAL RECORD NUMBER:  [id number]    The patient is being assessed for  Admission    I agree that One RN have performed a thorough Head to Toe Skin Assessment on the patient. ALL assessment sites listed below have been assessed. Areas assessed by both nurses:    Head, Face, Ears, Shoulders, Back, Chest, Arms, Elbows, Hands, Sacrum. Buttock, Coccyx, Ischium, and Legs. Feet and Heels        Does the Patient have a Wound? No. Two scabbed over areas to bottom of third toe on left foot and fourth toe on right       Jerardo Prevention initiated by RN: Yes   Wound Care Orders initiated by RN: No    Pressure Injury (Stage 3,4, Unstageable, DTI, NWPT, and Complex wounds) if present place referral order by RN under : No    New and Established Ostomies, if present place, referral order under : NA      Nurse 1 eSignature: Electronically signed by Kye Scott RN on 11/30/22 at 1:21 PM EST    **SHARE this note so that the co-signing nurse is able to place an eSignature**    Nurse 2 eSignature: Electronically signed by Alan Ortega RN, [address] [place] [address] on 11/30/22 at 7:08 PM EST

## 2022-11-30 NOTE — PROGRESS NOTES
Ethnicity  \"Are you of , /a, or Estonian origin? \"  Check all that apply:  [x] A. No, not of , /a, or [address] Origin  [] B.  Yes, [place], [place] American, Chicano/a  [] C.  Yes, [address] [place] [address]  [] D.  Yes, [place]  [] E.  Yes, another , , or Estonian origin  [] X. Patient unable to respond    Race  \"What is your race? \"  Check all that apply:  [x] A. White  [] B. Black or   [] C. American [place] [facility] or [place] Native  [] D.  [place] [facility]  [] E. [place]  [] F. Vincentian  [] G. [place]  [] Jarrell Age  [] I. [place]  [] J.  Other   [] K.   [] L. Japanese or Clementina  [] M. Guyanese  [] N. Other [place]  [] X. Patient unable to respond    High Risk Drug Classes:  Use and Indication    Is taking: Check if the pt is taking any medications by pharmacological classification, not how it is used, in the following classes  Indication noted: If column 1 is checked, check if there is an indication noted for all meds in the drug class Is taking  (check all that apply) Indication noted (check all that apply)   Antipsychotic [] []   Anticoagulant [] []   Antibiotic [x] []   Opioid [] []   Antiplatelet [] []   Hypoglycemic (including insulin) [] []   None of the above []     Special Treatments, Procedures, and Programs    Check all of the following treatments, procedures, and programs that apply on admission. On admission (check all that apply)   Cancer Treatments   A1. Chemotherapy []           A2. IV []           A3. Oral []           A10. Other []   B1. Radiation []   Respiratory Therapies   C1. Oxygen Therapy []           C2. Continuous []           C3. Intermittent []           C4. High-concentration []   D1. Suctioning []           D2. Scheduled []           D3. As needed []   E1. Tracheostomy Care []   F1.  Invasive Mechanical Ventilator (ventilator or respirator) []   G1. Non-invasive Mechanical Ventilator []           G2. BiPAP [] G3. CPAP []   Other   H1. IV Medications []           H2. Vasoactive medications []           H3. Antibiotics []           H4. Anticoagulation []           H10. Other []   I1. Transfusions []   J1. Dialysis []           J2. Hemodialysis []           J3. Peritoneal dialysis []   O1. IV access []           O2. Peripheral [x]           O3. Midline []           O4.  Central (PICC, tunneled, port) []      None of the above (select if no Cancer, Respiratory, or Other boxes are checked) []

## 2022-12-01 LAB
ALBUMIN SERPL-MCNC: 3 G/DL (ref 3.4–5)
ALP BLD-CCNC: 95 U/L (ref 40–129)
ALT SERPL-CCNC: 29 U/L (ref 10–40)
ANION GAP SERPL CALCULATED.3IONS-SCNC: 11 MMOL/L (ref 3–16)
AST SERPL-CCNC: 36 U/L (ref 15–37)
BASOPHILS ABSOLUTE: 0 K/UL (ref 0–0.2)
BASOPHILS RELATIVE PERCENT: 0.7 %
BILIRUB SERPL-MCNC: 0.5 MG/DL (ref 0–1)
BILIRUBIN DIRECT: <0.2 MG/DL (ref 0–0.3)
BILIRUBIN, INDIRECT: ABNORMAL MG/DL (ref 0–1)
BUN BLDV-MCNC: 58 MG/DL (ref 7–20)
CALCIUM SERPL-MCNC: 8.3 MG/DL (ref 8.3–10.6)
CHLORIDE BLD-SCNC: 95 MMOL/L (ref 99–110)
CO2: 33 MMOL/L (ref 21–32)
CREAT SERPL-MCNC: 2.3 MG/DL (ref 0.8–1.3)
EOSINOPHILS ABSOLUTE: 0.2 K/UL (ref 0–0.6)
EOSINOPHILS RELATIVE PERCENT: 4.4 %
FERRITIN: 294.6 NG/ML (ref 30–400)
GFR SERPL CREATININE-BSD FRML MDRD: 27 ML/MIN/{1.73_M2}
GLUCOSE BLD-MCNC: 105 MG/DL (ref 70–99)
GLUCOSE BLD-MCNC: 110 MG/DL (ref 70–99)
GLUCOSE BLD-MCNC: 119 MG/DL (ref 70–99)
GLUCOSE BLD-MCNC: 127 MG/DL (ref 70–99)
GLUCOSE BLD-MCNC: 127 MG/DL (ref 70–99)
GLUCOSE BLD-MCNC: 142 MG/DL (ref 70–99)
HCT VFR BLD CALC: 23.1 % (ref 40.5–52.5)
HEMOGLOBIN: 7.2 G/DL (ref 13.5–17.5)
IRON SATURATION: 19 % (ref 20–50)
IRON: 37 UG/DL (ref 59–158)
LYMPHOCYTES ABSOLUTE: 0.7 K/UL (ref 1–5.1)
LYMPHOCYTES RELATIVE PERCENT: 20.7 %
MAGNESIUM: 2 MG/DL (ref 1.8–2.4)
MCH RBC QN AUTO: 26.5 PG (ref 26–34)
MCHC RBC AUTO-ENTMCNC: 31.2 G/DL (ref 31–36)
MCV RBC AUTO: 84.9 FL (ref 80–100)
MONOCYTES ABSOLUTE: 0.5 K/UL (ref 0–1.3)
MONOCYTES RELATIVE PERCENT: 13.9 %
NEUTROPHILS ABSOLUTE: 2.1 K/UL (ref 1.7–7.7)
NEUTROPHILS RELATIVE PERCENT: 60.3 %
PDW BLD-RTO: 18 % (ref 12.4–15.4)
PERFORMED ON: ABNORMAL
PHOSPHORUS: 2.9 MG/DL (ref 2.5–4.9)
PLATELET # BLD: 174 K/UL (ref 135–450)
PMV BLD AUTO: 8.1 FL (ref 5–10.5)
POTASSIUM REFLEX MAGNESIUM: 3.4 MMOL/L (ref 3.5–5.1)
POTASSIUM SERPL-SCNC: 3.4 MMOL/L (ref 3.5–5.1)
PREALBUMIN: 19.7 MG/DL (ref 20–40)
PRO-BNP: 1684 PG/ML (ref 0–449)
RBC # BLD: 2.72 M/UL (ref 4.2–5.9)
SODIUM BLD-SCNC: 139 MMOL/L (ref 136–145)
TOTAL IRON BINDING CAPACITY: 190 UG/DL (ref 260–445)
TOTAL PROTEIN: 5.9 G/DL (ref 6.4–8.2)
WBC # BLD: 3.5 K/UL (ref 4–11)

## 2022-12-01 PROCEDURE — 2580000003 HC RX 258: Performed by: INTERNAL MEDICINE

## 2022-12-01 PROCEDURE — 97110 THERAPEUTIC EXERCISES: CPT

## 2022-12-01 PROCEDURE — 6370000000 HC RX 637 (ALT 250 FOR IP): Performed by: PHYSICAL MEDICINE & REHABILITATION

## 2022-12-01 PROCEDURE — 94760 N-INVAS EAR/PLS OXIMETRY 1: CPT

## 2022-12-01 PROCEDURE — 83880 ASSAY OF NATRIURETIC PEPTIDE: CPT

## 2022-12-01 PROCEDURE — 80069 RENAL FUNCTION PANEL: CPT

## 2022-12-01 PROCEDURE — 51798 US URINE CAPACITY MEASURE: CPT

## 2022-12-01 PROCEDURE — 6360000002 HC RX W HCPCS: Performed by: PHYSICAL MEDICINE & REHABILITATION

## 2022-12-01 PROCEDURE — 97166 OT EVAL MOD COMPLEX 45 MIN: CPT

## 2022-12-01 PROCEDURE — 97535 SELF CARE MNGMENT TRAINING: CPT

## 2022-12-01 PROCEDURE — 83735 ASSAY OF MAGNESIUM: CPT

## 2022-12-01 PROCEDURE — 36415 COLL VENOUS BLD VENIPUNCTURE: CPT

## 2022-12-01 PROCEDURE — 6370000000 HC RX 637 (ALT 250 FOR IP): Performed by: INTERNAL MEDICINE

## 2022-12-01 PROCEDURE — 83550 IRON BINDING TEST: CPT

## 2022-12-01 PROCEDURE — 84134 ASSAY OF PREALBUMIN: CPT

## 2022-12-01 PROCEDURE — 2700000000 HC OXYGEN THERAPY PER DAY

## 2022-12-01 PROCEDURE — 1280000000 HC REHAB R&B

## 2022-12-01 PROCEDURE — 99233 SBSQ HOSP IP/OBS HIGH 50: CPT | Performed by: INTERNAL MEDICINE

## 2022-12-01 PROCEDURE — 80076 HEPATIC FUNCTION PANEL: CPT

## 2022-12-01 PROCEDURE — 6360000002 HC RX W HCPCS: Performed by: INTERNAL MEDICINE

## 2022-12-01 PROCEDURE — 83540 ASSAY OF IRON: CPT

## 2022-12-01 PROCEDURE — 85025 COMPLETE CBC W/AUTO DIFF WBC: CPT

## 2022-12-01 PROCEDURE — 97116 GAIT TRAINING THERAPY: CPT

## 2022-12-01 PROCEDURE — 82728 ASSAY OF FERRITIN: CPT

## 2022-12-01 PROCEDURE — 97530 THERAPEUTIC ACTIVITIES: CPT

## 2022-12-01 PROCEDURE — 94640 AIRWAY INHALATION TREATMENT: CPT

## 2022-12-01 RX ORDER — POTASSIUM CHLORIDE 20 MEQ/1
20 TABLET, EXTENDED RELEASE ORAL ONCE
Status: COMPLETED | OUTPATIENT
Start: 2022-12-01 | End: 2022-12-01

## 2022-12-01 RX ADMIN — ATORVASTATIN CALCIUM 40 MG: 40 TABLET, FILM COATED ORAL at 07:53

## 2022-12-01 RX ADMIN — POTASSIUM CHLORIDE 20 MEQ: 1500 TABLET, EXTENDED RELEASE ORAL at 11:49

## 2022-12-01 RX ADMIN — TAMSULOSIN HYDROCHLORIDE 0.4 MG: 0.4 CAPSULE ORAL at 07:53

## 2022-12-01 RX ADMIN — AMIODARONE HYDROCHLORIDE 100 MG: 200 TABLET ORAL at 07:53

## 2022-12-01 RX ADMIN — MICONAZOLE NITRATE: 2 POWDER TOPICAL at 21:51

## 2022-12-01 RX ADMIN — VERAPAMIL HYDROCHLORIDE 240 MG: 240 TABLET, FILM COATED, EXTENDED RELEASE ORAL at 07:53

## 2022-12-01 RX ADMIN — ISODIUM CHLORIDE 3 ML: 0.03 SOLUTION RESPIRATORY (INHALATION) at 08:11

## 2022-12-01 RX ADMIN — VERAPAMIL HYDROCHLORIDE 240 MG: 240 TABLET, FILM COATED, EXTENDED RELEASE ORAL at 21:50

## 2022-12-01 RX ADMIN — POLYETHYLENE GLYCOL 3350 17 G: 17 POWDER, FOR SOLUTION ORAL at 21:49

## 2022-12-01 RX ADMIN — LEVALBUTEROL 1.25 MG: 1.25 SOLUTION, CONCENTRATE RESPIRATORY (INHALATION) at 19:55

## 2022-12-01 RX ADMIN — SODIUM CHLORIDE, PRESERVATIVE FREE 10 ML: 5 INJECTION INTRAVENOUS at 11:49

## 2022-12-01 RX ADMIN — LEVALBUTEROL 1.25 MG: 1.25 SOLUTION, CONCENTRATE RESPIRATORY (INHALATION) at 13:21

## 2022-12-01 RX ADMIN — ISODIUM CHLORIDE 3 ML: 0.03 SOLUTION RESPIRATORY (INHALATION) at 13:21

## 2022-12-01 RX ADMIN — LEVALBUTEROL 1.25 MG: 1.25 SOLUTION, CONCENTRATE RESPIRATORY (INHALATION) at 08:11

## 2022-12-01 RX ADMIN — PANTOPRAZOLE SODIUM 40 MG: 40 TABLET, DELAYED RELEASE ORAL at 07:54

## 2022-12-01 RX ADMIN — SENNOSIDES AND DOCUSATE SODIUM 1 TABLET: 50; 8.6 TABLET ORAL at 07:54

## 2022-12-01 RX ADMIN — TORSEMIDE 10 MG: 20 TABLET ORAL at 07:54

## 2022-12-01 RX ADMIN — HEPARIN SODIUM 5000 UNITS: 5000 INJECTION INTRAVENOUS; SUBCUTANEOUS at 21:49

## 2022-12-01 RX ADMIN — MICONAZOLE NITRATE: 2 POWDER TOPICAL at 07:56

## 2022-12-01 RX ADMIN — ASPIRIN 162 MG: 81 TABLET, COATED ORAL at 07:53

## 2022-12-01 RX ADMIN — SODIUM CHLORIDE, PRESERVATIVE FREE 10 ML: 5 INJECTION INTRAVENOUS at 21:50

## 2022-12-01 RX ADMIN — SENNOSIDES AND DOCUSATE SODIUM 1 TABLET: 50; 8.6 TABLET ORAL at 21:50

## 2022-12-01 RX ADMIN — HEPARIN SODIUM 5000 UNITS: 5000 INJECTION INTRAVENOUS; SUBCUTANEOUS at 07:55

## 2022-12-01 RX ADMIN — POLYETHYLENE GLYCOL 3350 17 G: 17 POWDER, FOR SOLUTION ORAL at 07:53

## 2022-12-01 RX ADMIN — MONTELUKAST 10 MG: 10 TABLET, FILM COATED ORAL at 07:54

## 2022-12-01 RX ADMIN — ISODIUM CHLORIDE 3 ML: 0.03 SOLUTION RESPIRATORY (INHALATION) at 20:14

## 2022-12-01 NOTE — PROGRESS NOTES
Physical Therapy  Facility/Department: [address] [facility]HAB  Rehabilitation Physical Therapy Treatment Note    NAME: Candace Stallings  : 1940 (79 y.o.)  MRN: [id number]  CODE STATUS: Full Code    Date of Service: 22     Restrictions:  Restrictions/Precautions: Fall Risk;Contact Precautions  Position Activity Restriction  Other position/activity restrictions: 2 L O2;  low sodium/low potassium diet; IV, contact precautions for ESBL in urine     Pertinent medical information:  Additional Pertinent Hx: Per Tru Harman MD H&P on 2022: The pt is an 78 yo male who presented to the ED with weakness, increasing edema and inability to urinate. The pt recently in the hospital for weakness and DEISY and pna. In the ED the pt's renal function wa worse and he was volume overloaded; he was SOB with crackles. PMHx: asthma, a-fib, carotid artery stenosis, CKD, COPD, HTN, anemia, JOANN on c-pap, DM    SUBJECTIVE  Subjective  Subjective: pt sitting in w/c in therapy gym. he is alert and pleasant. he is agreeable to PT this session. Pain: pt reports no pain.      Social/Functional History  Lives With: Spouse  Type of Home: House  Home Layout: Able to Live on Main level with bedroom/bathroom, Multi-level  Home Access: Stairs to enter with rails  Entrance Stairs - Number of Steps: 1+1  Entrance Stairs - Rails: Both  Bathroom Shower/Tub: Tub/Shower unit, Curtain  Bathroom Toilet: Standard (comfort height commode vanity in front)  Bathroom Equipment: Tub transfer bench, Hand-held shower (says TTB new)  Bathroom Accessibility: Walker accessible  Home Equipment: Cane, Rollator, Oxygen (new O2 2L home 2 liters)  Has the patient had two or more falls in the past year or any fall with injury in the past year?: No  Receives Help From: Home health (OT, PT, nurse (new))  ADL Assistance: Needs assistance (recent A but was indep before that)  Feeding: Independent  Homemaking Assistance: Independent (wife cleans, pt manages bill paying)  Ambulation Assistance: Independent (mostly used cane)  Transfer Assistance: Independent  Active : Yes  Mode of Transportation: Car  Education: sedan  Occupation: Retired  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 I's & drops her off)    OBJECTIVE  Cognition  Overall Cognitive Status:  (appears WFL for ADL tasks, higher level NT)  Safety Judgement: Decreased awareness of need for safety  Cognition Comment: wife decreased memory; some difficulty communicating vs recalling home set-up in bathroom  Orientation  Overall Orientation Status: Within Functional Limits  Orientation Level: Oriented X4    Functional Mobility  Transfers  Surface: To chair with arms;From chair with arms  Additional Factors: Verbal cues; Hand placement cues; Increased time to complete  Device: Walker  Sit to Stand  Assistance Level: Contact guard assist  Skilled Clinical Factors: v/c for hand placement and sequencing. Stand to Sit  Assistance Level: Contact guard assist  Car Transfer  Assistance Level: Contact guard assist  Skilled Clinical Factors: education on safe transfer. v/c required for sequencing and hand placement. increased time and effort to complete    Environmental Mobility  Ambulation  Surface: Level surface  Device: Rolling walker  Distance: 80' x 2 on level surface, 40' x 2 on level surface  Activity: Within Unit  Activity Comments: pt becomes SOB w ambulating. SpO2% on 2L O2 after first [de-identified]' ambulation 90% that raghu to 93% then after second  [de-identified]' ambulation SpO2% 86% that raghu to 94% after 1 minute of deep breaths through nose and out mouth  Additional Factors: Increased time to complete  Assistance Level: Contact guard assist  Gait Deviations: Slow seamus; Wide base of support  Curb  Skilled Clinical Factors: attempted w FWW but pt was apprehensive and unable to complete stating he needs a hand rail to help him like he has at home to get into his house. will assess step this afternoon    ASSESSMENT/PROGRESS TOWARDS GOALS     Assessment  Assessment: Pt able to complete all exercises this date. He was able to walk 2 house hold distances w FWW and CGA x 1. pt was also able to comlplete a car transfer w CGA x 1. v/c are required for transfers to complete. pt is unable to return home at this time due to being below baseline and requiring assitance for trasnfers and ambulation compared to independent PLOF. pt is limited by fatigue, decreased endurance, secondary medical conditions, and impaired gait and functional mobility. pt will continue to benefit from skilled PT intervention to improve his strength, endurance, gait, and functional mobility to facilitate a safe return to home w assitance from family. plan for d/c will be discussed at tuesday confrence on 12/ 6  Activity Tolerance: Patient limited by fatigue;Patient limited by endurance; Patient tolerated evaluation without incident  Discharge Recommendations: Home with Home health PT  Factors Affecting Discharge: lives in house w spouse w 1+1 stairs to enter  PT Equipment Recommendations  Equipment Needed: No     PM session:   S: pt sitting in chair in room. He is alert and pleasant. He is agreeable to PT this afternoon. He denies any pain. RN in room finishing blood sugar check due to patient with symptoms of shakes and weakness. O: Pt was able to complete a stand step transfer from chair in room to w/c w FWW and CGA x 1. Pt required assistance for managing O2 line. V/c required for sequencing and hand placement. Pt next completed a stand step transfer w FWW to curb step. Pt completed 1 - 6\" curb step w railing on R side and walker on L side for extra hand support as pt has B handrails at home w CGA x 1.  Pt required v/c for sequencing and hand placement. Pt goes down the steps at home sideways w B hands on one handrail. Pt completed this step down w CGA x 1. Pt then completed a stand step transfer from step to w/c w FWW and CGA x 1. V/c required for hand placement and sequencing. Pt educated on use of B hands for eccentric control lowering self into chair. Increased time and effort to complete. Pt next completed a sit to stand from w/c to FWW w CGA x 1. Pt then ambulated 60' on carpet w FWW and CGA x 1. V/c were given to  feet to not trip on them. PT managed O2 line during this walk. Pt then completed a stand to sit from [address] to w/c w CGA x 1. V/c were required for sequencing and hand placement. Increased time and effort were required to complete. Pt completed seated exercises in w/c B: ankle pumps x 30, marches x 20, LAQ x 20, hip adductor ball squeezes x 20, hip abductor w green theraband x 20, and glute squeezes x 10. Pt then ambulated 79' on level surface from therapy gym to room w FWW and CGA x 1. Pt completed a stand to sit from [address] to chair w armrest. V/c increased time and effort to complete. Pt left in chair in room w chair alarm on, call light within reach. A: Pt able to complete all exercises this date. He was able to walk 2 house hold distances w FWW and CGA x 1. pt was also able to comlplete a curb step w CGA x 1 and hand rail on the R. v/c are required for transfers to complete. pt is unable to return home at this time due to being below baseline and requiring assitance for trasnfers and ambulation compared to independent PLOF. pt is limited by fatigue, decreased endurance, secondary medical conditions, and impaired gait and functional mobility. pt will continue to benefit from skilled PT intervention to improve his strength, endurance, gait, and functional mobility to facilitate a safe return to home w assitance from family.  plan for d/c will be discussed at tuesday [facility] on 12/ 6    Safety Device - Type of devices:  [x]  All fall risk precautions in place [] Bed alarm in place  [x] Call light within reach [x] Chair alarm in place [] Positioning belt [x] Gait belt [] Patient at risk for falls [] Left in bed [x] Left in chair [] Telesitter in use [] Sitter present [] Nurse notified []  None     Goals  Patient Goals   Patient Goals : to be able to walk again  Short Term Goals  Time Frame for Short Term Goals: 7-10 days  Short Term Goal 1: Bed mobility modif I  Short Term Goal 2: Transfers sit <> stand with modif I  Short Term Goal 3: Ambulate with walker 150 feet with modif I  Short Term Goal 4: pt complete 2 curb steps w use of handrail modif I  Long Term Goals  Time Frame for Long Term Goals : STG=LTG    PLAN OF CARE/SAFETY  Physcial Therapy Plan  General Plan:  minutes of therapy at least 5 out of 7 days a week  Days Per Week: 5 Days  Hours Per Day: 1.5 hours  Therapy Duration: 4-7 Days  Current Treatment Recommendations: Strengthening;Balance training;Functional mobility training;Transfer training;Gait training; Therapeutic activities; Patient/Caregiver education & training; Safety education & training  Safety Devices  Type of Devices: Call light within reach; Chair alarm in place; Left in chair;Nurse notified;Gait belt;Patient at risk for falls    EDUCATION  Education  Education Given To: Patient; Family  Education Provided: Role of Therapy;Plan of Care; Mobility Training;Transfer Training;Energy Conservation; Safety;ADL Function;DME/Home Modifications; Fall Prevention Strategies  Education Method: Demonstration;Verbal  Education Outcome: Continued education needed;Verbalized understanding      Therapy Time   Individual Concurrent Group Co-treatment   Time In 4911         Time Out 2276         Minutes 45           Timed Code Treatment Minutes: 45 Minutes   Second Session Therapy Time     Individual Co-treatment   Time In 7395     Time Out 1600     Minutes 1356 [facility], 12/01/22 at 12:47 PM   Therapist was present, directed the patient's care, made skilled judgement, and was responsible for assessment and treatment of the patient.         Sean Shaw PT, DPT [id number] 12/01/22 4:23 PM

## 2022-12-01 NOTE — PLAN OF CARE
Problem: Safety - Adult  Goal: Free from fall injury  11/30/2022 2118 by Red Bhatia RN  Outcome: Progressing  11/30/2022 2118 by Red Bhatia RN  Outcome: Progressing  11/30/2022 1315 by Hubert Bowie RN  Outcome: Progressing     Problem: Chronic Conditions and Co-morbidities  Goal: Patient's chronic conditions and co-morbidity symptoms are monitored and maintained or improved  11/30/2022 2118 by Red Bhatia RN  Outcome: Progressing  11/30/2022 2118 by Red Bhatia RN  Outcome: Progressing  11/30/2022 1315 by Hubert Bowie RN  Outcome: Progressing     Problem: Skin/Tissue Integrity  Goal: Absence of new skin breakdown  Description: 1. Monitor for areas of redness and/or skin breakdown  2. Assess vascular access sites hourly  3. Every 4-6 hours minimum:  Change oxygen saturation probe site  4. Every 4-6 hours:  If on nasal continuous positive airway pressure, respiratory therapy assess nares and determine need for appliance change or resting period.   11/30/2022 2118 by Red Bhatia RN  Outcome: Progressing  11/30/2022 2118 by Red Bhatia RN  Outcome: Progressing  11/30/2022 1315 by Hubert Bowie RN  Outcome: Progressing     Problem: Pain  Goal: Verbalizes/displays adequate comfort level or baseline comfort level  Outcome: Progressing

## 2022-12-01 NOTE — PROGRESS NOTES
This is a 79 y.o.  male admitted on 11/30/2022 with Anasarca. Pt A&O X4. HR regular. Lungs sounds Diminished, expiratory wheezes. Denies chest pain. Abd soft and nontender. Active bowel sounds. Last BM 11/30/22. Continent of bowel & bladder. Denies any abd discomfort. Saline lock to RFA. Transfers with walker X1. Pills whole with thin. HS medication given. Tolerated well. Call light in reach. Patient instructed to call if there is any needs or changes.   Electronically signed by Drury Klinefelter, RN on 12/1/2022 at 12:29 AM

## 2022-12-01 NOTE — PROGRESS NOTES
Department of Physical Medicine & Rehabilitation  Progress Note    Patient Identification:  Hernán Escobar  [id number]  : 1940  Admit date: 2022    Chief Complaint: Anasarca    Subjective:   No acute events overnight. Patient seen this am sitting up in room. Reports therapy going well thus far. Asks about fluid and Na restriction. Education provided. Labs reviewed. ROS: No f/c, n/v, cp     Objective:  Patient Vitals for the past 24 hrs:   BP Temp Temp src Pulse Resp SpO2 Height Weight   22 0514 (!) 141/60 97.5 °F (36.4 °C) Oral 66 18 92 % -- 228 lb 9.9 oz (103.7 kg)   22 (!) 148/80 -- -- -- -- -- -- --   22 (!) 148/80 97.9 °F (36.6 °C) Oral 70 18 97 % -- --   22 1930 -- -- -- -- -- 95 % -- --   22 1620 (!) 154/65 97.9 °F (36.6 °C) -- 79 -- 94 % -- --   22 1059 (!) 148/63 97.6 °F (36.4 °C) Oral 71 18 96 % 5' 8\" (1.727 m) --     Const: Alert. No distress, pleasant. HEENT: Normocephalic, atraumatic. Normal sclera/conjunctiva. MMM. CV: Regular rate and rhythm. Resp: No respiratory distress. Lungs decreased at bases  Abd: Soft, nontender, nondistended, NABS+   Ext: Edema improving. Neuro: Alert, oriented, appropriately interactive. Psych: Cooperative, appropriate mood and affect    Laboratory data: Available via EMR.    Last 24 hour lab  Recent Results (from the past 24 hour(s))   POCT Glucose    Collection Time: 22 12:17 PM   Result Value Ref Range    POC Glucose 111 (H) 70 - 99 mg/dl    Performed on ACCU-CHEK    POCT Glucose    Collection Time: 22  4:16 PM   Result Value Ref Range    POC Glucose 139 (H) 70 - 99 mg/dl    Performed on ACCU-CHEK    POCT Glucose    Collection Time: 22  8:26 PM   Result Value Ref Range    POC Glucose 115 (H) 70 - 99 mg/dl    Performed on ACCU-CHEK    POCT Glucose    Collection Time: 22  6:51 AM   Result Value Ref Range    POC Glucose 110 (H) 70 - 99 mg/dl    Performed on ACCU-CHEK    Basic Metabolic Panel w/ Reflex to MG    Collection Time: 12/01/22  6:57 AM   Result Value Ref Range    Sodium 139 136 - 145 mmol/L    Potassium reflex Magnesium 3.4 (L) 3.5 - 5.1 mmol/L    Chloride 95 (L) 99 - 110 mmol/L    CO2 33 (H) 21 - 32 mmol/L    Anion Gap 11 3 - 16    Glucose 105 (H) 70 - 99 mg/dL    BUN 58 (H) 7 - 20 mg/dL    Creatinine 2.3 (H) 0.8 - 1.3 mg/dL    Est, Glom Filt Rate 27 (A) >60    Calcium 8.3 8.3 - 10.6 mg/dL   Prealbumin    Collection Time: 12/01/22  6:57 AM   Result Value Ref Range    Prealbumin 19.7 (L) 20.0 - 40.0 mg/dL   CBC with Auto Differential    Collection Time: 12/01/22  6:57 AM   Result Value Ref Range    WBC 3.5 (L) 4.0 - 11.0 K/uL    RBC 2.72 (L) 4.20 - 5.90 M/uL    Hemoglobin 7.2 (L) 13.5 - 17.5 g/dL    Hematocrit 23.1 (L) 40.5 - 52.5 %    MCV 84.9 80.0 - 100.0 fL    MCH 26.5 26.0 - 34.0 pg    MCHC 31.2 31.0 - 36.0 g/dL    RDW 18.0 (H) 12.4 - 15.4 %    Platelets 685 042 - 270 K/uL    MPV 8.1 5.0 - 10.5 fL    Neutrophils % 60.3 %    Lymphocytes % 20.7 %    Monocytes % 13.9 %    Eosinophils % 4.4 %    Basophils % 0.7 %    Neutrophils Absolute 2.1 1.7 - 7.7 K/uL    Lymphocytes Absolute 0.7 (L) 1.0 - 5.1 K/uL    Monocytes Absolute 0.5 0.0 - 1.3 K/uL    Eosinophils Absolute 0.2 0.0 - 0.6 K/uL    Basophils Absolute 0.0 0.0 - 0.2 K/uL   Renal Function Panel    Collection Time: 12/01/22  6:57 AM   Result Value Ref Range    Phosphorus 2.9 2.5 - 4.9 mg/dL    Albumin 3.0 (L) 3.4 - 5.0 g/dL   Hepatic Function Panel    Collection Time: 12/01/22  6:57 AM   Result Value Ref Range    Total Protein 5.9 (L) 6.4 - 8.2 g/dL    Alkaline Phosphatase 95 40 - 129 U/L    ALT 29 10 - 40 U/L    AST 36 15 - 37 U/L    Total Bilirubin 0.5 0.0 - 1.0 mg/dL    Bilirubin, Direct <0.2 0.0 - 0.3 mg/dL    Bilirubin, Indirect see below 0.0 - 1.0 mg/dL   Magnesium    Collection Time: 12/01/22  6:57 AM   Result Value Ref Range    Magnesium 2.00 1.80 - 2.40 mg/dL   Brain Natriuretic Peptide    Collection Time: 12/01/22 6: 57 AM   Result Value Ref Range    Pro-BNP 1,684 (H) 0 - 449 pg/mL   Iron and TIBC    Collection Time: 12/01/22  6:57 AM   Result Value Ref Range    Iron 37 (L) 59 - 158 ug/dL    TIBC 190 (L) 260 - 445 ug/dL    Iron Saturation 19 (L) 20 - 50 %       Therapy progress:  Physical therapy:  Bed Mobility:     Sit>supine:     Supine>sit:     Transfers:     Sit>stand:     Stand>sit:     Bed<>chair     Stand Pivot:     Lateral transfer:     Car transfer:     Ambulation:     Stairs:     Curb: Wheelchair:     Assessment:  Activity Tolerance: Patient limited by fatigue, Patient limited by endurance, Patient tolerated evaluation without incident  Discharge Recommendations: Home with Home health PT      Occupational therapy:   Feeding     Grooming/Oral Hygiene     UE Bathing     LE Bathing     UE Dressing     LE Dressing     Putting On/Taking Off Footwear     Toileting     Assessment:  Activity Tolerance: Patient limited by fatigue, Patient limited by endurance, Patient tolerated evaluation without incident    Speech therapy:            PT  Position Activity Restriction  Other position/activity restrictions: 2 L O2;  low sodium/low potassium diet; IV, contact precautions for ESBL in urine  Objective     Sit to Stand: Contact guard assistance, Minimal Assistance (CGA from recliner, min A from bed)  Stand to Sit: Contact guard assistance, Stand by assistance  Bed to Chair: Contact guard assistance  Device: Rolling Walker  Other Apparatus: O2 (2L)  Assistance: Contact guard assistance  Distance: 5', 25'  OT  PT Equipment Recommendations  Equipment Needed: No (pt has wh walker and cane)  Toilet - Technique: Ambulating  Equipment Used: Grab bars  Assessment        SLP          Body mass index is 34.76 kg/m².     Rehabilitation Diagnosis:   16.0, Debility (non-cardiac, non-pulmonary        Assessment and Plan:     Impairments:generalized weakness, decreased endurance, balance     Debility  -PT/OT     Anasarca, acute on chronic dCHF  -torsemide per Nephrology  -Daily wt     Obstructive Uropathy  -Salazar in place, ? consider void trial  -Flomax     DEISY on CKD  -Nephrology following, appreciate input  -Avoid nephrotoxins, renally dose meds  -Monitor renal function     HCAP, hemoptysis  -meropenem course completed    Acute hypoxic respiratory failure  -As evidenced by O2 saturation <90% on room air  -Supplemental O2, wean as tolerated -- currently on 2L     Anemia  -epoetin   -Monitor Hgb, transfuse prn <7. PAF  -amiodarone     HTN  -torsemide, verapamil     DM2  -ISS  -was on glargine 10 units qhs at home     COPD  -montelukast, levalbuterol     JOANN  -Home CPAP     Bladder   -Retention with obstructive uropathy as above  -Salazar in place, ? If ok for void trial  -Flomax     Bowel   -Constipation   -Linzess, miralax, senna+colace BID, PRN bisacodyl po and supp. Safety   -fall precautions     Pain control  -acetaminophen prn     PPx  -DVT: heparin  -GI: pantoprazole    Rehab Progress: Making progress. Working on strength, balance, endurance. Anticipated Dispo: home with wife  Services: TBD  DME: TBD  ELOS: 10-14 days      Ethyl Carol Grullon MD 12/1/2022, 9:31 AM

## 2022-12-01 NOTE — PROGRESS NOTES
Patient admitted to rehab with Anasarca. A/Ox4. Transfers with walkerx1. Mobility restrictions: WBAT. On general, low sodium 2g diet, tolerating well. Medications taken whole with thins. On Heparin for DVT prophylaxis. Skin: scattered bruising and abrasions. Oxygen: 2L nasal cannula. LDA: RFA. Has been continent of bowel and continent of bladder. LBM 12/1. Chair/bed alarms in use and call light in reach. Will monitor for safety.

## 2022-12-01 NOTE — PROGRESS NOTES
[facility] Progress Note  12/1/2022 9:44 AM  Subjective:   Admit Date: 11/30/2022      Chief Complaint: Now on rehab Unit     Interval History: On rehab unit for strengthening  Did restart oral torsemide yest  Hgb remains low--7.2--on procrit--will transfuse if under 7   He is motivated to improve   Salazar remains in place       Diet: ADULT DIET; Regular; Low Sodium (2 gm); Low Potassium (Less than 3000 mg/day)  Medications:   Scheduled Meds:   sodium chloride flush  5-40 mL IntraVENous 2 times per day    tamsulosin  0.4 mg Oral Daily    amiodarone  100 mg Oral Daily    aspirin EC  162 mg Oral Daily    atorvastatin  40 mg Oral Daily    linaclotide  145 mcg Oral QAM AC    montelukast  10 mg Oral Daily    pantoprazole  40 mg Oral Daily    verapamil  240 mg Oral BID    insulin lispro  0-4 Units SubCUTAneous TID WC    insulin lispro  0-4 Units SubCUTAneous Nightly    sodium chloride nebulizer  3 mL Nebulization TID    levalbuterol  1.25 mg Nebulization TID    epoetin kenny-epbx  30,000 Units SubCUTAneous Q7 Days    miconazole   Topical BID    polyethylene glycol  17 g Oral BID    heparin (porcine)  5,000 Units SubCUTAneous BID    sennosides-docusate sodium  1 tablet Oral BID    torsemide  10 mg Oral Daily     Continuous Infusions:   sodium chloride Stopped (11/30/22 2308)    dextrose         Review of Systems -   General ROS: afebrile  Respiratory ROS: positive for - cough  Cardiovascular ROS: no chest pain  Musculoskeletal ROS:positive for - low back pain   Neurological ROS: no TIA or stroke symptoms    Objective:   Vitals: BP (!) 141/60   Pulse 66   Temp 97.5 °F (36.4 °C) (Oral)   Resp 18   Ht 5' 8\" (1.727 m)   Wt 228 lb 9.9 oz (103.7 kg)   SpO2 92%   BMI 34.76 kg/m²   General appearance: alert and cooperative with exam  HEENT: Head: Normocephalic, no lesions, without obvious abnormality.   Neck: no adenopathy, no carotid bruit, no JVD, supple, symmetrical, trachea midline, and thyroid not enlarged, symmetric, no tenderness/mass/nodules  Lungs: diminished breath sounds bibasilar  Heart: regular rate and rhythm, S1, S2 normal, no murmur, click, rub or gallop  Abdomen: obese--no palp mass   Extremities: 1+ edema   Neurologic: Mental status: Alert, oriented, thought content appropriate    Admission on 11/30/2022   Component Date Value Ref Range Status    POC Glucose 11/30/2022 111 (A)  70 - 99 mg/dl Final    Performed on 11/30/2022 ACCU-CHEK   Final    POC Glucose 11/30/2022 139 (A)  70 - 99 mg/dl Final    Performed on 11/30/2022 ACCU-CHEK   Final    Sodium 12/01/2022 139  136 - 145 mmol/L Final    Potassium reflex Magnesium 12/01/2022 3.4 (A)  3.5 - 5.1 mmol/L Final    Chloride 12/01/2022 95 (A)  99 - 110 mmol/L Final    CO2 12/01/2022 33 (A)  21 - 32 mmol/L Final    Anion Gap 12/01/2022 11  3 - 16 Final    Glucose 12/01/2022 105 (A)  70 - 99 mg/dL Final    BUN 12/01/2022 58 (A)  7 - 20 mg/dL Final    Creatinine 12/01/2022 2.3 (A)  0.8 - 1.3 mg/dL Final    Est, Glom Filt Rate 12/01/2022 27 (A)  >60 Final    Comment: Pediatric calculator link  Cj.at. org/professionals/kdoqi/gfr_calculatorped  Effective Oct 3, 2022  These results are not intended for use in patients  <25years of age. eGFR results are calculated without  a race factor using the 2021 CKD-EPI equation. Careful  clinical correlation is recommended, particularly when  comparing to results calculated using previous equations. The CKD-EPI equation is less accurate in patients with  extremes of muscle mass, extra-renal metabolism of  creatinine, excessive creatinine ingestion, or following  therapy that affects renal tubular secretion.       Calcium 12/01/2022 8.3  8.3 - 10.6 mg/dL Final    Prealbumin 12/01/2022 19.7 (A)  20.0 - 40.0 mg/dL Final    WBC 12/01/2022 3.5 (A)  4.0 - 11.0 K/uL Final    RBC 12/01/2022 2.72 (A)  4.20 - 5.90 M/uL Final    Hemoglobin 12/01/2022 7.2 (A)  13.5 - 17.5 g/dL Final    Hematocrit 12/01/2022 23.1 (A)  40.5 - 52.5 % Final MCV 12/01/2022 84.9  80.0 - 100.0 fL Final    MCH 12/01/2022 26.5  26.0 - 34.0 pg Final    MCHC 12/01/2022 31.2  31.0 - 36.0 g/dL Final    RDW 12/01/2022 18.0 (A)  12.4 - 15.4 % Final    Platelets 94/28/2628 174  135 - 450 K/uL Final    MPV 12/01/2022 8.1  5.0 - 10.5 fL Final    Neutrophils % 12/01/2022 60.3  % Final    Lymphocytes % 12/01/2022 20.7  % Final    Monocytes % 12/01/2022 13.9  % Final    Eosinophils % 12/01/2022 4.4  % Final    Basophils % 12/01/2022 0.7  % Final    Neutrophils Absolute 12/01/2022 2.1  1.7 - 7.7 K/uL Final    Lymphocytes Absolute 12/01/2022 0.7 (A)  1.0 - 5.1 K/uL Final    Monocytes Absolute 12/01/2022 0.5  0.0 - 1.3 K/uL Final    Eosinophils Absolute 12/01/2022 0.2  0.0 - 0.6 K/uL Final    Basophils Absolute 12/01/2022 0.0  0.0 - 0.2 K/uL Final    Phosphorus 12/01/2022 2.9  2.5 - 4.9 mg/dL Final    Albumin 12/01/2022 3.0 (A)  3.4 - 5.0 g/dL Final    Total Protein 12/01/2022 5.9 (A)  6.4 - 8.2 g/dL Final    Alkaline Phosphatase 12/01/2022 95  40 - 129 U/L Final    ALT 12/01/2022 29  10 - 40 U/L Final    AST 12/01/2022 36  15 - 37 U/L Final    Total Bilirubin 12/01/2022 0.5  0.0 - 1.0 mg/dL Final    Bilirubin, Direct 12/01/2022 <0.2  0.0 - 0.3 mg/dL Final    Bilirubin, Indirect 12/01/2022 see below  0.0 - 1.0 mg/dL Final    Comment: Indirect Bilirubin cannot be calculated since Total Bilirubin  and/or Direct Bilirubin is below measurable range. Magnesium 12/01/2022 2.00  1.80 - 2.40 mg/dL Final    Pro-BNP 12/01/2022 1,684 (A)  0 - 449 pg/mL Final    Comment: Methodology by NT-proBNP    An age-independent cutoff point of 300 pg/ml has a 98%  negative predictive value excluding acute heart failure. Values exceeding the age-related cutoff values (450 pg/mL if  age<50, 900 if 50-75 and 1800 if >75) has 90% sensitivity and  84% specificity for diagnosing acute HF.  In patients with  renal compromise (eGFR<60) values greater than 1200pg/ml have  a diagnostic sensitivity and specificity of 89% and 72% for  acute HF. Iron 12/01/2022 37 (A)  59 - 158 ug/dL Final    TIBC 12/01/2022 190 (A)  260 - 445 ug/dL Final    Iron Saturation 12/01/2022 19 (A)  20 - 50 % Final    POC Glucose 11/30/2022 115 (A)  70 - 99 mg/dl Final    Performed on 11/30/2022 ACCU-CHEK   Final    POC Glucose 12/01/2022 110 (A)  70 - 99 mg/dl Final    Performed on 12/01/2022 ACCU-CHEK   Final   No results displayed because visit has over 200 results. Assessment & Plan:   Principal Problem:    Anasarca--torsemide restarted--orally   Active Problems:    JOANN (obstructive sleep apnea)    Type 2 diabetes mellitus with stage 4 chronic kidney disease, with long-term current use of insulin ([facility])--Continue current therapy      Microcytic anemia--on procrit--transfuse if <7    Essential hypertension--Continue current therapy      Moderate COPD (chronic obstructive pulmonary disease) ([facility])--Continue current therapy      Slow transit constipation  Resolved Problems:    * No resolved hospital problems.  *  Cont rehab--cont to try and remove fluid--follow wts and labs   Please note that over 35 minutes was spent in evaluating the patient, review of records and results, discussion with staff/family, etc.    Dm Roca MD

## 2022-12-01 NOTE — PROGRESS NOTES
Nephrology ([facility]) Progress Note    CC: DEISY on CKD    Subjective:    Chart reviewed , events noted  . Labs reviewed . ROS:  no chest pain. SOB/GIBBONS   PMFSH:  medications reviewed. Wife present. Objective:  Blood pressure (!) 141/60, pulse 66, temperature 97.5 °F (36.4 °C), temperature source Oral, resp. rate 18, height 5' 8\" (1.727 m), weight 228 lb 9.9 oz (103.7 kg), SpO2 92 %. Intake/Output Summary (Last 24 hours) at 12/1/2022 1037  Last data filed at 12/1/2022 0514  Gross per 24 hour   Intake --   Output 1050 ml   Net -1050 ml       General:  NAD, A+Ox3  Chest:  CTAB  CVS:  RRR, no S3. Abdominal:  NTND, soft, +BS  Extremities:  1+ pitting edema, wearing compression stockings  Skin:  no rash, warm. : +gordon    Labs:  Renal panel:  Lab Results   Component Value Date/Time     12/01/2022 06:57 AM    K 3.4 (L) 12/01/2022 06:57 AM    K 3.4 (L) 12/01/2022 06:57 AM    CO2 33 (H) 12/01/2022 06:57 AM    BUN 58 (H) 12/01/2022 06:57 AM    CREATININE 2.3 (H) 12/01/2022 06:57 AM    CALCIUM 8.3 12/01/2022 06:57 AM    PHOS 2.9 12/01/2022 06:57 AM    MG 2.00 12/01/2022 06:57 AM     CBC:  Lab Results   Component Value Date/Time    WBC 3.5 (L) 12/01/2022 06:57 AM    HGB 7.2 (L) 12/01/2022 06:57 AM    HCT 23.1 (L) 12/01/2022 06:57 AM     12/01/2022 06:57 AM       Assessment/Plan:  Reviewed old records and labs. 1) DEISY on CKD              - baseline Cr 2.0              - ddx:  CRS vs. ATN              - renal function better , on oral diuretics . 2) obstructive uropathy              - gordon in place              - on flomax     3) ACDHF              - echo shows LVEF 06-54%, diastolic dysfunction, elevated PASP 40 mmHg              - low dose diuretics started .     - LE edema stable     4) anemia               - retacrit 10,000 units qweek    5) FEN  - hypokalemia               - potassium supp   -              Plan dw pt and family in detail     Tobi Myers MD,FACP

## 2022-12-01 NOTE — PROGRESS NOTES
Occupational Therapy  Facility/Department: [facility]  Rehabilitation Occupational Therapy Evaluation       Date: 22  Patient Name: León Reno       Room: Q6T-0896/2944-32  MRN: [id number]  Account: [de-identified]   : 1940  (79 y.o.) Gender: male     Referring Practitioner: Dr Vladimir Benjamin MD  Diagnosis: Anasarca, Worsening renal function, Generalized weakness  Additional Pertinent Hx: \"Patient is an 80 yo M with pmh Afib, CHF, HTN, COPD, DM2, CKD who initially presented 2022 with diffuse weakness, increasing edema, and inability to urinate. Found to have hypothermia, acute on chronic dCHF exacerbation, obstructive uropathy, and DEISY on CKD. He was treated with lasix gtt. Course complicated by hemoptysis and HCAP. \" copied per Dr Daniela Oro 22 H & P note. PMH: asthma, a-fib, carotid artery stenosis, CKD, COPD, HTN, anemia, JOANN on c-pap, DM. Newly on home O2. Restrictions  Restrictions/Precautions: Fall Risk;Contact Precautions  Other position/activity restrictions: 2 L O2;  low sodium/low potassium diet; IV, contact precautions for ESBL in urine  Required Braces or Orthoses?: No  Equipment Used:  (RW)    Subjective  Subjective: Pt met in room seated in recliner with wife Peg present. Pt denied pain. Pt agreeable to OT eval/tx for ADL session for shower.           Vitals  Temp: 97.2 °F (36.2 °C)  Heart Rate: 77  Resp: 16  BP: (!) 122/49  Oxygen Therapy  SpO2: 92 %  Pulse Oximeter Device Mode: Intermittent  Pulse Oximeter Device Location: Finger  O2 Device: Nasal cannula  Skin Assessment: Clean, dry, & intact  O2 Flow Rate (L/min): 2 L/min  Level of Consciousness: Alert (0)    Objective     Cognition  Overall Cognitive Status:  (appears WFL for ADL tasks, higher level NT)  Safety Judgement: Decreased awareness of need for safety  Cognition Comment: wife decreased memory; some difficulty communicating vs recalling home set-up in bathroom  Orientation  Overall Orientation Status: Within Functional Limits  Orientation Level: Oriented X4   Perception  Overall Perceptual Status: WFL  Sensation  Overall Sensation Status: WFL   ROM  LUE AROM (degrees)  LUE AROM : WFL  LUE General AROM: shoulder decreased to ~ 110o, elbow> hand WFL  RUE AROM (degrees)  RUE AROM : WFL  RUE General AROM: shoulder decreased to~ 120o,  elbow> hand WFL  LUE Strength  Gross LUE Strength: WFL  LUE Strength Comment: grossly 4/5 to 4+/5(did not push past 4-/5 to shoulder D/T reported shoulder issues)  RUE Strength  Gross RUE Strength: WFL  RUE Strength Comment: grossly 4/5 to 4+/5  Fine Motor Skills  Coordination  Movements Are Fluid And Coordinated: No  Coordination and Movement Description: Tremors; Left UE;Right UE   Comment: mild tremors w/ finger to nose, L dominant      Functional Mobility  Balance  Sitting Balance: Independent  Standing Balance: Contact guard assistance  Toilet Transfers  Toilet - Technique: Ambulating  Equipment Used: Grab bars  Toilet Transfer: Contact guard assistance  Toilet Transfers Comments: RW>< toilet w/ Bilateral grab bars, cues/A for O2 line  Shower Transfers  Shower - Transfer From: Walker (RW)  Shower - Transfer Type: To and From  Shower - Transfer To:  Shower seat with back  Shower - Technique: Ambulating  Shower Transfers: Contact Guard;Minimal assistance  Shower Transfers Comments: RW>< shower chair in stall used grab bar cues for tech./A f or O2 line  Wheelchair Bed Transfers  Wheelchair/Bed - Technique: Ambulating  Equipment Used:  (RW)  Level of Asssistance: Minimal assistance  Wheelchair Transfers Comments: stood from recliner CGA to RW, >< straight chair at sink min A 2x, CGA 1x w/ RW/ A/cues for O2 line  Social/Functional History  Lives With: Spouse  Type of Home: House  Home Layout: Able to Live on Main level with bedroom/bathroom;Multi-level  Home Access: Stairs to enter with rails  Entrance Stairs - Number of Steps: 1+1  Entrance Stairs - Rails: Both  Bathroom Shower/Tub: Tub/Shower unit;Curtain  Bathroom Toilet: Standard (comfort height commode vanity in front)  Bathroom Equipment: Tub transfer bench;Hand-held shower (says TTB new)  Bathroom Accessibility: Walker accessible  Home Equipment: Cane;Rollator;Oxygen (new O2 2L home 2 liters)  Has the patient had two or more falls in the past year or any fall with injury in the past year?: No  Receives Help From: Home health (OT, PT, nurse (new))  ADL Assistance: Needs assistance (recent A but was indep before that)  Feeding: Independent  Homemaking Assistance: Independent (wife cleans, pt manages bill paying)  Ambulation Assistance: Independent (mostly used cane)  Transfer Assistance: Independent  Active : Yes  Mode of Transportation: Car  Education: sedan  Occupation: Retired  Type of Occupation: [employer]  [address]: [place], [address] saw  IADL Comments: sleeping in a recliner but reports he does gt into his bed on a regular basis  Additional Comments: wife had memory problems & is staying w/ dtr, but here during the day(dtr works [employer]. I's & drops her off)  ADL  Grooming: Setup  Grooming Skilled Clinical Factors: Seated at the sink pt completed brushed teeht/used mouthwash/combed hairoral care, brushed hair. UE Bathing: Stand by assistance;Setup; Increased time to complete  UE Bathing Skilled Clinical Factors: Pt showered on chair, OT covered IV, kept dry, ed pt in how to manage hand held shower/controls, OT bathed back he did rest including hair  LE Bathing: Maximum assistance; Increased time to complete;Setup  LE Bathing Skilled Clinical Factors: A to bath/dry below knees, A for buttocks in stance at grab bar(does not have & declined to get when OT suggested), he bathed rest seated  UE Dressing: Minimal assistance  UE Dressing Skilled Clinical Factors: min A to pull shirt down in back  LE Dressing: Maximum assistance; Increased time to complete  LE Dressing Skilled Clinical Factors: Dep for compression stockings, Assist for BLE into pull ups, he donned pants CGA over feet & was able to pull up briefs/pants/don suspenders w/ min A to fix O2 line through suspenders. Donned shoes with min A to tie  Toileting: Maximum assistance; Increased time to complete  Toileting Skilled Clinical Factors: BM, he wiped but OT max A for though hygiene,smearing, A to manage gown  Additional Comments: Pt left recliner in chair, nurse Wilbur Hill made aware of BM during session. Wife observed session. Goals  Patient Goals   Patient goals : Pt stated his goal is to do everything he did at home, walk indep, shower, dress, go grocery shopping . Above goals will work toward this goal.  Short Term Goals  Time Frame for Short Term Goals: 7-10 days from eval 12/1/22  Short Term Goal 1: Pt will bathe mod indep w/shower chair/long sponge  Short Term Goal 2: Pt will dress mod indep w/ AE if needed  Short Term Goal 3: Pt will toilet mod indep  Short Term Goal 4: Pt will perform functional ADL transfers, ADLmobility w/ LRAD mod indep. Short Term Goal 5: Pt will particpate in ther ex/UE HEP to increase activity tolerance to stand for 5+ minutes during ADL/mob tasks keeping O2 sats in safe range. Additional Goals?: Yes  Short Term Goal 6: Pt will improve walker safety/O2 line management to complete light IADL tasks of food prep/home management mod indep. with LRAD  Long Term Goals  Time Frame for Long Term Goals : STGs=LTGs    Assessment  Performance deficits / Impairments: Decreased functional mobility ; Decreased ADL status; Decreased endurance;Decreased fine motor control;Decreased balance;Decreased strength;Decreased high-level IADLs;Decreased safe awareness;Decreased coordination  Assessment: Pt is an 79 y.o. male admitted with anasarca, worsening renal function, and general weakness. Pt lives in house with wife whom has significant memory issues.  Pt & wife shared homemanagement tasks, but he was fully independent prior to initial admit. He did the grocery shopping & drives. Today pt required mod A to shower on chair w/ use of grab bar(does not have & does not want to get), min A for UB dressing, max A for LB dressing, max A for toileting for BM. He required CGA to occassional min A for ADL transfers w/RW and CGA for ADL mobiity w/RW + min cues for O2 line management. Pt was only able to stand ~ 1.5 minutes & requested to sit in chair for grooming tasks. Pt's O2 sats dropped while on 2 L per n/c during ADL tasks. Cued for breathing & recovered to 90%. Pt functioning well below his baseline of modified indep. & will require inpt OT treatment on ARU to facilitate return to indep. level of function, to allow for safe return home of wife where he is her caregiver D/T her cognitive issues. Treatment Diagnosis: Impaired: ADL/IADL function, functional mobility/transfers, activity tolerance/breathing capacity, balance, coord/tremors  Prognosis: Good  Decision Making: Medium Complexity  History: Per Alphonse Lizarraga MD H&P on 11-: The pt is an 80 yo male who presented to the ED with weakness, increasing edema and inability to urinate. The pt recently in the hospital for weakness and DEISY and pna. In the ED the pt's renal function wa worse and he was volume overloaded; he was SOB with crackles. Pt lives in house with wife whom has significant memory issues. Pt & wife shared homemanagement tasks, but he was fully indep prior to initial admit. He did the grocery shopping & drives. Exam: ADL/IADL function, functional mobility/transfers, activity tolerance/breathing capacity, balance, UE ROM/strength/coord. Assistance / Modification: Pt required mod A to shower, min A for UB dressing, max A for LB dressing, max A for toileting for BM. He required CGA to occassional min A for ADL transfers w/RW and CGA for ADL mobiity w/RW + min cues for O2 line management.   Treatment Initiated : 12/1/22  Discharge Recommendations: Home with assist PRN;Home with Home health OT  Occupational Therapy Plan  Times Per Week: 5-6  Times Per Day: Twice a day  Days Per Week: 5 Days  Hours Per Day: 1.5 hours  Current Treatment Recommendations: Balance training;Functional mobility training; Endurance training;Strengthening; Safety education & training;Self-Care / ADL;Equipment evaluation, education, & procurement;Patient/Caregiver education & training;ROM; Home management training       Therapy Time   Individual Concurrent Group Co-treatment   Time In 0815         Time Out 0945         Minutes 90         Timed Code Treatment Minutes: 75 Minutes (+15 eval)       Monique Grier OT  Electronically signed by Devaughn Crane OTR/L  License # [id number]

## 2022-12-01 NOTE — PLAN OF CARE
Problem: Safety - Adult  Goal: Free from fall injury  Outcome: Progressing  Flowsheets (Taken 12/1/2022 1548)  Free From Fall Injury: Instruct family/caregiver on patient safety     Problem: Chronic Conditions and Co-morbidities  Goal: Patient's chronic conditions and co-morbidity symptoms are monitored and maintained or improved  Outcome: Progressing  Flowsheets (Taken 12/1/2022 1548)  Care Plan - Patient's Chronic Conditions and Co-Morbidity Symptoms are Monitored and Maintained or Improved: Monitor and assess patient's chronic conditions and comorbid symptoms for stability, deterioration, or improvement     Problem: Skin/Tissue Integrity  Goal: Absence of new skin breakdown  Description: 1. Monitor for areas of redness and/or skin breakdown  2. Assess vascular access sites hourly  3. Every 4-6 hours minimum:  Change oxygen saturation probe site  4. Every 4-6 hours:  If on nasal continuous positive airway pressure, respiratory therapy assess nares and determine need for appliance change or resting period.   Outcome: Progressing     Problem: ABCDS Injury Assessment  Goal: Absence of physical injury  Outcome: Progressing  Flowsheets (Taken 12/1/2022 1549)  Absence of Physical Injury: Implement safety measures based on patient assessment

## 2022-12-02 LAB
ANION GAP SERPL CALCULATED.3IONS-SCNC: 15 MMOL/L (ref 3–16)
BUN BLDV-MCNC: 52 MG/DL (ref 7–20)
CALCIUM SERPL-MCNC: 8.7 MG/DL (ref 8.3–10.6)
CHLORIDE BLD-SCNC: 93 MMOL/L (ref 99–110)
CO2: 27 MMOL/L (ref 21–32)
CREAT SERPL-MCNC: 2.2 MG/DL (ref 0.8–1.3)
GFR SERPL CREATININE-BSD FRML MDRD: 29 ML/MIN/{1.73_M2}
GLUCOSE BLD-MCNC: 111 MG/DL (ref 70–99)
GLUCOSE BLD-MCNC: 126 MG/DL (ref 70–99)
GLUCOSE BLD-MCNC: 127 MG/DL (ref 70–99)
GLUCOSE BLD-MCNC: 145 MG/DL (ref 70–99)
GLUCOSE BLD-MCNC: 147 MG/DL (ref 70–99)
PERFORMED ON: ABNORMAL
POTASSIUM REFLEX MAGNESIUM: 3.8 MMOL/L (ref 3.5–5.1)
SODIUM BLD-SCNC: 135 MMOL/L (ref 136–145)

## 2022-12-02 PROCEDURE — 97112 NEUROMUSCULAR REEDUCATION: CPT

## 2022-12-02 PROCEDURE — 97110 THERAPEUTIC EXERCISES: CPT

## 2022-12-02 PROCEDURE — 97535 SELF CARE MNGMENT TRAINING: CPT

## 2022-12-02 PROCEDURE — 99233 SBSQ HOSP IP/OBS HIGH 50: CPT | Performed by: INTERNAL MEDICINE

## 2022-12-02 PROCEDURE — 80069 RENAL FUNCTION PANEL: CPT

## 2022-12-02 PROCEDURE — 94761 N-INVAS EAR/PLS OXIMETRY MLT: CPT

## 2022-12-02 PROCEDURE — 97116 GAIT TRAINING THERAPY: CPT

## 2022-12-02 PROCEDURE — 6370000000 HC RX 637 (ALT 250 FOR IP): Performed by: INTERNAL MEDICINE

## 2022-12-02 PROCEDURE — 36415 COLL VENOUS BLD VENIPUNCTURE: CPT

## 2022-12-02 PROCEDURE — 2580000003 HC RX 258: Performed by: INTERNAL MEDICINE

## 2022-12-02 PROCEDURE — 6370000000 HC RX 637 (ALT 250 FOR IP): Performed by: PHYSICAL MEDICINE & REHABILITATION

## 2022-12-02 PROCEDURE — 2700000000 HC OXYGEN THERAPY PER DAY

## 2022-12-02 PROCEDURE — 6360000002 HC RX W HCPCS: Performed by: PHYSICAL MEDICINE & REHABILITATION

## 2022-12-02 PROCEDURE — 1280000000 HC REHAB R&B

## 2022-12-02 PROCEDURE — 97530 THERAPEUTIC ACTIVITIES: CPT

## 2022-12-02 PROCEDURE — 6360000002 HC RX W HCPCS: Performed by: INTERNAL MEDICINE

## 2022-12-02 PROCEDURE — 94640 AIRWAY INHALATION TREATMENT: CPT

## 2022-12-02 RX ADMIN — LEVALBUTEROL 1.25 MG: 1.25 SOLUTION, CONCENTRATE RESPIRATORY (INHALATION) at 20:28

## 2022-12-02 RX ADMIN — ASPIRIN 162 MG: 81 TABLET, COATED ORAL at 08:19

## 2022-12-02 RX ADMIN — HEPARIN SODIUM 5000 UNITS: 5000 INJECTION INTRAVENOUS; SUBCUTANEOUS at 08:18

## 2022-12-02 RX ADMIN — ISODIUM CHLORIDE 3 ML: 0.03 SOLUTION RESPIRATORY (INHALATION) at 20:28

## 2022-12-02 RX ADMIN — MICONAZOLE NITRATE: 2 POWDER TOPICAL at 21:50

## 2022-12-02 RX ADMIN — ISODIUM CHLORIDE 3 ML: 0.03 SOLUTION RESPIRATORY (INHALATION) at 12:58

## 2022-12-02 RX ADMIN — SENNOSIDES AND DOCUSATE SODIUM 1 TABLET: 50; 8.6 TABLET ORAL at 08:18

## 2022-12-02 RX ADMIN — BISACODYL 5 MG: 5 TABLET, COATED ORAL at 08:18

## 2022-12-02 RX ADMIN — LEVALBUTEROL 1.25 MG: 1.25 SOLUTION, CONCENTRATE RESPIRATORY (INHALATION) at 12:58

## 2022-12-02 RX ADMIN — ATORVASTATIN CALCIUM 40 MG: 40 TABLET, FILM COATED ORAL at 08:20

## 2022-12-02 RX ADMIN — VERAPAMIL HYDROCHLORIDE 240 MG: 240 TABLET, FILM COATED, EXTENDED RELEASE ORAL at 21:50

## 2022-12-02 RX ADMIN — PANTOPRAZOLE SODIUM 40 MG: 40 TABLET, DELAYED RELEASE ORAL at 08:19

## 2022-12-02 RX ADMIN — ISODIUM CHLORIDE 3 ML: 0.03 SOLUTION RESPIRATORY (INHALATION) at 07:12

## 2022-12-02 RX ADMIN — SODIUM CHLORIDE, PRESERVATIVE FREE 10 ML: 5 INJECTION INTRAVENOUS at 08:21

## 2022-12-02 RX ADMIN — HEPARIN SODIUM 5000 UNITS: 5000 INJECTION INTRAVENOUS; SUBCUTANEOUS at 21:49

## 2022-12-02 RX ADMIN — VERAPAMIL HYDROCHLORIDE 240 MG: 240 TABLET, FILM COATED, EXTENDED RELEASE ORAL at 08:19

## 2022-12-02 RX ADMIN — SENNOSIDES AND DOCUSATE SODIUM 1 TABLET: 50; 8.6 TABLET ORAL at 21:49

## 2022-12-02 RX ADMIN — TORSEMIDE 10 MG: 20 TABLET ORAL at 08:19

## 2022-12-02 RX ADMIN — LEVALBUTEROL 1.25 MG: 1.25 SOLUTION, CONCENTRATE RESPIRATORY (INHALATION) at 07:12

## 2022-12-02 RX ADMIN — MICONAZOLE NITRATE: 2 POWDER TOPICAL at 08:21

## 2022-12-02 RX ADMIN — MONTELUKAST 10 MG: 10 TABLET, FILM COATED ORAL at 08:20

## 2022-12-02 RX ADMIN — AMIODARONE HYDROCHLORIDE 100 MG: 200 TABLET ORAL at 08:19

## 2022-12-02 RX ADMIN — TAMSULOSIN HYDROCHLORIDE 0.4 MG: 0.4 CAPSULE ORAL at 08:19

## 2022-12-02 RX ADMIN — POLYETHYLENE GLYCOL 3350 17 G: 17 POWDER, FOR SOLUTION ORAL at 08:18

## 2022-12-02 NOTE — PLAN OF CARE
Problem: Discharge Planning  Goal: Discharge to home or other facility with appropriate resources  12/1/2022 2225 by Vibha Gamboa RN  Outcome: Progressing     Problem: Safety - Adult  Goal: Free from fall injury  12/2/2022 1016 by Ryne Gross RN  Outcome: Progressing  Flowsheets (Taken 12/1/2022 1548)  Free From Fall Injury: Instruct family/caregiver on patient safety  12/1/2022 2225 by Vibha Gamboa RN  Outcome: Progressing     Problem: Chronic Conditions and Co-morbidities  Goal: Patient's chronic conditions and co-morbidity symptoms are monitored and maintained or improved  12/2/2022 1016 by Ryne Gross RN  Outcome: Progressing  Flowsheets (Taken 12/2/2022 1016)  Care Plan - Patient's Chronic Conditions and Co-Morbidity Symptoms are Monitored and Maintained or Improved: Monitor and assess patient's chronic conditions and comorbid symptoms for stability, deterioration, or improvement  12/1/2022 2225 by Vibha Gamboa RN  Outcome: Progressing     Problem: Skin/Tissue Integrity  Goal: Absence of new skin breakdown  Description: 1. Monitor for areas of redness and/or skin breakdown  2. Assess vascular access sites hourly  3. Every 4-6 hours minimum:  Change oxygen saturation probe site  4. Every 4-6 hours:  If on nasal continuous positive airway pressure, respiratory therapy assess nares and determine need for appliance change or resting period.   12/2/2022 1016 by Ryne Gross RN  Outcome: Progressing  12/1/2022 2225 by Vibha Gamboa RN  Outcome: Progressing     Problem: Pain  Goal: Verbalizes/displays adequate comfort level or baseline comfort level  12/1/2022 2225 by Vibha Gamboa RN  Outcome: Progressing     Problem: ABCDS Injury Assessment  Goal: Absence of physical injury  12/2/2022 1016 by Ryne Gross RN  Outcome: Progressing  Flowsheets (Taken 12/1/2022 1548)  Absence of Physical Injury: Implement safety measures based on patient assessment  12/1/2022 2225 by Vibha Gamboa RN  Outcome: Progressing

## 2022-12-02 NOTE — PROGRESS NOTES
This is a 79 y.o.  male admitted on 11/30/2022 with Anasarca. Pt A&O X4. HR regular. Lungs sounds Diminished, expiratory wheezes. Denies chest pain. Abd soft and nontender. Active bowel sounds. Last BM 12/01/22. Continent of bowel & bladder. Denies any abd discomfort. Saline lock to RFA. Transfers with walker X1. Pills whole with thin. HS medication given. Tolerated well. Call light in reach. Patient instructed to call if there is any needs or changes.   Electronically signed by Anny De La Garza RN on 12/2/2022 at 12:15 AM

## 2022-12-02 NOTE — PLAN OF CARE
Problem: Discharge Planning  Goal: Discharge to home or other facility with appropriate resources  12/1/2022 2225 by Toy Peabody, RN  Outcome: Progressing     Problem: Safety - Adult  Goal: Free from fall injury  12/2/2022 1016 by Cat Juarez RN  Outcome: Progressing  Flowsheets (Taken 12/1/2022 1548)  Free From Fall Injury: Instruct family/caregiver on patient safety  12/1/2022 2225 by Toy Peabody, RN  Outcome: Progressing     Problem: Chronic Conditions and Co-morbidities  Goal: Patient's chronic conditions and co-morbidity symptoms are monitored and maintained or improved  12/2/2022 1016 by Cat Juarez RN  Outcome: Progressing  Flowsheets (Taken 12/2/2022 1016)  Care Plan - Patient's Chronic Conditions and Co-Morbidity Symptoms are Monitored and Maintained or Improved: Monitor and assess patient's chronic conditions and comorbid symptoms for stability, deterioration, or improvement  12/1/2022 2225 by Toy Peabody, RN  Outcome: Progressing     Problem: Skin/Tissue Integrity  Goal: Absence of new skin breakdown  Description: 1. Monitor for areas of redness and/or skin breakdown  2. Assess vascular access sites hourly  3. Every 4-6 hours minimum:  Change oxygen saturation probe site  4. Every 4-6 hours:  If on nasal continuous positive airway pressure, respiratory therapy assess nares and determine need for appliance change or resting period.   12/2/2022 1016 by Cat Juarez RN  Outcome: Progressing  12/1/2022 2225 by Toy Peabody, RN  Outcome: Progressing     Problem: Pain  Goal: Verbalizes/displays adequate comfort level or baseline comfort level  12/1/2022 2225 by Toy Peabody, RN  Outcome: Progressing     Problem: ABCDS Injury Assessment  Goal: Absence of physical injury  12/2/2022 1016 by Cat Juarez RN  Outcome: Progressing  Flowsheets (Taken 12/1/2022 1548)  Absence of Physical Injury: Implement safety measures based on patient assessment  12/1/2022 2225 by Toy Peabody, RN  Outcome: Progressing

## 2022-12-02 NOTE — PLAN OF CARE
ARU PATIENT TREATMENT PLAN  [place]   [place] [id number]  [phone]    Rae Dorantes    : 1940  Acct #: [de-identified]  MRN: [id number]   PHYSICIAN:  Carl Multani MD  Primary Problem    Patient Active Problem List   Diagnosis    JOANN (obstructive sleep apnea)    Chronic GERD    Type 2 diabetes mellitus with stage 4 chronic kidney disease, with long-term current use of insulin (HCC)    Microcytic anemia    Microalbuminuria    Hyperlipidemia    Paroxysmal atrial fibrillation (HCC)    Edema    Bronchitis    Carotid artery stenosis without cerebral infarction    Leg swelling    Chronic venous insufficiency    Stage 3b chronic kidney disease ([facility])    Essential hypertension    B12 deficiency    Pulmonary nodule    Moderate COPD (chronic obstructive pulmonary disease) ([facility])    DEISY (acute kidney injury) ([facility]    Slow transit constipation    Pancreatic mass    Obesity, Class II, BMI 35-39.9    Generalized weakness    Anasarca    Worsening renal function       Rehabilitation Diagnosis:     Anasarca [R60.1]       ADMIT DATE:2022    Patient Goals: to walk again    Admitting Impairments: general weakness, decreased endurance, balance issues  Barriers: general  weakness, decreased endurance, balance issues, medical comorbidity   Participation: good     CARE PLAN     NURSING:  Rae Dorantes while on this unit will:     [x] Be continent of bowel and bladder     [x] Have an adequate number of bowel movements  [x] Urinate with no urinary retention >300ml in bladder  [] Complete bladder protocol with gordon removal  [x] Maintain O2 SATs at 92%  [x] Have pain managed while on ARU       [] Be pain free by discharge   [x] Have no skin breakdown while on ARU  [] Have improved skin integrity via wound measurements  [] Have no signs/symptoms of infection at the wound site  [x] Be free from injury during hospitalization   [x] Complete education with patient/family with understanding demonstrated for:  [x] Adjustment   [x] Other: Diabetic needs, CHF, COPD, o2 needs, ESBL. Nursing interventions may include bowel/bladder training, education for medical assistive devices, medication education, O2 saturation management, energy conservation, stress management techniques, fall prevention, alarms protocol, seating and positioning, skin/wound care, pressure relief instruction,dressing changes,  infection protection, DVT prophylaxis, and/or assistance with in room safety with transfers to bed, toilet, wheelchair, shower as well as bathroom activities and hygiene. Patient/caregiver education for:   [x] Disease/sustained injury/management      [x] Medication Use   [x] Surgical intervention   [x] Safety   [x] Body mechanics and or joint protection   [x] Health maintenance         PHYSICAL THERAPY:  Goals:                  Short Term Goals  Time Frame for Short Term Goals: 7-10 days  Short Term Goal 1: Bed mobility modif I  Short Term Goal 2: Transfers sit <> stand with modif I  Short Term Goal 3: Ambulate with walker 150 feet with modif I  Short Term Goal 4: pt complete 2 curb steps w use of handrail modif I            Long Term Goals  Time Frame for Long Term Goals : STG=LTG  These goals were reviewed with this patient at the time of assessment and Gilmar Suresh is in agreement.      Plan of Care:                    Current Treatment Recommendations: Strengthening, Balance training, Functional mobility training, Transfer training, Gait training, Therapeutic activities, Patient/Caregiver education & training, Safety education & training      OCCUPATIONAL THERAPY:  Goals:             Short Term Goals  Time Frame for Short Term Goals: 7-10 days from eval 12/1/22  Short Term Goal 1: Pt will bathe mod indep w/shower chair/long sponge  Short Term Goal 2: Pt will dress mod indep w/ AE if needed  Short Term Goal 3: Pt will toilet mod indep  Short Term Goal 4: Pt will perform functional ADL transfers, ADLmobility w/ LRAD mod indep. Short Term Goal 5: Pt will particpate in ther ex/UE HEP to increase activity tolerance to stand for 5+ minutes during ADL/mob tasks keeping O2 sats in safe range. Additional Goals?: Yes  Short Term Goal 6: Pt will improve walker safety/O2 line management to complete light IADL tasks of food prep/home management mod indep. with LRAD :  Long Term Goals  Time Frame for Long Term Goals : STGs=LTGs :    These goals were reviewed with this patient at the time of assessment and Edyta Piper is in agreement    Plan of Care:  Pt to be seen 90 mins per day for 5 day/week 1-1.5 weeks. SPEECH THERAPY: Goals will be left blank if speech is not following this patient. Goals: These goals were reviewed with this patient at the time of assessment and Edyta Piper is in agreement    Plan of Care:          CASE MANAGEMENT:  Goals: Return Home  Assist patient/family with discharge planning, patient/family counseling,   and coordination with insurance during ARU stay. Admission Period/Goal QIM CODES   QIM  Admit/Goal Score    Eating     Oral Hygiene     Toileting Hygiene     Shower/Bathe Self     Upper Body Dressing     Lower Body Dressing     Putting on/Taking off Footwear     Roll Left and Right     Sit to Lying     Lying to Sitting on Side of Bed     Sit to Stand     Chair/Bed to Chair Transfer     Toilet Transfer     Car Transfer     Walk 10 feet     Walk 50 feet with 2 Turns     Walk 150 feet with 2 turns     Walk 10 feet on Uneven Surfaces     1 Step     4 Steps     12 Steps     Picking up Object     Wheel 50 feet with 2 Turns   Type? Wheel 150 feet with 2 Turns   Type? Gilmar Suresh will be seen a minimum of 3 hours of therapy per day, a minimum of 5 out of 7 days per week.     [] In this rare instance due to the nature of this patient's medical involvement, this patient will be seen 15 hours per week (900 minutes within a 7 day period). Treatments may include therapeutic exercises, gait training, neuromuscular re-ed, transfer training, community reintegration, bed mobility, self care, home mgmt, cognitive training, energy conservation,dysphagia tx, speech/language/communication therapy, and patient/family education. In addition, dietician/nutritionist may monitor calorie count as well as intake and collaboratively work with SLP on dietary upgrades. Neuropsychology/Psychology may evaluate and provide necessary support. Medical issues being managed closely and that require 24 hour availability of a physician:   [] Swallowing Precautions  [x] Bowel/Bladder Fx  [x] Weight bearing precautions   [] Wound Care    [x] Pain Mgmt   [x] Infection Protection   [x] DVT Prophylaxis   [x] Fall Precautions  [x] Fluid/Electrolyte/Nutrition Balance   [] Voice Protection   [x] Respiratory  [] Other:    Medical Prognosis: [x] Good  [] Fair    [] Guarded   Total expected IRF days 12  Anticipated discharge destination:    [] Home Independently   [x] Home Modified Independent  [] Home with supervision    []SNF     [] Other                                           Physician anticipated functional outcomes:  Anton for mobility, ADLS, improve strength, endruance, balance   IPOC brief synthesis: Patient is an 78 yo M with pmh Afib, CHF, HTN, COPD, DM2, CKD who initially presented 11/16/2022 with diffuse weakness, increasing edema, and inability to urinate. Found to have hypothermia, acute on chronic dCHF exacerbation, obstructive uropathy, and DEISY on CKD. He was treated with lasix gtt. Course complicated by hemoptysis and HCAP. Now presents to ARU with impaired mobility and self-care below his baseline. He requires comprehensive inpatient rehab program in order to return to community setting.      I have reviewed this initial plan of care and agree with its contents:    Title   Name    Date    Time    Physician: Shoshana Covert. Robbi Frankel, MD 12/2/2022, 9:54 AM      Case Mgmt:Flaca Hobson    OT: Electronically signed by Luis E Dash OT on 12/2/2022 at 7:09 AM      PT:Electronically signed by Chely Briones PT on 12/3/2022 at 4:50 PM    RN: Melodie Rosales RN, [address] [place] [address] 12/03/2022 4:14 pm    ST:    :     Other:

## 2022-12-02 NOTE — PROGRESS NOTES
Physical Therapy  Facility/Department: [address] [facility]HAB  Rehabilitation Physical Therapy Treatment Note    NAME: Eitan Voss  : 1940 (79 y.o.)  MRN: [id number]  CODE STATUS: Full Code    Date of Service: 22     Restrictions:  Restrictions/Precautions: Fall Risk;Contact Precautions  Position Activity Restriction  Other position/activity restrictions: 2 L O2;  low sodium/low potassium diet; IV, contact precautions for ESBL in urine   Pertinent medical information:  Additional Pertinent Hx: Per Cecilia Masterson MD H&P on 2022: The pt is an 78 yo male who presented to the ED with weakness, increasing edema and inability to urinate. The pt recently in the hospital for weakness and DEISY and pna. In the ED the pt's renal function wa worse and he was volume overloaded; he was SOB with crackles. PMHx: asthma, a-fib, carotid artery stenosis, CKD, COPD, HTN, anemia, JOANN on c-pap, DM    SUBJECTIVE  Subjective  Subjective: pt sitting in w/c in therapy gym. he is alert and pleasant. he is agreeable to PT this session. he reports he didnt sleep well last night so he is tired. .  Pain: pt reports no pain.     Social/Functional History  Lives With: Spouse  Type of Home: House  Home Layout: Able to Live on Main level with bedroom/bathroom, Multi-level  Home Access: Stairs to enter with rails  Entrance Stairs - Number of Steps: 1+1  Entrance Stairs - Rails: Both  Bathroom Shower/Tub: Tub/Shower unit, Curtain  Bathroom Toilet: Standard (comfort height commode vanity in front)  Bathroom Equipment: Tub transfer bench, Hand-held shower (says TTB new)  Bathroom Accessibility: Walker accessible  Home Equipment: Cane, Rollator, Oxygen (new O2 2L home 2 liters)  Has the patient had two or more falls in the past year or any fall with injury in the past year?: No  Receives Help From: Home health (OT, PT, nurse (new))  ADL Assistance: Needs assistance (recent A but was indep before that)  Feeding: Independent  Homemaking Assistance: Independent (wife cleans, pt manages bill paying)  Ambulation Assistance: Independent (mostly used cane)  Transfer Assistance: Independent  Active : Yes  Mode of Transportation: Car  Education: sedan  Occupation: Retired  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 I's & drops her off)    OBJECTIVE  Cognition  Overall Cognitive Status:  (appears WFL for ADL tasks, higher level NT)  Safety Judgement: Decreased awareness of need for safety  Cognition Comment: wife decreased memory; some difficulty communicating vs recalling home set-up in bathroom  Orientation  Overall Orientation Status: Within Functional Limits  Orientation Level: Oriented X4    Functional Mobility  Transfers  Surface: To chair with arms;From chair with arms; To chair without arms;From chair without arms  Additional Factors: Verbal cues; Hand placement cues; Increased time to complete  Device: Walker  Sit to Stand  Assistance Level: Contact guard assist  Skilled Clinical Factors: v/c for hand placement and sequencing. Stand to Sit  Assistance Level: Contact guard assist  Skilled Clinical Factors: v/c for hand placement    Environmental Mobility  Ambulation  Surface: Level surface  Device: Rolling walker  Distance: 80' x 2 on level surface  Activity: Within Unit  Activity Comments: pt becomes SOB w ambulating. SpO2% on 2L O2 after first [de-identified]' ambulation 88% that raghu to 93% then after second  [de-identified]' ambulation SpO2% 87% that raghu to 94% after 2 minute of deep breaths through nose and out mouth  Additional Factors: Increased time to complete  Assistance Level: Contact guard assist  Gait Deviations: Slow seamus; Wide base of support    PT Exercises  Exercise Treatment: exercises sitting in w/c B: marches x 20, ankle pumps x 20, LAQ x 20, hip adductor ball squeeze x 20 and glute sets x 15. Dynamic Standing Balance Exercises: pt standing at [facility] was able to complete bean bag toss for 3 minutes without a break, reaching to his R and tossing without a step. No LOB noted. SPO2% on 2L O2 90-91% upon completion. completed 2 times. ASSESSMENT/PROGRESS TOWARDS GOALS     Assessment  Assessment: Pt able to complete all exercises this date. He was able to walk 2 house hold distances w FWW and CGA x 1. pt was also able to comlplete several transfers w CGA x 1. v/c are required for transfers to complete. pt is unable to return home at this time due to being below baseline and requiring assitance for trasnfers and ambulation compared to independent PLOF. pt is limited by fatigue, decreased endurance, secondary medical conditions, and impaired gait and functional mobility. pt will continue to benefit from skilled PT intervention to improve his strength, endurance, gait, and functional mobility to facilitate a safe return to home w assitance from family. plan for d/c will be discussed at tuesday confrence on 12/ 6  Activity Tolerance: Patient limited by fatigue;Patient limited by endurance; Patient tolerated evaluation without incident  Discharge Recommendations: Home with Home health PT  Factors Affecting Discharge: lives in house w spouse w 1+1 stairs to enter  PT Equipment Recommendations  Equipment Needed: No  Other: defer to next level of care     PM session:   S: pt sitting in chair in room. He reports feeling very tired and was not able to eat his lunch. The RN checked his blood sugar which was reported to be within normal limits. Pt reports that he isnt in any pain but feels off. O: Pt sat up in his chair in his room. He felt dizzy and was instructed to take deep breaths through the nose and out through the mouth. Pt started to feel better.  Pt then completed a stand step transfer form chair to w/c w FWW and CGA x 1. Increased time and effort to recover and complete. Pt then completed a stand step transfer from w/c to mat table w FWW and CGA x 1. Pt completed a sit to supine on mat table w SBA x 1 and a handrail. Pt then scooted over and up on the mat table on a wedge w SBA x 1. Pt then completed a roll R and roll L w head of bed elevated and SBA x 1. Increased time and effort required to complete. Pt SPO2% on 2L O2 checked after activities was 90% and required deep breaths to bring up to 94%. Pt next completed exercises supine in bed B: SLR x 15, SAQ x 15, hip abduction x 15, hip adduction ball squeeze x 15, heel slides x 15, and glute sets x 10. Pt completed a supine to sit w SBA x 1. Pt completed a sit to stand from EOB to FWW w SBA x 1. Pt then ambulated 40' w FWW and CGA x 1 to w/c. Pt completed a stand to sit from [address] to w/c w CGA x 1. SPO2% stat was checked after walk on 2L O2 was 91%. Pt was left in w/c w brakes on and gait belt on to be transferred to OT session. A: Pt able to complete all exercises this date. He was feeling very tired this afternoon w increased shakiness. Pt was able to complete bed mobility w SBA x 1. pt was also able to comlplete several transfers w CGA x 1. v/c are required for transfers to complete. pt is unable to return home at this time due to being below baseline and requiring assitance for trasnfers and ambulation compared to independent PLOF. pt is limited by fatigue, decreased endurance, secondary medical conditions, and impaired gait and functional mobility. pt will continue to benefit from skilled PT intervention to improve his strength, endurance, gait, and functional mobility to facilitate a safe return to home w assitance from family.  plan for d/c will be discussed at tuesday confrence on 12/ 6    Safety Device - Type of devices:  [x]  All fall risk precautions in place [] Bed alarm in place  [] Call light within reach [] Chair alarm in place [] Positioning belt [x] Gait belt [] Patient at risk for falls [] Left in bed [x] Left in chair [] Telesitter in use [] Sitter present [] Nurse notified []  None     Goals  Patient Goals   Patient Goals : to be able to walk again  Short Term Goals  Time Frame for Short Term Goals: 7-10 days  Short Term Goal 1: Bed mobility modif I  Short Term Goal 2: Transfers sit <> stand with modif I  Short Term Goal 3: Ambulate with walker 150 feet with modif I  Short Term Goal 4: pt complete 2 curb steps w use of handrail modif I  Long Term Goals  Time Frame for Long Term Goals : STG=LTG    PLAN OF CARE/SAFETY  Physcial Therapy Plan  General Plan:  minutes of therapy at least 5 out of 7 days a week  Days Per Week: 5 Days  Hours Per Day: 1.5 hours  Therapy Duration: 4-7 Days  Current Treatment Recommendations: Strengthening;Balance training;Functional mobility training;Transfer training;Gait training; Therapeutic activities; Patient/Caregiver education & training; Safety education & training  Safety Devices  Type of Devices: Call light within reach; Chair alarm in place; Left in chair;Nurse notified;Gait belt;Patient at risk for falls    EDUCATION  Education  Education Given To: Patient; Family  Education Provided: Role of Therapy;Plan of Care; Mobility Training;Transfer Training;Energy Conservation; Safety;ADL Function;DME/Home Modifications; Fall Prevention Strategies  Education Method: Demonstration;Verbal  Education Outcome: Continued education needed;Verbalized understanding      Therapy Time   Individual Concurrent Group Co-treatment   Time In 0900         Time Out 0945         Minutes 45           Timed Code Treatment Minutes: 45 Minutes   Second Session Therapy Time     Individual Co-treatment   Time In 7395     Time Out 1430     Minutes [address] [facility], 12/02/22 at 9:55 AM   Therapist was present, directed the patient's care, made skilled judgement, and was responsible for assessment and treatment of the patient. Roya Wright PT, DPT [id number] 12/02/22 3:56 PM

## 2022-12-02 NOTE — PROGRESS NOTES
Department of Physical Medicine & Rehabilitation  Progress Note    Patient Identification:  Reese Deshpande  [id number]  : 1940  Admit date: 2022    Chief Complaint: Anasarca    Subjective:   No acute events overnight. Patient seen this am sitting up in room. He reports fatigue from therapies and poor sleep. Offered melatonin and he will consider this. Labs reviewed. ROS: No f/c, n/v, cp     Objective:  Patient Vitals for the past 24 hrs:   BP Temp Temp src Pulse Resp SpO2 Weight   22 0712 -- -- -- -- -- 92 % --   22 0540 -- -- -- 76 -- 90 % --   22 0540 (!) 150/82 97.7 °F (36.5 °C) Oral 73 18 (!) 84 % 238 lb 8.6 oz (108.2 kg)   22 2130 (!) 144/71 97.6 °F (36.4 °C) Oral 66 18 94 % --   22 1958 -- -- -- -- -- 92 % --   22 1415 (!) 122/49 97.2 °F (36.2 °C) Oral 77 16 92 % --   22 1321 -- -- -- -- 16 92 % --     Const: Alert. No distress, pleasant. HEENT: Normocephalic, atraumatic. Normal sclera/conjunctiva. MMM. CV: Regular rate and rhythm. Resp: No respiratory distress. Lungs decreased at bases  Abd: Soft, nontender, nondistended, NABS+   Ext: Edema improving. Neuro: Alert, oriented, appropriately interactive. Psych: Cooperative, appropriate mood and affect    Laboratory data: Available via EMR.    Last 24 hour lab  Recent Results (from the past 24 hour(s))   POCT Glucose    Collection Time: 22 11:31 AM   Result Value Ref Range    POC Glucose 119 (H) 70 - 99 mg/dl    Performed on ACCU-CHEK    POCT Glucose    Collection Time: 22  3:18 PM   Result Value Ref Range    POC Glucose 142 (H) 70 - 99 mg/dl    Performed on ACCU-CHEK    POCT Glucose    Collection Time: 22  4:32 PM   Result Value Ref Range    POC Glucose 127 (H) 70 - 99 mg/dl    Performed on ACCU-CHEK    POCT Glucose    Collection Time: 22  8:31 PM   Result Value Ref Range    POC Glucose 127 (H) 70 - 99 mg/dl    Performed on ACCU-CHEK    POCT Glucose    Collection Time: 12/02/22  7:15 AM   Result Value Ref Range    POC Glucose 126 (H) 70 - 99 mg/dl    Performed on ACCU-CHEK    Basic Metabolic Panel w/ Reflex to MG    Collection Time: 12/02/22  8:06 AM   Result Value Ref Range    Sodium 135 (L) 136 - 145 mmol/L    Potassium reflex Magnesium 3.8 3.5 - 5.1 mmol/L    Chloride 93 (L) 99 - 110 mmol/L    CO2 27 21 - 32 mmol/L    Anion Gap 15 3 - 16    Glucose 147 (H) 70 - 99 mg/dL    BUN 52 (H) 7 - 20 mg/dL    Creatinine 2.2 (H) 0.8 - 1.3 mg/dL    Est, Glom Filt Rate 29 (A) >60    Calcium 8.7 8.3 - 10.6 mg/dL   Renal Function Panel    Collection Time: 12/02/22  8:06 AM   Result Value Ref Range    Phosphorus 3.0 2.5 - 4.9 mg/dL    Albumin 3.4 3.4 - 5.0 g/dL       Therapy progress:  Physical therapy:  Bed Mobility:     Sit>supine:     Supine>sit:     Transfers:  Surface: To chair with arms, From chair with arms, To chair without arms, From chair without arms  Additional Factors: Verbal cues, Hand placement cues, Increased time to complete  Device: Walker  Sit>stand:  Assistance Level: Contact guard assist  Skilled Clinical Factors: v/c for hand placement and sequencing. Stand>sit:  Assistance Level: Contact guard assist  Skilled Clinical Factors: v/c for hand placement  Bed<>chair     Stand Pivot:     Lateral transfer:     Car transfer:  Assistance Level: Contact guard assist  Skilled Clinical Factors: education on safe transfer. v/c required for sequencing and hand placement. increased time and effort to complete  Ambulation:  Surface: Level surface  Device: Rolling walker  Distance: 80' x 2 on level surface  Activity: Within Unit  Activity Comments: pt becomes SOB w ambulating.  SpO2% on 2L O2 after first [de-identified]' ambulation 88% that raghu to 93% then after second  [de-identified]' ambulation SpO2% 87% that raghu to 94% after 2 minute of deep breaths through nose and out mouth  Additional Factors: Increased time to complete  Assistance Level: Contact guard assist  Gait Deviations: Slow seamus, Wide base of support  Stairs:     Curb:  Skilled Clinical Factors: attempted w FWW but pt was apprehensive and unable to complete stating he needs a hand rail to help him like he has at home to get into his house. will assess step this afternoon  Wheelchair:     Assessment:  Assessment: Pt able to complete all exercises this date. He was able to walk 2 house hold distances w FWW and CGA x 1. pt was also able to comlplete a car transfer w CGA x 1. v/c are required for transfers to complete. pt is unable to return home at this time due to being below baseline and requiring assitance for trasnfers and ambulation compared to independent PLOF. pt is limited by fatigue, decreased endurance, secondary medical conditions, and impaired gait and functional mobility. pt will continue to benefit from skilled PT intervention to improve his strength, endurance, gait, and functional mobility to facilitate a safe return to home w assitance from family.  plan for d/c will be discussed at tuesday confrence on 12/ 6  Activity Tolerance: Patient limited by fatigue, Patient limited by endurance, Patient tolerated evaluation without incident  Discharge Recommendations: Home with Home health PT  Factors Affecting Discharge: lives in house w spouse w 1+1 stairs to enter      Occupational therapy:   Feeding     Grooming/Oral Hygiene     UE Bathing     LE Bathing     UE Dressing     LE Dressing     Putting On/Taking Off Footwear     Toileting     Assessment:  Activity Tolerance: Patient limited by fatigue, Patient limited by endurance, Patient tolerated evaluation without incident  Discharge Recommendations: Home with assist PRN, Home with Home health OT    Speech therapy:            PT  Position Activity Restriction  Other position/activity restrictions: 2 L O2;  low sodium/low potassium diet; IV, contact precautions for ESBL in urine  Objective     Sit to Stand: Contact guard assistance, Minimal Assistance (CGA from recliner, min A from bed)  Stand to Sit: Contact guard assistance, Stand by assistance  Bed to Chair: Contact guard assistance  Device: Rolling Walker  Other Apparatus: O2 (2L)  Assistance: Contact guard assistance  Distance: 5', 25'  OT  PT Equipment Recommendations  Equipment Needed: No  Toilet - Technique: Ambulating  Equipment Used: Grab bars  Toilet Transfers Comments: RW>< toilet w/ Bilateral grab bars, cues/A for O2 line  Assessment        SLP          Body mass index is 36.27 kg/m². Rehabilitation Diagnosis:   16.0, Debility (non-cardiac, non-pulmonary        Assessment and Plan:     Impairments:generalized weakness, decreased endurance, balance     Debility  -PT/OT     Anasarca, acute on chronic dCHF  -torsemide per Nephrology  -Daily wt     Obstructive Uropathy  -Salazar in place, ? consider void trial  -Flomax     DEISY on CKD  -Nephrology following, appreciate input  -Avoid nephrotoxins, renally dose meds  -Monitor renal function     HCAP, hemoptysis  -meropenem course completed    Acute hypoxic respiratory failure  -As evidenced by O2 saturation <90% on room air  -Supplemental O2, wean as tolerated -- currently on 2L     Anemia  -epoetin   -Monitor Hgb, transfuse prn <7. PAF  -amiodarone     HTN  -torsemide, verapamil     DM2  -ISS  -was on glargine 10 units qhs at home     COPD  -montelukast, levalbuterol     JOANN  -Home CPAP     Bladder   -Retention with obstructive uropathy as above  -Salazar in place, ? If ok for void trial  -Flomax     Bowel   -Constipation   -Linzess, miralax, senna+colace BID, PRN bisacodyl po and supp. Safety   -fall precautions     Pain control  -acetaminophen prn     PPx  -DVT: heparin  -GI: pantoprazole    Rehab Progress: Making progress. Working on strength, balance, endurance. Anticipated Dispo: home with wife  Services: TBD  DME: TBD  ELOS: 10-14 days      Kristin Collins MD 12/2/2022, 9:52 AM

## 2022-12-02 NOTE — PROGRESS NOTES
Occupational Therapy  Facility/Department: [address] [facility]  Rehabilitation Occupational Therapy Daily Treatment Note  AM and PM Sessions:    Date: 22  Patient Name: Ginny Luna       Room: W8D-4771/8470-52  MRN: [id number]  Account: [de-identified]   : 1940  (79 y.o.) Gender: male                    Past Medical History:  has a past medical history of Allergic rhinitis, Asthma, Atrial fibrillation ([facility] [address] Carotid artery stenosis, Chronic kidney disease, COPD (chronic obstructive pulmonary disease) ([facility] [address] CPAP (continuous positive airway pressure) dependence, ESBL (extended spectrum beta-lactamase) producing bacteria infection, Hyperlipidemia, Hypertension, Iron deficiency anemia, Obstructive sleep apnea, and Type II or unspecified type diabetes mellitus without mention of complication, not stated as uncontrolled. Past Surgical History:   has a past surgical history that includes Gallbladder surgery (); Upper gastrointestinal endoscopy (7-2005    7/10/2009); Colonoscopy (7/10/2009); Cataract removal (2012); Pain management procedure (Right, 10/20/2021); Pain management procedure (Left, 2021); and CT BIOPSY BONE MARROW (2022). Restrictions  Restrictions/Precautions: Fall Risk;Contact Precautions  Other position/activity restrictions: 2 L O2;  low sodium/low potassium diet; IV, contact precautions for ESBL in urine  Required Braces or Orthoses?: No    Subjective  Subjective: Pt met in dept agreeable to treatment, denied pain but stated he is tired b/c he did ot sleep well last night. Donned gown on pt & OT gowned & gloved to follow precautions. Pt on 2 liters O2 per N/C throughout.   Restrictions/Precautions: Fall Risk;Contact Precautions             Objective     Cognition  Overall Cognitive Status:  (appears WFL for ADL tasks, higher level NT)  Safety Judgement: Decreased awareness of need for safety  Cognition Comment: wife decreased memory; some difficulty communicating vs recalling home set-up in bathroom  Orientation  Overall Orientation Status: Within Functional Limits  Orientation Level: Oriented X4         ADL  Toilet Transfers  Technique:  (amb w/ RW)  Equipment: Treeveo safety frame  Assistance Level: Contact guard assist;Stand by assist  Skilled Clinical Factors: RW>< comfort ht toilet with TSF, says uses counter for support at home(is in front)  Tub/Shower Transfers  Type: Tub  Transfer From: Rolling walker  Transfer To: Tub transfer bench  Assistance Level: Stand by assist;Contact guard assist  Skilled Clinical Factors: RW>< TTB in DRY tub. Says has door doesnt no want to remove, does not get water on floor b/c he keeps shower pointed to the back. Struggled to stand back up, OT stabilized RW as pt holding onto it to stand. Meal Prep  Meal Prep Level: Walker  Meal Prep Level of Assistance: Contact guard assistance  Meal Preparation: Gloves donned onto pt for precautions. OT ed pt in walker safety, dicussed purpose of basket on walker for carrying to decrease fall risk. Pt amb with RW in dept CGA mround kitchen to retrieve items in higher cupboards/drawer/refrigerator & carried in basket to make instant oatmeal in micro then serve to table. He required chair to be pulled up for seated rest 1/2 way through. Pt required min A/cues to manage O2 line. Pt stood fro 3.5 minutes, then 1 minute to complete. O2 sats stayed > 90% on 2 L. All surfaces sanitized after session. Pt reported to OT he uses a pear shaaped stool to scoot self around kitchen at home, does cookoing seated. OT asked pt ti have family bring his peggy in if possible as do not have anything like this to practice with here. Discussesd getting on/off stool & fall risk, as does not lock. OT rec he always get on/off it near counter to hold. He agreed with this. Functional Mobility  Device: Rolling walker  Activity: Retrieve items;Transport items; To/From bathroom  Assistance Level: Stand by assist;Contact guard assist  Skilled Clinical Factors: Pt amb w/ RW in dept/OT kithchen >< bathroom with CGA to SBA, min cues for O2 line management. LEs shaking at times, appears unsteady. Bed To/From Chair  Technique:  (RW)  Assistance Level: Contact guard assist  Skilled Clinical Factors: RW>< kitchen chair 2x CGA did not follow OT's rec of sit & swing tech, instead turned chair to side. Says he sits on a pear shaped roller stool at home most of the time. RW >< arm chair SBA/CGA. He managed O2 line with min a for line at times. OT Exercises  Exercise Treatment: BUE UBE arm bike lowest setting for 5 minutes to increase strength/activity tolerance for ADL/IADL/mob tasks. Assessment  Assessment  Assessment: Pt making progress with functional mobility, transfers and activity tolerance as completed simple food pprep task to make oatmeal in micro using basket to carry on RW. He stood for 3.5 minutes then chair had to be pulled up. Pt was able to complete task after seated rest from RW. Says he rolls around on rolling stool in kitchen for food prep/cooking task at home. OT discussed safety of mounting it & getting off of it, but he stated he feels very safe & is not receptive to changing the way he has been doing this. Pt performed ADL transfers >< DRY TTB in DRY tub & toilet with TSF CGA/SBA, Kichen chair transfer CGA as did not follow the tech. OT suggested of sit & swing. Pt on 2 L O2 & sats did not go below 93% during session when checked. Pt remains below his baseline of indep function limited by decresed activity tolerance/general weakness & will continue to benefit from OT treatment on ARU. Plan to cont. tx. per POC. Activity Tolerance: Patient limited by fatigue;Patient limited by endurance; Patient tolerated evaluation without incident  Discharge Recommendations: Home with assist PRN;Home with Home health OT  Factors Affecting Discharge: wife has memory problems  OT Equipment Recommendations  Other: possible AE, walker basket, if goes home using regular RW vs his 4 WW continue to assess/address  Safety Devices  Safety Devices in place: Yes  Type of devices: Left in chair;Gait belt;Patient at risk for falls (left w/ [employer] transporter to go back to room)    Patient Education  Education  Education Given To: Patient  Education Provided: Role of Therapy;Plan of Care;Precautions; Safety;IADL Function;Mobility Training;Transfer Training;Energy Conservation;Equipment;DME/Home Modifications; Fall Prevention Strategies; ADL Function;Home Exercise Program  Education Method: Demonstration;Verbal  Education Outcome: Verbalized understanding    Plan  Occupational Therapy Plan  Times Per Week: 5-6  Times Per Day: Twice a day  Days Per Week: 5 Days  Hours Per Day: 1.5 hours  Current Treatment Recommendations: Balance training;Functional mobility training; Endurance training;Strengthening; Safety education & training;Self-Care / ADL;Equipment evaluation, education, & procurement;Patient/Caregiver education & training;ROM; Home management training    Second Session:     S: Pt met in dept agreeable to treatment. Pt denied pain but stated he is tired b/c of poor sleep last night. Pt on 2 L O2. O: pt amb in dept CGA/SBA w/ RW dept>< ADL apt. appears shaky. OT managed O2 line. Pt transferred RW >< w/c and edge of standard bed SBA/CGA. Pt sat EOB & propped LE up onto bed & doffed shoes/own compression stockings with increased time/rests required, then re-donned both stockings and shoes by propping legs on bed. Ther ex/HEP: Pt performed was ed in Red T-band HEP for BUE strengthening x15 reps each: shoulder flex, chest press, tricep ext, horizontal abd, BUE horizontal ab, elbow flex and shoulder ext. OT demo each ex. Pt ed to stop any ex that causes pain. Issued red band & copy of ex. Instructed pt to do HEP 1x on Sat & on Sunday to increase activity tolerance for ADL/IADL/mob tasks.     A: Pt making progress w/ADL mob/transfers & activity tolerance, but remains below his baseline of mod indep. Plan to cont tx per POC. Safety Device - Type of devices: Pt left with transporter to go back room. []  All fall risk precautions in place [] Bed alarm in place  [] Call light within reach [] Chair alarm in place [] Positioning belt [x] Gait belt [x] Patient at risk for falls [] Left in bed [x] Left in chair [] Telesitter in use [] Sitter present [] Nurse notified []  None          Goals  Patient Goals   Patient goals : Pt stated his goal is to do everything he did at home, walk indep, shower, dress, go grocery shopping . Above goals will work toward this goal.  Short Term Goals  Time Frame for Short Term Goals: 7-10 days from eval 12/1/22  Short Term Goal 1: Pt will bathe mod indep w/shower chair/long sponge  Short Term Goal 2: Pt will dress mod indep w/ AE if needed  Short Term Goal 3: Pt will toilet mod indep  Short Term Goal 4: Pt will perform functional ADL transfers, ADLmobility w/ LRAD mod indep. Short Term Goal 5: Pt will particpate in ther ex/UE HEP to increase activity tolerance to stand for 5+ minutes during ADL/mob tasks keeping O2 sats in safe range.   Additional Goals?: Yes  Short Term Goal 6: Pt will improve walker safety/O2 line management to complete light IADL tasks of food prep/home management mod indep. with LRAD  Long Term Goals  Time Frame for Long Term Goals : STGs=LTGs                   Therapy Time   Individual Concurrent Group Co-treatment   Time In 1102         Time Out 1147         Minutes 45         Timed Code Treatment Minutes: 45 Minutes  Therapy Time     Individual Co-treatment   Time In [facility]     Time Out 1515     Minutes 1077 Irene Arcos OT  Electronically signed by Dionte Howard OTR/CLINT  License # [id number]

## 2022-12-02 NOTE — PROGRESS NOTES
Nephrology ([facility]) Progress Note    CC: DEISY on CKD    79 y.o. male whom we were asked to see for DEISY on CKD. The patient presents back with worsened weakness and worsened edema. Subjective:    Chart reviewed , events noted  . Labs reviewed . ROS:  no chest pain. SOB/GIBBONS   PMFSH:  medications reviewed. Wife present. Objective:  Blood pressure (!) 150/82, pulse 76, temperature 97.7 °F (36.5 °C), temperature source Oral, resp. rate 18, height 5' 8\" (1.727 m), weight 238 lb 8.6 oz (108.2 kg), SpO2 90 %. Intake/Output Summary (Last 24 hours) at 12/2/2022 1350  Last data filed at 12/2/2022 0845  Gross per 24 hour   Intake 360 ml   Output --   Net 360 ml       General:  NAD, A+Ox3  Chest:  CTAB  CVS:  RRR, no S3. Abdominal:  NTND, soft, +BS  Extremities:  1+ pitting edema, wearing compression stockings  Skin:  no rash, warm. : +gordon    Labs:  Renal panel:  Lab Results   Component Value Date/Time     (L) 12/02/2022 08:06 AM    K 3.8 12/02/2022 08:06 AM    CO2 27 12/02/2022 08:06 AM    BUN 52 (H) 12/02/2022 08:06 AM    CREATININE 2.2 (H) 12/02/2022 08:06 AM    CALCIUM 8.7 12/02/2022 08:06 AM    PHOS 3.0 12/02/2022 08:06 AM    MG 2.00 12/01/2022 06:57 AM     CBC:  Lab Results   Component Value Date/Time    WBC 3.5 (L) 12/01/2022 06:57 AM    HGB 7.2 (L) 12/01/2022 06:57 AM    HCT 23.1 (L) 12/01/2022 06:57 AM     12/01/2022 06:57 AM       Assessment/Plan:  Reviewed old records and labs. 1) DEISY on CKD              - baseline Cr 2.0              - ddx:  CRS vs. ATN              - renal function stable  , on oral diuretics . 2) obstructive uropathy              - gordon in place              - on flomax     3) ACDHF              - echo shows LVEF 64-33%, diastolic dysfunction, elevated PASP 40 mmHg              - low dose diuretics started .     - LE edema stable     4) anemia               - retacrit 10,000 units qweek    5) FEN  - hypokalemia               - potassium supp   -              Plan dw pt and family in detail . Will follow  .      Rodney Conklin MD,FACP

## 2022-12-02 NOTE — PROGRESS NOTES
[facility] Progress Note  12/2/2022 8:55 AM  Subjective:   Admit Date: 11/30/2022      Chief Complaint: I feel ok     Interval History: [facility] well   Grad stronger  Will follow creat and cbc--    Diet: ADULT DIET; Regular; Low Sodium (2 gm); Low Potassium (Less than 3000 mg/day)  Medications:   Scheduled Meds:   [START ON 12/7/2022] epoetin kenny-epbx  30,000 Units SubCUTAneous Q7 Days    sodium chloride flush  5-40 mL IntraVENous 2 times per day    tamsulosin  0.4 mg Oral Daily    amiodarone  100 mg Oral Daily    aspirin EC  162 mg Oral Daily    atorvastatin  40 mg Oral Daily    linaclotide  145 mcg Oral QAM AC    montelukast  10 mg Oral Daily    pantoprazole  40 mg Oral Daily    verapamil  240 mg Oral BID    insulin lispro  0-4 Units SubCUTAneous TID WC    insulin lispro  0-4 Units SubCUTAneous Nightly    sodium chloride nebulizer  3 mL Nebulization TID    levalbuterol  1.25 mg Nebulization TID    miconazole   Topical BID    polyethylene glycol  17 g Oral BID    heparin (porcine)  5,000 Units SubCUTAneous BID    sennosides-docusate sodium  1 tablet Oral BID    torsemide  10 mg Oral Daily     Continuous Infusions:   sodium chloride Stopped (11/30/22 2308)    dextrose         Review of Systems -   General ROS: afebrile  Respiratory ROS: positive for - cough  Cardiovascular ROS: no chest pain  Musculoskeletal ROS:positive for - low back pain   Neurological ROS: no TIA or stroke symptoms    Objective:   Vitals: BP (!) 150/82   Pulse 76   Temp 97.7 °F (36.5 °C) (Oral)   Resp 18   Ht 5' 8\" (1.727 m)   Wt 238 lb 8.6 oz (108.2 kg)   SpO2 92%   BMI 36.27 kg/m²   General appearance: alert and cooperative with exam  HEENT: Head: Normocephalic, no lesions, without obvious abnormality.   Neck: no adenopathy, no carotid bruit, no JVD, supple, symmetrical, trachea midline, and thyroid not enlarged, symmetric, no tenderness/mass/nodules  Lungs: diminished breath sounds bibasilar  Heart: regular rate and rhythm, S1, S2 normal, no murmur, click, rub or gallop  Abdomen: obese--no palp masses   Extremities: 1+ edema   Neurologic: Mental status: Alert, oriented, thought content appropriate    Admission on 11/30/2022   Component Date Value Ref Range Status    POC Glucose 11/30/2022 111 (A)  70 - 99 mg/dl Final    Performed on 11/30/2022 ACCU-CHEK   Final    POC Glucose 11/30/2022 139 (A)  70 - 99 mg/dl Final    Performed on 11/30/2022 ACCU-CHEK   Final    Sodium 12/01/2022 139  136 - 145 mmol/L Final    Potassium reflex Magnesium 12/01/2022 3.4 (A)  3.5 - 5.1 mmol/L Final    Chloride 12/01/2022 95 (A)  99 - 110 mmol/L Final    CO2 12/01/2022 33 (A)  21 - 32 mmol/L Final    Anion Gap 12/01/2022 11  3 - 16 Final    Glucose 12/01/2022 105 (A)  70 - 99 mg/dL Final    BUN 12/01/2022 58 (A)  7 - 20 mg/dL Final    Creatinine 12/01/2022 2.3 (A)  0.8 - 1.3 mg/dL Final    Est, Glom Filt Rate 12/01/2022 27 (A)  >60 Final    Comment: Pediatric calculator link  [facility].at. org/professionals/kdoqi/gfr_calculatorped  Effective Oct 3, 2022  These results are not intended for use in patients  <25years of age. eGFR results are calculated without  a race factor using the 2021 CKD-EPI equation. Careful  clinical correlation is recommended, particularly when  comparing to results calculated using previous equations. The CKD-EPI equation is less accurate in patients with  extremes of muscle mass, extra-renal metabolism of  creatinine, excessive creatinine ingestion, or following  therapy that affects renal tubular secretion.       Calcium 12/01/2022 8.3  8.3 - 10.6 mg/dL Final    Prealbumin 12/01/2022 19.7 (A)  20.0 - 40.0 mg/dL Final    WBC 12/01/2022 3.5 (A)  4.0 - 11.0 K/uL Final    RBC 12/01/2022 2.72 (A)  4.20 - 5.90 M/uL Final    Hemoglobin 12/01/2022 7.2 (A)  13.5 - 17.5 g/dL Final    Hematocrit 12/01/2022 23.1 (A)  40.5 - 52.5 % Final    MCV 12/01/2022 84.9  80.0 - 100.0 fL Final    MCH 12/01/2022 26.5  26.0 - 34.0 pg Final    MCHC 12/01/2022 31.2  31.0 - 36.0 g/dL Final    RDW 12/01/2022 18.0 (A)  12.4 - 15.4 % Final    Platelets 49/03/0190 174  135 - 450 K/uL Final    MPV 12/01/2022 8.1  5.0 - 10.5 fL Final    Neutrophils % 12/01/2022 60.3  % Final    Lymphocytes % 12/01/2022 20.7  % Final    Monocytes % 12/01/2022 13.9  % Final    Eosinophils % 12/01/2022 4.4  % Final    Basophils % 12/01/2022 0.7  % Final    Neutrophils Absolute 12/01/2022 2.1  1.7 - 7.7 K/uL Final    Lymphocytes Absolute 12/01/2022 0.7 (A)  1.0 - 5.1 K/uL Final    Monocytes Absolute 12/01/2022 0.5  0.0 - 1.3 K/uL Final    Eosinophils Absolute 12/01/2022 0.2  0.0 - 0.6 K/uL Final    Basophils Absolute 12/01/2022 0.0  0.0 - 0.2 K/uL Final    Potassium 12/01/2022 3.4 (A)  3.5 - 5.1 mmol/L Final    Phosphorus 12/01/2022 2.9  2.5 - 4.9 mg/dL Final    Albumin 12/01/2022 3.0 (A)  3.4 - 5.0 g/dL Final    Total Protein 12/01/2022 5.9 (A)  6.4 - 8.2 g/dL Final    Alkaline Phosphatase 12/01/2022 95  40 - 129 U/L Final    ALT 12/01/2022 29  10 - 40 U/L Final    AST 12/01/2022 36  15 - 37 U/L Final    Total Bilirubin 12/01/2022 0.5  0.0 - 1.0 mg/dL Final    Bilirubin, Direct 12/01/2022 <0.2  0.0 - 0.3 mg/dL Final    Bilirubin, Indirect 12/01/2022 see below  0.0 - 1.0 mg/dL Final    Comment: Indirect Bilirubin cannot be calculated since Total Bilirubin  and/or Direct Bilirubin is below measurable range. Magnesium 12/01/2022 2.00  1.80 - 2.40 mg/dL Final    Pro-BNP 12/01/2022 1,684 (A)  0 - 449 pg/mL Final    Comment: Methodology by NT-proBNP    An age-independent cutoff point of 300 pg/ml has a 98%  negative predictive value excluding acute heart failure. Values exceeding the age-related cutoff values (450 pg/mL if  age<50, 900 if 50-75 and 1800 if >75) has 90% sensitivity and  84% specificity for diagnosing acute HF. In patients with  renal compromise (eGFR<60) values greater than 1200pg/ml have  a diagnostic sensitivity and specificity of 89% and 72% for  acute HF.       Iron 12/01/2022 37 (A)  59 - 158 ug/dL Final    TIBC 12/01/2022 190 (A)  260 - 445 ug/dL Final    Iron Saturation 12/01/2022 19 (A)  20 - 50 % Final    Ferritin 12/01/2022 294.6  30.0 - 400.0 ng/mL Final    POC Glucose 11/30/2022 115 (A)  70 - 99 mg/dl Final    Performed on 11/30/2022 ACCU-CHEK   Final    POC Glucose 12/01/2022 110 (A)  70 - 99 mg/dl Final    Performed on 12/01/2022 ACCU-CHEK   Final    POC Glucose 12/01/2022 119 (A)  70 - 99 mg/dl Final    Performed on 12/01/2022 ACCU-CHEK   Final    POC Glucose 12/01/2022 142 (A)  70 - 99 mg/dl Final    Performed on 12/01/2022 ACCU-CHEK   Final    POC Glucose 12/01/2022 127 (A)  70 - 99 mg/dl Final    Performed on 12/01/2022 ACCU-CHEK   Final    POC Glucose 12/01/2022 127 (A)  70 - 99 mg/dl Final    Performed on 12/01/2022 ACCU-CHEK   Final    POC Glucose 12/02/2022 126 (A)  70 - 99 mg/dl Final    Performed on 12/02/2022 ACCU-CHEK   Final         Assessment & Plan:   Principal Problem:    Anasarca--stable   Active Problems:    JOANN (obstructive sleep apnea)    Type 2 diabetes mellitus with stage 4 chronic kidney disease, with long-term current use of insulin ([facility])--Continue current therapy      Microcytic anemia--follow cbc     Essential hypertension    Moderate COPD (chronic obstructive pulmonary disease) ([facility])    Slow transit constipation  Resolved Problems:    * No resolved hospital problems.  *  Follow labs-cont full rehab   Gave him a pep talk   Please note that over 35 minutes was spent in evaluating the patient, review of records and results, discussion with staff/family, etc.    Monica Hagen MD

## 2022-12-02 NOTE — PROGRESS NOTES
Patient admitted to rehab with Anasarca. A/Ox4. Transfers with walker x1. Mobility restrictions: WBAT. On general, low sodium, low potassium diet, tolerating well. Medications taken whole with thins. On Heparin for DVT prophylaxis. Skin: scattered abrasions and bruising. Oxygen: 2L NC. LDA: NA. Has been continent of bowel and continent of bladder. LBM 12/2. Chair/bed alarms in use and call light in reach. Will monitor for safety.

## 2022-12-02 NOTE — PLAN OF CARE
Problem: Safety - Adult  Goal: Free from fall injury  12/1/2022 2225 by Patti Juan RN  Outcome: Progressing  12/1/2022 1548 by Fela Mccormack RN  Outcome: Progressing  Flowsheets (Taken 12/1/2022 1548)  Free From Fall Injury: [address] family/caregiver on patient safety     Problem: Skin/Tissue Integrity  Goal: Absence of new skin breakdown  Description: 1. Monitor for areas of redness and/or skin breakdown  2. Assess vascular access sites hourly  3. Every 4-6 hours minimum:  Change oxygen saturation probe site  4. Every 4-6 hours:  If on nasal continuous positive airway pressure, respiratory therapy assess nares and determine need for appliance change or resting period.   12/1/2022 2225 by Patti Juan RN  Outcome: Progressing  12/1/2022 1548 by Fela Mccormack RN  Outcome: Progressing     Problem: Pain  Goal: Verbalizes/displays adequate comfort level or baseline comfort level  Outcome: Progressing     Problem: ABCDS Injury Assessment  Goal: Absence of physical injury  12/1/2022 2225 by Patti Juan RN  Outcome: Progressing  12/1/2022 1548 by Fela Mccormack RN  Outcome: Progressing  Flowsheets (Taken 12/1/2022 1548)  Absence of Physical Injury: Implement safety measures based on patient assessment

## 2022-12-03 LAB
ALBUMIN SERPL-MCNC: 3.2 G/DL (ref 3.4–5)
ALBUMIN SERPL-MCNC: 3.4 G/DL (ref 3.4–5)
ANION GAP SERPL CALCULATED.3IONS-SCNC: 11 MMOL/L (ref 3–16)
BASOPHILS ABSOLUTE: 0 K/UL (ref 0–0.2)
BASOPHILS RELATIVE PERCENT: 0.9 %
BUN BLDV-MCNC: 50 MG/DL (ref 7–20)
CALCIUM SERPL-MCNC: 8.3 MG/DL (ref 8.3–10.6)
CHLORIDE BLD-SCNC: 96 MMOL/L (ref 99–110)
CO2: 33 MMOL/L (ref 21–32)
CREAT SERPL-MCNC: 2.2 MG/DL (ref 0.8–1.3)
EOSINOPHILS ABSOLUTE: 0.1 K/UL (ref 0–0.6)
EOSINOPHILS RELATIVE PERCENT: 2.5 %
GFR SERPL CREATININE-BSD FRML MDRD: 29 ML/MIN/{1.73_M2}
GLUCOSE BLD-MCNC: 110 MG/DL (ref 70–99)
GLUCOSE BLD-MCNC: 125 MG/DL (ref 70–99)
GLUCOSE BLD-MCNC: 126 MG/DL (ref 70–99)
GLUCOSE BLD-MCNC: 127 MG/DL (ref 70–99)
GLUCOSE BLD-MCNC: 148 MG/DL (ref 70–99)
HCT VFR BLD CALC: 23.7 % (ref 40.5–52.5)
HEMOGLOBIN: 7.4 G/DL (ref 13.5–17.5)
LYMPHOCYTES ABSOLUTE: 0.6 K/UL (ref 1–5.1)
LYMPHOCYTES RELATIVE PERCENT: 15.6 %
MCH RBC QN AUTO: 26.6 PG (ref 26–34)
MCHC RBC AUTO-ENTMCNC: 31.2 G/DL (ref 31–36)
MCV RBC AUTO: 85.2 FL (ref 80–100)
MONOCYTES ABSOLUTE: 0.5 K/UL (ref 0–1.3)
MONOCYTES RELATIVE PERCENT: 11.9 %
NEUTROPHILS ABSOLUTE: 2.7 K/UL (ref 1.7–7.7)
NEUTROPHILS RELATIVE PERCENT: 69.1 %
PDW BLD-RTO: 18.3 % (ref 12.4–15.4)
PERFORMED ON: ABNORMAL
PHOSPHORUS: 3 MG/DL (ref 2.5–4.9)
PHOSPHORUS: 3.3 MG/DL (ref 2.5–4.9)
PLATELET # BLD: 162 K/UL (ref 135–450)
PMV BLD AUTO: 8.2 FL (ref 5–10.5)
POTASSIUM REFLEX MAGNESIUM: 3.9 MMOL/L (ref 3.5–5.1)
POTASSIUM SERPL-SCNC: 3.8 MMOL/L (ref 3.5–5.1)
POTASSIUM SERPL-SCNC: 3.9 MMOL/L (ref 3.5–5.1)
RBC # BLD: 2.79 M/UL (ref 4.2–5.9)
SODIUM BLD-SCNC: 140 MMOL/L (ref 136–145)
WBC # BLD: 3.9 K/UL (ref 4–11)

## 2022-12-03 PROCEDURE — 97530 THERAPEUTIC ACTIVITIES: CPT

## 2022-12-03 PROCEDURE — 94640 AIRWAY INHALATION TREATMENT: CPT

## 2022-12-03 PROCEDURE — 97110 THERAPEUTIC EXERCISES: CPT

## 2022-12-03 PROCEDURE — 99232 SBSQ HOSP IP/OBS MODERATE 35: CPT | Performed by: STUDENT IN AN ORGANIZED HEALTH CARE EDUCATION/TRAINING PROGRAM

## 2022-12-03 PROCEDURE — 6360000002 HC RX W HCPCS: Performed by: INTERNAL MEDICINE

## 2022-12-03 PROCEDURE — 6360000002 HC RX W HCPCS: Performed by: PHYSICAL MEDICINE & REHABILITATION

## 2022-12-03 PROCEDURE — 1280000000 HC REHAB R&B

## 2022-12-03 PROCEDURE — 80069 RENAL FUNCTION PANEL: CPT

## 2022-12-03 PROCEDURE — 85025 COMPLETE CBC W/AUTO DIFF WBC: CPT

## 2022-12-03 PROCEDURE — 97535 SELF CARE MNGMENT TRAINING: CPT

## 2022-12-03 PROCEDURE — 94760 N-INVAS EAR/PLS OXIMETRY 1: CPT

## 2022-12-03 PROCEDURE — 6370000000 HC RX 637 (ALT 250 FOR IP): Performed by: INTERNAL MEDICINE

## 2022-12-03 PROCEDURE — 97116 GAIT TRAINING THERAPY: CPT

## 2022-12-03 PROCEDURE — 2700000000 HC OXYGEN THERAPY PER DAY

## 2022-12-03 PROCEDURE — 6370000000 HC RX 637 (ALT 250 FOR IP): Performed by: PHYSICAL MEDICINE & REHABILITATION

## 2022-12-03 PROCEDURE — 36415 COLL VENOUS BLD VENIPUNCTURE: CPT

## 2022-12-03 RX ADMIN — TORSEMIDE 10 MG: 20 TABLET ORAL at 10:04

## 2022-12-03 RX ADMIN — PANTOPRAZOLE SODIUM 40 MG: 40 TABLET, DELAYED RELEASE ORAL at 10:04

## 2022-12-03 RX ADMIN — POLYETHYLENE GLYCOL 3350 17 G: 17 POWDER, FOR SOLUTION ORAL at 10:04

## 2022-12-03 RX ADMIN — TAMSULOSIN HYDROCHLORIDE 0.4 MG: 0.4 CAPSULE ORAL at 10:05

## 2022-12-03 RX ADMIN — HEPARIN SODIUM 5000 UNITS: 5000 INJECTION INTRAVENOUS; SUBCUTANEOUS at 21:42

## 2022-12-03 RX ADMIN — LEVALBUTEROL 1.25 MG: 1.25 SOLUTION, CONCENTRATE RESPIRATORY (INHALATION) at 20:12

## 2022-12-03 RX ADMIN — SENNOSIDES AND DOCUSATE SODIUM 1 TABLET: 50; 8.6 TABLET ORAL at 10:05

## 2022-12-03 RX ADMIN — VERAPAMIL HYDROCHLORIDE 240 MG: 240 TABLET, FILM COATED, EXTENDED RELEASE ORAL at 21:42

## 2022-12-03 RX ADMIN — LEVALBUTEROL 1.25 MG: 1.25 SOLUTION, CONCENTRATE RESPIRATORY (INHALATION) at 11:38

## 2022-12-03 RX ADMIN — ATORVASTATIN CALCIUM 40 MG: 40 TABLET, FILM COATED ORAL at 10:04

## 2022-12-03 RX ADMIN — ISODIUM CHLORIDE 3 ML: 0.03 SOLUTION RESPIRATORY (INHALATION) at 11:38

## 2022-12-03 RX ADMIN — ISODIUM CHLORIDE 3 ML: 0.03 SOLUTION RESPIRATORY (INHALATION) at 20:12

## 2022-12-03 RX ADMIN — HEPARIN SODIUM 5000 UNITS: 5000 INJECTION INTRAVENOUS; SUBCUTANEOUS at 10:05

## 2022-12-03 RX ADMIN — VERAPAMIL HYDROCHLORIDE 240 MG: 240 TABLET, FILM COATED, EXTENDED RELEASE ORAL at 10:04

## 2022-12-03 RX ADMIN — SENNOSIDES AND DOCUSATE SODIUM 1 TABLET: 50; 8.6 TABLET ORAL at 21:42

## 2022-12-03 RX ADMIN — ASPIRIN 162 MG: 81 TABLET, COATED ORAL at 10:05

## 2022-12-03 RX ADMIN — MICONAZOLE NITRATE: 2 POWDER TOPICAL at 21:43

## 2022-12-03 RX ADMIN — MONTELUKAST 10 MG: 10 TABLET, FILM COATED ORAL at 10:05

## 2022-12-03 RX ADMIN — MICONAZOLE NITRATE: 2 POWDER TOPICAL at 10:07

## 2022-12-03 RX ADMIN — AMIODARONE HYDROCHLORIDE 100 MG: 200 TABLET ORAL at 10:05

## 2022-12-03 NOTE — PROGRESS NOTES
Nephrology ([facility]) Progress Note    CC: DEISY on CKD    79 y.o. male whom we were asked to see for DEISY on CKD. The patient presents back with worsened weakness and worsened edema. Subjective:  -pt seen and examined  -PMSHx and meds reviewed  -family at bedside      No acute events ON  BP stable  Oxygen requirement is stable  UO is good  Wt trending higher, ? accuracy    ROS: neg chest pain/SOB    Objective:  Blood pressure (!) (P) 156/74, pulse (P) 74, temperature (P) 97.5 °F (36.4 °C), temperature source (P) Oral, resp. rate (P) 18, height 5' 8\" (1.727 m), weight 238 lb 8.6 oz (108.2 kg), SpO2 92 %. Intake/Output Summary (Last 24 hours) at 12/3/2022 1241  Last data filed at 12/3/2022 1140  Gross per 24 hour   Intake 480 ml   Output 900 ml   Net -420 ml       General:  NAD, A+Ox3  Chest:  CTAB, no distress  CVS:  RR+S1/S2  Abdominal:  NTND, soft, +BS  Extremities:  1+ pitting edema  Skin:  no rash, warm. : +gordon    Labs:  Renal panel:  Lab Results   Component Value Date/Time     12/03/2022 07:37 AM    K 3.9 12/03/2022 07:37 AM    K 3.9 12/03/2022 07:37 AM    CO2 33 (H) 12/03/2022 07:37 AM    BUN 50 (H) 12/03/2022 07:37 AM    CREATININE 2.2 (H) 12/03/2022 07:37 AM    CALCIUM 8.3 12/03/2022 07:37 AM    PHOS 3.3 12/03/2022 07:37 AM    MG 2.00 12/01/2022 06:57 AM     CBC:  Lab Results   Component Value Date/Time    WBC 3.9 (L) 12/03/2022 07:37 AM    HGB 7.4 (L) 12/03/2022 07:37 AM    HCT 23.7 (L) 12/03/2022 07:37 AM     12/03/2022 07:37 AM       Assessment/Plan:  Reviewed old records and labs. 1) DEISY on CKD: peak Cr 3.0- baseline Cr 2.0-2.2-diminished perfusion in the setting of CHF   -improved with diuresis   -Cr stable              - renal function stable, on oral diuretics .       2) obstructive uropathy              - gordon in place              - on flomax     3) ACDHF              - echo shows LVEF 83-27%, diastolic dysfunction, elevated PASP 40 mmHg              - low dose diuretics started .     - LE edema stable     4) anemia             - retacrit 10,000 units qweek    5) FEN  - hypokalemia   - potassium supp

## 2022-12-03 NOTE — PLAN OF CARE
Problem: Safety - Adult  Goal: Free from fall injury  12/2/2022 2235 by Elsy Neri RN  Outcome: Progressing  12/2/2022 1016 by Mallory Wolfe RN  Outcome: Progressing  Flowsheets (Taken 12/1/2022 1548)  Free From Fall Injury: Piedad Moseley family/caregiver on patient safety     Problem: Chronic Conditions and Co-morbidities  Goal: Patient's chronic conditions and co-morbidity symptoms are monitored and maintained or improved  12/2/2022 2235 by Elsy Neri RN  Outcome: Progressing  12/2/2022 1016 by Mallory Wolfe RN  Outcome: Progressing  Flowsheets (Taken 12/2/2022 1016)  Care Plan - Patient's Chronic Conditions and Co-Morbidity Symptoms are Monitored and Maintained or Improved: Monitor and assess patient's chronic conditions and comorbid symptoms for stability, deterioration, or improvement     Problem: Skin/Tissue Integrity  Goal: Absence of new skin breakdown  Description: 1. Monitor for areas of redness and/or skin breakdown  2. Assess vascular access sites hourly  3. Every 4-6 hours minimum:  Change oxygen saturation probe site  4. Every 4-6 hours:  If on nasal continuous positive airway pressure, respiratory therapy assess nares and determine need for appliance change or resting period.   12/2/2022 2235 by Elsy Neri RN  Outcome: Progressing  12/2/2022 1016 by Mallory Wolfe RN  Outcome: Progressing     Problem: Pain  Goal: Verbalizes/displays adequate comfort level or baseline comfort level  Outcome: Progressing

## 2022-12-03 NOTE — PLAN OF CARE
Problem: Discharge Planning  Goal: Discharge to home or other facility with appropriate resources  Outcome: Progressing     Problem: Safety - Adult  Goal: Free from fall injury  Outcome: Progressing  Flowsheets (Taken 12/3/2022 1450)  Free From Fall Injury: Instruct family/caregiver on patient safety     Problem: Chronic Conditions and Co-morbidities  Goal: Patient's chronic conditions and co-morbidity symptoms are monitored and maintained or improved  Outcome: Progressing     Problem: Skin/Tissue Integrity  Goal: Absence of new skin breakdown  Description: 1. Monitor for areas of redness and/or skin breakdown  2. Assess vascular access sites hourly  3. Every 4-6 hours minimum:  Change oxygen saturation probe site  4. Every 4-6 hours:  If on nasal continuous positive airway pressure, respiratory therapy assess nares and determine need for appliance change or resting period.   Outcome: Progressing     Problem: Pain  Goal: Verbalizes/displays adequate comfort level or baseline comfort level  Outcome: Progressing     Problem: ABCDS Injury Assessment  Goal: Absence of physical injury  Outcome: Progressing  Flowsheets (Taken 12/3/2022 1450)  Absence of Physical Injury: Implement safety measures based on patient assessment

## 2022-12-03 NOTE — PROGRESS NOTES
Occupational Therapy  Facility/Department: [facility]  Rehabilitation Occupational Therapy Daily Treatment Note    Date: 12/3/22  Patient Name: Ameena Robin       Room: R9M-4204/0525-14  MRN: [id number]  Account: [de-identified]   : 1940  (79 y.o.) Gender: male           Additional Pertinent Hx: \"Patient is an 78 yo M with pmh Afib, CHF, HTN, COPD, DM2, CKD who initially presented 2022 with diffuse weakness, increasing edema, and inability to urinate. Found to have hypothermia, acute on chronic dCHF exacerbation, obstructive uropathy, and DEISY on CKD. He was treated with lasix gtt. Course complicated by hemoptysis and HCAP. \" copied per Dr Brad Palafox 22 H & P note. PMH: asthma, a-fib, carotid artery stenosis, CKD, COPD, HTN, anemia, JOANN on c-pap, DM. Newly on home O2. 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 Past Surgical History:   has a past surgical history that includes Gallbladder surgery (); Upper gastrointestinal endoscopy (7-2005    7/10/2009); Colonoscopy (7/10/2009); Cataract removal (2012); Pain management procedure (Right, 10/20/2021); Pain management procedure (Left, 2021); and CT BIOPSY BONE MARROW (2022). Restrictions  Restrictions/Precautions: Fall Risk;Contact Precautions  Other position/activity restrictions: 2 L O2;  low sodium/low potassium diet;  contact precautions for ESBL in urine    Subjective  Subjective: Pt met BS, in recliner and agreeable to OT. Pt denied pain. Pt reports he slept better last night(slept in recliner). Pt on 3L 02 and reading 97% on RN notified, ok to decrease to 2L. Restrictions/Precautions: Fall Risk;Contact Precautions             Objective     Cognition  Safety Judgement: Decreased awareness of need for safety  Cognition Comment: Cues for safe tranfers, RW use. Decreased insight into needs/ or openness to trialing techniques for LB dressing to lessen effort, conserve energy. Orientation  Overall Orientation Status: Within Functional Limits         ADL  Upper Extremity Bathing  Assistance Level: Stand by assist  Skilled Clinical Factors: seated from shower chair. Lower Extremity Bathing  Equipment Provided: Long-handled sponge  Assistance Level: Minimal assistance;Verbal cues; Increased time to complete  Skilled Clinical Factors: seated to wash LE's. Issued LHS and ed on use for feet. Stance holding grab bars for buttocks, niko areas. Assist to thoroughly dry toes. Pt states he dons slippers afer showering, amb to his bed, sits and further dries feet, dons socks. Upper Extremity Dressing  Assistance Level: Set-up  Skilled Clinical Factors: donned pullover shirt while seated  Lower Extremity Dressing  Assistance Level: Stand by assist;Contact guard assist;Verbal cues; Set-up  Skilled Clinical Factors: Shown reacher, yet did not need to use to thread hospital pants. CG/SBA in stance for over hips. Tied pants while seated  Putting On/Taking Off Footwear  Equipment Provided: Sock aid  Assistance Level: Minimal assistance;Set-up; Verbal cues  Skilled Clinical Factors: Instructed on use of sock aid to don footies. Pt stated he usually sits on EOB to don socks,shoes. Toileting  Assistance Level: Stand by assist;Contact guard assist  Skilled Clinical Factors: voided urine, BM. Removed underwear for upcoming shower.   Toilet Transfers  Technique:  (amb with RW)  Equipment: Grab bars  Additional Factors: Cues for hand placement  Assistance Level: Contact guard assist;Stand by assist  Tub/Shower Transfers  Type: Shower  Transfer From: Rolling walker  Transfer To: Shower chair with back  Additional Factors: Verbal cues;Cues for hand placement  Assistance Level: Stand by assist;Contact guard assist  Skilled Clinical Factors: cues for aligning self safely to chair and hand placement          Functional Mobility  Device: Rolling walker  Activity: To/From bathroom  Assistance Level: Stand by assist;Contact guard assist  Skilled Clinical Factors: Assist for 02 line management  Transfers  Surface: From chair with arms; To chair with arms; To chair without arms;From chair without arms (recliner; chair w/o arms in BR)  Additional Factors: Verbal cues; Hand placement cues  Device:  (RW)  Sit to Stand  Assistance Level: Stand by assist;Contact guard assist  Stand to Sit  Assistance Level: Stand by assist;Contact guard assist  Skilled Clinical Factors: cues for safely aligning self to chairs. Cues for hand placement. Tended to reach prematurely   OT Exercises  Resistive Exercises: Reviewed red theraband ex's, completing following handout, x10-15 reps     Assessment  Pt tolerated session fairly well. Pt completed ADL's, with requiring up to Min A for bathing (LB), Min A for LB dressing (use of sock aid to don footies; no AE to don hospital pants) and CG/SBA for toileting. Pt stated he like the sock aid, yet stating \"I have my way of doing things\". Discussed benefit of AE for energy conservation. Cont to assess. Pt required CG/SBA for transfers, mob with cues needed for safe walker use. Pt on 2L and 02 sats WNL. Pt however, easily fatigued with tasks, needing frequent rests. Cont poc to maximize function for return home.   Activity Tolerance: Patient limited by fatigue;Patient limited by endurance  Discharge Recommendations: Home with assist PRN;Home with Home health OT  Factors Affecting Discharge: wife has memory problems  OT Equipment Recommendations  Other: possible AE, walker basket, if goes home using regular RW vs his 4 WW continue to assess/address  Safety Devices  Safety Devices in place: Yes  Type of devices: Left in chair;Gait belt;Patient at risk for falls;Call light within reach; Chair alarm in place    Patient Education  Education  Education Given To: Patient  Education Provided: Role of Therapy;Plan of Care;Precautions; Safety; Mobility Training;Transfer Training;Energy Conservation;Equipment; Fall Prevention Strategies; ADL Function;Home Exercise Program  Education Method: Demonstration;Verbal  Education Outcome: Verbalized understanding;Continued education needed    Plan  Occupational Therapy Plan  Times Per Week: 5-6  Times Per Day: Twice a day  Days Per Week: 5 Days  Hours Per Day: 1.5 hours  Current Treatment Recommendations: Balance training;Functional mobility training; Endurance training;Strengthening; Safety education & training;Self-Care / ADL;Equipment evaluation, education, & procurement;Patient/Caregiver education & training;ROM; Home management training    Goals  Patient Goals   Patient goals : Pt stated his goal is to do everything he did at home, walk indep, shower, dress, go grocery shopping . Above goals will work toward this goal.  Short Term Goals  Time Frame for Short Term Goals: 7-10 days from eval 12/1/22  Short Term Goal 1: Pt will bathe mod indep w/shower chair/long sponge  Short Term Goal 2: Pt will dress mod indep w/ AE if needed  Short Term Goal 3: Pt will toilet mod indep  Short Term Goal 4: Pt will perform functional ADL transfers, ADLmobility w/ LRAD mod indep. Short Term Goal 5: Pt will particpate in ther ex/UE HEP to increase activity tolerance to stand for 5+ minutes during ADL/mob tasks keeping O2 sats in safe range.   Short Term Goal 6: Pt will improve walker safety/O2 line management to complete light IADL tasks of food prep/home management mod indep. with LRAD  Long Term Goals  Time Frame for Long Term Goals : STGs=LTGs        Therapy Time   Individual Concurrent Group Co-treatment   Time In 0845         Time Out 1015         Minutes [address] ROSS/L,515

## 2022-12-03 NOTE — PROGRESS NOTES
Name: Vanessa Stage  /Age/Sex: 1940 (79 y.o. male)   MRN & CSN: [id number] & [id number]  Admission Date/Time: 2022 10:52 AM   Location: [id number]-1189/3253-01  Current Hospital Day: Hospital Day: 4   Principal Problem: Anasarca  HPI     Patient seen and examined. No issues overnight. Patient feels well today. No chest pain or shortness of breath. Abdomen feeling less tense today    All other review of systems negative unless noted above. VITALS     Vitals:    22 1436   BP: (!) 128/54   Pulse: 69   Resp: 16   Temp: 97.5 °F (36.4 °C)   SpO2: 92%         PHYSICAL EXAM     General appearance: alert and cooperative with exam  HEENT: Head: Normocephalic, no lesions, without obvious abnormality.   Neck: no adenopathy, no carotid bruit, no JVD, supple, symmetrical, trachea midline, and thyroid not enlarged, symmetric, no tenderness/mass/nodules  Lungs: diminished breath sounds bibasilar  Heart: regular rate and rhythm, S1, S2 normal, no murmur, click, rub or gallop  Abdomen: obese--no palp masses   Extremities: 1+ edema   Neurologic: Mental status: Alert, oriented, thought content appropriate    LABS   BMP  Recent Labs     22  0657 22  0806    135*   K 3.4*  3.4* 3.8  3.8   CL 95* 93*   CO2 33* 27   BUN 58* 52*   CREATININE 2.3* 2.2*   CALCIUM 8.3 8.7   PHOS 2.9 3.0   MG 2.00  --      CBC/COAGS  Recent Labs     22  0657   WBC 3.5*   HCT 23.1*        Liver & Pancreas  Recent Labs     22  0657   AST 36   ALT 29   ALKPHOS 95   BILIDIR <0.2        IMAGING   No new imaging   Above studies and images were personally reviewed by myself    MEDS   Scheduled Meds:   [START ON 2022] epoetin kenny-epbx  30,000 Units SubCUTAneous Q7 Days    tamsulosin  0.4 mg Oral Daily    amiodarone  100 mg Oral Daily    aspirin EC  162 mg Oral Daily    atorvastatin  40 mg Oral Daily    linaclotide  145 mcg Oral QAM AC    montelukast  10 mg Oral Daily    pantoprazole  40 mg Oral Daily verapamil  240 mg Oral BID    insulin lispro  0-4 Units SubCUTAneous TID WC    insulin lispro  0-4 Units SubCUTAneous Nightly    sodium chloride nebulizer  3 mL Nebulization TID    levalbuterol  1.25 mg Nebulization TID    miconazole   Topical BID    polyethylene glycol  17 g Oral BID    heparin (porcine)  5,000 Units SubCUTAneous BID    sennosides-docusate sodium  1 tablet Oral BID    torsemide  10 mg Oral Daily     Continuous Infusions:   sodium chloride Stopped (11/30/22 8909)    dextrose       PRN Meds:sodium chloride flush, sodium chloride, ondansetron **OR** ondansetron, albuterol sulfate HFA, glucose, dextrose bolus **OR** dextrose bolus, glucagon (rDNA), dextrose, sodium chloride, meclizine, melatonin, hydrALAZINE, acetaminophen, bisacodyl, bisacodyl     CURRENT HOSPITAL PROBLEMS   Principal Problem:    Anasarca  - cont torsemide  Active Problems:    JOANN (obstructive sleep apnea)    Type 2 diabetes mellitus with stage 4 chronic kidney disease  with long-term current use of insulin ([facility])  - insulin with meals  CKD  - nephrology following    Microcytic anemia  - retacrit    Essential hypertension  - verapamil 240     Moderate COPD (chronic obstructive pulmonary disease) ([facility])  - cont home medications    Slow transit constipation  - cont linaclotide  Afib  - verapamil, amiodarone  -not on anticoagulation    Dispo: rehab    Lorna Lee MD 12/3/2022 8:33 AM

## 2022-12-03 NOTE — PROGRESS NOTES
This is a 79 y.o.  male admitted on 11/30/2022 with Anasarca. Pt A&O X4. HR regular. Lungs sounds Diminished, expiratory wheezes. Denies chest pain. Abd soft and nontender. Active bowel sounds. Last BM 12/02/22. Continent of bowel & bladder. Denies any abd discomfort. Transfers with walker X1. Pills whole with thin. HS medication given. Tolerated well. Call light in reach. Patient instructed to call if there is any needs or changes.   Electronically signed by Kenneth Marvin RN on 12/2/2022 at 10:40 PM

## 2022-12-04 VITALS
TEMPERATURE: 97.9 F | BODY MASS INDEX: 35.52 KG/M2 | OXYGEN SATURATION: 92 % | SYSTOLIC BLOOD PRESSURE: 141 MMHG | HEART RATE: 72 BPM | HEIGHT: 68 IN | WEIGHT: 234.35 LBS | RESPIRATION RATE: 18 BRPM | DIASTOLIC BLOOD PRESSURE: 92 MMHG

## 2022-12-04 LAB
ANION GAP SERPL CALCULATED.3IONS-SCNC: 11 MMOL/L (ref 3–16)
BUN BLDV-MCNC: 45 MG/DL (ref 7–20)
CALCIUM SERPL-MCNC: 8.2 MG/DL (ref 8.3–10.6)
CHLORIDE BLD-SCNC: 97 MMOL/L (ref 99–110)
CO2: 32 MMOL/L (ref 21–32)
CREAT SERPL-MCNC: 2.4 MG/DL (ref 0.8–1.3)
GFR SERPL CREATININE-BSD FRML MDRD: 26 ML/MIN/{1.73_M2}
GLUCOSE BLD-MCNC: 105 MG/DL (ref 70–99)
GLUCOSE BLD-MCNC: 107 MG/DL (ref 70–99)
GLUCOSE BLD-MCNC: 113 MG/DL (ref 70–99)
GLUCOSE BLD-MCNC: 134 MG/DL (ref 70–99)
GLUCOSE BLD-MCNC: 189 MG/DL (ref 70–99)
PERFORMED ON: ABNORMAL
POTASSIUM REFLEX MAGNESIUM: 3.9 MMOL/L (ref 3.5–5.1)
SODIUM BLD-SCNC: 140 MMOL/L (ref 136–145)

## 2022-12-04 PROCEDURE — 36415 COLL VENOUS BLD VENIPUNCTURE: CPT

## 2022-12-04 PROCEDURE — 6360000002 HC RX W HCPCS: Performed by: INTERNAL MEDICINE

## 2022-12-04 PROCEDURE — 6370000000 HC RX 637 (ALT 250 FOR IP): Performed by: PHYSICAL MEDICINE & REHABILITATION

## 2022-12-04 PROCEDURE — 6370000000 HC RX 637 (ALT 250 FOR IP): Performed by: INTERNAL MEDICINE

## 2022-12-04 PROCEDURE — 80048 BASIC METABOLIC PNL TOTAL CA: CPT

## 2022-12-04 PROCEDURE — 2700000000 HC OXYGEN THERAPY PER DAY

## 2022-12-04 PROCEDURE — 94640 AIRWAY INHALATION TREATMENT: CPT

## 2022-12-04 PROCEDURE — 1280000000 HC REHAB R&B

## 2022-12-04 PROCEDURE — 6360000002 HC RX W HCPCS: Performed by: PHYSICAL MEDICINE & REHABILITATION

## 2022-12-04 PROCEDURE — 94760 N-INVAS EAR/PLS OXIMETRY 1: CPT

## 2022-12-04 RX ADMIN — HEPARIN SODIUM 5000 UNITS: 5000 INJECTION INTRAVENOUS; SUBCUTANEOUS at 08:34

## 2022-12-04 RX ADMIN — ISODIUM CHLORIDE 3 ML: 0.03 SOLUTION RESPIRATORY (INHALATION) at 13:17

## 2022-12-04 RX ADMIN — SENNOSIDES AND DOCUSATE SODIUM 1 TABLET: 50; 8.6 TABLET ORAL at 08:33

## 2022-12-04 RX ADMIN — PANTOPRAZOLE SODIUM 40 MG: 40 TABLET, DELAYED RELEASE ORAL at 08:35

## 2022-12-04 RX ADMIN — ASPIRIN 162 MG: 81 TABLET, COATED ORAL at 08:33

## 2022-12-04 RX ADMIN — POLYETHYLENE GLYCOL 3350 17 G: 17 POWDER, FOR SOLUTION ORAL at 08:33

## 2022-12-04 RX ADMIN — ISODIUM CHLORIDE 3 ML: 0.03 SOLUTION RESPIRATORY (INHALATION) at 19:16

## 2022-12-04 RX ADMIN — MICONAZOLE NITRATE: 2 POWDER TOPICAL at 08:43

## 2022-12-04 RX ADMIN — HEPARIN SODIUM 5000 UNITS: 5000 INJECTION INTRAVENOUS; SUBCUTANEOUS at 21:25

## 2022-12-04 RX ADMIN — TORSEMIDE 10 MG: 20 TABLET ORAL at 08:35

## 2022-12-04 RX ADMIN — TAMSULOSIN HYDROCHLORIDE 0.4 MG: 0.4 CAPSULE ORAL at 08:33

## 2022-12-04 RX ADMIN — MONTELUKAST 10 MG: 10 TABLET, FILM COATED ORAL at 08:34

## 2022-12-04 RX ADMIN — ATORVASTATIN CALCIUM 40 MG: 40 TABLET, FILM COATED ORAL at 08:33

## 2022-12-04 RX ADMIN — LEVALBUTEROL 1.25 MG: 1.25 SOLUTION, CONCENTRATE RESPIRATORY (INHALATION) at 19:16

## 2022-12-04 RX ADMIN — ISODIUM CHLORIDE 3 ML: 0.03 SOLUTION RESPIRATORY (INHALATION) at 09:27

## 2022-12-04 RX ADMIN — LEVALBUTEROL 1.25 MG: 1.25 SOLUTION, CONCENTRATE RESPIRATORY (INHALATION) at 13:17

## 2022-12-04 RX ADMIN — VERAPAMIL HYDROCHLORIDE 240 MG: 240 TABLET, FILM COATED, EXTENDED RELEASE ORAL at 21:24

## 2022-12-04 RX ADMIN — VERAPAMIL HYDROCHLORIDE 240 MG: 240 TABLET, FILM COATED, EXTENDED RELEASE ORAL at 08:34

## 2022-12-04 RX ADMIN — LEVALBUTEROL 1.25 MG: 1.25 SOLUTION, CONCENTRATE RESPIRATORY (INHALATION) at 09:27

## 2022-12-04 RX ADMIN — AMIODARONE HYDROCHLORIDE 100 MG: 200 TABLET ORAL at 08:34

## 2022-12-04 ASSESSMENT — PAIN SCALES - GENERAL
PAINLEVEL_OUTOF10: 3
PAINLEVEL_OUTOF10: 3

## 2022-12-04 ASSESSMENT — PAIN - FUNCTIONAL ASSESSMENT: PAIN_FUNCTIONAL_ASSESSMENT: ACTIVITIES ARE NOT PREVENTED

## 2022-12-04 ASSESSMENT — PAIN DESCRIPTION - ORIENTATION: ORIENTATION: MID;UPPER

## 2022-12-04 ASSESSMENT — PAIN DESCRIPTION - FREQUENCY: FREQUENCY: INTERMITTENT

## 2022-12-04 ASSESSMENT — PAIN DESCRIPTION - PAIN TYPE: TYPE: CHRONIC PAIN

## 2022-12-04 ASSESSMENT — PAIN DESCRIPTION - LOCATION: LOCATION: BUTTOCKS

## 2022-12-04 ASSESSMENT — PAIN DESCRIPTION - DESCRIPTORS: DESCRIPTORS: DISCOMFORT

## 2022-12-04 NOTE — PROGRESS NOTES
Pt awake and AAO sitting up in the chair. Pt denies pain. Pt admitted with anasarca with h/o CKD, afib, and COPD. Pt with exp wheeze in lung bases. Occasional nonproductive cough. On O2 @ 2l/NC. Pt is not using his home CPAP machine stating O2 is enough. Belly round and soft with active BS. LBM today. Pt continent B and B. 1+ edema to lower extremities. On Demadex. Pt is in contact isolation for ESBL. Call light in reach. Chair alarm on.

## 2022-12-04 NOTE — PLAN OF CARE
Problem: Discharge Planning  Goal: Discharge to home or other facility with appropriate resources  Outcome: Progressing  Flowsheets (Taken 12/4/2022 0900)  Discharge to home or other facility with appropriate resources: Identify barriers to discharge with patient and caregiver     Problem: Safety - Adult  Goal: Free from fall injury  Outcome: Progressing  Flowsheets (Taken 12/4/2022 0944)  Free From Fall Injury: Instruct family/caregiver on patient safety     Problem: Chronic Conditions and Co-morbidities  Goal: Patient's chronic conditions and co-morbidity symptoms are monitored and maintained or improved  Outcome: Progressing  Flowsheets (Taken 12/4/2022 0900)  Care Plan - Patient's Chronic Conditions and Co-Morbidity Symptoms are Monitored and Maintained or Improved: Monitor and assess patient's chronic conditions and comorbid symptoms for stability, deterioration, or improvement     Problem: Skin/Tissue Integrity  Goal: Absence of new skin breakdown  Description: 1. Monitor for areas of redness and/or skin breakdown  2. Assess vascular access sites hourly  3. Every 4-6 hours minimum:  Change oxygen saturation probe site  4. Every 4-6 hours:  If on nasal continuous positive airway pressure, respiratory therapy assess nares and determine need for appliance change or resting period.   Outcome: Progressing     Problem: Pain  Goal: Verbalizes/displays adequate comfort level or baseline comfort level  Outcome: Progressing     Problem: ABCDS Injury Assessment  Goal: Absence of physical injury  Outcome: Progressing  Flowsheets (Taken 12/4/2022 0944)  Absence of Physical Injury: Implement safety measures based on patient assessment

## 2022-12-04 NOTE — PROGRESS NOTES
Patient admitted to rehab with anasarca. A/Ox4. Transfers with gate belt and walker. Mobility restrictions: WBAT. On reg, low potassium, low sodium diet, tolerating well. Medications taken whole with thins. On heparin for DVT prophylaxis. Skin: scattered bruising and abrasions, redness to skin folds. Mepliles to coccyx. Oxygen: 2 L nasal canula. Has CPAP from home but not currently using here. LDA: none. Has been continent of bowel and continent of bladder. LBM 12/4. Chair/bed alarms in use and call light in reach.

## 2022-12-04 NOTE — PROGRESS NOTES
weight trending lower, edema is improved    4) anemia             - retacrit 30,000 units qweek    5) FEN  - hypokalemia   -replaced

## 2022-12-05 LAB
ANION GAP SERPL CALCULATED.3IONS-SCNC: 10 MMOL/L (ref 3–16)
BASOPHILS ABSOLUTE: 0 K/UL (ref 0–0.2)
BASOPHILS RELATIVE PERCENT: 0.9 %
BUN BLDV-MCNC: 38 MG/DL (ref 7–20)
CALCIUM SERPL-MCNC: 8.3 MG/DL (ref 8.3–10.6)
CHLORIDE BLD-SCNC: 98 MMOL/L (ref 99–110)
CO2: 32 MMOL/L (ref 21–32)
CREAT SERPL-MCNC: 2.2 MG/DL (ref 0.8–1.3)
EOSINOPHILS ABSOLUTE: 0.1 K/UL (ref 0–0.6)
EOSINOPHILS RELATIVE PERCENT: 2.8 %
GFR SERPL CREATININE-BSD FRML MDRD: 29 ML/MIN/{1.73_M2}
GLUCOSE BLD-MCNC: 102 MG/DL (ref 70–99)
GLUCOSE BLD-MCNC: 117 MG/DL (ref 70–99)
GLUCOSE BLD-MCNC: 118 MG/DL (ref 70–99)
GLUCOSE BLD-MCNC: 121 MG/DL (ref 70–99)
GLUCOSE BLD-MCNC: 122 MG/DL (ref 70–99)
GLUCOSE BLD-MCNC: 143 MG/DL (ref 70–99)
HCT VFR BLD CALC: 24.8 % (ref 40.5–52.5)
HEMOGLOBIN: 7.8 G/DL (ref 13.5–17.5)
LYMPHOCYTES ABSOLUTE: 0.6 K/UL (ref 1–5.1)
LYMPHOCYTES RELATIVE PERCENT: 14.2 %
MCH RBC QN AUTO: 26.8 PG (ref 26–34)
MCHC RBC AUTO-ENTMCNC: 31.2 G/DL (ref 31–36)
MCV RBC AUTO: 85.8 FL (ref 80–100)
MONOCYTES ABSOLUTE: 0.4 K/UL (ref 0–1.3)
MONOCYTES RELATIVE PERCENT: 10.6 %
NEUTROPHILS ABSOLUTE: 3 K/UL (ref 1.7–7.7)
NEUTROPHILS RELATIVE PERCENT: 71.5 %
PDW BLD-RTO: 18.4 % (ref 12.4–15.4)
PERFORMED ON: ABNORMAL
PLATELET # BLD: 183 K/UL (ref 135–450)
PMV BLD AUTO: 8 FL (ref 5–10.5)
POTASSIUM REFLEX MAGNESIUM: 4.2 MMOL/L (ref 3.5–5.1)
RBC # BLD: 2.89 M/UL (ref 4.2–5.9)
SODIUM BLD-SCNC: 140 MMOL/L (ref 136–145)
WBC # BLD: 4.2 K/UL (ref 4–11)

## 2022-12-05 PROCEDURE — 36415 COLL VENOUS BLD VENIPUNCTURE: CPT

## 2022-12-05 PROCEDURE — 85025 COMPLETE CBC W/AUTO DIFF WBC: CPT

## 2022-12-05 PROCEDURE — 97116 GAIT TRAINING THERAPY: CPT | Performed by: PHYSICAL THERAPIST

## 2022-12-05 PROCEDURE — 1280000000 HC REHAB R&B

## 2022-12-05 PROCEDURE — 6370000000 HC RX 637 (ALT 250 FOR IP): Performed by: INTERNAL MEDICINE

## 2022-12-05 PROCEDURE — 97110 THERAPEUTIC EXERCISES: CPT

## 2022-12-05 PROCEDURE — 94760 N-INVAS EAR/PLS OXIMETRY 1: CPT

## 2022-12-05 PROCEDURE — 97112 NEUROMUSCULAR REEDUCATION: CPT

## 2022-12-05 PROCEDURE — 6360000002 HC RX W HCPCS: Performed by: INTERNAL MEDICINE

## 2022-12-05 PROCEDURE — 94640 AIRWAY INHALATION TREATMENT: CPT

## 2022-12-05 PROCEDURE — 99232 SBSQ HOSP IP/OBS MODERATE 35: CPT | Performed by: INTERNAL MEDICINE

## 2022-12-05 PROCEDURE — 80048 BASIC METABOLIC PNL TOTAL CA: CPT

## 2022-12-05 PROCEDURE — 2700000000 HC OXYGEN THERAPY PER DAY

## 2022-12-05 PROCEDURE — 97110 THERAPEUTIC EXERCISES: CPT | Performed by: PHYSICAL THERAPIST

## 2022-12-05 PROCEDURE — 6360000002 HC RX W HCPCS: Performed by: PHYSICAL MEDICINE & REHABILITATION

## 2022-12-05 PROCEDURE — 6370000000 HC RX 637 (ALT 250 FOR IP): Performed by: PHYSICAL MEDICINE & REHABILITATION

## 2022-12-05 PROCEDURE — 97535 SELF CARE MNGMENT TRAINING: CPT

## 2022-12-05 PROCEDURE — 97530 THERAPEUTIC ACTIVITIES: CPT

## 2022-12-05 PROCEDURE — 97530 THERAPEUTIC ACTIVITIES: CPT | Performed by: PHYSICAL THERAPIST

## 2022-12-05 RX ADMIN — LEVALBUTEROL 1.25 MG: 1.25 SOLUTION, CONCENTRATE RESPIRATORY (INHALATION) at 14:00

## 2022-12-05 RX ADMIN — MICONAZOLE NITRATE: 2 POWDER TOPICAL at 00:00

## 2022-12-05 RX ADMIN — TORSEMIDE 10 MG: 20 TABLET ORAL at 07:19

## 2022-12-05 RX ADMIN — MONTELUKAST 10 MG: 10 TABLET, FILM COATED ORAL at 07:20

## 2022-12-05 RX ADMIN — POLYETHYLENE GLYCOL 3350 17 G: 17 POWDER, FOR SOLUTION ORAL at 07:20

## 2022-12-05 RX ADMIN — MICONAZOLE NITRATE: 2 POWDER TOPICAL at 07:38

## 2022-12-05 RX ADMIN — SENNOSIDES AND DOCUSATE SODIUM 1 TABLET: 50; 8.6 TABLET ORAL at 22:00

## 2022-12-05 RX ADMIN — VERAPAMIL HYDROCHLORIDE 240 MG: 240 TABLET, FILM COATED, EXTENDED RELEASE ORAL at 22:00

## 2022-12-05 RX ADMIN — HEPARIN SODIUM 5000 UNITS: 5000 INJECTION INTRAVENOUS; SUBCUTANEOUS at 07:18

## 2022-12-05 RX ADMIN — AMIODARONE HYDROCHLORIDE 100 MG: 200 TABLET ORAL at 07:19

## 2022-12-05 RX ADMIN — POLYETHYLENE GLYCOL 3350 17 G: 17 POWDER, FOR SOLUTION ORAL at 18:34

## 2022-12-05 RX ADMIN — MICONAZOLE NITRATE: 2 POWDER TOPICAL at 22:00

## 2022-12-05 RX ADMIN — PANTOPRAZOLE SODIUM 40 MG: 40 TABLET, DELAYED RELEASE ORAL at 07:20

## 2022-12-05 RX ADMIN — TAMSULOSIN HYDROCHLORIDE 0.4 MG: 0.4 CAPSULE ORAL at 07:19

## 2022-12-05 RX ADMIN — ASPIRIN 162 MG: 81 TABLET, COATED ORAL at 07:19

## 2022-12-05 RX ADMIN — LEVALBUTEROL 1.25 MG: 1.25 SOLUTION, CONCENTRATE RESPIRATORY (INHALATION) at 20:13

## 2022-12-05 RX ADMIN — VERAPAMIL HYDROCHLORIDE 240 MG: 240 TABLET, FILM COATED, EXTENDED RELEASE ORAL at 07:20

## 2022-12-05 RX ADMIN — ISODIUM CHLORIDE 3 ML: 0.03 SOLUTION RESPIRATORY (INHALATION) at 20:13

## 2022-12-05 RX ADMIN — ISODIUM CHLORIDE 3 ML: 0.03 SOLUTION RESPIRATORY (INHALATION) at 09:29

## 2022-12-05 RX ADMIN — HEPARIN SODIUM 5000 UNITS: 5000 INJECTION INTRAVENOUS; SUBCUTANEOUS at 22:00

## 2022-12-05 RX ADMIN — LEVALBUTEROL 1.25 MG: 1.25 SOLUTION, CONCENTRATE RESPIRATORY (INHALATION) at 09:29

## 2022-12-05 RX ADMIN — ISODIUM CHLORIDE 3 ML: 0.03 SOLUTION RESPIRATORY (INHALATION) at 14:00

## 2022-12-05 RX ADMIN — SENNOSIDES AND DOCUSATE SODIUM 1 TABLET: 50; 8.6 TABLET ORAL at 07:20

## 2022-12-05 RX ADMIN — ATORVASTATIN CALCIUM 40 MG: 40 TABLET, FILM COATED ORAL at 07:19

## 2022-12-05 ASSESSMENT — PAIN SCALES - GENERAL: PAINLEVEL_OUTOF10: 0

## 2022-12-05 NOTE — PROGRESS NOTES
Department of Physical Medicine & Rehabilitation  Progress Note    Patient Identification:  Rae Dorantes  [id number]  : 1940  Admit date: 2022    Chief Complaint: Anasarca    Subjective:   No acute events overnight. Patient seen this am sitting up in room. Reports therapy continues to go well. Have not been able to wean from O2. He feels he has been voiding well. Asks appropriate questions about his lab results. Education provided. Wife was present for our conversation. Labs reviewed. ROS: No f/c, n/v, cp     Objective:  Patient Vitals for the past 24 hrs:   BP Temp Temp src Pulse Resp SpO2 Weight   22 0700 (!) 146/66 97.7 °F (36.5 °C) Oral 65 16 96 % --   22 0517 (!) 156/71 97.5 °F (36.4 °C) Oral 81 18 92 % --   22 0514 -- -- -- -- -- -- 233 lb 4 oz (105.8 kg)   22 1948 (!) 141/92 97.9 °F (36.6 °C) Oral 72 18 92 % --   22 1916 -- -- -- 77 18 96 % --   22 1455 130/63 97.7 °F (36.5 °C) Oral 72 16 95 % --     Const: Alert. No distress, pleasant. HEENT: Normocephalic, atraumatic. Normal sclera/conjunctiva. MMM. CV: Regular rate and rhythm. Resp: No respiratory distress. Lungs decreased at bases  Abd: Soft, nontender, nondistended, NABS+   Ext: Edema improving. Neuro: Alert, oriented, appropriately interactive. Psych: Cooperative, appropriate mood and affect    Laboratory data: Available via EMR.    Last 24 hour lab  Recent Results (from the past 24 hour(s))   POCT Glucose    Collection Time: 22 11:33 AM   Result Value Ref Range    POC Glucose 189 (H) 70 - 99 mg/dl    Performed on ACCU-CHEK    Basic Metabolic Panel w/ Reflex to MG    Collection Time: 22  1:14 PM   Result Value Ref Range    Sodium 140 136 - 145 mmol/L    Potassium reflex Magnesium 3.9 3.5 - 5.1 mmol/L    Chloride 97 (L) 99 - 110 mmol/L    CO2 32 21 - 32 mmol/L    Anion Gap 11 3 - 16    Glucose 105 (H) 70 - 99 mg/dL    BUN 45 (H) 7 - 20 mg/dL    Creatinine 2.4 (H) 0.8 - 1.3 mg/dL    Est, Glom Filt Rate 26 (A) >60    Calcium 8.2 (L) 8.3 - 10.6 mg/dL   POCT Glucose    Collection Time: 12/04/22  4:17 PM   Result Value Ref Range    POC Glucose 134 (H) 70 - 99 mg/dl    Performed on ACCU-CHEK    POCT Glucose    Collection Time: 12/04/22  9:14 PM   Result Value Ref Range    POC Glucose 107 (H) 70 - 99 mg/dl    Performed on ACCU-CHEK    POCT Glucose    Collection Time: 12/05/22  2:27 AM   Result Value Ref Range    POC Glucose 117 (H) 70 - 99 mg/dl    Performed on ACCU-CHEK    Basic Metabolic Panel w/ Reflex to MG    Collection Time: 12/05/22  6:45 AM   Result Value Ref Range    Sodium 140 136 - 145 mmol/L    Potassium reflex Magnesium 4.2 3.5 - 5.1 mmol/L    Chloride 98 (L) 99 - 110 mmol/L    CO2 32 21 - 32 mmol/L    Anion Gap 10 3 - 16    Glucose 102 (H) 70 - 99 mg/dL    BUN 38 (H) 7 - 20 mg/dL    Creatinine 2.2 (H) 0.8 - 1.3 mg/dL    Est, Glom Filt Rate 29 (A) >60    Calcium 8.3 8.3 - 10.6 mg/dL   CBC with Auto Differential    Collection Time: 12/05/22  6:45 AM   Result Value Ref Range    WBC 4.2 4.0 - 11.0 K/uL    RBC 2.89 (L) 4.20 - 5.90 M/uL    Hemoglobin 7.8 (L) 13.5 - 17.5 g/dL    Hematocrit 24.8 (L) 40.5 - 52.5 %    MCV 85.8 80.0 - 100.0 fL    MCH 26.8 26.0 - 34.0 pg    MCHC 31.2 31.0 - 36.0 g/dL    RDW 18.4 (H) 12.4 - 15.4 %    Platelets 684 508 - 524 K/uL    MPV 8.0 5.0 - 10.5 fL    Neutrophils % 71.5 %    Lymphocytes % 14.2 %    Monocytes % 10.6 %    Eosinophils % 2.8 %    Basophils % 0.9 %    Neutrophils Absolute 3.0 1.7 - 7.7 K/uL    Lymphocytes Absolute 0.6 (L) 1.0 - 5.1 K/uL    Monocytes Absolute 0.4 0.0 - 1.3 K/uL    Eosinophils Absolute 0.1 0.0 - 0.6 K/uL    Basophils Absolute 0.0 0.0 - 0.2 K/uL   POCT Glucose    Collection Time: 12/05/22  7:04 AM   Result Value Ref Range    POC Glucose 122 (H) 70 - 99 mg/dl    Performed on ACCU-CHEK        Therapy progress:  Physical therapy:  Bed Mobility:     Sit>supine:     Supine>sit:     Transfers:  Surface:  To chair with arms, From chair with arms, To chair without arms, From chair without arms  Additional Factors: Increased time to complete  Device: Walker  Sit>stand:  Assistance Level: Contact guard assist  Skilled Clinical Factors: v/c for hand placement and sequencing. Stand>sit:  Assistance Level: Contact guard assist  Skilled Clinical Factors: v/c for hand placement  Bed<>chair  Technique: Stand step  Assistance Level: Contact guard assist  Skilled Clinical Factors: with wheeled walker  Stand Pivot:     Lateral transfer:     Car transfer:  Assistance Level: Contact guard assist  Skilled Clinical Factors: education on safe transfer. v/c required for sequencing and hand placement. increased time and effort to complete  Ambulation:  Surface: Level surface  Device: Rolling walker  Distance: 80' with wheeled walker with several turns, short distance in gym to access steps  Activity: Within Unit  Activity Comments: pt becomes SOB w ambulating. SpO2% on 2L O2 after ambulation 87% that raghu to 90% but took a deep cough and deep breathing for one minute. Other ambulation trials, patient dropped to 89% on 2L and would increase to 93% or milton quickly. Patient becomes visibly SOB and requires seated rest break after all activities. Patient with noted tremors in UEs and legs which seems to decrease with rest  Additional Factors: Increased time to complete  Assistance Level: Contact guard assist, Stand by assist  Gait Deviations: Slow seamus, Wide base of support  Skilled Clinical Factors: limited endurance on 2L oxygen, 87 O2 sats on 2L after activity increased to 93%, noted tremors  Stairs:     Curb:  Curb Height: 6''  Device: Rolling walker  Number of Curbs: 1  Additional Factors: Verbal cues, Hand placement cues, Non-reciprocal going up, Reciprocal going down  Assistance Level: Contact guard assist, Minimal assistance  Skilled Clinical Factors: Patient able to perform curb step  Wheelchair:     Assessment:  Assessment: Mr. Hernán Nichols continues to be on 2L, desaturates with most activities on 2L to 87% today. He was able to transfer and ambulate household distances with SBA/CGA, but his endurance is limited and he has shaking of bilateral LE and UE tremors. Distance ambulate and time standing is limited by SOB and fatigue in bilateral LE. Pastient able to perform curb step CGA/minimal assist with light assist with wheeled walker when descend cues needed for sequencing. Patient continues to be below baseline and will benefit from continued skilled therapy for strengthening, gait training, endurance training, and functional mobility training to allow for safe return home. Activity Tolerance: Patient limited by fatigue, Patient limited by endurance, Patient tolerated evaluation without incident  Discharge Recommendations: Home with Home health PT, Home with assist PRN, Patient would benefit from continued therapy after discharge  Factors Affecting Discharge: lives in house w spouse w 1+1 stairs to enter      Occupational therapy:   Feeding     Grooming/Oral Hygiene     UE Bathing  Assistance Level: Stand by assist  Skilled Clinical Factors: seated from shower chair. LE Bathing  Equipment Provided: Long-handled sponge  Assistance Level: Minimal assistance, Verbal cues, Increased time to complete  Skilled Clinical Factors: seated to wash LE's. Issued LHS and ed on use for feet. Stance holding grab bars for buttocks, niko areas. Assist to thoroughly dry toes. Pt states he dons slippers afer showering, amb to his bed, sits and further dries feet, dons socks. UE Dressing  Assistance Level: Set-up  Skilled Clinical Factors: donned pullover shirt while seated  LE Dressing  Assistance Level: Stand by assist, Contact guard assist, Verbal cues, Set-up  Skilled Clinical Factors: Shown reacher, yet did not need to use to thread hospital pants. CG/SBA in stance for over hips.  Tied pants while seated  Putting On/Taking Off Footwear  Equipment Provided: Sock aid  Assistance Level: Minimal assistance, Set-up, Verbal cues  Skilled Clinical Factors: Instructed on use of sock aid to don footies. Pt stated he usually sits on EOB to don socks,shoes. Toileting  Assistance Level: Stand by assist, Contact guard assist  Skilled Clinical Factors: voided urine, BM. Removed underwear for upcoming shower. Assessment:  Assessment: Pt tolerated session fairly well. Pt completed ADL's, with requiring up to Min A for bathing (LB), Min A for LB dressing (use of sock aid to don footies; no AE to don hospital pants) and CG/SBA for toileting. Pt stated he like the sock aid, yet stating \"I have my way of doing things\". Discussed benefit of AE for energy conservation. Cont to assess. Pt required CG/SBA for transfers, mob with cues needed for safe walker use. Pt on 2L and 02 sats WNL. Pt however, easily fatigued with tasks, needing frequent rests. Cont poc to maximize function for return home.   Activity Tolerance: Patient limited by fatigue, Patient limited by endurance, Patient tolerated evaluation without incident  Discharge Recommendations: Home with assist PRN, Home with Home health OT  Factors Affecting Discharge: wife has memory problems    Speech therapy:            PT  Position Activity Restriction  Other position/activity restrictions: 2 L O2;  low sodium/low potassium diet; IV, contact precautions for ESBL in urine  Objective     Sit to Stand: Contact guard assistance, Minimal Assistance (CGA from recliner, min A from bed)  Stand to Sit: Contact guard assistance, Stand by assistance  Bed to Chair: Contact guard assistance  Device: Rolling Walker  Other Apparatus: O2 (2L)  Assistance: Contact guard assistance  Distance: 5', 25'  OT  PT Equipment Recommendations  Equipment Needed: No  Other: defer to next level of care  Toilet - Technique: Ambulating  Equipment Used: Grab bars  Toilet Transfers Comments: RW>< toilet w/ Bilateral grab bars, cues/A for O2 line  Assessment        SLP Body mass index is 35.47 kg/m². Rehabilitation Diagnosis:   16.0, Debility (non-cardiac, non-pulmonary        Assessment and Plan:     Impairments:generalized weakness, decreased endurance, balance     Debility  -PT/OT     Anasarca, acute on chronic dCHF  -torsemide per Nephrology  -Daily wt     Obstructive Uropathy  -Salazar initially placed, now voiding.   -Flomax     DEISY on CKD  -Nephrology following, appreciate input  -Avoid nephrotoxins, renally dose meds  -Monitor renal function     HCAP, hemoptysis  -meropenem course completed    Acute (? Chronic) hypoxic respiratory failure, COPD  -As evidenced by O2 saturation <90% on room air  -Supplemental O2, wean as tolerated -- currently on 2L     Anemia  -epoetin   -Monitor Hgb, transfuse prn <7. PAF  -amiodarone     HTN  -torsemide, verapamil     DM2  -ISS  -was on glargine 10 units qhs at home     COPD  -montelukast, levalbuterol     JOANN  -Home CPAP     Bladder   -Retention with obstructive uropathy as above  -Monitor PVRs, straight cath prn >300cc  -Flomax     Bowel   -Constipation   -Linzess, miralax, senna+colace BID, PRN bisacodyl po and supp. Safety   -fall precautions     Pain control  -acetaminophen prn     PPx  -DVT: heparin  -GI: pantoprazole    Rehab Progress: Making progress. Working on strength, balance, endurance. Anticipated Dispo: home with wife  Services: TBD  DME: TBD  ELOS: 10-14 days      Kristin Grullon MD 12/5/2022, 9:35 AM

## 2022-12-05 NOTE — PROGRESS NOTES
Occupational Therapy  Facility/Department: [facility]  Rehabilitation Occupational Therapy Daily Treatment Note    Date: 22  Patient Name: Ameena Robin       [address]: [address]  MRN: [id number]  Account: [de-identified]   : 1940  (79 y.o.) Gender: male              PM session: met in therapy dept, he seems irritable but cooperative. He is on 2L O2, pulse ox 96%, HR 72. He was instructed how to use basket & reacher to grab 6 items off floor--he was very unsteady to get up out of w/c, L UE tremors & forgetfulness of safe hand placement. Overall required CGA for steadying and management of O2 cord--pt showed OT a pic of adjustable saddle stool he uses in kitchen--aware it is on wheels but hale himself in corner to get up; pt cooks from this stool to conserve energy & reduce fall risk (has B knee arthritis). Owns a pulse oximeter & oxygen including portable tanks + concentrator. He was able to stand x 2 minutes w/ CGA while tossing bean bags on corn hole board; able to alternate hands however L UE tremors noted; tried using 2 lb wrist wt during balloon toss while seated but it didn't seem to impact tremor. He was able to toss balloon while seated away from back of w/c x 2 minutes but would have posterior lean--cues to correct to upright position. He use RW in/out of bathrm w/ CGA for O2 cord management, he is unsteady during clothing management, posterior leaning & quickly reached for GB, very close SBA during clothing management, able to perform niko hygiene. Does better when using RW in household areas, cues to slow down & for safe hand placement. Left in recliner, call light in reach, chair alarm on.  Tx time: 45 min, cont w/ POC Nanda Hernández OTR/L #[id number]       Past Medical History:  has a past medical history of Allergic rhinitis, Asthma, Atrial fibrillation ([facility] [address] Carotid artery stenosis, Chronic kidney disease, COPD (chronic obstructive pulmonary disease) ([facility] [address] CPAP (continuous positive airway pressure) dependence, ESBL (extended spectrum beta-lactamase) producing bacteria infection, Hyperlipidemia, Hypertension, Iron deficiency anemia, Obstructive sleep apnea, and Type II or unspecified type diabetes mellitus without mention of complication, not stated as uncontrolled. Past Surgical History:   has a past surgical history that includes Gallbladder surgery (1981); Upper gastrointestinal endoscopy (7-2005    7/10/2009); Colonoscopy (7/10/2009); Cataract removal (2/2012); Pain management procedure (Right, 10/20/2021); Pain management procedure (Left, 12/8/2021); and CT BIOPSY BONE MARROW (11/1/2022). Restrictions  Restrictions/Precautions: Fall Risk;Contact Precautions  Other position/activity restrictions: 2 L O2;  low sodium/low potassium diet; IV, contact precautions for ESBL in urine  Required Braces or Orthoses?: No    Subjective  Subjective: met in therapy dept, he is wanting to return home by \"Wednesday\"  Restrictions/Precautions: Fall Risk;Contact Precautions             Objective     Cognition  Safety Judgement: Decreased awareness of need for safety  Problem Solving: Decreased awareness of errors;Assistance required to identify errors made  Insights: Decreased awareness of deficits  Cognition Comment: Cues for safe tranfers, RW use. Decreased insight into needs/ or openness to trialing techniques for LB dressing to lessen effort, conserve energy. Orientation  Overall Orientation Status: Within Functional Limits  Orientation Level: Oriented X4         ADL             Functional Mobility  Device: 4-Wheeled walker  Activity: To/From therapy gym  Assistance Level: Contact guard assist  Skilled Clinical Factors: wanted to doff his O2 (currently on 2L O2 at 97%) used rollator x 20 ft from therapy gym into ADL suite; his pulse ox dropped to 78% on RA, OT replaced 2L O2, performed PLB after 1 minute increased to 91%. He is very unrealistic to wean off O2.  He also plopped down heavily into recliner when using rollator, forgot to lock brakes  Transfers  Surface: To chair with arms; Wheelchair;From chair with arms  Additional Factors: Verbal cues; Hand placement cues  Sit to Stand  Assistance Level: Stand by assist;Contact guard assist  Stand to Sit  Assistance Level: Stand by assist;Contact guard assist  Skilled Clinical Factors: cues for safely aligning self to chairs. Forgets to lock brakes of rollator,Cues for hand placement. Posterior LOB into recliner/plopping   OT Exercises  Exercise Treatment: using 3 lb wts for UE strengthening, completed 1 set of 10, L shld crepitus noted  Breathing Techniques: educated on PLB  Motor Control/Coordination: used 3 lb gripper for 2 sets of 20 for improved  on handles, GBs  Disease-specific Exercises: x 15 shld shrugs     Assessment  Assessment  Assessment: pt making steady gains w/ OT intervention. He was insisting on weaning from O2 during session. He doffed it while using rollator to ambulate 20ft, his pulse ox dropped to 78%; OT replaced 2L O2 & recovered to 91% after 1 minutes of PLB. Pt using rollator for short household distances however had posterior LOB into recliner (plopping)--forgetful of safe hand placement & locking rollator brakes. Pt w/ unrealistic expectations to be DC'ed on \"Wednesday\" as he is NOT safe d/t transfers yet. Anticipate DC closer to EOW to advance his IND w/ ADLs, t/f & fxl mobility while using 2L O2 & cord management. Pt plans to purchase sock aid.  Recommend caregiver training prior to DC; pt likely will need  OT services  Activity Tolerance: Patient limited by fatigue;Patient limited by endurance  Discharge Recommendations: Home with assist PRN;Home with Home health OT;S Level 1  Factors Affecting Discharge: wife has memory problems  OT Equipment Recommendations  Other: SA, walker basket, if goes home using regular RW vs his 4 WW continue to assess/address  Safety Devices  Type of devices: Left in chair;Gait belt;Patient at risk for falls;Call light within reach; Chair alarm in place    Patient Education  Education  Education Given To: Patient  Education Provided: ADL Function;Mobility Training  Education Provided Comments: educated on PLB during fxl mobility & use of rollator; he wanted to wean off O2 however pulse ox dropped too low  Education Method: Demonstration;Verbal  Education Outcome: Verbalized understanding;Continued education needed    Plan  Occupational Therapy Plan  Times Per Week: 5-6  Times Per Day: Twice a day  Days Per Week: 5 Days  Hours Per Day: 1.5 hours  Specific Instructions for Next Treatment: x 7-10 days from [facility] 12/1/22  Current Treatment Recommendations: Balance training;Functional mobility training; Endurance training;Strengthening; Safety education & training;Self-Care / ADL;Equipment evaluation, education, & procurement;Patient/Caregiver education & training;ROM; Home management training    Goals  Patient Goals   Patient goals : Pt stated his goal is to do everything he did at home, walk indep, shower, dress, go grocery shopping . Above goals will work toward this goal.  Short Term Goals  Time Frame for Short Term Goals: 7-10 days from [facility] 12/1/22  Short Term Goal 1: Pt will bathe mod indep w/shower chair/long sponge  Short Term Goal 2: Pt will dress mod indep w/ AE if needed  Short Term Goal 3: Pt will toilet mod indep  Short Term Goal 4: Pt will perform functional ADL transfers, ADLmobility w/ LRAD mod indep. Short Term Goal 5: Pt will particpate in ther ex/UE HEP to increase activity tolerance to stand for 5+ minutes during ADL/mob tasks keeping O2 sats in safe range.   Short Term Goal 6: Pt will improve walker safety/O2 line management to complete light IADL tasks of food prep/home management mod indep. with LRAD  Long Term Goals  Time Frame for Long Term Goals : STGs=LTGs        Therapy Time   Individual Concurrent Group PM-treatment   Time In 1115      1515   Time Out 1200      1600   Minutes 45 45   Timed Code Treatment Minutes: [address] [place], [place] #2286

## 2022-12-05 NOTE — PROGRESS NOTES
Pt awake and AAO sitting up in bed watching TV. Pt denies pain. Pt admitted to rehab with anasarca. Lungs decreased. Exp wheezes oted at times. SOB with activity. No cough. On O2 @ 2l/NC. Pt deos not wear his home CPAP unit at bedside. Belly round and softly distended with active BS. LBM today. Continent of urine. 1+ edema to lower legs. Sugar free snack of jello given per pt request. Pt is contact isolation for ESBL. Call light in reach. Bed alarm on.

## 2022-12-05 NOTE — PATIENT CARE CONFERENCE
[facility]  Inpatient Rehabilitation  Weekly Team Conference Note      Date: 2022  Patient Name:  Lacy Hope    MRN: [id number]  : 1940  Gender:   Physician:   Diagnosis: Anasarca [R60.1]    CASE MANAGEMENT  Assessment: Discharge home with family support and home care. PHYSICAL THERAPY      Date: 2022  Patient Name: Lacy Hope        MRN: [id number]    : 1940  (79 y.o.)  Gender: male       Bed Mobility:  Sit>supine:  Assistance Level: Supervision  Supine>sit:  Assistance Level: Supervision  Skilled Clinical Factors: use of momentum    Transfers:  Bed<>chair  Technique: Stand step  Assistance Level: Contact guard assist, Stand by assist  Skilled Clinical Factors: with wheeled walker  Sit>stand  Assistance Level: Contact guard assist, Stand by assist  Stand>sit  Assistance Level: Contact guard assist, Stand by assist  Car transfer:  Assistance Level: Contact guard assist  Skilled Clinical Factors: education on safe transfer. v/c required for sequencing and hand placement.  increased time and effort to complete  Toilet transfer:  Assistance Level: Stand by assist    Ambulation:  Device: Rolling walker  Distance: 48' with two turns, 36' (w/c <-> curb step)  Assistance Level: Stand by assist  Curb:  Device: Rolling walker  Assistance Level: Stand by assist, Contact guard assist      Bathing:  UE Bathing  Assistance Level: Set-up  LE Bathing       Dressing:  UE Dressing  Assistance Level: Set-up  LE Dressing  Assistance Level: Stand by assist, Contact guard assist, Verbal cues, Set-up  Putting On/Taking Off Footwear  Assistance Level: Minimal assistance, Set-up, Verbal cues    Toileting:  Assistance Level: Stand by assist   SPEECH THERAPY (intentionally left blank if not actively being seen by this service):      OCCUPATIONAL THERAPY  ADLs:  Feeding     Grooming/Oral Hygiene  Assistance Level: Set-up  Skilled Clinical Factors: set-up seated in recliner as out of time  UE Bathing  Assistance Level: Set-up  Skilled Clinical Factors: seated from shower chair. LE Bathing  Equipment Provided: Long-handled sponge  Assistance Level: Stand by assist  Skilled Clinical Factors: seated to wash LE's. Used long sponge for feet. Ed in tech to dry. Pt in stance holding grab bars bathed buttocks, niko areas. Pt states he dons slide on slippers after showering at home, pt amb to his bed, sat and further dry feet, dons socks. UE Dressing  Assistance Level: Set-up  Skilled Clinical Factors: retrieved own clothes from bag seated, then the rest from RW CGA/SBA at closet, cues for O2 line. Pt doffed/donned pullover shirt while seated, he managed O2 line  LE Dressing  Assistance Level: Stand by assist, Contact guard assist, Verbal cues, Set-up  Skilled Clinical Factors: Donned underpants/pants over feet per self, donned suspenders himself, pulled up at [address] [place] [address] in stance SBA  Putting On/Taking Off Footwear  Equipment Provided: Sock aid  Assistance Level: Minimal assistance, Set-up, Verbal cues  Skilled Clinical Factors: He sat on EOB & donned own compression hose himself. Min increased time. He donned shoes, props feet on chair to tie. Toileting  Assistance Level: Stand by assist  Skilled Clinical Factors: Own clothes down SBA at RW. voided urine, BM. Removed underwear for upcoming shower. Transfers: Toilet Transfers  Technique:  (amb with RW)  Equipment: Grab bars  Additional Factors: Cues for hand placement  Assistance Level: Stand by assist  Skilled Clinical Factors: RW>< comfort ht toilet with grab bars, says uses counter for support at home(is in front)  Tub/Shower Transfers  Type: Shower  Transfer From: Rolling walker  Transfer To:  Shower chair with back  Additional Factors: Verbal cues, Cues for hand placement  Assistance Level: Stand by assist, Contact guard assist  Skilled Clinical Factors: cues for aligning self safely to chair and hand placement    IADLs:  Meal Prep  Meal Prep Level: Walker  Meal Prep Level of Assistance: Contact guard assistance  Meal Preparation: Gloves donned onto pt for precautions. OT ed pt in walker safety, dicussed purpose of basket on walker for carrying to decrease fall risk. Pt amb with RW in dept CGA mround kitchen to retrieve items in higher cupboards/drawer/refrigerator & carried in basket to make instant oatmeal in micro then serve to table. He required chair to be pulled up for seated rest 1/2 way through. Pt required min A/cues to manage O2 line. Pt stood fro 3.5 minutes, then 1 minute to complete. O2 sats stayed > 90% on 2 L. All surfaces sanitized after session. Pt reported to OT he uses a pear shaaped stool to scoot self around kitchen at home, does cookoing seated. OT asked pt ti have family bring his peggy in if possible as do not have anything like this to practice with here. Discussesd getting on/off stool & fall risk, as does not lock. OT rec he always get on/off it near counter to hold. He agreed with this. HALFPOPS     [facility] Management       UE function:  WFLs, crepitus B shld, has multiple joints w/ arthritis, B knee pain d/t arthritis    Assessment:  Assessment: Today pt showered with SBA w/ heavy use of grab bar in stance. Stated he cannot put grab bar up at home\"tried\", used long sponge for feet. Pt dressed seated EOB w/ increased time/rests with   Pt toileted SBA but did not have on clothes to pull up as going to shower. Pt performed ADL mob/transfers with RW in room CGA to SBA, cues for O2 line management + safety cues. O2 sat 89% after shower on 2 L O2, recovered to 91% w/ deep breathing. Pt required increased time & rests to complete ADL tasks. Pt continues to be below his baseline & will benefit from cont'd OT treatment on ARU. Plan to cont tx per POC.   Activity Tolerance: Patient limited by fatigue, Patient limited by endurance  Discharge Recommendations: Home with assist PRN, Home with Home health OT, S Level 1  Factors Affecting Discharge: wife has memory problems     NUTRITION  Most recent weightWeight: 236 lb 15.9 oz (107.5 kg)     BMI (Calculated): 36.1  Please see nutrition note for details. NURSING  Pt is continent of bowel and bladder, blood sugar checks, fall risk, monitor and maintain skin integrity,   Family Education: Patient Education: Medications, diabetic needs, diet, safety and fall prevention,skin care and prevention. MEDICAL      TEAM SUMMARY AND DISCHARGE PLAN  Estimated Length of Stay:12/8/2022  Destination: home health  Anticipated Services at Discharge:    [x] OT  [x] PT   [] SLP    [x] RN   [] Home Health aide []   Community Resources: _______________________________  Equipment recommendations:  [] Hospital bed [] Tub bench  [] Shower chair [] Hand held shower  [] Raised toilet seat [] Toilet safety frame [] Bedside commode   [] W/C: _____  [] Rolling Walker [] Standard walker [] Gait belt [] cane: _________  [] Sliding board [] Alternate seating/furniture [] O2 [] Hip Kit: _______  [] Life Line [] Other: _______  Factors facilitating achievement of predicted outcomes: Family support  Barriers to the achievement of predicted outcomes/Interventions:     Lacks insight into deficits    Interdisciplinary Individualized Plan of Care Review:    Continue Current Plan of Care: Yes    Modifications:_____________________________    Special Needs in the Upcoming Week :    [x] Family/Caregiver Education  [] Home visit  []Therapeutic Pass   [] Consults:_______    [] Other;_______    Patient Rehab Team Goals for the Upcoming Week:  1. Complete toilet tasks & transfer w/ MI  2. Transfer and ambulate household distances with MI  3.       Team Members Present at Conference:  Physician:Dr. Óscar Almeida MD  : Electronically signed by CARLIE Hoffman on 12/6/22 at 2:06 PM EST     Occupational Therapist: Bethanne Kidney, OTR/L  Physical Cristian Fuentes, [id number]   Speech Therapist:   VIN Pham RN CRRN   Dietician:  Psychologist:  Other:      I led this team conference and I approve the established interdisciplinary plan of care as documented within the medical record of Leesa Ramirez. MD:  Morales Schaefer.  Delia Davis MD 12/6/2022, 1:03 PM

## 2022-12-05 NOTE — PLAN OF CARE
Problem: Safety - Adult  Goal: Free from fall injury  Outcome: Progressing  Flowsheets (Taken 12/4/2022 0944 by Macrina Tomlinson RN)  Free From Fall Injury: Instruct family/caregiver on patient safety     Problem: Chronic Conditions and Co-morbidities  Goal: Patient's chronic conditions and co-morbidity symptoms are monitored and maintained or improved  Outcome: Progressing  Flowsheets (Taken 12/4/2022 0900 by Macrina Tomlinson RN)  Care Plan - Patient's Chronic Conditions and Co-Morbidity Symptoms are Monitored and Maintained or Improved: Monitor and assess patient's chronic conditions and comorbid symptoms for stability, deterioration, or improvement     Problem: Skin/Tissue Integrity  Goal: Absence of new skin breakdown  Description: 1. Monitor for areas of redness and/or skin breakdown  2. Assess vascular access sites hourly  3. Every 4-6 hours minimum:  Change oxygen saturation probe site  4. Every 4-6 hours:  If on nasal continuous positive airway pressure, respiratory therapy assess nares and determine need for appliance change or resting period.   Outcome: Progressing     Problem: ABCDS Injury Assessment  Goal: Absence of physical injury  Outcome: Progressing  Flowsheets (Taken 12/4/2022 0944 by Macrina Tomlinson RN)  Absence of Physical Injury: Implement safety measures based on patient assessment

## 2022-12-05 NOTE — PROGRESS NOTES
[facility] Internal Medicine Note      Chief Complaint: I am doing ok    Subjective/Interval History: This morning the patient is sitting up in bed. He has eaten breakfast.  He is eating and drinking well. Reports she is doing well in rehab. Moving bowels. No dysuria. Overall feeling much better. Anxious to go home. Labs pending for today. No chest pain or shortness breath. No cough or sputum. No nausea, vomiting, diarrhea. No abdominal pain. No dysuria. The remainder of the review of systems is negative. PMH, PSH, FH/SH reviewed and unchanged as documented in the H&P personally documented at the time of his acute care admission on 2022    Medication list reviewed    Objective:    BP (!) 146/66   Pulse 65   Temp 97.7 °F (36.5 °C) (Oral)   Resp 16   Ht 5' 8\" (1.727 m)   Wt 233 lb 4 oz (105.8 kg)   SpO2 96%   BMI 35.47 kg/m²   Temp  Av.6 °F (36.4 °C)  Min: 97.3 °F (36.3 °C)  Max: 97.9 °F (36.6 °C)    RRR  Chest-respirations are easy. Lungs are clear throughout  Abd-BS+, soft, NTND,   Ext-no lower extremity edema. Remains with some dependent edema in his flanks and hips.     The Following Labs Were Reviewed Today:    Labs are still pending for today, CBC and BMP    Recent Results (from the past 24 hour(s))   POCT Glucose    Collection Time: 22 11:33 AM   Result Value Ref Range    POC Glucose 189 (H) 70 - 99 mg/dl    Performed on ACCU-CHEK    Basic Metabolic Panel w/ Reflex to MG    Collection Time: 22  1:14 PM   Result Value Ref Range    Sodium 140 136 - 145 mmol/L    Potassium reflex Magnesium 3.9 3.5 - 5.1 mmol/L    Chloride 97 (L) 99 - 110 mmol/L    CO2 32 21 - 32 mmol/L    Anion Gap 11 3 - 16    Glucose 105 (H) 70 - 99 mg/dL    BUN 45 (H) 7 - 20 mg/dL    Creatinine 2.4 (H) 0.8 - 1.3 mg/dL    Est, Glom Filt Rate 26 (A) >60    Calcium 8.2 (L) 8.3 - 10.6 mg/dL   POCT Glucose    Collection Time: 22  4:17 PM   Result Value Ref Range    POC Glucose 134 (H) 70 - 99 mg/dl    Performed on ACCU-CHEK    POCT Glucose    Collection Time: 12/04/22  9:14 PM   Result Value Ref Range    POC Glucose 107 (H) 70 - 99 mg/dl    Performed on ACCU-CHEK    POCT Glucose    Collection Time: 12/05/22  2:27 AM   Result Value Ref Range    POC Glucose 117 (H) 70 - 99 mg/dl    Performed on ACCU-CHEK    POCT Glucose    Collection Time: 12/05/22  7:04 AM   Result Value Ref Range    POC Glucose 122 (H) 70 - 99 mg/dl    Performed on ACCU-CHEK        ASSESSMENT/PLAN:      Principal Problem:     Debility-continue on rehab. Active Problems:     Anasarca-overall edema markedly improved. Weight is slowly drifting down still. Continue to monitor renal function. JOANN (obstructive sleep apnea)-using CPAP. Type 2 diabetes mellitus with stage 4 chronic kidney disease, with long-term current use of insulin ([facility])-blood sugars are excellent. Patient only on sliding scale. Microcytic anemia-primarily due to renal insufficiency. Receiving Procrit weekly. CBC pending for today. Essential hypertension-blood pressure a bit labile, high at times. Continue to monitor. Moderate COPD (chronic obstructive pulmonary disease) ([facility])-lungs clear today. Continue with Xopenex. Slow transit constipation-continue with Linzess and MiraLAX.     Time > 25 minutes reviewing chart and patient data, examining and interviewing patient, and discussing with nursing staff, family, etc.      Wes Conway MD, [address]  8:06 AM  12/5/2022

## 2022-12-05 NOTE — PROGRESS NOTES
Physical Therapy  Facility/Department: [address] [facility]  Rehabilitation Physical Therapy Treatment Note  AM and PM    NAME: Lacy Hope  : 1940 (81 y.o.)  MRN: [id number]  CODE STATUS: Full Code    Date of Service: 22       Restrictions:  Restrictions/Precautions: Fall Risk;Contact Precautions  Position Activity Restriction  Other position/activity restrictions: 2 L O2;  low sodium/low potassium diet; IV, contact precautions for ESBL in urine     SUBJECTIVE  Subjective  Subjective: Patient feels rushed this AM, Patient reports no pain but pain B knees with WB. Patient agreeable to therapy. OBJECTIVE  Cognition  Safety Judgement: Decreased awareness of need for safety  Problem Solving: Decreased awareness of errors;Assistance required to identify errors made  Insights: Decreased awareness of deficits  Cognition Comment: Cues for safe tranfers, RW use. Decreased insight into needs/ or openness to trialing techniques for LB dressing to lessen effort, conserve energy. Orientation  Overall Orientation Status: Within Functional Limits  Orientation Level: Oriented X4    Functional Mobility  Bed Mobility  Overall Assistance Level: Supervision  Additional Factors: Increased time to complete; Head of bed flat; Without handrails  Roll Left  Assistance Level: Supervision  Roll Right  Assistance Level: Supervision  Sit to Supine  Assistance Level: Supervision  Supine to Sit  Assistance Level: Supervision  Skilled Clinical Factors: use of momentum  Scooting  Assistance Level: Supervision  Balance  Sitting Balance: Supervision  Standing Balance: Stand by assistance  Standing Balance  Activity: Patient requested use of bathroom. He was CGA to ambulate into room with wheeled walker and PT managing O2 tank. Patient able to stand to urinate with supervision/SBA during PM session  Transfers  Surface: To chair with arms;From chair with arms; To chair without arms;From chair without arms  Additional Factors: Increased time to complete  Device: Walker  Sit to Stand  Assistance Level: Contact guard assist;Stand by assist  Stand to Sit  Assistance Level: Contact guard assist;Stand by assist  Skilled Clinical Factors: v/c for hand placement  Bed To/From Chair  Technique: Stand step  Assistance Level: Contact guard assist;Stand by assist  Skilled Clinical Factors: with wheeled walker      Environmental Mobility  Ambulation  Surface: Level surface  Device: Rolling walker  Distance: 80' with wheeled walker with several turns, short distance in gym to access steps  Activity: Within Unit  Activity Comments: pt becomes SOB w ambulating. SpO2% on 2L O2 after ambulation 87% that raghu to 90% but took a deep cough and deep breathing for one minute. Other ambulation trials, patient dropped to 89% on 2L and would increase to 93% or milton quickly. Patient becomes visibly SOB and requires seated rest break after all activities. Patient with noted tremors in UEs and legs which seems to decrease with rest  Additional Factors: Increased time to complete  Assistance Level: Contact guard assist;Stand by assist  Gait Deviations: Slow seamus; Wide base of support  Skilled Clinical Factors: limited endurance on 2L oxygen, 87 O2 sats on 2L after activity increased to 93%, noted tremors  Curb  Curb Height: 6''  Device: Rolling walker  Number of Curbs: 1  Additional Factors: Verbal cues; Hand placement cues; Non-reciprocal going up;Reciprocal going down  Assistance Level: Contact guard assist;Minimal assistance  Skilled Clinical Factors: Patient able to perform curb step    PT Exercises  Exercise Treatment: patient performed 20 reps bilateral LE ther ex while seated in chair: APs, alternating LAQ x 15, GS, h hip abduction with light resistance band x15, marching x 20, and hamstring curls with light resistance band.       ASSESSMENT/PROGRESS TOWARDS GOALS       Assessment  Assessment: Mr. Alma Delia Torres continues to be on 2L, desaturates with most activities on 2L to 87% today. He was able to transfer and ambulate household distances with SBA/CGA, but his endurance is limited and he has shaking of bilateral LE and UE tremors. Distance ambulate and time standing is limited by SOB and fatigue in bilateral LE. Pastient able to perform curb step CGA/minimal assist with light assist with wheeled walker when descend cues needed for sequencing. Patient continues to be below baseline and will benefit from continued skilled therapy for strengthening, gait training, endurance training, and functional mobility training to allow for safe return home. Patient would like to go home on Wednesday but feel could benefit till EOW. Recommend home PT at D/C. Activity Tolerance: Patient limited by fatigue;Patient limited by endurance; Patient tolerated evaluation without incident  Discharge Recommendations: Home with Home health PT;Home with assist PRN;Patient would benefit from continued therapy after discharge  Factors Affecting Discharge: lives in house w spouse w 1+1 stairs to enter  PT Equipment Recommendations  Equipment Needed: No  Other: defer to next level of care    Goals  Patient Goals   Patient Goals : to be able to walk again  Short Term Goals  Time Frame for Short Term Goals: 7-10 days  Short Term Goal 1: Bed mobility modif I  Short Term Goal 2: Transfers sit <> stand with modif I  Short Term Goal 3: Ambulate with walker 150 feet with modif I  Short Term Goal 4: pt complete 2 curb steps w use of handrail modif I  Long Term Goals  Time Frame for Long Term Goals : STG=LTG    PLAN OF CARE/SAFETY  Physcial Therapy Plan  General Plan:  minutes of therapy at least 5 out of 7 days a week  Days Per Week: 5 Days  Hours Per Day: 1.5 hours  Therapy Duration: 4-7 Days  Current Treatment Recommendations: Strengthening;Balance training;Functional mobility training;Transfer training;Gait training; Therapeutic activities; Patient/Caregiver education & training; Safety education & training  Safety Devices  Type of Devices: Call light within reach; Chair alarm in place; Left in chair;Nurse notified;Gait belt;Patient at risk for falls (wife present)  Restraints  Restraints Initially in Place: No    EDUCATION  Education  Education Given To: Patient; Family  Education Provided: Plan of Care; Mobility Training;Transfer Training; Safety  Education Provided Comments: importance of O2 levels  Education Method: Demonstration;Verbal  Education Outcome: Continued education needed;Verbalized understanding    Second Session  S/ Patient agreeable to therapy,questioning need for oxygen. Patient with no pain c/o at this time. O Patient sit to stand with CGA  Patient able to stand to urinate in toilet with close SBA with grab bar. Patient ambulated 80' and 70' with SBA/CGA with O2 sats on 2L oxygen , O2 sats 86% from baseline of 92%. Patient improving on managing oxygen line with functional mobility. Educated patient on need for oxygen at this time  Patient performed bed mobility as bolded above. Patient return to room via transport. Second Assessment- Patient needs frequent rest breaks with activity. Patient anxious to return home due to waiting for staff to help him in room. Patient SBA/CGA for mobility but close to baseline. Recommend D/C towards EOW with home PT to continue to work on strengthening, balance and endurance.   Therapy Time   Individual Concurrent Group Co-treatment   Time In 0815         Time Out 0900         Minutes 45           Timed Code Treatment Minutes: 39 Minutes   Second Session Therapy Time     Individual Co-treatment   Time In 4404     Time Out [address] [facility], PT, 12/05/22 at 4:48 PM

## 2022-12-05 NOTE — PROGRESS NOTES
Patient admitted to rehab with anasarca. A/Ox4. Transfers with walker x1. Mobility restrictions: WBAT. On general, low sodium, low potassium diet, tolerating well. Medications taken whole. On heparin for DVT prophylaxis. Skin: scattered abrasions and bruising. Oxygen: 2L NC. LDA: NA. Has been continent of bowel and continent of bladder. LBM 12/4. Chair/bed alarms in use and call light in reach. Will monitor for safety.

## 2022-12-05 NOTE — PROGRESS NOTES
Nephrology Progress note  [phone]   [facility]. [facility]      Reason for Consultation: DEISY / CKD IV    HPI: 79 y.o. male whom we were asked to see for DEISY on CKD. The patient presents back with worsened weakness and worsened edema. Subjective:  -pt seen and examined  -PMSHx and meds reviewed  -family at bedside    ROS: neg chest pain/SOB    Objective:  Blood pressure (!) 146/66, pulse 65, temperature 97.7 °F (36.5 °C), temperature source Oral, resp. rate 16, height 5' 8\" (1.727 m), weight 233 lb 4 oz (105.8 kg), SpO2 96 %. Intake/Output Summary (Last 24 hours) at 12/5/2022 1253  Last data filed at 12/5/2022 1236  Gross per 24 hour   Intake 580 ml   Output --   Net 580 ml       Patient Vitals for the past 96 hrs (Last 3 readings):   Weight   12/05/22 0514 233 lb 4 oz (105.8 kg)   12/04/22 0601 234 lb 5.6 oz (106.3 kg)   12/02/22 0540 238 lb 8.6 oz (108.2 kg)       General:  NAD, A+Ox3  Chest:  CTAB, no distress  CVS:  RR+S1/S2  Abdominal:  NTND, soft, +BS  Extremities:  1+ pitting edema  Skin:  no rash, warm.   : +gordon    Medications:   [START ON 12/7/2022] epoetin kenny-epbx  30,000 Units SubCUTAneous Q7 Days    tamsulosin  0.4 mg Oral Daily    amiodarone  100 mg Oral Daily    aspirin EC  162 mg Oral Daily    atorvastatin  40 mg Oral Daily    linaclotide  145 mcg Oral QAM AC    montelukast  10 mg Oral Daily    pantoprazole  40 mg Oral Daily    verapamil  240 mg Oral BID    insulin lispro  0-4 Units SubCUTAneous TID WC    insulin lispro  0-4 Units SubCUTAneous Nightly    sodium chloride nebulizer  3 mL Nebulization TID    levalbuterol  1.25 mg Nebulization TID    miconazole   Topical BID    polyethylene glycol  17 g Oral BID    heparin (porcine)  5,000 Units SubCUTAneous BID    sennosides-docusate sodium  1 tablet Oral BID    torsemide  10 mg Oral Daily       Labs:  Renal panel:  Lab Results   Component Value Date/Time     12/05/2022 06:45 AM    K 4.2 12/05/2022 06:45 AM    CO2 32 12/05/2022 06:45 AM    BUN 38 (H) 12/05/2022 06:45 AM    CREATININE 2.2 (H) 12/05/2022 06:45 AM    CALCIUM 8.3 12/05/2022 06:45 AM    PHOS 3.3 12/03/2022 07:37 AM    MG 2.00 12/01/2022 06:57 AM     CBC:  Lab Results   Component Value Date/Time    WBC 4.2 12/05/2022 06:45 AM    HGB 7.8 (L) 12/05/2022 06:45 AM    HCT 24.8 (L) 12/05/2022 06:45 AM     12/05/2022 06:45 AM       Assessment/Plan:  Reviewed old records and labs. 1) DEISY on CKD: peak Cr 3.0- baseline Cr 2.0-2.2-diminished perfusion in the setting of CHF   -improved with diuresis   -renal function stable, on oral diuretics .       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

## 2022-12-06 LAB
ALBUMIN SERPL-MCNC: 3.1 G/DL (ref 3.4–5)
ANION GAP SERPL CALCULATED.3IONS-SCNC: 9 MMOL/L (ref 3–16)
BUN BLDV-MCNC: 33 MG/DL (ref 7–20)
CALCIUM SERPL-MCNC: 7.9 MG/DL (ref 8.3–10.6)
CHLORIDE BLD-SCNC: 99 MMOL/L (ref 99–110)
CO2: 32 MMOL/L (ref 21–32)
CREAT SERPL-MCNC: 2.2 MG/DL (ref 0.8–1.3)
GFR SERPL CREATININE-BSD FRML MDRD: 29 ML/MIN/{1.73_M2}
GLUCOSE BLD-MCNC: 103 MG/DL (ref 70–99)
GLUCOSE BLD-MCNC: 108 MG/DL (ref 70–99)
GLUCOSE BLD-MCNC: 114 MG/DL (ref 70–99)
GLUCOSE BLD-MCNC: 124 MG/DL (ref 70–99)
GLUCOSE BLD-MCNC: 131 MG/DL (ref 70–99)
MAGNESIUM: 2 MG/DL (ref 1.8–2.4)
PERFORMED ON: ABNORMAL
PHOSPHORUS: 3.6 MG/DL (ref 2.5–4.9)
POTASSIUM SERPL-SCNC: 3.8 MMOL/L (ref 3.5–5.1)
SODIUM BLD-SCNC: 140 MMOL/L (ref 136–145)

## 2022-12-06 PROCEDURE — 6370000000 HC RX 637 (ALT 250 FOR IP): Performed by: INTERNAL MEDICINE

## 2022-12-06 PROCEDURE — 1280000000 HC REHAB R&B

## 2022-12-06 PROCEDURE — 6370000000 HC RX 637 (ALT 250 FOR IP): Performed by: PHYSICAL MEDICINE & REHABILITATION

## 2022-12-06 PROCEDURE — 97110 THERAPEUTIC EXERCISES: CPT

## 2022-12-06 PROCEDURE — 97116 GAIT TRAINING THERAPY: CPT

## 2022-12-06 PROCEDURE — 6360000002 HC RX W HCPCS: Performed by: PHYSICAL MEDICINE & REHABILITATION

## 2022-12-06 PROCEDURE — 80069 RENAL FUNCTION PANEL: CPT

## 2022-12-06 PROCEDURE — 83735 ASSAY OF MAGNESIUM: CPT

## 2022-12-06 PROCEDURE — 36415 COLL VENOUS BLD VENIPUNCTURE: CPT

## 2022-12-06 PROCEDURE — 99231 SBSQ HOSP IP/OBS SF/LOW 25: CPT | Performed by: INTERNAL MEDICINE

## 2022-12-06 PROCEDURE — 97530 THERAPEUTIC ACTIVITIES: CPT

## 2022-12-06 PROCEDURE — 6360000002 HC RX W HCPCS: Performed by: INTERNAL MEDICINE

## 2022-12-06 PROCEDURE — 94640 AIRWAY INHALATION TREATMENT: CPT

## 2022-12-06 PROCEDURE — 97535 SELF CARE MNGMENT TRAINING: CPT

## 2022-12-06 RX ADMIN — LEVALBUTEROL 1.25 MG: 1.25 SOLUTION, CONCENTRATE RESPIRATORY (INHALATION) at 20:10

## 2022-12-06 RX ADMIN — POLYETHYLENE GLYCOL 3350 17 G: 17 POWDER, FOR SOLUTION ORAL at 08:16

## 2022-12-06 RX ADMIN — ISODIUM CHLORIDE 3 ML: 0.03 SOLUTION RESPIRATORY (INHALATION) at 12:40

## 2022-12-06 RX ADMIN — ISODIUM CHLORIDE 3 ML: 0.03 SOLUTION RESPIRATORY (INHALATION) at 20:10

## 2022-12-06 RX ADMIN — MICONAZOLE NITRATE: 2 POWDER TOPICAL at 23:21

## 2022-12-06 RX ADMIN — SENNOSIDES AND DOCUSATE SODIUM 1 TABLET: 50; 8.6 TABLET ORAL at 23:07

## 2022-12-06 RX ADMIN — PANTOPRAZOLE SODIUM 40 MG: 40 TABLET, DELAYED RELEASE ORAL at 08:15

## 2022-12-06 RX ADMIN — AMIODARONE HYDROCHLORIDE 100 MG: 200 TABLET ORAL at 08:23

## 2022-12-06 RX ADMIN — HEPARIN SODIUM 5000 UNITS: 5000 INJECTION INTRAVENOUS; SUBCUTANEOUS at 08:15

## 2022-12-06 RX ADMIN — POLYETHYLENE GLYCOL 3350 17 G: 17 POWDER, FOR SOLUTION ORAL at 17:47

## 2022-12-06 RX ADMIN — MONTELUKAST 10 MG: 10 TABLET, FILM COATED ORAL at 08:15

## 2022-12-06 RX ADMIN — ASPIRIN 162 MG: 81 TABLET, COATED ORAL at 08:14

## 2022-12-06 RX ADMIN — LEVALBUTEROL 1.25 MG: 1.25 SOLUTION, CONCENTRATE RESPIRATORY (INHALATION) at 12:40

## 2022-12-06 RX ADMIN — TORSEMIDE 10 MG: 20 TABLET ORAL at 08:16

## 2022-12-06 RX ADMIN — VERAPAMIL HYDROCHLORIDE 240 MG: 240 TABLET, FILM COATED, EXTENDED RELEASE ORAL at 23:06

## 2022-12-06 RX ADMIN — VERAPAMIL HYDROCHLORIDE 240 MG: 240 TABLET, FILM COATED, EXTENDED RELEASE ORAL at 08:23

## 2022-12-06 RX ADMIN — TAMSULOSIN HYDROCHLORIDE 0.4 MG: 0.4 CAPSULE ORAL at 08:15

## 2022-12-06 RX ADMIN — SENNOSIDES AND DOCUSATE SODIUM 1 TABLET: 50; 8.6 TABLET ORAL at 08:16

## 2022-12-06 RX ADMIN — HEPARIN SODIUM 5000 UNITS: 5000 INJECTION INTRAVENOUS; SUBCUTANEOUS at 23:08

## 2022-12-06 RX ADMIN — ATORVASTATIN CALCIUM 40 MG: 40 TABLET, FILM COATED ORAL at 08:15

## 2022-12-06 ASSESSMENT — PAIN SCALES - GENERAL: PAINLEVEL_OUTOF10: 0

## 2022-12-06 NOTE — PROGRESS NOTES
Nephrology Progress note  [phone]   [facility]. [facility]      Reason for Consultation: DEISY / CKD IV    HPI: 79 y.o. male whom we were asked to see for DEISY on CKD. The patient presents back with worsened weakness and worsened edema. Subjective:  -pt seen and examined  -PMSHx and meds reviewed  -family at bedside    ROS: neg chest pain/SOB    Objective:  Blood pressure 135/86, pulse 74, temperature 97.6 °F (36.4 °C), temperature source Oral, resp. rate 18, height 5' 8\" (1.727 m), weight 236 lb 15.9 oz (107.5 kg), SpO2 93 %. Intake/Output Summary (Last 24 hours) at 12/6/2022 1350  Last data filed at 12/6/2022 1134  Gross per 24 hour   Intake 740 ml   Output 2 ml   Net 738 ml       Patient Vitals for the past 96 hrs (Last 3 readings):   Weight   12/06/22 1223 236 lb 15.9 oz (107.5 kg)   12/06/22 0411 236 lb 15.9 oz (107.5 kg)   12/05/22 0514 233 lb 4 oz (105.8 kg)         General:  NAD, A+Ox3  Chest:  CTAB, no distress  CVS:  RR  Abdominal:  NTND, soft, +BS  Extremities:  1+ pitting edema  Skin:  no rash, warm.     Medications:   [START ON 12/7/2022] epoetin kenny-epbx  30,000 Units SubCUTAneous Q7 Days    tamsulosin  0.4 mg Oral Daily    amiodarone  100 mg Oral Daily    aspirin EC  162 mg Oral Daily    atorvastatin  40 mg Oral Daily    linaclotide  145 mcg Oral QAM AC    montelukast  10 mg Oral Daily    pantoprazole  40 mg Oral Daily    verapamil  240 mg Oral BID    insulin lispro  0-4 Units SubCUTAneous TID WC    insulin lispro  0-4 Units SubCUTAneous Nightly    sodium chloride nebulizer  3 mL Nebulization TID    levalbuterol  1.25 mg Nebulization TID    miconazole   Topical BID    polyethylene glycol  17 g Oral BID    heparin (porcine)  5,000 Units SubCUTAneous BID    sennosides-docusate sodium  1 tablet Oral BID    torsemide  10 mg Oral Daily       Labs:  Renal panel:  Lab Results   Component Value Date/Time     12/06/2022 07:01 AM    K 3.8 12/06/2022 07:01 AM    K 4.2 12/05/2022 06:45 AM CO2 32 12/06/2022 07:01 AM    BUN 33 (H) 12/06/2022 07:01 AM    CREATININE 2.2 (H) 12/06/2022 07:01 AM    CALCIUM 7.9 (L) 12/06/2022 07:01 AM    PHOS 3.6 12/06/2022 07:01 AM    MG 2.00 12/06/2022 07:01 AM     CBC:  Lab Results   Component Value Date/Time    WBC 4.2 12/05/2022 06:45 AM    HGB 7.8 (L) 12/05/2022 06:45 AM    HCT 24.8 (L) 12/05/2022 06:45 AM     12/05/2022 06:45 AM       Assessment/Plan:  Reviewed old records and labs. 1) DEISY on CKD: peak Cr 3.0- baseline Cr 2.0-2.2-diminished perfusion in the setting of CHF   -improved with diuresis   -renal function stable, on oral diuretics .       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

## 2022-12-06 NOTE — PROGRESS NOTES
Late Entry:    Patient admitted to rehab with Anasarca. A/Ox4. Transfers with front-wheel walker x1 assist. Mobility restrictions: WBAT. On regular, low Na+, low K+ diet, tolerating well. Medications taken whole with thin liquids. On heparin for DVT prophylaxis. Skin: intact. Oxygen: 2 L/min via nasal cannula. LDA: PIV saline locked and capped. Has been continent of bowel and of bladder. LBM 11/30/22. Chair/bed alarms in use and call light in reach. Will monitor for safety.  Electronically signed by Bogdan Thurman RN on 12/6/22 at 10:06 AM EST

## 2022-12-06 NOTE — PROGRESS NOTES
Department of Physical Medicine & Rehabilitation  Progress Note    Patient Identification:  Carlota Valentine  [id number]  : 1940  Admit date: 2022    Chief Complaint: Anasarca    Subjective:   No acute events overnight. Patient seen this am sitting up in gym. Reports breathing is stable. He admits to significant progress since admission to ARU but questions ability to make further progress. Education provided by me and PT. Labs reviewed. ROS: No f/c, n/v, cp     Objective:  Patient Vitals for the past 24 hrs:   BP Temp Temp src Pulse Resp SpO2 Weight   22 1223 -- -- -- -- -- -- 236 lb 15.9 oz (107.5 kg)   22 0823 135/86 97.6 °F (36.4 °C) Oral 74 18 93 % --   22 0411 (!) 142/61 98.1 °F (36.7 °C) Oral 62 18 97 % 236 lb 15.9 oz (107.5 kg)   22 2117 (!) 154/57 97.7 °F (36.5 °C) Oral 68 17 95 % --   22 2015 -- -- -- 66 18 94 % --   22 1400 -- -- -- 67 16 93 % --   22 1353 (!) 149/65 97.8 °F (36.6 °C) Oral 68 16 93 % --     Const: Alert. No distress, pleasant. HEENT: Normocephalic, atraumatic. Normal sclera/conjunctiva. MMM. CV: Regular rate and rhythm. Resp: No respiratory distress. Lungs decreased at bases  Abd: Soft, nontender, nondistended, NABS+   Ext: Edema improving. Neuro: Alert, oriented, appropriately interactive. Psych: Cooperative, appropriate mood and affect    Laboratory data: Available via EMR.    Last 24 hour lab  Recent Results (from the past 24 hour(s))   POCT Glucose    Collection Time: 22  4:09 PM   Result Value Ref Range    POC Glucose 121 (H) 70 - 99 mg/dl    Performed on ACCU-CHEK    POCT Glucose    Collection Time: 22  8:17 PM   Result Value Ref Range    POC Glucose 118 (H) 70 - 99 mg/dl    Performed on ACCU-CHEK    Renal Function Panel    Collection Time: 22  7:01 AM   Result Value Ref Range    Sodium 140 136 - 145 mmol/L    Potassium 3.8 3.5 - 5.1 mmol/L    Chloride 99 99 - 110 mmol/L    CO2 32 21 - 32 mmol/L    Anion Gap 9 3 - 16    Glucose 103 (H) 70 - 99 mg/dL    BUN 33 (H) 7 - 20 mg/dL    Creatinine 2.2 (H) 0.8 - 1.3 mg/dL    Est, Glom Filt Rate 29 (A) >60    Calcium 7.9 (L) 8.3 - 10.6 mg/dL    Phosphorus 3.6 2.5 - 4.9 mg/dL    Albumin 3.1 (L) 3.4 - 5.0 g/dL   Magnesium    Collection Time: 12/06/22  7:01 AM   Result Value Ref Range    Magnesium 2.00 1.80 - 2.40 mg/dL   POCT Glucose    Collection Time: 12/06/22  7:07 AM   Result Value Ref Range    POC Glucose 114 (H) 70 - 99 mg/dl    Performed on ACCU-CHEK    POCT Glucose    Collection Time: 12/06/22 11:34 AM   Result Value Ref Range    POC Glucose 108 (H) 70 - 99 mg/dl    Performed on ACCU-CHEK        Therapy progress:  Physical therapy:  Bed Mobility:  Overall Assistance Level: Supervision  Additional Factors: Increased time to complete, Head of bed flat, Without handrails  Sit>supine:  Assistance Level: Supervision  Supine>sit:  Assistance Level: Supervision  Skilled Clinical Factors: use of momentum  Transfers:  Surface: To chair with arms, From chair with arms, To chair without arms, From chair without arms  Additional Factors: Increased time to complete  Device: Walker  Sit>stand:  Assistance Level: Supervision  Skilled Clinical Factors: v/c for hand placement and sequencing. Stand>sit:  Assistance Level: Supervision  Skilled Clinical Factors: v/c for hand placement and safety  Bed<>chair  Technique: Stand step  Assistance Level: Contact guard assist, Stand by assist  Skilled Clinical Factors: with wheeled walker  Stand Pivot:     Lateral transfer:     Car transfer:  Assistance Level: Contact guard assist  Skilled Clinical Factors: education on safe transfer. v/c required for sequencing and hand placement. increased time and effort to complete  Ambulation:  Surface: Level surface  Device: Rolling walker  Distance: 48' with two turns, 36' (w/c <-> curb step)  Activity: Within Unit  Activity Comments: pt becomes SOB w ambulating.  Patient becomes visibly SOB and requires seated rest break after all activities. Patient with noted tremors in UEs and legs which seems to decrease with rest. limits distance due to fatigue at this time. Additional Factors: Increased time to complete (pt managing O2 tank throughout)  Assistance Level: Supervision  Gait Deviations: Slow seamus, Wide base of support  Skilled Clinical Factors: limited endurance on 2L oxygen, HR 84 bpm 88 O2 sats on 2L after activity increased to 93%, noted tremors. Increased time to recover after each functional mobility task  Stairs:  Number of Stairs: 0- N/A  Curb:  Curb Height: 6''  Device: Rolling walker  Number of Curbs: 1  Additional Factors: Verbal cues, Hand placement cues, Increased time to complete  Assistance Level: Stand by assist, Contact guard assist  Skilled Clinical Factors: Patient able to perform curb step with RW support increased effort and time, shaking but without LOB at this time. spo2% 89% at completion on 2 L  Wheelchair:     Assessment:  Assessment: Mr. Otoniel Dick continues to be on 2L, desaturates with most activities on 2L to 88-89% today with household distances. He was able to transfer and ambulate household distances with Supervision, but his endurance is limited and he has shaking of bilateral LE and UE tremors baseline for patient. Distance ambulate and time standing is limited by SOB and fatigue in bilateral LE. Patient able to perform curb step SBA with light assist with wheeled walker when descend cues needed for sequencing. Patient continues to be below baseline and will benefit from continued skilled therapy for strengthening, gait training, endurance training, and functional mobility training to allow for safe return home. Patient would like to go home on Wednesday but feel could benefit till EOW. Recommend home PT at D/C.   Activity Tolerance: Patient limited by fatigue, Patient limited by endurance  Discharge Recommendations: Home with Home health PT, Home with assist PRN, Patient would benefit from continued therapy after discharge  Factors Affecting Discharge: lives in house w spouse w 1+1 stairs to enter      Occupational therapy:   Feeding     Grooming/Oral Hygiene  Assistance Level: Set-up  Skilled Clinical Factors: set-up seated in recliner as out of time  UE Bathing  Assistance Level: Set-up  Skilled Clinical Factors: seated from shower chair. LE Bathing  Equipment Provided: Long-handled sponge  Assistance Level: Stand by assist  Skilled Clinical Factors: seated to wash LE's. Used long sponge for feet. Ed in tech to dry. Pt in stance holding grab bars bathed buttocks, niko areas. Pt states he dons slide on slippers after showering at home, pt amb to his bed, sat and further dry feet, dons socks. UE Dressing  Assistance Level: Set-up  Skilled Clinical Factors: retrieved own clothes from bag seated, then the rest from RW CGA/SBA at closet, cues for O2 line. Pt doffed/donned pullover shirt while seated, he managed O2 line  LE Dressing  Assistance Level: Stand by assist, Contact guard assist, Verbal cues, Set-up  Skilled Clinical Factors: Donned underpants/pants over feet per self, donned suspenders himself, pulled up at [address] [place] [address] in stance SBA  Putting On/Taking Off Footwear  Equipment Provided: Sock aid  Assistance Level: Minimal assistance, Set-up, Verbal cues  Skilled Clinical Factors: He sat on EOB & donned own compression hose himself. Min increased time. He donned shoes, props feet on chair to tie. Toileting  Assistance Level: Stand by assist  Skilled Clinical Factors: Own clothes down SBA at RW. voided urine, BM. Removed underwear for upcoming shower. Assessment:  Assessment: Today pt showered with SBA w/ heavy use of grab bar in stance. Stated he cannot put grab bar up at home\"tried\", used long sponge for feet. Pt dressed seated EOB w/ increased time/rests with   Pt toileted SBA but did not have on clothes to pull up as going to shower.  Pt performed ADL mob/transfers with RW in room CGA to SBA, cues for O2 line management + safety cues. O2 sat 89% after shower on 2 L O2, recovered to 91% w/ deep breathing. Pt required increased time & rests to complete ADL tasks. Pt continues to be below his baseline & will benefit from cont'd OT treatment on ARU. Plan to cont tx per POC. Activity Tolerance: Patient limited by fatigue, Patient limited by endurance  Discharge Recommendations: Home with assist PRN, Home with Home health OT, S Level 1  Factors Affecting Discharge: wife has memory problems    Speech therapy:            PT  Position Activity Restriction  Other position/activity restrictions: 2 L O2;  low sodium/low potassium diet; IV, contact precautions for ESBL in urine  Objective     Sit to Stand: Contact guard assistance, Minimal Assistance (CGA from recliner, min A from bed)  Stand to Sit: Contact guard assistance, Stand by assistance  Bed to Chair: Contact guard assistance  Device: Rolling Walker  Other Apparatus: O2 (2L)  Assistance: Contact guard assistance  Distance: 5', 25'  OT  PT Equipment Recommendations  Equipment Needed: No  Other: defer to next level of care  Toilet - Technique: Ambulating  Equipment Used: Grab bars  Toilet Transfers Comments: RW>< toilet w/ Bilateral grab bars, cues/A for O2 line  Assessment        SLP          Body mass index is 36.03 kg/m².     Rehabilitation Diagnosis:   16.0, Debility (non-cardiac, non-pulmonary        Assessment and Plan:     Impairments:generalized weakness, decreased endurance, balance     Debility  -PT/OT     Anasarca, acute on chronic dCHF  -torsemide per Nephrology  -Daily wt     Obstructive Uropathy  -Salazar initially placed, now voiding.   -Flomax     DEISY on CKD  -Nephrology following, appreciate input  -Avoid nephrotoxins, renally dose meds  -Monitor renal function     HCAP, hemoptysis  -meropenem course completed    Acute (? Chronic) hypoxic respiratory failure, COPD  -As evidenced by O2 saturation <90% on room air  -Supplemental O2, wean as tolerated -- currently on 2L     Anemia  -epoetin   -Monitor Hgb, transfuse prn <7. PAF  -amiodarone     HTN  -torsemide, verapamil     DM2  -ISS  -was on glargine 10 units qhs at home     COPD  -montelukast, levalbuterol     JOANN  -Home CPAP     Bladder   -Retention with obstructive uropathy as above  -Monitor PVRs, straight cath prn >300cc  -Flomax     Bowel   -Constipation   -Linzess, miralax, senna+colace BID, PRN bisacodyl po and supp. Safety   -fall precautions     Pain control  -acetaminophen prn     PPx  -DVT: heparin  -GI: pantoprazole    Rehab Progress: Interdisciplinary team conference was held today with entire rehab treatment team including PT, OT, SLP (if applicable), Dietician, RN, and SW. Discussion focused on progress toward rehab goals and discharge planning. Making progress. Working on strength, balance, endurance. Separate conference then held with patient/family (if available), questions answered and concerns addressed. Total treatment time >50 min with greater than 50% spent in care coordination. Anticipated Dispo: home with wife  Services:  PT, OT, RN  DME: sock aide  ELOS: 12/8      Efra Avendaño.  Naveed Collins MD 12/6/2022, 1:00 PM

## 2022-12-06 NOTE — CARE COORDINATION
Team Conference:  [employer] held today; Tuesday, December 6th 2022  Team reviewed progress and goals. Team recommends continued stay on Acute Rehab to further goals of personal care and ambulation needs. Discharge plan is home with family support and Home Care from [facility]. Projected discharge is Thursday December 8th. Meet with patient and his wife, discussed Family Education tomorrow and discharge Thursday. Patient and his wife requested to see dietitian prior to discharge, there is consult in [facility] from Dr. Trisha quispe.     Patient's daughter to transport at discharge after 4pm.    Electronically signed by CARLIE Lubin on 12/6/22 at 2:09 PM EST   [phone]

## 2022-12-06 NOTE — PROGRESS NOTES
Physical Therapy  Facility/Department: [address] REHAB  Rehabilitation Physical Therapy Treatment Note    NAME: Maritza Neville  : 1940 (79 y.o.)  MRN: [id number]  CODE STATUS: Full Code    Date of Service: 22       Restrictions:  Restrictions/Precautions: Fall Risk;Contact Precautions  Position Activity Restriction  Other position/activity restrictions: 2 L O2;  low sodium/low potassium diet; IV, contact precautions for ESBL in urine   Pertinent medical information:  Additional Pertinent Hx: Per Lesvia Dietrich MD H&P on 2022: The pt is an 80 yo male who presented to the ED with weakness, increasing edema and inability to urinate. The pt recently in the hospital for weakness and DEISY and pna. In the ED the pt's renal function wa worse and he was volume overloaded; he was SOB with crackles. PMHx: asthma, a-fib, carotid artery stenosis, CKD, COPD, HTN, anemia, JOANN on c-pap, DM    SUBJECTIVE  Subjective  Subjective: Pt feels like too much therapy this morning, states I do not need to due this much activity. Agreeable to PT treatment session with some redirection. Pain: No complaints of pain at this time.         Post Treatment Pain Screening     Social/Functional History  Lives With: Spouse  Type of Home: House  Home Layout: Able to Live on Main level with bedroom/bathroom, Multi-level  Home Access: Stairs to enter with rails  Entrance Stairs - Number of Steps: 1+1  Entrance Stairs - Rails: Both  Bathroom Shower/Tub: Tub/Shower unit, Doors (does not want to remove door just leaves door open for shower D/T TTB)  Bathroom Toilet: Standard (comfort height commode vanity in front)  Bathroom Equipment: Tub transfer bench, Hand-held shower (says TTB new)  Bathroom Accessibility: Walker accessible  Home Equipment: Cane, Rollator, Oxygen (new O2 2L home 2 liters)  Has the patient had two or more falls in the past year or any fall with injury in the past year?: No  Receives Help From: Home health (OT, PT, nurse (new))  ADL Assistance: Needs assistance (recent A but was indep before that)  [address]in [address]: Independent (wife cleans, pt manages bill paying)  Ambulation Assistance: Independent (mostly used cane)  Transfer Assistance: Independent  Active : Yes  Mode of Transportation: Car  Education: sedan  Occupation: Retired  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 I's & drops her off)      OBJECTIVE  Cognition  Safety Judgement: Decreased awareness of need for safety  Problem Solving: Decreased awareness of errors;Assistance required to identify errors made  Insights: Decreased awareness of deficits  Cognition Comment: Cues for safe tranfers, RW use. Decreased insight into needs/safety/energy conservation. Orientation  Overall Orientation Status: Within Functional Limits  Orientation Level: Oriented X4    Functional Mobility  Balance  Sitting Balance: Supervision  Standing Balance: Stand by assistance  Transfers  Surface: To chair with arms;From chair with arms; To chair without arms;From chair without arms  Additional Factors: Increased time to complete  Device: Walker  Sit to Stand  Assistance Level: Supervision  Skilled Clinical Factors: v/c for hand placement and sequencing. Stand to Sit  Assistance Level: Supervision  Skilled Clinical Factors: v/c for hand placement and safety      Environmental Mobility  Ambulation  Surface: Level surface  Device: Rolling walker  Distance: 48' with two turns, 36' (w/c <-> curb step)  Activity: Within Unit  Activity Comments: pt becomes SOB w ambulating. Patient becomes visibly SOB and requires seated rest break after all activities. Patient with noted tremors in UEs and legs which seems to decrease with rest. limits distance due to fatigue at this time. Additional Factors: Increased time to complete (pt managing O2 tank throughout)  Assistance Level: Supervision  Gait Deviations: Slow seamus; Wide base of support  Skilled Clinical Factors: limited endurance on 2L oxygen, HR 84 bpm 88 O2 sats on 2L after activity increased to 93%, noted tremors. Increased time to recover after each functional mobility task  Stairs  Number of Stairs: 0- N/A  Curb  Curb Height: 6''  Device: Rolling walker  Number of Curbs: 1  Additional Factors: Verbal cues; Hand placement cues; Increased time to complete  Assistance Level: Stand by assist;Contact guard assist  Skilled Clinical Factors: Patient able to perform curb step with RW support increased effort and time, shaking but without LOB at this time. spo2% 89% at completion on 2 L             PT Exercises  Exercise Treatment: patient performed 20 reps bilateral LE ther ex while seated in chair: heel/toe raises, alternating LAQ, hip adduction with knees flexed, hip abduction red t-band, marching, and HS curls red t-band. ASSESSMENT/PROGRESS TOWARDS GOALS       Assessment  Assessment: Mr. Paula Butt continues to be on 2L, desaturates with most activities on 2L to 88-89% today with household distances. He was able to transfer and ambulate household distances with Supervision, but his endurance is limited and he has shaking of bilateral LE and UE tremors baseline for patient. Distance ambulate and time standing is limited by SOB and fatigue in bilateral LE. Patient able to perform curb step SBA with light assist with wheeled walker when descend cues needed for sequencing. Patient continues to be below baseline and will benefit from continued skilled therapy for strengthening, gait training, endurance training, and functional mobility training to allow for safe return home. Patient would like to go home on Wednesday but feel could benefit till EOW.  Recommend home PT at D/C.  Activity Tolerance: Patient limited by fatigue;Patient limited by endurance  Discharge Recommendations: Home with Home health PT;Home with assist PRN;Patient would benefit from continued therapy after discharge  Factors Affecting Discharge: lives in house w spouse w 1+1 stairs to enter  PT Equipment Recommendations  Equipment Needed: No  Other: defer to next level of care     PM session:   S: Pt sitting in recliner upon arrival to room, 92% on 2 L SPO2%. Pt agreeable to PT treatment requesting to use commode prior to leaving room. O: Maneuvering in room with RW PT assisting with line management throughout supervision, standing at commode with left railing able to urinate with supervision for safety with standing, due to fatigue washing of hands performed in sitting after set up. Pt able to managed zipper and pants without assistance at this time. Sit <-> stand with supervision, cues for safety especially stand to sit eccentric control. Surface: Level surface, Device: Rolling walker, Distance: 180' with two turns, Activity: Within Unit. Activity Comments: pt becomes SOB w ambulating. Patient becomes visibly SOB and requires seated rest break after all activities. Patient with noted tremors in UEs and legs which seems to decrease with rest. limits distance due to fatigue at this time. Pt wanting to push distances this afternoon. Additional Factors: Increased time to complete (pt managing O2 tank throughout). Assistance Level: Supervision  Skilled Clinical Factors: limited endurance on 2L oxygen,87 O2 sats on 2L after activity increased 3 L for patient comfort and recovery then to 97% able to return to 3 L at rest, noted tremors. Increased time to recover after each functional mobility task.   Exercise Treatment: patient performed 20 reps bilateral LE ther ex while seated in chair: heel/toe raises, alternating LAQ, hip adduction with knees flexed, hip abduction red t-band, marching, and HS curls red t-band. Standing balance stood at RW with SBA X 12 each alternating BLEs, 92% on 2 L at completion of exercise. Positioned in wheelchair at completion of session, care transition to [facility] 36.   A: Pt demonstrated increased ability to ambulate this date with decreased assistance throughout. Significant increased time to recover from functional community distances at this time and not recommended. Education on energy conservation and benefits of maintaining household distances for safety. Pt able to tolerate session at slower pace with frequent rest breaks for recover throughout. Safety Device - Type of devices:  [x]  All fall risk precautions in place [] Bed alarm in place  [] Call light within reach [] Chair alarm in place [] Positioning belt [x] Gait belt [x] Patient at risk for falls [] Left in bed [x] Left in chair [] Telesitter in use [] Sitter present [] Nurse notified []  None     Goals  Patient Goals   Patient Goals : to be able to walk again  Short Term Goals  Time Frame for Short Term Goals: 7-10 days  Short Term Goal 1: Bed mobility modif I  Short Term Goal 2: Transfers sit <> stand with modif I  Short Term Goal 3: Ambulate with walker 150 feet with modif I  Short Term Goal 4: pt complete 2 curb steps w use of handrail modif I  Long Term Goals  Time Frame for Long Term Goals : STG=LTG    PLAN OF CARE/SAFETY  Physcial Therapy Plan  General Plan:  minutes of therapy at least 5 out of 7 days a week  Days Per Week: 5 Days  Hours Per Day: 1.5 hours  Therapy Duration: 4-7 Days  Current Treatment Recommendations: Strengthening;Balance training;Functional mobility training;Transfer training;Gait training; Therapeutic activities; Patient/Caregiver education & training; Safety education & training  Safety Devices  Type of Devices: All fall risk precautions in place;Gait belt;Patient at risk for falls; Left in chair (positioned in wheelchair for safety, return to room with transportation)  Restraints  Restraints Initially in Place: No    EDUCATION  Education  Education Given To: Patient; Family  Education Provided: Plan of Care; Mobility Training;Transfer Training; Safety  Education Provided Comments: importance of O2 levels, benefits of PT and rehabiliation goals  Education Method: Demonstration;Verbal  Education Outcome: Continued education needed;Verbalized understanding        Therapy Time   Individual Concurrent Group Co-treatment   Time In 0945         Time Out 1030         Minutes 45           Timed Code Treatment Minutes: 39 [address]   Time In 1300     Time Out 4703     Minutes [address], [address] [facility], 12/06/22 at 10:41 AM     Ev Stubbs PT, DPT [id number] 12/06/22 10:42 AM

## 2022-12-06 NOTE — PROGRESS NOTES
Patient admitted to rehab with anasarca. A/Ox4. Transfers with one assist with gait belt and front wheeled walker. Mobility restrictions: WBAT. On general low sodium, low potassium diet, tolerating well. Medications taken whole with fluids. On heparin for DVT prophylaxis. Skin: abdominal panus pink. Oxygen: 2LPM per n/c. LDA: none. Has been continent of bowel and  of bladder. LBM 12/6. Chair/bed alarms in use and call light in reach. Will monitor for safety.

## 2022-12-06 NOTE — PROGRESS NOTES
Occupational Therapy  Facility/Department: [address] REHAB  Rehabilitation Occupational Therapy Daily Treatment Note  AM and PM Sessions:     Date: 22  Patient Name: Reese Deshpande       Room: R2M-8911/4486-06  MRN: [id number]  Account: [de-identified]   : 1940  (79 y.o.) Gender: male                    Past Medical History:  has a past medical history of Allergic rhinitis, Asthma, Atrial fibrillation ([facility] [address] Carotid artery stenosis, Chronic kidney disease, COPD (chronic obstructive pulmonary disease) ([facility] [address] CPAP (continuous positive airway pressure) dependence, ESBL (extended spectrum beta-lactamase) producing bacteria infection, Hyperlipidemia, Hypertension, Iron deficiency anemia, Obstructive sleep apnea, and Type II or unspecified type diabetes mellitus without mention of complication, not stated as uncontrolled. Past Surgical History:   has a past surgical history that includes Gallbladder surgery (); Upper gastrointestinal endoscopy (7-2005    7/10/2009); Colonoscopy (7/10/2009); Cataract removal (2012); Pain management procedure (Right, 10/20/2021); Pain management procedure (Left, 2021); and CT BIOPSY BONE MARROW (2022). Restrictions  Restrictions/Precautions: Fall Risk;Contact Precautions  Other position/activity restrictions: 2 L O2;  low sodium/low potassium diet; IV, contact precautions for ESBL in urine  Required Braces or Orthoses?: No    Subjective  Subjective: Pt met BS,seated in recliner and agreeable to OT ADL session for shower. Pt denied pain. Pt reports he slept good last night(slept in bed). Dr Agus Roberto in room talking to pt at start.   Restrictions/Precautions: Fall Risk;Contact Precautions             Objective     Cognition  Safety Judgement: Decreased awareness of need for safety  Problem Solving: Decreased awareness of errors;Assistance required to identify errors made  Insights: Decreased awareness of deficits  Cognition Comment: Cues for safe tranfers, SANDEE use.  Decreased insight into needs/safety/energy conservation. Orientation  Overall Orientation Status: Within Functional Limits  Orientation Level: Oriented X4         ADL  Grooming/Oral Hygiene  Assistance Level: Set-up  Skilled Clinical Factors: set-up seated in recliner as out of time  Upper Extremity Bathing  Assistance Level: Set-up  Skilled Clinical Factors: seated from shower chair. Lower Extremity Bathing  Equipment Provided: Long-handled sponge  Assistance Level: Stand by assist  Skilled Clinical Factors: seated to wash LE's. Used long sponge for feet. Ed in tech to dry. Pt in stance holding grab bars bathed buttocks, niko areas. Pt states he dons slide on slippers after showering at home, pt amb to his bed, sat and further dry feet, dons socks. Upper Extremity Dressing  Assistance Level: Set-up  Skilled Clinical Factors: retrieved own clothes from bag seated, then the rest from RW CGA/SBA at closet, cues for O2 line. Pt doffed/donned pullover shirt while seated, he managed O2 line  Lower Extremity Dressing  Assistance Level: Stand by assist;Contact guard assist;Verbal cues; Set-up  Skilled Clinical Factors: Donned underpants/pants over feet per self, donned suspenders himself, pulled up at [address] [place] [address] in stance SBA  Putting On/Taking Off Footwear  Skilled Clinical Factors: He sat on EOB & donned own compression hose himself. Min increased time. He donned shoes, props feet on chair to tie. Toileting  Assistance Level: Stand by assist  Skilled Clinical Factors: Own clothes down SBA at RW. voided urine, BM. Removed underwear for upcoming shower. Toilet Transfers  Equipment: Grab bars  Assistance Level: Stand by assist  Skilled Clinical Factors: RW>< comfort ht toilet with grab bars, says uses counter for support at home(is in front)  Tub/Shower Transfers  Type: Shower  Transfer From: Rolling walker  Transfer To:  Shower chair with back  Additional Factors: Verbal cues;Cues for hand placement  Assistance Level: Stand by assist;Contact guard assist  Skilled Clinical Factors: cues for aligning self safely to chair and hand placement          Functional Mobility  Device: Rolling walker  Activity: Transport items; Retrieve items; To/From bathroom  Assistance Level: Contact guard assist;Stand by assist  Skilled Clinical Factors: Pt asked to remove O2 at start & OT stated he needed to keep on, except when washing face (per notes dropped yesterday w/it off). Pt amb w/RW in room from recliner >< bathrom, in bathroom SBA, occ. CGA, cues, occ. assist for O2 line. Transfers  Surface: To chair with arms;From chair with arms  Sit to Stand  Assistance Level: Stand by assist  Stand to Sit  Assistance Level: Stand by assist;Contact guard assist  Bed To/From Chair  Assistance Level: Contact guard assist  Skilled Clinical Factors: >< recliner with RW, >< bed CGA/SBA, cues/assist for O2 line         Assessment  Assessment  Assessment: Today pt showered with SBA w/ heavy use of grab bar in stance. Stated he cannot put grab bar up at home\"tried\", used long sponge for feet. Pt dressed seated EOB w/ increased time/rests with   Pt toileted SBA but did not have on clothes to pull up as going to shower. Pt performed ADL mob/transfers with RW in room CGA to SBA, cues for O2 line management + safety cues. O2 sat 89% after shower on 2 L O2, recovered to 91% w/ deep breathing. Pt required increased time & rests to complete ADL tasks. Pt continues to be below his baseline & will benefit from cont'd OT treatment on ARU. Plan to cont tx per POC.   Activity Tolerance: Patient limited by fatigue;Patient limited by endurance  Discharge Recommendations: Home with assist PRN;Home with Home health OT;S Level 1  Factors Affecting Discharge: wife has memory problems  OT Equipment Recommendations  Other: SA, walker basket, if goes home using regular RW vs his 4 WW continue to assess/address       Patient Education  Education  Education Given To: Patient  Education Provided: Plan of Care;Precautions; Safety; Mobility Training;Transfer Training;Energy Conservation;Equipment; Fall Prevention Strategies; ADL Function;Home Exercise Program;Role of Therapy  Education Method: Demonstration;Verbal  Education Outcome: Verbalized understanding;Continued education needed    Plan  Occupational Therapy Plan  Times Per Week: 5-6  Times Per Day: Twice a day  Days Per Week: 5 Days  Hours Per Day: 1.5 hours  Specific Instructions for Next Treatment: x 7-10 days from eval 12/1/22  Current Treatment Recommendations: Balance training;Functional mobility training; Endurance training;Strengthening; Safety education & training;Self-Care / ADL;Equipment evaluation, education, & procurement;Patient/Caregiver education & training;ROM; Home management training    Second Session:     S: Pt met in dept agreeable to tx. Pt denied pain. O2 sat was 91% or greater when checked w/ 2 L O2 per N/C. Pt expressed he wants to D/C home tomorrow  but team recommends D/C Thursday with home OT, PT and nurse. O: IADL: Simple IADL cooking task as he reported he does a lot of meal prep at home: OT re-ed pt on location of items, contact precautions followed for session & all surfaces sanitized after session. Pt reported he does all his cooking from rolling stool at home that is high. Pt amb in OT kitchen with RW using basket to carry made fried egg on stovetop supervision to mod lester. OT cued pt to manage own O2 line at start which pt did this session. He was able to retrieve pan from low cupboard, spatula from drawer and small plate from RW with basket. Pt  wiped up spill @ counter and used hand  D/T using raw egg. Pt stood for ~ 5 minutes which was his max & had to sit down in kitchen chair at table to rest. OT brought rolling office chair w/o arms for pt to complete cooking aspect of task seated, as is what he does at home. Pt demo able to get onto chair placed in corner to stabilize.  He was able to cook egg from chair, then serve to table by rolling nilesh with his feet. Pt managed O2 line from office chair and demo able to push chair back into corner to safely get up form chair to RW. Pt amb back to w/c in dept. w/ RW. Pt performed transfer >< kitchen chair, w/c and office chair to simulate stool in kitchen at home all supervision to mod indep. Ther ex: w/ 2# BUE wts for chest press, horizontal add/abd x10, & 3 # wts for : bicep curls for 2 sets of 20 then 10 reps, scapular add ex x 15 reps, supination/pronation x20, over head press w/o wt x 10. C/O shoulder pain so limited overhead ex. Ther ex to increase activity tolerance for ADL/IADL mob tasks. Pt's O2 sats stayed at 93% or greater when checked on 2 liters O2. A: Pt making progress & expect he will be ready for D/C on Thursday 12/8/22 as planned with wife & home therapy. Safety Device - Type of devices: Left with transporter Rip Butler to go back to room. []  All fall risk precautions in place [] Bed alarm in place  [] Call light within reach [] Chair alarm in place [] Positioning belt [x] Gait belt [] Patient at risk for falls [] Left in bed [x] Left in chair [] Telesitter in use [] Sitter present [] Nurse notified []  None        Goals  Patient Goals   Patient goals : Pt stated his goal is to do everything he did at home, walk indep, shower, dress, go grocery shopping . Above goals will work toward this goal.  Short Term Goals  Time Frame for Short Term Goals: 7-10 days from eval 12/1/22  Short Term Goal 1: Pt will bathe mod indep w/shower chair/long sponge  Short Term Goal 2: Pt will dress mod indep w/ AE if needed  Short Term Goal 3: Pt will toilet mod indep  Short Term Goal 4: Pt will perform functional ADL transfers, ADLmobility w/ LRAD mod indep. Short Term Goal 5: Pt will particpate in ther ex/UE HEP to increase activity tolerance to stand for 5+ minutes during ADL/mob tasks keeping O2 sats in safe range.   MET  Short Term Goal 6: Pt will improve walker safety/O2 line management to complete light IADL tasks of food prep/home management mod indep. with LRAD  MET  Long Term Goals  Time Frame for Long Term Goals : STGs=LTGs                   Therapy Time   Individual Concurrent Group Co-treatment   Time In 0830         Time Out 0935         Minutes 65         Timed Code Treatment Minutes: 65 Minutes  Therapy Time     Individual Co-treatment   Time In [facility]     Time Out 1430     Minutes 41 [facility] Lyle Bansal OT  Electronically signed by Devaughn Crane OTR/L  License # [id number]

## 2022-12-06 NOTE — PROGRESS NOTES
Resting quietly in bed. Pt admitted with debility. Is oriented x4. Transferring with a walker x1, CGA. Pt noted with some tremors and SOB when ambulating. Lungs clear but diminished. On 2l O2, home dependent. HR regular. Abdomen non tender with active bowel sounds. Has been continent of urine. Denies pain. Encouraged to call for all needs. Call light in reach at all times.  Isabela Carrasco RN

## 2022-12-06 NOTE — PLAN OF CARE
Problem: Safety - Adult  Goal: Free from fall injury  Outcome: Progressing  Flowsheets (Taken 12/6/2022 2701)  Free From Fall Injury:   Instruct family/caregiver on patient safety   Based on caregiver fall risk screen, instruct family/caregiver to ask for assistance with transferring infant if caregiver noted to have fall risk factors  Note: Pt educated on falls precautions and safety. Call light and personal belongings within reach at all times. Non skid socks in use when up. Hourly rounding and alarms active. Problem: Skin/Tissue Integrity  Goal: Absence of new skin breakdown  Description: 1. Monitor for areas of redness and/or skin breakdown  2. Assess vascular access sites hourly  3. Every 4-6 hours minimum:  Change oxygen saturation probe site  4. Every 4-6 hours:  If on nasal continuous positive airway pressure, respiratory therapy assess nares and determine need for appliance change or resting period. Outcome: Progressing  Note: Able to change positions in bed without assist, no evidence of skin breakdown noted. Waffle cushion in place to chair.

## 2022-12-06 NOTE — PROGRESS NOTES
[facility] Internal Medicine Note      Chief Complaint: I am doing ok    Subjective/Interval History:    Patient is once again at the bedside chair. He is feeling okay. Denies pain. Breathing is improved. Tolerating diet  Doing well with rehab. Anxious to go home. No chest pain or shortness breath. No cough or sputum. No nausea, vomiting, diarrhea. No abdominal pain. No dysuria. The remainder of the review of systems is negative. PMH, PSH, FH/SH reviewed and unchanged as documented in the H&P personally documented at the time of his acute care admission on 2022    Medication list reviewed    Objective:    /86   Pulse 62   Temp 98.1 °F (36.7 °C) (Oral)   Resp 18   Ht 5' 8\" (1.727 m)   Wt 236 lb 15.9 oz (107.5 kg)   SpO2 97%   BMI 36.03 kg/m²   Temp  Av.9 °F (36.6 °C)  Min: 97.7 °F (36.5 °C)  Max: 98.1 °F (36.7 °C)    Exam unchanged  RRR  Chest-respirations are easy. Lungs are clear throughout  Abd-BS+, soft, NTND,   Ext-no lower extremity edema. Remains with minimal dependent edema in his flanks and hips.     The Following Labs Were Reviewed Today:    Recent Results (from the past 24 hour(s))   POCT Glucose    Collection Time: 22 11:06 AM   Result Value Ref Range    POC Glucose 143 (H) 70 - 99 mg/dl    Performed on ACCU-CHEK    POCT Glucose    Collection Time: 22  4:09 PM   Result Value Ref Range    POC Glucose 121 (H) 70 - 99 mg/dl    Performed on ACCU-CHEK    POCT Glucose    Collection Time: 22  8:17 PM   Result Value Ref Range    POC Glucose 118 (H) 70 - 99 mg/dl    Performed on ACCU-CHEK    Renal Function Panel    Collection Time: 22  7:01 AM   Result Value Ref Range    Sodium 140 136 - 145 mmol/L    Potassium 3.8 3.5 - 5.1 mmol/L    Chloride 99 99 - 110 mmol/L    CO2 32 21 - 32 mmol/L    Anion Gap 9 3 - 16    Glucose 103 (H) 70 - 99 mg/dL    BUN 33 (H) 7 - 20 mg/dL    Creatinine 2.2 (H) 0.8 - 1.3 mg/dL    Est, Glom Filt Rate 29 (A) >60    Calcium 7.9 (L) 8.3 - 10.6 mg/dL    Phosphorus 3.6 2.5 - 4.9 mg/dL    Albumin 3.1 (L) 3.4 - 5.0 g/dL   Magnesium    Collection Time: 12/06/22  7:01 AM   Result Value Ref Range    Magnesium 2.00 1.80 - 2.40 mg/dL   POCT Glucose    Collection Time: 12/06/22  7:07 AM   Result Value Ref Range    POC Glucose 114 (H) 70 - 99 mg/dl    Performed on ACCU-CHEK        ASSESSMENT/PLAN:      Principal Problem:     Debility-continue on rehab. Active Problems:     Anasarca-much improved. No ankle edema today and flank edema seems much less. Awaiting standing weight for the patient. Continue current diuretics. JOANN (obstructive sleep apnea)-using CPAP. Type 2 diabetes mellitus with stage 4 chronic kidney disease, with long-term current use of insulin ([facility])-blood sugars are excellent. Patient only on sliding scale. Microcytic anemia-primarily due to renal insufficiency. Receiving Procrit weekly. CBC was okay, repeat pending for Thursday. Essential hypertension-blood pressure a bit labile, high at times. Continue to monitor. May need to adjust medicines. Moderate COPD (chronic obstructive pulmonary disease) (HCC)-lungs clear today. Continue with Xopenex. Slow transit constipation-continue with Linzess and MiraLAX. Consult dietitian for CHF and renal insufficiency diets, specifically sodium and potassium.     Duncan Eastman MD, [address]  8:31 AM  12/6/2022

## 2022-12-06 NOTE — PLAN OF CARE
Problem: Discharge Planning  Goal: Discharge to home or other facility with appropriate resources  12/6/2022 1312 by Ahmet Chavez RN  Outcome: Progressing  Flowsheets (Taken 12/6/2022 1312)  Discharge to home or other facility with appropriate resources:   Identify barriers to discharge with patient and caregiver   Identify discharge learning needs (meds, wound care, etc)     Problem: Safety - Adult  Goal: Free from fall injury  12/6/2022 1312 by Ahmet Chavez RN  Outcome: Progressing  Flowsheets (Taken 12/6/2022 1312)  Free From Fall Injury:   Instruct family/caregiver on patient safety   Based on caregiver fall risk screen, instruct family/caregiver to ask for assistance with transferring infant if caregiver noted to have fall risk factors     Problem: Chronic Conditions and Co-morbidities  Goal: Patient's chronic conditions and co-morbidity symptoms are monitored and maintained or improved  12/6/2022 1312 by Ahmet Chavez RN  Outcome: Progressing  Flowsheets (Taken 12/6/2022 1312)  Care Plan - Patient's Chronic Conditions and Co-Morbidity Symptoms are Monitored and Maintained or Improved:   Monitor and assess patient's chronic conditions and comorbid symptoms for stability, deterioration, or improvement   Collaborate with multidisciplinary team to address chronic and comorbid conditions and prevent exacerbation or deterioration     Problem: Skin/Tissue Integrity  Goal: Absence of new skin breakdown  Description: 1. Monitor for areas of redness and/or skin breakdown  2. Assess vascular access sites hourly  3. Every 4-6 hours minimum:  Change oxygen saturation probe site  4. Every 4-6 hours:  If on nasal continuous positive airway pressure, respiratory therapy assess nares and determine need for appliance change or resting period.   12/6/2022 1312 by Ahmet Chavez RN  Outcome: Progressing     Problem: Pain  Goal: Verbalizes/displays adequate comfort level or baseline comfort level  12/6/2022 1312 by Bibi Palacios RN  Outcome: Progressing  Flowsheets (Taken 12/6/2022 1312)  Verbalizes/displays adequate comfort level or baseline comfort level: Encourage patient to monitor pain and request assistance     Problem: ABCDS Injury Assessment  Goal: Absence of physical injury  12/6/2022 1312 by Bibi Palacios RN  Outcome: Progressing  Flowsheets  Taken 12/6/2022 1312  Absence of Physical Injury: Implement safety measures based on patient assessment  Taken 12/6/2022 1309  Absence of Physical Injury: Implement safety measures based on patient assessment

## 2022-12-07 LAB
ALBUMIN SERPL-MCNC: 3.1 G/DL (ref 3.4–5)
ANION GAP SERPL CALCULATED.3IONS-SCNC: 12 MMOL/L (ref 3–16)
BUN BLDV-MCNC: 32 MG/DL (ref 7–20)
CALCIUM SERPL-MCNC: 8.3 MG/DL (ref 8.3–10.6)
CHLORIDE BLD-SCNC: 101 MMOL/L (ref 99–110)
CO2: 29 MMOL/L (ref 21–32)
CREAT SERPL-MCNC: 2 MG/DL (ref 0.8–1.3)
GFR SERPL CREATININE-BSD FRML MDRD: 33 ML/MIN/{1.73_M2}
GLUCOSE BLD-MCNC: 112 MG/DL (ref 70–99)
GLUCOSE BLD-MCNC: 121 MG/DL (ref 70–99)
GLUCOSE BLD-MCNC: 128 MG/DL (ref 70–99)
GLUCOSE BLD-MCNC: 138 MG/DL (ref 70–99)
GLUCOSE BLD-MCNC: 142 MG/DL (ref 70–99)
MAGNESIUM: 2 MG/DL (ref 1.8–2.4)
PERFORMED ON: ABNORMAL
PHOSPHORUS: 3.4 MG/DL (ref 2.5–4.9)
POTASSIUM SERPL-SCNC: 4 MMOL/L (ref 3.5–5.1)
SODIUM BLD-SCNC: 142 MMOL/L (ref 136–145)

## 2022-12-07 PROCEDURE — 6360000002 HC RX W HCPCS: Performed by: PHYSICAL MEDICINE & REHABILITATION

## 2022-12-07 PROCEDURE — 97535 SELF CARE MNGMENT TRAINING: CPT

## 2022-12-07 PROCEDURE — 1280000000 HC REHAB R&B

## 2022-12-07 PROCEDURE — 94640 AIRWAY INHALATION TREATMENT: CPT

## 2022-12-07 PROCEDURE — 6370000000 HC RX 637 (ALT 250 FOR IP): Performed by: INTERNAL MEDICINE

## 2022-12-07 PROCEDURE — 2700000000 HC OXYGEN THERAPY PER DAY

## 2022-12-07 PROCEDURE — 83735 ASSAY OF MAGNESIUM: CPT

## 2022-12-07 PROCEDURE — 80069 RENAL FUNCTION PANEL: CPT

## 2022-12-07 PROCEDURE — 6360000002 HC RX W HCPCS: Performed by: INTERNAL MEDICINE

## 2022-12-07 PROCEDURE — 94760 N-INVAS EAR/PLS OXIMETRY 1: CPT

## 2022-12-07 PROCEDURE — 97530 THERAPEUTIC ACTIVITIES: CPT

## 2022-12-07 PROCEDURE — 6370000000 HC RX 637 (ALT 250 FOR IP): Performed by: PHYSICAL MEDICINE & REHABILITATION

## 2022-12-07 PROCEDURE — 36415 COLL VENOUS BLD VENIPUNCTURE: CPT

## 2022-12-07 PROCEDURE — 97116 GAIT TRAINING THERAPY: CPT | Performed by: PHYSICAL THERAPIST

## 2022-12-07 PROCEDURE — 97110 THERAPEUTIC EXERCISES: CPT | Performed by: PHYSICAL THERAPIST

## 2022-12-07 PROCEDURE — 97530 THERAPEUTIC ACTIVITIES: CPT | Performed by: PHYSICAL THERAPIST

## 2022-12-07 RX ORDER — TORSEMIDE 10 MG/1
10 TABLET ORAL DAILY
Qty: 30 TABLET | Refills: 0 | Status: SHIPPED | OUTPATIENT
Start: 2022-12-08

## 2022-12-07 RX ADMIN — TORSEMIDE 10 MG: 20 TABLET ORAL at 07:30

## 2022-12-07 RX ADMIN — ISODIUM CHLORIDE 3 ML: 0.03 SOLUTION RESPIRATORY (INHALATION) at 13:12

## 2022-12-07 RX ADMIN — SENNOSIDES AND DOCUSATE SODIUM 1 TABLET: 50; 8.6 TABLET ORAL at 22:36

## 2022-12-07 RX ADMIN — ISODIUM CHLORIDE 3 ML: 0.03 SOLUTION RESPIRATORY (INHALATION) at 08:08

## 2022-12-07 RX ADMIN — LEVALBUTEROL 1.25 MG: 1.25 SOLUTION, CONCENTRATE RESPIRATORY (INHALATION) at 08:08

## 2022-12-07 RX ADMIN — MICONAZOLE NITRATE: 2 POWDER TOPICAL at 07:35

## 2022-12-07 RX ADMIN — TAMSULOSIN HYDROCHLORIDE 0.4 MG: 0.4 CAPSULE ORAL at 07:30

## 2022-12-07 RX ADMIN — ATORVASTATIN CALCIUM 40 MG: 40 TABLET, FILM COATED ORAL at 07:30

## 2022-12-07 RX ADMIN — EPOETIN ALFA-EPBX 30000 UNITS: 10000 INJECTION, SOLUTION INTRAVENOUS; SUBCUTANEOUS at 15:22

## 2022-12-07 RX ADMIN — HEPARIN SODIUM 5000 UNITS: 5000 INJECTION INTRAVENOUS; SUBCUTANEOUS at 22:36

## 2022-12-07 RX ADMIN — MONTELUKAST 10 MG: 10 TABLET, FILM COATED ORAL at 07:30

## 2022-12-07 RX ADMIN — LEVALBUTEROL 1.25 MG: 1.25 SOLUTION, CONCENTRATE RESPIRATORY (INHALATION) at 19:17

## 2022-12-07 RX ADMIN — ASPIRIN 162 MG: 81 TABLET, COATED ORAL at 07:30

## 2022-12-07 RX ADMIN — POLYETHYLENE GLYCOL 3350 17 G: 17 POWDER, FOR SOLUTION ORAL at 17:12

## 2022-12-07 RX ADMIN — AMIODARONE HYDROCHLORIDE 100 MG: 200 TABLET ORAL at 07:31

## 2022-12-07 RX ADMIN — PANTOPRAZOLE SODIUM 40 MG: 40 TABLET, DELAYED RELEASE ORAL at 07:31

## 2022-12-07 RX ADMIN — HEPARIN SODIUM 5000 UNITS: 5000 INJECTION INTRAVENOUS; SUBCUTANEOUS at 07:31

## 2022-12-07 RX ADMIN — SENNOSIDES AND DOCUSATE SODIUM 1 TABLET: 50; 8.6 TABLET ORAL at 07:30

## 2022-12-07 RX ADMIN — POLYETHYLENE GLYCOL 3350 17 G: 17 POWDER, FOR SOLUTION ORAL at 07:30

## 2022-12-07 RX ADMIN — ISODIUM CHLORIDE 3 ML: 0.03 SOLUTION RESPIRATORY (INHALATION) at 19:17

## 2022-12-07 RX ADMIN — LEVALBUTEROL 1.25 MG: 1.25 SOLUTION, CONCENTRATE RESPIRATORY (INHALATION) at 13:12

## 2022-12-07 RX ADMIN — VERAPAMIL HYDROCHLORIDE 240 MG: 240 TABLET, FILM COATED, EXTENDED RELEASE ORAL at 07:30

## 2022-12-07 RX ADMIN — VERAPAMIL HYDROCHLORIDE 240 MG: 240 TABLET, FILM COATED, EXTENDED RELEASE ORAL at 22:36

## 2022-12-07 ASSESSMENT — PAIN SCALES - GENERAL
PAINLEVEL_OUTOF10: 0
PAINLEVEL_OUTOF10: 0

## 2022-12-07 NOTE — PROGRESS NOTES
Patient admitted to rehab with anasarca. A/Ox4. Transfers with one assist with gait belt with front wheeled walker. Mobility restrictions: WBAT. On general low sodium, low potassium diet, tolerating well. Medications taken whole with fluids. On heparin for DVT prophylaxis. Skin: abdominal panus pink. Oxygen: 2 LPM per n/c. LDA: none. Has been continent of bowel and  of bladder. LBM 12/7/2022. Chair/bed alarms in use and call light in reach. Will monitor for safety.

## 2022-12-07 NOTE — PROGRESS NOTES
Nephrology Progress note  [phone]   [facility]      Reason for Consultation: DEISY / CKD IV    HPI: 79 y.o. male whom we were asked to see for DEISY on CKD. The patient presents back with worsened weakness and worsened edema. Subjective:  -pt seen and examined  -PMSHx and meds reviewed  -family at bedside    ROS: neg chest pain/SOB    Objective:  Blood pressure (!) 148/74, pulse 80, temperature 97.7 °F (36.5 °C), temperature source Oral, resp. rate 18, height 5' 8\" (1.727 m), weight 232 lb 12.9 oz (105.6 kg), SpO2 91 %. Intake/Output Summary (Last 24 hours) at 12/7/2022 1259  Last data filed at 12/7/2022 0949  Gross per 24 hour   Intake 600 ml   Output --   Net 600 ml       Patient Vitals for the past 96 hrs (Last 3 readings):   Weight   12/06/22 2326 232 lb 12.9 oz (105.6 kg)   12/06/22 1223 236 lb 15.9 oz (107.5 kg)   12/06/22 0411 236 lb 15.9 oz (107.5 kg)         General:  NAD, A+Ox3  Chest:  CTAB, no distress  CVS:  RR  Abdominal:  NTND, soft, +BS  Extremities:  1+ pitting edema  Skin:  no rash, warm.     Medications:   epoetin kenny-epbx  30,000 Units SubCUTAneous Q7 Days    tamsulosin  0.4 mg Oral Daily    amiodarone  100 mg Oral Daily    aspirin EC  162 mg Oral Daily    atorvastatin  40 mg Oral Daily    linaclotide  145 mcg Oral QAM AC    montelukast  10 mg Oral Daily    pantoprazole  40 mg Oral Daily    verapamil  240 mg Oral BID    insulin lispro  0-4 Units SubCUTAneous TID WC    insulin lispro  0-4 Units SubCUTAneous Nightly    sodium chloride nebulizer  3 mL Nebulization TID    levalbuterol  1.25 mg Nebulization TID    miconazole   Topical BID    polyethylene glycol  17 g Oral BID    heparin (porcine)  5,000 Units SubCUTAneous BID    sennosides-docusate sodium  1 tablet Oral BID    torsemide  10 mg Oral Daily       Labs:  Renal panel:  Lab Results   Component Value Date/Time     12/07/2022 06:28 AM    K 4.0 12/07/2022 06:28 AM    K 4.2 12/05/2022 06:45 AM    CO2 29 12/07/2022 06:28 AM    BUN 32 (H) 12/07/2022 06:28 AM    CREATININE 2.0 (H) 12/07/2022 06:28 AM    CALCIUM 8.3 12/07/2022 06:28 AM    PHOS 3.4 12/07/2022 06:28 AM    MG 2.00 12/07/2022 06:28 AM     CBC:  Lab Results   Component Value Date/Time    WBC 4.2 12/05/2022 06:45 AM    HGB 7.8 (L) 12/05/2022 06:45 AM    HCT 24.8 (L) 12/05/2022 06:45 AM     12/05/2022 06:45 AM       Assessment/Plan:  Reviewed old records and labs. 1) DEISY on CKD: peak Cr 3.0- baseline Cr 2.0-2.2-diminished perfusion in the setting of CHF   -improved with diuresis   -renal function stable, on oral diuretics .       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

## 2022-12-07 NOTE — PROGRESS NOTES
Occupational Therapy  Facility/Department: [address] [facility]  Rehabilitation Occupational Therapy Daily Treatment Note    AM and PM Sessions:     Discharge Summary: To be seen 1 more time tomorrow for ADL shower session     Date: 22  Patient Name: León Reno       Room: W2L-0983/2870-26  MRN: [id number]  Account: [de-identified]   : 1940  (79 y.o.) Gender: male                    Past Medical History:  has a past medical history of Allergic rhinitis, Asthma, Atrial fibrillation ([facility] [address] Carotid artery stenosis, Chronic kidney disease, COPD (chronic obstructive pulmonary disease) ([facility] [address] CPAP (continuous positive airway pressure) dependence, ESBL (extended spectrum beta-lactamase) producing bacteria infection, Hyperlipidemia, Hypertension, Iron deficiency anemia, Obstructive sleep apnea, and Type II or unspecified type diabetes mellitus without mention of complication, not stated as uncontrolled. Past Surgical History:   has a past surgical history that includes Gallbladder surgery (); Upper gastrointestinal endoscopy (7-2005    7/10/2009); Colonoscopy (7/10/2009); Cataract removal (2012); Pain management procedure (Right, 10/20/2021); Pain management procedure (Left, 2021); and CT BIOPSY BONE MARROW (2022). Restrictions  Restrictions/Precautions: Fall Risk;Contact Precautions  Other position/activity restrictions: 2 L O2;  low sodium/low potassium diet; IV, contact precautions for ESBL in urine  Required Braces or Orthoses?: No    Subjective  Subjective: Pt met BS,seated in recliner, wife present, planned D/C ADL session for shower. Pt ADAMANTLY refused shower today, though had agreed to it yesterday. He was fully dressed. Pt c/o annoying pain in R shoulder w/ movement /10, & slight at rest. Pt reports he slept OK. Pt also refused to don gown for contact precautions. Recliner> w/c mod indep w/ RW. O2 in tow. Taken to dept via w/c. OT followed precautions, was gowned/gloved & all surfaces pt touched were sanitized. Wife observed session for family education, questions answered. Restrictions/Precautions: Fall Risk;Contact Precautions             Objective     Cognition  Safety Judgement: Decreased awareness of need for safety  Problem Solving: Decreased awareness of errors;Assistance required to identify errors made  Insights: Decreased awareness of deficits  Cognition Comment: Decreased insight into safety/energy conservation. Often ignores recommendations made for safety. Orientation  Overall Orientation Status: Within Functional Limits  Orientation Level: Oriented X4         ADL  Toilet Transfers  Technique:  (amb RW)  Equipment: Standard toilet  Additional Factors: Without handrails  Assistance Level: Modified independent  Skilled Clinical Factors: RW>< comfort ht toilet with no grab bars, says uses counter for support at home(is in front) but demo able to get up w/o rails, he rocks forward, placed R hand on counter & L on  RW, (multiple times does not push up with hands for transfers after ed, does have safety concerns, but is mod indep  Tub/Shower Transfers  Type: Tub  Transfer From: Rolling walker  Transfer To: Tub transfer bench  Assistance Level: Modified independent  Skilled Clinical Factors: RW>< TTB in DRY tub mod indep    Meal Prep  Meal Prep Level: Walker (RW)  Meal Prep Level of Assistance: Modified independent  Meal Preparation: Pt retrieved items, made buttered toast & served to table mod indep with safety concerns, He used basket to carry. Pt standing 5 minutes for task & O2 sat was 90% right after he sat down on 2 liters. Light Housekeeping  Light Housekeeping Level: Walker (RW)  Light Housekeeping Level of Assistance: Modified independent;Supervision  Light Housekeeping: Pt retrieved clothes from closet & carried to bed w/RW mod indep w/safety concerns, was ed in walker safety & used basket to carry, OT rec to get basket for home use. Discussed where to get.  O2 sat was 92% after on 2 liters. Functional Mobility  Device: Rolling walker  Activity: Transport items; Retrieve items; To/From bathroom  Assistance Level: Modified independent  Skilled Clinical Factors: Pt amb w/ RW in dept , kitchen, ADL apt mod indep, he managed O2 line w/increased time but safety concerns, & some agitation. Bed Mobility  Overall Assistance Level: Modified Independent  Roll Left  Assistance Level: Independent  Roll Right  Assistance Level: Independent  Supine to Sit  Assistance Level: Independent  Scooting  Assistance Level: Independent  Transfers  Surface: To bed;Standard toilet; To chair without arms;From chair without arms; Wheelchair;From bed  Device: Walker (RW)  Sit to Stand  Assistance Level: Independent; Modified independent;Dependent  Stand to Sit  Assistance Level: Modified independent  Bed To/From Chair  Assistance Level: Modified independent  Skilled Clinical Factors: Does NOT follow hand placlement cues, safety concerns, he managed O2 line >< kitchen chair w/o arms, w/c, bed 2x & recliner mod indep, safety concerns         Assessment  Assessment  Assessment: Pt completed IADL tasks of light homemaking and food prep mod indep w/ RW using basket to carry & demo able to manage O2 line, but does have safety concerns. Wife observed tx & verbalized understanding of walker safety recs. Pt has TTB in tub/support by toilet & pt demo ADL transfers mod indep, but w/ safety concerns. Pt continues to be below baseline, plan to cont tx per POC. Refused D/C ADL session for shower but agreed to do tomorrow AM.  Activity Tolerance: Patient limited by fatigue;Patient limited by endurance  Discharge Recommendations: Home with assist PRN;Home with Home health OT;S Level 1  Factors Affecting Discharge: wife has memory problems  OT Equipment Recommendations  Other: possibly sock aid, walker basket  Safety Devices  Type of devices: Left in chair;Gait belt;Patient at risk for falls;Call light within reach; Chair alarm in place;Nurse notified (returend back to room end of session. on 2L O2 throughout session.)    Patient Education  Education  Education Given To: Patient  Education Provided: Plan of Care;Precautions; Safety; Mobility Training;Transfer Training;Energy Conservation;Equipment; Fall Prevention Strategies; ADL Function;Home Exercise Program;Role of Therapy;IADL Function;DME/Home Modifications; Family Education  Education Method: Demonstration;Verbal  Education Outcome: Verbalized understanding;Continued education needed    Plan  Occupational Therapy Plan  Times Per Week: 5-6  Times Per Day: Twice a day  Days Per Week: 5 Days  Hours Per Day: 1.5 hours  Specific Instructions for Next Treatment: x 7-10 days from eval 12/1/22  Current Treatment Recommendations: Balance training;Functional mobility training; Endurance training;Strengthening; Safety education & training;Self-Care / ADL;Equipment evaluation, education, & procurement;Patient/Caregiver education & training;ROM; Home management training    Second Session:    S: Pt met ind ept agreeable to tx. He reported his R shoulder pain from AM about gone, but know his L shoulder is a painful. Did not rate. Refusing to wear gown for contact prec. O: Pt stood for almost 5 minutes to complete pipe tree mod difficult design, 1 error. Pt to car simulator via w/c & amb ~ 20'>< car simulator drivers side >< mock car mod indep & he managed O2 line. O2 92% appeared SOB. All items/surfaces sanitized D/T precautions   To gift shop via w/c. Pt amb w/RW around crowded area in store shopping for list of 5 items he verbally repeated at start. Pt able to navigate w/ RW to locate 4/5 items & could not recall the 5th item. Had to go sit in down as becoming SOB. O2 sat was 90% After ~ 1 minute seated rest. Back to pt's room via w/c. Pt amb to recliner w/ RW. Needs in reach. A. pt ready for d/c home tomorrow with wife after ADL session in AM. Expect pt will have met all goals.  Rec home OT post D/C. Safety Device - Type of devices:  []  All fall risk precautions in place [] Bed alarm in place  [x] Call light within reach [x] Chair alarm in place [] Positioning belt [x] Gait belt [] Patient at risk for falls [] Left in bed [x] Left in chair [] Telesitter in use [] Sitter present [x] Nurse notified []  None      Goals  Patient Goals   Patient goals : Pt stated his goal is to do everything he did at home, walk indep, shower, dress, go grocery shopping . Above goals will work toward this goal.  Short Term Goals  Time Frame for Short Term Goals: 7-10 days from eval 12/1/22  Short Term Goal 1: Pt will bathe mod indep w/shower chair/long sponge  Short Term Goal 2: Pt will dress mod indep w/ AE if needed  Short Term Goal 3: Pt will toilet mod indep  Short Term Goal 4: Pt will perform functional ADL transfers, ADLmobility w/ LRAD mod indep. MET  Short Term Goal 5: Pt will particpate in ther ex/UE HEP to increase activity tolerance to stand for 5+ minutes during ADL/mob tasks keeping O2 sats in safe range.  MET  Short Term Goal 6: Pt will improve walker safety/O2 line management to complete light IADL tasks of food prep/home management mod indep. with LRAD MET  Long Term Goals  Time Frame for Long Term Goals : STGs=LTGs                 Therapy Time   Individual Concurrent Group Co-treatment   Time In 0850         Time Out 0950         Minutes 60         Timed Code Treatment Minutes: 60 Minutes  Therapy Time     Individual Co-treatment   Time In 1430     Time Out 1515     Minutes 41 [facility] Yolonda Homans, OT  Electronically signed by [facility], OTR/L  License # [id number]

## 2022-12-07 NOTE — DISCHARGE INSTR - COC
Continuity of Care Form    Patient Name: Leesa Ramirez   :  1940  MRN:  [id number]    Admit date:  2022  Discharge date:  2022    Code Status Order: Full Code   Advance Directives:     Admitting Physician:  Romeo Barraza MD  PCP: Lissette Neff MD    Discharging Nurse: DAQUAN Disla RN, [address] Unit/Room#: R6E-6849/6036-60  Discharging Unit Phone Number: [phone]    Emergency Contact:   Extended Emergency Contact Information  Primary Emergency Contact: Roselia Suresh  Address: [address] [place] [address] Phone: [phone]  Work Phone: [phone]  Mobile Phone: [phone]  Relation: Spouse  Secondary Emergency Contact: Josephine Suresh  Mobile Phone: [phone]  Relation: Child   needed?  No    Past Surgical History:  Past Surgical History:   Procedure Laterality Date    CATARACT REMOVAL  2012    bilateral    COLONOSCOPY  7/10/2009    diverticulosis    hemorrhoids    CT BONE MARROW BIOPSY  2022    CT BONE MARROW BIOPSY 2022 [facility] CT    GALLBLADDER SURGERY  1981    PAIN MANAGEMENT PROCEDURE Right 10/20/2021    COOLIEF RADIOFREQUENCY ABLATION - RIGHT KNEE performed by Alphonzo Bernheim, MD at [address] Left 2021    [address] - LEFT KNEE performed by Alphonzo Bernheim, MD at [facility]  -2005    7/10/2009    normal       Immunization History:   Immunization History   Administered Date(s) Administered    COVID-19, PFIZER GRAY top, DO NOT Dilute, (age 15 y+), IM, 30 mcg/0.3 mL 2022    COVID-19, PFIZER PURPLE top, DILUTE for use, (age 15 y+), 30mcg/0.3mL 2021, 2021, 10/27/2021    Influenza 10/19/2011, 10/09/2013    Influenza A ([id number]) Vaccine PF IM 2009    Influenza Vaccine, unspecified formulation 2009, 10/25/2010, 10/19/2011, 10/09/2013, 10/21/2016, 09/01/2017    Influenza Virus Vaccine 09/15/2012, 10/01/2014, 11/07/2014, 09/11/2015    Influenza Whole 10/25/2010    Influenza, AFLURIA (age 1 yrs+), FLUZONE, (age 10 mo+), MDV, 0.5mL 09/15/2012, 10/01/2014    Influenza, FLUAD, (age 72 y+), Adjuvanted, 0.5mL 09/27/2021, 09/14/2022    Influenza, FLUARIX, FLULAVAL, FLUZONE (age 10 mo+) AND AFLURIA, (age 1 y+), PF, 0.5mL 10/21/2016, 09/01/2017    Influenza, FLUZONE (age 72 y+), High Dose, 0.7mL 08/19/2020    Influenza, High Dose (Fluzone 65 yrs and older) 10/11/2018, 09/05/2019, 08/19/2020    PPD Test 06/18/2005    Pneumococcal Conjugate 13-valent ([id number]) 12/01/2014, 01/02/2015    Pneumococcal Polysaccharide (Peoslcmoj22) 10/19/2011, 01/01/2014    Td, unspecified formulation 11/03/2010    Tdap (Boostrix, Adacel) 11/03/2010    Zoster Live (Zostavax) 07/01/2012, 09/01/2012       Active Problems:  Patient Active Problem List   Diagnosis Code    JOANN (obstructive sleep apnea) G47.33    Chronic GERD K21.9    Type 2 diabetes mellitus with stage 4 chronic kidney disease, with long-term current use of insulin ([facility]) E11.22, N18.4, Z79.4    Microcytic anemia D50.9    Microalbuminuria R80.9    Hyperlipidemia E78.5    Paroxysmal atrial fibrillation ([facility]) I48.0    Edema R60.9    Bronchitis J40    Carotid artery stenosis without cerebral infarction I65.29    Leg swelling M79.89    Chronic venous insufficiency I87.2    Stage 3b chronic kidney disease ([facility]) N18.32    Essential hypertension I10    B12 deficiency E53.8    Pulmonary nodule R91.1    Moderate COPD (chronic obstructive pulmonary disease) ([facility] [address] J44.9    DEISY (acute kidney injury) ([facility] [address] N17.9    Slow transit constipation K59.01    Pancreatic mass K86.89    Obesity, Class II, BMI 35-39.9 E66.9    Generalized weakness R53.1    Anasarca R60.1    Worsening renal function N28.9       Isolation/Infection:   Isolation            Contact          Patient Infection Status       Infection Onset Added Last Indicated Last Indicated By Review Planned Expiration Resolved Resolved By    ESBL (Extended Spectrum Beta Lactamase) 12/26/18 12/28/18 12/26/18 Urine Culture        Resolved    COVID-19 (Rule Out) 11/15/22 11/15/22 11/15/22 COVID-19, Rapid (Ordered)   11/15/22 Rule-Out Test Resulted    COVID-19 (Rule Out) 10/30/22 10/30/22 10/30/22 COVID-19, Rapid (Ordered)   10/30/22 Rule-Out Test Resulted            Nurse Assessment:  Last Vital Signs: BP (!) 148/74   Pulse 80   Temp 97.7 °F (36.5 °C) (Oral)   Resp 18   Ht 5' 8\" (1.727 m)   Wt 232 lb 12.9 oz (105.6 kg)   SpO2 91%   BMI 35.40 kg/m²     Last documented pain score (0-10 scale): Pain Level: 0  Last Weight:   Wt Readings from Last 1 Encounters:   12/06/22 232 lb 12.9 oz (105.6 kg)     Mental Status:  oriented and alert    IV Access:  - None    Nursing Mobility/ADLs:  Walking   Assisted  Transfer  Assisted  Bathing  Independent  Dressing  Independent  Toileting  Independent  Feeding  Independent  Med Admin  Independent  Med Delivery   whole    Wound Care Documentation and Therapy:        Elimination:  Continence: Bowel: Yes  Bladder: Yes  Urinary Catheter: None   Colostomy/Ileostomy/Ileal Conduit: No       Date of Last BM: 12/7/2022    Intake/Output Summary (Last 24 hours) at 12/7/2022 1208  Last data filed at 12/7/2022 0949  Gross per 24 hour   Intake 720 ml   Output --   Net 720 ml     I/O last 3 completed shifts: In: 1000 [P.O.:1000]  Out: -     Safety Concerns: At Risk for Falls    Impairments/Disabilities:      Hearing    Nutrition Therapy:  Current Nutrition Therapy:   - Oral Diet:  General    Routes of Feeding: Oral  Liquids:  Thin Liquids  Daily Fluid Restriction: no  Last Modified Barium Swallow with Video (Video Swallowing Test): not done    Treatments at the Time of Hospital Discharge:   Respiratory Treatments:   Oxygen Therapy:    Ventilator:    - No ventilator support    Rehab Therapies:   Weight Bearing Status/Restrictions: No weight bearing restrictions  Other Medical Equipment (for information only, NOT a DME order):  walker  Other Treatments:     Patient's personal belongings (please select all that are sent with patient):  Hearing Aides bilateral    RN SIGNATURE:  Electronically signed by Brittaney Harris RN on 12/8/22 at 6:10 AM EST    CASE MANAGEMENT/SOCIAL WORK SECTION    Inpatient Status Date: 11/30/22    Readmission Risk Assessment Score:  Readmission Risk              Risk of Unplanned Readmission:  34           Discharging to Facility/ Agency   Name: [facility]  Address:  [place] [address]  Phone:  [phone]  Fax:  [phone]     Dialysis Facility (if applicable)   Name:  Address:  Dialysis Schedule:  Phone:  Fax:    / signature: Electronically signed by CARLIE Jefferson on 12/7/22 at 3:22 PM EST    PHYSICIAN SECTION    Prognosis: Good    Condition at Discharge: Stable    Rehab Potential (if transferring to Rehab): Good    Recommended Labs or Other Treatments After Discharge:   Home care PT, OT, RN  Follow-up with PCP and Nephrology    Physician Certification: I certify the above information and transfer of Ginny Luna  is necessary for the continuing treatment of the diagnosis listed and that he requires Home Care for less 30 days.      Update Admission H&P: No change in H&P    PHYSICIAN SIGNATURE:  Electronically signed by Lala Goetz MD on 12/7/22 at 12:09 PM EST

## 2022-12-07 NOTE — PROGRESS NOTES
Sodium 142 136 - 145 mmol/L    Potassium 4.0 3.5 - 5.1 mmol/L    Chloride 101 99 - 110 mmol/L    CO2 29 21 - 32 mmol/L    Anion Gap 12 3 - 16    Glucose 112 (H) 70 - 99 mg/dL    BUN 32 (H) 7 - 20 mg/dL    Creatinine 2.0 (H) 0.8 - 1.3 mg/dL    Est, Glom Filt Rate 33 (A) >60    Calcium 8.3 8.3 - 10.6 mg/dL    Phosphorus 3.4 2.5 - 4.9 mg/dL    Albumin 3.1 (L) 3.4 - 5.0 g/dL   Magnesium    Collection Time: 12/07/22  6:28 AM   Result Value Ref Range    Magnesium 2.00 1.80 - 2.40 mg/dL   POCT Glucose    Collection Time: 12/07/22  7:04 AM   Result Value Ref Range    POC Glucose 128 (H) 70 - 99 mg/dl    Performed on ACCU-CHEK        Therapy progress:  Physical therapy:  Bed Mobility:  Overall Assistance Level: Supervision  Additional Factors: Increased time to complete, Head of bed flat, Without handrails  Sit>supine:  Assistance Level: Supervision  Supine>sit:  Assistance Level: Supervision  Skilled Clinical Factors: use of momentum  Transfers:  Surface: To chair with arms, From chair with arms, To chair without arms, From chair without arms  Additional Factors: Increased time to complete  Device: Walker  Sit>stand:  Assistance Level: Supervision  Skilled Clinical Factors: v/c for hand placement and sequencing. Stand>sit:  Assistance Level: Supervision  Skilled Clinical Factors: v/c for hand placement and safety  Bed<>chair  Technique: Stand step  Assistance Level: Contact guard assist, Stand by assist  Skilled Clinical Factors: with wheeled walker  Stand Pivot:     Lateral transfer:     Car transfer:  Assistance Level: Contact guard assist  Skilled Clinical Factors: education on safe transfer. v/c required for sequencing and hand placement. increased time and effort to complete  Ambulation:  Surface: Level surface  Device: Rolling walker  Distance: 48' with two turns, 36' (w/c <-> curb step)  Activity: Within Unit  Activity Comments: pt becomes SOB w ambulating.  Patient becomes visibly SOB and requires seated rest break after all activities. Patient with noted tremors in UEs and legs which seems to decrease with rest. limits distance due to fatigue at this time. Additional Factors: Increased time to complete (pt managing O2 tank throughout)  Assistance Level: Supervision  Gait Deviations: Slow seamus, Wide base of support  Skilled Clinical Factors: limited endurance on 2L oxygen, HR 84 bpm 88 O2 sats on 2L after activity increased to 93%, noted tremors. Increased time to recover after each functional mobility task  Stairs:  Number of Stairs: 0- N/A  Curb:  Curb Height: 6''  Device: Rolling walker  Number of Curbs: 1  Additional Factors: Verbal cues, Hand placement cues, Increased time to complete  Assistance Level: Stand by assist, Contact guard assist  Skilled Clinical Factors: Patient able to perform curb step with RW support increased effort and time, shaking but without LOB at this time. spo2% 89% at completion on 2 L  Wheelchair:     Assessment:  Assessment: Mr. Rebecca Denver continues to be on 2L, desaturates with most activities on 2L to 88-89% today with household distances. He was able to transfer and ambulate household distances with Supervision, but his endurance is limited and he has shaking of bilateral LE and UE tremors baseline for patient. Distance ambulate and time standing is limited by SOB and fatigue in bilateral LE. Patient able to perform curb step SBA with light assist with wheeled walker when descend cues needed for sequencing. Patient continues to be below baseline and will benefit from continued skilled therapy for strengthening, gait training, endurance training, and functional mobility training to allow for safe return home. Patient would like to go home on Wednesday but feel could benefit till EOW. Recommend home PT at D/C.   Activity Tolerance: Patient limited by fatigue, Patient limited by endurance  Discharge Recommendations: Home with Home health PT, Home with assist PRN, Patient would benefit from continued therapy after discharge  Factors Affecting Discharge: lives in house w spouse w 1+1 stairs to enter      Occupational therapy:   Feeding     Grooming/Oral Hygiene  Assistance Level: Set-up  Skilled Clinical Factors: set-up seated in recliner as out of time  UE Bathing  Assistance Level: Set-up  Skilled Clinical Factors: seated from shower chair. LE Bathing  Equipment Provided: Long-handled sponge  Assistance Level: Stand by assist  Skilled Clinical Factors: seated to wash LE's. Used long sponge for feet. Ed in tech to dry. Pt in stance holding grab bars bathed buttocks, niko areas. Pt states he dons slide on slippers after showering at home, pt amb to his bed, sat and further dry feet, dons socks. UE Dressing  Assistance Level: Set-up  Skilled Clinical Factors: retrieved own clothes from bag seated, then the rest from RW CGA/SBA at closet, cues for O2 line. Pt doffed/donned pullover shirt while seated, he managed O2 line  LE Dressing  Assistance Level: Stand by assist, Contact guard assist, Verbal cues, Set-up  Skilled Clinical Factors: Donned underpants/pants over feet per self, donned suspenders himself, pulled up at [address] [place] [address] in stance SBA  Putting On/Taking Off Footwear  Equipment Provided: Sock aid  Assistance Level: Minimal assistance, Set-up, Verbal cues  Skilled Clinical Factors: He sat on EOB & donned own compression hose himself. Min increased time. He donned shoes, props feet on chair to tie. Toileting  Assistance Level: Stand by assist  Skilled Clinical Factors: Own clothes down SBA at RW. voided urine, BM. Removed underwear for upcoming shower. Assessment:  Assessment: Today pt showered with SBA w/ heavy use of grab bar in stance. Stated he cannot put grab bar up at home\"tried\", used long sponge for feet. Pt dressed seated EOB w/ increased time/rests with   Pt toileted SBA but did not have on clothes to pull up as going to shower.  Pt performed ADL mob/transfers with RW in room CGA to SBA, cues for O2 line management + safety cues. O2 sat 89% after shower on 2 L O2, recovered to 91% w/ deep breathing. Pt required increased time & rests to complete ADL tasks. Pt continues to be below his baseline & will benefit from cont'd OT treatment on ARU. Plan to cont tx per POC. Activity Tolerance: Patient limited by fatigue, Patient limited by endurance  Discharge Recommendations: Home with assist PRN, Home with Home health OT, S Level 1  Factors Affecting Discharge: wife has memory problems    Speech therapy:            PT  Position Activity Restriction  Other position/activity restrictions: 2 L O2;  low sodium/low potassium diet; IV, contact precautions for ESBL in urine  Objective     Sit to Stand: Contact guard assistance, Minimal Assistance (CGA from recliner, min A from bed)  Stand to Sit: Contact guard assistance, Stand by assistance  Bed to Chair: Contact guard assistance  Device: Rolling Walker  Other Apparatus: O2 (2L)  Assistance: Contact guard assistance  Distance: 5', 25'  OT  PT Equipment Recommendations  Equipment Needed: No  Other: defer to next level of care  Toilet - Technique: Ambulating  Equipment Used: Grab bars  Toilet Transfers Comments: RW>< toilet w/ Bilateral grab bars, cues/A for O2 line  Assessment        SLP          Body mass index is 35.4 kg/m².     Rehabilitation Diagnosis:   16.0, Debility (non-cardiac, non-pulmonary        Assessment and Plan:     Impairments:generalized weakness, decreased endurance, balance     Debility  -PT/OT     Anasarca, acute on chronic dCHF  -torsemide per Nephrology  -Daily wt stable ~232 lbs, edema steadily improving on exam     Obstructive Uropathy  -Salazar initially placed, now voiding.   -Flomax     DEISY on CKD  -Nephrology following, appreciate input  -Avoid nephrotoxins, renally dose meds  -Cr now stable ~2     HCAP, hemoptysis  -meropenem course completed    Acute (? Chronic) hypoxic respiratory failure, COPD  -As evidenced by O2 saturation <90% on room air  -Supplemental O2, weaned as tolerated -- currently still requiring 2L     Anemia  -epoetin   -Hgb now gradually trending up, >7     PAF  -amiodarone     HTN  -torsemide, verapamil     DM2  -ISS only  -was on glargine 10 units qhs at home     COPD  -montelukast, levalbuterol     JOANN  -Home CPAP     Bladder   -Retention with obstructive uropathy as above  -Monitor PVRs, straight cath prn >300cc  -Flomax     Bowel   -Constipation   -Linzess, miralax, senna+colace BID, PRN bisacodyl po and supp. Safety   -fall precautions     Pain control  -acetaminophen prn     PPx  -DVT: heparin  -GI: pantoprazole    Rehab Progress: Making progress. Working on strength, balance, endurance. Anticipated Dispo: home with wife  Services: HH PT, OT, RN  DME: sock aide  ELOS: 12/8      Tito Luque MD 12/7/2022, 9:56 AM

## 2022-12-07 NOTE — PROGRESS NOTES
Physical Therapy  Facility/Department: [address] [facility]  Rehabilitation Physical Therapy Treatment Note  Discharge Summary  AM and PM    NAME: Rae Dorantes  : 1940 (81 y.o.)  MRN: [id number]  CODE STATUS: Full Code    Date of Service: 22       Restrictions:  Restrictions/Precautions: Fall Risk;Contact Precautions  Position Activity Restriction  Other position/activity restrictions: 2 L O2;  low sodium/low potassium diet; IV, contact precautions for ESBL in urine     SUBJECTIVE  Subjective  Subjective: Patient looking forward to D/C home , acheness R shoulder but not bad. Patient agreeable to therapy  Pain: acheness R shoulder          OBJECTIVE  Cognition  Safety Judgement: Decreased awareness of need for safety  Problem Solving: Decreased awareness of errors;Assistance required to identify errors made  Insights: Decreased awareness of deficits  Cognition Comment: Often ignores recommendations made for safety. Orientation  Overall Orientation Status: Within Functional Limits  Orientation Level: Oriented X4    Functional Mobility  Bed Mobility  Overall Assistance Level:  (patient refused to perform already did with OT)  Balance  Sitting Balance: Modified independent   Standing Balance: Modified independent   Transfers  Surface: To chair with arms;From chair with arms; To chair without arms;From chair without arms  Additional Factors: Increased time to complete  Device: Walker  Sit to Stand  Assistance Level: Modified independent  Skilled Clinical Factors: v/c for hand placement and sequencing.   Stand to Sit  Assistance Level: Modified independent  Skilled Clinical Factors: v/c for hand placement and safety  Bed To/From Chair  Technique: Stand step  Assistance Level: Modified independent  Skilled Clinical Factors: with wheeled walker- Patient usually reaches back with only one hand  Car Transfer  Assistance Level: Modified independent  Skilled Clinical Factors: Patient unsafe with hand placement but able to perform without assistance      Environmental Mobility  Ambulation  Surface: Level surface; Carpet  Device: Rolling walker  Distance: 48' with two turns, [address] x 2 with several turns( 150' in PM on carpet, over doorsills with MI- O2 sats 87%, returned to 95% after few minutes rest on 2L oxygen)  Activity: Within Unit  Activity Comments: pt becomes SOB w ambulating. Patient becomes visibly SOB and requires seated rest break after all activities. Patient with noted tremors in UEs and legs which seems to decrease with rest.  Additional Factors: Increased time to complete (pt managing O2 tank throughout)  Assistance Level: Modified independent  Gait Deviations: Slow seamus; Wide base of support  Skilled Clinical Factors: limited endurance on 2L oxygen, HR 70 bpm 90 O2 sats on 2L after activity baseline 97% and HR 70, noted tremors. Increased time to recover after each functional mobility task  Stairs  Number of Stairs: 0- N/A  Curb  Curb Height: 6''  Device: Rolling walker  Number of Curbs: 1  Additional Factors: Verbal cues; Hand placement cues; Increased time to complete  Assistance Level: Stand by assist  Skilled Clinical Factors: Patient able to perform curb step with RW support increased effort and time, shaking but without LOB at this time. 90% at completion on 2 L, wife present aware to watch oxygen line      ASSESSMENT/PROGRESS TOWARDS GOALS       Assessment  Assessment: Mr. Kelsie Weiner continues to be on 2L, desaturates with most activities on 2L to 90% today with household distances. During PM session , ambulated 150' with MI with 2L O2 with O2 sats decreased to 87% after ambulation. He was able to transfer and ambulate household distances with MI, but his endurance is limited and he has shaking of bilateral LE and UE tremors baseline for patient. Patient needs cues for hand placement with many attempts to educate, reaches back with one hand only.  Patient with improved awareness of oxygen tubing with functional mobility. Patient able to perform curb step SBA with light assist with no assist and cues to slow down. Patient has met 3/4 goals at this time. Patient continues to be below baseline and will benefit from continued skilled therapy for strengthening, gait training, endurance training, and functional mobility training to allow for safe return home. Patient to be D/C on Thursday , 12/7, with home PT. Activity Tolerance: Patient limited by fatigue;Patient limited by endurance  Discharge Recommendations: Home with Home health PT;Home with assist PRN;Patient would benefit from continued therapy after discharge  Factors Affecting Discharge: lives in house w spouse w 1+1 stairs to enter  PT Equipment Recommendations  Equipment Needed: No    Goals  Patient Goals   Patient Goals : to be able to walk again  Short Term Goals  Time Frame for Short Term Goals: 7-10 days  Short Term Goal 1: Bed mobility modif I  Short Term Goal 2: Transfers sit <> stand with modif I- met 12/7  Short Term Goal 3: Ambulate with walker 150 feet with modif I- household distances in AM , met in PM(desat to 87% from 95% on 2L) 12/7  Short Term Goal 4: pt complete 2 curb steps w use of handrail modif I- not met recommend SBA  Long Term Goals  Time Frame for Long Term Goals : STG=LTG    PLAN OF CARE/SAFETY  Physcial Therapy Plan  General Plan:  minutes of therapy at least 5 out of 7 days a week  Days Per Week: 5 Days  Hours Per Day: 1.5 hours  Therapy Duration: 4-7 Days  Specific Instructions for Next Treatment: HEP, attempt community distances if able, review curb step  Current Treatment Recommendations: Strengthening;Balance training;Functional mobility training;Transfer training;Gait training; Therapeutic activities; Patient/Caregiver education & training; Safety education & training; Endurance training;Home exercise program;Equipment evaluation, education, & procurement;ADL/Self-care training  Safety Devices  Type of Devices:  All fall risk precautions in place;Gait belt;Patient at risk for falls; Left in chair (positioned in wheelchair for safety, return to room with transportation)  Restraints  Restraints Initially in Place: No    EDUCATION  Education  Education Given To: Patient; Family  Education Provided: Plan of Care; Mobility Training;Transfer Training; Safety  Education Provided Comments: importance of O2 levels, benefits of PT and mobility  Education Method: Demonstration;Verbal  Education Outcome: Continued education needed;Verbalized understanding  Second session  S/ patient agreeable to therapy with no pain, doesnot feel need to review HEP  O/ sit to stand with MI  Patient ambulated and bolded above community distances. Patient given written HEP and reviewed- marches x 20, AP x 15, LAQ x 15, hip abduction x 15 with green theraband, knee flexion x 15 B LEs. Sit to stands x 5  Patient able to  object from floor with MI using reacher. Patient to OT session  Second Assessment- Patient does need cues for hand placement and seems to disregard the education. Patient MI with transfers and gait.     Therapy Time   Individual Concurrent Group Co-treatment   Time In 1030         Time Out 1115         Minutes 45           Timed Code Treatment Minutes: 39 Minutes   Second Session Therapy Time     Individual Co-treatment   Time In 0501     Time Out 1430     Minutes [address] PT, 12/07/22 at 2:33 PM

## 2022-12-07 NOTE — PLAN OF CARE
Problem: Discharge Planning  Goal: Discharge to home or other facility with appropriate resources  Outcome: Progressing  Flowsheets  Taken 12/7/2022 0917 by Rick Browning RN  Discharge to home or other facility with appropriate resources:   Identify barriers to discharge with patient and caregiver   Identify discharge learning needs (meds, wound care, etc)  Taken 12/6/2022 2100 by Bharath Varela RN  Discharge to home or other facility with appropriate resources: Identify discharge learning needs (meds, wound care, etc)     Problem: Safety - Adult  Goal: Free from fall injury  Outcome: Progressing  Flowsheets (Taken 12/7/2022 0917)  Free From Fall Injury:   Instruct family/caregiver on patient safety   Based on caregiver fall risk screen, instruct family/caregiver to ask for assistance with transferring infant if caregiver noted to have fall risk factors     Problem: Chronic Conditions and Co-morbidities  Goal: Patient's chronic conditions and co-morbidity symptoms are monitored and maintained or improved  Outcome: Progressing  Flowsheets  Taken 12/7/2022 0917 by Kade Oropeza 34 - Patient's Chronic Conditions and Co-Morbidity Symptoms are Monitored and Maintained or Improved:   Monitor and assess patient's chronic conditions and comorbid symptoms for stability, deterioration, or improvement   Collaborate with multidisciplinary team to address chronic and comorbid conditions and prevent exacerbation or deterioration   Update acute care plan with appropriate goals if chronic or comorbid symptoms are exacerbated and prevent overall improvement and discharge  Taken 12/6/2022 2100 by Bharath Varela RN  Care Plan - Patient's Chronic Conditions and Co-Morbidity Symptoms are Monitored and Maintained or Improved: Monitor and assess patient's chronic conditions and comorbid symptoms for stability, deterioration, or improvement     Problem: Skin/Tissue Integrity  Goal: Absence of new skin breakdown  Description: 1. Monitor for areas of redness and/or skin breakdown  2. Assess vascular access sites hourly  3. Every 4-6 hours minimum:  Change oxygen saturation probe site  4. Every 4-6 hours:  If on nasal continuous positive airway pressure, respiratory therapy assess nares and determine need for appliance change or resting period.   Outcome: Progressing     Problem: Pain  Goal: Verbalizes/displays adequate comfort level or baseline comfort level  Outcome: Progressing  Flowsheets (Taken 12/7/2022 0917)  Verbalizes/displays adequate comfort level or baseline comfort level: Encourage patient to monitor pain and request assistance     Problem: ABCDS Injury Assessment  Goal: Absence of physical injury  Outcome: Progressing  Flowsheets  Taken 12/7/2022 0917  Absence of Physical Injury: Implement safety measures based on patient assessment  Taken 12/7/2022 0915  Absence of Physical Injury: Implement safety measures based on patient assessment

## 2022-12-07 NOTE — CONSULTS
Nutrition Education    Heart Failure Diet Education:    Per hospital protocol or consult, patient being seen for Heart Failure diet guidelines. Learners: [x]Patient [x]Family []Caregiver [] Other: ______________  Methods: [x]Verbal Education  [x]Handouts  []Teachback     Patient's Lifestyle Questions:   Is HF a new Diagnosis? []Yes [x]No     Has patient had prior education? []Yes [x]No    Who does Grocery shopping and cooking? Patient likes to cook, both him and wife grocery shop    Frequency of eating out? Not very often    Typical Eating Habits: likes a variety of foods      Instructed the Patient on:   [x] High/Low Sodium foods    [x] Moderation/portion control  [] Eating out  [x] Fluid Restriction    [x] Label reading  [] Carb Counting   [x] Other: High potassium foods to monitor portions on and be aware of       Educational Materials Provided:   [x] Nutrition Care Manual (NCM) Heart Failure Nutrition Therapy   [] NCM Sodium (Salt) Content of Foods  [] NCM Sodium Free Flavoring Tips    [] Heart Healthy Eating Label Reading Tips   [] Healthy Eating when Eating Out  [] Controlling Your Fluids   [] Fast Food Guide (from [employer])  [] NCM Carbohydrate Counting for People with Diabetes   [] Managing Your Diabetes Plate Method     -Diet Recommendations: 2000 mg Sodium/day, 64 oz Fluids/day         Monitoring/Evaluation:   -Barriers: multiple co morbid conditions  -Evaluation of education: Indicates understanding.  -Expected compliance: excellent.       Total time involved in patient education: 30 minutes        Electronically signed by Zoey Mathews RD, LD on 12/7/2022 at [address] [place] [address]  Contact Number: [phone]

## 2022-12-08 VITALS
RESPIRATION RATE: 16 BRPM | WEIGHT: 232.81 LBS | BODY MASS INDEX: 35.28 KG/M2 | SYSTOLIC BLOOD PRESSURE: 161 MMHG | OXYGEN SATURATION: 91 % | HEIGHT: 68 IN | TEMPERATURE: 97.9 F | DIASTOLIC BLOOD PRESSURE: 91 MMHG | HEART RATE: 71 BPM

## 2022-12-08 LAB
ALBUMIN SERPL-MCNC: 3.3 G/DL (ref 3.4–5)
ANION GAP SERPL CALCULATED.3IONS-SCNC: 11 MMOL/L (ref 3–16)
BASOPHILS ABSOLUTE: 0 K/UL (ref 0–0.2)
BASOPHILS RELATIVE PERCENT: 0.7 %
BUN BLDV-MCNC: 28 MG/DL (ref 7–20)
CALCIUM SERPL-MCNC: 8.5 MG/DL (ref 8.3–10.6)
CHLORIDE BLD-SCNC: 100 MMOL/L (ref 99–110)
CO2: 29 MMOL/L (ref 21–32)
CREAT SERPL-MCNC: 1.9 MG/DL (ref 0.8–1.3)
EOSINOPHILS ABSOLUTE: 0.1 K/UL (ref 0–0.6)
EOSINOPHILS RELATIVE PERCENT: 2.2 %
GFR SERPL CREATININE-BSD FRML MDRD: 35 ML/MIN/{1.73_M2}
GLUCOSE BLD-MCNC: 113 MG/DL (ref 70–99)
GLUCOSE BLD-MCNC: 118 MG/DL (ref 70–99)
GLUCOSE BLD-MCNC: 170 MG/DL (ref 70–99)
HCT VFR BLD CALC: 24.5 % (ref 40.5–52.5)
HEMOGLOBIN: 7.5 G/DL (ref 13.5–17.5)
LYMPHOCYTES ABSOLUTE: 0.6 K/UL (ref 1–5.1)
LYMPHOCYTES RELATIVE PERCENT: 14.2 %
MAGNESIUM: 2 MG/DL (ref 1.8–2.4)
MCH RBC QN AUTO: 26.3 PG (ref 26–34)
MCHC RBC AUTO-ENTMCNC: 30.8 G/DL (ref 31–36)
MCV RBC AUTO: 85.5 FL (ref 80–100)
MONOCYTES ABSOLUTE: 0.5 K/UL (ref 0–1.3)
MONOCYTES RELATIVE PERCENT: 12.1 %
NEUTROPHILS ABSOLUTE: 3 K/UL (ref 1.7–7.7)
NEUTROPHILS RELATIVE PERCENT: 70.8 %
PDW BLD-RTO: 18.8 % (ref 12.4–15.4)
PERFORMED ON: ABNORMAL
PERFORMED ON: ABNORMAL
PHOSPHORUS: 3.6 MG/DL (ref 2.5–4.9)
PLATELET # BLD: 185 K/UL (ref 135–450)
PMV BLD AUTO: 7.8 FL (ref 5–10.5)
POTASSIUM SERPL-SCNC: 3.9 MMOL/L (ref 3.5–5.1)
RBC # BLD: 2.86 M/UL (ref 4.2–5.9)
SODIUM BLD-SCNC: 140 MMOL/L (ref 136–145)
WBC # BLD: 4.3 K/UL (ref 4–11)

## 2022-12-08 PROCEDURE — 6370000000 HC RX 637 (ALT 250 FOR IP): Performed by: INTERNAL MEDICINE

## 2022-12-08 PROCEDURE — 97535 SELF CARE MNGMENT TRAINING: CPT

## 2022-12-08 PROCEDURE — 80069 RENAL FUNCTION PANEL: CPT

## 2022-12-08 PROCEDURE — 83735 ASSAY OF MAGNESIUM: CPT

## 2022-12-08 PROCEDURE — 6360000002 HC RX W HCPCS: Performed by: PHYSICAL MEDICINE & REHABILITATION

## 2022-12-08 PROCEDURE — 6360000002 HC RX W HCPCS: Performed by: INTERNAL MEDICINE

## 2022-12-08 PROCEDURE — 36415 COLL VENOUS BLD VENIPUNCTURE: CPT

## 2022-12-08 PROCEDURE — 6370000000 HC RX 637 (ALT 250 FOR IP): Performed by: PHYSICAL MEDICINE & REHABILITATION

## 2022-12-08 PROCEDURE — 2700000000 HC OXYGEN THERAPY PER DAY

## 2022-12-08 PROCEDURE — 94640 AIRWAY INHALATION TREATMENT: CPT

## 2022-12-08 PROCEDURE — 85025 COMPLETE CBC W/AUTO DIFF WBC: CPT

## 2022-12-08 PROCEDURE — 94760 N-INVAS EAR/PLS OXIMETRY 1: CPT

## 2022-12-08 RX ADMIN — AMIODARONE HYDROCHLORIDE 100 MG: 200 TABLET ORAL at 07:36

## 2022-12-08 RX ADMIN — VERAPAMIL HYDROCHLORIDE 240 MG: 240 TABLET, FILM COATED, EXTENDED RELEASE ORAL at 07:36

## 2022-12-08 RX ADMIN — HEPARIN SODIUM 5000 UNITS: 5000 INJECTION INTRAVENOUS; SUBCUTANEOUS at 07:37

## 2022-12-08 RX ADMIN — MONTELUKAST 10 MG: 10 TABLET, FILM COATED ORAL at 07:37

## 2022-12-08 RX ADMIN — ASPIRIN 162 MG: 81 TABLET, COATED ORAL at 07:36

## 2022-12-08 RX ADMIN — POLYETHYLENE GLYCOL 3350 17 G: 17 POWDER, FOR SOLUTION ORAL at 07:37

## 2022-12-08 RX ADMIN — ATORVASTATIN CALCIUM 40 MG: 40 TABLET, FILM COATED ORAL at 07:36

## 2022-12-08 RX ADMIN — TORSEMIDE 10 MG: 20 TABLET ORAL at 07:36

## 2022-12-08 RX ADMIN — PANTOPRAZOLE SODIUM 40 MG: 40 TABLET, DELAYED RELEASE ORAL at 07:37

## 2022-12-08 RX ADMIN — LEVALBUTEROL 1.25 MG: 1.25 SOLUTION, CONCENTRATE RESPIRATORY (INHALATION) at 09:07

## 2022-12-08 RX ADMIN — ISODIUM CHLORIDE 3 ML: 0.03 SOLUTION RESPIRATORY (INHALATION) at 09:09

## 2022-12-08 RX ADMIN — TAMSULOSIN HYDROCHLORIDE 0.4 MG: 0.4 CAPSULE ORAL at 07:37

## 2022-12-08 RX ADMIN — MICONAZOLE NITRATE: 2 POWDER TOPICAL at 07:43

## 2022-12-08 RX ADMIN — SENNOSIDES AND DOCUSATE SODIUM 1 TABLET: 50; 8.6 TABLET ORAL at 07:36

## 2022-12-08 NOTE — PROGRESS NOTES
Patient admitted to rehab with anasarca. A/Ox4. Transfers with no device, ambulates with RW. Mobility restrictions: WBAT. On Regular, low potassium, low sodium diet, tolerating well. Medications taken whole. On Heparin for DVT prophylaxis. Skin: scattered bruising. Oxygen: 2 LPM via nasal cannula. LDA: none. Has been continent of bowel and bladder. LBM 12/7. Chair/bed alarms in use and call light in reach. Will monitor for safety.  Electronically signed by Eduar Burgess RN, [address] [place] [address] on 12/8/22 at 9:38 AM EST

## 2022-12-08 NOTE — PROGRESS NOTES
Patient has been discharged per MD orders. Patient verbalizes understanding of all instructions, medications, and follow up. All belongings have been gathered and packed. Family in route to transport patient from hospital. Patient declining dinner and blood glucose monitoring due to being discharged.  Electronically signed by Bob Cameron RN, [address] [place] [address] on 12/8/22 at 4:23 PM EST

## 2022-12-08 NOTE — PROGRESS NOTES
Nephrology Progress note  [phone]  [phone]   [facility]      Reason for Consultation: DEISY / CKD IV    HPI: 79 y.o. male whom we were asked to see for DEISY on CKD. The patient presents back with worsened weakness and worsened edema. Subjective:  -pt seen and examined  -PMSHx and meds reviewed  -family at bedside    ROS: neg chest pain/SOB    Objective:  Blood pressure (!) 161/91, pulse 71, temperature 97.9 °F (36.6 °C), temperature source Oral, resp. rate 16, height 5' 8\" (1.727 m), weight 232 lb 12.9 oz (105.6 kg), SpO2 91 %. Intake/Output Summary (Last 24 hours) at 12/8/2022 1215  Last data filed at 12/7/2022 2230  Gross per 24 hour   Intake 660 ml   Output --   Net 660 ml       Patient Vitals for the past 96 hrs (Last 3 readings):   Weight   12/08/22 0300 232 lb 12.9 oz (105.6 kg)   12/06/22 2326 232 lb 12.9 oz (105.6 kg)   12/06/22 1223 236 lb 15.9 oz (107.5 kg)         General:  NAD, A+Ox3  Chest:  CTAB, no distress  CVS:  RR  Abdominal:  NTND, soft, +BS  Extremities:  1+ pitting edema  Skin:  no rash, warm.     Medications:   epoetin kenny-epbx  30,000 Units SubCUTAneous Q7 Days    tamsulosin  0.4 mg Oral Daily    amiodarone  100 mg Oral Daily    aspirin EC  162 mg Oral Daily    atorvastatin  40 mg Oral Daily    linaclotide  145 mcg Oral QAM AC    montelukast  10 mg Oral Daily    pantoprazole  40 mg Oral Daily    verapamil  240 mg Oral BID    insulin lispro  0-4 Units SubCUTAneous TID WC    insulin lispro  0-4 Units SubCUTAneous Nightly    sodium chloride nebulizer  3 mL Nebulization TID    levalbuterol  1.25 mg Nebulization TID    miconazole   Topical BID    polyethylene glycol  17 g Oral BID    heparin (porcine)  5,000 Units SubCUTAneous BID    sennosides-docusate sodium  1 tablet Oral BID    torsemide  10 mg Oral Daily       Labs:  Renal panel:  Lab Results   Component Value Date/Time     12/08/2022 07:06 AM    K 3.9 12/08/2022 07:06 AM    K 4.2 12/05/2022 06:45 AM    CO2 29 12/08/2022 07:06 AM    BUN 28 (H) 12/08/2022 07:06 AM    CREATININE 1.9 (H) 12/08/2022 07:06 AM    CALCIUM 8.5 12/08/2022 07:06 AM    PHOS 3.6 12/08/2022 07:06 AM    MG 2.00 12/08/2022 07:06 AM     CBC:  Lab Results   Component Value Date/Time    WBC 4.3 12/08/2022 07:06 AM    HGB 7.5 (L) 12/08/2022 07:06 AM    HCT 24.5 (L) 12/08/2022 07:06 AM     12/08/2022 07:06 AM       Assessment/Plan:  Reviewed old records and labs. 1) DEISY on CKD: peak Cr 3.0- baseline Cr 2.0-2.2-diminished perfusion in the setting of CHF   -improved with diuresis   -renal function stable, on oral diuretics .       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

## 2022-12-08 NOTE — PROGRESS NOTES
Occupational Therapy  Facility/Department: [facility]  Rehabilitation Occupational Therapy Daily Treatment Note    Date: 22  Patient Name: Hernán Escobar       Room: N0S-4725/4459-43  MRN: [id number]  Account: [de-identified]   : 1940  (79 y.o.) Gender: male                    Past Medical History:  has a past medical history of Allergic rhinitis, Asthma, Atrial fibrillation ([facility] [address] Carotid artery stenosis, Chronic kidney disease, COPD (chronic obstructive pulmonary disease) ([facility] [address] CPAP (continuous positive airway pressure) dependence, ESBL (extended spectrum beta-lactamase) producing bacteria infection, Hyperlipidemia, Hypertension, Iron deficiency anemia, Obstructive sleep apnea, and Type II or unspecified type diabetes mellitus without mention of complication, not stated as uncontrolled. Past Surgical History:   has a past surgical history that includes Gallbladder surgery (); Upper gastrointestinal endoscopy (7-2005    7/10/2009); Colonoscopy (7/10/2009); Cataract removal (2012); Pain management procedure (Right, 10/20/2021); Pain management procedure (Left, 2021); and CT BIOPSY BONE MARROW (2022). Restrictions  Restrictions/Precautions: Fall Risk;Contact Precautions  Other position/activity restrictions: 2 L O2;  low sodium/low potassium diet; contact precautions for ESBL in urine    Subjective  Subjective: Pt seen bedside for an ADL. Pt agreed to a shower with encouragment and explanation of need for final ADL. Restrictions/Precautions: Fall Risk;Contact Precautions             Objective               ADL  Grooming/Oral Hygiene  Assistance Level: Modified independent  Skilled Clinical Factors: Stood at the sink to complete oral hygiene. Upper Extremity Bathing  Assistance Level: Modified independent  Skilled Clinical Factors: seated from shower chair.   Lower Extremity Bathing  Equipment Provided: Long-handled sponge  Assistance Level: Modified independent  Skilled Clinical Factors: Stood with towel on floor for non skid surface to wash posterior/niko areas. Pt used LH sponge to wash/dry both feet. Upper Extremity Dressing  Assistance Level: Modified independent  Lower Extremity Dressing  Assistance Level: Modified independent  Skilled Clinical Factors: Pt able to hemal underwear and pants seated on a chair. Putting On/Taking Off Footwear  Assistance Level: Modified independent  Toileting  Assistance Level: Modified independent  Skilled Clinical Factors: Completed hygiene after a bowel movement. Pt able to manage clothing. Toilet Transfers  Equipment: Grab bars  Additional Factors: With handrails  Assistance Level: Modified independent  Tub/Shower Transfers  Type: Shower  Transfer From: Rolling walker  Transfer To: Shower chair with back  Assistance Level: Modified independent          Functional Mobility  Device: Rolling walker  Activity: To/From bathroom  Assistance Level: Modified independent  Supine to Sit  Assistance Level: Independent         Assessment  Assessment  Assessment: Pt tolerated session well. Pt able to complete ADL with bathing and dressing with modified independence. Pt required rest breaks for SOB. He completed mobility and transfers with modified independence. Pt to be discharged home this date with assist as needed and [address] [place] [address]. Discharge Recommendations: Home with assist PRN;Home with Home health OT;S Level 1  Safety Devices  Type of devices: Call light within reach; Chair alarm in place; Left in chair;Gait belt;Nurse notified        Plan  Occupational Therapy Plan  Times Per Week: 5-6  Times Per Day: Twice a day  Days Per Week: 5 Days  Hours Per Day: 1.5 hours  Current Treatment Recommendations: Balance training;Functional mobility training; Endurance training;Strengthening; Safety education & training;Self-Care / ADL;Equipment evaluation, education, & procurement;Patient/Caregiver education & training;ROM; Home management training    Goals  Patient Goals   Patient goals : Pt stated his goal is to do everything he did at home, walk indep, shower, dress, go grocery shopping . Above goals will work toward this goal.  Short Term Goals  Time Frame for Short Term Goals: 7-10 days from eval 12/1/22  Short Term Goal 1: Pt will bathe mod indep w/shower chair/long sponge- MET  Short Term Goal 2: Pt will dress mod indep w/ AE if needed- MET  Short Term Goal 3: Pt will toilet mod indep- MET  Short Term Goal 4: Pt will perform functional ADL transfers, ADLmobility w/ LRAD mod indep. MET  Short Term Goal 5: Pt will particpate in ther ex/UE HEP to increase activity tolerance to stand for 5+ minutes during ADL/mob tasks keeping O2 sats in safe range.  MET  Short Term Goal 6: Pt will improve walker safety/O2 line management to complete light IADL tasks of food prep/home management mod indep. with LRAD MET  Long Term Goals  Time Frame for Long Term Goals : STGs=LTGs      Therapy Time   Individual Concurrent Group Co-treatment   Time In 0745         Time Out 0840         Minutes [address] [facility], [address] [place] [address]

## 2022-12-08 NOTE — DISCHARGE SUMMARY
Physical Medicine & Rehabilitation  Discharge Summary     Patient Identification:  Bentley Shi  : 1940  Admit date: 2022  Discharge date:  2022  Attending provider: Lazaro Thomas MD        Primary care provider: Evan Morgan MD     Discharge Diagnoses:   Patient Active Problem List   Diagnosis    JOANN (obstructive sleep apnea)    Chronic GERD    Type 2 diabetes mellitus with stage 4 chronic kidney disease, with long-term current use of insulin (HCC)    Microcytic anemia    Microalbuminuria    Hyperlipidemia    Paroxysmal atrial fibrillation (HCC)    Edema    Bronchitis    Carotid artery stenosis without cerebral infarction    Leg swelling    Chronic venous insufficiency    Stage 3b chronic kidney disease (HCC)    Essential hypertension    B12 deficiency    Pulmonary nodule    Moderate COPD (chronic obstructive pulmonary disease) (HCC)    DEISY (acute kidney injury) ([facility] [address]    Slow transit constipation    Pancreatic mass    Obesity, Class II, BMI 35-39.9    Generalized weakness    Anasarca    Worsening renal function       History of Present Illness/Acute Hospital Course:  Patient is an 78 yo M with pmh Afib, CHF, HTN, COPD, DM2, CKD who initially presented 2022 with diffuse weakness, increasing edema, and inability to urinate. Found to have hypothermia, acute on chronic dCHF exacerbation, obstructive uropathy, and DEISY on CKD. He was treated with lasix gtt. Course complicated by hemoptysis and HCAP. Now presents to ARU with impaired mobility and self-care below his baseline. Inpatient Rehabilitation Course:   Bentley Shi is a 79 y.o. male admitted to inpatient rehabilitation on 2022 with Anasarca. The patient participated in an aggressive multidisciplinary inpatient rehabilitation program involving 3 hours of therapy per day, at least 5 days per week. He made good progress with regard to strength, endurance, functional mobility, balance.  Now overall modified independent to supervision. Discharging home with wife. Home care services and DME ordered as below. Patient will follow-up with PCP, Nephrology. Impairments: generalized weakness, decreased endurance, balance    Medical Management:    Anasarca, acute on chronic dCHF  -torsemide per Nephrology  -Daily wt stable ~232 lbs, edema steadily improving on exam     Obstructive Uropathy  -Salazar initially placed, now voiding.   -Flomax     DEISY on CKD  -Nephrology following, appreciate input  -Avoid nephrotoxins, renally dose meds  -Cr now stable ~2     HCAP, hemoptysis  -meropenem course completed     Acute (? Chronic) hypoxic respiratory failure, COPD  -As evidenced by O2 saturation <90% on room air  -Supplemental O2, weaned as tolerated -- currently still requiring 2L  -montelukast, levalbuterol     Anemia  -epoetin   -Hgb now gradually trending up, >7     PAF  -amiodarone     HTN  -torsemide, verapamil     DM2  -ISS while inpatient  -was on glargine 10 units qhs at home     JOANN  -Home CPAP     PPx  -DVT: heparin  -GI: pantoprazole    Discharge Exam:  Const: Alert. No distress, pleasant. HEENT: Normocephalic, atraumatic. Normal sclera/conjunctiva. MMM. CV: Regular rate and rhythm. Resp: No respiratory distress. Lungs decreased at bases  Abd: Soft, nontender, nondistended, NABS+   Ext: Edema improving. Neuro: Alert, oriented, appropriately interactive. Psych: Cooperative, appropriate mood and affect      Discharge Functional Status:    Physical therapy:  Bed Mobility:  Overall Assistance Level:  (patient refused to perform already did with OT)  Additional Factors: Increased time to complete, Head of bed flat, Without handrails  Sit>supine:  Assistance Level: Supervision  Supine>sit:  Assistance Level: Supervision  Skilled Clinical Factors: use of momentum  Transfers:  Surface:  To chair with arms, From chair with arms, To chair without arms, From chair without arms  Additional Factors: Increased time to complete  Device: Walker  Sit>stand:  Assistance Level: Modified independent  Skilled Clinical Factors: v/c for hand placement and sequencing. Stand>sit:  Assistance Level: Modified independent  Skilled Clinical Factors: v/c for hand placement and safety  Bed<>chair  Technique: Stand step  Assistance Level: Modified independent  Skilled Clinical Factors: with wheeled walker- Patient usually reaches back with only one hand  Stand Pivot:     Lateral transfer:     Car transfer:  Assistance Level: Modified independent  Skilled Clinical Factors: Patient unsafe with hand placement but able to perform without assistance  Ambulation:  Surface: Level surface, Carpet  Device: Rolling walker  Distance: 48' with two turns, 80' x 2 with several turns( 150' in PM on carpet, over doorsills with MI- O2 sats 87%, returned to 95% after few minutes rest on 2L oxygen)  Activity: Within Unit  Activity Comments: pt becomes SOB w ambulating. Patient becomes visibly SOB and requires seated rest break after all activities. Patient with noted tremors in UEs and legs which seems to decrease with rest.  Additional Factors: Increased time to complete (pt managing O2 tank throughout)  Assistance Level: Modified independent  Gait Deviations: Slow seamus, Wide base of support  Skilled Clinical Factors: limited endurance on 2L oxygen, HR 70 bpm 90 O2 sats on 2L after activity baseline 97% and HR 70, noted tremors. Increased time to recover after each functional mobility task  Stairs:  Number of Stairs: 0- N/A  Curb:  Curb Height: 6''  Device: Rolling walker  Number of Curbs: 1  Additional Factors: Verbal cues, Hand placement cues, Increased time to complete  Assistance Level: Stand by assist  Skilled Clinical Factors: Patient able to perform curb step with RW support increased effort and time, shaking but without LOB at this time.   90% at completion on 2 L, wife present aware to watch oxygen line  Wheelchair:     Assessment:  Assessment: Mr. Shannan Camejo Chuck Ortiz continues to be on 2L, desaturates with most activities on 2L to 90% today with household distances. During PM session , ambulated 150' with MI with 2L O2 with O2 sats decreased to 87% after ambulation. He was able to transfer and ambulate household distances with MI, but his endurance is limited and he has shaking of bilateral LE and UE tremors baseline for patient. Patient needs cues for hand placement with many attempts to educate, reaches back with one hand only. Patient with improved awareness of oxygen tubing with functional mobility. Patient able to perform curb step SBA with light assist with no assist and cues to slow down. Patient has met 3/4 goals at this time. Patient continues to be below baseline and will benefit from continued skilled therapy for strengthening, gait training, endurance training, and functional mobility training to allow for safe return home. Patient to be D/C on Thursday , 12/7, with home PT. Activity Tolerance: Patient limited by fatigue, Patient limited by endurance  Discharge Recommendations: Home with Home health PT, Home with assist PRN, Patient would benefit from continued therapy after discharge  Factors Affecting Discharge: lives in house w spouse w 1+1 stairs to enter      Occupational therapy:   Feeding     Grooming/Oral Hygiene  Assistance Level: Modified independent  Skilled Clinical Factors: Stood at the sink to complete oral hygiene. UE Bathing  Assistance Level: Modified independent  Skilled Clinical Factors: seated from shower chair. LE Bathing  Equipment Provided: Long-handled sponge  Assistance Level: Modified independent  Skilled Clinical Factors: Stood with towel on floor for non skid surface to wash posterior/niko areas. Pt used LH sponge to wash/dry both feet. UE Dressing  Assistance Level: Modified independent  Skilled Clinical Factors: retrieved own clothes from bag seated, then the rest from RW CGA/SBA at closet, cues for O2 line.  Pt doffed/donned pullover shirt while seated, he managed O2 line  LE Dressing  Assistance Level: Modified independent  Skilled Clinical Factors: Pt able to hemal underwear and pants seated on a chair. Putting On/Taking Off Footwear  Equipment Provided: Sock aid  Assistance Level: Modified independent  Skilled Clinical Factors: He sat on EOB & donned own compression hose himself. Min increased time. He donned shoes, props feet on chair to tie. Toileting  Assistance Level: Modified independent  Skilled Clinical Factors: Completed hygiene after a bowel movement. Pt able to manage clothing. Transfers: Toilet Transfers  Technique:  (amb RW)  Equipment: Grab bars  Additional Factors: With handrails  Assistance Level: Modified independent  Skilled Clinical Factors: RW>< comfort ht toilet with no grab bars, says uses counter for support at home(is in front) but demo able to get up w/o rails, he rocks forward, placed R hand on counter & L on  RW, (multiple times does not push up with hands for transfers after ed, does have safety concerns, but is mod indep  Tub/Shower Transfers  Type: Shower  Transfer From: Rolling walker  Transfer To: Shower chair with back  Additional Factors: Verbal cues, Cues for hand placement  Assistance Level: Modified independent  Skilled Clinical Factors: RW>< TTB in DRY tub mod indep  IADLs:  Meal Prep  Meal Prep Level: Walker (RW)  Meal Prep Level of Assistance: Modified independent  Meal Preparation: Pt retrieved items, made buttered toast & served to table mod indep with safety concerns, He used basket to carry. Pt standing 5 minutes for task & O2 sat was 90% right after he sat down on 2 liters.   Money Management     Light Housekeeping  Light Housekeeping Level: Walker (RW)  Light Housekeeping Level of Assistance: Modified independent, Supervision  Light Housekeeping: Pt retrieved clothes from closet & carried to bed w/RW mod indep w/safety concerns, was ed in walker safety & used basket to carry, OT rec to get basket for home use. Discussed where to get. O2 sat was 92% after on 2 liters. Dependent Care     Community Re-Entry     [facility]     Health Management     Assessment:  Assessment: Pt tolerated session well. Pt able to complete ADL with bathing and dressing with modified independence. Pt required rest breaks for SOB. He completed mobility and transfers with modified independence. Pt to be discharged home this date with assist as needed and [address]   Activity Tolerance: Patient limited by fatigue, Patient limited by endurance  Discharge Recommendations: Home with assist PRN, Home with Home health OT, S Level 1  Factors Affecting Discharge: wife has memory problems    Speech therapy:              Significant Diagnostics:   Lab Results   Component Value Date    CREATININE 1.9 (H) 12/08/2022    BUN 28 (H) 12/08/2022     12/08/2022    K 3.9 12/08/2022     12/08/2022    CO2 29 12/08/2022       Lab Results   Component Value Date    WBC 4.3 12/08/2022    HGB 7.5 (L) 12/08/2022    HCT 24.5 (L) 12/08/2022    MCV 85.5 12/08/2022     12/08/2022       Disposition:  home with wife  Services:  PT, OT, RN  DME: sock aide      Discharge Condition: Stable    Follow-up:  See after visit summary from hospitalization    Discharge Medications:     Medication List        START taking these medications      torsemide 10 MG tablet  Commonly known as: DEMADEX  Take 1 tablet by mouth daily            CONTINUE taking these medications      Accu-Chek FastClix Lancets Misc  USE TO TEST 3 TIMES A DAY DX CODE E11.9     Accu-Chek Guide strip  Generic drug: blood glucose test strips  TEST AS DIRECTED 3 TIMES A DAY     albuterol sulfate  (90 Base) MCG/ACT inhaler  Commonly known as: PROVENTIL;VENTOLIN;PROAIR  INHALE 2 PUFFS BY MOUTH EVERY 4 HOURS AS NEEDED FOR WHEEZING     alfuzosin 10 MG extended release tablet  Commonly known as: UROXATRAL  TAKE 1 TABLET BY MOUTH EVERY DAY     amiodarone 200 MG tablet  Commonly known as: CORDARONE  Take 0.5 tablets by mouth daily     aspirin EC 81 MG EC tablet     atorvastatin 40 MG tablet  Commonly known as: LIPITOR  TAKE 1 TABLET BY MOUTH EVERY DAY     B-D UF III MINI PEN NEEDLES 31G X 5 MM Misc  Generic drug: Insulin Pen Needle  USE AS DIRECTED 3 TIMES A DAY     Basaglar KwikPen 100 UNIT/ML injection pen  Generic drug: insulin glargine     CPAP Machine Misc     [employer] Probiotic (Lactobacillus) Caps  TAKE 1 CAPSULE BY MOUTH 2 TIMES DAILY (WITH MEALS)     [employer] Purelax 17 GM/SCOOP powder  Generic drug: polyethylene glycol  TAKE 17G BY MOUTH DAILY MIX WITH 8 OZ OF WATER     docusate sodium 100 MG capsule  Commonly known as: COLACE     ferrous sulfate 325 (65 Fe) MG tablet  Commonly known as: IRON 325     INSULIN SYRINGE .5CC/29G 29G X 1/2\" 0.5 ML Misc  1 each by Does not apply route daily     levalbuterol 1.25 MG/3ML nebulizer solution  Commonly known as: XOPENEX  USE 1 VIAL VIA NEBULIZER FOUR TIMES DAILY     Linzess 145 MCG capsule  Generic drug: linaclotide  TAKE 1 CAPSULE BY MOUTH EVERY DAY IN THE MORNING BEFORE BREAKFAST     montelukast 10 MG tablet  Commonly known as: SINGULAIR  TAKE 1 TABLET BY MOUTH EVERY DAY     MULTIVITAMIN ADULT PO     pantoprazole 40 MG tablet  Commonly known as: PROTONIX  TAKE 1 TABLET BY MOUTH EVERY DAY     SUPER C COMPLEX PO     verapamil 240 MG extended release tablet  Commonly known as: CALAN SR  TAKE 1 TABLET BY MOUTH TWICE A DAY     VITAMIN B-12 PO     VITAMIN D3 PO     ZINC PO            STOP taking these medications      cloNIDine 0.1 MG tablet  Commonly known as: CATAPRES     sodium bicarbonate 650 MG tablet               Where to Get Your Medications        These medications were sent to [facility]/pharmacy #[id number], [place] - [address] [facility].  Caitlyn Barber [phone] [facility] [address]      Phone: [phone]   torsemide 10 MG tablet           I spent over 35 minutes on this discharge encounter between counseling, coordination of care, and medication reconciliation. To comply with [facility] trent R.II.4.1:   Discharge order placed in advance to facilitate patients discharge needs.       Wicho Duggan MD

## 2022-12-08 NOTE — PLAN OF CARE
Problem: Discharge Planning  Goal: Discharge to home or other facility with appropriate resources  12/8/2022 0936 by Faby Morales, RN  Outcome: Progressing     Problem: Safety - Adult  Goal: Free from fall injury  12/8/2022 0936 by Faby Morales, RN  Outcome: Progressing  Flowsheets (Taken 12/8/2022 0934)  Free From Fall Injury: Instruct family/caregiver on patient safety  Note: Patient will be free of injury this shift. Patient is encouraged to call for assistance prior to getting out of bed. Call light within reach, bed in lowest position and locked, and bed alarm engaged. Non-slip socks on both feet. Bedside table within reach. Room and floor are free of clutter. Problem: Chronic Conditions and Co-morbidities  Goal: Patient's chronic conditions and co-morbidity symptoms are monitored and maintained or improved  12/8/2022 0936 by Faby Morales, RN  Outcome: Progressing     Problem: Skin/Tissue Integrity  Goal: Absence of new skin breakdown  Description: 1. Monitor for areas of redness and/or skin breakdown  2. Assess vascular access sites hourly  3. Every 4-6 hours minimum:  Change oxygen saturation probe site  4. Every 4-6 hours:  If on nasal continuous positive airway pressure, respiratory therapy assess nares and determine need for appliance change or resting period.   12/8/2022 0936 by Faby Morales, RN  Outcome: Progressing     Problem: Pain  Goal: Verbalizes/displays adequate comfort level or baseline comfort level  12/8/2022 0936 by Faby Morales, RN  Outcome: Progressing     Problem: ABCDS Injury Assessment  Goal: Absence of physical injury  12/8/2022 0936 by Faby Morales, RN  Outcome: Progressing  Flowsheets (Taken 12/8/2022 0934)  Absence of Physical Injury: Implement safety measures based on patient assessment

## 2022-12-23 ENCOUNTER — TELEPHONE (OUTPATIENT)
Dept: OTHER | Facility: CLINIC | Age: 82
End: 2022-12-23

## 2022-12-23 ENCOUNTER — HOSPITAL ENCOUNTER (INPATIENT)
Age: 82
LOS: 7 days | Discharge: HOME | End: 2022-12-30
Attending: EMERGENCY MEDICINE | Admitting: INTERNAL MEDICINE
Payer: MEDICARE

## 2022-12-23 ENCOUNTER — APPOINTMENT (OUTPATIENT)
Dept: GENERAL RADIOLOGY | Age: 82
End: 2022-12-23
Payer: MEDICARE

## 2022-12-23 DIAGNOSIS — R77.8 ELEVATED TROPONIN: ICD-10-CM

## 2022-12-23 DIAGNOSIS — E87.5 HYPERKALEMIA: ICD-10-CM

## 2022-12-23 DIAGNOSIS — N17.9 AKI (ACUTE KIDNEY INJURY) (HCC): ICD-10-CM

## 2022-12-23 DIAGNOSIS — R06.00 DYSPNEA, UNSPECIFIED TYPE: ICD-10-CM

## 2022-12-23 DIAGNOSIS — R00.1 BRADYCARDIA: ICD-10-CM
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
## 2022-12-23 DIAGNOSIS — J18.9 ATYPICAL PNEUMONIA: ICD-10-CM

## 2022-12-23 PROBLEM — R63.5 WEIGHT GAIN: Status: ACTIVE | Noted: 2022-01-01

## 2022-12-23 PROBLEM — R93.89 ABNORMAL CXR: Status: ACTIVE | Noted: 2022-12-23

## 2022-12-23 PROBLEM — R79.89 ELEVATED BRAIN NATRIURETIC PEPTIDE (BNP) LEVEL: Status: ACTIVE | Noted: 2022-01-01

## 2022-12-23 PROBLEM — D64.9 CHRONIC ANEMIA: Status: ACTIVE | Noted: 2022-01-01

## 2022-12-23 PROBLEM — I95.9 HYPOTENSION, UNSPECIFIED: Status: ACTIVE | Noted: 2022-01-01

## 2022-12-23 PROBLEM — R60.1 ANASARCA: Status: RESOLVED | Noted: 2022-11-16 | Resolved: 2022-12-23

## 2022-12-23 PROBLEM — J96.11 CHRONIC RESPIRATORY FAILURE WITH HYPOXIA (HCC): Status: ACTIVE | Noted: 2022-12-23

## 2022-12-23 PROBLEM — N28.9 WORSENING RENAL FUNCTION: Status: RESOLVED | Noted: 2022-01-01 | Resolved: 2022-01-01

## 2022-12-23 PROBLEM — R79.89 ELEVATED TROPONIN: Status: ACTIVE | Noted: 2022-01-01

## 2022-12-23 LAB
A/G RATIO: 1.1 (ref 1.1–2.2)
ALBUMIN SERPL-MCNC: 3.2 G/DL (ref 3.4–5)
ALP BLD-CCNC: 92 U/L (ref 40–129)
ALT SERPL-CCNC: 56 U/L (ref 10–40)
ANION GAP SERPL CALCULATED.3IONS-SCNC: 14 MMOL/L (ref 3–16)
AST SERPL-CCNC: 54 U/L (ref 15–37)
BASOPHILS ABSOLUTE: 0 K/UL (ref 0–0.2)
BASOPHILS RELATIVE PERCENT: 0.3 %
BILIRUB SERPL-MCNC: 0.3 MG/DL (ref 0–1)
BUN BLDV-MCNC: 74 MG/DL (ref 7–20)
CALCIUM SERPL-MCNC: 8.5 MG/DL (ref 8.3–10.6)
CHLORIDE BLD-SCNC: 103 MMOL/L (ref 99–110)
CO2: 24 MMOL/L (ref 21–32)
CREAT SERPL-MCNC: 2.8 MG/DL (ref 0.8–1.3)
EOSINOPHILS ABSOLUTE: 0 K/UL (ref 0–0.6)
EOSINOPHILS RELATIVE PERCENT: 0.3 %
GFR SERPL CREATININE-BSD FRML MDRD: 22 ML/MIN/{1.73_M2}
GLUCOSE BLD-MCNC: 129 MG/DL (ref 70–99)
GLUCOSE BLD-MCNC: 145 MG/DL (ref 70–99)
HCT VFR BLD CALC: 25.1 % (ref 40.5–52.5)
HEMOGLOBIN: 7.4 G/DL (ref 13.5–17.5)
LYMPHOCYTES ABSOLUTE: 0.4 K/UL (ref 1–5.1)
LYMPHOCYTES RELATIVE PERCENT: 8.1 %
MCH RBC QN AUTO: 25.3 PG (ref 26–34)
MCHC RBC AUTO-ENTMCNC: 29.6 G/DL (ref 31–36)
MCV RBC AUTO: 85.5 FL (ref 80–100)
MONOCYTES ABSOLUTE: 0.3 K/UL (ref 0–1.3)
MONOCYTES RELATIVE PERCENT: 6.6 %
NEUTROPHILS ABSOLUTE: 4.5 K/UL (ref 1.7–7.7)
NEUTROPHILS RELATIVE PERCENT: 84.7 %
PDW BLD-RTO: 19.5 % (ref 12.4–15.4)
PERFORMED ON: ABNORMAL
PLATELET # BLD: 120 K/UL (ref 135–450)
PMV BLD AUTO: 8.2 FL (ref 5–10.5)
POTASSIUM REFLEX MAGNESIUM: 6 MMOL/L (ref 3.5–5.1)
PRO-BNP: 1140 PG/ML (ref 0–449)
RAPID INFLUENZA  B AGN: NEGATIVE
RAPID INFLUENZA A AGN: NEGATIVE
RBC # BLD: 2.94 M/UL (ref 4.2–5.9)
SARS-COV-2, NAAT: NOT DETECTED
SODIUM BLD-SCNC: 141 MMOL/L (ref 136–145)
TOTAL PROTEIN: 6.1 G/DL (ref 6.4–8.2)
TROPONIN: 0.05 NG/ML
WBC # BLD: 5.3 K/UL (ref 4–11)

## 2022-12-23 PROCEDURE — 71045 X-RAY EXAM CHEST 1 VIEW: CPT

## 2022-12-23 PROCEDURE — 87804 INFLUENZA ASSAY W/OPTIC: CPT

## 2022-12-23 PROCEDURE — 87635 SARS-COV-2 COVID-19 AMP PRB: CPT

## 2022-12-23 PROCEDURE — 84145 PROCALCITONIN (PCT): CPT

## 2022-12-23 PROCEDURE — 84484 ASSAY OF TROPONIN QUANT: CPT

## 2022-12-23 PROCEDURE — 6360000002 HC RX W HCPCS: Performed by: EMERGENCY MEDICINE

## 2022-12-23 PROCEDURE — 36415 COLL VENOUS BLD VENIPUNCTURE: CPT

## 2022-12-23 PROCEDURE — 6360000002 HC RX W HCPCS: Performed by: NURSE PRACTITIONER

## 2022-12-23 PROCEDURE — 51701 INSERT BLADDER CATHETER: CPT

## 2022-12-23 PROCEDURE — 99285 EMERGENCY DEPT VISIT HI MDM: CPT

## 2022-12-23 PROCEDURE — 83880 ASSAY OF NATRIURETIC PEPTIDE: CPT

## 2022-12-23 PROCEDURE — 1200000000 HC SEMI PRIVATE

## 2022-12-23 PROCEDURE — 80053 COMPREHEN METABOLIC PANEL: CPT

## 2022-12-23 PROCEDURE — 96375 TX/PRO/DX INJ NEW DRUG ADDON: CPT

## 2022-12-23 PROCEDURE — 85025 COMPLETE CBC W/AUTO DIFF WBC: CPT

## 2022-12-23 PROCEDURE — 96374 THER/PROPH/DIAG INJ IV PUSH: CPT

## 2022-12-23 PROCEDURE — 2500000003 HC RX 250 WO HCPCS: Performed by: NURSE PRACTITIONER

## 2022-12-23 PROCEDURE — 93005 ELECTROCARDIOGRAM TRACING: CPT | Performed by: NURSE PRACTITIONER

## 2022-12-23 PROCEDURE — 6370000000 HC RX 637 (ALT 250 FOR IP): Performed by: NURSE PRACTITIONER

## 2022-12-23 RX ORDER — 0.9 % SODIUM CHLORIDE 0.9 %
1000 INTRAVENOUS SOLUTION INTRAVENOUS ONCE
Status: DISCONTINUED | OUTPATIENT
Start: 2022-12-23 | End: 2022-12-23

## 2022-12-23 RX ORDER — DEXTROSE MONOHYDRATE 100 MG/ML
INJECTION, SOLUTION INTRAVENOUS CONTINUOUS PRN
Status: DISCONTINUED | OUTPATIENT
Start: 2022-12-23 | End: 2022-12-30 | Stop reason: HOSPADM

## 2022-12-23 RX ORDER — ATROPINE SULFATE 0.1 MG/ML
1 INJECTION INTRAVENOUS ONCE
Status: COMPLETED | OUTPATIENT
Start: 2022-12-23 | End: 2022-12-23

## 2022-12-23 RX ORDER — 0.9 % SODIUM CHLORIDE 0.9 %
500 INTRAVENOUS SOLUTION INTRAVENOUS ONCE
Status: DISCONTINUED | OUTPATIENT
Start: 2022-12-23 | End: 2022-12-23

## 2022-12-23 RX ORDER — LINEZOLID 2 MG/ML
600 INJECTION, SOLUTION INTRAVENOUS 2 TIMES DAILY
Status: DISCONTINUED | OUTPATIENT
Start: 2022-12-23 | End: 2022-12-23

## 2022-12-23 RX ORDER — FUROSEMIDE 10 MG/ML
40 INJECTION INTRAMUSCULAR; INTRAVENOUS ONCE
Status: COMPLETED | OUTPATIENT
Start: 2022-12-23 | End: 2022-12-23

## 2022-12-23 RX ADMIN — SODIUM ZIRCONIUM CYCLOSILICATE 10 G: 10 POWDER, FOR SUSPENSION ORAL at 22:39

## 2022-12-23 RX ADMIN — SODIUM BICARBONATE 50 MEQ: 84 INJECTION, SOLUTION INTRAVENOUS at 22:33

## 2022-12-23 RX ADMIN — FUROSEMIDE 40 MG: 10 INJECTION, SOLUTION INTRAMUSCULAR; INTRAVENOUS at 22:36

## 2022-12-23 RX ADMIN — ATROPINE SULFATE 1 MG: 0.1 INJECTION INTRAVENOUS at 20:37

## 2022-12-23 ASSESSMENT — ENCOUNTER SYMPTOMS
EYE REDNESS: 0
VOMITING: 0
NAUSEA: 0
SHORTNESS OF BREATH: 1
APNEA: 0
WHEEZING: 0
RECTAL PAIN: 0
BACK PAIN: 0
COUGH: 1
BLOOD IN STOOL: 0
EYE ITCHING: 0
CHOKING: 0
STRIDOR: 0
EYE DISCHARGE: 0
CHEST TIGHTNESS: 0
ANAL BLEEDING: 0
SORE THROAT: 0
PHOTOPHOBIA: 0
ABDOMINAL DISTENTION: 0
COLOR CHANGE: 0
DIARRHEA: 0
EYE PAIN: 0
ABDOMINAL PAIN: 0
CONSTIPATION: 0

## 2022-12-23 ASSESSMENT — LIFESTYLE VARIABLES
HOW MANY STANDARD DRINKS CONTAINING ALCOHOL DO YOU HAVE ON A TYPICAL DAY: PATIENT DOES NOT DRINK
HOW OFTEN DO YOU HAVE A DRINK CONTAINING ALCOHOL: NEVER

## 2022-12-23 ASSESSMENT — PAIN - FUNCTIONAL ASSESSMENT: PAIN_FUNCTIONAL_ASSESSMENT: 0-10

## 2022-12-23 ASSESSMENT — PAIN SCALES - GENERAL: PAINLEVEL_OUTOF10: 0

## 2022-12-24 PROBLEM — R55 SYNCOPE AND COLLAPSE: Status: ACTIVE | Noted: 2022-01-01

## 2022-12-24 LAB
ABO/RH: NORMAL
ABO/RH: NORMAL
ANION GAP SERPL CALCULATED.3IONS-SCNC: 11 MMOL/L (ref 3–16)
ANTIBODY SCREEN: NORMAL
BASOPHILS ABSOLUTE: 0 K/UL (ref 0–0.2)
BASOPHILS RELATIVE PERCENT: 0.4 %
BLOOD BANK DISPENSE STATUS: NORMAL
BLOOD BANK PRODUCT CODE: NORMAL
BPU ID: NORMAL
BUN BLDV-MCNC: 73 MG/DL (ref 7–20)
CALCIUM SERPL-MCNC: 8.1 MG/DL (ref 8.3–10.6)
CHLORIDE BLD-SCNC: 100 MMOL/L (ref 99–110)
CO2: 28 MMOL/L (ref 21–32)
CREAT SERPL-MCNC: 3 MG/DL (ref 0.8–1.3)
DESCRIPTION BLOOD BANK: NORMAL
EKG DIAGNOSIS: NORMAL
EKG Q-T INTERVAL: 460 MS
EKG QRS DURATION: 110 MS
EKG QTC CALCULATION (BAZETT): 447 MS
EKG R AXIS: -27 DEGREES
EKG T AXIS: 59 DEGREES
EKG VENTRICULAR RATE: 57 BPM
EOSINOPHILS ABSOLUTE: 0 K/UL (ref 0–0.6)
EOSINOPHILS RELATIVE PERCENT: 0.4 %
GFR SERPL CREATININE-BSD FRML MDRD: 20 ML/MIN/{1.73_M2}
GLUCOSE BLD-MCNC: 105 MG/DL (ref 70–99)
GLUCOSE BLD-MCNC: 121 MG/DL (ref 70–99)
GLUCOSE BLD-MCNC: 126 MG/DL (ref 70–99)
GLUCOSE BLD-MCNC: 71 MG/DL (ref 70–99)
GLUCOSE BLD-MCNC: 77 MG/DL (ref 70–99)
GLUCOSE BLD-MCNC: 89 MG/DL (ref 70–99)
HCT VFR BLD CALC: 19.7 % (ref 40.5–52.5)
HCT VFR BLD CALC: 23.9 % (ref 40.5–52.5)
HEMOGLOBIN: 6 G/DL (ref 13.5–17.5)
HEMOGLOBIN: 7.4 G/DL (ref 13.5–17.5)
LACTIC ACID, SEPSIS: 1.4 MMOL/L (ref 0.4–1.9)
LACTIC ACID: 0.6 MMOL/L (ref 0.4–2)
LYMPHOCYTES ABSOLUTE: 0.7 K/UL (ref 1–5.1)
LYMPHOCYTES RELATIVE PERCENT: 11.7 %
MCH RBC QN AUTO: 25.3 PG (ref 26–34)
MCHC RBC AUTO-ENTMCNC: 30.5 G/DL (ref 31–36)
MCV RBC AUTO: 83 FL (ref 80–100)
MONOCYTES ABSOLUTE: 0.5 K/UL (ref 0–1.3)
MONOCYTES RELATIVE PERCENT: 8.4 %
NEUTROPHILS ABSOLUTE: 4.7 K/UL (ref 1.7–7.7)
NEUTROPHILS RELATIVE PERCENT: 79.1 %
PDW BLD-RTO: 19.3 % (ref 12.4–15.4)
PERFORMED ON: ABNORMAL
PERFORMED ON: NORMAL
PERFORMED ON: NORMAL
PLATELET # BLD: 123 K/UL (ref 135–450)
PMV BLD AUTO: 8.4 FL (ref 5–10.5)
POTASSIUM REFLEX MAGNESIUM: 4.2 MMOL/L (ref 3.5–5.1)
PROCALCITONIN: 0.15 NG/ML (ref 0–0.15)
RBC # BLD: 2.37 M/UL (ref 4.2–5.9)
SODIUM BLD-SCNC: 139 MMOL/L (ref 136–145)
TROPONIN: 0.05 NG/ML
TROPONIN: 0.06 NG/ML
TROPONIN: 0.06 NG/ML
WBC # BLD: 5.9 K/UL (ref 4–11)

## 2022-12-24 PROCEDURE — 2580000003 HC RX 258: Performed by: NURSE PRACTITIONER

## 2022-12-24 PROCEDURE — 6360000002 HC RX W HCPCS: Performed by: INTERNAL MEDICINE

## 2022-12-24 PROCEDURE — 93010 ELECTROCARDIOGRAM REPORT: CPT | Performed by: INTERNAL MEDICINE

## 2022-12-24 PROCEDURE — 99223 1ST HOSP IP/OBS HIGH 75: CPT | Performed by: INTERNAL MEDICINE

## 2022-12-24 PROCEDURE — 1200000000 HC SEMI PRIVATE

## 2022-12-24 PROCEDURE — 85025 COMPLETE CBC W/AUTO DIFF WBC: CPT

## 2022-12-24 PROCEDURE — 83605 ASSAY OF LACTIC ACID: CPT

## 2022-12-24 PROCEDURE — 86850 RBC ANTIBODY SCREEN: CPT

## 2022-12-24 PROCEDURE — 2700000000 HC OXYGEN THERAPY PER DAY

## 2022-12-24 PROCEDURE — P9016 RBC LEUKOCYTES REDUCED: HCPCS

## 2022-12-24 PROCEDURE — 84484 ASSAY OF TROPONIN QUANT: CPT

## 2022-12-24 PROCEDURE — 36430 TRANSFUSION BLD/BLD COMPNT: CPT

## 2022-12-24 PROCEDURE — 6370000000 HC RX 637 (ALT 250 FOR IP): Performed by: INTERNAL MEDICINE

## 2022-12-24 PROCEDURE — 85018 HEMOGLOBIN: CPT

## 2022-12-24 PROCEDURE — 80048 BASIC METABOLIC PNL TOTAL CA: CPT

## 2022-12-24 PROCEDURE — 86901 BLOOD TYPING SEROLOGIC RH(D): CPT

## 2022-12-24 PROCEDURE — 9990000010 HC NO CHARGE VISIT

## 2022-12-24 PROCEDURE — 86900 BLOOD TYPING SEROLOGIC ABO: CPT

## 2022-12-24 PROCEDURE — 94761 N-INVAS EAR/PLS OXIMETRY MLT: CPT

## 2022-12-24 PROCEDURE — 36415 COLL VENOUS BLD VENIPUNCTURE: CPT

## 2022-12-24 PROCEDURE — 2580000003 HC RX 258: Performed by: INTERNAL MEDICINE

## 2022-12-24 PROCEDURE — 6370000000 HC RX 637 (ALT 250 FOR IP): Performed by: NURSE PRACTITIONER

## 2022-12-24 PROCEDURE — 87040 BLOOD CULTURE FOR BACTERIA: CPT

## 2022-12-24 PROCEDURE — 85014 HEMATOCRIT: CPT

## 2022-12-24 PROCEDURE — 86923 COMPATIBILITY TEST ELECTRIC: CPT

## 2022-12-24 RX ORDER — SODIUM CHLORIDE 0.9 % (FLUSH) 0.9 %
5-40 SYRINGE (ML) INJECTION PRN
Status: DISCONTINUED | OUTPATIENT
Start: 2022-12-24 | End: 2022-12-30 | Stop reason: HOSPADM

## 2022-12-24 RX ORDER — FUROSEMIDE 10 MG/ML
40 INJECTION INTRAMUSCULAR; INTRAVENOUS ONCE
Status: COMPLETED | OUTPATIENT
Start: 2022-12-24 | End: 2022-12-24

## 2022-12-24 RX ORDER — TAMSULOSIN HYDROCHLORIDE 0.4 MG/1
0.4 CAPSULE ORAL DAILY
Status: DISCONTINUED | OUTPATIENT
Start: 2022-12-24 | End: 2022-12-30 | Stop reason: HOSPADM

## 2022-12-24 RX ORDER — SODIUM CHLORIDE 0.9 % (FLUSH) 0.9 %
5-40 SYRINGE (ML) INJECTION EVERY 12 HOURS SCHEDULED
Status: DISCONTINUED | OUTPATIENT
Start: 2022-12-24 | End: 2022-12-30 | Stop reason: HOSPADM

## 2022-12-24 RX ORDER — HEPARIN SODIUM 5000 [USP'U]/ML
5000 INJECTION, SOLUTION INTRAVENOUS; SUBCUTANEOUS EVERY 8 HOURS SCHEDULED
Status: DISCONTINUED | OUTPATIENT
Start: 2022-12-24 | End: 2022-12-26

## 2022-12-24 RX ORDER — INSULIN LISPRO 100 [IU]/ML
0-4 INJECTION, SOLUTION INTRAVENOUS; SUBCUTANEOUS NIGHTLY
Status: DISCONTINUED | OUTPATIENT
Start: 2022-12-24 | End: 2022-12-30 | Stop reason: HOSPADM

## 2022-12-24 RX ORDER — ATORVASTATIN CALCIUM 40 MG/1
40 TABLET, FILM COATED ORAL DAILY
Status: DISCONTINUED | OUTPATIENT
Start: 2022-12-24 | End: 2022-12-30 | Stop reason: HOSPADM

## 2022-12-24 RX ORDER — POLYETHYLENE GLYCOL 3350 17 G/17G
17 POWDER, FOR SOLUTION ORAL DAILY
Status: DISCONTINUED | OUTPATIENT
Start: 2022-12-24 | End: 2022-12-24 | Stop reason: SDUPTHER

## 2022-12-24 RX ORDER — ASPIRIN 81 MG/1
162 TABLET ORAL DAILY
Status: DISCONTINUED | OUTPATIENT
Start: 2022-12-24 | End: 2022-12-30 | Stop reason: HOSPADM

## 2022-12-24 RX ORDER — MONTELUKAST SODIUM 10 MG/1
10 TABLET ORAL DAILY
Status: DISCONTINUED | OUTPATIENT
Start: 2022-12-24 | End: 2022-12-30 | Stop reason: HOSPADM

## 2022-12-24 RX ORDER — ACETAMINOPHEN 650 MG/1
650 SUPPOSITORY RECTAL EVERY 6 HOURS PRN
Status: DISCONTINUED | OUTPATIENT
Start: 2022-12-24 | End: 2022-12-30 | Stop reason: HOSPADM

## 2022-12-24 RX ORDER — ONDANSETRON 2 MG/ML
4 INJECTION INTRAMUSCULAR; INTRAVENOUS EVERY 6 HOURS PRN
Status: DISCONTINUED | OUTPATIENT
Start: 2022-12-24 | End: 2022-12-30 | Stop reason: HOSPADM

## 2022-12-24 RX ORDER — POLYETHYLENE GLYCOL 3350 17 G/17G
17 POWDER, FOR SOLUTION ORAL DAILY
Status: DISCONTINUED | OUTPATIENT
Start: 2022-12-24 | End: 2022-12-30 | Stop reason: HOSPADM

## 2022-12-24 RX ORDER — INSULIN LISPRO 100 [IU]/ML
0-4 INJECTION, SOLUTION INTRAVENOUS; SUBCUTANEOUS
Status: DISCONTINUED | OUTPATIENT
Start: 2022-12-24 | End: 2022-12-30 | Stop reason: HOSPADM

## 2022-12-24 RX ORDER — PANTOPRAZOLE SODIUM 40 MG/1
40 TABLET, DELAYED RELEASE ORAL DAILY
Status: DISCONTINUED | OUTPATIENT
Start: 2022-12-24 | End: 2022-12-30 | Stop reason: HOSPADM

## 2022-12-24 RX ORDER — POLYETHYLENE GLYCOL 3350 17 G/17G
17 POWDER, FOR SOLUTION ORAL DAILY PRN
Status: DISCONTINUED | OUTPATIENT
Start: 2022-12-24 | End: 2022-12-24 | Stop reason: SDUPTHER

## 2022-12-24 RX ORDER — ACETAMINOPHEN 325 MG/1
650 TABLET ORAL EVERY 6 HOURS PRN
Status: DISCONTINUED | OUTPATIENT
Start: 2022-12-24 | End: 2022-12-30 | Stop reason: HOSPADM

## 2022-12-24 RX ORDER — ONDANSETRON 4 MG/1
4 TABLET, ORALLY DISINTEGRATING ORAL EVERY 8 HOURS PRN
Status: DISCONTINUED | OUTPATIENT
Start: 2022-12-24 | End: 2022-12-30 | Stop reason: HOSPADM

## 2022-12-24 RX ORDER — SODIUM CHLORIDE 9 MG/ML
INJECTION, SOLUTION INTRAVENOUS PRN
Status: DISCONTINUED | OUTPATIENT
Start: 2022-12-24 | End: 2022-12-30 | Stop reason: HOSPADM

## 2022-12-24 RX ORDER — DOCUSATE SODIUM 100 MG/1
100 CAPSULE, LIQUID FILLED ORAL DAILY
Status: DISCONTINUED | OUTPATIENT
Start: 2022-12-24 | End: 2022-12-30 | Stop reason: HOSPADM

## 2022-12-24 RX ORDER — LEVALBUTEROL 1.25 MG/.5ML
1.25 SOLUTION, CONCENTRATE RESPIRATORY (INHALATION) EVERY 8 HOURS PRN
Status: DISCONTINUED | OUTPATIENT
Start: 2022-12-24 | End: 2022-12-30 | Stop reason: HOSPADM

## 2022-12-24 RX ORDER — FERROUS SULFATE TAB EC 324 MG (65 MG FE EQUIVALENT) 324 (65 FE) MG
325 TABLET DELAYED RESPONSE ORAL DAILY
Status: DISCONTINUED | OUTPATIENT
Start: 2022-12-24 | End: 2022-12-30 | Stop reason: HOSPADM

## 2022-12-24 RX ORDER — AMIODARONE HYDROCHLORIDE 200 MG/1
100 TABLET ORAL DAILY
Status: DISCONTINUED | OUTPATIENT
Start: 2022-12-24 | End: 2022-12-30 | Stop reason: HOSPADM

## 2022-12-24 RX ADMIN — FUROSEMIDE 40 MG: 10 INJECTION, SOLUTION INTRAMUSCULAR; INTRAVENOUS at 13:51

## 2022-12-24 RX ADMIN — AMIODARONE HYDROCHLORIDE 100 MG: 200 TABLET ORAL at 08:25

## 2022-12-24 RX ADMIN — DEXTROSE MONOHYDRATE 250 ML: 100 INJECTION, SOLUTION INTRAVENOUS at 00:53

## 2022-12-24 RX ADMIN — TAMSULOSIN HYDROCHLORIDE 0.4 MG: 0.4 CAPSULE ORAL at 08:26

## 2022-12-24 RX ADMIN — ATORVASTATIN CALCIUM 40 MG: 40 TABLET, FILM COATED ORAL at 08:29

## 2022-12-24 RX ADMIN — INSULIN HUMAN 10 UNITS: 100 INJECTION, SOLUTION PARENTERAL at 00:54

## 2022-12-24 RX ADMIN — ASPIRIN 162 MG: 81 TABLET, COATED ORAL at 08:26

## 2022-12-24 RX ADMIN — MONTELUKAST 10 MG: 10 TABLET, FILM COATED ORAL at 08:29

## 2022-12-24 RX ADMIN — FERROUS SULFATE TAB EC 324 MG (65 MG FE EQUIVALENT) 325 MG: 324 (65 FE) TABLET DELAYED RESPONSE at 08:26

## 2022-12-24 RX ADMIN — Medication 10 ML: at 21:42

## 2022-12-24 RX ADMIN — HEPARIN SODIUM 5000 UNITS: 5000 INJECTION INTRAVENOUS; SUBCUTANEOUS at 05:43

## 2022-12-24 RX ADMIN — DOCUSATE SODIUM 100 MG: 100 CAPSULE, LIQUID FILLED ORAL at 08:29

## 2022-12-24 RX ADMIN — PANTOPRAZOLE SODIUM 40 MG: 40 TABLET, DELAYED RELEASE ORAL at 08:29

## 2022-12-24 RX ADMIN — HEPARIN SODIUM 5000 UNITS: 5000 INJECTION INTRAVENOUS; SUBCUTANEOUS at 21:41

## 2022-12-24 RX ADMIN — HEPARIN SODIUM 5000 UNITS: 5000 INJECTION INTRAVENOUS; SUBCUTANEOUS at 13:51

## 2022-12-24 ASSESSMENT — PAIN SCALES - GENERAL
PAINLEVEL_OUTOF10: 0
PAINLEVEL_OUTOF10: 0

## 2022-12-24 NOTE — PROGRESS NOTES
Pt arrived to floor from ER at 0155 via stretcher to [address] with family. Pt oriented to room, call light, policies and procedures, the menu and ordering. Call light within reach. Bed in lowest position, bed alarm on, and wheels locked. Pt verbalized understanding. No complaints, questions or concerns at this time.

## 2022-12-24 NOTE — H&P
[facility] Internal Medicine  History and Physical      CHIEF COMPLAINT: I am not sure what happened    History of Present Illness: This is an 76-year-old white male with a history of CKD stage IV, diabetes, hypertension, sleep apnea and chronic anemia who presented to the emergency room yesterday after an episode of weakness and 2 episodes of passing out per his report. Patient states that he was sitting on a chair working on his computer as he adjusted position the chair slid out from him and he fell to the ground. The patient was unable to get up on his own and he called his brother and nephews to come over help him up. The patient states that they were unable to get him up and he reports that he was told that he passed out twice while he was being assisted. Ambulance was called and the patient was transported to [facility] emergency room where he was found to have a heart rate of 45 and a blood pressure was 80 systolic. Patient was given a dose of atropine and his blood pressure and heart rate has been fine since. Verapamil has been placed on hold. Otherwise patient states he has been doing okay although his weight is up approximately 10 pounds from his last outpatient weight when he was seen in the office. Patient denies chest pain or an increase in his shortness of breath. No PND or orthopnea. He does wear oxygen chronically and his baseline dyspnea has not changed. No dysuria. No nausea or vomiting or diarrhea. Mild cough with some sputum. No fever or chills. Patient was treated for multifocal pneumonia during his most recent admission in addition to his anasarca. Patient's hemoglobin has dropped overnight and he is currently getting 1 unit of packed red blood cells. PT/OT evaluation is on hold until transfusion is complete.       Past Medical History:   Diagnosis Date    Allergic rhinitis     Asthma     Atrial fibrillation ([facility] [address]     Carotid artery stenosis     Chronic kidney disease COPD (chronic obstructive pulmonary disease) (HCC)     CPAP (continuous positive airway pressure) dependence     ESBL (extended spectrum beta-lactamase) producing bacteria infection 12/26/2018    urine    Hyperlipidemia     Hypertension     Iron deficiency anemia     Obstructive sleep apnea     Type II or unspecified type diabetes mellitus without mention of complication, not stated as uncontrolled          Past Surgical History:   Procedure Laterality Date    CATARACT REMOVAL  2/2012    bilateral    COLONOSCOPY  7/10/2009    diverticulosis    hemorrhoids    CT BONE MARROW BIOPSY  11/1/2022    CT BONE MARROW BIOPSY 11/1/2022 [facility] CT    GALLBLADDER SURGERY  1981    PAIN MANAGEMENT PROCEDURE Right 10/20/2021    COOLIEF RADIOFREQUENCY ABLATION - RIGHT KNEE performed by Kalyan Stephen MD at [address] Left 12/8/2021    [address] - LEFT KNEE performed by Kalyan Stephen MD at [address] [place] [address]  7-2005    7/10/2009    normal       Medications Prior to Admission:    Medications Prior to Admission: torsemide (DEMADEX) 10 MG tablet, Take 1 tablet by mouth daily (Patient taking differently: Take 10 mg by mouth in the morning and at bedtime)  insulin glargine (BASAGLAR KWIKPEN) 100 UNIT/ML injection pen, Inject 5 Units into the skin nightly  levalbuterol (XOPENEX) 1.25 MG/3ML nebulizer solution, USE 1 VIAL VIA NEBULIZER FOUR TIMES DAILY  amiodarone (CORDARONE) 200 MG tablet, Take 0.5 tablets by mouth daily  INSULIN SYRINGE .5CC/29G 29G X 1/2\" 0.5 ML MISC, 1 each by Does not apply route daily  albuterol sulfate HFA (PROVENTIL;VENTOLIN;PROAIR) 108 (90 Base) MCG/ACT inhaler, INHALE 2 PUFFS BY MOUTH EVERY 4 HOURS AS NEEDED FOR WHEEZING  montelukast (SINGULAIR) 10 MG tablet, TAKE 1 TABLET BY MOUTH EVERY DAY  [employer] PURELAX 17 GM/SCOOP powder, TAKE 17G BY MOUTH DAILY MIX WITH 8 OZ OF WATER  pantoprazole (PROTONIX) 40 MG tablet, TAKE 1 TABLET BY MOUTH EVERY DAY  B-D UF III MINI PEN NEEDLES 31G X 5 MM MISC, USE AS DIRECTED 3 TIMES A DAY  atorvastatin (LIPITOR) 40 MG tablet, TAKE 1 TABLET BY MOUTH EVERY DAY  alfuzosin (UROXATRAL) 10 MG extended release tablet, TAKE 1 TABLET BY MOUTH EVERY DAY  LINZESS 145 MCG capsule, TAKE 1 CAPSULE BY MOUTH EVERY DAY IN THE MORNING BEFORE BREAKFAST  verapamil (CALAN SR) 240 MG extended release tablet, TAKE 1 TABLET BY MOUTH TWICE A DAY  ACCU-CHEK GUIDE strip, TEST AS DIRECTED 3 TIMES A DAY  Accu-Chek FastClix Lancets MISC, USE TO TEST 3 TIMES A DAY DX CODE E11.9  ZINC PO, Take by mouth  CPAP Machine MISC, by Does not apply route  Ascorbic Acid (SUPER C COMPLEX PO), Take by mouth daily  Multiple Vitamins-Minerals (MULTIVITAMIN ADULT PO), Take by mouth daily  Lactobacillus Rhamnosus, GG, ([employer] PROBIOTIC, LACTOBACILLUS,) CAPS, TAKE 1 CAPSULE BY MOUTH 2 TIMES DAILY (WITH MEALS)  docusate sodium (COLACE) 100 MG capsule, Take by mouth daily  Cyanocobalamin (VITAMIN B-12 PO), Take 500 mcg by mouth every other day   Cholecalciferol (VITAMIN D3 PO), Take 2,000 Units by mouth daily   aspirin EC 81 MG EC tablet, Take 2 tablets by mouth daily. ferrous sulfate 325 (65 FE) MG tablet, Take 325 mg by mouth daily     Allergies:    Hytrin [terazosin hcl], Penicillins, Shrimp flavor, Sulfa antibiotics, Sulfasalazine, Tekturna [aliskiren fumarate], Vancomycin, and Ciprofloxacin    Social History:    reports that he quit smoking about 35 years ago. His smoking use included cigarettes. He started smoking about 65 years ago. He has a 9.00 pack-year smoking history. He has never used smokeless tobacco. He reports that he does not drink alcohol and does not use drugs.     Family History:   family history includes Arthritis in his paternal grandmother; Diabetes in his brother and paternal grandmother; Heart Disease in his mother; High Blood Pressure in his brother and father; Sleep Apnea in his brother; Stroke in his mother; Vision Loss in his mother. REVIEW OF SYSTEMS:  As above in the HPI, otherwise negative    PHYSICAL EXAM:    Vitals:  BP (!) 126/58   Pulse 75   Temp 97.7 °F (36.5 °C) (Oral)   Resp 18   Ht 5' 8\" (1.727 m)   Wt 240 lb 8.4 oz (109.1 kg)   SpO2 99%   BMI 36.57 kg/m²   Temp  Av.5 °F (35.8 °C)  Min: 93.6 °F (34.2 °C)  Max: 97.7 °F (36.5 °C)    General:  Awake, alert, oriented X 3. Well developed, well nourished. No apparent distress. Morbidly Obese  HEENT:  Normocephalic, atraumatic. Pupils equal, round, reactive to light. No scleral icterus. No conjunctival injection. Normal lips, teeth, and gums. No nasal discharge. Neck:  Supple. No carotid bruit, carotid upstroke normal.  Heart:  RRR, no murmurs, gallops, or rubs. Lungs:  CTA bilaterally, bilat symmetrical expansion, no wheeze, rales, or rhonchi. Respirations easy. Breath sounds diminished in the bases bilaterally. Abdomen: Bowel sounds present, normoactive. Soft, nontender/nondistended. No masses, no peritoneal signs. No significant flank edema or hip edema noted. Extremities:  No clubbing, cyanosis. Patient has some 1-2+ pretibial edema bilaterally and similar amounts of edema in his thighs. Skin:  Warm and dry, no open lesions or rash. Neuro:  Cranial nerves 2-12 intact, no focal deficits. Able to move all extremities.     LABS reviewed:    Recent Results (from the past 24 hour(s))   CBC with Auto Differential    Collection Time: 22  9:17 PM   Result Value Ref Range    WBC 5.3 4.0 - 11.0 K/uL    RBC 2.94 (L) 4.20 - 5.90 M/uL    Hemoglobin 7.4 (L) 13.5 - 17.5 g/dL    Hematocrit 25.1 (L) 40.5 - 52.5 %    MCV 85.5 80.0 - 100.0 fL    MCH 25.3 (L) 26.0 - 34.0 pg    MCHC 29.6 (L) 31.0 - 36.0 g/dL    RDW 19.5 (H) 12.4 - 15.4 %    Platelets 691 (L) 963 - 450 K/uL    MPV 8.2 5.0 - 10.5 fL    Neutrophils % 84.7 %    Lymphocytes % 8.1 %    Monocytes % 6.6 %    Eosinophils % 0.3 %    Basophils % 0.3 %    Neutrophils Absolute 4.5 1.7 - 7.7 K/uL    Lymphocytes Absolute 0.4 (L) 1.0 - 5.1 K/uL    Monocytes Absolute 0.3 0.0 - 1.3 K/uL    Eosinophils Absolute 0.0 0.0 - 0.6 K/uL    Basophils Absolute 0.0 0.0 - 0.2 K/uL   CMP w/ Reflex to MG    Collection Time: 12/23/22  9:17 PM   Result Value Ref Range    Sodium 141 136 - 145 mmol/L    Potassium reflex Magnesium 6.0 (HH) 3.5 - 5.1 mmol/L    Chloride 103 99 - 110 mmol/L    CO2 24 21 - 32 mmol/L    Anion Gap 14 3 - 16    Glucose 145 (H) 70 - 99 mg/dL    BUN 74 (H) 7 - 20 mg/dL    Creatinine 2.8 (H) 0.8 - 1.3 mg/dL    Est, Glom Filt Rate 22 (A) >60    Calcium 8.5 8.3 - 10.6 mg/dL    Total Protein 6.1 (L) 6.4 - 8.2 g/dL    Albumin 3.2 (L) 3.4 - 5.0 g/dL    Albumin/Globulin Ratio 1.1 1.1 - 2.2    Total Bilirubin 0.3 0.0 - 1.0 mg/dL    Alkaline Phosphatase 92 40 - 129 U/L    ALT 56 (H) 10 - 40 U/L    AST 54 (H) 15 - 37 U/L   Troponin    Collection Time: 12/23/22  9:17 PM   Result Value Ref Range    Troponin 0.05 (H) <0.01 ng/mL   Brain Natriuretic Peptide    Collection Time: 12/23/22  9:17 PM   Result Value Ref Range    Pro-BNP 1,140 (H) 0 - 449 pg/mL   COVID-19, Rapid    Collection Time: 12/23/22  9:17 PM    Specimen: Nasopharyngeal Swab   Result Value Ref Range    SARS-CoV-2, NAAT Not Detected Not Detected   Rapid influenza A/B antigens    Collection Time: 12/23/22  9:17 PM    Specimen: Nasopharyngeal   Result Value Ref Range    Rapid Influenza A Ag Negative Negative    Rapid Influenza B Ag Negative Negative   Procalcitonin    Collection Time: 12/23/22  9:17 PM   Result Value Ref Range    Procalcitonin 0.15 0.00 - 0.15 ng/mL   EKG 12 Lead    Collection Time: 12/23/22  9:22 PM   Result Value Ref Range    Ventricular Rate 57 BPM    QRS Duration 110 ms    Q-T Interval 460 ms    QTc Calculation (Bazett) 447 ms    R Axis -27 degrees    T Axis 59 degrees    Diagnosis       Junctional rhythm  quite possibly, but  Poor data quality, interpretation may be adversely affectedLow voltage QRSIncomplete right bundle  branch blockNonspecific ST and T wave abnormalityAbnormal ECGWhen compared with ECG of 15-NOV-2022 20:35,Poor data quality ekgs, butNo significant change evidentrepeat ekg suggested if rhythm diagnosis uncertain by other meansConfirmed by [facility] MD, [address] ([id number]) on 12/24/2022 8:32:40 AM     POCT Glucose    Collection Time: 12/23/22 10:45 PM   Result Value Ref Range    POC Glucose 129 (H) 70 - 99 mg/dl    Performed on ACCU-CHEK    POCT Glucose    Collection Time: 12/24/22  2:03 AM   Result Value Ref Range    POC Glucose 71 70 - 99 mg/dl    Performed on ACCU-CHEK    Lactate, Sepsis    Collection Time: 12/24/22  3:32 AM   Result Value Ref Range    Lactic Acid, Sepsis 1.4 0.4 - 1.9 mmol/L   Basic Metabolic Panel w/ Reflex to MG    Collection Time: 12/24/22  3:32 AM   Result Value Ref Range    Sodium 139 136 - 145 mmol/L    Potassium reflex Magnesium 4.2 3.5 - 5.1 mmol/L    Chloride 100 99 - 110 mmol/L    CO2 28 21 - 32 mmol/L    Anion Gap 11 3 - 16    Glucose 77 70 - 99 mg/dL    BUN 73 (H) 7 - 20 mg/dL    Creatinine 3.0 (H) 0.8 - 1.3 mg/dL    Est, Glom Filt Rate 20 (A) >60    Calcium 8.1 (L) 8.3 - 10.6 mg/dL   CBC with Auto Differential    Collection Time: 12/24/22  3:32 AM   Result Value Ref Range    WBC 5.9 4.0 - 11.0 K/uL    RBC 2.37 (L) 4.20 - 5.90 M/uL    Hemoglobin 6.0 (LL) 13.5 - 17.5 g/dL    Hematocrit 19.7 (LL) 40.5 - 52.5 %    MCV 83.0 80.0 - 100.0 fL    MCH 25.3 (L) 26.0 - 34.0 pg    MCHC 30.5 (L) 31.0 - 36.0 g/dL    RDW 19.3 (H) 12.4 - 15.4 %    Platelets 967 (L) 875 - 450 K/uL    MPV 8.4 5.0 - 10.5 fL    Neutrophils % 79.1 %    Lymphocytes % 11.7 %    Monocytes % 8.4 %    Eosinophils % 0.4 %    Basophils % 0.4 %    Neutrophils Absolute 4.7 1.7 - 7.7 K/uL    Lymphocytes Absolute 0.7 (L) 1.0 - 5.1 K/uL    Monocytes Absolute 0.5 0.0 - 1.3 K/uL    Eosinophils Absolute 0.0 0.0 - 0.6 K/uL    Basophils Absolute 0.0 0.0 - 0.2 K/uL   Troponin    Collection Time: 12/24/22  3:32 AM   Result Value Ref Range Troponin 0.05 (H) <0.01 ng/mL   Lactic Acid    Collection Time: 12/24/22  5:54 AM   Result Value Ref Range    Lactic Acid 0.6 0.4 - 2.0 mmol/L   TYPE AND SCREEN    Collection Time: 12/24/22  5:54 AM   Result Value Ref Range    ABO/Rh CANCELED     Antibody Screen NEG    PREPARE RBC (CROSSMATCH), 1 Units    Collection Time: 12/24/22  5:54 AM   Result Value Ref Range    Product Code Blood Bank [id number]     Description Blood Bank Red Blood Cells, Leuko-reduced     Unit Number [id number]     Dispense Status Blood Bank issued    ABO/RH    Collection Time: 12/24/22  5:54 AM   Result Value Ref Range    ABO/Rh A POS    POCT Glucose    Collection Time: 12/24/22  6:18 AM   Result Value Ref Range    POC Glucose 89 70 - 99 mg/dl    Performed on ACCU-CHEK        ASSESSMENT:      Principal Problem:    Syncope and collapse  Active Problems:    Hyperkalemia    Bradycardia    Hypotension, unspecified    Type 2 diabetes mellitus with stage 4 chronic kidney disease, with long-term current use of insulin (HCC)    Chronic anemia    Abnormal CXR    Elevated brain natriuretic peptide (BNP) level    Weight gain    Elevated troponin    Chronic respiratory failure with hypoxia (HCC)    JOANN (obstructive sleep apnea)    Chronic GERD    Hyperlipidemia    Paroxysmal atrial fibrillation (HCC)    Stage 3b chronic kidney disease (HCC)    Essential hypertension    B12 deficiency    Moderate COPD (chronic obstructive pulmonary disease) (HCC)    Slow transit constipation    Weakness    PLAN:    Syncopal events are likely related to his bradycardia and low blood pressure. Verapamil has been placed on hold. His hyperkalemia has been treated and his potassium is better this morning. This will be monitored. He remains on telemetry without arrhythmia. Echocardiogram performed in the last few weeks showed a normal ejection fraction. No need to repeat echocardiogram at this time. The patient is being transfused 1 unit of blood.   Monitor blood counts, he has been getting erythropoietin injections from hematology. Given his edema, he has been given 1 dose of IV Lasix today. Monitor weights, blood pressures and renal function prior to giving more Lasix tomorrow. Consider nephrology consultation. Home medications reviewed. Torsemide on hold since IV Lasix given. Verapamil currently on hold as noted above. Reinstitute slowly if needed. Procalcitonin is low suggesting chest x-ray findings are related to fluid and not infection. White blood cell count is normal and the patient is afebrile further speaking against infection.       Ana Ibarra MD  10:54 AM  12/24/2022

## 2022-12-24 NOTE — ED PROVIDER NOTES
I PERSONALLY SAW THE PATIENT AND PERFORMED A SUBSTANTIVE PORTION OF THE VISIT INCLUDING ALL ASPECTS OF THE MEDICAL DECISION MAKING PROCESS. [address]      Pt Name: Helen Barrios  MRN: [id number]  Mayra 1940  Date of evaluation: 12/23/2022  Provider: Yolie Weeks MD    CHIEF COMPLAINT       Chief Complaint   Patient presents with    Fall     Pt from home. Pt stood up and chair slid out from under him. Pt sat on ground on buttocks. Pt denies any injuries or pain. Pt states that he started getting short of breath after fall. Hx of anemia, diabetes, HBP    Shortness of Breath       HISTORY OF PRESENT ILLNESS    Gilmar Terry is a 79 y.o. male who presents to the emergency department with fall. Patient presents from home with fall. Sit up in chair slid out from under him. Patient sat on the ground on his buttocks. Denies injuries or pain. Does endorse shortness of breath. History of congestive heart failure and CKD. Recent pneumonia treated with antibiotics. Course was finished. No fevers. Does have a chronic cough. No chest pain. No other associated symptoms. Positive for leg swelling and weight gain. Nursing Notes were reviewed. Including nursing noted for FM, Surgical History, Past Medical History, Social History, vitals, and allergies; agree with all. REVIEW OF SYSTEMS       Review of Systems   Constitutional:  Negative for diaphoresis, fever and unexpected weight change. HENT:  Negative for congestion, dental problem, drooling, ear discharge and ear pain. Eyes:  Negative for photophobia, pain, discharge, redness, itching and visual disturbance. Respiratory:  Positive for cough and shortness of breath. Negative for apnea, choking, chest tightness, wheezing and stridor. Cardiovascular:  Negative for chest pain, palpitations and leg swelling.    Gastrointestinal:  Negative for abdominal distention, abdominal pain, anal bleeding, blood in stool, constipation, diarrhea, nausea, rectal pain and vomiting. Endocrine: Negative for cold intolerance and heat intolerance. Genitourinary:  Negative for decreased urine volume, testicular pain and urgency. Musculoskeletal:  Negative for arthralgias and back pain. Skin:  Negative for color change and pallor. Neurological:  Positive for weakness. Negative for tremors and facial asymmetry. Hematological:  Negative for adenopathy. Does not bruise/bleed easily. Psychiatric/Behavioral:  Negative for agitation, behavioral problems, confusion and decreased concentration. Except as noted above the remainder of the review of systems was reviewed and negative.      PAST MEDICAL HISTORY     Past Medical History:   Diagnosis Date    Allergic rhinitis     Asthma     Atrial fibrillation ([facility] [address]     Carotid artery stenosis     Chronic kidney disease     COPD (chronic obstructive pulmonary disease) ([facility])     CPAP (continuous positive airway pressure) dependence     ESBL (extended spectrum beta-lactamase) producing bacteria infection 12/26/2018    urine    Hyperlipidemia     Hypertension     Iron deficiency anemia     Obstructive sleep apnea     Type II or unspecified type diabetes mellitus without mention of complication, not stated as uncontrolled        SURGICAL HISTORY       Past Surgical History:   Procedure Laterality Date    CATARACT REMOVAL  2/2012    bilateral    COLONOSCOPY  7/10/2009    diverticulosis    hemorrhoids    CT BONE MARROW BIOPSY  11/1/2022    CT BONE MARROW BIOPSY 11/1/2022 [facility] CT    GALLBLADDER SURGERY  1981    PAIN MANAGEMENT PROCEDURE Right 10/20/2021    COOLIEF RADIOFREQUENCY ABLATION - RIGHT KNEE performed by Manish Nathan MD at [address] Left 12/8/2021    [facility] 24 - LEFT KNEE performed by Manish Nathan MD at [facility] 7-2005    7/10/2009    normal       CURRENT MEDICATIONS       Previous Medications    ACCU-CHEK FASTCLIX LANCETS MISC    USE TO TEST 3 TIMES A DAY DX CODE E11.9    ACCU-CHEK GUIDE STRIP    TEST AS DIRECTED 3 TIMES A DAY    ALBUTEROL SULFATE HFA (PROVENTIL;VENTOLIN;PROAIR) 108 (90 BASE) MCG/ACT INHALER    INHALE 2 PUFFS BY MOUTH EVERY 4 HOURS AS NEEDED FOR WHEEZING    ALFUZOSIN (UROXATRAL) 10 MG EXTENDED RELEASE TABLET    TAKE 1 TABLET BY MOUTH EVERY DAY    AMIODARONE (CORDARONE) 200 MG TABLET    Take 0.5 tablets by mouth daily    ASCORBIC ACID (SUPER C COMPLEX PO)    Take by mouth daily    ASPIRIN EC 81 MG EC TABLET    Take 2 tablets by mouth daily.    ATORVASTATIN (LIPITOR) 40 MG TABLET    TAKE 1 TABLET BY MOUTH EVERY DAY    B-D UF III MINI PEN NEEDLES 31G X 5 MM MISC    USE AS DIRECTED 3 TIMES A DAY    CHOLECALCIFEROL (VITAMIN D3 PO)    Take 2,000 Units by mouth daily     CPAP MACHINE MISC    by Does not apply route    [employer] PURELAX 17 GM/SCOOP POWDER    TAKE 17G BY MOUTH DAILY MIX WITH 8 OZ OF WATER    CYANOCOBALAMIN (VITAMIN B-12 PO)    Take 500 mcg by mouth every other day     DOCUSATE SODIUM (COLACE) 100 MG CAPSULE    Take by mouth daily    FERROUS SULFATE 325 (65 FE) MG TABLET    Take 325 mg by mouth daily     INSULIN GLARGINE (BASAGLAR KWIKPEN) 100 UNIT/ML INJECTION PEN    Inject 5 Units into the skin nightly    INSULIN SYRINGE .5CC/29G 29G X 1/2\" 0.5 ML MISC    1 each by Does not apply route daily    LACTOBACILLUS RHAMNOSUS, GG, ([employer] PROBIOTIC, LACTOBACILLUS,) CAPS    TAKE 1 CAPSULE BY MOUTH 2 TIMES DAILY (WITH MEALS)    LEVALBUTEROL (XOPENEX) 1.25 MG/3ML NEBULIZER SOLUTION    USE 1 VIAL VIA NEBULIZER FOUR TIMES DAILY    LINZESS 145 MCG CAPSULE    TAKE 1 CAPSULE BY MOUTH EVERY DAY IN THE MORNING BEFORE BREAKFAST    MONTELUKAST (SINGULAIR) 10 MG TABLET    TAKE 1 TABLET BY MOUTH EVERY DAY    MULTIPLE VITAMINS-MINERALS (MULTIVITAMIN ADULT PO)    Take by mouth daily    PANTOPRAZOLE (PROTONIX) 40 MG TABLET    TAKE  1 TABLET BY MOUTH EVERY DAY    TORSEMIDE (DEMADEX) 10 MG TABLET    Take 1 tablet by mouth daily    VERAPAMIL (CALAN SR) 240 MG EXTENDED RELEASE TABLET    TAKE 1 TABLET BY MOUTH TWICE A DAY    ZINC PO    Take by mouth       ALLERGIES     Hytrin [terazosin hcl], Penicillins, Shrimp flavor, Sulfa antibiotics, Sulfasalazine, Tekturna [aliskiren fumarate], Vancomycin, and Ciprofloxacin    FAMILY HISTORY        Family History   Problem Relation Age of Onset    Heart Disease Mother     Stroke Mother     Vision Loss Mother     High Blood Pressure Father     High Blood Pressure Brother     Diabetes Brother     Sleep Apnea Brother     Arthritis Paternal Grandmother     Diabetes Paternal Grandmother        SOCIAL HISTORY       Social History     Socioeconomic History    Marital status:      Spouse name: None    Number of children: 5    Years of education: None    Highest education level: None   Occupational History    Occupation: retired   Tobacco Use    Smoking status: Former     Packs/day: 0.50     Years: 18.00     Pack years: 9.00     Types: Cigarettes     Start date: 1958     Quit date: 1988     Years since quittin.0    Smokeless tobacco: Never   Vaping Use    Vaping Use: Never used   Substance and Sexual Activity    Alcohol use: No     Alcohol/week: 0.0 standard drinks    Drug use: No    Sexual activity: Yes     Partners: Female     Social Determinants of Health     Financial Resource Strain: Low Risk     Difficulty of Paying Living Expenses: Not hard at all   Food Insecurity: No Food Insecurity    Worried About Running Out of Food in the Last Year: Never true    Ran Out of Food in the Last Year: Never true       PHYSICAL EXAM       ED Triage Vitals [22]   BP Temp Temp src Heart Rate Resp SpO2 Height Weight   (!) 80/40 -- -- (!) 45 17 96 % 5' 8\" (1.727 m) 243 lb 13.3 oz (110.6 kg)       Physical Exam  Vitals and nursing note reviewed.    Constitutional:       General: He is not in acute distress. Appearance: He is well-developed. He is not diaphoretic. HENT:      Head: Normocephalic and atraumatic. Eyes:      General:         Right eye: No discharge. Left eye: No discharge. Pupils: Pupils are equal, round, and reactive to light. Neck:      Thyroid: No thyromegaly. Trachea: No tracheal deviation. Cardiovascular:      Rate and Rhythm: Normal rate and regular rhythm. Heart sounds: No murmur heard. Pulmonary:      Breath sounds: No wheezing or rales. Chest:      Chest wall: No tenderness. Abdominal:      General: There is no distension. Palpations: Abdomen is soft. There is no mass. Tenderness: There is no abdominal tenderness. There is no guarding or rebound. Musculoskeletal:         General: No tenderness or deformity. Normal range of motion. Cervical back: Normal range of motion. Right lower leg: Edema present. Left lower leg: Edema present. Skin:     General: Skin is warm. Coloration: Skin is not pale. Findings: No erythema or rash. Neurological:      Mental Status: He is alert. Cranial Nerves: No cranial nerve deficit. Motor: No abnormal muscle tone. Coordination: Coordination normal.       DIAGNOSTIC RESULTS     EKG: All EKG's are interpreted by the Emergency Department Physician who either signs or Co-signs this chart in the absence of acardiologist.    EKG bradycardia nonspecific EKG does not    RADIOLOGY:   Non-plain film images such as CT, Ultrasoundand MRI are read by the radiologist. Plain radiographic images are visualized and preliminarily interpreted by the emergency physician with the below findings:    Impression   1. Extensive airspace disease throughout the right lung, left lung base. Interval improvement is noted within the right basilar airspace disease. Differential considerations would include multifocal pneumonia versus   coalescing edema.   Follow-up imaging is recommended to ensure complete   clearing   2.  Borderline cardiomegaly     ED BEDSIDE ULTRASOUND:   Performed by ED Physician - none    LABS:  Labs Reviewed   CBC WITH AUTO DIFFERENTIAL - Abnormal; Notable for the following components:       Result Value    RBC 2.94 (*)     Hemoglobin 7.4 (*)     Hematocrit 25.1 (*)     MCH 25.3 (*)     MCHC 29.6 (*)     RDW 19.5 (*)     Platelets 340 (*)     Lymphocytes Absolute 0.4 (*)     All other components within normal limits   COMPREHENSIVE METABOLIC PANEL W/ REFLEX TO MG FOR LOW K - Abnormal; Notable for the following components:    Potassium reflex Magnesium 6.0 (*)     Glucose 145 (*)     BUN 74 (*)     Creatinine 2.8 (*)     Est, Glom Filt Rate 22 (*)     Total Protein 6.1 (*)     Albumin 3.2 (*)     ALT 56 (*)     AST 54 (*)     All other components within normal limits    Narrative:     Gary De La Rosa tel. [phone],  Chemistry results called to and read back by Hunter Clifford RN, 12/23/2022  22:04, by [facility]   TROPONIN - Abnormal; Notable for the following components:    Troponin 0.05 (*)     All other components within normal limits    Narrative:     Gary Ortiztune tel. [phone],  Chemistry results called to and read back by Hunter Clifford RN, 12/23/2022  22:04, by [facility] [id number] - Abnormal; Notable for the following components:    Pro-BNP 1,140 (*)     All other components within normal limits    Narrative:     Gary Ortiztune tel. [phone],  Chemistry results called to and read back by Hunter Clifford RN, 12/23/2022  22:04, by [facility]   POCT GLUCOSE - Abnormal; Notable for the following components:    POC Glucose 129 (*)     All other components within normal limits   COVID-19, RAPID   RAPID INFLUENZA A/B ANTIGENS   CULTURE, BLOOD 2   CULTURE, BLOOD 1   LACTATE, SEPSIS   LACTATE, SEPSIS   PROCALCITONIN   POCT GLUCOSE   POCT GLUCOSE   POCT GLUCOSE   POCT GLUCOSE   POCT GLUCOSE   POCT GLUCOSE   POCT GLUCOSE       All other labs were withinnormal range or not returned as of this dictation. EMERGENCY DEPARTMENT COURSE and DIFFERENTIAL DIAGNOSIS/MDM:     PMH, Surgical Hx, FH, Social Hx reviewed by myself (ETOH usage, Tobacco usage, Drug usage reviewed by myself, no pertinent Hx)- No Pertinent Hx     Old records were reviewed by me     MDM 79year-old with fall. Positive for dyspnea. Found to be hyperkalemic and have an DEISY. Diuresis initiated as patient has a 15 pound weight gain. This will hopefully help patient's DEISY. Elevated troponin type II. Bradycardia resolved. Hyperkalemia cocktail given. Admission to the hospitalist.  Labs-   Diagnostic findings  Medications  Consults  Disposition Admission    I PERSONALLY SAW THE PATIENT AND PERFORMED A SUBSTANTIVE PORTION OF THE VISIT INCLUDING ALL ASPECTS OF THE MEDICAL DECISION MAKING PROCESS. The primary clinician of record [employer]   Total Critical Caretime was 49 minutes, excluding separately reportable procedures. There was a high probability of clinically significant/life threatening deterioration in the patient's condition which required my urgent intervention. CRITICAL CARE  I personally saw the patient and independently provided 49 minutes of non-concurrent critical care out of the total shared critical care time provided. This excludes seperately billable procedures. Critical care time was provided for patient as above that required close evaluation and/or intervention with concern for potential patient decompensation. PROCEDURES:  Unlessotherwise noted below, none    FINAL IMPRESSION      1. Fall, initial encounter    2. Dyspnea, unspecified type    3. Hyperkalemia    4. DEISY (acute kidney injury) ([facility] [address]    5. Elevated troponin    6.  Bradycardia          DISPOSITION/PLAN   DISPOSITION Decision To Admit 12/23/2022 10:50:41 PM    (Please note that portions ofthis note were completed with a voice recognition program.  Efforts were made to edit the dictations but occasionally words are mis-transcribed.)    Usman Church MD(electronically signed)  Attending Emergency Physician        Usman Church MD  12/23/22 [id number]

## 2022-12-24 NOTE — ED NOTES
Pt heart rate at 45. MD notified. MD gave verbal order for atropine.       Andrea Jeffries RN  12/23/22 2051

## 2022-12-24 NOTE — PROGRESS NOTES
PT AAO x4. Blood transfusion consent signed at this time and witnessed. Placed in chart. All questions answered.  Electronically signed by Beckie Hannah RN on 12/24/2022 at 6:05 AM

## 2022-12-24 NOTE — ED NOTES
Treated pt hypothermia with warm blankets NP notified. No further treatment needed at this time.       Letty Marti RN  12/24/22 0114

## 2022-12-24 NOTE — ED PROVIDER NOTES
[address] [facility]        Pt Name: Joseph Brito  MRN: [id number]  [place] 1940  Date of evaluation: 12/23/2022  Provider: SILVIANO Smart - JUAN  PCP: Rohit Evans MD  Note Started: 11:40 PM EST 12/23/22           I have seen and evaluated this patient with my supervising physician Bina Bass MD.    [address] [place] [address]       Chief Complaint   Patient presents with    Fall     Pt from home. Pt stood up and chair slid out from under him. Pt sat on ground on buttocks. Pt denies any injuries or pain. Pt states that he started getting short of breath after fall. Hx of anemia, diabetes, HBP    Shortness of Breath       HISTORY OF PRESENT ILLNESS   (Location, Timing/Onset, Context/Setting, Quality, Duration, Modifying Factors, Severity, Associated Signs and Symptoms)  Note limiting factors. Chief Complaint: Shortness of breath, fell and could not get up    Joseph Brito is a 79 y.o. male who presents to the ED today. He had slid out of his recliner. He states that he did not hit his head or lose consciousness. No associated neck pain. However could not get off the ground. Called EMS to help him have a lift assist.  However given that he was then developing some shortness of breath they brought him to the ED. On arrival he was bradycardic and hypotensive. My attending quickly saw him and placed the order for atropine. He is on 2 L nasal cannula at baseline, is currently on this. Is endorsing a wet productive cough with thick green sputum. He states that is been ongoing since he was here for pneumonia about a week or 2 ago. He states that he is having some nasal congestion. No abdominal pain, nausea vomiting or diarrhea. Denies any chest pain. Denies any lower extremity edema or pain. No fevers or chills. Came to the ED for further evaluation and treatment.     Nursing Notes were all reviewed and agreed with or any disagreements were addressed in the HPI. REVIEW OF SYSTEMS    (2-9 systems for level 4, 10 or more for level 5)     Review of Systems   Constitutional:  Positive for fatigue and unexpected weight change. Negative for chills and fever. Noted +10lb weight gain since d/c   HENT:  Positive for congestion. Negative for sore throat. Eyes:  Negative for pain and visual disturbance. Respiratory:  Positive for cough and shortness of breath. Cardiovascular: Negative. Negative for chest pain, palpitations and leg swelling. Gastrointestinal:  Negative for abdominal pain, diarrhea, nausea and vomiting. Genitourinary:  Negative for dysuria and hematuria. Musculoskeletal:  Negative for back pain, myalgias, neck pain and neck stiffness. Skin:  Negative for rash and wound. Neurological:  Positive for weakness. Negative for dizziness and light-headedness. All other systems reviewed and are negative. Positives and Pertinent negatives as per HPI. Except as noted above in the ROS, all other systems were reviewed and negative.        PAST MEDICAL HISTORY     Past Medical History:   Diagnosis Date    Allergic rhinitis     Asthma     Atrial fibrillation ([facility] [address]     Carotid artery stenosis     Chronic kidney disease     COPD (chronic obstructive pulmonary disease) (HCC)     CPAP (continuous positive airway pressure) dependence     ESBL (extended spectrum beta-lactamase) producing bacteria infection 12/26/2018    urine    Hyperlipidemia     Hypertension     Iron deficiency anemia     Obstructive sleep apnea     Type II or unspecified type diabetes mellitus without mention of complication, not stated as uncontrolled          SURGICAL HISTORY     Past Surgical History:   Procedure Laterality Date    CATARACT REMOVAL  2/2012    bilateral    COLONOSCOPY  7/10/2009    diverticulosis    hemorrhoids    CT BONE MARROW BIOPSY  11/1/2022    CT BONE MARROW BIOPSY 11/1/2022 [facility] CT    GALLBLADDER SURGERY  1981    PAIN MANAGEMENT PROCEDURE Right 10/20/2021    COOLIEF RADIOFREQUENCY ABLATION - RIGHT KNEE performed by Jules Cline MD at [address] Left 12/8/2021    [address] - LEFT KNEE performed by Jules Cline MD at [address] [facility]  7-2005    7/10/2009    normal         CURRENTMEDICATIONS       Previous Medications    ACCU-CHEK FASTCLIX LANCETS MISC    USE TO TEST 3 TIMES A DAY DX CODE E11.9    ACCU-CHEK GUIDE STRIP    TEST AS DIRECTED 3 TIMES A DAY    ALBUTEROL SULFATE HFA (PROVENTIL;VENTOLIN;PROAIR) 108 (90 BASE) MCG/ACT INHALER    INHALE 2 PUFFS BY MOUTH EVERY 4 HOURS AS NEEDED FOR WHEEZING    ALFUZOSIN (UROXATRAL) 10 MG EXTENDED RELEASE TABLET    TAKE 1 TABLET BY MOUTH EVERY DAY    AMIODARONE (CORDARONE) 200 MG TABLET    Take 0.5 tablets by mouth daily    ASCORBIC ACID (SUPER C COMPLEX PO)    Take by mouth daily    ASPIRIN EC 81 MG EC TABLET    Take 2 tablets by mouth daily.     ATORVASTATIN (LIPITOR) 40 MG TABLET    TAKE 1 TABLET BY MOUTH EVERY DAY    B-D UF III MINI PEN NEEDLES 31G X 5 MM MISC    USE AS DIRECTED 3 TIMES A DAY    CHOLECALCIFEROL (VITAMIN D3 PO)    Take 2,000 Units by mouth daily     CPAP MACHINE MISC    by Does not apply route    [employer] PURELAX 17 GM/SCOOP POWDER    TAKE 17G BY MOUTH DAILY MIX WITH 8 OZ OF WATER    CYANOCOBALAMIN (VITAMIN B-12 PO)    Take 500 mcg by mouth every other day     DOCUSATE SODIUM (COLACE) 100 MG CAPSULE    Take by mouth daily    FERROUS SULFATE 325 (65 FE) MG TABLET    Take 325 mg by mouth daily     INSULIN GLARGINE (BASAGLAR KWIKPEN) 100 UNIT/ML INJECTION PEN    Inject 5 Units into the skin nightly    INSULIN SYRINGE .5CC/29G 29G X 1/2\" 0.5 ML MISC    1 each by Does not apply route daily    LACTOBACILLUS RHAMNOSUS, GG, ([employer] PROBIOTIC, LACTOBACILLUS,) CAPS    TAKE 1 CAPSULE BY MOUTH 2 TIMES DAILY (WITH MEALS)    LEVALBUTEROL (XOPENEX) 1.25 MG/3ML NEBULIZER SOLUTION USE 1 VIAL VIA NEBULIZER FOUR TIMES DAILY    LINZESS 145 MCG CAPSULE    TAKE 1 CAPSULE BY MOUTH EVERY DAY IN THE MORNING BEFORE BREAKFAST    MONTELUKAST (SINGULAIR) 10 MG TABLET    TAKE 1 TABLET BY MOUTH EVERY DAY    MULTIPLE VITAMINS-MINERALS (MULTIVITAMIN ADULT PO)    Take by mouth daily    PANTOPRAZOLE (PROTONIX) 40 MG TABLET    TAKE 1 TABLET BY MOUTH EVERY DAY    TORSEMIDE (DEMADEX) 10 MG TABLET    Take 1 tablet by mouth daily    VERAPAMIL (CALAN SR) 240 MG EXTENDED RELEASE TABLET    TAKE 1 TABLET BY MOUTH TWICE A DAY    ZINC PO    Take by mouth         ALLERGIES     Hytrin [terazosin hcl], Penicillins, Shrimp flavor, Sulfa antibiotics, Sulfasalazine, Tekturna [aliskiren fumarate], Vancomycin, and Ciprofloxacin    FAMILYHISTORY       Family History   Problem Relation Age of Onset    Heart Disease Mother     Stroke Mother     Vision Loss Mother     High Blood Pressure Father     High Blood Pressure Brother     Diabetes Brother     Sleep Apnea Brother     Arthritis Paternal Grandmother     Diabetes Paternal Grandmother           SOCIAL HISTORY       Social History     Tobacco Use    Smoking status: Former     Packs/day: 0.50     Years: 18.00     Pack years: 9.00     Types: Cigarettes     Start date: 1958     Quit date: 1988     Years since quittin.0    Smokeless tobacco: Never   Vaping Use    Vaping Use: Never used   Substance Use Topics    Alcohol use: No     Alcohol/week: 0.0 standard drinks    Drug use: No       SCREENINGS        [place] Coma Scale  Eye Opening: Spontaneous  Best Verbal Response: Oriented  Best Motor Response: Obeys commands  Iza Coma Scale Score: 15                CIWA Assessment  BP: (!) 120/47  Heart Rate: 59             PHYSICAL EXAM    (up to 7 for level 4, 8 or more for level 5)     ED Triage Vitals [22]   BP Temp Temp src Heart Rate Resp SpO2 Height Weight   (!) 80/40 -- -- (!) 45 17 96 % 5' 8\" (1.727 m) 243 lb 13.3 oz (110.6 kg)       Physical Exam  Vitals and nursing note reviewed. Constitutional:       General: He is not in acute distress. Appearance: Normal appearance. He is obese. He is not ill-appearing, toxic-appearing or diaphoretic. HENT:      Head: Normocephalic and atraumatic. No raccoon eyes, Dutton's sign, right periorbital erythema or left periorbital erythema. Right Ear: Hearing and external ear normal.      Left Ear: Hearing and external ear normal.      Nose: Nose normal. No laceration, nasal tenderness, mucosal edema, congestion or rhinorrhea. Right Nostril: No epistaxis. Left Nostril: No epistaxis. Mouth/Throat:      Lips: Pink. No lesions. Mouth: Mucous membranes are moist.      Tongue: No lesions. Tongue does not deviate from midline. Pharynx: Oropharynx is clear. Uvula midline. No pharyngeal swelling, oropharyngeal exudate, posterior oropharyngeal erythema or uvula swelling. Tonsils: No tonsillar exudate or tonsillar abscesses. Eyes:      General: Lids are normal.         Right eye: No discharge. Left eye: No discharge. Extraocular Movements: Extraocular movements intact. Pupils: Pupils are equal, round, and reactive to light. Neck:      Trachea: Phonation normal. No abnormal tracheal secretions or tracheal deviation. Comments: No meningismus   Cardiovascular:      Rate and Rhythm: Regular rhythm. Bradycardia present. Pulses: Normal pulses. Heart sounds: Normal heart sounds. No murmur heard. No friction rub. No gallop. Comments: Hypotensive and systolic in the 16N on arrival  Pulmonary:      Effort: Pulmonary effort is normal. No respiratory distress. Breath sounds: No stridor. Examination of the right-lower field reveals rhonchi. Examination of the left-lower field reveals rhonchi. Rhonchi present. No wheezing or rales. Comments: Rhonchorous lung sounds in the bases that do somewhat clear with cough.   No conversational dyspnea, tachypnea or respiratory distress  Abdominal:      General: Abdomen is protuberant. Bowel sounds are normal. There is no distension. Palpations: Abdomen is soft. There is no mass. Tenderness: There is no abdominal tenderness. There is no right CVA tenderness, left CVA tenderness, guarding or rebound. Hernia: No hernia is present. Musculoskeletal:         General: Normal range of motion. Cervical back: Full passive range of motion without pain, normal range of motion and neck supple. No rigidity. No spinous process tenderness or muscular tenderness. Right lower leg: No tenderness. No edema. Left lower leg: No tenderness. No edema. Lymphadenopathy:      Cervical: No cervical adenopathy. Skin:     General: Skin is warm and dry. Capillary Refill: Capillary refill takes less than 2 seconds. Neurological:      General: No focal deficit present. Mental Status: He is alert and oriented to person, place, and time. GCS: GCS eye subscore is 4. GCS verbal subscore is 5. GCS motor subscore is 6. Cranial Nerves: No dysarthria or facial asymmetry. Sensory: Sensation is intact. No sensory deficit. Motor: Motor function is intact. Gait: Gait is intact. Psychiatric:         Mood and Affect: Mood normal. Mood is not anxious. Behavior: Behavior normal.         Thought Content:  Thought content normal.       DIAGNOSTIC RESULTS   LABS:    Labs Reviewed   CBC WITH AUTO DIFFERENTIAL - Abnormal; Notable for the following components:       Result Value    RBC 2.94 (*)     Hemoglobin 7.4 (*)     Hematocrit 25.1 (*)     MCH 25.3 (*)     MCHC 29.6 (*)     RDW 19.5 (*)     Platelets 976 (*)     Lymphocytes Absolute 0.4 (*)     All other components within normal limits   COMPREHENSIVE METABOLIC PANEL W/ REFLEX TO MG FOR LOW K - Abnormal; Notable for the following components:    Potassium reflex Magnesium 6.0 (*)     Glucose 145 (*)     BUN 74 (*)     Creatinine 2.8 (*)     Est, Glom Filt Rate 22 (*)     Total Protein 6.1 (*)     Albumin 3.2 (*)     ALT 56 (*)     AST 54 (*)     All other components within normal limits    Narrative:     Karis Eastman tel. [phone],  Chemistry results called to and read back by Henri Bonner RN, 12/23/2022  22:04, by [facility]   TROPONIN - Abnormal; Notable for the following components:    Troponin 0.05 (*)     All other components within normal limits    Narrative:     Karis Esatman tel. [phone],  Chemistry results called to and read back by Henri Bonner RN, 12/23/2022  22:04, by Kyung Rees - Abnormal; Notable for the following components:    Pro-BNP 1,140 (*)     All other components within normal limits    Narrative:     Karis Eastman tel. [phone],  Chemistry results called to and read back by Henri Bonner RN, 12/23/2022  22:04, by [facility]   POCT GLUCOSE - Abnormal; Notable for the following components:    POC Glucose 129 (*)     All other components within normal limits   COVID-19, RAPID   RAPID INFLUENZA A/B ANTIGENS   CULTURE, BLOOD 2   CULTURE, BLOOD 1   LACTATE, SEPSIS   LACTATE, SEPSIS   PROCALCITONIN   POCT GLUCOSE   POCT GLUCOSE   POCT GLUCOSE   POCT GLUCOSE   POCT GLUCOSE   POCT GLUCOSE   POCT GLUCOSE       When ordered only abnormal lab results are displayed. All other labs were within normal range or not returned as of this dictation. EKG: When ordered, EKG's are interpreted by the Emergency Department Physician in the absence of a cardiologist.  Please see their note for interpretation of EKG. RADIOLOGY:   Non-plain film images such as CT, Ultrasound and MRI are read by the radiologist. Plain radiographic images are visualized and preliminarily interpreted by the ED Provider with the below findings:        Interpretation per the Radiologist below, if available at the time of this note:    XR CHEST PORTABLE   Final Result   1. Extensive airspace disease throughout the right lung, left lung base. Interval improvement is noted within the right basilar airspace disease. Differential considerations would include multifocal pneumonia versus   coalescing edema. Follow-up imaging is recommended to ensure complete   clearing   2. Borderline cardiomegaly           XR CHEST PORTABLE    Result Date: 12/23/2022  EXAMINATION: ONE XRAY VIEW OF THE CHEST 12/23/2022 5:59 pm COMPARISON: Chest x-ray dated November 20, 2022 HISTORY: ORDERING SYSTEM PROVIDED HISTORY: sob TECHNOLOGIST PROVIDED HISTORY: Reason for exam:->sob Reason for Exam: fall, sob FINDINGS: Adequate inspiration is noted. Extensive airspace disease is present throughout the right upper and lower lung zone. Patchy airspace disease is present within the left lung base. Interval improvement is noted within the right basilar airspace disease. No pneumothorax noted. Small pleural effusions are present. Borderline cardiomegaly is noted. No acute osseous abnormality is present. 1. Extensive airspace disease throughout the right lung, left lung base. Interval improvement is noted within the right basilar airspace disease. Differential considerations would include multifocal pneumonia versus coalescing edema. Follow-up imaging is recommended to ensure complete clearing 2. Borderline cardiomegaly       No results found. PROCEDURES   Unless otherwise noted below, none     Procedures    CRITICAL CARE TIME   CRITICAL CARE NOTE:    Tianna Villalobos CNP am the primary clinician of record. There was a high probability of clinically significant life-threatening deterioration of the patient's condition requiring my urgent intervention. Total critical care time was at least 21 minutes. Of nonconcurrent critical care time.   This includes vital sign monitoring, pulse oximetry monitoring, telemetry monitoring, clinical response to the IV medications, reviewing the nursing notes, consultation time, dictation/documentation time, and interpretation of the labwork. This excludes any separately billable procedures performed. The critical care time above also includes time spent obtaining history from [facility], as the patient was unable to provide full history AND the history obtained was directly relevant to the care of the patient. CONSULTS:  IP CONSULT TO PRIMARY CARE PROVIDER      EMERGENCY DEPARTMENT COURSE and DIFFERENTIAL DIAGNOSIS/MDM:   Vitals:    Vitals:    12/23/22 2016 12/23/22 2135 12/23/22 2236   BP: (!) 80/40 116/71 (!) 120/47   Pulse: (!) 45 59    Resp: 17     SpO2: 96%     Weight: 243 lb 13.3 oz (110.6 kg)     Height: 5' 8\" (1.727 m)         Patient was given the following medications:  Medications   insulin regular (HUMULIN R;NOVOLIN R) injection 10 Units (has no administration in time range)     And   dextrose bolus 10% 250 mL (has no administration in time range)   glucose chewable tablet 16 g (has no administration in time range)   dextrose bolus 10% 125 mL (has no administration in time range)     Or   dextrose bolus 10% 250 mL (has no administration in time range)   glucagon (rDNA) injection 1 mg (has no administration in time range)   dextrose 10 % infusion (has no administration in time range)   atropine injection 1 mg (1 mg IntraVENous Given 12/23/22 2037)   sodium bicarbonate 8.4 % injection 50 mEq (50 mEq IntraVENous Given 12/23/22 2233)   furosemide (LASIX) injection 40 mg (40 mg IntraVENous Given 12/23/22 2236)   sodium zirconium cyclosilicate (LOKELMA) oral suspension 10 g (10 g Oral Given 12/23/22 2239)         Is this patient to be included in the SEP-1 Core Measure due to severe sepsis or septic shock?    No   Exclusion criteria - the patient is NOT to be included for SEP-1 Core Measure due to:  Viral etiology found or highly suspected (including COVID-19) without concomitant bacterial infection    MDM: See HPI and above for full presentation physical exam.    Patient is a pleasant 58-year-old male that slid out of his recliner and was unable to get off the ground. While EMS was in route he became more more short of breath per the patient. He has had some Cough and congestion that he states has not cleared since he was admitted for pneumonia a few weeks ago. He is on 2 L nasal cannula at baseline for history of COPD. Is on Lasix, although he states that he does not have congestive heart failure. Denies any calf pain or swelling. He does have rhonchorous lung sounds in the bilateral bases that somewhat clear with cough. He does have a wet hacking cough on exam.  No conversational dyspnea or acute respiratory distress. No lower extremity edema. Abdomen is protuberant, could be where he holds fluid, he is up 10 pounds in weight from when he was last discharged. No abdominal pain or chest pain. On arrival he was bradycardic and hypotensive. This was significant enough, with his shortness of breath, my attending did order atropine. He did respond well to this and has not needed any additional doses. He looks chronically ill . but nontoxic. Patient's blood work shows no leukocytosis. He does have a slightly improving chest x-ray from his last comparison a few weeks ago. Although he does have a productive cough that is still persistent, speaking to my attending and Dr. Jocelyn Vanessa will add on a Pro-Herminio and if elevated will start him on medications for HCAP, otherwise we will trend the chest x-rays cardiorenal in nature than pneumonia    His hemoglobin is decreased at 7.4, this is down from 8.3. However it looks like he has a trend of hemoglobins in the mid to low sevens. It does not appear like he needs an acute transfusion, there is no evidence suggest any blood loss anemia. He has no melena, hematemesis or hematochezia. He does have a slight thrombocytopenia with a platelet count of 277. His thrombocytopenia and anemia can be explained by an increase in his creatinine.   It looks like he has been running 1.8 on discharge, is currently 2.8 with a BUN of 74 and a GFR of 22. With his creatinine function being worse, he is having an elevated potassium of 6. We will give him medications for the hyperkalemia such as Lokelma p.o., insulin and dextrose IV, as well as a dose of IV Lasix. He is not complaining of any chest pain but his troponin is elevated at 0.05. Could be secondary to his fluid retention, his BNP is 1140 but could be nondiagnostic elevation, he does have a rounded abdomen, his lower extremities do not appear edematous but I do suspect that he does have his third spacing/retention in the abdomen rather than his lower extremities. The dose of IV Lasix will help this. Rapid COVID and flu were negative    Chest x-ray is read by the radiologist as above. Given his elevated renal function, fluid retention, with worsening shortness of breath/dyspnea and elevated troponin, with all of his disease processes and his multiple core morbidities I do believe he needs to be admitted. I therefore consulted his primary care provider Dr. Dewayne Saldivar who does admit, he is in agreement with the plan of admission. Again we will start him on antibiotics if his procalcitonin is elevated, otherwise his dyspnea is likely secondary to his fluid retention and COPD. Patient and family at bedside are in agreement with the plan of admission. FINAL IMPRESSION      1. Fall, initial encounter    2. Dyspnea, unspecified type    3. Hyperkalemia    4. DEISY (acute kidney injury) ([facility] [address]    5. Elevated troponin    6. Bradycardia          DISPOSITION/PLAN   DISPOSITION Admitted 12/23/2022 11:53:01 PM      PATIENT REFERRED TO:  No follow-up provider specified.     DISCHARGE MEDICATIONS:  New Prescriptions    No medications on file       DISCONTINUED MEDICATIONS:  Discontinued Medications    No medications on file              (Please note that portions of this note were completed with a voice recognition program.  Efforts were made to edit the dictations but occasionally words are mis-transcribed.)    SILVIANO Bruce CNP (electronically signed)           SILVIANO Bruce CNP  12/23/22 1277

## 2022-12-24 NOTE — ED TRIAGE NOTES
Pt arrived per ems for a c/c of a fall and shortness of breath. Pt sat on ground from a standing position when the chair fell out from under him. Pt denies any injuries and currently denies pain. Pt states that he started feeling short of breath after the fall. Pt states that he has been on O2 at home since 11/28/22 after a pneumonia diagnosis. Pt currently sating at 96% on 3L o2. Pt is bradycardic (45) and hypotensive (80/40) at this time.

## 2022-12-24 NOTE — PROGRESS NOTES
PT AAO x4 per this shift. Bear warmed and warm blankets used to raise body temp. Pt tolerating diet. Pt stating he is on a 1200 ml fluid restriction at home. Pt saúl pain. Family was at bedside or emotional support. 275 ml clear yellow urine out of [place] per this shift. No further needs voiced at this time. Fall precautions in place. Bed alarm on. Call light within reach. Will continue to round.  Electronically signed by Yulissa Arguelles RN on 12/24/2022 at 8:01 AM

## 2022-12-24 NOTE — PLAN OF CARE
Problem: Discharge Planning  Goal: Discharge to home or other facility with appropriate resources  Outcome: Progressing  Flowsheets (Taken 12/24/2022 0085)  Discharge to home or other facility with appropriate resources:   Identify barriers to discharge with patient and caregiver   Identify discharge learning needs (meds, wound care, etc)   Arrange for needed discharge resources and transportation as appropriate     Problem: Pain  Goal: Verbalizes/displays adequate comfort level or baseline comfort level  Outcome: Progressing  Flowsheets (Taken 12/24/2022 0633)  Verbalizes/displays adequate comfort level or baseline comfort level:   Implement non-pharmacological measures as appropriate and evaluate response   Assess pain using appropriate pain scale   Encourage patient to monitor pain and request assistance     Problem: Skin/Tissue Integrity  Goal: Absence of new skin breakdown  Description: 1. Monitor for areas of redness and/or skin breakdown  2. Assess vascular access sites hourly  3. Every 4-6 hours minimum:  Change oxygen saturation probe site  4. Every 4-6 hours:  If on nasal continuous positive airway pressure, respiratory therapy assess nares and determine need for appliance change or resting period. Outcome: Progressing  Note: Jerardo score assessed. Repositioned patient Q2H and assessed skin. Educated patient on importance of repositioning to prevent skin issues.         Problem: Safety - Adult  Goal: Free from fall injury  Outcome: Progressing  Flowsheets (Taken 12/24/2022 0626)  Free From Fall Injury:   Instruct family/caregiver on patient safety   Based on caregiver fall risk screen, instruct family/caregiver to ask for assistance with transferring infant if caregiver noted to have fall risk factors     Problem: ABCDS Injury Assessment  Goal: Absence of physical injury  Outcome: Progressing  Flowsheets  Taken 12/24/2022 0633  Absence of Physical Injury: Implement safety measures based on patient assessment  Taken 12/24/2022 7888  Absence of Physical Injury: Implement safety measures based on patient assessment

## 2022-12-24 NOTE — ED NOTES
Pt daughter stated that pt has been on oxygen since approx 10/30.       Sangeeta Spencer RN  12/23/22 3235

## 2022-12-24 NOTE — ED NOTES
Report called to MAHNAZ Nettles. YASAR discussed. All questions answered. RN verbalized understanding.       Sangeeta Spencer RN  12/24/22 4586

## 2022-12-24 NOTE — PLAN OF CARE
Problem: Pain  Goal: Verbalizes/displays adequate comfort level or baseline comfort level  12/24/2022 1336 by Donnie Padilla RN  Outcome: Progressing  12/24/2022 1333 by Donnie Padilla RN  Outcome: Progressing  Flowsheets (Taken 12/24/2022 1333)  Verbalizes/displays adequate comfort level or baseline comfort level:   Encourage patient to monitor pain and request assistance   Assess pain using appropriate pain scale  12/24/2022 0633 by Phuong Birch RN  Outcome: Progressing  Flowsheets (Taken 12/24/2022 2652)  Verbalizes/displays adequate comfort level or baseline comfort level:   Implement non-pharmacological measures as appropriate and evaluate response   Assess pain using appropriate pain scale   Encourage patient to monitor pain and request assistance     Problem: Skin/Tissue Integrity  Goal: Absence of new skin breakdown  Description: 1. Monitor for areas of redness and/or skin breakdown  2. Assess vascular access sites hourly  3. Every 4-6 hours minimum:  Change oxygen saturation probe site  4. Every 4-6 hours:  If on nasal continuous positive airway pressure, respiratory therapy assess nares and determine need for appliance change or resting period. 12/24/2022 1336 by Donnie Padilla RN  Outcome: Progressing  12/24/2022 1333 by Donnie Padilla RN  Outcome: Progressing  12/24/2022 4066 by Phuong Birch RN  Outcome: Progressing  Note: Jerardo score assessed. Repositioned patient Q2H and assessed skin. Educated patient on importance of repositioning to prevent skin issues.         Problem: Safety - Adult  Goal: Free from fall injury  12/24/2022 1336 by Donnie Padilla RN  Outcome: Progressing  12/24/2022 1333 by Donnie Padilla RN  Outcome: Progressing  Flowsheets (Taken 12/24/2022 1333)  Free From Fall Injury: Instruct family/caregiver on patient safety  12/24/2022 3288 by Phuong Birch RN  Outcome: Progressing  Flowsheets (Taken 12/24/2022 0626)  Free From Fall Injury:   Instruct family/caregiver on patient safety   Based on caregiver fall risk screen, instruct family/caregiver to ask for assistance with transferring infant if caregiver noted to have fall risk factors     Problem: ABCDS Injury Assessment  Goal: Absence of physical injury  12/24/2022 1336 by Asuncion Ortiz RN  Outcome: Progressing  12/24/2022 1333 by Asuncion Ortiz RN  Outcome: Progressing  Flowsheets (Taken 12/24/2022 1333)  Absence of Physical Injury: Implement safety measures based on patient assessment  12/24/2022 0633 by Edwar Minor RN  Outcome: Progressing  Flowsheets  Taken 12/24/2022 4357  Absence of Physical Injury: Implement safety measures based on patient assessment  Taken 12/24/2022 0626  Absence of Physical Injury: Implement safety measures based on patient assessment     Problem: Chronic Conditions and Co-morbidities  Goal: Patient's chronic conditions and co-morbidity symptoms are monitored and maintained or improved  Outcome: Progressing  Flowsheets  Taken 12/24/2022 1336 by Kade Anderson 34 - Patient's Chronic Conditions and Co-Morbidity Symptoms are Monitored and Maintained or Improved:   Monitor and assess patient's chronic conditions and comorbid symptoms for stability, deterioration, or improvement   Collaborate with multidisciplinary team to address chronic and comorbid conditions and prevent exacerbation or deterioration   Update acute care plan with appropriate goals if chronic or comorbid symptoms are exacerbated and prevent overall improvement and discharge  Taken 12/24/2022 0230 by Kade Ho 34 - Patient's Chronic Conditions and Co-Morbidity Symptoms are Monitored and Maintained or Improved:   Monitor and assess patient's chronic conditions and comorbid symptoms for stability, deterioration, or improvement   Collaborate with multidisciplinary team to address chronic and comorbid conditions and prevent exacerbation or deterioration   Update acute care plan with appropriate goals if chronic or comorbid symptoms are exacerbated and prevent overall improvement and discharge

## 2022-12-24 NOTE — TELEPHONE ENCOUNTER
Writer contacted Dr. Martha Josue to inform of 30 day readmission risk. Writer's attempt to contact Dr. Martha Josue was unsuccessful.      Call Back: If you need to call back to inform of disposition you can contact me at [phone]

## 2022-12-24 NOTE — PROGRESS NOTES
Physical Therapy  Attempted PT stiven, RN just starting blood transfusion for hgb = 6.0 and RN requests that PT eval pt at a later time. Electronically signed by Chase Andersen PT on 12/24/2022 at 9:42 AM  Re-attempted eval at 12:00; pt's blood transfusion complete and pt up on chair with Stedy and nursing, pt reports he is no longer dizzy but declines therapy evals despite encouragement. Declines to stand at walker. Complete therapy eval tomorrow or Monday.   Electronically signed by Chase Andersen PT on 12/24/2022 at 12:11 PM

## 2022-12-24 NOTE — PROGRESS NOTES
12/24/22 1623   Encounter Summary   Encounter Overview/Reason  Rituals, Rites and Sacraments;Spiritual/Emotional Needs   Service Provided For: Patient   Referral/Consult From: Nurse   Sharda Vyas and [address] and Referrals   Plan/Referrals Coordinate Rites and/or Rituals  (Pt called Fr. [facility] to visit on 12/26)

## 2022-12-24 NOTE — PROGRESS NOTES
Medication Reconciliation    List of medications for [place] Knife is currently taking is complete.      Source of Information:   Epic records  Conversation with patient at bedside    Notes Regarding Home Medications:   Patient received all of their morning home medications prior to arrival to the emergency department  Lantus-5 units per pt  Torsemide-BID per pt  Pt states he was told by PCP to try and wean down on Xopenex and albuterol    Denies other otc/herbal use    Leigh Erickson, Pharmacy Intern  12/23/2022 10:32 PM

## 2022-12-24 NOTE — PROGRESS NOTES
Occupational Therapy      Attempted to see patient this date for OT evaluation, however patient was receiving blood at this time due to having a hemoglobin count of 6. Will try back at a later time as patient schedule allows.      Electronically signed by Lindsay Hutton OT on 12/24/2022 at 8:58 AM

## 2022-12-24 NOTE — PROGRESS NOTES
4 Eyes Skin Assessment     NAME:  Gilmar Suresh  YOB: 1940  MEDICAL RECORD NUMBER:  [id number]    The patient is being assessed for  Admission    I agree that One RN have performed a thorough Head to Toe Skin Assessment on the patient. ALL assessment sites listed below have been assessed. Areas assessed by both nurses:    Head, Face, Ears, Shoulders, Back, Chest, Arms, Elbows, Hands, Sacrum. Buttock, Coccyx, Ischium, and Legs. Feet and Heels        Does the Patient have a Wound? Yes wound(s) were present on assessment.  LDA wound assessment was Initiated and completed by RN       Jerardo Prevention initiated by RN: Yes   Wound Care Orders initiated by RN: Yes    Pressure Injury (Stage 3,4, Unstageable, DTI, NWPT, and Complex wounds) if present place referral order by RN under :  yes      New and Established Ostomies, if present place, referral order under : No      Nurse 1 eSignature: Electronically signed by Nacho Nixon RN on 12/24/22 at 6:35 AM EST    **SHARE this note so that the co-signing nurse is able to place an eSignature**    Nurse 2 eSignature: Electronically signed by Winter Carrasquillo RN on 12/24/22 at 6:39 AM EST

## 2022-12-25 LAB
ANION GAP SERPL CALCULATED.3IONS-SCNC: 11 MMOL/L (ref 3–16)
BASOPHILS ABSOLUTE: 0 K/UL (ref 0–0.2)
BASOPHILS RELATIVE PERCENT: 0.4 %
BUN BLDV-MCNC: 72 MG/DL (ref 7–20)
CALCIUM SERPL-MCNC: 7.9 MG/DL (ref 8.3–10.6)
CHLORIDE BLD-SCNC: 105 MMOL/L (ref 99–110)
CO2: 31 MMOL/L (ref 21–32)
CREAT SERPL-MCNC: 2.6 MG/DL (ref 0.8–1.3)
EOSINOPHILS ABSOLUTE: 0.1 K/UL (ref 0–0.6)
EOSINOPHILS RELATIVE PERCENT: 1.5 %
GFR SERPL CREATININE-BSD FRML MDRD: 24 ML/MIN/{1.73_M2}
GLUCOSE BLD-MCNC: 106 MG/DL (ref 70–99)
GLUCOSE BLD-MCNC: 109 MG/DL (ref 70–99)
GLUCOSE BLD-MCNC: 113 MG/DL (ref 70–99)
GLUCOSE BLD-MCNC: 86 MG/DL (ref 70–99)
GLUCOSE BLD-MCNC: 95 MG/DL (ref 70–99)
HCT VFR BLD CALC: 23.1 % (ref 40.5–52.5)
HEMOGLOBIN: 7.2 G/DL (ref 13.5–17.5)
LYMPHOCYTES ABSOLUTE: 0.7 K/UL (ref 1–5.1)
LYMPHOCYTES RELATIVE PERCENT: 11.6 %
MAGNESIUM: 2.4 MG/DL (ref 1.8–2.4)
MCH RBC QN AUTO: 26.2 PG (ref 26–34)
MCHC RBC AUTO-ENTMCNC: 31.3 G/DL (ref 31–36)
MCV RBC AUTO: 83.9 FL (ref 80–100)
MONOCYTES ABSOLUTE: 0.6 K/UL (ref 0–1.3)
MONOCYTES RELATIVE PERCENT: 10 %
NEUTROPHILS ABSOLUTE: 4.3 K/UL (ref 1.7–7.7)
NEUTROPHILS RELATIVE PERCENT: 76.5 %
PDW BLD-RTO: 19 % (ref 12.4–15.4)
PERFORMED ON: ABNORMAL
PERFORMED ON: NORMAL
PLATELET # BLD: 143 K/UL (ref 135–450)
PMV BLD AUTO: 8.4 FL (ref 5–10.5)
POTASSIUM SERPL-SCNC: 4.3 MMOL/L (ref 3.5–5.1)
RBC # BLD: 2.76 M/UL (ref 4.2–5.9)
SODIUM BLD-SCNC: 147 MMOL/L (ref 136–145)
WBC # BLD: 5.7 K/UL (ref 4–11)

## 2022-12-25 PROCEDURE — 36415 COLL VENOUS BLD VENIPUNCTURE: CPT

## 2022-12-25 PROCEDURE — 99233 SBSQ HOSP IP/OBS HIGH 50: CPT | Performed by: INTERNAL MEDICINE

## 2022-12-25 PROCEDURE — 80048 BASIC METABOLIC PNL TOTAL CA: CPT

## 2022-12-25 PROCEDURE — 2700000000 HC OXYGEN THERAPY PER DAY

## 2022-12-25 PROCEDURE — 85025 COMPLETE CBC W/AUTO DIFF WBC: CPT

## 2022-12-25 PROCEDURE — 2580000003 HC RX 258: Performed by: INTERNAL MEDICINE

## 2022-12-25 PROCEDURE — 83735 ASSAY OF MAGNESIUM: CPT

## 2022-12-25 PROCEDURE — 6360000002 HC RX W HCPCS: Performed by: INTERNAL MEDICINE

## 2022-12-25 PROCEDURE — 97530 THERAPEUTIC ACTIVITIES: CPT

## 2022-12-25 PROCEDURE — 1200000000 HC SEMI PRIVATE

## 2022-12-25 PROCEDURE — 6370000000 HC RX 637 (ALT 250 FOR IP): Performed by: INTERNAL MEDICINE

## 2022-12-25 PROCEDURE — 94761 N-INVAS EAR/PLS OXIMETRY MLT: CPT

## 2022-12-25 RX ORDER — FUROSEMIDE 10 MG/ML
40 INJECTION INTRAMUSCULAR; INTRAVENOUS 2 TIMES DAILY
Status: DISCONTINUED | OUTPATIENT
Start: 2022-12-25 | End: 2022-12-27

## 2022-12-25 RX ADMIN — FUROSEMIDE 40 MG: 10 INJECTION, SOLUTION INTRAMUSCULAR; INTRAVENOUS at 12:12

## 2022-12-25 RX ADMIN — TAMSULOSIN HYDROCHLORIDE 0.4 MG: 0.4 CAPSULE ORAL at 08:22

## 2022-12-25 RX ADMIN — FERROUS SULFATE TAB EC 324 MG (65 MG FE EQUIVALENT) 325 MG: 324 (65 FE) TABLET DELAYED RESPONSE at 08:22

## 2022-12-25 RX ADMIN — HEPARIN SODIUM 5000 UNITS: 5000 INJECTION INTRAVENOUS; SUBCUTANEOUS at 20:50

## 2022-12-25 RX ADMIN — PANTOPRAZOLE SODIUM 40 MG: 40 TABLET, DELAYED RELEASE ORAL at 08:22

## 2022-12-25 RX ADMIN — AMIODARONE HYDROCHLORIDE 100 MG: 200 TABLET ORAL at 08:22

## 2022-12-25 RX ADMIN — POLYETHYLENE GLYCOL 3350 17 G: 17 POWDER, FOR SOLUTION ORAL at 08:22

## 2022-12-25 RX ADMIN — Medication 10 ML: at 08:22

## 2022-12-25 RX ADMIN — HEPARIN SODIUM 5000 UNITS: 5000 INJECTION INTRAVENOUS; SUBCUTANEOUS at 15:40

## 2022-12-25 RX ADMIN — ATORVASTATIN CALCIUM 40 MG: 40 TABLET, FILM COATED ORAL at 08:22

## 2022-12-25 RX ADMIN — ASPIRIN 162 MG: 81 TABLET, COATED ORAL at 08:25

## 2022-12-25 RX ADMIN — FUROSEMIDE 40 MG: 10 INJECTION, SOLUTION INTRAMUSCULAR; INTRAVENOUS at 17:36

## 2022-12-25 RX ADMIN — DOCUSATE SODIUM 100 MG: 100 CAPSULE, LIQUID FILLED ORAL at 08:22

## 2022-12-25 RX ADMIN — MONTELUKAST 10 MG: 10 TABLET, FILM COATED ORAL at 08:22

## 2022-12-25 RX ADMIN — HEPARIN SODIUM 5000 UNITS: 5000 INJECTION INTRAVENOUS; SUBCUTANEOUS at 05:23

## 2022-12-25 RX ADMIN — Medication 10 ML: at 20:51

## 2022-12-25 NOTE — PROGRESS NOTES
Patient has been in chair most of the day, only awaken when spoken to, gordon patent and draining. Patient able to make needs known, no needs voiced. Call light and bedside table within reach. Will continue to monitor and reassess.

## 2022-12-25 NOTE — PROGRESS NOTES
Physical Therapy    Suki Patiño  12/25/2022  To room to patient resting in recliner with legs elevated. Staff assisted him OOB via [address] stedy with assist of one. Hemoglobin at 7.2  Reviewed with patient his recent mobility at home using rolling walker - states he was managing all mobility himself, including showering. When requested to observe his mobility with the rolling walker - for room distances he quickly declines stating \"maybe later\"   Further encouragement met with greater resistance leading to complaints that \"they should have done something about my heart and kidneys 5 years ago\"  Advised staff to use stedy for transfers and anticipate will attempt PT assessment again tomorrow. Nursing informed.   Electronically signed by Zaheer Fernandes PT on 12/25/2022 at 8:27 AM

## 2022-12-25 NOTE — PROGRESS NOTES
Patient currently up in chair, awake, a&ox4. Patient took morning medications whole, no complications. IV flushed and locked. Patient denies pain at this time. Voiding adequately, no issues. Patient tolerating PO intake, no c/o n/v at this time. Patient using stedy for transfers, states he's too weak to walk at this time. Patient able to make needs known, no other needs mentioned. Call light and bedside table within reach. Will continue to monitor and reassess.

## 2022-12-25 NOTE — PLAN OF CARE
Problem: Discharge Planning  Goal: Discharge to home or other facility with appropriate resources  Outcome: Progressing  Flowsheets (Taken 12/24/2022 5432 by Yuki Norwood RN)  Discharge to home or other facility with appropriate resources:   Identify barriers to discharge with patient and caregiver   Identify discharge learning needs (meds, wound care, etc)   Arrange for needed discharge resources and transportation as appropriate     Problem: Pain  Goal: Verbalizes/displays adequate comfort level or baseline comfort level  12/25/2022 0739 by Jacki Lincoln RN  Outcome: Progressing  Flowsheets (Taken 12/24/2022 1948 by Vale Chavira RN)  Verbalizes/displays adequate comfort level or baseline comfort level:   Encourage patient to monitor pain and request assistance   Assess pain using appropriate pain scale  12/25/2022 0441 by Vale Chavira RN  Outcome: Progressing  Flowsheets (Taken 12/24/2022 1948)  Verbalizes/displays adequate comfort level or baseline comfort level:   Encourage patient to monitor pain and request assistance   Assess pain using appropriate pain scale     Problem: Skin/Tissue Integrity  Goal: Absence of new skin breakdown  Description: 1. Monitor for areas of redness and/or skin breakdown  2. Assess vascular access sites hourly  3. Every 4-6 hours minimum:  Change oxygen saturation probe site  4. Every 4-6 hours:  If on nasal continuous positive airway pressure, respiratory therapy assess nares and determine need for appliance change or resting period.   12/25/2022 0739 by Jacki Lincoln RN  Outcome: Progressing  Note: Will provide 15 minutes of education to patient during hospital stay  12/25/2022 0441 by Vale Chavira RN  Outcome: Progressing     Problem: Safety - Adult  Goal: Free from fall injury  Outcome: Progressing  Flowsheets (Taken 12/24/2022 2100 by Vale Chavira RN)  Free From Fall Injury: Instruct family/caregiver on patient safety     Problem: ABCDS Injury Assessment  Goal: Absence of physical injury  Outcome: Progressing  Flowsheets (Taken 12/24/2022 1333 by Cynthia Pa RN)  Absence of Physical Injury: Implement safety measures based on patient assessment     Problem: Chronic Conditions and Co-morbidities  Goal: Patient's chronic conditions and co-morbidity symptoms are monitored and maintained or improved  Outcome: Progressing  Flowsheets (Taken 12/24/2022 1336 by Cynthia Pa RN)  Care Plan - Patient's Chronic Conditions and Co-Morbidity Symptoms are Monitored and Maintained or Improved:   Monitor and assess patient's chronic conditions and comorbid symptoms for stability, deterioration, or improvement   Collaborate with multidisciplinary team to address chronic and comorbid conditions and prevent exacerbation or deterioration   Update acute care plan with appropriate goals if chronic or comorbid symptoms are exacerbated and prevent overall improvement and discharge     Problem: Chronic Conditions and Co-morbidities  Goal: Patient's chronic conditions and co-morbidity symptoms are monitored and maintained or improved  Outcome: Progressing  Flowsheets (Taken 12/24/2022 1336 by Cynthia Pa RN)  Care Plan - Patient's Chronic Conditions and Co-Morbidity Symptoms are Monitored and Maintained or Improved:   Monitor and assess patient's chronic conditions and comorbid symptoms for stability, deterioration, or improvement   Collaborate with multidisciplinary team to address chronic and comorbid conditions and prevent exacerbation or deterioration   Update acute care plan with appropriate goals if chronic or comorbid symptoms are exacerbated and prevent overall improvement and discharge

## 2022-12-25 NOTE — PROGRESS NOTES
[facility] Internal Medicine Note      Chief Complaint: I am ok    Subjective/Interval History:    Patient is currently sitting up in the chair. He is dozing pretty heavily, awakes to voice, states he is doing okay and goes back to sleep quickly. He denies any discomfort. Review of systems cannot be obtained due to his sleepiness today. Bed weight probably not reliable today. 1500 mL negative fluid balance since admission. Creatinine improved today despite dose of Lasix yesterday. PMH, PSH, FH/SH reviewed and unchanged as documented in the H&P personally documented at admission 22    Medication list reviewed    Objective:    BP (!) 154/65   Pulse 66   Temp 97.4 °F (36.3 °C) (Oral)   Resp 16   Ht 5' 8\" (1.727 m)   Wt 227 lb 11.8 oz (103.3 kg)   SpO2 97%   BMI 34.63 kg/m²   Temp  Av.5 °F (36.4 °C)  Min: 97.4 °F (36.3 °C)  Max: 97.6 °F (36.4 °C)    RRR with premature beats. Telemetry shows sinus rhythm with premature beats  Pulm-a few scattered wheezes but respirations are easy. Lungs are otherwise clear throughout. Abd- BS+, soft, NTND  Ext-2+ edema of his lower extremities.     The Following Labs Were Reviewed Today:    Recent Results (from the past 24 hour(s))   Troponin    Collection Time: 22 12:39 PM   Result Value Ref Range    Troponin 0.06 (H) <0.01 ng/mL   Hemoglobin and Hematocrit    Collection Time: 22 12:39 PM   Result Value Ref Range    Hemoglobin 7.4 (L) 13.5 - 17.5 g/dL    Hematocrit 23.9 (L) 40.5 - 52.5 %   POCT Glucose    Collection Time: 22  3:45 PM   Result Value Ref Range    POC Glucose 121 (H) 70 - 99 mg/dl    Performed on ACCU-CHEK    Troponin    Collection Time: 22  5:28 PM   Result Value Ref Range    Troponin 0.06 (H) <0.01 ng/mL   POCT Glucose    Collection Time: 22  8:22 PM   Result Value Ref Range    POC Glucose 126 (H) 70 - 99 mg/dl    Performed on ACCU-CHEK    Basic Metabolic Panel    Collection Time: 22  5:04 AM   Result Value Ref Range    Sodium 147 (H) 136 - 145 mmol/L    Potassium 4.3 3.5 - 5.1 mmol/L    Chloride 105 99 - 110 mmol/L    CO2 31 21 - 32 mmol/L    Anion Gap 11 3 - 16    Glucose 86 70 - 99 mg/dL    BUN 72 (H) 7 - 20 mg/dL    Creatinine 2.6 (H) 0.8 - 1.3 mg/dL    Est, Glom Filt Rate 24 (A) >60    Calcium 7.9 (L) 8.3 - 10.6 mg/dL   CBC with Auto Differential    Collection Time: 12/25/22  5:04 AM   Result Value Ref Range    WBC 5.7 4.0 - 11.0 K/uL    RBC 2.76 (L) 4.20 - 5.90 M/uL    Hemoglobin 7.2 (L) 13.5 - 17.5 g/dL    Hematocrit 23.1 (L) 40.5 - 52.5 %    MCV 83.9 80.0 - 100.0 fL    MCH 26.2 26.0 - 34.0 pg    MCHC 31.3 31.0 - 36.0 g/dL    RDW 19.0 (H) 12.4 - 15.4 %    Platelets 980 995 - 988 K/uL    MPV 8.4 5.0 - 10.5 fL    Neutrophils % 76.5 %    Lymphocytes % 11.6 %    Monocytes % 10.0 %    Eosinophils % 1.5 %    Basophils % 0.4 %    Neutrophils Absolute 4.3 1.7 - 7.7 K/uL    Lymphocytes Absolute 0.7 (L) 1.0 - 5.1 K/uL    Monocytes Absolute 0.6 0.0 - 1.3 K/uL    Eosinophils Absolute 0.1 0.0 - 0.6 K/uL    Basophils Absolute 0.0 0.0 - 0.2 K/uL   Magnesium    Collection Time: 12/25/22  5:04 AM   Result Value Ref Range    Magnesium 2.40 1.80 - 2.40 mg/dL   POCT Glucose    Collection Time: 12/25/22  6:06 AM   Result Value Ref Range    POC Glucose 95 70 - 99 mg/dl    Performed on ACCU-CHEK        ASSESSMENT/PLAN:      Principal Problem:    Syncope and collapse-blood pressure and heart rate have been better since holding his verapamil. Consider restarting lower dose verapamil as his blood pressure is beginning to climb higher. Active Problems:    Hyperkalemia-resolved, better. Bradycardia-improved since verapamil dose reduced. Hypotension, unspecified-currently resolved. Type 2 diabetes mellitus with stage 4 chronic kidney disease, with long-term current use of insulin-blood sugars have been quite low, continue with sliding scale only. Chronic anemia-hemoglobin mildly depressed from yesterday.   Repeat CBC tomorrow, may need to transfuse again. Consider reconsulting hematology tomorrow or Tuesday. Not certain when his last dose of EPO was given. Abnormal CXR-no clinical evidence of pneumonia. Procalcitonin level at admission was low. This suggests the x-ray changes are residual from his recent multifocal pneumonia. Elevated brain natriuretic peptide (BNP) level-continue to diurese, renal function tolerating well. Weight gain-likely fluid due to his chronic underlying venous insufficiency. Elevated troponin-elevated troponin has been stable and likely related to his renal failure. He has not endorsed any cardiac symptoms to suggest coronary ischemia. Chronic respiratory failure with hypoxia -continue with supplemental oxygen. JOANN (obstructive sleep apnea)-patient is somewhat sleepy today. If not perking up may consider an ABG to ensure he is not retaining CO2. Will discussed with nursing. Chronic GERD-continue daily PPI. Hyperlipidemia-continue statin dose. Paroxysmal atrial fibrillation-remains in sinus rhythm here. Patient not an anticoagulation candidate due to prior GI bleeding. Stage 3b chronic kidney disease-renal function improving, consider asking nephrology to follow along. Essential hypertension-blood pressure running a little high. May restart lower-dose verapamil    Moderate COPD (chronic obstructive pulmonary disease) (HCC)-continue with Xopenex nebulizer. Slow transit constipation-Linzess and MiraLAX. Weakness-PT/OT working with the patient    Disposition-continue to diurese and monitor blood pressure.   Patient hopefully will work with therapy again tomorrow and we will consider discharge soon    Conner Kapoor MD, FACP  11:49 AM  12/25/2022

## 2022-12-25 NOTE — PLAN OF CARE
Problem: Pain  Goal: Verbalizes/displays adequate comfort level or baseline comfort level  Outcome: Progressing  Flowsheets (Taken 12/24/2022 1948)  Verbalizes/displays adequate comfort level or baseline comfort level:   Encourage patient to monitor pain and request assistance   Assess pain using appropriate pain scale     Problem: Safety - Adult  Goal: Free from fall injury  Recent Flowsheet Documentation  Taken 12/24/2022 2100 by Steve Lincoln RN  Free From Fall Injury: Instruct family/caregiver on patient safety

## 2022-12-25 NOTE — PROGRESS NOTES
Patient reported zero pain overnight, gordon in place with clear Yellow urine, patient needed  moderate assistance  to the bathroom using the steady.

## 2022-12-26 LAB
ANION GAP SERPL CALCULATED.3IONS-SCNC: 11 MMOL/L (ref 3–16)
BASOPHILS ABSOLUTE: 0 K/UL (ref 0–0.2)
BASOPHILS RELATIVE PERCENT: 0.6 %
BUN BLDV-MCNC: 58 MG/DL (ref 7–20)
CALCIUM SERPL-MCNC: 8.3 MG/DL (ref 8.3–10.6)
CHLORIDE BLD-SCNC: 102 MMOL/L (ref 99–110)
CO2: 31 MMOL/L (ref 21–32)
CREAT SERPL-MCNC: 2.2 MG/DL (ref 0.8–1.3)
EOSINOPHILS ABSOLUTE: 0.1 K/UL (ref 0–0.6)
EOSINOPHILS RELATIVE PERCENT: 1.8 %
GFR SERPL CREATININE-BSD FRML MDRD: 29 ML/MIN/{1.73_M2}
GLUCOSE BLD-MCNC: 140 MG/DL (ref 70–99)
GLUCOSE BLD-MCNC: 143 MG/DL (ref 70–99)
GLUCOSE BLD-MCNC: 90 MG/DL (ref 70–99)
GLUCOSE BLD-MCNC: 90 MG/DL (ref 70–99)
GLUCOSE BLD-MCNC: 96 MG/DL (ref 70–99)
HCT VFR BLD CALC: 27.6 % (ref 40.5–52.5)
HEMOGLOBIN: 8.7 G/DL (ref 13.5–17.5)
LYMPHOCYTES ABSOLUTE: 0.7 K/UL (ref 1–5.1)
LYMPHOCYTES RELATIVE PERCENT: 11.1 %
MAGNESIUM: 2.1 MG/DL (ref 1.8–2.4)
MCH RBC QN AUTO: 26.5 PG (ref 26–34)
MCHC RBC AUTO-ENTMCNC: 31.6 G/DL (ref 31–36)
MCV RBC AUTO: 84.1 FL (ref 80–100)
MONOCYTES ABSOLUTE: 0.7 K/UL (ref 0–1.3)
MONOCYTES RELATIVE PERCENT: 11.5 %
NEUTROPHILS ABSOLUTE: 4.8 K/UL (ref 1.7–7.7)
NEUTROPHILS RELATIVE PERCENT: 75 %
PDW BLD-RTO: 19.1 % (ref 12.4–15.4)
PERFORMED ON: ABNORMAL
PERFORMED ON: ABNORMAL
PERFORMED ON: NORMAL
PERFORMED ON: NORMAL
PLATELET # BLD: 158 K/UL (ref 135–450)
PMV BLD AUTO: 8 FL (ref 5–10.5)
POTASSIUM SERPL-SCNC: 3.5 MMOL/L (ref 3.5–5.1)
RBC # BLD: 3.28 M/UL (ref 4.2–5.9)
SODIUM BLD-SCNC: 144 MMOL/L (ref 136–145)
WBC # BLD: 6.4 K/UL (ref 4–11)

## 2022-12-26 PROCEDURE — 2700000000 HC OXYGEN THERAPY PER DAY

## 2022-12-26 PROCEDURE — 97535 SELF CARE MNGMENT TRAINING: CPT

## 2022-12-26 PROCEDURE — 87205 SMEAR GRAM STAIN: CPT

## 2022-12-26 PROCEDURE — 97530 THERAPEUTIC ACTIVITIES: CPT

## 2022-12-26 PROCEDURE — 2580000003 HC RX 258: Performed by: INTERNAL MEDICINE

## 2022-12-26 PROCEDURE — 87070 CULTURE OTHR SPECIMN AEROBIC: CPT

## 2022-12-26 PROCEDURE — 6360000002 HC RX W HCPCS: Performed by: INTERNAL MEDICINE

## 2022-12-26 PROCEDURE — 94760 N-INVAS EAR/PLS OXIMETRY 1: CPT

## 2022-12-26 PROCEDURE — 85025 COMPLETE CBC W/AUTO DIFF WBC: CPT

## 2022-12-26 PROCEDURE — 99233 SBSQ HOSP IP/OBS HIGH 50: CPT | Performed by: INTERNAL MEDICINE

## 2022-12-26 PROCEDURE — 97166 OT EVAL MOD COMPLEX 45 MIN: CPT

## 2022-12-26 PROCEDURE — 80048 BASIC METABOLIC PNL TOTAL CA: CPT

## 2022-12-26 PROCEDURE — 97116 GAIT TRAINING THERAPY: CPT

## 2022-12-26 PROCEDURE — 6370000000 HC RX 637 (ALT 250 FOR IP): Performed by: INTERNAL MEDICINE

## 2022-12-26 PROCEDURE — 1200000000 HC SEMI PRIVATE

## 2022-12-26 PROCEDURE — 36415 COLL VENOUS BLD VENIPUNCTURE: CPT

## 2022-12-26 PROCEDURE — 97162 PT EVAL MOD COMPLEX 30 MIN: CPT

## 2022-12-26 PROCEDURE — 83735 ASSAY OF MAGNESIUM: CPT

## 2022-12-26 RX ORDER — HYDRALAZINE HYDROCHLORIDE 10 MG/1
10 TABLET, FILM COATED ORAL EVERY 8 HOURS SCHEDULED
Status: DISCONTINUED | OUTPATIENT
Start: 2022-12-26 | End: 2022-12-30 | Stop reason: HOSPADM

## 2022-12-26 RX ORDER — HEPARIN SODIUM 5000 [USP'U]/ML
5000 INJECTION, SOLUTION INTRAVENOUS; SUBCUTANEOUS 2 TIMES DAILY
Status: DISCONTINUED | OUTPATIENT
Start: 2022-12-26 | End: 2022-12-30 | Stop reason: HOSPADM

## 2022-12-26 RX ADMIN — HEPARIN SODIUM 5000 UNITS: 5000 INJECTION INTRAVENOUS; SUBCUTANEOUS at 05:12

## 2022-12-26 RX ADMIN — Medication 10 ML: at 21:09

## 2022-12-26 RX ADMIN — AMIODARONE HYDROCHLORIDE 100 MG: 200 TABLET ORAL at 08:48

## 2022-12-26 RX ADMIN — PANTOPRAZOLE SODIUM 40 MG: 40 TABLET, DELAYED RELEASE ORAL at 08:48

## 2022-12-26 RX ADMIN — HYDRALAZINE HYDROCHLORIDE 10 MG: 10 TABLET, FILM COATED ORAL at 21:09

## 2022-12-26 RX ADMIN — MONTELUKAST 10 MG: 10 TABLET, FILM COATED ORAL at 08:48

## 2022-12-26 RX ADMIN — HEPARIN SODIUM 5000 UNITS: 5000 INJECTION INTRAVENOUS; SUBCUTANEOUS at 13:39

## 2022-12-26 RX ADMIN — FUROSEMIDE 40 MG: 10 INJECTION, SOLUTION INTRAMUSCULAR; INTRAVENOUS at 17:43

## 2022-12-26 RX ADMIN — DOCUSATE SODIUM 100 MG: 100 CAPSULE, LIQUID FILLED ORAL at 08:48

## 2022-12-26 RX ADMIN — POLYETHYLENE GLYCOL 3350 17 G: 17 POWDER, FOR SOLUTION ORAL at 08:48

## 2022-12-26 RX ADMIN — TAMSULOSIN HYDROCHLORIDE 0.4 MG: 0.4 CAPSULE ORAL at 08:48

## 2022-12-26 RX ADMIN — HEPARIN SODIUM 5000 UNITS: 5000 INJECTION INTRAVENOUS; SUBCUTANEOUS at 21:09

## 2022-12-26 RX ADMIN — ATORVASTATIN CALCIUM 40 MG: 40 TABLET, FILM COATED ORAL at 08:48

## 2022-12-26 RX ADMIN — FERROUS SULFATE TAB EC 324 MG (65 MG FE EQUIVALENT) 325 MG: 324 (65 FE) TABLET DELAYED RESPONSE at 08:48

## 2022-12-26 RX ADMIN — Medication 10 ML: at 08:48

## 2022-12-26 RX ADMIN — FUROSEMIDE 40 MG: 10 INJECTION, SOLUTION INTRAMUSCULAR; INTRAVENOUS at 08:48

## 2022-12-26 RX ADMIN — HYDRALAZINE HYDROCHLORIDE 10 MG: 10 TABLET, FILM COATED ORAL at 13:39

## 2022-12-26 RX ADMIN — ASPIRIN 162 MG: 81 TABLET, COATED ORAL at 08:48

## 2022-12-26 NOTE — CARE COORDINATION
INITIAL CASE MANAGEMENT ASSESSMENT    Reviewed chart, met with patient to assess possible discharge needs. Explained Case Management role/services. Living Situation: lives at home with his wife Peg- they [place] in [place] style home w/ 1+1 EZE[address]-- all living space is on one level    ADLs: independent     DME: walker-grab bars by bath tub- seat extender for tub -cane    PT/OT Recs:    Discharge Recommendations:  3-5 sessions per week, Patient would benefit from continued therapy after discharge     [facility] scored a 16/24 on the AM-PAC ADL Inpatient form. Current research shows that an AM-PAC score of 17 or less is typically not associated with a discharge to the patient's home setting. Based on the patient's AM-PAC score and their current ADL deficits, it is recommended that the patient have 3-5 sessions per week of Occupational Therapy at d/c to increase the patient's independence    Active Services: [facility]-PT/OT/VN      Transportation: active --states his brother can transport home at [place]      Medications: no issues--uses [facility] on [place] and [address] [place] [address]     PCP: Fariba Field      HD/PD: n/a    PLAN/COMMENTS: met with patient at bedside to discuss dc needs and review PT/OT recommendation for continued therapy in a skilled setting-- pt states he plans to return home and resume his home care w/ [facility]  . Patient did give me permission to discuss with his wife Peg-- I called her # [phone]- left a message requesting a return call      SW/CM provided contact information for patient or family to call with any questions. SW/CM will follow and assist as needed.   Electronically signed by Reinaldo Traylor on 12/26/2022 at 4:26 PM  #[phone]

## 2022-12-26 NOTE — PROGRESS NOTES
Physical Therapy  Facility/Department: [address] ORTHOPEDICS  Physical Therapy Initial Assessment    Name: Jelena Alarcon  : 1940  MRN: [id number]  Date of Service: 2022    Assessment / Discharge Recommendations:  -not safe to ambulate alone and his wife does not appear physically able to provide hands on assist  -anticipate will need to reach Modified Independent for all mobility to manage at home  -recommend continued PTOT and nursing care in a [facility] scored a 13/24 on the AM-PAC short mobility form. Current research shows that an AM-PAC score of 17 or less is typically not associated with a discharge to the patient's home setting. Based on the patient's AM-PAC score and their current functional mobility deficits, it is recommended that the patient have 3-5 sessions per week of Physical Therapy at d/c to increase the patient's independence. Patient Diagnosis(es): The primary encounter diagnosis was Fall, initial encounter. Diagnoses of Dyspnea, unspecified type, Hyperkalemia, DEISY (acute kidney injury) ([address] Elevated troponin, and Bradycardia were also pertinent to this visit. 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 Past Surgical History:  has a past surgical history that includes Gallbladder surgery (); Upper gastrointestinal endoscopy (7-2005    7/10/2009); Colonoscopy (7/10/2009); Cataract removal (2012); Pain management procedure (Right, 10/20/2021);  Pain management procedure (Left, 12/8/2021); and CT BIOPSY BONE MARROW (11/1/2022). Body Structures, Functions, Activity Limitations Requiring Skilled Therapeutic Intervention: Decreased functional mobility ; Decreased ADL status; Decreased posture;Decreased balance;Decreased endurance  Therapy Prognosis: Good;Fair  Decision Making: Medium Complexity  Requires PT Follow-Up: Yes  Activity Tolerance  Activity Tolerance: Patient limited by fatigue;Patient limited by endurance     Plan   Physcial Therapy Plan  General Plan: 3-5 times per week  Current Treatment Recommendations: Functional mobility training, Transfer training, ADL/Self-care training, Gait training, Positioning, Modalities, Therapeutic activities, Patient/Caregiver education & training  Safety Devices  Type of Devices: All fall risk precautions in place, Call light within reach, Chair alarm in place, Left in chair, Nurse notified     Restrictions  Restrictions/Precautions  Restrictions/Precautions: Fall Risk, Contact Precautions  Position Activity Restriction  Other position/activity restrictions: O2 at 3liters   (baseline at 2 liters \"but I don't need it).  ESBL 12/26     Subjective   General  Chart Reviewed: Yes  Patient assessed for rehabilitation services?: Yes  Additional Pertinent Hx: here due to fall from office chair - chart notes indicate he was attempting to scoot back in chair or getting up and slid to floor)  Response To Previous Treatment: Not applicable  Family / Caregiver Present: Yes  Follows Commands: Within Functional Limits (begrudgingly)  Subjective  Subjective: arrived to room along with OT to patient resting in recliner - only semi-agreeable to PT OT assessment to see how he is able to mobilize to walker and ambulate short in room distances  - states no pain     Social/Functional History  Social/Functional History  Lives With: Spouse  Type of Home: House  Home Layout: Able to Live on Main level with bedroom/bathroom, Multi-level  Home Access: Stairs to enter with rails  Entrance Stairs - Number of Steps: 1+1  Entrance Stairs - Rails: Both  Bathroom Shower/Tub: Tub/Shower unit  Bathroom Toilet: Standard  Bathroom Equipment: Shower chair, Grab bars in shower, Tub transfer bench  Home Equipment: Cane, Rollator, Oxygen, Grab bars (2L)  Has the patient had two or more falls in the past year or any fall with injury in the past year?: Yes  ADL Assistance: Needs assistance (assist LB.  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  IADL Comments: sleeps in a recliner but reports he does get into bed on a regular basis  Objective   Observation/Palpation  Observation: claudia  Gross Assessment  Tone: Normal  Sensation:  (not assessed)   Strength Other  Other: grossly functional as observed mobilizing up to walker and ambulating  Bed mobility  Bed Mobility Comments: not observed  Transfers  Sit to Stand: Minimal Assistance  Stand to Sit: Minimal Assistance  Ambulation  Surface: Level tile  Device: Rolling Walker  Assistance: Minimal assistance  Quality of Gait: steps high on left vs right  - uneven symmetry - choppy  Distance: 20 feet x 2 (rest on toilet)  Comments: not safe to ambulate  More Ambulation?: No  Stairs/Curb  Stairs?: No  Balance  Comments: midline in sitting at the EOB and in static stance on the walker  - flexed trunk posture on the walker   -dynamic is fair minus on rolling walker    AM-PAC Score  AM-PAC Inpatient Mobility Raw Score : 13 (12/26/22 1328)  AM-PAC Inpatient T-Scale Score : 36.74 (12/26/22 1328)  Mobility Inpatient CMS 0-100% Score: 64.91 (12/26/22 1328)  Mobility Inpatient CMS G-Code Modifier : CL (12/26/22 1328)  Goals  Short Term Goals  Time Frame for Short Term Goals: 1-2 days  Short Term Goal 1: bed mobility at cga (also sleeps in recliner)  Short Term Goal 2: transfers at [address] Goal 3: ambulation at cga rolling walker for 40 feet  Patient Goals   Patient Goals : get home       Education  Patient Education  Education Given To: Patient; Family  Education Provided: Role of Therapy;Plan of Care;Precautions  Education Method: Verbal  Barriers to Learning: None  Education Outcome: Continued education needed      Therapy Time   Individual Concurrent Group Co-treatment   Time In 1300         Time Out 1340         Minutes 111 Aditya Carlton, PT

## 2022-12-26 NOTE — PROGRESS NOTES
Patient is resting in bed. Alert and oriented X 4. Denies pain at this time. IV capped and flushed. Assessment complete. Salazar in place and draining. All patient needs are met at this time. Fall precautions are in place. Call light is in reach.

## 2022-12-26 NOTE — PROGRESS NOTES
Patient reported to case management, Adalid Clark, that he was coughing up thick, red sputum. Adalid Clark notified this RN. Patient states he coughs up sputum every time he coughs. Dr. Delfina Sneed made aware. See new orders. Specimen cup provided for patient to obtain sputum culture. Will monitor.      Electronically signed by Latrice Rene RN on 12/26/2022 at 4:28 PM

## 2022-12-26 NOTE — PROGRESS NOTES
Occupational Therapy  Facility/Department: [address] ORTHOPEDICS  Occupational Therapy Initial Assessment    Name: Emery Jorge  : 1940  MRN: [id number]  Date of Service: 2022    Discharge Recommendations:  3-5 sessions per week, Patient would benefit from continued therapy after discharge        Emery Jorge scored a 16/24 on the AM-PAC ADL Inpatient form. Current research shows that an AM-PAC score of 17 or less is typically not associated with a discharge to the patient's home setting. Based on the patient's AM-PAC score and their current ADL deficits, it is recommended that the patient have 3-5 sessions per week of Occupational Therapy at d/c to increase the patient's independence. Please see assessment section for further patient specific details. If patient discharges prior to next session this note will serve as a discharge summary. Please see below for the latest assessment towards goals. Patient Diagnosis(es): The primary encounter diagnosis was Fall, initial encounter. Diagnoses of Dyspnea, unspecified type, Hyperkalemia, DEISY (acute kidney injury) ([address] Elevated troponin, and Bradycardia were also pertinent to this visit. 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 Past Surgical History:  has a past surgical history that includes Gallbladder surgery (); Upper gastrointestinal endoscopy (7-2005    7/10/2009); Colonoscopy (7/10/2009); Cataract removal (2012);  Pain management procedure (Right, 10/20/2021); Pain management procedure (Left, 12/8/2021); and CT BIOPSY BONE MARROW (11/1/2022). Treatment Diagnosis: impaired ADL/fxl mobility    Assessment   Performance deficits / Impairments: Decreased functional mobility ; Decreased endurance;Decreased ADL status; Decreased high-level IADLs;Decreased balance;Decreased strength;Decreased safe awareness  Assessment: 78 yo male admitted for a fall from chair and with anemia requiring blood transfusion. PMH: JOANN, DM, CKD, COPD, HTN. PTA, pt lives with spouse with asssit for IADL and IND ADL and fxl mobility with RW. However, family reports unsure if has bathed in last 10 days. Today, pt functioning below baseline limited by decreased insight, balance, endurance, and strength. Pt on 3L NC requiring assist for line management and cues for deep breathing d/t SOB. Pt required Min A for fxl tx and fxl mobility household distances with RW. Pt with poor RW management and moderate BLE unsteadiness. Pt required Mod A toileting and close CGA sink side grooming. BUE WFL for self care. Anticipate up to Max A LB and set up seated UB ADL based on balance, endurance, cognition, pain and PLOF. Pt is a high fall risk with low AMPAC score demonstrating need for further skilled OT, rec low-mod pace 3-5x/week  Treatment Diagnosis: impaired ADL/fxl mobility  Prognosis: Fair  Decision Making: Medium Complexity  REQUIRES OT FOLLOW-UP: Yes  Activity Tolerance  Activity Tolerance: Patient limited by fatigue  Activity Tolerance Comments: on 3L NC, SOB with <20 feet fxl mobility requiring seated rest break and ~90 sec to obtain [facility] breathing.         Plan   Occupational Therapy Plan  Times Per Week: 3-5  Current Treatment Recommendations: Strengthening, ROM, Balance training, Functional mobility training, Endurance training, Pain management, Safety education & training, Modalities, Positioning, Self-Care / ADL, Home management training, Patient/Caregiver education & training Restrictions  Restrictions/Precautions  Restrictions/Precautions: Fall Risk, Contact Precautions  Position Activity Restriction  Other position/activity restrictions: O2 at 3liters   (baseline at 2 liters \"but I don't need it). ESBL 12/26    Subjective   General  Chart Reviewed: Yes  Patient assessed for rehabilitation services?: Yes  Additional Pertinent Hx: 80 yo male admitted for a fall from chair and with anemia requiring blood transfusion. PMH: JOANN, DM, CKD, COPD, HTN  Family / Caregiver Present: Yes (wife and dtr)  Referring Practitioner: Myrna Castillo MD  Diagnosis: Fall from chair  Subjective  Subjective: Pt resting in recliner upon arrival and agreeable to OT/PT eval with encouragement. Pt reporting no pain. Family reports wife unable to physical assist pt  General Comment  Comments: RN ok to see       Social/Functional History  Social/Functional History  Lives With: Spouse  Type of Home: House  Home Layout: Able to Live on Main level with bedroom/bathroom, Multi-level  Home Access: Stairs to enter with rails  Entrance Stairs - Number of Steps: 1+1  Entrance Stairs - Rails: Both  Bathroom Shower/Tub: Tub/Shower unit  Bathroom Toilet: Standard  Bathroom Equipment: Shower chair, Grab bars in shower, Tub transfer bench  Home Equipment: Cane, Rollator, Oxygen, Grab bars (2L)  Has the patient had two or more falls in the past year or any fall with injury in the past year?: Yes  ADL Assistance: Needs assistance (assist LB.  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, [employer] saw  IADL Comments: sleeps in a recliner but reports he deos get into bed on a regular basis Objective         Observation/Palpation  Observation: claudia  Safety Devices  Type of Devices: All fall risk precautions in place;Call light within reach; Chair alarm in place; Left in chair;Nurse notified       Toilet Transfers  Toilet - Technique: Ambulating (RW)  Equipment Used: Grab bars (cues to use)  Toilet Transfer: Minimal assistance    AROM: Within functional limits  PROM: Within functional limits  Strength: Generally decreased, functional  Coordination: Within functional limits  Tone: Normal    ADL  Grooming: Contact guard assistance  Grooming Skilled Clinical Factors: standing sink side for hand washing  Toileting: Moderate assistance  Toileting Skilled Clinical Factors: pt has large BM on commode. Pt manages pericare (assist for thoroughness and steadiness), no pants to manage- anticipate assist would be requiring  Additional Comments: Anticipate up to Max A LB and set up seated UB ADL based on balance, endurance, cognition, pain and PLOF       Activity Tolerance  Activity Tolerance: Patient limited by fatigue;Patient limited by endurance  Bed mobility  Bed Mobility Comments: not observed    Transfers  Sit to stand: Minimal assistance  Stand to sit: Minimal assistance  Transfer Comments: RW. cues hand placement/safety. Standing balance: CGA sink side grooming x2 min              Fxl mobility: Min A with RW, recliner><bathroom, seated break on toilet. fair/poor RW management/safety, ran into foot of bed. Moderate unsteadiness and small LOB, partially self corrects. Required assist for O2 Line management    Vision  Vision: Impaired (catarct surgery)  Vision Exceptions: Wears glasses for reading  Hearing  Hearing: Exceptions to [employer]  Hearing Exceptions: Bilateral hearing aid;Hard of hearing/hearing concerns    Cognition  Overall Cognitive Status: Exceptions  Following Commands:  Follows one step commands consistently  Attention Span: Attends with cues to redirect  Safety Judgement: Decreased awareness of need for safety;Decreased awareness of need for assistance  Problem Solving: Decreased awareness of errors  Insights: Decreased awareness of deficits  Initiation: Does not require cues  Sequencing: Does not require cues  Cognition Comment: poor insight  Orientation  Overall Orientation Status: Within Functional Limits                    Education Given To: Patient; Family  Education Provided: Role of Therapy;Plan of Care;Transfer Training; Fall Prevention Strategies  Education Method: Demonstration;Verbal  Barriers to Learning:  (poor insight)  Education Outcome: Verbalized understanding;Demonstrated understanding;Continued education needed                      AM-PAC Score        AM-PAC Inpatient Daily Activity Raw Score: 16 (12/26/22 1347)  AM-PAC Inpatient ADL T-Scale Score : 35.96 (12/26/22 1347)  ADL Inpatient CMS 0-100% Score: 53.32 (12/26/22 1347)  ADL Inpatient CMS G-Code Modifier : CK (12/26/22 1347)    Goals  Short Term Goals  Time Frame for Short Term Goals: prior to d/c  Short Term Goal 1: toileting Mod I  Short Term Goal 2: UB bathing/dressing Mod I  Short Term Goal 3: tolerate 5 min fxl standing task Mod I  Short Term Goal 4: LB dressing Mod I  Short Term Goal 5: fxl tx and mobility with RW household distances Mod I  Patient Goals   Patient goals : \"I just want to get home.  I will be fine\"       Therapy Time   Individual Concurrent Group Co-treatment   Time In 1300         Time Out 1340         Minutes 40         Timed Code Treatment Minutes: 25 Minutes (15 eval. 15 ADL 10 TA)       DOROTHEA Luevano, OTR/L

## 2022-12-26 NOTE — ACP (ADVANCE CARE PLANNING)
Advance Care Planning     Advance Care Planning Activator (Inpatient)  Conversation Note      Date of ACP Conversation: 12/26/2022     Conversation Conducted with: Patient with Decision Making Capacity    ACP Activator: [address] Decision Maker:     Current Designated Health Care Decision Maker:     Primary Decision Maker: [address] - [phone]    Secondary Decision Maker: Rita Gomes Child - [phone]  Click here to complete Healthcare Decision Makers including section of the Healthcare Decision Maker Relationship (ie \"Primary\")      Care Preferences    Ventilation: \"If you were in your present state of health and suddenly became very ill and were unable to breathe on your own, what would your preference be about the use of a ventilator (breathing machine) if it were available to you? \"      Would the patient desire the use of ventilator (breathing machine)?: yes    \"If your health worsens and it becomes clear that your chance of recovery is unlikely, what would your preference be about the use of a ventilator (breathing machine) if it were available to you? \"     Would the patient desire the use of ventilator (breathing machine)?: No      Resuscitation  \"CPR works best to restart the heart when there is a sudden event, like a heart attack, in someone who is otherwise healthy. Unfortunately, CPR does not typically restart the heart for people who have serious health conditions or who are very sick. \"    \"In the event your heart stopped as a result of an underlying serious health condition, would you want attempts to be made to restart your heart (answer \"yes\" for attempt to resuscitate) or would you prefer a natural death (answer \"no\" for do not attempt to resuscitate)? \" yes       [] Yes   [] No   Educated Luke / Chevy Deleon regarding differences between Advance Directives and portable DNR orders.     Length of ACP Conversation in minutes:      Dereck Borges Outcomes:  [x] ACP discussion completed  [] Existing advance directive reviewed with patient; no changes to patient's previously recorded wishes  [] New Advance Directive completed  [] Portable Do Not Rescitate prepared for Provider review and signature  [] POLST/POST/MOLST/MOST prepared for Provider review and signature      Follow-up plan:    [] Schedule follow-up conversation to continue planning  [] Referred individual to Provider for additional questions/concerns   [] Advised patient/agent/surrogate to review completed ACP document and update if needed with changes in condition, patient preferences or care setting    [x] This note routed to one or more involved healthcare providers        Electronically signed by Charley Gosselin on 12/26/2022 at 3:58 PM  #[phone]

## 2022-12-26 NOTE — PLAN OF CARE
Problem: Pain  Goal: Verbalizes/displays adequate comfort level or baseline comfort level  Outcome: Progressing     Problem: Skin/Tissue Integrity  Goal: Absence of new skin breakdown  Description: 1. Monitor for areas of redness and/or skin breakdown  2. Assess vascular access sites hourly  3. Every 4-6 hours minimum:  Change oxygen saturation probe site  4. Every 4-6 hours:  If on nasal continuous positive airway pressure, respiratory therapy assess nares and determine need for appliance change or resting period.   Outcome: Progressing     Problem: Safety - Adult  Goal: Free from fall injury  Outcome: Progressing  Flowsheets (Taken 12/25/2022 2214)  Free From Fall Injury: Instruct family/caregiver on patient safety

## 2022-12-26 NOTE — PROGRESS NOTES
Patient up in chair. Alert and oriented. Denies pain. Breakfast eaten. Morning medications given without difficulty. Assessment completed. Chair alarm on. Call light and belongings within reach. Will continue to monitor.     Electronically signed by Huang Gallo RN on 12/26/2022 at 11:05 AM

## 2022-12-26 NOTE — PROGRESS NOTES
225 [facility] Note      Chief Complaint:   When can I get out of here? Subjective/Interval History:    Patient sitting up in the chair. He is frustrated with the food he has received so far today. He is not certain he understands why he is in the hospital.  He has not been very cooperative with working with physical therapy so far. Salazar catheter still in place. Blood pressures running high, he is concerned about this. Patient no longer on telemetry but no further bradycardia documented. Standing weight today 224 pounds. Substantial diuresis yesterday with 2.7 L negative fluid balance yesterday, 4.2 L negative fluid balance since admission  Creatinine improved today despite dose of Lasix yesterday. PMH, PSH, FH/SH reviewed and unchanged as documented in the H&P personally documented at admission 22    Medication list reviewed    Objective:    BP (!) 181/79   Pulse 68   Temp 97.5 °F (36.4 °C) (Oral)   Resp 19   Ht 5' 8\" (1.727 m)   Wt 224 lb 3.3 oz (101.7 kg)   SpO2 97%   BMI 34.09 kg/m²   Temp  Av.6 °F (36.4 °C)  Min: 97.5 °F (36.4 °C)  Max: 97.8 °F (36.6 °C)    RRR with premature beats. Pulm-lungs are clear today. Overall doing well. Aspirations are easy. Abd- BS+, soft, NTND  Ext-no edema below the knees, some posterior thigh edema bilaterally. No significant edema noted in the flanks or hips.     The Following Labs Were Reviewed Today:    Recent Results (from the past 24 hour(s))   POCT Glucose    Collection Time: 22  3:33 PM   Result Value Ref Range    POC Glucose 113 (H) 70 - 99 mg/dl    Performed on ACCU-CHEK    POCT Glucose    Collection Time: 22  8:06 PM   Result Value Ref Range    POC Glucose 109 (H) 70 - 99 mg/dl    Performed on ACCU-CHEK    Basic Metabolic Panel    Collection Time: 22  5:35 AM   Result Value Ref Range    Sodium 144 136 - 145 mmol/L    Potassium 3.5 3.5 - 5.1 mmol/L    Chloride 102 99 - 110 mmol/L    CO2 31 21 - 32 mmol/L    Anion Gap 11 3 - 16    Glucose 90 70 - 99 mg/dL    BUN 58 (H) 7 - 20 mg/dL    Creatinine 2.2 (H) 0.8 - 1.3 mg/dL    Est, Glom Filt Rate 29 (A) >60    Calcium 8.3 8.3 - 10.6 mg/dL   CBC with Auto Differential    Collection Time: 12/26/22  5:35 AM   Result Value Ref Range    WBC 6.4 4.0 - 11.0 K/uL    RBC 3.28 (L) 4.20 - 5.90 M/uL    Hemoglobin 8.7 (L) 13.5 - 17.5 g/dL    Hematocrit 27.6 (L) 40.5 - 52.5 %    MCV 84.1 80.0 - 100.0 fL    MCH 26.5 26.0 - 34.0 pg    MCHC 31.6 31.0 - 36.0 g/dL    RDW 19.1 (H) 12.4 - 15.4 %    Platelets 553 353 - 609 K/uL    MPV 8.0 5.0 - 10.5 fL    Neutrophils % 75.0 %    Lymphocytes % 11.1 %    Monocytes % 11.5 %    Eosinophils % 1.8 %    Basophils % 0.6 %    Neutrophils Absolute 4.8 1.7 - 7.7 K/uL    Lymphocytes Absolute 0.7 (L) 1.0 - 5.1 K/uL    Monocytes Absolute 0.7 0.0 - 1.3 K/uL    Eosinophils Absolute 0.1 0.0 - 0.6 K/uL    Basophils Absolute 0.0 0.0 - 0.2 K/uL   Magnesium    Collection Time: 12/26/22  5:35 AM   Result Value Ref Range    Magnesium 2.10 1.80 - 2.40 mg/dL   POCT Glucose    Collection Time: 12/26/22  6:15 AM   Result Value Ref Range    POC Glucose 90 70 - 99 mg/dl    Performed on ACCU-CHEK    POCT Glucose    Collection Time: 12/26/22 11:09 AM   Result Value Ref Range    POC Glucose 140 (H) 70 - 99 mg/dl    Performed on ACCU-CHEK        ASSESSMENT/PLAN:      Principal Problem:    Syncope and collapse-heart rate has been better off of verapamil. Active Problems:    HTN- given his recent bradycardic episode will hold off on restarting verapamil and start hydralazine 10 mg 3 times daily and titrate as needed to control blood pressure. Recent LV function was normal so no need for concurrent dosing of isosorbide. Hyperkalemia-resolved, better. Bradycardia-improved since verapamil dose reduced. Hypotension, unspecified-currently resolved off of verapamil.       Type 2 diabetes mellitus with stage 4 chronic kidney disease, with long-term current use of insulin-blood sugars have been quite low, continue with sliding scale only. Chronic anemia-hemoglobin up to 8.7 today. Continue to monitor. May ask hematology to see him tomorrow to determine when next dose of erythropoietin needed. Abnormal CXR-no clinical evidence of pneumonia. Procalcitonin level at admission was low. This suggests the x-ray changes are residual from his recent multifocal pneumonia. Elevated brain natriuretic peptide (BNP) level-continue to diurese, renal function tolerating well, renal function actually improved. Weight is down and I's and O's are negative. Weight gain-likely fluid due to his chronic underlying venous insufficiency. Weight is down nicely. Reduce Lasix to once daily. Elevated troponin-elevated troponin has been stable and likely related to his renal failure. He has not endorsed any cardiac symptoms to suggest coronary ischemia. Chronic respiratory failure with hypoxia -continue with supplemental oxygen. JOANN (obstructive sleep apnea)-  nolt using CPAP here    Chronic GERD-continue daily PPI. Hyperlipidemia-continue statin dose. Paroxysmal atrial fibrillation- clinically remains in sinus rhythm here. Patient not an anticoagulation candidate due to prior GI bleeding. Stage 3b chronic kidney disease-renal function improving, consider asking nephrology to follow along. Essential hypertension-blood pressure running high. Considered verapamil restarting but want to avoid bradycardia, add hydralazine as noted above and titrate for blood pressure control. Moderate COPD (chronic obstructive pulmonary disease) (HCC)-continue with Xopenex nebulizer. Slow transit constipation-Linzess and MiraLAX. Being off of verapamil should help this as well. Weakness-encourage patient to work with therapy so we can get him home. BPH-patient on Flomax.   Salazar catheter will be discontinued today and we will BladderScan him to ensure he has not retaining urine.    Disposition-patient weight is down and edema is reduced. We will discontinue Salazar today, I have encouraged him to work with therapy, blood pressure meds will be adjusted for control. I suspect we will be able to get him discharged in the next 24-48 hours assuming he does well with therapy.     Time > 35 minutes reviewing chart and patient data, examining and interviewing patient, and discussing with nursing staff, family, etc.     Harleen Tracy MD, FACP  12:19 PM  12/26/2022

## 2022-12-26 NOTE — PROGRESS NOTES
Comprehensive Nutrition Assessment    Type and Reason for Visit:  Initial    Nutrition Recommendations/Plan:   Continue 4 carb, 2 gm Na+, 1500 mL FR     Malnutrition Assessment:  Malnutrition Status:  Insufficient data (12/26/22 1024)    Context:  Acute Illness       Nutrition Assessment:    Pt admitted d/t syncope and collapse, PMH includes CKD IV, DM, HTN. Receives 4 carb, 2 gm Na+, 1500 mL FR diet with variable PO intakes. Per weight history pt has experienced 18% weight loss over 1.5 months although this is likely d/t fluid shifts. Noted possible unstg PU, wound eval pending. Will continue to monitor intakes and need for ONS this admission. Nutrition Related Findings:    -4 L since admit. NP BUE, trace NP BLE edema. LBM 12/24. Labs reviewed. Wound Type: Wound Consult Pending       Current Nutrition Intake & Therapies:    Average Meal Intake: 1-25%, 51-75%, %  Average Supplements Intake: None Ordered  ADULT DIET; Regular; 4 carb choices (60 gm/meal); Low Sodium (2 gm); 1500 ml    Anthropometric Measures:  Height: 5' 8\" (172.7 cm)  Ideal Body Weight (IBW): 154 lbs (70 kg)    Admission Body Weight: 243 lb (110.2 kg)  Current Body Weight: 224 lb (101.6 kg),   IBW. Weight Source: Standing Scale  Current BMI (kg/m2): 34.1  BMI Categories: Obese Class 1 (BMI 30.0-34. 9)    Estimated Daily Nutrient Needs:  Energy Requirements Based On: Kcal/kg  Weight Used for Energy Requirements: Current  Energy (kcal/day): 5737-3202 kcal (15-18 kcal/kg CBW)  Weight Used for Protein Requirements: Ideal  Protein (g/day):  gm (1.2-2 g/kg IBW)  Fluid (ml/day): 1500 mL FR    Nutrition Diagnosis:   Inadequate oral intake related to inadequate protein-energy intake as evidenced by variable intakes.     Nutrition Interventions:   Food and/or Nutrient Delivery: Continue Current Diet  Nutrition Education/Counseling: Education not indicated  Coordination of Nutrition Care: Continue to monitor while inpatient    Goals:  Goals: PO intake 50% or greater, by next RD assessment    Nutrition Monitoring and Evaluation:   Behavioral-Environmental Outcomes: None Identified  Food/Nutrient Intake Outcomes: Food and Nutrient Intake  Physical Signs/Symptoms Outcomes: Biochemical Data, Fluid Status or Edema, Nutrition Focused Physical Findings, Skin, Weight    Discharge Planning:    No discharge needs at this time     Matias Freedman, [address] [place] [address], LD  Contact: [phone]

## 2022-12-27 ENCOUNTER — APPOINTMENT (OUTPATIENT)
Dept: GENERAL RADIOLOGY | Age: 82
End: 2022-12-27
Payer: MEDICARE

## 2022-12-27 ENCOUNTER — APPOINTMENT (OUTPATIENT)
Dept: CT IMAGING | Age: 82
End: 2022-12-27
Payer: MEDICARE

## 2022-12-27 LAB
ANION GAP SERPL CALCULATED.3IONS-SCNC: 10 MMOL/L (ref 3–16)
BASOPHILS ABSOLUTE: 0 K/UL (ref 0–0.2)
BASOPHILS RELATIVE PERCENT: 0.4 %
BUN BLDV-MCNC: 51 MG/DL (ref 7–20)
C-REACTIVE PROTEIN: 85.6 MG/L (ref 0–5.1)
CALCIUM SERPL-MCNC: 8.6 MG/DL (ref 8.3–10.6)
CHLORIDE BLD-SCNC: 101 MMOL/L (ref 99–110)
CO2: 32 MMOL/L (ref 21–32)
CREAT SERPL-MCNC: 2.1 MG/DL (ref 0.8–1.3)
EOSINOPHILS ABSOLUTE: 0.2 K/UL (ref 0–0.6)
EOSINOPHILS RELATIVE PERCENT: 2.3 %
GFR SERPL CREATININE-BSD FRML MDRD: 31 ML/MIN/{1.73_M2}
GLUCOSE BLD-MCNC: 103 MG/DL (ref 70–99)
GLUCOSE BLD-MCNC: 104 MG/DL (ref 70–99)
GLUCOSE BLD-MCNC: 108 MG/DL (ref 70–99)
GLUCOSE BLD-MCNC: 152 MG/DL (ref 70–99)
GLUCOSE BLD-MCNC: 225 MG/DL (ref 70–99)
HCT VFR BLD CALC: 28.8 % (ref 40.5–52.5)
HEMOGLOBIN: 9 G/DL (ref 13.5–17.5)
LYMPHOCYTES ABSOLUTE: 0.8 K/UL (ref 1–5.1)
LYMPHOCYTES RELATIVE PERCENT: 12 %
MAGNESIUM: 1.9 MG/DL (ref 1.8–2.4)
MCH RBC QN AUTO: 26.2 PG (ref 26–34)
MCHC RBC AUTO-ENTMCNC: 31.2 G/DL (ref 31–36)
MCV RBC AUTO: 84 FL (ref 80–100)
MONOCYTES ABSOLUTE: 0.8 K/UL (ref 0–1.3)
MONOCYTES RELATIVE PERCENT: 11.1 %
NEUTROPHILS ABSOLUTE: 5.1 K/UL (ref 1.7–7.7)
NEUTROPHILS RELATIVE PERCENT: 74.2 %
PDW BLD-RTO: 19.4 % (ref 12.4–15.4)
PERFORMED ON: ABNORMAL
PLATELET # BLD: 175 K/UL (ref 135–450)
PMV BLD AUTO: 7.8 FL (ref 5–10.5)
POTASSIUM SERPL-SCNC: 3.6 MMOL/L (ref 3.5–5.1)
PROCALCITONIN: 0.17 NG/ML (ref 0–0.15)
RBC # BLD: 3.43 M/UL (ref 4.2–5.9)
SODIUM BLD-SCNC: 143 MMOL/L (ref 136–145)
WBC # BLD: 6.9 K/UL (ref 4–11)

## 2022-12-27 PROCEDURE — 97530 THERAPEUTIC ACTIVITIES: CPT

## 2022-12-27 PROCEDURE — 97116 GAIT TRAINING THERAPY: CPT

## 2022-12-27 PROCEDURE — 2580000003 HC RX 258: Performed by: INTERNAL MEDICINE

## 2022-12-27 PROCEDURE — 6370000000 HC RX 637 (ALT 250 FOR IP): Performed by: INTERNAL MEDICINE

## 2022-12-27 PROCEDURE — 86038 ANTINUCLEAR ANTIBODIES: CPT

## 2022-12-27 PROCEDURE — 83735 ASSAY OF MAGNESIUM: CPT

## 2022-12-27 PROCEDURE — 2700000000 HC OXYGEN THERAPY PER DAY

## 2022-12-27 PROCEDURE — 71250 CT THORAX DX C-: CPT

## 2022-12-27 PROCEDURE — 84145 PROCALCITONIN (PCT): CPT

## 2022-12-27 PROCEDURE — 85025 COMPLETE CBC W/AUTO DIFF WBC: CPT

## 2022-12-27 PROCEDURE — 1200000000 HC SEMI PRIVATE

## 2022-12-27 PROCEDURE — 97535 SELF CARE MNGMENT TRAINING: CPT

## 2022-12-27 PROCEDURE — 6360000002 HC RX W HCPCS: Performed by: INTERNAL MEDICINE

## 2022-12-27 PROCEDURE — 99233 SBSQ HOSP IP/OBS HIGH 50: CPT | Performed by: INTERNAL MEDICINE

## 2022-12-27 PROCEDURE — 80048 BASIC METABOLIC PNL TOTAL CA: CPT

## 2022-12-27 PROCEDURE — 71045 X-RAY EXAM CHEST 1 VIEW: CPT

## 2022-12-27 PROCEDURE — 86140 C-REACTIVE PROTEIN: CPT

## 2022-12-27 PROCEDURE — 99223 1ST HOSP IP/OBS HIGH 75: CPT | Performed by: INTERNAL MEDICINE

## 2022-12-27 PROCEDURE — 36415 COLL VENOUS BLD VENIPUNCTURE: CPT

## 2022-12-27 RX ORDER — PREDNISONE 20 MG/1
40 TABLET ORAL DAILY
Status: DISCONTINUED | OUTPATIENT
Start: 2022-12-27 | End: 2022-12-29

## 2022-12-27 RX ORDER — TORSEMIDE 20 MG/1
20 TABLET ORAL DAILY
Status: DISCONTINUED | OUTPATIENT
Start: 2022-12-27 | End: 2022-12-30 | Stop reason: HOSPADM

## 2022-12-27 RX ADMIN — TORSEMIDE 20 MG: 20 TABLET ORAL at 09:06

## 2022-12-27 RX ADMIN — HYDRALAZINE HYDROCHLORIDE 10 MG: 10 TABLET, FILM COATED ORAL at 15:41

## 2022-12-27 RX ADMIN — HEPARIN SODIUM 5000 UNITS: 5000 INJECTION INTRAVENOUS; SUBCUTANEOUS at 21:35

## 2022-12-27 RX ADMIN — POLYETHYLENE GLYCOL 3350 17 G: 17 POWDER, FOR SOLUTION ORAL at 09:06

## 2022-12-27 RX ADMIN — ATORVASTATIN CALCIUM 40 MG: 40 TABLET, FILM COATED ORAL at 09:06

## 2022-12-27 RX ADMIN — HEPARIN SODIUM 5000 UNITS: 5000 INJECTION INTRAVENOUS; SUBCUTANEOUS at 09:07

## 2022-12-27 RX ADMIN — TAMSULOSIN HYDROCHLORIDE 0.4 MG: 0.4 CAPSULE ORAL at 09:06

## 2022-12-27 RX ADMIN — HYDRALAZINE HYDROCHLORIDE 10 MG: 10 TABLET, FILM COATED ORAL at 05:56

## 2022-12-27 RX ADMIN — Medication 10 ML: at 21:35

## 2022-12-27 RX ADMIN — FERROUS SULFATE TAB EC 324 MG (65 MG FE EQUIVALENT) 325 MG: 324 (65 FE) TABLET DELAYED RESPONSE at 09:06

## 2022-12-27 RX ADMIN — PANTOPRAZOLE SODIUM 40 MG: 40 TABLET, DELAYED RELEASE ORAL at 09:06

## 2022-12-27 RX ADMIN — Medication 10 ML: at 09:07

## 2022-12-27 RX ADMIN — DOCUSATE SODIUM 100 MG: 100 CAPSULE, LIQUID FILLED ORAL at 09:06

## 2022-12-27 RX ADMIN — PREDNISONE 40 MG: 20 TABLET ORAL at 11:05

## 2022-12-27 RX ADMIN — AMIODARONE HYDROCHLORIDE 100 MG: 200 TABLET ORAL at 09:06

## 2022-12-27 RX ADMIN — HYDRALAZINE HYDROCHLORIDE 10 MG: 10 TABLET, FILM COATED ORAL at 21:35

## 2022-12-27 RX ADMIN — MONTELUKAST 10 MG: 10 TABLET, FILM COATED ORAL at 09:06

## 2022-12-27 ASSESSMENT — PAIN SCALES - GENERAL
PAINLEVEL_OUTOF10: 0
PAINLEVEL_OUTOF10: 0

## 2022-12-27 NOTE — PROGRESS NOTES
Patient alert and oriented. Denies pain. VSS. Currently on 3L nasal cannula. Patient is home dependent on 2L. Continues to have blood tinged sputum. Breakafst eaten. Morning medications given without difficulty. Bed alarm on. Call light and belongings within reach. Will continue to monitor.      Electronically signed by Daryl Nuñez RN on 12/27/2022 at 10:19 AM

## 2022-12-27 NOTE — PROGRESS NOTES
Pt resting in chair. No pain reported. Still coughing blood tinges thick sputum, sample was colleted and sent to lab.

## 2022-12-27 NOTE — CONSULTS
Pulmonary Consult Note     Patient's name:  Alyssa Yost Record Number: [id number]  Patient's account/billing number: [de-identified]  Patient's YOB: 1940  Age: 79 y.o. Date of Admission: 12/23/2022  8:08 PM  Date of Consult: 12/27/2022      Primary Care Physician: Ana Ibarra MD      Code Status: Full Code    Reason for consult: hemoptysis     Assessment and Plan     Hemoptysis   Acute on chronic respiratory failure with hypoxia   Bilateral multifocal PNA  CKDIII  JOANN on cpap  COPD  Afib      Plan:  Persistent extensive R>L airspace disease, DD include organizing PNA, amiodarone toxicity, DAH vs less likely new PNA. Will obtain CT to further evaluate  Check CRP, PCT and JOSE  Planning bronch and BAL  Start steroid, monitor BS        HISTORY OF PRESENT ILLNESS:   Mr./Ms. Leslie Wallis is a 79 y.o. gentleman with past medical history stated below significant for obesity, CKD 3, diastolic heart failure, obstructive sleep apnea on CPAP therapy, was readmitted again to the hospital on December 23 for cough and hemoptysis, he stated that he coughs about a quarter size blood 5-6 times a day, he has been admitted multiple times since October, treated for HCAP with meropenem recently, imaging showed persistent bilateral right more than left airspace disease, he is on 3 L of oxygen.   H/o Afib not on anticoagulation           Past Medical History:        Diagnosis Date    Allergic rhinitis     Asthma     Atrial fibrillation (HCC)     Carotid artery stenosis     Chronic kidney disease     COPD (chronic obstructive pulmonary disease) (HCC)     CPAP (continuous positive airway pressure) dependence     ESBL (extended spectrum beta-lactamase) producing bacteria infection 12/26/2018    urine    Hyperlipidemia     Hypertension     Iron deficiency anemia     Obstructive sleep apnea     Type II or unspecified type diabetes mellitus without mention of complication, not stated as uncontrolled        Past Surgical History:        Procedure Laterality Date    CATARACT REMOVAL  2/2012    bilateral    COLONOSCOPY  7/10/2009    diverticulosis    hemorrhoids    CT BONE MARROW BIOPSY  11/1/2022    CT BONE MARROW BIOPSY 11/1/2022 [facility] CT    GALLBLADDER SURGERY  1981    PAIN MANAGEMENT PROCEDURE Right 10/20/2021    COOLIEF RADIOFREQUENCY ABLATION - RIGHT KNEE performed by Laura Ramírez MD at [address] Left 12/8/2021    [address] - LEFT KNEE performed by Laura Ramírez MD at [facility]  7-2005    7/10/2009    normal       Allergies: Allergies   Allergen Reactions    Hytrin [Terazosin Hcl]      Ineffective for BP control    Penicillins      Unknown reaction during childhood; Patient tolerated cephalosporins in the past    Shrimp Flavor Hives    Sulfa Antibiotics      Unknown reaction in childhood    Sulfasalazine Other (See Comments)    Tekturna [Aliskiren Fumarate]      Ineffective    Vancomycin      Fever    Ciprofloxacin Rash     Fever and rash          Home Meds:   Prior to Admission medications    Medication Sig Start Date End Date Taking?  Authorizing Provider   torsemide (DEMADEX) 10 MG tablet Take 1 tablet by mouth daily  Patient taking differently: Take 10 mg by mouth in the morning and at bedtime 12/8/22   Brittny Lewis MD   insulin glargine [facility]) 100 UNIT/ML injection pen Inject 5 Units into the skin nightly    Historical Provider, MD   levalbuterol Claudia Brownlee) 1.25 MG/3ML nebulizer solution USE 1 VIAL VIA NEBULIZER FOUR TIMES DAILY 11/15/22   Chad Kenyon DO   amiodarone (CORDARONE) 200 MG tablet Take 0.5 tablets by mouth daily 11/11/22   Milena Latham MD   INSULIN SYRINGE .5CC/29G 29G X 1/2\" 0.5 ML MISC 1 each by Does not apply route daily 11/9/22   Nancee Duane, MD   albuterol sulfate HFA (PROVENTIL;VENTOLIN;PROAIR) 108 (90 Base) MCG/ACT inhaler INHALE 2 PUFFS BY MOUTH EVERY 4 HOURS AS NEEDED FOR WHEEZING 10/21/22   Ranjeet Howard MD   montelukast (SINGULAIR) 10 MG tablet TAKE 1 TABLET BY MOUTH EVERY DAY 10/21/22   Ranjeet Howard MD   [employer] PURELAX 17 GM/SCOOP powder TAKE 17G BY MOUTH DAILY MIX WITH 8 OZ OF WATER 9/6/22   Ranjeet Howard MD   pantoprazole (PROTONIX) 40 MG tablet TAKE 1 TABLET BY MOUTH EVERY DAY 8/15/22   [facility],    B-D UF III MINI PEN NEEDLES 31G X 5 MM MISC USE AS DIRECTED 3 TIMES A DAY 8/13/22   Megha Dior MD   atorvastatin (LIPITOR) 40 MG tablet TAKE 1 TABLET BY MOUTH EVERY DAY 4/26/22   Ranjeet Howard MD   alfuzosin (UROXATRAL) 10 MG extended release tablet TAKE 1 TABLET BY MOUTH EVERY DAY 4/4/22   Ranjeet Howard MD   LINZESS 145 MCG capsule TAKE 1 CAPSULE BY MOUTH EVERY DAY IN THE MORNING BEFORE BREAKFAST 3/23/22   Ranjeet Howard MD   verapamil (CALAN SR) 240 MG extended release tablet TAKE 1 TABLET BY MOUTH TWICE A DAY 2/14/22   Ranjeet Howard MD   ACCU-CHEK GUIDE strip TEST AS DIRECTED 3 TIMES A DAY 1/29/22   Ranjeet Howard MD   Accu-Chek FastClix Lancets MISC USE TO TEST 3 TIMES A DAY DX CODE E11.9 7/20/21   Ranjeet Howard MD   ZINC PO Take by mouth    Historical Provider, MD   CPAP Machine MISC by Does not apply route    Historical Provider, MD   Ascorbic Acid (SUPER C COMPLEX PO) Take by mouth daily    Historical Provider, MD   Multiple Vitamins-Minerals (MULTIVITAMIN ADULT PO) Take by mouth daily    Historical Provider, MD   Lactobacillus Rhamnosus, GG, ([employer] PROBIOTIC, LACTOBACILLUS,) CAPS TAKE 1 CAPSULE BY MOUTH 2 TIMES DAILY (WITH MEALS) 3/15/20   Ranjeet Howard MD   docusate sodium (COLACE) 100 MG capsule Take by mouth daily    Historical Provider, MD   Cyanocobalamin (VITAMIN B-12 PO) Take 500 mcg by mouth every other day     Historical Provider, MD Cholecalciferol (VITAMIN D3 PO) Take 2,000 Units by mouth daily     Historical Provider, MD   aspirin EC 81 MG EC tablet Take 2 tablets by mouth daily. 1/24/13   Milena Latham MD   ferrous sulfate 325 (65 FE) MG tablet Take 325 mg by mouth daily     Historical Provider, MD       Family History:       Problem Relation Age of Onset    Heart Disease Mother     Stroke Mother     Vision Loss Mother     High Blood Pressure Father     High Blood Pressure Brother     Diabetes Brother     Sleep Apnea Brother     Arthritis Paternal Grandmother     Diabetes Paternal Grandmother          Social History:   TOBACCO:   reports that he quit smoking about 35 years ago. His smoking use included cigarettes. He started smoking about 65 years ago. He has a 9.00 pack-year smoking history. He has never used smokeless tobacco.  ETOH:   reports no history of alcohol use. DRUGS:  reports no history of drug use. REVIEW OF SYSTEMS:  Review of Systems -   General ROS: negative  Psychological ROS: negative  Ophthalmic ROS: negative  ENT ROS: negative  Allergy and Immunology ROS: negative  Hematological and Lymphatic ROS: negative  Endocrine ROS: negative  Breast ROS: negative  Respiratory ROS: cough, hemoptysis, sob  Cardiovascular ROS: no chest pain or dyspnea on exertion  Gastrointestinal ROS:negative  Genito-Urinary ROS: negative  Musculoskeletal ROS: negative  Neurological ROS: negative  Dermatological ROS: negative        Physical Exam:    Vitals: /80   Pulse 79   Temp 97.4 °F (36.3 °C) (Oral)   Resp 18   Ht 5' 8\" (1.727 m)   Wt 217 lb 9.5 oz (98.7 kg)   SpO2 93%   BMI 33.09 kg/m²     Last Body weight:   Wt Readings from Last 3 Encounters:   12/27/22 217 lb 9.5 oz (98.7 kg)   12/16/22 239 lb (108.4 kg)   12/08/22 232 lb 12.9 oz (105.6 kg)       Body Mass Index : Body mass index is 33.09 kg/m².       Intake and Output summary:   Intake/Output Summary (Last 24 hours) at 12/27/2022 2942  Last data filed at 12/27/2022 2165  Gross per 24 hour   Intake 640 ml   Output 2100 ml   Net -1460 ml       Physical Examination:     PHYSICAL EXAM:    Gen:  mild acute distress   Eyes: PERRL. Anicteric sclera. No conjunctival injection. ENT: No discharge. Posterior oropharynx clear. External appearance of ears and nose normal.  Neck: Trachea midline. No mass, no lymphadenopathy    Resp:  bilateral rhonchi, diminished bilaterally,   CV: Regular rate. Regular rhythm. No murmur or rub. No edema. GI: Soft, Non-tender. Non-distended. +BS  Skin: Warm, dry, w/o erythema. Lymph: No cervical or supraclavicular LAD. M/S: No cyanosis. No clubbing. Neuro:  CN 2-12 tested, no focal neurologic deficit. Moves all extremities  Psych: Awake and alert, Oriented x 3. Judgement and insight appropriate. Mood stable. Laboratory findings:-    CBC:   Recent Labs     12/27/22  0440   WBC 6.9   HGB 9.0*        BMP:    Recent Labs     12/25/22  0504 12/26/22  0535 12/27/22  0439   * 144 143   K 4.3 3.5 3.6    102 101   CO2 31 31 32   BUN 72* 58* 51*   CREATININE 2.6* 2.2* 2.1*   GLUCOSE 86 90 104*     S. Calcium:  Recent Labs     12/27/22  0439   CALCIUM 8.6       S. Magnesium:  Recent Labs     12/27/22  0439   MG 1.90       S. Glucose:  Recent Labs     12/26/22  1607 12/26/22  2005 12/27/22  0626   POCGLU 96 143* 108*       Recent Labs     12/24/22  1239 12/24/22  1728   TROPONINI 0.06* 0.06*       Thyroid functions:   Lab Results   Component Value Date/Time    TSH 2.54 05/27/2022 02:05 PM          Radiology Review:  Pertinent images / reports were reviewed as a part of this visit. CT Chest w/ contrast: No results found for this or any previous visit.       CT Chest w/o contrast: Results for orders placed during the hospital encounter of 11/15/22    CT CHEST WO CONTRAST    Narrative  EXAMINATION:  CT OF THE CHEST WITHOUT CONTRAST 11/23/2022 11:49 am    TECHNIQUE:  CT of the chest was performed without the administration of intravenous  contrast. Multiplanar reformatted images are provided for review. Automated  exposure control, iterative reconstruction, and/or weight based adjustment of  the mA/kV was utilized to reduce the radiation dose to as low as reasonably  achievable. COMPARISON:  X-ray chest same date, CT chest 09/25/2018    HISTORY:  ORDERING SYSTEM PROVIDED HISTORY: Hemoptysis  TECHNOLOGIST PROVIDED HISTORY:  Reason for exam:->Hemoptysis  Reason for Exam: Hemoptysis    FINDINGS:  Mediastinum: Noncontrast evaluation mediastinum demonstrates normal caliber  thoracic aorta with atherosclerotic calcification also involving coronary  arteries and origins of the great vessels. Mild cardiomegaly. No  pericardial effusion. No mediastinal or axillary lymphadenopathy. Evaluation of hilar lymph nodes is limited due to lack of IV contrast.    Lungs/pleura: Small right and trace left pleural effusions. Multifocal  airspace disease throughout both lungs suspicious for multifocal pneumonia. No pneumothorax. Mild emphysematous changes. Upper Abdomen: Noncontrast evaluation upper abdomen without acute abnormality. Soft Tissues/Bones: No acute osseous abnormality in the chest.    Impression  Multifocal pneumonia. Small right and trace left pleural effusion. Diffuse atherosclerosis. CTPA: No results found for this or any previous visit. CXR PA/LAT: Results for orders placed during the hospital encounter of 10/30/22    XR CHEST (2 VW)    Narrative  EXAMINATION:  TWO XRAY VIEWS OF THE CHEST    11/6/2022 9:37 am    COMPARISON:  10/30/2022    HISTORY:  ORDERING SYSTEM PROVIDED HISTORY: productive cough wheeze ho pneumonia  TECHNOLOGIST PROVIDED HISTORY:  Reason for exam:->productive cough wheeze ho pneumonia    FINDINGS:  Increasing basilar opacities and small pleural effusions appear increased. No pneumothorax identified. No acute osseous abnormality appreciated.   Cardiac and mediastinal contours appear unchanged. Impression  Increasing basilar opacities and small pleural effusions, for which  multifocal infection or developing edema are considerations. CXR portable: Results for orders placed during the hospital encounter of 12/23/22    XR CHEST PORTABLE    Narrative  EXAMINATION:  ONE XRAY VIEW OF THE CHEST    12/23/2022 5:59 pm    COMPARISON:  Chest x-ray dated November 20, 2022    HISTORY:  ORDERING SYSTEM PROVIDED HISTORY: sob  TECHNOLOGIST PROVIDED HISTORY:  Reason for exam:->sob  Reason for Exam: fall, sob    FINDINGS:  Adequate inspiration is noted. Extensive airspace disease is present  throughout the right upper and lower lung zone. Patchy airspace disease is  present within the left lung base. Interval improvement is noted within the  right basilar airspace disease. No pneumothorax noted. Small pleural  effusions are present. Borderline cardiomegaly is noted. No acute osseous  abnormality is present. Impression  1. Extensive airspace disease throughout the right lung, left lung base. Interval improvement is noted within the right basilar airspace disease. Differential considerations would include multifocal pneumonia versus  coalescing edema. Follow-up imaging is recommended to ensure complete  clearing  2.  Borderline cardiomegaly        Johnny Cordoba MD, M.D.            12/27/2022, 9:38 AM

## 2022-12-27 NOTE — PROGRESS NOTES
Physician Progress Note      Israel Felton  CSN #:                  [id number]  :                       1940  ADMIT DATE:       2022 8:08 PM  [address] [place] DATE:  RESPONDING  PROVIDER #:        Rommel Conway MD          QUERY TEXT:    Pt with CKD IV admitted with syncopal episode thought to be secondary to   hypotension and bradycardia. On arrival BUN 74, creatinine 2.8, GFR 22 and K   6.0. Currently BUN 51, creatinine 2.1, GFR 31 and K 3.6. If possible, please   document in the progress notes and discharge summary if you are evaluating   and/or treating any of the following: The medical record reflects the following:  Risk Factors: CKD IV, bradycardia, hypotension, H/H as low as 6.0/19.7  Clinical Indicators: On arrival BUN 74, creatinine 2.8, GFR 22 and K 6.0. Currently BUN 51, creatinine 2.1, GFR 31 and K 3.6. Treatment:  Verapamil dose decreased, IV fluids, PRBCs, monitoring renal labs    Defined by Kidney Disease Improving Global Outcomes (KDIGO) clinical practice   guideline for acute kidney injury:  -Increase in SCr by greater than or equal to 0.3 mg/dl within 48 hours; or  -Increase or decrease in SCr to greater than or equal to 1.5 times baseline,   which is known or presumed to have occurred within the prior 7 days; or  -Urine volume < 0.5ml/kg/h for 6 hours    Shirley nance, RN, BSN, CCDS  [email or web address]  Options provided:  -- DEISY on CKD IV present on admission  -- Other - I will add my own diagnosis  -- Disagree - Not applicable / Not valid  -- Disagree - Clinically unable to determine / Unknown  -- Refer to Clinical Documentation Reviewer    PROVIDER RESPONSE TEXT:    DEISY on CKD IV was present on admission.     Query created by: Rosas Mckinney on 2022 7:27 AM      Electronically signed by:  Rommel Conway MD 2022 7:35 AM

## 2022-12-27 NOTE — PROGRESS NOTES
Physical Therapy  Facility/Department: [address] ORTHOPEDICS  Physical Therapy Treatment note    Name: Vanessa Villatoro  : 1940  MRN: [id number]  Date of Service: 2022    Assessment / Discharge Recommendations:  -engages for PT OT reluctantly. ...  -managed a bit better with ambulating in room using rolling walker but still requires hands on assist for safety  -his wife is not capable to assist with his transfers and ambulation   -another family member might be capable but do not think available to provide 24 hour assist  -at this time presents with need for continued PTOT and nursing care in a skilled setting with a SLOW pace until proves safe and effective with all mobility and basic ADLs to safely manage at home     Vanessa Villatoro scored a 13/24 on the AM-PAC short mobility form. Current research shows that an AM-PAC score of 17 or less is typically not associated with a discharge to the patient's home setting. Based on the patient's AM-PAC score and their current functional mobility deficits, it is recommended that the patient have 3-5 sessions per week of Physical Therapy at d/c to increase the patient's independence. Patient Diagnosis(es): The primary encounter diagnosis was Fall, initial encounter. Diagnoses of Dyspnea, unspecified type, Hyperkalemia, DEISY (acute kidney injury) ([address] Elevated troponin, and Bradycardia were also pertinent to this visit. 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   Past Surgical History:  has a past surgical history that includes Gallbladder surgery (1981); Upper gastrointestinal endoscopy (7-2005    7/10/2009); Colonoscopy (7/10/2009); Cataract removal (2/2012); Pain management procedure (Right, 10/20/2021); Pain management procedure (Left, 12/8/2021); and CT BIOPSY BONE MARROW (11/1/2022). Body Structures, Functions, Activity Limitations Requiring Skilled Therapeutic Intervention: Decreased functional mobility ; Decreased ADL status; Decreased posture;Decreased balance;Decreased endurance  Requires PT Follow-Up: Yes  Activity Tolerance  Activity Tolerance: Patient limited by fatigue;Patient limited by endurance     Plan   Physcial Therapy Plan  General Plan: 3-5 times per week  Current Treatment Recommendations: Functional mobility training, Transfer training, ADL/Self-care training, Gait training, Positioning, Modalities, Therapeutic activities, Patient/Caregiver education & training  Safety Devices  Type of Devices: All fall risk precautions in place, Call light within reach, Chair alarm in place, Left in chair, Nurse notified     Restrictions  Restrictions/Precautions  Restrictions/Precautions: Fall Risk, Contact Precautions  Position Activity Restriction  Other position/activity restrictions: O2 at 2 liters     Subjective   General  Chart Reviewed: Yes  Patient assessed for rehabilitation services?: Yes  Additional Pertinent Hx: here due to fall from office chair - chart notes indicate he was attempting to scoot back in chair or getting up and slid to floor)  Response To Previous Treatment: Patient with no complaints from previous session.   Family / Caregiver Present: No  Follows Commands: Within Functional Limits  Subjective  Subjective: arrived to room along with OT to patient sitting up in recliner - upon re-introductions he asked \"what do you want\"  explained again - as yesterday - that primary need at this time is for him to increase some activity and to assess safety and effectiveness using walker for very basic household distances - he reluctantly agrees but not after additional complaints about his food and. ..and...and.     Social/Functional History  Social/Functional History  Lives With: Spouse  Type of Home: House  Home Layout: Able to Live on Main level with bedroom/bathroom, Multi-level  Home Access: Stairs to enter with rails  Entrance Stairs - Number of Steps: 1+1  Entrance Stairs - Rails: Both  Bathroom Shower/Tub: Tub/Shower unit  Bathroom Toilet: Standard  Bathroom Equipment: Shower chair, Grab bars in shower, Tub transfer bench  Home Equipment: Cane, Rollator, Oxygen, Grab bars (2L)  Has the patient had two or more falls in the past year or any fall with injury in the past year?: Yes  ADL Assistance: Needs assistance (assist LB.  has been home 10 days (\"just got the bench for the shower, he said he used it once\"- per dtr))  Homemaking Assistance: Needs assistance (wife cleans, pt manages bill paying)  Ambulation Assistance: Independent (RW since d/c from [facility])  Transfer Assistance: Independent  Active : Yes  Occupation: Retired  Type of Occupation: built houses  [facility] [place]: [employer], [address]  IADL Comments: sleeps in a recliner but reports he deos get into bed on a regular basis  Vision/Hearing  Vision  Vision: Impaired  Vision Exceptions: Wears glasses for reading  Hearing  Hearing: Exceptions to [facility]  Hearing Exceptions: Bilateral hearing aid;Hard of hearing/hearing concerns    Objective   Observation: claudia  Bed mobility  Bed Mobility Comments: not observed - OOB at start and conclusion of session  Transfers  Sit to Stand: Minimal Assistance;Contact guard assistance  Stand to Sit: Contact guard assistance;Minimal Assistance  Ambulation  Surface: Level tile  Device: Rolling Walker  Assistance: Minimal assistance  Quality of Gait: step through pattern with more even step length and decrease \"choppiness\"  Distance: ~25 feet  Comments: improving - however failed to get squared to the recliner and only manage to get \"one cheek\" on the recliner   -nearly sat on the arm rest - - (secured his position and assisted up to stance to properly reposition)  More Ambulation?: No  Stairs/Curb  Stairs?: No  Balance  Comments: midline in sitting at the EOB and in static stance on the walker  - flexed trunk posture on the walker   -dynamic is fair minus on rolling walker    AM-PAC Score  AM-PAC Inpatient Mobility Raw Score : 13 (12/26/22 1328)  AM-PAC Inpatient T-Scale Score : 36.74 (12/26/22 1328)  Mobility Inpatient CMS 0-100% Score: 64.91 (12/26/22 1328)  Mobility Inpatient CMS G-Code Modifier : CL (12/26/22 1328)    Goals  Short Term Goals  Time Frame for Short Term Goals: 1-2 days  Short Term Goal 1: bed mobility at [facility] (also sleeps in recliner)  Short Term Goal 2: transfers at [facility] Goal 3: ambulation at [facility] rolling walker for 40 feet  Patient Goals   Patient Goals : get home       Education  Patient Education  Education Given To: Patient  Education Provided: Transfer Training  Education Provided Comments: reviewed proper effort needed to get squared up to sit down safely      Therapy Time   Individual Concurrent Group Co-treatment   Time In 1410         Time Out 1450         Minutes [address], PT

## 2022-12-27 NOTE — PROGRESS NOTES
Occupational Therapy  Facility/Department: [address] ORTHOPEDICS  Occupational Therapy Treatment Note    Name: Claude Alarcon  : 1940  MRN: [id number]  Date of Service: 2022    Discharge Recommendations:  3-5 sessions per week, Patient would benefit from continued therapy after discharge        Claude Alarcon scored a 16/24 on the AM-PAC ADL Inpatient form. Current research shows that an AM-PAC score of 17 or less is typically not associated with a discharge to the patient's home setting. Based on the patient's AM-PAC score and their current ADL deficits, it is recommended that the patient have 3-5 sessions per week of Occupational Therapy at d/c to increase the patient's independence. Please see assessment section for further patient specific details. If patient discharges prior to next session this note will serve as a discharge summary. Please see below for the latest assessment towards goals. Patient Diagnosis(es): The primary encounter diagnosis was Fall, initial encounter. Diagnoses of Dyspnea, unspecified type, Hyperkalemia, DEISY (acute kidney injury) ([address] Elevated troponin, and Bradycardia were also pertinent to this visit. 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 Past Surgical History:  has a past surgical history that includes Gallbladder surgery (); Upper gastrointestinal endoscopy (7-2005    7/10/2009); Colonoscopy (7/10/2009); Cataract removal (2012);  Pain management procedure (Right, 10/20/2021); Pain management procedure (Left, 12/8/2021); and CT BIOPSY BONE MARROW (11/1/2022). Treatment Diagnosis: impaired ADL/fxl mobility    Assessment   Performance deficits / Impairments: Decreased functional mobility ; Decreased endurance;Decreased ADL status; Decreased high-level IADLs;Decreased balance;Decreased strength;Decreased safe awareness  Assessment: 78 yo male admitted for a fall from chair and with anemia requiring blood transfusion. PMH: JOANN, DM, CKD, COPD, HTN. PTA, pt lives with spouse with asssit for IADL and IND ADL and fxl mobility with RW. However, family reports unsure if he has bathed in last 10 days. Today, 12/27, pt functioning below baseline limited by decreased insight, balance, endurance, and strength. Pt on 2L NC requiring assist for line management, mild SOB. Pt required Min A for fxl tx and fxl mobility household distances with RW. Pt with fair RW management and moderate BLE unsteadiness. Pt completes seated grooming with set up, otherwise declines ADL. Anticipate up to Max A LB and set up seated UB ADL based on balance, endurance, cognition, pain and PLOF. Pt is a high fall risk with low AMPAC score demonstrating need for further skilled OT, rec low-mod pace 3-5x/week.   Treatment Diagnosis: impaired ADL/fxl mobility  Prognosis: Fair  REQUIRES OT FOLLOW-UP: Yes  Activity Tolerance  Activity Tolerance: Patient limited by fatigue  Activity Tolerance Comments: mild SOB with activity        Plan   Occupational Therapy Plan  Times Per Week: 3-5  Current Treatment Recommendations: Strengthening, ROM, Balance training, Functional mobility training, Endurance training, Pain management, Safety education & training, Modalities, Positioning, Self-Care / ADL, Home management training, Patient/Caregiver education & training     Restrictions  Restrictions/Precautions  Restrictions/Precautions: Fall Risk, Contact Precautions  Position Activity Restriction  Other position/activity restrictions: O2 at 2 liters    Subjective   General  Chart Reviewed: Yes  Patient assessed for rehabilitation services?: Yes  Additional Pertinent Hx: 78 yo male admitted for a fall from chair and with anemia requiring blood transfusion. PMH: JOANN, DM, CKD, COPD, HTN  Family / Caregiver Present: Yes (wife and dtr)  Referring Practitioner: Mary Ann Cohen MD  Diagnosis: Fall from chair  Subjective  Subjective: Pt resting in recliner upon arrival and agreeable to OT/PT tx with encouragement. Pt reporting no pain. Family reports wife unable to physical assist pt. General Comment  Comments: RN ok to see       Social/Functional History  Social/Functional History  Lives With: Spouse  Type of Home: House  Home Layout: Able to Live on Main level with bedroom/bathroom, Multi-level  Home Access: Stairs to enter with rails  Entrance Stairs - Number of Steps: 1+1  Entrance Stairs - Rails: Both  Bathroom Shower/Tub: Tub/Shower unit  Bathroom Toilet: Standard  Bathroom Equipment: Shower chair, Grab bars in shower, Tub transfer bench  Home Equipment: Cane, Rollator, Oxygen, Grab bars (2L)  Has the patient had two or more falls in the past year or any fall with injury in the past year?: Yes  ADL Assistance: Needs assistance (assist LB. 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  IADL Comments: sleeps in a recliner but reports he deos get into bed on a regular basis       Objective         Observation/Palpation  Observation: claudia  Safety Devices  Type of Devices: All fall risk precautions in place;Call light within reach; Chair alarm in place; Left in chair;Nurse notified             ADL  Grooming: Setup  Grooming Skilled Clinical Factors: seated oral care and face washing       Activity Tolerance  Activity Tolerance: Patient limited by fatigue;Patient limited by endurance       Transfers  Sit to stand: Minimal assistance  Stand to sit: Minimal assistance  Transfer Comments: RW. cues hand placement/safety. Sits prior to fully aligned- sits on arm rest even with cues for proper alignment            Standing balance: CGA PRN tx at RW. Fxl mobility: Min A with RW, recliner><curtain, fair RW management/safety, Min unsteadiness and no LOB. Pt partially manages O2 Line    Vision  Vision: Impaired  Vision Exceptions: Wears glasses for reading  Hearing  Hearing: Exceptions to [facility]  Hearing Exceptions: Bilateral hearing aid;Hard of hearing/hearing concerns    Cognition  Overall Cognitive Status: Exceptions  Following Commands: Follows one step commands consistently  Safety Judgement: Decreased awareness of need for safety;Decreased awareness of need for assistance  Problem Solving: Decreased awareness of errors  Insights: Decreased awareness of deficits  Initiation: Does not require cues  Sequencing: Does not require cues  Cognition Comment: poor insight  Orientation  Overall Orientation Status: Within Functional Limits                     Education Given To: Patient; Family  Education Provided: Role of Therapy;Plan of Care;Transfer Training; Fall Prevention Strategies  Education Method: Demonstration;Verbal  Barriers to Learning:  (poor insight)  Education Outcome: Verbalized understanding;Demonstrated understanding;Continued education needed                      AM-PAC Score        AM-PAC Inpatient Daily Activity Raw Score: 16 (12/27/22 1456)  AM-PAC Inpatient ADL T-Scale Score : 35.96 (12/27/22 1456)  ADL Inpatient CMS 0-100% Score: 53.32 (12/27/22 1456)  ADL Inpatient CMS G-Code Modifier : CK (12/27/22 1456)    Goals  Short Term Goals  Time Frame for Short Term Goals: prior to d/c: ongoing  Short Term Goal 1: toileting Mod I  Short Term Goal 2: UB bathing/dressing Mod I  Short Term Goal 3: tolerate 5 min fxl standing task Mod I  Short Term Goal 4: LB dressing Mod I  Short Term Goal 5: fxl tx and mobility with RW household distances Mod I  Patient Goals   Patient goals : \"I just want to get home.  I will be fine\"       Therapy Time   Individual Concurrent Group Co-treatment   Time In 1410         Time Out 1450         Minutes 40         Timed Code Treatment Minutes: 40 Minutes (10 ADL 30 TA)       DOROTHEA Luevano, OTR/L

## 2022-12-27 NOTE — PROGRESS NOTES
[facility] Internal Medicine Note      Chief Complaint:   I feel ok    Subjective/Interval History: This morning the patient is lying completely flat in bed. He is breathing comfortably. He simply states \"I can lie flat without being short of breath\"  Weight down to 217 pounds today. No edema is present. Yesterday the patient complained of persistent hemoptysis to  and nursing. Aspirin was discontinued and heparin was reduced to twice daily. This morning he continues with blood-tinged sputum. He reports that this has been ongoing since his last admission for pneumonia. Sputum culture was obtained yesterday. No other new problems noted. 5.8 L negative fluid balance since admission  Creatinine continues to improve. PMH, PSH, FH/SH reviewed and unchanged as documented in the H&P personally documented at admission 22    Medication list reviewed    Objective:    /80   Pulse 79   Temp 97.4 °F (36.3 °C) (Oral)   Resp 18   Ht 5' 8\" (1.727 m)   Wt 217 lb 9.5 oz (98.7 kg)   SpO2 95%   BMI 33.09 kg/m²   Temp  Av.4 °F (36.3 °C)  Min: 97.3 °F (36.3 °C)  Max: 97.5 °F (36.4 °C)    RRR with premature beats. Pulm-lungs are clear today. Overall doing well. Respirations are easy. Abd- BS+, soft, NTND  Ext-no edema noted today.     The Following Labs Were Reviewed Today:    Recent Results (from the past 24 hour(s))   POCT Glucose    Collection Time: 22 11:09 AM   Result Value Ref Range    POC Glucose 140 (H) 70 - 99 mg/dl    Performed on ACCU-CHEK    POCT Glucose    Collection Time: 22  4:07 PM   Result Value Ref Range    POC Glucose 96 70 - 99 mg/dl    Performed on ACCU-CHEK    POCT Glucose    Collection Time: 22  8:05 PM   Result Value Ref Range    POC Glucose 143 (H) 70 - 99 mg/dl    Performed on ACCU-CHEK    Basic Metabolic Panel    Collection Time: 22  4:39 AM   Result Value Ref Range    Sodium 143 136 - 145 mmol/L    Potassium 3.6 3.5 - 5.1 mmol/L    Chloride 101 99 - 110 mmol/L    CO2 32 21 - 32 mmol/L    Anion Gap 10 3 - 16    Glucose 104 (H) 70 - 99 mg/dL    BUN 51 (H) 7 - 20 mg/dL    Creatinine 2.1 (H) 0.8 - 1.3 mg/dL    Est, Glom Filt Rate 31 (A) >60    Calcium 8.6 8.3 - 10.6 mg/dL   Magnesium    Collection Time: 12/27/22  4:39 AM   Result Value Ref Range    Magnesium 1.90 1.80 - 2.40 mg/dL   CBC with Auto Differential    Collection Time: 12/27/22  4:40 AM   Result Value Ref Range    WBC 6.9 4.0 - 11.0 K/uL    RBC 3.43 (L) 4.20 - 5.90 M/uL    Hemoglobin 9.0 (L) 13.5 - 17.5 g/dL    Hematocrit 28.8 (L) 40.5 - 52.5 %    MCV 84.0 80.0 - 100.0 fL    MCH 26.2 26.0 - 34.0 pg    MCHC 31.2 31.0 - 36.0 g/dL    RDW 19.4 (H) 12.4 - 15.4 %    Platelets 655 331 - 122 K/uL    MPV 7.8 5.0 - 10.5 fL    Neutrophils % 74.2 %    Lymphocytes % 12.0 %    Monocytes % 11.1 %    Eosinophils % 2.3 %    Basophils % 0.4 %    Neutrophils Absolute 5.1 1.7 - 7.7 K/uL    Lymphocytes Absolute 0.8 (L) 1.0 - 5.1 K/uL    Monocytes Absolute 0.8 0.0 - 1.3 K/uL    Eosinophils Absolute 0.2 0.0 - 0.6 K/uL    Basophils Absolute 0.0 0.0 - 0.2 K/uL   POCT Glucose    Collection Time: 12/27/22  6:26 AM   Result Value Ref Range    POC Glucose 108 (H) 70 - 99 mg/dl    Performed on ACCU-CHEK        ASSESSMENT/PLAN:      Principal Problem:    Syncope and collapse-heart rate has been better off of verapamil. Continue to avoid verapamil. Active Problems:    Hemoptysis-persistent hemoptysis for several weeks. Repeat chest x-ray today, check procalcitonin. Consult pulmonary. Sputum culture is pending. Heparin for DVT prophylaxis dose reduced from 3 times daily to twice daily, aspirin on hold. HTN-continue to avoid verapamil, blood pressure is fairly well controlled on low-dose hydralazine today. Hyperkalemia-resolved, better. Bradycardia-improved since verapamil dose reduced.     Hypotension, unspecified- resolved    Type 2 diabetes mellitus with stage 4 chronic kidney disease, with long-term current use of insulin-blood sugars have been quite low, continue with sliding scale only. Chronic anemia-hemoglobin up to 9.0 today. Abnormal CXR-patient afebrile, but still with some sputum as noted above. Procalcitonin level at admission was low. Repeating procalcitonin today as noted above    Elevated brain natriuretic peptide (BNP) level-   patient appears to be at his dry weight. Clinically edema has resolved. He is breathing comfortably when lying flat. Change Lasix IV to oral torsemide 20 mg daily. Elevated troponin-elevated troponin has been stable and likely related to his renal failure. He has not endorsed any cardiac symptoms to suggest coronary ischemia. Chronic respiratory failure with hypoxia -continue with supplemental oxygen. Attempt to wean oxygen today. JOANN (obstructive sleep apnea)-  nolt using CPAP here    Chronic GERD-continue daily PPI. Hyperlipidemia-continue statin dose. Paroxysmal atrial fibrillation- clinically remains in sinus rhythm here. Patient not an anticoagulation candidate due to prior GI bleeding. Stage 3b chronic kidney disease-renal function continues to improve. Continue to follow. Essential hypertension-blood pressure better with hydralazine. Moderate COPD (chronic obstructive pulmonary disease) (HCC)-continue with Xopenex nebulizer. Slow transit constipation-Linzess and MiraLAX. Being off of verapamil should help this as well. Weakness-patient did not do well with therapy yesterday. Still scoring quite low on his AMPAC scores. Plan to discuss with family regarding disposition. BPH-patient on Flomax. Continue with Flomax. Bladder scan as needed. Disposition-patient nearing discharge, but need pulmonary opinion regarding the hemoptysis.     Time > 35 minutes reviewing chart and patient data, examining and interviewing patient, and discussing with nursing staff, family, etc.     Char Wynne MD, FACP  8:16 AM  12/27/2022

## 2022-12-27 NOTE — PLAN OF CARE
Problem: Discharge Planning  Goal: Discharge to home or other facility with appropriate resources  Outcome: Progressing  Flowsheets (Taken 12/27/2022 0752)  Discharge to home or other facility with appropriate resources:   Identify barriers to discharge with patient and caregiver   Arrange for needed discharge resources and transportation as appropriate   Identify discharge learning needs (meds, wound care, etc)     Problem: Pain  Goal: Verbalizes/displays adequate comfort level or baseline comfort level  Outcome: Progressing  Flowsheets (Taken 12/27/2022 0752)  Verbalizes/displays adequate comfort level or baseline comfort level:   Encourage patient to monitor pain and request assistance   Assess pain using appropriate pain scale   Administer analgesics based on type and severity of pain and evaluate response   Implement non-pharmacological measures as appropriate and evaluate response     Problem: Skin/Tissue Integrity  Goal: Absence of new skin breakdown  Description: 1. Monitor for areas of redness and/or skin breakdown  2. Assess vascular access sites hourly  3. Every 4-6 hours minimum:  Change oxygen saturation probe site  4. Every 4-6 hours:  If on nasal continuous positive airway pressure, respiratory therapy assess nares and determine need for appliance change or resting period.   Outcome: Progressing     Problem: Safety - Adult  Goal: Free from fall injury  Outcome: Progressing  Flowsheets (Taken 12/27/2022 0752)  Free From Fall Injury: Instruct family/caregiver on patient safety     Problem: ABCDS Injury Assessment  Goal: Absence of physical injury  Outcome: Progressing  Flowsheets (Taken 12/27/2022 0752)  Absence of Physical Injury: Implement safety measures based on patient assessment     Problem: Chronic Conditions and Co-morbidities  Goal: Patient's chronic conditions and co-morbidity symptoms are monitored and maintained or improved  Outcome: Progressing  Flowsheets (Taken 12/27/2022 0752)  Care Plan - Patient's Chronic Conditions and Co-Morbidity Symptoms are Monitored and Maintained or Improved:   Monitor and assess patient's chronic conditions and comorbid symptoms for stability, deterioration, or improvement   Collaborate with multidisciplinary team to address chronic and comorbid conditions and prevent exacerbation or deterioration     Problem: Nutrition Deficit:  Goal: Optimize nutritional status  Outcome: Progressing  Flowsheets (Taken 12/27/2022 1822)  Nutrient intake appropriate for improving, restoring, or maintaining nutritional needs:   Assess nutritional status and recommend course of action   Recommend appropriate diets, oral nutritional supplements, and vitamin/mineral supplements   Monitor oral intake, labs, and treatment plans

## 2022-12-27 NOTE — CARE COORDINATION
Wound consult noted for unstageable pressure injury to left buttock. Spoke with RN, currently has foam border in place. Prevention orders already in place. Will attempt to see pt later.  Modesto Agarwal, MSN, RN, [facility]

## 2022-12-27 NOTE — DISCHARGE INSTR - COC
Continuity of Care Form    Patient Name: Jeanine Dietrich   :  1940  MRN:  [id number]    Admit date:  2022  Discharge date:  22      Code Status Order: Full Code   Advance Directives:     Admitting Physician:  Ranjeet Howard MD  PCP: Geni Wadsworth MD    Discharging Nurse: [id number] Unit/Room#: A9V-2769/3128-01  Discharging Unit Phone Number: [phone]    Emergency Contact:   Extended Emergency Contact Information  Primary Emergency Contact: Roselia Suresh  Address: [address] Phone: [phone]  Work Phone: [phone]  Mobile Phone: [phone]  Relation: Spouse  Secondary Emergency Contact: Josephine Suresh  Mobile Phone: [phone]  Relation: Child   needed?  No    Past Surgical History:  Past Surgical History:   Procedure Laterality Date    CATARACT REMOVAL  2012    bilateral    COLONOSCOPY  7/10/2009    diverticulosis    hemorrhoids    CT BONE MARROW BIOPSY  2022    CT BONE MARROW BIOPSY 2022 [facility]    GALLBLADDER SURGERY  1981    PAIN MANAGEMENT PROCEDURE Right 10/20/2021    COOLIEF RADIOFREQUENCY ABLATION - RIGHT KNEE performed by Stephen Fatima MD at [address] Left 2021    [address] - LEFT KNEE performed by Stephen Fatima MD at [address]  -2005    7/10/2009    normal       Immunization History:   Immunization History   Administered Date(s) Administered    COVID-19, PFIZER GRAY top, DO NOT Dilute, (age 15 y+), IM, 30 mcg/0.3 mL 2022    COVID-19, PFIZER PURPLE top, DILUTE for use, (age 15 y+), 30mcg/0.3mL 2021, 2021, 10/27/2021    Influenza 10/19/2011, 10/09/2013    Influenza A (H6N9-55) Vaccine PF IM 2009    Influenza Vaccine, unspecified formulation 2009, 10/25/2010, 10/19/2011, 10/09/2013, 10/21/2016, 09/01/2017    Influenza Virus Vaccine 09/15/2012, 10/01/2014, 11/07/2014, 09/11/2015    Influenza Whole 10/25/2010    Influenza, AFLURIA (age 1 yrs+), FLUZONE, (age 10 mo+), MDV, 0.5mL 09/15/2012, 10/01/2014    Influenza, FLUAD, (age 72 y+), Adjuvanted, 0.5mL 09/27/2021, 09/14/2022    Influenza, FLUARIX, FLULAVAL, FLUZONE (age 10 mo+) AND AFLURIA, (age 1 y+), PF, 0.5mL 10/21/2016, 09/01/2017    Influenza, FLUZONE (age 72 y+), High Dose, 0.7mL 08/19/2020    Influenza, High Dose (Fluzone 65 yrs and older) 10/11/2018, 09/05/2019, 08/19/2020    PPD Test 06/18/2005    Pneumococcal Conjugate 13-valent (Jewel Marcin) 12/01/2014, 01/02/2015    Pneumococcal Polysaccharide ([id number]) 10/19/2011, 01/01/2014    Td, unspecified formulation 11/03/2010    Tdap (Boostrix, Adacel) 11/03/2010    Zoster Live (Zostavax) 07/01/2012, 09/01/2012       Active Problems:  Patient Active Problem List   Diagnosis Code    JOANN (obstructive sleep apnea) G47.33    Chronic GERD K21.9    Type 2 diabetes mellitus with stage 4 chronic kidney disease, with long-term current use of insulin ([facility]) E11.22, N18.4, Z79.4    Microcytic anemia D50.9    Microalbuminuria R80.9    Hyperlipidemia E78.5    Paroxysmal atrial fibrillation (HCC) I48.0    Edema R60.9    Carotid artery stenosis without cerebral infarction I65.29    Leg swelling M79.89    Chronic venous insufficiency I87.2    Stage 3b chronic kidney disease ([facility]) N18.32    Essential hypertension I10    B12 deficiency E53.8    Pulmonary nodule R91.1    Moderate COPD (chronic obstructive pulmonary disease) ([facility] [address] J44.9    DEISY (acute kidney injury) ([facility] [address] N17.9    Slow transit constipation K59.01    Pancreatic mass K86.89    Obesity, Class II, BMI 35-39.9 E66.9    Weakness R53.1    Hyperkalemia E87.5    Chronic anemia D64.9    Abnormal CXR R93.89    Elevated brain natriuretic peptide (BNP) level R79.89    Weight gain R63.5    Elevated troponin R77.8    Chronic respiratory failure with hypoxia (HCC) J96.11    Bradycardia R00.1    Hypotension, unspecified I95.9    Syncope and collapse R55       Isolation/Infection:   Isolation            Contact          Patient Infection Status       Infection Onset Added Last Indicated Last Indicated By Review Planned Expiration Resolved Resolved By    ESBL (Extended Spectrum Beta Lactamase) 12/26/18 12/28/18 12/26/18 Urine Culture        Resolved    COVID-19 (Rule Out) 12/23/22 12/23/22 12/23/22 COVID-19, Rapid (Ordered)   12/23/22 Rule-Out Test Resulted    COVID-19 (Rule Out) 11/15/22 11/15/22 11/15/22 COVID-19, Rapid (Ordered)   11/15/22 Rule-Out Test Resulted    COVID-19 (Rule Out) 10/30/22 10/30/22 10/30/22 COVID-19, Rapid (Ordered)   10/30/22 Rule-Out Test Resulted            Nurse Assessment:  Last Vital Signs: /80   Pulse 79   Temp 97.4 °F (36.3 °C) (Oral)   Resp 18   Ht 5' 8\" (1.727 m)   Wt 217 lb 9.5 oz (98.7 kg)   SpO2 95%   BMI 33.09 kg/m²     Last documented pain score (0-10 scale): Pain Level: 0  Last Weight:   Wt Readings from Last 1 Encounters:   12/27/22 217 lb 9.5 oz (98.7 kg)     Mental Status:  oriented, alert, coherent, and logical    IV Access:  - None    Nursing Mobility/ADLs:  Walking   Assisted  Transfer  Assisted  Bathing  Assisted  Dressing  Assisted  Toileting  Assisted  Feeding  Assisted  Med Admin  Assisted  Med Delivery   whole    Wound Care Documentation and Therapy:        Elimination:  Continence: Bowel: Yes  Bladder: Yes  Urinary Catheter: None   Colostomy/Ileostomy/Ileal Conduit: No       Date of Last BM: 12/30/2022    Intake/Output Summary (Last 24 hours) at 12/27/2022 0901  Last data filed at 12/27/2022 3802  Gross per 24 hour   Intake 640 ml   Output 2100 ml   Net -1460 ml     I/O last 3 completed shifts: In: 600 [P.O.:600]  [facility] [Urine:[facility]95]    Safety Concerns:      At Risk for Falls    Impairments/Disabilities:      None and ambulation limitations, COPD    Nutrition Therapy:  Current Nutrition Therapy:   - Oral Diet: General, Carb Control 4 carbs/meal (1800kcals/day), and Low Sodium (2gm)    Routes of Feeding: Oral  Liquids: Thin Liquids  Daily Fluid Restriction: yes - amount 1500 ml  Last Modified Barium Swallow with Video (Video Swallowing Test): not done    Treatments at the Time of Hospital Discharge:   Respiratory Treatments: Home O2  Oxygen Therapy:  is on oxygen at 2 L/min per nasal cannula. Ventilator:    - BiPAP    , CPAP/EPAP: 6 cmH2O only when sleeping    Rehab Therapies: Physical Therapy and Occupational Therapy  Weight Bearing Status/Restrictions: No weight bearing restrictions  Other Medical Equipment (for information only, NOT a DME order):  walker  Other Treatments: Respiratory treatments. Patient's personal belongings (please select all that are sent with patient):  Glasses, Clothing and meds given to family. RN SIGNATURE:  Electronically signed by Livia Patiño RN on 12/30/22 at 12:53 PM EST    CASE MANAGEMENT/SOCIAL WORK SECTION    Inpatient Status Date: ***    Readmission Risk Assessment Score:  Readmission Risk              Risk of Unplanned Readmission:  35         Discharging to Facility/ Agency   Name: [facility]  Address:  [place] [address]  Phone:  [phone]  Fax:  [phone]    / signature: Electronically signed by Bacilio Stoner on 12/30/22 at 2:06 PM EST    PHYSICIAN SECTION    Prognosis: Fair    Condition at Discharge: Stable    Rehab Potential (if transferring to Rehab): Fair    Recommended Labs or Other Treatments After Discharge: PT/OT    Physician Certification: I certify the above information and transfer of Coleen Rosenthal  is necessary for the continuing treatment of the diagnosis listed and that he requires [address] for less 30 days.      Update Admission H&P: No change in H&P    PHYSICIAN SIGNATURE:  Electronically signed by Abraham Vaz MD on 12/30/22 at 7:12 AM EST

## 2022-12-28 LAB
ANION GAP SERPL CALCULATED.3IONS-SCNC: 16 MMOL/L (ref 3–16)
ANTI-NUCLEAR ANTIBODY (ANA): NEGATIVE
APPEARANCE BAL (LAVAGE): ABNORMAL
BASOPHILS ABSOLUTE: 0 K/UL (ref 0–0.2)
BASOPHILS RELATIVE PERCENT: 0.3 %
BLOOD CULTURE, ROUTINE: NORMAL
BUN BLDV-MCNC: 49 MG/DL (ref 7–20)
CALCIUM SERPL-MCNC: 8.7 MG/DL (ref 8.3–10.6)
CHLORIDE BLD-SCNC: 100 MMOL/L (ref 99–110)
CO2: 29 MMOL/L (ref 21–32)
COLOR LAVAGE: ABNORMAL
CREAT SERPL-MCNC: 1.9 MG/DL (ref 0.8–1.3)
CULTURE, BLOOD 2: NORMAL
EOSINOPHILS ABSOLUTE: 0 K/UL (ref 0–0.6)
EOSINOPHILS RELATIVE PERCENT: 0 %
GFR SERPL CREATININE-BSD FRML MDRD: 35 ML/MIN/{1.73_M2}
GLUCOSE BLD-MCNC: 110 MG/DL (ref 70–99)
GLUCOSE BLD-MCNC: 111 MG/DL (ref 70–99)
GLUCOSE BLD-MCNC: 111 MG/DL (ref 70–99)
GLUCOSE BLD-MCNC: 123 MG/DL (ref 70–99)
GLUCOSE BLD-MCNC: 186 MG/DL (ref 70–99)
GLUCOSE BLD-MCNC: 218 MG/DL (ref 70–99)
GLUCOSE BLD-MCNC: 225 MG/DL (ref 70–99)
HCT VFR BLD CALC: 28.4 % (ref 40.5–52.5)
HEMOGLOBIN: 8.9 G/DL (ref 13.5–17.5)
LYMPHOCYTES # BLD: 3 %
LYMPHOCYTES ABSOLUTE: 0.7 K/UL (ref 1–5.1)
LYMPHOCYTES RELATIVE PERCENT: 8.8 %
LYMPHOCYTES, BAL: 17 % (ref 5–10)
MACROPHAGES, BAL: 11 % (ref 90–95)
MAGNESIUM: 2 MG/DL (ref 1.8–2.4)
MCH RBC QN AUTO: 26.3 PG (ref 26–34)
MCHC RBC AUTO-ENTMCNC: 31.3 G/DL (ref 31–36)
MCV RBC AUTO: 83.9 FL (ref 80–100)
MONOCYTES ABSOLUTE: 0.8 K/UL (ref 0–1.3)
MONOCYTES RELATIVE PERCENT: 9.2 %
MONOCYTES, BAL: 5 %
MONONUCLEAR UNIDENTIFIED CELLS FLUID BAL: 5 %
NEUTROPHILS ABSOLUTE: 6.9 K/UL (ref 1.7–7.7)
NEUTROPHILS RELATIVE PERCENT: 81.7 %
NUMBER OF CELLS COUNTED BAL (LAVAGE): 100
PATH REVIEW BAL (LAVAGE): YES
PDW BLD-RTO: 19.7 % (ref 12.4–15.4)
PERFORMED ON: ABNORMAL
PLATELET # BLD: 188 K/UL (ref 135–450)
PMV BLD AUTO: 8 FL (ref 5–10.5)
POTASSIUM SERPL-SCNC: 3.8 MMOL/L (ref 3.5–5.1)
PROCALCITONIN: 0.14 NG/ML (ref 0–0.15)
RBC # BLD: 3.39 M/UL (ref 4.2–5.9)
RBC, BAL: ABNORMAL /CUMM
REPORT: NORMAL
SEGMENTED NEUTROPHILS, BAL: 59 % (ref 5–10)
SODIUM BLD-SCNC: 145 MMOL/L (ref 136–145)
WBC # BLD: 8.4 K/UL (ref 4–11)
WBC/EPI CELLS BAL: 500 /CUMM

## 2022-12-28 PROCEDURE — 36415 COLL VENOUS BLD VENIPUNCTURE: CPT

## 2022-12-28 PROCEDURE — 87077 CULTURE AEROBIC IDENTIFY: CPT

## 2022-12-28 PROCEDURE — 6370000000 HC RX 637 (ALT 250 FOR IP): Performed by: INTERNAL MEDICINE

## 2022-12-28 PROCEDURE — 89051 BODY FLUID CELL COUNT: CPT

## 2022-12-28 PROCEDURE — 94760 N-INVAS EAR/PLS OXIMETRY 1: CPT

## 2022-12-28 PROCEDURE — 97535 SELF CARE MNGMENT TRAINING: CPT

## 2022-12-28 PROCEDURE — 2709999900 HC NON-CHARGEABLE SUPPLY: Performed by: INTERNAL MEDICINE

## 2022-12-28 PROCEDURE — 84145 PROCALCITONIN (PCT): CPT

## 2022-12-28 PROCEDURE — 7100000001 HC PACU RECOVERY - ADDTL 15 MIN: Performed by: INTERNAL MEDICINE

## 2022-12-28 PROCEDURE — 88184 FLOWCYTOMETRY/ TC 1 MARKER: CPT

## 2022-12-28 PROCEDURE — 2500000003 HC RX 250 WO HCPCS

## 2022-12-28 PROCEDURE — 6360000002 HC RX W HCPCS: Performed by: INTERNAL MEDICINE

## 2022-12-28 PROCEDURE — 88185 FLOWCYTOMETRY/TC ADD-ON: CPT

## 2022-12-28 PROCEDURE — 99152 MOD SED SAME PHYS/QHP 5/>YRS: CPT | Performed by: INTERNAL MEDICINE

## 2022-12-28 PROCEDURE — 94640 AIRWAY INHALATION TREATMENT: CPT

## 2022-12-28 PROCEDURE — 2580000003 HC RX 258: Performed by: INTERNAL MEDICINE

## 2022-12-28 PROCEDURE — 80048 BASIC METABOLIC PNL TOTAL CA: CPT

## 2022-12-28 PROCEDURE — 88312 SPECIAL STAINS GROUP 1: CPT

## 2022-12-28 PROCEDURE — 7100000000 HC PACU RECOVERY - FIRST 15 MIN: Performed by: INTERNAL MEDICINE

## 2022-12-28 PROCEDURE — 2700000000 HC OXYGEN THERAPY PER DAY

## 2022-12-28 PROCEDURE — 0B9D8ZX DRAINAGE OF RIGHT MIDDLE LUNG LOBE, VIA NATURAL OR ARTIFICIAL OPENING ENDOSCOPIC, DIAGNOSTIC: ICD-10-PCS | Performed by: INTERNAL MEDICINE

## 2022-12-28 PROCEDURE — 3609027000 HC BRONCHOSCOPY: Performed by: INTERNAL MEDICINE

## 2022-12-28 PROCEDURE — 87102 FUNGUS ISOLATION CULTURE: CPT

## 2022-12-28 PROCEDURE — 87252 VIRUS INOCULATION TISSUE: CPT

## 2022-12-28 PROCEDURE — 99232 SBSQ HOSP IP/OBS MODERATE 35: CPT | Performed by: INTERNAL MEDICINE

## 2022-12-28 PROCEDURE — 88112 CYTOPATH CELL ENHANCE TECH: CPT

## 2022-12-28 PROCEDURE — 3609010800 HC BRONCHOSCOPY ALVEOLAR LAVAGE: Performed by: INTERNAL MEDICINE

## 2022-12-28 PROCEDURE — 87633 RESP VIRUS 12-25 TARGETS: CPT

## 2022-12-28 PROCEDURE — 97530 THERAPEUTIC ACTIVITIES: CPT

## 2022-12-28 PROCEDURE — 88305 TISSUE EXAM BY PATHOLOGIST: CPT

## 2022-12-28 PROCEDURE — 1200000000 HC SEMI PRIVATE

## 2022-12-28 PROCEDURE — 31624 DX BRONCHOSCOPE/LAVAGE: CPT | Performed by: INTERNAL MEDICINE

## 2022-12-28 PROCEDURE — 87205 SMEAR GRAM STAIN: CPT

## 2022-12-28 PROCEDURE — 85025 COMPLETE CBC W/AUTO DIFF WBC: CPT

## 2022-12-28 PROCEDURE — 87070 CULTURE OTHR SPECIMN AEROBIC: CPT

## 2022-12-28 PROCEDURE — 2500000003 HC RX 250 WO HCPCS: Performed by: INTERNAL MEDICINE

## 2022-12-28 PROCEDURE — 83735 ASSAY OF MAGNESIUM: CPT

## 2022-12-28 RX ORDER — MIDAZOLAM HYDROCHLORIDE 1 MG/ML
INJECTION INTRAMUSCULAR; INTRAVENOUS PRN
Status: DISCONTINUED | OUTPATIENT
Start: 2022-12-28 | End: 2022-12-28 | Stop reason: ALTCHOICE

## 2022-12-28 RX ORDER — FENTANYL CITRATE 50 UG/ML
INJECTION, SOLUTION INTRAMUSCULAR; INTRAVENOUS PRN
Status: DISCONTINUED | OUTPATIENT
Start: 2022-12-28 | End: 2022-12-28 | Stop reason: ALTCHOICE

## 2022-12-28 RX ORDER — FLUMAZENIL 0.1 MG/ML
INJECTION INTRAVENOUS PRN
Status: DISCONTINUED | OUTPATIENT
Start: 2022-12-28 | End: 2022-12-28 | Stop reason: ALTCHOICE

## 2022-12-28 RX ORDER — LIDOCAINE HYDROCHLORIDE 20 MG/ML
INJECTION, SOLUTION EPIDURAL; INFILTRATION; INTRACAUDAL; PERINEURAL PRN
Status: DISCONTINUED | OUTPATIENT
Start: 2022-12-28 | End: 2022-12-28 | Stop reason: ALTCHOICE

## 2022-12-28 RX ORDER — IPRATROPIUM BROMIDE AND ALBUTEROL SULFATE 2.5; .5 MG/3ML; MG/3ML
1 SOLUTION RESPIRATORY (INHALATION) ONCE
Status: COMPLETED | OUTPATIENT
Start: 2022-12-28 | End: 2022-12-28

## 2022-12-28 RX ORDER — LIDOCAINE HYDROCHLORIDE 20 MG/ML
JELLY TOPICAL PRN
Status: DISCONTINUED | OUTPATIENT
Start: 2022-12-28 | End: 2022-12-28 | Stop reason: ALTCHOICE

## 2022-12-28 RX ADMIN — PANTOPRAZOLE SODIUM 40 MG: 40 TABLET, DELAYED RELEASE ORAL at 14:36

## 2022-12-28 RX ADMIN — HYDRALAZINE HYDROCHLORIDE 10 MG: 10 TABLET, FILM COATED ORAL at 06:00

## 2022-12-28 RX ADMIN — INSULIN LISPRO 1 UNITS: 100 INJECTION, SOLUTION INTRAVENOUS; SUBCUTANEOUS at 17:04

## 2022-12-28 RX ADMIN — AMIODARONE HYDROCHLORIDE 100 MG: 200 TABLET ORAL at 14:36

## 2022-12-28 RX ADMIN — SODIUM CHLORIDE: 9 INJECTION, SOLUTION INTRAVENOUS at 10:58

## 2022-12-28 RX ADMIN — ATORVASTATIN CALCIUM 40 MG: 40 TABLET, FILM COATED ORAL at 14:35

## 2022-12-28 RX ADMIN — HEPARIN SODIUM 5000 UNITS: 5000 INJECTION INTRAVENOUS; SUBCUTANEOUS at 21:24

## 2022-12-28 RX ADMIN — Medication 10 ML: at 21:25

## 2022-12-28 RX ADMIN — MONTELUKAST 10 MG: 10 TABLET, FILM COATED ORAL at 14:36

## 2022-12-28 RX ADMIN — DOCUSATE SODIUM 100 MG: 100 CAPSULE, LIQUID FILLED ORAL at 14:35

## 2022-12-28 RX ADMIN — HYDRALAZINE HYDROCHLORIDE 10 MG: 10 TABLET, FILM COATED ORAL at 14:35

## 2022-12-28 RX ADMIN — TORSEMIDE 20 MG: 20 TABLET ORAL at 14:35

## 2022-12-28 RX ADMIN — IPRATROPIUM BROMIDE AND ALBUTEROL SULFATE 1 AMPULE: .5; 3 SOLUTION RESPIRATORY (INHALATION) at 12:15

## 2022-12-28 RX ADMIN — PREDNISONE 40 MG: 20 TABLET ORAL at 14:35

## 2022-12-28 RX ADMIN — TAMSULOSIN HYDROCHLORIDE 0.4 MG: 0.4 CAPSULE ORAL at 14:35

## 2022-12-28 RX ADMIN — HYDRALAZINE HYDROCHLORIDE 10 MG: 10 TABLET, FILM COATED ORAL at 21:24

## 2022-12-28 RX ADMIN — FERROUS SULFATE TAB EC 324 MG (65 MG FE EQUIVALENT) 325 MG: 324 (65 FE) TABLET DELAYED RESPONSE at 14:35

## 2022-12-28 ASSESSMENT — PAIN - FUNCTIONAL ASSESSMENT: PAIN_FUNCTIONAL_ASSESSMENT: 0-10

## 2022-12-28 ASSESSMENT — PAIN SCALES - GENERAL
PAINLEVEL_OUTOF10: 0
PAINLEVEL_OUTOF10: 1

## 2022-12-28 NOTE — CARE COORDINATION
Attempted to see pt; currently up in chair. RN to notify wound care when pt returns to bed.  Nelson Jackman, MSN, RN, [facility]

## 2022-12-28 NOTE — CARE COORDINATION
[facility] Contin[facility] Nurse  Consult Note       NAME:  Marcelino Raza  MEDICAL RECORD NUMBER:  [id number]  AGE: 79 y.o.    GENDER: male  : 1940  TODAY'S DATE:  2022    Subjective   Reason for WOCN Evaluation and Assessment: unstageable pressure injury to left buttock      Marcelino Raza is a 79 y.o. male referred by:   [] Physician  [x] Nursing  [] Other:     Wound Identification:  Wound Type: pressure  Contributing Factors: chronic pressure and decreased mobility    Wound History: pt tells me he has had wound for about 3 weeks  Current Wound Care Treatment:  foam border    Patient Goal of Care:  [x] Wound Healing  [] Odor Control  [] Palliative Care  [] Pain Control   [] Other:         PAST MEDICAL HISTORY        Diagnosis Date    Allergic rhinitis     Asthma     Atrial fibrillation ([facility] [address]     Carotid artery stenosis     Chronic kidney disease     COPD (chronic obstructive pulmonary disease) ([facility])     CPAP (continuous positive airway pressure) dependence     ESBL (extended spectrum beta-lactamase) producing bacteria infection 2018    urine    Hyperlipidemia     Hypertension     Iron deficiency anemia     Obstructive sleep apnea     Type II or unspecified type diabetes mellitus without mention of complication, not stated as uncontrolled        PAST SURGICAL HISTORY    Past Surgical History:   Procedure Laterality Date    BRONCHOSCOPY N/A 2022    BRONCHOSCOPY ALVEOLAR LAVAGE performed by Johnny Cordoba MD at [address]  2022    [address] [facility] ONLY performed by Johnny Cordoba MD at [address] [facility]  2012    bilateral    COLONOSCOPY  7/10/2009    diverticulosis    hemorrhoids    CT BONE MARROW BIOPSY  2022    CT BONE MARROW BIOPSY 2022 [facility]    GALLBLADDER SURGERY  1981    PAIN MANAGEMENT PROCEDURE Right 10/20/2021    COOLIEF RADIOFREQUENCY ABLATION - RIGHT KNEE performed by Livia Degroot Dottie Layne MD at [address] Left 2021    [address] - LEFT KNEE performed by Kalyan Stephen MD at [address] [facility]  7-2005    7/10/2009    normal       FAMILY HISTORY    Family History   Problem Relation Age of Onset    Heart Disease Mother     Stroke Mother     Vision Loss Mother     High Blood Pressure Father     High Blood Pressure Brother     Diabetes Brother     Sleep Apnea Brother     Arthritis Paternal Grandmother     Diabetes Paternal Grandmother        SOCIAL HISTORY    Social History     Tobacco Use    Smoking status: Former     Packs/day: 0.50     Years: 18.00     Pack years: 9.00     Types: Cigarettes     Start date: 1958     Quit date: 1988     Years since quittin.0    Smokeless tobacco: Never   Vaping Use    Vaping Use: Never used   Substance Use Topics    Alcohol use: No     Alcohol/week: 0.0 standard drinks    Drug use: No       ALLERGIES    Allergies   Allergen Reactions    Hytrin [Terazosin Hcl]      Ineffective for BP control    Penicillins      Unknown reaction during childhood; Patient tolerated cephalosporins in the past    Shrimp Flavor Hives    Sulfa Antibiotics      Unknown reaction in childhood    Sulfasalazine Other (See Comments)    Tekturna [Aliskiren Fumarate]      Ineffective    Vancomycin      Fever    Ciprofloxacin Rash     Fever and rash        MEDICATIONS    No current facility-administered medications on file prior to encounter.      Current Outpatient Medications on File Prior to Encounter   Medication Sig Dispense Refill    torsemide (DEMADEX) 10 MG tablet Take 1 tablet by mouth daily (Patient taking differently: Take 10 mg by mouth in the morning and at bedtime) 30 tablet 0    insulin glargine (BASAGLAR KWIKPEN) 100 UNIT/ML injection pen Inject 5 Units into the skin nightly      levalbuterol (XOPENEX) 1.25 MG/3ML nebulizer solution USE 1 VIAL VIA NEBULIZER FOUR TIMES DAILY 360 mL 11    amiodarone (CORDARONE) 200 MG tablet Take 0.5 tablets by mouth daily 45 tablet 3    INSULIN SYRINGE .5CC/29G 29G X 1/2\" 0.5 ML MISC 1 each by Does not apply route daily 100 each 1    albuterol sulfate HFA (PROVENTIL;VENTOLIN;PROAIR) 108 (90 Base) MCG/ACT inhaler INHALE 2 PUFFS BY MOUTH EVERY 4 HOURS AS NEEDED FOR WHEEZING 18 each 11    montelukast (SINGULAIR) 10 MG tablet TAKE 1 TABLET BY MOUTH EVERY DAY 90 tablet 3    [employer] PURELAX 17 GM/SCOOP powder TAKE 17G BY MOUTH DAILY MIX WITH 8 OZ OF WATER 510 g 3    pantoprazole (PROTONIX) 40 MG tablet TAKE 1 TABLET BY MOUTH EVERY DAY 90 tablet 1    B-D UF III MINI PEN NEEDLES 31G X 5 MM MISC USE AS DIRECTED 3 TIMES A  each 3    atorvastatin (LIPITOR) 40 MG tablet TAKE 1 TABLET BY MOUTH EVERY DAY 90 tablet 3    alfuzosin (UROXATRAL) 10 MG extended release tablet TAKE 1 TABLET BY MOUTH EVERY DAY 90 tablet 3    LINZESS 145 MCG capsule TAKE 1 CAPSULE BY MOUTH EVERY DAY IN THE MORNING BEFORE BREAKFAST 30 capsule 11    verapamil (CALAN SR) 240 MG extended release tablet TAKE 1 TABLET BY MOUTH TWICE A  tablet 3    ACCU-CHEK GUIDE strip TEST AS DIRECTED 3 TIMES A  strip 5    Accu-Chek FastClix Lancets MISC USE TO TEST 3 TIMES A DAY DX CODE E11.9 102 each 17    ZINC PO Take by mouth      CPAP Machine MISC by Does not apply route      Ascorbic Acid (SUPER C COMPLEX PO) Take by mouth daily      Multiple Vitamins-Minerals (MULTIVITAMIN ADULT PO) Take by mouth daily      Lactobacillus Rhamnosus, GG, ([employer] PROBIOTIC, LACTOBACILLUS,) CAPS TAKE 1 CAPSULE BY MOUTH 2 TIMES DAILY (WITH MEALS) 60 capsule 11    docusate sodium (COLACE) 100 MG capsule Take by mouth daily      Cyanocobalamin (VITAMIN B-12 PO) Take 500 mcg by mouth every other day       Cholecalciferol (VITAMIN D3 PO) Take 2,000 Units by mouth daily       aspirin EC 81 MG EC tablet Take 2 tablets by mouth daily.  30 tablet 3    ferrous sulfate 325 (65 FE) MG tablet Take 325 mg by mouth daily          Objective    /63   Pulse 76   Temp 97.4 °F (36.3 °C) (Oral)   Resp 16   Ht 5' 8\" (1.727 m)   Wt 216 lb 11.4 oz (98.3 kg)   SpO2 90%   BMI 32.95 kg/m²     LABS:  WBC:    Lab Results   Component Value Date/Time    WBC 8.4 12/28/2022 04:49 AM     H/H:    Lab Results   Component Value Date/Time    HGB 8.9 12/28/2022 04:49 AM    HCT 28.4 12/28/2022 04:49 AM     PTT:    Lab Results   Component Value Date/Time    APTT 29.5 06/08/2018 08:14 AM   [APTT}  PT/INR:    Lab Results   Component Value Date/Time    PROTIME 18.2 11/15/2022 09:04 PM    INR 1.51 11/15/2022 09:04 PM     HgBA1c:    Lab Results   Component Value Date/Time    LABA1C 6.1 05/27/2022 02:05 PM       Assessment   Jerardo Risk Score: Jerardo Scale Score: 15    Patient Active Problem List   Diagnosis Code    JOANN (obstructive sleep apnea) G47.33    Chronic GERD K21.9    Type 2 diabetes mellitus with stage 4 chronic kidney disease, with long-term current use of insulin ([facility]) E11.22, N18.4, Z79.4    Microcytic anemia D50.9    Microalbuminuria R80.9    Hyperlipidemia E78.5    Paroxysmal atrial fibrillation ([facility]) I48.0    Edema R60.9    Carotid artery stenosis without cerebral infarction I65.29    Leg swelling M79.89    Chronic venous insufficiency I87.2    Stage 3b chronic kidney disease (HCC) N18.32    Essential hypertension I10    Hemoptysis R04.2    B12 deficiency E53.8    Pulmonary nodule R91.1    Moderate COPD (chronic obstructive pulmonary disease) ([facility]) J44.9    DEISY (acute kidney injury) ([facility] [address] N17.9    Slow transit constipation K59.01    Pancreatic mass K86.89    Obesity, Class II, BMI 35-39.9 E66.9    Weakness R53.1    Hyperkalemia E87.5    Chronic anemia D64.9    Abnormal CXR R93.89    Elevated brain natriuretic peptide (BNP) level R79.89    Weight gain R63.5    Elevated troponin R77.8    Chronic respiratory failure with hypoxia ([facility]) J96.11    Bradycardia R00.1    Hypotension, unspecified I95.9    Syncope and collapse R55 Measurements:      Wound 12/28/22 Buttocks Left (Active)   Wound Image   12/28/22 1400   Wound Etiology Pressure Unstageable 12/28/22 1400   Wound Cleansed Other (Comment) 12/28/22 1400   Dressing/Treatment Triad hydro/zinc oxide-based hydrophilic paste 92/55/75 9157   Wound Length (cm) 1.5 cm 12/28/22 1400   Wound Width (cm) 1 cm 12/28/22 1400   Wound Surface Area (cm^2) 1.5 cm^2 12/28/22 1400   Wound Assessment Slough 12/28/22 1400   Drainage Amount None 12/28/22 1400   Odor None 12/28/22 1400   Karen-wound Assessment Intact 12/28/22 1400   Margins Defined edges 12/28/22 1400   Number of days: 0      Pt seen. Awake, coughing after bronch. Has unstageable pressure injury to left buttock. Pericare done and Triad applied. Bilat heels are intact. Pt declines air mattress. Does not like chair cushion. Bariatric chair cushion ordered from supply chain to see if that is more comfortable. Response to treatment:  Well tolerated by patient. Pain Assessment:  Severity:  0 / 10    Plan   Plan of Care:   -blue barrier wipes   -Triad barrier to buttocks  -turn and reposition every 2 hours  -chair cushion, encourage to reposition self frequently while in chair  -elevate heels    Specialty Bed Required : No   [] Low Air Loss   [] Pressure Redistribution  [] Fluid Immersion  [] Bariatric  [] Total Pressure Relief  [] Other:     Current Diet: ADULT DIET; Regular; 4 carb choices (60 gm/meal);  Low Sodium (2 gm); 1500 ml  Dietician consult:  Yes    Discharge Plan:  Placement for patient upon discharge: skilled nursing    Patient appropriate for Outpatient [address]: No    Referrals:  [x]   [] [address] [facility] [employer] [facility]  [] Supplies  [] Other    Patient/Caregiver Teaching:  Level of patient/caregiver understanding able to:   [] Indicates understanding       [x] Needs reinforcement  [] Unsuccessful      [] Verbal Understanding  [] Demonstrated understanding       [] No evidence of learning  [] Refused teaching [] N/A       Electronically signed by Lolis Riddle on 12/28/2022 at 2:46 PM

## 2022-12-28 NOTE — PROGRESS NOTES
PACU Transfer Note    Vitals:    12/28/22 1230   BP: (!) 118/58   Pulse: 82   Resp: 17   Temp: 96.9 °F (36.1 °C)   SpO2: 97%       No intake/output data recorded.     Pain assessment:  none  Pain Level: 0    Report given to Receiving unit RN.    12/28/2022 12:34 PM

## 2022-12-28 NOTE — PROGRESS NOTES
[facility] Internal Medicine Note      Chief Complaint:   I'm ok    Subjective/Interval History:    Patient seen immediately after bronchoscopy today. Patient not conversant, coughing a fair amount. No new problems noted overnight. Weight down 1 pound from yesterday. Urine output not recorded from yesterday. PMH, PSH, FH/SH reviewed and unchanged as documented in the H&P personally documented at admission 22    Medication list reviewed    Objective:    /68   Pulse 84   Temp 96.9 °F (36.1 °C) (Temporal)   Resp 19   Ht 5' 8\" (1.727 m)   Wt 216 lb 11.4 oz (98.3 kg)   SpO2 95%   BMI 32.95 kg/m²   Temp  Av.4 °F (36.3 °C)  Min: 96.9 °F (36.1 °C)  Max: 98 °F (36.7 °C)    RRR   Pulm-bilateral rhonchi on exam.  Abd- BS+, soft, NTND  Ext-no edema noted today.     The Following Labs Were Reviewed Today:    Recent Results (from the past 24 hour(s))   POCT Glucose    Collection Time: 22  4:53 PM   Result Value Ref Range    POC Glucose 152 (H) 70 - 99 mg/dl    Performed on ACCU-CHEK    POCT Glucose    Collection Time: 22  8:15 PM   Result Value Ref Range    POC Glucose 225 (H) 70 - 99 mg/dl    Performed on ACCU-CHEK    Basic Metabolic Panel    Collection Time: 22  4:49 AM   Result Value Ref Range    Sodium 145 136 - 145 mmol/L    Potassium 3.8 3.5 - 5.1 mmol/L    Chloride 100 99 - 110 mmol/L    CO2 29 21 - 32 mmol/L    Anion Gap 16 3 - 16    Glucose 111 (H) 70 - 99 mg/dL    BUN 49 (H) 7 - 20 mg/dL    Creatinine 1.9 (H) 0.8 - 1.3 mg/dL    Est, Glom Filt Rate 35 (A) >60    Calcium 8.7 8.3 - 10.6 mg/dL   CBC with Auto Differential    Collection Time: 22  4:49 AM   Result Value Ref Range    WBC 8.4 4.0 - 11.0 K/uL    RBC 3.39 (L) 4.20 - 5.90 M/uL    Hemoglobin 8.9 (L) 13.5 - 17.5 g/dL    Hematocrit 28.4 (L) 40.5 - 52.5 %    MCV 83.9 80.0 - 100.0 fL    MCH 26.3 26.0 - 34.0 pg    MCHC 31.3 31.0 - 36.0 g/dL    RDW 19.7 (H) 12.4 - 15.4 %    Platelets 996 609 - 417 K/uL    MPV 8.0 5.0 - 10.5 fL    Neutrophils % 81.7 %    Lymphocytes % 8.8 %    Monocytes % 9.2 %    Eosinophils % 0.0 %    Basophils % 0.3 %    Neutrophils Absolute 6.9 1.7 - 7.7 K/uL    Lymphocytes Absolute 0.7 (L) 1.0 - 5.1 K/uL    Monocytes Absolute 0.8 0.0 - 1.3 K/uL    Eosinophils Absolute 0.0 0.0 - 0.6 K/uL    Basophils Absolute 0.0 0.0 - 0.2 K/uL   Magnesium    Collection Time: 12/28/22  4:49 AM   Result Value Ref Range    Magnesium 2.00 1.80 - 2.40 mg/dL   Procalcitonin    Collection Time: 12/28/22  4:49 AM   Result Value Ref Range    Procalcitonin 0.14 0.00 - 0.15 ng/mL   POCT Glucose    Collection Time: 12/28/22  6:17 AM   Result Value Ref Range    POC Glucose 186 (H) 70 - 99 mg/dl    Performed on ACCU-CHEK    POCT Glucose    Collection Time: 12/28/22 10:49 AM   Result Value Ref Range    POC Glucose 110 (H) 70 - 99 mg/dl    Performed on ACCU-CHEK    POCT Glucose    Collection Time: 12/28/22 12:10 PM   Result Value Ref Range    POC Glucose 111 (H) 70 - 99 mg/dl    Performed on ACCU-CHEK        ASSESSMENT/PLAN:      Principal Problem:    Syncope and collapse-heart rate has been better off of verapamil. Continue to avoid verapamil. Active Problems:    Hemoptysis-no significant findings on bronchoscopy. Await BAL. Appreciate pulmonary input. HTN-blood pressure very well controlled on hydralazine. Hyperkalemia-resolved, better. Bradycardia-improved since verapamil dose reduced. Type 2 diabetes mellitus with stage 4 chronic kidney disease, with long-term current use of insulin-blood sugars have been quite low, continue with sliding scale only. Chronic anemia-hemoglobin up to 9.0 today. Elevated brain natriuretic peptide (BNP) level-   patient doing well on oral torsemide. Continue to monitor. Chronic respiratory failure with hypoxia -continue with supplemental oxygen. Wean as tolerated. JOANN (obstructive sleep apnea)-  not using CPAP here    Chronic GERD-continue daily PPI. Hyperlipidemia-continue statin dose. Paroxysmal atrial fibrillation- clinically remains in sinus rhythm here. Patient not an anticoagulation candidate due to prior GI bleeding. Stage 3b chronic kidney disease-renal function continues to improve. Continue to follow. Essential hypertension-blood pressure better with hydralazine. Moderate COPD (chronic obstructive pulmonary disease) (HCC)-continue with Xopenex nebulizer. Slow transit constipation-Linzess and MiraLAX. Being off of verapamil should help this as well. Weakness-patient did not do well with therapy yesterday. Still scoring quite low on his AMPAC scores. Await and see how he does after bronchoscopy. BPH-patient on Flomax. Continue with Flomax. Bladder scan as needed. Disposition-reevaluate daily for discharge. At this point he will need ECF again, discussed with family yesterday.       Haven Blackmon MD, FACP  12:26 PM  12/28/2022

## 2022-12-28 NOTE — CARE COORDINATION
Spoke with dtr Ariel Bates who reports she got a VM off her mothers machine asking for a returned call. Pts wife has dementia. Dtr reports pt is here often and she is unsure what to do to help prevent this. Pts wife is physically able to get around but mentally is limited and pt is limited physically but mentally is sharp. Dtr asking for referral to [facility] for SNF if needed as in the past they reviewed list and chose [facility] but pt ended up going to inpatient rehab last time. SW encouraged dtr to call [facility] to see what they could offer for spouse if needed and if long term is a possibility for both of them if needed. Also encouraged dtr to call assisted living options to see if pt could afford it.     Electronically signed by [id number] CARLIE Petty on 12/28/2022 at 9:46 AM

## 2022-12-28 NOTE — CARE COORDINATION
Recd call from [facility], they are able to accept pt at dc and will have a private room available tomorrow if ready for dc. Will call dtr Ariel Bates to inform. Electronically signed by [id number] CARLIE Petty on 12/28/2022 at 12:43 PM    Spoke with pt at bedside who is refusing SNF. He is adamantly refusing SNF reporting he is strong enough to go home and he is going home to take care of his wife. OT saw pt who is recommending HH OT at dc.     Electronically signed by [id number] CARLIE Petty on 12/28/2022 at 3:18 PM

## 2022-12-28 NOTE — PROGRESS NOTES
Pulmonary progress Note     Patient's name:  Alyssa Yost Record Number: [id number]  Patient's account/billing number: [de-identified]  Patient's YOB: 1940  Age: 79 y.o. Date of Admission: 12/23/2022  8:08 PM  Date of Consult: 12/28/2022      Primary Care Physician: Willow Eisenberg MD      Code Status: Full Code    Reason for consult: hemoptysis     Assessment and Plan     Hemoptysis   Acute on chronic respiratory failure with hypoxia   Bilateral multifocal PNA  CKDIII  JOANN on cpap  COPD  Afib      Plan:  Bronch with BAl today   Persistent extensive R>L airspace disease, DD include organizing PNA, amiodarone toxicity, DAH vs less likely new PNA. Will obtain CT to further evaluate  Continue prednisone        Conscious sedation:    Mallampati: IV    ASA: III    Subjective:  CRP 85  PCT: 0.17      HISTORY OF PRESENT ILLNESS:   /Ms. Braxton Alvarado is a 79 y.o. gentleman with past medical history stated below significant for obesity, CKD 3, diastolic heart failure, obstructive sleep apnea on CPAP therapy, was readmitted again to the hospital on December 23 for cough and hemoptysis, he stated that he coughs about a quarter size blood 5-6 times a day, he has been admitted multiple times since October, treated for HCAP with meropenem recently, imaging showed persistent bilateral right more than left airspace disease, he is on 3 L of oxygen.   H/o Afib not on anticoagulation           Past Medical History:        Diagnosis Date    Allergic rhinitis     Asthma     Atrial fibrillation (HCC)     Carotid artery stenosis     Chronic kidney disease     COPD (chronic obstructive pulmonary disease) (HCC)     CPAP (continuous positive airway pressure) dependence     ESBL (extended spectrum beta-lactamase) producing bacteria infection 12/26/2018    urine    Hyperlipidemia     Hypertension     Iron deficiency anemia     Obstructive sleep apnea Type II or unspecified type diabetes mellitus without mention of complication, not stated as uncontrolled        Past Surgical History:        Procedure Laterality Date    CATARACT REMOVAL  2/2012    bilateral    COLONOSCOPY  7/10/2009    diverticulosis    hemorrhoids    CT BONE MARROW BIOPSY  11/1/2022    CT BONE MARROW BIOPSY 11/1/2022 [facility] CT    GALLBLADDER SURGERY  1981    PAIN MANAGEMENT PROCEDURE Right 10/20/2021    COOLIEF RADIOFREQUENCY ABLATION - RIGHT KNEE performed by Judy Nunez MD at [address] Left 12/8/2021    [address] - LEFT KNEE performed by Judy Nunez MD at [facility]  7-2005    7/10/2009    normal       Allergies: Allergies   Allergen Reactions    Hytrin [Terazosin Hcl]      Ineffective for BP control    Penicillins      Unknown reaction during childhood; Patient tolerated cephalosporins in the past    Shrimp Flavor Hives    Sulfa Antibiotics      Unknown reaction in childhood    Sulfasalazine Other (See Comments)    Tekturna [Aliskiren Fumarate]      Ineffective    Vancomycin      Fever    Ciprofloxacin Rash     Fever and rash          Home Meds:   Prior to Admission medications    Medication Sig Start Date End Date Taking?  Authorizing Provider   torsemide (DEMADEX) 10 MG tablet Take 1 tablet by mouth daily  Patient taking differently: Take 10 mg by mouth in the morning and at bedtime 12/8/22   Namrata Cortés MD   insulin glargine [facility]) 100 UNIT/ML injection pen Inject 5 Units into the skin nightly    Historical Provider, MD   levalbuterol Boom Aguilar) 1.25 MG/3ML nebulizer solution USE 1 VIAL VIA NEBULIZER FOUR TIMES DAILY 11/15/22   Loc Obregon DO   amiodarone (CORDARONE) 200 MG tablet Take 0.5 tablets by mouth daily 11/11/22   Rachel Leggett MD   INSULIN SYRINGE .5CC/29G 29G X 1/2\" 0.5 ML MISC 1 each by Does not apply route daily 11/9/22   Renea Roe MD   albuterol sulfate HFA (PROVENTIL;VENTOLIN;PROAIR) 108 (90 Base) MCG/ACT inhaler INHALE 2 PUFFS BY MOUTH EVERY 4 HOURS AS NEEDED FOR WHEEZING 10/21/22   Renea Roe MD   montelukast (SINGULAIR) 10 MG tablet TAKE 1 TABLET BY MOUTH EVERY DAY 10/21/22   Renea Roe MD   [employer] PURELAX 17 GM/SCOOP powder TAKE 17G BY MOUTH DAILY MIX WITH 8 OZ OF WATER 9/6/22   Renea Roe MD   pantoprazole (PROTONIX) 40 MG tablet TAKE 1 TABLET BY MOUTH EVERY DAY 8/15/22   Minor Prince,    B-D UF III MINI PEN NEEDLES 31G X 5 MM MISC USE AS DIRECTED 3 TIMES A DAY 8/13/22   Dimitri Patino MD   atorvastatin (LIPITOR) 40 MG tablet TAKE 1 TABLET BY MOUTH EVERY DAY 4/26/22   Renea Roe MD   alfuzosin (UROXATRAL) 10 MG extended release tablet TAKE 1 TABLET BY MOUTH EVERY DAY 4/4/22   Renea Roe MD   LINZESS 145 MCG capsule TAKE 1 CAPSULE BY MOUTH EVERY DAY IN THE MORNING BEFORE BREAKFAST 3/23/22   Renea Roe MD   verapamil (CALAN SR) 240 MG extended release tablet TAKE 1 TABLET BY MOUTH TWICE A DAY 2/14/22   Renea Roe MD   ACCU-CHEGALLO GUIDE strip TEST AS DIRECTED 3 TIMES A DAY 1/29/22   Renea Roe MD   Accu-Chek FastClix Lancets MISC USE TO TEST 3 TIMES A DAY DX CODE E11.9 7/20/21   Renea Roe MD   ZINC PO Take by mouth    Historical Provider, MD   CPAP Machine MISC by Does not apply route    Historical Provider, MD   Ascorbic Acid (SUPER C COMPLEX PO) Take by mouth daily    Historical Provider, MD   Multiple Vitamins-Minerals (MULTIVITAMIN ADULT PO) Take by mouth daily    Historical Provider, MD   Lactobacillus Rhamnosus, GG, ([employer] PROBIOTIC, LACTOBACILLUS,) CAPS TAKE 1 CAPSULE BY MOUTH 2 TIMES DAILY (WITH MEALS) 3/15/20   Renea Roe MD   docusate sodium (COLACE) 100 MG capsule Take by mouth daily    Historical Provider, MD   Cyanocobalamin (VITAMIN B-12 PO) Take 500 mcg by mouth every other day     Historical Provider, MD   Cholecalciferol (VITAMIN D3 PO) Take 2,000 Units by mouth daily     Historical Provider, MD   aspirin EC 81 MG EC tablet Take 2 tablets by mouth daily. 1/24/13   Laine Dumont MD   ferrous sulfate 325 (65 FE) MG tablet Take 325 mg by mouth daily     Historical Provider, MD       Family History:       Problem Relation Age of Onset    Heart Disease Mother     Stroke Mother     Vision Loss Mother     High Blood Pressure Father     High Blood Pressure Brother     Diabetes Brother     Sleep Apnea Brother     Arthritis Paternal Grandmother     Diabetes Paternal Grandmother          Social History:   TOBACCO:   reports that he quit smoking about 35 years ago. His smoking use included cigarettes. He started smoking about 65 years ago. He has a 9.00 pack-year smoking history. He has never used smokeless tobacco.  ETOH:   reports no history of alcohol use. DRUGS:  reports no history of drug use. REVIEW OF SYSTEMS:  Review of Systems -   General ROS: negative  Psychological ROS: negative  Ophthalmic ROS: negative  ENT ROS: negative  Allergy and Immunology ROS: negative  Hematological and Lymphatic ROS: negative  Endocrine ROS: negative  Breast ROS: negative  Respiratory ROS: cough, hemoptysis, sob  Cardiovascular ROS: no chest pain or dyspnea on exertion  Gastrointestinal ROS:negative  Genito-Urinary ROS: negative  Musculoskeletal ROS: negative  Neurological ROS: negative  Dermatological ROS: negative        Physical Exam:    Vitals: BP (!) 162/76   Pulse 83   Temp 97 °F (36.1 °C) (Temporal)   Resp 17   Ht 5' 8\" (1.727 m)   Wt 216 lb 11.4 oz (98.3 kg)   SpO2 98%   BMI 32.95 kg/m²     Last Body weight:   Wt Readings from Last 3 Encounters:   12/28/22 216 lb 11.4 oz (98.3 kg)   12/16/22 239 lb (108.4 kg)   12/08/22 232 lb 12.9 oz (105.6 kg)       Body Mass Index : Body mass index is 32.95 kg/m².       Intake and Output summary: Intake/Output Summary (Last 24 hours) at 12/28/2022 1110  Last data filed at 12/28/2022 0911  Gross per 24 hour   Intake 360 ml   Output --   Net 360 ml       Physical Examination:     PHYSICAL EXAM:    Gen:  mild acute distress   Eyes: PERRL. Anicteric sclera. No conjunctival injection. ENT: No discharge. Posterior oropharynx clear. External appearance of ears and nose normal.  Neck: Trachea midline. No mass, no lymphadenopathy    Resp:  bilateral rhonchi, diminished bilaterally,   CV: Regular rate. Regular rhythm. No murmur or rub. No edema. GI: Soft, Non-tender. Non-distended. +BS  Skin: Warm, dry, w/o erythema. Lymph: No cervical or supraclavicular LAD. M/S: No cyanosis. No clubbing. Neuro:  CN 2-12 tested, no focal neurologic deficit. Moves all extremities  Psych: Awake and alert, Oriented x 3. Judgement and insight appropriate. Mood stable. Laboratory findings:-    CBC:   Recent Labs     12/28/22  0449   WBC 8.4   HGB 8.9*        BMP:    Recent Labs     12/26/22  0535 12/27/22  0439 12/28/22  0449    143 145   K 3.5 3.6 3.8    101 100   CO2 31 32 29   BUN 58* 51* 49*   CREATININE 2.2* 2.1* 1.9*   GLUCOSE 90 104* 111*     S. Calcium:  Recent Labs     12/28/22  0449   CALCIUM 8.7       S. Magnesium:  Recent Labs     12/28/22  0449   MG 2.00       S. Glucose:  Recent Labs     12/27/22 2015 12/28/22  0617 12/28/22  1049   POCGLU 225* 186* 110*       No results for input(s): CKTOTAL, CKMB, CKMBINDEX, TROPONINI in the last 72 hours. Thyroid functions:   Lab Results   Component Value Date/Time    TSH 2.54 05/27/2022 02:05 PM          Radiology Review:  Pertinent images / reports were reviewed as a part of this visit. CT Chest w/ contrast: No results found for this or any previous visit.       CT Chest w/o contrast: Results for orders placed during the hospital encounter of 11/15/22    CT CHEST WO CONTRAST    Narrative  EXAMINATION:  CT OF THE CHEST WITHOUT CONTRAST 11/23/2022 11:49 am    TECHNIQUE:  CT of the chest was performed without the administration of intravenous  contrast. Multiplanar reformatted images are provided for review. Automated  exposure control, iterative reconstruction, and/or weight based adjustment of  the mA/kV was utilized to reduce the radiation dose to as low as reasonably  achievable. COMPARISON:  X-ray chest same date, CT chest 09/25/2018    HISTORY:  ORDERING SYSTEM PROVIDED HISTORY: Hemoptysis  TECHNOLOGIST PROVIDED HISTORY:  Reason for exam:->Hemoptysis  Reason for Exam: Hemoptysis    FINDINGS:  Mediastinum: Noncontrast evaluation mediastinum demonstrates normal caliber  thoracic aorta with atherosclerotic calcification also involving coronary  arteries and origins of the great vessels. Mild cardiomegaly. No  pericardial effusion. No mediastinal or axillary lymphadenopathy. Evaluation of hilar lymph nodes is limited due to lack of IV contrast.    Lungs/pleura: Small right and trace left pleural effusions. Multifocal  airspace disease throughout both lungs suspicious for multifocal pneumonia. No pneumothorax. Mild emphysematous changes. Upper Abdomen: Noncontrast evaluation upper abdomen without acute abnormality. Soft Tissues/Bones: No acute osseous abnormality in the chest.    Impression  Multifocal pneumonia. Small right and trace left pleural effusion. Diffuse atherosclerosis. CTPA: No results found for this or any previous visit. CXR PA/LAT: Results for orders placed during the hospital encounter of 10/30/22    XR CHEST (2 VW)    Narrative  EXAMINATION:  TWO XRAY VIEWS OF THE CHEST    11/6/2022 9:37 am    COMPARISON:  10/30/2022    HISTORY:  ORDERING SYSTEM PROVIDED HISTORY: productive cough wheeze ho pneumonia  TECHNOLOGIST PROVIDED HISTORY:  Reason for exam:->productive cough wheeze ho pneumonia    FINDINGS:  Increasing basilar opacities and small pleural effusions appear increased.   No pneumothorax identified.  No acute osseous abnormality appreciated.  Cardiac and mediastinal contours appear unchanged.    Impression  Increasing basilar opacities and small pleural effusions, for which  multifocal infection or developing edema are considerations.      CXR portable: Results for orders placed during the hospital encounter of 12/23/22    XR CHEST PORTABLE    Narrative  EXAMINATION:  ONE XRAY VIEW OF THE CHEST    12/23/2022 5:59 pm    COMPARISON:  Chest x-ray dated November 20, 2022    HISTORY:  ORDERING SYSTEM PROVIDED HISTORY: sob  TECHNOLOGIST PROVIDED HISTORY:  Reason for exam:->sob  Reason for Exam: fall, sob    FINDINGS:  Adequate inspiration is noted.  Extensive airspace disease is present  throughout the right upper and lower lung zone.  Patchy airspace disease is  present within the left lung base.  Interval improvement is noted within the  right basilar airspace disease.  No pneumothorax noted.  Small pleural  effusions are present.  Borderline cardiomegaly is noted.  No acute osseous  abnormality is present.    Impression  1. Extensive airspace disease throughout the right lung, left lung base.  Interval improvement is noted within the right basilar airspace disease.  Differential considerations would include multifocal pneumonia versus  coalescing edema.  Follow-up imaging is recommended to ensure complete  clearing  2. Borderline cardiomegaly        Roland Morales MD, MRYAN.            12/28/2022, 11:10 AM

## 2022-12-28 NOTE — PROCEDURES
Bronchoscopy Inpatient Procedure Note    Date of Procedure: 12/28/2022      Anesthesia: Conscious Sedation. Total Dose: Versed - 2 mg  Fentanyl - 100 mcg  Given flumazenil 0.4 mg to reverse. Procedure: Flexible fiberoptic bronchoscopy with RML BAL     Estimated Blood Loss: Minimal    Complications: None      Consent to Procedure  The risks, benefits, complications, treatment options and expected outcomes were discussed with the patient and/or POA  The possibilities of reaction to medication, pulmonary aspiration, perforation of a viscus, bleeding, failure to diagnose a condition and creating a complication requiring transfusion or operation were discussed with the patient who freely signed the consent. Description of Procedure   Gilmar was monitored  standard endoscopy monitoring devices. Gilmar DICK Suresh and the procedure were verified as Flexible Fiberoptic Bronchoscopy. A Time Out was held and the above information confirmed. The patient was placed in the appropriate position. The patient was sedated. The bronchoscope was then passed through right nostril Lidocaine 2% solution 2 ml at a time was applied topically to vocal cords and airways. After careful inspection of the tracheal, the bronchoscope was sequentially passed into all segments of the left and right endobronchial trees to the second and/or third divisions. Endobronchial findings    Vocal Cords Normal  Trachea: No tracheal stenosis.    Bilateral lungs no significant secretions   Normal air ways mucosa  No endobronchial lesions  RML BAL using 200 cc of saline to obtain 30 cc of BAL  Also central airway washing obtained

## 2022-12-28 NOTE — H&P
Pulmonary H&P Note     Patient's name:  Alyssa Yost Record Number: [id number]  Patient's account/billing number: [de-identified]  Patient's YOB: 1940  Age: 79 y.o. Date of Admission: 12/23/2022  8:08 PM  Date of Consult: 12/28/2022      Primary Care Physician: Pat Cho MD      Code Status: Full Code    Reason for consult: hemoptysis     Assessment and Plan     Hemoptysis   Acute on chronic respiratory failure with hypoxia   Bilateral multifocal PNA  CKDIII  JOANN on cpap  COPD  Afib      Plan:  Bronch with BAl today   Persistent extensive R>L airspace disease, DD include organizing PNA, amiodarone toxicity, DAH vs less likely new PNA. Will obtain CT to further evaluate  Continue prednisone        Conscious sedation:    Mallampati: IV    ASA: III    Subjective:  CRP 85  PCT: 0.17      HISTORY OF PRESENT ILLNESS:   Mr./Ms. Suki Patiño is a 79 y.o. gentleman with past medical history stated below significant for obesity, CKD 3, diastolic heart failure, obstructive sleep apnea on CPAP therapy, was readmitted again to the hospital on December 23 for cough and hemoptysis, he stated that he coughs about a quarter size blood 5-6 times a day, he has been admitted multiple times since October, treated for HCAP with meropenem recently, imaging showed persistent bilateral right more than left airspace disease, he is on 3 L of oxygen.   H/o Afib not on anticoagulation           Past Medical History:        Diagnosis Date    Allergic rhinitis     Asthma     Atrial fibrillation (HCC)     Carotid artery stenosis     Chronic kidney disease     COPD (chronic obstructive pulmonary disease) (HCC)     CPAP (continuous positive airway pressure) dependence     ESBL (extended spectrum beta-lactamase) producing bacteria infection 12/26/2018    urine    Hyperlipidemia     Hypertension     Iron deficiency anemia     Obstructive sleep apnea     Type II or unspecified type diabetes mellitus without mention of complication, not stated as uncontrolled        Past Surgical History:        Procedure Laterality Date    CATARACT REMOVAL  2/2012    bilateral    COLONOSCOPY  7/10/2009    diverticulosis    hemorrhoids    CT BONE MARROW BIOPSY  11/1/2022    CT BONE MARROW BIOPSY 11/1/2022 [facility] CT    GALLBLADDER SURGERY  1981    PAIN MANAGEMENT PROCEDURE Right 10/20/2021    COOLIEF RADIOFREQUENCY ABLATION - RIGHT KNEE performed by Marcela Pena MD at [address] Left 12/8/2021    [address] - LEFT KNEE performed by Marcela Pena MD at [address]  7-2005    7/10/2009    normal       Allergies: Allergies   Allergen Reactions    Hytrin [Terazosin Hcl]      Ineffective for BP control    Penicillins      Unknown reaction during childhood; Patient tolerated cephalosporins in the past    Shrimp Flavor Hives    Sulfa Antibiotics      Unknown reaction in childhood    Sulfasalazine Other (See Comments)    Tekturna [Aliskiren Fumarate]      Ineffective    Vancomycin      Fever    Ciprofloxacin Rash     Fever and rash          Home Meds:   Prior to Admission medications    Medication Sig Start Date End Date Taking?  Authorizing Provider   torsemide (DEMADEX) 10 MG tablet Take 1 tablet by mouth daily  Patient taking differently: Take 10 mg by mouth in the morning and at bedtime 12/8/22   Florentino Peres MD   insulin glargine [facility]) 100 UNIT/ML injection pen Inject 5 Units into the skin nightly    Historical Provider, MD   levalbuterol Hammad Mckeon) 1.25 MG/3ML nebulizer solution USE 1 VIAL VIA NEBULIZER FOUR TIMES DAILY 11/15/22   Radha Fang DO   amiodarone (CORDARONE) 200 MG tablet Take 0.5 tablets by mouth daily 11/11/22   Latasha Young MD   INSULIN SYRINGE .5CC/29G 29G X 1/2\" 0.5 ML MISC 1 each by Does not apply route daily 11/9/22   Suzanne Kennedy MD   albuterol sulfate HFA (PROVENTIL;VENTOLIN;PROAIR) 108 (90 Base) MCG/ACT inhaler INHALE 2 PUFFS BY MOUTH EVERY 4 HOURS AS NEEDED FOR WHEEZING 10/21/22   Suzanne Kennedy MD   montelukast (SINGULAIR) 10 MG tablet TAKE 1 TABLET BY MOUTH EVERY DAY 10/21/22   Suzanne Kennedy MD   [employer] PURELAX 17 GM/SCOOP powder TAKE 17G BY MOUTH DAILY MIX WITH 8 OZ OF WATER 9/6/22   Suzanne Kennedy MD   pantoprazole (PROTONIX) 40 MG tablet TAKE 1 TABLET BY MOUTH EVERY DAY 8/15/22   Jeremy Jolly, DO   B-D UF III MINI PEN NEEDLES 31G X 5 MM MISC USE AS DIRECTED 3 TIMES A DAY 8/13/22   Mitchell Palma MD   atorvastatin (LIPITOR) 40 MG tablet TAKE 1 TABLET BY MOUTH EVERY DAY 4/26/22   Suzanne Kennedy MD   alfuzosin (UROXATRAL) 10 MG extended release tablet TAKE 1 TABLET BY MOUTH EVERY DAY 4/4/22   Suzanne Kennedy MD   LINZESS 145 MCG capsule TAKE 1 CAPSULE BY MOUTH EVERY DAY IN THE MORNING BEFORE BREAKFAST 3/23/22   Suzanne Kennedy MD   verapamil (CALAN SR) 240 MG extended release tablet TAKE 1 TABLET BY MOUTH TWICE A DAY 2/14/22   Suzanne Kennedy MD   ACCU-CHEK GUIDE strip TEST AS DIRECTED 3 TIMES A DAY 1/29/22   Suzanne Kennedy MD   Accu-Chek FastClix Lancets MISC USE TO TEST 3 TIMES A DAY DX CODE E11.9 7/20/21   Suzanne Kennedy MD   ZINC PO Take by mouth    Historical Provider, MD   CPAP Machine MISC by Does not apply route    Historical Provider, MD   Ascorbic Acid (SUPER C COMPLEX PO) Take by mouth daily    Historical Provider, MD   Multiple Vitamins-Minerals (MULTIVITAMIN ADULT PO) Take by mouth daily    Historical Provider, MD   Lactobacillus Rhamnosus, GG, ([employer] PROBIOTIC, LACTOBACILLUS,) CAPS TAKE 1 CAPSULE BY MOUTH 2 TIMES DAILY (WITH MEALS) 3/15/20   Suzanne Kennedy MD   docusate sodium (COLACE) 100 MG capsule Take by mouth daily    Historical Provider, MD   Cyanocobalamin (VITAMIN B-12 PO) Take 500 mcg by mouth every other day     Historical Provider, MD   Cholecalciferol (VITAMIN D3 PO) Take 2,000 Units by mouth daily     Historical Provider, MD   aspirin EC 81 MG EC tablet Take 2 tablets by mouth daily. 1/24/13   Gary Rand MD   ferrous sulfate 325 (65 FE) MG tablet Take 325 mg by mouth daily     Historical Provider, MD       Family History:       Problem Relation Age of Onset    Heart Disease Mother     Stroke Mother     Vision Loss Mother     High Blood Pressure Father     High Blood Pressure Brother     Diabetes Brother     Sleep Apnea Brother     Arthritis Paternal Grandmother     Diabetes Paternal Grandmother          Social History:   TOBACCO:   reports that he quit smoking about 35 years ago. His smoking use included cigarettes. He started smoking about 65 years ago. He has a 9.00 pack-year smoking history. He has never used smokeless tobacco.  ETOH:   reports no history of alcohol use. DRUGS:  reports no history of drug use. REVIEW OF SYSTEMS:  Review of Systems -   General ROS: negative  Psychological ROS: negative  Ophthalmic ROS: negative  ENT ROS: negative  Allergy and Immunology ROS: negative  Hematological and Lymphatic ROS: negative  Endocrine ROS: negative  Breast ROS: negative  Respiratory ROS: cough, hemoptysis, sob  Cardiovascular ROS: no chest pain or dyspnea on exertion  Gastrointestinal ROS:negative  Genito-Urinary ROS: negative  Musculoskeletal ROS: negative  Neurological ROS: negative  Dermatological ROS: negative        Physical Exam:    Vitals: BP (!) 162/76   Pulse 83   Temp 97 °F (36.1 °C) (Temporal)   Resp 17   Ht 5' 8\" (1.727 m)   Wt 216 lb 11.4 oz (98.3 kg)   SpO2 98%   BMI 32.95 kg/m²     Last Body weight:   Wt Readings from Last 3 Encounters:   12/28/22 216 lb 11.4 oz (98.3 kg)   12/16/22 239 lb (108.4 kg)   12/08/22 232 lb 12.9 oz (105.6 kg)       Body Mass Index : Body mass index is 32.95 kg/m².       Intake and Output summary:   Intake/Output Summary (Last 24 hours) at 12/28/2022 1110  Last data filed at 12/28/2022 0911  Gross per 24 hour   Intake 360 ml   Output --   Net 360 ml         Physical Examination:     PHYSICAL EXAM:    Gen:  mild acute distress   Eyes: PERRL. Anicteric sclera. No conjunctival injection. ENT: No discharge. Posterior oropharynx clear. External appearance of ears and nose normal.  Neck: Trachea midline. No mass, no lymphadenopathy    Resp:  bilateral rhonchi, diminished bilaterally,   CV: Regular rate. Regular rhythm. No murmur or rub. No edema. GI: Soft, Non-tender. Non-distended. +BS  Skin: Warm, dry, w/o erythema. Lymph: No cervical or supraclavicular LAD. M/S: No cyanosis. No clubbing. Neuro:  CN 2-12 tested, no focal neurologic deficit. Moves all extremities  Psych: Awake and alert, Oriented x 3. Judgement and insight appropriate. Mood stable. Laboratory findings:-    CBC:   Recent Labs     12/28/22  0449   WBC 8.4   HGB 8.9*          BMP:    Recent Labs     12/26/22  0535 12/27/22  0439 12/28/22  0449    143 145   K 3.5 3.6 3.8    101 100   CO2 31 32 29   BUN 58* 51* 49*   CREATININE 2.2* 2.1* 1.9*   GLUCOSE 90 104* 111*       S. Calcium:  Recent Labs     12/28/22  0449   CALCIUM 8.7         S. Magnesium:  Recent Labs     12/28/22  0449   MG 2.00         S. Glucose:  Recent Labs     12/27/22 2015 12/28/22  0617 12/28/22  1049   POCGLU 225* 186* 110*         No results for input(s): CKTOTAL, CKMB, CKMBINDEX, TROPONINI in the last 72 hours. Thyroid functions:   Lab Results   Component Value Date/Time    TSH 2.54 05/27/2022 02:05 PM            Radiology Review:  Pertinent images / reports were reviewed as a part of this visit. CT Chest w/ contrast: No results found for this or any previous visit.       CT Chest w/o contrast: Results for orders placed during the hospital encounter of 11/15/22    CT CHEST WO CONTRAST    Narrative  EXAMINATION:  CT OF THE CHEST WITHOUT CONTRAST 11/23/2022 11:49 am    TECHNIQUE:  CT of the chest was performed without the administration of intravenous  contrast. Multiplanar reformatted images are provided for review. Automated  exposure control, iterative reconstruction, and/or weight based adjustment of  the mA/kV was utilized to reduce the radiation dose to as low as reasonably  achievable. COMPARISON:  X-ray chest same date, CT chest 09/25/2018    HISTORY:  ORDERING SYSTEM PROVIDED HISTORY: Hemoptysis  TECHNOLOGIST PROVIDED HISTORY:  Reason for exam:->Hemoptysis  Reason for Exam: Hemoptysis    FINDINGS:  Mediastinum: Noncontrast evaluation mediastinum demonstrates normal caliber  thoracic aorta with atherosclerotic calcification also involving coronary  arteries and origins of the great vessels. Mild cardiomegaly. No  pericardial effusion. No mediastinal or axillary lymphadenopathy. Evaluation of hilar lymph nodes is limited due to lack of IV contrast.    Lungs/pleura: Small right and trace left pleural effusions. Multifocal  airspace disease throughout both lungs suspicious for multifocal pneumonia. No pneumothorax. Mild emphysematous changes. Upper Abdomen: Noncontrast evaluation upper abdomen without acute abnormality. Soft Tissues/Bones: No acute osseous abnormality in the chest.    Impression  Multifocal pneumonia. Small right and trace left pleural effusion. Diffuse atherosclerosis. CTPA: No results found for this or any previous visit. CXR PA/LAT: Results for orders placed during the hospital encounter of 10/30/22    XR CHEST (2 VW)    Narrative  EXAMINATION:  TWO XRAY VIEWS OF THE CHEST    11/6/2022 9:37 am    COMPARISON:  10/30/2022    HISTORY:  ORDERING SYSTEM PROVIDED HISTORY: productive cough wheeze ho pneumonia  TECHNOLOGIST PROVIDED HISTORY:  Reason for exam:->productive cough wheeze ho pneumonia    FINDINGS:  Increasing basilar opacities and small pleural effusions appear increased.   No pneumothorax identified. No acute osseous abnormality appreciated. Cardiac and mediastinal contours appear unchanged. Impression  Increasing basilar opacities and small pleural effusions, for which  multifocal infection or developing edema are considerations. CXR portable: Results for orders placed during the hospital encounter of 12/23/22    XR CHEST PORTABLE    Narrative  EXAMINATION:  ONE XRAY VIEW OF THE CHEST    12/23/2022 5:59 pm    COMPARISON:  Chest x-ray dated November 20, 2022    HISTORY:  ORDERING SYSTEM PROVIDED HISTORY: sob  TECHNOLOGIST PROVIDED HISTORY:  Reason for exam:->sob  Reason for Exam: fall, sob    FINDINGS:  Adequate inspiration is noted. Extensive airspace disease is present  throughout the right upper and lower lung zone. Patchy airspace disease is  present within the left lung base. Interval improvement is noted within the  right basilar airspace disease. No pneumothorax noted. Small pleural  effusions are present. Borderline cardiomegaly is noted. No acute osseous  abnormality is present. Impression  1. Extensive airspace disease throughout the right lung, left lung base. Interval improvement is noted within the right basilar airspace disease. Differential considerations would include multifocal pneumonia versus  coalescing edema. Follow-up imaging is recommended to ensure complete  clearing  2.  Borderline cardiomegaly        Erica Miranda MD, M.D.            12/28/2022, 11:10 AM

## 2022-12-28 NOTE — PROGRESS NOTES
Occupational Therapy  Facility/Department: [address] ORTHOPEDICS  Occupational Daily Treatment Note      Name: bJ Stanley  : 1940  MRN: [id number]  Date of Service: 2022    Discharge Recommendations:  24 hour supervision or assist, Home with Home health OT, Home with nursing aide, S Level 3, 2-3 sessions per week          Patient Diagnosis(es): The primary encounter diagnosis was Fall, initial encounter. Diagnoses of Dyspnea, unspecified type, Hyperkalemia, DEISY (acute kidney injury) ([facility] [address] Elevated troponin, Bradycardia, and Atypical pneumonia were also pertinent to this visit. Past Medical History:  has a past medical history of Allergic rhinitis, Asthma, Atrial fibrillation ([address] Carotid artery stenosis, Chronic kidney disease, COPD (chronic obstructive pulmonary disease) ([facility] [address] CPAP (continuous positive airway pressure) dependence, ESBL (extended spectrum beta-lactamase) producing bacteria infection, Hyperlipidemia, Hypertension, Iron deficiency anemia, Obstructive sleep apnea, and Type II or unspecified type diabetes mellitus without mention of complication, not stated as uncontrolled. Past Surgical History:  has a past surgical history that includes Gallbladder surgery (); Upper gastrointestinal endoscopy (7-2005    7/10/2009); Colonoscopy (7/10/2009); Cataract removal (2012); Pain management procedure (Right, 10/20/2021); Pain management procedure (Left, 2021); CT BIOPSY BONE MARROW (2022); bronchoscopy (N/A, 2022); and bronchoscopy (2022). Treatment Diagnosis: impaired ADL/fxl mobility    Gilmar Suresh scored a 19/24 on the AM-PAC ADL Inpatient form. Current research shows that an AM-PAC score of 18 or greater is typically associated with a discharge to the patient's home setting.  Based on the patient's AM-PAC score, and their current ADL deficits, it is recommended that the patient have 2-3 sessions per week of Occupational Therapy at d/c to increase the patient's independence. At this time, this patient demonstrates the endurance and safety to discharge home with home (home vs OP services) and a follow up treatment frequency of 2-3x/wk. Please see assessment section for further patient specific details. If patient discharges prior to next session this note will serve as a discharge summary. Please see below for the latest assessment towards goals. HOME HEALTH CARE: LEVEL 3 SAFETY       -Initial home health evaluation to occur within 24-48 hours, in patient home   -Home health agency to establish plan of care for patient over 60 day period   -Medication Reconciliation   -PT/OT/Speech evaluations in home within 24-48 hours of discharge; including  -DME and home safety   -Frontload therapy 5 days, then 3x a week   -OT to evaluate if patient has [address] needs for personal care   - evaluation within 24-48 hours, includes evaluation of resources   and insurance to determine [place], [place], LTC, and Medicaid options   -PCP Visit scheduled within three to seven days of discharge       Assessment   Performance deficits / Impairments: Decreased functional mobility ; Decreased endurance;Decreased ADL status; Decreased high-level IADLs;Decreased balance;Decreased strength;Decreased safe awareness  Assessment: Discussed with OTR am pac score has improved to 19. Anticipate that patient should be able to return home with 24/7 assist, level 3 home OT and HHA to ensure a safe d/c to home. Completed bed mobility with SBA, HOB elevated, effortful to complete. SBA for sit<>stand from EOB to RW to commode to recliner chair with mild cues for hand placement. Functional mobility with Rw with SBA, slow steady gait with no overt LOB noted, able to manage o2 line. Dressed upper body with sert up, lower body with SBA, Min A to throughly clean buttocks after BM. Will cont with POC.   REQUIRES OT FOLLOW-UP: Yes  Activity Tolerance  Activity Tolerance: Patient Tolerated treatment well  Activity Tolerance Comments: mild SOB with activity, takes rest breaks without cues, IND with purse lip breathing        Plan   Occupational Therapy Plan  Times Per Week: 3-5  Current Treatment Recommendations: Strengthening, ROM, Balance training, Functional mobility training, Endurance training, Pain management, Safety education & training, Modalities, Positioning, Self-Care / ADL, Home management training, Patient/Caregiver education & training     Restrictions  Restrictions/Precautions  Restrictions/Precautions: Fall Risk, Contact Precautions  Position Activity Restriction  Other position/activity restrictions: O2 at 2 liters    Subjective   General  Chart Reviewed: Yes  Patient assessed for rehabilitation services?: Yes  Additional Pertinent Hx: 80 yo male admitted for a fall from chair and with anemia requiring blood transfusion. PMH: JOANN, DM, CKD, COPD, HTN  Response to previous treatment: Patient with no complaints from previous session  Family / Caregiver Present: No  Referring Practitioner: Karma Colon MD  Diagnosis: Fall from chair  Subjective  Subjective: Patient supine in bed upob arrival to room. Patient agreeable to therapy. No reports of pain. General Comment  Comments: RN ok to see     Social/Functional History  Social/Functional History  Lives With: Spouse  Type of Home: House  Home Layout: Able to Live on Main level with bedroom/bathroom, Multi-level  Home Access: Stairs to enter with rails  Entrance Stairs - Number of Steps: 1+1  Entrance Stairs - Rails: Both  Bathroom Shower/Tub: Tub/Shower unit  Bathroom Toilet: Standard  Bathroom Equipment: Shower chair, Grab bars in shower, Tub transfer bench  Home Equipment: Cane, Rollator, Oxygen, Grab bars (2L)  Has the patient had two or more falls in the past year or any fall with injury in the past year?: Yes  ADL Assistance: Needs assistance (assist LB.  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  IADL Comments: sleeps in a recliner but reports he deos get into bed on a regular basis       Objective        Safety Devices  Type of Devices: All fall risk precautions in place;Call light within reach; Chair alarm in place; Left in chair;Nurse notified  Balance  Sitting: Intact  Standing: With support (stood with SBA with RW for ADL tasks)  Toilet Transfers  Toilet - Technique: Ambulating  Equipment Used: Grab bars  Toilet Transfer: Stand by assistance     ADL  Grooming: Stand by assistance  Grooming Skilled Clinical Factors: standing in front of sink to wash hands  UE Dressing: Setup;Stand by assistance  UE Dressing Skilled Clinical Factors: seated on edge of bed to doff gown and don shirt  LE Dressing: Stand by assistance  LE Dressing Skilled Clinical Factors: able to thread underclothing and pants over feet and hips with SBA for balance  Toileting: Minimal assistance  Toileting Skilled Clinical Factors: pt has  BM on commode. Pt manages pericare (assist for thoroughness and steadiness), able to manage underclothing        Bed mobility  Supine to Sit: Stand by assistance (HOB elevated, effortful)  Sit to Supine: Unable to assess (in chair at end of session)  Transfers  Sit to stand: Stand by assistance  Stand to sit: Stand by assistance  Transfer Comments: SBA for sit<>stand from EOB to RW to commode to recliner chair with mild cues for hand placement. Functional mobility with RW with close SBA, slow steady gait, no overt LOB noted, able to manage o2 line     Cognition  Overall Cognitive Status: Exceptions  Following Commands:  Follows multistep commands with increased time  Attention Span: Attends with cues to redirect  Safety Judgement: Decreased awareness of need for safety;Decreased awareness of need for assistance  Problem Solving: Decreased awareness of errors  Insights: Decreased awareness of deficits  Initiation: Does not require cues  Sequencing: Does not require cues  Cognition Comment: decreased insight  Orientation  Overall Orientation Status: Within Functional Limits                  Education Given To: Patient  Education Provided: Role of Therapy;Plan of Care;Transfer Training; Fall Prevention Strategies            AM-PAC Score        AM-PAC Inpatient Daily Activity Raw Score: 19 (12/28/22 1519)  AM-PAC Inpatient ADL T-Scale Score : 40.22 (12/28/22 1519)  ADL Inpatient CMS 0-100% Score: 42.8 (12/28/22 1519)  ADL Inpatient CMS G-Code Modifier : CK (12/28/22 1519)        Goals  Short Term Goals  Time Frame for Short Term Goals: prior to d/c: ongoing  Short Term Goal 1: toileting Mod I  Short Term Goal 2: UB bathing/dressing Mod I  Short Term Goal 3: tolerate 5 min fxl standing task Mod I  Short Term Goal 4: LB dressing Mod I  Short Term Goal 5: fxl tx and mobility with RW household distances Mod I  Patient Goals   Patient goals : \"I just want to get home.  I will be fine\"       Therapy Time   Individual Concurrent Group Co-treatment   Time In 1430         Time Out 1525         Minutes 55               Electronically signed by Jessie Wadsworth [id number] on 12/28/2022 at 3:31 PM

## 2022-12-28 NOTE — PROGRESS NOTES
Patient admitted to PACU # 12 from OR at 1200 post Bronchoscopy alveolar lavage per MD Camargo. Attached to PACU monitoring system and report received from anesthesia provider. Patient was reported to be hemodynamically stable during procedure. Patient drowsy on admission and denied pain.

## 2022-12-29 LAB
ANION GAP SERPL CALCULATED.3IONS-SCNC: 8 MMOL/L (ref 3–16)
BASOPHILS ABSOLUTE: 0 K/UL (ref 0–0.2)
BASOPHILS RELATIVE PERCENT: 0.1 %
BUN BLDV-MCNC: 51 MG/DL (ref 7–20)
CALCIUM SERPL-MCNC: 7.9 MG/DL (ref 8.3–10.6)
CHLORIDE BLD-SCNC: 100 MMOL/L (ref 99–110)
CO2: 34 MMOL/L (ref 21–32)
CREAT SERPL-MCNC: 2 MG/DL (ref 0.8–1.3)
EOSINOPHILS ABSOLUTE: 0 K/UL (ref 0–0.6)
EOSINOPHILS RELATIVE PERCENT: 0.1 %
FUNGUS STAIN: NORMAL
GFR SERPL CREATININE-BSD FRML MDRD: 33 ML/MIN/{1.73_M2}
GLUCOSE BLD-MCNC: 128 MG/DL (ref 70–99)
GLUCOSE BLD-MCNC: 133 MG/DL (ref 70–99)
GLUCOSE BLD-MCNC: 133 MG/DL (ref 70–99)
GLUCOSE BLD-MCNC: 159 MG/DL (ref 70–99)
GLUCOSE BLD-MCNC: 209 MG/DL (ref 70–99)
HCT VFR BLD CALC: 28.2 % (ref 40.5–52.5)
HEMOGLOBIN: 8.6 G/DL (ref 13.5–17.5)
LYMPHOCYTES ABSOLUTE: 0.6 K/UL (ref 1–5.1)
LYMPHOCYTES RELATIVE PERCENT: 4.7 %
MAGNESIUM: 1.8 MG/DL (ref 1.8–2.4)
MCH RBC QN AUTO: 25.9 PG (ref 26–34)
MCHC RBC AUTO-ENTMCNC: 30.5 G/DL (ref 31–36)
MCV RBC AUTO: 84.9 FL (ref 80–100)
MONOCYTES ABSOLUTE: 0.7 K/UL (ref 0–1.3)
MONOCYTES RELATIVE PERCENT: 5.4 %
NEUTROPHILS ABSOLUTE: 11.1 K/UL (ref 1.7–7.7)
NEUTROPHILS RELATIVE PERCENT: 89.7 %
PATH CONSULT FLUID: NORMAL
PDW BLD-RTO: 20.3 % (ref 12.4–15.4)
PERFORMED ON: ABNORMAL
PLATELET # BLD: 196 K/UL (ref 135–450)
PMV BLD AUTO: 7.8 FL (ref 5–10.5)
POTASSIUM SERPL-SCNC: 3.9 MMOL/L (ref 3.5–5.1)
RBC # BLD: 3.32 M/UL (ref 4.2–5.9)
SODIUM BLD-SCNC: 142 MMOL/L (ref 136–145)
WBC # BLD: 12.3 K/UL (ref 4–11)

## 2022-12-29 PROCEDURE — 9990000010 HC NO CHARGE VISIT

## 2022-12-29 PROCEDURE — 85025 COMPLETE CBC W/AUTO DIFF WBC: CPT

## 2022-12-29 PROCEDURE — 1200000000 HC SEMI PRIVATE

## 2022-12-29 PROCEDURE — 99233 SBSQ HOSP IP/OBS HIGH 50: CPT | Performed by: INTERNAL MEDICINE

## 2022-12-29 PROCEDURE — 36415 COLL VENOUS BLD VENIPUNCTURE: CPT

## 2022-12-29 PROCEDURE — 6360000002 HC RX W HCPCS: Performed by: INTERNAL MEDICINE

## 2022-12-29 PROCEDURE — 97530 THERAPEUTIC ACTIVITIES: CPT

## 2022-12-29 PROCEDURE — 2580000003 HC RX 258: Performed by: INTERNAL MEDICINE

## 2022-12-29 PROCEDURE — 2700000000 HC OXYGEN THERAPY PER DAY

## 2022-12-29 PROCEDURE — 83735 ASSAY OF MAGNESIUM: CPT

## 2022-12-29 PROCEDURE — 6370000000 HC RX 637 (ALT 250 FOR IP): Performed by: INTERNAL MEDICINE

## 2022-12-29 PROCEDURE — 80048 BASIC METABOLIC PNL TOTAL CA: CPT

## 2022-12-29 PROCEDURE — 97535 SELF CARE MNGMENT TRAINING: CPT

## 2022-12-29 PROCEDURE — 94760 N-INVAS EAR/PLS OXIMETRY 1: CPT

## 2022-12-29 PROCEDURE — 99232 SBSQ HOSP IP/OBS MODERATE 35: CPT | Performed by: INTERNAL MEDICINE

## 2022-12-29 PROCEDURE — 94660 CPAP INITIATION&MGMT: CPT

## 2022-12-29 RX ORDER — PREDNISONE 1 MG/1
10 TABLET ORAL DAILY
Status: DISCONTINUED | OUTPATIENT
Start: 2023-01-19 | End: 2022-12-30 | Stop reason: HOSPADM

## 2022-12-29 RX ORDER — PREDNISONE 20 MG/1
20 TABLET ORAL DAILY
Status: DISCONTINUED | OUTPATIENT
Start: 2023-01-12 | End: 2022-12-30 | Stop reason: HOSPADM

## 2022-12-29 RX ORDER — PREDNISONE 20 MG/1
20 TABLET ORAL DAILY
Status: DISCONTINUED | OUTPATIENT
Start: 2023-01-12 | End: 2022-12-29

## 2022-12-29 RX ORDER — PREDNISONE 20 MG/1
40 TABLET ORAL DAILY
Status: DISCONTINUED | OUTPATIENT
Start: 2022-12-29 | End: 2022-12-29

## 2022-12-29 RX ORDER — PREDNISONE 20 MG/1
40 TABLET ORAL DAILY
Status: DISCONTINUED | OUTPATIENT
Start: 2022-12-29 | End: 2022-12-30 | Stop reason: HOSPADM

## 2022-12-29 RX ORDER — CEFUROXIME AXETIL 250 MG/1
500 TABLET ORAL EVERY 12 HOURS SCHEDULED
Status: DISCONTINUED | OUTPATIENT
Start: 2022-12-29 | End: 2022-12-30 | Stop reason: HOSPADM

## 2022-12-29 RX ADMIN — Medication 10 ML: at 23:40

## 2022-12-29 RX ADMIN — CEFUROXIME AXETIL 500 MG: 250 TABLET ORAL at 23:39

## 2022-12-29 RX ADMIN — ATORVASTATIN CALCIUM 40 MG: 40 TABLET, FILM COATED ORAL at 09:25

## 2022-12-29 RX ADMIN — HYDRALAZINE HYDROCHLORIDE 10 MG: 10 TABLET, FILM COATED ORAL at 05:45

## 2022-12-29 RX ADMIN — PANTOPRAZOLE SODIUM 40 MG: 40 TABLET, DELAYED RELEASE ORAL at 09:23

## 2022-12-29 RX ADMIN — TORSEMIDE 20 MG: 20 TABLET ORAL at 09:25

## 2022-12-29 RX ADMIN — POLYETHYLENE GLYCOL 3350 17 G: 17 POWDER, FOR SOLUTION ORAL at 09:28

## 2022-12-29 RX ADMIN — HYDRALAZINE HYDROCHLORIDE 10 MG: 10 TABLET, FILM COATED ORAL at 23:41

## 2022-12-29 RX ADMIN — INSULIN LISPRO 1 UNITS: 100 INJECTION, SOLUTION INTRAVENOUS; SUBCUTANEOUS at 19:01

## 2022-12-29 RX ADMIN — Medication 10 ML: at 09:31

## 2022-12-29 RX ADMIN — PREDNISONE 40 MG: 20 TABLET ORAL at 09:25

## 2022-12-29 RX ADMIN — HYDRALAZINE HYDROCHLORIDE 10 MG: 10 TABLET, FILM COATED ORAL at 13:42

## 2022-12-29 RX ADMIN — AMIODARONE HYDROCHLORIDE 100 MG: 200 TABLET ORAL at 09:23

## 2022-12-29 RX ADMIN — TAMSULOSIN HYDROCHLORIDE 0.4 MG: 0.4 CAPSULE ORAL at 09:23

## 2022-12-29 RX ADMIN — MONTELUKAST 10 MG: 10 TABLET, FILM COATED ORAL at 09:24

## 2022-12-29 RX ADMIN — DOCUSATE SODIUM 100 MG: 100 CAPSULE, LIQUID FILLED ORAL at 09:23

## 2022-12-29 RX ADMIN — HEPARIN SODIUM 5000 UNITS: 5000 INJECTION INTRAVENOUS; SUBCUTANEOUS at 09:28

## 2022-12-29 RX ADMIN — FERROUS SULFATE TAB EC 324 MG (65 MG FE EQUIVALENT) 325 MG: 324 (65 FE) TABLET DELAYED RESPONSE at 09:25

## 2022-12-29 RX ADMIN — HEPARIN SODIUM 5000 UNITS: 5000 INJECTION INTRAVENOUS; SUBCUTANEOUS at 23:38

## 2022-12-29 ASSESSMENT — PAIN SCALES - GENERAL
PAINLEVEL_OUTOF10: 0
PAINLEVEL_OUTOF10: 1
PAINLEVEL_OUTOF10: 0

## 2022-12-29 NOTE — PROGRESS NOTES
Patient continues to cough post bronchoscopy, not reporting any bloody cough. Pt was encouraged to take a shower based on family request, patient declined saying he will shower during the day. Pt using the walker well ambulating to bathroom.

## 2022-12-29 NOTE — PROGRESS NOTES
Patient is alert and oriented times 4. Upon assessment patient was sitting in chair relaxing. He is able to walk to the bathroom with a walker and assist. He was breathing heavy he said he had just came back from the bathroom. Lung sounds were clear but a little diminished. Patient had all meds for the morning.  I contacted the DrAmanda For WBC elevation and BP elevation

## 2022-12-29 NOTE — PROGRESS NOTES
Pulmonary progress Note     Patient's name:  Alyssa Yost Record Number: [id number]  Patient's account/billing number: [de-identified]  Patient's YOB: 1940  Age: 79 y.o. Date of Admission: 12/23/2022  8:08 PM  Date of Consult: 12/29/2022      Primary Care Physician: Abby Nunez MD      Code Status: Full Code    Reason for consult: hemoptysis     Assessment and Plan     Hemoptysis   Acute on chronic respiratory failure with hypoxia   Bilateral multifocal PNA  CKDIII  JOANN on cpap  COPD  Afib      Plan:  Findings of his bronch was not indicative of active infection, so far presentation, imaging and bronch findings consistent with organizing PNA, there is also possibility of amiodarone contribution to this organizing PNA, consider stopping amiodarone. Planning prolonged course of prednisone with taper, prednisone prescription and taper placed, he is schedule to follow up with his pulmonologist Dr. Sallei Cox on the 26th, keep the same appointment   Follow up cytology results  Follow up culture results       Subjective:  No acute events overnight        HISTORY OF PRESENT ILLNESS:   Mr./Ms. Wander Llanos is a 79 y.o. gentleman with past medical history stated below significant for obesity, CKD 3, diastolic heart failure, obstructive sleep apnea on CPAP therapy, was readmitted again to the hospital on December 23 for cough and hemoptysis, he stated that he coughs about a quarter size blood 5-6 times a day, he has been admitted multiple times since October, treated for HCAP with meropenem recently, imaging showed persistent bilateral right more than left airspace disease, he is on 3 L of oxygen.   H/o Afib not on anticoagulation           Past Medical History:        Diagnosis Date    Allergic rhinitis     Asthma     Atrial fibrillation ([address]     Carotid artery stenosis     Chronic kidney disease     COPD (chronic obstructive pulmonary disease) ([facility]     CPAP (continuous positive airway pressure) dependence     ESBL (extended spectrum beta-lactamase) producing bacteria infection 12/26/2018    urine    Hyperlipidemia     Hypertension     Iron deficiency anemia     Obstructive sleep apnea     Type II or unspecified type diabetes mellitus without mention of complication, not stated as uncontrolled        Past Surgical History:        Procedure Laterality Date    BRONCHOSCOPY N/A 12/28/2022    BRONCHOSCOPY ALVEOLAR LAVAGE performed by Yaya Medrano MD at [address]  12/28/2022    BRONCHOSCOPY DIAGNOSTIC OR CELL [address] ONLY performed by Yaya Medrano MD at [facility]  2/2012    bilateral    COLONOSCOPY  7/10/2009    diverticulosis    hemorrhoids    CT BONE MARROW BIOPSY  11/1/2022    CT BONE MARROW BIOPSY 11/1/2022 [facility] CT    GALLBLADDER SURGERY  1981    PAIN MANAGEMENT PROCEDURE Right 10/20/2021    COOLIEF RADIOFREQUENCY ABLATION - RIGHT KNEE performed by Judy Nunez MD at [address] Left 12/8/2021    [address] - LEFT KNEE performed by Judy Nunez MD at [address] [facility]  7-2005    7/10/2009    normal       Allergies: Allergies   Allergen Reactions    Hytrin [Terazosin Hcl]      Ineffective for BP control    Penicillins      Unknown reaction during childhood; Patient tolerated cephalosporins in the past    Shrimp Flavor Hives    Sulfa Antibiotics      Unknown reaction in childhood    Sulfasalazine Other (See Comments)    Tekturna [Aliskiren Fumarate]      Ineffective    Vancomycin      Fever    Ciprofloxacin Rash     Fever and rash          Home Meds:   Prior to Admission medications    Medication Sig Start Date End Date Taking?  Authorizing Provider   torsemide (DEMADEX) 10 MG tablet Take 1 tablet by mouth daily  Patient taking differently: Take 10 mg by mouth in the morning and at bedtime 12/8/22   Lina Matthews MD   insulin glargine [facility]) 100 UNIT/ML injection pen Inject 5 Units into the skin nightly    Historical Provider, MD   levalbuterol Sharran Appl) 1.25 MG/3ML nebulizer solution USE 1 VIAL VIA NEBULIZER FOUR TIMES DAILY 11/15/22   Jason Still DO   amiodarone (CORDARONE) 200 MG tablet Take 0.5 tablets by mouth daily 11/11/22   Paul Padilla MD   INSULIN SYRINGE .5CC/29G 29G X 1/2\" 0.5 ML MISC 1 each by Does not apply route daily 11/9/22   Alejandra Benedict MD   albuterol sulfate HFA (PROVENTIL;VENTOLIN;PROAIR) 108 (90 Base) MCG/ACT inhaler INHALE 2 PUFFS BY MOUTH EVERY 4 HOURS AS NEEDED FOR WHEEZING 10/21/22   Alejandra Benedict MD   montelukast (SINGULAIR) 10 MG tablet TAKE 1 TABLET BY MOUTH EVERY DAY 10/21/22   Alejandra Benedict MD   [employer] PURELAX 17 GM/SCOOP powder TAKE 17G BY MOUTH DAILY MIX WITH 8 OZ OF WATER 9/6/22   Alejandra Benedict MD   pantoprazole (PROTONIX) 40 MG tablet TAKE 1 TABLET BY MOUTH EVERY DAY 8/15/22   Jason Still DO   B-D UF III MINI PEN NEEDLES 31G X 5 MM MISC USE AS DIRECTED 3 TIMES A DAY 8/13/22   Jeannette Nguyen MD   atorvastatin (LIPITOR) 40 MG tablet TAKE 1 TABLET BY MOUTH EVERY DAY 4/26/22   Alejandra Benedict MD   alfuzosin (UROXATRAL) 10 MG extended release tablet TAKE 1 TABLET BY MOUTH EVERY DAY 4/4/22   Alejandra Benedict MD   LINZESS 145 MCG capsule TAKE 1 CAPSULE BY MOUTH EVERY DAY IN THE MORNING BEFORE BREAKFAST 3/23/22   Alejandra Benedict MD   verapamil (CALAN SR) 240 MG extended release tablet TAKE 1 TABLET BY MOUTH TWICE A DAY 2/14/22   Alejandra Benedict MD   ACCU-CHEK GUIDE strip TEST AS DIRECTED 3 TIMES A DAY 1/29/22   Alejandra Benedict MD   Accu-Chek FastClix Lancets MISC USE TO TEST 3 TIMES A DAY DX CODE E11.9 7/20/21   Alejandra Benedict MD   ZINC PO Take by mouth    Historical Provider, MD   CPAP Machine MISC by Does not apply route    Historical Provider, MD   Ascorbic Acid (SUPER C COMPLEX PO) Take by mouth daily    Historical Provider, MD   Multiple Vitamins-Minerals (MULTIVITAMIN ADULT PO) Take by mouth daily    Historical Provider, MD   Lactobacillus Rhamnosus, GG, ([employer] PROBIOTIC, LACTOBACILLUS,) CAPS TAKE 1 CAPSULE BY MOUTH 2 TIMES DAILY (WITH MEALS) 3/15/20   Jatinder Fitch MD   docusate sodium (COLACE) 100 MG capsule Take by mouth daily    Historical Provider, MD   Cyanocobalamin (VITAMIN B-12 PO) Take 500 mcg by mouth every other day     Historical Provider, MD   Cholecalciferol (VITAMIN D3 PO) Take 2,000 Units by mouth daily     Historical Provider, MD   aspirin EC 81 MG EC tablet Take 2 tablets by mouth daily. 1/24/13   Jael Schofield MD   ferrous sulfate 325 (65 FE) MG tablet Take 325 mg by mouth daily     Historical Provider, MD       Family History:       Problem Relation Age of Onset    Heart Disease Mother     Stroke Mother     Vision Loss Mother     High Blood Pressure Father     High Blood Pressure Brother     Diabetes Brother     Sleep Apnea Brother     Arthritis Paternal Grandmother     Diabetes Paternal Grandmother          Social History:   TOBACCO:   reports that he quit smoking about 35 years ago. His smoking use included cigarettes. He started smoking about 65 years ago. He has a 9.00 pack-year smoking history. He has never used smokeless tobacco.  ETOH:   reports no history of alcohol use. DRUGS:  reports no history of drug use.           REVIEW OF SYSTEMS:  Review of Systems -   General ROS: negative  Psychological ROS: negative  Ophthalmic ROS: negative  ENT ROS: negative  Allergy and Immunology ROS: negative  Hematological and Lymphatic ROS: negative  Endocrine ROS: negative  Breast ROS: negative  Respiratory ROS: cough, hemoptysis, sob  Cardiovascular ROS: no chest pain or dyspnea on exertion  Gastrointestinal ROS:negative  Genito-Urinary ROS: negative  Musculoskeletal ROS: negative  Neurological ROS: negative  Dermatological ROS: negative        Physical Exam:    Vitals: BP (!) 146/78   Pulse 85   Temp 97.7 °F (36.5 °C) (Oral)   Resp 18   Ht 5' 8\" (1.727 m)   Wt 217 lb 13 oz (98.8 kg)   SpO2 98%   BMI 33.12 kg/m²     Last Body weight:   Wt Readings from Last 3 Encounters:   12/29/22 217 lb 13 oz (98.8 kg)   12/16/22 239 lb (108.4 kg)   12/08/22 232 lb 12.9 oz (105.6 kg)       Body Mass Index : Body mass index is 33.12 kg/m². Intake and Output summary:   Intake/Output Summary (Last 24 hours) at 12/29/2022 0940  Last data filed at 12/29/2022 0806  Gross per 24 hour   Intake 500 ml   Output --   Net 500 ml       Physical Examination:     PHYSICAL EXAM:    Gen:  mild acute distress   Eyes: PERRL. Anicteric sclera. No conjunctival injection. ENT: No discharge. Posterior oropharynx clear. External appearance of ears and nose normal.  Neck: Trachea midline. No mass, no lymphadenopathy    Resp:  bilateral rhonchi, diminished bilaterally,   CV: Regular rate. Regular rhythm. No murmur or rub. No edema. GI: Soft, Non-tender. Non-distended. +BS  Skin: Warm, dry, w/o erythema. Lymph: No cervical or supraclavicular LAD. M/S: No cyanosis. No clubbing. Neuro:  CN 2-12 tested, no focal neurologic deficit. Moves all extremities  Psych: Awake and alert, Oriented x 3. Judgement and insight appropriate. Mood stable. Laboratory findings:-    CBC:   Recent Labs     12/29/22  0510   WBC 12.3*   HGB 8.6*        BMP:    Recent Labs     12/27/22  0439 12/28/22  0449 12/29/22  0510    145 142   K 3.6 3.8 3.9    100 100   CO2 32 29 34*   BUN 51* 49* 51*   CREATININE 2.1* 1.9* 2.0*   GLUCOSE 104* 111* 133*     S. Calcium:  Recent Labs     12/29/22  0510   CALCIUM 7.9*       S. Magnesium:  Recent Labs     12/29/22  0510   MG 1.80       S.  Glucose:  Recent Labs     12/28/22  1639 12/28/22  2121 12/29/22  0616   POCGLU 225* 218* 133*       No results for input(s): CKTOTAL, CKMB, CKMBINDEX, TROPONINI in the last 72 hours. Thyroid functions:   Lab Results   Component Value Date/Time    TSH 2.54 05/27/2022 02:05 PM          Radiology Review:  Pertinent images / reports were reviewed as a part of this visit. CT Chest w/ contrast: No results found for this or any previous visit. CT Chest w/o contrast: Results for orders placed during the hospital encounter of 11/15/22    CT CHEST WO CONTRAST    Narrative  EXAMINATION:  CT OF THE CHEST WITHOUT CONTRAST 11/23/2022 11:49 am    TECHNIQUE:  CT of the chest was performed without the administration of intravenous  contrast. Multiplanar reformatted images are provided for review. Automated  exposure control, iterative reconstruction, and/or weight based adjustment of  the mA/kV was utilized to reduce the radiation dose to as low as reasonably  achievable. COMPARISON:  X-ray chest same date, CT chest 09/25/2018    HISTORY:  ORDERING SYSTEM PROVIDED HISTORY: Hemoptysis  TECHNOLOGIST PROVIDED HISTORY:  Reason for exam:->Hemoptysis  Reason for Exam: Hemoptysis    FINDINGS:  Mediastinum: Noncontrast evaluation mediastinum demonstrates normal caliber  thoracic aorta with atherosclerotic calcification also involving coronary  arteries and origins of the great vessels. Mild cardiomegaly. No  pericardial effusion. No mediastinal or axillary lymphadenopathy. Evaluation of hilar lymph nodes is limited due to lack of IV contrast.    Lungs/pleura: Small right and trace left pleural effusions. Multifocal  airspace disease throughout both lungs suspicious for multifocal pneumonia. No pneumothorax. Mild emphysematous changes. Upper Abdomen: Noncontrast evaluation upper abdomen without acute abnormality. Soft Tissues/Bones: No acute osseous abnormality in the chest.    Impression  Multifocal pneumonia. Small right and trace left pleural effusion. Diffuse atherosclerosis.       CTPA: No results found for this or any previous visit. CXR PA/LAT: Results for orders placed during the hospital encounter of 10/30/22    XR CHEST (2 VW)    Narrative  EXAMINATION:  TWO XRAY VIEWS OF THE CHEST    11/6/2022 9:37 am    COMPARISON:  10/30/2022    HISTORY:  ORDERING SYSTEM PROVIDED HISTORY: productive cough wheeze ho pneumonia  TECHNOLOGIST PROVIDED HISTORY:  Reason for exam:->productive cough wheeze ho pneumonia    FINDINGS:  Increasing basilar opacities and small pleural effusions appear increased. No pneumothorax identified. No acute osseous abnormality appreciated. Cardiac and mediastinal contours appear unchanged. Impression  Increasing basilar opacities and small pleural effusions, for which  multifocal infection or developing edema are considerations. CXR portable: Results for orders placed during the hospital encounter of 12/23/22    XR CHEST PORTABLE    Narrative  EXAMINATION:  ONE XRAY VIEW OF THE CHEST    12/23/2022 5:59 pm    COMPARISON:  Chest x-ray dated November 20, 2022    HISTORY:  ORDERING SYSTEM PROVIDED HISTORY: sob  TECHNOLOGIST PROVIDED HISTORY:  Reason for exam:->sob  Reason for Exam: fall, sob    FINDINGS:  Adequate inspiration is noted. Extensive airspace disease is present  throughout the right upper and lower lung zone. Patchy airspace disease is  present within the left lung base. Interval improvement is noted within the  right basilar airspace disease. No pneumothorax noted. Small pleural  effusions are present. Borderline cardiomegaly is noted. No acute osseous  abnormality is present. Impression  1. Extensive airspace disease throughout the right lung, left lung base. Interval improvement is noted within the right basilar airspace disease. Differential considerations would include multifocal pneumonia versus  coalescing edema. Follow-up imaging is recommended to ensure complete  clearing  2.  Borderline cardiomegaly        Eliz Renee MD, M.D.            12/29/2022, 9:40 AM

## 2022-12-29 NOTE — PROGRESS NOTES
Physician Progress Note      Loy Cardenas  CSN #:                  [id number]  :                       1940  ADMIT DATE:       2022 8:08 PM  [address] [place] DATE:  RESPONDING  PROVIDER #:        Jonny Avitia MD          QUERY TEXT:    Patient admitted with syncopal episode thought to be secondary to hypotension   and bradycardia. Recently treated for HCAP with meropenem. Pulmonologist   consulted for hemoptysis. Per consult \"Persistent extensive R>L airspace   disease, DD include organizing PNA, amiodarone toxicity, DAH vs less likely   new PNA. \"   Bronch with BAL planned. Procalcitonin 0.15 on admission and   currently 0.17. Repeat CT done on  shows \"increasing ground-glass   attenuation in the right greater than left upper lobe with somewhat mosaic   distribution\" and \"Multifocal airspace opacities in the b    The medical record reflects the following:  Risk Factors: COPD, recently hospitalized with pneumonia  Clinical Indicators: hemoptysis, persistent extensive bilateral airspace   disease, Procalcitonin increased at 0.17  Treatment: pulmonary consult, bronch and BAL pending    Thank you,  Page Castro, RN, BSN, CCDS  [email or web address]  Options provided:  -- Pneumonia present on admission  -- Defer to pulmonary consultant documentation regarding pneumonia  -- Other - I will add my own diagnosis  -- Disagree - Not applicable / Not valid  -- Disagree - Clinically unable to determine / Unknown  -- Refer to Clinical Documentation Reviewer    PROVIDER RESPONSE TEXT:    I defer to pulmonary consultant regarding documentation of pneumonia present   on admission.     Query created by: Sameera Rocha on 2022 4:47 AM      Electronically signed by:  Jonny Avitia MD 2022 7:17 PM

## 2022-12-29 NOTE — PROGRESS NOTES
Occupational Therapy  [facility]  Occupational Therapy Daily Note  Name: Mandeep Martines  : 1940  MRN: [id number]  Date of Service: 2022    Discharge Recommendations:  24 hour supervision or assist, Home with Home health OT, Home with nursing aide, S Level 3   Mandeep Martines scored a 19/24 on the AM-PAC ADL Inpatient form. Current research shows that an AM-PAC score of 18 or greater is typically associated with a discharge to the patient's home setting. Based on the patient's AM-PAC score, and their current ADL deficits, it is recommended that the patient have initial Daily sessions per week of Occupational Therapy at d/c to increase the patient's independence. At this time, this patient demonstrates the endurance and safety to discharge home with [facility]   Please see assessment section for further patient specific details. If patient discharges prior to next session this note will serve as a discharge summary. Please see below for the latest assessment towards goals. HOME HEALTH CARE: LEVEL 3 SAFETY       -Initial home health evaluation to occur within 24-48 hours, in patient home   -Home health agency to establish plan of care for patient over 60 day period   -Medication Reconciliation   -PT/OT/Speech evaluations in home within 24-48 hours of discharge; including  -DME and home safety   -Frontload therapy 5 days, then 3x a week   -OT to evaluate if patient has [address] needs for personal care   - evaluation within 24-48 hours, includes evaluation of resources   and insurance to determine [place], [place], LTC, and Medicaid options   -PCP Visit scheduled within three to seven days of discharge          Treatment Diagnosis: impaired ADL/fxl mobility      Assessment   Performance deficits / Impairments: Decreased functional mobility ; Decreased endurance;Decreased ADL status; Decreased high-level IADLs;Decreased balance;Decreased strength;Decreased safe awareness  Assessment: Discussed with OTR: Pt today, SBA for toileting needs, declined further ADL tasks. AMPAC remains at 23. Pt transferred amb in room with RW and SBA with min cues for safety-managing 02 line. Pt easily SOB, with desating briefly to 80's, on 2L and HR to 120's. Wife and daughter arrived to room and discussed pt's routine for home, as pt adament about returning home. Pt reporting he was using a wheeled office chair to \"get around the kitchen\" in. Discussed safety risks with transferring on/off a chair on wheels that does not lock. After discussion, pt agreeable to using his rollator walker, which does have a seat and brakes to lock for safer sit><stands. Pt will benefit from 24/7 supervision/assist prn at home and HHOT, level 3 home care. Treatment Diagnosis: impaired ADL/fxl mobility  Prognosis: Fair  REQUIRES OT FOLLOW-UP: Yes  Activity Tolerance  Activity Tolerance: Treatment limited secondary to medical complications (free text); Patient limited by fatigue  Activity Tolerance Comments: SOB. Desating to 80's on 2L to quickly recovering to >90% with rest, cues for pursed lip breathing (pt states awareness of breathing technique, yet difficutly implementing). Pt's HR to 120's.         Plan   Occupational Therapy Plan  Times Per Week: 3-5  Current Treatment Recommendations: Strengthening, ROM, Balance training, Functional mobility training, Endurance training, Pain management, Safety education & training, Modalities, Positioning, Self-Care / ADL, Home management training, Patient/Caregiver education & training     Restrictions  Restrictions/Precautions  Restrictions/Precautions: Fall Risk, Contact Precautions  Position Activity Restriction  Other position/activity restrictions: O2 at 2 liters    Subjective   General  Chart Reviewed: Yes, Orders, Progress Notes, Labs  Patient assessed for rehabilitation services?: Yes  Additional Pertinent Hx: 78 yo male admitted for a fall from chair and with anemia requiring blood transfusion. PMH: JOANN, DM, CKD, COPD, HTN  Response to previous treatment: Patient with no complaints from previous session  Family / Caregiver Present: Yes (daughter and wife arrived during tx.)  Referring Practitioner: Shruti Knowles MD  Diagnosis: Fall from chair  Subjective  Subjective: Pt met BS, on commode in BR. Pt reports amb per self to commode, using RW. Pt without c/o pain. General Comment  Comments: RN ok to see     Social/Functional History  Social/Functional History  Lives With: Spouse  Type of Home: House  Home Layout: Able to Live on Main level with bedroom/bathroom, Multi-level  Home Access: Stairs to enter with rails  Entrance Stairs - Number of Steps: 1+1  Entrance Stairs - Rails: Both  Bathroom Shower/Tub: Tub/Shower unit  Bathroom Toilet: Standard  Bathroom Equipment: Shower chair, Grab bars in shower, Tub transfer bench  Home Equipment: Cane, Rollator, Oxygen, Grab bars, Walker, standard (states has standard walker-\"no wheels\". on 2 L02; has a walker basket)  Has the patient had two or more falls in the past year or any fall with injury in the past year?: Yes  ADL Assistance: Needs assistance (assist LB. 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 States he uses a chair w/wheels (\"computer chair\"), in the kitchen, when cooks. Objective       Safety Devices  Type of Devices: All fall risk precautions in place;Call light within reach; Chair alarm in place; Left in chair;Nurse notified     Toilet Transfers  Toilet - Technique: Ambulating  Equipment Used: Grab bars  Toilet Transfer: Stand by assistance  Toilet Transfers Comments: on commode at start of tx. Observed standing off commode. Pt did not need cues for hand placement, with RW use. ADL  Grooming Skilled Clinical Factors: Used hand wipes after toileting  Toileting: Stand by assistance  Toileting Skilled Clinical Factors: Pt on commode at start of tx, reports amb there per self. Pt observed managing hygiene and clothes, in sitting/stance. Additional Comments: Pt reports dressed self earlier, declining to comolete. Daughter present and wanting pt to take a shower now and pt adamently refusing, stating \"I'll do it when I get home tomorrow\". Cognition  Overall Cognitive Status: Exceptions  Safety Judgement: Decreased awareness of need for safety;Decreased awareness of need for assistance  Insights: Decreased awareness of deficits  Cognition Comment: decreased insight into deficits, safety  Orientation  Overall Orientation Status: Within Functional Limits          Functional Mobility: Pt amb with RW from BR to recliner, 25 ft, with SBA and very min assist to manage 02 line and walker safety w/02 line (turn to side 02 is positioned in, when turning). Pt SOB, desating to 80's briefly and HR to120's. Education Given To: Patient; Family  Education Provided: Role of Therapy;Plan of Care;Transfer Training; Fall Prevention Strategies; Energy Conservation  Education Provided Comments: Ed pt and family on use of his rollator (with seat, brakes) for safety, vs using a wheeled chair to sit and rest on/use in kitchen (reports had been \"getting around in wheeled office chair w/o a back on it). ed pt on consistently using spirometer for breathing ex. Ed pt on safe walker use, w/managing 02 line. All techniques, suggestions will need to be reviewed/reinforced with home therapy.   Education Method: Demonstration;Verbal  Barriers to Learning:  (poor insight)  Education Outcome: Verbalized understanding;Demonstrated understanding;Continued education needed     AM-PAC Score        AM-PAC Inpatient Daily Activity Raw Score: 19 (12/29/22 1436)  AM-PAC Inpatient ADL T-Scale Score : 40.22 (12/29/22 1436)  ADL Inpatient CMS 0-100% Score: 42.8 (12/29/22 1436)  ADL Inpatient CMS G-Code Modifier : CK (12/29/22 1436)      Goals  Short Term Goals  Time Frame for Short Term Goals: prior to d/c: ongoing  Short Term Goal 1: toileting Mod I  Short Term Goal 2: UB bathing/dressing Mod I  Short Term Goal 3: tolerate 5 min fxl standing task Mod I  Short Term Goal 4: LB dressing Mod I  Short Term Goal 5: fxl tx and mobility with RW household distances Mod I  Patient Goals   Patient goals : \"I just want to get home.  I will be fine\"       Therapy Time   Individual Concurrent Group Co-treatment   Time In 1350         Time Out 1445         Minutes [address]/L,515

## 2022-12-29 NOTE — PROGRESS NOTES
[facility] Internal Medicine Note      Chief Complaint:   I'm ok    Subjective/Interval History: This morning the patient is sitting on the couch in his room putting on street clothes. Overall he is feeling better he states. Patient has audible wheezes today, he is short of breath while trying to put on his compression stockings. He states he is eating and drinking okay. Moving his bowels without difficulty. Yesterday social work discussed with him possibly going to [facility] for additional rehab and the patient adamantly refused. Lengthy discussion today with the patient regarding his need for therapy moving forward. I have discussed with therapy reevaluating him and educating him on his deficiencies if he is not considered safe to go home at this point. No other new problems noted today. PMH, PSH, FH/SH reviewed and unchanged as documented in the H&P personally documented at admission 22    Medication list reviewed    Objective:    BP (!) 146/78   Pulse 85   Temp 97.7 °F (36.5 °C) (Oral)   Resp 18   Ht 5' 8\" (1.727 m)   Wt 217 lb 13 oz (98.8 kg)   SpO2 98%   BMI 33.12 kg/m²   Temp  Av.5 °F (36.4 °C)  Min: 96.9 °F (36.1 °C)  Max: 98.6 °F (37 °C)    RRR   Pulm-bilateral wheezes on expiration both to auscultation and also can be heard just standing in the room listening to the patient breathes. Abd- BS+, soft, NTND  Ext-no edema noted today.     The Following Labs Were Reviewed Today:    Recent Results (from the past 24 hour(s))   POCT Glucose    Collection Time: 22 10:49 AM   Result Value Ref Range    POC Glucose 110 (H) 70 - 99 mg/dl    Performed on ACCU-CHEK    Culture, Respiratory    Collection Time: 22 12:09 PM    Specimen: Bronchial Washing; Respiratory   Result Value Ref Range    Gram Stain Result       No Epithelial Cells seen  3+ WBC's (Polymorphonuclear)  1+ Gram positive cocci     POCT Glucose    Collection Time: 22 12:10 PM   Result Value Ref Range POC Glucose 111 (H) 70 - 99 mg/dl    Performed on ACCU-CHEK    Culture, Fungus    Collection Time: 12/28/22 12:22 PM    Specimen: BAL- Bronch. Lavage; Respiratory   Result Value Ref Range    Fungus Stain No Fungal elements seen    Culture, Respiratory    Collection Time: 12/28/22 12:22 PM    Specimen: BAL- Bronch. Lavage;  Respiratory   Result Value Ref Range    Gram Stain Result       No Epithelial Cells seen  1+ WBC's (Polymorphonuclear)  1+ RBC's  No organisms seen     Cell Count with Differential, BAL Fluid    Collection Time: 12/28/22 12:22 PM   Result Value Ref Range    Color, Lavage Red Colorless    Appearance BAL (Lavage) Cloudy     WBC/EPI Cells Bal 500 /cumm    RBC, BAL 41119 /cumm    Segmented Neutrophils, BAL 59 (H) 5 - 10 %    Lymphocytes, BAL 17 (H) 5 - 10 %    Monocytes, BAL 5 %    Macrophages, BAL 11 (L) 90 - 95 %    Lymphocytes 3 %    Mononuclear unidentified cells, fluid bal 5 %    Number of Cells Counted BAL (Lavage) 100     Path Review, BAL (Lavage) Yes    PNEUMONIA PANEL FILM ARRAY REPORT    Collection Time: 12/28/22 12:22 PM   Result Value Ref Range    Report SEE IMAGE    POCT Glucose    Collection Time: 12/28/22 12:55 PM   Result Value Ref Range    POC Glucose 123 (H) 70 - 99 mg/dl    Performed on ACCU-CHEK    POCT Glucose    Collection Time: 12/28/22  4:39 PM   Result Value Ref Range    POC Glucose 225 (H) 70 - 99 mg/dl    Performed on ACCU-CHEK    POCT Glucose    Collection Time: 12/28/22  9:21 PM   Result Value Ref Range    POC Glucose 218 (H) 70 - 99 mg/dl    Performed on ACCU-CHEK    Basic Metabolic Panel    Collection Time: 12/29/22  5:10 AM   Result Value Ref Range    Sodium 142 136 - 145 mmol/L    Potassium 3.9 3.5 - 5.1 mmol/L    Chloride 100 99 - 110 mmol/L    CO2 34 (H) 21 - 32 mmol/L    Anion Gap 8 3 - 16    Glucose 133 (H) 70 - 99 mg/dL    BUN 51 (H) 7 - 20 mg/dL    Creatinine 2.0 (H) 0.8 - 1.3 mg/dL    Est, Glom Filt Rate 33 (A) >60    Calcium 7.9 (L) 8.3 - 10.6 mg/dL   CBC with Auto Differential    Collection Time: 12/29/22  5:10 AM   Result Value Ref Range    WBC 12.3 (H) 4.0 - 11.0 K/uL    RBC 3.32 (L) 4.20 - 5.90 M/uL    Hemoglobin 8.6 (L) 13.5 - 17.5 g/dL    Hematocrit 28.2 (L) 40.5 - 52.5 %    MCV 84.9 80.0 - 100.0 fL    MCH 25.9 (L) 26.0 - 34.0 pg    MCHC 30.5 (L) 31.0 - 36.0 g/dL    RDW 20.3 (H) 12.4 - 15.4 %    Platelets 444 799 - 635 K/uL    MPV 7.8 5.0 - 10.5 fL    Neutrophils % 89.7 %    Lymphocytes % 4.7 %    Monocytes % 5.4 %    Eosinophils % 0.1 %    Basophils % 0.1 %    Neutrophils Absolute 11.1 (H) 1.7 - 7.7 K/uL    Lymphocytes Absolute 0.6 (L) 1.0 - 5.1 K/uL    Monocytes Absolute 0.7 0.0 - 1.3 K/uL    Eosinophils Absolute 0.0 0.0 - 0.6 K/uL    Basophils Absolute 0.0 0.0 - 0.2 K/uL   Magnesium    Collection Time: 12/29/22  5:10 AM   Result Value Ref Range    Magnesium 1.80 1.80 - 2.40 mg/dL   POCT Glucose    Collection Time: 12/29/22  6:16 AM   Result Value Ref Range    POC Glucose 133 (H) 70 - 99 mg/dl    Performed on ACCU-CHEK        ASSESSMENT/PLAN:      Principal Problem:    Syncope and collapse-heart rate has been better off of verapamil. Continue to avoid verapamil. Active Problems:    Hemoptysis-no significant findings on bronchoscopy. Respiratory cultures pending. Patient continues on prednisone. Await final pulmonary recommendations. HTN-blood pressure very well controlled on hydralazine. Continue current dosing. Hyperkalemia-resolved, better. Bradycardia-resolved since verapamil discontinued. Type 2 diabetes mellitus with stage 4 chronic kidney disease, with long-term current use of insulin-blood sugars have been quite low, continue with sliding scale only. Chronic anemia-hemoglobin down slightly today. Continue to monitor. Elevated brain natriuretic peptide (BNP) level-   patient doing well on oral torsemide. Continue to monitor. Chronic respiratory failure with hypoxia -continue with supplemental oxygen. Wean as tolerated.     JOANN (obstructive sleep apnea)-  not using CPAP here    Chronic GERD-continue daily PPI. Hyperlipidemia-continue statin dose. Paroxysmal atrial fibrillation- clinically remains in sinus rhythm here. Patient not an anticoagulation candidate due to prior GI bleeding. Stage 3b chronic kidney disease-renal function continues to be stable, at his baseline currently. Essential hypertension-blood pressure better with hydralazine. Moderate COPD (chronic obstructive pulmonary disease) (HCC)-continue with Xopenex nebulizer. He is wheezing again today. Continue prednisone. Slow transit constipation-Linzess and MiraLAX. Being off of verapamil should help this as well. Weakness-patient seemed to do better yesterday with therapy. Await therapy repeat evaluation today and the recommendations. BPH-patient on Flomax. Continue with Flomax. Bladder scan as needed. Disposition-patient getting close to discharge. Plan to discuss with his daughter and await therapy recommendations today as well as any additional recommendations from pulmonary. Patient seems amenable to a rehab center after we discussed today, he is primarily interested in making sure his wife is cared for, which his kids are very involved with currently.     Time > 35 minutes reviewing chart and patient data, examining and interviewing patient, and discussing with nursing staff, family, etc.       Char Wynne MD, FACP  8:59 AM  12/29/2022

## 2022-12-29 NOTE — PLAN OF CARE
Problem: Discharge Planning  Goal: Discharge to home or other facility with appropriate resources  12/29/2022 1821 by Masoud Estrada RN  Outcome: Progressing  12/29/2022 1820 by Masoud Estrada RN  Outcome: Progressing  Flowsheets (Taken 12/29/2022 0913)  Discharge to home or other facility with appropriate resources:   Identify barriers to discharge with patient and caregiver   Arrange for needed discharge resources and transportation as appropriate   Identify discharge learning needs (meds, wound care, etc)   Refer to discharge planning if patient needs post-hospital services based on physician order or complex needs related to functional status, cognitive ability or social support system     Problem: Pain  Goal: Verbalizes/displays adequate comfort level or baseline comfort level  12/29/2022 1821 by Masoud Estrada RN  Outcome: Progressing  12/29/2022 1820 by Masoud Estrada RN  Outcome: Progressing     Problem: Skin/Tissue Integrity  Goal: Absence of new skin breakdown  Description: 1. Monitor for areas of redness and/or skin breakdown  2. Assess vascular access sites hourly  3. Every 4-6 hours minimum:  Change oxygen saturation probe site  4. Every 4-6 hours:  If on nasal continuous positive airway pressure, respiratory therapy assess nares and determine need for appliance change or resting period.   12/29/2022 1821 by Masoud Estrada RN  Outcome: Progressing  12/29/2022 1820 by Masoud Estrada RN  Outcome: Progressing     Problem: Safety - Adult  Goal: Free from fall injury  12/29/2022 1821 by Masoud Estrada RN  Outcome: Progressing  12/29/2022 1820 by Masoud Estrada RN  Outcome: Progressing  Flowsheets (Taken 12/29/2022 0913)  Free From Fall Injury: Instruct family/caregiver on patient safety     Problem: ABCDS Injury Assessment  Goal: Absence of physical injury  12/29/2022 1821 by Masoud Estrada RN  Outcome: Progressing  12/29/2022 1820 by Masoud Estrada RN  Outcome: Progressing  Flowsheets (Taken 12/29/2022 0913)  Absence of Physical Injury: Implement safety measures based on patient assessment     Problem: Chronic Conditions and Co-morbidities  Goal: Patient's chronic conditions and co-morbidity symptoms are monitored and maintained or improved  12/29/2022 1821 by Elaine Franco RN  Outcome: Progressing  12/29/2022 1820 by Elaine Franco RN  Outcome: Progressing  Flowsheets (Taken 12/29/2022 0913)  Care Plan - Patient's Chronic Conditions and Co-Morbidity Symptoms are Monitored and Maintained or Improved: Monitor and assess patient's chronic conditions and comorbid symptoms for stability, deterioration, or improvement     Problem: Nutrition Deficit:  Goal: Optimize nutritional status  12/29/2022 1821 by Elaine Franco RN  Outcome: Progressing  12/29/2022 1820 by Elaine Franco RN  Outcome: Progressing

## 2022-12-29 NOTE — PROGRESS NOTES
Contacted Dr. Claire Ash  about elevated WBC count and elevated BP. Was told no new orders as of now just continue to monitor for fever. Dr said elevation of WBC is because of prednisione.

## 2022-12-29 NOTE — PROGRESS NOTES
Physical Therapy    Yazmin Hook  12/29/2022  Patient with good session with OT just earlier. Discussed with OT. OT indicates he had been using some type of rolling stool in kitchen. .. he has a rollator which should be his assistive device for ambulating and taking seated rest breaks as needed- not as a \"wheelchair\"   - perhaps needs to inquire about a proper power scooter to use when too fatigued to manage ambulation with rollator. Will look to see early tomorrow to further observe/assess his mobility as needed to manage at home.   Electronically signed by Stephanie Issa PT on 12/29/2022 at 3:59 PM

## 2022-12-30 VITALS
HEIGHT: 68 IN | WEIGHT: 218.26 LBS | SYSTOLIC BLOOD PRESSURE: 141 MMHG | RESPIRATION RATE: 17 BRPM | BODY MASS INDEX: 33.08 KG/M2 | DIASTOLIC BLOOD PRESSURE: 80 MMHG | HEART RATE: 84 BPM | TEMPERATURE: 97.6 F | OXYGEN SATURATION: 94 %

## 2022-12-30 LAB
CULTURE, RESPIRATORY: ABNORMAL
GLUCOSE BLD-MCNC: 130 MG/DL (ref 70–99)
GLUCOSE BLD-MCNC: 137 MG/DL (ref 70–99)
GRAM STAIN RESULT: ABNORMAL
GRAM STAIN RESULT: ABNORMAL
ORGANISM: ABNORMAL
ORGANISM: ABNORMAL
PERFORMED ON: ABNORMAL
PERFORMED ON: ABNORMAL

## 2022-12-30 PROCEDURE — 6370000000 HC RX 637 (ALT 250 FOR IP): Performed by: INTERNAL MEDICINE

## 2022-12-30 PROCEDURE — 2700000000 HC OXYGEN THERAPY PER DAY

## 2022-12-30 PROCEDURE — 94760 N-INVAS EAR/PLS OXIMETRY 1: CPT

## 2022-12-30 PROCEDURE — 6360000002 HC RX W HCPCS: Performed by: INTERNAL MEDICINE

## 2022-12-30 PROCEDURE — 2580000003 HC RX 258: Performed by: INTERNAL MEDICINE

## 2022-12-30 PROCEDURE — 99239 HOSP IP/OBS DSCHRG MGMT >30: CPT | Performed by: INTERNAL MEDICINE

## 2022-12-30 PROCEDURE — 99231 SBSQ HOSP IP/OBS SF/LOW 25: CPT | Performed by: INTERNAL MEDICINE

## 2022-12-30 PROCEDURE — 97530 THERAPEUTIC ACTIVITIES: CPT

## 2022-12-30 RX ORDER — PREDNISONE 20 MG/1
40 TABLET ORAL DAILY
Qty: 14 TABLET | Refills: 0 | Status: SHIPPED | OUTPATIENT
Start: 2022-12-30 | End: 2023-01-06

## 2022-12-30 RX ORDER — TORSEMIDE 20 MG/1
20 TABLET ORAL DAILY
Qty: 30 TABLET | Refills: 3 | Status: SHIPPED | OUTPATIENT
Start: 2022-12-30

## 2022-12-30 RX ORDER — HYDRALAZINE HYDROCHLORIDE 10 MG/1
10 TABLET, FILM COATED ORAL EVERY 8 HOURS SCHEDULED
Qty: 90 TABLET | Refills: 3 | Status: SHIPPED | OUTPATIENT
Start: 2022-12-30

## 2022-12-30 RX ORDER — PREDNISONE 20 MG/1
20 TABLET ORAL DAILY
Qty: 7 TABLET | Refills: 0 | Status: SHIPPED | OUTPATIENT
Start: 2023-01-12 | End: 2023-01-19

## 2022-12-30 RX ORDER — PREDNISONE 10 MG/1
30 TABLET ORAL DAILY
Qty: 21 TABLET | Refills: 0 | Status: SHIPPED | OUTPATIENT
Start: 2023-01-05 | End: 2023-01-12

## 2022-12-30 RX ORDER — PREDNISONE 10 MG/1
10 TABLET ORAL DAILY
Qty: 7 TABLET | Refills: 0 | Status: SHIPPED | OUTPATIENT
Start: 2023-01-19 | End: 2023-01-26

## 2022-12-30 RX ORDER — CEFUROXIME AXETIL 500 MG/1
500 TABLET ORAL EVERY 12 HOURS SCHEDULED
Qty: 13 TABLET | Refills: 0 | Status: SHIPPED | OUTPATIENT
Start: 2022-12-30 | End: 2023-01-06

## 2022-12-30 RX ADMIN — TAMSULOSIN HYDROCHLORIDE 0.4 MG: 0.4 CAPSULE ORAL at 09:36

## 2022-12-30 RX ADMIN — DOCUSATE SODIUM 100 MG: 100 CAPSULE, LIQUID FILLED ORAL at 09:34

## 2022-12-30 RX ADMIN — Medication 10 ML: at 09:36

## 2022-12-30 RX ADMIN — PREDNISONE 40 MG: 20 TABLET ORAL at 09:34

## 2022-12-30 RX ADMIN — TORSEMIDE 20 MG: 20 TABLET ORAL at 09:33

## 2022-12-30 RX ADMIN — MONTELUKAST 10 MG: 10 TABLET, FILM COATED ORAL at 09:34

## 2022-12-30 RX ADMIN — POLYETHYLENE GLYCOL 3350 17 G: 17 POWDER, FOR SOLUTION ORAL at 09:35

## 2022-12-30 RX ADMIN — ATORVASTATIN CALCIUM 40 MG: 40 TABLET, FILM COATED ORAL at 09:35

## 2022-12-30 RX ADMIN — AMIODARONE HYDROCHLORIDE 100 MG: 200 TABLET ORAL at 09:34

## 2022-12-30 RX ADMIN — PANTOPRAZOLE SODIUM 40 MG: 40 TABLET, DELAYED RELEASE ORAL at 09:35

## 2022-12-30 RX ADMIN — HEPARIN SODIUM 5000 UNITS: 5000 INJECTION INTRAVENOUS; SUBCUTANEOUS at 09:33

## 2022-12-30 RX ADMIN — HYDRALAZINE HYDROCHLORIDE 10 MG: 10 TABLET, FILM COATED ORAL at 06:22

## 2022-12-30 RX ADMIN — HYDRALAZINE HYDROCHLORIDE 10 MG: 10 TABLET, FILM COATED ORAL at 13:17

## 2022-12-30 RX ADMIN — FERROUS SULFATE TAB EC 324 MG (65 MG FE EQUIVALENT) 325 MG: 324 (65 FE) TABLET DELAYED RESPONSE at 09:33

## 2022-12-30 RX ADMIN — CEFUROXIME AXETIL 500 MG: 250 TABLET ORAL at 09:35

## 2022-12-30 ASSESSMENT — PAIN SCALES - GENERAL: PAINLEVEL_OUTOF10: 0

## 2022-12-30 NOTE — PLAN OF CARE
Problem: Discharge Planning  Goal: Discharge to home or other facility with appropriate resources  12/30/2022 0802 by Aura Parikh RN  Outcome: Progressing  Flowsheets (Taken 12/30/2022 0802)  Discharge to home or other facility with appropriate resources:   Identify barriers to discharge with patient and caregiver   Identify discharge learning needs (meds, wound care, etc)     Problem: Pain  Goal: Verbalizes/displays adequate comfort level or baseline comfort level  12/30/2022 0802 by Aura Parikh RN  Outcome: Progressing  Flowsheets (Taken 12/30/2022 0802)  Verbalizes/displays adequate comfort level or baseline comfort level:   Encourage patient to monitor pain and request assistance   Administer analgesics based on type and severity of pain and evaluate response   Assess pain using appropriate pain scale   Implement non-pharmacological measures as appropriate and evaluate response     Problem: Skin/Tissue Integrity  Goal: Absence of new skin breakdown  Description: 1. Monitor for areas of redness and/or skin breakdown  2. Assess vascular access sites hourly  3. Every 4-6 hours minimum:  Change oxygen saturation probe site  4. Every 4-6 hours:  If on nasal continuous positive airway pressure, respiratory therapy assess nares and determine need for appliance change or resting period. 12/30/2022 0802 by Aura Parikh RN  Outcome: Progressing  Note: Skin assessment complete. No new signs of skin breakdown noted. Repositioning patient with pillows at two hour intervals. Heels elevated off bed.       Problem: Safety - Adult  Goal: Free from fall injury  12/30/2022 0802 by Aura Parikh RN  Outcome: Progressing  Flowsheets  Taken 12/30/2022 0802  Free From Fall Injury: Instruct family/caregiver on patient safety  Taken 12/29/2022 2215  Free From Fall Injury: Instruct family/caregiver on patient safety     Problem: ABCDS Injury Assessment  Goal: Absence of physical injury  12/30/2022 0802 by Gena Bloom, RN  Outcome: Progressing  Flowsheets  Taken 12/30/2022 0802  Absence of Physical Injury: Implement safety measures based on patient assessment  Taken 12/29/2022 2215  Absence of Physical Injury: Implement safety measures based on patient assessment     Problem: Chronic Conditions and Co-morbidities  Goal: Patient's chronic conditions and co-morbidity symptoms are monitored and maintained or improved  12/30/2022 0802 by Gena Bloom, RN  Outcome: Progressing  Flowsheets (Taken 12/30/2022 0802)  Care Plan - Patient's Chronic Conditions and Co-Morbidity Symptoms are Monitored and Maintained or Improved: Monitor and assess patient's chronic conditions and comorbid symptoms for stability, deterioration, or improvement     Problem: Nutrition Deficit:  Goal: Optimize nutritional status  12/30/2022 0802 by Gena Bloom RN  Outcome: Progressing  Flowsheets (Taken 12/30/2022 0802)  Nutrient intake appropriate for improving, restoring, or maintaining nutritional needs:   Assess nutritional status and recommend course of action   Monitor oral intake, labs, and treatment plans

## 2022-12-30 NOTE — PROGRESS NOTES
Physical Therapy    Vanessa Villatoro  12/30/2022  Patient seen bedside - he is sitting OOB in recliner- dressed and states ready for discharge to home.   Reviewed initial general activity including getting up to standing each hour to at least take a few steps in place with hold of his walker.   -he has been ambulating in room to and from the bathroom using rolling walker with sba with nursing staff  -no observed losses of balance   -he declines practice for 1 + 1 EZEQUIEL - instructed to be sure to have his brother provide hands on assist to him and to stabilize walker as he steps up- he verbalizes understanding   -recommended he see Home PT OT as ordered to regain his baseline mobility   - at discharge from hospital he successfully managed transfer to front car seat with sba   Electronically signed by Kathy Manzanares PT on 12/30/2022 at 2:17 PM

## 2022-12-30 NOTE — DISCHARGE SUMMARY
[address] [address]                               DISCHARGE SUMMARY    PATIENT NAME: Ramesh Reid                    :        1940  MED REC NO:   [id number]                          ROOM:       7622  ACCOUNT NO:   [de-identified]                           ADMIT DATE: 2022  PROVIDER:     Mayda Ford MD                  DISCHARGE DATE:    REASON FOR ADMISSION:  Syncope and weakness. HISTORY OF PRESENT ILLNESS:  This is an 79-year-old white male with a  history of chronic kidney disease stage IV, diabetes, hypertension and  sleep apnea along with chronic anemia presented to the emergency room  after an episode of weakness and two episodes of passing out per his  report. The patient states he was sitting on his chair, working on his  computer, he adjusted the position of the chair and fell to the ground. He was unable to get up and he called his brother and nephews came over  and helped him. As they tried to get him up, he reportedly had two  episodes of passing out. The patient upon arrival in the emergency room  had a heart rate of 45 and blood pressure that was 80 systolic. The  patient was given a dose of atropine and his verapamil was placed on  hold. The patient was admitted for further evaluation and treatment. Additionally, the patient had recently been admitted with acute renal  failure and anasarca. The patient had been on diuretics, but his weight  had increased 10 pounds since his discharge weight, so the patient was  fairly edematous here as well. He was placed on IV diuretics. HOSPITAL COURSE:  1. Syncope and collapse. The patient's bradycardia and hypotension  resolved since his verapamil was discontinued. He recently had an  echocardiogram showing normal ejection fraction and diastolic  dysfunction.   The patient was in sinus rhythm on telemetry and had no  additional episodes or no arrhythmias during this hospitalization. He  will remain off verapamil and he has been doing better. 2.  Elevated BNP with chronic diastolic congestive heart failure,  probably with an acute component here. The patient was placed on IV  Lasix and he diuresed well. The patient's initial weights were recorded  as 240 pounds, but his discharge weight is 270 pounds on a standing  scale. This will be his new dry weight. The patient did not have any  edema present and in his lungs had no crackles. He will continue on  torsemide 20 mg daily. His prior torsemide dose was 10 mg daily. He  will weigh himself daily as well. 3.  Hemoptysis. During the course of hospitalization, a chest x-ray  showed persistent bilateral infiltrates that had some improvement from  baseline and from his most recent multifocal pneumonia admission. The  patient had some persistent hemoptysis during the course of this  hospitalization. His heparin was reduced from 5000 units t.i.d. to  b.i.d. and his aspirin was held. Pulmonary was consulted and a  bronchoscopy was performed after a CT scan of the chest.  There was no  endobronchial lesions and no obvious mucus, but his culture did grow  Moraxella catarrhalis. The patient was started on Ceftin 500 mg twice  daily this will continue for 1 week. Additionally, the patient has been  placed on a prednisone taper 40 mg for 7 days then 30 mg for 7 days, and  tapering down by 10 mg each week until completed. He will have  outpatient followup with Dr. Jarrod Schultz. Pulmonary consultant during  this admission felt that this may represent an organizing pneumonia or  perhaps an effect of amiodarone treatment. Determination of next steps  will be planned during the outpatient followup with Dr. Lilia Garcia. 4.  Diabetes. The patient has not been on diabetes regimen during his  hospitalization other than sliding scale. His sugars have been fairly  well controlled.   Since institution of his prednisone, his blood sugars  have been little higher and his fasting blood sugar was 130 today. He  will be placed back on 5 units of Basaglar for diabetes management. This will be given to him once daily and we will titrate as needed based  on his sugars. I would anticipate his sugars will come down nicely once  his prednisone is tapered. Additionally, the patient's blood sugars  here in the hospital have been substantially better than they have been  as an outpatient likely due to dietary compliance. This will be  monitored. 5.  Obstructive sleep apnea. He will continue on CPAP at home. 6.  Chronic respiratory failure with hypoxia. The patient has had a  persistent oxygen requirement of 3 liters. This will continue as an  outpatient. 7.  Paroxysmal atrial fibrillation. The patient remains in sinus  rhythm. His rate is controlled. He is not an anticoagulation candidate  due to previous GI bleeding. 8.  Stage IIIB chronic kidney disease. His creatinine on the day prior  to discharge was 1.8. His baseline is approximately 2. His creatinine  was as high as 3.0 during this hospitalization and actually responded  well to diuretics. DISCHARGE MEDICATIONS:  Please see the discharge med rec form for  discharge medications. The remainder of the patient's medical problems  are stable and he will continue on his home medication as reviewed again  today at discharge. FOLLOWUP:  Follow up will be with Dr. Dung Castro and Dr. Massimo Minaya in 2  weeks. He will also get follow up with Dr. Edil Anna for his  chronic anemia and potential Procrit injections. CONDITION ON DISCHARGE:  Fair.         Obed Johnson MD    D: 12/30/2022 7:20:05       T: 12/30/2022 7:24:21     [email or web address]  Job#: [id number]     Doc#: [id number]    CC:

## 2022-12-30 NOTE — PROGRESS NOTES
Physician Progress Note      Israel Felton  CSN #:                  [id number]  :                       1940  ADMIT DATE:       2022 8:08 PM  [address] [place] DATE:  RESPONDING  PROVIDER #:        Rommel Conway MD          QUERY TEXT:    Patient admitted with syncope likely related to bradycardia and hypotension. Per wound care consult, has unstageable pressure injury to left buttock. Pt   told wound care consultant that he has had this wound for about 3 weeks. If   possible, please document in progress notes and discharge summary the   location, present on admission status and stage of the pressure ulcer: The medical record reflects the following:  Risk Factors: chronic pressure, decreased mobility  Clinical Indicators: Per wound care consult, has unstageable pressure injury   to left buttock which patient says he has had this wound for about 3 weeks. Treatment: wound care consult, treat per wound care protocol    Stage 1:  Non-blanchable erythema of intact skin  Stage 2:  Abrasion, Blister, Partial-thickness skin loss, with exposed dermis  Stage 3:  Full-thickness skin loss with damage or necrosis of subcutaneous   tissue  Stage 4:  Full-thickness skin & soft tissue loss through to underlying muscle,   tendon or bone  Unstageable: Obscured full-thickness skin & tissue loss    Thank you,  Shirley Reynolds RN, BSN, CCDS  [email or web address]  Options provided:  -- Unstageable Pressure Ulcer to left buttock present on admission  -- Other - I will add my own diagnosis  -- Disagree - Not applicable / Not valid  -- Disagree - Clinically unable to determine / Unknown  -- Refer to Clinical Documentation Reviewer    PROVIDER RESPONSE TEXT:    This patient has an unstageable pressure ulcer of the left buttock that was   present on admission.     Query created by: Rosas Mckinney on 2022 5:52 AM      Electronically signed by:  Rommel Conway MD 2022 7:02 AM

## 2022-12-30 NOTE — PROGRESS NOTES
Pulmonary progress Note     Patient's name:  Alyssa Yost Record Number: [id number]  Patient's account/billing number: [de-identified]  Patient's YOB: 1940  Age: 79 y.o. Date of Admission: 12/23/2022  8:08 PM  Date of Consult: 12/30/2022      Primary Care Physician: Bakari Waite MD      Code Status: Full Code    Reason for consult: hemoptysis     Assessment and Plan     Hemoptysis   Acute on chronic respiratory failure with hypoxia   Bilateral multifocal PNA (moraxella catarrhalis)   CKDIII  JOANN on cpap  COPD  Afib      Plan:  Ok to d/c from pulmonary stand point   7 days of antibiotic   Findings of his bronch was not indicative of active infection, so far presentation, imaging and bronch findings consistent with organizing PNA, there is also possibility of amiodarone contribution to this organizing PNA, consider stopping amiodarone. Planning prolonged course of prednisone with taper, prednisone prescription and taper placed, he is schedule to follow up with his pulmonologist Dr. Andrea Savage on the 26th, keep the same appointment   Follow up cytology results         Subjective:  No acute events overnight        HISTORY OF PRESENT ILLNESS:   Mr./Ms. Marry Barrientos is a 79 y.o. gentleman with past medical history stated below significant for obesity, CKD 3, diastolic heart failure, obstructive sleep apnea on CPAP therapy, was readmitted again to the hospital on December 23 for cough and hemoptysis, he stated that he coughs about a quarter size blood 5-6 times a day, he has been admitted multiple times since October, treated for HCAP with meropenem recently, imaging showed persistent bilateral right more than left airspace disease, he is on 3 L of oxygen.   H/o Afib not on anticoagulation           Past Medical History:        Diagnosis Date    Allergic rhinitis     Asthma     Atrial fibrillation ([facility] [address]     Carotid artery stenosis Chronic kidney disease     COPD (chronic obstructive pulmonary disease) ([facility])     CPAP (continuous positive airway pressure) dependence     ESBL (extended spectrum beta-lactamase) producing bacteria infection 12/26/2018    urine    Hyperlipidemia     Hypertension     Iron deficiency anemia     Obstructive sleep apnea     Type II or unspecified type diabetes mellitus without mention of complication, not stated as uncontrolled        Past Surgical History:        Procedure Laterality Date    BRONCHOSCOPY N/A 12/28/2022    BRONCHOSCOPY ALVEOLAR LAVAGE performed by Kevin Snow MD at [address]  12/28/2022    BRONCHOSCOPY DIAGNOSTIC OR CELL [address] ONLY performed by Kevin Snow MD at [address] [facility]  2/2012    bilateral    COLONOSCOPY  7/10/2009    diverticulosis    hemorrhoids    CT BONE MARROW BIOPSY  11/1/2022    CT BONE MARROW BIOPSY 11/1/2022 [facility] CT    GALLBLADDER SURGERY  1981    PAIN MANAGEMENT PROCEDURE Right 10/20/2021    COOLIEF RADIOFREQUENCY ABLATION - RIGHT KNEE performed by Sandee Castro MD at [address] Left 12/8/2021    [address] - LEFT KNEE performed by Sandee Castro MD at [facility]  7-2005    7/10/2009    normal       Allergies: Allergies   Allergen Reactions    Hytrin [Terazosin Hcl]      Ineffective for BP control    Penicillins      Unknown reaction during childhood; Patient tolerated cephalosporins in the past    Shrimp Flavor Hives    Sulfa Antibiotics      Unknown reaction in childhood    Sulfasalazine Other (See Comments)    Tekturna [Aliskiren Fumarate]      Ineffective    Vancomycin      Fever    Ciprofloxacin Rash     Fever and rash          Home Meds:   Prior to Admission medications    Medication Sig Start Date End Date Taking?  Authorizing Provider   hydrALAZINE (APRESOLINE) 10 MG tablet Take 1 tablet by mouth every 8 hours 12/30/22  Yes Filiberto Mark MD   cefUROXime (CEFTIN) 500 MG tablet Take 1 tablet by mouth every 12 hours for 13 doses 12/30/22 1/6/23 Yes Filiberto Mark MD   predniSONE (DELTASONE) 20 MG tablet Take 2 tablets by mouth daily for 7 doses 12/30/22 1/6/23 Yes Filiberto Mark MD   predniSONE (DELTASONE) 10 MG tablet Take 3 tablets by mouth daily for 7 doses 1/5/23 1/12/23 Yes Filiberto Mark MD   predniSONE (DELTASONE) 20 MG tablet Take 1 tablet by mouth daily for 7 doses 1/12/23 1/19/23 Yes Filiberto Mark MD   predniSONE (DELTASONE) 10 MG tablet Take 1 tablet by mouth daily for 7 doses 1/19/23 1/26/23 Yes Filiberto Mark MD   torsemide [facility]) 20 MG tablet Take 1 tablet by mouth daily 12/30/22  Yes Filiberto Mark MD   insulin glargine [facility]) 100 UNIT/ML injection pen Inject 5 Units into the skin nightly    Historical Provider, MD   levalbuterol Shanta Huff) 1.25 MG/3ML nebulizer solution USE 1 VIAL VIA NEBULIZER FOUR TIMES DAILY 11/15/22   Moris Coronado DO   amiodarone (CORDARONE) 200 MG tablet Take 0.5 tablets by mouth daily 11/11/22   Albina Baumann MD   INSULIN SYRINGE .5CC/29G 29G X 1/2\" 0.5 ML MISC 1 each by Does not apply route daily 11/9/22   Filiberto Mark MD   albuterol sulfate HFA (PROVENTIL;VENTOLIN;PROAIR) 108 (90 Base) MCG/ACT inhaler INHALE 2 PUFFS BY MOUTH EVERY 4 HOURS AS NEEDED FOR WHEEZING 10/21/22   Filiberto Mark MD   montelukast (SINGULAIR) 10 MG tablet TAKE 1 TABLET BY MOUTH EVERY DAY 10/21/22   Filiberto Mark MD   [employer] PURELAX 17 GM/SCOOP powder TAKE 17G BY MOUTH DAILY MIX WITH 8 OZ OF WATER 9/6/22   Filiberto Mark MD   pantoprazole (PROTONIX) 40 MG tablet TAKE 1 TABLET BY MOUTH EVERY DAY 8/15/22   Moris Coronado DO   B-D UF III MINI PEN NEEDLES 31G X 5 MM MISC USE AS DIRECTED 3 TIMES A DAY 8/13/22   Becky Morris MD   atorvastatin (LIPITOR) 40 MG tablet TAKE 1 TABLET BY MOUTH EVERY DAY 4/26/22   Félix Adams MD   alfuzosin Pedrito Yoder) 10 MG extended release tablet TAKE 1 TABLET BY MOUTH EVERY DAY 4/4/22   Félix Adams MD   LINZESS 145 MCG capsule TAKE 1 CAPSULE BY MOUTH EVERY DAY IN THE MORNING BEFORE BREAKFAST 3/23/22   Félix Adams MD   ACCU-CHEJE GUIDE strip TEST AS DIRECTED 3 TIMES A DAY 1/29/22   Félix Adams MD   Accu-Cheje FastClix Lancets MISC USE TO TEST 3 TIMES A DAY DX CODE E11.9 7/20/21   Félix Adams MD   ZINC PO Take by mouth    Historical Provider, MD   CPAP Machine MISC by Does not apply route    Historical Provider, MD   Ascorbic Acid (SUPER C COMPLEX PO) Take by mouth daily    Historical Provider, MD   Multiple Vitamins-Minerals (MULTIVITAMIN ADULT PO) Take by mouth daily    Historical Provider, MD   Lactobacillus Rhamnosus, GG, ([employer] PROBIOTIC, LACTOBACILLUS,) CAPS TAKE 1 CAPSULE BY MOUTH 2 TIMES DAILY (WITH MEALS) 3/15/20   Félix Adams MD   docusate sodium (COLACE) 100 MG capsule Take by mouth daily    Historical Provider, MD   Cyanocobalamin (VITAMIN B-12 PO) Take 500 mcg by mouth every other day     Historical Provider, MD   Cholecalciferol (VITAMIN D3 PO) Take 2,000 Units by mouth daily     Historical Provider, MD   aspirin EC 81 MG EC tablet Take 2 tablets by mouth daily. 1/24/13   Cris Rider MD   ferrous sulfate 325 (65 FE) MG tablet Take 325 mg by mouth daily     Historical Provider, MD       Family History:       Problem Relation Age of Onset    Heart Disease Mother     Stroke Mother     Vision Loss Mother     High Blood Pressure Father     High Blood Pressure Brother     Diabetes Brother     Sleep Apnea Brother     Arthritis Paternal Grandmother     Diabetes Paternal Grandmother          Social History:   TOBACCO:   reports that he quit smoking about 35 years ago. His smoking use included cigarettes. He started smoking about 65 years ago.  He has a 9.00 pack-year smoking history. He has never used smokeless tobacco.  ETOH:   reports no history of alcohol use. DRUGS:  reports no history of drug use. REVIEW OF SYSTEMS:  Review of Systems -   General ROS: negative  Psychological ROS: negative  Ophthalmic ROS: negative  ENT ROS: negative  Allergy and Immunology ROS: negative  Hematological and Lymphatic ROS: negative  Endocrine ROS: negative  Breast ROS: negative  Respiratory ROS: cough, hemoptysis, sob  Cardiovascular ROS: no chest pain or dyspnea on exertion  Gastrointestinal ROS:negative  Genito-Urinary ROS: negative  Musculoskeletal ROS: negative  Neurological ROS: negative  Dermatological ROS: negative        Physical Exam:    Vitals: BP (!) 141/80   Pulse 84   Temp 97.6 °F (36.4 °C) (Oral)   Resp 17   Ht 5' 8\" (1.727 m)   Wt 218 lb 4.1 oz (99 kg)   SpO2 94%   BMI 33.19 kg/m²     Last Body weight:   Wt Readings from Last 3 Encounters:   12/30/22 218 lb 4.1 oz (99 kg)   12/16/22 239 lb (108.4 kg)   12/08/22 232 lb 12.9 oz (105.6 kg)       Body Mass Index : Body mass index is 33.19 kg/m². Intake and Output summary:   Intake/Output Summary (Last 24 hours) at 12/30/2022 7437  Last data filed at 12/29/2022 1901  Gross per 24 hour   Intake 120 ml   Output --   Net 120 ml       Physical Examination:     PHYSICAL EXAM:    Gen:  mild acute distress   Eyes: PERRL. Anicteric sclera. No conjunctival injection. ENT: No discharge. Posterior oropharynx clear. External appearance of ears and nose normal.  Neck: Trachea midline. No mass, no lymphadenopathy    Resp:  bilateral rhonchi, diminished bilaterally,   CV: Regular rate. Regular rhythm. No murmur or rub. No edema. GI: Soft, Non-tender. Non-distended. +BS  Skin: Warm, dry, w/o erythema. Lymph: No cervical or supraclavicular LAD. M/S: No cyanosis. No clubbing. Neuro:  CN 2-12 tested, no focal neurologic deficit. Moves all extremities  Psych: Awake and alert, Oriented x 3.   Judgement and insight appropriate. Mood stable. Laboratory findings:-    CBC:   Recent Labs     12/29/22  0510   WBC 12.3*   HGB 8.6*        BMP:    Recent Labs     12/28/22  0449 12/29/22  0510    142   K 3.8 3.9    100   CO2 29 34*   BUN 49* 51*   CREATININE 1.9* 2.0*   GLUCOSE 111* 133*     S. Calcium:  Recent Labs     12/29/22  0510   CALCIUM 7.9*       S. Magnesium:  Recent Labs     12/29/22  0510   MG 1.80       S. Glucose:  Recent Labs     12/29/22  1701 12/29/22  1947 12/30/22  0705   POCGLU 209* 159* 130*       No results for input(s): CKTOTAL, CKMB, CKMBINDEX, TROPONINI in the last 72 hours. Thyroid functions:   Lab Results   Component Value Date/Time    TSH 2.54 05/27/2022 02:05 PM          Radiology Review:  Pertinent images / reports were reviewed as a part of this visit. CT Chest w/ contrast: No results found for this or any previous visit. CT Chest w/o contrast: Results for orders placed during the hospital encounter of 11/15/22    CT CHEST WO CONTRAST    Narrative  EXAMINATION:  CT OF THE CHEST WITHOUT CONTRAST 11/23/2022 11:49 am    TECHNIQUE:  CT of the chest was performed without the administration of intravenous  contrast. Multiplanar reformatted images are provided for review. Automated  exposure control, iterative reconstruction, and/or weight based adjustment of  the mA/kV was utilized to reduce the radiation dose to as low as reasonably  achievable. COMPARISON:  X-ray chest same date, CT chest 09/25/2018    HISTORY:  ORDERING SYSTEM PROVIDED HISTORY: Hemoptysis  TECHNOLOGIST PROVIDED HISTORY:  Reason for exam:->Hemoptysis  Reason for Exam: Hemoptysis    FINDINGS:  Mediastinum: Noncontrast evaluation mediastinum demonstrates normal caliber  thoracic aorta with atherosclerotic calcification also involving coronary  arteries and origins of the great vessels. Mild cardiomegaly. No  pericardial effusion. No mediastinal or axillary lymphadenopathy.   Evaluation of hilar lymph nodes is limited due to lack of IV contrast.    Lungs/pleura: Small right and trace left pleural effusions. Multifocal  airspace disease throughout both lungs suspicious for multifocal pneumonia. No pneumothorax. Mild emphysematous changes. Upper Abdomen: Noncontrast evaluation upper abdomen without acute abnormality. Soft Tissues/Bones: No acute osseous abnormality in the chest.    Impression  Multifocal pneumonia. Small right and trace left pleural effusion. Diffuse atherosclerosis. CTPA: No results found for this or any previous visit. CXR PA/LAT: Results for orders placed during the hospital encounter of 10/30/22    XR CHEST (2 VW)    Narrative  EXAMINATION:  TWO XRAY VIEWS OF THE CHEST    11/6/2022 9:37 am    COMPARISON:  10/30/2022    HISTORY:  ORDERING SYSTEM PROVIDED HISTORY: productive cough wheeze ho pneumonia  TECHNOLOGIST PROVIDED HISTORY:  Reason for exam:->productive cough wheeze ho pneumonia    FINDINGS:  Increasing basilar opacities and small pleural effusions appear increased. No pneumothorax identified. No acute osseous abnormality appreciated. Cardiac and mediastinal contours appear unchanged. Impression  Increasing basilar opacities and small pleural effusions, for which  multifocal infection or developing edema are considerations. CXR portable: Results for orders placed during the hospital encounter of 12/23/22    XR CHEST PORTABLE    Narrative  EXAMINATION:  ONE XRAY VIEW OF THE CHEST    12/23/2022 5:59 pm    COMPARISON:  Chest x-ray dated November 20, 2022    HISTORY:  ORDERING SYSTEM PROVIDED HISTORY: sob  TECHNOLOGIST PROVIDED HISTORY:  Reason for exam:->sob  Reason for Exam: fall, sob    FINDINGS:  Adequate inspiration is noted. Extensive airspace disease is present  throughout the right upper and lower lung zone. Patchy airspace disease is  present within the left lung base.   Interval improvement is noted within the  right basilar airspace disease. No pneumothorax noted. Small pleural  effusions are present. Borderline cardiomegaly is noted. No acute osseous  abnormality is present. Impression  1. Extensive airspace disease throughout the right lung, left lung base. Interval improvement is noted within the right basilar airspace disease. Differential considerations would include multifocal pneumonia versus  coalescing edema. Follow-up imaging is recommended to ensure complete  clearing  2.  Borderline cardiomegaly        Pearl Saunders MD, MRYAN.            12/30/2022, 8:12 AM

## 2022-12-30 NOTE — PROGRESS NOTES
Retail pharmacy is not contracted with insurance spoke to patient.  We transferred to [facility] [phone] at patient request

## 2022-12-30 NOTE — PROGRESS NOTES
Patient up in chair. He stated he wanted to sleep in the chair. Patient taken to bathroom via walker x 1 assist. He tolerated the activity well. Pt refuse to change from street clothes to a gown. Chair alarm in chair wheels are locked. Call light in reach. Will continue to monitor.

## 2022-12-30 NOTE — CARE COORDINATION
DISCHARGE SUMMARY     DATE OF DISCHARGE: 12/30/2022    DISCHARGE DESTINATION: 12/30/2022      HOME CARE AGENCY: Discharging to Facility/ Agency   Name: [facility]  Address:  [address] [facility]  Phone:  [phone]  Fax:  [phone]      TRANSPORTATION:   family    COMMENTS: Spoke with patient regarding discharge plan. He plans to return home with family and resume [facility]. He has portable O2 and family will transport him home. Spoke with patient about [employer] to home and provided information. Patient agreeable to a referral--referral faxed to [employer] [phone]. Spoke with [employer] will pull orders from epic.    Electronically signed by DUDLEY Duval, ERINW, Case Management 12/30/2022 at 2:12 PM  [phone]

## 2022-12-30 NOTE — DISCHARGE SUMMARY
Discharge Summary Dictated    Dictation #  [id number]    Time > 30 minutes coordinating discharge,  reviewing chart and patient data, examining and interviewing patient, and discussing with nursing staff, case management/social work, family, etc.

## 2022-12-30 NOTE — PROGRESS NOTES
Attempted to place pt on bipap with nasal mask but after a couple of mins pt stated that he could not tolerate and wanted mask off. Pt placed back on nasal cannula of 2L. Will continue to monitor. Bipap removed from room.

## 2022-12-30 NOTE — PROGRESS NOTES
[facility] Internal Medicine Note      Chief Complaint:   Am I going home today? Subjective/Interval History: This morning the patient is up in the bedside chair. He is doing well. No complaints. No further hemoptysis. Denies pain. Eating and drinking well. Cough is minimal.  Anxious to go home. Weight yesterday 217 pounds. This is his new dry weight. PMH, PSH, FH/SH reviewed and unchanged as documented in the H&P personally documented at admission 22    Medication list reviewed    Objective:    BP (!) 154/77   Pulse 80   Temp 97.5 °F (36.4 °C) (Oral)   Resp 19   Ht 5' 8\" (1.727 m)   Wt 217 lb 13 oz (98.8 kg)   SpO2 97%   BMI 33.12 kg/m²   Temp  Av.9 °F (36.6 °C)  Min: 97.5 °F (36.4 °C)  Max: 98.4 °F (36.9 °C)    RRR   Pulm-respirations easy. Faint wheezes bilaterally. Abd- BS+, soft, NTND  Ext-no edema noted today. The Following Labs Were Reviewed Today:    Recent Results (from the past 24 hour(s))   POCT Glucose    Collection Time: 22 12:03 PM   Result Value Ref Range    POC Glucose 128 (H) 70 - 99 mg/dl    Performed on ACCU-CHEK    POCT Glucose    Collection Time: 22  5:01 PM   Result Value Ref Range    POC Glucose 209 (H) 70 - 99 mg/dl    Performed on ACCU-CHEK    POCT Glucose    Collection Time: 22  7:47 PM   Result Value Ref Range    POC Glucose 159 (H) 70 - 99 mg/dl    Performed on ACCU-CHEK        ASSESSMENT/PLAN:      Principal Problem:    Syncope and collapse-heart rate has been better off of verapamil. Continue to avoid verapamil. Active Problems:    Hemoptysis-no significant findings on bronchoscopy. Cultures growing Moraxella catarrhalis. Placed on Ceftin yesterday. Continue prednisone taper. He will have pulmonary follow-up in 2 to 3 weeks. HTN-blood pressure fairly well controlled on hydralazine. Continue current dosing. Hyperkalemia-resolved, better. Bradycardia-resolved since verapamil discontinued.     Type 2 diabetes mellitus with stage 4 chronic kidney disease, with long-term current use of insulin-blood sugars have been quite low. Plan to place back on low-dose basal insulin at bedtime given his prednisone therapy. Chronic anemia-hemoglobin down slightly today. Continue to monitor. Elevated brain natriuretic peptide (BNP) level-   patient doing well on oral torsemide. Continue to monitor. Chronic respiratory failure with hypoxia -continue with supplemental oxygen. Wean as tolerated. JOANN (obstructive sleep apnea)-  not using CPAP here    Chronic GERD-continue daily PPI. Hyperlipidemia-continue statin dose. Paroxysmal atrial fibrillation- clinically remains in sinus rhythm here. Patient not an anticoagulation candidate due to prior GI bleeding. Stage 3b chronic kidney disease-renal function continues to be stable, at his baseline currently. Essential hypertension-blood pressure better with hydralazine. Moderate COPD (chronic obstructive pulmonary disease) (HCC)-continue with Xopenex nebulizer. He is wheezing again today. Continue prednisone taper. Slow transit constipation-Linzess and MiraLAX. Being off of verapamil should help this as well. Weakness-patient seemed to do better yesterday with therapy. Await final PT recommendation today. BPH-patient on Flomax. Continue with Flomax. Patient seems to be urinating better. Disposition-patient discharged ready today. Likely able to go home with PT/OT but await final PT evaluation today.     Time > 30 minutes reviewing chart and patient data, examining and interviewing patient, and discussing with nursing staff, family, etc.       Char Wynne MD, [address]  7:03 AM  12/30/2022

## 2022-12-30 NOTE — CARE COORDINATION
12/30/22 1409   IMM Letter   IMM Letter given to Patient/Family/Significant other/Guardian/POA/by: to patient hs   IMM Letter date given: 12/30/22   IMM Letter time given: 1130     Education provided to patient. Patient reported no questions and verbalized understanding. Patient made aware that he/she has 4 hours prior to discharging from hospital to decide if he/she wishes to pursue the Medicare appeal process.

## 2022-12-31 LAB — PRELIMINARY: NORMAL

## 2022-12-31 NOTE — PROGRESS NOTES
Pt discharged to home. Transportation here with wheelchair. Accompanied by spouse and daughter. Transported in personal vehicle. Discharge instructions, Rx, and personal belongings given to pt. Explanation of discharge medications and instructions understood by verbal statement. No questions, comments or concerns at this time.

## 2022-12-31 NOTE — PROGRESS NOTES
Patient with repeated calls to request information to leave. Oriented patient to plan and need for MD order to gain discharge. Reviewed patient needs and preparation for discharge. Assessment and meds as charted.

## 2023-01-01 ENCOUNTER — ANESTHESIA (OUTPATIENT)
Dept: OPERATING ROOM | Age: 83
End: 2023-01-01
Payer: MEDICARE

## 2023-01-01 ENCOUNTER — APPOINTMENT (OUTPATIENT)
Dept: GENERAL RADIOLOGY | Age: 83
DRG: 492 | End: 2023-01-01
Payer: MEDICARE

## 2023-01-01 ENCOUNTER — ANESTHESIA EVENT (OUTPATIENT)
Dept: OPERATING ROOM | Age: 83
End: 2023-01-01
Payer: MEDICARE

## 2023-01-01 ENCOUNTER — HOSPITAL ENCOUNTER (INPATIENT)
Age: 83
LOS: 4 days | DRG: 492 | End: 2023-02-09
Attending: INTERNAL MEDICINE | Admitting: INTERNAL MEDICINE
Payer: MEDICARE

## 2023-01-01 ENCOUNTER — APPOINTMENT (OUTPATIENT)
Dept: CT IMAGING | Age: 83
DRG: 492 | End: 2023-01-01
Payer: MEDICARE

## 2023-01-01 ENCOUNTER — TELEPHONE (OUTPATIENT)
Dept: PULMONOLOGY | Age: 83
End: 2023-01-01

## 2023-01-01 ENCOUNTER — CLINICAL DOCUMENTATION ONLY (OUTPATIENT)
Facility: CLINIC | Age: 83
End: 2023-01-01

## 2023-01-01 VITALS
DIASTOLIC BLOOD PRESSURE: 31 MMHG | OXYGEN SATURATION: 66 % | SYSTOLIC BLOOD PRESSURE: 50 MMHG | HEIGHT: 68 IN | TEMPERATURE: 98.3 F | BODY MASS INDEX: 34.21 KG/M2 | WEIGHT: 225.75 LBS
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
## 2023-01-01 DIAGNOSIS — N17.9 ACUTE KIDNEY INJURY SUPERIMPOSED ON CKD (HCC): ICD-10-CM

## 2023-01-01 DIAGNOSIS — S42.222A CLOSED 2-PART DISPLACED FRACTURE OF SURGICAL NECK OF LEFT HUMERUS, INITIAL ENCOUNTER: ICD-10-CM

## 2023-01-01 DIAGNOSIS — N18.9 ACUTE KIDNEY INJURY SUPERIMPOSED ON CKD (HCC): ICD-10-CM

## 2023-01-01 DIAGNOSIS — D64.9 ACUTE ON CHRONIC ANEMIA: ICD-10-CM

## 2023-01-01 DIAGNOSIS — S42.302A CLOSED FRACTURE OF SHAFT OF LEFT HUMERUS, UNSPECIFIED FRACTURE MORPHOLOGY, INITIAL ENCOUNTER: Primary | ICD-10-CM

## 2023-01-01 LAB
ABO/RH: NORMAL
ACANTHOCYTES: ABNORMAL
ALBUMIN SERPL-MCNC: 3.2 G/DL (ref 3.4–5)
ALBUMIN SERPL-MCNC: 3.4 G/DL (ref 3.4–5)
ANION GAP SERPL CALCULATED.3IONS-SCNC: 10 MMOL/L (ref 3–16)
ANION GAP SERPL CALCULATED.3IONS-SCNC: 11 MMOL/L (ref 3–16)
ANION GAP SERPL CALCULATED.3IONS-SCNC: 12 MMOL/L (ref 3–16)
ANION GAP SERPL CALCULATED.3IONS-SCNC: 12 MMOL/L (ref 3–16)
ANION GAP SERPL CALCULATED.3IONS-SCNC: 13 MMOL/L (ref 3–16)
ANION GAP SERPL CALCULATED.3IONS-SCNC: 13 MMOL/L (ref 3–16)
ANISOCYTOSIS: ABNORMAL
ANTIBODY SCREEN: NORMAL
BACTERIA: ABNORMAL /HPF
BASE EXCESS ARTERIAL: 6 (ref -3–3)
BASE EXCESS VENOUS: 6 (ref -3–3)
BASOPHILS ABSOLUTE: 0 K/UL (ref 0–0.2)
BASOPHILS ABSOLUTE: 0.1 K/UL (ref 0–0.2)
BASOPHILS RELATIVE PERCENT: 0.1 %
BASOPHILS RELATIVE PERCENT: 0.5 %
BASOPHILS RELATIVE PERCENT: 0.6 %
BASOPHILS RELATIVE PERCENT: 0.8 %
BASOPHILS RELATIVE PERCENT: 0.9 %
BASOPHILS RELATIVE PERCENT: 1.1 %
BILIRUBIN URINE: NEGATIVE
BLOOD BANK DISPENSE STATUS: NORMAL
BLOOD BANK PRODUCT CODE: NORMAL
BLOOD, URINE: ABNORMAL
BPU ID: NORMAL
BUN BLDV-MCNC: 63 MG/DL (ref 7–20)
BUN BLDV-MCNC: 66 MG/DL (ref 7–20)
BUN BLDV-MCNC: 70 MG/DL (ref 7–20)
BUN BLDV-MCNC: 83 MG/DL (ref 7–20)
BUN BLDV-MCNC: 87 MG/DL (ref 7–20)
BUN BLDV-MCNC: 90 MG/DL (ref 7–20)
CALCIUM IONIZED: 1.12 MMOL/L (ref 1.12–1.32)
CALCIUM SERPL-MCNC: 7.1 MG/DL (ref 8.3–10.6)
CALCIUM SERPL-MCNC: 7.2 MG/DL (ref 8.3–10.6)
CALCIUM SERPL-MCNC: 7.2 MG/DL (ref 8.3–10.6)
CALCIUM SERPL-MCNC: 7.3 MG/DL (ref 8.3–10.6)
CALCIUM SERPL-MCNC: 7.7 MG/DL (ref 8.3–10.6)
CALCIUM SERPL-MCNC: 7.7 MG/DL (ref 8.3–10.6)
CHLORIDE BLD-SCNC: 90 MMOL/L (ref 99–110)
CHLORIDE BLD-SCNC: 92 MMOL/L (ref 99–110)
CHLORIDE BLD-SCNC: 92 MMOL/L (ref 99–110)
CHLORIDE BLD-SCNC: 93 MMOL/L (ref 99–110)
CHLORIDE BLD-SCNC: 95 MMOL/L (ref 99–110)
CHLORIDE BLD-SCNC: 99 MMOL/L (ref 99–110)
CLARITY: ABNORMAL
CO2: 31 MMOL/L (ref 21–32)
CO2: 32 MMOL/L (ref 21–32)
CO2: 32 MMOL/L (ref 21–32)
CO2: 33 MMOL/L (ref 21–32)
CO2: 35 MMOL/L (ref 21–32)
CO2: 37 MMOL/L (ref 21–32)
COLOR: YELLOW
CREAT SERPL-MCNC: 2.3 MG/DL (ref 0.8–1.3)
CREAT SERPL-MCNC: 2.3 MG/DL (ref 0.8–1.3)
CREAT SERPL-MCNC: 2.5 MG/DL (ref 0.8–1.3)
CREAT SERPL-MCNC: 2.9 MG/DL (ref 0.8–1.3)
CREAT SERPL-MCNC: 3 MG/DL (ref 0.8–1.3)
CREAT SERPL-MCNC: 3 MG/DL (ref 0.8–1.3)
CREATININE URINE: 100.8 MG/DL (ref 39–259)
DESCRIPTION BLOOD BANK: NORMAL
EOSINOPHILS ABSOLUTE: 0 K/UL (ref 0–0.6)
EOSINOPHILS ABSOLUTE: 0.1 K/UL (ref 0–0.6)
EOSINOPHILS ABSOLUTE: 0.1 K/UL (ref 0–0.6)
EOSINOPHILS RELATIVE PERCENT: 0 %
EOSINOPHILS RELATIVE PERCENT: 0.7 %
EOSINOPHILS RELATIVE PERCENT: 0.8 %
EOSINOPHILS RELATIVE PERCENT: 1.1 %
EOSINOPHILS RELATIVE PERCENT: 1.1 %
EOSINOPHILS RELATIVE PERCENT: 1.4 %
FERRITIN: 233.8 NG/ML (ref 30–400)
FOLATE: 6.67 NG/ML (ref 4.78–24.2)
GFR SERPL CREATININE-BSD FRML MDRD: 20 ML/MIN/{1.73_M2}
GFR SERPL CREATININE-BSD FRML MDRD: 20 ML/MIN/{1.73_M2}
GFR SERPL CREATININE-BSD FRML MDRD: 21 ML/MIN/{1.73_M2}
GFR SERPL CREATININE-BSD FRML MDRD: 25 ML/MIN/{1.73_M2}
GFR SERPL CREATININE-BSD FRML MDRD: 27 ML/MIN/{1.73_M2}
GFR SERPL CREATININE-BSD FRML MDRD: 27 ML/MIN/{1.73_M2}
GLUCOSE BLD-MCNC: 114 MG/DL (ref 70–99)
GLUCOSE BLD-MCNC: 118 MG/DL (ref 70–99)
GLUCOSE BLD-MCNC: 121 MG/DL (ref 70–99)
GLUCOSE BLD-MCNC: 123 MG/DL (ref 70–99)
GLUCOSE BLD-MCNC: 124 MG/DL (ref 70–99)
GLUCOSE BLD-MCNC: 129 MG/DL (ref 70–99)
GLUCOSE BLD-MCNC: 131 MG/DL (ref 70–99)
GLUCOSE BLD-MCNC: 139 MG/DL (ref 70–99)
GLUCOSE BLD-MCNC: 144 MG/DL (ref 70–99)
GLUCOSE BLD-MCNC: 146 MG/DL (ref 70–99)
GLUCOSE BLD-MCNC: 147 MG/DL (ref 70–99)
GLUCOSE BLD-MCNC: 175 MG/DL (ref 70–99)
GLUCOSE BLD-MCNC: 194 MG/DL (ref 70–99)
GLUCOSE BLD-MCNC: 213 MG/DL (ref 70–99)
GLUCOSE BLD-MCNC: 214 MG/DL (ref 70–99)
GLUCOSE BLD-MCNC: 218 MG/DL (ref 70–99)
GLUCOSE BLD-MCNC: 227 MG/DL (ref 70–99)
GLUCOSE BLD-MCNC: 229 MG/DL (ref 70–99)
GLUCOSE BLD-MCNC: 231 MG/DL (ref 70–99)
GLUCOSE BLD-MCNC: 264 MG/DL (ref 70–99)
GLUCOSE BLD-MCNC: 320 MG/DL (ref 70–99)
GLUCOSE URINE: NEGATIVE MG/DL
HCO3 ARTERIAL: 33.1 MMOL/L (ref 21–29)
HCO3 VENOUS: 33.7 MMOL/L (ref 23–29)
HCT VFR BLD CALC: 25.2 % (ref 40.5–52.5)
HCT VFR BLD CALC: 25.4 % (ref 40.5–52.5)
HCT VFR BLD CALC: 25.8 % (ref 40.5–52.5)
HCT VFR BLD CALC: 25.9 % (ref 40.5–52.5)
HCT VFR BLD CALC: 26.3 % (ref 40.5–52.5)
HCT VFR BLD CALC: 27.1 % (ref 40.5–52.5)
HEMATOLOGY PATH CONSULT: NORMAL
HEMATOLOGY PATH CONSULT: YES
HEMOGLOBIN: 10.2 GM/DL (ref 13.5–17.5)
HEMOGLOBIN: 7.6 G/DL (ref 13.5–17.5)
HEMOGLOBIN: 7.9 G/DL (ref 13.5–17.5)
HEMOGLOBIN: 8.1 G/DL (ref 13.5–17.5)
HEMOGLOBIN: 8.1 G/DL (ref 13.5–17.5)
HYPOCHROMIA: ABNORMAL
IRON SATURATION: 15 % (ref 20–50)
IRON: 34 UG/DL (ref 59–158)
KETONES, URINE: 15 MG/DL
LACTATE: 3.29 MMOL/L (ref 0.4–2)
LACTIC ACID: 0.8 MMOL/L (ref 0.4–2)
LEUKOCYTE ESTERASE, URINE: ABNORMAL
LYMPHOCYTES ABSOLUTE: 0.2 K/UL (ref 1–5.1)
LYMPHOCYTES ABSOLUTE: 0.2 K/UL (ref 1–5.1)
LYMPHOCYTES ABSOLUTE: 0.3 K/UL (ref 1–5.1)
LYMPHOCYTES ABSOLUTE: 0.3 K/UL (ref 1–5.1)
LYMPHOCYTES ABSOLUTE: 0.4 K/UL (ref 1–5.1)
LYMPHOCYTES ABSOLUTE: 0.5 K/UL (ref 1–5.1)
LYMPHOCYTES RELATIVE PERCENT: 10.5 %
LYMPHOCYTES RELATIVE PERCENT: 10.7 %
LYMPHOCYTES RELATIVE PERCENT: 2.8 %
LYMPHOCYTES RELATIVE PERCENT: 4.4 %
LYMPHOCYTES RELATIVE PERCENT: 7.9 %
LYMPHOCYTES RELATIVE PERCENT: 9.5 %
MACROCYTES: ABNORMAL
MAGNESIUM: 1.9 MG/DL (ref 1.8–2.4)
MAGNESIUM: 2 MG/DL (ref 1.8–2.4)
MCH RBC QN AUTO: 23.8 PG (ref 26–34)
MCH RBC QN AUTO: 24.6 PG (ref 26–34)
MCH RBC QN AUTO: 24.8 PG (ref 26–34)
MCH RBC QN AUTO: 25.3 PG (ref 26–34)
MCH RBC QN AUTO: 25.4 PG (ref 26–34)
MCH RBC QN AUTO: 25.4 PG (ref 26–34)
MCHC RBC AUTO-ENTMCNC: 29.6 G/DL (ref 31–36)
MCHC RBC AUTO-ENTMCNC: 29.7 G/DL (ref 31–36)
MCHC RBC AUTO-ENTMCNC: 30.2 G/DL (ref 31–36)
MCHC RBC AUTO-ENTMCNC: 31 G/DL (ref 31–36)
MCHC RBC AUTO-ENTMCNC: 31.2 G/DL (ref 31–36)
MCHC RBC AUTO-ENTMCNC: 31.4 G/DL (ref 31–36)
MCV RBC AUTO: 80.4 FL (ref 80–100)
MCV RBC AUTO: 80.9 FL (ref 80–100)
MCV RBC AUTO: 81.2 FL (ref 80–100)
MCV RBC AUTO: 81.4 FL (ref 80–100)
MCV RBC AUTO: 81.8 FL (ref 80–100)
MCV RBC AUTO: 83.3 FL (ref 80–100)
MICROCYTES: ABNORMAL
MICROSCOPIC EXAMINATION: YES
MONOCYTES ABSOLUTE: 0 K/UL (ref 0–1.3)
MONOCYTES ABSOLUTE: 0.1 K/UL (ref 0–1.3)
MONOCYTES ABSOLUTE: 0.3 K/UL (ref 0–1.3)
MONOCYTES ABSOLUTE: 0.4 K/UL (ref 0–1.3)
MONOCYTES ABSOLUTE: 0.4 K/UL (ref 0–1.3)
MONOCYTES ABSOLUTE: 0.6 K/UL (ref 0–1.3)
MONOCYTES RELATIVE PERCENT: 2.5 %
MONOCYTES RELATIVE PERCENT: 3.7 %
MONOCYTES RELATIVE PERCENT: 4.9 %
MONOCYTES RELATIVE PERCENT: 6.4 %
MONOCYTES RELATIVE PERCENT: 8.5 %
MONOCYTES RELATIVE PERCENT: 8.7 %
NEUTROPHILS ABSOLUTE: 1.5 K/UL (ref 1.7–7.7)
NEUTROPHILS ABSOLUTE: 2.7 K/UL (ref 1.7–7.7)
NEUTROPHILS ABSOLUTE: 3.7 K/UL (ref 1.7–7.7)
NEUTROPHILS ABSOLUTE: 4.2 K/UL (ref 1.7–7.7)
NEUTROPHILS ABSOLUTE: 5.8 K/UL (ref 1.7–7.7)
NEUTROPHILS ABSOLUTE: 6.6 K/UL (ref 1.7–7.7)
NEUTROPHILS RELATIVE PERCENT: 79.6 %
NEUTROPHILS RELATIVE PERCENT: 83.4 %
NEUTROPHILS RELATIVE PERCENT: 84.5 %
NEUTROPHILS RELATIVE PERCENT: 84.7 %
NEUTROPHILS RELATIVE PERCENT: 86.4 %
NEUTROPHILS RELATIVE PERCENT: 91.8 %
NITRITE, URINE: NEGATIVE
O2 SAT, ARTERIAL: 79 % (ref 93–100)
O2 SAT, VEN: 81 %
PCO2 ARTERIAL: 79.3 MM HG (ref 35–45)
PCO2, VEN: 77 MM HG (ref 40–50)
PDW BLD-RTO: 21.8 % (ref 12.4–15.4)
PDW BLD-RTO: 21.8 % (ref 12.4–15.4)
PDW BLD-RTO: 22.1 % (ref 12.4–15.4)
PDW BLD-RTO: 22.4 % (ref 12.4–15.4)
PDW BLD-RTO: 22.5 % (ref 12.4–15.4)
PDW BLD-RTO: 22.6 % (ref 12.4–15.4)
PERFORMED ON: ABNORMAL
PH ARTERIAL: 7.23 (ref 7.35–7.45)
PH UA: 5.5 (ref 5–8)
PH VENOUS: 7.25 (ref 7.35–7.45)
PHOSPHORUS: 4.7 MG/DL (ref 2.5–4.9)
PHOSPHORUS: 5.2 MG/DL (ref 2.5–4.9)
PLATELET # BLD: 138 K/UL (ref 135–450)
PLATELET # BLD: 150 K/UL (ref 135–450)
PLATELET # BLD: 152 K/UL (ref 135–450)
PLATELET # BLD: 161 K/UL (ref 135–450)
PLATELET # BLD: 161 K/UL (ref 135–450)
PLATELET # BLD: 166 K/UL (ref 135–450)
PLATELET SLIDE REVIEW: ADEQUATE
PMV BLD AUTO: 8.1 FL (ref 5–10.5)
PMV BLD AUTO: 8.2 FL (ref 5–10.5)
PMV BLD AUTO: 8.3 FL (ref 5–10.5)
PMV BLD AUTO: 8.5 FL (ref 5–10.5)
PMV BLD AUTO: 8.5 FL (ref 5–10.5)
PMV BLD AUTO: 8.6 FL (ref 5–10.5)
PO2 ARTERIAL: 54.1 MM HG (ref 75–108)
PO2, VEN: 55 MM HG
POC HEMATOCRIT: 30 % (ref 40.5–52.5)
POC POTASSIUM: 4.4 MMOL/L (ref 3.5–5.1)
POC SAMPLE TYPE: ABNORMAL
POC SAMPLE TYPE: ABNORMAL
POC SODIUM: 137 MMOL/L (ref 136–145)
POLYCHROMASIA: ABNORMAL
POTASSIUM REFLEX MAGNESIUM: 3.7 MMOL/L (ref 3.5–5.1)
POTASSIUM REFLEX MAGNESIUM: 3.8 MMOL/L (ref 3.5–5.1)
POTASSIUM SERPL-SCNC: 4.4 MMOL/L (ref 3.5–5.1)
POTASSIUM SERPL-SCNC: 4.5 MMOL/L (ref 3.5–5.1)
POTASSIUM SERPL-SCNC: 4.5 MMOL/L (ref 3.5–5.1)
POTASSIUM SERPL-SCNC: 4.6 MMOL/L (ref 3.5–5.1)
PROTEIN UA: 30 MG/DL
RBC # BLD: 3.09 M/UL (ref 4.2–5.9)
RBC # BLD: 3.1 M/UL (ref 4.2–5.9)
RBC # BLD: 3.2 M/UL (ref 4.2–5.9)
RBC # BLD: 3.21 M/UL (ref 4.2–5.9)
RBC # BLD: 3.23 M/UL (ref 4.2–5.9)
RBC # BLD: 3.26 M/UL (ref 4.2–5.9)
RBC UA: ABNORMAL /HPF (ref 0–4)
REPORT: NORMAL
SLIDE REVIEW: ABNORMAL
SODIUM BLD-SCNC: 135 MMOL/L (ref 136–145)
SODIUM BLD-SCNC: 136 MMOL/L (ref 136–145)
SODIUM BLD-SCNC: 137 MMOL/L (ref 136–145)
SODIUM BLD-SCNC: 137 MMOL/L (ref 136–145)
SODIUM BLD-SCNC: 141 MMOL/L (ref 136–145)
SODIUM BLD-SCNC: 146 MMOL/L (ref 136–145)
SODIUM URINE: <20 MMOL/L
SPECIFIC GRAVITY UA: 1.02 (ref 1–1.03)
STOMATOCYTES: ABNORMAL
TCO2 ARTERIAL: 36 MMOL/L
TCO2 CALC VENOUS: 36 MMOL/L
TOTAL IRON BINDING CAPACITY: 228 UG/DL (ref 260–445)
TRANSFERRIN: 183 MG/DL (ref 200–360)
UREA NITROGEN, UR: 529 MG/DL (ref 800–1666)
URIC ACID, SERUM: 11 MG/DL (ref 3.5–7.2)
URINE REFLEX TO CULTURE: YES
URINE TYPE: ABNORMAL
UROBILINOGEN, URINE: 0.2 E.U./DL
VITAMIN B-12: 1627 PG/ML (ref 211–911)
WBC # BLD: 1.8 K/UL (ref 4–11)
WBC # BLD: 3.2 K/UL (ref 4–11)
WBC # BLD: 4.6 K/UL (ref 4–11)
WBC # BLD: 5 K/UL (ref 4–11)
WBC # BLD: 6.9 K/UL (ref 4–11)
WBC # BLD: 7.2 K/UL (ref 4–11)
WBC UA: >100 /HPF (ref 0–5)
YEAST: PRESENT /HPF

## 2023-01-01 PROCEDURE — 6370000000 HC RX 637 (ALT 250 FOR IP): Performed by: NURSE PRACTITIONER

## 2023-01-01 PROCEDURE — 2700000000 HC OXYGEN THERAPY PER DAY

## 2023-01-01 PROCEDURE — 82728 ASSAY OF FERRITIN: CPT

## 2023-01-01 PROCEDURE — 6360000002 HC RX W HCPCS: Performed by: ORTHOPAEDIC SURGERY

## 2023-01-01 PROCEDURE — 96374 THER/PROPH/DIAG INJ IV PUSH: CPT

## 2023-01-01 PROCEDURE — 94660 CPAP INITIATION&MGMT: CPT

## 2023-01-01 PROCEDURE — 94669 MECHANICAL CHEST WALL OSCILL: CPT

## 2023-01-01 PROCEDURE — 97530 THERAPEUTIC ACTIVITIES: CPT

## 2023-01-01 PROCEDURE — 89220 SPUTUM SPECIMEN COLLECTION: CPT

## 2023-01-01 PROCEDURE — 87086 URINE CULTURE/COLONY COUNT: CPT

## 2023-01-01 PROCEDURE — 36415 COLL VENOUS BLD VENIPUNCTURE: CPT

## 2023-01-01 PROCEDURE — 2580000003 HC RX 258: Performed by: NURSE ANESTHETIST, CERTIFIED REGISTERED

## 2023-01-01 PROCEDURE — 2500000003 HC RX 250 WO HCPCS: Performed by: ORTHOPAEDIC SURGERY

## 2023-01-01 PROCEDURE — 1200000000 HC SEMI PRIVATE

## 2023-01-01 PROCEDURE — 85014 HEMATOCRIT: CPT

## 2023-01-01 PROCEDURE — 2500000003 HC RX 250 WO HCPCS: Performed by: NURSE ANESTHETIST, CERTIFIED REGISTERED

## 2023-01-01 PROCEDURE — 6370000000 HC RX 637 (ALT 250 FOR IP): Performed by: INTERNAL MEDICINE

## 2023-01-01 PROCEDURE — 87088 URINE BACTERIA CULTURE: CPT

## 2023-01-01 PROCEDURE — 2580000003 HC RX 258: Performed by: NURSE PRACTITIONER

## 2023-01-01 PROCEDURE — 97535 SELF CARE MNGMENT TRAINING: CPT

## 2023-01-01 PROCEDURE — 99232 SBSQ HOSP IP/OBS MODERATE 35: CPT | Performed by: INTERNAL MEDICINE

## 2023-01-01 PROCEDURE — 87150 DNA/RNA AMPLIFIED PROBE: CPT

## 2023-01-01 PROCEDURE — 6360000002 HC RX W HCPCS: Performed by: INTERNAL MEDICINE

## 2023-01-01 PROCEDURE — 87070 CULTURE OTHR SPECIMN AEROBIC: CPT

## 2023-01-01 PROCEDURE — 84300 ASSAY OF URINE SODIUM: CPT

## 2023-01-01 PROCEDURE — 83605 ASSAY OF LACTIC ACID: CPT

## 2023-01-01 PROCEDURE — 24515 OPTX HUMRL SHFT FX PLATE/SCR: CPT | Performed by: ORTHOPAEDIC SURGERY

## 2023-01-01 PROCEDURE — 99222 1ST HOSP IP/OBS MODERATE 55: CPT | Performed by: ORTHOPAEDIC SURGERY

## 2023-01-01 PROCEDURE — 82607 VITAMIN B-12: CPT

## 2023-01-01 PROCEDURE — 87077 CULTURE AEROBIC IDENTIFY: CPT

## 2023-01-01 PROCEDURE — 3700000000 HC ANESTHESIA ATTENDED CARE: Performed by: ORTHOPAEDIC SURGERY

## 2023-01-01 PROCEDURE — 36556 INSERT NON-TUNNEL CV CATH: CPT

## 2023-01-01 PROCEDURE — 99233 SBSQ HOSP IP/OBS HIGH 50: CPT | Performed by: INTERNAL MEDICINE

## 2023-01-01 PROCEDURE — 80048 BASIC METABOLIC PNL TOTAL CA: CPT

## 2023-01-01 PROCEDURE — 97162 PT EVAL MOD COMPLEX 30 MIN: CPT

## 2023-01-01 PROCEDURE — 71045 X-RAY EXAM CHEST 1 VIEW: CPT

## 2023-01-01 PROCEDURE — 6360000002 HC RX W HCPCS: Performed by: NURSE ANESTHETIST, CERTIFIED REGISTERED

## 2023-01-01 PROCEDURE — 84550 ASSAY OF BLOOD/URIC ACID: CPT

## 2023-01-01 PROCEDURE — 6360000002 HC RX W HCPCS: Performed by: NURSE PRACTITIONER

## 2023-01-01 PROCEDURE — 83735 ASSAY OF MAGNESIUM: CPT

## 2023-01-01 PROCEDURE — 02HV33Z INSERTION OF INFUSION DEVICE INTO SUPERIOR VENA CAVA, PERCUTANEOUS APPROACH: ICD-10-PCS | Performed by: STUDENT IN AN ORGANIZED HEALTH CARE EDUCATION/TRAINING PROGRAM

## 2023-01-01 PROCEDURE — 80069 RENAL FUNCTION PANEL: CPT

## 2023-01-01 PROCEDURE — 73060 X-RAY EXAM OF HUMERUS: CPT

## 2023-01-01 PROCEDURE — 94761 N-INVAS EAR/PLS OXIMETRY MLT: CPT

## 2023-01-01 PROCEDURE — 94760 N-INVAS EAR/PLS OXIMETRY 1: CPT

## 2023-01-01 PROCEDURE — 6370000000 HC RX 637 (ALT 250 FOR IP): Performed by: ANESTHESIOLOGY

## 2023-01-01 PROCEDURE — 86923 COMPATIBILITY TEST ELECTRIC: CPT

## 2023-01-01 PROCEDURE — 2580000003 HC RX 258: Performed by: INTERNAL MEDICINE

## 2023-01-01 PROCEDURE — 83540 ASSAY OF IRON: CPT

## 2023-01-01 PROCEDURE — 94640 AIRWAY INHALATION TREATMENT: CPT

## 2023-01-01 PROCEDURE — 86901 BLOOD TYPING SEROLOGIC RH(D): CPT

## 2023-01-01 PROCEDURE — 0BH17EZ INSERTION OF ENDOTRACHEAL AIRWAY INTO TRACHEA, VIA NATURAL OR ARTIFICIAL OPENING: ICD-10-PCS | Performed by: STUDENT IN AN ORGANIZED HEALTH CARE EDUCATION/TRAINING PROGRAM

## 2023-01-01 PROCEDURE — P9016 RBC LEUKOCYTES REDUCED: HCPCS

## 2023-01-01 PROCEDURE — 81001 URINALYSIS AUTO W/SCOPE: CPT

## 2023-01-01 PROCEDURE — 0PSG04Z REPOSITION LEFT HUMERAL SHAFT WITH INTERNAL FIXATION DEVICE, OPEN APPROACH: ICD-10-PCS | Performed by: ORTHOPAEDIC SURGERY

## 2023-01-01 PROCEDURE — 85025 COMPLETE CBC W/AUTO DIFF WBC: CPT

## 2023-01-01 PROCEDURE — 2709999900 HC NON-CHARGEABLE SUPPLY: Performed by: ORTHOPAEDIC SURGERY

## 2023-01-01 PROCEDURE — 99223 1ST HOSP IP/OBS HIGH 75: CPT | Performed by: INTERNAL MEDICINE

## 2023-01-01 PROCEDURE — 3209999900 FLUORO FOR SURGICAL PROCEDURES

## 2023-01-01 PROCEDURE — 70450 CT HEAD/BRAIN W/O DYE: CPT

## 2023-01-01 PROCEDURE — 3600000014 HC SURGERY LEVEL 4 ADDTL 15MIN: Performed by: ORTHOPAEDIC SURGERY

## 2023-01-01 PROCEDURE — 31500 INSERT EMERGENCY AIRWAY: CPT

## 2023-01-01 PROCEDURE — 6360000002 HC RX W HCPCS

## 2023-01-01 PROCEDURE — 87040 BLOOD CULTURE FOR BACTERIA: CPT

## 2023-01-01 PROCEDURE — 87186 SC STD MICRODIL/AGAR DIL: CPT

## 2023-01-01 PROCEDURE — 72125 CT NECK SPINE W/O DYE: CPT

## 2023-01-01 PROCEDURE — 3700000001 HC ADD 15 MINUTES (ANESTHESIA): Performed by: ORTHOPAEDIC SURGERY

## 2023-01-01 PROCEDURE — 82570 ASSAY OF URINE CREATININE: CPT

## 2023-01-01 PROCEDURE — 5A1935Z RESPIRATORY VENTILATION, LESS THAN 24 CONSECUTIVE HOURS: ICD-10-PCS | Performed by: STUDENT IN AN ORGANIZED HEALTH CARE EDUCATION/TRAINING PROGRAM

## 2023-01-01 PROCEDURE — 2500000003 HC RX 250 WO HCPCS: Performed by: STUDENT IN AN ORGANIZED HEALTH CARE EDUCATION/TRAINING PROGRAM

## 2023-01-01 PROCEDURE — 94002 VENT MGMT INPAT INIT DAY: CPT

## 2023-01-01 PROCEDURE — 73090 X-RAY EXAM OF FOREARM: CPT

## 2023-01-01 PROCEDURE — 84540 ASSAY OF URINE/UREA-N: CPT

## 2023-01-01 PROCEDURE — 24515 OPTX HUMRL SHFT FX PLATE/SCR: CPT | Performed by: NURSE PRACTITIONER

## 2023-01-01 PROCEDURE — C1713 ANCHOR/SCREW BN/BN,TIS/BN: HCPCS | Performed by: ORTHOPAEDIC SURGERY

## 2023-01-01 PROCEDURE — 84132 ASSAY OF SERUM POTASSIUM: CPT

## 2023-01-01 PROCEDURE — P9047 ALBUMIN (HUMAN), 25%, 50ML: HCPCS | Performed by: INTERNAL MEDICINE

## 2023-01-01 PROCEDURE — 7100000001 HC PACU RECOVERY - ADDTL 15 MIN: Performed by: ORTHOPAEDIC SURGERY

## 2023-01-01 PROCEDURE — 6360000002 HC RX W HCPCS: Performed by: STUDENT IN AN ORGANIZED HEALTH CARE EDUCATION/TRAINING PROGRAM

## 2023-01-01 PROCEDURE — 87205 SMEAR GRAM STAIN: CPT

## 2023-01-01 PROCEDURE — 86850 RBC ANTIBODY SCREEN: CPT

## 2023-01-01 PROCEDURE — 82746 ASSAY OF FOLIC ACID SERUM: CPT

## 2023-01-01 PROCEDURE — 73030 X-RAY EXAM OF SHOULDER: CPT

## 2023-01-01 PROCEDURE — 2720000010 HC SURG SUPPLY STERILE: Performed by: ORTHOPAEDIC SURGERY

## 2023-01-01 PROCEDURE — 84295 ASSAY OF SERUM SODIUM: CPT

## 2023-01-01 PROCEDURE — 84466 ASSAY OF TRANSFERRIN: CPT

## 2023-01-01 PROCEDURE — 7100000000 HC PACU RECOVERY - FIRST 15 MIN: Performed by: ORTHOPAEDIC SURGERY

## 2023-01-01 PROCEDURE — 3600000004 HC SURGERY LEVEL 4 BASE: Performed by: ORTHOPAEDIC SURGERY

## 2023-01-01 PROCEDURE — 2580000003 HC RX 258: Performed by: ORTHOPAEDIC SURGERY

## 2023-01-01 PROCEDURE — 2580000003 HC RX 258: Performed by: STUDENT IN AN ORGANIZED HEALTH CARE EDUCATION/TRAINING PROGRAM

## 2023-01-01 PROCEDURE — 51702 INSERT TEMP BLADDER CATH: CPT

## 2023-01-01 PROCEDURE — 82330 ASSAY OF CALCIUM: CPT

## 2023-01-01 PROCEDURE — 36430 TRANSFUSION BLD/BLD COMPNT: CPT

## 2023-01-01 PROCEDURE — 82947 ASSAY GLUCOSE BLOOD QUANT: CPT

## 2023-01-01 PROCEDURE — 82803 BLOOD GASES ANY COMBINATION: CPT

## 2023-01-01 PROCEDURE — 93005 ELECTROCARDIOGRAM TRACING: CPT | Performed by: STUDENT IN AN ORGANIZED HEALTH CARE EDUCATION/TRAINING PROGRAM

## 2023-01-01 PROCEDURE — 86900 BLOOD TYPING SEROLOGIC ABO: CPT

## 2023-01-01 PROCEDURE — 97166 OT EVAL MOD COMPLEX 45 MIN: CPT

## 2023-01-01 PROCEDURE — A4217 STERILE WATER/SALINE, 500 ML: HCPCS | Performed by: ORTHOPAEDIC SURGERY

## 2023-01-01 PROCEDURE — 99285 EMERGENCY DEPT VISIT HI MDM: CPT

## 2023-01-01 DEVICE — BONE SCREW
Type: IMPLANTABLE DEVICE | Site: ARM | Status: FUNCTIONAL
Brand: VARIAX

## 2023-01-01 DEVICE — COMPRESSION PLATE BROAD CURVED
Type: IMPLANTABLE DEVICE | Site: ARM | Status: FUNCTIONAL
Brand: VARIAX

## 2023-01-01 DEVICE — LOCKING SCREW
Type: IMPLANTABLE DEVICE | Site: ARM | Status: FUNCTIONAL
Brand: VARIAX

## 2023-01-01 RX ORDER — DEXAMETHASONE SODIUM PHOSPHATE 4 MG/ML
INJECTION, SOLUTION INTRA-ARTICULAR; INTRALESIONAL; INTRAMUSCULAR; INTRAVENOUS; SOFT TISSUE PRN
Status: DISCONTINUED | OUTPATIENT
Start: 2023-01-01 | End: 2023-01-01 | Stop reason: SDUPTHER

## 2023-01-01 RX ORDER — ZINC SULFATE 50(220)MG
50 CAPSULE ORAL DAILY
Status: DISCONTINUED | OUTPATIENT
Start: 2023-01-01 | End: 2023-01-01

## 2023-01-01 RX ORDER — MORPHINE SULFATE 2 MG/ML
2 INJECTION, SOLUTION INTRAMUSCULAR; INTRAVENOUS ONCE
Status: COMPLETED | OUTPATIENT
Start: 2023-01-01 | End: 2023-01-01

## 2023-01-01 RX ORDER — ONDANSETRON 4 MG/1
4 TABLET, ORALLY DISINTEGRATING ORAL EVERY 8 HOURS PRN
Status: DISCONTINUED | OUTPATIENT
Start: 2023-01-01 | End: 2023-01-01

## 2023-01-01 RX ORDER — SODIUM CHLORIDE 9 MG/ML
INJECTION, SOLUTION INTRAVENOUS PRN
Status: DISCONTINUED | OUTPATIENT
Start: 2023-01-01 | End: 2023-01-01 | Stop reason: HOSPADM

## 2023-01-01 RX ORDER — SODIUM CHLORIDE 0.9 % (FLUSH) 0.9 %
5-40 SYRINGE (ML) INJECTION PRN
Status: DISCONTINUED | OUTPATIENT
Start: 2023-01-01 | End: 2023-01-01 | Stop reason: HOSPADM

## 2023-01-01 RX ORDER — METRONIDAZOLE 500 MG/100ML
500 INJECTION, SOLUTION INTRAVENOUS EVERY 8 HOURS
Status: DISCONTINUED | OUTPATIENT
Start: 2023-01-01 | End: 2023-01-01

## 2023-01-01 RX ORDER — OXYCODONE HYDROCHLORIDE 5 MG/1
5 TABLET ORAL
Status: DISCONTINUED | OUTPATIENT
Start: 2023-01-01 | End: 2023-01-01 | Stop reason: HOSPADM

## 2023-01-01 RX ORDER — TRAMADOL HYDROCHLORIDE 50 MG/1
50 TABLET ORAL EVERY 6 HOURS PRN
Status: DISCONTINUED | OUTPATIENT
Start: 2023-01-01 | End: 2023-01-01

## 2023-01-01 RX ORDER — MONTELUKAST SODIUM 10 MG/1
10 TABLET ORAL DAILY
Status: DISCONTINUED | OUTPATIENT
Start: 2023-01-01 | End: 2023-01-01

## 2023-01-01 RX ORDER — SODIUM CHLORIDE 0.9 % (FLUSH) 0.9 %
5-40 SYRINGE (ML) INJECTION EVERY 12 HOURS SCHEDULED
Status: DISCONTINUED | OUTPATIENT
Start: 2023-01-01 | End: 2023-01-01 | Stop reason: HOSPADM

## 2023-01-01 RX ORDER — ENOXAPARIN SODIUM 100 MG/ML
40 INJECTION SUBCUTANEOUS DAILY
Status: CANCELLED | OUTPATIENT
Start: 2023-01-01

## 2023-01-01 RX ORDER — PROPOFOL 10 MG/ML
5-50 INJECTION, EMULSION INTRAVENOUS CONTINUOUS
Status: DISCONTINUED | OUTPATIENT
Start: 2023-01-01 | End: 2023-01-01 | Stop reason: HOSPADM

## 2023-01-01 RX ORDER — FENTANYL CITRATE 50 UG/ML
25 INJECTION, SOLUTION INTRAMUSCULAR; INTRAVENOUS EVERY 5 MIN PRN
Status: DISCONTINUED | OUTPATIENT
Start: 2023-01-01 | End: 2023-01-01 | Stop reason: HOSPADM

## 2023-01-01 RX ORDER — METHOCARBAMOL 500 MG/1
500 TABLET, FILM COATED ORAL ONCE
Status: COMPLETED | OUTPATIENT
Start: 2023-01-01 | End: 2023-01-01

## 2023-01-01 RX ORDER — INSULIN LISPRO 100 [IU]/ML
0-4 INJECTION, SOLUTION INTRAVENOUS; SUBCUTANEOUS NIGHTLY
Status: DISCONTINUED | OUTPATIENT
Start: 2023-01-01 | End: 2023-01-01 | Stop reason: SDUPTHER

## 2023-01-01 RX ORDER — INSULIN LISPRO 100 [IU]/ML
0-4 INJECTION, SOLUTION INTRAVENOUS; SUBCUTANEOUS NIGHTLY
Status: DISCONTINUED | OUTPATIENT
Start: 2023-01-01 | End: 2023-01-01

## 2023-01-01 RX ORDER — POLYETHYLENE GLYCOL 3350 17 G/17G
17 POWDER, FOR SOLUTION ORAL DAILY PRN
Status: DISCONTINUED | OUTPATIENT
Start: 2023-01-01 | End: 2023-01-01 | Stop reason: SDUPTHER

## 2023-01-01 RX ORDER — PHENYLEPHRINE HCL IN 0.9% NACL 1 MG/10 ML
SYRINGE (ML) INTRAVENOUS PRN
Status: DISCONTINUED | OUTPATIENT
Start: 2023-01-01 | End: 2023-01-01 | Stop reason: SDUPTHER

## 2023-01-01 RX ORDER — IPRATROPIUM BROMIDE AND ALBUTEROL SULFATE 2.5; .5 MG/3ML; MG/3ML
1 SOLUTION RESPIRATORY (INHALATION) ONCE
Status: COMPLETED | OUTPATIENT
Start: 2023-01-01 | End: 2023-01-01

## 2023-01-01 RX ORDER — ATROPINE SULFATE 10 MG/ML
2 SOLUTION/ DROPS OPHTHALMIC EVERY 4 HOURS PRN
Status: DISCONTINUED | OUTPATIENT
Start: 2023-01-01 | End: 2023-01-01 | Stop reason: HOSPADM

## 2023-01-01 RX ORDER — LACTULOSE 10 G/15ML
20 SOLUTION ORAL 3 TIMES DAILY PRN
Status: DISCONTINUED | OUTPATIENT
Start: 2023-01-01 | End: 2023-01-01

## 2023-01-01 RX ORDER — MORPHINE SULFATE 2 MG/ML
2 INJECTION, SOLUTION INTRAMUSCULAR; INTRAVENOUS
Status: DISCONTINUED | OUTPATIENT
Start: 2023-01-01 | End: 2023-01-01

## 2023-01-01 RX ORDER — FERROUS SULFATE TAB EC 324 MG (65 MG FE EQUIVALENT) 324 (65 FE) MG
325 TABLET DELAYED RESPONSE ORAL
Status: DISCONTINUED | OUTPATIENT
Start: 2023-01-01 | End: 2023-01-01

## 2023-01-01 RX ORDER — FENTANYL CITRATE 50 UG/ML
25 INJECTION, SOLUTION INTRAMUSCULAR; INTRAVENOUS EVERY 10 MIN PRN
Status: DISCONTINUED | OUTPATIENT
Start: 2023-01-01 | End: 2023-01-01 | Stop reason: HOSPADM

## 2023-01-01 RX ORDER — ACETAMINOPHEN 325 MG/1
650 TABLET ORAL EVERY 6 HOURS PRN
Status: DISCONTINUED | OUTPATIENT
Start: 2023-01-01 | End: 2023-01-01 | Stop reason: HOSPADM

## 2023-01-01 RX ORDER — FENTANYL CITRATE-0.9 % NACL/PF 10 MCG/ML
25-200 PLASTIC BAG, INJECTION (ML) INTRAVENOUS CONTINUOUS
Status: DISCONTINUED | OUTPATIENT
Start: 2023-01-01 | End: 2023-01-01 | Stop reason: HOSPADM

## 2023-01-01 RX ORDER — FENTANYL CITRATE-0.9 % NACL/PF 20 MCG/2ML
50 SYRINGE (ML) INTRAVENOUS EVERY 30 MIN PRN
Status: DISCONTINUED | OUTPATIENT
Start: 2023-01-01 | End: 2023-01-01 | Stop reason: HOSPADM

## 2023-01-01 RX ORDER — POLYETHYLENE GLYCOL 3350 17 G/17G
17 POWDER, FOR SOLUTION ORAL DAILY
Status: DISCONTINUED | OUTPATIENT
Start: 2023-01-01 | End: 2023-01-01 | Stop reason: SDUPTHER

## 2023-01-01 RX ORDER — HEPARIN SODIUM 5000 [USP'U]/ML
5000 INJECTION, SOLUTION INTRAVENOUS; SUBCUTANEOUS 2 TIMES DAILY
Status: DISCONTINUED | OUTPATIENT
Start: 2023-01-01 | End: 2023-01-01 | Stop reason: ALTCHOICE

## 2023-01-01 RX ORDER — HEPARIN SODIUM 5000 [USP'U]/ML
5000 INJECTION, SOLUTION INTRAVENOUS; SUBCUTANEOUS EVERY 8 HOURS SCHEDULED
Status: DISCONTINUED | OUTPATIENT
Start: 2023-01-01 | End: 2023-01-01

## 2023-01-01 RX ORDER — MIDAZOLAM HYDROCHLORIDE 1 MG/ML
2 INJECTION INTRAMUSCULAR; INTRAVENOUS ONCE
Status: COMPLETED | OUTPATIENT
Start: 2023-01-01 | End: 2023-01-01

## 2023-01-01 RX ORDER — LANOLIN ALCOHOL/MO/W.PET/CERES
400 CREAM (GRAM) TOPICAL 2 TIMES DAILY
Status: DISCONTINUED | OUTPATIENT
Start: 2023-01-01 | End: 2023-01-01 | Stop reason: HOSPADM

## 2023-01-01 RX ORDER — LIDOCAINE HYDROCHLORIDE 20 MG/ML
INJECTION, SOLUTION EPIDURAL; INFILTRATION; INTRACAUDAL; PERINEURAL PRN
Status: DISCONTINUED | OUTPATIENT
Start: 2023-01-01 | End: 2023-01-01 | Stop reason: SDUPTHER

## 2023-01-01 RX ORDER — TRAMADOL HYDROCHLORIDE 50 MG/1
50 TABLET ORAL EVERY 6 HOURS PRN
Qty: 20 TABLET | Refills: 0 | Status: SHIPPED | OUTPATIENT
Start: 2023-01-01 | End: 2023-02-12

## 2023-01-01 RX ORDER — NOREPINEPHRINE BIT/0.9 % NACL 16MG/250ML
1-100 INFUSION BOTTLE (ML) INTRAVENOUS CONTINUOUS
Status: DISCONTINUED | OUTPATIENT
Start: 2023-01-01 | End: 2023-01-01

## 2023-01-01 RX ORDER — TAMSULOSIN HYDROCHLORIDE 0.4 MG/1
0.4 CAPSULE ORAL DAILY
Status: DISCONTINUED | OUTPATIENT
Start: 2023-01-01 | End: 2023-01-01

## 2023-01-01 RX ORDER — TAMSULOSIN HYDROCHLORIDE 0.4 MG/1
0.4 CAPSULE ORAL DAILY
COMMUNITY

## 2023-01-01 RX ORDER — BUPIVACAINE HYDROCHLORIDE 5 MG/ML
INJECTION, SOLUTION EPIDURAL; INTRACAUDAL
Status: COMPLETED | OUTPATIENT
Start: 2023-01-01 | End: 2023-01-01

## 2023-01-01 RX ORDER — ONDANSETRON 2 MG/ML
4 INJECTION INTRAMUSCULAR; INTRAVENOUS EVERY 6 HOURS PRN
Status: DISCONTINUED | OUTPATIENT
Start: 2023-01-01 | End: 2023-01-01 | Stop reason: HOSPADM

## 2023-01-01 RX ORDER — MORPHINE SULFATE 4 MG/ML
4 INJECTION, SOLUTION INTRAMUSCULAR; INTRAVENOUS
Status: DISCONTINUED | OUTPATIENT
Start: 2023-01-01 | End: 2023-01-01

## 2023-01-01 RX ORDER — INSULIN GLARGINE 100 [IU]/ML
10 INJECTION, SOLUTION SUBCUTANEOUS DAILY
Status: DISCONTINUED | OUTPATIENT
Start: 2023-01-01 | End: 2023-01-01 | Stop reason: HOSPADM

## 2023-01-01 RX ORDER — POLYETHYLENE GLYCOL 3350 17 G/17G
17 POWDER, FOR SOLUTION ORAL DAILY
Status: DISCONTINUED | OUTPATIENT
Start: 2023-01-01 | End: 2023-01-01

## 2023-01-01 RX ORDER — ENOXAPARIN SODIUM 100 MG/ML
40 INJECTION SUBCUTANEOUS DAILY
Status: DISCONTINUED | OUTPATIENT
Start: 2023-01-01 | End: 2023-01-01 | Stop reason: DRUGHIGH

## 2023-01-01 RX ORDER — PROPOFOL 10 MG/ML
INJECTION, EMULSION INTRAVENOUS PRN
Status: DISCONTINUED | OUTPATIENT
Start: 2023-01-01 | End: 2023-01-01 | Stop reason: SDUPTHER

## 2023-01-01 RX ORDER — DOCUSATE SODIUM 100 MG/1
100 CAPSULE, LIQUID FILLED ORAL 2 TIMES DAILY
Status: DISCONTINUED | OUTPATIENT
Start: 2023-01-01 | End: 2023-01-01

## 2023-01-01 RX ORDER — FENTANYL CITRATE 50 UG/ML
INJECTION, SOLUTION INTRAMUSCULAR; INTRAVENOUS PRN
Status: DISCONTINUED | OUTPATIENT
Start: 2023-01-01 | End: 2023-01-01 | Stop reason: SDUPTHER

## 2023-01-01 RX ORDER — PREDNISONE 20 MG/1
20 TABLET ORAL DAILY
Status: DISCONTINUED | OUTPATIENT
Start: 2023-01-01 | End: 2023-01-01

## 2023-01-01 RX ORDER — PANTOPRAZOLE SODIUM 40 MG/1
40 TABLET, DELAYED RELEASE ORAL DAILY
Status: DISCONTINUED | OUTPATIENT
Start: 2023-01-01 | End: 2023-01-01

## 2023-01-01 RX ORDER — ASPIRIN 81 MG/1
81 TABLET ORAL 2 TIMES DAILY
Qty: 60 TABLET | Refills: 0 | Status: SHIPPED | OUTPATIENT
Start: 2023-01-01 | End: 2023-03-09

## 2023-01-01 RX ORDER — ALBUMIN (HUMAN) 12.5 G/50ML
25 SOLUTION INTRAVENOUS EVERY 8 HOURS
Status: DISCONTINUED | OUTPATIENT
Start: 2023-01-01 | End: 2023-01-01

## 2023-01-01 RX ORDER — ACETAMINOPHEN 650 MG/1
650 SUPPOSITORY RECTAL EVERY 6 HOURS PRN
Status: DISCONTINUED | OUTPATIENT
Start: 2023-01-01 | End: 2023-01-01 | Stop reason: HOSPADM

## 2023-01-01 RX ORDER — INSULIN LISPRO 100 [IU]/ML
0-4 INJECTION, SOLUTION INTRAVENOUS; SUBCUTANEOUS EVERY 4 HOURS
Status: DISCONTINUED | OUTPATIENT
Start: 2023-01-01 | End: 2023-01-01

## 2023-01-01 RX ORDER — ENOXAPARIN SODIUM 100 MG/ML
30 INJECTION SUBCUTANEOUS DAILY
Status: DISCONTINUED | OUTPATIENT
Start: 2023-01-01 | End: 2023-01-01

## 2023-01-01 RX ORDER — SUCCINYLCHOLINE/SOD CL,ISO/PF 200MG/10ML
SYRINGE (ML) INTRAVENOUS PRN
Status: DISCONTINUED | OUTPATIENT
Start: 2023-01-01 | End: 2023-01-01 | Stop reason: SDUPTHER

## 2023-01-01 RX ORDER — ONDANSETRON 2 MG/ML
INJECTION INTRAMUSCULAR; INTRAVENOUS PRN
Status: DISCONTINUED | OUTPATIENT
Start: 2023-01-01 | End: 2023-01-01 | Stop reason: SDUPTHER

## 2023-01-01 RX ORDER — SODIUM CHLORIDE 9 MG/ML
INJECTION, SOLUTION INTRAVENOUS CONTINUOUS PRN
Status: DISCONTINUED | OUTPATIENT
Start: 2023-01-01 | End: 2023-01-01 | Stop reason: SDUPTHER

## 2023-01-01 RX ORDER — INSULIN LISPRO 100 [IU]/ML
0-8 INJECTION, SOLUTION INTRAVENOUS; SUBCUTANEOUS
Status: DISCONTINUED | OUTPATIENT
Start: 2023-01-01 | End: 2023-01-01 | Stop reason: SDUPTHER

## 2023-01-01 RX ORDER — MIDAZOLAM HYDROCHLORIDE 1 MG/ML
INJECTION INTRAMUSCULAR; INTRAVENOUS
Status: COMPLETED
Start: 2023-01-01 | End: 2023-01-01

## 2023-01-01 RX ORDER — MIDODRINE HYDROCHLORIDE 5 MG/1
5 TABLET ORAL
Status: DISCONTINUED | OUTPATIENT
Start: 2023-01-01 | End: 2023-01-01 | Stop reason: HOSPADM

## 2023-01-01 RX ORDER — EPHEDRINE SULFATE/0.9% NACL/PF 50 MG/5 ML
SYRINGE (ML) INTRAVENOUS PRN
Status: DISCONTINUED | OUTPATIENT
Start: 2023-01-01 | End: 2023-01-01 | Stop reason: SDUPTHER

## 2023-01-01 RX ORDER — ROCURONIUM BROMIDE 10 MG/ML
INJECTION, SOLUTION INTRAVENOUS PRN
Status: DISCONTINUED | OUTPATIENT
Start: 2023-01-01 | End: 2023-01-01 | Stop reason: SDUPTHER

## 2023-01-01 RX ORDER — AMIODARONE HYDROCHLORIDE 200 MG/1
100 TABLET ORAL DAILY
Status: DISCONTINUED | OUTPATIENT
Start: 2023-01-01 | End: 2023-01-01 | Stop reason: HOSPADM

## 2023-01-01 RX ORDER — INSULIN LISPRO 100 [IU]/ML
0-4 INJECTION, SOLUTION INTRAVENOUS; SUBCUTANEOUS
Status: DISCONTINUED | OUTPATIENT
Start: 2023-01-01 | End: 2023-01-01

## 2023-01-01 RX ORDER — ALBUTEROL SULFATE 90 UG/1
2 AEROSOL, METERED RESPIRATORY (INHALATION) EVERY 4 HOURS PRN
Status: DISCONTINUED | OUTPATIENT
Start: 2023-01-01 | End: 2023-01-01 | Stop reason: HOSPADM

## 2023-01-01 RX ORDER — SODIUM CHLORIDE 9 MG/ML
INJECTION, SOLUTION INTRAVENOUS CONTINUOUS
Status: DISCONTINUED | OUTPATIENT
Start: 2023-01-01 | End: 2023-01-01

## 2023-01-01 RX ORDER — HYDRALAZINE HYDROCHLORIDE 25 MG/1
25 TABLET, FILM COATED ORAL 3 TIMES DAILY
Status: DISCONTINUED | OUTPATIENT
Start: 2023-01-01 | End: 2023-01-01

## 2023-01-01 RX ORDER — MORPHINE SULFATE 2 MG/ML
2 INJECTION, SOLUTION INTRAMUSCULAR; INTRAVENOUS ONCE
Status: CANCELLED | OUTPATIENT
Start: 2023-01-01

## 2023-01-01 RX ORDER — DEXTROSE MONOHYDRATE 100 MG/ML
INJECTION, SOLUTION INTRAVENOUS CONTINUOUS PRN
Status: DISCONTINUED | OUTPATIENT
Start: 2023-01-01 | End: 2023-01-01 | Stop reason: HOSPADM

## 2023-01-01 RX ORDER — DROPERIDOL 2.5 MG/ML
0.62 INJECTION, SOLUTION INTRAMUSCULAR; INTRAVENOUS
Status: DISCONTINUED | OUTPATIENT
Start: 2023-01-01 | End: 2023-01-01 | Stop reason: HOSPADM

## 2023-01-01 RX ORDER — ESMOLOL HYDROCHLORIDE 10 MG/ML
INJECTION INTRAVENOUS PRN
Status: DISCONTINUED | OUTPATIENT
Start: 2023-01-01 | End: 2023-01-01 | Stop reason: SDUPTHER

## 2023-01-01 RX ORDER — ONDANSETRON 2 MG/ML
4 INJECTION INTRAMUSCULAR; INTRAVENOUS
Status: DISCONTINUED | OUTPATIENT
Start: 2023-01-01 | End: 2023-01-01 | Stop reason: HOSPADM

## 2023-01-01 RX ORDER — SODIUM CHLORIDE 450 MG/100ML
INJECTION, SOLUTION INTRAVENOUS CONTINUOUS
Status: DISCONTINUED | OUTPATIENT
Start: 2023-01-01 | End: 2023-01-01

## 2023-01-01 RX ORDER — ATORVASTATIN CALCIUM 40 MG/1
40 TABLET, FILM COATED ORAL DAILY
Status: DISCONTINUED | OUTPATIENT
Start: 2023-01-01 | End: 2023-01-01 | Stop reason: HOSPADM

## 2023-01-01 RX ADMIN — TIOTROPIUM BROMIDE AND OLODATEROL 2 PUFF: 3.124; 2.736 SPRAY, METERED RESPIRATORY (INHALATION) at 09:29

## 2023-01-01 RX ADMIN — Medication 100 MCG: at 14:07

## 2023-01-01 RX ADMIN — AMIODARONE HYDROCHLORIDE 100 MG: 200 TABLET ORAL at 09:18

## 2023-01-01 RX ADMIN — FERROUS SULFATE TAB EC 324 MG (65 MG FE EQUIVALENT) 325 MG: 324 (65 FE) TABLET DELAYED RESPONSE at 09:18

## 2023-01-01 RX ADMIN — DOCUSATE SODIUM 100 MG: 100 CAPSULE, LIQUID FILLED ORAL at 09:05

## 2023-01-01 RX ADMIN — HYDRALAZINE HYDROCHLORIDE 25 MG: 25 TABLET, FILM COATED ORAL at 23:46

## 2023-01-01 RX ADMIN — Medication 50 MCG: at 04:08

## 2023-01-01 RX ADMIN — SODIUM CHLORIDE: 4.5 INJECTION, SOLUTION INTRAVENOUS at 18:11

## 2023-01-01 RX ADMIN — AMIODARONE HYDROCHLORIDE 100 MG: 200 TABLET ORAL at 08:27

## 2023-01-01 RX ADMIN — PANTOPRAZOLE SODIUM 40 MG: 40 TABLET, DELAYED RELEASE ORAL at 09:05

## 2023-01-01 RX ADMIN — Medication 10 ML: at 20:44

## 2023-01-01 RX ADMIN — CEFAZOLIN 2000 MG: 2 INJECTION, POWDER, FOR SOLUTION INTRAMUSCULAR; INTRAVENOUS at 01:58

## 2023-01-01 RX ADMIN — PREDNISONE 20 MG: 20 TABLET ORAL at 09:05

## 2023-01-01 RX ADMIN — DOCUSATE SODIUM 100 MG: 100 CAPSULE, LIQUID FILLED ORAL at 09:19

## 2023-01-01 RX ADMIN — INSULIN LISPRO 1 UNITS: 100 INJECTION, SOLUTION INTRAVENOUS; SUBCUTANEOUS at 09:25

## 2023-01-01 RX ADMIN — FERROUS SULFATE TAB EC 324 MG (65 MG FE EQUIVALENT) 325 MG: 324 (65 FE) TABLET DELAYED RESPONSE at 09:05

## 2023-01-01 RX ADMIN — TAMSULOSIN HYDROCHLORIDE 0.4 MG: 0.4 CAPSULE ORAL at 09:18

## 2023-01-01 RX ADMIN — SODIUM CHLORIDE: 9 INJECTION, SOLUTION INTRAVENOUS at 13:50

## 2023-01-01 RX ADMIN — Medication 10 ML: at 09:06

## 2023-01-01 RX ADMIN — Medication 10 MG: at 14:05

## 2023-01-01 RX ADMIN — MORPHINE SULFATE 4 MG: 4 INJECTION, SOLUTION INTRAMUSCULAR; INTRAVENOUS at 09:03

## 2023-01-01 RX ADMIN — Medication 10 ML: at 23:33

## 2023-01-01 RX ADMIN — Medication 400 MG: at 09:05

## 2023-01-01 RX ADMIN — SODIUM CHLORIDE: 9 INJECTION, SOLUTION INTRAVENOUS at 05:26

## 2023-01-01 RX ADMIN — DOCUSATE SODIUM 100 MG: 100 CAPSULE, LIQUID FILLED ORAL at 20:24

## 2023-01-01 RX ADMIN — ENOXAPARIN SODIUM 30 MG: 100 INJECTION SUBCUTANEOUS at 09:20

## 2023-01-01 RX ADMIN — Medication 120 MG: at 13:57

## 2023-01-01 RX ADMIN — DOCUSATE SODIUM 100 MG: 100 CAPSULE, LIQUID FILLED ORAL at 20:44

## 2023-01-01 RX ADMIN — ALBUMIN (HUMAN) 25 G: 0.25 INJECTION, SOLUTION INTRAVENOUS at 00:28

## 2023-01-01 RX ADMIN — INSULIN LISPRO 1 UNITS: 100 INJECTION, SOLUTION INTRAVENOUS; SUBCUTANEOUS at 13:36

## 2023-01-01 RX ADMIN — MIDAZOLAM HYDROCHLORIDE 2 MG: 1 INJECTION INTRAMUSCULAR; INTRAVENOUS at 05:01

## 2023-01-01 RX ADMIN — HYDRALAZINE HYDROCHLORIDE 25 MG: 25 TABLET, FILM COATED ORAL at 08:26

## 2023-01-01 RX ADMIN — TIOTROPIUM BROMIDE AND OLODATEROL 2 PUFF: 3.124; 2.736 SPRAY, METERED RESPIRATORY (INHALATION) at 09:44

## 2023-01-01 RX ADMIN — Medication 50 MCG: at 04:53

## 2023-01-01 RX ADMIN — INSULIN LISPRO 1 UNITS: 100 INJECTION, SOLUTION INTRAVENOUS; SUBCUTANEOUS at 17:23

## 2023-01-01 RX ADMIN — INSULIN LISPRO 2 UNITS: 100 INJECTION, SOLUTION INTRAVENOUS; SUBCUTANEOUS at 17:05

## 2023-01-01 RX ADMIN — IPRATROPIUM BROMIDE AND ALBUTEROL SULFATE 1 AMPULE: 2.5; .5 SOLUTION RESPIRATORY (INHALATION) at 12:44

## 2023-01-01 RX ADMIN — Medication 50 MG: at 09:19

## 2023-01-01 RX ADMIN — FENTANYL CITRATE 25 MCG: 50 INJECTION INTRAMUSCULAR; INTRAVENOUS at 15:25

## 2023-01-01 RX ADMIN — LIDOCAINE HYDROCHLORIDE 60 MG: 20 INJECTION, SOLUTION EPIDURAL; INFILTRATION; INTRACAUDAL; PERINEURAL at 13:57

## 2023-01-01 RX ADMIN — DOCUSATE SODIUM 100 MG: 100 CAPSULE, LIQUID FILLED ORAL at 23:30

## 2023-01-01 RX ADMIN — MORPHINE SULFATE 2 MG: 2 INJECTION, SOLUTION INTRAMUSCULAR; INTRAVENOUS at 00:10

## 2023-01-01 RX ADMIN — Medication 400 MG: at 20:44

## 2023-01-01 RX ADMIN — INSULIN LISPRO 1 UNITS: 100 INJECTION, SOLUTION INTRAVENOUS; SUBCUTANEOUS at 23:32

## 2023-01-01 RX ADMIN — MONTELUKAST 10 MG: 10 TABLET, FILM COATED ORAL at 09:05

## 2023-01-01 RX ADMIN — Medication 400 MG: at 23:29

## 2023-01-01 RX ADMIN — Medication 25 MCG/HR: at 04:01

## 2023-01-01 RX ADMIN — DEXAMETHASONE SODIUM PHOSPHATE 8 MG: 4 INJECTION, SOLUTION INTRAMUSCULAR; INTRAVENOUS at 14:10

## 2023-01-01 RX ADMIN — BISACODYL 5 MG: 5 TABLET, COATED ORAL at 09:19

## 2023-01-01 RX ADMIN — Medication 100 MCG: at 14:00

## 2023-01-01 RX ADMIN — MONTELUKAST 10 MG: 10 TABLET, FILM COATED ORAL at 09:18

## 2023-01-01 RX ADMIN — TAMSULOSIN HYDROCHLORIDE 0.4 MG: 0.4 CAPSULE ORAL at 09:05

## 2023-01-01 RX ADMIN — SODIUM CHLORIDE: 9 INJECTION, SOLUTION INTRAVENOUS at 03:45

## 2023-01-01 RX ADMIN — FENTANYL CITRATE 25 MCG: 50 INJECTION INTRAMUSCULAR; INTRAVENOUS at 13:50

## 2023-01-01 RX ADMIN — ENOXAPARIN SODIUM 30 MG: 100 INJECTION SUBCUTANEOUS at 09:05

## 2023-01-01 RX ADMIN — FENTANYL CITRATE 50 MCG: 50 INJECTION INTRAMUSCULAR; INTRAVENOUS at 13:57

## 2023-01-01 RX ADMIN — HYDRALAZINE HYDROCHLORIDE 25 MG: 25 TABLET, FILM COATED ORAL at 09:19

## 2023-01-01 RX ADMIN — CEFAZOLIN 2000 MG: 2 INJECTION, POWDER, FOR SOLUTION INTRAMUSCULAR; INTRAVENOUS at 18:13

## 2023-01-01 RX ADMIN — PANTOPRAZOLE SODIUM 40 MG: 40 TABLET, DELAYED RELEASE ORAL at 09:18

## 2023-01-01 RX ADMIN — POLYETHYLENE GLYCOL 3350 17 G: 17 POWDER, FOR SOLUTION ORAL at 09:05

## 2023-01-01 RX ADMIN — Medication 10 ML: at 09:22

## 2023-01-01 RX ADMIN — Medication 10 ML: at 08:27

## 2023-01-01 RX ADMIN — Medication 10 ML: at 20:24

## 2023-01-01 RX ADMIN — FUROSEMIDE 5 MG/HR: 10 INJECTION, SOLUTION INTRAMUSCULAR; INTRAVENOUS at 23:48

## 2023-01-01 RX ADMIN — HYDRALAZINE HYDROCHLORIDE 25 MG: 25 TABLET, FILM COATED ORAL at 15:53

## 2023-01-01 RX ADMIN — Medication 10 ML: at 23:46

## 2023-01-01 RX ADMIN — Medication 50 MG: at 09:05

## 2023-01-01 RX ADMIN — ROCURONIUM BROMIDE 50 MG: 10 INJECTION INTRAVENOUS at 14:16

## 2023-01-01 RX ADMIN — AMIODARONE HYDROCHLORIDE 100 MG: 200 TABLET ORAL at 09:05

## 2023-01-01 RX ADMIN — POLYETHYLENE GLYCOL 3350 17 G: 17 POWDER, FOR SOLUTION ORAL at 09:20

## 2023-01-01 RX ADMIN — MORPHINE SULFATE 2 MG: 4 INJECTION, SOLUTION INTRAMUSCULAR; INTRAVENOUS at 09:23

## 2023-01-01 RX ADMIN — Medication 50 MCG: at 06:37

## 2023-01-01 RX ADMIN — ALBUMIN (HUMAN) 25 G: 0.25 INJECTION, SOLUTION INTRAVENOUS at 17:04

## 2023-01-01 RX ADMIN — MORPHINE SULFATE 2 MG: 2 INJECTION, SOLUTION INTRAMUSCULAR; INTRAVENOUS at 11:24

## 2023-01-01 RX ADMIN — ONDANSETRON 4 MG: 2 INJECTION INTRAMUSCULAR; INTRAVENOUS at 15:25

## 2023-01-01 RX ADMIN — PROPOFOL 150 MG: 10 INJECTION, EMULSION INTRAVENOUS at 13:57

## 2023-01-01 RX ADMIN — PREDNISONE 20 MG: 20 TABLET ORAL at 09:19

## 2023-01-01 RX ADMIN — Medication 400 MG: at 20:24

## 2023-01-01 RX ADMIN — ESMOLOL HYDROCHLORIDE 40 MG: 100 INJECTION, SOLUTION INTRAVENOUS at 14:33

## 2023-01-01 RX ADMIN — INSULIN LISPRO 4 UNITS: 100 INJECTION, SOLUTION INTRAVENOUS; SUBCUTANEOUS at 20:45

## 2023-01-01 RX ADMIN — PANTOPRAZOLE SODIUM 40 MG: 40 TABLET, DELAYED RELEASE ORAL at 08:26

## 2023-01-01 RX ADMIN — MORPHINE SULFATE 2 MG: 2 INJECTION, SOLUTION INTRAMUSCULAR; INTRAVENOUS at 21:17

## 2023-01-01 RX ADMIN — CEFEPIME 2000 MG: 2 INJECTION, POWDER, FOR SOLUTION INTRAVENOUS at 05:32

## 2023-01-01 RX ADMIN — SUGAMMADEX 200 MG: 100 INJECTION, SOLUTION INTRAVENOUS at 15:34

## 2023-01-01 RX ADMIN — INSULIN GLARGINE 10 UNITS: 100 INJECTION, SOLUTION SUBCUTANEOUS at 09:07

## 2023-01-01 RX ADMIN — ESMOLOL HYDROCHLORIDE 40 MG: 100 INJECTION, SOLUTION INTRAVENOUS at 15:05

## 2023-01-01 RX ADMIN — INSULIN GLARGINE 10 UNITS: 100 INJECTION, SOLUTION SUBCUTANEOUS at 09:25

## 2023-01-01 RX ADMIN — MONTELUKAST 10 MG: 10 TABLET, FILM COATED ORAL at 08:26

## 2023-01-01 RX ADMIN — Medication 100 MCG: at 14:03

## 2023-01-01 RX ADMIN — HYDRALAZINE HYDROCHLORIDE 25 MG: 25 TABLET, FILM COATED ORAL at 20:44

## 2023-01-01 RX ADMIN — BISACODYL 5 MG: 5 TABLET, COATED ORAL at 09:05

## 2023-01-01 RX ADMIN — ATORVASTATIN CALCIUM 40 MG: 40 TABLET, FILM COATED ORAL at 09:18

## 2023-01-01 RX ADMIN — ACETAMINOPHEN 650 MG: 325 TABLET ORAL at 00:10

## 2023-01-01 RX ADMIN — PROPOFOL 5 MCG/KG/MIN: 10 INJECTION, EMULSION INTRAVENOUS at 04:02

## 2023-01-01 RX ADMIN — Medication 400 MG: at 09:18

## 2023-01-01 RX ADMIN — TAMSULOSIN HYDROCHLORIDE 0.4 MG: 0.4 CAPSULE ORAL at 08:27

## 2023-01-01 RX ADMIN — MORPHINE SULFATE 2 MG: 2 INJECTION, SOLUTION INTRAMUSCULAR; INTRAVENOUS at 05:29

## 2023-01-01 RX ADMIN — ATORVASTATIN CALCIUM 40 MG: 40 TABLET, FILM COATED ORAL at 09:05

## 2023-01-01 RX ADMIN — INSULIN LISPRO 1 UNITS: 100 INJECTION, SOLUTION INTRAVENOUS; SUBCUTANEOUS at 12:51

## 2023-01-01 RX ADMIN — HYDRALAZINE HYDROCHLORIDE 25 MG: 25 TABLET, FILM COATED ORAL at 09:05

## 2023-01-01 RX ADMIN — MORPHINE SULFATE 2 MG: 2 INJECTION, SOLUTION INTRAMUSCULAR; INTRAVENOUS at 23:38

## 2023-01-01 RX ADMIN — MIDODRINE HYDROCHLORIDE 5 MG: 5 TABLET ORAL at 16:57

## 2023-01-01 RX ADMIN — MIDAZOLAM 2 MG: 1 INJECTION INTRAMUSCULAR; INTRAVENOUS at 05:01

## 2023-01-01 RX ADMIN — METHOCARBAMOL 500 MG: 500 TABLET ORAL at 21:18

## 2023-01-01 RX ADMIN — Medication 10 MCG/MIN: at 05:07

## 2023-01-01 ASSESSMENT — PAIN DESCRIPTION - ORIENTATION
ORIENTATION: LEFT

## 2023-01-01 ASSESSMENT — PAIN - FUNCTIONAL ASSESSMENT
PAIN_FUNCTIONAL_ASSESSMENT: PREVENTS OR INTERFERES SOME ACTIVE ACTIVITIES AND ADLS
PAIN_FUNCTIONAL_ASSESSMENT: PREVENTS OR INTERFERES SOME ACTIVE ACTIVITIES AND ADLS
PAIN_FUNCTIONAL_ASSESSMENT: 0-10

## 2023-01-01 ASSESSMENT — PULMONARY FUNCTION TESTS
PIF_VALUE: 41
PIF_VALUE: 35
PIF_VALUE: 36
PIF_VALUE: 41
PIF_VALUE: 27
PIF_VALUE: 33
PIF_VALUE: 35
PIF_VALUE: 39
PIF_VALUE: 38
PIF_VALUE: 40
PIF_VALUE: 35
PIF_VALUE: 27
PIF_VALUE: 35
PIF_VALUE: 29
PIF_VALUE: 34
PIF_VALUE: 41
PIF_VALUE: 41
PIF_VALUE: 27
PIF_VALUE: 41
PIF_VALUE: 27
PIF_VALUE: 27
PIF_VALUE: 38
PIF_VALUE: 27
PIF_VALUE: 33
PIF_VALUE: 32
PIF_VALUE: 36
PIF_VALUE: 27

## 2023-01-01 ASSESSMENT — PAIN DESCRIPTION - LOCATION
LOCATION: ARM
LOCATION: ARM;SHOULDER
LOCATION: ARM

## 2023-01-01 ASSESSMENT — PAIN DESCRIPTION - FREQUENCY: FREQUENCY: INTERMITTENT

## 2023-01-01 ASSESSMENT — PAIN SCALES - GENERAL
PAINLEVEL_OUTOF10: 0
PAINLEVEL_OUTOF10: 10
PAINLEVEL_OUTOF10: 10
PAINLEVEL_OUTOF10: 7
PAINLEVEL_OUTOF10: 10
PAINLEVEL_OUTOF10: 5
PAINLEVEL_OUTOF10: 2
PAINLEVEL_OUTOF10: 8
PAINLEVEL_OUTOF10: 4
PAINLEVEL_OUTOF10: 10
PAINLEVEL_OUTOF10: 7

## 2023-01-01 ASSESSMENT — PAIN DESCRIPTION - ONSET: ONSET: ON-GOING

## 2023-01-01 ASSESSMENT — ENCOUNTER SYMPTOMS
CONSTIPATION: 1
EYES NEGATIVE: 1
SHORTNESS OF BREATH: 0
COLOR CHANGE: 1
BACK PAIN: 0

## 2023-01-01 ASSESSMENT — PAIN DESCRIPTION - DESCRIPTORS
DESCRIPTORS: SORE
DESCRIPTORS: ACHING

## 2023-01-01 ASSESSMENT — PAIN DESCRIPTION - PAIN TYPE: TYPE: SURGICAL PAIN

## 2023-01-17 ENCOUNTER — HOSPITAL ENCOUNTER (INPATIENT)
Age: 83
LOS: 18 days | Discharge: NURSING, REHAB OR LONG-TERM CARE | DRG: 286 | End: 2023-02-04
Attending: INTERNAL MEDICINE | Admitting: INTERNAL MEDICINE
Payer: MEDICARE

## 2023-01-17 ENCOUNTER — APPOINTMENT (OUTPATIENT)
Dept: GENERAL RADIOLOGY | Age: 83
DRG: 286 | End: 2023-01-17
Payer: MEDICARE

## 2023-01-17 ENCOUNTER — DIRECT ADMIT ORDERS (OUTPATIENT)
Dept: INTERNAL MEDICINE CLINIC | Age: 83
End: 2023-01-17

## 2023-01-17 DIAGNOSIS — N18.9 CHRONIC KIDNEY DISEASE, UNSPECIFIED CKD STAGE: ICD-10-CM

## 2023-01-17 DIAGNOSIS — N39.0 URINARY TRACT INFECTION WITH HEMATURIA, SITE UNSPECIFIED: ICD-10-CM

## 2023-01-17 DIAGNOSIS — R31.9 URINARY TRACT INFECTION WITH HEMATURIA, SITE UNSPECIFIED: ICD-10-CM

## 2023-01-17 DIAGNOSIS — I50.9 ACUTE ON CHRONIC CONGESTIVE HEART FAILURE, UNSPECIFIED HEART FAILURE TYPE (HCC): Primary | ICD-10-CM

## 2023-01-17 DIAGNOSIS — R77.8 ELEVATED TROPONIN: ICD-10-CM

## 2023-01-17 PROBLEM — R00.1 BRADYCARDIA: Status: RESOLVED | Noted: 2022-01-01 | Resolved: 2023-01-01

## 2023-01-17 PROBLEM — I95.9 HYPOTENSION, UNSPECIFIED: Status: RESOLVED | Noted: 2022-01-01 | Resolved: 2023-01-01

## 2023-01-17 PROBLEM — R63.5 WEIGHT GAIN: Status: RESOLVED | Noted: 2022-01-01 | Resolved: 2023-01-01

## 2023-01-17 PROBLEM — I50.43 CHF (CONGESTIVE HEART FAILURE), NYHA CLASS I, ACUTE ON CHRONIC, COMBINED (HCC): Status: ACTIVE | Noted: 2023-01-01

## 2023-01-17 PROBLEM — I50.33 ACUTE ON CHRONIC DIASTOLIC CONGESTIVE HEART FAILURE (HCC): Status: ACTIVE | Noted: 2023-01-01

## 2023-01-17 PROBLEM — N17.9 AKI (ACUTE KIDNEY INJURY) (HCC): Status: RESOLVED | Noted: 2018-12-26 | Resolved: 2023-01-17

## 2023-01-17 PROBLEM — R55 SYNCOPE AND COLLAPSE: Status: RESOLVED | Noted: 2022-01-01 | Resolved: 2023-01-01

## 2023-01-17 LAB
A/G RATIO: 1.1 (ref 1.1–2.2)
ALBUMIN SERPL-MCNC: 3.1 G/DL (ref 3.4–5)
ALP BLD-CCNC: 81 U/L (ref 40–129)
ALT SERPL-CCNC: 30 U/L (ref 10–40)
ANION GAP SERPL CALCULATED.3IONS-SCNC: 10 MMOL/L (ref 3–16)
APTT: 28.7 SEC (ref 23–34.3)
AST SERPL-CCNC: 16 U/L (ref 15–37)
BACTERIA: ABNORMAL /HPF
BASE EXCESS VENOUS: 6.3 MMOL/L
BASOPHILS ABSOLUTE: 0 K/UL (ref 0–0.2)
BASOPHILS RELATIVE PERCENT: 0.5 %
BILIRUB SERPL-MCNC: 0.5 MG/DL (ref 0–1)
BUN BLDV-MCNC: 66 MG/DL (ref 7–20)
CALCIUM SERPL-MCNC: 7.7 MG/DL (ref 8.3–10.6)
CARBOXYHEMOGLOBIN: 1.9 %
CHLORIDE BLD-SCNC: 103 MMOL/L (ref 99–110)
CO2: 30 MMOL/L (ref 21–32)
CREAT SERPL-MCNC: 1.8 MG/DL (ref 0.8–1.3)
EKG DIAGNOSIS: NORMAL
EKG Q-T INTERVAL: 376 MS
EKG QRS DURATION: 142 MS
EKG QTC CALCULATION (BAZETT): 490 MS
EKG R AXIS: -29 DEGREES
EKG T AXIS: 42 DEGREES
EKG VENTRICULAR RATE: 102 BPM
EOSINOPHILS ABSOLUTE: 0 K/UL (ref 0–0.6)
EOSINOPHILS RELATIVE PERCENT: 0.1 %
EPITHELIAL CELLS, UA: 0 /HPF (ref 0–5)
GFR SERPL CREATININE-BSD FRML MDRD: 37 ML/MIN/{1.73_M2}
GLUCOSE BLD-MCNC: 143 MG/DL (ref 70–99)
GLUCOSE BLD-MCNC: 146 MG/DL (ref 70–99)
HCO3 VENOUS: 33 MMOL/L (ref 23–29)
HCT VFR BLD CALC: 31.9 % (ref 40.5–52.5)
HEMOGLOBIN: 9.6 G/DL (ref 13.5–17.5)
HYALINE CASTS: 18 /LPF (ref 0–8)
INR BLD: 1.33 (ref 0.87–1.14)
LACTIC ACID, SEPSIS: 1 MMOL/L (ref 0.4–1.9)
LACTIC ACID, SEPSIS: 1.8 MMOL/L (ref 0.4–1.9)
LYMPHOCYTES ABSOLUTE: 0.2 K/UL (ref 1–5.1)
LYMPHOCYTES RELATIVE PERCENT: 1.5 %
MCH RBC QN AUTO: 25.2 PG (ref 26–34)
MCHC RBC AUTO-ENTMCNC: 30 G/DL (ref 31–36)
MCV RBC AUTO: 84.1 FL (ref 80–100)
METHEMOGLOBIN VENOUS: 0.4 %
MONOCYTES ABSOLUTE: 0.4 K/UL (ref 0–1.3)
MONOCYTES RELATIVE PERCENT: 4.5 %
NEUTROPHILS ABSOLUTE: 9.3 K/UL (ref 1.7–7.7)
NEUTROPHILS RELATIVE PERCENT: 93.4 %
O2 SAT, VEN: 84 %
O2 THERAPY: ABNORMAL
PCO2, VEN: 58.5 MMHG (ref 40–50)
PDW BLD-RTO: 20.5 % (ref 12.4–15.4)
PERFORMED ON: ABNORMAL
PH VENOUS: 7.36 (ref 7.35–7.45)
PLATELET # BLD: 151 K/UL (ref 135–450)
PMV BLD AUTO: 7.9 FL (ref 5–10.5)
PO2, VEN: 52 MMHG
POTASSIUM REFLEX MAGNESIUM: 4.2 MMOL/L (ref 3.5–5.1)
PRO-BNP: 2497 PG/ML (ref 0–449)
PROCALCITONIN: 0.37 NG/ML (ref 0–0.15)
PROTHROMBIN TIME: 16.4 SEC (ref 11.7–14.5)
RAPID INFLUENZA  B AGN: NEGATIVE
RAPID INFLUENZA A AGN: NEGATIVE
RBC # BLD: 3.79 M/UL (ref 4.2–5.9)
RBC UA: 1 /HPF (ref 0–4)
SARS-COV-2, NAAT: NOT DETECTED
SODIUM BLD-SCNC: 143 MMOL/L (ref 136–145)
TCO2 CALC VENOUS: 35 MMOL/L
TOTAL PROTEIN: 5.9 G/DL (ref 6.4–8.2)
TROPONIN: 0.07 NG/ML
WBC # BLD: 10 K/UL (ref 4–11)
WBC UA: 107 /HPF (ref 0–5)

## 2023-01-17 PROCEDURE — 80053 COMPREHEN METABOLIC PANEL: CPT

## 2023-01-17 PROCEDURE — 87088 URINE BACTERIA CULTURE: CPT

## 2023-01-17 PROCEDURE — 36415 COLL VENOUS BLD VENIPUNCTURE: CPT

## 2023-01-17 PROCEDURE — 6360000002 HC RX W HCPCS: Performed by: GENERAL ACUTE CARE HOSPITAL

## 2023-01-17 PROCEDURE — 87086 URINE CULTURE/COLONY COUNT: CPT

## 2023-01-17 PROCEDURE — 85730 THROMBOPLASTIN TIME PARTIAL: CPT

## 2023-01-17 PROCEDURE — 81001 URINALYSIS AUTO W/SCOPE: CPT

## 2023-01-17 PROCEDURE — 85025 COMPLETE CBC W/AUTO DIFF WBC: CPT

## 2023-01-17 PROCEDURE — 93010 ELECTROCARDIOGRAM REPORT: CPT | Performed by: INTERNAL MEDICINE

## 2023-01-17 PROCEDURE — 96374 THER/PROPH/DIAG INJ IV PUSH: CPT

## 2023-01-17 PROCEDURE — 83880 ASSAY OF NATRIURETIC PEPTIDE: CPT

## 2023-01-17 PROCEDURE — 85610 PROTHROMBIN TIME: CPT

## 2023-01-17 PROCEDURE — 87186 SC STD MICRODIL/AGAR DIL: CPT

## 2023-01-17 PROCEDURE — 93005 ELECTROCARDIOGRAM TRACING: CPT | Performed by: INTERNAL MEDICINE

## 2023-01-17 PROCEDURE — 87804 INFLUENZA ASSAY W/OPTIC: CPT

## 2023-01-17 PROCEDURE — 99285 EMERGENCY DEPT VISIT HI MDM: CPT

## 2023-01-17 PROCEDURE — 84145 PROCALCITONIN (PCT): CPT

## 2023-01-17 PROCEDURE — 83605 ASSAY OF LACTIC ACID: CPT

## 2023-01-17 PROCEDURE — 87635 SARS-COV-2 COVID-19 AMP PRB: CPT

## 2023-01-17 PROCEDURE — 6370000000 HC RX 637 (ALT 250 FOR IP): Performed by: INTERNAL MEDICINE

## 2023-01-17 PROCEDURE — 87040 BLOOD CULTURE FOR BACTERIA: CPT

## 2023-01-17 PROCEDURE — 71045 X-RAY EXAM CHEST 1 VIEW: CPT

## 2023-01-17 PROCEDURE — 2580000003 HC RX 258: Performed by: GENERAL ACUTE CARE HOSPITAL

## 2023-01-17 PROCEDURE — 82803 BLOOD GASES ANY COMBINATION: CPT

## 2023-01-17 PROCEDURE — 84484 ASSAY OF TROPONIN QUANT: CPT

## 2023-01-17 PROCEDURE — 1200000000 HC SEMI PRIVATE

## 2023-01-17 RX ORDER — PREDNISONE 20 MG/1
20 TABLET ORAL ONCE
Status: COMPLETED | OUTPATIENT
Start: 2023-01-18 | End: 2023-01-17

## 2023-01-17 RX ORDER — VALSARTAN AND HYDROCHLOROTHIAZIDE 160; 12.5 MG/1; MG/1
2 TABLET, FILM COATED ORAL DAILY
Status: DISCONTINUED | OUTPATIENT
Start: 2023-01-18 | End: 2023-01-18

## 2023-01-17 RX ORDER — HYDRALAZINE HYDROCHLORIDE 10 MG/1
10 TABLET, FILM COATED ORAL EVERY 8 HOURS SCHEDULED
Status: DISCONTINUED | OUTPATIENT
Start: 2023-01-17 | End: 2023-01-28

## 2023-01-17 RX ORDER — PREDNISONE 10 MG/1
10 TABLET ORAL DAILY
Status: DISCONTINUED | OUTPATIENT
Start: 2023-01-19 | End: 2023-01-24

## 2023-01-17 RX ORDER — ZINC SULFATE 50(220)MG
50 CAPSULE ORAL DAILY
COMMUNITY

## 2023-01-17 RX ORDER — ALBUTEROL SULFATE 90 UG/1
2 AEROSOL, METERED RESPIRATORY (INHALATION) EVERY 4 HOURS PRN
Status: DISCONTINUED | OUTPATIENT
Start: 2023-01-17 | End: 2023-01-28

## 2023-01-17 RX ORDER — MONTELUKAST SODIUM 10 MG/1
10 TABLET ORAL DAILY
Status: DISCONTINUED | OUTPATIENT
Start: 2023-01-18 | End: 2023-02-04 | Stop reason: HOSPADM

## 2023-01-17 RX ORDER — INSULIN LISPRO 100 [IU]/ML
0-4 INJECTION, SOLUTION INTRAVENOUS; SUBCUTANEOUS NIGHTLY
Status: DISCONTINUED | OUTPATIENT
Start: 2023-01-18 | End: 2023-02-04

## 2023-01-17 RX ORDER — ASPIRIN 81 MG/1
162 TABLET ORAL DAILY
Status: DISCONTINUED | OUTPATIENT
Start: 2023-01-18 | End: 2023-02-04 | Stop reason: HOSPADM

## 2023-01-17 RX ORDER — PANTOPRAZOLE SODIUM 40 MG/1
40 TABLET, DELAYED RELEASE ORAL
Status: DISCONTINUED | OUTPATIENT
Start: 2023-01-18 | End: 2023-02-04 | Stop reason: HOSPADM

## 2023-01-17 RX ORDER — INSULIN LISPRO 100 [IU]/ML
0-4 INJECTION, SOLUTION INTRAVENOUS; SUBCUTANEOUS
Status: DISCONTINUED | OUTPATIENT
Start: 2023-01-18 | End: 2023-02-04

## 2023-01-17 RX ORDER — POLYETHYLENE GLYCOL 3350 17 G/17G
17 POWDER, FOR SOLUTION ORAL DAILY
Status: DISCONTINUED | OUTPATIENT
Start: 2023-01-18 | End: 2023-02-04 | Stop reason: HOSPADM

## 2023-01-17 RX ORDER — LACTOBACILLUS RHAMNOSUS GG 10B CELL
1 CAPSULE ORAL 2 TIMES DAILY WITH MEALS
Status: DISCONTINUED | OUTPATIENT
Start: 2023-01-18 | End: 2023-02-04 | Stop reason: HOSPADM

## 2023-01-17 RX ORDER — DOCUSATE SODIUM 100 MG/1
100 CAPSULE, LIQUID FILLED ORAL 2 TIMES DAILY
Status: DISCONTINUED | OUTPATIENT
Start: 2023-01-17 | End: 2023-02-04 | Stop reason: HOSPADM

## 2023-01-17 RX ORDER — FUROSEMIDE 10 MG/ML
40 INJECTION INTRAMUSCULAR; INTRAVENOUS 2 TIMES DAILY
Status: DISCONTINUED | OUTPATIENT
Start: 2023-01-17 | End: 2023-01-17

## 2023-01-17 RX ORDER — AMIODARONE HYDROCHLORIDE 200 MG/1
100 TABLET ORAL DAILY
Status: DISCONTINUED | OUTPATIENT
Start: 2023-01-18 | End: 2023-01-24

## 2023-01-17 RX ORDER — FUROSEMIDE 10 MG/ML
40 INJECTION INTRAMUSCULAR; INTRAVENOUS ONCE
Status: COMPLETED | OUTPATIENT
Start: 2023-01-17 | End: 2023-01-17

## 2023-01-17 RX ORDER — LEVALBUTEROL 1.25 MG/.5ML
1.25 SOLUTION, CONCENTRATE RESPIRATORY (INHALATION) EVERY 6 HOURS PRN
Status: DISCONTINUED | OUTPATIENT
Start: 2023-01-17 | End: 2023-01-28

## 2023-01-17 RX ORDER — DEXTROSE MONOHYDRATE 100 MG/ML
INJECTION, SOLUTION INTRAVENOUS CONTINUOUS PRN
Status: DISCONTINUED | OUTPATIENT
Start: 2023-01-17 | End: 2023-02-04 | Stop reason: HOSPADM

## 2023-01-17 RX ORDER — ASCORBIC ACID 500 MG
500 TABLET ORAL DAILY
COMMUNITY

## 2023-01-17 RX ORDER — FERROUS SULFATE TAB EC 324 MG (65 MG FE EQUIVALENT) 324 (65 FE) MG
324 TABLET DELAYED RESPONSE ORAL DAILY
Status: DISCONTINUED | OUTPATIENT
Start: 2023-01-18 | End: 2023-01-20

## 2023-01-17 RX ORDER — M-VIT,TX,IRON,MINS/CALC/FOLIC 27MG-0.4MG
1 TABLET ORAL DAILY
COMMUNITY

## 2023-01-17 RX ORDER — FUROSEMIDE 10 MG/ML
40 INJECTION INTRAMUSCULAR; INTRAVENOUS 2 TIMES DAILY
Status: DISCONTINUED | OUTPATIENT
Start: 2023-01-18 | End: 2023-01-18

## 2023-01-17 RX ORDER — ATORVASTATIN CALCIUM 40 MG/1
40 TABLET, FILM COATED ORAL DAILY
Status: DISCONTINUED | OUTPATIENT
Start: 2023-01-18 | End: 2023-02-04 | Stop reason: HOSPADM

## 2023-01-17 RX ORDER — VITAMIN B COMPLEX
1 TABLET ORAL DAILY
COMMUNITY

## 2023-01-17 RX ORDER — TAMSULOSIN HYDROCHLORIDE 0.4 MG/1
0.4 CAPSULE ORAL DAILY
Status: DISCONTINUED | OUTPATIENT
Start: 2023-01-18 | End: 2023-02-04 | Stop reason: HOSPADM

## 2023-01-17 RX ORDER — VALSARTAN AND HYDROCHLOROTHIAZIDE 320; 25 MG/1; MG/1
1 TABLET, FILM COATED ORAL DAILY
Status: ON HOLD | COMMUNITY
End: 2023-01-24

## 2023-01-17 RX ADMIN — FUROSEMIDE 40 MG: 10 INJECTION, SOLUTION INTRAVENOUS at 17:03

## 2023-01-17 RX ADMIN — HYDRALAZINE HYDROCHLORIDE 10 MG: 10 TABLET, FILM COATED ORAL at 23:53

## 2023-01-17 RX ADMIN — PREDNISONE 20 MG: 20 TABLET ORAL at 23:54

## 2023-01-17 RX ADMIN — DOCUSATE SODIUM 100 MG: 100 CAPSULE, LIQUID FILLED ORAL at 23:54

## 2023-01-17 RX ADMIN — CEFEPIME 2000 MG: 2 INJECTION, POWDER, FOR SOLUTION INTRAVENOUS at 22:18

## 2023-01-17 ASSESSMENT — ENCOUNTER SYMPTOMS
VOMITING: 0
SHORTNESS OF BREATH: 1
SORE THROAT: 0
NAUSEA: 0
CHEST TIGHTNESS: 0
ABDOMINAL PAIN: 0
ABDOMINAL DISTENTION: 1
BACK PAIN: 0
WHEEZING: 0
VOICE CHANGE: 0
COUGH: 1

## 2023-01-17 ASSESSMENT — PAIN - FUNCTIONAL ASSESSMENT: PAIN_FUNCTIONAL_ASSESSMENT: 0-10

## 2023-01-17 NOTE — ED PROVIDER NOTES
[facility]  EMERGENCY DEPARTMENT ENCOUNTER        Pt Name: Sasha Clinton  MRN: [id number]  [place] 1940  Date of evaluation: 1/17/2023  Provider: SILVIANO Gonzalez - CNP  PCP: Rodríguez Jasso MD  Note Started: 6:11 PM EST 1/17/23      VICTORINA. I have evaluated this patient. My supervising physician was available for consultation. CHIEF COMPLAINT       Chief Complaint   Patient presents with    Shortness of Breath     Pt reports increased SOB, productive cough x few months intermittently; pt reports worsening last night. On 3L n/c at home. Urinary Retention     Reports feeling of urgency but unable to urinate. Pt reports last urinated yesterday \"during the day\". HISTORY OF PRESENT ILLNESS: 1 or more Elements     History From: Patient/family  Limitations to history : None    Sasha Clinton is a 79 y.o. male who presents to the emergency department today reporting progressive shortness of breath, productive cough which became worse last night. Patient with history of COPD and CHF. Patient wears supplemental oxygen at 3 L per nasal cannula. Patient also reports urinary retention. Patient states that he last urinated yesterday. Nursing Notes were all reviewed and agreed with or any disagreements were addressed in the HPI. REVIEW OF SYSTEMS :      Review of Systems   Constitutional:  Positive for fatigue. Negative for chills and fever. HENT:  Negative for congestion, sore throat and voice change. Eyes:  Negative for visual disturbance. Respiratory:  Positive for cough and shortness of breath. Negative for chest tightness and wheezing. Cardiovascular:  Positive for leg swelling. Negative for chest pain and palpitations. Gastrointestinal:  Positive for abdominal distention. Negative for abdominal pain, nausea and vomiting. Endocrine: Negative for polydipsia and polyuria. Genitourinary:  Negative for difficulty urinating, dysuria and flank pain. Musculoskeletal:  Negative for back pain, neck pain and neck stiffness. Skin:  Negative for rash and wound. Allergic/Immunologic: Negative for immunocompromised state. Neurological:  Positive for weakness. Negative for dizziness and light-headedness. Hematological:  Does not bruise/bleed easily. Psychiatric/Behavioral:  Negative for sleep disturbance and suicidal ideas. Positives and Pertinent negatives as per HPI.      SURGICAL HISTORY     Past Surgical History:   Procedure Laterality Date    BRONCHOSCOPY N/A 12/28/2022    BRONCHOSCOPY ALVEOLAR LAVAGE performed by Michelle Brenner MD at [address]  12/28/2022    BRONCHOSCOPY DIAGNOSTIC OR CELL [address] ONLY performed by Michelle Brenner MD at [facility]  2/2012    bilateral    COLONOSCOPY  7/10/2009    diverticulosis    hemorrhoids    CT BONE MARROW BIOPSY  11/1/2022    CT BONE MARROW BIOPSY 11/1/2022 [facility]    GALLBLADDER SURGERY  1981    PAIN MANAGEMENT PROCEDURE Right 10/20/2021    COOLIEF RADIOFREQUENCY ABLATION - RIGHT KNEE performed by Nikolai Layne MD at [address] Left 12/8/2021    [address] - LEFT KNEE performed by Nikolai Layne MD at [address] [facility]  7-2005    7/10/2009    normal       [address]       Current Discharge Medication List        CONTINUE these medications which have NOT CHANGED    Details   vitamin C (ASCORBIC ACID) 500 MG tablet Take 500 mg by mouth daily      Cholecalciferol 50 MCG (2000 UT) TABS Take 1 tablet by mouth daily      Multiple Vitamins-Minerals (THERAPEUTIC MULTIVITAMIN-MINERALS) tablet Take 1 tablet by mouth daily      B Complex Vitamins (B-COMPLEX/B-12) TABS Take 1 tablet by mouth daily      zinc sulfate (ZINC-220) 220 (50 Zn) MG capsule Take 50 mg by mouth daily      bisacodyl (DULCOLAX) 5 MG EC tablet Take 5 mg by mouth daily valsartan-hydroCHLOROthiazide (DIOVAN-HCT) 320-25 MG per tablet Take 1 tablet by mouth daily      hydrALAZINE (APRESOLINE) 10 MG tablet Take 1 tablet by mouth every 8 hours  Qty: 90 tablet, Refills: 3      !! predniSONE (DELTASONE) 20 MG tablet Take 1 tablet by mouth daily for 7 doses  Qty: 7 tablet, Refills: 0      !! predniSONE (DELTASONE) 10 MG tablet Take 1 tablet by mouth daily for 7 doses  Qty: 7 tablet, Refills: 0      torsemide (DEMADEX) 20 MG tablet Take 1 tablet by mouth daily  Qty: 30 tablet, Refills: 3      insulin glargine (BASAGLAR KWIKPEN) 100 UNIT/ML injection pen Inject 5 Units into the skin nightly      levalbuterol (XOPENEX) 1.25 MG/3ML nebulizer solution USE 1 VIAL VIA NEBULIZER FOUR TIMES DAILY  Qty: 360 mL, Refills: 11    Associated Diagnoses: COPD with chronic bronchitis (HCC)      amiodarone (CORDARONE) 200 MG tablet Take 0.5 tablets by mouth daily  Qty: 45 tablet, Refills: 3    Associated Diagnoses: Paroxysmal atrial fibrillation (HCC)      INSULIN SYRINGE .5CC/29G 29G X 1/2\" 0.5 ML MISC 1 each by Does not apply route daily  Qty: 100 each, Refills: 1      albuterol sulfate HFA (PROVENTIL;VENTOLIN;PROAIR) 108 (90 Base) MCG/ACT inhaler INHALE 2 PUFFS BY MOUTH EVERY 4 HOURS AS NEEDED FOR WHEEZING  Qty: 18 each, Refills: 11    Associated Diagnoses: Chronic obstructive pulmonary disease, unspecified COPD type (HCC)      montelukast (SINGULAIR) 10 MG tablet TAKE 1 TABLET BY MOUTH EVERY DAY  Qty: 90 tablet, Refills: 3      [employer] PURELAX 17 GM/SCOOP powder TAKE 17G BY MOUTH DAILY MIX WITH 8 OZ OF WATER  Qty: 510 g, Refills: 3      pantoprazole (PROTONIX) 40 MG tablet TAKE 1 TABLET BY MOUTH EVERY DAY  Qty: 90 tablet, Refills: 1    Associated Diagnoses: Gastroesophageal reflux disease without esophagitis      B-D UF III MINI PEN NEEDLES 31G X 5 MM MISC USE AS DIRECTED 3 TIMES A DAY  Qty: 100 each, Refills: 3      atorvastatin (LIPITOR) 40 MG tablet TAKE 1 TABLET BY MOUTH EVERY DAY  Qty: 90 tablet, Refills: 3      alfuzosin (UROXATRAL) 10 MG extended release tablet TAKE 1 TABLET BY MOUTH EVERY DAY  Qty: 90 tablet, Refills: 3      LINZESS 145 MCG capsule TAKE 1 CAPSULE BY MOUTH EVERY DAY IN THE MORNING BEFORE BREAKFAST  Qty: 30 capsule, Refills: 11      ACCU-CHEK GUIDE strip TEST AS DIRECTED 3 TIMES A DAY  Qty: 300 strip, Refills: 5    Associated Diagnoses: Type 2 diabetes mellitus without complication, with long-term current use of insulin (HCC)      Accu-Chek FastClix Lancets MISC USE TO TEST 3 TIMES A DAY DX CODE E11.9  Qty: 102 each, Refills: 17      CPAP Machine MISC by Does not apply route      Lactobacillus Rhamnosus, GG, ([employer]S PROBIOTIC, LACTOBACILLUS,) CAPS TAKE 1 CAPSULE BY MOUTH 2 TIMES DAILY (WITH MEALS)  Qty: 60 capsule, Refills: 11      docusate sodium (COLACE) 100 MG capsule Take 100 mg by mouth 2 times daily      Cyanocobalamin (VITAMIN B-12 PO) Take 500 mcg by mouth every other day       aspirin EC 81 MG EC tablet Take 2 tablets by mouth daily. Qty: 30 tablet, Refills: 3      ferrous sulfate 325 (65 FE) MG tablet Take 325 mg by mouth daily        ! ! - Potential duplicate medications found. Please discuss with provider.           ALLERGIES     Hytrin [terazosin hcl], Penicillins, Shrimp flavor, Sulfa antibiotics, Sulfasalazine, Tekturna [aliskiren fumarate], Vancomycin, and Ciprofloxacin    FAMILYHISTORY       Family History   Problem Relation Age of Onset    Heart Disease Mother     Stroke Mother     Vision Loss Mother     High Blood Pressure Father     High Blood Pressure Brother     Diabetes Brother     Sleep Apnea Brother     Arthritis Paternal Grandmother     Diabetes Paternal Grandmother         SOCIAL HISTORY       Social History     Tobacco Use    Smoking status: Former     Packs/day: 0.50     Years: 18.00     Pack years: 9.00     Types: Cigarettes     Start date: 1958     Quit date: 1988     Years since quittin.0    Smokeless tobacco: Never   Vaping Use    Vaping Use: Never used   Substance Use Topics    Alcohol use: No     Alcohol/week: 0.0 standard drinks    Drug use: No       SCREENINGS        [place] Coma Scale  Eye Opening: Spontaneous  Best Verbal Response: Oriented  Best Motor Response: Obeys commands  Iza Coma Scale Score: 15                CIWA Assessment  BP: 136/61  Heart Rate: (!) 101           PHYSICAL EXAM  1 or more Elements     ED Triage Vitals   BP Temp Temp Source Heart Rate Resp SpO2 Height Weight   01/17/23 1308 01/17/23 1308 01/17/23 1308 01/17/23 1308 01/17/23 1308 01/17/23 1308 -- 01/17/23 1310   131/86 97.4 °F (36.3 °C) Oral 100 19 95 %  224 lb (101.6 kg)       Physical Exam  Vitals and nursing note reviewed. Constitutional:       General: He is in acute distress. Appearance: Normal appearance. He is obese. He is ill-appearing. HENT:      Head: Normocephalic and atraumatic. Right Ear: External ear normal.      Left Ear: External ear normal.      Nose: Nose normal.      Mouth/Throat:      Mouth: Mucous membranes are moist.      Pharynx: Oropharynx is clear. Eyes:      General:         Right eye: No discharge. Left eye: No discharge. Extraocular Movements: Extraocular movements intact. Pupils: Pupils are equal, round, and reactive to light. Cardiovascular:      Rate and Rhythm: Normal rate and regular rhythm. Pulses: Normal pulses. Heart sounds: Normal heart sounds. Pulmonary:      Effort: Pulmonary effort is normal. Tachypnea present. No respiratory distress. Breath sounds: Decreased breath sounds, rhonchi and rales present. No wheezing. Chest:      Chest wall: No tenderness. Abdominal:      General: Bowel sounds are normal.      Palpations: Abdomen is soft. Tenderness: There is no abdominal tenderness. There is no right CVA tenderness or left CVA tenderness. Musculoskeletal:         General: Normal range of motion. Cervical back: Normal range of motion and neck supple.       Right lower leg: No tenderness. Edema present. Left lower leg: No tenderness. Edema present. Skin:     General: Skin is warm and dry. Capillary Refill: Capillary refill takes less than 2 seconds. Neurological:      General: No focal deficit present. Mental Status: He is alert and oriented to person, place, and time. Psychiatric:         Mood and Affect: Mood is anxious. Behavior: Behavior normal.         Thought Content:  Thought content normal.         Judgment: Judgment normal.           DIAGNOSTIC RESULTS   LABS:    Labs Reviewed   CBC WITH AUTO DIFFERENTIAL - Abnormal; Notable for the following components:       Result Value    RBC 3.79 (*)     Hemoglobin 9.6 (*)     Hematocrit 31.9 (*)     MCH 25.2 (*)     MCHC 30.0 (*)     RDW 20.5 (*)     Neutrophils Absolute 9.3 (*)     Lymphocytes Absolute 0.2 (*)     All other components within normal limits   COMPREHENSIVE METABOLIC PANEL W/ REFLEX TO MG FOR LOW K - Abnormal; Notable for the following components:    Glucose 143 (*)     BUN 66 (*)     Creatinine 1.8 (*)     Est, Glom Filt Rate 37 (*)     Calcium 7.7 (*)     Total Protein 5.9 (*)     Albumin 3.1 (*)     All other components within normal limits   TROPONIN - Abnormal; Notable for the following components:    Troponin 0.07 (*)     All other components within normal limits   BRAIN NATRIURETIC PEPTIDE - Abnormal; Notable for the following components:    Pro-BNP 2,497 (*)     All other components within normal limits   PROTIME-INR - Abnormal; Notable for the following components:    Protime 16.4 (*)     INR 1.33 (*)     All other components within normal limits   BLOOD GAS, VENOUS - Abnormal; Notable for the following components:    pCO2, Jarrett 58.5 (*)     HCO3, Venous 33 (*)     All other components within normal limits   URINALYSIS WITH REFLEX TO CULTURE - Abnormal; Notable for the following components:    Blood, Urine TRACE (*)     Protein, UA 30 (*)     Leukocyte Esterase, Urine LARGE (*)     All other components within normal limits   MICROSCOPIC URINALYSIS - Abnormal; Notable for the following components:    Bacteria, UA 4+ (*)     Hyaline Casts, UA 18 (*)     WBC,  (*)     All other components within normal limits   PROCALCITONIN - Abnormal; Notable for the following components:    Procalcitonin 0.37 (*)     All other components within normal limits   POCT GLUCOSE - Abnormal; Notable for the following components:    POC Glucose 146 (*)     All other components within normal limits   COVID-19, RAPID   RAPID INFLUENZA A/B ANTIGENS   CULTURE, URINE   CULTURE, BLOOD 1   CULTURE, BLOOD 2   APTT   LACTATE, SEPSIS   LACTATE, SEPSIS       When ordered only abnormal lab results are displayed. All other labs were within normal range or not returned as of this dictation. EKG: When ordered, EKG's are interpreted by the Emergency Department Physician in the absence of a cardiologist.  Please see their note for interpretation of EKG. RADIOLOGY:   Non-plain film images such as CT, Ultrasound and MRI are read by the radiologist. Plain radiographic images are visualized and preliminarily interpreted by the ED Provider with the below findings:        Interpretation per the Radiologist below, if available at the time of this note:    XR CHEST PORTABLE   Final Result   Patchy infiltrates in the lungs bilaterally, greater on the right, increased   when compared to the previous exam.  In this case, multifocal pneumonia and   pulmonary edema both considered. XR CHEST PORTABLE    Result Date: 1/17/2023  EXAMINATION: ONE XRAY VIEW OF THE CHEST 1/17/2023 1:44 pm COMPARISON: 12/27/2022 HISTORY: ORDERING SYSTEM PROVIDED HISTORY: SOB TECHNOLOGIST PROVIDED HISTORY: Reason for exam:->SOB FINDINGS: Patchy infiltrates seen within the lungs bilaterally, greatest in the right mid to lower lung. These are increased when compared to the previous exam. Cardial pericardial silhouette is enlarged, stable.   The pulmonary vasculature is congested. Patchy infiltrates in the lungs bilaterally, greater on the right, increased when compared to the previous exam.  In this case, multifocal pneumonia and pulmonary edema both considered. No results found. PROCEDURES   Unless otherwise noted below, none     Procedures    CRITICAL CARE TIME (.cctime)   There was a high probability of life-threatening clinical deterioration in the patient's condition requiring my urgent intervention. I personally saw the patient and independently provided 12 minutes of non-concurrent critical care out of the total shared critical care time provided, excluding separately reportable procedures. PAST MEDICAL HISTORY      has a past medical history of Allergic rhinitis, Asthma, Atrial fibrillation ([facility] [address] Carotid artery stenosis, Chronic kidney disease, COPD (chronic obstructive pulmonary disease) ([facility] [address] CPAP (continuous positive airway pressure) dependence, ESBL (extended spectrum beta-lactamase) producing bacteria infection (12/26/2018), Hyperlipidemia, Hypertension, Iron deficiency anemia, Obstructive sleep apnea, and Type II or unspecified type diabetes mellitus without mention of complication, not stated as uncontrolled.      EMERGENCY DEPARTMENT COURSE and DIFFERENTIAL DIAGNOSIS/MDM:   Vitals:    Vitals:    01/17/23 1900 01/17/23 1915 01/17/23 2015 01/17/23 2124   BP: 139/62 (!) 132/58  136/61   Pulse: 97 89 88 (!) 101   Resp: 24 25 26 20   Temp:    97.8 °F (36.6 °C)   TempSrc:    Oral   SpO2: 96% 97% 96% 92%   Weight:           Patient was given the following medications:  Medications   cefepime (MAXIPIME) 2000 mg IVPB minibag (2,000 mg IntraVENous New Bag 1/17/23 2218)   furosemide (LASIX) injection 40 mg (has no administration in time range)   furosemide (LASIX) injection 40 mg (40 mg IntraVENous Given 1/17/23 1703)       ED Course as of 01/17/23 2310 Tue Jan 17, 2023 1740 Spoke with Dr. Marjorie Moser who agrees to admit patient for further evaluation and treatment. [TV]      ED Course User Index  [TV] Joselyn Streetmark, APRN - CNP        Is this patient to be included in the SEP-1 Core Measure due to severe sepsis or septic shock? No   Exclusion criteria - the patient is NOT to be included for SEP-1 Core Measure due to:  2+ SIRS criteria are not met    Chronic Conditions affecting care:    has a past medical history of Allergic rhinitis, Asthma, Atrial fibrillation (HCC), Carotid artery stenosis, Chronic kidney disease, COPD (chronic obstructive pulmonary disease) ([facility] [address] CPAP (continuous positive airway pressure) dependence, ESBL (extended spectrum beta-lactamase) producing bacteria infection (12/26/2018), Hyperlipidemia, Hypertension, Iron deficiency anemia, Obstructive sleep apnea, and Type II or unspecified type diabetes mellitus without mention of complication, not stated as uncontrolled. CONSULTS: (Who and What was discussed)  IP CONSULT TO PRIMARY CARE PROVIDER          Records Reviewed (Source): Previous records reviewed in order to gain further information regarding patient's PMH is well as his HPI. Nursing notes reviewed. CC/HPI Summary, DDx, ED Course, and Reassessment: This is a 79-year-old male with history of multiple comorbid's including atrial fibrillation, asthma, COPD, coronary artery disease, hypertension, hyperlipidemia, and diabetes who presents to the emergency department today for evaluation of progressive shortness of breath and productive cough which became worse last night. There is been no recent travel or known sick contacts. Patient recently admitted to this hospital December 23. Patient denies having any chest pain. He wears supplemental oxygen at 3 L per nasal cannula and denies any increased oxygen requirement. Patient reports unchanged bilateral lower extremity edema. He denies history of DVTs or PEs. Patient states that his cough is productive of thick white sputum. There has been no fever.   He states that shortness of breath is worse with minimal exertion and he even feels short of breath at rest.  He also reports orthopnea. He adamantly denies having any chest pain. An EKG is interpreted by ED physician reviewed by myself is negative for acute ST elevation. Chest x-ray interpreted by radiologist reviewed by myself shows bilateral patchy infiltrates concerning for pulmonary edema versus multifocal pneumonia. Laboratory studies notable for an elevated troponin of 0.07. Creatinine is at baseline at 1.8. BNP is elevated at 2500, IV Lasix 40 mg given. Urinalysis consistent with UTI, patient given IV cefepime. Salazar catheter placed due to patient's reported urinary retention and to monitor urine output. Patient reassessed. Patient has remained hemodynamically stable throughout ED visit. Low suspicion for PE, ACS, MI, or aortic dissection. Given patient's history and ED work-up I do feel patient will benefit from admission for further evaluation and treatment. Shared decision making employed and patient and family are in agreement with this plan of care. I did speak with patient's primary care physician Dr. Milind Suarez who agrees to admit patient for further evaluation and treatment. Patient is a full code at the time of this admission. I am the Primary Clinician of Record. FINAL IMPRESSION      1. Acute on chronic congestive heart failure, unspecified heart failure type (HCC)    2. Elevated troponin    3. Chronic kidney disease, unspecified CKD stage    4. Urinary tract infection with hematuria, site unspecified          DISPOSITION/PLAN     DISPOSITION Admitted 01/17/2023 06:20:40 PM      PATIENT REFERRED TO:  No follow-up provider specified.     DISCHARGE MEDICATIONS:  Current Discharge Medication List          DISCONTINUED MEDICATIONS:  Current Discharge Medication List        STOP taking these medications       Multiple Vitamins-Minerals (MULTIVITAMIN ADULT PO) Comments:   Reason for Stopping:                      (Please note that portions of this note were completed with a voice recognition program.  Efforts were made to edit the dictations but occasionally words are mis-transcribed.)    SILVIANO Armstrong CNP (electronically signed)       SILVIANO Armstrong CNP  01/17/23 5458

## 2023-01-17 NOTE — ED PROVIDER NOTES
EKG: Atrial fibrillation with RVR, ventricular rate of 102, PVCs, right bundle branch block pattern. Rhythm strip shows atrial fibrillation with RVR with a ventricular rate of 102,  ms with no other ectopy as interpreted by me.   This compares to an EKG dated 12/23/2022 which shows a junctional rhythm, the atrial fibrillation and today's tracing is maye Castro MD  01/17/23 5919

## 2023-01-18 PROBLEM — J41.1 MUCOPURULENT CHRONIC BRONCHITIS (HCC): Status: ACTIVE | Noted: 2023-01-01

## 2023-01-18 PROBLEM — I50.9 ACUTE ON CHRONIC CONGESTIVE HEART FAILURE (HCC): Status: ACTIVE | Noted: 2023-01-01

## 2023-01-18 LAB
ANION GAP SERPL CALCULATED.3IONS-SCNC: 13 MMOL/L (ref 3–16)
BASOPHILS ABSOLUTE: 0 K/UL (ref 0–0.2)
BASOPHILS RELATIVE PERCENT: 0.2 %
BILIRUBIN URINE: NEGATIVE
BLOOD, URINE: ABNORMAL
BUN BLDV-MCNC: 69 MG/DL (ref 7–20)
CALCIUM SERPL-MCNC: 7.4 MG/DL (ref 8.3–10.6)
CHLORIDE BLD-SCNC: 100 MMOL/L (ref 99–110)
CLARITY: ABNORMAL
CO2: 29 MMOL/L (ref 21–32)
COLOR: YELLOW
CREAT SERPL-MCNC: 1.9 MG/DL (ref 0.8–1.3)
EOSINOPHILS ABSOLUTE: 0 K/UL (ref 0–0.6)
EOSINOPHILS RELATIVE PERCENT: 0 %
GFR SERPL CREATININE-BSD FRML MDRD: 35 ML/MIN/{1.73_M2}
GLUCOSE BLD-MCNC: 135 MG/DL (ref 70–99)
GLUCOSE BLD-MCNC: 190 MG/DL (ref 70–99)
GLUCOSE BLD-MCNC: 191 MG/DL (ref 70–99)
GLUCOSE BLD-MCNC: 201 MG/DL (ref 70–99)
GLUCOSE BLD-MCNC: 369 MG/DL (ref 70–99)
GLUCOSE URINE: NEGATIVE MG/DL
HCT VFR BLD CALC: 30 % (ref 40.5–52.5)
HEMOGLOBIN: 9 G/DL (ref 13.5–17.5)
KETONES, URINE: NEGATIVE MG/DL
LACTIC ACID: 1.1 MMOL/L (ref 0.4–2)
LEUKOCYTE ESTERASE, URINE: ABNORMAL
LYMPHOCYTES ABSOLUTE: 0.1 K/UL (ref 1–5.1)
LYMPHOCYTES RELATIVE PERCENT: 1.6 %
MCH RBC QN AUTO: 25.3 PG (ref 26–34)
MCHC RBC AUTO-ENTMCNC: 30 G/DL (ref 31–36)
MCV RBC AUTO: 84.4 FL (ref 80–100)
MICROSCOPIC EXAMINATION: YES
MONOCYTES ABSOLUTE: 0.3 K/UL (ref 0–1.3)
MONOCYTES RELATIVE PERCENT: 3.9 %
NEUTROPHILS ABSOLUTE: 8.3 K/UL (ref 1.7–7.7)
NEUTROPHILS RELATIVE PERCENT: 94.3 %
NITRITE, URINE: NEGATIVE
PDW BLD-RTO: 21.1 % (ref 12.4–15.4)
PERFORMED ON: ABNORMAL
PH UA: 5 (ref 5–8)
PLATELET # BLD: 134 K/UL (ref 135–450)
PMV BLD AUTO: 8.4 FL (ref 5–10.5)
POTASSIUM SERPL-SCNC: 4.6 MMOL/L (ref 3.5–5.1)
PROTEIN UA: 30 MG/DL
RBC # BLD: 3.55 M/UL (ref 4.2–5.9)
REPORT: NORMAL
SEDIMENTATION RATE, ERYTHROCYTE: 62 MM/HR (ref 0–20)
SODIUM BLD-SCNC: 142 MMOL/L (ref 136–145)
SPECIFIC GRAVITY UA: 1.01 (ref 1–1.03)
TROPONIN: 0.05 NG/ML
TROPONIN: 0.06 NG/ML
TROPONIN: 0.06 NG/ML
URINE REFLEX TO CULTURE: YES
URINE TYPE: ABNORMAL
UROBILINOGEN, URINE: 0.2 E.U./DL
WBC # BLD: 8.8 K/UL (ref 4–11)

## 2023-01-18 PROCEDURE — 97162 PT EVAL MOD COMPLEX 30 MIN: CPT

## 2023-01-18 PROCEDURE — 1200000000 HC SEMI PRIVATE

## 2023-01-18 PROCEDURE — 6360000002 HC RX W HCPCS: Performed by: INTERNAL MEDICINE

## 2023-01-18 PROCEDURE — 2500000003 HC RX 250 WO HCPCS: Performed by: INTERNAL MEDICINE

## 2023-01-18 PROCEDURE — 87205 SMEAR GRAM STAIN: CPT

## 2023-01-18 PROCEDURE — 80048 BASIC METABOLIC PNL TOTAL CA: CPT

## 2023-01-18 PROCEDURE — 2580000003 HC RX 258: Performed by: GENERAL ACUTE CARE HOSPITAL

## 2023-01-18 PROCEDURE — 97535 SELF CARE MNGMENT TRAINING: CPT

## 2023-01-18 PROCEDURE — 2580000003 HC RX 258: Performed by: INTERNAL MEDICINE

## 2023-01-18 PROCEDURE — 6370000000 HC RX 637 (ALT 250 FOR IP): Performed by: INTERNAL MEDICINE

## 2023-01-18 PROCEDURE — 84484 ASSAY OF TROPONIN QUANT: CPT

## 2023-01-18 PROCEDURE — 99223 1ST HOSP IP/OBS HIGH 75: CPT | Performed by: INTERNAL MEDICINE

## 2023-01-18 PROCEDURE — 94640 AIRWAY INHALATION TREATMENT: CPT

## 2023-01-18 PROCEDURE — 2700000000 HC OXYGEN THERAPY PER DAY

## 2023-01-18 PROCEDURE — 97530 THERAPEUTIC ACTIVITIES: CPT

## 2023-01-18 PROCEDURE — 6360000002 HC RX W HCPCS: Performed by: GENERAL ACUTE CARE HOSPITAL

## 2023-01-18 PROCEDURE — 36415 COLL VENOUS BLD VENIPUNCTURE: CPT

## 2023-01-18 PROCEDURE — 97166 OT EVAL MOD COMPLEX 45 MIN: CPT

## 2023-01-18 PROCEDURE — 87070 CULTURE OTHR SPECIMN AEROBIC: CPT

## 2023-01-18 PROCEDURE — 94760 N-INVAS EAR/PLS OXIMETRY 1: CPT

## 2023-01-18 PROCEDURE — 87633 RESP VIRUS 12-25 TARGETS: CPT

## 2023-01-18 PROCEDURE — 85025 COMPLETE CBC W/AUTO DIFF WBC: CPT

## 2023-01-18 PROCEDURE — 85652 RBC SED RATE AUTOMATED: CPT

## 2023-01-18 PROCEDURE — 87641 MR-STAPH DNA AMP PROBE: CPT

## 2023-01-18 PROCEDURE — 83605 ASSAY OF LACTIC ACID: CPT

## 2023-01-18 PROCEDURE — 51702 INSERT TEMP BLADDER CATH: CPT

## 2023-01-18 RX ORDER — LINEZOLID 2 MG/ML
600 INJECTION, SOLUTION INTRAVENOUS EVERY 12 HOURS
Status: COMPLETED | OUTPATIENT
Start: 2023-01-18 | End: 2023-01-19

## 2023-01-18 RX ORDER — LEVALBUTEROL 1.25 MG/.5ML
1.25 SOLUTION, CONCENTRATE RESPIRATORY (INHALATION)
Status: DISCONTINUED | OUTPATIENT
Start: 2023-01-18 | End: 2023-01-28

## 2023-01-18 RX ORDER — LEVALBUTEROL 1.25 MG/.5ML
1.25 SOLUTION, CONCENTRATE RESPIRATORY (INHALATION)
Status: DISCONTINUED | OUTPATIENT
Start: 2023-01-18 | End: 2023-01-18

## 2023-01-18 RX ORDER — SODIUM CHLORIDE FOR INHALATION 3 %
15 VIAL, NEBULIZER (ML) INHALATION
Status: DISCONTINUED | OUTPATIENT
Start: 2023-01-18 | End: 2023-01-28

## 2023-01-18 RX ORDER — SODIUM CHLORIDE FOR INHALATION 0.9 %
3 VIAL, NEBULIZER (ML) INHALATION PRN
Status: DISCONTINUED | OUTPATIENT
Start: 2023-01-18 | End: 2023-02-04 | Stop reason: HOSPADM

## 2023-01-18 RX ORDER — SODIUM CHLORIDE FOR INHALATION 0.9 %
3 VIAL, NEBULIZER (ML) INHALATION
Status: DISCONTINUED | OUTPATIENT
Start: 2023-01-18 | End: 2023-01-18

## 2023-01-18 RX ORDER — ASCORBIC ACID 500 MG
500 TABLET ORAL DAILY
Status: DISCONTINUED | OUTPATIENT
Start: 2023-01-18 | End: 2023-02-04 | Stop reason: HOSPADM

## 2023-01-18 RX ORDER — CHOLECALCIFEROL (VITAMIN D3) 125 MCG
500 CAPSULE ORAL DAILY
Status: DISCONTINUED | OUTPATIENT
Start: 2023-01-18 | End: 2023-02-04 | Stop reason: HOSPADM

## 2023-01-18 RX ORDER — MAGNESIUM SULFATE IN WATER 40 MG/ML
2000 INJECTION, SOLUTION INTRAVENOUS PRN
Status: DISCONTINUED | OUTPATIENT
Start: 2023-01-18 | End: 2023-01-20

## 2023-01-18 RX ORDER — INSULIN GLARGINE 100 [IU]/ML
10 INJECTION, SOLUTION SUBCUTANEOUS EVERY MORNING
Status: DISCONTINUED | OUTPATIENT
Start: 2023-01-18 | End: 2023-01-29

## 2023-01-18 RX ORDER — METRONIDAZOLE 500 MG/100ML
500 INJECTION, SOLUTION INTRAVENOUS EVERY 8 HOURS
Status: DISCONTINUED | OUTPATIENT
Start: 2023-01-18 | End: 2023-01-18

## 2023-01-18 RX ORDER — SODIUM CHLORIDE FOR INHALATION 3 %
15 VIAL, NEBULIZER (ML) INHALATION
Status: DISCONTINUED | OUTPATIENT
Start: 2023-01-18 | End: 2023-01-18

## 2023-01-18 RX ADMIN — SODIUM CHLORIDE SOLN NEBU 3% 15 ML: 3 NEBU SOLN at 11:22

## 2023-01-18 RX ADMIN — ALBUTEROL SULFATE 2 PUFF: 90 AEROSOL, METERED RESPIRATORY (INHALATION) at 08:26

## 2023-01-18 RX ADMIN — SODIUM CHLORIDE SOLN NEBU 3% 15 ML: 3 NEBU SOLN at 19:20

## 2023-01-18 RX ADMIN — AMIODARONE HYDROCHLORIDE 100 MG: 200 TABLET ORAL at 10:20

## 2023-01-18 RX ADMIN — LEVALBUTEROL HYDROCHLORIDE 1.25 MG: 1.25 SOLUTION, CONCENTRATE RESPIRATORY (INHALATION) at 11:22

## 2023-01-18 RX ADMIN — LINEZOLID 600 MG: 600 INJECTION, SOLUTION INTRAVENOUS at 12:10

## 2023-01-18 RX ADMIN — PANTOPRAZOLE SODIUM 40 MG: 40 TABLET, DELAYED RELEASE ORAL at 06:52

## 2023-01-18 RX ADMIN — HYDRALAZINE HYDROCHLORIDE 10 MG: 10 TABLET, FILM COATED ORAL at 14:47

## 2023-01-18 RX ADMIN — FUROSEMIDE 5 MG/HR: 10 INJECTION, SOLUTION INTRAMUSCULAR; INTRAVENOUS at 10:40

## 2023-01-18 RX ADMIN — METRONIDAZOLE 500 MG: 500 INJECTION, SOLUTION INTRAVENOUS at 13:21

## 2023-01-18 RX ADMIN — INSULIN LISPRO 1 UNITS: 100 INJECTION, SOLUTION INTRAVENOUS; SUBCUTANEOUS at 10:19

## 2023-01-18 RX ADMIN — SODIUM CHLORIDE SOLN NEBU 3% 15 ML: 3 NEBU SOLN at 15:40

## 2023-01-18 RX ADMIN — DOCUSATE SODIUM 100 MG: 100 CAPSULE, LIQUID FILLED ORAL at 10:21

## 2023-01-18 RX ADMIN — DOCUSATE SODIUM 100 MG: 100 CAPSULE, LIQUID FILLED ORAL at 21:04

## 2023-01-18 RX ADMIN — HYDRALAZINE HYDROCHLORIDE 10 MG: 10 TABLET, FILM COATED ORAL at 06:52

## 2023-01-18 RX ADMIN — ASPIRIN 162 MG: 81 TABLET, COATED ORAL at 10:21

## 2023-01-18 RX ADMIN — MONTELUKAST 10 MG: 10 TABLET, FILM COATED ORAL at 10:20

## 2023-01-18 RX ADMIN — FERROUS SULFATE TAB EC 324 MG (65 MG FE EQUIVALENT) 324 MG: 324 (65 FE) TABLET DELAYED RESPONSE at 10:21

## 2023-01-18 RX ADMIN — MEROPENEM 1000 MG: 1 INJECTION, POWDER, FOR SOLUTION INTRAVENOUS at 14:49

## 2023-01-18 RX ADMIN — LEVALBUTEROL HYDROCHLORIDE 1.25 MG: 1.25 SOLUTION, CONCENTRATE RESPIRATORY (INHALATION) at 15:40

## 2023-01-18 RX ADMIN — POLYETHYLENE GLYCOL 3350 17 G: 17 POWDER, FOR SOLUTION ORAL at 10:19

## 2023-01-18 RX ADMIN — ALBUTEROL SULFATE 2 PUFF: 90 AEROSOL, METERED RESPIRATORY (INHALATION) at 02:27

## 2023-01-18 RX ADMIN — BISACODYL 5 MG: 5 TABLET, COATED ORAL at 10:21

## 2023-01-18 RX ADMIN — TAMSULOSIN HYDROCHLORIDE 0.4 MG: 0.4 CAPSULE ORAL at 10:21

## 2023-01-18 RX ADMIN — ALBUTEROL SULFATE 2 PUFF: 90 AEROSOL, METERED RESPIRATORY (INHALATION) at 06:11

## 2023-01-18 RX ADMIN — CEFEPIME 2000 MG: 2 INJECTION, POWDER, FOR SOLUTION INTRAVENOUS at 06:51

## 2023-01-18 RX ADMIN — INSULIN LISPRO 4 UNITS: 100 INJECTION, SOLUTION INTRAVENOUS; SUBCUTANEOUS at 12:46

## 2023-01-18 RX ADMIN — CYANOCOBALAMIN TAB 500 MCG 500 MCG: 500 TAB at 12:06

## 2023-01-18 RX ADMIN — Medication 1 CAPSULE: at 10:20

## 2023-01-18 RX ADMIN — ATORVASTATIN CALCIUM 40 MG: 40 TABLET, FILM COATED ORAL at 10:21

## 2023-01-18 RX ADMIN — HYDRALAZINE HYDROCHLORIDE 10 MG: 10 TABLET, FILM COATED ORAL at 21:05

## 2023-01-18 RX ADMIN — INSULIN GLARGINE 10 UNITS: 100 INJECTION, SOLUTION SUBCUTANEOUS at 12:46

## 2023-01-18 RX ADMIN — Medication 1 CAPSULE: at 16:42

## 2023-01-18 RX ADMIN — VALSARTAN AND HYDROCHLOROTHIAZIDE 2 TABLET: 160; 12.5 TABLET, FILM COATED ORAL at 10:20

## 2023-01-18 RX ADMIN — LEVALBUTEROL HYDROCHLORIDE 1.25 MG: 1.25 SOLUTION, CONCENTRATE RESPIRATORY (INHALATION) at 19:20

## 2023-01-18 RX ADMIN — OXYCODONE HYDROCHLORIDE AND ACETAMINOPHEN 500 MG: 500 TABLET ORAL at 12:06

## 2023-01-18 ASSESSMENT — PAIN SCALES - GENERAL
PAINLEVEL_OUTOF10: 0
PAINLEVEL_OUTOF10: 0

## 2023-01-18 NOTE — CARE COORDINATION
DISCHARGE PLAN: Pt is hopeful to return home with his spouse and [facility]. SW will continue to follow. If pt goes home, spouse will transport pt home.   ____________________________________  INITIAL ASSESSMENT  Met w/pt and pts spouse to address barriers to dc. HOME: Pt reported living in a multi level home with his spouse. Pt and spouse stay on the main level of the home. There are 1+1 EZEQUIEL. Disease Specific: CHF, UTI    COVID Vaccination: Overdue for booster    DME/O2: shower chair, grab bars, transfer tub bench, cane, rollator, oxygen through [employer] (3L continuous with portability). No other DME needs noted at this time. SW will follow for possible need for an increase in O2 needs. ACTIVE SERVICES: Pt was active with [facility] PTA and had PT/OT/RN services. Pts spouse was supportive and was able to assist with grocery shopping, laundry, cooking and cleaning. Pt is hopeful to return home upon d/c with the resumption of [facility] [employer]. services. SW did discuss SNF placement with pt and spouse and both agreed they will consider SNF placement if pt does not continue to improve. SW will continue to follow to assist with d/c planning needs. TRANSPORTATION: Pt is an active  and stated his spouse will transport him home at d/c. PHARMACY: Denies difficulty obtaining/taking meds  Pt has all meds filled at [facility] on [address]. PCP: Klarissa Lackey MD     DEMOGRAPHICS: Verified address/phone number as correct    INSURANCE:  Medicare  PRESCRIPTION COVERAGE: [employer]    HD/PD: No    THERAPY RECS    PHYSICAL THERAPY  \"Discharge Recommendations:  Continue to assess pending progress, Patient would benefit from continued therapy after discharge   [address] scored a 14/24 on the AM-PAC short mobility form. Please see assessment section for further patient specific details.   PT Equipment Recommendations  Equipment Needed: No\"  OCCUPATIONAL THERAPY    \"Discharge Recommendations:  Continue to assess pending progress, Patient would benefit from continued therapy after discharge  OT Equipment Recommendations  Other: TBD     Gilmar Suresh scored a 12/24 on the AM-PAC ADL Inpatient form. Current research shows that an AM-PAC score of 17 or less is typically not associated with a discharge to the patient's home setting. Based on the patient's AM-PAC score and their current ADL deficits, it is recommended that the patient have 3-5 sessions per week of Occupational Therapy at d/c to increase the patient's independence. Please see assessment section for further patient specific details. \"    Discharge planning team will remain available for needs. Please consult for any specifics not addressed in this note.     DUDLEY Dover LSHEENA  [phone]  Electronically signed by Nemo Berg on 1/18/2023 at 1:42 PM

## 2023-01-18 NOTE — PROGRESS NOTES
Pt's evening temperature not registering. Rectal temperature taken. Pt's rectal temperature 95 degrees.  Electronically signed by Siomara Valentin RN on 1/18/2023 at 5:10 PM

## 2023-01-18 NOTE — PROGRESS NOTES
Clinical Pharmacy Note  Renal Dose Adjustment    Gilmar DICK Suresh is receiving meropenem. This medication is renally eliminated. Based on the patient's Estimated Creatinine Clearance of 34 mL/min (A) (based on SCr of 1.9 mg/dL (H)), the dose has been adjusted to meropenem 1 g (extended infusion) every 12 hours per protocol. Pharmacy will continue to monitor and adjust dose as needed for changes in renal function.

## 2023-01-18 NOTE — PROGRESS NOTES
Preliminary urine culture growing E. Coli  Previous E. coli urinary tract infection was ESBL. Change cefepime to meropenem pending final sensitivity.   Electronically signed by Raegan Bueno MD on 1/18/2023 at 11:18 AM

## 2023-01-18 NOTE — PROGRESS NOTES
Physical Therapy  Facility/Department: [address] MED SURG  Physical Therapy Initial Assessment  If patient discharges prior to next session this note will serve as a discharge summary. Please see below for the latest assessment towards goals. Name: Madiha Cueto  : 1940  MRN: [id number]  Date of Service: 2023    Discharge Recommendations:  Continue to assess pending progress, Patient would benefit from continued therapy after discharge   Madiha Cueto scored a 14/24 on the AM-PAC short mobility form. Please see assessment section for further patient specific details. PT Equipment Recommendations  Equipment Needed: No      Patient Diagnosis(es): The primary encounter diagnosis was Acute on chronic congestive heart failure, unspecified heart failure type ([address] Diagnoses of Elevated troponin, Chronic kidney disease, unspecified CKD stage, and Urinary tract infection with hematuria, site unspecified were also pertinent to this visit. 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 Past Surgical History:  has a past surgical history that includes Gallbladder surgery (); Upper gastrointestinal endoscopy (7-2005    7/10/2009); Colonoscopy (7/10/2009); Cataract removal (2012); Pain management procedure (Right, 10/20/2021); Pain management procedure (Left, 2021); CT BIOPSY BONE MARROW (2022); bronchoscopy (N/A, 2022); and bronchoscopy (2022).     Assessment   Assessment: The pt is an 80 yo male who presented to the ED with increasing SOB and O2 needs. Troponins were elevated, he has had increased weight, had elevated BNP, urinalysis suggested infection and CXR: \"shows either multifocal pneumonia or heart failure\". The pt recently in the hospital and on ARU, was d/c on 12-8-2022 and readmitted on 12-. The pt had been d/c to home each time with family. The pt lives with his wife and PTA, he was able to ambulate with a walker and getting asisst for self-care. PMHx: asthma, a-fib, CKD, carotid artery stenosis, COPD, CHF, HTN, anemia, JOANN, DM     Today, the pt demonstrated that he is functioning below his reported baseline and today needed the stedy to get out of the bed to the commode and to the chair. He is limited today due to his breathing status and is on 5L O2. Antiicpate that the pt will progress and hopefully will be able to return home with family and home PT but will con't to follow and assess as he is followed. Therapy Prognosis: Fair;Good  Decision Making: Medium Complexity  Barriers to Learning: motivation, resistance to education  Requires PT Follow-Up: Yes  Activity Tolerance  Activity Tolerance: Patient limited by fatigue;Patient limited by endurance  Activity Tolerance Comments: On 5L O2 SpO2 variable from 93% > 98%     Plan   Physcial Therapy Plan  General Plan: 3-5 times per week  Current Treatment Recommendations: Strengthening, Balance training, Functional mobility training, Transfer training, Gait training, Therapeutic activities, Safety education & training, Patient/Caregiver education & training  Safety Devices  Type of Devices: Call light within reach, Chair alarm in place, Left in chair, Nurse notified     Restrictions  Restrictions/Precautions  Restrictions/Precautions: Fall Risk, Contact Precautions  Position Activity Restriction  Other position/activity restrictions: Contact Precautions (ESBL); 5 L O2     Subjective   General  Chart Reviewed:  Yes  Additional Pertinent Hx: Cortez Manzanares Kranthi Carlin MD H&P on 1-: The pt is an 80 yo male who presented to the ED with increasing SOB and O2 needs. Troponins were elevated, he has had increased weight, had elevated BNP, urinalysis suggested infection and CXR: \"shows either multifocal pneumonia or heart failure\". The pt recently in the hospital and on ARU, was d/c on 12-8-2022 and readmitted on 12-. The pt had been d/c to home each time with family.        PMHx: asthma, a-fib, CKD, carotid artery stenosis, COPD, CHF, HTN, anemia, JOANN, DM  Response To Previous Treatment: Not applicable  Family / Caregiver Present: Yes (wife)  Referring Practitioner: Luiz Contreras MD  Referral Date : 01/17/23  Diagnosis: Acute on chronic diastolic congestive heart failure, Acute on chronic respiratory failure with hypoxia  Follows Commands: Impaired  Other (Comment): delayed  Subjective  Subjective: the pt was found to be awake in the bed; the pt needing encouragement to participate; the pt not reporting pain but just that he can't breathe         Social/Functional History  Social/Functional History  Lives With: Spouse  Type of Home: House  Home Layout: Able to Live on Main level with bedroom/bathroom, Multi-level  Home Access: Stairs to enter with rails  Entrance Stairs - Number of Steps: 1+1  Entrance Stairs - Rails: Both  Bathroom Shower/Tub: Tub/Shower unit  Bathroom Toilet: Standard  Bathroom Equipment: Shower chair, Grab bars in shower, Tub transfer bench  Home Equipment: Cane, Rollator, Oxygen, Grab bars (2L O2)  Has the patient had two or more falls in the past year or any fall with injury in the past year?: Yes  ADL Assistance: Needs assistance (assist LB)  Homemaking Assistance: Needs assistance (wife cleans, pt manages bill paying)  Homemaking Responsibilities: No  Ambulation Assistance: Independent (with rollator)  Transfer Assistance: Independent  Active : Yes  Occupation: Retired  Type of Occupation: built houses  Leisure & Hobbies: wood carving, scroll saw  IADL Comments: sleeps in a recliner but reports he does get into bed on a regular basis    Cognition   Orientation  Overall Orientation Status: Within Functional Limits  Cognition  Overall Cognitive Status: WFL  Cognition Comment: but with decreased insight     Objective           Gross Assessment  AROM: Generally decreased, functional  Strength: Generally decreased, functional  Tone: Normal     Bed mobility  Supine to Sit: Contact guard assistance (HOB elevated, use of rail)  Sit to Supine: Unable to assess (pt seated in recliner at end of session)  Scooting: Stand by assistance (to scoot to EOB)  Transfers  Sit to Stand: Contact guard assistance;Stand by assistance  Stand to Sit: Contact guard assistance  Bed to Chair: Dependent/Total (with stedy)  Ambulation  Comments: not able to ambulate today     Balance  Sitting - Static: Good  Sitting - Dynamic: Good  Standing - Static: Good (statically in the stedy and on the standing scale)  Comments: the pt able to statically  the stedy and on the standing scale with CGA; no LOB           AM-PAC Score  AM-PAC Inpatient Mobility Raw Score : 14 (01/18/23 1035)  AM-PAC Inpatient T-Scale Score : 38.1 (01/18/23 1035)  Mobility Inpatient CMS 0-100% Score: 61.29 (01/18/23 1035)  Mobility Inpatient CMS G-Code Modifier : CL (01/18/23 1035)          Goals  Short Term Goals  Time Frame for Short Term Goals: upon d/c  Short Term Goal 1: Bed mobility with mod I. Short Term Goal 2: Transfer sit <> stand to a walker with SBA. Short Term Goal 3: Ambulate with wh walker 25 feet with SBA.   Patient Goals   Patient Goals : none stated       Education  Patient Education  Education Given To: Patient  Education Provided: Role of Therapy;Plan of Care  Education Method: Verbal  Barriers to Learning: Other (Comment) (?motivation)  Education Outcome: Continued education needed      Therapy Time   Individual Concurrent Group Co-treatment   Time In 0940 Time Out 1035         Minutes 55         Timed Code Treatment Minutes: 40 Minutes     Electronically signed by Bong Ochoa, PT [id number] on 1/18/2023 at 10:43 AM

## 2023-01-18 NOTE — PROGRESS NOTES
Heart Failure Diet Education:    Per hospital protocol or consult, patient being seen for Heart Failure diet guidelines. Learners: [x]Patient [x]Family []Caregiver [] Other: ______________  Methods: [x]Verbal Education  [x]Handouts  []Teachback     Patient's Lifestyle Questions:   Is HF a new Diagnosis? []Yes [x]No    Has patient had prior education? []Yes [x]No    Who does Grocery shopping and cooking? Pt    Frequency of eating out? Not frequent    Typical Eating Habits:Pt reports he eats variety of food and eats canned and frozen food. Educated pt on choosing fresh over frozen and checking the nutrition facts label. Instructed the Patient on:   [x] High/Low Sodium foods    [x] Moderation/portion control  [] Eating out  [] Fluid Restriction    [x] Label reading  [] Carb Counting   [] Other:    Educational Materials Provided:   [x] Nutrition Care Manual (NCM) Heart Failure Nutrition Therapy   [] NCM Sodium (Salt) Content of Foods  [] NCM Sodium Free Flavoring Tips    [] Heart Healthy Eating Label Reading Tips   [] Healthy Eating when Eating Out  [] Controlling Your Fluids   [] [facility] (from [employer])  [] NCM Carbohydrate Counting for People with Diabetes   [] Managing Your Diabetes Plate Method     -Diet Recommendations: 2000 mg Sodium/day, 1200 mL Fluids/day     Monitoring/Evaluation:   -Barriers: None  -Evaluation of education: Indicates understanding.  -Expected compliance: good.     Total time involved in patient education: 5 minutes    Electronically signed by Kimmie Wallis on 1/18/2023 at 10:42 AM

## 2023-01-18 NOTE — PROGRESS NOTES
Patient came up from the ED with SOB,CHF,Chronic Kidney disease and UTI. HR is 101 all other vitals are stable. Patient came up with 3 liters of oxygen and asked me if I could move it up to 4 liters. Patient is now on 4 liters of oxygen. Patient has been oriented to room and has his call light. The patient's wife and daughter are in the room with him.

## 2023-01-18 NOTE — CARE COORDINATION
01/18/23 1344   Readmission Assessment   Number of Days since last admission? 8-30 days   Previous Disposition Home with Home Health   Who is being Interviewed Patient   What was the patient's/caregiver's perception as to why they think they needed to return back to the hospital? Other (Comment)  (\"Unable to breathe\")   Did you visit your Primary Care Physician after you left the hospital, before you returned this time? No   Why weren't you able to visit your PCP? Did not have an appointment   Did you see a specialist, such as Cardiac, Pulmonary, Orthopedic Physician, etc. after you left the hospital? Yes   Who advised the patient to return to the hospital? [facility] Staff   Does the patient report anything that got in the way of taking their medications? No   In our efforts to provide the best possible care to you and others like you, can you think of anything that we could have done to help you after you left the hospital the first time, so that you might not have needed to return so soon?  Other (Comment)  (\"I am catching something everytime I come into the hospital.\")   DUDLEY Cadet  [phone]  Electronically signed by Bernardino Jefferson on 1/18/2023 at 1:46 PM

## 2023-01-18 NOTE — PROGRESS NOTES
Occupational Therapy  Facility/Department: [facility] MED SURG  Occupational Therapy Initial Assessment    Name: Leesa Ramirez  : 1940  MRN: [id number]  Date of Service: 2023    Discharge Recommendations:  Continue to assess pending progress, Patient would benefit from continued therapy after discharge  OT Equipment Recommendations  Other: TBD    Gilmar Suresh scored a  on the AM-PAC ADL Inpatient form. Current research shows that an AM-PAC score of 17 or less is typically not associated with a discharge to the patient's home setting. Based on the patient's AM-PAC score and their current ADL deficits, it is recommended that the patient have 3-5 sessions per week of Occupational Therapy at d/c to increase the patient's independence. Please see assessment section for further patient specific details. If patient discharges prior to next session this note will serve as a discharge summary. Please see below for the latest assessment towards goals. Patient Diagnosis(es): The primary encounter diagnosis was Acute on chronic congestive heart failure, unspecified heart failure type ([address] Diagnoses of Elevated troponin, Chronic kidney disease, unspecified CKD stage, and Urinary tract infection with hematuria, site unspecified were also pertinent to this visit. 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 Past Surgical History:  has a past surgical history that includes Gallbladder surgery ();  Upper gastrointestinal endoscopy (7-2005    7/10/2009); Colonoscopy (7/10/2009); Cataract removal (2/2012); Pain management procedure (Right, 10/20/2021); Pain management procedure (Left, 12/8/2021); CT BIOPSY BONE MARROW (11/1/2022); bronchoscopy (N/A, 12/28/2022); and bronchoscopy (12/28/2022). Assessment   Performance deficits / Impairments: Decreased functional mobility ; Decreased ADL status; Decreased strength;Decreased ROM; Decreased balance;Decreased endurance;Decreased fine motor control  Assessment: Pt is an 79 y.o. male admitted with Acute on chronic diastolic CHF. PTA, pt living with wife, has assist prn for LB ADLs, independent fxl mobility with rollator. Pt currently functioning below baseline d/t the above deficits, today -requiring use of benoit stedy lift for fxl transfers d/t SOB and fatigue, total A toileting,a nd anticipate pt would require overall mod/max A for ADLs. Pt required max verbal encouragement to participate. As pt presents today, AM-PAC score is NOT associated with a homebound d/c and indicates need for ongoing skilled OT, but will cont to see how pt progresses. Prognosis: Fair  Decision Making: Medium Complexity  History: see above  REQUIRES OT FOLLOW-UP: Yes  Activity Tolerance  Activity Tolerance: Patient limited by fatigue  Activity Tolerance Comments: SpO2 on 5 L O2 93-98%        Plan   Occupational Therapy Plan  Times Per Week: 3-5  Current Treatment Recommendations: Strengthening, Balance training, Functional mobility training, Endurance training, Self-Care / ADL, Safety education & training, Equipment evaluation, education, & procurement, ROM     Restrictions  Restrictions/Precautions  Restrictions/Precautions: Fall Risk, Contact Precautions  Position Activity Restriction  Other position/activity restrictions: Contact Precautions (ESBL); 5 L O2    Subjective   General  Chart Reviewed:  Yes  Additional Pertinent Hx: Per Kika Tsang MD's H&P: Krysten Farias is an 44-year-old white male with a history of chronic diastolic heart failure, diabetes, chronic kidney disease stage IIIb who recently was hospitalized with congestive heart failure and hemoptysis and pneumonia growing Moraxella catarrhalis, who presented to the emergency room with increasing shortness of breath and increased oxygen requirement. Derrick Espinoza Work-up in the emergency room revealed an elevated BNP compared to prior, a procalcitonin that was slightly elevated at 0.37, urinalysis that suggested infection and chest x-ray that shows either multifocal pneumonia or heart failure. \"  Family / Caregiver Present: Yes (wife)  Referring Practitioner: Tru Harman MD  Diagnosis: Acute on chronic diastolic CHF  Subjective  Subjective: Pt met b/s for OT eval/tx. Pt in bed on arrival, initially refusing therapy d/t SOB. With max verbal encouragement pt agreeable.      Social/Functional History  Social/Functional History  Lives With: Spouse  Type of Home: House  Home Layout: Able to Live on Main level with bedroom/bathroom, Multi-level  Home Access: Stairs to enter with rails  Entrance Stairs - Number of Steps: 1+1  Entrance Stairs - Rails: Both  Bathroom Shower/Tub: Tub/Shower unit  Bathroom Toilet: Standard  Bathroom Equipment: Shower chair, Grab bars in shower, Tub transfer bench  Home Equipment: Cane, Rollator, Oxygen, Grab bars (2L O2)  Has the patient had two or more falls in the past year or any fall with injury in the past year?: Yes  ADL Assistance: Needs assistance (assist LB)  Homemaking Assistance: Needs assistance (wife cleans, pt manages bill paying)  Homemaking Responsibilities: No  Ambulation Assistance: Independent (with rollator)  Transfer Assistance: Independent  Active : Yes  Occupation: Retired  Type of Occupation: Lifestander  [address] [place] [address] Avenue: wood carving, kindra saw  IADL Comments: sleeps in a recliner but reports he does get into bed on a regular basis       Objective     Safety Devices  Type of Devices: Call light within reach; Chair alarm in place; Left in chair;Nurse notified    Balance  Sitting: Intact  Standing: With support (CGA in benoit stedy and at standing scale)  Gait  Overall Level of Assistance:  (pt too SOB to attempt, required use of benoit stedy)    Transfers  Sit to stand: Contact guard assistance (EOB >benoit stedy, recliner >standing scale)  Stand to sit: Contact guard assistance (benoit stedy>recliner)  Transfer Comments: total A via [address] stedy for bed>chair transfer  Toilet Transfers  Toilet Transfer: Contact guard assistance  Toilet Transfers Comments: with benoit stedy    AROM: Generally decreased, functional  Strength: Generally decreased, functional  Coordination:  (tremors B UE's)    ADL  Toileting: Dependent/Total  Toileting Skilled Clinical Factors: catheter, attempted to have BM at commode (dry trial), assist for hygiene standing in benoit stedy  Additional Comments: Anticipate pt is min A feeding and grooming, max A bathing and dressing based on ROM/strength, balance, endurance. Activity Tolerance  Activity Tolerance: Patient limited by fatigue;Patient limited by endurance  Activity Tolerance Comments:  On 5L O2 SpO2 variable from 93% > 98%    Bed mobility  Supine to Sit: Contact guard assistance (HOB elevated, use of rail)  Sit to Supine: Unable to assess (pt seated in recliner at end of session)  Scooting: Stand by assistance (to scoot to EOB)      Cognition  Overall Cognitive Status: WFL  Cognition Comment: but with decreased insight  Orientation  Overall Orientation Status: Within Functional Limits    Education Given To: Patient  Education Provided: Role of Therapy;Plan of Care;Transfer Training;ADL Adaptive Strategies  Education Provided Comments: importance of OOB activity  Barriers to Learning:  (decreased insight)  Education Outcome: Continued education needed                            AM-PAC Score        AM-PAC Inpatient Daily Activity Raw Score: 12 (01/18/23 1034)  AM-PAC Inpatient ADL T-Scale Score : 30.6 (01/18/23 1034)  ADL Inpatient CMS 0-100% Score: 66.57 (01/18/23 1034)  ADL Inpatient CMS G-Code Modifier : CL (01/18/23 1034)           Goals  Short Term Goals  Time Frame for Short Term Goals: Prior to d/c:  Short Term Goal 1: Pt will complete LB ADLs with mod A. Short Term Goal 2: Pt will complete UB ADLs with set-up/SBA. Short Term Goal 3: Pt will complete toileting with CGA/min A. Short Term Goal 4: Pt will complete fxl mobility and fxl transfers to/from ADL surfaces with SBA using AD. Short Term Goal 5: Pt will tolerate standing >3 minutes for functional task with SBA to improve standing tolerance for ADL routine. Long Term Goals  Time Frame for Long Term Goals : STGs=LTGs  Patient Goals   Patient goals : to return home.        Therapy Time   Individual Concurrent Group Co-treatment   Time In 0940         Time Out 9444         Minutes 55         Timed Code Treatment Minutes: [facility] OTR/L 8500

## 2023-01-18 NOTE — PROGRESS NOTES
Pharmacy Medication Reconciliation Note     List of medications Maritza Neville is currently taking is complete. Source of information:   1. Conversation with patient, spouse, and family at bedside  2. EMR    Notes regarding home medications:   1. Patient reports taking no medications other than Ventolin PTA in the ED  2. Reports being constipated even with bowel regimen. Can not afford Linzess.    3. Patient unknown if he should restart Valsartan-HCTZ--has been on hold since 12/22    Patient denies taking any additional OTC or herbal medications    Marcus Aburto, Pharmacy Intern  1/17/2023  6:58 PM

## 2023-01-18 NOTE — PROGRESS NOTES
Patient called and said that he could not breathe. I turned his oxygen up to 5 liters and respiratory was called to do a PRN breathing treatment. Patient is now sitting on the side of the bed and states that his breathing is better in that position. Patient is waiting for respiratory.

## 2023-01-18 NOTE — H&P
[address] [facility] Internal Medicine  History and Physical      CHIEF COMPLAINT:  I couldn't breathe    History of Present Illness: This is an 39-year-old white male with a history of chronic diastolic heart failure, diabetes, chronic kidney disease stage IIIb who recently was hospitalized with congestive heart failure and hemoptysis and pneumonia growing Moraxella catarrhalis, who presented to the emergency room with increasing shortness of breath and increased oxygen requirement. Patient states that in the approximately 1 day prior to presenting to the emergency room he had had progressive worsening of his dyspnea. The patient had been weighing himself regularly but he reports in the last several days his scales were not working properly and he has not had a good weight. His weight prior to his scale malfunction was approximately 210 pounds and his 2 recorded weights here have been 224 and 231 pounds. The patient's weight at discharge last admission was 217 pounds. (A standing weight is pending for this morning to clarify his current weight). Patient states that he has had consistent tan sputum without fever at home. He denies hemoptysis. This morning the patient looks dyspneic, currently on 5 L of oxygen and states he is still having some trouble breathing. Patient had 600 mL of recorded urine overnight. Creatinine is 1.9 this morning, this is at his baseline. Troponins were elevated at presentation, consistent with previous elevations and are not trending upward currently. He denied chest pain with this. Work-up in the emergency room revealed an elevated BNP compared to prior, a procalcitonin that was slightly elevated at 0.37, urinalysis that suggested infection and chest x-ray that shows either multifocal pneumonia or heart failure. Patient was started on cefepime last evening.         Past Medical History:   Diagnosis Date    Allergic rhinitis     Asthma     Atrial fibrillation ([facility] [address]     Carotid artery stenosis     Chronic kidney disease     COPD (chronic obstructive pulmonary disease) ([facility])     CPAP (continuous positive airway pressure) dependence     ESBL (extended spectrum beta-lactamase) producing bacteria infection 12/26/2018    urine    Hyperlipidemia     Hypertension     Iron deficiency anemia     Obstructive sleep apnea     Type II or unspecified type diabetes mellitus without mention of complication, not stated as uncontrolled          Past Surgical History:   Procedure Laterality Date    BRONCHOSCOPY N/A 12/28/2022    BRONCHOSCOPY ALVEOLAR LAVAGE performed by Ivana Borden MD at [address]  12/28/2022    BRONCHOSCOPY DIAGNOSTIC OR CELL [address] ONLY performed by Ivana Borden MD at [facility]  2/2012    bilateral    COLONOSCOPY  7/10/2009    diverticulosis    hemorrhoids    CT BONE MARROW BIOPSY  11/1/2022    CT BONE MARROW BIOPSY 11/1/2022 [facility] CT    GALLBLADDER SURGERY  1981    PAIN MANAGEMENT PROCEDURE Right 10/20/2021    COOLIEF RADIOFREQUENCY ABLATION - RIGHT KNEE performed by Lukasz Mathews MD at [address] Left 12/8/2021    [address] - LEFT KNEE performed by Lukasz Mathews MD at [address]  7-2005    7/10/2009    normal       Medications Prior to Admission:    Medications Prior to Admission: vitamin C (ASCORBIC ACID) 500 MG tablet, Take 500 mg by mouth daily  Cholecalciferol 50 MCG (2000 UT) TABS, Take 1 tablet by mouth daily  Multiple Vitamins-Minerals (THERAPEUTIC MULTIVITAMIN-MINERALS) tablet, Take 1 tablet by mouth daily  B Complex Vitamins (B-COMPLEX/B-12) TABS, Take 1 tablet by mouth daily  zinc sulfate (ZINC-220) 220 (50 Zn) MG capsule, Take 50 mg by mouth daily  bisacodyl (DULCOLAX) 5 MG EC tablet, Take 5 mg by mouth daily  valsartan-hydroCHLOROthiazide (DIOVAN-HCT) 320-25 MG per tablet, Take 1 tablet by mouth daily  hydrALAZINE (APRESOLINE) 10 MG tablet, Take 1 tablet by mouth every 8 hours  predniSONE (DELTASONE) 20 MG tablet, Take 1 tablet by mouth daily for 7 doses  [START ON 1/19/2023] predniSONE (DELTASONE) 10 MG tablet, Take 1 tablet by mouth daily for 7 doses  torsemide (DEMADEX) 20 MG tablet, Take 1 tablet by mouth daily  insulin glargine (BASAGLAR KWIKPEN) 100 UNIT/ML injection pen, Inject 5 Units into the skin nightly  levalbuterol (XOPENEX) 1.25 MG/3ML nebulizer solution, USE 1 VIAL VIA NEBULIZER FOUR TIMES DAILY  amiodarone (CORDARONE) 200 MG tablet, Take 0.5 tablets by mouth daily  INSULIN SYRINGE .5CC/29G 29G X 1/2\" 0.5 ML MISC, 1 each by Does not apply route daily  albuterol sulfate HFA (PROVENTIL;VENTOLIN;PROAIR) 108 (90 Base) MCG/ACT inhaler, INHALE 2 PUFFS BY MOUTH EVERY 4 HOURS AS NEEDED FOR WHEEZING  montelukast (SINGULAIR) 10 MG tablet, TAKE 1 TABLET BY MOUTH EVERY DAY  [employer] PURELAX 17 GM/SCOOP powder, TAKE 17G BY MOUTH DAILY MIX WITH 8 OZ OF WATER  pantoprazole (PROTONIX) 40 MG tablet, TAKE 1 TABLET BY MOUTH EVERY DAY  B-D UF III MINI PEN NEEDLES 31G X 5 MM MISC, USE AS DIRECTED 3 TIMES A DAY  atorvastatin (LIPITOR) 40 MG tablet, TAKE 1 TABLET BY MOUTH EVERY DAY  alfuzosin (UROXATRAL) 10 MG extended release tablet, TAKE 1 TABLET BY MOUTH EVERY DAY  LINZESS 145 MCG capsule, TAKE 1 CAPSULE BY MOUTH EVERY DAY IN THE MORNING BEFORE BREAKFAST (Patient not taking: Reported on 1/17/2023)  ACCU-CHEK GUIDE strip, TEST AS DIRECTED 3 TIMES A DAY  Accu-Chek FastClix Lancets MISC, USE TO TEST 3 TIMES A DAY DX CODE E11.9  CPAP Machine MISC, by Does not apply route  Lactobacillus Rhamnosus, GG, ([employer] PROBIOTIC, LACTOBACILLUS,) CAPS, TAKE 1 CAPSULE BY MOUTH 2 TIMES DAILY (WITH MEALS)  docusate sodium (COLACE) 100 MG capsule, Take 100 mg by mouth 2 times daily  Cyanocobalamin (VITAMIN B-12 PO), Take 500 mcg by mouth every other day   aspirin EC 81 MG EC tablet, Take 2 tablets by mouth daily.   ferrous sulfate 325 (65 FE) MG tablet, Take 325 mg by mouth daily     Allergies:    Hytrin [terazosin hcl], Penicillins, Shrimp flavor, Sulfa antibiotics, Sulfasalazine, Tekturna [aliskiren fumarate], Vancomycin, and Ciprofloxacin    Social History:    reports that he quit smoking about 35 years ago. His smoking use included cigarettes. He started smoking about 65 years ago. He has a 9.00 pack-year smoking history. He has never used smokeless tobacco. He reports that he does not drink alcohol and does not use drugs. Family History:   family history includes Arthritis in his paternal grandmother; Diabetes in his brother and paternal grandmother; Heart Disease in his mother; High Blood Pressure in his brother and father; Sleep Apnea in his brother; Stroke in his mother; Vision Loss in his mother. REVIEW OF SYSTEMS:  As above in the HPI, otherwise negative    PHYSICAL EXAM:    Vitals:  /62   Pulse 85   Temp (!) 96.4 °F (35.8 °C) (Axillary)   Resp 24   Wt 231 lb 14.8 oz (105.2 kg)   SpO2 98%   BMI 35.26 kg/m²   Temp  Av.7 °F (35.9 °C)  Min: 93.8 °F (34.3 °C)  Max: 97.8 °F (36.6 °C)    General:  Awake, alert, oriented X 3. Well developed, well nourished. No apparent distress, appears mildly dyspneic and like he does not feel well. HEENT:  Normocephalic, atraumatic. Pupils equal, round, reactive to light. No scleral icterus. No conjunctival injection. Normal lips, teeth, and gums. No nasal discharge. Neck:  Supple. No carotid bruit, carotid upstroke normal.  Heart: Irregularly irregular, no murmurs, gallops, or rubs. Telemetry shows atrial fibrillation. Lungs: Mild tachypnea. Oxygen at 5 L. Audible upper airway secretions. Lungs diffusely diminished throughout, no wheezes noted. Abdomen: Bowel sounds present, normoactive. Soft, nontender/nondistended. No masses, no peritoneal signs. Extremities:  No clubbing, cyanosis.   Patient has pitting edema/third spacing of fluid from his feet to his thighs/hips. Skin:  Warm and dry, no open lesions or rash. Neuro:  Cranial nerves 2-12 intact, no focal deficits. Moves all extremities without difficulty. LABS and diagnostics reviewed:    Recent Results (from the past 24 hour(s))   EKG 12 Lead    Collection Time: 01/17/23  1:06 PM   Result Value Ref Range    Ventricular Rate 102 BPM    QRS Duration 142 ms    Q-T Interval 376 ms    QTc Calculation (Bazett) 490 ms    R Axis -29 degrees    T Axis 42 degrees    Diagnosis       Atrial fibrillation with rapid ventricular response with premature ventricular or aberrantly conducted complexesRight bundle branch blockAbnormal ECGWhen compared with ECG of 23-DEC-2022 21:22,Atrial fibrillation has replaced Junctional rhythmVent.  rate has increased BY  45 BPMRight bundle branch block has replaced Incomplete right bundle branch blockConfirmed by Pau Martinez MD, [facility] ([id number]) on 1/17/2023 2:39:38 PM     CBC with Auto Differential    Collection Time: 01/17/23  2:53 PM   Result Value Ref Range    WBC 10.0 4.0 - 11.0 K/uL    RBC 3.79 (L) 4.20 - 5.90 M/uL    Hemoglobin 9.6 (L) 13.5 - 17.5 g/dL    Hematocrit 31.9 (L) 40.5 - 52.5 %    MCV 84.1 80.0 - 100.0 fL    MCH 25.2 (L) 26.0 - 34.0 pg    MCHC 30.0 (L) 31.0 - 36.0 g/dL    RDW 20.5 (H) 12.4 - 15.4 %    Platelets 477 757 - 850 K/uL    MPV 7.9 5.0 - 10.5 fL    Neutrophils % 93.4 %    Lymphocytes % 1.5 %    Monocytes % 4.5 %    Eosinophils % 0.1 %    Basophils % 0.5 %    Neutrophils Absolute 9.3 (H) 1.7 - 7.7 K/uL    Lymphocytes Absolute 0.2 (L) 1.0 - 5.1 K/uL    Monocytes Absolute 0.4 0.0 - 1.3 K/uL    Eosinophils Absolute 0.0 0.0 - 0.6 K/uL    Basophils Absolute 0.0 0.0 - 0.2 K/uL   Comprehensive Metabolic Panel w/ Reflex to MG    Collection Time: 01/17/23  2:53 PM   Result Value Ref Range    Sodium 143 136 - 145 mmol/L    Potassium reflex Magnesium 4.2 3.5 - 5.1 mmol/L    Chloride 103 99 - 110 mmol/L    CO2 30 21 - 32 mmol/L    Anion Gap 10 3 - 16    Glucose 143 (H) 70 - 99 mg/dL BUN 66 (H) 7 - 20 mg/dL    Creatinine 1.8 (H) 0.8 - 1.3 mg/dL    Est, Glom Filt Rate 37 (A) >60    Calcium 7.7 (L) 8.3 - 10.6 mg/dL    Total Protein 5.9 (L) 6.4 - 8.2 g/dL    Albumin 3.1 (L) 3.4 - 5.0 g/dL    Albumin/Globulin Ratio 1.1 1.1 - 2.2    Total Bilirubin 0.5 0.0 - 1.0 mg/dL    Alkaline Phosphatase 81 40 - 129 U/L    ALT 30 10 - 40 U/L    AST 16 15 - 37 U/L   Troponin    Collection Time: 01/17/23  2:53 PM   Result Value Ref Range    Troponin 0.07 (H) <0.01 ng/mL   Brain Natriuretic Peptide    Collection Time: 01/17/23  2:53 PM   Result Value Ref Range    Pro-BNP 2,497 (H) 0 - 449 pg/mL   Protime-INR    Collection Time: 01/17/23  2:53 PM   Result Value Ref Range    Protime 16.4 (H) 11.7 - 14.5 sec    INR 1.33 (H) 0.87 - 1.14   APTT    Collection Time: 01/17/23  2:53 PM   Result Value Ref Range    aPTT 28.7 23.0 - 34.3 sec   Blood Gas, Venous    Collection Time: 01/17/23  2:53 PM   Result Value Ref Range    pH, Jarrett 7.360 7.350 - 7.450    pCO2, Jarrett 58.5 (H) 40.0 - 50.0 mmHg    pO2, Jarrett 52 Not Established mmHg    HCO3, Venous 33 (H) 23 - 29 mmol/L    Base Excess, Jarrett 6.3 Not Established mmol/L    O2 Sat, Jarrett 84 Not Established %    Carboxyhemoglobin 1.9 %    MetHgb, Jarrett 0.4 <1.5 %    TC02 (Calc), Jarrett 35 Not Established mmol/L    O2 Therapy Unknown    Urinalysis with Reflex to Culture    Collection Time: 01/17/23  2:53 PM    Specimen: Urine, clean catch   Result Value Ref Range    Color, UA Yellow Straw/Yellow    Clarity, UA Error Clear    Glucose, Ur Negative Negative mg/dL    Bilirubin Urine Negative Negative    Ketones, Urine Negative Negative mg/dL    Specific Gravity, UA 1.014 1.005 - 1.030    Blood, Urine TRACE (A) Negative    pH, UA 5.0 5.0 - 8.0    Protein, UA 30 (A) Negative mg/dL    Urobilinogen, Urine 0.2 <2.0 E.U./dL    Nitrite, Urine Negative Negative    Leukocyte Esterase, Urine LARGE (A) Negative    Microscopic Examination YES     Urine Type Cleancatch     Urine Reflex to Culture Yes Microscopic Urinalysis    Collection Time: 01/17/23  2:53 PM   Result Value Ref Range    Bacteria, UA 4+ (A) None Seen /HPF    Hyaline Casts, UA 18 (H) 0 - 8 /LPF    WBC,  (H) 0 - 5 /HPF    RBC, UA 1 0 - 4 /HPF    Epithelial Cells, UA 0 0 - 5 /HPF   COVID-19, Rapid    Collection Time: 01/17/23  6:53 PM    Specimen: Nasopharyngeal Swab   Result Value Ref Range    SARS-CoV-2, NAAT Not Detected Not Detected   Rapid influenza A/B antigens    Collection Time: 01/17/23  6:53 PM    Specimen: Nasopharyngeal   Result Value Ref Range    Rapid Influenza A Ag Negative Negative    Rapid Influenza B Ag Negative Negative   Procalcitonin    Collection Time: 01/17/23  7:50 PM   Result Value Ref Range    Procalcitonin 0.37 (H) 0.00 - 0.15 ng/mL   Lactate, Sepsis    Collection Time: 01/17/23  7:50 PM   Result Value Ref Range    Lactic Acid, Sepsis 1.8 0.4 - 1.9 mmol/L   POCT Glucose    Collection Time: 01/17/23  9:32 PM   Result Value Ref Range    POC Glucose 146 (H) 70 - 99 mg/dl    Performed on ACCU-CHEK    Lactate, Sepsis    Collection Time: 01/17/23  9:57 PM   Result Value Ref Range    Lactic Acid, Sepsis 1.0 0.4 - 1.9 mmol/L   Basic Metabolic Panel    Collection Time: 01/18/23  5:26 AM   Result Value Ref Range    Sodium 142 136 - 145 mmol/L    Potassium 4.6 3.5 - 5.1 mmol/L    Chloride 100 99 - 110 mmol/L    CO2 29 21 - 32 mmol/L    Anion Gap 13 3 - 16    Glucose 191 (H) 70 - 99 mg/dL    BUN 69 (H) 7 - 20 mg/dL    Creatinine 1.9 (H) 0.8 - 1.3 mg/dL    Est, Glom Filt Rate 35 (A) >60    Calcium 7.4 (L) 8.3 - 10.6 mg/dL   CBC with Auto Differential    Collection Time: 01/18/23  5:26 AM   Result Value Ref Range    WBC 8.8 4.0 - 11.0 K/uL    RBC 3.55 (L) 4.20 - 5.90 M/uL    Hemoglobin 9.0 (L) 13.5 - 17.5 g/dL    Hematocrit 30.0 (L) 40.5 - 52.5 %    MCV 84.4 80.0 - 100.0 fL    MCH 25.3 (L) 26.0 - 34.0 pg    MCHC 30.0 (L) 31.0 - 36.0 g/dL    RDW 21.1 (H) 12.4 - 15.4 %    Platelets 538 (L) 501 - 450 K/uL    MPV 8.4 5.0 - 10.5 fL Neutrophils % 94.3 %    Lymphocytes % 1.6 %    Monocytes % 3.9 %    Eosinophils % 0.0 %    Basophils % 0.2 %    Neutrophils Absolute 8.3 (H) 1.7 - 7.7 K/uL    Lymphocytes Absolute 0.1 (L) 1.0 - 5.1 K/uL    Monocytes Absolute 0.3 0.0 - 1.3 K/uL    Eosinophils Absolute 0.0 0.0 - 0.6 K/uL    Basophils Absolute 0.0 0.0 - 0.2 K/uL   Lactic Acid    Collection Time: 01/18/23  5:26 AM   Result Value Ref Range    Lactic Acid 1.1 0.4 - 2.0 mmol/L   Troponin    Collection Time: 01/18/23  5:26 AM   Result Value Ref Range    Troponin 0.06 (H) <0.01 ng/mL       ASSESSMENT:      Principal Problem:    Acute on chronic diastolic congestive heart failure (HCC)  Active Problems:    Acute on chronic respiratory failure with hypoxia (HCC)    Type 2 diabetes mellitus with stage 3b chronic kidney disease, with long-term current use of insulin (HCC)    Chronic anemia    Abnormal CXR    Elevated troponin    JOANN (obstructive sleep apnea)    Chronic GERD    Essential hypertension    B12 deficiency    PLAN:    The patient has been admitted and received IV Lasix last night, this has been changed to a Lasix drip at 5 mg/h this morning. Titrate Lasix drip as needed for diuresis and hypoxia improvement. Patient's weight at last discharge was 217 pounds, he reported a weight of 210 pounds at home prior to his scale stop working. Troponins have been scheduled x3. Cardiology has been consulted to review and evaluate. Patient with apparent urinary tract infection and possible recurrent pneumonia. Patient placed on cefepime last night. Pulmonary has been consulted to render an opinion regarding the presence of pneumonia and give antibiotic recommendations. Patient does have a history of an ESBL urinary tract infection, but last admission he grew Moraxella catarrhalis in his lungs, this was treated with Ceftin. Patient with a chronic anemia, follow hemoglobin in May need to consider erythropoietin injections.   Consider hematology consultation to follow along. Follow CBC. PT/OT has been asked to follow-up with the patient. Home meds have been reviewed and restarted as appropriate  Blood pressure is reasonably well controlled since admission, continue to monitor. Patient was bradycardic last visit with verapamil so he is no longer on this. Calcium was slightly low today but also has hypoalbuminemia. Check ionized calcium tomorrow. Monitor renal function and magnesium daily.         Cate Maloney MD  8:45 AM  1/18/2023

## 2023-01-18 NOTE — CONSULTS
PATIENT IS SEEN AT THE REQUEST OF DR. Dale Stallings for CHF vs pneumonia, assistance with antibiotics    HISTORY OF PRESENT ILLNESS:  This is a 79 y.o. male who presented to the ED on 1/17 with a CC of SOB, urinary retention and increase in oxygen requirements. Says he has been more SOB with productive cough of yellow mucus. Recently been discharged home on oxygen but historically has not required it. Says he is indeed more SOB than baseline. Has had several admissions over the past few months. He does feel that he recovered from the most recent one when he came back with SOB etc.  Wife at bedside.       Established Pulmonologist:  Ahmet Pollock     PAST MEDICAL HISTORY:  Past Medical History:   Diagnosis Date    Allergic rhinitis     Asthma     Atrial fibrillation ([facility] [address]     Carotid artery stenosis     Chronic kidney disease     COPD (chronic obstructive pulmonary disease) ([facility])     CPAP (continuous positive airway pressure) dependence     ESBL (extended spectrum beta-lactamase) producing bacteria infection 12/26/2018    urine    Hyperlipidemia     Hypertension     Iron deficiency anemia     Obstructive sleep apnea     Type II or unspecified type diabetes mellitus without mention of complication, not stated as uncontrolled        PAST SURGICAL HISTORY:  Past Surgical History:   Procedure Laterality Date    BRONCHOSCOPY N/A 12/28/2022    BRONCHOSCOPY ALVEOLAR LAVAGE performed by Rosie Pearl MD at [address]  12/28/2022    BRONCHOSCOPY DIAGNOSTIC OR CELL [address] ONLY performed by Rosie Pearl MD at [address]  2/2012    bilateral    COLONOSCOPY  7/10/2009    diverticulosis    hemorrhoids    CT BONE MARROW BIOPSY  11/1/2022    CT BONE MARROW BIOPSY 11/1/2022 [facility] CT    GALLBLADDER SURGERY  1981    PAIN MANAGEMENT PROCEDURE Right 10/20/2021    COOLIEF RADIOFREQUENCY ABLATION - RIGHT KNEE performed by Alessandra Avelar MD at [address] Left 12/8/2021    COOLIEF RADIOFREQUENCY ABLATION - LEFT KNEE performed by Kelly Ohara MD at [address] [place] [address]  7-2005    7/10/2009    normal       FAMILY HISTORY:  family history includes Arthritis in his paternal grandmother; Diabetes in his brother and paternal grandmother; Heart Disease in his mother; High Blood Pressure in his brother and father; Sleep Apnea in his brother; Stroke in his mother; Vision Loss in his mother. SOCIAL HISTORY:   reports that he quit smoking about 35 years ago. His smoking use included cigarettes. He started smoking about 65 years ago. He has a 9.00 pack-year smoking history. He has never used smokeless tobacco.    Scheduled Meds:   vitamin B-12  500 mcg Oral Daily    cefepime  2,000 mg IntraVENous Q12H    tamsulosin  0.4 mg Oral Daily    amiodarone  100 mg Oral Daily    aspirin EC  162 mg Oral Daily    atorvastatin  40 mg Oral Daily    bisacodyl  5 mg Oral Daily    polyethylene glycol  17 g Oral Daily    docusate sodium  100 mg Oral BID    ferrous sulfate  324 mg Oral Daily    hydrALAZINE  10 mg Oral 3 times per day    lactobacillus  1 capsule Oral BID WC    linaclotide  145 mcg Oral QAM AC    montelukast  10 mg Oral Daily    pantoprazole  40 mg Oral QAM AC    [START ON 1/19/2023] predniSONE  10 mg Oral Daily    valsartan-hydroCHLOROthiazide  2 tablet Oral Daily    insulin lispro  0-4 Units SubCUTAneous TID WC    insulin lispro  0-4 Units SubCUTAneous Nightly       Continuous Infusions:   furosemide (LASIX) 1mg/ml infusion      dextrose         PRN Meds:  magnesium sulfate, albuterol sulfate HFA, levalbuterol, glucose, dextrose bolus **OR** dextrose bolus, glucagon (rDNA), dextrose    ALLERGIES:  Patient is allergic to hytrin [terazosin hcl], penicillins, shrimp flavor, sulfa antibiotics, sulfasalazine, tekturna [aliskiren fumarate], vancomycin, and ciprofloxacin.     REVIEW OF SYSTEMS:  Constitutional: Repeated admissions  HENT: Negative for sore throat  Eyes: Negative for redness   Respiratory: SOB, difficulty in breathing, yellow mucus   Cardiovascular: Negative for chest pain  Gastrointestinal: Negative for vomiting, diarrhea   Genitourinary: Negative for hematuria, negative for dysuria  Musculoskeletal: Negative for arthralgias   Skin: Negative for rash  Neurological: Negative for syncope  Hematological: Negative for adenopathy  Extremities:  Negative for swelling    PHYSICAL EXAM:  Blood pressure 117/74, pulse 86, temperature (!) 96.4 °F (35.8 °C), temperature source Axillary, resp. rate 24, weight 226 lb 6.6 oz (102.7 kg), SpO2 98 %.'  Gen: No distress. Chronically ill   Eyes: PERRL. No sclera icterus. No conjunctival injection. ENT: No discharge. Pharynx clear. Hoarse/weak voice   Neck: Trachea midline. No obvious mass. Resp: Upper airway wheezing, some rhonchi   CV: Regular rate. Regular rhythm. No murmur or rub. GI: Non-tender. Non-distended. No hernia. BS present. Skin: Warm and dry. No nodule on exposed extremities. Lymph: No cervical LAD. No supraclavicular LAD. M/S: No cyanosis. No joint deformity. Neuro: Awake. Alert. Moves all four extremities.    EXT:   + edema, no clubbing    LABS:  CBC:   Recent Labs     01/17/23  1453 01/18/23  0526   WBC 10.0 8.8   HGB 9.6* 9.0*   HCT 31.9* 30.0*   MCV 84.1 84.4    134*     BMP:   Recent Labs     01/17/23  1453 01/18/23  0526    142   K 4.2 4.6    100   CO2 30 29   BUN 66* 69*   CREATININE 1.8* 1.9*     LIVER PROFILE:   Recent Labs     01/17/23  1453   AST 16   ALT 30   BILITOT 0.5   ALKPHOS 81     PT/INR:   Recent Labs     01/17/23  1453   PROTIME 16.4*   INR 1.33*     APTT:   Recent Labs     01/17/23  1453   APTT 28.7     UA:  Recent Labs     01/17/23  1453   COLORU Yellow   PHUR 5.0   WBCUA 107*   RBCUA 1   BACTERIA 4+*   CLARITYU Error   SPECGRAV 1.014   LEUKOCYTESUR LARGE*   UROBILINOGEN 0.2   BILIRUBINUR Negative   BLOODU TRACE*   GLUCOSEU Negative No results for input(s): PHART, PST8TBA, PO2ART in the last 72 hours. Cultures:   Pending     PFTs:     Mild obstruction     ECHO:    Ejection fraction is visually estimated to be 60-65%. No regional wall motion abnormalities are noted. Grade I diastolic dysfunction with elevated LV filling pressures. The left atrium is moderately dilated. mildly dilated right ventricle. Estimated pulmonary artery systolic pressure is mildly elevated at 40 mmHg   assuming a right atrial pressure of 15 mmHg. VB.36    Chest X-ray:  Chest imaging was reviewed by me and showed bilateral airspace disease, worse in the right lung. I reviewed all the above labs and studies pertaining to this visit. ASSESSMENT/PLAN:  Acute on Chronic Hypoxic Respiratory Failure  Titrate oxygen for saturations greater than or equal to 90%  Recent baseline oxygen is 3 liters, historically not on oxygen   Abnormal CXR, asymmetric. Probably HCAP with pulmonary edema  Continue with cefepime due to history of pseudomonas  Add Flagyl for anaerobes (possible aspiration)  One day of Zyvox, send MRSA probe  Sputum culture and Pneumonia panel   Agree with diuresis   Chronic bronchitis   Add xopenex nebs and saline nebs  Acapella  Ok with prednisone for now   JOANN   Home machine with sleep.   Will likely need oxygen bleed in     DVT prophylaxis  recommend heparin if no contraindications     Thanks    Morena Rodriguez, DO [facility]

## 2023-01-18 NOTE — ACP (ADVANCE CARE PLANNING)
Advance Care Planning     Advance Care Planning Activator (Inpatient)  Conversation Note      Date of ACP Conversation: 1/18/2023     Conversation Conducted with: Patient with Decision Making Capacity    ACP Activator[facility] Decision Maker:     Current Designated Health Care Decision Maker:     Primary Decision Maker: Roselia Suresh - Spouse - [phone]    Secondary Decision Maker: Dionicio Terrazas Child - [phone]    Care Preferences    Ventilation: \"If you were in your present state of health and suddenly became very ill and were unable to breathe on your own, what would your preference be about the use of a ventilator (breathing machine) if it were available to you? \"      Would the patient desire the use of ventilator (breathing machine)?: yes    \"If your health worsens and it becomes clear that your chance of recovery is unlikely, what would your preference be about the use of a ventilator (breathing machine) if it were available to you? \"     Would the patient desire the use of ventilator (breathing machine)?: No      Resuscitation  \"CPR works best to restart the heart when there is a sudden event, like a heart attack, in someone who is otherwise healthy. Unfortunately, CPR does not typically restart the heart for people who have serious health conditions or who are very sick. \"    \"In the event your heart stopped as a result of an underlying serious health condition, would you want attempts to be made to restart your heart (answer \"yes\" for attempt to resuscitate) or would you prefer a natural death (answer \"no\" for do not attempt to resuscitate)? \" yes       [] Yes   [x] No   Educated Patient / Donnamadolfo Ross regarding differences between Advance Directives and portable DNR orders.     Length of ACP Conversation in minutes: 5     Conversation Outcomes:  [x] ACP discussion completed  [] Existing advance directive reviewed with patient; no changes to patient's previously recorded wishes  [] New Advance Directive completed  [] Portable Do Not Rescitate prepared for Provider review and signature  [] POLST/POST/MOLST/MOST prepared for Provider review and signature      Follow-up plan:    [] Schedule follow-up conversation to continue planning  [] Referred individual to Provider for additional questions/concerns   [] Advised patient/agent/surrogate to review completed ACP document and update if needed with changes in condition, patient preferences or care setting    [x] This note routed to one or more involved healthcare providers    Electronically signed by Jonel Antunez on 1/18/2023 at 1:43 PM

## 2023-01-18 NOTE — PLAN OF CARE
Problem: Discharge Planning  Goal: Discharge to home or other facility with appropriate resources  Outcome: Progressing  Flowsheets (Taken 1/17/2023 2151)  Discharge to home or other facility with appropriate resources:   Identify barriers to discharge with patient and caregiver   Identify discharge learning needs (meds, wound care, etc)   Refer to discharge planning if patient needs post-hospital services based on physician order or complex needs related to functional status, cognitive ability or social support system   Arrange for needed discharge resources and transportation as appropriate     Problem: Pain  Goal: Verbalizes/displays adequate comfort level or baseline comfort level  Outcome: Progressing     Problem: Safety - Adult  Goal: Free from fall injury  Outcome: Progressing     Problem: ABCDS Injury Assessment  Goal: Absence of physical injury  Outcome: Progressing  Flowsheets (Taken 1/17/2023 2143)  Absence of Physical Injury: Implement safety measures based on patient assessment     Problem: Skin/Tissue Integrity  Goal: Absence of new skin breakdown  Description: 1. Monitor for areas of redness and/or skin breakdown  2. Assess vascular access sites hourly  3. Every 4-6 hours minimum:  Change oxygen saturation probe site  4. Every 4-6 hours:  If on nasal continuous positive airway pressure, respiratory therapy assess nares and determine need for appliance change or resting period.   Outcome: Progressing     Problem: Infection - Adult  Goal: Absence of infection at discharge  Outcome: Progressing  Goal: Absence of infection during hospitalization  Outcome: Progressing  Goal: Absence of fever/infection during anticipated neutropenic period  Outcome: Progressing     Problem: Genitourinary - Adult  Goal: Absence of urinary retention  Outcome: Progressing  Goal: Urinary catheter remains patent  Outcome: Progressing     Problem: Metabolic/Fluid and Electrolytes - Adult  Goal: Electrolytes maintained within normal limits  Outcome: Progressing  Goal: Hemodynamic stability and optimal renal function maintained  Outcome: Progressing  Goal: Glucose maintained within prescribed range  Outcome: Progressing

## 2023-01-18 NOTE — PLAN OF CARE
Problem: Discharge Planning  Goal: Discharge to home or other facility with appropriate resources  Outcome: Progressing     Problem: Pain  Goal: Verbalizes/displays adequate comfort level or baseline comfort level  Outcome: Progressing     Problem: Safety - Adult  Goal: Free from fall injury  Outcome: Progressing     Problem: ABCDS Injury Assessment  Goal: Absence of physical injury  Outcome: Progressing     Problem: Skin/Tissue Integrity  Goal: Absence of new skin breakdown  Description: 1. Monitor for areas of redness and/or skin breakdown  2. Assess vascular access sites hourly  3. Every 4-6 hours minimum:  Change oxygen saturation probe site  4. Every 4-6 hours:  If on nasal continuous positive airway pressure, respiratory therapy assess nares and determine need for appliance change or resting period.   Outcome: Progressing     Problem: Infection - Adult  Goal: Absence of infection at discharge  Outcome: Progressing  Goal: Absence of infection during hospitalization  Outcome: Progressing  Goal: Absence of fever/infection during anticipated neutropenic period  Outcome: Progressing     Problem: Genitourinary - Adult  Goal: Absence of urinary retention  Outcome: Progressing  Goal: Urinary catheter remains patent  Outcome: Progressing     Problem: Metabolic/Fluid and Electrolytes - Adult  Goal: Electrolytes maintained within normal limits  Outcome: Progressing  Goal: Hemodynamic stability and optimal renal function maintained  Outcome: Progressing  Goal: Glucose maintained within prescribed range  Outcome: Progressing

## 2023-01-19 LAB
ANION GAP SERPL CALCULATED.3IONS-SCNC: 10 MMOL/L (ref 3–16)
BUN BLDV-MCNC: 79 MG/DL (ref 7–20)
CALCIUM IONIZED: 0.99 MMOL/L (ref 1.12–1.32)
CALCIUM SERPL-MCNC: 7.4 MG/DL (ref 8.3–10.6)
CHLORIDE BLD-SCNC: 98 MMOL/L (ref 99–110)
CO2: 29 MMOL/L (ref 21–32)
CORTISOL TOTAL: 14 UG/DL
CREAT SERPL-MCNC: 2.6 MG/DL (ref 0.8–1.3)
GFR SERPL CREATININE-BSD FRML MDRD: 24 ML/MIN/{1.73_M2}
GLUCOSE BLD-MCNC: 161 MG/DL (ref 70–99)
GLUCOSE BLD-MCNC: 167 MG/DL (ref 70–99)
GLUCOSE BLD-MCNC: 180 MG/DL (ref 70–99)
GLUCOSE BLD-MCNC: 191 MG/DL (ref 70–99)
GLUCOSE BLD-MCNC: 213 MG/DL (ref 70–99)
MAGNESIUM: 1.9 MG/DL (ref 1.8–2.4)
MRSA SCREEN RT-PCR: NORMAL
ORGANISM: ABNORMAL
PERFORMED ON: ABNORMAL
PH VENOUS: 7.32 (ref 7.35–7.45)
POTASSIUM SERPL-SCNC: 4.9 MMOL/L (ref 3.5–5.1)
PROCALCITONIN: 0.56 NG/ML (ref 0–0.15)
SODIUM BLD-SCNC: 137 MMOL/L (ref 136–145)
TSH REFLEX FT4: 0.92 UIU/ML (ref 0.27–4.2)
URINE CULTURE, ROUTINE: ABNORMAL

## 2023-01-19 PROCEDURE — 6360000002 HC RX W HCPCS: Performed by: INTERNAL MEDICINE

## 2023-01-19 PROCEDURE — 6370000000 HC RX 637 (ALT 250 FOR IP): Performed by: INTERNAL MEDICINE

## 2023-01-19 PROCEDURE — 2580000003 HC RX 258: Performed by: INTERNAL MEDICINE

## 2023-01-19 PROCEDURE — 82330 ASSAY OF CALCIUM: CPT

## 2023-01-19 PROCEDURE — 99233 SBSQ HOSP IP/OBS HIGH 50: CPT | Performed by: INTERNAL MEDICINE

## 2023-01-19 PROCEDURE — 84145 PROCALCITONIN (PCT): CPT

## 2023-01-19 PROCEDURE — 1200000000 HC SEMI PRIVATE

## 2023-01-19 PROCEDURE — 36415 COLL VENOUS BLD VENIPUNCTURE: CPT

## 2023-01-19 PROCEDURE — 2700000000 HC OXYGEN THERAPY PER DAY

## 2023-01-19 PROCEDURE — 82533 TOTAL CORTISOL: CPT

## 2023-01-19 PROCEDURE — 94640 AIRWAY INHALATION TREATMENT: CPT

## 2023-01-19 PROCEDURE — 83735 ASSAY OF MAGNESIUM: CPT

## 2023-01-19 PROCEDURE — 80048 BASIC METABOLIC PNL TOTAL CA: CPT

## 2023-01-19 PROCEDURE — 84443 ASSAY THYROID STIM HORMONE: CPT

## 2023-01-19 PROCEDURE — 94761 N-INVAS EAR/PLS OXIMETRY MLT: CPT

## 2023-01-19 PROCEDURE — 94660 CPAP INITIATION&MGMT: CPT

## 2023-01-19 RX ORDER — SODIUM CHLORIDE 9 MG/ML
INJECTION, SOLUTION INTRAVENOUS CONTINUOUS
Status: DISCONTINUED | OUTPATIENT
Start: 2023-01-19 | End: 2023-01-24

## 2023-01-19 RX ORDER — LACTULOSE 10 G/15ML
10 SOLUTION ORAL 3 TIMES DAILY PRN
Status: DISCONTINUED | OUTPATIENT
Start: 2023-01-19 | End: 2023-01-20

## 2023-01-19 RX ORDER — COSYNTROPIN 0.25 MG/ML
250 INJECTION, POWDER, FOR SOLUTION INTRAMUSCULAR; INTRAVENOUS ONCE
Status: COMPLETED | OUTPATIENT
Start: 2023-01-20 | End: 2023-01-20

## 2023-01-19 RX ADMIN — OXYCODONE HYDROCHLORIDE AND ACETAMINOPHEN 500 MG: 500 TABLET ORAL at 08:55

## 2023-01-19 RX ADMIN — SODIUM CHLORIDE SOLN NEBU 3% 4 ML: 3 NEBU SOLN at 08:19

## 2023-01-19 RX ADMIN — MEROPENEM 1000 MG: 1 INJECTION, POWDER, FOR SOLUTION INTRAVENOUS at 23:49

## 2023-01-19 RX ADMIN — ATORVASTATIN CALCIUM 40 MG: 40 TABLET, FILM COATED ORAL at 08:55

## 2023-01-19 RX ADMIN — MEROPENEM 1000 MG: 1 INJECTION, POWDER, FOR SOLUTION INTRAVENOUS at 12:23

## 2023-01-19 RX ADMIN — SODIUM CHLORIDE SOLN NEBU 3% 4 ML: 3 NEBU SOLN at 12:01

## 2023-01-19 RX ADMIN — ALBUTEROL SULFATE 2 PUFF: 90 AEROSOL, METERED RESPIRATORY (INHALATION) at 01:00

## 2023-01-19 RX ADMIN — SODIUM CHLORIDE: 9 INJECTION, SOLUTION INTRAVENOUS at 14:39

## 2023-01-19 RX ADMIN — LEVALBUTEROL HYDROCHLORIDE 1.25 MG: 1.25 SOLUTION, CONCENTRATE RESPIRATORY (INHALATION) at 08:19

## 2023-01-19 RX ADMIN — Medication 1 CAPSULE: at 08:55

## 2023-01-19 RX ADMIN — FERROUS SULFATE TAB EC 324 MG (65 MG FE EQUIVALENT) 324 MG: 324 (65 FE) TABLET DELAYED RESPONSE at 08:55

## 2023-01-19 RX ADMIN — DOCUSATE SODIUM 100 MG: 100 CAPSULE, LIQUID FILLED ORAL at 08:55

## 2023-01-19 RX ADMIN — LEVALBUTEROL HYDROCHLORIDE 1.25 MG: 1.25 SOLUTION, CONCENTRATE RESPIRATORY (INHALATION) at 20:39

## 2023-01-19 RX ADMIN — Medication 1 CAPSULE: at 17:23

## 2023-01-19 RX ADMIN — INSULIN LISPRO 1 UNITS: 100 INJECTION, SOLUTION INTRAVENOUS; SUBCUTANEOUS at 12:23

## 2023-01-19 RX ADMIN — ISODIUM CHLORIDE 3 ML: 0.03 SOLUTION RESPIRATORY (INHALATION) at 04:04

## 2023-01-19 RX ADMIN — AMIODARONE HYDROCHLORIDE 100 MG: 200 TABLET ORAL at 08:55

## 2023-01-19 RX ADMIN — LEVALBUTEROL HYDROCHLORIDE 1.25 MG: 1.25 SOLUTION, CONCENTRATE RESPIRATORY (INHALATION) at 16:45

## 2023-01-19 RX ADMIN — DOCUSATE SODIUM 100 MG: 100 CAPSULE, LIQUID FILLED ORAL at 20:46

## 2023-01-19 RX ADMIN — BISACODYL 5 MG: 5 TABLET, COATED ORAL at 08:55

## 2023-01-19 RX ADMIN — ASPIRIN 162 MG: 81 TABLET, COATED ORAL at 08:55

## 2023-01-19 RX ADMIN — LEVALBUTEROL HYDROCHLORIDE 1.25 MG: 1.25 SOLUTION, CONCENTRATE RESPIRATORY (INHALATION) at 04:04

## 2023-01-19 RX ADMIN — SODIUM CHLORIDE SOLN NEBU 3% 15 ML: 3 NEBU SOLN at 20:40

## 2023-01-19 RX ADMIN — TAMSULOSIN HYDROCHLORIDE 0.4 MG: 0.4 CAPSULE ORAL at 08:56

## 2023-01-19 RX ADMIN — CYANOCOBALAMIN TAB 500 MCG 500 MCG: 500 TAB at 08:56

## 2023-01-19 RX ADMIN — SODIUM CHLORIDE SOLN NEBU 3% 15 ML: 3 NEBU SOLN at 16:45

## 2023-01-19 RX ADMIN — INSULIN GLARGINE 10 UNITS: 100 INJECTION, SOLUTION SUBCUTANEOUS at 09:01

## 2023-01-19 RX ADMIN — FUROSEMIDE 5 MG/HR: 10 INJECTION, SOLUTION INTRAMUSCULAR; INTRAVENOUS at 03:41

## 2023-01-19 RX ADMIN — PANTOPRAZOLE SODIUM 40 MG: 40 TABLET, DELAYED RELEASE ORAL at 06:45

## 2023-01-19 RX ADMIN — LINEZOLID 600 MG: 600 INJECTION, SOLUTION INTRAVENOUS at 00:05

## 2023-01-19 RX ADMIN — MEROPENEM 1000 MG: 1 INJECTION, POWDER, FOR SOLUTION INTRAVENOUS at 01:17

## 2023-01-19 RX ADMIN — LEVALBUTEROL HYDROCHLORIDE 1.25 MG: 1.25 SOLUTION, CONCENTRATE RESPIRATORY (INHALATION) at 12:01

## 2023-01-19 RX ADMIN — POLYETHYLENE GLYCOL 3350 17 G: 17 POWDER, FOR SOLUTION ORAL at 09:06

## 2023-01-19 RX ADMIN — MONTELUKAST 10 MG: 10 TABLET, FILM COATED ORAL at 08:55

## 2023-01-19 RX ADMIN — PREDNISONE 10 MG: 10 TABLET ORAL at 08:55

## 2023-01-19 NOTE — PROGRESS NOTES
[address] [facility] Internal Medicine Note      Chief Complaint: I feel terrible    Subjective/Interval History:    Patient is very unhappy this morning. Patient is constipated, he has not been using his Linzess in more than a week at home due to the cost.  Patient's blood pressures been running low and he feels very dry related to the Lasix drip. Renal function is worse today. Urine culture confirms ESBL E. coli UTI. Patient reports that his breathing is still not good. He is on 5 L of oxygen. Patient's appetite is not good today. Continued cough with sputum. Salazar catheter remains in place. Patient continues to have episodes of hypothermia. It is unclear what is causing this. Discussed with nursing at the bedside. No chest pain. No nausea, vomiting, diarrhea. No abdominal pain. No dysuria. The remainder of the review of systems is negative. PMH, PSH, FH/SH reviewed and unchanged as documented in the H&P personally documented at admission 23    Medication list reviewed    Objective:    /71   Pulse 86   Temp (!) 94.3 °F (34.6 °C) (Oral)   Resp 20   Wt 231 lb 0.7 oz (104.8 kg)   SpO2 97%   BMI 35.13 kg/m²   Temp  Av °F (35.6 °C)  Min: 94.3 °F (34.6 °C)  Max: 97.5 °F (36.4 °C)    CV-irregularly irregular, rate controlled  Pulm-respirations easy, currently on 5 L of oxygen per nasal cannula. Looks less tachypneic today. Diffusely diminished breath sounds throughout. Abd- BS+, soft, NTND  Ext-continued 2-3+ edema of his lower extremities.     The Following Labs Were Reviewed Today:    Recent Results (from the past 24 hour(s))   POCT Glucose    Collection Time: 23  9:32 AM   Result Value Ref Range    POC Glucose 201 (H) 70 - 99 mg/dl    Performed on ACCU-CHEK    Troponin    Collection Time: 23 11:15 AM   Result Value Ref Range    Troponin 0.05 (H) <0.01 ng/mL   Culture, Respiratory    Collection Time: 23 11:20 AM    Specimen: Sputum Expectorated   Result Value Ref Range    Gram Stain Result       No Epithelial Cells seen  4+ WBC's (Polymorphonuclear)  1+ Gram positive rods  1+ Gram negative rods     MRSA DNA Probe, Nasal    Collection Time: 01/18/23 11:20 AM    Specimen: Nares   Result Value Ref Range    MRSA SCREEN RT-PCR       Negative  MRSA DNA not detected.   Normal Range: Not detected     PNEUMONIA PANEL FILM ARRAY REPORT    Collection Time: 01/18/23 11:20 AM   Result Value Ref Range    Report SEE IMAGE    POCT Glucose    Collection Time: 01/18/23 11:32 AM   Result Value Ref Range    POC Glucose 369 (H) 70 - 99 mg/dl    Performed on ACCU-CHEK    POCT Glucose    Collection Time: 01/18/23  3:27 PM   Result Value Ref Range    POC Glucose 135 (H) 70 - 99 mg/dl    Performed on ACCU-CHEK    Troponin    Collection Time: 01/18/23  5:39 PM   Result Value Ref Range    Troponin 0.06 (H) <0.01 ng/mL   POCT Glucose    Collection Time: 01/18/23  8:23 PM   Result Value Ref Range    POC Glucose 190 (H) 70 - 99 mg/dl    Performed on ACCU-CHEK    Basic Metabolic Panel    Collection Time: 01/19/23  4:46 AM   Result Value Ref Range    Sodium 137 136 - 145 mmol/L    Potassium 4.9 3.5 - 5.1 mmol/L    Chloride 98 (L) 99 - 110 mmol/L    CO2 29 21 - 32 mmol/L    Anion Gap 10 3 - 16    Glucose 191 (H) 70 - 99 mg/dL    BUN 79 (H) 7 - 20 mg/dL    Creatinine 2.6 (H) 0.8 - 1.3 mg/dL    Est, Glom Filt Rate 24 (A) >60    Calcium 7.4 (L) 8.3 - 10.6 mg/dL   Magnesium    Collection Time: 01/19/23  4:46 AM   Result Value Ref Range    Magnesium 1.90 1.80 - 2.40 mg/dL   Calcium, Ionized    Collection Time: 01/19/23  4:46 AM   Result Value Ref Range    Calcium, Ionized 0.99 (L) 1.12 - 1.32 mmol/L    pH, Jarrett 7.317 (L) 7.350 - 7.450   Procalcitonin    Collection Time: 01/19/23  4:46 AM   Result Value Ref Range    Procalcitonin 0.56 (H) 0.00 - 0.15 ng/mL   POCT Glucose    Collection Time: 01/19/23  7:30 AM   Result Value Ref Range    POC Glucose 167 (H) 70 - 99 mg/dl    Performed on ACCU-CHEK ASSESSMENT/PLAN:      Principal Problem:    Acute on chronic diastolic congestive heart failure -renal function significantly worse today. Hold Lasix for the moment. Active Problems:    Acute on chronic respiratory failure with hypoxia/HCAP (healthcare-associated pneumonia)-patient now on meropenem and Zyvox. MRSA probe is negative so may be able to stop Zyvox today. Pneumonia panel pending. Appreciate pulmonary input. DEISY-blood pressure relatively low. Hold hydralazine and hold Lasix. Renal function is worse. He did receive a dose of valsartan HCT yesterday, this was discontinued in November and inadvertently was reordered at the time of admission. This was identified yesterday and discontinued. Nephrology has been consulted. Consider saline bolus today if pressure remains low    UTI due to extended-spectrum beta lactamase (ESBL) producing Escherichia coli-day #2 of meropenem. Consider infectious disease consult for duration of antibiotics. Probably plan on 7 days at this point. Hypothermia-patient having recurrent episodes of this, is it related to sepsis? Check cosyntropin stim test, check TSH. Consider brain MRI. Chronic anemia-patient was to see Dr. Jas Baugh today for consideration for additional Aranesp/Procrit. He has been notified of the patient's admission. Elevated troponin-stable. No trending in troponins    JOANN (obstructive sleep apnea)-patient uses CPAP at home. He does have CPAP here. Its not clear if he used it last night or not. Will clarify with the patient. Chronic GERD-continue Protonix. Type 2 diabetes mellitus with stage 4 chronic kidney disease, with long-term current use of insulin ([facility])-sugars were high yesterday. Lantus was added. Continue sliding scale. Continue to monitor    Essential hypertension-antihypertensives on hold currently. B12 deficiency-continue with oral B12 supplementation.       Lissette Neff MD, FACP  9:04 AM  1/19/2023

## 2023-01-19 NOTE — PROGRESS NOTES
Pulmonary Progress Note    CC:  Follow up acute on chronic respiratory failure, possible pneumonia     Subjective:  4 liters of oxygen  ESBL E. Coli noted in urine and antibiotics were adjusted   Dry mouth that limits his ability to cough up stuff today  Hypothermic periodically   Seems to be confused as to why he is here and why he is not getting better  Lasix discontinued today     ROS  SOB  Dry mouth  Hard to cough up mucus      Intake/Output Summary (Last 24 hours) at 1/19/2023 0837  Last data filed at 1/19/2023 0210  Gross per 24 hour   Intake 890 ml   Output 250 ml   Net 640 ml         PHYSICAL EXAM:  Blood pressure 105/71, pulse 86, temperature (!) 94.3 °F (34.6 °C), temperature source Oral, resp. rate 20, weight 231 lb 0.7 oz (104.8 kg), SpO2 97 %.'  Gen: Chronically ill, upset   Eyes: PERRL. No sclera icterus. No conjunctival injection. ENT: No discharge. Pharynx clear. External appearance of ears and nose normal.  Neck: Trachea midline. No obvious mass. Resp: Rhonchi, crackles   CV: Regular rate. Regular rhythm. No murmur or rub. GI: Non-tender. Non-distended. No hernia. Skin: Pale  Lymph: No cervical LAD. No supraclavicular LAD. M/S: No cyanosis. No clubbing. No joint deformity. Neuro: Moves all four extremities. CN 2-12 tested, no defect noted.   Ext:   ++ edema    Medications:    Scheduled Meds:   vitamin B-12  500 mcg Oral Daily    levalbuterol  1.25 mg Nebulization Q4H WA    sodium chloride (Inhalant)  15 mL Nebulization Q4H WA    vitamin C  500 mg Oral Daily    meropenem  1,000 mg IntraVENous Q12H    insulin glargine  10 Units SubCUTAneous QAM    tamsulosin  0.4 mg Oral Daily    amiodarone  100 mg Oral Daily    aspirin EC  162 mg Oral Daily    atorvastatin  40 mg Oral Daily    bisacodyl  5 mg Oral Daily    polyethylene glycol  17 g Oral Daily    docusate sodium  100 mg Oral BID    ferrous sulfate  324 mg Oral Daily    hydrALAZINE  10 mg Oral 3 times per day    lactobacillus  1 capsule Oral BID WC    linaclotide  145 mcg Oral QAM AC    montelukast  10 mg Oral Daily    pantoprazole  40 mg Oral QAM AC    predniSONE  10 mg Oral Daily    insulin lispro  0-4 Units SubCUTAneous TID WC    insulin lispro  0-4 Units SubCUTAneous Nightly       Continuous Infusions:   furosemide (LASIX) 1mg/ml infusion 5 mg/hr (01/19/23 0341)    dextrose         PRN Meds:  magnesium sulfate, sodium chloride nebulizer, albuterol sulfate HFA, levalbuterol, glucose, dextrose bolus **OR** dextrose bolus, glucagon (rDNA), dextrose    Labs:  CBC:   Recent Labs     01/17/23  1453 01/18/23  0526   WBC 10.0 8.8   HGB 9.6* 9.0*   HCT 31.9* 30.0*   MCV 84.1 84.4    134*     BMP:   Recent Labs     01/17/23  1453 01/18/23  0526 01/19/23  0446    142 137   K 4.2 4.6 4.9    100 98*   CO2 30 29 29   BUN 66* 69* 79*   CREATININE 1.8* 1.9* 2.6*     LIVER PROFILE:   Recent Labs     01/17/23  1453   AST 16   ALT 30   BILITOT 0.5   ALKPHOS 81     PT/INR:   Recent Labs     01/17/23  1453   PROTIME 16.4*   INR 1.33*     APTT:   Recent Labs     01/17/23  1453   APTT 28.7     UA:  Recent Labs     01/17/23  1453   COLORU Yellow   PHUR 5.0   WBCUA 107*   RBCUA 1   BACTERIA 4+*   CLARITYU SL CLOUDY*   SPECGRAV 1.014   LEUKOCYTESUR LARGE*   UROBILINOGEN 0.2   BILIRUBINUR Negative   BLOODU TRACE*   GLUCOSEU Negative     No results for input(s): PH, PCO2, PO2 in the last 72 hours. Films:  Chest imaging reports were reviewed and imaging was reviewed by me and showed no new labs     ABG:  No new draws    Cultures:  Sputum:  in process  Pneumonia panel:  in process  Urine;  ESBL E. Coli   MRSA probe:  negative     I reviewed the labs and images listed above    ASSESSMENT/PLAN:  Acute on Chronic Hypoxic Respiratory Failure  Titrate oxygen for saturations greater than or equal to 90%  Recent baseline oxygen is 3 liters, historically not on oxygen   Abnormal CXR, asymmetric.   Probably HCAP with pulmonary edema  Continue with merrem to cover lung and urine  Received 24 hours of Zyvox, but with MRSA probe negative, no need to continue it  Sputum culture and Pneumonia panel are in process   Chronic bronchitis   Xopenex nebs and saline nebs to help with expectoration   Acapella  Ok with prednisone for now. May need stress dosing if BP drops and remains hypothermic   JOANN   Home machine with sleep.   Will likely need oxygen bleed in      DVT prophylaxis  recommend heparin if no contraindications     D/W Dr. Flaca Vivar, [facility] Pulmonary

## 2023-01-19 NOTE — PROGRESS NOTES
Occupational Therapy  Attempt Note    Eitan Shanika  1/19/2023    Attempted OT treatment this PM. Wife in room and reporting pt is not doing as well today. Pt sleeping at time of attempt. OT noted in chart review pt's blood pressures have been running low and renal function is worse today. Pt having episodes of hypothermia. Will defer therapy at this time, and attempt again tomorrow as pt is able to participate. If pt is d/c'd prior to next therapy treatment session, please refer to last therapy progress note for pt's d/c status and OT recommendations.      Franco Villegas, OTR/L [id number]

## 2023-01-19 NOTE — PLAN OF CARE
Problem: Discharge Planning  Goal: Discharge to home or other facility with appropriate resources  1/19/2023 0219 by Larissa Anna RN  Outcome: Progressing  1/18/2023 1531 by Tracey De Paz RN  Outcome: Progressing     Problem: Pain  Goal: Verbalizes/displays adequate comfort level or baseline comfort level  1/19/2023 0219 by Larissa Anna RN  Outcome: Progressing  1/18/2023 1531 by Tracey De Paz RN  Outcome: Progressing     Problem: Safety - Adult  Goal: Free from fall injury  1/19/2023 0219 by Larissa Anna RN  Outcome: Progressing  1/18/2023 1531 by Tracey De Paz RN  Outcome: Progressing     Problem: ABCDS Injury Assessment  Goal: Absence of physical injury  1/19/2023 0219 by Larissa Anna RN  Outcome: Progressing  1/18/2023 1531 by Tracey De Paz RN  Outcome: Progressing     Problem: Skin/Tissue Integrity  Goal: Absence of new skin breakdown  Description: 1. Monitor for areas of redness and/or skin breakdown  2. Assess vascular access sites hourly  3. Every 4-6 hours minimum:  Change oxygen saturation probe site  4. Every 4-6 hours:  If on nasal continuous positive airway pressure, respiratory therapy assess nares and determine need for appliance change or resting period.   1/19/2023 0219 by Larissa Anna RN  Outcome: Progressing  1/18/2023 1531 by Tracey De Paz RN  Outcome: Progressing     Problem: Infection - Adult  Goal: Absence of infection at discharge  1/19/2023 0219 by Larissa Anna RN  Outcome: Progressing  1/18/2023 1531 by Tracey DeP az RN  Outcome: Progressing  Goal: Absence of infection during hospitalization  1/19/2023 0219 by Larissa Anna RN  Outcome: Progressing  1/18/2023 1531 by Tracey De Paz RN  Outcome: Progressing  Goal: Absence of fever/infection during anticipated neutropenic period  1/19/2023 0219 by Larissa Anna RN  Outcome: Progressing  1/18/2023 1531 by Tracey De Paz RN  Outcome: Progressing     Problem: Genitourinary - Adult  Goal: Absence of urinary retention  1/19/2023 0219 by Jelly Moreno MAHNAZ Castillo  Outcome: Progressing  Flowsheets (Taken 1/18/2023 2103)  Absence of urinary retention:   Assess patients ability to void and empty bladder   Monitor intake/output and perform bladder scan as needed  1/18/2023 1531 by Adi Arreola RN  Outcome: Progressing  Goal: Urinary catheter remains patent  1/19/2023 0219 by Dav Burt RN  Outcome: Progressing  Flowsheets (Taken 1/18/2023 2103)  Urinary catheter remains patent: Assess patency of urinary catheter  1/18/2023 1531 by Adi Arreola RN  Outcome: Progressing     Problem: Metabolic/Fluid and Electrolytes - Adult  Goal: Electrolytes maintained within normal limits  1/19/2023 0219 by Dav Burt RN  Outcome: Progressing  1/18/2023 1531 by Adi Arreola RN  Outcome: Progressing  Goal: Hemodynamic stability and optimal renal function maintained  1/19/2023 0219 by Dav Burt RN  Outcome: Progressing  1/18/2023 1531 by Adi Arreola RN  Outcome: Progressing  Goal: Glucose maintained within prescribed range  1/19/2023 0219 by Dav Burt RN  Outcome: Progressing  1/18/2023 1531 by Adi Arreola RN  Outcome: Progressing

## 2023-01-19 NOTE — CONSULTS
Interventional Cardiology Consultation     Karen Kala  1940    PCP: Nelly Skiff, MD  Referring Physician: Dr. David Reyes  Reason for Referral: CHF   Chief Complaint:   Chief Complaint   Patient presents with    Shortness of Breath     Pt reports increased SOB, productive cough x few months intermittently; pt reports worsening last night. On 3L n/c at home. Urinary Retention     Reports feeling of urgency but unable to urinate. Pt reports last urinated yesterday \"during the day\". Subjective:     History of Present Illness: The patient is 79 y.o. male with a past medical history significant for  CAD, CKD,obesity, hypoventilation syndrome as well as moderate COPD of asthma type and atrial fibrillation, who presents with the above complaint. He feels that he has had continued shortness of breath for the past several months he is currently on home oxygen. He had a nurses aide come visit him today and as per family his O2 sat on home oxygen was 87% and was instructed to come to the emergency was Hospital ER for further work-up. He admits to worsening shortness of breath with minimal exertion and he has to sit in a recliner is unable to lie flat at night. He denies any exertional chest pain. He additionally has an intermittent productive cough. He has been started on antibiotics with healthcare associated pneumonia. Additionally for with diuretic therapy for volume overload. He notes that since admission he has had mild improvement in his symptoms.   He currently denies chest pain palpitations or syncope    Past Medical History:   Diagnosis Date    Allergic rhinitis     Asthma     Atrial fibrillation (HCC)     Carotid artery stenosis     Chronic kidney disease     COPD (chronic obstructive pulmonary disease) (HCC)     CPAP (continuous positive airway pressure) dependence     ESBL (extended spectrum beta-lactamase) producing bacteria infection 12/26/2018    urine Hyperlipidemia     Hypertension     Iron deficiency anemia     Obstructive sleep apnea     Type II or unspecified type diabetes mellitus without mention of complication, not stated as uncontrolled      Past Surgical History:   Procedure Laterality Date    BRONCHOSCOPY N/A 2022    BRONCHOSCOPY ALVEOLAR LAVAGE performed by Kaley Mcnair MD at [address]  2022    BRONCHOSCOPY DIAGNOSTIC OR CELL [address] ONLY performed by Kaley Mcnair MD at [address] [facility]  2012    bilateral    COLONOSCOPY  7/10/2009    diverticulosis    hemorrhoids    CT BONE MARROW BIOPSY  2022    CT BONE MARROW BIOPSY 2022 [facility]    GALLBLADDER SURGERY  1981    PAIN MANAGEMENT PROCEDURE Right 10/20/2021    COOLIEF RADIOFREQUENCY ABLATION - RIGHT KNEE performed by Mora Jeronimo MD at [address] Left 2021    [address] - LEFT KNEE performed by Mora Jeronimo MD at [address] [facility]  7-2005    7/10/2009    normal     Family History   Problem Relation Age of Onset    Heart Disease Mother     Stroke Mother     Vision Loss Mother     High Blood Pressure Father     High Blood Pressure Brother     Diabetes Brother     Sleep Apnea Brother     Arthritis Paternal Grandmother     Diabetes Paternal Grandmother      Social History     Tobacco Use    Smoking status: Former     Packs/day: 0.50     Years: 18.00     Pack years: 9.00     Types: Cigarettes     Start date: 1958     Quit date: 1988     Years since quittin.0    Smokeless tobacco: Never   Vaping Use    Vaping Use: Never used   Substance Use Topics    Alcohol use: No     Alcohol/week: 0.0 standard drinks    Drug use: No       Allergies   Allergen Reactions    Hytrin [Terazosin Hcl]      Ineffective for BP control    Penicillins      Unknown reaction during childhood; Patient tolerated cephalosporins in the past Shrimp Flavor Hives    Sulfa Antibiotics      Unknown reaction in childhood    Sulfasalazine Other (See Comments)    Tekturna [Aliskiren Fumarate]      Ineffective    Vancomycin      Fever    Ciprofloxacin Rash     Fever and rash        Current Facility-Administered Medications   Medication Dose Route Frequency Provider Last Rate Last Admin    furosemide (LASIX) 100 mg in dextrose 5 % 100 mL infusion  5 mg/hr IntraVENous Continuous Amaedo Stahl MD 5 mL/hr at 01/18/23 1040 5 mg/hr at 01/18/23 1040    magnesium sulfate 2000 mg in 50 mL IVPB premix  2,000 mg IntraVENous PRN Amadeo Stahl MD        vitamin B-12 (CYANOCOBALAMIN) tablet 500 mcg  500 mcg Oral Daily Amadeo Stahl MD   500 mcg at 01/18/23 1206    linezolid (ZYVOX) IVPB 600 mg  600 mg IntraVENous Q12H Hortencia Fraser, DO   Stopped at 01/18/23 1321    levalbuterol (XOPENEX) nebulizer solution 1.25 mg  1.25 mg Nebulization Q4H WA Hortencia Fraser DO   1.25 mg at 01/18/23 1920    sodium chloride (Inhalant) 3 % nebulizer solution 15 mL  15 mL Nebulization Q4H WA Himanshu Jenkins, DO   15 mL at 01/18/23 1920    sodium chloride nebulizer 0.9 % solution 3 mL  3 mL Nebulization PRN Hortencia Fraser DO        ascorbic acid (VITAMIN C) tablet 500 mg  500 mg Oral Daily Amadeo Stahl MD   500 mg at 01/18/23 1206    [START ON 1/19/2023] meropenem (MERREM) 1,000 mg in sodium chloride 0.9 % 100 mL IVPB (mini-bag)  1,000 mg IntraVENous Q12H Amadeo Stahl MD        insulin glargine (LANTUS) injection vial 10 Units  10 Units SubCUTAneous QAM Amadeo Stahl MD   10 Units at 01/18/23 1246    albuterol sulfate HFA (PROVENTIL;VENTOLIN;PROAIR) 108 (90 Base) MCG/ACT inhaler 2 puff  2 puff Inhalation Q4H PRN Amadeo Stahl MD   2 puff at 01/18/23 0826    tamsulosin (FLOMAX) capsule 0.4 mg  0.4 mg Oral Daily Amadeo Stahl MD   0.4 mg at 01/18/23 1021    amiodarone (CORDARONE) tablet 100 mg  100 mg Oral Daily Loren Turner MD   100 mg at 01/18/23 1020    aspirin EC tablet 162 mg  162 mg Oral Daily Loren Turner MD   162 mg at 01/18/23 1021    atorvastatin (LIPITOR) tablet 40 mg  40 mg Oral Daily Loren Turner MD   40 mg at 01/18/23 1021    bisacodyl (DULCOLAX) EC tablet 5 mg  5 mg Oral Daily Loren Turner MD   5 mg at 01/18/23 1021    polyethylene glycol (GLYCOLAX) packet 17 g  17 g Oral Daily Loren Turner MD   17 g at 01/18/23 1019    docusate sodium (COLACE) capsule 100 mg  100 mg Oral BID Loren Turner MD   100 mg at 01/18/23 2104    ferrous sulfate EC tablet 324 mg  324 mg Oral Daily Loren Turner MD   324 mg at 01/18/23 1021    hydrALAZINE (APRESOLINE) tablet 10 mg  10 mg Oral 3 times per day Loren Turner MD   10 mg at 01/18/23 2105    lactobacillus (CULTURELLE) capsule 1 capsule  1 capsule Oral BID  Loren Turner MD   1 capsule at 01/18/23 1642    levalbuterol (Patricia Vincent) nebulizer solution 1.25 mg  1.25 mg Nebulization Q6H PRN Loren Turner MD        linaclotide [facility]) capsule 145 mcg (Patient Supplied)  145 mcg Oral QAM  Loren Turner MD        montelukast (SINGULAIR) tablet 10 mg  10 mg Oral Daily Loren Turner MD   10 mg at 01/18/23 1020    pantoprazole (PROTONIX) tablet 40 mg  40 mg Oral QAM  Loren Turner MD   40 mg at 01/18/23 7932    [START ON 1/19/2023] predniSONE (DELTASONE) tablet 10 mg  10 mg Oral Daily Loren Turner MD        insulin lispro (HUMALOG) injection vial 0-4 Units  0-4 Units SubCUTAneous TID  Loren Turner MD   4 Units at 01/18/23 1246    insulin lispro (HUMALOG) injection vial 0-4 Units  0-4 Units SubCUTAneous Nightly Loren Turner MD        glucose chewable tablet 16 g  4 tablet Oral PRN Loren Turner MD        dextrose bolus 10% 125 mL  125 mL IntraVENous PRN Ronda Barcenas Rigo Latham MD        Or    dextrose bolus 10% 250 mL  250 mL IntraVENous PRN Smiley Hui MD        glucagon (rDNA) injection 1 mg  1 mg SubCUTAneous PRN Smiley Hui MD        dextrose 10 % infusion   IntraVENous Continuous PRN Smiley Hui MD           Review of Systems:  Constitutional: No unanticipated weight loss. There's been no change in energy level, sleep pattern, or activity level. No fevers, chills. Eyes: No visual changes or diplopia. No scleral icterus. ENT: No Headaches, hearing loss or vertigo. No mouth sores or sore throat. Cardiovascular: as reviewed in HPI  Respiratory: No cough or wheezing, no sputum production. No hemoptysis. Gastrointestinal: No abdominal pain, appetite loss, blood in stools. No change in bowel or bladder habits. Genitourinary: No dysuria, trouble voiding, or hematuria. Musculoskeletal:  No gait disturbance, no joint complaints. Integumentary: No rash or pruritis. Neurological: No headache, diplopia, change in muscle strength, numbness or tingling. Psychiatric: No anxiety or depression. Endocrine: No temperature intolerance. No excessive thirst, fluid intake, or urination. No tremor. Hematologic/Lymphatic: No abnormal bruising or bleeding, blood clots or swollen lymph nodes. Allergic/Immunologic: No nasal congestion or hives. Physical Exam:   BP (!) 94/57   Pulse 78   Temp 97.3 °F (36.3 °C) (Axillary)   Resp 24   Wt 226 lb 6.6 oz (102.7 kg)   SpO2 96%   BMI 34.43 kg/m²   Wt Readings from Last 3 Encounters:   01/18/23 226 lb 6.6 oz (102.7 kg)   12/30/22 218 lb 4.1 oz (99 kg)   12/16/22 239 lb (108.4 kg)     Constitutional: He is oriented to person, place, and time. He appears well-developed and well-nourished. In no acute distress. Head: Normocephalic and atraumatic. Pupils equal and round. Neck: Neck supple. No JVP or carotid bruit appreciated. No mass and no thyromegaly present.  No lymphadenopathy present. Cardiovascular: Normal rate. Normal heart sounds. Exam reveals no gallop and no friction rub. No murmur heard. Pulmonary/Chest: Diffuse rhonchi rales  Abdominal: Soft, non-tender. Bowel sounds are normal. He exhibits no organomegaly, mass or bruit. Extremities: 4+ edema. No cyanosis or clubbing. Pulses are 2+ radial/carotid bilaterally. Neurological: No gross cranial nerve deficit. Coordination normal.   Skin: Skin is warm and dry. There is no rash or diaphoresis. Psychiatric: He has a normal mood and affect. His speech is normal and behavior is normal.     Lab Review:   FLP:    Lab Results   Component Value Date/Time    TRIG 76 11/17/2021 10:13 AM    HDL 47 11/17/2021 10:13 AM    HDL 42 12/15/2011 01:57 PM    LDLCALC 54 11/17/2021 10:13 AM    LDLDIRECT 51 05/27/2022 02:05 PM    LABVLDL 15 11/17/2021 10:13 AM     BUN/Creatinine:    Lab Results   Component Value Date/Time    BUN 69 01/18/2023 05:26 AM    CREATININE 1.9 01/18/2023 05:26 AM     PT/INR, TNI, HGB A1C:   Lab Results   Component Value Date/Time    TROPONINI 0.06 (H) 01/18/2023 05:39 PM    LABA1C 6.1 05/27/2022 02:05 PM      No results found for: CBCAUTODIF    EKG 11/19/2018 Sinus rhythm   EKG 12/4/20: Sinus rhythm   EKG 6/4/21: Sinus rhythm   EKG 2/2/22: Sinus rhythm with normal axis and intervals        7 day event monitor 12/12-12/18/19  No episodes of atrial fibrillation noted         Procedures:     [facility] 7/1/21  1. Right-dominant coronary arterial circulation. The right coronary  artery is not as well visualized as the left but there are no  obstructive lesions present in this vessel. There is moderate  calcification. In the left system, there is no left main disease or  circumflex disease. There is moderate calcification in the left  anterior descending artery in the proximal and midportions. There is  tortuosity in the left anterior descending artery with a 50% mid lesion. No left ventriculogram was performed.   2.  Elevated right-sided filling pressures with a right atrial pressure  of 10 and a pulmonary capillary wedge pressure of 18 mmHg.  This  corresponds to a left ventricular end-diastolic pressure of 16 mmHg.  3.  Pulmonary hypertension with an instantaneous pressure of 50/18 for a  mean pulmonary arterial pressure of 28 mmHg.  4.  Cardiac output by thermodilution 8.53 liters per minute with a  cardiac index of 3.68 liters per kilogram per minute.  5.  Pulmonary vascular resistance 104 dynes per second.  6.  Normal mixed venous oxygen saturations.  Systemic arterial  saturation was 91% on room air.  This procedure was performed on room  air.  SVC was 69% and pulmonary artery was 69%.        Imaging:     Stress perfusion 6/18/21  Moderate sized moderate intensity reversible inferior wall defect suggestive    of ischemia        Normal LV size and systolic function.    Overall findings represent a intermediate risk study.       All above diagnostic testing and laboratory data was independently visualized and reviewed by me (not simply review of report)       Assessment and Plan   1) acute on chronic heart failure with preserved ejection fraction  -Art is clearly volume overloaded and I agree with diuresis being mindful for of his renal insufficiency and can continue low-dose IV Lasix drip at 5 mils per hour    2) essential hypertension  -Hydralazine 3 times daily    3) paroxysmal atrial fibrillation  -Currently in sinus rhythm  -Continue with amiodarone to maintain rhythm control  -Decision regarding anticoagulation right now is unclear I will discuss with his primary cardiologist and can consider starting on discharge    4) Healthcare associated pneumonia  As per pulmonary    Overall, the problems requiring hospitalization are high in severity     Thank you very much for allowing me to participate in the care of your patient. Please do not hesitate to contact me if you have any questions.      Morales Santacruz MD [facility]  General,  Interventional Cardiology, and Peripheral Vascular Disease   [place] [address]   Ph: [phone]  Fax: [phone]

## 2023-01-19 NOTE — PROGRESS NOTES
Physical Therapy  PT attempt  Gilmar Suresh  Attempted PT treatment this PM.  Wife in room and reporting pt is not doing as well today. Pt sleeping at time of attempt. Patient's BP and temp running low and renal function worse today. Will hold at this time and re-attempt tomorrow.     Electronically signed by Christian Hagan, PT [id number] on 1/19/2023 at 2:26 PM

## 2023-01-19 NOTE — PROGRESS NOTES
Called pharmacy and talked to Yuliya Hui to make sure all medications are compatible to run with Lasix and she said yes. I need to Y it in at the bottom.

## 2023-01-19 NOTE — PROGRESS NOTES
Comprehensive Nutrition Assessment    Type and Reason for Visit:  Reassess    Nutrition Recommendations/Plan:   Continue Regular, Low Na+ diet with 1200 mL FR  Start Nepro BID     Malnutrition Assessment:  Malnutrition Status: At risk for malnutrition (Comment) (01/19/23 1330)      Nutrition Assessment:    Initial RD assessment for stg 2 PU. Pt admitted with acute on chronic diastolic congestive heart failure, also being treated for DESIY this admission. PMH includes DM2, CKD4, HTN, HLD, COPD. Receives Low Na+ 1200 mL FR diet. Pt reports that intake has been inadequate d/t poor appetite and feeling worse today. Noted significant weight loss of 15.7% over 2 months, likely d/t fluid shifts. Pt with increased nutrient needs r/t wound and poor itnake. Agreeable to try ONS. Nutrition Related Findings:    LBM 1/18. Nonpitting BLE edema. Glucose 213 today Wound Type: Stage II, Pressure Injury       Current Nutrition Intake & Therapies:    Average Meal Intake: 0%, 1-25%  Average Supplements Intake: None Ordered  ADULT DIET; Regular; Low Sodium (2 gm); 1200 ml    Anthropometric Measures:  Height: 5' 8\" (172.7 cm)  Ideal Body Weight (IBW): 154 lbs (70 kg)    Current Body Weight: 231 lb (104.8 kg), 150 % IBW.  Weight Source: Standing Scale  Current BMI (kg/m2): 35.1  Usual Body Weight: 274 lb (124.3 kg)  % Weight Change (Calculated): -15.7  BMI Categories: Obese Class 2 (BMI 35.0 -39.9)    Estimated Daily Nutrient Needs:  Energy Requirements Based On: Kcal/kg  Weight Used for Energy Requirements: Current  Energy (kcal/day): 0029-0857 kcal (15-18 kcal/kg CBW)  Weight Used for Protein Requirements: Ideal  Protein (g/day):  gm (1.2-2 g/kg IBW)  Fluid (ml/day): 1200 mL FR    Nutrition Diagnosis:   Increased nutrient needs related to increase demand for energy/nutrients as evidenced by wounds  Inadequate oral intake related to inadequate protein-energy intake as evidenced by intake 0-25%    Nutrition Interventions:   Food and/or Nutrient Delivery: Continue Current Diet, Start Oral Nutrition Supplement  Nutrition Education/Counseling: Education completed  Coordination of Nutrition Care: Continue to monitor while inpatient    Goals:  Goals: PO intake 50% or greater, by next RD assessment    Nutrition Monitoring and Evaluation:   Behavioral-Environmental Outcomes: None Identified  Food/Nutrient Intake Outcomes: Food and Nutrient Intake, Supplement Intake  Physical Signs/Symptoms Outcomes: Biochemical Data, Nutrition Focused Physical Findings, Skin, Weight, Meal Time Behavior, Fluid Status or Edema    Discharge Planning:     Too soon to determine     Billie Owens, [address] [place] [address], [place]  Contact: [phone]

## 2023-01-19 NOTE — CONSULTS
ONCOLOGY HEMATOLOGY CARE PROGRESS NOTE      SUBJECTIVE:  Patient well-known to me. I follow him for his chronic anemia. He is due for a Procrit shot today so I was consulted to see him. He is feeling a bit better. His dyspnea on exertion and weakness are slightly improved. ROS:     Constitutional:  No weight loss, No fever, No chills, No night sweats. Energy level good.   Eyes:  No impairment or change in vision  ENT / Mouth:  No pain, abnormal ulceration, bleeding, nasal drip or change in voice or hearing  Cardiovascular:  No chest pain, palpitations, new edema, or calf discomfort  Respiratory:  No pain, hemoptysis, change to breathing  Breast:  No pain, discharge, change in appearance or texture  Gastrointestinal:  No pain, cramping, jaundice, change to eating and bowel habits  Urinary:  No pain, bleeding or change in continence  Genitalia: No pain, bleeding or discharge  Musculoskeletal:  No redness, pain, edema or weakness  Skin:  No pruritus, rash, change to nodules or lesions  Neurologic:  No discomfort, change in mental status, speech, sensory or motor activity  Psychiatric:  No change in concentration or change to affect or mood  Endocrine:  No hot flashes, increased thirst, or change to urine production  Hematologic: No petechiae, ecchymosis or bleeding  Lymphatic:  No lymphadenopathy or lymphedema  Allergy / Immunologic:  No eczema, hives, frequent or recurrent infections    OBJECTIVE        Physical    VITALS:  Patient Vitals for the past 24 hrs:   BP Temp Temp src Pulse Resp SpO2 Height Weight   01/19/23 1523 (!) 91/58 (!) 94.1 °F (34.5 °C) Axillary 69 22 98 % -- --   01/19/23 1203 -- -- -- -- -- -- 5' 8\" (1.727 m) --   01/19/23 1202 -- -- -- -- 20 98 % -- --   01/19/23 1148 (!) 94/57 (!) 94.7 °F (34.8 °C) Axillary 76 22 98 % -- --   01/19/23 0821 -- -- -- -- 20 97 % -- --   01/19/23 0735 105/71 (!) 94.3 °F (34.6 °C) Oral 86 20 96 % -- --   01/19/23 0651 (!) 94/54 -- -- -- -- -- -- --   01/19/23 0449 (!) 94/54 -- -- 82 20 97 % -- --   01/19/23 0447 -- -- -- -- -- -- -- 231 lb 0.7 oz (104.8 kg)   01/19/23 0404 -- -- -- -- -- 95 % -- --   01/19/23 0100 -- -- -- -- -- 96 % -- --   01/18/23 2331 (!) 96/56 97.4 °F (36.3 °C) Axillary 81 20 96 % -- --   01/18/23 1934 (!) 94/57 97.3 °F (36.3 °C) Axillary 78 24 96 % -- --   01/18/23 1921 -- -- -- -- -- 96 % -- --   01/18/23 1810 -- 97.5 °F (36.4 °C) Axillary -- -- -- -- --   01/18/23 1700 -- (!) 95 °F (35 °C) Rectal -- -- -- -- --   01/18/23 1540 -- -- -- -- -- 98 % -- --   01/18/23 1537 (!) 92/56 -- Oral 78 24 96 % -- --       24HR INTAKE/OUTPUT:    Intake/Output Summary (Last 24 hours) at 1/19/2023 1525  Last data filed at 1/19/2023 1223  Gross per 24 hour   Intake 1290 ml   Output 250 ml   Net 1040 ml       CONSTITUTIONAL: awake, alert, cooperative, no apparent distress   EYES: pupils equal, round and reactive to light, sclera clear and conjunctiva normal  ENT: Normocephalic, without obvious abnormality, atraumatic  NECK: supple, symmetrical, no jugular venous distension and no carotid bruits   HEMATOLOGIC/LYMPHATIC: no cervical, supraclavicular or axillary lymphadenopathy   LUNGS: no increased work of breathing and clear to auscultation   CARDIOVASCULAR: regular rate and rhythm, normal S1 and S2, no murmur noted  ABDOMEN: normal bowel sounds x 4, soft, non-distended, non-tender, no masses palpated, no hepatosplenomgaly   MUSCULOSKELETAL: full range of motion noted, tone is normal  NEUROLOGIC: awake, alert, oriented to name, place and time. Motor skills grossly intact. SKIN: Normal skin color, texture, turgor and no jaundice.  appears intact   EXTREMITIES: 1+ LE edema     DATA:  CBC:    Recent Labs     01/18/23  0526 01/17/23  1453 12/29/22  0510 12/28/22  1222 12/28/22  0449   WBC 8.8 10.0 12.3*  --  8.4   LABLYMP  --   --   --  3  --    NEUTROABS 8.3* 9.3* 11.1*  --  6.9   LYMPHOPCT 1.6 1.5 4.7  --  8.8   RBC 3.55* 3.79* 3.32*  --  3.39*   HGB 9.0* 9.6* 8.6*  --  8.9*   HCT 30.0* 31.9* 28.2*  --  28.4*   MCV 84.4 84.1 84.9  --  83.9   MCH 25.3* 25.2* 25.9*  --  26.3   MCHC 30.0* 30.0* 30.5*  --  31.3   RDW 21.1* 20.5* 20.3*  --  19.7*   * 151 196  --  188       PT/INR:    Recent Labs     01/17/23  1453 01/12/23  1223 12/23/22 2117   PROT 5.9* 5.5* 6.1*   INR 1.33*  --   --      PTT:    Recent Labs     01/17/23 1453   APTT 28.7       CMP:    Recent Labs     01/19/23  0446 01/18/23  0526 01/17/23  1453 01/12/23  1223 12/29/22  0510 12/28/22  0449 12/24/22  0332 12/23/22 2117    142 143 142 142 145   < > 141   K 4.9 4.6 4.2 4.1 3.9 3.8   < > 6.0*   CL 98* 100 103 100 100 100   < > 103   CO2 29 29 30 28 34* 29   < > 24   GLUCOSE 191* 191* 143* 166* 133* 111*   < > 145*   BUN 79* 69* 66* 73* 51* 49*   < > 74*   CREATININE 2.6* 1.9* 1.8* 1.9* 2.0* 1.9*   < > 2.8*   LABGLOM 24* 35* 37* 35* 33* 35*   < > 22*   CALCIUM 7.4* 7.4* 7.7* 8.0* 7.9* 8.7   < > 8.5   PROT  --   --  5.9* 5.5*  --   --   --  6.1*   LABALBU  --   --  3.1* 3.2*  --   --   --  3.2*   AGRATIO  --   --  1.1  --   --   --   --  1.1   BILITOT  --   --  0.5 <0.2  --   --   --  0.3   ALKPHOS  --   --  81 85  --   --   --  92   ALT  --   --  30 33  --   --   --  56*   AST  --   --  16 20  --   --   --  54*   MG 1.90  --   --  1.80 1.80 2.00   < >  --     < > = values in this interval not displayed. Lab Results   Component Value Date    CALCIUM 7.4 (L) 01/19/2023    PHOS 3.6 01/12/2023       LDH:No results for input(s): LDH in the last 720 hours. Radiology Review:  XR CHEST PORTABLE  Narrative: EXAMINATION:  ONE XRAY VIEW OF THE CHEST    1/17/2023 1:44 pm    COMPARISON:  12/27/2022    HISTORY:  ORDERING SYSTEM PROVIDED HISTORY: SOB  TECHNOLOGIST PROVIDED HISTORY:  Reason for exam:->SOB    FINDINGS:  Patchy infiltrates seen within the lungs bilaterally, greatest in the right  mid to lower lung.   These are increased when compared to the previous exam.  Cardial pericardial silhouette is enlarged, stable. The pulmonary  vasculature is congested. Impression: Patchy infiltrates in the lungs bilaterally, greater on the right, increased  when compared to the previous exam.  In this case, multifocal pneumonia and  pulmonary edema both considered. Problem List  Patient Active Problem List   Diagnosis    JOANN (obstructive sleep apnea)    Chronic GERD    Type 2 diabetes mellitus with stage 4 chronic kidney disease, with long-term current use of insulin (HCC)    Microcytic anemia    Microalbuminuria    Hyperlipidemia    Paroxysmal atrial fibrillation (HCC)    Edema    Acute on chronic respiratory failure with hypoxia (HCC)    Carotid artery stenosis without cerebral infarction    Leg swelling    Chronic venous insufficiency    Stage 3b chronic kidney disease (HCC)    Essential hypertension    Hemoptysis    B12 deficiency    HCAP (healthcare-associated pneumonia)    Pulmonary nodule    Moderate COPD (chronic obstructive pulmonary disease) (HCC)    DEISY (acute kidney injury) ([address]    Slow transit constipation    Pancreatic mass    UTI due to extended-spectrum beta lactamase (ESBL) producing Escherichia coli    Obesity, Class II, BMI 35-39.9    Weakness    Hyperkalemia    Chronic anemia    Abnormal CXR    Elevated brain natriuretic peptide (BNP) level    Elevated troponin    Chronic respiratory failure with hypoxia (HCC)    Acute on chronic diastolic congestive heart failure (HCC)    Acute on chronic congestive heart failure (HCC)    Mucopurulent chronic bronchitis ([address]       ASSESSMENT AND PLAN:  1. Anemia. He gets Procrit every 2 weeks as an outpatient and he is due today. Therefore, I will give it to him today. His laboratory work-up  in the past has included normal B12, folate, myeloma work-up. He has been intermittently iron deficient in the past so I will recheck his iron levels. He is on Procrit due to his chronic kidney disease.     2.  Acute kidney injury. Nephrology is following. 3.  UTI.   Per the primary team.            ONCOLOGIC DISPOSITION:      Izella Siemens, MD  Please contact through [address]

## 2023-01-19 NOTE — PROGRESS NOTES
Department of Internal Medicine  Nephrology Progress Note    SUBJECTIVE:  We are following this patient for rosio. Patient progress reviewed. REVIEW OF SYSTEMS:  Does not feel well. Feels weak/ tired . Poor appetite  . Physical Exam:    VITALS:  /71   Pulse 86   Temp (!) 94.3 °F (34.6 °C) (Oral)   Resp 20   Wt 231 lb 0.7 oz (104.8 kg)   SpO2 97%   BMI 35.13 kg/m²   24HR INTAKE/OUTPUT:    Intake/Output Summary (Last 24 hours) at 1/19/2023 1144  Last data filed at 1/19/2023 0924  Gross per 24 hour   Intake 890 ml   Output 250 ml   Net 640 ml       Constitutional:  looks comfortable   Respiratory:  decrease BS at bases   Gastrointestinal:  no  epigastric tenderness. Normal Bowel Sounds  Cardiovascular:  S1, S2 irregular.   Edema:  + + edema    DATA:    CBC:  Lab Results   Component Value Date/Time    WBC 8.8 01/18/2023 05:26 AM    RBC 3.55 01/18/2023 05:26 AM    HGB 9.0 01/18/2023 05:26 AM    HCT 30.0 01/18/2023 05:26 AM    MCV 84.4 01/18/2023 05:26 AM    MCH 25.3 01/18/2023 05:26 AM    MCHC 30.0 01/18/2023 05:26 AM    RDW 21.1 01/18/2023 05:26 AM     01/18/2023 05:26 AM    MPV 8.4 01/18/2023 05:26 AM     CMP:  Lab Results   Component Value Date/Time     01/19/2023 04:46 AM    K 4.9 01/19/2023 04:46 AM    K 4.2 01/17/2023 02:53 PM    CL 98 01/19/2023 04:46 AM    CO2 29 01/19/2023 04:46 AM    BUN 79 01/19/2023 04:46 AM    CREATININE 2.6 01/19/2023 04:46 AM    GFRAA 37 05/27/2022 02:05 PM    GFRAA >60 04/19/2013 10:46 AM    AGRATIO 1.1 01/17/2023 02:53 PM    LABGLOM 24 01/19/2023 04:46 AM    GLUCOSE 191 01/19/2023 04:46 AM    GLUCOSE 96 06/16/2011 10:37 AM    PROT 5.9 01/17/2023 02:53 PM    PROT 6.4 12/12/2012 08:05 AM    CALCIUM 7.4 01/19/2023 04:46 AM    BILITOT 0.5 01/17/2023 02:53 PM    ALKPHOS 81 01/17/2023 02:53 PM    AST 16 01/17/2023 02:53 PM    ALT 30 01/17/2023 02:53 PM      Hepatic Function Panel:   Lab Results   Component Value Date/Time    ALKPHOS 81 01/17/2023 02:53 PM    ALT 30 01/17/2023 02:53 PM    AST 16 01/17/2023 02:53 PM    PROT 5.9 01/17/2023 02:53 PM    PROT 6.4 12/12/2012 08:05 AM    BILITOT 0.5 01/17/2023 02:53 PM    BILIDIR <0.2 01/12/2023 12:23 PM    IBILI see below 01/12/2023 12:23 PM      Phosphorus:   Lab Results   Component Value Date/Time    PHOS 3.6 01/12/2023 12:23 PM       ASSESSMENT:  Principal Problem:    Acute on chronic diastolic congestive heart failure (HCC)  Active Problems:    JOANN (obstructive sleep apnea)    Type 2 diabetes mellitus with stage 4 chronic kidney disease, with long-term current use of insulin (HCC)    Chronic anemia    Abnormal CXR    Elevated troponin    Acute on chronic congestive heart failure (HCC)    Mucopurulent chronic bronchitis (HCC)    Chronic GERD    Acute on chronic respiratory failure with hypoxia (HCC)    Essential hypertension    B12 deficiency    HCAP (healthcare-associated pneumonia)    DEISY (acute kidney injury) ([facility]    UTI due to extended-spectrum beta lactamase (ESBL) producing Escherichia coli  Resolved Problems:    * No resolved hospital problems. *      PLAN:  DEISY most likely multifactorial ( los BP/ ARbs /diuretics ) UTI   Hold above meds ( done) . Add IVF . HTN low , off meds   . KEEP sbp> 90 mmHg   ESBL UTI on abx   HCAP Plan per Pul. Ac /ch resp failure   DM2   Obesity with JOANN   Over all prognosis guarded .  Will follow     Olga Lidia Lopes MD, Jimena Forman

## 2023-01-19 NOTE — PLAN OF CARE
Problem: Discharge Planning  Goal: Discharge to home or other facility with appropriate resources  1/19/2023 1018 by Fletcher Navarro RN  Outcome: Progressing  1/19/2023 0219 by Abel Lacy RN  Outcome: Progressing     Problem: Pain  Goal: Verbalizes/displays adequate comfort level or baseline comfort level  1/19/2023 1018 by Fletcher Navarro RN  Outcome: Progressing  1/19/2023 0219 by Abel Lacy RN  Outcome: Progressing     Problem: Safety - Adult  Goal: Free from fall injury  1/19/2023 1018 by Fletcher Navarro RN  Outcome: Progressing  1/19/2023 0219 by Abel Lacy RN  Outcome: Progressing     Problem: ABCDS Injury Assessment  Goal: Absence of physical injury  1/19/2023 1018 by Fletcher Navarro RN  Outcome: Progressing  1/19/2023 0219 by Abel Lacy RN  Outcome: Progressing     Problem: Skin/Tissue Integrity  Goal: Absence of new skin breakdown  Description: 1. Monitor for areas of redness and/or skin breakdown  2. Assess vascular access sites hourly  3. Every 4-6 hours minimum:  Change oxygen saturation probe site  4. Every 4-6 hours:  If on nasal continuous positive airway pressure, respiratory therapy assess nares and determine need for appliance change or resting period.   1/19/2023 1018 by Fletcher Navarro RN  Outcome: Progressing  1/19/2023 0219 by Abel Lacy RN  Outcome: Progressing     Problem: Infection - Adult  Goal: Absence of infection at discharge  1/19/2023 1018 by Fletcher Navarro RN  Outcome: Progressing  1/19/2023 0219 by Abel Lacy RN  Outcome: Progressing  Goal: Absence of infection during hospitalization  1/19/2023 1018 by Fletcher Navarro RN  Outcome: Progressing  1/19/2023 0219 by Abel Lacy RN  Outcome: Progressing  Goal: Absence of fever/infection during anticipated neutropenic period  1/19/2023 1018 by Fletcher Navarro RN  Outcome: Progressing  1/19/2023 0219 by Abel Lacy RN  Outcome: Progressing     Problem: Genitourinary - Adult  Goal: Absence of urinary retention  1/19/2023 1018 by Ragini Xavier June Pink RN  Outcome: Progressing  1/19/2023 0219 by Мария Sanchez RN  Outcome: Progressing  Flowsheets (Taken 1/18/2023 2103)  Absence of urinary retention:   Assess patients ability to void and empty bladder   Monitor intake/output and perform bladder scan as needed  Goal: Urinary catheter remains patent  1/19/2023 1018 by Alexandra Spencer RN  Outcome: Progressing  1/19/2023 0219 by Мария Sanchez RN  Outcome: Progressing  Flowsheets (Taken 1/18/2023 2103)  Urinary catheter remains patent: Assess patency of urinary catheter     Problem: Metabolic/Fluid and Electrolytes - Adult  Goal: Electrolytes maintained within normal limits  1/19/2023 1018 by Alexandra Spencer RN  Outcome: Progressing  1/19/2023 0219 by Мария Sanchez RN  Outcome: Progressing  Goal: Hemodynamic stability and optimal renal function maintained  1/19/2023 1018 by Alexandra Spencer RN  Outcome: Progressing  1/19/2023 0219 by Мария Sanchez RN  Outcome: Progressing  Goal: Glucose maintained within prescribed range  1/19/2023 1018 by Alexandra Spencer RN  Outcome: Progressing  1/19/2023 0219 by Мария Sanchez RN  Outcome: Progressing

## 2023-01-19 NOTE — CONSULTS
[address] [address]                                  CONSULTATION    PATIENT NAME: Kera Izaguirre                    :        1940  MED REC NO:   [id number]                          ROOM:       200  ACCOUNT NO:   [de-identified]                           ADMIT DATE: 2023  PROVIDER:     Milind Quinn MD    CONSULT DATE:  2023    REASON FOR CONSULTATION:  Acute kidney injury. HISTORY OF PRESENT ILLNESS:  He is an 79-year-old pleasant gentleman  with past medical history significant for coronary artery disease,  congestive heart failure, morbid obesity, COPD, obstructive sleep apnea,  chronic respiratory failure, hypercholesterolemia, chronic kidney  disease stage IV came to ER complaining of shortness of breath, dyspnea  on exertion, as well as difficulty with urination. The patient was  found to have urinary outlet obstruction with UTI (ESBL). The patient  was also diagnosed with pneumonia and admitted for further workup and  management. Renal consultation has been called for worsening kidney  function. At the tie of consultation today, the patient does not feel well,  complaining of shortness of breath with dyspnea on exertion. Also  complaining of not feeling well overall and decreased level of energy,  has some cough and sputum. The patient continued to hypothermia off and  on. Admit poor appetite. PAST MEDICAL HISTORY:  1.  Diabetes mellitus type 2.  2.  Hypertension. 3.  Chronic kidney disease stage IV. 4.  COPD. 5.  Chronic respiratory failure. 6.  Obesity. 7.  Obstructive sleep apnea. 8.  Chronic atrial fibrillation. 9.  Diastolic heart failure. 10.  Anemia of chronic kidney disease. 11.  Hypothermia, etiology recent bouts of hypothermia. FAMILY HISTORY:  Positive for diabetes and hypertension. SOCIAL HISTORY:  Does not smoke or drink.     ALLERGIES:  HYTRIN, PENICILLIN, SULFA, TEKTURNA, VANCOMYCIN, CIPRO. REVIEW OF SYSTEMS:  Hard of hearing. No vision problem. Denies any  headache. No dizziness feeling. Does not feel well. Complaining of  poor appetite, shortness of breath, and dyspnea on exertion. Feeling  very anxious and agitated. PHYSICAL EXAMINATION:  GENERAL:  He is lying in bed, looks comfortable. VITAL SIGNS:  Blood pressure 105/71, pulse 86, temperature 94.3. HEENT:  Pupils reactive to light. CHEST:  Decreased breath sounds in both bases. Bilateral scattered  rhonchi. CARDIOVASCULAR:  S1 and S2 audible. Regular rate and rhythm. ABDOMEN:  Soft, nontender. Positive bowel sounds. Distended. EXTREMITIES:  Positive edema. LABORATORY DATA:  Sodium 137, potassium 4.9, chloride 98, bicarb 29, BUN  79, creatinine 2.6. WBC 8.8, hemoglobin 9. IMPRESSION:  1. Acute kidney injury, most likely secondary to hypotension. 2.  UTI. 3.  Bladder outlet obstruction. 4.  Valsartan. 5.  History of chronic kidney disease stage IV. 6.  UTI. 7.  Acute-on-chronic respiratory failure. 8.  History of congestive heart failure. 9.  Pneumonia. 10. Anemia of chronic disease. 11.  Diabetes mellitus type 2.  12.  Obstructive sleep apnea. PLAN:  1. Daily weight. 2.  Strict I's and O's.  3.  Hold Valsartan/hydrochlorothiazide. 4.  Keep blood pressure 90 or above. 5.  Hold hydralazine. 6.  I agree with gentle hydration. 7.  Continue with broad-spectrum antibiotic for UTI as well as  pneumonia. Check the iron studies and ferritin. 8.  Check the phosphorous and PTH. 9.  If no acute indication to start dialysis, but if kidney function  decline, may need _____. Case discussed with the patient, family as well as prior admitting team.  Overall, prognosis is guarded. Thank you very much.         Isacc Akins MD    D: 01/19/2023 12:24:58       T: 01/19/2023 14:26:40     [email or web address]  Job#: [id number]     Doc#: [id number]    CC:

## 2023-01-20 LAB
ANION GAP SERPL CALCULATED.3IONS-SCNC: 17 MMOL/L (ref 3–16)
BUN BLDV-MCNC: 83 MG/DL (ref 7–20)
CALCIUM SERPL-MCNC: 7.6 MG/DL (ref 8.3–10.6)
CHLORIDE BLD-SCNC: 97 MMOL/L (ref 99–110)
CO2: 25 MMOL/L (ref 21–32)
CORTISOL 30 MIN: 19.1 UG/DL
CORTISOL 60 MIN: 23 UG/DL
CORTISOL BASE: 9.8 UG/DL
CORTISOL TOTAL: 9.8 UG/DL
CREAT SERPL-MCNC: 3.2 MG/DL (ref 0.8–1.3)
CULTURE, RESPIRATORY: NORMAL
GFR SERPL CREATININE-BSD FRML MDRD: 18 ML/MIN/{1.73_M2}
GLUCOSE BLD-MCNC: 187 MG/DL (ref 70–99)
GLUCOSE BLD-MCNC: 194 MG/DL (ref 70–99)
GLUCOSE BLD-MCNC: 242 MG/DL (ref 70–99)
GLUCOSE BLD-MCNC: 77 MG/DL (ref 70–99)
GLUCOSE BLD-MCNC: 85 MG/DL (ref 70–99)
GRAM STAIN RESULT: NORMAL
MAGNESIUM: 2 MG/DL (ref 1.8–2.4)
PERFORMED ON: ABNORMAL
PERFORMED ON: NORMAL
POTASSIUM SERPL-SCNC: 4.7 MMOL/L (ref 3.5–5.1)
PROCALCITONIN: 0.53 NG/ML (ref 0–0.15)
SODIUM BLD-SCNC: 139 MMOL/L (ref 136–145)

## 2023-01-20 PROCEDURE — 80048 BASIC METABOLIC PNL TOTAL CA: CPT

## 2023-01-20 PROCEDURE — 6360000002 HC RX W HCPCS: Performed by: INTERNAL MEDICINE

## 2023-01-20 PROCEDURE — 2580000003 HC RX 258: Performed by: INTERNAL MEDICINE

## 2023-01-20 PROCEDURE — 99233 SBSQ HOSP IP/OBS HIGH 50: CPT | Performed by: INTERNAL MEDICINE

## 2023-01-20 PROCEDURE — 94760 N-INVAS EAR/PLS OXIMETRY 1: CPT

## 2023-01-20 PROCEDURE — 97110 THERAPEUTIC EXERCISES: CPT

## 2023-01-20 PROCEDURE — 99232 SBSQ HOSP IP/OBS MODERATE 35: CPT | Performed by: INTERNAL MEDICINE

## 2023-01-20 PROCEDURE — 82533 TOTAL CORTISOL: CPT

## 2023-01-20 PROCEDURE — 83735 ASSAY OF MAGNESIUM: CPT

## 2023-01-20 PROCEDURE — 80400 ACTH STIMULATION PANEL: CPT

## 2023-01-20 PROCEDURE — 94640 AIRWAY INHALATION TREATMENT: CPT

## 2023-01-20 PROCEDURE — 51702 INSERT TEMP BLADDER CATH: CPT

## 2023-01-20 PROCEDURE — 84145 PROCALCITONIN (PCT): CPT

## 2023-01-20 PROCEDURE — 97530 THERAPEUTIC ACTIVITIES: CPT

## 2023-01-20 PROCEDURE — 2700000000 HC OXYGEN THERAPY PER DAY

## 2023-01-20 PROCEDURE — 1200000000 HC SEMI PRIVATE

## 2023-01-20 PROCEDURE — 6370000000 HC RX 637 (ALT 250 FOR IP): Performed by: INTERNAL MEDICINE

## 2023-01-20 PROCEDURE — 36415 COLL VENOUS BLD VENIPUNCTURE: CPT

## 2023-01-20 RX ORDER — FUROSEMIDE 10 MG/ML
20 INJECTION INTRAMUSCULAR; INTRAVENOUS ONCE
Status: COMPLETED | OUTPATIENT
Start: 2023-01-20 | End: 2023-01-20

## 2023-01-20 RX ORDER — LACTULOSE 10 G/15ML
20 SOLUTION ORAL 3 TIMES DAILY PRN
Status: DISCONTINUED | OUTPATIENT
Start: 2023-01-20 | End: 2023-01-26

## 2023-01-20 RX ORDER — HEPARIN SODIUM 5000 [USP'U]/ML
5000 INJECTION, SOLUTION INTRAVENOUS; SUBCUTANEOUS EVERY 8 HOURS SCHEDULED
Status: DISCONTINUED | OUTPATIENT
Start: 2023-01-20 | End: 2023-01-28

## 2023-01-20 RX ADMIN — Medication 1 CAPSULE: at 08:12

## 2023-01-20 RX ADMIN — INSULIN GLARGINE 10 UNITS: 100 INJECTION, SOLUTION SUBCUTANEOUS at 08:21

## 2023-01-20 RX ADMIN — LEVALBUTEROL HYDROCHLORIDE 1.25 MG: 1.25 SOLUTION, CONCENTRATE RESPIRATORY (INHALATION) at 20:19

## 2023-01-20 RX ADMIN — PREDNISONE 10 MG: 10 TABLET ORAL at 08:12

## 2023-01-20 RX ADMIN — Medication 1 CAPSULE: at 17:18

## 2023-01-20 RX ADMIN — BISACODYL 5 MG: 5 TABLET, COATED ORAL at 08:12

## 2023-01-20 RX ADMIN — FERROUS SULFATE TAB EC 324 MG (65 MG FE EQUIVALENT) 324 MG: 324 (65 FE) TABLET DELAYED RESPONSE at 08:12

## 2023-01-20 RX ADMIN — POLYETHYLENE GLYCOL 3350 17 G: 17 POWDER, FOR SOLUTION ORAL at 08:13

## 2023-01-20 RX ADMIN — SODIUM CHLORIDE SOLN NEBU 3% 15 ML: 3 NEBU SOLN at 08:11

## 2023-01-20 RX ADMIN — COSYNTROPIN 250 MCG: 0.25 INJECTION, POWDER, LYOPHILIZED, FOR SOLUTION INTRAVENOUS at 09:42

## 2023-01-20 RX ADMIN — ASPIRIN 162 MG: 81 TABLET, COATED ORAL at 08:13

## 2023-01-20 RX ADMIN — DOCUSATE SODIUM 100 MG: 100 CAPSULE, LIQUID FILLED ORAL at 08:12

## 2023-01-20 RX ADMIN — LEVALBUTEROL HYDROCHLORIDE 1.25 MG: 1.25 SOLUTION, CONCENTRATE RESPIRATORY (INHALATION) at 11:50

## 2023-01-20 RX ADMIN — LEVALBUTEROL HYDROCHLORIDE 1.25 MG: 1.25 SOLUTION, CONCENTRATE RESPIRATORY (INHALATION) at 08:11

## 2023-01-20 RX ADMIN — PANTOPRAZOLE SODIUM 40 MG: 40 TABLET, DELAYED RELEASE ORAL at 06:05

## 2023-01-20 RX ADMIN — LEVALBUTEROL HYDROCHLORIDE 1.25 MG: 1.25 SOLUTION, CONCENTRATE RESPIRATORY (INHALATION) at 16:34

## 2023-01-20 RX ADMIN — CYANOCOBALAMIN TAB 500 MCG 500 MCG: 500 TAB at 08:12

## 2023-01-20 RX ADMIN — HEPARIN SODIUM 5000 UNITS: 5000 INJECTION INTRAVENOUS; SUBCUTANEOUS at 14:40

## 2023-01-20 RX ADMIN — SODIUM CHLORIDE SOLN NEBU 3% 15 ML: 3 NEBU SOLN at 11:50

## 2023-01-20 RX ADMIN — SODIUM CHLORIDE: 9 INJECTION, SOLUTION INTRAVENOUS at 18:40

## 2023-01-20 RX ADMIN — AMIODARONE HYDROCHLORIDE 100 MG: 200 TABLET ORAL at 08:12

## 2023-01-20 RX ADMIN — ATORVASTATIN CALCIUM 40 MG: 40 TABLET, FILM COATED ORAL at 08:13

## 2023-01-20 RX ADMIN — EPOETIN ALFA-EPBX 60000 UNITS: 20000 INJECTION, SOLUTION INTRAVENOUS; SUBCUTANEOUS at 09:36

## 2023-01-20 RX ADMIN — TAMSULOSIN HYDROCHLORIDE 0.4 MG: 0.4 CAPSULE ORAL at 08:12

## 2023-01-20 RX ADMIN — FUROSEMIDE 20 MG: 10 INJECTION, SOLUTION INTRAMUSCULAR; INTRAVENOUS at 14:40

## 2023-01-20 RX ADMIN — MEROPENEM 500 MG: 500 INJECTION, POWDER, FOR SOLUTION INTRAVENOUS at 12:03

## 2023-01-20 RX ADMIN — DOCUSATE SODIUM 100 MG: 100 CAPSULE, LIQUID FILLED ORAL at 20:38

## 2023-01-20 RX ADMIN — OXYCODONE HYDROCHLORIDE AND ACETAMINOPHEN 500 MG: 500 TABLET ORAL at 08:12

## 2023-01-20 RX ADMIN — HEPARIN SODIUM 5000 UNITS: 5000 INJECTION INTRAVENOUS; SUBCUTANEOUS at 20:36

## 2023-01-20 RX ADMIN — SODIUM CHLORIDE SOLN NEBU 3% 15 ML: 3 NEBU SOLN at 16:34

## 2023-01-20 RX ADMIN — SODIUM CHLORIDE SOLN NEBU 3% 15 ML: 3 NEBU SOLN at 20:20

## 2023-01-20 RX ADMIN — MONTELUKAST 10 MG: 10 TABLET, FILM COATED ORAL at 08:12

## 2023-01-20 ASSESSMENT — PAIN SCALES - GENERAL: PAINLEVEL_OUTOF10: 0

## 2023-01-20 NOTE — PROGRESS NOTES
Physical Therapy  Facility/Department: [address] MED SURG  Physical Therapy Daily Note  If pt. is D/C'd prior to next visit please refer back to last daily progress note for D/C status. Name: Leesa Ramirez  : 1940  MRN: [id number]  Date of Service: 2023    Discharge Recommendations:  Continue to assess pending progress, Patient would benefit from continued therapy after discharge   Leesa Ramirez scored a 14/24 on the AM-PAC short mobility form. Please see assessment section for further patient specific details. PT Equipment Recommendations  Equipment Needed: No      Patient Diagnosis(es): The primary encounter diagnosis was Acute on chronic congestive heart failure, unspecified heart failure type ([facility] [address] Diagnoses of Elevated troponin, Chronic kidney disease, unspecified CKD stage, and Urinary tract infection with hematuria, site unspecified were also pertinent to this visit. Past Medical History:  has a past medical history of Allergic rhinitis, Asthma, Atrial fibrillation ([address] Carotid artery stenosis, Chronic kidney disease, COPD (chronic obstructive pulmonary disease) ([facility] [address] CPAP (continuous positive airway pressure) dependence, ESBL (extended spectrum beta-lactamase) producing bacteria infection, Hyperlipidemia, Hypertension, Iron deficiency anemia, Obstructive sleep apnea, and Type II or unspecified type diabetes mellitus without mention of complication, not stated as uncontrolled. Past Surgical History:  has a past surgical history that includes Gallbladder surgery (); Upper gastrointestinal endoscopy (7-2005    7/10/2009); Colonoscopy (7/10/2009); Cataract removal (2012); Pain management procedure (Right, 10/20/2021); Pain management procedure (Left, 2021); CT BIOPSY BONE MARROW (2022); bronchoscopy (N/A, 2022); and bronchoscopy (2022).     Assessment   Assessment: Today, the pt con't to demonstrate that he is functioning well below his baseline and is still needing the stedy to get to and from the bathroom. The pt is limited today due to his poor activity tolerance and his lack of motivation to participate fully in therapy session. The pt is not safe to return home at this time and will benefit from con't skilled PT services while he is in the hospital. Hopefully the pt will start to progress and be able to use the walker for mobility but if not he may need con't skilled PT at d/c. Will con't to follow. Therapy Prognosis: Fair;Guarded  Barriers to Learning: motivation, resistance to education  Requires PT Follow-Up: Yes  Activity Tolerance  Activity Tolerance: Patient limited by fatigue;Patient limited by endurance; Other (comment)  Activity Tolerance Comments: self-limiting     Plan   Physcial Therapy Plan  General Plan: 3-5 times per week  Current Treatment Recommendations: Strengthening, Balance training, Functional mobility training, Transfer training, Gait training, Therapeutic activities, Safety education & training, Patient/Caregiver education & training  Safety Devices  Type of Devices: Bed alarm in place, Call light within reach, Patient at risk for falls, Left in bed, Nurse notified (wife in the room)     Restrictions  Restrictions/Precautions  Restrictions/Precautions: Fall Risk, Contact Precautions  Position Activity Restriction  Other position/activity restrictions: Contact Precautions (ESBL); 3 L O2 but increased to 5L per pt request after transfers and with exercise (put back down to 3L at end of session)     Subjective   General  Chart Reviewed: Yes  Additional Pertinent Hx: Per Josse Swan MD H&P on 1-: The pt is an 78 yo male who presented to the ED with increasing SOB and O2 needs. Troponins were elevated, he has had increased weight, had elevated BNP, urinalysis suggested infection and CXR: \"shows either multifocal pneumonia or heart failure\".  The pt recently in the hospital and on ARU, was d/c on 12-8-2022 and readmitted on 12-. The pt had been d/c to home each time with family. PMHx: asthma, a-fib, CKD, carotid artery stenosis, COPD, CHF, HTN, anemia, JOANN, DM  Response To Previous Treatment: Patient reporting fatigue but able to participate.   Family / Caregiver Present: Yes (wife)  Referring Practitioner: Anson Quintero MD  Referral Date : 01/17/23  Diagnosis: Acute on chronic diastolic congestive heart failure, Acute on chronic respiratory failure with hypoxia  Follows Commands: Impaired  Other (Comment): delayed  Subjective  Subjective: found the pt getting up from the commode with PCA via stedy; the pt declining getting to the chair and requesting to go back to bed; he was reluctantly agreeable to exercises sitting EOB; he is reporting that he just wants some rest as he feels he has not been left alone since early this morning         Social/Functional History  Social/Functional History  Lives With: Spouse  Type of Home: House  Home Layout: Able to Live on Main level with bedroom/bathroom, Multi-level  Home Access: Stairs to enter with rails  Entrance Stairs - Number of Steps: 1+1  Entrance Stairs - Rails: Both  Bathroom Shower/Tub: Tub/Shower unit  Bathroom Toilet: Standard  Bathroom Equipment: Shower chair, Grab bars in shower, Tub transfer bench  Home Equipment: Cane, Rollator, Oxygen, Grab bars (2L O2)  Has the patient had two or more falls in the past year or any fall with injury in the past year?: Yes  ADL Assistance: Needs assistance (assist LB)  Homemaking Assistance: Needs assistance (wife cleans, pt manages bill paying)  Homemaking Responsibilities: No  Ambulation Assistance: Independent (with rollator)  Transfer Assistance: Independent  Active : Yes  Occupation: Retired  Type of Occupation: [employer]  [address]: [place], [address] saw  IADL Comments: sleeps in a recliner but reports he does get into bed on a regular basis      Objective     Bed mobility  Supine to Sit: Unable to assess  Sit to Supine: Contact guard assistance (bed flat, a little assist for one leg)  Scooting: Contact guard assistance;Stand by assistance (to reposition in the bed)  Transfers  Sit to Stand: Contact guard assistance  Stand to Sit: Contact guard assistance  Bed to Chair: Dependent/Total (stedy was used to get the pt bed <> commode)  Ambulation  Comments: not able to ambulate today     Balance  Sitting - Static: Good  Sitting - Dynamic: Good  Standing - Static: Good;- (in the stedy)  Comments: static sitting EOB x 15 minutes doing exercises    Exercise Treatment: while seated EOB: 10 reps ankle pumps; 10 reps LAQ, 10 reps marching;  Breathing Techniques: seated EOB: scapular squeezes with inhalation x 10 reps; shoulder flexion in \"Y\" formation with inhalation x 10 reps        AM-PAC Score  AM-PAC Inpatient Mobility Raw Score : 14 (01/20/23 1415)  AM-PAC Inpatient T-Scale Score : 38.1 (01/20/23 1415)  Mobility Inpatient CMS 0-100% Score: 61.29 (01/20/23 1415)  Mobility Inpatient CMS G-Code Modifier : CL (01/20/23 1415)          Goals  Short Term Goals  Time Frame for Short Term Goals: upon d/c  (slow progress toward goals)  Short Term Goal 1: Bed mobility with mod I. Short Term Goal 2: Transfer sit <> stand to a walker with SBA. Short Term Goal 3: Ambulate with wh walker 25 feet with SBA.   Patient Goals   Patient Goals : none stated       Education  Patient Education  Education Given To: Patient  Education Provided: Role of Therapy;Plan of Care;Home Exercise Program;Energy Conservation  Education Provided Comments: importance of mobility and being out of the bed  Education Method: Verbal  Barriers to Learning: Other (Comment) (?motivation)  Education Outcome: Continued education needed;Demonstrated understanding      Therapy Time   Individual Concurrent Group Co-treatment   Time In 1350         Time Out 1425         Minutes 35                 Electronically signed by Karri Curry PT [id number] on 1/20/2023 at 2:29 PM

## 2023-01-20 NOTE — PLAN OF CARE
Problem: Discharge Planning  Goal: Discharge to home or other facility with appropriate resources  1/19/2023 2322 by Saman Gale RN  Outcome: Progressing  1/19/2023 1018 by Kenzie Field RN  Outcome: Progressing     Problem: Pain  Goal: Verbalizes/displays adequate comfort level or baseline comfort level  1/19/2023 2322 by Saman Gale RN  Outcome: Progressing  1/19/2023 1018 by Kenzie Field RN  Outcome: Progressing     Problem: Safety - Adult  Goal: Free from fall injury  1/19/2023 2322 by Saman Gale RN  Outcome: Progressing  1/19/2023 1018 by Kenzie Field RN  Outcome: Progressing     Problem: ABCDS Injury Assessment  Goal: Absence of physical injury  1/19/2023 2322 by Saman Gale RN  Outcome: Progressing  1/19/2023 1018 by Kenzie Field RN  Outcome: Progressing     Problem: Skin/Tissue Integrity  Goal: Absence of new skin breakdown  Description: 1. Monitor for areas of redness and/or skin breakdown  2. Assess vascular access sites hourly  3. Every 4-6 hours minimum:  Change oxygen saturation probe site  4. Every 4-6 hours:  If on nasal continuous positive airway pressure, respiratory therapy assess nares and determine need for appliance change or resting period.   1/19/2023 2322 by Saman Gale RN  Outcome: Progressing  1/19/2023 1018 by Kenzie Field RN  Outcome: Progressing     Problem: Infection - Adult  Goal: Absence of infection at discharge  1/19/2023 2322 by Saman Gale RN  Outcome: Progressing  1/19/2023 1018 by Kenzie Field RN  Outcome: Progressing  Goal: Absence of infection during hospitalization  1/19/2023 2322 by Saman Gale RN  Outcome: Progressing  1/19/2023 1018 by Kenzie Field RN  Outcome: Progressing  Goal: Absence of fever/infection during anticipated neutropenic period  1/19/2023 2322 by Saman Gale RN  Outcome: Progressing  1/19/2023 1018 by Kenzie Field RN  Outcome: Progressing     Problem: Genitourinary - Adult  Goal: Absence of urinary retention  1/19/2023 2322 by Lamar Goss Tono Issa RN  Outcome: Progressing  1/19/2023 1018 by Tato Sanchez RN  Outcome: Progressing  Goal: Urinary catheter remains patent  1/19/2023 2322 by Neda Vallejo RN  Outcome: Progressing  1/19/2023 1018 by Tato Sanchez RN  Outcome: Progressing     Problem: Metabolic/Fluid and Electrolytes - Adult  Goal: Electrolytes maintained within normal limits  1/19/2023 2322 by Neda Vallejo RN  Outcome: Progressing  1/19/2023 1018 by Tato Sanchez RN  Outcome: Progressing  Goal: Hemodynamic stability and optimal renal function maintained  1/19/2023 2322 by Neda Vallejo RN  Outcome: Progressing  1/19/2023 1018 by Tato Sanchez RN  Outcome: Progressing  Goal: Glucose maintained within prescribed range  1/19/2023 2322 by Neda Vallejo RN  Outcome: Progressing  1/19/2023 1018 by Tato Sanchez RN  Outcome: Progressing     Problem: Nutrition Deficit:  Goal: Optimize nutritional status  Outcome: Progressing

## 2023-01-20 NOTE — PROGRESS NOTES
Interventional Cardiology Consultation     Gilmar Suresh  1940    PCP: CARLOS SOMERS MD  Referring Physician: Dr. Somers  Reason for Referral: CHF   Chief Complaint:   Chief Complaint   Patient presents with    Shortness of Breath     Pt reports increased SOB, productive cough x few months intermittently; pt reports worsening last night. On 3L n/c at home.     Urinary Retention     Reports feeling of urgency but unable to urinate. Pt reports last urinated yesterday \"during the day\".      Interval history  Resting comfortably  Lasix discontinued due to hypovolemia and acute kidney injury  Subjective:     History of Present Illness: The patient is 82 y.o. male with a past medical history significant for  CAD, CKD,obesity, hypoventilation syndrome as well as moderate COPD of asthma type and atrial fibrillation, who presents with the above complaint.  He feels that he has had continued shortness of breath for the past several months he is currently on home oxygen.  He had a nurses aide come visit him today and as per family his O2 sat on home oxygen was 87% and was instructed to come to the emergency was Hospital ER for further work-up.  He admits to worsening shortness of breath with minimal exertion and he has to sit in a recliner is unable to lie flat at night.  He denies any exertional chest pain.  He additionally has an intermittent productive cough.  He has been started on antibiotics with healthcare associated pneumonia.  Additionally for with diuretic therapy for volume overload.  He notes that since admission he has had mild improvement in his symptoms.  He currently denies chest pain palpitations or syncope    Past Medical History:   Diagnosis Date    Allergic rhinitis     Asthma     Atrial fibrillation (HCC)     Carotid artery stenosis     Chronic kidney disease     COPD (chronic obstructive pulmonary disease) (HCC)     CPAP (continuous positive airway pressure)  dependence     ESBL (extended spectrum beta-lactamase) producing bacteria infection 2018    urine    Hyperlipidemia     Hypertension     Iron deficiency anemia     Obstructive sleep apnea     Type II or unspecified type diabetes mellitus without mention of complication, not stated as uncontrolled      Past Surgical History:   Procedure Laterality Date    BRONCHOSCOPY N/A 2022    BRONCHOSCOPY ALVEOLAR LAVAGE performed by Asher Bueno MD at [address]  2022    BRONCHOSCOPY DIAGNOSTIC OR CELL [address] ONLY performed by Asher Bueno MD at [facility]  2012    bilateral    COLONOSCOPY  7/10/2009    diverticulosis    hemorrhoids    CT BONE MARROW BIOPSY  2022    CT BONE MARROW BIOPSY 2022 [facility]    GALLBLADDER SURGERY  1981    PAIN MANAGEMENT PROCEDURE Right 10/20/2021    COOLIEF RADIOFREQUENCY ABLATION - RIGHT KNEE performed by Yodit Fagan MD at [address] Left 2021    [address] - LEFT KNEE performed by Yodit Fagan MD at [address] [facility]  7-2005    7/10/2009    normal     Family History   Problem Relation Age of Onset    Heart Disease Mother     Stroke Mother     Vision Loss Mother     High Blood Pressure Father     High Blood Pressure Brother     Diabetes Brother     Sleep Apnea Brother     Arthritis Paternal Grandmother     Diabetes Paternal Grandmother      Social History     Tobacco Use    Smoking status: Former     Packs/day: 0.50     Years: 18.00     Pack years: 9.00     Types: Cigarettes     Start date: 1958     Quit date: 1988     Years since quittin.0    Smokeless tobacco: Never   Vaping Use    Vaping Use: Never used   Substance Use Topics    Alcohol use: No     Alcohol/week: 0.0 standard drinks    Drug use: No       Allergies   Allergen Reactions    Hytrin [Terazosin Hcl]      Ineffective for BP control    Penicillins      Unknown reaction during childhood; Patient tolerated cephalosporins in the past    Shrimp Flavor Hives    Sulfa Antibiotics      Unknown reaction in childhood    Sulfasalazine Other (See Comments)    Tekturna [Aliskiren Fumarate]      Ineffective    Vancomycin      Fever    Ciprofloxacin Rash     Fever and rash        Current Facility-Administered Medications   Medication Dose Route Frequency Provider Last Rate Last Admin    lactulose (CHRONULAC) 10 GM/15ML solution 10 g  10 g Oral TID PRN Luiz Contrreas MD        cosyntropin (CORTROSYN) injection 250 mcg  250 mcg IntraVENous Once Luiz Contreras MD        0.9 % sodium chloride infusion   IntraVENous Continuous Enrrique Stacy MD 50 mL/hr at 01/19/23 1439 New Bag at 01/19/23 1439    magnesium sulfate 2000 mg in 50 mL IVPB premix  2,000 mg IntraVENous PRN Luiz Contreras MD        vitamin B-12 (CYANOCOBALAMIN) tablet 500 mcg  500 mcg Oral Daily Luiz Contreras MD   500 mcg at 01/19/23 0856    levalbuterol (XOPENEX) nebulizer solution 1.25 mg  1.25 mg Nebulization Q4H WA Tuan Duffy, DO   1.25 mg at 01/19/23 2039    sodium chloride (Inhalant) 3 % nebulizer solution 15 mL  15 mL Nebulization Q4H MARTIN Duffy DO   15 mL at 01/19/23 2040    sodium chloride nebulizer 0.9 % solution 3 mL  3 mL Nebulization PRN Tuan Duffy DO   3 mL at 01/19/23 0404    ascorbic acid (VITAMIN C) tablet 500 mg  500 mg Oral Daily Luiz Contreras MD   500 mg at 01/19/23 0855    meropenem (MERREM) 1,000 mg in sodium chloride 0.9 % 100 mL IVPB (mini-bag)  1,000 mg IntraVENous Q12H Luiz Contreras MD 33.3 mL/hr at 01/19/23 2349 1,000 mg at 01/19/23 2349    insulin glargine (LANTUS) injection vial 10 Units  10 Units SubCUTAneous QAM Luiz Contreras MD   10 Units at 01/19/23 0901    albuterol sulfate HFA (PROVENTIL;VENTOLIN;PROAIR) 108 (90 Base) MCG/ACT inhaler 2 puff  2 puff Inhalation Q4H PRN Loren Turner MD   2 puff at 01/19/23 0100    tamsulosin (FLOMAX) capsule 0.4 mg  0.4 mg Oral Daily Loren Turner MD   0.4 mg at 01/19/23 9037    amiodarone (CORDARONE) tablet 100 mg  100 mg Oral Daily Loren Turner MD   100 mg at 01/19/23 0855    aspirin EC tablet 162 mg  162 mg Oral Daily Loren Turner MD   162 mg at 01/19/23 0855    atorvastatin (LIPITOR) tablet 40 mg  40 mg Oral Daily Loren Turner MD   40 mg at 01/19/23 0855    bisacodyl (DULCOLAX) EC tablet 5 mg  5 mg Oral Daily Loren Turner MD   5 mg at 01/19/23 0855    polyethylene glycol (GLYCOLAX) packet 17 g  17 g Oral Daily Loren Turner MD   17 g at 01/19/23 9247    docusate sodium (COLACE) capsule 100 mg  100 mg Oral BID Loren Turner MD   100 mg at 01/19/23 2046    ferrous sulfate EC tablet 324 mg  324 mg Oral Daily Loren Turner MD   324 mg at 01/19/23 0855    [Held by provider] hydrALAZINE (APRESOLINE) tablet 10 mg  10 mg Oral 3 times per day Loren Turner MD   10 mg at 01/18/23 2105    lactobacillus (CULTURELLE) capsule 1 capsule  1 capsule Oral BID WC Loren Turner MD   1 capsule at 01/19/23 1723    levalbuterol (Patricia Vincent) nebulizer solution 1.25 mg  1.25 mg Nebulization Q6H PRN Loren Turner MD   1.25 mg at 01/19/23 0404    linaclotide (LINZESS) capsule 145 mcg (Patient Supplied)  145 mcg Oral QAM AC Loren Turner MD        montelukast (SINGULAIR) tablet 10 mg  10 mg Oral Daily Loren Turner MD   10 mg at 01/19/23 0855    pantoprazole (PROTONIX) tablet 40 mg  40 mg Oral QAM AC Loren Turner MD   40 mg at 01/19/23 0645    predniSONE (DELTASONE) tablet 10 mg  10 mg Oral Daily Loren Turner MD   10 mg at 01/19/23 0855    insulin lispro (HUMALOG) injection vial 0-4 Units  0-4 Units SubCUTAneous TID WC Loren Turner MD   1 Units at 01/19/23 1223    insulin lispro (HUMALOG) injection vial 0-4 Units  0-4 Units SubCUTAneous Nightly Jayna Ryan MD        glucose chewable tablet 16 g  4 tablet Oral PRN Jayna Ryan MD        dextrose bolus 10% 125 mL  125 mL IntraVENous PRN Jayna Ryan MD        Or    dextrose bolus 10% 250 mL  250 mL IntraVENous PRN Jayna Ryan MD        glucagon (rDNA) injection 1 mg  1 mg SubCUTAneous PRN Jayna Ryan MD        dextrose 10 % infusion   IntraVENous Continuous PRN Jayna Ryan MD           Review of Systems:  Constitutional: No unanticipated weight loss. There's been no change in energy level, sleep pattern, or activity level. No fevers, chills. Eyes: No visual changes or diplopia. No scleral icterus. ENT: No Headaches, hearing loss or vertigo. No mouth sores or sore throat. Cardiovascular: as reviewed in HPI  Respiratory: No cough or wheezing, no sputum production. No hemoptysis. Gastrointestinal: No abdominal pain, appetite loss, blood in stools. No change in bowel or bladder habits. Genitourinary: No dysuria, trouble voiding, or hematuria. Musculoskeletal:  No gait disturbance, no joint complaints. Integumentary: No rash or pruritis. Neurological: No headache, diplopia, change in muscle strength, numbness or tingling. Psychiatric: No anxiety or depression. Endocrine: No temperature intolerance. No excessive thirst, fluid intake, or urination. No tremor. Hematologic/Lymphatic: No abnormal bruising or bleeding, blood clots or swollen lymph nodes. Allergic/Immunologic: No nasal congestion or hives.     Physical Exam:   /67   Pulse 80   Temp 97.4 °F (36.3 °C) (Oral)   Resp 20   Ht 5' 8\" (1.727 m)   Wt 231 lb 0.7 oz (104.8 kg)   SpO2 96%   BMI 35.13 kg/m²   Wt Readings from Last 3 Encounters:   01/19/23 231 lb 0.7 oz (104.8 kg)   12/30/22 218 lb 4.1 oz (99 kg)   12/16/22 239 lb (108.4 kg)     Constitutional: He is oriented to person, place, and time. He appears well-developed and well-nourished. In no acute distress. Head: Normocephalic and atraumatic. Pupils equal and round. Neck: Neck supple. No JVP or carotid bruit appreciated. No mass and no thyromegaly present. No lymphadenopathy present. Cardiovascular: Normal rate. Normal heart sounds. Exam reveals no gallop and no friction rub. No murmur heard. Pulmonary/Chest: Diffuse rhonchi rales  Abdominal: Soft, non-tender. Bowel sounds are normal. He exhibits no organomegaly, mass or bruit. Extremities: 4+ edema. No cyanosis or clubbing. Pulses are 2+ radial/carotid bilaterally. Neurological: No gross cranial nerve deficit. Coordination normal.   Skin: Skin is warm and dry. There is no rash or diaphoresis. Psychiatric: He has a normal mood and affect. His speech is normal and behavior is normal.     Lab Review:   FLP:    Lab Results   Component Value Date/Time    TRIG 76 11/17/2021 10:13 AM    HDL 47 11/17/2021 10:13 AM    HDL 42 12/15/2011 01:57 PM    LDLCALC 54 11/17/2021 10:13 AM    LDLDIRECT 51 05/27/2022 02:05 PM    LABVLDL 15 11/17/2021 10:13 AM     BUN/Creatinine:    Lab Results   Component Value Date/Time    BUN 79 01/19/2023 04:46 AM    CREATININE 2.6 01/19/2023 04:46 AM     PT/INR, TNI, HGB A1C:   Lab Results   Component Value Date/Time    TROPONINI 0.06 (H) 01/18/2023 05:39 PM    LABA1C 6.1 05/27/2022 02:05 PM      No results found for: CBCAUTODIF    EKG 11/19/2018 Sinus rhythm   EKG 12/4/20: Sinus rhythm   EKG 6/4/21: Sinus rhythm   EKG 2/2/22: Sinus rhythm with normal axis and intervals        7 day event monitor 12/12-12/18/19  No episodes of atrial fibrillation noted         Procedures:     [facility] 7/1/21  1. Right-dominant coronary arterial circulation. The right coronary  artery is not as well visualized as the left but there are no  obstructive lesions present in this vessel. There is moderate  calcification.   In the left system, there is no left main disease or  circumflex disease. There is moderate calcification in the left  anterior descending artery in the proximal and midportions. There is  tortuosity in the left anterior descending artery with a 50% mid lesion. No left ventriculogram was performed. 2.  Elevated right-sided filling pressures with a right atrial pressure  of 10 and a pulmonary capillary wedge pressure of 18 mmHg. This  corresponds to a left ventricular end-diastolic pressure of 16 mmHg. 3.  Pulmonary hypertension with an instantaneous pressure of 50/18 for a  mean pulmonary arterial pressure of 28 mmHg. 4.  Cardiac output by thermodilution 8.53 liters per minute with a  cardiac index of 3.68 liters per kilogram per minute. 5.  Pulmonary vascular resistance 104 dynes per second. 6.  Normal mixed venous oxygen saturations. Systemic arterial  saturation was 91% on room air. This procedure was performed on room  air. SVC was 69% and pulmonary artery was 69%. Imaging:     Stress perfusion 6/18/21  Moderate sized moderate intensity reversible inferior wall defect suggestive    of ischemia        Normal LV size and systolic function. Overall findings represent a intermediate risk study.        All above diagnostic testing and laboratory data was independently visualized and reviewed by me (not simply review of report)       Assessment and Plan   1) acute on chronic heart failure with preserved ejection fraction  -Lasix drip on hold due to acute kidney injury  -We will consider restarting low-dose diuretics once his renal function returns to his baseline  -Antihypertensives on hold due to marginal blood pressure likely due to hypovolemia    2) essential hypertension  -Hydralazine on hold until hemodynamics stabilize    3) paroxysmal atrial fibrillation  -Currently in sinus rhythm  -Continue with amiodarone to maintain rhythm control  -Decision regarding anticoagulation right now is unclear I will discuss with his primary cardiologist and can consider starting on discharge    4) Healthcare associated pneumonia  As per pulmonary    Overall, the problems requiring hospitalization are high in severity     Thank you very much for allowing me to participate in the care of your patient. Please do not hesitate to contact me if you have any questions.       Eddie Luz MD [address] [place] [address], Interventional Cardiology, and Peripheral Vascular Disease   [facility]   Ph: [phone]  Fax: [phone]

## 2023-01-20 NOTE — PROGRESS NOTES
Physical Therapy  Attempt Note  Reese Deshpande  [id number]    Attempted to see this pt for PT session with OT as a cotx. The pt was found to be awake in the bed and his wife was also in the room. The pt immediately declined therapy and stated \"I had a bad night and I just want to rest right now\". The pt was non-committal about doing therapy later today but said we could check back. Attempted to educate the pt on the importance of mobility and the pt still declined. If pt. is D/C'd prior to next visit please refer back to last daily progress note for D/C status.   Electronically signed by Amelia Glaser, PT [id number] on 1/20/2023 at 10:30 AM

## 2023-01-20 NOTE — CARE COORDINATION
Discharge Planning:  SW met with pt and pts spouse to discuss d/c planning. Pt was in the middle of receiving a breathing treatment and this SW tried to talk with pt and pts spouse. SW explained that the physical therapist is reporting the following \"The pt is limited today due to his poor activity tolerance and his lack of motivation to participate fully in therapy session. The pt is not safe to return home at this time and will benefit from con't skilled PT services while he is in the hospital. Hopefully the pt will start to progress and be able to use the walker for mobility but if not he may need con't skilled PT at d/c. Will con't to follow. \"    Pt stated he is unable to leave his wife alone and will not go to a nursing facility upon d/c. Pt is adamantly refusing SNF placement at this time. SW will continue to follow.    DUDLEY Boswell  [phone]  Electronically signed by Miriam Riddle on 1/20/2023 at 5:05 PM

## 2023-01-20 NOTE — PROGRESS NOTES
Department of Internal Medicine  Nephrology Progress Note       He is an 79-year-old pleasant gentleman  with past medical history significant for coronary artery disease,  congestive heart failure, morbid obesity, COPD, obstructive sleep apnea,  chronic respiratory failure, hypercholesterolemia, chronic kidney  disease stage IV came to ER complaining of shortness of breath, dyspnea  on exertion, as well as difficulty with urination. The patient was  found to have urinary outlet obstruction with UTI (ESBL). The patient  was also diagnosed with pneumonia and admitted for further workup and management. Renal consultation has been called for worsening kidney Function. Events noted , labs reviewed . Salazar in place   Over all feels better   REVIEW OF SYSTEMS:  Stable SOB/GIBBONS . Feels weak/ tired . Poor appetite  . ROS limites due to pt factor   Family at bed side  Physical Exam:    VITALS:  /61   Pulse 89   Temp 97.8 °F (36.6 °C) (Oral)   Resp 18   Ht 5' 8\" (1.727 m)   Wt 241 lb 2.9 oz (109.4 kg)   SpO2 96%   BMI 36.67 kg/m²   24HR INTAKE/OUTPUT:    Intake/Output Summary (Last 24 hours) at 1/20/2023 1207  Last data filed at 1/20/2023 8198  Gross per 24 hour   Intake 1633.09 ml   Output 325 ml   Net 1308.09 ml         Constitutional:  looks comfortable   Respiratory:  decrease BS at bases / rhonchi   Gastrointestinal:  no  epigastric tenderness. Normal Bowel Sounds  Cardiovascular:  S1, S2 irregular.   Edema:  + + edema    DATA:    CBC:  Lab Results   Component Value Date/Time    WBC 8.8 01/18/2023 05:26 AM    RBC 3.55 01/18/2023 05:26 AM    HGB 9.0 01/18/2023 05:26 AM    HCT 30.0 01/18/2023 05:26 AM    MCV 84.4 01/18/2023 05:26 AM    MCH 25.3 01/18/2023 05:26 AM    MCHC 30.0 01/18/2023 05:26 AM    RDW 21.1 01/18/2023 05:26 AM     01/18/2023 05:26 AM    MPV 8.4 01/18/2023 05:26 AM     CMP:  Lab Results   Component Value Date/Time     01/20/2023 05:23 AM    K 4.7 01/20/2023 05:23 AM K 4.2 01/17/2023 02:53 PM    CL 97 01/20/2023 05:23 AM    CO2 25 01/20/2023 05:23 AM    BUN 83 01/20/2023 05:23 AM    CREATININE 3.2 01/20/2023 05:23 AM    GFRAA 37 05/27/2022 02:05 PM    GFRAA >60 04/19/2013 10:46 AM    AGRATIO 1.1 01/17/2023 02:53 PM    LABGLOM 18 01/20/2023 05:23 AM    GLUCOSE 77 01/20/2023 05:23 AM    GLUCOSE 96 06/16/2011 10:37 AM    PROT 5.9 01/17/2023 02:53 PM    PROT 6.4 12/12/2012 08:05 AM    CALCIUM 7.6 01/20/2023 05:23 AM    BILITOT 0.5 01/17/2023 02:53 PM    ALKPHOS 81 01/17/2023 02:53 PM    AST 16 01/17/2023 02:53 PM    ALT 30 01/17/2023 02:53 PM      Hepatic Function Panel:   Lab Results   Component Value Date/Time    ALKPHOS 81 01/17/2023 02:53 PM    ALT 30 01/17/2023 02:53 PM    AST 16 01/17/2023 02:53 PM    PROT 5.9 01/17/2023 02:53 PM    PROT 6.4 12/12/2012 08:05 AM    BILITOT 0.5 01/17/2023 02:53 PM    BILIDIR <0.2 01/12/2023 12:23 PM    IBILI see below 01/12/2023 12:23 PM      Phosphorus:   Lab Results   Component Value Date/Time    PHOS 3.6 01/12/2023 12:23 PM       ASSESSMENT:  Principal Problem:    Acute on chronic diastolic congestive heart failure (HCC)  Active Problems:    JOANN (obstructive sleep apnea)    Type 2 diabetes mellitus with stage 4 chronic kidney disease, with long-term current use of insulin (HCC)    Chronic anemia    Abnormal CXR    Elevated troponin    Acute on chronic congestive heart failure (HCC)    Mucopurulent chronic bronchitis (HCC)    Chronic GERD    Acute on chronic respiratory failure with hypoxia (HCC)    Essential hypertension    B12 deficiency    HCAP (healthcare-associated pneumonia)    DEISY (acute kidney injury) ([facility] [address]    UTI due to extended-spectrum beta lactamase (ESBL) producing Escherichia coli  Resolved Problems:    * No resolved hospital problems. *      PLAN:  DEISY most likely multifactorial ( low  BP/ ARbs /diuretics ) UTI   Renal function declining , tolerating  IVF . No acute indication for RRT . HTN low , off meds   . KEEP sbp> 90 mmHg ESBL UTI on abx   HCAP Plan per Pul. Ac /ch resp failure   DM2   Obesity with JOANN   Over all prognosis guarded .  Will follow     Jeanne Walton MD, Ruben Morgan

## 2023-01-20 NOTE — PLAN OF CARE
Problem: Discharge Planning  Goal: Discharge to home or other facility with appropriate resources  Outcome: Progressing  Flowsheets (Taken 1/20/2023 0900)  Discharge to home or other facility with appropriate resources:   Identify barriers to discharge with patient and caregiver   Arrange for needed discharge resources and transportation as appropriate   Identify discharge learning needs (meds, wound care, etc)     Problem: Pain  Goal: Verbalizes/displays adequate comfort level or baseline comfort level  Outcome: Progressing  Flowsheets (Taken 1/20/2023 0726)  Verbalizes/displays adequate comfort level or baseline comfort level:   Encourage patient to monitor pain and request assistance   Assess pain using appropriate pain scale   Implement non-pharmacological measures as appropriate and evaluate response   Administer analgesics based on type and severity of pain and evaluate response   Consider cultural and social influences on pain and pain management   Notify Licensed Independent Practitioner if interventions unsuccessful or patient reports new pain     Problem: Safety - Adult  Goal: Free from fall injury  Outcome: Progressing     Problem: ABCDS Injury Assessment  Goal: Absence of physical injury  Outcome: Progressing     Problem: Skin/Tissue Integrity  Goal: Absence of new skin breakdown  Description: 1. Monitor for areas of redness and/or skin breakdown  2. Assess vascular access sites hourly  3. Every 4-6 hours minimum:  Change oxygen saturation probe site  4. Every 4-6 hours:  If on nasal continuous positive airway pressure, respiratory therapy assess nares and determine need for appliance change or resting period.   Outcome: Progressing     Problem: Infection - Adult  Goal: Absence of infection at discharge  Outcome: Progressing  Flowsheets (Taken 1/20/2023 0900)  Absence of infection at discharge:   Assess and monitor for signs and symptoms of infection   Monitor lab/diagnostic results  Goal: Absence of infection during hospitalization  Outcome: Progressing  Flowsheets (Taken 1/20/2023 0900)  Absence of infection during hospitalization:   Assess and monitor for signs and symptoms of infection   Monitor lab/diagnostic results  Goal: Absence of fever/infection during anticipated neutropenic period  Outcome: Progressing     Problem: Genitourinary - Adult  Goal: Absence of urinary retention  Outcome: Progressing  Goal: Urinary catheter remains patent  Outcome: Progressing  Flowsheets (Taken 1/20/2023 0900)  Urinary catheter remains patent: Assess patency of urinary catheter     Problem: Metabolic/Fluid and Electrolytes - Adult  Goal: Electrolytes maintained within normal limits  Outcome: Progressing  Flowsheets (Taken 1/20/2023 0900)  Electrolytes maintained within normal limits:   Monitor labs and assess patient for signs and symptoms of electrolyte imbalances   Administer electrolyte replacement as ordered   Monitor response to electrolyte replacements, including repeat lab results as appropriate   Fluid restriction as ordered   Instruct patient on fluid and nutrition restrictions as appropriate  Goal: Hemodynamic stability and optimal renal function maintained  Outcome: Progressing  Flowsheets (Taken 1/20/2023 0900)  Hemodynamic stability and optimal renal function maintained:   Monitor labs and assess for signs and symptoms of volume excess or deficit   Monitor intake, output and patient weight   Monitor response to interventions for patient's volume status, including labs, urine output, blood pressure (other measures as available)   Instruct patient on fluid and nutrition restrictions as appropriate  Goal: Glucose maintained within prescribed range  Outcome: Progressing  Flowsheets (Taken 1/20/2023 0900)  Glucose maintained within prescribed range:   Monitor blood glucose as ordered   Assess for signs and symptoms of hyperglycemia and hypoglycemia   Administer ordered medications to maintain glucose within target range     Problem: Nutrition Deficit:  Goal: Optimize nutritional status  Outcome: Progressing

## 2023-01-20 NOTE — PROGRESS NOTES
Interventional Cardiology Consultation     Adalberto Horn  1940    PCP: Nimisha Burgos MD  Referring Physician: Dr. Dimitris Saldivar  Reason for Referral: CHF   Chief Complaint:   Chief Complaint   Patient presents with    Shortness of Breath     Pt reports increased SOB, productive cough x few months intermittently; pt reports worsening last night. On 3L n/c at home. Urinary Retention     Reports feeling of urgency but unable to urinate. Pt reports last urinated yesterday \"during the day\". Interval history  Resting comfortably  Lasix discontinued due to hypovolemia and acute kidney injury  -Cough has improved  Subjective:     History of Present Illness: The patient is 79 y.o. male with a past medical history significant for  CAD, CKD,obesity, hypoventilation syndrome as well as moderate COPD of asthma type and atrial fibrillation, who presents with the above complaint. He feels that he has had continued shortness of breath for the past several months he is currently on home oxygen. He had a nurses aide come visit him today and as per family his O2 sat on home oxygen was 87% and was instructed to come to the emergency was Hospital ER for further work-up. He admits to worsening shortness of breath with minimal exertion and he has to sit in a recliner is unable to lie flat at night. He denies any exertional chest pain. He additionally has an intermittent productive cough. He has been started on antibiotics with healthcare associated pneumonia. Additionally for with diuretic therapy for volume overload. He notes that since admission he has had mild improvement in his symptoms.   He currently denies chest pain palpitations or syncope    Past Medical History:   Diagnosis Date    Allergic rhinitis     Asthma     Atrial fibrillation (HCC)     Carotid artery stenosis     Chronic kidney disease     COPD (chronic obstructive pulmonary disease) (HCC)     CPAP (continuous positive airway pressure) dependence     ESBL (extended spectrum beta-lactamase) producing bacteria infection 2018    urine    Hyperlipidemia     Hypertension     Iron deficiency anemia     Obstructive sleep apnea     Type II or unspecified type diabetes mellitus without mention of complication, not stated as uncontrolled      Past Surgical History:   Procedure Laterality Date    BRONCHOSCOPY N/A 2022    BRONCHOSCOPY ALVEOLAR LAVAGE performed by Pineda Alexis MD at [address]  2022    BRONCHOSCOPY DIAGNOSTIC OR CELL [address] ONLY performed by Pineda Alexis MD at [facility]  2012    bilateral    COLONOSCOPY  7/10/2009    diverticulosis    hemorrhoids    CT BONE MARROW BIOPSY  2022    CT BONE MARROW BIOPSY 2022 [facility]    GALLBLADDER SURGERY  1981    PAIN MANAGEMENT PROCEDURE Right 10/20/2021    COOLIEF RADIOFREQUENCY ABLATION - RIGHT KNEE performed by Mya Vargas MD at [address] Left 2021    [address] - LEFT KNEE performed by Mya Vargas MD at [facility]  -2005    7/10/2009    normal     Family History   Problem Relation Age of Onset    Heart Disease Mother     Stroke Mother     Vision Loss Mother     High Blood Pressure Father     High Blood Pressure Brother     Diabetes Brother     Sleep Apnea Brother     Arthritis Paternal Grandmother     Diabetes Paternal Grandmother      Social History     Tobacco Use    Smoking status: Former     Packs/day: 0.50     Years: 18.00     Pack years: 9.00     Types: Cigarettes     Start date: 1958     Quit date: 1988     Years since quittin.0    Smokeless tobacco: Never   Vaping Use    Vaping Use: Never used   Substance Use Topics    Alcohol use: No     Alcohol/week: 0.0 standard drinks    Drug use: No       Allergies   Allergen Reactions    Hytrin [Terazosin Hcl] Ineffective for BP control    Penicillins      Unknown reaction during childhood; Patient tolerated cephalosporins in the past    Shrimp Flavor Hives    Sulfa Antibiotics      Unknown reaction in childhood    Sulfasalazine Other (See Comments)    Tekturna [Aliskiren Fumarate]      Ineffective    Vancomycin      Fever    Ciprofloxacin Rash     Fever and rash        Current Facility-Administered Medications   Medication Dose Route Frequency Provider Last Rate Last Admin    heparin (porcine) injection 5,000 Units  5,000 Units SubCUTAneous 3 times per day Keon Pérez DO   5,000 Units at 01/20/23 1440    meropenem (MERREM) 500 mg IVPB extended (mini-bag)  500 mg IntraVENous Q12H Loren Turner MD   Stopped at 01/20/23 1545    lactulose (CHRONULAC) 10 GM/15ML solution 20 g  20 g Oral TID PRN Loren Turner MD        0.9 % sodium chloride infusion   IntraVENous Continuous Eugenia Tavares MD 50 mL/hr at 01/20/23 0645 Rate Verify at 01/20/23 0645    vitamin B-12 (CYANOCOBALAMIN) tablet 500 mcg  500 mcg Oral Daily Loren Turner MD   500 mcg at 01/20/23 1765    levalbuterol (Patricia Vincent) nebulizer solution 1.25 mg  1.25 mg Nebulization Q4H MARTIN Pérez DO   1.25 mg at 01/20/23 1150    sodium chloride (Inhalant) 3 % nebulizer solution 15 mL  15 mL Nebulization Q4H MARTIN Limon DO   15 mL at 01/20/23 1150    sodium chloride nebulizer 0.9 % solution 3 mL  3 mL Nebulization PRN Keon Pérez DO   3 mL at 01/19/23 0404    ascorbic acid (VITAMIN C) tablet 500 mg  500 mg Oral Daily Loren Turner MD   500 mg at 01/20/23 3965    insulin glargine (LANTUS) injection vial 10 Units  10 Units SubCUTAneous QAM Loren Turner MD   10 Units at 01/20/23 0821    albuterol sulfate HFA (PROVENTIL;VENTOLIN;PROAIR) 108 (90 Base) MCG/ACT inhaler 2 puff  2 puff Inhalation Q4H PRN Loren Turner MD   2 puff at 01/19/23 0100    tamsulosin (FLOMAX) capsule 0.4 mg  0.4 mg Oral Daily Farnaz Forrester MD   0.4 mg at 01/20/23 1963    amiodarone (CORDARONE) tablet 100 mg  100 mg Oral Daily Farnaz Forrester MD   100 mg at 01/20/23 5289    aspirin EC tablet 162 mg  162 mg Oral Daily Farnaz Forrester MD   162 mg at 01/20/23 0813    atorvastatin (LIPITOR) tablet 40 mg  40 mg Oral Daily Farnaz Forrester MD   40 mg at 01/20/23 0813    bisacodyl (DULCOLAX) EC tablet 5 mg  5 mg Oral Daily Farnaz Forrester MD   5 mg at 01/20/23 3120    polyethylene glycol (GLYCOLAX) packet 17 g  17 g Oral Daily Farnaz Forrester MD   17 g at 01/20/23 0813    docusate sodium (COLACE) capsule 100 mg  100 mg Oral BID Farnaz Forrester MD   100 mg at 01/20/23 7505    [Held by provider] hydrALAZINE (APRESOLINE) tablet 10 mg  10 mg Oral 3 times per day Farnaz Forrester MD   10 mg at 01/18/23 2105    lactobacillus (CULTURELLE) capsule 1 capsule  1 capsule Oral BID  Farnaz Forrester MD   1 capsule at 01/20/23 1148    levalbuterol (Newton Margarette) nebulizer solution 1.25 mg  1.25 mg Nebulization Q6H PRN Farnaz Forrester MD   1.25 mg at 01/19/23 0404    linaclotide (LINZESS) capsule 145 mcg (Patient Supplied)  145 mcg Oral QAM  Farnaz Forrester MD        montelukast (SINGULAIR) tablet 10 mg  10 mg Oral Daily Farnaz Forrester MD   10 mg at 01/20/23 0915    pantoprazole (PROTONIX) tablet 40 mg  40 mg Oral QAM  Farnaz Forrester MD   40 mg at 01/20/23 3382    predniSONE (DELTASONE) tablet 10 mg  10 mg Oral Daily Farnaz Forrester MD   10 mg at 01/20/23 8644    insulin lispro (HUMALOG) injection vial 0-4 Units  0-4 Units SubCUTAneous TID  Farnaz Forrester MD   1 Units at 01/19/23 1223    insulin lispro (HUMALOG) injection vial 0-4 Units  0-4 Units SubCUTAneous Nightly Farnaz Forrester MD        glucose chewable tablet 16 g  4 tablet Oral PRN Farnaz Forrester MD        dextrose bolus 10% 125 mL  125 mL IntraVENous PRN Caden Dubois MD        Or    dextrose bolus 10% 250 mL  250 mL IntraVENous PRN Caden Dubois MD        glucagon (rDNA) injection 1 mg  1 mg SubCUTAneous PRN Caden Dubois MD        dextrose 10 % infusion   IntraVENous Continuous PRN Caden Dubois MD           Review of Systems:  Constitutional: No unanticipated weight loss. There's been no change in energy level, sleep pattern, or activity level. No fevers, chills. Eyes: No visual changes or diplopia. No scleral icterus. ENT: No Headaches, hearing loss or vertigo. No mouth sores or sore throat. Cardiovascular: as reviewed in HPI  Respiratory: No cough or wheezing, no sputum production. No hemoptysis. Gastrointestinal: No abdominal pain, appetite loss, blood in stools. No change in bowel or bladder habits. Genitourinary: No dysuria, trouble voiding, or hematuria. Musculoskeletal:  No gait disturbance, no joint complaints. Integumentary: No rash or pruritis. Neurological: No headache, diplopia, change in muscle strength, numbness or tingling. Psychiatric: No anxiety or depression. Endocrine: No temperature intolerance. No excessive thirst, fluid intake, or urination. No tremor. Hematologic/Lymphatic: No abnormal bruising or bleeding, blood clots or swollen lymph nodes. Allergic/Immunologic: No nasal congestion or hives. Physical Exam:   /65   Pulse 81   Temp 97.5 °F (36.4 °C) (Oral)   Resp 18   Ht 5' 8\" (1.727 m)   Wt 241 lb 2.9 oz (109.4 kg)   SpO2 95%   BMI 36.67 kg/m²   Wt Readings from Last 3 Encounters:   01/20/23 241 lb 2.9 oz (109.4 kg)   12/30/22 218 lb 4.1 oz (99 kg)   12/16/22 239 lb (108.4 kg)     Constitutional: He is oriented to person, place, and time. He appears well-developed and well-nourished. In no acute distress. Head: Normocephalic and atraumatic. Pupils equal and round. Neck: Neck supple. No JVP or carotid bruit appreciated.  No mass and no thyromegaly present. No lymphadenopathy present. Cardiovascular: Normal rate. Normal heart sounds. Exam reveals no gallop and no friction rub. No murmur heard. Pulmonary/Chest: Diffuse rhonchi rales  Abdominal: Soft, non-tender. Bowel sounds are normal. He exhibits no organomegaly, mass or bruit. Extremities: 4+ edema. No cyanosis or clubbing. Pulses are 2+ radial/carotid bilaterally. Neurological: No gross cranial nerve deficit. Coordination normal.   Skin: Skin is warm and dry. There is no rash or diaphoresis. Psychiatric: He has a normal mood and affect. His speech is normal and behavior is normal.     Lab Review:   FLP:    Lab Results   Component Value Date/Time    TRIG 76 11/17/2021 10:13 AM    HDL 47 11/17/2021 10:13 AM    HDL 42 12/15/2011 01:57 PM    LDLCALC 54 11/17/2021 10:13 AM    LDLDIRECT 51 05/27/2022 02:05 PM    LABVLDL 15 11/17/2021 10:13 AM     BUN/Creatinine:    Lab Results   Component Value Date/Time    BUN 83 01/20/2023 05:23 AM    CREATININE 3.2 01/20/2023 05:23 AM     PT/INR, TNI, HGB A1C:   Lab Results   Component Value Date/Time    TROPONINI 0.06 (H) 01/18/2023 05:39 PM    LABA1C 6.1 05/27/2022 02:05 PM      No results found for: CBCAUTODIF    EKG 11/19/2018 Sinus rhythm   EKG 12/4/20: Sinus rhythm   EKG 6/4/21: Sinus rhythm   EKG 2/2/22: Sinus rhythm with normal axis and intervals        7 day event monitor 12/12-12/18/19  No episodes of atrial fibrillation noted         Procedures:     [facility] 7/1/21  1. Right-dominant coronary arterial circulation. The right coronary  artery is not as well visualized as the left but there are no  obstructive lesions present in this vessel. There is moderate  calcification. In the left system, there is no left main disease or  circumflex disease. There is moderate calcification in the left  anterior descending artery in the proximal and midportions. There is  tortuosity in the left anterior descending artery with a 50% mid lesion.   No left ventriculogram was performed. 2.  Elevated right-sided filling pressures with a right atrial pressure  of 10 and a pulmonary capillary wedge pressure of 18 mmHg. This  corresponds to a left ventricular end-diastolic pressure of 16 mmHg. 3.  Pulmonary hypertension with an instantaneous pressure of 50/18 for a  mean pulmonary arterial pressure of 28 mmHg. 4.  Cardiac output by thermodilution 8.53 liters per minute with a  cardiac index of 3.68 liters per kilogram per minute. 5.  Pulmonary vascular resistance 104 dynes per second. 6.  Normal mixed venous oxygen saturations. Systemic arterial  saturation was 91% on room air. This procedure was performed on room  air. SVC was 69% and pulmonary artery was 69%. Imaging:     Stress perfusion 6/18/21  Moderate sized moderate intensity reversible inferior wall defect suggestive    of ischemia        Normal LV size and systolic function. Overall findings represent a intermediate risk study.        All above diagnostic testing and laboratory data was independently visualized and reviewed by me (not simply review of report)       Assessment and Plan   1) acute on chronic heart failure with preserved ejection fraction  -Lasix drip on hold due to acute kidney injury  -We will consider restarting low-dose diuretics once his renal function returns to his baseline  -Antihypertensives on hold due to marginal blood pressure likely due to hypovolemia  -Continue with normal saline resuscitation    2) essential hypertension  -Hydralazine on hold until hemodynamics stabilize    3) paroxysmal atrial fibrillation  -Currently in sinus rhythm  -Continue with amiodarone to maintain rhythm control  -Decision regarding anticoagulation right now is unclear I will discuss with his primary cardiologist and can consider starting on discharge    4) Healthcare associated pneumonia  As per pulmonary    Overall, the problems requiring hospitalization are high in severity     Thank you very much for allowing me to participate in the care of your patient. Please do not hesitate to contact me if you have any questions.       Marta Cantor MD [address] [place] [address], Interventional Cardiology, and Peripheral Vascular Disease   [facility]   Ph: [phone]  Fax: [phone]

## 2023-01-20 NOTE — PROGRESS NOTES
Physician Progress Note      Cassi Centeno  [facility] #:                  [id number]  :                       1940  ADMIT DATE:       2023 12:56 PM  [address] DATE:  RESPONDING  PROVIDER #:        Shoaib Whalen MD          QUERY TEXT:    Pt admitted with CHF. Pt noted to have probable pneumonia. If possible,   please document in the progress notes and discharge summary if you are   evaluating and/or treating any of the following:      Note: CAP and HCAP indicate where the pneumonia was acquired, not a specific   type. The medical record reflects the following:  Risk Factors: recently was hospitalized with congestive heart failure and   hemoptysis and pneumonia growing Moraxella catarrhalis. HX- COPD, Home O2 3L, CHF,  Clinical Indicators: VS- P 101, RR 28. Robert Patella Robert Patella 95% 4L,  99% 5L. Robert Patella Robetr Patella Robert Patella Procal   0.37. Robert Patella Robert Patella CXR-Patchy infiltrates in the lungs bilaterally, greater on the right,   increased when compared to the previous exam.  In this case, multifocal   pneumonia and pulmonary edema both considered. Robert Patella Robert Patella Robert Patella Per Pulm consult note on   23-Abnormal CXR, asymmetric. Probably HCAP with pulmonary edema     Continue with cefepime due to history of pseudomonas   Add Flagyl for   anaerobes (possible aspiration)  Treatment: Cefepime IV, Pulmonary consult,  Flagyl IV, Zyvox IV, MRSA probe,   Sputum culture and PNA panel    Thank You,  Srinivas Montoya RN BSN CDS CRCR  [email or web address]  Options provided:  -- Treating for gram negative pneumonia  -- Treating for aspiration pneumonia  -- Other - I will add my own diagnosis  -- Disagree - Not applicable / Not valid  -- Disagree - Clinically unable to determine / Unknown  -- Refer to Clinical Documentation Reviewer    PROVIDER RESPONSE TEXT:    Provider is clinically unable to determine a response to this query.   Empiric treatment for possible HCAP, organism unknown    Query created by: Raiza Nolan on 2023 11:26 AM      Electronically signed by:  Dl Dexter MD 1/19/2023 11:02 PM

## 2023-01-20 NOTE — PROGRESS NOTES
Clinical Pharmacy Note  Renal Dose Adjustment    Gilmar DICK Suresh is receiving meropenem. This medication is renally eliminated. Based on the patient's Estimated Creatinine Clearance: 21 mL/min (A) (based on SCr of 3.2 mg/dL (H)). and urine output, the dose has been adjusted to meropenem 500 mg every 12 hours per protocol. Pharmacy will continue to monitor and adjust dose as needed for changes in renal function.     Maxime Forte, [address] [place] [address], PharmD, 1/20/2023 11:29 AM

## 2023-01-20 NOTE — PROGRESS NOTES
[facility] Internal Medicine Note      Chief Complaint: I feel better today    Subjective/Interval History: This morning the patient states he feels little better. He looks more comfortable and breathing is easier currently. Cosyntropin stim test scheduled for today. Renal function is little worse. Salazar remains in place with some blood-tinged urine. Cardiology, pulmonary, nephrology notes reviewed and appreciated. Blood pressures have been better the last 12 hours. Patient with no other new complaints today. No chest pain. No nausea, vomiting, diarrhea. No abdominal pain. No dysuria. The remainder of the review of systems is negative. PMH, PSH, FH/SH reviewed and unchanged as documented in the H&P personally documented at admission 23    Medication list reviewed    Objective:    BP (!) 148/65   Pulse 88   Temp 97.8 °F (36.6 °C) (Oral)   Resp 18   Ht 5' 8\" (1.727 m)   Wt 241 lb 2.9 oz (109.4 kg)   SpO2 96%   BMI 36.67 kg/m²   Temp  Av.2 °F (35.7 °C)  Min: 94.1 °F (34.5 °C)  Max: 97.8 °F (36.6 °C)    CV-irregularly irregular, rate controlled  Pulm-respirations remain easy, currently on 5 L of oxygen per nasal cannula. Breath sounds remain diffusely diminished throughout. No wheezes or rhonchi noted. Abd- BS+, soft, NTND  Ext-continued 3+ edema of his lower extremities, looks worse today.     The Following Labs Were Reviewed Today:    Recent Results (from the past 24 hour(s))   TSH with Reflex to FT4    Collection Time: 23  9:37 AM   Result Value Ref Range    TSH Reflex FT4 0.92 0.27 - 4.20 uIU/mL   Cortisol Total    Collection Time: 23  9:37 AM   Result Value Ref Range    Cortisol 14.0 ug/dL   POCT Glucose    Collection Time: 23 11:45 AM   Result Value Ref Range    POC Glucose 213 (H) 70 - 99 mg/dl    Performed on ACCU-CHEK    POCT Glucose    Collection Time: 23  4:15 PM   Result Value Ref Range    POC Glucose 161 (H) 70 - 99 mg/dl    Performed on ACCU-CHEK POCT Glucose    Collection Time: 01/19/23  8:05 PM   Result Value Ref Range    POC Glucose 180 (H) 70 - 99 mg/dl    Performed on ACCU-CHEK    Basic Metabolic Panel    Collection Time: 01/20/23  5:23 AM   Result Value Ref Range    Sodium 139 136 - 145 mmol/L    Potassium 4.7 3.5 - 5.1 mmol/L    Chloride 97 (L) 99 - 110 mmol/L    CO2 25 21 - 32 mmol/L    Anion Gap 17 (H) 3 - 16    Glucose 77 70 - 99 mg/dL    BUN 83 (HH) 7 - 20 mg/dL    Creatinine 3.2 (H) 0.8 - 1.3 mg/dL    Est, Glom Filt Rate 18 (A) >60    Calcium 7.6 (L) 8.3 - 10.6 mg/dL   Magnesium    Collection Time: 01/20/23  5:23 AM   Result Value Ref Range    Magnesium 2.00 1.80 - 2.40 mg/dL   Procalcitonin    Collection Time: 01/20/23  5:23 AM   Result Value Ref Range    Procalcitonin 0.53 (H) 0.00 - 0.15 ng/mL   POCT Glucose    Collection Time: 01/20/23  7:28 AM   Result Value Ref Range    POC Glucose 85 70 - 99 mg/dl    Performed on ACCU-CHEK        ASSESSMENT/PLAN:      Principal Problem:    Acute on chronic diastolic congestive heart failure -renal function worsening again. Continue to hold diuretics. Active Problems:    Acute on chronic respiratory failure with hypoxia/HCAP (healthcare-associated pneumonia)-patient now on meropenem. Zyvox has been discontinued. Pneumonia panel pending. Appreciate pulmonary input. Continues on prednisone taper from an outpatient. DEISY-blood pressure has improved. Creatinine is up again today. Continue to hold diuretics. UTI due to extended-spectrum beta lactamase (ESBL) producing Escherichia coli-day #3 of meropenem. Consider infectious disease consult for duration of antibiotics. Probably plan on 7 days at this point. Hypothermia-temperatures have been improved since last evening. Cosyntropin stim test pending. Thyroid is okay. Chronic anemia-appreciate hematology input. Elevated troponin-stable. No trending in troponins    JOANN (obstructive sleep apnea)-patient uses CPAP at home.   He does have CPAP here. Chronic GERD-continue Protonix. Type 2 diabetes mellitus with stage 4 chronic kidney disease, with long-term current use of insulin (HCC)-sugars lower today. Reduce Lantus dose and consider stopping Lantus. Continue sliding scale. Continue to monitor    Essential hypertension-antihypertensives remain on hold currently. B12 deficiency-continue with oral B12 supplementation. Atrial fibrillation-rate is controlled. Patient is not an anticoagulation candidate due to occult bleeding in the past and chronic iron deficiency.       [address] [facility] MD [place], Batool Gustafson  8:33 AM  1/20/2023

## 2023-01-20 NOTE — PROGRESS NOTES
Pulmonary Progress Note    CC:  Follow up acute on chronic respiratory failure, possible pneumonia     Subjective:  3 liters of oxygen  Pneumonia panel with H influenza and Moraxella   Breathing maybe a little bit better  Having trouble coughing up mucus  Less cold   Not eating    ROS  Hard to cough up mucus       Intake/Output Summary (Last 24 hours) at 1/20/2023 0918  Last data filed at 1/20/2023 0645  Gross per 24 hour   Intake 1883.09 ml   Output 325 ml   Net 1558.09 ml           PHYSICAL EXAM:  Blood pressure (!) 148/65, pulse 88, temperature 97.8 °F (36.6 °C), temperature source Oral, resp. rate 18, height 5' 8\" (1.727 m), weight 241 lb 2.9 oz (109.4 kg), SpO2 96 %.'  Gen: Chronically ill, lethargic today    Eyes: PERRL. No sclera icterus. No conjunctival injection. ENT: No discharge. Pharynx clear. External appearance of ears and nose normal.  Neck: Trachea midline. No obvious mass. Resp: Improved aeration, rhonchi in lower lobes   CV: Regular rate. Regular rhythm. No murmur or rub. GI: Non-tender. Non-distended. No hernia. Skin: Pale  Lymph: No cervical LAD. No supraclavicular LAD. M/S: No cyanosis. No clubbing. No joint deformity. Neuro: Moves all four extremities. CN 2-12 tested, no defect noted.   Ext:   ++ edema    Medications:    Scheduled Meds:   epoetin kenny-epbx  60,000 Units SubCUTAneous Once    cosyntropin  250 mcg IntraVENous Once    vitamin B-12  500 mcg Oral Daily    levalbuterol  1.25 mg Nebulization Q4H WA    sodium chloride (Inhalant)  15 mL Nebulization Q4H WA    vitamin C  500 mg Oral Daily    meropenem  1,000 mg IntraVENous Q12H    insulin glargine  10 Units SubCUTAneous QAM    tamsulosin  0.4 mg Oral Daily    amiodarone  100 mg Oral Daily    aspirin EC  162 mg Oral Daily    atorvastatin  40 mg Oral Daily    bisacodyl  5 mg Oral Daily    polyethylene glycol  17 g Oral Daily    docusate sodium  100 mg Oral BID    ferrous sulfate  324 mg Oral Daily    [Held by provider] hydrALAZINE  10 mg Oral 3 times per day    lactobacillus  1 capsule Oral BID     linaclotide  145 mcg Oral QAM AC    montelukast  10 mg Oral Daily    pantoprazole  40 mg Oral QAM AC    predniSONE  10 mg Oral Daily    insulin lispro  0-4 Units SubCUTAneous TID     insulin lispro  0-4 Units SubCUTAneous Nightly       Continuous Infusions:   sodium chloride 50 mL/hr at 01/20/23 0645    dextrose         PRN Meds:  lactulose, magnesium sulfate, sodium chloride nebulizer, albuterol sulfate HFA, levalbuterol, glucose, dextrose bolus **OR** dextrose bolus, glucagon (rDNA), dextrose    Labs:  CBC:   Recent Labs     01/17/23  1453 01/18/23  0526   WBC 10.0 8.8   HGB 9.6* 9.0*   HCT 31.9* 30.0*   MCV 84.1 84.4    134*       BMP:   Recent Labs     01/18/23  0526 01/19/23  0446 01/20/23  0523    137 139   K 4.6 4.9 4.7    98* 97*   CO2 29 29 25   BUN 69* 79* 83*   CREATININE 1.9* 2.6* 3.2*       LIVER PROFILE:   Recent Labs     01/17/23  1453   AST 16   ALT 30   BILITOT 0.5   ALKPHOS 81       PT/INR:   Recent Labs     01/17/23  1453   PROTIME 16.4*   INR 1.33*       APTT:   Recent Labs     01/17/23  1453   APTT 28.7       UA:  Recent Labs     01/17/23  1453   COLORU Yellow   PHUR 5.0   WBCUA 107*   RBCUA 1   BACTERIA 4+*   CLARITYU SL CLOUDY*   SPECGRAV 1.014   LEUKOCYTESUR LARGE*   UROBILINOGEN 0.2   BILIRUBINUR Negative   BLOODU TRACE*   GLUCOSEU Negative       No results for input(s): PH, PCO2, PO2 in the last 72 hours.         Films:  Chest imaging reports were reviewed and imaging was reviewed by me and showed no new labs     ABG:  No new draws    Cultures:  Sputum:  in process  Pneumonia panel:  in process  Urine;  ESBL E. Coli   MRSA probe:  negative     I reviewed the labs and images listed above    ASSESSMENT/PLAN:  Acute on Chronic Hypoxic Respiratory Failure  Titrate oxygen for saturations greater than or equal to 90%  Recent baseline oxygen is 3 liters, historically not on oxygen   Pneumonia due to H. Influenza and Morraxella (noted on pneumonia panel)  Should be covered by Khris Caballero. May need extended course of antibiotics as he may be colonized. Once Merrem is completed for urine coverage may want to de-escalate to [facility] and treat for 14 days   Chronic bronchitis   Xopenex nebs and saline nebs to help with expectoration   Acapella  May want to wean off prednisone   JOANN   Home machine with sleep.   Refused it last night      DVT prophylaxis  Add heparin     D/W Dr. Bhavin Thurman, [facility]

## 2023-01-20 NOTE — PROGRESS NOTES
Occupational Therapy  Attempt Note    Leesa Ramirez  1/20/2023    OT attempted to see for therapy treatment session, but was unable to see d/t pt's adamant refusal. Pt reports he doesn't feel well, and had a bad night, states \"I just want to lay here and rest.\" Pt educated on importance of OOB activity and pt continued to refuse. Discussed d/c plans with pt, who reports his plan is to return home. Discussed concerns with d/c to home if pt is not ambulating here in the hospital (pt has been using benoit stedy), and pt confident that he will be able to return home. Pt agreeable for OT to attempt again later, but was non-committal about if he will participate. Will attempt again later as therapy schedule permits. If pt is d/c'd prior to next therapy treatment session, please refer to last therapy progress note for pt's d/c status and OT recommendations.     Essie Kerr, OTR/L [id number]

## 2023-01-21 LAB
ANION GAP SERPL CALCULATED.3IONS-SCNC: 16 MMOL/L (ref 3–16)
BLOOD CULTURE, ROUTINE: NORMAL
BUN BLDV-MCNC: 93 MG/DL (ref 7–20)
C-REACTIVE PROTEIN: 110 MG/L (ref 0–5.1)
CALCIUM SERPL-MCNC: 7.5 MG/DL (ref 8.3–10.6)
CHLORIDE BLD-SCNC: 98 MMOL/L (ref 99–110)
CO2: 23 MMOL/L (ref 21–32)
CREAT SERPL-MCNC: 3.2 MG/DL (ref 0.8–1.3)
CULTURE, BLOOD 2: NORMAL
GFR SERPL CREATININE-BSD FRML MDRD: 18 ML/MIN/{1.73_M2}
GLUCOSE BLD-MCNC: 104 MG/DL (ref 70–99)
GLUCOSE BLD-MCNC: 110 MG/DL (ref 70–99)
GLUCOSE BLD-MCNC: 157 MG/DL (ref 70–99)
GLUCOSE BLD-MCNC: 208 MG/DL (ref 70–99)
GLUCOSE BLD-MCNC: 83 MG/DL (ref 70–99)
MAGNESIUM: 1.9 MG/DL (ref 1.8–2.4)
PERFORMED ON: ABNORMAL
POTASSIUM SERPL-SCNC: 4.5 MMOL/L (ref 3.5–5.1)
PROCALCITONIN: 0.38 NG/ML (ref 0–0.15)
SODIUM BLD-SCNC: 137 MMOL/L (ref 136–145)

## 2023-01-21 PROCEDURE — 99232 SBSQ HOSP IP/OBS MODERATE 35: CPT | Performed by: INTERNAL MEDICINE

## 2023-01-21 PROCEDURE — 6360000002 HC RX W HCPCS: Performed by: INTERNAL MEDICINE

## 2023-01-21 PROCEDURE — 99233 SBSQ HOSP IP/OBS HIGH 50: CPT | Performed by: NURSE PRACTITIONER

## 2023-01-21 PROCEDURE — 6370000000 HC RX 637 (ALT 250 FOR IP): Performed by: INTERNAL MEDICINE

## 2023-01-21 PROCEDURE — 94760 N-INVAS EAR/PLS OXIMETRY 1: CPT

## 2023-01-21 PROCEDURE — 36415 COLL VENOUS BLD VENIPUNCTURE: CPT

## 2023-01-21 PROCEDURE — 2580000003 HC RX 258: Performed by: INTERNAL MEDICINE

## 2023-01-21 PROCEDURE — 84145 PROCALCITONIN (PCT): CPT

## 2023-01-21 PROCEDURE — 80048 BASIC METABOLIC PNL TOTAL CA: CPT

## 2023-01-21 PROCEDURE — 83735 ASSAY OF MAGNESIUM: CPT

## 2023-01-21 PROCEDURE — 1200000000 HC SEMI PRIVATE

## 2023-01-21 PROCEDURE — 2700000000 HC OXYGEN THERAPY PER DAY

## 2023-01-21 PROCEDURE — 94640 AIRWAY INHALATION TREATMENT: CPT

## 2023-01-21 PROCEDURE — 99232 SBSQ HOSP IP/OBS MODERATE 35: CPT | Performed by: STUDENT IN AN ORGANIZED HEALTH CARE EDUCATION/TRAINING PROGRAM

## 2023-01-21 PROCEDURE — 86140 C-REACTIVE PROTEIN: CPT

## 2023-01-21 RX ADMIN — POLYETHYLENE GLYCOL 3350 17 G: 17 POWDER, FOR SOLUTION ORAL at 07:59

## 2023-01-21 RX ADMIN — LEVALBUTEROL HYDROCHLORIDE 1.25 MG: 1.25 SOLUTION, CONCENTRATE RESPIRATORY (INHALATION) at 11:51

## 2023-01-21 RX ADMIN — MEROPENEM 500 MG: 500 INJECTION, POWDER, FOR SOLUTION INTRAVENOUS at 00:07

## 2023-01-21 RX ADMIN — SODIUM CHLORIDE SOLN NEBU 3% 15 ML: 3 NEBU SOLN at 11:51

## 2023-01-21 RX ADMIN — DOCUSATE SODIUM 100 MG: 100 CAPSULE, LIQUID FILLED ORAL at 07:58

## 2023-01-21 RX ADMIN — PREDNISONE 10 MG: 10 TABLET ORAL at 07:59

## 2023-01-21 RX ADMIN — INSULIN LISPRO 1 UNITS: 100 INJECTION, SOLUTION INTRAVENOUS; SUBCUTANEOUS at 17:42

## 2023-01-21 RX ADMIN — LEVALBUTEROL HYDROCHLORIDE 1.25 MG: 1.25 SOLUTION, CONCENTRATE RESPIRATORY (INHALATION) at 08:16

## 2023-01-21 RX ADMIN — HEPARIN SODIUM 5000 UNITS: 5000 INJECTION INTRAVENOUS; SUBCUTANEOUS at 05:24

## 2023-01-21 RX ADMIN — HEPARIN SODIUM 5000 UNITS: 5000 INJECTION INTRAVENOUS; SUBCUTANEOUS at 13:40

## 2023-01-21 RX ADMIN — Medication 1 CAPSULE: at 07:58

## 2023-01-21 RX ADMIN — MEROPENEM 500 MG: 500 INJECTION, POWDER, FOR SOLUTION INTRAVENOUS at 11:34

## 2023-01-21 RX ADMIN — OXYCODONE HYDROCHLORIDE AND ACETAMINOPHEN 500 MG: 500 TABLET ORAL at 07:58

## 2023-01-21 RX ADMIN — BISACODYL 5 MG: 5 TABLET, COATED ORAL at 07:58

## 2023-01-21 RX ADMIN — SODIUM CHLORIDE: 9 INJECTION, SOLUTION INTRAVENOUS at 20:45

## 2023-01-21 RX ADMIN — ASPIRIN 162 MG: 81 TABLET, COATED ORAL at 07:58

## 2023-01-21 RX ADMIN — CYANOCOBALAMIN TAB 500 MCG 500 MCG: 500 TAB at 07:59

## 2023-01-21 RX ADMIN — SODIUM CHLORIDE SOLN NEBU 3% 15 ML: 3 NEBU SOLN at 08:16

## 2023-01-21 RX ADMIN — INSULIN GLARGINE 10 UNITS: 100 INJECTION, SOLUTION SUBCUTANEOUS at 08:00

## 2023-01-21 RX ADMIN — DOCUSATE SODIUM 100 MG: 100 CAPSULE, LIQUID FILLED ORAL at 20:39

## 2023-01-21 RX ADMIN — HEPARIN SODIUM 5000 UNITS: 5000 INJECTION INTRAVENOUS; SUBCUTANEOUS at 20:39

## 2023-01-21 RX ADMIN — MEROPENEM 500 MG: 500 INJECTION, POWDER, FOR SOLUTION INTRAVENOUS at 23:52

## 2023-01-21 RX ADMIN — LACTULOSE 20 G: 20 SOLUTION ORAL at 05:29

## 2023-01-21 RX ADMIN — PANTOPRAZOLE SODIUM 40 MG: 40 TABLET, DELAYED RELEASE ORAL at 05:03

## 2023-01-21 RX ADMIN — Medication 1 CAPSULE: at 16:54

## 2023-01-21 RX ADMIN — TAMSULOSIN HYDROCHLORIDE 0.4 MG: 0.4 CAPSULE ORAL at 07:58

## 2023-01-21 RX ADMIN — MONTELUKAST 10 MG: 10 TABLET, FILM COATED ORAL at 07:58

## 2023-01-21 RX ADMIN — LEVALBUTEROL HYDROCHLORIDE 1.25 MG: 1.25 SOLUTION, CONCENTRATE RESPIRATORY (INHALATION) at 15:42

## 2023-01-21 RX ADMIN — ATORVASTATIN CALCIUM 40 MG: 40 TABLET, FILM COATED ORAL at 07:58

## 2023-01-21 RX ADMIN — SODIUM CHLORIDE SOLN NEBU 3% 15 ML: 3 NEBU SOLN at 15:42

## 2023-01-21 RX ADMIN — AMIODARONE HYDROCHLORIDE 100 MG: 200 TABLET ORAL at 07:58

## 2023-01-21 ASSESSMENT — PAIN SCALES - GENERAL: PAINLEVEL_OUTOF10: 0

## 2023-01-21 NOTE — PROGRESS NOTES
Patient resting in bed   Vitals stable on 3L O2 via NC  Diminished lung sounds  Denies any pain at this time  Dyspnea on exertion and rest noted    SR/Afib on tele  Generalized edema noted  Edema +2 to LUE, +2 BLE  Scattered bruising related to recent fall noted. Superficial burn to L. Hand     3 open wounds dime size noted to buttocks  Triad hydro/zinc oxide-based hydrophilic paste applied to buttocks    Antibiotic therapy continued    Salazar catheter in place, patent and draining.     Cardiology, pulmonology and nephrology following    Patient and family updated on care    Call light placed within reach  No needs voiced at this time

## 2023-01-21 NOTE — ASSESSMENT & PLAN NOTE
Patient is volume up. Patient oxygen requirement is stable. Nephrology and cardiology is following and assisting fluid management. Creatinine is down trending  -Follow neurology recommendation for diuresis versus dialysis. - Fluid restriction.  -Continue strict intake and output monitoring. Continue daily standing weights.

## 2023-01-21 NOTE — PROGRESS NOTES
n  Department of Internal Medicine  Nephrology Progress Note       He is an 54-year-old pleasant gentleman  with past medical history significant for coronary artery disease,  congestive heart failure, morbid obesity, COPD, obstructive sleep apnea,  chronic respiratory failure, hypercholesterolemia, chronic kidney  disease stage IV came to ER complaining of shortness of breath, dyspnea  on exertion, as well as difficulty with urination. The patient was  found to have urinary outlet obstruction with UTI (ESBL). The patient  was also diagnosed with pneumonia and admitted for further workup and management. Renal consultation has been called for worsening kidney Function. HPI:  Breathing stable. No CP. Confused. Renal function may have plateau'ed. ROS:  In bed. [address]:  medications reviewed. Physical Exam:    VITALS:  /69   Pulse 86   Temp 97.6 °F (36.4 °C) (Oral)   Resp 18   Ht 5' 8\" (1.727 m)   Wt 235 lb 0.2 oz (106.6 kg)   SpO2 96%   BMI 35.73 kg/m²   24HR INTAKE/OUTPUT:    Intake/Output Summary (Last 24 hours) at 1/21/2023 1750  Last data filed at 1/21/2023 1540  Gross per 24 hour   Intake 2053.25 ml   Output 900 ml   Net 1153.25 ml       Constitutional:  looks comfortable   Respiratory:  decrease BS at bases / rhonchi   Gastrointestinal:  no  epigastric tenderness. Normal Bowel Sounds  Cardiovascular:  S1, S2 irregular.   Edema:  + + edema    DATA:    CBC:  Lab Results   Component Value Date/Time    WBC 8.8 01/18/2023 05:26 AM    RBC 3.55 01/18/2023 05:26 AM    HGB 9.0 01/18/2023 05:26 AM    HCT 30.0 01/18/2023 05:26 AM    MCV 84.4 01/18/2023 05:26 AM    MCH 25.3 01/18/2023 05:26 AM    MCHC 30.0 01/18/2023 05:26 AM    RDW 21.1 01/18/2023 05:26 AM     01/18/2023 05:26 AM    MPV 8.4 01/18/2023 05:26 AM     CMP:  Lab Results   Component Value Date/Time     01/21/2023 05:39 AM    K 4.5 01/21/2023 05:39 AM    K 4.2 01/17/2023 02:53 PM    CL 98 01/21/2023 05:39 AM    CO2 23 01/21/2023 05:39 AM    BUN 93 01/21/2023 05:39 AM    CREATININE 3.2 01/21/2023 05:39 AM    GFRAA 37 05/27/2022 02:05 PM    GFRAA >60 04/19/2013 10:46 AM    AGRATIO 1.1 01/17/2023 02:53 PM    LABGLOM 18 01/21/2023 05:39 AM    GLUCOSE 83 01/21/2023 05:39 AM    GLUCOSE 96 06/16/2011 10:37 AM    PROT 5.9 01/17/2023 02:53 PM    PROT 6.4 12/12/2012 08:05 AM    CALCIUM 7.5 01/21/2023 05:39 AM    BILITOT 0.5 01/17/2023 02:53 PM    ALKPHOS 81 01/17/2023 02:53 PM    AST 16 01/17/2023 02:53 PM    ALT 30 01/17/2023 02:53 PM      Hepatic Function Panel:   Lab Results   Component Value Date/Time    ALKPHOS 81 01/17/2023 02:53 PM    ALT 30 01/17/2023 02:53 PM    AST 16 01/17/2023 02:53 PM    PROT 5.9 01/17/2023 02:53 PM    PROT 6.4 12/12/2012 08:05 AM    BILITOT 0.5 01/17/2023 02:53 PM    BILIDIR <0.2 01/12/2023 12:23 PM    IBILI see below 01/12/2023 12:23 PM      Phosphorus:   Lab Results   Component Value Date/Time    PHOS 3.6 01/12/2023 12:23 PM       ASSESSMENT/PLAN:    DEISY   - most likely multifactorial ( low  BP/ ARbs /diuretics/UTI )  - renal function may have plateau'ed  - family discussing whether dialysis is an option    HTN   - better  - off meds  - keep sbp > 90 mmHg     ESBL UTI  - on meropenem    HCAP    - on meropenem    Ac /ch resp failure     Obesity with JOANN     Over all prognosis guarded .  Will follow

## 2023-01-21 NOTE — PROGRESS NOTES
Cardiology Progress Note     Admit Date: 2023     Reason for follow up: HF    HPI and Interval History:   The patient is 79 y.o. male with a past medical history significant for CAD, CKD, obesity, hypoventilation syndrome as well as moderate COPD of asthma type and atrial fibrillation, who presents with the above complaint. He feels that he has had continued shortness of breath for the past several months he is currently on home oxygen. He had a nurses aide come visit him today and as per family his O2 sat on home oxygen was 87% and was instructed to come to the emergency was Hospital ER for further work-up. He admits to worsening shortness of breath with minimal exertion and he has to sit in a recliner is unable to lie flat at night. He denies any exertional chest pain. He additionally has an intermittent productive cough. He has been started on antibiotics with healthcare associated pneumonia. Additionally for with diuretic therapy for volume overload. He notes that since admission he has had mild improvement in his symptoms. He currently denies chest pain palpitations or syncope    Previously on Lasix drip, on hold now due to worsening renal function and placed on IV fluids. Pt resting comfortably in bed, no cardiac complaints. Rate controlled atrial fibrillation on monitor.     Physical Examination:  Vitals:    23 0729   BP: (!) 122/55   Pulse: 81   Resp: 19   Temp: (!) 96.2 °F (35.7 °C)   SpO2: 96%        Intake/Output Summary (Last 24 hours) at 2023 0746  Last data filed at 2023 0530  Gross per 24 hour   Intake 1195.64 ml   Output 600 ml   Net 595.64 ml     In: 1195.6 [P.O.:780; I.V.:315.6]  Out: 600    Wt Readings from Last 3 Encounters:   23 235 lb 0.2 oz (106.6 kg)   22 218 lb 4.1 oz (99 kg)   22 239 lb (108.4 kg)     Temp  Av.3 °F (36.3 °C)  Min: 96.2 °F (35.7 °C)  Max: 97.8 °F (36.6 °C)  Pulse  Av.2  Min: 65  Max: 90  BP  Min: 98/38  Max: 122/55  SpO2  Av.3 %  Min: 94 %  Max: 98 %    Telemetry: Atrial fibrillation v-rates in the 80s. Constitutional: Alert, in no acute distress. Appears stated age. Head: Normocephalic and atraumatic. Eyes: Conjunctivae normal. EOM are normal.   Neck: Neck supple. No lymphadenopathy. No rigidity. No JVD present. Cardiovascular: Controlled rate, IRR. No murmurs, rubs or gallops. No S3 or S4.  Pulmonary/Chest: Clear breath sounds bilaterally. No crackles, wheezes or rhonchi. No respiratory accessory muscle use. Abdominal: Soft. Normal bowel sounds present. No distension, No tenderness. Musculoskeletal: No tenderness. No edema    Lymphadenopathy: Has no cervical adenopathy. Neurological: Alert and oriented. No gross deficits. Skin: Skin is warm and dry. No rash, lesions, ulcerations noted. Psychiatric: No anxiety or agitation. Labs, diagnostic and imaging results reviewed. Reviewed. Recent Labs     23  0446 23  0523    139   K 4.9 4.7   CL 98* 97*   CO2 29 25   BUN 79* 83*   CREATININE 2.6* 3.2*     No results for input(s): WBC, HGB, HCT, MCV, PLT in the last 72 hours. Lab Results   Component Value Date/Time    CKTOTAL 47 10/30/2022 09:56 PM    TROPONINI 0.06 2023 05:39 PM     Estimated Creatinine Clearance: 21 mL/min (A) (based on SCr of 3.2 mg/dL (H)).    No results found for: BNP  Lab Results   Component Value Date/Time    PROTIME 16.4 2023 02:53 PM    PROTIME 18.2 11/15/2022 09:04 PM    PROTIME 16.9 2022 08:27 AM    INR 1.33 2023 02:53 PM    INR 1.51 11/15/2022 09:04 PM    INR 1.38 2022 08:27 AM     Lab Results   Component Value Date/Time    CHOL 116 2021 10:13 AM    HDL 47 2021 10:13 AM    HDL 42 12/15/2011 01:57 PM    TRIG 76 2021 10:13 AM       Scheduled Meds:   heparin (porcine)  5,000 Units SubCUTAneous 3 times per day    meropenem  500 mg IntraVENous Q12H    vitamin B-12  500 mcg Oral Daily    levalbuterol  1.25 mg Nebulization Q4H WA    sodium chloride (Inhalant)  15 mL Nebulization Q4H WA    vitamin C  500 mg Oral Daily    insulin glargine  10 Units SubCUTAneous QAM    tamsulosin  0.4 mg Oral Daily    amiodarone  100 mg Oral Daily    aspirin EC  162 mg Oral Daily    atorvastatin  40 mg Oral Daily    bisacodyl  5 mg Oral Daily    polyethylene glycol  17 g Oral Daily    docusate sodium  100 mg Oral BID    [Held by provider] hydrALAZINE  10 mg Oral 3 times per day    lactobacillus  1 capsule Oral BID     linaclotide  145 mcg Oral QAM AC    montelukast  10 mg Oral Daily    pantoprazole  40 mg Oral QAM AC    predniSONE  10 mg Oral Daily    insulin lispro  0-4 Units SubCUTAneous TID WC    insulin lispro  0-4 Units SubCUTAneous Nightly     Continuous Infusions:   sodium chloride 50 mL/hr at 23 1840    dextrose       PRN Meds:lactulose, sodium chloride nebulizer, albuterol sulfate HFA, levalbuterol, glucose, dextrose bolus **OR** dextrose bolus, glucagon (rDNA), dextrose     EC23  Atrial fibrillation at 102 BPM. PVCs versus aberrant conduction. RBBB. Echo:22   Ejection fraction is visually estimated to be 60-65%. No regional wall motion abnormalities are noted. Grade I diastolic dysfunction with elevated LV filling pressures. The left atrium is moderately dilated. mildly dilated right ventricle. Estimated pulmonary artery systolic pressure is mildly elevated at 40 mmHg   assuming a right atrial pressure of 15 mmHg. Stress: 21   Moderate sized moderate intensity reversible inferior wall defect suggestive    of ischemia        Normal LV size and systolic function. Overall findings represent a intermediate risk study. Cath: 21  FINDINGS:  1. Right-dominant coronary arterial circulation. The right coronary  artery is not as well visualized as the left but there are no  obstructive lesions present in this vessel. There is moderate  calcification.   In the left system, there is no left main disease or  circumflex disease. There is moderate calcification in the left  anterior descending artery in the proximal and midportions. There is  tortuosity in the left anterior descending artery with a 50% mid lesion. No left ventriculogram was performed. 2.  Elevated right-sided filling pressures with a right atrial pressure  of 10 and a pulmonary capillary wedge pressure of 18 mmHg. This  corresponds to a left ventricular end-diastolic pressure of 16 mmHg. 3.  Pulmonary hypertension with an instantaneous pressure of 50/18 for a  mean pulmonary arterial pressure of 28 mmHg. 4.  Cardiac output by thermodilution 8.53 liters per minute with a  cardiac index of 3.68 liters per kilogram per minute. 5.  Pulmonary vascular resistance 104 dynes per second. 6.  Normal mixed venous oxygen saturations. Systemic arterial  saturation was 91% on room air. This procedure was performed on room  air. SVC was 69% and pulmonary artery was 69%.     Assessment and Plan:     Acute on chronic diastolic heart failure   - previously on Lasix drip, had worsening renal function and now on IV fluids   - resume low dose diuretics if needed when renal function at baseline     Paroxysmal atrial fibrillation   - currently in rate controlled atrial fibrillation   - on amiodarone, continue if able to be anticoagulated   - CHADS2-VASc at least 6 (age, CAD, HF, HTN, DM) - would recommend DOAC if no contraindication as he has been in atrial fibrillation since at least 1/17 high risk for stroke if converts chemically    - would recommend Eliquis 2.5mg BID or Xarelto 15mg QD and would stop aspirin given no past interventions   - if does not convert on own and if able to be anticoagulated, could consider cardioversion    Acute on chronic renal failure stage 3b   - worsening BUN today, creatinine stable   - creatinine 1.8-2.2 over the last couple month   - nephrology following    HCAP   - care per primary/pulmonary    Acute on chronic hypoxic respiratory failure   - secondary to above   - pulmonary following    CAD   - nonobstructive on [facility] in 2021   - on aspirin, statin    SILVIANO Lake  The [address], [address] [place] [address]  Phone: [phone]  Fax: [phone]    Electronically signed by SILVIANO Manrique - CNP on 1/21/2023 at 7:46 AM

## 2023-01-21 NOTE — PLAN OF CARE
Problem: Discharge Planning  Goal: Discharge to home or other facility with appropriate resources  1/20/2023 2241 by Adrien Yao RN  Outcome: Progressing  1/20/2023 1654 by Nubia Larson RN  Outcome: Progressing  Flowsheets (Taken 1/20/2023 0900)  Discharge to home or other facility with appropriate resources:   Identify barriers to discharge with patient and caregiver   Arrange for needed discharge resources and transportation as appropriate   Identify discharge learning needs (meds, wound care, etc)     Problem: Pain  Goal: Verbalizes/displays adequate comfort level or baseline comfort level  1/20/2023 2241 by Adrien Yao RN  Outcome: Progressing  1/20/2023 1654 by Nubia Larson RN  Outcome: Progressing  Flowsheets (Taken 1/20/2023 0726)  Verbalizes/displays adequate comfort level or baseline comfort level:   Encourage patient to monitor pain and request assistance   Assess pain using appropriate pain scale   Implement non-pharmacological measures as appropriate and evaluate response   Administer analgesics based on type and severity of pain and evaluate response   Consider cultural and social influences on pain and pain management   Notify Licensed Independent Practitioner if interventions unsuccessful or patient reports new pain     Problem: Safety - Adult  Goal: Free from fall injury  1/20/2023 2241 by Adrien Yao RN  Outcome: Progressing  Flowsheets (Taken 1/20/2023 2239)  Free From Fall Injury: Instruct family/caregiver on patient safety  1/20/2023 1654 by Nubia Larson RN  Outcome: Progressing  Flowsheets (Taken 1/20/2023 0900)  Free From Fall Injury: Instruct family/caregiver on patient safety     Problem: ABCDS Injury Assessment  Goal: Absence of physical injury  1/20/2023 2241 by Adrien Yao RN  Outcome: Progressing  Flowsheets (Taken 1/20/2023 2239)  Absence of Physical Injury: Implement safety measures based on patient assessment  1/20/2023 1654 by Nubia Larson RN  Outcome: Progressing  Flowsheets (Taken 1/20/2023 0900)  Absence of Physical Injury: Implement safety measures based on patient assessment     Problem: Skin/Tissue Integrity  Goal: Absence of new skin breakdown  Description: 1. Monitor for areas of redness and/or skin breakdown  2. Assess vascular access sites hourly  3. Every 4-6 hours minimum:  Change oxygen saturation probe site  4. Every 4-6 hours:  If on nasal continuous positive airway pressure, respiratory therapy assess nares and determine need for appliance change or resting period.   1/20/2023 2241 by Minna Sacks, RN  Outcome: Progressing  1/20/2023 1654 by Alia Nava RN  Outcome: Progressing     Problem: Infection - Adult  Goal: Absence of infection at discharge  1/20/2023 2241 by Minna Sacks, RN  Outcome: [place]  1/20/2023 1654 by Alia Nava RN  Outcome: Progressing  Flowsheets (Taken 1/20/2023 0900)  Absence of infection at discharge:   Assess and monitor for signs and symptoms of infection   Monitor lab/diagnostic results  Goal: Absence of infection during hospitalization  1/20/2023 2241 by Minna Sacks, RN  Outcome: Progressing  1/20/2023 1654 by Alia Nava RN  Outcome: Progressing  Flowsheets (Taken 1/20/2023 0900)  Absence of infection during hospitalization:   Assess and monitor for signs and symptoms of infection   Monitor lab/diagnostic results  Goal: Absence of fever/infection during anticipated neutropenic period  1/20/2023 2241 by Minna Sacks, RN  Outcome: Progressing  1/20/2023 1654 by Alia Nava RN  Outcome: Progressing     Problem: Genitourinary - Adult  Goal: Absence of urinary retention  1/20/2023 2241 by Minna Sacks, RN  Outcome: Progressing  1/20/2023 1654 by Alia Nava RN  Outcome: Progressing  Goal: Urinary catheter remains patent  1/20/2023 2241 by Minna Sacks, RN  Outcome: Progressing  1/20/2023 1654 by Alia Nava RN  Outcome: Progressing  Flowsheets (Taken 1/20/2023 0900)  Urinary catheter remains patent: Assess patency of urinary catheter     Problem: Metabolic/Fluid and Electrolytes - Adult  Goal: Electrolytes maintained within normal limits  1/20/2023 2241 by Mendel Feast, RN  Outcome: Progressing  1/20/2023 1654 by Bibi Wei RN  Outcome: Progressing  Flowsheets (Taken 1/20/2023 0900)  Electrolytes maintained within normal limits:   Monitor labs and assess patient for signs and symptoms of electrolyte imbalances   Administer electrolyte replacement as ordered   Monitor response to electrolyte replacements, including repeat lab results as appropriate   Fluid restriction as ordered   Instruct patient on fluid and nutrition restrictions as appropriate  Goal: Hemodynamic stability and optimal renal function maintained  1/20/2023 2241 by Mendel Feast, RN  Outcome: Progressing  1/20/2023 1654 by Bibi Wei RN  Outcome: Progressing  Flowsheets (Taken 1/20/2023 0900)  Hemodynamic stability and optimal renal function maintained:   Monitor labs and assess for signs and symptoms of volume excess or deficit   Monitor intake, output and patient weight   Monitor response to interventions for patient's volume status, including labs, urine output, blood pressure (other measures as available)   Instruct patient on fluid and nutrition restrictions as appropriate  Goal: Glucose maintained within prescribed range  1/20/2023 2241 by Mendel Feast, RN  Outcome: Progressing  1/20/2023 1654 by Bibi Wei RN  Outcome: Progressing  Flowsheets (Taken 1/20/2023 0900)  Glucose maintained within prescribed range:   Monitor blood glucose as ordered   Assess for signs and symptoms of hyperglycemia and hypoglycemia   Administer ordered medications to maintain glucose within target range     Problem: Nutrition Deficit:  Goal: Optimize nutritional status  1/20/2023 2241 by Mendel Feast, RN  Outcome: Progressing  1/20/2023 1654 by Bibi Wei RN  Outcome: Progressing

## 2023-01-21 NOTE — ASSESSMENT & PLAN NOTE
Presenting with dyspnea. Patient with imaging findings concerning for pneumonia versus heart failure. Patient with increased weight and elevated BNP. Patient also reported sputum production but no fever. Patient was started on pneumonia treatment as well as fluid management for heart failure exacerbation.  -Continue meropenem 500 mg every 12 hours this patient has ESBL UTI.  - Cardiology and nephrology consulted. Patient with creatinine that remained at 3.2. Will defer to nephrology and cardiology to decide diuresis versus dialysis.

## 2023-01-21 NOTE — PLAN OF CARE
Problem: Discharge Planning  Goal: Discharge to home or other facility with appropriate resources  1/21/2023 1022 by Rae Nguyen RN  Outcome: Progressing  Flowsheets (Taken 1/21/2023 0740)  Discharge to home or other facility with appropriate resources:   Identify barriers to discharge with patient and caregiver   Arrange for needed discharge resources and transportation as appropriate   Identify discharge learning needs (meds, wound care, etc)     Problem: Pain  Goal: Verbalizes/displays adequate comfort level or baseline comfort level  1/21/2023 1022 by Rae Nguyen RN  Outcome: Progressing  Flowsheets (Taken 1/21/2023 0729)  Verbalizes/displays adequate comfort level or baseline comfort level:   Encourage patient to monitor pain and request assistance   Assess pain using appropriate pain scale   Administer analgesics based on type and severity of pain and evaluate response   Implement non-pharmacological measures as appropriate and evaluate response   Consider cultural and social influences on pain and pain management   Notify Licensed Independent Practitioner if interventions unsuccessful or patient reports new pain     Problem: Safety - Adult  Goal: Free from fall injury  1/21/2023 1022 by Rae Nguyen RN  Outcome: Progressing  Flowsheets (Taken 1/21/2023 0740)  Free From Fall Injury: Instruct family/caregiver on patient safety     Problem: ABCDS Injury Assessment  Goal: Absence of physical injury  1/21/2023 1022 by Rae Nguyen RN  Outcome: Progressing  Flowsheets (Taken 1/21/2023 0740)  Absence of Physical Injury: Implement safety measures based on patient assessment     Problem: Skin/Tissue Integrity  Goal: Absence of new skin breakdown  Description: 1. Monitor for areas of redness and/or skin breakdown  2. Assess vascular access sites hourly  3. Every 4-6 hours minimum:  Change oxygen saturation probe site  4.   Every 4-6 hours:  If on nasal continuous positive airway pressure, respiratory therapy assess nares and determine need for appliance change or resting period.   1/21/2023 1022 by Eldon Clements RN  Outcome: Progressing     Problem: Infection - Adult  Goal: Absence of infection at discharge  1/21/2023 1022 by Eldon Clements RN  Outcome: Progressing  Flowsheets (Taken 1/21/2023 0740)  Absence of infection at discharge:   Assess and monitor for signs and symptoms of infection   Monitor lab/diagnostic results   Administer medications as ordered   Instruct and encourage patient and family to use good hand hygiene technique   [place] appropriate cooling/warming therapies per order   Identify and instruct in appropriate isolation precautions for identified infection/condition  Goal: Absence of infection during hospitalization  1/21/2023 1022 by Eldon Clements RN  Outcome: Progressing  Flowsheets (Taken 1/21/2023 0740)  Absence of infection during hospitalization:   Assess and monitor for signs and symptoms of infection   Monitor lab/diagnostic results   Administer medications as ordered  Goal: Absence of fever/infection during anticipated neutropenic period  1/21/2023 1022 by Eldon Clements RN  Outcome: Progressing     Problem: Genitourinary - Adult  Goal: Absence of urinary retention  1/21/2023 1022 by Eldon Clements RN  Outcome: Progressing  Flowsheets (Taken 1/21/2023 0740)  Absence of urinary retention: Place urinary catheter per Licensed Independent Practitioner order if needed  Goal: Urinary catheter remains patent  1/21/2023 1022 by Eldon Clements RN  Outcome: Progressing  Flowsheets (Taken 1/21/2023 0740)  Urinary catheter remains patent: Assess patency of urinary catheter     Problem: Metabolic/Fluid and Electrolytes - Adult  Goal: Electrolytes maintained within normal limits  1/21/2023 1022 by Eldon Clements RN  Outcome: Progressing  Flowsheets (Taken 1/21/2023 0740)  Electrolytes maintained within normal limits:   Monitor labs and assess patient for signs and symptoms of electrolyte imbalances   Administer electrolyte replacement as ordered   Monitor response to electrolyte replacements, including repeat lab results as appropriate   Fluid restriction as ordered  Goal: Hemodynamic stability and optimal renal function maintained  1/21/2023 1022 by Kat Pompa RN  Outcome: Progressing  Flowsheets (Taken 1/21/2023 0740)  Hemodynamic stability and optimal renal function maintained:   Monitor labs and assess for signs and symptoms of volume excess or deficit   Monitor intake, output and patient weight   Monitor response to interventions for patient's volume status, including labs, urine output, blood pressure (other measures as available)   Instruct patient on fluid and nutrition restrictions as appropriate  Goal: Glucose maintained within prescribed range  1/21/2023 1022 by Kat Pompa RN  Outcome: Progressing  Flowsheets (Taken 1/21/2023 0740)  Glucose maintained within prescribed range:   Monitor blood glucose as ordered   Assess for signs and symptoms of hyperglycemia and hypoglycemia   Administer ordered medications to maintain glucose within target range     Problem: Nutrition Deficit:  Goal: Optimize nutritional status  1/21/2023 1022 by Kat Pompa RN  Outcome: Progressing

## 2023-01-21 NOTE — ASSESSMENT & PLAN NOTE
Hemoglobin A1C   Date Value Ref Range Status   05/27/2022 6.1 See comment % Final     Comment:     Comment:  Diagnosis of Diabetes: > or = 6.5%  Increased risk of diabetes (Prediabetes): 5.7-6.4%  Glycemic Control: Nonpregnant Adults: <7.0%                    Pregnant: <6.0%         -Continue Lantus 10 units nightly and low-dose sliding scale correction.

## 2023-01-21 NOTE — PROGRESS NOTES
[facility] Internal Medicine    Patient:  Hernán Escobar 79 y.o. male MRN: [id number]     Date of Service: 1/21/2023    Allergy: Hytrin [terazosin hcl], Penicillins, Shrimp flavor, Sulfa antibiotics, Sulfasalazine, Tekturna [aliskiren fumarate], Vancomycin, and Ciprofloxacin  CC: F/u:   Brief Course   Hernán Escobar is an 79-year-old male with a past medical history significant for chronic diastolic heart failure, type 2 diabetes, chronic kidney disease who was admitted on 1/18/2022 for worsening dyspnea. He has been on antibiotics for pneumonia. Nephrology was consulted for assistance in fluid management. Pulmonology consulted as well. 24 hr interval hx:  Patient net +600 yesterday. Creatinine remained at 3.2 today. Baseline is around 2. Weight today is 235  Patient resting comfortably today    Objective     Physical Exam:  Vitals: /67   Pulse 75   Temp 97.5 °F (36.4 °C) (Axillary)   Resp 18   Ht 5' 8\" (1.727 m)   Wt 235 lb 0.2 oz (106.6 kg)   SpO2 97%   BMI 35.73 kg/m²     Physical Exam  Constitutional:       General: He is not in acute distress. HENT:      Mouth/Throat:      Mouth: Mucous membranes are moist.   Eyes:      Pupils: Pupils are equal, round, and reactive to light. Cardiovascular:      Rate and Rhythm: Normal rate and regular rhythm. Pulses: Normal pulses. Pulmonary:      Effort: Pulmonary effort is normal. No respiratory distress. Breath sounds: Normal breath sounds. No wheezing, rhonchi or rales. Abdominal:      General: Abdomen is flat. There is no distension. Palpations: Abdomen is soft. Tenderness: There is no abdominal tenderness. Musculoskeletal:      Right lower leg: Edema present. Left lower leg: Edema present. Skin:     General: Skin is warm and dry. Coloration: Skin is not jaundiced or pale. Findings: No erythema. Neurological:      General: No focal deficit present.       Mental Status: He is alert and oriented to person, place, and time. Pertinent/ New Labs and Imaging Studies   BMP:    Lab Results   Component Value Date/Time     01/21/2023 05:39 AM    K 4.5 01/21/2023 05:39 AM    K 4.2 01/17/2023 02:53 PM    CL 98 01/21/2023 05:39 AM    CO2 23 01/21/2023 05:39 AM    BUN 93 01/21/2023 05:39 AM    LABALBU 3.1 01/17/2023 02:53 PM    CREATININE 3.2 01/21/2023 05:39 AM    CALCIUM 7.5 01/21/2023 05:39 AM    GFRAA 37 05/27/2022 02:05 PM    GFRAA >60 04/19/2013 10:46 AM    LABGLOM 18 01/21/2023 05:39 AM    GLUCOSE 83 01/21/2023 05:39 AM    GLUCOSE 96 06/16/2011 10:37 AM       Imaging:  CT CHEST WO CONTRAST    Result Date: 12/27/2022  EXAMINATION: CT OF THE CHEST WITHOUT CONTRAST 12/27/2022 10:41 am TECHNIQUE: CT of the chest was performed without the administration of intravenous contrast. Multiplanar reformatted images are provided for review. Automated exposure control, iterative reconstruction, and/or weight based adjustment of the mA/kV was utilized to reduce the radiation dose to as low as reasonably achievable. COMPARISON: 11/23/2022 CT HISTORY: ORDERING SYSTEM PROVIDED HISTORY: unresolved PNA TECHNOLOGIST PROVIDED HISTORY: Reason for exam:->unresolved PNA Reason for Exam: unresolved PNA, sob FINDINGS: Mediastinum: The heart is enlarged. Trace pericardial fluid. Advanced atherosclerotic calcification of coronary arteries. Mild atherosclerotic calcification of the aorta. Pulmonary arteries normal in caliber. Thyroid and esophagus are normal.  Shotty subcentimeter mediastinal lymph nodes are stable. There also densely calcified mediastinal and right hilar nodes representing granulomatous change. Lungs/pleura: Inspissated secretions are minimal in the right mainstem bronchus. Peripheral airways are patent. Persistent small right pleural effusion. Multifocal airspace opacity are relatively unchanged in the right middle and bilateral lower lobes.   New on the current exam, there is increasing ground-glass attenuation in the right greater than left upper lobe with somewhat mosaic distribution. No discrete mass or nodule. Upper Abdomen: Normal adrenal glands. Cortical thinning in the kidneys suggesting chronic medical renal disease. Calcified granulomata in the liver and spleen. Soft Tissues/Bones: No skeletal abnormalities are appreciated within the chest.     1. Multifocal airspace opacities in the bilateral lungs that are relatively stable since 11/23/2022. Chronic pneumonia or cryptogenic organizing pneumonia may be considered. 2. Increasing mosaic attenuation in the right greater than left upper lobe which may be in association with organizing pneumonia or interstitial pneumonia. A degree of pulmonary edema may also be considered. XR CHEST PORTABLE    Result Date: 1/17/2023  EXAMINATION: ONE XRAY VIEW OF THE CHEST 1/17/2023 1:44 pm COMPARISON: 12/27/2022 HISTORY: ORDERING SYSTEM PROVIDED HISTORY: SOB TECHNOLOGIST PROVIDED HISTORY: Reason for exam:->SOB FINDINGS: Patchy infiltrates seen within the lungs bilaterally, greatest in the right mid to lower lung. These are increased when compared to the previous exam. Cardial pericardial silhouette is enlarged, stable. The pulmonary vasculature is congested. Patchy infiltrates in the lungs bilaterally, greater on the right, increased when compared to the previous exam.  In this case, multifocal pneumonia and pulmonary edema both considered. XR CHEST PORTABLE    Result Date: 12/27/2022  EXAMINATION: ONE XRAY VIEW OF THE CHEST 12/27/2022 10:02 am COMPARISON: 12/23/2022 radiograph HISTORY: ORDERING SYSTEM PROVIDED HISTORY: Persistent hemoptysis. Evaluate for changes in x-ray. TECHNOLOGIST PROVIDED HISTORY: Reason for exam:->Persistent hemoptysis. Evaluate for changes in x-ray. FINDINGS: The heart is enlarged.   Mediastinum is normal.  Perihilar and diffuse reticular and ground-glass airspace opacities are demonstrated in a patient with underlying chronic interstitial change based upon prior imaging. This is diffuse and asymmetric in the right lung, stable since 12/23/2022 but showing improvement from prior imaging in November. There are no significant skeletal findings. Relatively stable pattern of diffuse airspace change in the right greater than left lung showing gradual improvement over the course of the prior month. Overall pattern would favor a resolving infectious or inflammatory process in a patient with underlying chronic interstitial change. XR CHEST PORTABLE    Result Date: 12/23/2022  EXAMINATION: ONE XRAY VIEW OF THE CHEST 12/23/2022 5:59 pm COMPARISON: Chest x-ray dated November 20, 2022 HISTORY: ORDERING SYSTEM PROVIDED HISTORY: sob TECHNOLOGIST PROVIDED HISTORY: Reason for exam:->sob Reason for Exam: fall, sob FINDINGS: Adequate inspiration is noted. Extensive airspace disease is present throughout the right upper and lower lung zone. Patchy airspace disease is present within the left lung base. Interval improvement is noted within the right basilar airspace disease. No pneumothorax noted. Small pleural effusions are present. Borderline cardiomegaly is noted. No acute osseous abnormality is present. 1. Extensive airspace disease throughout the right lung, left lung base. Interval improvement is noted within the right basilar airspace disease. Differential considerations would include multifocal pneumonia versus coalescing edema. Follow-up imaging is recommended to ensure complete clearing 2. Borderline cardiomegaly     Bronchoscopy    Result Date: 12/28/2022  No dictation       Assessment and Plan   Acute on chronic diastolic congestive heart failure ([address]   Patient with heart failure who presented volume up with dyspnea. Patient weight at last discharge from hospital was 218. Weight today is 235 but down from 240 yesterday.   Nephrology is following and assisting fluid management.  -Follow neurology recommendation for diuresis versus dialysis. Patient's creatinine is remained at 3.2 today. Hopefully this creatinine will start to downtrend.  -Continue strict intake and output monitoring. Continue daily standing weights. Acute on chronic respiratory failure with hypoxia (HCC)   Presenting with dyspnea. Patient with imaging findings concerning for pneumonia versus heart failure. Patient with increased weight and elevated BNP. Patient also reported sputum production but no fever. Patient was started on pneumonia treatment as well as fluid management for heart failure exacerbation.  -Continue meropenem 500 mg every 12 hours this patient has ESBL UTI.  - Cardiology and nephrology consulted. Patient with creatinine that remained at 3.2. Will defer to nephrology and cardiology to decide diuresis versus dialysis. Essential hypertension   Blood pressure is controlled    UTI due to extended-spectrum beta lactamase (ESBL) producing Escherichia coli   ESBL E. coli on urine culture  -Continue meropenem 500 mg every 12 hours    Type 2 diabetes mellitus with stage 4 chronic kidney disease, with long-term current use of insulin ([facility])  Hemoglobin A1C   Date Value Ref Range Status   05/27/2022 6.1 See comment % Final     Comment:     Comment:  Diagnosis of Diabetes: > or = 6.5%  Increased risk of diabetes (Prediabetes): 5.7-6.4%  Glycemic Control: Nonpregnant Adults: <7.0%                    Pregnant: <6.0%         -Continue Lantus 10 units nightly and low-dose sliding scale correction.     JOANN (obstructive sleep apnea)  - CPAP therapy at night           DVT Prophylaxis: Heparin  Code: Full  Disposition: Continued admission    Velia Espinal MD  [facility] Internal Medicine  Office [phone]  Cell [phone]

## 2023-01-21 NOTE — PROGRESS NOTES
Pulmonary Critical Care Progress Note     Patient's name:  Alyssa Yost Record Number: [id number]  Patient's account/billing number: [de-identified]  Patient's YOB: 1940  Age: 79 y.o. Date of Admission: 1/17/2023 12:56 PM  Date of Consult: 1/21/2023      Primary Care Physician: Carmen Garcia MD      Code Status: Full Code    Chief complaint: Acute on chronic respiratory failure with hypoxia    Assessment and Plan     Acute on chronic respiratory with hypoxia and hypercapnia  History of H influenza and Moraxella pneumonia  History of organizing pneumonia  Acute kidney injury  ESBL positive UTI  There is diastolic heart failure    Plan:  Agree with current antibiotics  Continue prednisone at 10 mg daily  Fluid management/diuresis per nephrology  Check C-reactive protein  Dilators bronchodilators. Acapella    Overnight:  Sleepy  No acute events overnight  Afebrile    REVIEW OF SYSTEMS:  Review of Systems -   General ROS: Weak  Psychological ROS: negative  Ophthalmic ROS: negative  ENT ROS: negative  Allergy and Immunology ROS: negative  Hematological and Lymphatic ROS: negative  Endocrine ROS: negative  Breast ROS: negative  Respiratory ROS: Shortness of breath, lower extremity edema  Cardiovascular ROS: no chest pain or dyspnea on exertion  Gastrointestinal ROS:negative  Genito-Urinary ROS: negative  Musculoskeletal ROS: negative  Neurological ROS: negative  Dermatological ROS: negative        Physical Exam:    Vitals: /67   Pulse 75   Temp 97.5 °F (36.4 °C) (Axillary)   Resp 18   Ht 5' 8\" (1.727 m)   Wt 235 lb 0.2 oz (106.6 kg)   SpO2 97%   BMI 35.73 kg/m²     Last Body weight:   Wt Readings from Last 3 Encounters:   01/21/23 235 lb 0.2 oz (106.6 kg)   12/30/22 218 lb 4.1 oz (99 kg)   12/16/22 239 lb (108.4 kg)       Body Mass Index : Body mass index is 35.73 kg/m².       Intake and Output summary:   Intake/Output Summary (Last 24 hours) at 1/21/2023 1224  Last data filed at 1/21/2023 1019  Gross per 24 hour   Intake 2228.89 ml   Output 600 ml   Net 1628.89 ml       Physical Examination:     Gen: Lethargic, no acute distress  Eyes: PERRL. Anicteric sclera. No conjunctival injection. ENT: No discharge. Posterior oropharynx clear. External appearance of ears and nose normal.  Neck: Trachea midline. No mass   Resp: Diminished bilaterally, no active wheezing or rhonchi  CV: Regular rate. Regular rhythm. No murmur or rub. No edema. GI: Soft, Non-tender. Non-distended. +BS  Skin: Warm, dry, w/o erythema. Lymph: No cervical or supraclavicular LAD. M/S: No cyanosis. No clubbing. Neuro:  CN 2-12 tested, no focal neurologic deficit. Moves all extremities  Psych: Awake lethargic/sleepy no focal deficit      Laboratory findings:-    CBC: No results for input(s): WBC, HGB, PLT in the last 72 hours. BMP:    Recent Labs     01/19/23  0446 01/20/23  0523 01/21/23  0539    139 137   K 4.9 4.7 4.5   CL 98* 97* 98*   CO2 29 25 23   BUN 79* 83* 93*   CREATININE 2.6* 3.2* 3.2*   GLUCOSE 191* 77 83     S. Calcium:  Recent Labs     01/21/23  0539   CALCIUM 7.5*     S. Magnesium:  Recent Labs     01/21/23  0539   MG 1.90       S. Glucose:  Recent Labs     01/20/23  2059 01/21/23  0731 01/21/23  1130   POCGLU 242* 104* 157*           Radiology Review:  Pertinent images / reports were reviewed as a part of this visit.         Alejandrina Woodard MD, MRYAN.            1/21/2023, 12:24 PM

## 2023-01-22 LAB
ANION GAP SERPL CALCULATED.3IONS-SCNC: 15 MMOL/L (ref 3–16)
BUN BLDV-MCNC: 90 MG/DL (ref 7–20)
CALCIUM SERPL-MCNC: 7.4 MG/DL (ref 8.3–10.6)
CHLORIDE BLD-SCNC: 103 MMOL/L (ref 99–110)
CO2: 26 MMOL/L (ref 21–32)
CREAT SERPL-MCNC: 3 MG/DL (ref 0.8–1.3)
GFR SERPL CREATININE-BSD FRML MDRD: 20 ML/MIN/{1.73_M2}
GLUCOSE BLD-MCNC: 121 MG/DL (ref 70–99)
GLUCOSE BLD-MCNC: 198 MG/DL (ref 70–99)
GLUCOSE BLD-MCNC: 216 MG/DL (ref 70–99)
GLUCOSE BLD-MCNC: 87 MG/DL (ref 70–99)
GLUCOSE BLD-MCNC: 94 MG/DL (ref 70–99)
MAGNESIUM: 2.3 MG/DL (ref 1.8–2.4)
PERFORMED ON: ABNORMAL
PERFORMED ON: NORMAL
POTASSIUM SERPL-SCNC: 4.4 MMOL/L (ref 3.5–5.1)
SODIUM BLD-SCNC: 144 MMOL/L (ref 136–145)

## 2023-01-22 PROCEDURE — 6360000002 HC RX W HCPCS: Performed by: INTERNAL MEDICINE

## 2023-01-22 PROCEDURE — 94640 AIRWAY INHALATION TREATMENT: CPT

## 2023-01-22 PROCEDURE — 2700000000 HC OXYGEN THERAPY PER DAY

## 2023-01-22 PROCEDURE — 99232 SBSQ HOSP IP/OBS MODERATE 35: CPT | Performed by: INTERNAL MEDICINE

## 2023-01-22 PROCEDURE — 83735 ASSAY OF MAGNESIUM: CPT

## 2023-01-22 PROCEDURE — 2580000003 HC RX 258: Performed by: INTERNAL MEDICINE

## 2023-01-22 PROCEDURE — 1200000000 HC SEMI PRIVATE

## 2023-01-22 PROCEDURE — 99232 SBSQ HOSP IP/OBS MODERATE 35: CPT | Performed by: NURSE PRACTITIONER

## 2023-01-22 PROCEDURE — 94669 MECHANICAL CHEST WALL OSCILL: CPT

## 2023-01-22 PROCEDURE — 6370000000 HC RX 637 (ALT 250 FOR IP): Performed by: INTERNAL MEDICINE

## 2023-01-22 PROCEDURE — 36415 COLL VENOUS BLD VENIPUNCTURE: CPT

## 2023-01-22 PROCEDURE — 80048 BASIC METABOLIC PNL TOTAL CA: CPT

## 2023-01-22 PROCEDURE — 94760 N-INVAS EAR/PLS OXIMETRY 1: CPT

## 2023-01-22 RX ADMIN — HEPARIN SODIUM 5000 UNITS: 5000 INJECTION INTRAVENOUS; SUBCUTANEOUS at 14:12

## 2023-01-22 RX ADMIN — PANTOPRAZOLE SODIUM 40 MG: 40 TABLET, DELAYED RELEASE ORAL at 05:20

## 2023-01-22 RX ADMIN — CYANOCOBALAMIN TAB 500 MCG 500 MCG: 500 TAB at 08:29

## 2023-01-22 RX ADMIN — HEPARIN SODIUM 5000 UNITS: 5000 INJECTION INTRAVENOUS; SUBCUTANEOUS at 05:20

## 2023-01-22 RX ADMIN — INSULIN LISPRO 1 UNITS: 100 INJECTION, SOLUTION INTRAVENOUS; SUBCUTANEOUS at 17:37

## 2023-01-22 RX ADMIN — INSULIN GLARGINE 10 UNITS: 100 INJECTION, SOLUTION SUBCUTANEOUS at 08:23

## 2023-01-22 RX ADMIN — SODIUM CHLORIDE SOLN NEBU 3% 4 ML: 3 NEBU SOLN at 08:28

## 2023-01-22 RX ADMIN — OXYCODONE HYDROCHLORIDE AND ACETAMINOPHEN 500 MG: 500 TABLET ORAL at 08:21

## 2023-01-22 RX ADMIN — DOCUSATE SODIUM 100 MG: 100 CAPSULE, LIQUID FILLED ORAL at 20:31

## 2023-01-22 RX ADMIN — MEROPENEM 500 MG: 500 INJECTION, POWDER, FOR SOLUTION INTRAVENOUS at 22:56

## 2023-01-22 RX ADMIN — POLYETHYLENE GLYCOL 3350 17 G: 17 POWDER, FOR SOLUTION ORAL at 08:23

## 2023-01-22 RX ADMIN — LEVALBUTEROL HYDROCHLORIDE 1.25 MG: 1.25 SOLUTION, CONCENTRATE RESPIRATORY (INHALATION) at 08:29

## 2023-01-22 RX ADMIN — ATORVASTATIN CALCIUM 40 MG: 40 TABLET, FILM COATED ORAL at 08:20

## 2023-01-22 RX ADMIN — TAMSULOSIN HYDROCHLORIDE 0.4 MG: 0.4 CAPSULE ORAL at 08:20

## 2023-01-22 RX ADMIN — PREDNISONE 10 MG: 10 TABLET ORAL at 08:21

## 2023-01-22 RX ADMIN — MEROPENEM 500 MG: 500 INJECTION, POWDER, FOR SOLUTION INTRAVENOUS at 12:07

## 2023-01-22 RX ADMIN — MONTELUKAST 10 MG: 10 TABLET, FILM COATED ORAL at 08:20

## 2023-01-22 RX ADMIN — SODIUM CHLORIDE SOLN NEBU 3% 15 ML: 3 NEBU SOLN at 19:36

## 2023-01-22 RX ADMIN — BISACODYL 5 MG: 5 TABLET, COATED ORAL at 08:20

## 2023-01-22 RX ADMIN — ASPIRIN 162 MG: 81 TABLET, COATED ORAL at 08:20

## 2023-01-22 RX ADMIN — LEVALBUTEROL HYDROCHLORIDE 1.25 MG: 1.25 SOLUTION, CONCENTRATE RESPIRATORY (INHALATION) at 16:06

## 2023-01-22 RX ADMIN — Medication 1 CAPSULE: at 17:31

## 2023-01-22 RX ADMIN — DOCUSATE SODIUM 100 MG: 100 CAPSULE, LIQUID FILLED ORAL at 08:20

## 2023-01-22 RX ADMIN — LEVALBUTEROL HYDROCHLORIDE 1.25 MG: 1.25 SOLUTION, CONCENTRATE RESPIRATORY (INHALATION) at 19:36

## 2023-01-22 RX ADMIN — AMIODARONE HYDROCHLORIDE 100 MG: 200 TABLET ORAL at 08:21

## 2023-01-22 RX ADMIN — SODIUM CHLORIDE SOLN NEBU 3% 4 ML: 3 NEBU SOLN at 16:09

## 2023-01-22 RX ADMIN — Medication 1 CAPSULE: at 08:20

## 2023-01-22 RX ADMIN — HEPARIN SODIUM 5000 UNITS: 5000 INJECTION INTRAVENOUS; SUBCUTANEOUS at 20:31

## 2023-01-22 RX ADMIN — SODIUM CHLORIDE SOLN NEBU 3% 4 ML: 3 NEBU SOLN at 11:56

## 2023-01-22 RX ADMIN — LEVALBUTEROL HYDROCHLORIDE 1.25 MG: 1.25 SOLUTION, CONCENTRATE RESPIRATORY (INHALATION) at 11:53

## 2023-01-22 NOTE — PLAN OF CARE
Problem: Discharge Planning  Goal: Discharge to home or other facility with appropriate resources  1/21/2023 2156 by Anjali Rodriguez RN  Outcome: Progressing  1/21/2023 1022 by Claudene Sloop, RN  Outcome: Progressing  Flowsheets (Taken 1/21/2023 0740)  Discharge to home or other facility with appropriate resources:   Identify barriers to discharge with patient and caregiver   Arrange for needed discharge resources and transportation as appropriate   Identify discharge learning needs (meds, wound care, etc)     Problem: Pain  Goal: Verbalizes/displays adequate comfort level or baseline comfort level  1/21/2023 2156 by Anjali Rodriguez RN  Outcome: Progressing  1/21/2023 1022 by Claudene Sloop, RN  Outcome: Progressing  Flowsheets (Taken 1/21/2023 0729)  Verbalizes/displays adequate comfort level or baseline comfort level:   Encourage patient to monitor pain and request assistance   Assess pain using appropriate pain scale   Administer analgesics based on type and severity of pain and evaluate response   Implement non-pharmacological measures as appropriate and evaluate response   Consider cultural and social influences on pain and pain management   Notify Licensed Independent Practitioner if interventions unsuccessful or patient reports new pain     Problem: Safety - Adult  Goal: Free from fall injury  1/21/2023 2156 by Anjali Rodriguez RN  Outcome: Progressing  [address] (Taken 1/21/2023 2155)  Free From Fall Injury: Instruct family/caregiver on patient safety  1/21/2023 1022 by Claudene Sloop, RN  Outcome: Progressing  Flowsheets (Taken 1/21/2023 0740)  Free From Fall Injury: Instruct family/caregiver on patient safety     Problem: ABCDS Injury Assessment  Goal: Absence of physical injury  1/21/2023 2156 by Anjali Rodriguez RN  Outcome: Progressing  Flowsheets (Taken 1/21/2023 2155)  Absence of Physical Injury: Implement safety measures based on patient assessment  1/21/2023 1022 by Claudene Sloop, RN  Outcome: Progressing  Flowsheets (Taken 1/21/2023 0740)  Absence of Physical Injury: Implement safety measures based on patient assessment     Problem: Skin/Tissue Integrity  Goal: Absence of new skin breakdown  Description: 1. Monitor for areas of redness and/or skin breakdown  2. Assess vascular access sites hourly  3. Every 4-6 hours minimum:  Change oxygen saturation probe site  4. Every 4-6 hours:  If on nasal continuous positive airway pressure, respiratory therapy assess nares and determine need for appliance change or resting period.   1/21/2023 2156 by Ta Granda RN  Outcome: Progressing  1/21/2023 1022 by Vianca Velazco RN  Outcome: Progressing     Problem: Infection - Adult  Goal: Absence of infection at discharge  1/21/2023 2156 by Ta Granda RN  Outcome: Ange Silence  1/21/2023 1022 by Vianca Velazco RN  Outcome: Progressing  Flowsheets (Taken 1/21/2023 0740)  Absence of infection at discharge:   Assess and monitor for signs and symptoms of infection   Monitor lab/diagnostic results   Administer medications as ordered   Instruct and encourage patient and family to use good hand hygiene technique   [place] appropriate cooling/warming therapies per order   Identify and instruct in appropriate isolation precautions for identified infection/condition  Goal: Absence of infection during hospitalization  1/21/2023 2156 by Ta Granda RN  Outcome: Progressing  1/21/2023 1022 by Vianca Velazco RN  Outcome: Progressing  Flowsheets (Taken 1/21/2023 0740)  Absence of infection during hospitalization:   Assess and monitor for signs and symptoms of infection   Monitor lab/diagnostic results   Administer medications as ordered  Goal: Absence of fever/infection during anticipated neutropenic period  1/21/2023 2156 by Ta Gradna RN  Outcome: Progressing  1/21/2023 1022 by Vianca Velazco RN  Outcome: Progressing     Problem: Genitourinary - Adult  Goal: Absence of urinary retention  1/21/2023 2156 by Minna Sacks, RN  Outcome: Progressing  1/21/2023 1022 by Alia Nava RN  Outcome: Progressing  Flowsheets (Taken 1/21/2023 0740)  Absence of urinary retention: Place urinary catheter per Licensed Independent Practitioner order if needed  Goal: Urinary catheter remains patent  1/21/2023 2156 by Minna Sacks, RN  Outcome: Progressing  1/21/2023 1022 by Alia Nava RN  Outcome: Progressing  Flowsheets (Taken 1/21/2023 0740)  Urinary catheter remains patent: Assess patency of urinary catheter     Problem: Metabolic/Fluid and Electrolytes - Adult  Goal: Electrolytes maintained within normal limits  1/21/2023 2156 by Minna Sacks, RN  Outcome: Progressing  1/21/2023 1022 by Alia Nava RN  Outcome: Progressing  Flowsheets (Taken 1/21/2023 0740)  Electrolytes maintained within normal limits:   Monitor labs and assess patient for signs and symptoms of electrolyte imbalances   Administer electrolyte replacement as ordered   Monitor response to electrolyte replacements, including repeat lab results as appropriate   Fluid restriction as ordered  Goal: Hemodynamic stability and optimal renal function maintained  1/21/2023 2156 by Minna Sacks, RN  Outcome: Progressing  1/21/2023 1022 by Alia Nava RN  Outcome: Progressing  Flowsheets (Taken 1/21/2023 0740)  Hemodynamic stability and optimal renal function maintained:   Monitor labs and assess for signs and symptoms of volume excess or deficit   Monitor intake, output and patient weight   Monitor response to interventions for patient's volume status, including labs, urine output, blood pressure (other measures as available)   Instruct patient on fluid and nutrition restrictions as appropriate  Goal: Glucose maintained within prescribed range  1/21/2023 2156 by Minna Sacks, RN  Outcome: Progressing  1/21/2023 1022 by Alia Nava RN  Outcome: Progressing  Flowsheets (Taken 1/21/2023 0740)  Glucose maintained within prescribed range:   Monitor blood glucose as ordered   Assess for signs and symptoms of hyperglycemia and hypoglycemia   Administer ordered medications to maintain glucose within target range     Problem: Nutrition Deficit:  Goal: Optimize nutritional status  1/21/2023 2156 by [place] Dave RN  Outcome: Progressing  1/21/2023 1022 by Joi Walter RN  Outcome: Progressing

## 2023-01-22 NOTE — PROGRESS NOTES
[facility] Internal Medicine    Patient:  Suze Castaneda 79 y.o. male MRN: [id number]     Date of Service: 1/22/2023    Allergy: Hytrin [terazosin hcl], Penicillins, Shrimp flavor, Sulfa antibiotics, Sulfasalazine, Tekturna [aliskiren fumarate], Vancomycin, and Ciprofloxacin  CC: F/u:   Brief Course   Suze Castaneda is an 79-year-old male with a past medical history significant for chronic diastolic heart failure, type 2 diabetes, chronic kidney disease who was admitted on 1/18/2022 for worsening dyspnea. He has been on antibiotics for pneumonia. Nephrology was consulted for assistance in fluid management. Pulmonology consulted as well. 24 hr interval hx:  Patient is net positve 1.3L. Weight is up, but is a bed weight. Creatinine down trended from 3.2 to 3.0 today. Patient resting comfortably today    Objective     Physical Exam:  Vitals: BP (!) 140/66   Pulse 75   Temp 97.3 °F (36.3 °C) (Oral)   Resp 18   Ht 5' 8\" (1.727 m)   Wt 244 lb 0.8 oz (110.7 kg)   SpO2 95%   BMI 37.11 kg/m²     Physical Exam  Constitutional:       General: He is not in acute distress. HENT:      Mouth/Throat:      Mouth: Mucous membranes are moist.   Eyes:      Pupils: Pupils are equal, round, and reactive to light. Cardiovascular:      Rate and Rhythm: Normal rate and regular rhythm. Pulses: Normal pulses. Pulmonary:      Effort: Pulmonary effort is normal. No respiratory distress. Breath sounds: Normal breath sounds. No wheezing, rhonchi or rales. Abdominal:      General: Abdomen is flat. There is no distension. Palpations: Abdomen is soft. Tenderness: There is no abdominal tenderness. Musculoskeletal:      Right lower leg: Edema present. Left lower leg: Edema present. Skin:     General: Skin is warm and dry. Coloration: Skin is not jaundiced or pale. Findings: No erythema. Neurological:      General: No focal deficit present.       Mental Status: He is alert and oriented to person, place, and time. Pertinent/ New Labs and Imaging Studies   BMP:    Lab Results   Component Value Date/Time     01/22/2023 07:40 AM    K 4.4 01/22/2023 07:40 AM    K 4.2 01/17/2023 02:53 PM     01/22/2023 07:40 AM    CO2 26 01/22/2023 07:40 AM    BUN 90 01/22/2023 07:40 AM    LABALBU 3.1 01/17/2023 02:53 PM    CREATININE 3.0 01/22/2023 07:40 AM    CALCIUM 7.4 01/22/2023 07:40 AM    GFRAA 37 05/27/2022 02:05 PM    GFRAA >60 04/19/2013 10:46 AM    LABGLOM 20 01/22/2023 07:40 AM    GLUCOSE 87 01/22/2023 07:40 AM    GLUCOSE 96 06/16/2011 10:37 AM       Imaging:  CT CHEST WO CONTRAST    Result Date: 12/27/2022  EXAMINATION: CT OF THE CHEST WITHOUT CONTRAST 12/27/2022 10:41 am TECHNIQUE: CT of the chest was performed without the administration of intravenous contrast. Multiplanar reformatted images are provided for review. Automated exposure control, iterative reconstruction, and/or weight based adjustment of the mA/kV was utilized to reduce the radiation dose to as low as reasonably achievable. COMPARISON: 11/23/2022 CT HISTORY: ORDERING SYSTEM PROVIDED HISTORY: unresolved PNA TECHNOLOGIST PROVIDED HISTORY: Reason for exam:->unresolved PNA Reason for Exam: unresolved PNA, sob FINDINGS: Mediastinum: The heart is enlarged. Trace pericardial fluid. Advanced atherosclerotic calcification of coronary arteries. Mild atherosclerotic calcification of the aorta. Pulmonary arteries normal in caliber. Thyroid and esophagus are normal.  Shotty subcentimeter mediastinal lymph nodes are stable. There also densely calcified mediastinal and right hilar nodes representing granulomatous change. Lungs/pleura: Inspissated secretions are minimal in the right mainstem bronchus. Peripheral airways are patent. Persistent small right pleural effusion. Multifocal airspace opacity are relatively unchanged in the right middle and bilateral lower lobes.   New on the current exam, there is increasing ground-glass attenuation in the right greater than left upper lobe with somewhat mosaic distribution. No discrete mass or nodule. Upper Abdomen: Normal adrenal glands. Cortical thinning in the kidneys suggesting chronic medical renal disease. Calcified granulomata in the liver and spleen. Soft Tissues/Bones: No skeletal abnormalities are appreciated within the chest.     1. Multifocal airspace opacities in the bilateral lungs that are relatively stable since 11/23/2022. Chronic pneumonia or cryptogenic organizing pneumonia may be considered. 2. Increasing mosaic attenuation in the right greater than left upper lobe which may be in association with organizing pneumonia or interstitial pneumonia. A degree of pulmonary edema may also be considered. XR CHEST PORTABLE    Result Date: 1/17/2023  EXAMINATION: ONE XRAY VIEW OF THE CHEST 1/17/2023 1:44 pm COMPARISON: 12/27/2022 HISTORY: ORDERING SYSTEM PROVIDED HISTORY: SOB TECHNOLOGIST PROVIDED HISTORY: Reason for exam:->SOB FINDINGS: Patchy infiltrates seen within the lungs bilaterally, greatest in the right mid to lower lung. These are increased when compared to the previous exam. Cardial pericardial silhouette is enlarged, stable. The pulmonary vasculature is congested. Patchy infiltrates in the lungs bilaterally, greater on the right, increased when compared to the previous exam.  In this case, multifocal pneumonia and pulmonary edema both considered. XR CHEST PORTABLE    Result Date: 12/27/2022  EXAMINATION: ONE XRAY VIEW OF THE CHEST 12/27/2022 10:02 am COMPARISON: 12/23/2022 radiograph HISTORY: ORDERING SYSTEM PROVIDED HISTORY: Persistent hemoptysis. Evaluate for changes in x-ray. TECHNOLOGIST PROVIDED HISTORY: Reason for exam:->Persistent hemoptysis. Evaluate for changes in x-ray. FINDINGS: The heart is enlarged.   Mediastinum is normal.  Perihilar and diffuse reticular and ground-glass airspace opacities are demonstrated in a patient with underlying chronic interstitial change based upon prior imaging. This is diffuse and asymmetric in the right lung, stable since 12/23/2022 but showing improvement from prior imaging in November. There are no significant skeletal findings. Relatively stable pattern of diffuse airspace change in the right greater than left lung showing gradual improvement over the course of the prior month. Overall pattern would favor a resolving infectious or inflammatory process in a patient with underlying chronic interstitial change. XR CHEST PORTABLE    Result Date: 12/23/2022  EXAMINATION: ONE XRAY VIEW OF THE CHEST 12/23/2022 5:59 pm COMPARISON: Chest x-ray dated November 20, 2022 HISTORY: ORDERING SYSTEM PROVIDED HISTORY: sob TECHNOLOGIST PROVIDED HISTORY: Reason for exam:->sob Reason for Exam: fall, sob FINDINGS: Adequate inspiration is noted. Extensive airspace disease is present throughout the right upper and lower lung zone. Patchy airspace disease is present within the left lung base. Interval improvement is noted within the right basilar airspace disease. No pneumothorax noted. Small pleural effusions are present. Borderline cardiomegaly is noted. No acute osseous abnormality is present. 1. Extensive airspace disease throughout the right lung, left lung base. Interval improvement is noted within the right basilar airspace disease. Differential considerations would include multifocal pneumonia versus coalescing edema. Follow-up imaging is recommended to ensure complete clearing 2. Borderline cardiomegaly     Bronchoscopy    Result Date: 12/28/2022  No dictation       Assessment and Plan   Acute on chronic diastolic congestive heart failure ([address]  Patient is volume up. Patient oxygen requirement is stable. Nephrology and cardiology is following and assisting fluid management. Creatinine is down trending  -Follow neurology recommendation for diuresis versus dialysis.     - Fluid restriction.  -Continue strict intake and output monitoring. Continue daily standing weights. Acute on chronic respiratory failure with hypoxia (HCC)   Presenting with dyspnea. Patient with imaging findings concerning for pneumonia versus heart failure. Patient with increased weight and elevated BNP. Patient also reported sputum production but no fever. Patient was started on pneumonia treatment as well as fluid management for heart failure exacerbation.  -Continue meropenem 500 mg every 12 hours this patient has ESBL UTI.  - Cardiology and nephrology consulted. Patient with creatinine that remained at 3.2. Will defer to nephrology and cardiology to decide diuresis versus dialysis. Essential hypertension   Blood pressure is controlled    UTI due to extended-spectrum beta lactamase (ESBL) producing Escherichia coli   ESBL E. coli on urine culture  -Continue meropenem 500 mg every 12 hours    Type 2 diabetes mellitus with stage 4 chronic kidney disease, with long-term current use of insulin ([facility])  Hemoglobin A1C   Date Value Ref Range Status   05/27/2022 6.1 See comment % Final     Comment:     Comment:  Diagnosis of Diabetes: > or = 6.5%  Increased risk of diabetes (Prediabetes): 5.7-6.4%  Glycemic Control: Nonpregnant Adults: <7.0%                    Pregnant: <6.0%         -Continue Lantus 10 units nightly and low-dose sliding scale correction.     JOANN (obstructive sleep apnea)  - CPAP therapy at night           DVT Prophylaxis: Heparin  Code: Full  Disposition: Continued admission    Rubin Jones MD  [facility] Internal Medicine  Office [phone]  Cell [phone]

## 2023-01-22 NOTE — PROGRESS NOTES
Skin tear noticed on patient's proximal-lateral left elbow. Skin tear was slightly weeping with minimal drainage. Cleaned and dressed with absorbent bandage. Skin tear documented in patient avatar.      Electronically signed by Deidra Henry RN on 1/22/2023 at 5:57 PM

## 2023-01-22 NOTE — PROGRESS NOTES
Cardiology Progress Note     Admit Date: 2023     Reason for follow up: HF    HPI and Interval History:   The patient is 79 y.o. male with a past medical history significant for CAD, CKD, obesity, hypoventilation syndrome as well as moderate COPD of asthma type and atrial fibrillation, who presents with the above complaint. He feels that he has had continued shortness of breath for the past several months he is currently on home oxygen. He had a nurses aide come visit him today and as per family his O2 sat on home oxygen was 87% and was instructed to come to the emergency was Hospital ER for further work-up. He admits to worsening shortness of breath with minimal exertion and he has to sit in a recliner is unable to lie flat at night. He denies any exertional chest pain. He additionally has an intermittent productive cough. He has been started on antibiotics with healthcare associated pneumonia. Additionally for with diuretic therapy for volume overload. He notes that since admission he has had mild improvement in his symptoms. He currently denies chest pain palpitations or syncope. Previously on Lasix drip, on hold now due to worsening renal function and placed on IV fluids. Remains in rate controlled atrial fibrillation. No cardiac complaints. May need dialysis. Physical Examination:  Vitals:    23 0411   BP: (!) 133/53   Pulse: 74   Resp: 18   Temp: 98.1 °F (36.7 °C)   SpO2: 98%        Intake/Output Summary (Last 24 hours) at 2023 0756  Last data filed at 2023 0528  Gross per 24 hour   Intake 2333.46 ml   Output 950 ml   Net 1383.46 ml     In: 2333.5 [P.O.:1100;  I.V.:1033.6]  Out: 950    Wt Readings from Last 3 Encounters:   23 244 lb 0.8 oz (110.7 kg)   22 218 lb 4.1 oz (99 kg)   22 239 lb (108.4 kg)     Temp  Av.8 °F (36.6 °C)  Min: 97.5 °F (36.4 °C)  Max: 98.1 °F (36.7 °C)  Pulse  Av.1  Min: 72  Max: 87  BP  Min: 123/70  Max: 133/53  SpO2  Avg: 97.2 %  Min: 96 %  Max: 99 %    Telemetry: Atrial fibrillation v-rates in the 80s. Constitutional: Alert, in no acute distress. Appears stated age. Head: Normocephalic and atraumatic. Eyes: Conjunctivae normal. EOM are normal.   Neck: Neck supple. No lymphadenopathy. No rigidity. No JVD present. Cardiovascular: Controlled rate, IRR. No murmurs, rubs or gallops. No S3 or S4.  Pulmonary/Chest: Clear breath sounds bilaterally. No crackles, wheezes or rhonchi. No respiratory accessory muscle use. Abdominal: Soft. Normal bowel sounds present. No distension, No tenderness. Musculoskeletal: No tenderness. No edema    Lymphadenopathy: Has no cervical adenopathy. Neurological: Alert and oriented. No gross deficits. Skin: Skin is warm and dry. No rash, lesions, ulcerations noted. Psychiatric: No anxiety or agitation. Labs, diagnostic and imaging results reviewed. Reviewed. Recent Labs     01/20/23  0523 01/21/23  0539    137   K 4.7 4.5   CL 97* 98*   CO2 25 23   BUN 83* 93*   CREATININE 3.2* 3.2*     No results for input(s): WBC, HGB, HCT, MCV, PLT in the last 72 hours. Lab Results   Component Value Date/Time    CKTOTAL 47 10/30/2022 09:56 PM    TROPONINI 0.06 01/18/2023 05:39 PM     Estimated Creatinine Clearance: 21 mL/min (A) (based on SCr of 3.2 mg/dL (H)).    No results found for: BNP  Lab Results   Component Value Date/Time    PROTIME 16.4 01/17/2023 02:53 PM    PROTIME 18.2 11/15/2022 09:04 PM    PROTIME 16.9 11/01/2022 08:27 AM    INR 1.33 01/17/2023 02:53 PM    INR 1.51 11/15/2022 09:04 PM    INR 1.38 11/01/2022 08:27 AM     Lab Results   Component Value Date/Time    CHOL 116 11/17/2021 10:13 AM    HDL 47 11/17/2021 10:13 AM    HDL 42 12/15/2011 01:57 PM    TRIG 76 11/17/2021 10:13 AM       Scheduled Meds:   heparin (porcine)  5,000 Units SubCUTAneous 3 times per day    meropenem  500 mg IntraVENous Q12H    vitamin B-12  500 mcg Oral Daily    levalbuterol  1.25 mg Nebulization Q4H WA sodium chloride (Inhalant)  15 mL Nebulization Q4H WA    vitamin C  500 mg Oral Daily    insulin glargine  10 Units SubCUTAneous QAM    tamsulosin  0.4 mg Oral Daily    amiodarone  100 mg Oral Daily    aspirin EC  162 mg Oral Daily    atorvastatin  40 mg Oral Daily    bisacodyl  5 mg Oral Daily    polyethylene glycol  17 g Oral Daily    docusate sodium  100 mg Oral BID    [Held by provider] hydrALAZINE  10 mg Oral 3 times per day    lactobacillus  1 capsule Oral BID     linaclotide  145 mcg Oral QAM AC    montelukast  10 mg Oral Daily    pantoprazole  40 mg Oral QAM AC    predniSONE  10 mg Oral Daily    insulin lispro  0-4 Units SubCUTAneous TID     insulin lispro  0-4 Units SubCUTAneous Nightly     Continuous Infusions:   sodium chloride 50 mL/hr at 23    dextrose       PRN Meds:lactulose, sodium chloride nebulizer, albuterol sulfate HFA, levalbuterol, glucose, dextrose bolus **OR** dextrose bolus, glucagon (rDNA), dextrose     EC23  Atrial fibrillation at 102 BPM. PVCs versus aberrant conduction. RBBB. Echo:22   Ejection fraction is visually estimated to be 60-65%. No regional wall motion abnormalities are noted. Grade I diastolic dysfunction with elevated LV filling pressures. The left atrium is moderately dilated. mildly dilated right ventricle. Estimated pulmonary artery systolic pressure is mildly elevated at 40 mmHg   assuming a right atrial pressure of 15 mmHg. Stress: 21   Moderate sized moderate intensity reversible inferior wall defect suggestive    of ischemia        Normal LV size and systolic function. Overall findings represent a intermediate risk study. Cath: 21  FINDINGS:  1. Right-dominant coronary arterial circulation. The right coronary  artery is not as well visualized as the left but there are no  obstructive lesions present in this vessel. There is moderate  calcification.   In the left system, there is no left main disease or  circumflex disease. There is moderate calcification in the left  anterior descending artery in the proximal and midportions. There is  tortuosity in the left anterior descending artery with a 50% mid lesion. No left ventriculogram was performed. 2.  Elevated right-sided filling pressures with a right atrial pressure  of 10 and a pulmonary capillary wedge pressure of 18 mmHg. This  corresponds to a left ventricular end-diastolic pressure of 16 mmHg. 3.  Pulmonary hypertension with an instantaneous pressure of 50/18 for a  mean pulmonary arterial pressure of 28 mmHg. 4.  Cardiac output by thermodilution 8.53 liters per minute with a  cardiac index of 3.68 liters per kilogram per minute. 5.  Pulmonary vascular resistance 104 dynes per second. 6.  Normal mixed venous oxygen saturations. Systemic arterial  saturation was 91% on room air. This procedure was performed on room  air. SVC was 69% and pulmonary artery was 69%.     Assessment and Plan:     Acute on chronic diastolic heart failure   - previously on Lasix drip, had worsening renal function and now on IV fluids   - resume low dose diuretics if needed when renal function at baseline if not on dialysis     Paroxysmal atrial fibrillation   - currently in rate controlled atrial fibrillation   - on amiodarone, continue if able to be anticoagulated   - CHADS2-VASc at least 6 (age, CAD, HF, HTN, DM) - would recommend DOAC if no contraindication as he has been in atrial fibrillation since at least 1/17 high risk for stroke if converts chemically    - would recommend Eliquis 2.5mg BID or Xarelto 15mg QD and would stop aspirin given no past interventions   - if does not convert on own and if able to be anticoagulated, could consider cardioversion    Acute on chronic renal failure stage 3b   - worsening BUN today, creatinine stable   - creatinine 1.8-2.2 over the last couple month   - nephrology following    HCAP   - care per primary/pulmonary    Acute on chronic hypoxic respiratory failure   - secondary to above   - pulmonary following    CAD   - nonobstructive on [place] in 2021   - on aspirin, statin    SILVIANO Camacho  [facility], [address] [place] [address]  Phone: [phone]  Fax: [phone]    Electronically signed by SILVIANO Mccoy - CNP on 1/22/2023 at 7:56 AM

## 2023-01-22 NOTE — PROGRESS NOTES
Pulmonary Critical Care Progress Note     Patient's name:  Alyssa Yost Record Number: [id number]  Patient's account/billing number: [de-identified]  Patient's YOB: 1940  Age: 79 y.o. Date of Admission: 1/17/2023 12:56 PM  Date of Consult: 1/22/2023      Primary Care Physician: Cate Maloney MD      Code Status: Full Code    Chief complaint: Acute on chronic respiratory failure with hypoxia    Assessment and Plan     Acute on chronic respiratory with hypoxia and hypercapnia  History of H influenza and Moraxella pneumonia  History of organizing pneumonia  Acute kidney injury  ESBL positive UTI  diastolic heart failure    Plan:  Continue antibiotics   Continue prednisone at 10 mg daily  Fluid management/diuresis per nephrology  bronchodilators. Acapella    Overnight:    No acute events overnight  Afebrile    REVIEW OF SYSTEMS:  Review of Systems -   General ROS: Weak  Psychological ROS: negative  Ophthalmic ROS: negative  ENT ROS: negative  Allergy and Immunology ROS: negative  Hematological and Lymphatic ROS: negative  Endocrine ROS: negative  Breast ROS: negative  Respiratory ROS: Shortness of breath, lower extremity edema  Cardiovascular ROS: no chest pain or dyspnea on exertion  Gastrointestinal ROS:negative  Genito-Urinary ROS: negative  Musculoskeletal ROS: negative  Neurological ROS: negative  Dermatological ROS: negative        Physical Exam:    Vitals: /62   Pulse 75   Temp 97.5 °F (36.4 °C) (Oral)   Resp 18   Ht 5' 8\" (1.727 m)   Wt 244 lb 0.8 oz (110.7 kg)   SpO2 95%   BMI 37.11 kg/m²     Last Body weight:   Wt Readings from Last 3 Encounters:   01/22/23 244 lb 0.8 oz (110.7 kg)   12/30/22 218 lb 4.1 oz (99 kg)   12/16/22 239 lb (108.4 kg)       Body Mass Index : Body mass index is 37.11 kg/m².       Intake and Output summary:   Intake/Output Summary (Last 24 hours) at 1/22/2023 1225  Last data filed at 1/22/2023 1139  Gross per 24 hour   Intake 1320.21 ml   Output 1300 ml   Net 20.21 ml       Physical Examination:     Gen: Lethargic, no acute distress  Eyes: PERRL. Anicteric sclera. No conjunctival injection. ENT: No discharge. Posterior oropharynx clear. External appearance of ears and nose normal.  Neck: Trachea midline. No mass   Resp: Diminished bilaterally, no active wheezing or rhonchi  CV: Regular rate. Regular rhythm. No murmur or rub. No edema. GI: Soft, Non-tender. Non-distended. +BS  Skin: Warm, dry, w/o erythema. Lymph: No cervical or supraclavicular LAD. M/S: No cyanosis. No clubbing. Neuro:  CN 2-12 tested, no focal neurologic deficit. Moves all extremities  Psych: Awake lethargic/sleepy no focal deficit      Laboratory findings:-    CBC: No results for input(s): WBC, HGB, PLT in the last 72 hours. BMP:    Recent Labs     01/20/23  0523 01/21/23  0539 01/22/23  0740    137 144   K 4.7 4.5 4.4   CL 97* 98* 103   CO2 25 23 26   BUN 83* 93* 90*   CREATININE 3.2* 3.2* 3.0*   GLUCOSE 77 83 87     S. Calcium:  Recent Labs     01/22/23  0740   CALCIUM 7.4*     S. Magnesium:  Recent Labs     01/22/23  0740   MG 2.30       S. Glucose:  Recent Labs     01/21/23  2006 01/22/23  0804 01/22/23  1135   POCGLU 110* 94 121*           Radiology Review:  Pertinent images / reports were reviewed as a part of this visit.         Dameon Arce MD, MRYAN.            1/22/2023, 12:25 PM

## 2023-01-22 NOTE — PLAN OF CARE
Problem: Discharge Planning  Goal: Discharge to home or other facility with appropriate resources  Outcome: Progressing     Problem: Pain  Goal: Verbalizes/displays adequate comfort level or baseline comfort level  Outcome: Progressing     Problem: Safety - Adult  Goal: Free from fall injury  Outcome: Progressing     Problem: ABCDS Injury Assessment  Goal: Absence of physical injury  Outcome: Progressing     Problem: Skin/Tissue Integrity  Goal: Absence of new skin breakdown  Description: 1. Monitor for areas of redness and/or skin breakdown  2. Assess vascular access sites hourly  3. Every 4-6 hours minimum:  Change oxygen saturation probe site  4. Every 4-6 hours:  If on nasal continuous positive airway pressure, respiratory therapy assess nares and determine need for appliance change or resting period.   Outcome: Progressing     Problem: Infection - Adult  Goal: Absence of infection at discharge  Outcome: Progressing  Goal: Absence of infection during hospitalization  Outcome: Progressing  Goal: Absence of fever/infection during anticipated neutropenic period  Outcome: Progressing     Problem: Genitourinary - Adult  Goal: Absence of urinary retention  Outcome: Progressing  Goal: Urinary catheter remains patent  Outcome: Progressing     Problem: Metabolic/Fluid and Electrolytes - Adult  Goal: Electrolytes maintained within normal limits  Outcome: Progressing  Goal: Hemodynamic stability and optimal renal function maintained  Outcome: Progressing  Goal: Glucose maintained within prescribed range  Outcome: Progressing     Problem: Nutrition Deficit:  Goal: Optimize nutritional status  Outcome: Progressing

## 2023-01-22 NOTE — PROGRESS NOTES
n  Department of Internal Medicine  Nephrology Progress Note       He is an 58-year-old pleasant gentleman  with past medical history significant for coronary artery disease,  congestive heart failure, morbid obesity, COPD, obstructive sleep apnea,  chronic respiratory failure, hypercholesterolemia, chronic kidney  disease stage IV came to ER complaining of shortness of breath, dyspnea  on exertion, as well as difficulty with urination. The patient was  found to have urinary outlet obstruction with UTI (ESBL). The patient  was also diagnosed with pneumonia and admitted for further workup and management. Renal consultation has been called for worsening kidney Function. HPI:  Breathing stable. No CP. Confused. Renal function may have plateau'ed. ROS:  In bed. [address]:  medications reviewed. Physical Exam:    VITALS:  /86   Pulse 86   Temp 97.7 °F (36.5 °C) (Oral)   Resp 18   Ht 5' 8\" (1.727 m)   Wt 244 lb 0.8 oz (110.7 kg)   SpO2 97%   BMI 37.11 kg/m²   24HR INTAKE/OUTPUT:    Intake/Output Summary (Last 24 hours) at 1/22/2023 1703  Last data filed at 1/22/2023 1139  Gross per 24 hour   Intake 1080.21 ml   Output 1000 ml   Net 80.21 ml         Constitutional:  looks comfortable   Respiratory:  decrease BS at bases / rhonchi   Gastrointestinal:  no  epigastric tenderness. Normal Bowel Sounds  Cardiovascular:  S1, S2 irregular.   Edema:  + + edema    DATA:    CBC:  Lab Results   Component Value Date/Time    WBC 8.8 01/18/2023 05:26 AM    RBC 3.55 01/18/2023 05:26 AM    HGB 9.0 01/18/2023 05:26 AM    HCT 30.0 01/18/2023 05:26 AM    MCV 84.4 01/18/2023 05:26 AM    MCH 25.3 01/18/2023 05:26 AM    MCHC 30.0 01/18/2023 05:26 AM    RDW 21.1 01/18/2023 05:26 AM     01/18/2023 05:26 AM    MPV 8.4 01/18/2023 05:26 AM     CMP:  Lab Results   Component Value Date/Time     01/22/2023 07:40 AM    K 4.4 01/22/2023 07:40 AM    K 4.2 01/17/2023 02:53 PM     01/22/2023 07:40 AM    CO2 26 01/22/2023 07:40 AM    BUN 90 01/22/2023 07:40 AM    CREATININE 3.0 01/22/2023 07:40 AM    GFRAA 37 05/27/2022 02:05 PM    GFRAA >60 04/19/2013 10:46 AM    AGRATIO 1.1 01/17/2023 02:53 PM    LABGLOM 20 01/22/2023 07:40 AM    GLUCOSE 87 01/22/2023 07:40 AM    GLUCOSE 96 06/16/2011 10:37 AM    PROT 5.9 01/17/2023 02:53 PM    PROT 6.4 12/12/2012 08:05 AM    CALCIUM 7.4 01/22/2023 07:40 AM    BILITOT 0.5 01/17/2023 02:53 PM    ALKPHOS 81 01/17/2023 02:53 PM    AST 16 01/17/2023 02:53 PM    ALT 30 01/17/2023 02:53 PM      Hepatic Function Panel:   Lab Results   Component Value Date/Time    ALKPHOS 81 01/17/2023 02:53 PM    ALT 30 01/17/2023 02:53 PM    AST 16 01/17/2023 02:53 PM    PROT 5.9 01/17/2023 02:53 PM    PROT 6.4 12/12/2012 08:05 AM    BILITOT 0.5 01/17/2023 02:53 PM    BILIDIR <0.2 01/12/2023 12:23 PM    IBILI see below 01/12/2023 12:23 PM      Phosphorus:   Lab Results   Component Value Date/Time    PHOS 3.6 01/12/2023 12:23 PM       ASSESSMENT/PLAN:    DEISY   - most likely multifactorial ( low  BP/ ARbs /diuretics/UTI )  - renal function may be slowly improving  - family discussing whether dialysis is an option    HTN   - better  - off meds  - keep sbp > 90 mmHg     ESBL UTI  - on meropenem    HCAP    - on meropenem    Ac /ch resp failure     Obesity with JOANN     Over all prognosis guarded .  Will follow

## 2023-01-23 PROBLEM — I50.32 CHRONIC DIASTOLIC CHF (CONGESTIVE HEART FAILURE), NYHA CLASS 2 (HCC): Status: ACTIVE | Noted: 2023-01-01

## 2023-01-23 PROBLEM — I48.19 PERSISTENT ATRIAL FIBRILLATION (HCC): Status: ACTIVE | Noted: 2023-01-23

## 2023-01-23 LAB
ANION GAP SERPL CALCULATED.3IONS-SCNC: 11 MMOL/L (ref 3–16)
BACTERIA: ABNORMAL /HPF
BILIRUBIN URINE: NEGATIVE
BLOOD, URINE: ABNORMAL
BUN BLDV-MCNC: 82 MG/DL (ref 7–20)
CALCIUM SERPL-MCNC: 7.9 MG/DL (ref 8.3–10.6)
CHLORIDE BLD-SCNC: 108 MMOL/L (ref 99–110)
CLARITY: CLEAR
CO2: 26 MMOL/L (ref 21–32)
COLOR: ABNORMAL
CREAT SERPL-MCNC: 2.3 MG/DL (ref 0.8–1.3)
EPITHELIAL CELLS, UA: 2 /HPF (ref 0–5)
GFR SERPL CREATININE-BSD FRML MDRD: 27 ML/MIN/{1.73_M2}
GLUCOSE BLD-MCNC: 116 MG/DL (ref 70–99)
GLUCOSE BLD-MCNC: 122 MG/DL (ref 70–99)
GLUCOSE BLD-MCNC: 151 MG/DL (ref 70–99)
GLUCOSE BLD-MCNC: 226 MG/DL (ref 70–99)
GLUCOSE BLD-MCNC: 269 MG/DL (ref 70–99)
GLUCOSE URINE: NEGATIVE MG/DL
HYALINE CASTS: 9 /LPF (ref 0–8)
KETONES, URINE: NEGATIVE MG/DL
LEUKOCYTE ESTERASE, URINE: ABNORMAL
MAGNESIUM: 2.3 MG/DL (ref 1.8–2.4)
MICROSCOPIC EXAMINATION: YES
NITRITE, URINE: NEGATIVE
PERFORMED ON: ABNORMAL
PH UA: 5 (ref 5–8)
POTASSIUM SERPL-SCNC: 4.5 MMOL/L (ref 3.5–5.1)
PROTEIN UA: 100 MG/DL
RBC UA: 860 /HPF (ref 0–4)
SODIUM BLD-SCNC: 145 MMOL/L (ref 136–145)
SPECIFIC GRAVITY UA: 1.01 (ref 1–1.03)
URINE TYPE: ABNORMAL
UROBILINOGEN, URINE: 0.2 E.U./DL
WBC UA: 12 /HPF (ref 0–5)

## 2023-01-23 PROCEDURE — 83735 ASSAY OF MAGNESIUM: CPT

## 2023-01-23 PROCEDURE — 2580000003 HC RX 258: Performed by: INTERNAL MEDICINE

## 2023-01-23 PROCEDURE — 6360000002 HC RX W HCPCS: Performed by: INTERNAL MEDICINE

## 2023-01-23 PROCEDURE — 36415 COLL VENOUS BLD VENIPUNCTURE: CPT

## 2023-01-23 PROCEDURE — 94640 AIRWAY INHALATION TREATMENT: CPT

## 2023-01-23 PROCEDURE — 99232 SBSQ HOSP IP/OBS MODERATE 35: CPT | Performed by: INTERNAL MEDICINE

## 2023-01-23 PROCEDURE — 2700000000 HC OXYGEN THERAPY PER DAY

## 2023-01-23 PROCEDURE — 97116 GAIT TRAINING THERAPY: CPT

## 2023-01-23 PROCEDURE — 1200000000 HC SEMI PRIVATE

## 2023-01-23 PROCEDURE — 94660 CPAP INITIATION&MGMT: CPT

## 2023-01-23 PROCEDURE — 97530 THERAPEUTIC ACTIVITIES: CPT

## 2023-01-23 PROCEDURE — 99233 SBSQ HOSP IP/OBS HIGH 50: CPT | Performed by: INTERNAL MEDICINE

## 2023-01-23 PROCEDURE — 80048 BASIC METABOLIC PNL TOTAL CA: CPT

## 2023-01-23 PROCEDURE — 81001 URINALYSIS AUTO W/SCOPE: CPT

## 2023-01-23 PROCEDURE — 6370000000 HC RX 637 (ALT 250 FOR IP): Performed by: INTERNAL MEDICINE

## 2023-01-23 PROCEDURE — 94760 N-INVAS EAR/PLS OXIMETRY 1: CPT

## 2023-01-23 PROCEDURE — 94669 MECHANICAL CHEST WALL OSCILL: CPT

## 2023-01-23 RX ADMIN — SODIUM CHLORIDE: 9 INJECTION, SOLUTION INTRAVENOUS at 05:03

## 2023-01-23 RX ADMIN — LEVALBUTEROL HYDROCHLORIDE 1.25 MG: 1.25 SOLUTION, CONCENTRATE RESPIRATORY (INHALATION) at 12:09

## 2023-01-23 RX ADMIN — SODIUM CHLORIDE SOLN NEBU 3% 15 ML: 3 NEBU SOLN at 08:15

## 2023-01-23 RX ADMIN — OXYCODONE HYDROCHLORIDE AND ACETAMINOPHEN 500 MG: 500 TABLET ORAL at 08:41

## 2023-01-23 RX ADMIN — HEPARIN SODIUM 5000 UNITS: 5000 INJECTION INTRAVENOUS; SUBCUTANEOUS at 15:27

## 2023-01-23 RX ADMIN — BISACODYL 5 MG: 5 TABLET, COATED ORAL at 08:41

## 2023-01-23 RX ADMIN — LEVALBUTEROL HYDROCHLORIDE 1.25 MG: 1.25 SOLUTION, CONCENTRATE RESPIRATORY (INHALATION) at 17:23

## 2023-01-23 RX ADMIN — Medication 1 CAPSULE: at 15:27

## 2023-01-23 RX ADMIN — LEVALBUTEROL HYDROCHLORIDE 1.25 MG: 1.25 SOLUTION, CONCENTRATE RESPIRATORY (INHALATION) at 08:15

## 2023-01-23 RX ADMIN — DOCUSATE SODIUM 100 MG: 100 CAPSULE, LIQUID FILLED ORAL at 08:41

## 2023-01-23 RX ADMIN — POLYETHYLENE GLYCOL 3350 17 G: 17 POWDER, FOR SOLUTION ORAL at 08:46

## 2023-01-23 RX ADMIN — INSULIN LISPRO 2 UNITS: 100 INJECTION, SOLUTION INTRAVENOUS; SUBCUTANEOUS at 17:29

## 2023-01-23 RX ADMIN — SODIUM CHLORIDE SOLN NEBU 3% 15 ML: 3 NEBU SOLN at 12:09

## 2023-01-23 RX ADMIN — INSULIN GLARGINE 10 UNITS: 100 INJECTION, SOLUTION SUBCUTANEOUS at 08:43

## 2023-01-23 RX ADMIN — HEPARIN SODIUM 5000 UNITS: 5000 INJECTION INTRAVENOUS; SUBCUTANEOUS at 21:37

## 2023-01-23 RX ADMIN — PANTOPRAZOLE SODIUM 40 MG: 40 TABLET, DELAYED RELEASE ORAL at 05:00

## 2023-01-23 RX ADMIN — MONTELUKAST 10 MG: 10 TABLET, FILM COATED ORAL at 08:41

## 2023-01-23 RX ADMIN — MEROPENEM 500 MG: 500 INJECTION, POWDER, FOR SOLUTION INTRAVENOUS at 15:27

## 2023-01-23 RX ADMIN — PREDNISONE 10 MG: 10 TABLET ORAL at 08:41

## 2023-01-23 RX ADMIN — SODIUM CHLORIDE SOLN NEBU 3% 15 ML: 3 NEBU SOLN at 21:08

## 2023-01-23 RX ADMIN — ASPIRIN 162 MG: 81 TABLET, COATED ORAL at 15:27

## 2023-01-23 RX ADMIN — HEPARIN SODIUM 5000 UNITS: 5000 INJECTION INTRAVENOUS; SUBCUTANEOUS at 05:00

## 2023-01-23 RX ADMIN — SODIUM CHLORIDE SOLN NEBU 3% 15 ML: 3 NEBU SOLN at 17:22

## 2023-01-23 RX ADMIN — Medication 1 CAPSULE: at 08:41

## 2023-01-23 RX ADMIN — LEVALBUTEROL HYDROCHLORIDE 1.25 MG: 1.25 SOLUTION, CONCENTRATE RESPIRATORY (INHALATION) at 21:08

## 2023-01-23 RX ADMIN — CYANOCOBALAMIN TAB 500 MCG 500 MCG: 500 TAB at 08:41

## 2023-01-23 RX ADMIN — TAMSULOSIN HYDROCHLORIDE 0.4 MG: 0.4 CAPSULE ORAL at 08:41

## 2023-01-23 RX ADMIN — DOCUSATE SODIUM 100 MG: 100 CAPSULE, LIQUID FILLED ORAL at 21:37

## 2023-01-23 RX ADMIN — AMIODARONE HYDROCHLORIDE 100 MG: 200 TABLET ORAL at 08:41

## 2023-01-23 RX ADMIN — ATORVASTATIN CALCIUM 40 MG: 40 TABLET, FILM COATED ORAL at 08:41

## 2023-01-23 ASSESSMENT — PAIN SCALES - GENERAL: PAINLEVEL_OUTOF10: 0

## 2023-01-23 NOTE — CARE COORDINATION
Discharge Planning:  SW met with pt to again discuss d/c plans. Pt is still refusing SNF placement despite concerns that he is requiring a steady for tranfers. Pt stated he was up this morning and needs to go home upon d/c. Pt will not further discuss SNF placement. SW will f/u after PT/OT work with pt today. Provided to patient . .. by Electronically signed by Carlos Serrato on 1/23/2023 at 11:21 AM. Education provided to patient, patient reported no questions and verbalized understanding. Patient aware of 4 hours allotted time to determine if they choose to pursue Medicare appeal process. PLAN: Pt is refusing SNF placement. States his brother will transport him home at d/c and he will resume HC with [facility].    DUDLEY Moore  [phone]  Electronically signed by Carlos Serrato on 1/23/2023 at 11:23 AM

## 2023-01-23 NOTE — PROGRESS NOTES
Pulmonary Critical Care Progress Note     Patient's name:  Alyssa Yost Record Number: [id number]  Patient's account/billing number: [de-identified]  Patient's YOB: 1940  Age: 79 y.o. Date of Admission: 1/17/2023 12:56 PM  Date of Consult: 1/23/2023      Primary Care Physician: Kal Manning MD      Code Status: Full Code    Chief complaint: Acute on chronic respiratory failure with hypoxia    Assessment and Plan     Acute on chronic respiratory with hypoxia and hypercapnia  History of H influenza and Moraxella pneumonia  History of organizing pneumonia  Acute kidney injury  ESBL positive UTI  diastolic heart failure    Plan:  Continue antibiotics   Continue prednisone at 10 mg daily, consider stopping amiodarone, suspect organizing PNA, amiodarone is potential culprit    Fluid management/diuresis per nephrology  bronchodilators. Acapella    Overnight:    No acute events overnight  Afebrile    REVIEW OF SYSTEMS:  Review of Systems -   General ROS: Weak  Psychological ROS: negative  Ophthalmic ROS: negative  ENT ROS: negative  Allergy and Immunology ROS: negative  Hematological and Lymphatic ROS: negative  Endocrine ROS: negative  Breast ROS: negative  Respiratory ROS: Shortness of breath, lower extremity edema  Cardiovascular ROS: no chest pain or dyspnea on exertion  Gastrointestinal ROS:negative  Genito-Urinary ROS: negative  Musculoskeletal ROS: negative  Neurological ROS: negative  Dermatological ROS: negative        Physical Exam:    Vitals: BP (!) 148/71   Pulse 76   Temp 97.4 °F (36.3 °C) (Oral)   Resp 16   Ht 5' 8\" (1.727 m)   Wt 242 lb 8.1 oz (110 kg)   SpO2 94%   BMI 36.87 kg/m²     Last Body weight:   Wt Readings from Last 3 Encounters:   01/23/23 242 lb 8.1 oz (110 kg)   12/30/22 218 lb 4.1 oz (99 kg)   12/16/22 239 lb (108.4 kg)       Body Mass Index : Body mass index is 36.87 kg/m².       Intake and Output summary:   Intake/Output Summary (Last 24 hours) at 1/23/2023 0949  Last data filed at 1/23/2023 0504  Gross per 24 hour   Intake 1384.39 ml   Output 1350 ml   Net 34.39 ml       Physical Examination:     Gen: Lethargic, no acute distress  Eyes: PERRL. Anicteric sclera. No conjunctival injection. ENT: No discharge. Posterior oropharynx clear. External appearance of ears and nose normal.  Neck: Trachea midline. No mass   Resp: Diminished bilaterally, no active wheezing or rhonchi  CV: Regular rate. Regular rhythm. No murmur or rub.+++ edema. GI: Soft, Non-tender. Non-distended. +BS  Skin: Warm, dry, w/o erythema. Lymph: No cervical or supraclavicular LAD. M/S: No cyanosis. No clubbing. Neuro:  CN 2-12 tested, no focal neurologic deficit. Moves all extremities  Psych: Awake lethargic/sleepy no focal deficit      Laboratory findings:-    CBC: No results for input(s): WBC, HGB, PLT in the last 72 hours. BMP:    Recent Labs     01/21/23  0539 01/22/23  0740 01/23/23  0612    144 145   K 4.5 4.4 4.5   CL 98* 103 108   CO2 23 26 26   BUN 93* 90* 82*   CREATININE 3.2* 3.0* 2.3*   GLUCOSE 83 87 116*     S. Calcium:  Recent Labs     01/23/23  0612   CALCIUM 7.9*     S. Magnesium:  Recent Labs     01/23/23  0612   MG 2.30       S. Glucose:  Recent Labs     01/22/23  1709 01/22/23  2019 01/23/23  0803   POCGLU 216* 198* 122*           Radiology Review:  Pertinent images / reports were reviewed as a part of this visit.         Kei Uribe MD, M.D.            1/23/2023, 9:49 AM

## 2023-01-23 NOTE — PROGRESS NOTES
[facility] Internal Medicine Note      Chief Complaint: Get me out of here    Subjective/Interval History: This morning the patient is on the toilet trying to move his bowels. His primary complaint is the amount of time it takes to get help to go to the bathroom. He states his breathing is okay. He states he is eating and drinking okay. He denies pain. Once again he asks what is wrong with him and why he is in the hospital.  No new problems overnight. No chest pain. No nausea, vomiting, diarrhea. No abdominal pain. No dysuria. The remainder of the review of systems is negative. PMH, PSH, FH/SH reviewed and unchanged as documented in the H&P personally documented at admission 23    Medication list reviewed    Objective:    BP (!) 154/76   Pulse 77   Temp 97.6 °F (36.4 °C) (Oral)   Resp 18   Ht 5' 8\" (1.727 m)   Wt 242 lb 8.1 oz (110 kg)   SpO2 96%   BMI 36.87 kg/m²   Temp  Av.6 °F (36.4 °C)  Min: 97.3 °F (36.3 °C)  Max: 97.7 °F (36.5 °C)    CV-irregularly irregular, rate controlled  Pulm-respirations remain easy, currently on 3 L of oxygen per nasal cannula. Breath sounds remain diffusely diminished throughout. No wheezes or rhonchi noted. Abd- BS+, soft, NTND  Ext-continued 2-3+ edema of his lower extremities.     The Following Labs Were Reviewed Today:    Recent Results (from the past 24 hour(s))   POCT Glucose    Collection Time: 23  8:04 AM   Result Value Ref Range    POC Glucose 94 70 - 99 mg/dl    Performed on ACCU-CHEK    POCT Glucose    Collection Time: 23 11:35 AM   Result Value Ref Range    POC Glucose 121 (H) 70 - 99 mg/dl    Performed on ACCU-CHEK    POCT Glucose    Collection Time: 23  5:09 PM   Result Value Ref Range    POC Glucose 216 (H) 70 - 99 mg/dl    Performed on ACCU-CHEK    POCT Glucose    Collection Time: 23  8:19 PM   Result Value Ref Range    POC Glucose 198 (H) 70 - 99 mg/dl    Performed on ACCU-CHEK    Basic Metabolic Panel Collection Time: 01/23/23  6:12 AM   Result Value Ref Range    Sodium 145 136 - 145 mmol/L    Potassium 4.5 3.5 - 5.1 mmol/L    Chloride 108 99 - 110 mmol/L    CO2 26 21 - 32 mmol/L    Anion Gap 11 3 - 16    Glucose 116 (H) 70 - 99 mg/dL    BUN 82 (HH) 7 - 20 mg/dL    Creatinine 2.3 (H) 0.8 - 1.3 mg/dL    Est, Glom Filt Rate 27 (A) >60    Calcium 7.9 (L) 8.3 - 10.6 mg/dL   Magnesium    Collection Time: 01/23/23  6:12 AM   Result Value Ref Range    Magnesium 2.30 1.80 - 2.40 mg/dL       ASSESSMENT/PLAN:      Principal Problem:    Acute on chronic diastolic congestive heart failure -renal function has improved today. Await nephrology input regarding the timing of diuretics. Certainly has tenuous renal function. Active Problems:    Acute on chronic respiratory failure with hypoxia/HCAP (healthcare-associated pneumonia)-patient now on meropenem. Respiratory panel growing Moraxella catarrhalis and haemophilus influenza. DEISY-improving. Await nephrology input today. UTI due to extended-spectrum beta lactamase (ESBL) producing Escherichia coli-day #6 of meropenem. Patient is afebrile. Hypothermia-seems to have resolved without clear cause. Chronic anemia-appreciate hematology input. Elevated troponin-stable. No trending in troponins    Chronic GERD-continue Protonix. Type 2 diabetes mellitus with stage 4 chronic kidney disease, with long-term current use of insulin ([facility])-sugars doing okay. Continue low-dose Lantus. Continue sliding scale. Continue to monitor    Essential hypertension-antihypertensives remain on hold currently. B12 deficiency-continue with oral B12 supplementation. Atrial fibrillation-rate is controlled. Patient is not an anticoagulation candidate due to occult bleeding in the past and chronic iron deficiency.       João Fam MD, FACP  7:51 AM  1/23/2023

## 2023-01-23 NOTE — PLAN OF CARE
Problem: Discharge Planning  Goal: Discharge to home or other facility with appropriate resources  1/23/2023 0002 by Ligia Georges RN  Outcome: Progressing  1/22/2023 1639 by Tamiko Dumont RN  Outcome: Progressing     Problem: Pain  Goal: Verbalizes/displays adequate comfort level or baseline comfort level  1/23/2023 0002 by Ligia Georges RN  Outcome: Progressing  1/22/2023 1639 by Tamiko Dumont RN  Outcome: Progressing     Problem: Safety - Adult  Goal: Free from fall injury  1/23/2023 0002 by Ligia Georges RN  Outcome: Progressing  Flowsheets (Taken 1/22/2023 2359)  Free From Fall Injury: Instruct family/caregiver on patient safety  1/22/2023 1639 by Tamiko Dumont RN  Outcome: Progressing     Problem: ABCDS Injury Assessment  Goal: Absence of physical injury  1/23/2023 0002 by Ligia Georges RN  Outcome: Progressing  Flowsheets (Taken 1/22/2023 2359)  Absence of Physical Injury: Implement safety measures based on patient assessment  1/22/2023 1639 by Tamiko Dumont RN  Outcome: Progressing     Problem: Skin/Tissue Integrity  Goal: Absence of new skin breakdown  Description: 1. Monitor for areas of redness and/or skin breakdown  2. Assess vascular access sites hourly  3. Every 4-6 hours minimum:  Change oxygen saturation probe site  4. Every 4-6 hours:  If on nasal continuous positive airway pressure, respiratory therapy assess nares and determine need for appliance change or resting period.   1/23/2023 0002 by Ligia Georges RN  Outcome: Progressing  1/22/2023 1639 by Tamiko Dumont RN  Outcome: Progressing     Problem: Infection - Adult  Goal: Absence of infection at discharge  1/23/2023 0002 by Ligia Georges RN  Outcome: Progressing  1/22/2023 1639 by Tamiko Dumont RN  Outcome: Progressing  Goal: Absence of infection during hospitalization  1/23/2023 0002 by Ligia Georges RN  Outcome: Progressing  1/22/2023 1639 by Tamiko Dumont RN  Outcome: Progressing  Goal: Absence of fever/infection during anticipated neutropenic period  1/23/2023 0002 by Kelly Conde RN  Outcome: Progressing  1/22/2023 1639 by Joanne Barcenas RN  Outcome: Progressing     Problem: Genitourinary - Adult  Goal: Absence of urinary retention  1/23/2023 0002 by Kelly Conde RN  Outcome: Progressing  1/22/2023 1639 by Joanne Barcenas RN  Outcome: Progressing  Goal: Urinary catheter remains patent  1/23/2023 0002 by Kelly Conde RN  Outcome: Progressing  1/22/2023 1639 by Joanne Barcenas RN  Outcome: Progressing     Problem: Metabolic/Fluid and Electrolytes - Adult  Goal: Electrolytes maintained within normal limits  1/23/2023 0002 by Kelly Conde RN  Outcome: Progressing  1/22/2023 1639 by Joanne Barcenas RN  Outcome: Progressing  Goal: Hemodynamic stability and optimal renal function maintained  1/23/2023 0002 by Kelly Conde RN  Outcome: Progressing  1/22/2023 1639 by Joanne Barcenas RN  Outcome: Progressing  Goal: Glucose maintained within prescribed range  1/23/2023 0002 by Kelly Conde RN  Outcome: Progressing  1/22/2023 1639 by Joanne Barcenas RN  Outcome: Progressing     Problem: Nutrition Deficit:  Goal: Optimize nutritional status  1/23/2023 0002 by Kelly Conde RN  Outcome: Progressing  1/22/2023 1639 by Joanne Barcenas RN  Outcome: Progressing

## 2023-01-23 NOTE — PROGRESS NOTES
Occupational Therapy  Facility/Department: [facility]  Occupational Therapy Daily Treatment Note    Name: Shayan Samaniego  : 1940  MRN: [id number]  Date of Service: 2023    Discharge Recommendations:  (3-5 recommended, however, pt refusing. Recommend 24 hour assist and home OT if pt returns home)    OT Equipment Recommendations  Equipment Needed: Kenia Suresh scored a 15/24 on the AM-PAC ADL Inpatient form. Current research shows that an AM-PAC score of 17 or less is typically not associated with a discharge to the patient's home setting. Based on the patient's AM-PAC score and their current ADL deficits, it is recommended that the patient have 3-5 sessions per week of Occupational Therapy at d/c to increase the patient's independence. Please see assessment section for further patient specific details. If patient discharges prior to next session this note will serve as a discharge summary. Please see below for the latest assessment towards goals. Patient Diagnosis(es): The primary encounter diagnosis was Acute on chronic congestive heart failure, unspecified heart failure type ([address] Diagnoses of Elevated troponin, Chronic kidney disease, unspecified CKD stage, and Urinary tract infection with hematuria, site unspecified were also pertinent to this visit. 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 Past Surgical History:  has a past surgical history that includes Gallbladder surgery ();  Upper gastrointestinal endoscopy (7-2005    7/10/2009); Colonoscopy (7/10/2009); Cataract removal (2/2012); Pain management procedure (Right, 10/20/2021); Pain management procedure (Left, 12/8/2021); CT BIOPSY BONE MARROW (11/1/2022); bronchoscopy (N/A, 12/28/2022); and bronchoscopy (12/28/2022). Assessment   Performance deficits / Impairments: Decreased functional mobility ; Decreased ADL status; Decreased strength;Decreased ROM; Decreased balance;Decreased endurance;Decreased fine motor control  Assessment: Pt tolerated treatment fair, continues to need max encouragement to participate in OOB, but did make progress towards goals. Pt was able ambulate with RW today, but needed min A d/t unsteadiness and limited to shorter room distances d/t SOB and fatigue. Pt declined completing ADLs, but anticipate pt would require at least mod A based on the above performance deficits. Although pt with a low AM-PAC score that indicates need for ongoing skilled OT at d/c, pt is adamant that he will be returning home at d/c. Recommend 24 hour assist and home OT. Prognosis: Fair  History: see above  REQUIRES OT FOLLOW-UP: Yes  Activity Tolerance  Activity Tolerance: Patient limited by fatigue  Activity Tolerance Comments: SpO2 on 3 L O2 low to mid 80's with activity, low 90's at rest.        Plan   Occupational Therapy Plan  Times Per Week: 3-5  Current Treatment Recommendations: Strengthening, Balance training, Functional mobility training, Endurance training, Self-Care / ADL, Safety education & training, Equipment evaluation, education, & procurement, ROM     Restrictions  Restrictions/Precautions  Restrictions/Precautions: Fall Risk, Contact Precautions  Position Activity Restriction  Other position/activity restrictions: Contact Precautions (ESBL); 3 L O2 (the pt requesting to increase to 5L with activity)    Subjective   General  Chart Reviewed:  Yes  Additional Pertinent Hx: Per Lesvia Dietrich MD's H&P: Jean Haile is an 79-year-old white male with a history of chronic diastolic heart failure, diabetes, chronic kidney disease stage IIIb who recently was hospitalized with congestive heart failure and hemoptysis and pneumonia growing Moraxella catarrhalis, who presented to the emergency room with increasing shortness of breath and increased oxygen requirement. Shasta Balling Shasta Balling Shasta Balling Work-up in the emergency room revealed an elevated BNP compared to prior, a procalcitonin that was slightly elevated at 0.37, urinalysis that suggested infection and chest x-ray that shows either multifocal pneumonia or heart failure. \"  Family / Caregiver Present: Yes (wife)  Referring Practitioner: Sofi Matute MD  Diagnosis: Acute on chronic diastolic CHF  Subjective  Subjective: Pt met b/s for OT tx. Pt in bed on arrival, agreeable to participate in therapy with max verbal encourgement. Pt with multiple complaints, states he is just trying to get some rest and therapy never comes at the right time.      Social/Functional History  Social/Functional History  Lives With: Spouse  Type of Home: House  Home Layout: Able to Live on Main level with bedroom/bathroom, Multi-level  Home Access: Stairs to enter with rails  Entrance Stairs - Number of Steps: 1+1  Entrance Stairs - Rails: Both  Bathroom Shower/Tub: Tub/Shower unit  Bathroom Toilet: Standard  Bathroom Equipment: Shower chair, Grab bars in shower, Tub transfer bench  Home Equipment: Cane, Rollator, Oxygen, Grab bars (2L O2)  Has the patient had two or more falls in the past year or any fall with injury in the past year?: Yes  ADL Assistance: Needs assistance (assist LB)  Homemaking Assistance: Needs assistance (wife cleans, pt manages bill paying)  Homemaking Responsibilities: No  Ambulation Assistance: Independent (with rollator)  Transfer Assistance: Independent  Active : Yes  Occupation: Retired  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 Pt unsteady with 2 LOB requiring min A to correct.  Pt easily fatigued and required seated rest break between each ambulation trial)  Assistive Device: Walker, rolling;Gait belt    ADL  Toileting Skilled Clinical Factors: catheter  Additional Comments: pt declined completing ADLs    Activity Tolerance  Activity Tolerance: Patient limited by fatigue;Patient limited by endurance  Activity Tolerance Comments: SpO2 on 3L O2 down as low as 81% with activity; he does recover with time and PLB to the 90's    Bed mobility  Supine to Sit: Stand by assistance (HOB elevated; increased effort)  Sit to Supine: Unable to assess  Scooting: Stand by assistance    Transfers  Sit to stand: Contact guard assistance  Stand to sit: Contact guard assistance  Transfer Comments: to/from RW, max verbal cues for safety and hand placement, pt does not like for therapist to touch him during transfers    Cognition  Overall Cognitive Status: Exceptions  Safety Judgement: Decreased awareness of need for safety;Decreased awareness of need for assistance  Problem Solving: Decreased awareness of errors  Insights: Decreased awareness of deficits    Education Given To: Patient  Education Provided: Role of Therapy;Plan of Care;Transfer Training  Education Provided Comments: importance of OOB activity  Education Method: Demonstration;Verbal  Barriers to Learning: Cognition (decreased insight)  Education Outcome: Continued education needed                            AM-PAC Score        AM-PAC Inpatient Daily Activity Raw Score: 15 (01/23/23 1221)  AM-PAC Inpatient ADL T-Scale Score : 34.69 (01/23/23 1221)  ADL Inpatient CMS 0-100% Score: 56.46 (01/23/23 1221)  ADL Inpatient CMS G-Code Modifier : CK (01/23/23 1221)        Goals  Short Term Goals  Time Frame for Short Term Goals: Prior to d/c: status goals 1/23: all goals ongoing  Short Term Goal 1: Pt will complete LB ADLs with mod A. Short Term Goal 2: Pt will complete UB ADLs with set-up/SBA. Short Term Goal 3: Pt will complete toileting with CGA/min A. Short Term Goal 4: Pt will complete fxl mobility and fxl transfers to/from ADL surfaces with SBA using AD. Short Term Goal 5: Pt will tolerate standing >3 minutes for functional task with SBA to improve standing tolerance for ADL routine. Long Term Goals  Time Frame for Long Term Goals : STGs=LTGs  Patient Goals   Patient goals : to return home.        Therapy Time   Individual Concurrent Group Co-treatment   Time In 1137         Time Out 1220         Minutes Dmitry Dawson 90, OTR/L 0013

## 2023-01-23 NOTE — PLAN OF CARE
Problem: Discharge Planning  Goal: Discharge to home or other facility with appropriate resources  Outcome: Progressing     Problem: Pain  Goal: Verbalizes/displays adequate comfort level or baseline comfort level  Outcome: Progressing     Problem: Safety - Adult  Goal: Free from fall injury  Outcome: Progressing     Problem: ABCDS Injury Assessment  Goal: Absence of physical injury  Outcome: Progressing     Problem: Skin/Tissue Integrity  Goal: Absence of new skin breakdown  Description: 1. Monitor for areas of redness and/or skin breakdown  2. Assess vascular access sites hourly  3. Every 4-6 hours minimum:  Change oxygen saturation probe site  4. Every 4-6 hours:  If on nasal continuous positive airway pressure, respiratory therapy assess nares and determine need for appliance change or resting period.   Outcome: Progressing     Problem: Infection - Adult  Goal: Absence of infection at discharge  Outcome: Progressing  Goal: Absence of infection during hospitalization  Outcome: Progressing  Goal: Absence of fever/infection during anticipated neutropenic period  Outcome: Progressing     Problem: Genitourinary - Adult  Goal: Absence of urinary retention  Outcome: Progressing  Goal: Urinary catheter remains patent  Outcome: Progressing     Problem: Metabolic/Fluid and Electrolytes - Adult  Goal: Electrolytes maintained within normal limits  Outcome: Progressing  Goal: Hemodynamic stability and optimal renal function maintained  Outcome: Progressing  Goal: Glucose maintained within prescribed range  Outcome: Progressing     Problem: Nutrition Deficit:  Goal: Optimize nutritional status  Outcome: Progressing     Problem: Chronic Conditions and Co-morbidities  Goal: Patient's chronic conditions and co-morbidity symptoms are monitored and maintained or improved  Outcome: Progressing

## 2023-01-23 NOTE — PROGRESS NOTES
Physical Therapy  Facility/Department: [address] [facility]  Physical Therapy Daily Note  ([facility])  If patient discharges prior to next session this note will serve as a discharge summary. Please see below for the latest assessment towards goals. Name: Suze Castaneda  : 1940  MRN: [id number]  Date of Service: 2023    Discharge Recommendations:  24 hour supervision or assist, Home with Home health PT, Patient would benefit from continued therapy after discharge (3-5 more appropriate if the pt is agreeable)   Suze Castaneda scored a 15/24 on the AM-PAC short mobility form. Current research shows that an AM-PAC score of 18 or greater is typically associated with a discharge to the patient's home setting. Based on the patient's AM-PAC score and their current functional mobility deficits, it is recommended that the patient have 2-3 sessions per week of Physical Therapy at d/c to increase the patient's independence. At this time, this patient demonstrates the endurance and safety to discharge home with 24/7 assist and home PT (home vs OP services) and a follow up treatment frequency of 2-3x/wk. Please see assessment section for further patient specific details. PT Equipment Recommendations  Equipment Needed: No      Patient Diagnosis(es): The primary encounter diagnosis was Acute on chronic congestive heart failure, unspecified heart failure type ([address] Diagnoses of Elevated troponin, Chronic kidney disease, unspecified CKD stage, and Urinary tract infection with hematuria, site unspecified were also pertinent to this visit.   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 Past Surgical History:  has a past surgical history that includes Gallbladder surgery (1981); Upper gastrointestinal endoscopy (7-2005    7/10/2009); Colonoscopy (7/10/2009); Cataract removal (2/2012); Pain management procedure (Right, 10/20/2021); Pain management procedure (Left, 12/8/2021); CT BIOPSY BONE MARROW (11/1/2022); bronchoscopy (N/A, 12/28/2022); and bronchoscopy (12/28/2022). Assessment   Assessment: Today, the pt showed slight improvement and was able to use a walker instead of LIFT equipment to get to the chair. The pt does demonstrate impulsiveness with his transfers and use of the walker and needed max vc's for safety. He was able to walk 15 feet x 3 with the wh walker and min A and he needed significant rest breaks due to SpO2 levels decreasing to 81% with activity. The pt is refusing to d/c to anywhere but home and IF home, he will need 24/7 hands-on assist and level 3 home PT. The pt is not safe and his wife does not appear to be able to give adequate assist but the pt is refusing anything but home at d/c. Will con't to follow while he is on the acute care floor to improve his strength, his mobility and his tolerance for activity. Progress is limited by the pt's resistance to education and his self-limiting behaviors.   Therapy Prognosis: Fair  Barriers to Learning: motivation, resistance to education  Requires PT Follow-Up: Yes  Activity Tolerance  Activity Tolerance: Patient limited by fatigue;Patient limited by endurance  Activity Tolerance Comments: SpO2 on 3L O2 down as low as 81% with activity; he does recover with time and PLB to the 90's     Plan   Physcial Therapy Plan  General Plan: 3-5 times per week  Current Treatment Recommendations: Strengthening, Balance training, Functional mobility training, Transfer training, Gait training, Therapeutic activities, Safety education & training, Patient/Caregiver education & training  Safety Devices  Type of Devices: Call light within reach, Chair alarm in place, Gait belt, Patient at risk for falls, Left in chair, Nurse notified (wife in the room)     Restrictions  Restrictions/Precautions  Restrictions/Precautions: Fall Risk, Contact Precautions  Position Activity Restriction  Other position/activity restrictions: Contact Precautions (ESBL); 3 L O2 (the pt requesting to increase to 5L with activity)     Subjective   General  Chart Reviewed: Yes  Additional Pertinent Hx: Per Naila Hernandez MD H&P on 1-: The pt is an 80 yo male who presented to the ED with increasing SOB and O2 needs. Troponins were elevated, he has had increased weight, had elevated BNP, urinalysis suggested infection and CXR: \"shows either multifocal pneumonia or heart failure\". The pt recently in the hospital and on ARU, was d/c on 12-8-2022 and readmitted on 12-. The pt had been d/c to home each time with family. PMHx: asthma, a-fib, CKD, carotid artery stenosis, COPD, CHF, HTN, anemia, JOANN, DM  Response To Previous Treatment: Patient reporting fatigue but able to participate. Family / Caregiver Present: Yes (wife)  Referring Practitioner: Naila Hernandez MD  Referral Date : 01/17/23  Diagnosis: Acute on chronic diastolic congestive heart failure, Acute on chronic respiratory failure with hypoxia  Follows Commands: Impaired  Other (Comment): delayed  Subjective  Subjective: the pt needing maximum encouragement to participate today; he has multiple complaints re: being disturbed at the wrong time and not being able to sleep; the pt stating \"you know I can walk I've done it the past 2 times I have been here! \"         Social/Functional History  Social/Functional History  Lives With: Spouse  Type of Home: House  Home Layout: Able to Live on Main level with bedroom/bathroom, Multi-level  Home Access: Stairs to enter with rails  Entrance Stairs - Number of Steps: 1+1  Entrance Stairs - Rails: Both  Bathroom Shower/Tub: Tub/Shower unit  Bathroom Toilet: Standard  Bathroom Equipment: Shower chair, Grab bars in shower, Tub transfer bench  Home Equipment: Cane, Rollator, Oxygen, Grab bars (2L O2)  Has the patient had two or more falls in the past year or any fall with injury in the past year?: Yes  ADL Assistance: Needs assistance (assist LB)  Homemaking Assistance: Needs assistance (wife cleans, pt manages bill paying)  Homemaking Responsibilities: No  Ambulation Assistance: Independent (with rollator)  Transfer Assistance: Independent  Active : Yes  Occupation: Retired  Type of Occupation: [employer]  [address] [place] [address] Avenue: wood carving, kindra saw  IADL Comments: sleeps in a recliner but reports he does get into bed on a regular basis      Objective       Bed mobility  Supine to Sit: Stand by assistance (HOB elevated; increased effort)  Sit to Supine: Unable to assess  Scooting: Stand by assistance  Transfers  Sit to Stand: Contact guard assistance  Stand to Sit: Contact guard assistance  Comment: max vc's for safety and for hand placement although the pt does not want therapist to touch him during transfers  Ambulation  Surface: Level tile  Device: Rolling Walker  Other Apparatus: O2 (O2 line partially managed by pt and by therapist)  Assistance: Minimal assistance  Quality of Gait: the pt is impulsive and needed assist for safe walker management; limited distance due to decreased activity tolerance; shaky and with slight LOB at least 2 times  Distance: 15 feet x 3 with significant rest breaks needed inbetween  Comments: SpO2 on 3L O2 down as low as 81% with activity; he does recover with time and PLB to the 90's     Balance  Sitting - Static: Good  Sitting - Dynamic: Good  Standing - Static: Good;-  Standing - Dynamic: Fair  Comments: at least 2 small LOB when ambulating with the walker           AM-PAC Score  AM-PAC Inpatient Mobility Raw Score : 15 (01/23/23 1206)  AM-PAC Inpatient T-Scale Score : 39.45 (01/23/23 1206)  Mobility Inpatient CMS 0-100% Score: 57.7 (01/23/23 1206)  Mobility Inpatient CMS G-Code Modifier : CK (01/23/23 1206)            Goals  Short Term Goals  Time Frame for Short Term Goals: upon d/c  (slow progression toward goals)  Short Term Goal 1: Bed mobility with mod I. Short Term Goal 2: Transfer sit <> stand to a walker with SBA. Short Term Goal 3: Ambulate with wh walker 25 feet with SBA.   Patient Goals   Patient Goals : none stated       Education  Patient Education  Education Given To: Patient  Education Provided: Role of Therapy;Plan of Care;Home Exercise Program;Energy Conservation  Education Provided Comments: importance of mobility and being out of the bed  Education Method: Verbal  Barriers to Learning: Other (Comment) (self-limiting and resistant to education)  Education Outcome: Continued education needed      Therapy Time   Individual Concurrent Group Co-treatment   Time In 1137         Time Out 1220         Minutes 43               Electronically signed by Sheri Hollingsworth, PT [id number] on 1/23/2023 at 12:21 PM

## 2023-01-23 NOTE — PROGRESS NOTES
[address]   Daily Progress Note      Admit Date:  1/17/2023      Subjective:   Mr. Ankush Deleon is an 78yo male with mild CAD, COPD and chronic hypoxic respiratory failure, chronic diastolic CHF and paroxysmal atrial fibrillation who presented to [facility] with shortness of breath. Interval History:  [facility] reports that he is feeling a little better. His wife and daughter are present at the bedside. Sinus bradycardia on telemetry. Objective:     BP (!) 147/74   Pulse 82   Temp 97.4 °F (36.3 °C) (Oral)   Resp 18   Ht 5' 8\" (1.727 m)   Wt 242 lb 8.1 oz (110 kg)   SpO2 95%   BMI 36.87 kg/m²      Intake/Output Summary (Last 24 hours) at 1/23/2023 1633  Last data filed at 1/23/2023 1323  Gross per 24 hour   Intake 2092.39 ml   Output 1000 ml   Net 1092.39 ml       Physical Exam:  General:  Awake, alert, NAD  Skin:  Warm and dry  Neck:  JVD<8, no carotid bruits  Chest:  Diminished breath sounds bilaterally with wheezes.  No rhonchi/rales  Cardiovascular:  Irreg irreg, normal S1/S2, no M/R/G  Abdomen:  Soft, nontender, +bowel sounds  Extremities:  No edema  Pulses: 2+ bilat carotid    2+ bilat radial    2+ bilat femoral        Medications:    heparin (porcine)  5,000 Units SubCUTAneous 3 times per day    meropenem  500 mg IntraVENous Q12H    vitamin B-12  500 mcg Oral Daily    levalbuterol  1.25 mg Nebulization Q4H WA    sodium chloride (Inhalant)  15 mL Nebulization Q4H WA    vitamin C  500 mg Oral Daily    insulin glargine  10 Units SubCUTAneous QAM    tamsulosin  0.4 mg Oral Daily    amiodarone  100 mg Oral Daily    aspirin EC  162 mg Oral Daily    atorvastatin  40 mg Oral Daily    bisacodyl  5 mg Oral Daily    polyethylene glycol  17 g Oral Daily    docusate sodium  100 mg Oral BID    [Held by provider] hydrALAZINE  10 mg Oral 3 times per day    lactobacillus  1 capsule Oral BID WC    linaclotide  145 mcg Oral QAM AC    montelukast  10 mg Oral Daily    pantoprazole  40 mg Oral QAM AC predniSONE  10 mg Oral Daily    insulin lispro  0-4 Units SubCUTAneous TID WC    insulin lispro  0-4 Units SubCUTAneous Nightly      sodium chloride 50 mL/hr at 01/23/23 0504    dextrose         Lab Data:  CBC: No results for input(s): WBC, HGB, PLT in the last 72 hours. BMP:    Recent Labs     01/21/23  0539 01/22/23  0740 01/23/23  0612    144 145   K 4.5 4.4 4.5   CO2 23 26 26   BUN 93* 90* 82*   CREATININE 3.2* 3.0* 2.3*     FASTING LIPID PANEL:  Lab Results   Component Value Date/Time    CHOL 116 11/17/2021 10:13 AM    HDL 47 11/17/2021 10:13 AM    HDL 42 12/15/2011 01:57 PM    TRIG 76 11/17/2021 10:13 AM       Assessment / Plan: 1. Chronic diastolic CHF, class 2  I don't necessarily think that Karie Neal had a CHF exacerbation but I was not present on his admission. That being said, he has been off diuretics and improving with some IV fluid hydration. If need be, I can repeat his right heart catheterization in the next few days. 2.Acute Kidney Injury  Improving. Hold diuretics and nephrotoxic agents. 3.Persistent Atrial fibrillation  Karie Neal had been in sinus rhythm up until December 2022. By telemetry today, he is in sinus rhythm again. Continue amiodarone 100mg daily.          Signed:  Anson Granda MD

## 2023-01-23 NOTE — PROGRESS NOTES
n  Department of Internal Medicine  Nephrology Progress Note       He is an 58-year-old pleasant gentleman  with past medical history significant for coronary artery disease,  congestive heart failure, morbid obesity, COPD, obstructive sleep apnea,  chronic respiratory failure, hypercholesterolemia, chronic kidney  disease stage IV came to ER complaining of shortness of breath, dyspnea  on exertion, as well as difficulty with urination. The patient was  found to have urinary outlet obstruction with UTI (ESBL). The patient  was also diagnosed with pneumonia and admitted for further workup and management. Renal consultation has been called for worsening kidney Function. HPI:  Breathing stable. No CP. Confused. Renal function noted. ROS:  In bed. No CP, SOB or GIBBONS   PMFSH:  medications reviewed. Physical Exam:    VITALS:  BP (!) 148/71   Pulse 76   Temp 97.4 °F (36.3 °C) (Oral)   Resp 16   Ht 5' 8\" (1.727 m)   Wt 242 lb 8.1 oz (110 kg)   SpO2 94%   BMI 36.87 kg/m²   24HR INTAKE/OUTPUT:    Intake/Output Summary (Last 24 hours) at 1/23/2023 1137  Last data filed at 1/23/2023 1108  Gross per 24 hour   Intake 1737.39 ml   Output 1350 ml   Net 387.39 ml         Constitutional:  looks comfortable   Respiratory:  decrease BS at bases / rhonchi   Gastrointestinal:  no  epigastric tenderness. Normal Bowel Sounds  Cardiovascular:  S1, S2 irregular.   Edema:  + + edema    DATA:    CBC:  Lab Results   Component Value Date/Time    WBC 8.8 01/18/2023 05:26 AM    RBC 3.55 01/18/2023 05:26 AM    HGB 9.0 01/18/2023 05:26 AM    HCT 30.0 01/18/2023 05:26 AM    MCV 84.4 01/18/2023 05:26 AM    MCH 25.3 01/18/2023 05:26 AM    MCHC 30.0 01/18/2023 05:26 AM    RDW 21.1 01/18/2023 05:26 AM     01/18/2023 05:26 AM    MPV 8.4 01/18/2023 05:26 AM     CMP:  Lab Results   Component Value Date/Time     01/23/2023 06:12 AM    K 4.5 01/23/2023 06:12 AM    K 4.2 01/17/2023 02:53 PM     01/23/2023 06:12 AM CO2 26 01/23/2023 06:12 AM    BUN 82 01/23/2023 06:12 AM    CREATININE 2.3 01/23/2023 06:12 AM    GFRAA 37 05/27/2022 02:05 PM    GFRAA >60 04/19/2013 10:46 AM    AGRATIO 1.1 01/17/2023 02:53 PM    LABGLOM 27 01/23/2023 06:12 AM    GLUCOSE 116 01/23/2023 06:12 AM    GLUCOSE 96 06/16/2011 10:37 AM    PROT 5.9 01/17/2023 02:53 PM    PROT 6.4 12/12/2012 08:05 AM    CALCIUM 7.9 01/23/2023 06:12 AM    BILITOT 0.5 01/17/2023 02:53 PM    ALKPHOS 81 01/17/2023 02:53 PM    AST 16 01/17/2023 02:53 PM    ALT 30 01/17/2023 02:53 PM      Hepatic Function Panel:   Lab Results   Component Value Date/Time    ALKPHOS 81 01/17/2023 02:53 PM    ALT 30 01/17/2023 02:53 PM    AST 16 01/17/2023 02:53 PM    PROT 5.9 01/17/2023 02:53 PM    PROT 6.4 12/12/2012 08:05 AM    BILITOT 0.5 01/17/2023 02:53 PM    BILIDIR <0.2 01/12/2023 12:23 PM    IBILI see below 01/12/2023 12:23 PM      Phosphorus:   Lab Results   Component Value Date/Time    PHOS 3.6 01/12/2023 12:23 PM       ASSESSMENT/PLAN:    DEISY   - most likely multifactorial ( low  BP/ ARbs /diuretics/UTI )  - renal function slowly improving  - Pt wants to pursue RRT if and when needed . Tolerating IVF . HTN   - better  - off meds  - keep sbp > 90 mmHg     ESBL UTI  - on meropenem    HCAP    - on meropenem    Ac /ch resp failure     Obesity with JOANN     Over all prognosis guarded .  Will follow       Milind Quinn MD,FACP

## 2023-01-24 ENCOUNTER — APPOINTMENT (OUTPATIENT)
Dept: CARDIAC CATH/INVASIVE PROCEDURES | Age: 83
DRG: 286 | End: 2023-01-24
Payer: MEDICARE

## 2023-01-24 LAB
ALBUMIN SERPL-MCNC: 2.8 G/DL (ref 3.4–5)
ANION GAP SERPL CALCULATED.3IONS-SCNC: 9 MMOL/L (ref 3–16)
BUN BLDV-MCNC: 72 MG/DL (ref 7–20)
CALCIUM SERPL-MCNC: 8.1 MG/DL (ref 8.3–10.6)
CHLORIDE BLD-SCNC: 109 MMOL/L (ref 99–110)
CO2: 29 MMOL/L (ref 21–32)
CREAT SERPL-MCNC: 2.1 MG/DL (ref 0.8–1.3)
GFR SERPL CREATININE-BSD FRML MDRD: 31 ML/MIN/{1.73_M2}
GLUCOSE BLD-MCNC: 144 MG/DL (ref 70–99)
GLUCOSE BLD-MCNC: 161 MG/DL (ref 70–99)
GLUCOSE BLD-MCNC: 212 MG/DL (ref 70–99)
GLUCOSE BLD-MCNC: 216 MG/DL (ref 70–99)
GLUCOSE BLD-MCNC: 286 MG/DL (ref 70–99)
HCT VFR BLD CALC: 27.1 % (ref 40.5–52.5)
HEMOGLOBIN: 8 G/DL (ref 13.5–17.5)
MAGNESIUM: 2.3 MG/DL (ref 1.8–2.4)
MCH RBC QN AUTO: 24.8 PG (ref 26–34)
MCHC RBC AUTO-ENTMCNC: 29.7 G/DL (ref 31–36)
MCV RBC AUTO: 83.6 FL (ref 80–100)
PDW BLD-RTO: 21.1 % (ref 12.4–15.4)
PERFORMED ON: ABNORMAL
PHOSPHORUS: 3.8 MG/DL (ref 2.5–4.9)
PLATELET # BLD: 90 K/UL (ref 135–450)
PLATELET SLIDE REVIEW: ABNORMAL
PMV BLD AUTO: 8.7 FL (ref 5–10.5)
POTASSIUM SERPL-SCNC: 4.8 MMOL/L (ref 3.5–5.1)
PRO-BNP: 5031 PG/ML (ref 0–449)
RBC # BLD: 3.24 M/UL (ref 4.2–5.9)
SLIDE REVIEW: ABNORMAL
SODIUM BLD-SCNC: 147 MMOL/L (ref 136–145)
WBC # BLD: 5.9 K/UL (ref 4–11)

## 2023-01-24 PROCEDURE — 85027 COMPLETE CBC AUTOMATED: CPT

## 2023-01-24 PROCEDURE — 2700000000 HC OXYGEN THERAPY PER DAY

## 2023-01-24 PROCEDURE — 94640 AIRWAY INHALATION TREATMENT: CPT

## 2023-01-24 PROCEDURE — C1769 GUIDE WIRE: HCPCS

## 2023-01-24 PROCEDURE — 1200000000 HC SEMI PRIVATE

## 2023-01-24 PROCEDURE — 6370000000 HC RX 637 (ALT 250 FOR IP): Performed by: INTERNAL MEDICINE

## 2023-01-24 PROCEDURE — 99232 SBSQ HOSP IP/OBS MODERATE 35: CPT | Performed by: INTERNAL MEDICINE

## 2023-01-24 PROCEDURE — 51702 INSERT TEMP BLADDER CATH: CPT

## 2023-01-24 PROCEDURE — 94760 N-INVAS EAR/PLS OXIMETRY 1: CPT

## 2023-01-24 PROCEDURE — 2580000003 HC RX 258: Performed by: INTERNAL MEDICINE

## 2023-01-24 PROCEDURE — C1894 INTRO/SHEATH, NON-LASER: HCPCS

## 2023-01-24 PROCEDURE — 6360000002 HC RX W HCPCS: Performed by: INTERNAL MEDICINE

## 2023-01-24 PROCEDURE — 80151 DRUG ASSAY AMIODARONE: CPT

## 2023-01-24 PROCEDURE — 80069 RENAL FUNCTION PANEL: CPT

## 2023-01-24 PROCEDURE — 93451 RIGHT HEART CATH: CPT

## 2023-01-24 PROCEDURE — 83880 ASSAY OF NATRIURETIC PEPTIDE: CPT

## 2023-01-24 PROCEDURE — 99233 SBSQ HOSP IP/OBS HIGH 50: CPT | Performed by: INTERNAL MEDICINE

## 2023-01-24 PROCEDURE — 36415 COLL VENOUS BLD VENIPUNCTURE: CPT

## 2023-01-24 PROCEDURE — 6360000002 HC RX W HCPCS

## 2023-01-24 PROCEDURE — C1751 CATH, INF, PER/CENT/MIDLINE: HCPCS

## 2023-01-24 PROCEDURE — 2500000003 HC RX 250 WO HCPCS

## 2023-01-24 PROCEDURE — 4A023N6 MEASUREMENT OF CARDIAC SAMPLING AND PRESSURE, RIGHT HEART, PERCUTANEOUS APPROACH: ICD-10-PCS | Performed by: INTERNAL MEDICINE

## 2023-01-24 PROCEDURE — 94660 CPAP INITIATION&MGMT: CPT

## 2023-01-24 PROCEDURE — 83735 ASSAY OF MAGNESIUM: CPT

## 2023-01-24 RX ORDER — ACETAMINOPHEN 325 MG/1
650 TABLET ORAL EVERY 4 HOURS PRN
Status: DISCONTINUED | OUTPATIENT
Start: 2023-01-24 | End: 2023-02-04 | Stop reason: HOSPADM

## 2023-01-24 RX ORDER — SODIUM CHLORIDE 9 MG/ML
INJECTION, SOLUTION INTRAVENOUS PRN
Status: DISCONTINUED | OUTPATIENT
Start: 2023-01-24 | End: 2023-02-04 | Stop reason: HOSPADM

## 2023-01-24 RX ORDER — PREDNISONE 20 MG/1
20 TABLET ORAL DAILY
Status: DISCONTINUED | OUTPATIENT
Start: 2023-01-25 | End: 2023-02-04 | Stop reason: HOSPADM

## 2023-01-24 RX ORDER — SODIUM CHLORIDE 0.9 % (FLUSH) 0.9 %
5-40 SYRINGE (ML) INJECTION EVERY 12 HOURS SCHEDULED
Status: DISCONTINUED | OUTPATIENT
Start: 2023-01-24 | End: 2023-02-04 | Stop reason: HOSPADM

## 2023-01-24 RX ORDER — SODIUM CHLORIDE 0.9 % (FLUSH) 0.9 %
5-40 SYRINGE (ML) INJECTION PRN
Status: DISCONTINUED | OUTPATIENT
Start: 2023-01-24 | End: 2023-02-04 | Stop reason: HOSPADM

## 2023-01-24 RX ADMIN — MONTELUKAST 10 MG: 10 TABLET, FILM COATED ORAL at 11:43

## 2023-01-24 RX ADMIN — SODIUM CHLORIDE SOLN NEBU 3% 15 ML: 3 NEBU SOLN at 19:54

## 2023-01-24 RX ADMIN — SODIUM CHLORIDE, PRESERVATIVE FREE 10 ML: 5 INJECTION INTRAVENOUS at 12:34

## 2023-01-24 RX ADMIN — LEVALBUTEROL HYDROCHLORIDE 1.25 MG: 1.25 SOLUTION, CONCENTRATE RESPIRATORY (INHALATION) at 12:45

## 2023-01-24 RX ADMIN — BISACODYL 5 MG: 5 TABLET, COATED ORAL at 11:43

## 2023-01-24 RX ADMIN — PANTOPRAZOLE SODIUM 40 MG: 40 TABLET, DELAYED RELEASE ORAL at 06:06

## 2023-01-24 RX ADMIN — INSULIN LISPRO 1 UNITS: 100 INJECTION, SOLUTION INTRAVENOUS; SUBCUTANEOUS at 17:26

## 2023-01-24 RX ADMIN — HEPARIN SODIUM 5000 UNITS: 5000 INJECTION INTRAVENOUS; SUBCUTANEOUS at 12:32

## 2023-01-24 RX ADMIN — MEROPENEM 500 MG: 500 INJECTION, POWDER, FOR SOLUTION INTRAVENOUS at 22:08

## 2023-01-24 RX ADMIN — OXYCODONE HYDROCHLORIDE AND ACETAMINOPHEN 500 MG: 500 TABLET ORAL at 11:43

## 2023-01-24 RX ADMIN — SODIUM CHLORIDE, PRESERVATIVE FREE 10 ML: 5 INJECTION INTRAVENOUS at 22:03

## 2023-01-24 RX ADMIN — SODIUM CHLORIDE SOLN NEBU 3% 15 ML: 3 NEBU SOLN at 12:47

## 2023-01-24 RX ADMIN — HEPARIN SODIUM 5000 UNITS: 5000 INJECTION INTRAVENOUS; SUBCUTANEOUS at 22:12

## 2023-01-24 RX ADMIN — INSULIN LISPRO 2 UNITS: 100 INJECTION, SOLUTION INTRAVENOUS; SUBCUTANEOUS at 12:35

## 2023-01-24 RX ADMIN — MEROPENEM 500 MG: 500 INJECTION, POWDER, FOR SOLUTION INTRAVENOUS at 11:46

## 2023-01-24 RX ADMIN — Medication 1 CAPSULE: at 17:26

## 2023-01-24 RX ADMIN — LEVALBUTEROL HYDROCHLORIDE 1.25 MG: 1.25 SOLUTION, CONCENTRATE RESPIRATORY (INHALATION) at 19:54

## 2023-01-24 RX ADMIN — TAMSULOSIN HYDROCHLORIDE 0.4 MG: 0.4 CAPSULE ORAL at 11:44

## 2023-01-24 RX ADMIN — PREDNISONE 10 MG: 10 TABLET ORAL at 11:46

## 2023-01-24 RX ADMIN — DOCUSATE SODIUM 100 MG: 100 CAPSULE, LIQUID FILLED ORAL at 22:02

## 2023-01-24 RX ADMIN — ASPIRIN 162 MG: 81 TABLET, COATED ORAL at 11:44

## 2023-01-24 RX ADMIN — HEPARIN SODIUM 5000 UNITS: 5000 INJECTION INTRAVENOUS; SUBCUTANEOUS at 06:05

## 2023-01-24 RX ADMIN — POLYETHYLENE GLYCOL 3350 17 G: 17 POWDER, FOR SOLUTION ORAL at 12:32

## 2023-01-24 RX ADMIN — DOCUSATE SODIUM 100 MG: 100 CAPSULE, LIQUID FILLED ORAL at 12:32

## 2023-01-24 RX ADMIN — CYANOCOBALAMIN TAB 500 MCG 500 MCG: 500 TAB at 11:43

## 2023-01-24 RX ADMIN — MEROPENEM 500 MG: 500 INJECTION, POWDER, FOR SOLUTION INTRAVENOUS at 00:02

## 2023-01-24 RX ADMIN — ATORVASTATIN CALCIUM 40 MG: 40 TABLET, FILM COATED ORAL at 11:43

## 2023-01-24 RX ADMIN — INSULIN GLARGINE 10 UNITS: 100 INJECTION, SOLUTION SUBCUTANEOUS at 12:03

## 2023-01-24 NOTE — PROGRESS NOTES
ONCOLOGY HEMATOLOGY CARE PROGRESS NOTE      SUBJECTIVE:  Pt doing about the same--denies sob/cp. Continues to have generalized weakness. ROS:     Constitutional:  No weight loss, No fever, No chills, No night sweats. Energy level good.   Eyes:  No impairment or change in vision  ENT / Mouth:  No pain, abnormal ulceration, bleeding, nasal drip or change in voice or hearing  Cardiovascular:  No chest pain, palpitations, new edema, or calf discomfort  Respiratory:  No pain, hemoptysis, change to breathing  Breast:  No pain, discharge, change in appearance or texture  Gastrointestinal:  No pain, cramping, jaundice, change to eating and bowel habits  Urinary:  No pain, bleeding or change in continence  Genitalia: No pain, bleeding or discharge  Musculoskeletal:  No redness, pain, edema or weakness  Skin:  No pruritus, rash, change to nodules or lesions  Neurologic:  No discomfort, change in mental status, speech, sensory or motor activity  Psychiatric:  No change in concentration or change to affect or mood  Endocrine:  No hot flashes, increased thirst, or change to urine production  Hematologic: No petechiae, ecchymosis or bleeding  Lymphatic:  No lymphadenopathy or lymphedema  Allergy / Immunologic:  No eczema, hives, frequent or recurrent infections    OBJECTIVE        Physical    VITALS:  Patient Vitals for the past 24 hrs:   BP Temp Temp src Pulse Resp SpO2 Weight   01/24/23 0930 -- -- -- -- -- 98 % --   01/24/23 0915 (!) 165/70 97.4 °F (36.3 °C) Oral 81 24 97 % --   01/24/23 0650 -- -- -- -- -- -- 246 lb 14.6 oz (112 kg)   01/24/23 0602 (!) 172/70 97.3 °F (36.3 °C) Oral 76 20 97 % --   01/24/23 0106 (!) 151/77 (!) 96.5 °F (35.8 °C) Axillary 73 20 96 % --   01/23/23 2108 -- -- -- 82 18 95 % --   01/23/23 2039 (!) 163/72 97.4 °F (36.3 °C) Oral 75 16 96 % --   01/23/23 1723 -- -- -- 81 18 95 % --   01/23/23 1610 (!) 147/74 97.4 °F (36.3 °C) Oral 82 18 95 % --   01/23/23 1210 -- -- -- 79 18 94 % --   01/23/23 1206 129/77 -- Axillary 79 18 94 % --       24HR INTAKE/OUTPUT:    Intake/Output Summary (Last 24 hours) at 1/24/2023 1003  Last data filed at 1/24/2023 1620  Gross per 24 hour   Intake 1328 ml   Output 950 ml   Net 378 ml       CONSTITUTIONAL: awake, alert, cooperative, no apparent distress   EYES: pupils equal, round and reactive to light, sclera clear and conjunctiva normal  ENT: Normocephalic, without obvious abnormality, atraumatic  NECK: supple, symmetrical, no jugular venous distension and no carotid bruits   HEMATOLOGIC/LYMPHATIC: no cervical, supraclavicular or axillary lymphadenopathy   LUNGS: no increased work of breathing and clear to auscultation   CARDIOVASCULAR: regular rate and rhythm, normal S1 and S2, no murmur noted  ABDOMEN: normal bowel sounds x 4, soft, non-distended, non-tender, no masses palpated, no hepatosplenomgaly   MUSCULOSKELETAL: full range of motion noted, tone is normal  NEUROLOGIC: awake, alert, oriented to name, place and time. Motor skills grossly intact. SKIN: Normal skin color, texture, turgor and no jaundice.  appears intact   EXTREMITIES: 2+ bilateral LE edema     DATA:  CBC:    Recent Labs     01/24/23  0500 01/18/23  0526 01/17/23  1453 12/29/22  0510 12/28/22  1222 12/28/22  0449   WBC 5.9 8.8 10.0 12.3*  --  8.4   LABLYMP  --   --   --   --  3  --    NEUTROABS  --  8.3* 9.3* 11.1*  --  6.9   LYMPHOPCT  --  1.6 1.5 4.7  --  8.8   RBC 3.24* 3.55* 3.79* 3.32*  --  3.39*   HGB 8.0* 9.0* 9.6* 8.6*  --  8.9*   HCT 27.1* 30.0* 31.9* 28.2*  --  28.4*   MCV 83.6 84.4 84.1 84.9  --  83.9   MCH 24.8* 25.3* 25.2* 25.9*  --  26.3   MCHC 29.7* 30.0* 30.0* 30.5*  --  31.3   RDW 21.1* 21.1* 20.5* 20.3*  --  19.7*   PLT 90* 134* 151 196  --  188       PT/INR:    Recent Labs     01/17/23  1453 01/12/23  1223   PROT 5.9* 5.5*   INR 1.33*  --      PTT:    Recent Labs     01/17/23  1453   APTT 28.7       CMP:    Recent Labs     01/24/23  0500 01/23/23  0612 01/22/23  0740 01/21/23  0539 01/18/23  0526 01/17/23  1453 01/12/23  1223   * 145 144 137   < > 143 142   K 4.8 4.5 4.4 4.5   < > 4.2 4.1    108 103 98*   < > 103 100   CO2 29 26 26 23   < > 30 28   GLUCOSE 144* 116* 87 83   < > 143* 166*   BUN 72* 82* 90* 93*   < > 66* 73*   CREATININE 2.1* 2.3* 3.0* 3.2*   < > 1.8* 1.9*   LABGLOM 31* 27* 20* 18*   < > 37* 35*   CALCIUM 8.1* 7.9* 7.4* 7.5*   < > 7.7* 8.0*   PROT  --   --   --   --   --  5.9* 5.5*   LABALBU 2.8*  --   --   --   --  3.1* 3.2*   AGRATIO  --   --   --   --   --  1.1  --    BILITOT  --   --   --   --   --  0.5 <0.2   ALKPHOS  --   --   --   --   --  81 85   ALT  --   --   --   --   --  30 33   AST  --   --   --   --   --  16 20   MG 2.30 2.30 2.30 1.90   < >  --  1.80    < > = values in this interval not displayed. Lab Results   Component Value Date    CALCIUM 8.1 (L) 01/24/2023    PHOS 3.8 01/24/2023       LDH:No results for input(s): LDH in the last 720 hours. Radiology Review:  XR CHEST PORTABLE  Narrative: EXAMINATION:  ONE XRAY VIEW OF THE CHEST    1/17/2023 1:44 pm    COMPARISON:  12/27/2022    HISTORY:  ORDERING SYSTEM PROVIDED HISTORY: SOB  TECHNOLOGIST PROVIDED HISTORY:  Reason for exam:->SOB    FINDINGS:  Patchy infiltrates seen within the lungs bilaterally, greatest in the right  mid to lower lung. These are increased when compared to the previous exam.  Cardial pericardial silhouette is enlarged, stable. The pulmonary  vasculature is congested. Impression: Patchy infiltrates in the lungs bilaterally, greater on the right, increased  when compared to the previous exam.  In this case, multifocal pneumonia and  pulmonary edema both considered.       Problem List  Patient Active Problem List   Diagnosis    JOANN (obstructive sleep apnea)    Chronic GERD    Type 2 diabetes mellitus with stage 4 chronic kidney disease, with long-term current use of insulin (HCC)    Microcytic anemia    Microalbuminuria    Hyperlipidemia Paroxysmal atrial fibrillation (HCC)    Edema    Acute on chronic respiratory failure with hypoxia (HCC)    Carotid artery stenosis without cerebral infarction    Leg swelling    Chronic venous insufficiency    Stage 3b chronic kidney disease (HCC)    Essential hypertension    Hemoptysis    B12 deficiency    HCAP (healthcare-associated pneumonia)    Pulmonary nodule    Moderate COPD (chronic obstructive pulmonary disease) (HCC)    Acute kidney injury (HCC)    Slow transit constipation    Pancreatic mass    UTI due to extended-spectrum beta lactamase (ESBL) producing Escherichia coli    Obesity, Class II, BMI 35-39.9    Weakness    Hyperkalemia    Chronic anemia    Abnormal CXR    Elevated brain natriuretic peptide (BNP) level    Elevated troponin    Chronic respiratory failure with hypoxia (HCC)    Chronic diastolic CHF (congestive heart failure), NYHA class 2 (HCC)    Acute on chronic congestive heart failure (HCC)    Mucopurulent chronic bronchitis (HCC)    Persistent atrial fibrillation (HCC)       ASSESSMENT AND PLAN:  Anemia. Partially due to CKD. Will continue EPO in house. Will likely give it weekly in house. Iron studies OK.   CHF--per primary team  Pneumonia--per primary team            ONCOLOGIC DISPOSITION:      Laneta Gosselin, MD  Please contact through [facility]

## 2023-01-24 NOTE — PLAN OF CARE
Problem: Discharge Planning  Goal: Discharge to home or other facility with appropriate resources  1/24/2023 1023 by Juan Diego Peace RN  Outcome: Progressing  1/23/2023 2254 by Fransisca Monte RN  Outcome: Progressing  Flowsheets (Taken 1/23/2023 2130)  Discharge to home or other facility with appropriate resources: Identify barriers to discharge with patient and caregiver     Problem: Pain  Goal: Verbalizes/displays adequate comfort level or baseline comfort level  1/24/2023 1023 by Juan Diego Peace RN  Outcome: Progressing  1/23/2023 2254 by Fransisca Monte RN  Outcome: Progressing     Problem: Safety - Adult  Goal: Free from fall injury  1/24/2023 1023 by Juan Diego Peace RN  Outcome: Progressing  1/23/2023 2254 by Fransisca Monte RN  Outcome: Progressing     Problem: ABCDS Injury Assessment  Goal: Absence of physical injury  1/24/2023 1023 by Juan Diego Peace RN  Outcome: Progressing  1/23/2023 2254 by Fransisca Monte RN  Outcome: Progressing     Problem: Skin/Tissue Integrity  Goal: Absence of new skin breakdown  Description: 1. Monitor for areas of redness and/or skin breakdown  2. Assess vascular access sites hourly  3. Every 4-6 hours minimum:  Change oxygen saturation probe site  4. Every 4-6 hours:  If on nasal continuous positive airway pressure, respiratory therapy assess nares and determine need for appliance change or resting period.   1/24/2023 1023 by Juan Diego Peace RN  Outcome: Progressing  1/23/2023 2254 by Fransisca Monte RN  Outcome: Progressing     Problem: Infection - Adult  Goal: Absence of infection at discharge  1/24/2023 1023 by Juan Diego Peace RN  Outcome: Progressing  1/23/2023 2254 by Fransisca Monte RN  Outcome: Progressing  Flowsheets (Taken 1/23/2023 2130)  Absence of infection at discharge: Assess and monitor for signs and symptoms of infection  Goal: Absence of infection during hospitalization  1/24/2023 1023 by Juan Diego Peace RN  Outcome: Progressing  1/23/2023 2254 by Blas Multani RN  Outcome: Progressing  Flowsheets (Taken 1/23/2023 2130)  Absence of infection during hospitalization: Assess and monitor for signs and symptoms of infection  Goal: Absence of fever/infection during anticipated neutropenic period  1/24/2023 1023 by Dionicio Calhoun RN  Outcome: Progressing  1/23/2023 2254 by Blas Multani RN  Outcome: Progressing  Flowsheets (Taken 1/23/2023 2130)  Absence of fever/infection during anticipated neutropenic period: Monitor white blood cell count     Problem: Genitourinary - Adult  Goal: Absence of urinary retention  1/24/2023 1023 by Dionicio Calhoun RN  Outcome: Progressing  1/23/2023 2254 by Blas Multani RN  Outcome: Progressing  Flowsheets (Taken 1/23/2023 2130)  Absence of urinary retention: Assess patients ability to void and empty bladder  Goal: Urinary catheter remains patent  1/24/2023 1023 by Dionicio Calhoun RN  Outcome: Progressing  1/23/2023 2254 by Blas Multani RN  Outcome: Progressing  Flowsheets (Taken 1/23/2023 2130)  Urinary catheter remains patent: Assess patency of urinary catheter     Problem: Metabolic/Fluid and Electrolytes - Adult  Goal: Electrolytes maintained within normal limits  1/24/2023 1023 by Dionicio Calhoun RN  Outcome: Progressing  1/23/2023 2254 by Blas Multani RN  Outcome: Progressing  Flowsheets (Taken 1/23/2023 2130)  Electrolytes maintained within normal limits: Monitor labs and assess patient for signs and symptoms of electrolyte imbalances  Goal: Hemodynamic stability and optimal renal function maintained  1/24/2023 1023 by Dionicio Calhoun RN  Outcome: Progressing  1/23/2023 2254 by Blas Multani RN  Outcome: Progressing  Flowsheets (Taken 1/23/2023 2130)  Hemodynamic stability and optimal renal function maintained: Monitor labs and assess for signs and symptoms of volume excess or deficit  Goal: Glucose maintained within prescribed range  1/24/2023 1023 by Dionicio Calhoun RN  Outcome: Progressing  1/23/2023 2254 by Zaynab Ta RN  Outcome: Progressing  Flowsheets (Taken 1/23/2023 2130)  Glucose maintained within prescribed range: Monitor blood glucose as ordered     Problem: Nutrition Deficit:  Goal: Optimize nutritional status  1/24/2023 1023 by Juan Daniel Richardson RN  Outcome: Progressing  1/23/2023 2254 by Zaynab Ta RN  Outcome: Progressing     Problem: Chronic Conditions and Co-morbidities  Goal: Patient's chronic conditions and co-morbidity symptoms are monitored and maintained or improved  1/24/2023 1023 by Juan Daniel Richardson RN  Outcome: Progressing  1/23/2023 2254 by Zaynab Ta RN  Outcome: Progressing  Flowsheets (Taken 1/23/2023 2130)  Care Plan - Patient's Chronic Conditions and Co-Morbidity Symptoms are Monitored and Maintained or Improved: Monitor and assess patient's chronic conditions and comorbid symptoms for stability, deterioration, or improvement

## 2023-01-24 NOTE — PROGRESS NOTES
[facility]   Daily Progress Note      Admit Date:  1/17/2023      Subjective:   Mr. Anthony Zaragoza is an 80yo male with mild CAD, COPD and chronic hypoxic respiratory failure, chronic diastolic CHF and paroxysmal atrial fibrillation who presented to [facility] with shortness of breath. Interval History:  Finesse Marinelli reports that he is feeling \"okay\". He is on 3 liters of oxygen by NC. Sinus bradycardia on telemetry. Objective:     BP (!) 172/70   Pulse 76   Temp 97.3 °F (36.3 °C) (Oral)   Resp 20   Ht 5' 8\" (1.727 m)   Wt 246 lb 14.6 oz (112 kg)   SpO2 97%   BMI 37.54 kg/m²      Intake/Output Summary (Last 24 hours) at 1/24/2023 5111  Last data filed at 1/23/2023 2142  Gross per 24 hour   Intake 1028 ml   Output 950 ml   Net 78 ml       Physical Exam:  General:  Awake, alert, NAD  Skin:  Warm and dry  Neck:  JVD<8, no carotid bruits  Chest:  Diminished breath sounds bilaterally with wheezes.  No rhonchi/rales  Cardiovascular:  Regular but glenis, normal S1/S2, no M/R/G  Abdomen:  Soft, nontender, +bowel sounds  Extremities:  No edema  Pulses: 2+ bilat carotid    2+ bilat radial    2+ bilat femoral        Medications:    heparin (porcine)  5,000 Units SubCUTAneous 3 times per day    meropenem  500 mg IntraVENous Q12H    vitamin B-12  500 mcg Oral Daily    levalbuterol  1.25 mg Nebulization Q4H WA    sodium chloride (Inhalant)  15 mL Nebulization Q4H WA    vitamin C  500 mg Oral Daily    insulin glargine  10 Units SubCUTAneous QAM    tamsulosin  0.4 mg Oral Daily    aspirin EC  162 mg Oral Daily    atorvastatin  40 mg Oral Daily    bisacodyl  5 mg Oral Daily    polyethylene glycol  17 g Oral Daily    docusate sodium  100 mg Oral BID    [Held by provider] hydrALAZINE  10 mg Oral 3 times per day    lactobacillus  1 capsule Oral BID WC    linaclotide  145 mcg Oral QAM AC    montelukast  10 mg Oral Daily    pantoprazole  40 mg Oral QAM AC    predniSONE  10 mg Oral Daily    insulin lispro  0-4 Units SubCUTAneous TID WC    insulin lispro  0-4 Units SubCUTAneous Nightly      sodium chloride 50 mL/hr at 01/23/23 0504    dextrose         Lab Data:  CBC:   Recent Labs     01/24/23  0500   WBC 5.9   HGB 8.0*   PLT 90*     BMP:    Recent Labs     01/22/23  0740 01/23/23  0612 01/24/23  0500    145 147*   K 4.4 4.5 4.8   CO2 26 26 29   BUN 90* 82* 72*   CREATININE 3.0* 2.3* 2.1*     FASTING LIPID PANEL:  Lab Results   Component Value Date/Time    CHOL 116 11/17/2021 10:13 AM    HDL 47 11/17/2021 10:13 AM    HDL 42 12/15/2011 01:57 PM    TRIG 76 11/17/2021 10:13 AM       Assessment / Plan: 1. Chronic diastolic CHF, class 2  Finesse Marinelli is really not significant intravascularly volume overloaded by his RHC today. His PCWP was 18mmHg. He certainly has a lot of 3rd spacing with arm and leg edema. I would not attempt to significantly diurese him as he has improved even with IV fluids. 2.Acute Kidney Injury  Improving and near baseline. Hold diuretics and nephrotoxic agents. 3.Persistent Atrial fibrillation  Finesse Marinelli had been in sinus rhythm up until December 2022 but presented in atrial fibrillation. By telemetry today, he is back in sinus rhythm again. Okay to hold amiodarone given concerns but will send a level. He was taken off his anticoagulation for his anemia. 4.Acute on chronic Respiratory Failure with hypoxia  Unclear etiology but it would not seem that amiodarone is contributing to this. He has been an extremely small dose of amiodarone at 100mg daily for a very long time. We will send an amiodarone level to confirm but blood levels likely extremely low. Will hold amiodarone now given team member's concerns.        Signed:  Rico Haro MD

## 2023-01-24 NOTE — PLAN OF CARE
Problem: Discharge Planning  Goal: Discharge to home or other facility with appropriate resources  1/23/2023 2254 by Pavel Jung RN  Outcome: Ulus Barefoot (Taken 1/23/2023 2130)  Discharge to home or other facility with appropriate resources: Identify barriers to discharge with patient and caregiver     Problem: Pain  Goal: Verbalizes/displays adequate comfort level or baseline comfort level  1/23/2023 2254 by Pavel Jung RN  Outcome: Progressing     Problem: Safety - Adult  Goal: Free from fall injury  1/23/2023 2254 by Pavel Jung RN  Outcome: Progressing     Problem: ABCDS Injury Assessment  Goal: Absence of physical injury  1/23/2023 2254 by Pavel Jung RN  Outcome: Progressing     Problem: Skin/Tissue Integrity  Goal: Absence of new skin breakdown  Description: 1. Monitor for areas of redness and/or skin breakdown  2. Assess vascular access sites hourly  3. Every 4-6 hours minimum:  Change oxygen saturation probe site  4. Every 4-6 hours:  If on nasal continuous positive airway pressure, respiratory therapy assess nares and determine need for appliance change or resting period.   1/23/2023 2254 by Pavel Jung RN  Outcome: Progressing     Problem: Infection - Adult  Goal: Absence of infection at discharge  1/23/2023 2254 by Pavel Jung RN  Outcome: Progressing  Flowsheets (Taken 1/23/2023 2130)  Absence of infection at discharge: Assess and monitor for signs and symptoms of infection     Problem: Infection - Adult  Goal: Absence of infection during hospitalization  1/23/2023 2254 by Pavel Jung RN  Outcome: Progressing  [address] (Taken 1/23/2023 2130)  Absence of infection during hospitalization: Assess and monitor for signs and symptoms of infection     Problem: Infection - Adult  Goal: Absence of fever/infection during anticipated neutropenic period  1/23/2023 2254 by Pavel Jung RN  Outcome: Progressing  Flowsheets (Taken 1/23/2023 2130)  Absence of fever/infection during anticipated neutropenic period: Monitor white blood cell count     Problem: Genitourinary - Adult  Goal: Absence of urinary retention  1/23/2023 2254 by Simon Rg RN  Outcome: Progressing  Flowsheets (Taken 1/23/2023 2130)  Absence of urinary retention: Assess patients ability to void and empty bladder     Problem: Genitourinary - Adult  Goal: Urinary catheter remains patent  1/23/2023 2254 by Simon Rg RN  Outcome: Progressing  Flowsheets (Taken 1/23/2023 2130)  Urinary catheter remains patent: Assess patency of urinary catheter     Problem: Metabolic/Fluid and Electrolytes - Adult  Goal: Electrolytes maintained within normal limits  1/23/2023 2254 by Simon Rg RN  Outcome: Progressing  Flowsheets (Taken 1/23/2023 2130)  Electrolytes maintained within normal limits: Monitor labs and assess patient for signs and symptoms of electrolyte imbalances     Problem: Metabolic/Fluid and Electrolytes - Adult  Goal: Hemodynamic stability and optimal renal function maintained  1/23/2023 2254 by Simon Rg RN  Outcome: Progressing  Flowsheets (Taken 1/23/2023 2130)  Hemodynamic stability and optimal renal function maintained: Monitor labs and assess for signs and symptoms of volume excess or deficit     Problem: Metabolic/Fluid and Electrolytes - Adult  Goal: Glucose maintained within prescribed range  1/23/2023 2254 by Simon Rg RN  Outcome: Progressing  Flowsheets (Taken 1/23/2023 2130)  Glucose maintained within prescribed range: Monitor blood glucose as ordered     Problem: Nutrition Deficit:  Goal: Optimize nutritional status  1/23/2023 2254 by Simon Rg RN  Outcome: Progressing     Problem: Chronic Conditions and Co-morbidities  Goal: Patient's chronic conditions and co-morbidity symptoms are monitored and maintained or improved  1/23/2023 2254 by Simon Rg RN  Outcome: Progressing  Flowsheets (Taken 1/23/2023 2130)  Care Plan - Patient's Chronic Conditions and Co-Morbidity Symptoms are Monitored and Maintained or Improved: Monitor and assess patient's chronic conditions and comorbid symptoms for stability, deterioration, or improvement

## 2023-01-24 NOTE — PROGRESS NOTES
[address] [facility] Internal Medicine Note      Chief Complaint: I want to go home    Subjective/Interval History: This morning the patient continues to be fairly unhappy about his ongoing hospitalization. He is anxious to get home to help with his wife. Case discussed with pulmonary at length, possibly this is an organizing pneumonia and Dr. Lori Griggs feels that the patient would benefit from high-dose of prednisone. Pulmonary also feels that amiodarone may be contributing to his underlying lung disease. Case discussed with Dr. Sage Kennedy of cardiology. He is not convinced that amiodarone is contributing factor given the low dose he is on, but he will consider stopping this. The patient has been in atrial fibrillation during his hospitalization but yesterday was in sinus rhythm. Today it is difficult to determine his rhythm given underlying baseline artifact on telemetry currently. Case discussed at length with therapy. Patient is using the study with nursing staff but he is using the walker with therapy, that being said he is impulsive and fatigues easily. They do not feel he is capable of going home unless there is significant 24-hour care. The patient's wife has cognitive impairment and is substantially smaller than the patient and likely would not be able to provide the care he needs at home. Fortunately the creatinine is improving. No chest pain. No nausea, vomiting, diarrhea. No abdominal pain. No dysuria. The remainder of the review of systems is negative. PMH, PSH, FH/SH reviewed and unchanged as documented in the H&P personally documented at admission 23    Medication list reviewed    Objective:    BP (!) 172/70   Pulse 76   Temp 97.3 °F (36.3 °C) (Oral)   Resp 20   Ht 5' 8\" (1.727 m)   Wt 246 lb 14.6 oz (112 kg)   SpO2 97%   BMI 37.54 kg/m²   Temp  Av.2 °F (36.2 °C)  Min: 96.5 °F (35.8 °C)  Max: 97.4 °F (36.3 °C)    CV-regular on exam.  Telemetry difficult to interpret this morning. Patient was in sinus rhythm yesterday. Pulm-respirations remain easy, currently on 3 L of oxygen per nasal cannula. Patient seems to get tachypneic easily with conversation. No wheezes noted today. Abd- BS+, soft, NTND  Ext-continued 3+ edema of his lower extremities.     The Following Labs Were Reviewed Today:    Recent Results (from the past 24 hour(s))   POCT Glucose    Collection Time: 01/23/23 12:08 PM   Result Value Ref Range    POC Glucose 151 (H) 70 - 99 mg/dl    Performed on ACCU-CHEK    Urinalysis    Collection Time: 01/23/23  3:30 PM   Result Value Ref Range    Color, UA ORANGE (A) Straw/Yellow    Clarity, UA Clear Clear    Glucose, Ur Negative Negative mg/dL    Bilirubin Urine Negative Negative    Ketones, Urine Negative Negative mg/dL    Specific Gravity, UA 1.015 1.005 - 1.030    Blood, Urine LARGE (A) Negative    pH, UA 5.0 5.0 - 8.0    Protein,  (A) Negative mg/dL    Urobilinogen, Urine 0.2 <2.0 E.U./dL    Nitrite, Urine Negative Negative    Leukocyte Esterase, Urine SMALL (A) Negative    Microscopic Examination YES     Urine Type NotGiven    Microscopic Urinalysis    Collection Time: 01/23/23  3:30 PM   Result Value Ref Range    Bacteria, UA None Seen None Seen /HPF    Hyaline Casts, UA 9 (H) 0 - 8 /LPF    WBC, UA 12 (H) 0 - 5 /HPF    RBC,  (H) 0 - 4 /HPF    Epithelial Cells, UA 2 0 - 5 /HPF   POCT Glucose    Collection Time: 01/23/23  5:16 PM   Result Value Ref Range    POC Glucose 269 (H) 70 - 99 mg/dl    Performed on ACCU-CHEK    POCT Glucose    Collection Time: 01/23/23  8:41 PM   Result Value Ref Range    POC Glucose 226 (H) 70 - 99 mg/dl    Performed on ACCU-CHEK    Magnesium    Collection Time: 01/24/23  5:00 AM   Result Value Ref Range    Magnesium 2.30 1.80 - 2.40 mg/dL   CBC    Collection Time: 01/24/23  5:00 AM   Result Value Ref Range    WBC 5.9 4.0 - 11.0 K/uL    RBC 3.24 (L) 4.20 - 5.90 M/uL    Hemoglobin 8.0 (L) 13.5 - 17.5 g/dL    Hematocrit 27.1 (L) 40.5 - 52.5 % MCV 83.6 80.0 - 100.0 fL    MCH 24.8 (L) 26.0 - 34.0 pg    MCHC 29.7 (L) 31.0 - 36.0 g/dL    RDW 21.1 (H) 12.4 - 15.4 %    Platelets 90 (L) 769 - 450 K/uL    MPV 8.7 5.0 - 10.5 fL    PLATELET SLIDE REVIEW Decreased     SLIDE REVIEW see below    Renal Function Panel    Collection Time: 01/24/23  5:00 AM   Result Value Ref Range    Sodium 147 (H) 136 - 145 mmol/L    Potassium 4.8 3.5 - 5.1 mmol/L    Chloride 109 99 - 110 mmol/L    CO2 29 21 - 32 mmol/L    Anion Gap 9 3 - 16    Glucose 144 (H) 70 - 99 mg/dL    BUN 72 (H) 7 - 20 mg/dL    Creatinine 2.1 (H) 0.8 - 1.3 mg/dL    Est, Glom Filt Rate 31 (A) >60    Calcium 8.1 (L) 8.3 - 10.6 mg/dL    Phosphorus 3.8 2.5 - 4.9 mg/dL    Albumin 2.8 (L) 3.4 - 5.0 g/dL   Brain Natriuretic Peptide    Collection Time: 01/24/23  5:00 AM   Result Value Ref Range    Pro-BNP 5,031 (H) 0 - 449 pg/mL       ASSESSMENT/PLAN:      Principal Problem:    Acute on chronic diastolic congestive heart failure -creatinine is improving with fluids. Oxygenation is stable on 3 L of oxygen. Case discussed with Dr. Hermelinda Palencia, he is considering right heart cath to help better understand patient's fluid volume status. Active Problems:    Acute on chronic respiratory failure with hypoxia/HCAP (healthcare-associated pneumonia)-patient now on meropenem. Respiratory panel growing Moraxella catarrhalis and haemophilus influenza. DEISY-improving with fluids. May be able to stop IV fluids today. Await nephrology input. UTI due to extended-spectrum beta lactamase (ESBL) producing Escherichia coli-day #7 of meropenem. Patient is afebrile. Probably stop antibiotics unless pulmonary needs a longer course for his lungs. Hypothermia-seems to have resolved without clear cause. Chronic anemia-appreciate hematology input. Hemoglobin reasonably stable. Chronic GERD-continue Protonix.     Type 2 diabetes mellitus with stage 4 chronic kidney disease, with long-term current use of insulin (HCC)-sugars doing okay.  Continue low-dose Lantus. Continue sliding scale. Continue to monitor    Essential hypertension-antihypertensives remain on hold currently. Consider restarting hydralazine 10 mg 3 times daily. Defer to Dr. Prabha Saenz deficiency-continue with oral B12 supplementation. Atrial fibrillation-rate is controlled. Patient is not an anticoagulation candidate due to occult bleeding in the past and chronic iron deficiency.           Raegan Bueno MD, [address]  8:31 AM  1/24/2023

## 2023-01-24 NOTE — ANESTHESIA PRE-OP
H&P Update    I have reviewed the history and physical and examined the patient and find no relevant changes. I have reviewed with the patient and/or family the risks, benefits, and alternatives to the procedure. Pre-sedation Assessment    Patient:  Madiha Cueto   :   1940  Intended Procedure: Right Heart Catheterization    [facility] nurse's notes reviewed and agreed. Medications reviewed  Allergies: Allergies   Allergen Reactions    Valsartan-Hydrochlorothiazide Other (See Comments)     DEISY    Hytrin [Terazosin Hcl]      Ineffective for BP control    Penicillins      Unknown reaction during childhood; Patient tolerated cephalosporins in the past    Shrimp Flavor Hives    Sulfa Antibiotics      Unknown reaction in childhood    Sulfasalazine Other (See Comments)    Tekturna [Aliskiren Fumarate]      Ineffective    Vancomycin      Fever    Ciprofloxacin Rash     Fever and rash          Pre-Procedure Assessment/Plan:  ASA 3 - Patient with moderate systemic disease with functional limitations  Mallampati 3  Level of Sedation Plan:No sedation    Anginal Classification w/in 2 weeks: 0    Heart Failure w/in 2 weeks:  If yes NYHA class: 2    Post Procedure plan: Return to same level of care

## 2023-01-24 NOTE — PROGRESS NOTES
Pulmonary Critical Care Progress Note     Patient's name:  Alyssa Yost Record Number: [id number]  Patient's account/billing number: [de-identified]  Patient's YOB: 1940  Age: 79 y.o. Date of Admission: 1/17/2023 12:56 PM  Date of Consult: 1/24/2023      Primary Care Physician: Raegan Bueno MD      Code Status: Full Code    Chief complaint: Acute on chronic respiratory failure with hypoxia    Assessment and Plan     Acute on chronic respiratory with hypoxia and hypercapnia  History of H influenza and Moraxella pneumonia  History of organizing pneumonia  Acute kidney injury  ESBL positive UTI  diastolic heart failure  Pulmonary HTN secondary  JOANN    Plan:  Continue antibiotics   Prednisone @ 20 mg po daily  RHC results reviewed   bronchodilators. Acapella  Encourage cpap use     Overnight:  Remains sob  RHC results as following:  RA         14  RV         50/5  PA         53/14 (mean PA 33mmHg)  PCWP   18  Afebrile    REVIEW OF SYSTEMS:  Review of Systems -   General ROS: Weak  Psychological ROS: negative  Ophthalmic ROS: negative  ENT ROS: negative  Allergy and Immunology ROS: negative  Hematological and Lymphatic ROS: negative  Endocrine ROS: negative  Breast ROS: negative  Respiratory ROS: Shortness of breath, lower extremity edema  Cardiovascular ROS: no chest pain or dyspnea on exertion  Gastrointestinal ROS:negative  Genito-Urinary ROS: negative  Musculoskeletal ROS: negative  Neurological ROS: negative  Dermatological ROS: negative        Physical Exam:    Vitals: BP (!) 165/70   Pulse 81   Temp 97.4 °F (36.3 °C) (Oral)   Resp 24   Ht 5' 8\" (1.727 m)   Wt 246 lb 14.6 oz (112 kg)   SpO2 98%   BMI 37.54 kg/m²     Last Body weight:   Wt Readings from Last 3 Encounters:   01/24/23 246 lb 14.6 oz (112 kg)   12/30/22 218 lb 4.1 oz (99 kg)   12/16/22 239 lb (108.4 kg)       Body Mass Index : Body mass index is 37.54 kg/m².       Intake and Output summary: Intake/Output Summary (Last 24 hours) at 1/24/2023 1219  Last data filed at 1/24/2023 7807  Gross per 24 hour   Intake 855 ml   Output 950 ml   Net -95 ml       Physical Examination:     Gen: Lethargic, no acute distress  Eyes: PERRL. Anicteric sclera. No conjunctival injection. ENT: No discharge. Posterior oropharynx clear. External appearance of ears and nose normal.  Neck: Trachea midline. No mass   Resp: Diminished bilaterally, no active wheezing or rhonchi  CV: Regular rate. Regular rhythm. No murmur or rub.+++ edema. GI: Soft, Non-tender. Non-distended. +BS  Skin: Warm, dry, w/o erythema. Lymph: No cervical or supraclavicular LAD. M/S: No cyanosis. No clubbing. Neuro:  CN 2-12 tested, no focal neurologic deficit. Moves all extremities  Psych: Awake lethargic/sleepy no focal deficit      Laboratory findings:-    CBC:   Recent Labs     01/24/23  0500   WBC 5.9   HGB 8.0*   PLT 90*     BMP:    Recent Labs     01/22/23  0740 01/23/23  0612 01/24/23  0500    145 147*   K 4.4 4.5 4.8    108 109   CO2 26 26 29   BUN 90* 82* 72*   CREATININE 3.0* 2.3* 2.1*   GLUCOSE 87 116* 144*     S. Calcium:  Recent Labs     01/24/23  0500   CALCIUM 8.1*     S. Magnesium:  Recent Labs     01/24/23  0500   MG 2.30       S. Glucose:  Recent Labs     01/23/23  1716 01/23/23  2041 01/24/23  0824   POCGLU 269* 226* 161*           Radiology Review:  Pertinent images / reports were reviewed as a part of this visit.         Fidencio Correa MD, MRYAN.            1/24/2023, 12:19 PM

## 2023-01-24 NOTE — CARE COORDINATION
Discharge Planning:  Chart reviewed. Pt is currently off the unit getting a right heart cath. SW has been working with this pt re: his d/c plan. Pt has been [facility]ant [facility] despite the recommendations from PT/OT. SW will continue to follow to assist with d/c planning.    DUDLEY Ramirez  [phone]  Electronically signed by Nilda Carrillo on 1/24/2023 at 10:57 AM

## 2023-01-24 NOTE — BRIEF OP NOTE
RIGHT HEART CATHETERIZATION Brief Postoperative Note      Patient: Fabiana Huff  YOB: 1940  MRN: [id number]    Date of Procedure: 1/24/23    Pre-Op Diagnosis: ACUTE ON CHRONIC RESPIRATORY FAILURE WITH HYPOXIA  Post-Op Diagnosis: Same         Findings:       Pressures  RA 14  RV 50/5  PA 53/14 (mean PA 33mmHg)  PCWP 18    Cardiac Outputs  Vincent  9.41 (CI 4.22)  Thermo  6.27  (CI 2.81)    Saturations:  RA 65%  PA 62%    Pulmonary Vasc Resistance 128 dynes/sec/m2    Electronically signed by Sol Tomlinson MD on 1/24/2023 at 10:56 AM

## 2023-01-24 NOTE — PROGRESS NOTES
n  Department of Internal Medicine  Nephrology Progress Note       He is an 58-year-old pleasant gentleman  with past medical history significant for coronary artery disease,  congestive heart failure, morbid obesity, COPD, obstructive sleep apnea,  chronic respiratory failure, hypercholesterolemia, chronic kidney  disease stage IV came to ER complaining of shortness of breath, dyspnea  on exertion, as well as difficulty with urination. The patient was  found to have urinary outlet obstruction with UTI (ESBL). The patient  was also diagnosed with pneumonia and admitted for further workup and management. Renal consultation has been called for worsening kidney Function. HPI:  Breathing stable. Labs reviewed  . ROS:  Feels weak / tired . No CP,  some SOB or GIBBONS . Rest ROS neg . [address] [place]:  medications reviewed. Physical Exam:    VITALS:  BP (!) 165/70   Pulse 81   Temp 97.4 °F (36.3 °C) (Oral)   Resp 24   Ht 5' 8\" (1.727 m)   Wt 246 lb 14.6 oz (112 kg)   SpO2 98%   BMI 37.54 kg/m²   24HR INTAKE/OUTPUT:    Intake/Output Summary (Last 24 hours) at 1/24/2023 1206  Last data filed at 1/24/2023 0924  Gross per 24 hour   Intake 855 ml   Output 950 ml   Net -95 ml         Constitutional:  looks comfortable   Respiratory:  decrease BS at bases / rhonchi   Gastrointestinal:  no  epigastric tenderness. Normal Bowel Sounds  Cardiovascular:  S1, S2 irregular.   Edema:  + + edema    DATA:    CBC:  Lab Results   Component Value Date/Time    WBC 5.9 01/24/2023 05:00 AM    RBC 3.24 01/24/2023 05:00 AM    HGB 8.0 01/24/2023 05:00 AM    HCT 27.1 01/24/2023 05:00 AM    MCV 83.6 01/24/2023 05:00 AM    MCH 24.8 01/24/2023 05:00 AM    MCHC 29.7 01/24/2023 05:00 AM    RDW 21.1 01/24/2023 05:00 AM    PLT 90 01/24/2023 05:00 AM    MPV 8.7 01/24/2023 05:00 AM     CMP:  Lab Results   Component Value Date/Time     01/24/2023 05:00 AM    K 4.8 01/24/2023 05:00 AM    K 4.2 01/17/2023 02:53 PM     01/24/2023 05:00 AM    CO2 29 01/24/2023 05:00 AM    BUN 72 01/24/2023 05:00 AM    CREATININE 2.1 01/24/2023 05:00 AM    GFRAA 37 05/27/2022 02:05 PM    GFRAA >60 04/19/2013 10:46 AM    AGRATIO 1.1 01/17/2023 02:53 PM    LABGLOM 31 01/24/2023 05:00 AM    GLUCOSE 144 01/24/2023 05:00 AM    GLUCOSE 96 06/16/2011 10:37 AM    PROT 5.9 01/17/2023 02:53 PM    PROT 6.4 12/12/2012 08:05 AM    CALCIUM 8.1 01/24/2023 05:00 AM    BILITOT 0.5 01/17/2023 02:53 PM    ALKPHOS 81 01/17/2023 02:53 PM    AST 16 01/17/2023 02:53 PM    ALT 30 01/17/2023 02:53 PM      Hepatic Function Panel:   Lab Results   Component Value Date/Time    ALKPHOS 81 01/17/2023 02:53 PM    ALT 30 01/17/2023 02:53 PM    AST 16 01/17/2023 02:53 PM    PROT 5.9 01/17/2023 02:53 PM    PROT 6.4 12/12/2012 08:05 AM    BILITOT 0.5 01/17/2023 02:53 PM    BILIDIR <0.2 01/12/2023 12:23 PM    IBILI see below 01/12/2023 12:23 PM      Phosphorus:   Lab Results   Component Value Date/Time    PHOS 3.8 01/24/2023 05:00 AM       ASSESSMENT/PLAN:    DEISY   - most likely multifactorial ( low  BP/ ARbs /diuretics/UTI )  - renal function slowly improving  - dc IVF, will need resumption of diuretics soon . HTN   -controlled     ESBL UTI  - on meropenem    HCAP    - on meropenem    Ac /ch resp failure   Stable     Obesity with JOANN     Over all prognosis guarded .       Olivia Ramesh MD,FACP

## 2023-01-24 NOTE — CARE COORDINATION
Attempted to see pt. Pt currently off unit. Will attempt to see pt.  Moncho Chopra, MSN, RN, Dora Jacob

## 2023-01-24 NOTE — PROCEDURES
[address] [place] [address] [facility]                            CARDIAC CATHETERIZATION    PATIENT NAME: Nidia Cadet                    :        1940  MED REC NO:   [id number]                          ROOM:       4110  ACCOUNT NO:   [de-identified]                           ADMIT DATE: 2023  PROVIDER:     Paco Sharma MD    DATE OF PROCEDURE:  2023    INDICATIONS FOR PROCEDURE:  Acute-on-chronic respiratory failure with  hypoxia. The risks and benefits of the procedure were explained to the patient. Informed consent was obtained. He was placed on the cardiac  catheterization table and prepped and draped in sterile fashion. Anesthesia was provided in antecubital fossa with 1% lidocaine injected  subcutaneously and deep. Using ultrasound guidance, the right  brachiocephalic vein was accessed and cannulated and a 7-Khmer sheath  inserted using Seldinger technique. The pulmonary artery catheter was then advanced with some difficulty  into the superior vena cava where an oxygen saturation was obtained. Catheter was then advanced down to the right atrium for an oxygen  saturation. It was flushed and placed on pressure. After right atrial  pressure tracing was obtained, the catheter was advanced sequentially  for pressure tracings into the right ventricle, pulmonary artery and  pulmonary capillary wedge positions. The balloon was deflated and a  pulmonary arterial oxygen saturation was performed. Next, cardiac  outputs by both thermodilution and Vincent equation were performed. The  pulmonary artery catheter was removed. The right brachiocephalic sheath  was also removed and manual pressure applied for hemostasis. There were  no complications from the procedure and estimated blood loss was less  than 20 mL. No sedation was given for this procedure. FINDINGS:  1.   Elevated right atrial pressure at 14 mmHg, but only a mildly  elevated pulmonary capillary wedge pressure at 18 mmHg. 2.  Pulmonary hypertension, likely mixed with an instantaneous pressure  of 53/14 and a mean pulmonary arterial pressure of 33 mmHg. 3.  Normal cardiac output by thermodilution at 6.27 liters per minute  with a cardiac index of 2.81 liters per kilogram per minute. By Linnette Borrow  equation cardiac index is 4.22 liters per kilogram per minute. 4.  Pulmonary vascular resistance 128 dynes per second per meter  squared. 5.  Normal to mildly reduced mixed venous oxygen saturations with an SVC  of 60 with a right atrium of 65% and pulmonary artery of 62%. The  patient saturating 94% on pulse oximetry on 3 liters at which time the  study was performed.         Homer Carroll MD    D: 01/24/2023 11:04:54       T: 01/24/2023 11:07:14     [email or web address]  Job#: [id number]     Doc#: [id number]    CC:  Veronica Joel MD

## 2023-01-25 ENCOUNTER — APPOINTMENT (OUTPATIENT)
Dept: GENERAL RADIOLOGY | Age: 83
DRG: 286 | End: 2023-01-25
Payer: MEDICARE

## 2023-01-25 PROBLEM — R68.0 HYPOTHERMIA DUE TO NON-ENVIRONMENTAL CAUSE: Status: ACTIVE | Noted: 2023-01-01

## 2023-01-25 LAB
ALBUMIN SERPL-MCNC: 2.5 G/DL (ref 3.4–5)
ANION GAP SERPL CALCULATED.3IONS-SCNC: 9 MMOL/L (ref 3–16)
BUN BLDV-MCNC: 59 MG/DL (ref 7–20)
CALCIUM SERPL-MCNC: 8.4 MG/DL (ref 8.3–10.6)
CHLORIDE BLD-SCNC: 108 MMOL/L (ref 99–110)
CO2: 24 MMOL/L (ref 21–32)
CREAT SERPL-MCNC: 1.7 MG/DL (ref 0.8–1.3)
GFR SERPL CREATININE-BSD FRML MDRD: 39 ML/MIN/{1.73_M2}
GLUCOSE BLD-MCNC: 170 MG/DL (ref 70–99)
GLUCOSE BLD-MCNC: 200 MG/DL (ref 70–99)
GLUCOSE BLD-MCNC: 205 MG/DL (ref 70–99)
GLUCOSE BLD-MCNC: 233 MG/DL (ref 70–99)
GLUCOSE BLD-MCNC: 239 MG/DL (ref 70–99)
HCT VFR BLD CALC: 27.2 % (ref 40.5–52.5)
HEMOGLOBIN: 8 G/DL (ref 13.5–17.5)
MAGNESIUM: 2.4 MG/DL (ref 1.8–2.4)
MCH RBC QN AUTO: 24.7 PG (ref 26–34)
MCHC RBC AUTO-ENTMCNC: 29.3 G/DL (ref 31–36)
MCV RBC AUTO: 84.3 FL (ref 80–100)
PDW BLD-RTO: 20.4 % (ref 12.4–15.4)
PERFORMED ON: ABNORMAL
PHOSPHORUS: 3.2 MG/DL (ref 2.5–4.9)
PLATELET # BLD: 88 K/UL (ref 135–450)
PMV BLD AUTO: 8.9 FL (ref 5–10.5)
POTASSIUM SERPL-SCNC: 5.1 MMOL/L (ref 3.5–5.1)
RBC # BLD: 3.22 M/UL (ref 4.2–5.9)
SODIUM BLD-SCNC: 141 MMOL/L (ref 136–145)
WBC # BLD: 7.3 K/UL (ref 4–11)

## 2023-01-25 PROCEDURE — 2580000003 HC RX 258: Performed by: INTERNAL MEDICINE

## 2023-01-25 PROCEDURE — 97116 GAIT TRAINING THERAPY: CPT | Performed by: PHYSICAL THERAPIST

## 2023-01-25 PROCEDURE — 94660 CPAP INITIATION&MGMT: CPT

## 2023-01-25 PROCEDURE — 6360000002 HC RX W HCPCS: Performed by: INTERNAL MEDICINE

## 2023-01-25 PROCEDURE — 6370000000 HC RX 637 (ALT 250 FOR IP): Performed by: INTERNAL MEDICINE

## 2023-01-25 PROCEDURE — 83735 ASSAY OF MAGNESIUM: CPT

## 2023-01-25 PROCEDURE — 85027 COMPLETE CBC AUTOMATED: CPT

## 2023-01-25 PROCEDURE — 94640 AIRWAY INHALATION TREATMENT: CPT

## 2023-01-25 PROCEDURE — 97530 THERAPEUTIC ACTIVITIES: CPT | Performed by: PHYSICAL THERAPIST

## 2023-01-25 PROCEDURE — 99233 SBSQ HOSP IP/OBS HIGH 50: CPT | Performed by: INTERNAL MEDICINE

## 2023-01-25 PROCEDURE — 94760 N-INVAS EAR/PLS OXIMETRY 1: CPT

## 2023-01-25 PROCEDURE — 80069 RENAL FUNCTION PANEL: CPT

## 2023-01-25 PROCEDURE — 99232 SBSQ HOSP IP/OBS MODERATE 35: CPT | Performed by: INTERNAL MEDICINE

## 2023-01-25 PROCEDURE — 1200000000 HC SEMI PRIVATE

## 2023-01-25 PROCEDURE — 51702 INSERT TEMP BLADDER CATH: CPT

## 2023-01-25 PROCEDURE — 36415 COLL VENOUS BLD VENIPUNCTURE: CPT

## 2023-01-25 PROCEDURE — 2700000000 HC OXYGEN THERAPY PER DAY

## 2023-01-25 PROCEDURE — 71045 X-RAY EXAM CHEST 1 VIEW: CPT

## 2023-01-25 PROCEDURE — 94669 MECHANICAL CHEST WALL OSCILL: CPT

## 2023-01-25 RX ORDER — TORSEMIDE 20 MG/1
40 TABLET ORAL DAILY
Status: DISCONTINUED | OUTPATIENT
Start: 2023-01-25 | End: 2023-01-27

## 2023-01-25 RX ORDER — FUROSEMIDE 10 MG/ML
20 INJECTION INTRAMUSCULAR; INTRAVENOUS ONCE
Status: COMPLETED | OUTPATIENT
Start: 2023-01-25 | End: 2023-01-25

## 2023-01-25 RX ADMIN — SODIUM CHLORIDE SOLN NEBU 3% 4 ML: 3 NEBU SOLN at 12:34

## 2023-01-25 RX ADMIN — SODIUM CHLORIDE SOLN NEBU 3% 4 ML: 3 NEBU SOLN at 08:30

## 2023-01-25 RX ADMIN — SODIUM CHLORIDE SOLN NEBU 3% 4 ML: 3 NEBU SOLN at 17:00

## 2023-01-25 RX ADMIN — ATORVASTATIN CALCIUM 40 MG: 40 TABLET, FILM COATED ORAL at 08:10

## 2023-01-25 RX ADMIN — FUROSEMIDE 20 MG: 10 INJECTION, SOLUTION INTRAMUSCULAR; INTRAVENOUS at 14:08

## 2023-01-25 RX ADMIN — PREDNISONE 20 MG: 20 TABLET ORAL at 08:10

## 2023-01-25 RX ADMIN — HYDRALAZINE HYDROCHLORIDE 10 MG: 10 TABLET, FILM COATED ORAL at 14:08

## 2023-01-25 RX ADMIN — Medication 1 CAPSULE: at 17:52

## 2023-01-25 RX ADMIN — INSULIN LISPRO 1 UNITS: 100 INJECTION, SOLUTION INTRAVENOUS; SUBCUTANEOUS at 17:52

## 2023-01-25 RX ADMIN — LEVALBUTEROL HYDROCHLORIDE 1.25 MG: 1.25 SOLUTION, CONCENTRATE RESPIRATORY (INHALATION) at 17:00

## 2023-01-25 RX ADMIN — BISACODYL 5 MG: 5 TABLET, COATED ORAL at 08:10

## 2023-01-25 RX ADMIN — DOCUSATE SODIUM 100 MG: 100 CAPSULE, LIQUID FILLED ORAL at 08:10

## 2023-01-25 RX ADMIN — MONTELUKAST 10 MG: 10 TABLET, FILM COATED ORAL at 08:10

## 2023-01-25 RX ADMIN — CYANOCOBALAMIN TAB 500 MCG 500 MCG: 500 TAB at 08:10

## 2023-01-25 RX ADMIN — HEPARIN SODIUM 5000 UNITS: 5000 INJECTION INTRAVENOUS; SUBCUTANEOUS at 05:46

## 2023-01-25 RX ADMIN — OXYCODONE HYDROCHLORIDE AND ACETAMINOPHEN 500 MG: 500 TABLET ORAL at 08:10

## 2023-01-25 RX ADMIN — PANTOPRAZOLE SODIUM 40 MG: 40 TABLET, DELAYED RELEASE ORAL at 05:46

## 2023-01-25 RX ADMIN — LEVALBUTEROL HYDROCHLORIDE 1.25 MG: 1.25 SOLUTION, CONCENTRATE RESPIRATORY (INHALATION) at 08:29

## 2023-01-25 RX ADMIN — SODIUM CHLORIDE, PRESERVATIVE FREE 10 ML: 5 INJECTION INTRAVENOUS at 08:10

## 2023-01-25 RX ADMIN — LEVALBUTEROL HYDROCHLORIDE 1.25 MG: 1.25 SOLUTION, CONCENTRATE RESPIRATORY (INHALATION) at 12:34

## 2023-01-25 RX ADMIN — DOCUSATE SODIUM 100 MG: 100 CAPSULE, LIQUID FILLED ORAL at 21:41

## 2023-01-25 RX ADMIN — Medication 1 CAPSULE: at 08:10

## 2023-01-25 RX ADMIN — HYDRALAZINE HYDROCHLORIDE 10 MG: 10 TABLET, FILM COATED ORAL at 21:41

## 2023-01-25 RX ADMIN — LEVALBUTEROL HYDROCHLORIDE 1.25 MG: 1.25 SOLUTION, CONCENTRATE RESPIRATORY (INHALATION) at 21:21

## 2023-01-25 RX ADMIN — ASPIRIN 162 MG: 81 TABLET, COATED ORAL at 08:10

## 2023-01-25 RX ADMIN — SODIUM CHLORIDE, PRESERVATIVE FREE 10 ML: 5 INJECTION INTRAVENOUS at 21:41

## 2023-01-25 RX ADMIN — INSULIN LISPRO 1 UNITS: 100 INJECTION, SOLUTION INTRAVENOUS; SUBCUTANEOUS at 11:34

## 2023-01-25 RX ADMIN — HEPARIN SODIUM 5000 UNITS: 5000 INJECTION INTRAVENOUS; SUBCUTANEOUS at 21:41

## 2023-01-25 RX ADMIN — TORSEMIDE 40 MG: 20 TABLET ORAL at 17:52

## 2023-01-25 RX ADMIN — TAMSULOSIN HYDROCHLORIDE 0.4 MG: 0.4 CAPSULE ORAL at 08:10

## 2023-01-25 RX ADMIN — MEROPENEM 500 MG: 500 INJECTION, POWDER, FOR SOLUTION INTRAVENOUS at 11:34

## 2023-01-25 RX ADMIN — HEPARIN SODIUM 5000 UNITS: 5000 INJECTION INTRAVENOUS; SUBCUTANEOUS at 14:08

## 2023-01-25 RX ADMIN — INSULIN GLARGINE 10 UNITS: 100 INJECTION, SOLUTION SUBCUTANEOUS at 08:11

## 2023-01-25 RX ADMIN — POLYETHYLENE GLYCOL 3350 17 G: 17 POWDER, FOR SOLUTION ORAL at 08:10

## 2023-01-25 RX ADMIN — SODIUM CHLORIDE SOLN NEBU 3% 15 ML: 3 NEBU SOLN at 21:21

## 2023-01-25 NOTE — PLAN OF CARE
Problem: Discharge Planning  Goal: Discharge to home or other facility with appropriate resources  Outcome: Progressing  Flowsheets (Taken 1/25/2023 8930)  Discharge to home or other facility with appropriate resources: Identify barriers to discharge with patient and caregiver     Problem: Pain  Goal: Verbalizes/displays adequate comfort level or baseline comfort level  Outcome: Progressing     Problem: Safety - Adult  Goal: Free from fall injury  1/25/2023 1112 by Juan Daniel Richardson RN  Outcome: Progressing  1/25/2023 0510 by Sandrita Herr RN  Outcome: Progressing  Note: D: Pt. Alert and oriented, calls for assist as needed, call light in reach and bed alarm on  A: encouraged to call for assist as needed  R: without falls this shift     Problem: ABCDS Injury Assessment  Goal: Absence of physical injury  Outcome: Progressing     Problem: Skin/Tissue Integrity  Goal: Absence of new skin breakdown  Description: 1. Monitor for areas of redness and/or skin breakdown  2. Assess vascular access sites hourly  3. Every 4-6 hours minimum:  Change oxygen saturation probe site  4. Every 4-6 hours:  If on nasal continuous positive airway pressure, respiratory therapy assess nares and determine need for appliance change or resting period.   Outcome: Progressing     Problem: Infection - Adult  Goal: Absence of infection at discharge  Outcome: Progressing  Flowsheets (Taken 1/25/2023 0811)  Absence of infection at discharge: Assess and monitor for signs and symptoms of infection  Goal: Absence of infection during hospitalization  Outcome: Progressing  Flowsheets (Taken 1/25/2023 0811)  Absence of infection during hospitalization: Assess and monitor for signs and symptoms of infection  Goal: Absence of fever/infection during anticipated neutropenic period  Outcome: Progressing  Flowsheets (Taken 1/25/2023 0811)  Absence of fever/infection during anticipated neutropenic period: Monitor white blood cell count     Problem: Genitourinary - Adult  Goal: Absence of urinary retention  Outcome: Progressing  Flowsheets (Taken 1/25/2023 0811)  Absence of urinary retention: Assess patients ability to void and empty bladder  Goal: Urinary catheter remains patent  Outcome: Progressing  Flowsheets (Taken 1/25/2023 0811)  Urinary catheter remains patent: Assess patency of urinary catheter     Problem: Metabolic/Fluid and Electrolytes - Adult  Goal: Electrolytes maintained within normal limits  Outcome: Progressing  Flowsheets (Taken 1/25/2023 0811)  Electrolytes maintained within normal limits: Monitor labs and assess patient for signs and symptoms of electrolyte imbalances  Goal: Hemodynamic stability and optimal renal function maintained  Outcome: Progressing  Flowsheets (Taken 1/25/2023 0811)  Hemodynamic stability and optimal renal function maintained: Monitor labs and assess for signs and symptoms of volume excess or deficit  Goal: Glucose maintained within prescribed range  Outcome: Progressing  Flowsheets (Taken 1/25/2023 0811)  Glucose maintained within prescribed range: Monitor blood glucose as ordered     Problem: Nutrition Deficit:  Goal: Optimize nutritional status  Outcome: Progressing     Problem: Chronic Conditions and Co-morbidities  Goal: Patient's chronic conditions and co-morbidity symptoms are monitored and maintained or improved  Outcome: Progressing  Flowsheets (Taken 1/25/2023 0811)  Care Plan - Patient's Chronic Conditions and Co-Morbidity Symptoms are Monitored and Maintained or Improved: Monitor and assess patient's chronic conditions and comorbid symptoms for stability, deterioration, or improvement

## 2023-01-25 NOTE — CARE COORDINATION
Attempted to see pt. Currently up in chair. RN to notify wound care when pt returns to bed.  Alfonso Sarkar, MSN, RN, [facility] Statement Selected

## 2023-01-25 NOTE — PROGRESS NOTES
Physical Therapy  Facility/Department: [address] MED SURG  Physical Therapy Daily Treatment Note  This note also serves as a D/C Summary in the event that this patient is discharged prior to the next therapy session. Please refer to last PT note for goal status, discharge recommendations and functional status. Name: Travis Benites  : 1940  MRN: [id number]  Date of Service: 2023    Discharge Recommendations:  24 hour supervision or assist, Home with Home health PT, Patient would benefit from continued therapy after discharge (3-5 more appropriate if the pt is agreeable)   PT Equipment Recommendations  Equipment Needed: No - owns rollator    Gilmar Suresh scored a 17/24 on the AM-PAC short mobility form. Current research shows that an AM-PAC score of 18 or greater is typically associated with a discharge to the patient's home setting. Based on the patient's AM-PAC score and their current functional mobility deficits, it is recommended that the patient have 2-3 sessions per week of Physical Therapy at d/c to increase the patient's independence. At this time, this patient demonstrates the endurance and safety to discharge home with 24/7 assist and home therapy services with a follow up treatment frequency of 2-3x/wk. Please see assessment section for further patient specific details. Patient could likely benefit from 3-5 days/week on SNU but uncertain if agreeable. Patient Diagnosis(es): The primary encounter diagnosis was Acute on chronic congestive heart failure, unspecified heart failure type ([address] Diagnoses of Elevated troponin, Chronic kidney disease, unspecified CKD stage, and Urinary tract infection with hematuria, site unspecified were also pertinent to this visit.   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 Past Surgical History:  has a past surgical history that includes Gallbladder surgery (1981); Upper gastrointestinal endoscopy (7-2005    7/10/2009); Colonoscopy (7/10/2009); Cataract removal (2/2012); Pain management procedure (Right, 10/20/2021); Pain management procedure (Left, 12/8/2021); CT BIOPSY BONE MARROW (11/1/2022); bronchoscopy (N/A, 12/28/2022); and bronchoscopy (12/28/2022). Assessment   Body Structures, Functions, Activity Limitations Requiring Skilled Therapeutic Intervention: Decreased functional mobility ; Decreased endurance  Assessment: Today, the pt showed slight improvement and was able to use a walker to get to the toilet and then to the recliner chair. The pt does demonstrate impulsiveness with his transfers and use of the walker and needed min cues for safety. He was able to walk 10 feet bed to toilet and then 25' toilet to recliner with the wh walker and CGA, no siginificant loss of balance. He did take standing rest break leaning on forearms to wash hands at sink, but did not feel need to sit down prior to ambulating to recliner. Patient mentioned blood in his urine and weeping from his skin - asked \"why don't they just let me die. \"  However, then mentioned that he was the caregiver for his wife who has dementia, daughter is currently taking care of her. The pt continues to prefer going home and IF home, he will need 24/7 hands-on assist and level 3 home PT. The pt is not safe and his wife does not appear to be able to give adequate assist. Will con't to follow while he is on the acute care floor to improve his strength, his mobility and his tolerance for activity.  Progress is limited by the pt's resistance to education and his self-limiting behaviors. Therapy Prognosis: Fair  Barriers to Learning: motivation, resistance to education  Requires PT Follow-Up: Yes  Activity Tolerance  Activity Tolerance: Patient limited by fatigue;Patient limited by endurance  Activity Tolerance Comments: SpO2 on 4L O2, O2 decreased to 81% with ambulation, returned to 90% after couple minutes of seated rest and pursed lip breathing     Plan   Physcial Therapy Plan  General Plan: 3-5 times per week  Current Treatment Recommendations: Strengthening, Balance training, Functional mobility training, Transfer training, Gait training, Therapeutic activities, Safety education & training, Patient/Caregiver education & training  Safety Devices  Type of Devices: Call light within reach, Chair alarm in place, Gait belt, Patient at risk for falls, Left in chair     Restrictions  Restrictions/Precautions  Restrictions/Precautions: Fall Risk, Contact Precautions  Position Activity Restriction  Other position/activity restrictions: Contact Precautions (ESBL); 4 L O2     Subjective   General  Chart Reviewed: Yes  Additional Pertinent Hx: Per Lukas Dove MD H&P on 1-: The pt is an 80 yo male who presented to the ED with increasing SOB and O2 needs. Troponins were elevated, he has had increased weight, had elevated BNP, urinalysis suggested infection and CXR: \"shows either multifocal pneumonia or heart failure\". The pt recently in the hospital and on ARU, was d/c on 12-8-2022 and readmitted on 12-. The pt had been d/c to home each time with family. PMHx: asthma, a-fib, CKD, carotid artery stenosis, COPD, CHF, HTN, anemia, JOANN, DM  Response To Previous Treatment: Patient reporting fatigue but able to participate.   Family / Caregiver Present: Yes (wife)  Referring Practitioner: Lukas Dove MD  Referral Date : 01/17/23  Diagnosis: Acute on chronic diastolic congestive heart failure, Acute on chronic respiratory failure with hypoxia  Follows Commands: Impaired  Other (Comment): delayed  Subjective  Subjective: Patient lying in bed with call light on - states that he has been waitning for 45 minutes to go to the bathrom - has catheter but needs to have BM. Demanding and insisting bed needs to be lowered although fully lowered feet and bed height. Gruff and insisting that he would be better off at home. Reports pain in his stomach with need to have BM, headache and back pain. Agreeable to allow PT to assist him to the toilet.          Social/Functional History  Social/Functional History  Lives With: Spouse  Type of Home: House  Home Layout: Able to Live on Main level with bedroom/bathroom, Multi-level  Home Access: Stairs to enter with rails  Entrance Stairs - Number of Steps: 1+1  Entrance Stairs - Rails: Both  Bathroom Shower/Tub: Tub/Shower unit  Bathroom Toilet: Standard  Bathroom Equipment: Shower chair, Grab bars in shower, Tub transfer bench  Home Equipment: Cane, Rollator, Oxygen, Grab bars (2L O2)  Has the patient had two or more falls in the past year or any fall with injury in the past year?: Yes  ADL Assistance: Needs assistance (assist LB)  Homemaking Assistance: Needs assistance (wife cleans, pt manages bill paying)  Homemaking Responsibilities: No  Ambulation Assistance: Independent (with rollator)  Transfer Assistance: Independent  Active : Yes  Occupation: Retired  Type of Occupation: [employer]  [employer] [employer]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, kindra saw  IADL Comments: sleeps in a recliner but reports he does get into bed on a regular basis  Vision/Hearing       Cognition   Cognition  Overall Cognitive Status: Exceptions  Safety Judgement: Decreased awareness of need for safety;Decreased awareness of need for assistance  Problem Solving: Decreased awareness of errors  Insights: Decreased awareness of deficits  Cognition Comment: but with decreased insight, frustrated need to wait for assistance     Objective        Bed mobility  Supine to Sit: Stand by assistance (HOB elevated; increased effort, encouargement)  Sit to Supine: Unable to assess  Scooting: Stand by assistance  Transfers  Sit to Stand: Contact guard assistance  Stand to Sit: Contact guard assistance  Comment: cues for hand placement - prefers to place one hand on surface and other on walker - safe although not preferred - bed to toilet to recliner  Ambulation  Surface: Level tile  Device: Rolling Walker  Other Apparatus: O2 (O2 line partially managed by pt and by therapist)  Assistance: Contact guard assistance  Quality of Gait: the pt is impulsive and needs occasional cues and assist for safe walker management; limited distance due to decreased activity tolerance; shaky initially with sit to stand, assist to safely manage O2 tubing  Gait Deviations: Slow Riri; Increased KATLYN  Distance: 10' bed to toilet, 25' toilet to recliner  Comments: 4L O2, O2 decreased to 81% with ambulation, returned to 90% after couple minutes of seated rest and pursed lip breathing     Balance  Sitting - Static: Good  Sitting - Dynamic: Good  Standing - Static: Good;-  Standing - Dynamic: Fair  Comments: supervision/mod I seated on toilet, CGA with wh walker for ambulation           AM-PAC Score  AM-PAC Inpatient Mobility Raw Score : 17 (01/25/23 0923)  AM-PAC Inpatient T-Scale Score : 42.13 (01/25/23 0923)  Mobility Inpatient CMS 0-100% Score: 50.57 (01/25/23 0923)  Mobility Inpatient CMS G-Code Modifier : CK (01/25/23 0923)             Goals  Short Term Goals  Time Frame for Short Term Goals: upon d/c  (slow progression toward goals)  Short Term Goal 1: Bed mobility with mod I. Short Term Goal 2: Transfer sit <> stand to a walker with SBA. Short Term Goal 3: Ambulate with wh walker 25 feet with SBA.   Patient Goals   Patient Goals : none stated       Education  Patient Education  Education Given To: Patient  Education Provided: Role of Therapy;Plan of Care;Home Exercise Program;Energy Conservation  Education Provided Comments: importance of mobility and being out of the bed, current need for medical attention in the hospital to address his concerns of blood in his urine and weeping from his skin  Education Method: Verbal  Barriers to Learning: Other (Comment) (self-limiting and resistant to education)  Education Outcome: Continued education needed      Therapy Time   Individual Concurrent Group Co-treatment   Time In 0840         Time Out 0920         Minutes 40         Timed Code Treatment Minutes: Maxime [address], [address]

## 2023-01-25 NOTE — PLAN OF CARE
Problem: Safety - Adult  Goal: Free from fall injury  Outcome: Progressing  Note: D: Pt.  Alert and oriented, calls for assist as needed, call light in reach and bed alarm on  A: encouraged to call for assist as needed  R: without falls this shift

## 2023-01-25 NOTE — PROGRESS NOTES
Pulmonary Critical Care Progress Note     Patient's name:  Alyssa Yost Record Number: [id number]  Patient's account/billing number: [de-identified]  Patient's YOB: 1940  Age: 79 y.o. Date of Admission: 1/17/2023 12:56 PM  Date of Consult: 1/25/2023      Primary Care Physician: Ayla Gallo MD      Code Status: Full Code    Chief complaint: Acute on chronic respiratory failure with hypoxia    Assessment and Plan     Acute on chronic respiratory with hypoxia and hypercapnia  History of H influenza and Moraxella pneumonia  History of organizing pneumonia  Acute kidney injury  ESBL positive UTI  diastolic heart failure  Pulmonary HTN secondary  JOANN    Plan:  Continue antibiotics   Prednisone @ 20 mg po daily  RHC results reviewed  Diuresis/UF per nephrology   bronchodilators. Acapella  Encourage cpap use     Overnight:  Remains sob  RHC results as following:  RA         14  RV         50/5  PA         53/14 (mean PA 33mmHg)  PCWP   18  Afebrile    REVIEW OF SYSTEMS:  Review of Systems -   General ROS: Weak  Psychological ROS: negative  Ophthalmic ROS: negative  ENT ROS: negative  Allergy and Immunology ROS: negative  Hematological and Lymphatic ROS: negative  Endocrine ROS: negative  Breast ROS: negative  Respiratory ROS: Shortness of breath, lower extremity edema  Cardiovascular ROS: no chest pain or dyspnea on exertion  Gastrointestinal ROS:negative  Genito-Urinary ROS: negative  Musculoskeletal ROS: negative  Neurological ROS: negative  Dermatological ROS: negative        Physical Exam:    Vitals: BP (!) 167/75   Pulse 78   Temp 97.5 °F (36.4 °C) (Oral)   Resp 18   Ht 5' 8\" (1.727 m)   Wt 241 lb 13.5 oz (109.7 kg)   SpO2 95%   BMI 36.77 kg/m²     Last Body weight:   Wt Readings from Last 3 Encounters:   01/25/23 241 lb 13.5 oz (109.7 kg)   12/30/22 218 lb 4.1 oz (99 kg)   12/16/22 239 lb (108.4 kg)       Body Mass Index : Body mass index is 36.77 kg/m².       Intake and Output summary:   Intake/Output Summary (Last 24 hours) at 1/25/2023 1020  Last data filed at 1/25/2023 0556  Gross per 24 hour   Intake 300 ml   Output 250 ml   Net 50 ml       Physical Examination:     Gen: Lethargic, no acute distress  Eyes: PERRL. Anicteric sclera. No conjunctival injection. ENT: No discharge. Posterior oropharynx clear. External appearance of ears and nose normal.  Neck: Trachea midline. No mass   Resp: Diminished bilaterally, no active wheezing or rhonchi  CV: Regular rate. Regular rhythm. No murmur or rub.+++ edema. GI: Soft, Non-tender. Non-distended. +BS  Skin: Warm, dry, w/o erythema. Lymph: No cervical or supraclavicular LAD. M/S: No cyanosis. No clubbing. Neuro:  CN 2-12 tested, no focal neurologic deficit. Moves all extremities  Psych: Awake lethargic/sleepy no focal deficit      Laboratory findings:-    CBC:   Recent Labs     01/24/23  0500   WBC 5.9   HGB 8.0*   PLT 90*     BMP:    Recent Labs     01/23/23  0612 01/24/23  0500    147*   K 4.5 4.8    109   CO2 26 29   BUN 82* 72*   CREATININE 2.3* 2.1*   GLUCOSE 116* 144*     S. Calcium:  Recent Labs     01/24/23  0500   CALCIUM 8.1*     S. Magnesium:  Recent Labs     01/24/23  0500   MG 2.30       S. Glucose:  Recent Labs     01/24/23  1649 01/24/23  2040 01/25/23  0758   POCGLU 212* 286* 170*           Radiology Review:  Pertinent images / reports were reviewed as a part of this visit.         Neto Grimes MD, M.D.            1/25/2023, 10:20 AM

## 2023-01-25 NOTE — PROGRESS NOTES
Comprehensive Nutrition Assessment    Type and Reason for Visit:  Reassess    Nutrition Recommendations/Plan:   Continue LISSETTE diet. D/C Nepro. Trial Milton BID. Malnutrition Assessment:  Malnutrition Status: At risk for malnutrition (Comment) (01/19/23 1330)    Context:  Acute Illness     Findings of the 6 clinical characteristics of malnutrition:  Energy Intake:  Mild decrease in energy intake (Comment)  Weight Loss:  Greater than 7.5% over 3 months     Body Fat Loss:  No significant body fat loss     Muscle Mass Loss:  No significant muscle mass loss    Fluid Accumulation:  Mild Extremities   Strength:  Not Performed    Nutrition Assessment:    Follow-up. Pt PO intakes slowly improving but remain variable, % noted. ONS on board but no intakes noted. Kidney function improving. Will trial different ONS, Milton BID. Noted wt trending up, Pt +4L fluid with significant edema. Nutrition Related Findings:    +4 BLE and +2 BUE edema; BM 1/23; +4L fluid Wound Type: Stage II, Pressure Injury       Current Nutrition Intake & Therapies:    Average Meal Intake: 26-50%, 51-75%, %  Average Supplements Intake: 0%  ADULT ORAL NUTRITION SUPPLEMENT; Breakfast, Dinner; Renal Oral Supplement  ADULT DIET; Regular; No Added Salt (3-4 gm)    Anthropometric Measures:  Height: 5' 8\" (172.7 cm)  Ideal Body Weight (IBW): 154 lbs (70 kg)       Current Body Weight: 241 lb (109.3 kg), 156.5 % IBW.  Weight Source: Bed Scale  Current BMI (kg/m2): 36.7  Usual Body Weight: 274 lb (124.3 kg)  % Weight Change (Calculated): -15.7                    BMI Categories: Obese Class 2 (BMI 35.0 -39.9)    Estimated Daily Nutrient Needs:  Energy Requirements Based On: Kcal/kg  Weight Used for Energy Requirements: Current  Energy (kcal/day): 2532-5397 kcal (15-18 kcal/kg CBW)  Weight Used for Protein Requirements: Ideal  Protein (g/day):  gm (1.2-2 g/kg IBW)     Fluid (ml/day): per provider, was on FR    Nutrition Diagnosis: Increased nutrient needs related to increase demand for energy/nutrients as evidenced by wounds  Inadequate oral intake (improving slightly) related to inadequate protein-energy intake as evidenced by intake 26-50%, intake 51-75%    Nutrition Interventions:   Food and/or Nutrient Delivery: Continue Current Diet, Modify Oral Nutrition Supplement  Nutrition Education/Counseling: Education completed  Coordination of Nutrition Care: Continue to monitor while inpatient       Goals:  Previous Goal Met: Progressing toward Goal(s) (slightly)  Goals: PO intake 50% or greater, by next RD assessment       Nutrition Monitoring and Evaluation:   Behavioral-Environmental Outcomes: None Identified  Food/Nutrient Intake Outcomes: Food and Nutrient Intake, Supplement Intake  Physical Signs/Symptoms Outcomes: Biochemical Data, Nutrition Focused Physical Findings, Skin, Weight, Meal Time Behavior, Fluid Status or Edema    Discharge Planning:     Too soon to determine     Isabelle Knutson RD, LD  Contact: [phone]

## 2023-01-25 NOTE — PROGRESS NOTES
[facility] Internal Medicine Note      Chief Complaint: I want to go home    Subjective/Interval History: This morning the patient is sitting up in the chair. Right heart cath reviewed with the patient. Case discussed at length with cardiology at the bedside, pulmonary and with nephrology today. Case discussed with therapy, he did somewhat better today and may be okay at home if he has 24-hour assist.  Patient has no new complaints, just tired of being in the hospital.    Creatinine improved, restarted diuretics today. No chest pain. No nausea, vomiting, diarrhea. No abdominal pain. No dysuria. The remainder of the review of systems is negative. PMH, PSH, FH/SH reviewed and unchanged as documented in the H&P personally documented at admission 23    Medication list reviewed    Objective:    BP (!) 161/78   Pulse 79   Temp 97.3 °F (36.3 °C) (Oral)   Resp 16   Ht 5' 8\" (1.727 m)   Wt 241 lb 13.5 oz (109.7 kg)   SpO2 96%   BMI 36.77 kg/m²   Temp  Av °F (36.1 °C)  Min: 94.6 °F (34.8 °C)  Max: 98 °F (36.7 °C)    CV-regular on exam.    Pulm-respirations remain easy, currently on 4.5 L of oxygen per nasal cannula. No wheezes noted today. Abd- BS+, soft, NTND  Ext-continued 3+ edema of his upper and lower extremities.     The Following Labs Were Reviewed Today:    Recent Results (from the past 24 hour(s))   POCT Glucose    Collection Time: 23  4:49 PM   Result Value Ref Range    POC Glucose 212 (H) 70 - 99 mg/dl    Performed on ACCU-CHEK    POCT Glucose    Collection Time: 23  8:40 PM   Result Value Ref Range    POC Glucose 286 (H) 70 - 99 mg/dl    Performed on ACCU-CHEK    POCT Glucose    Collection Time: 23  7:58 AM   Result Value Ref Range    POC Glucose 170 (H) 70 - 99 mg/dl    Performed on ACCU-CHEK    POCT Glucose    Collection Time: 23 11:23 AM   Result Value Ref Range    POC Glucose 233 (H) 70 - 99 mg/dl    Performed on ACCU-CHEK        ASSESSMENT/PLAN: Principal Problem:    Acute on chronic diastolic congestive heart failure -right heart cath suggests that patient is not intravascularly volume overloaded. He does have pulmonary hypertension and certainly has third spacing fluid. Improved creatinine today suggestive that we can restart diuretics. Discussed with nephrology, start torsemide 40 mg once daily continue to monitor renal function. Nephrology feels that ultrafiltration will tip the patient toward needing dialysis sooner and we should try diuretics first.  Dr. Lilia Arias, cardiology, agrees with diuretics as well  Active Problems:    Acute on chronic respiratory failure with hypoxia/HCAP (healthcare-associated pneumonia)-patient remains on antibiotics per pulmonary. May have a component of organizing pneumonia as well. Continue prednisone    DEISY-better. UTI due to extended-spectrum beta lactamase (ESBL) producing Escherichia coli-day #8 of meropenem. Will touch base with infectious disease regarding duration of antibiotics for ESBL UTI. Chronic anemia-continue to monitor    Chronic GERD-continue Protonix. Type 2 diabetes mellitus with stage 4 chronic kidney disease, with long-term current use of insulin (HCC)-sugars doing okay. Continue low-dose Lantus, consider increase the dose. .  Continue sliding scale. Continue to monitor    Essential hypertension-blood pressure higher today. Restart hydralazine 10 mg 3 times daily. B12 deficiency-continue with oral B12 supplementation. Atrial fibrillation-rate is controlled. Patient is not an anticoagulation candidate due to occult bleeding in the past and chronic iron deficiency.           Julián Spear MD, FACP  11:45 AM  1/25/2023

## 2023-01-25 NOTE — PROGRESS NOTES
n  Department of Internal Medicine  Nephrology Progress Note       He is an 71-year-old pleasant gentleman  with past medical history significant for coronary artery disease,  congestive heart failure, morbid obesity, COPD, obstructive sleep apnea,  chronic respiratory failure, hypercholesterolemia, chronic kidney  disease stage IV came to ER complaining of shortness of breath, dyspnea  on exertion, as well as difficulty with urination. The patient was  found to have urinary outlet obstruction with UTI (ESBL). The patient  was also diagnosed with pneumonia and admitted for further workup and management. Renal consultation has been called for worsening kidney Function. Increase Resp rate / edema   Labs reviewed  . ROS:  Feels weak / tired . No CP,  C/o  SOB /  GIBBONS . Rest ROS neg . [address]:  medications reviewed. Physical Exam:    VITALS:  BP (!) 161/78   Pulse 79   Temp 97.3 °F (36.3 °C) (Oral)   Resp 16   Ht 5' 8\" (1.727 m)   Wt 241 lb 13.5 oz (109.7 kg)   SpO2 96%   BMI 36.77 kg/m²   24HR INTAKE/OUTPUT:    Intake/Output Summary (Last 24 hours) at 1/25/2023 1210  Last data filed at 1/25/2023 0556  Gross per 24 hour   Intake 300 ml   Output 250 ml   Net 50 ml         Constitutional:  looks comfortable   Respiratory:  decrease BS at bases / rhonchi   Gastrointestinal:  no  epigastric tenderness. Normal Bowel Sounds  Cardiovascular:  S1, S2 irregular.   Edema:  + + edema    DATA:    CBC:  Lab Results   Component Value Date/Time    WBC 7.3 01/25/2023 11:43 AM    RBC 3.22 01/25/2023 11:43 AM    HGB 8.0 01/25/2023 11:43 AM    HCT 27.2 01/25/2023 11:43 AM    MCV 84.3 01/25/2023 11:43 AM    MCH 24.7 01/25/2023 11:43 AM    MCHC 29.3 01/25/2023 11:43 AM    RDW 20.4 01/25/2023 11:43 AM    PLT 88 01/25/2023 11:43 AM    MPV 8.9 01/25/2023 11:43 AM     CMP:  Lab Results   Component Value Date/Time     01/25/2023 11:18 AM    K 5.1 01/25/2023 11:18 AM    K 4.2 01/17/2023 02:53 PM     01/25/2023 11:18 AM    CO2 24 01/25/2023 11:18 AM    BUN 59 01/25/2023 11:18 AM    CREATININE 1.7 01/25/2023 11:18 AM    GFRAA 37 05/27/2022 02:05 PM    GFRAA >60 04/19/2013 10:46 AM    AGRATIO 1.1 01/17/2023 02:53 PM    LABGLOM 39 01/25/2023 11:18 AM    GLUCOSE 239 01/25/2023 11:18 AM    GLUCOSE 96 06/16/2011 10:37 AM    PROT 5.9 01/17/2023 02:53 PM    PROT 6.4 12/12/2012 08:05 AM    CALCIUM 8.4 01/25/2023 11:18 AM    BILITOT 0.5 01/17/2023 02:53 PM    ALKPHOS 81 01/17/2023 02:53 PM    AST 16 01/17/2023 02:53 PM    ALT 30 01/17/2023 02:53 PM      Hepatic Function Panel:   Lab Results   Component Value Date/Time    ALKPHOS 81 01/17/2023 02:53 PM    ALT 30 01/17/2023 02:53 PM    AST 16 01/17/2023 02:53 PM    PROT 5.9 01/17/2023 02:53 PM    PROT 6.4 12/12/2012 08:05 AM    BILITOT 0.5 01/17/2023 02:53 PM    BILIDIR <0.2 01/12/2023 12:23 PM    IBILI see below 01/12/2023 12:23 PM      Phosphorus:   Lab Results   Component Value Date/Time    PHOS 3.2 01/25/2023 11:18 AM       ASSESSMENT/PLAN:    DEISY   - most likely multifactorial ( low  BP/ ARbs /diuretics/UTI )  - renal function slowly improving  - ok to resume diuretics  and monitor renal; function  . Will give iv lasix times 1 . High risk of DEISY and need RRT soon . HTN   -controlled     ESBL UTI  - on meropenem    HCAP    - on meropenem    Ac /ch resp failure   Stable     Obesity with JOANN     Over all prognosis guarded . plan dw team in detail.        Rabia Mullen MD,FACP

## 2023-01-25 NOTE — PROGRESS NOTES
[facility]   Daily Progress Note      Admit Date:  1/17/2023      Subjective:   Mr. Lay Luciano is an 80yo male with mild CAD, COPD and chronic hypoxic respiratory failure, chronic diastolic CHF and paroxysmal atrial fibrillation who presented to [facility] with shortness of breath. Interval History:  Patrick Knight reports that he is feeling \"okay\". He is on 3 liters of oxygen by NC. Sinus bradycardia on telemetry. Objective:     BP (!) 161/78   Pulse 79   Temp 97.3 °F (36.3 °C) (Oral)   Resp 18   Ht 5' 8\" (1.727 m)   Wt 241 lb 13.5 oz (109.7 kg)   SpO2 97%   BMI 36.77 kg/m²      Intake/Output Summary (Last 24 hours) at 1/25/2023 1330  Last data filed at 1/25/2023 0556  Gross per 24 hour   Intake --   Output 250 ml   Net -250 ml       Physical Exam:  General:  Awake, alert, NAD  Skin:  Warm and dry  Neck:  JVD<8, no carotid bruits  Chest:  Diminished breath sounds bilaterally with wheezes.  No rhonchi/rales  Cardiovascular:  Regular but glenis, normal S1/S2, no M/R/G  Abdomen:  Soft, nontender, +bowel sounds  Extremities:  No edema  Pulses: 2+ bilat carotid    2+ bilat radial    2+ bilat femoral        Medications:    torsemide  40 mg Oral Daily    furosemide  20 mg IntraVENous Once    sodium chloride flush  5-40 mL IntraVENous 2 times per day    predniSONE  20 mg Oral Daily    heparin (porcine)  5,000 Units SubCUTAneous 3 times per day    meropenem  500 mg IntraVENous Q12H    vitamin B-12  500 mcg Oral Daily    levalbuterol  1.25 mg Nebulization Q4H WA    sodium chloride (Inhalant)  15 mL Nebulization Q4H WA    vitamin C  500 mg Oral Daily    insulin glargine  10 Units SubCUTAneous QAM    tamsulosin  0.4 mg Oral Daily    aspirin EC  162 mg Oral Daily    atorvastatin  40 mg Oral Daily    bisacodyl  5 mg Oral Daily    polyethylene glycol  17 g Oral Daily    docusate sodium  100 mg Oral BID    hydrALAZINE  10 mg Oral 3 times per day    lactobacillus  1 capsule Oral BID WC    linaclotide  145 mcg Oral QAM AC    montelukast  10 mg Oral Daily    pantoprazole  40 mg Oral QAM AC    insulin lispro  0-4 Units SubCUTAneous TID WC    insulin lispro  0-4 Units SubCUTAneous Nightly      sodium chloride      dextrose         Lab Data:  CBC:   Recent Labs     01/24/23  0500 01/25/23  1143   WBC 5.9 7.3   HGB 8.0* 8.0*   PLT 90* 88*     BMP:    Recent Labs     01/23/23  0612 01/24/23  0500 01/25/23  1118    147* 141   K 4.5 4.8 5.1   CO2 26 29 24   BUN 82* 72* 59*   CREATININE 2.3* 2.1* 1.7*     FASTING LIPID PANEL:  Lab Results   Component Value Date/Time    CHOL 116 11/17/2021 10:13 AM    HDL 47 11/17/2021 10:13 AM    HDL 42 12/15/2011 01:57 PM    TRIG 76 11/17/2021 10:13 AM       Assessment / Plan: 1. Chronic diastolic CHF, class 2  Phillip Sanchez is really not significant intravascularly volume overloaded by his RHC today. His PCWP was 18mmHg. He certainly has a lot of 3rd spacing with arm and leg edema. Place compression stockings on his bilateral lower and upper extremities I would gently diurese him with just 20mg of lasix IV once or twice a day. 2.Acute Kidney Injury  Improving and below his baseline. Hold nephrotoxic agents. 3.Persistent Atrial fibrillation  Phillip Sanchez had been in sinus rhythm up until December 2022 but presented in atrial fibrillation. By telemetry today, he is back in sinus rhythm again. Okay to hold amiodarone given concerns but will send a level. He was taken off his anticoagulation for his anemia. 4.Acute on chronic Respiratory Failure with hypoxia  Unclear etiology but could be Organizing Pneumonia. Dr. Aleksandr Ponce following. Amiodarone level to confirm but blood levels likely extremely low and drug not contributing to pulmonary condition. Will hold amiodarone now given team member's concerns.        Signed:  Priscilla Gunter MD

## 2023-01-26 LAB
ANION GAP SERPL CALCULATED.3IONS-SCNC: 11 MMOL/L (ref 3–16)
BASOPHILS ABSOLUTE: 0 K/UL (ref 0–0.2)
BASOPHILS ABSOLUTE: 0 K/UL (ref 0–0.2)
BASOPHILS RELATIVE PERCENT: 0.1 %
BASOPHILS RELATIVE PERCENT: 0.2 %
BUN BLDV-MCNC: 64 MG/DL (ref 7–20)
CALCIUM SERPL-MCNC: 8.3 MG/DL (ref 8.3–10.6)
CHLORIDE BLD-SCNC: 108 MMOL/L (ref 99–110)
CO2: 26 MMOL/L (ref 21–32)
CREAT SERPL-MCNC: 1.8 MG/DL (ref 0.8–1.3)
EOSINOPHILS ABSOLUTE: 0 K/UL (ref 0–0.6)
EOSINOPHILS ABSOLUTE: 0.1 K/UL (ref 0–0.6)
EOSINOPHILS RELATIVE PERCENT: 0.4 %
EOSINOPHILS RELATIVE PERCENT: 1.3 %
GFR SERPL CREATININE-BSD FRML MDRD: 37 ML/MIN/{1.73_M2}
GLUCOSE BLD-MCNC: 126 MG/DL (ref 70–99)
GLUCOSE BLD-MCNC: 132 MG/DL (ref 70–99)
GLUCOSE BLD-MCNC: 177 MG/DL (ref 70–99)
GLUCOSE BLD-MCNC: 187 MG/DL (ref 70–99)
GLUCOSE BLD-MCNC: 235 MG/DL (ref 70–99)
HCT VFR BLD CALC: 26.4 % (ref 40.5–52.5)
HCT VFR BLD CALC: 28.9 % (ref 40.5–52.5)
HEMOGLOBIN: 7.6 G/DL (ref 13.5–17.5)
HEMOGLOBIN: 8.5 G/DL (ref 13.5–17.5)
INR BLD: 1.34 (ref 0.87–1.14)
LYMPHOCYTES ABSOLUTE: 0.6 K/UL (ref 1–5.1)
LYMPHOCYTES ABSOLUTE: 0.7 K/UL (ref 1–5.1)
LYMPHOCYTES RELATIVE PERCENT: 11.8 %
LYMPHOCYTES RELATIVE PERCENT: 9.7 %
MCH RBC QN AUTO: 24.3 PG (ref 26–34)
MCH RBC QN AUTO: 25.3 PG (ref 26–34)
MCHC RBC AUTO-ENTMCNC: 28.9 G/DL (ref 31–36)
MCHC RBC AUTO-ENTMCNC: 29.3 G/DL (ref 31–36)
MCV RBC AUTO: 84.3 FL (ref 80–100)
MCV RBC AUTO: 86.3 FL (ref 80–100)
MONOCYTES ABSOLUTE: 0.3 K/UL (ref 0–1.3)
MONOCYTES ABSOLUTE: 0.4 K/UL (ref 0–1.3)
MONOCYTES RELATIVE PERCENT: 5 %
MONOCYTES RELATIVE PERCENT: 6 %
NEUTROPHILS ABSOLUTE: 5 K/UL (ref 1.7–7.7)
NEUTROPHILS ABSOLUTE: 5.1 K/UL (ref 1.7–7.7)
NEUTROPHILS RELATIVE PERCENT: 81.7 %
NEUTROPHILS RELATIVE PERCENT: 83.8 %
PDW BLD-RTO: 21.1 % (ref 12.4–15.4)
PDW BLD-RTO: 21.6 % (ref 12.4–15.4)
PERFORMED ON: ABNORMAL
PLATELET # BLD: 89 K/UL (ref 135–450)
PLATELET # BLD: 97 K/UL (ref 135–450)
PMV BLD AUTO: 8.7 FL (ref 5–10.5)
PMV BLD AUTO: 8.8 FL (ref 5–10.5)
POTASSIUM SERPL-SCNC: 5.1 MMOL/L (ref 3.5–5.1)
PROTHROMBIN TIME: 16.5 SEC (ref 11.7–14.5)
RBC # BLD: 3.14 M/UL (ref 4.2–5.9)
RBC # BLD: 3.34 M/UL (ref 4.2–5.9)
SODIUM BLD-SCNC: 145 MMOL/L (ref 136–145)
WBC # BLD: 6 K/UL (ref 4–11)
WBC # BLD: 6.3 K/UL (ref 4–11)

## 2023-01-26 PROCEDURE — 85025 COMPLETE CBC W/AUTO DIFF WBC: CPT

## 2023-01-26 PROCEDURE — 80048 BASIC METABOLIC PNL TOTAL CA: CPT

## 2023-01-26 PROCEDURE — 97530 THERAPEUTIC ACTIVITIES: CPT

## 2023-01-26 PROCEDURE — 36415 COLL VENOUS BLD VENIPUNCTURE: CPT

## 2023-01-26 PROCEDURE — 2700000000 HC OXYGEN THERAPY PER DAY

## 2023-01-26 PROCEDURE — 6360000002 HC RX W HCPCS: Performed by: INTERNAL MEDICINE

## 2023-01-26 PROCEDURE — 2580000003 HC RX 258: Performed by: INTERNAL MEDICINE

## 2023-01-26 PROCEDURE — 99232 SBSQ HOSP IP/OBS MODERATE 35: CPT | Performed by: INTERNAL MEDICINE

## 2023-01-26 PROCEDURE — 6370000000 HC RX 637 (ALT 250 FOR IP): Performed by: INTERNAL MEDICINE

## 2023-01-26 PROCEDURE — 85610 PROTHROMBIN TIME: CPT

## 2023-01-26 PROCEDURE — 1200000000 HC SEMI PRIVATE

## 2023-01-26 PROCEDURE — 51702 INSERT TEMP BLADDER CATH: CPT

## 2023-01-26 PROCEDURE — 99232 SBSQ HOSP IP/OBS MODERATE 35: CPT | Performed by: NURSE PRACTITIONER

## 2023-01-26 PROCEDURE — 94669 MECHANICAL CHEST WALL OSCILL: CPT

## 2023-01-26 PROCEDURE — 94640 AIRWAY INHALATION TREATMENT: CPT

## 2023-01-26 PROCEDURE — 97110 THERAPEUTIC EXERCISES: CPT

## 2023-01-26 PROCEDURE — 94760 N-INVAS EAR/PLS OXIMETRY 1: CPT

## 2023-01-26 RX ORDER — LACTULOSE 10 G/15ML
20 SOLUTION ORAL 3 TIMES DAILY
Status: DISCONTINUED | OUTPATIENT
Start: 2023-01-26 | End: 2023-01-30

## 2023-01-26 RX ADMIN — ATORVASTATIN CALCIUM 40 MG: 40 TABLET, FILM COATED ORAL at 08:22

## 2023-01-26 RX ADMIN — SODIUM CHLORIDE, PRESERVATIVE FREE 10 ML: 5 INJECTION INTRAVENOUS at 08:27

## 2023-01-26 RX ADMIN — Medication 1 CAPSULE: at 17:26

## 2023-01-26 RX ADMIN — ASPIRIN 162 MG: 81 TABLET, COATED ORAL at 08:24

## 2023-01-26 RX ADMIN — LACTULOSE 20 G: 20 SOLUTION ORAL at 21:24

## 2023-01-26 RX ADMIN — DOCUSATE SODIUM 100 MG: 100 CAPSULE, LIQUID FILLED ORAL at 21:23

## 2023-01-26 RX ADMIN — MONTELUKAST 10 MG: 10 TABLET, FILM COATED ORAL at 08:23

## 2023-01-26 RX ADMIN — PANTOPRAZOLE SODIUM 40 MG: 40 TABLET, DELAYED RELEASE ORAL at 06:18

## 2023-01-26 RX ADMIN — BISACODYL 5 MG: 5 TABLET, COATED ORAL at 08:24

## 2023-01-26 RX ADMIN — SODIUM CHLORIDE, PRESERVATIVE FREE 10 ML: 5 INJECTION INTRAVENOUS at 21:24

## 2023-01-26 RX ADMIN — Medication 1 CAPSULE: at 08:26

## 2023-01-26 RX ADMIN — HYDRALAZINE HYDROCHLORIDE 10 MG: 10 TABLET, FILM COATED ORAL at 13:46

## 2023-01-26 RX ADMIN — HYDRALAZINE HYDROCHLORIDE 10 MG: 10 TABLET, FILM COATED ORAL at 21:23

## 2023-01-26 RX ADMIN — HEPARIN SODIUM 5000 UNITS: 5000 INJECTION INTRAVENOUS; SUBCUTANEOUS at 13:46

## 2023-01-26 RX ADMIN — TORSEMIDE 40 MG: 20 TABLET ORAL at 08:22

## 2023-01-26 RX ADMIN — HEPARIN SODIUM 5000 UNITS: 5000 INJECTION INTRAVENOUS; SUBCUTANEOUS at 06:19

## 2023-01-26 RX ADMIN — TAMSULOSIN HYDROCHLORIDE 0.4 MG: 0.4 CAPSULE ORAL at 08:23

## 2023-01-26 RX ADMIN — LACTULOSE 20 G: 20 SOLUTION ORAL at 13:46

## 2023-01-26 RX ADMIN — INSULIN GLARGINE 10 UNITS: 100 INJECTION, SOLUTION SUBCUTANEOUS at 08:27

## 2023-01-26 RX ADMIN — CYANOCOBALAMIN TAB 500 MCG 500 MCG: 500 TAB at 08:22

## 2023-01-26 RX ADMIN — PREDNISONE 20 MG: 20 TABLET ORAL at 08:23

## 2023-01-26 RX ADMIN — HEPARIN SODIUM 5000 UNITS: 5000 INJECTION INTRAVENOUS; SUBCUTANEOUS at 21:25

## 2023-01-26 RX ADMIN — LEVALBUTEROL HYDROCHLORIDE 1.25 MG: 1.25 SOLUTION, CONCENTRATE RESPIRATORY (INHALATION) at 07:27

## 2023-01-26 RX ADMIN — LEVALBUTEROL HYDROCHLORIDE 1.25 MG: 1.25 SOLUTION, CONCENTRATE RESPIRATORY (INHALATION) at 11:53

## 2023-01-26 RX ADMIN — OXYCODONE HYDROCHLORIDE AND ACETAMINOPHEN 500 MG: 500 TABLET ORAL at 08:22

## 2023-01-26 RX ADMIN — POLYETHYLENE GLYCOL 3350 17 G: 17 POWDER, FOR SOLUTION ORAL at 08:24

## 2023-01-26 RX ADMIN — HYDRALAZINE HYDROCHLORIDE 10 MG: 10 TABLET, FILM COATED ORAL at 06:18

## 2023-01-26 RX ADMIN — SODIUM CHLORIDE SOLN NEBU 3% 4 ML: 3 NEBU SOLN at 07:27

## 2023-01-26 RX ADMIN — DOCUSATE SODIUM 100 MG: 100 CAPSULE, LIQUID FILLED ORAL at 08:25

## 2023-01-26 RX ADMIN — SODIUM CHLORIDE SOLN NEBU 3% 4 ML: 3 NEBU SOLN at 11:53

## 2023-01-26 RX ADMIN — INSULIN LISPRO 1 UNITS: 100 INJECTION, SOLUTION INTRAVENOUS; SUBCUTANEOUS at 17:26

## 2023-01-26 NOTE — CARE COORDINATION
[facility] Nurse  Consult Note       NAME:  Gilmar Suresh  MEDICAL RECORD NUMBER:  [id number]  AGE: 82 y.o.   GENDER: male  : 1940  TODAY'S DATE:  2023    Subjective   Reason for WOCN Evaluation and Assessment: pt seen for stage 2 pressure injuries to buttock, POA      Gilmar Suresh is a 82 y.o. male referred by:   [] Physician  [x] Nursing  [] Other:     Wound Identification:  Wound Type: pressure  Contributing Factors: chronic pressure, decreased mobility, and shear force    Wound History:noted on admit; has had pressure injuries to buttocks in past  Current Wound Care Treatment:  zinc    Patient Goal of Care:  [x] Wound Healing  [] Odor Control  [] Palliative Care  [] Pain Control   [] Other:         PAST MEDICAL HISTORY        Diagnosis Date    Allergic rhinitis     Asthma     Atrial fibrillation (HCC)     Carotid artery stenosis     Chronic kidney disease     COPD (chronic obstructive pulmonary disease) (HCC)     CPAP (continuous positive airway pressure) dependence     ESBL (extended spectrum beta-lactamase) producing bacteria infection 2018    urine    Hyperlipidemia     Hypertension     Iron deficiency anemia     Obstructive sleep apnea     Type II or unspecified type diabetes mellitus without mention of complication, not stated as uncontrolled        PAST SURGICAL HISTORY    Past Surgical History:   Procedure Laterality Date    BRONCHOSCOPY N/A 2022    BRONCHOSCOPY ALVEOLAR LAVAGE performed by Roland Morales MD at [facility] ENDOSCOPY    BRONCHOSCOPY  2022    BRONCHOSCOPY DIAGNOSTIC OR CELL WASH ONLY performed by Roland Morales MD at [facility] ENDOSCOPY    CATARACT REMOVAL  2012    bilateral    COLONOSCOPY  7/10/2009    diverticulosis    hemorrhoids    CT BONE MARROW BIOPSY  2022    CT BONE MARROW BIOPSY 2022 [facility] CT    GALLBLADDER SURGERY  1981    PAIN MANAGEMENT PROCEDURE Right 10/20/2021    COOLIEF RADIOFREQUENCY ABLATION - RIGHT KNEE  performed by Naima Matthews MD at [address] Left 2021    [place] 24 - LEFT KNEE performed by Naima Matthews MD at [address] [facility]  7-2005    7/10/2009    normal       FAMILY HISTORY    Family History   Problem Relation Age of Onset    Heart Disease Mother     Stroke Mother     Vision Loss Mother     High Blood Pressure Father     High Blood Pressure Brother     Diabetes Brother     Sleep Apnea Brother     Arthritis Paternal Grandmother     Diabetes Paternal Grandmother        SOCIAL HISTORY    Social History     Tobacco Use    Smoking status: Former     Packs/day: 0.50     Years: 18.00     Pack years: 9.00     Types: Cigarettes     Start date: 1958     Quit date: 1988     Years since quittin.0    Smokeless tobacco: Never   Vaping Use    Vaping Use: Never used   Substance Use Topics    Alcohol use: No     Alcohol/week: 0.0 standard drinks    Drug use: No       ALLERGIES    Allergies   Allergen Reactions    Valsartan-Hydrochlorothiazide Other (See Comments)     DEISY    Hytrin [Terazosin Hcl]      Ineffective for BP control    Penicillins      Unknown reaction during childhood; Patient tolerated cephalosporins in the past    Shrimp Flavor Hives    Sulfa Antibiotics      Unknown reaction in childhood    Sulfasalazine Other (See Comments)    Tekturna [Aliskiren Fumarate]      Ineffective    Vancomycin      Fever    Ciprofloxacin Rash     Fever and rash        MEDICATIONS    No current facility-administered medications on file prior to encounter.      Current Outpatient Medications on File Prior to Encounter   Medication Sig Dispense Refill    vitamin C (ASCORBIC ACID) 500 MG tablet Take 500 mg by mouth daily      Cholecalciferol 50 MCG (2000) TABS Take 1 tablet by mouth daily      Multiple Vitamins-Minerals (THERAPEUTIC MULTIVITAMIN-MINERALS) tablet Take 1 tablet by mouth daily      B Complex Vitamins (B-COMPLEX/B-12) TABS Take 1 tablet by mouth daily      zinc sulfate (ZINC-220) 220 (50 Zn) MG capsule Take 50 mg by mouth daily      bisacodyl (DULCOLAX) 5 MG EC tablet Take 5 mg by mouth daily      hydrALAZINE (APRESOLINE) 10 MG tablet Take 1 tablet by mouth every 8 hours 90 tablet 3    predniSONE (DELTASONE) 10 MG tablet Take 1 tablet by mouth daily for 7 doses 7 tablet 0    torsemide (DEMADEX) 20 MG tablet Take 1 tablet by mouth daily 30 tablet 3    insulin glargine (BASAGLAR KWIKPEN) 100 UNIT/ML injection pen Inject 5 Units into the skin nightly      levalbuterol (XOPENEX) 1.25 MG/3ML nebulizer solution USE 1 VIAL VIA NEBULIZER FOUR TIMES DAILY 360 mL 11    amiodarone (CORDARONE) 200 MG tablet Take 0.5 tablets by mouth daily 45 tablet 3    INSULIN SYRINGE .5CC/29G 29G X 1/2\" 0.5 ML MISC 1 each by Does not apply route daily 100 each 1    albuterol sulfate HFA (PROVENTIL;VENTOLIN;PROAIR) 108 (90 Base) MCG/ACT inhaler INHALE 2 PUFFS BY MOUTH EVERY 4 HOURS AS NEEDED FOR WHEEZING 18 each 11    montelukast (SINGULAIR) 10 MG tablet TAKE 1 TABLET BY MOUTH EVERY DAY 90 tablet 3    [employer] PURELAX 17 GM/SCOOP powder TAKE 17G BY MOUTH DAILY MIX WITH 8 OZ OF WATER 510 g 3    pantoprazole (PROTONIX) 40 MG tablet TAKE 1 TABLET BY MOUTH EVERY DAY 90 tablet 1    B-D UF III MINI PEN NEEDLES 31G X 5 MM MISC USE AS DIRECTED 3 TIMES A  each 3    atorvastatin (LIPITOR) 40 MG tablet TAKE 1 TABLET BY MOUTH EVERY DAY 90 tablet 3    alfuzosin (UROXATRAL) 10 MG extended release tablet TAKE 1 TABLET BY MOUTH EVERY DAY 90 tablet 3    LINZESS 145 MCG capsule TAKE 1 CAPSULE BY MOUTH EVERY DAY IN THE MORNING BEFORE BREAKFAST (Patient not taking: Reported on 1/17/2023) 30 capsule 11    ACCU-CHEK GUIDE strip TEST AS DIRECTED 3 TIMES A  strip 5    Accu-Chek FastClix Lancets MISC USE TO TEST 3 TIMES A DAY DX CODE E11.9 102 each 17    CPAP Machine MISC by Does not apply route      Lactobacillus Rhamnosus, GG, ([employer] PROBIOTIC, LACTOBACILLUS,) CAPS TAKE 1 CAPSULE BY MOUTH 2 TIMES DAILY (WITH MEALS) 60 capsule 11    docusate sodium (COLACE) 100 MG capsule Take 100 mg by mouth 2 times daily      Cyanocobalamin (VITAMIN B-12 PO) Take 500 mcg by mouth every other day       aspirin EC 81 MG EC tablet Take 2 tablets by mouth daily.  30 tablet 3    ferrous sulfate 325 (65 FE) MG tablet Take 325 mg by mouth daily          Objective    BP (!) 146/76   Pulse 80   Temp 97.3 °F (36.3 °C) (Oral)   Resp 16   Ht 5' 8\" (1.727 m)   Wt 245 lb 13 oz (111.5 kg)   SpO2 95%   BMI 37.38 kg/m²     LABS:  WBC:    Lab Results   Component Value Date/Time    WBC 6.3 01/26/2023 09:37 AM     H/H:    Lab Results   Component Value Date/Time    HGB 8.5 01/26/2023 09:37 AM    HCT 28.9 01/26/2023 09:37 AM     PTT:    Lab Results   Component Value Date/Time    APTT 28.7 01/17/2023 02:53 PM   [APTT}  PT/INR:    Lab Results   Component Value Date/Time    PROTIME 16.5 01/26/2023 01:02 AM    INR 1.34 01/26/2023 01:02 AM     HgBA1c:    Lab Results   Component Value Date/Time    LABA1C 6.1 05/27/2022 02:05 PM       Assessment   Jerardo Risk Score: Jerardo Scale Score: 17    Patient Active Problem List   Diagnosis Code    JOANN (obstructive sleep apnea) G47.33    Chronic GERD K21.9    Type 2 diabetes mellitus with stage 4 chronic kidney disease, with long-term current use of insulin ([facility]) E11.22, N18.4, Z79.4    Microcytic anemia D50.9    Microalbuminuria R80.9    Hyperlipidemia E78.5    Paroxysmal atrial fibrillation ([facility]) I48.0    Edema R60.9    Acute on chronic respiratory failure with hypoxia ([facility]) J96.21    Carotid artery stenosis without cerebral infarction I65.29    Leg swelling M79.89    Chronic venous insufficiency I87.2    Stage 3b chronic kidney disease ([facility]) N18.32    Essential hypertension I10    Hemoptysis R04.2    B12 deficiency E53.8    HCAP (healthcare-associated pneumonia) J18.9    Pulmonary nodule R91.1    Moderate COPD (chronic obstructive pulmonary disease) ([facility] [address] J44.9    Acute kidney injury ([facility] [address] N17.9    Slow transit constipation K59.01    Pancreatic mass K86.89    UTI due to extended-spectrum beta lactamase (ESBL) producing Escherichia coli N39.0, B96.29, Z16.12    Obesity, Class II, BMI 35-39.9 E66.9    Weakness R53.1    Hyperkalemia E87.5    Chronic anemia D64.9    Abnormal CXR R93.89    Elevated brain natriuretic peptide (BNP) level R79.89    Elevated troponin R77.8    Chronic respiratory failure with hypoxia ([facility]) J96.11    Chronic diastolic CHF (congestive heart failure), NYHA class 2 ([facility]) I50.32    Acute on chronic congestive heart failure ([facility]) I50.9    Mucopurulent chronic bronchitis ([facility]) J41.1    Persistent atrial fibrillation ([facility]) I48.19    Hypothermia due to non-environmental cause R68.0       Measurements:  Wound 12/28/22 Buttocks Left (Active)   Wound Etiology Pressure Stage 2 01/26/23 1147   Dressing Status Dry;Clean; Intact 01/25/23 2200   Wound Cleansed Cleansed with saline 01/21/23 0740   Dressing/Treatment Zinc paste 01/26/23 1147   Wound Length (cm) 1.5 cm 12/28/22 1400   Wound Width (cm) 1 cm 12/28/22 1400   Wound Surface Area (cm^2) 1.5 cm^2 12/28/22 1400   Wound Assessment Pink/red 01/26/23 1147   Drainage Amount None 01/26/23 1147   Drainage Description Serosanguinous 01/18/23 1817   Odor None 01/26/23 1147   Karen-wound Assessment Blanchable erythema 01/26/23 1147   Margins Defined edges 01/26/23 1147   Number of days: 29     Pt tells me he has several open areas at the top \"of his crack. \" Pt has 3 stage 2 pressure injuries to buttocks/inner gluteal cleft. He tells me he has been turning. RN reprots that he had been refusing at times to turn. Does not want air mattress because he feels like he gets stuck in it and can't move. He was able to turn to side with minimal assist. Bilat heels are intact. Has [place] at bedside that was unopened. States he didn't know how to mix it. Explained how to mix it.      Response to treatment:  Well tolerated by patient. Pain Assessment:  Severity:  0 / 10    Plan   Plan of Care:   -turn and reposition every 2 hours  -chair cushion, encourage to reposition self frequently while in chair  -elevate heels, apply liquid barrier film twice daily  -blue barrier wipes to buttocks  -Triad barrier    Specialty Bed Required : No   [] Low Air Loss   [] Pressure Redistribution  [] Fluid Immersion  [] Bariatric  [] Total Pressure Relief  [] Other:     Current Diet: ADULT DIET; Regular; No Added Salt (3-4 gm)  ADULT ORAL NUTRITION SUPPLEMENT; Breakfast, Dinner;  Wound Healing Oral Supplement  Dietician consult:  Yes    Discharge Plan:  Placement for patient upon discharge: home with support    Patient appropriate for Outpatient [address]: No    Referrals:  [x]   [] [address] [facility]  [] Supplies  [] Other    Patient/Caregiver Teaching:  Level of patient/caregiver understanding able to:   [] Indicates understanding       [] Needs reinforcement  [] Unsuccessful      [x] Verbal Understanding  [] Demonstrated understanding       [] No evidence of learning  [] Refused teaching         [] N/A       Electronically signed by Mariaa Chun on 1/26/2023 at 11:48 AM

## 2023-01-26 NOTE — PLAN OF CARE
Problem: Skin/Tissue Integrity  Goal: Absence of new skin breakdown  Description: 1. Monitor for areas of redness and/or skin breakdown  2. Assess vascular access sites hourly  3. Every 4-6 hours minimum:  Change oxygen saturation probe site  4. Every 4-6 hours:  If on nasal continuous positive airway pressure, respiratory therapy assess nares and determine need for appliance change or resting period. Note: D: Pt. Alert and oriented, refuses specialty mattress and refuses to be turned every 2 hours, stage 2 remains to bottom, skin is very fragile, weeping, bruised and skin tear noted to L elbow  A: Zinc ointment applied to buttocks and niko-area, arms legs and feet elevated with pillows.  Pt.educated on skin care management  R: verbalized understanding but remains uncooperative

## 2023-01-26 NOTE — PROGRESS NOTES
Physical Therapy  Varun Gupta  PT reviewed patient's chart. Upon arrival, patient lying supine in bed with HOB elevated eating breakfast. Patient requesting PT return later this date. Will re-attempt as the schedule allows. Thank you.   Avelino Tran PT, DPT, [id number]

## 2023-01-26 NOTE — PROGRESS NOTES
[address]   Daily Progress Note      Admit Date:  1/17/2023    CC: SOB    HPI:   Anila Wolfe is a 79 y.o. male with PMH Hx abn stress>mild CAD, COPD, chronic hypoxic resp failure, DHF, PAFm anemia, HTN. Presented to [facility] with acute on chronic hypoxic resp failure. Treated for PNA with antibiotics, Pulm following  Mild elevated troponin and elevated BNP. Dr. Inés Carmona consulted. Initially diuresed with IV lasix. RHC 1/25 suggestive of euvolemia and pulm HTN. Neph consulted for DEISY on CKD. + UTI. C/O mostly of gen weakness and fatigue today. Continues with SOB but improving, weaning O2 3L NC. Increased SOB when laying flat. Cont with BLE edema, applying compression stockings. He has been OOB to chair. Denied CP, LH, syncope, palpitations. Review of Systems:   General: Denies fever, chills  Skin: Denies skin changes, rash, itching, lesions.   HEENT: Denies headache, dizziness, vision changes, nosebleeds, sore throat, nasal drainage  RESP: Denies cough, sputum,wheeze, snoring  CARD: Denies palpitations,  murmur  GI:Denies nausea, vomiting, heartburn, loss of appetite, change in bowels  : Denies frequency, pain, incontinence, polyuria  VASC: Denies claudication, leg cramps, clots  MUSC/SKEL: Denies pain, stiffness, arthritis  PSYCH: Denies anxiety, depression, stress  NEURO: Denies numbness, tingling, weakness,change in mood or memory  HEME: Denies abn bruising, bleeding, anemia  ENDO: Denies intolerance to heat, cold, excessive thirst or hunger, hx thyroid disease    Objective:   /75   Pulse 78   Temp 97.9 °F (36.6 °C) (Axillary)   Resp 18   Ht 5' 8\" (1.727 m)   Wt 245 lb 13 oz (111.5 kg)   SpO2 94%   BMI 37.38 kg/m²         Intake/Output Summary (Last 24 hours) at 1/26/2023 1451  Last data filed at 1/26/2023 0759  Gross per 24 hour   Intake 310 ml   Output 2000 ml   Net -1690 ml     I/O since adm: +2.3L    WEIGHT:Admit Weight: 224 lb (101.6 kg)         Today Weight: 245 lb 13 oz (111.5 kg)   DRY WEIGHT:  Wt Readings from Last 3 Encounters:   01/26/23 245 lb 13 oz (111.5 kg)   12/30/22 218 lb 4.1 oz (99 kg)   12/16/22 239 lb (108.4 kg)       Physical Exam:  GEN: Appears ill,  no acute distress  SKIN: Pale, warm, dry. HEENT: PERRLA. No adenopathy. LUNG: AP diameter normal. Decreased BS bilateral bases. No wheeze, rales, or ronchi. Respiratory effort increased. HEART: S1S2 A/R. No carotid bruit. No murmur, rub or gallop. ABD: Soft, nontender. +BS X 4 quads. EXT: Radial and pedal pulses 2+ and symmetric. Without varicosities. 2+ BLE edema. MUSCSKEL: Good ROM X4 extremities. No deformity. NEURO: A/O X3. Calm and cooperative. Telemetry: NSR HR 77    Medications:    lactulose  20 g Oral TID    torsemide  40 mg Oral Daily    sodium chloride flush  5-40 mL IntraVENous 2 times per day    predniSONE  20 mg Oral Daily    heparin (porcine)  5,000 Units SubCUTAneous 3 times per day    vitamin B-12  500 mcg Oral Daily    levalbuterol  1.25 mg Nebulization Q4H WA    sodium chloride (Inhalant)  15 mL Nebulization Q4H WA    vitamin C  500 mg Oral Daily    insulin glargine  10 Units SubCUTAneous QAM    tamsulosin  0.4 mg Oral Daily    aspirin EC  162 mg Oral Daily    atorvastatin  40 mg Oral Daily    bisacodyl  5 mg Oral Daily    polyethylene glycol  17 g Oral Daily    docusate sodium  100 mg Oral BID    hydrALAZINE  10 mg Oral 3 times per day    lactobacillus  1 capsule Oral BID WC    linaclotide  145 mcg Oral QAM AC    montelukast  10 mg Oral Daily    pantoprazole  40 mg Oral QAM AC    insulin lispro  0-4 Units SubCUTAneous TID WC    insulin lispro  0-4 Units SubCUTAneous Nightly      sodium chloride      dextrose       sodium chloride flush, sodium chloride, acetaminophen, sodium chloride nebulizer, albuterol sulfate HFA, levalbuterol, glucose, dextrose bolus **OR** dextrose bolus, glucagon (rDNA), dextrose    Lab Data: I have reviewed all labs below today.    CBC: Recent Labs     01/25/23  1143 01/26/23  0102 01/26/23  0937   WBC 7.3 6.0 6.3   HGB 8.0* 7.6* 8.5*   HCT 27.2* 26.4* 28.9*   MCV 84.3 84.3 86.3   PLT 88* 89* 97*     BMP:   Recent Labs     01/24/23  0500 01/25/23  1118 01/26/23  0937   * 141 145   K 4.8 5.1 5.1    108 108   CO2 29 24 26   PHOS 3.8 3.2  --    BUN 72* 59* 64*   CREATININE 2.1* 1.7* 1.8*     GLUCOSE:   Recent Labs     01/24/23  0500 01/25/23  1118 01/26/23  0937   GLUCOSE 144* 239* 187*     LIVER PROFILE:   Lab Results   Component Value Date/Time    AST 16 01/17/2023 02:53 PM    ALT 30 01/17/2023 02:53 PM    LIPASE 20.0 12/26/2018 07:48 AM    LABALBU 2.5 01/25/2023 11:18 AM    BILIDIR <0.2 01/12/2023 12:23 PM    BILITOT 0.5 01/17/2023 02:53 PM    ALKPHOS 81 01/17/2023 02:53 PM     PT/INR:   Lab Results   Component Value Date/Time    PROTIME 16.5 01/26/2023 01:02 AM    INR 1.34 01/26/2023 01:02 AM    INR 1.33 01/17/2023 02:53 PM    INR 1.51 11/15/2022 09:04 PM     APTT:   Lab Results   Component Value Date/Time    APTT 28.7 01/17/2023 02:53 PM     Pro-BNP:    Lab Results   Component Value Date/Time    PROBNP 5,031 01/24/2023 05:00 AM    PROBNP 2,497 01/17/2023 02:53 PM    PROBNP 1,140 12/23/2022 09:17 PM     Parameters:   > 450 pg/mL under age 48  > 900 pg/mL ages 54-65  > 1800 pg/mL over age 76    ENZYMES:  Lab Results   Component Value Date/Time    CKTOTAL 47 10/30/2022 09:56 PM    TROPONINI 0.06 01/18/2023 05:39 PM    TROPONINI 0.05 01/18/2023 11:15 AM    TROPONINI 0.06 01/18/2023 05:26 AM     FASTING LIPID PANEL:  Lab Results   Component Value Date/Time    CHOL 116 11/17/2021 10:13 AM    HDL 47 11/17/2021 10:13 AM    HDL 42 12/15/2011 01:57 PM    LDLCALC 54 11/17/2021 10:13 AM    TRIG 76 11/17/2021 10:13 AM       Diagnostics:    EKG:   Atrial fibrillation with rapid ventricular response with premature ventricular or aberrantly conducted complexes  Right bundle branch block  Abnormal ECG  When compared with ECG of 23-DEC-2022 21:22,  Atrial fibrillation has replaced Junctional rhythm  Vent. rate has increased BY 45 BPM  Right bundle branch block has replaced Incomplete right bundle branch block  Confirmed by ARIELLA Harrison MD ([id number]) on 1/17/2023 2:39:38 PM    NM Stress 6/28/2021  Summary  Moderate sized moderate intensity reversible inferior wall defect suggestive of ischemia  Normal LV size and systolic function. Overall findings represent a intermediate risk study. Recommendation  Cardiac catheterization. LHC/RHC 7/1/2021  R dominant  RCA- mod calcifications, no obstructions  LM- no dz  LCX- no dz  LAD- mod calcification prox , turtuous 50% lesion mid. No ventriculogram.    RAP10  [id number]  PA 50/18(28)  CO 8.53L  CI 3.68  MXV sat normal    ECHO: 11/16/2022  Summary  Ejection fraction is visually estimated to be 60-65%. No regional wall motion abnormalities are noted. Grade I diastolic dysfunction with elevated LV filling pressures. The left atrium is moderately dilated. mildly dilated right ventricle. Estimated pulmonary artery systolic pressure is mildly elevated at 40 mmHg assuming a right atrial pressure of 15 mmHg. RHC: 1/25/2023  FINDINGS:   1. Elevated right atrial pressure at 14 mmHg, but only a mildly   elevated pulmonary capillary wedge pressure at 18 mmHg. 2.  Pulmonary hypertension, likely mixed with an instantaneous pressure   of 53/14 and a mean pulmonary arterial pressure of 33 mmHg. 3.  Normal cardiac output by thermodilution at 6.27 liters per minute   with a cardiac index of 2.81 liters per kilogram per minute. By Aden Suzanne   equation cardiac index is 4.22 liters per kilogram per minute. 4.  Pulmonary vascular resistance 128 dynes per second per meter   squared. 5.  Normal to mildly reduced mixed venous oxygen saturations with an SVC   of 60 with a right atrium of 65% and pulmonary artery of 62%. The   patient saturating 94% on pulse oximetry on 3 liters at which time the   study was performed. CXR 1/17/2023  Impression   Patchy infiltrates in the lungs bilaterally, greater on the right, increased   when compared to the previous exam.  In this case, multifocal pneumonia and   pulmonary edema both considered. CXR 1/25/2023  Impression   Cardiomegaly with trace effusions and scattered infiltrates. Assessment/Plan:    1.) Acute on chronic hypoxic resp failure: SOB multifactorial with PNA/ infiltrates and DHF/hypervolemia. SOB improving. Pulm following. Cont steroids, Rt tx, supplemental O2, antibiotics  Amio level pending - concern for toxicity contributing to resp failure    2.) Acute on chronic diastolic heart failure: Elevated BNP, SOB, edema. RHC- PCWP 18 after diuresis, RAP 14. Mildly elevated suggestive of mild hypervolemia. Difficult to diurese with DEISY on CKD. Neph following and now on oral torsemide. Hydralazine for afterload reduction (no ACEi/ARNI, SGLT2i, AA D/T DEISY/CKD)  He is not on BB (not indicated for EF >40%)  NYHA Class III   Stage C    Cardiac Rehab for EF <35%: NA  ICD counseling: NA   2gm Na diet, daily weight, 64 oz fluid restriction  Avoid NSAIDS and other nephrotoxic meds  [facility] Referral: OP  HF RN referral for education    3.) DEISY on CKD:   Creat 1.8>3>1.8 today  Neph following, restarted torsemide  May need RRT    3.) Paroxysmal AF: NSR rate controlled  CHADSVASC-     HASBLED-  No OAC D/T hx GIB  No BB or CCB  Was on Amio - stopped with concerns that it is contributing to lung dz- amio level pending    4.) Chronic Anemia:   H/H stable, rec restarting ferrous sulfate  2017 ACC/AHA HF Guidelines: In patients with NYHA class II and III HF and iron deficiency (ferritin <100 ng/ml or 100-300 ng/ml if transferrin saturation <20%), IV iron replacement might be reasonable to improve functional status and QOL.    There is uncertain evidence base for oral iron repletion in the setting of anemia associated with Hf.   Erythropoietin-stimulating agents should not be used to improve morbidity and mortality.       Electronically signed by SILVIANO Quiroga CNP on 1/26/2023 at 2:51 PM

## 2023-01-26 NOTE — PROGRESS NOTES
Pulmonary Critical Care Progress Note     Patient's name:  Alyssa Yost Record Number: [id number]  Patient's account/billing number: [de-identified]  Patient's YOB: 1940  Age: 79 y.o. Date of Admission: 1/17/2023 12:56 PM  Date of Consult: 1/26/2023      Primary Care Physician: Carmen Garcia MD      Code Status: Full Code    Chief complaint: Acute on chronic respiratory failure with hypoxia    Assessment and Plan     Acute on chronic respiratory with hypoxia and hypercapnia  History of H influenza and Moraxella pneumonia  History of organizing pneumonia, minor hemoptysis   Acute kidney injury  ESBL positive UTI, treated   diastolic heart failure  Pulmonary HTN secondary  JOANN    Plan:  Prednisone @ 20 mg po daily  Diuresis. per nephrology   bronchodilators. Acapella  Encourage cpap use       Overnight:  No acute events overnight  Up to chair      REVIEW OF SYSTEMS:  Review of Systems -   General ROS: Weak  Psychological ROS: negative  Ophthalmic ROS: negative  ENT ROS: negative  Allergy and Immunology ROS: negative  Hematological and Lymphatic ROS: negative  Endocrine ROS: negative  Breast ROS: negative  Respiratory ROS: Shortness of breath, lower extremity edema  Cardiovascular ROS: no chest pain or dyspnea on exertion  Gastrointestinal ROS:negative  Genito-Urinary ROS: negative  Musculoskeletal ROS: negative  Neurological ROS: negative  Dermatological ROS: negative        Physical Exam:    Vitals: BP (!) 146/76   Pulse 80   Temp 97.3 °F (36.3 °C) (Oral)   Resp 16   Ht 5' 8\" (1.727 m)   Wt 245 lb 13 oz (111.5 kg)   SpO2 95%   BMI 37.38 kg/m²     Last Body weight:   Wt Readings from Last 3 Encounters:   01/26/23 245 lb 13 oz (111.5 kg)   12/30/22 218 lb 4.1 oz (99 kg)   12/16/22 239 lb (108.4 kg)       Body Mass Index : Body mass index is 37.38 kg/m².       Intake and Output summary:   Intake/Output Summary (Last 24 hours) at 1/26/2023 1114  Last data filed at 1/26/2023 0759  Gross per 24 hour   Intake 310 ml   Output 2000 ml   Net -1690 ml       Physical Examination:     Gen: Lethargic, no acute distress  Eyes: PERRL. Anicteric sclera. No conjunctival injection. ENT: No discharge. Posterior oropharynx clear. External appearance of ears and nose normal.  Neck: Trachea midline. No mass   Resp: Diminished bilaterally, no active wheezing or rhonchi  CV: Regular rate. Regular rhythm. No murmur or rub.+++ edema. GI: Soft, Non-tender. Non-distended. +BS  Skin: Warm, dry, w/o erythema. Lymph: No cervical or supraclavicular LAD. M/S: No cyanosis. No clubbing. Neuro:  CN 2-12 tested, no focal neurologic deficit. Moves all extremities  Psych: Awake lethargic/sleepy no focal deficit      Laboratory findings:-    CBC:   Recent Labs     01/26/23  0937   WBC 6.3   HGB 8.5*   PLT 97*     BMP:    Recent Labs     01/24/23  0500 01/25/23  1118 01/26/23  0937   * 141 145   K 4.8 5.1 5.1    108 108   CO2 29 24 26   BUN 72* 59* 64*   CREATININE 2.1* 1.7* 1.8*   GLUCOSE 144* 239* 187*     S. Calcium:  Recent Labs     01/26/23  0937   CALCIUM 8.3     S. Magnesium:  Recent Labs     01/25/23  1118   MG 2.40       S. Glucose:  Recent Labs     01/25/23  1704 01/25/23  2051 01/26/23  0802   POCGLU 200* 205* 126*           Radiology Review:  Pertinent images / reports were reviewed as a part of this visit.         Mayelin Krishnan MD, MRYAN.            1/26/2023, 11:14 AM

## 2023-01-26 NOTE — PROGRESS NOTES
Physical Therapy  Facility/Department: [facility]  Physical Therapy Daily Note    Name: Ginny Luna  : 1940  MRN: [id number]  Date of Service: 2023    Discharge Recommendations:  24 hour supervision or assist, Home with Home health PT, Patient would benefit from continued therapy after discharge (3-5 more appropriate if the pt is agreeable)   PT Equipment Recommendations  Equipment Needed: No      Patient Diagnosis(es): The primary encounter diagnosis was Acute on chronic congestive heart failure, unspecified heart failure type ([address] Diagnoses of Elevated troponin, Chronic kidney disease, unspecified CKD stage, and Urinary tract infection with hematuria, site unspecified were also pertinent to this visit. Past Medical History:  has a past medical history of Allergic rhinitis, Asthma, Atrial fibrillation ([address] Carotid artery stenosis, Chronic kidney disease, COPD (chronic obstructive pulmonary disease) ([facility] [address] CPAP (continuous positive airway pressure) dependence, ESBL (extended spectrum beta-lactamase) producing bacteria infection, Hyperlipidemia, Hypertension, Iron deficiency anemia, Obstructive sleep apnea, and Type II or unspecified type diabetes mellitus without mention of complication, not stated as uncontrolled. Past Surgical History:  has a past surgical history that includes Gallbladder surgery (); Upper gastrointestinal endoscopy (7-2005    7/10/2009); Colonoscopy (7/10/2009); Cataract removal (2012); Pain management procedure (Right, 10/20/2021); Pain management procedure (Left, 2021); CT BIOPSY BONE MARROW (2022); bronchoscopy (N/A, 2022); and bronchoscopy (2022). Assessment   Body Structures, Functions, Activity Limitations Requiring Skilled Therapeutic Intervention: Decreased functional mobility ; Decreased endurance  Assessment: Patient continues to be limited by decreased endurance and pain from wound when sitting.  Patient ambulated 25 ft this visit from toilet to recliner chair at [place] [id number] with RW. Upon arrival, patient transferred via STEDY by PCA to toilet. Patient participated with seated exercises as noted, denies pain. Patient required assistance with pericare while he rested forearms on RW, no signs of LOB, The patient continues to prefer going home and IF home, he will need 24/7 hands-on assist and level 3 home PT. The patient is not safe and his wife does not appear to be able to give adequate assist. Will con't to follow while he is on the acute care floor to improve his strength, his mobility and his tolerance for activity. Progress is limited by the patient's resistance to education and his self-limiting behaviors. Therapy Prognosis: Fair  Barriers to Learning: motivation, resistance to education  Activity Tolerance  Activity Tolerance: Patient limited by fatigue;Patient limited by endurance     Plan   Physcial Therapy Plan  General Plan: 3-5 times per week  Current Treatment Recommendations: Strengthening, Balance training, Functional mobility training, Transfer training, Gait training, Therapeutic activities, Safety education & training, Patient/Caregiver education & training  Safety Devices  Type of Devices: Call light within reach, Chair alarm in place, Gait belt, Patient at risk for falls, Left in chair     Restrictions  Restrictions/Precautions  Restrictions/Precautions: Fall Risk, Contact Precautions  Position Activity Restriction  Other position/activity restrictions: Contact Precautions (ESBL); 4 L O2     Subjective   General  Chart Reviewed: Yes  Additional Pertinent Hx: Per Alphonse Lizarraga MD H&P on 1-: The pt is an 78 yo male who presented to the ED with increasing SOB and O2 needs. Troponins were elevated, he has had increased weight, had elevated BNP, urinalysis suggested infection and CXR: \"shows either multifocal pneumonia or heart failure\".  The pt recently in the hospital and on ARU, was d/c on 12-8-2022 and readmitted on 12-. The pt had been d/c to home each time with family. PMHx: asthma, a-fib, CKD, carotid artery stenosis, COPD, CHF, HTN, anemia, JOANN, DM  Response To Previous Treatment: Patient reporting fatigue but able to participate. Family / Caregiver Present: Yes (wife)  Referring Practitioner: Sully Nolen MD  Referral Date : 01/17/23  Diagnosis: Acute on chronic diastolic congestive heart failure, Acute on chronic respiratory failure with hypoxia  Follows Commands: Impaired  Other (Comment): delayed  Subjective  Subjective: Patient sitting on toilet upon arrival with PCA present who assisted patient via STEDY. Patient's spouse present. Patient is agreeable to PT.          Social/Functional History  Social/Functional History  Lives With: Spouse  Type of Home: House  Home Layout: Able to Live on Main level with bedroom/bathroom, Multi-level  Home Access: Stairs to enter with rails  Entrance Stairs - Number of Steps: 1+1  Entrance Stairs - Rails: Both  Bathroom Shower/Tub: Tub/Shower unit  Bathroom Toilet: Standard  Bathroom Equipment: Shower chair, Grab bars in shower, Tub transfer bench  Home Equipment: Cane, Rollator, Oxygen, Grab bars (2L O2)  Has the patient had two or more falls in the past year or any fall with injury in the past year?: Yes  ADL Assistance: Needs assistance (assist LB)  Homemaking Assistance: Needs assistance (wife cleans, pt manages bill paying)  Homemaking Responsibilities: No  Ambulation Assistance: Independent (with rollator)  Transfer Assistance: Independent  Active : Yes  Occupation: Retired  Type of Occupation: [employer]  [address] [place] [address] Avenue: wood carving, kindra saw  IADL Comments: sleeps in a recliner but reports he does get into bed on a regular basis       Cognition   Cognition  Overall Cognitive Status: Exceptions  Safety Judgement: Decreased awareness of need for safety;Decreased awareness of need for assistance  Problem Solving: Decreased awareness of errors  Insights: Decreased awareness of deficits  Cognition Comment: but with decreased insight, frustrated need to wait for assistance     Objective   Bed mobility  Bed Mobility Comments: Patient sitting on toilet at beginning of session and in recliner by end of session  Transfers  Sit to Stand: Contact guard assistance  Stand to Sit: Contact guard assistance  Comment: Patient performed STS from toilet with R grab bar for assistance in addition to STS from recliner chair 5x with RW placed in front  Ambulation  Surface: Level tile  Device: Rolling Walker  Other Apparatus: O2 (O2 line partially managed by pt and by therapist)  Assistance: Contact guard assistance  Quality of Gait: the pt is impulsive and needs occasional cues and assist for safe walker management; limited distance due to decreased activity tolerance; shaky initially with sit to stand, assist to safely manage O2 tubing  Gait Deviations: Slow Riri; Increased KATLYN  Distance: 25' toilet to recliner  Comments: Patient reporting SOB by end of ambulation, educated on pursed lip breathing     Balance  Sitting - Static: Good  Sitting - Dynamic: Good  Standing - Static: Good;-  Standing - Dynamic: Fair  Comments: supervision/mod I seated on toilet, CGA with wh walker for ambulation  Exercise Treatment: Seated Exercises: B ankle pumps,  LAQ, marching, shoulder rows, shoulder shrugs 2x10      AM-PAC Score  AM-PAC Inpatient Mobility Raw Score : 17 (01/26/23 0939)  AM-PAC Inpatient T-Scale Score : 42.13 (01/26/23 0939)  Mobility Inpatient CMS 0-100% Score: 50.57 (01/26/23 0939)  Mobility Inpatient CMS G-Code Modifier : CK (01/26/23 0939)        Goals  Short Term Goals  Time Frame for Short Term Goals: upon d/c  (slow progression toward goals)  Short Term Goal 1: Bed mobility with mod I. Short Term Goal 2: Transfer sit <> stand to a walker with SBA. Short Term Goal 3: Ambulate with wh walker 25 feet with SBA.   Patient Goals   Patient Goals : none stated       Education  Patient Education  Education Given To: Patient  Education Provided: Role of Therapy;Plan of Care;Home Exercise Program;Energy Conservation  Education Provided Comments: importance of mobility and being out of the bed  Education Method: Verbal  Barriers to Learning: Other (Comment) (self-limiting and resistant to education)  Education Outcome: Continued education needed      Therapy Time   Individual Concurrent Group Co-treatment   Time In 0848         Time Out 0932         Minutes 44         Timed Code Treatment Minutes: [id number] [place] [address] DPT, [id number]

## 2023-01-26 NOTE — PROGRESS NOTES
n  Department of Internal Medicine  Nephrology Progress Note       He is an 69-year-old pleasant gentleman  with past medical history significant for coronary artery disease,  congestive heart failure, morbid obesity, COPD, obstructive sleep apnea,  chronic respiratory failure, hypercholesterolemia, chronic kidney  disease stage IV came to ER complaining of shortness of breath, dyspnea  on exertion, as well as difficulty with urination. The patient was  found to have urinary outlet obstruction with UTI (ESBL). The patient  was also diagnosed with pneumonia and admitted for further workup and management. Renal consultation has been called for worsening kidney Function. Labs reviewed  . ROS:  Feels weak / tired . No CP,  improved  SOB /  GIBBONS . Rest ROS neg . [address] [place]:  medications reviewed. Physical Exam:    VITALS:  BP (!) 146/76   Pulse 80   Temp 97.3 °F (36.3 °C) (Oral)   Resp 16   Ht 5' 8\" (1.727 m)   Wt 245 lb 13 oz (111.5 kg)   SpO2 95%   BMI 37.38 kg/m²   24HR INTAKE/OUTPUT:    Intake/Output Summary (Last 24 hours) at 1/26/2023 1057  Last data filed at 1/26/2023 0653  Gross per 24 hour   Intake 10 ml   Output 2000 ml   Net -1990 ml         Constitutional:  looks comfortable   Respiratory:  decrease BS at bases / rhonchi   Gastrointestinal:  no  epigastric tenderness. Normal Bowel Sounds  Cardiovascular:  S1, S2 irregular.   Edema:  + + edema    DATA:    CBC:  Lab Results   Component Value Date/Time    WBC 6.3 01/26/2023 09:37 AM    RBC 3.34 01/26/2023 09:37 AM    HGB 8.5 01/26/2023 09:37 AM    HCT 28.9 01/26/2023 09:37 AM    MCV 86.3 01/26/2023 09:37 AM    MCH 25.3 01/26/2023 09:37 AM    MCHC 29.3 01/26/2023 09:37 AM    RDW 21.6 01/26/2023 09:37 AM    PLT 97 01/26/2023 09:37 AM    MPV 8.7 01/26/2023 09:37 AM     CMP:  Lab Results   Component Value Date/Time     01/26/2023 09:37 AM    K 5.1 01/26/2023 09:37 AM    K 4.2 01/17/2023 02:53 PM     01/26/2023 09:37 AM    CO2 26 01/26/2023 09:37 AM    BUN 64 01/26/2023 09:37 AM    CREATININE 1.8 01/26/2023 09:37 AM    GFRAA 37 05/27/2022 02:05 PM    GFRAA >60 04/19/2013 10:46 AM    AGRATIO 1.1 01/17/2023 02:53 PM    LABGLOM 37 01/26/2023 09:37 AM    GLUCOSE 187 01/26/2023 09:37 AM    GLUCOSE 96 06/16/2011 10:37 AM    PROT 5.9 01/17/2023 02:53 PM    PROT 6.4 12/12/2012 08:05 AM    CALCIUM 8.3 01/26/2023 09:37 AM    BILITOT 0.5 01/17/2023 02:53 PM    ALKPHOS 81 01/17/2023 02:53 PM    AST 16 01/17/2023 02:53 PM    ALT 30 01/17/2023 02:53 PM      Hepatic Function Panel:   Lab Results   Component Value Date/Time    ALKPHOS 81 01/17/2023 02:53 PM    ALT 30 01/17/2023 02:53 PM    AST 16 01/17/2023 02:53 PM    PROT 5.9 01/17/2023 02:53 PM    PROT 6.4 12/12/2012 08:05 AM    BILITOT 0.5 01/17/2023 02:53 PM    BILIDIR <0.2 01/12/2023 12:23 PM    IBILI see below 01/12/2023 12:23 PM      Phosphorus:   Lab Results   Component Value Date/Time    PHOS 3.2 01/25/2023 11:18 AM       ASSESSMENT/PLAN:    DEISY   - most likely multifactorial ( low  BP/ ARbs /diuretics/UTI )  - renal function slowly improving  - on diuretics  , monitor renal; function  . High risk of DEISY and need RRT soon . HTN   -controlled     ESBL UTI  - on meropenem    HCAP    - on meropenem    Ac /ch resp failure   Stable     Obesity with JOANN     Over all prognosis guarded .       Krystin Gant MD,FACP

## 2023-01-26 NOTE — PLAN OF CARE
Problem: Discharge Planning  Goal: Discharge to home or other facility with appropriate resources  Outcome: Progressing     Problem: Pain  Goal: Verbalizes/displays adequate comfort level or baseline comfort level  Outcome: Progressing     Problem: Safety - Adult  Goal: Free from fall injury  Outcome: Progressing     Problem: ABCDS Injury Assessment  Goal: Absence of physical injury  Outcome: Progressing     Problem: Skin/Tissue Integrity  Goal: Absence of new skin breakdown  Description: 1. Monitor for areas of redness and/or skin breakdown  2. Assess vascular access sites hourly  3. Every 4-6 hours minimum:  Change oxygen saturation probe site  4. Every 4-6 hours:  If on nasal continuous positive airway pressure, respiratory therapy assess nares and determine need for appliance change or resting period. 1/26/2023 1041 by Modesto Soliz RN  Outcome: Progressing  1/26/2023 0422 by Fela Franklin RN  Note: D: Pt. Alert and oriented, refuses specialty mattress and refuses to be turned every 2 hours, stage 2 remains to bottom, skin is very fragile, weeping, bruised and skin tear noted to L elbow  A: Zinc ointment applied to buttocks and niko-area, arms legs and feet elevated with pillows.  Pt.educated on skin care management  R: verbalized understanding but remains uncooperative     Problem: Infection - Adult  Goal: Absence of infection at discharge  Outcome: Progressing  Goal: Absence of infection during hospitalization  Outcome: Progressing  Goal: Absence of fever/infection during anticipated neutropenic period  Outcome: Progressing     Problem: Genitourinary - Adult  Goal: Absence of urinary retention  Outcome: Progressing  Goal: Urinary catheter remains patent  Outcome: Progressing     Problem: Metabolic/Fluid and Electrolytes - Adult  Goal: Electrolytes maintained within normal limits  Outcome: Progressing  Goal: Hemodynamic stability and optimal renal function maintained  Outcome: Progressing  Goal: Glucose maintained within prescribed range  Outcome: Progressing     Problem: Nutrition Deficit:  Goal: Optimize nutritional status  Outcome: Progressing     Problem: Chronic Conditions and Co-morbidities  Goal: Patient's chronic conditions and co-morbidity symptoms are monitored and maintained or improved  Outcome: Progressing

## 2023-01-26 NOTE — PROGRESS NOTES
[facility] Internal Medicine Note      Chief Complaint: I 'm ok    Subjective/Interval History: This morning the patient had very small bowel movement. Still having hard time moving his bowels. Patient looks worn out. No other new problems noted. Wife at the bedside  Needed steady to get to the toilet today. Currently sitting in the bedside chair. Continues to have some hemoptysis with his mucus production. No chest pain. No nausea, vomiting, diarrhea. No abdominal pain. No dysuria. The remainder of the review of systems is negative. PMH, PSH, FH/SH reviewed and unchanged as documented in the H&P personally documented at admission 23    Medication list reviewed    Objective:    BP (!) 146/76   Pulse 80   Temp 97.3 °F (36.3 °C) (Oral)   Resp 16   Ht 5' 8\" (1.727 m)   Wt 245 lb 13 oz (111.5 kg)   SpO2 95%   BMI 37.38 kg/m²   Temp  Av.5 °F (36.4 °C)  Min: 97.3 °F (36.3 °C)  Max: 97.9 °F (36.6 °C)    Exam unchanged:  CV-regular on exam.    Pulm-respirations remain easy, currently been weaned back to 3 L of oxygen per nasal cannula. No wheezes noted today. Abd- BS+, soft, NTND  Ext-continued 3+ edema of his upper and lower extremities.     The Following Labs Were Reviewed Today:    Recent Results (from the past 24 hour(s))   Magnesium    Collection Time: 23 11:18 AM   Result Value Ref Range    Magnesium 2.40 1.80 - 2.40 mg/dL   Renal Function Panel    Collection Time: 23 11:18 AM   Result Value Ref Range    Sodium 141 136 - 145 mmol/L    Potassium 5.1 3.5 - 5.1 mmol/L    Chloride 108 99 - 110 mmol/L    CO2 24 21 - 32 mmol/L    Anion Gap 9 3 - 16    Glucose 239 (H) 70 - 99 mg/dL    BUN 59 (H) 7 - 20 mg/dL    Creatinine 1.7 (H) 0.8 - 1.3 mg/dL    Est, Glom Filt Rate 39 (A) >60    Calcium 8.4 8.3 - 10.6 mg/dL    Phosphorus 3.2 2.5 - 4.9 mg/dL    Albumin 2.5 (L) 3.4 - 5.0 g/dL   POCT Glucose    Collection Time: 23 11:23 AM   Result Value Ref Range    POC Glucose 233 (H) 70 - 99 mg/dl    Performed on ACCU-CHEK    CBC    Collection Time: 01/25/23 11:43 AM   Result Value Ref Range    WBC 7.3 4.0 - 11.0 K/uL    RBC 3.22 (L) 4.20 - 5.90 M/uL    Hemoglobin 8.0 (L) 13.5 - 17.5 g/dL    Hematocrit 27.2 (L) 40.5 - 52.5 %    MCV 84.3 80.0 - 100.0 fL    MCH 24.7 (L) 26.0 - 34.0 pg    MCHC 29.3 (L) 31.0 - 36.0 g/dL    RDW 20.4 (H) 12.4 - 15.4 %    Platelets 88 (L) 999 - 450 K/uL    MPV 8.9 5.0 - 10.5 fL   POCT Glucose    Collection Time: 01/25/23  5:04 PM   Result Value Ref Range    POC Glucose 200 (H) 70 - 99 mg/dl    Performed on ACCU-CHEK    POCT Glucose    Collection Time: 01/25/23  8:51 PM   Result Value Ref Range    POC Glucose 205 (H) 70 - 99 mg/dl    Performed on ACCU-CHEK    Protime-INR    Collection Time: 01/26/23  1:02 AM   Result Value Ref Range    Protime 16.5 (H) 11.7 - 14.5 sec    INR 1.34 (H) 0.87 - 1.14   CBC with Auto Differential    Collection Time: 01/26/23  1:02 AM   Result Value Ref Range    WBC 6.0 4.0 - 11.0 K/uL    RBC 3.14 (L) 4.20 - 5.90 M/uL    Hemoglobin 7.6 (L) 13.5 - 17.5 g/dL    Hematocrit 26.4 (L) 40.5 - 52.5 %    MCV 84.3 80.0 - 100.0 fL    MCH 24.3 (L) 26.0 - 34.0 pg    MCHC 28.9 (L) 31.0 - 36.0 g/dL    RDW 21.1 (H) 12.4 - 15.4 %    Platelets 89 (L) 933 - 450 K/uL    MPV 8.8 5.0 - 10.5 fL    Neutrophils % 83.8 %    Lymphocytes % 9.7 %    Monocytes % 6.0 %    Eosinophils % 0.4 %    Basophils % 0.1 %    Neutrophils Absolute 5.0 1.7 - 7.7 K/uL    Lymphocytes Absolute 0.6 (L) 1.0 - 5.1 K/uL    Monocytes Absolute 0.4 0.0 - 1.3 K/uL    Eosinophils Absolute 0.0 0.0 - 0.6 K/uL    Basophils Absolute 0.0 0.0 - 0.2 K/uL   POCT Glucose    Collection Time: 01/26/23  8:02 AM   Result Value Ref Range    POC Glucose 126 (H) 70 - 99 mg/dl    Performed on ACCU-CHEK        ASSESSMENT/PLAN:      Principal Problem:    Acute on chronic diastolic congestive heart failure -renal function pending for today. Restarted on torsemide yesterday. Continue to monitor.   Active Problems: Acute on chronic respiratory failure with hypoxia/HCAP (healthcare-associated pneumonia)-patient remains on antibiotics per pulmonary. May have a component of organizing pneumonia as well. Continue prednisone. Still with some hemoptysis. DEISY-better. UTI due to extended-spectrum beta lactamase (ESBL) producing Escherichia coli-day #9 of meropenem. Discussed with ID, current course of antibiotics has been adequate to treat UTI. Chronic anemia-continue to monitor    Chronic GERD-continue Protonix. Type 2 diabetes mellitus with stage 4 chronic kidney disease, with long-term current use of insulin (HCC)-sugars doing okay. Continue low-dose Lantus, consider increase the dose. Continue sliding scale. Continue to monitor    Essential hypertension-blood pressure higher today. Restarted hydralazine 10 mg 3 times daily, control is better. B12 deficiency-continue with oral B12 supplementation. Atrial fibrillation-rate is controlled. Patient is not an anticoagulation candidate due to occult bleeding in the past and chronic iron deficiency.     Haile Zavala MD, FACP  9:12 AM  1/26/2023

## 2023-01-27 ENCOUNTER — APPOINTMENT (OUTPATIENT)
Dept: CT IMAGING | Age: 83
DRG: 286 | End: 2023-01-27
Payer: MEDICARE

## 2023-01-27 LAB
ALBUMIN SERPL-MCNC: 2.8 G/DL (ref 3.4–5)
ANION GAP SERPL CALCULATED.3IONS-SCNC: 10 MMOL/L (ref 3–16)
ANION GAP SERPL CALCULATED.3IONS-SCNC: 11 MMOL/L (ref 3–16)
BASOPHILS ABSOLUTE: 0 K/UL (ref 0–0.2)
BASOPHILS RELATIVE PERCENT: 0.3 %
BUN BLDV-MCNC: 65 MG/DL (ref 7–20)
BUN BLDV-MCNC: 66 MG/DL (ref 7–20)
CALCIUM SERPL-MCNC: 8.5 MG/DL (ref 8.3–10.6)
CALCIUM SERPL-MCNC: 8.6 MG/DL (ref 8.3–10.6)
CHLORIDE BLD-SCNC: 104 MMOL/L (ref 99–110)
CHLORIDE BLD-SCNC: 106 MMOL/L (ref 99–110)
CO2: 27 MMOL/L (ref 21–32)
CO2: 28 MMOL/L (ref 21–32)
CREAT SERPL-MCNC: 1.7 MG/DL (ref 0.8–1.3)
CREAT SERPL-MCNC: 1.7 MG/DL (ref 0.8–1.3)
EOSINOPHILS ABSOLUTE: 0.1 K/UL (ref 0–0.6)
EOSINOPHILS RELATIVE PERCENT: 1 %
GFR SERPL CREATININE-BSD FRML MDRD: 39 ML/MIN/{1.73_M2}
GFR SERPL CREATININE-BSD FRML MDRD: 39 ML/MIN/{1.73_M2}
GLUCOSE BLD-MCNC: 105 MG/DL (ref 70–99)
GLUCOSE BLD-MCNC: 105 MG/DL (ref 70–99)
GLUCOSE BLD-MCNC: 124 MG/DL (ref 70–99)
GLUCOSE BLD-MCNC: 220 MG/DL (ref 70–99)
GLUCOSE BLD-MCNC: 224 MG/DL (ref 70–99)
GLUCOSE BLD-MCNC: 229 MG/DL (ref 70–99)
HCT VFR BLD CALC: 27.6 % (ref 40.5–52.5)
HEMOGLOBIN: 7.9 G/DL (ref 13.5–17.5)
LYMPHOCYTES ABSOLUTE: 0.8 K/UL (ref 1–5.1)
LYMPHOCYTES RELATIVE PERCENT: 14.5 %
MCH RBC QN AUTO: 24.5 PG (ref 26–34)
MCHC RBC AUTO-ENTMCNC: 28.8 G/DL (ref 31–36)
MCV RBC AUTO: 85.3 FL (ref 80–100)
MONOCYTES ABSOLUTE: 0.3 K/UL (ref 0–1.3)
MONOCYTES RELATIVE PERCENT: 5.5 %
NEUTROPHILS ABSOLUTE: 4.2 K/UL (ref 1.7–7.7)
NEUTROPHILS RELATIVE PERCENT: 78.7 %
PDW BLD-RTO: 20.7 % (ref 12.4–15.4)
PERFORMED ON: ABNORMAL
PHOSPHORUS: 3 MG/DL (ref 2.5–4.9)
PLATELET # BLD: 86 K/UL (ref 135–450)
PMV BLD AUTO: 8.7 FL (ref 5–10.5)
POTASSIUM SERPL-SCNC: 4.6 MMOL/L (ref 3.5–5.1)
POTASSIUM SERPL-SCNC: 4.7 MMOL/L (ref 3.5–5.1)
PRO-BNP: 3502 PG/ML (ref 0–449)
RBC # BLD: 3.24 M/UL (ref 4.2–5.9)
SODIUM BLD-SCNC: 142 MMOL/L (ref 136–145)
SODIUM BLD-SCNC: 144 MMOL/L (ref 136–145)
WBC # BLD: 5.4 K/UL (ref 4–11)

## 2023-01-27 PROCEDURE — 94640 AIRWAY INHALATION TREATMENT: CPT

## 2023-01-27 PROCEDURE — 85025 COMPLETE CBC W/AUTO DIFF WBC: CPT

## 2023-01-27 PROCEDURE — 97110 THERAPEUTIC EXERCISES: CPT

## 2023-01-27 PROCEDURE — 6360000002 HC RX W HCPCS: Performed by: INTERNAL MEDICINE

## 2023-01-27 PROCEDURE — 83880 ASSAY OF NATRIURETIC PEPTIDE: CPT

## 2023-01-27 PROCEDURE — 2700000000 HC OXYGEN THERAPY PER DAY

## 2023-01-27 PROCEDURE — 71250 CT THORAX DX C-: CPT

## 2023-01-27 PROCEDURE — 99232 SBSQ HOSP IP/OBS MODERATE 35: CPT | Performed by: INTERNAL MEDICINE

## 2023-01-27 PROCEDURE — 80069 RENAL FUNCTION PANEL: CPT

## 2023-01-27 PROCEDURE — 2580000003 HC RX 258: Performed by: INTERNAL MEDICINE

## 2023-01-27 PROCEDURE — 36415 COLL VENOUS BLD VENIPUNCTURE: CPT

## 2023-01-27 PROCEDURE — 1200000000 HC SEMI PRIVATE

## 2023-01-27 PROCEDURE — 99232 SBSQ HOSP IP/OBS MODERATE 35: CPT | Performed by: NURSE PRACTITIONER

## 2023-01-27 PROCEDURE — 6370000000 HC RX 637 (ALT 250 FOR IP): Performed by: INTERNAL MEDICINE

## 2023-01-27 PROCEDURE — 94669 MECHANICAL CHEST WALL OSCILL: CPT

## 2023-01-27 PROCEDURE — 94760 N-INVAS EAR/PLS OXIMETRY 1: CPT

## 2023-01-27 PROCEDURE — 99233 SBSQ HOSP IP/OBS HIGH 50: CPT | Performed by: INTERNAL MEDICINE

## 2023-01-27 PROCEDURE — 97530 THERAPEUTIC ACTIVITIES: CPT

## 2023-01-27 RX ORDER — FUROSEMIDE 10 MG/ML
40 INJECTION INTRAMUSCULAR; INTRAVENOUS 2 TIMES DAILY
Status: DISCONTINUED | OUTPATIENT
Start: 2023-01-27 | End: 2023-01-29

## 2023-01-27 RX ADMIN — HYDRALAZINE HYDROCHLORIDE 10 MG: 10 TABLET, FILM COATED ORAL at 17:01

## 2023-01-27 RX ADMIN — TORSEMIDE 40 MG: 20 TABLET ORAL at 08:49

## 2023-01-27 RX ADMIN — DOCUSATE SODIUM 100 MG: 100 CAPSULE, LIQUID FILLED ORAL at 20:03

## 2023-01-27 RX ADMIN — LACTULOSE 20 G: 20 SOLUTION ORAL at 08:49

## 2023-01-27 RX ADMIN — CYANOCOBALAMIN TAB 500 MCG 500 MCG: 500 TAB at 08:49

## 2023-01-27 RX ADMIN — HEPARIN SODIUM 5000 UNITS: 5000 INJECTION INTRAVENOUS; SUBCUTANEOUS at 20:03

## 2023-01-27 RX ADMIN — SODIUM CHLORIDE, PRESERVATIVE FREE 10 ML: 5 INJECTION INTRAVENOUS at 20:03

## 2023-01-27 RX ADMIN — PREDNISONE 20 MG: 20 TABLET ORAL at 08:49

## 2023-01-27 RX ADMIN — EPOETIN ALFA-EPBX 40000 UNITS: 40000 INJECTION, SOLUTION INTRAVENOUS; SUBCUTANEOUS at 12:42

## 2023-01-27 RX ADMIN — INSULIN LISPRO 1 UNITS: 100 INJECTION, SOLUTION INTRAVENOUS; SUBCUTANEOUS at 17:02

## 2023-01-27 RX ADMIN — Medication 1 CAPSULE: at 08:49

## 2023-01-27 RX ADMIN — HYDRALAZINE HYDROCHLORIDE 10 MG: 10 TABLET, FILM COATED ORAL at 05:46

## 2023-01-27 RX ADMIN — TAMSULOSIN HYDROCHLORIDE 0.4 MG: 0.4 CAPSULE ORAL at 08:49

## 2023-01-27 RX ADMIN — FUROSEMIDE 40 MG: 10 INJECTION, SOLUTION INTRAMUSCULAR; INTRAVENOUS at 18:37

## 2023-01-27 RX ADMIN — OXYCODONE HYDROCHLORIDE AND ACETAMINOPHEN 500 MG: 500 TABLET ORAL at 08:49

## 2023-01-27 RX ADMIN — MONTELUKAST 10 MG: 10 TABLET, FILM COATED ORAL at 08:49

## 2023-01-27 RX ADMIN — INSULIN GLARGINE 10 UNITS: 100 INJECTION, SOLUTION SUBCUTANEOUS at 08:49

## 2023-01-27 RX ADMIN — ATORVASTATIN CALCIUM 40 MG: 40 TABLET, FILM COATED ORAL at 08:49

## 2023-01-27 RX ADMIN — LEVALBUTEROL HYDROCHLORIDE 1.25 MG: 1.25 SOLUTION, CONCENTRATE RESPIRATORY (INHALATION) at 11:37

## 2023-01-27 RX ADMIN — LACTULOSE 20 G: 20 SOLUTION ORAL at 20:03

## 2023-01-27 RX ADMIN — HYDRALAZINE HYDROCHLORIDE 10 MG: 10 TABLET, FILM COATED ORAL at 20:03

## 2023-01-27 RX ADMIN — HEPARIN SODIUM 5000 UNITS: 5000 INJECTION INTRAVENOUS; SUBCUTANEOUS at 05:46

## 2023-01-27 RX ADMIN — ASPIRIN 162 MG: 81 TABLET, COATED ORAL at 08:49

## 2023-01-27 RX ADMIN — POLYETHYLENE GLYCOL 3350 17 G: 17 POWDER, FOR SOLUTION ORAL at 08:49

## 2023-01-27 RX ADMIN — FUROSEMIDE 40 MG: 10 INJECTION, SOLUTION INTRAMUSCULAR; INTRAVENOUS at 12:41

## 2023-01-27 RX ADMIN — HEPARIN SODIUM 5000 UNITS: 5000 INJECTION INTRAVENOUS; SUBCUTANEOUS at 17:01

## 2023-01-27 RX ADMIN — SODIUM CHLORIDE SOLN NEBU 3% 15 ML: 3 NEBU SOLN at 11:37

## 2023-01-27 RX ADMIN — PANTOPRAZOLE SODIUM 40 MG: 40 TABLET, DELAYED RELEASE ORAL at 05:46

## 2023-01-27 RX ADMIN — SODIUM CHLORIDE SOLN NEBU 3% 15 ML: 3 NEBU SOLN at 07:48

## 2023-01-27 RX ADMIN — BISACODYL 5 MG: 5 TABLET, COATED ORAL at 08:49

## 2023-01-27 RX ADMIN — SODIUM CHLORIDE, PRESERVATIVE FREE 5 ML: 5 INJECTION INTRAVENOUS at 09:02

## 2023-01-27 RX ADMIN — LEVALBUTEROL HYDROCHLORIDE 1.25 MG: 1.25 SOLUTION, CONCENTRATE RESPIRATORY (INHALATION) at 07:48

## 2023-01-27 RX ADMIN — DOCUSATE SODIUM 100 MG: 100 CAPSULE, LIQUID FILLED ORAL at 08:48

## 2023-01-27 RX ADMIN — INSULIN LISPRO 1 UNITS: 100 INJECTION, SOLUTION INTRAVENOUS; SUBCUTANEOUS at 12:42

## 2023-01-27 RX ADMIN — Medication 1 CAPSULE: at 17:01

## 2023-01-27 NOTE — PLAN OF CARE
Problem: Discharge Planning  Goal: Discharge to home or other facility with appropriate resources  Outcome: Progressing  Flowsheets (Taken 1/26/2023 1147 by Sarah Pinedo, RN)  Discharge to home or other facility with appropriate resources: Identify barriers to discharge with patient and caregiver     Problem: Pain  Goal: Verbalizes/displays adequate comfort level or baseline comfort level  Outcome: Progressing     Problem: Safety - Adult  Goal: Free from fall injury  Outcome: Progressing     Problem: ABCDS Injury Assessment  Goal: Absence of physical injury  Outcome: Progressing     Problem: Skin/Tissue Integrity  Goal: Absence of new skin breakdown  Description: 1. Monitor for areas of redness and/or skin breakdown  2. Assess vascular access sites hourly  3. Every 4-6 hours minimum:  Change oxygen saturation probe site  4. Every 4-6 hours:  If on nasal continuous positive airway pressure, respiratory therapy assess nares and determine need for appliance change or resting period.   Outcome: Progressing     Problem: Infection - Adult  Goal: Absence of infection at discharge  Outcome: Progressing  Flowsheets (Taken 1/26/2023 1147 by Sarah Pinedo, RN)  Absence of infection at discharge: Assess and monitor for signs and symptoms of infection  Goal: Absence of infection during hospitalization  Outcome: Progressing  Flowsheets (Taken 1/26/2023 1147 by Sarah Pinedo, RN)  Absence of infection during hospitalization: Assess and monitor for signs and symptoms of infection  Goal: Absence of fever/infection during anticipated neutropenic period  Outcome: Progressing  Flowsheets (Taken 1/26/2023 1147 by Sarah Pinedo, RN)  Absence of fever/infection during anticipated neutropenic period: Monitor white blood cell count     Problem: Genitourinary - Adult  Goal: Absence of urinary retention  Outcome: Progressing  Flowsheets (Taken 1/26/2023 1147 by Sarah Pinedo, RN)  Absence of urinary retention: Assess patients ability to void and empty bladder  Goal: Urinary catheter remains patent  Outcome: Progressing  Flowsheets (Taken 1/26/2023 1147 by Ranjana Ellis RN)  Urinary catheter remains patent: Assess patency of urinary catheter     Problem: Metabolic/Fluid and Electrolytes - Adult  Goal: Electrolytes maintained within normal limits  Outcome: Progressing  Flowsheets (Taken 1/26/2023 1147 by Ranjana Ellis RN)  Electrolytes maintained within normal limits: Monitor labs and assess patient for signs and symptoms of electrolyte imbalances  Goal: Hemodynamic stability and optimal renal function maintained  Outcome: Progressing  Flowsheets (Taken 1/26/2023 1147 by Ranjana Ellis RN)  Hemodynamic stability and optimal renal function maintained: Monitor labs and assess for signs and symptoms of volume excess or deficit  Goal: Glucose maintained within prescribed range  Outcome: Progressing  Flowsheets (Taken 1/26/2023 1147 by Ranjana Ellis RN)  Glucose maintained within prescribed range: Monitor blood glucose as ordered     Problem: Nutrition Deficit:  Goal: Optimize nutritional status  Outcome: Progressing     Problem: Chronic Conditions and Co-morbidities  Goal: Patient's chronic conditions and co-morbidity symptoms are monitored and maintained or improved  Outcome: Progressing  Flowsheets (Taken 1/26/2023 1147 by Ranjana Ellis RN)  Care Plan - Patient's Chronic Conditions and Co-Morbidity Symptoms are Monitored and Maintained or Improved: Monitor and assess patient's chronic conditions and comorbid symptoms for stability, deterioration, or improvement

## 2023-01-27 NOTE — NURSE NAVIGATOR
Anticipating discharge order.   Should pt be d/c'd over the weekend, he has heart failure instructions on his TRUONG, and AVS.  He has had 60 minutes of education on chf.

## 2023-01-27 NOTE — PROGRESS NOTES
Occupational Therapy  Facility/Department: [facility] MED SURG  Occupational Therapy Daily Treatment Note    Name: Cinthya Blanc  : 1940  MRN: [id number]  Date of Service: 2023    Discharge Recommendations:  3-5 sessions per week, Patient would benefit from continued therapy after discharge  OT Equipment Recommendations  Other: TBD     Gilmar Suresh scored a 16/24 on the AM-PAC ADL Inpatient form. Current research shows that an AM-PAC score of 17 or less is typically not associated with a discharge to the patient's home setting. Based on the patient's AM-PAC score and their current ADL deficits, it is recommended that the patient have 3-5 sessions per week of Occupational Therapy at d/c to increase the patient's independence. Please see assessment section for further patient specific details. If patient discharges prior to next session this note will serve as a discharge summary. Please see below for the latest assessment towards goals. Patient Diagnosis(es): The primary encounter diagnosis was Acute on chronic congestive heart failure, unspecified heart failure type ([address] Diagnoses of Elevated troponin, Chronic kidney disease, unspecified CKD stage, and Urinary tract infection with hematuria, site unspecified were also pertinent to this visit. 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 Past Surgical History:  has a past surgical history that includes Gallbladder surgery ();  Upper gastrointestinal endoscopy (7-2005    7/10/2009); Colonoscopy (7/10/2009); Cataract removal (2/2012); Pain management procedure (Right, 10/20/2021); Pain management procedure (Left, 12/8/2021); CT BIOPSY BONE MARROW (11/1/2022); bronchoscopy (N/A, 12/28/2022); and bronchoscopy (12/28/2022). Assessment   Performance deficits / Impairments: Decreased functional mobility ; Decreased ADL status; Decreased strength;Decreased ROM; Decreased balance;Decreased endurance;Decreased fine motor control  Assessment: Pt requires encouragement to participate in minimal activity. Pt participated in seated therex to improve strength/endurance for ADLs. Pt declined ADLs for fxl transfers/mobility, but anticipate pt would require at least mod A for ADL needs d/t the above performance deficits. As pt presents today, AM-PAC score is NOT associated with a homebound d/c and indicates need for ongoing skilled OT to maximize pt's safety and independence. Wife does not appear to be able to provide adequate level of assist that pt would require for safe d/c to home, and pt's children are unable to provide 24 hour assist .  Prognosis: Fair  History: see above  REQUIRES OT FOLLOW-UP: Yes  Activity Tolerance  Activity Tolerance: Patient limited by fatigue;Treatment limited secondary to agitation  Activity Tolerance Comments: low motivation        Plan   Occupational Therapy Plan  Times Per Week: 3-5  Current Treatment Recommendations: Strengthening, Balance training, Functional mobility training, Endurance training, Self-Care / ADL, Safety education & training, Equipment evaluation, education, & procurement, ROM     Restrictions  Restrictions/Precautions  Restrictions/Precautions: Fall Risk, Contact Precautions  Position Activity Restriction  Other position/activity restrictions: Contact Precautions (ESBL); 3L O2    Subjective   General  Chart Reviewed:  Yes  Additional Pertinent Hx: Per Alphonse Lizarraga MD's H&P: Marti Downey is an 58-year-old white male with a history of chronic diastolic heart failure, diabetes, chronic kidney disease stage IIIb who recently was hospitalized with congestive heart failure and hemoptysis and pneumonia growing Moraxella catarrhalis, who presented to the emergency room with increasing shortness of breath and increased oxygen requirement. ...Work-up in the emergency room revealed an elevated BNP compared to prior, a procalcitonin that was slightly elevated at 0.37, urinalysis that suggested infection and chest x-ray that shows either multifocal pneumonia or heart failure.\"  Family / Caregiver Present: No  Referring Practitioner: Alberto Somers MD  Diagnosis: Acute on chronic diastolic CHF  Subjective  Subjective: Pt met b/s for OT tx. Pt seated in recliner on arrival, initially refusing therapy d/t multiple complaints i.e. \"had a hell of a day\" \"haven't gotten any sleep\" \"swollen up like a balloon\". With verbal encouragement, pt agreeable to participate in therex in recliner.     Social/Functional History  Social/Functional History  Lives With: Spouse  Type of Home: House  Home Layout: Able to Live on Main level with bedroom/bathroom, Multi-level  Home Access: Stairs to enter with rails  Entrance Stairs - Number of Steps: 1+1  Entrance Stairs - Rails: Both  Bathroom Shower/Tub: Tub/Shower unit  Bathroom Toilet: Standard  Bathroom Equipment: Shower chair, Grab bars in shower, Tub transfer bench  Home Equipment: Cane, Rollator, Oxygen, Grab bars (2L O2)  Has the patient had two or more falls in the past year or any fall with injury in the past year?: Yes  ADL Assistance: Needs assistance (assist LB)  Homemaking Assistance: Needs assistance (wife cleans, pt manages bill paying)  Homemaking Responsibilities: No  Ambulation Assistance: Independent (with rollator)  Transfer Assistance: Independent  Active : Yes  Occupation: Retired  Type of Occupation: built houses  Leisure & Hobbies: wood carving, scroll saw  IADL Comments: sleeps  in a recliner but reports he does get into bed on a regular basis       Objective     Safety Devices  Type of Devices: Chair alarm in place;Call light within reach; Left in chair;Patient at risk for falls    Activity Tolerance  Activity Tolerance: Patient limited by fatigue;Patient limited by endurance; Other (comment)  Activity Tolerance Comments: low motivation, resistance to education    Bed mobility  Bed Mobility Comments: pt seated in recliner at start/end of session    Transfers  Transfer Comments: pt refused d/t fatigue    Fxl mobility: Pt refused    Cognition  Overall Cognitive Status: Exceptions  Safety Judgement: Decreased awareness of need for safety;Decreased awareness of need for assistance  Problem Solving: Decreased awareness of errors  Insights: Decreased awareness of deficits  Cognition Comment: decreased insight, multiple complaints, low motivation, resistance to education    Exercise Treatment: pt  completed B UE/LE therex to improve strength/endurance for ADLs. B UE's elevated on pillows at end of session d/t edema and weeping  A/AROM Exercises: AROM B UE chest press x10, seated row x10, pronation/supination x10, L elbow flexion x10; B LE ankle pumps x10, LAQ x20, and marching x10    Education Given To: Patient  Education Provided: Role of Therapy;Plan of Care;Home Exercise Program  Education Method: Demonstration;Verbal  Barriers to Learning: Cognition (decreased insight)  Education Outcome: Continued education needed                          AM-PAC Score        AM-[facility] Inpatient Daily Activity Raw Score: 16 (01/27/23 1444)  AM-PAC Inpatient ADL T-Scale Score : 35.96 (01/27/23 1444)  ADL Inpatient CMS 0-100% Score: 53.32 (01/27/23 1444)  ADL Inpatient CMS G-Code Modifier : CK (01/27/23 1444)           Goals  Short Term Goals  Time Frame for Short Term Goals: Prior to d/c: status goals 1/27: all goals ongoing  Short Term Goal 1: Pt will complete LB ADLs with mod A.   Short Term Goal 2: Pt will complete UB ADLs with set-up/SBA. Short Term Goal 3: Pt will complete toileting with CGA/min A. Short Term Goal 4: Pt will complete fxl mobility and fxl transfers to/from ADL surfaces with SBA using AD. Short Term Goal 5: Pt will tolerate standing >3 minutes for functional task with SBA to improve standing tolerance for ADL routine. Long Term Goals  Time Frame for Long Term Goals : STGs=LTGs  Patient Goals   Patient goals : to return home.        Therapy Time   Individual Concurrent Group Co-treatment   Time In 1411         Time Out 1435         Minutes [address], OTR/L 5793

## 2023-01-27 NOTE — DISCHARGE INSTR - COC
Continuity of Care Form    Patient Name: Ellie Dykes   :  1940  MRN:  [id number]    Admit date:  2023  Discharge date:  2023    Code Status Order: Full Code   Advance Directives:     Admitting Physician:  Oli Cline MD  PCP: Raegan Bueno MD    Discharging Nurse: [facility] Unit/Room#: G5G-1495/1041-18  Discharging Unit Phone Number: [phone]    Emergency Contact:   Extended Emergency Contact Information  Primary Emergency Contact: Grace Suresharet  Address: [address] [facility] [address] Phone: [phone]  Work Phone: [phone]  Mobile Phone: [phone]  Relation: Spouse  Secondary Emergency Contact: Josephine Suresh  Mobile Phone: [phone]  Relation: Child   needed?  No    Past Surgical History:  Past Surgical History:   Procedure Laterality Date    BRONCHOSCOPY N/A 2022    BRONCHOSCOPY ALVEOLAR LAVAGE performed by Fidencio Correa MD at [address]  2022    BRONCHOSCOPY DIAGNOSTIC OR CELL [address] ONLY performed by Fidencio Correa MD at [facility]  2012    bilateral    COLONOSCOPY  7/10/2009    diverticulosis    hemorrhoids    CT BONE MARROW BIOPSY  2022    CT BONE MARROW BIOPSY 2022 [facility]    GALLBLADDER SURGERY  1981    PAIN MANAGEMENT PROCEDURE Right 10/20/2021    COOLIEF RADIOFREQUENCY ABLATION - RIGHT KNEE performed by Blas Trujillo MD at [address] Left 2021    [address] - LEFT KNEE performed by Blas Trujillo MD at [facility]  7-2005    7/10/2009    normal       Immunization History:   Immunization History   Administered Date(s) Administered    COVID-19, PFIZER GRAY top, DO NOT Dilute, (age 15 y+), IM, 30 mcg/0.3 mL 2022    COVID-19, PFIZER PURPLE top, DILUTE for use, (age 15 y+), 30mcg/0.3mL 01/22/2021, 02/12/2021, 10/27/2021    Influenza 10/19/2011, 10/09/2013    Influenza A (H3G2-13) Vaccine PF IM 12/22/2009    Influenza Vaccine, unspecified formulation 09/29/2009, 10/25/2010, 10/19/2011, 10/09/2013, 10/21/2016, 09/01/2017    Influenza Virus Vaccine 09/15/2012, 10/01/2014, 11/07/2014, 09/11/2015    Influenza Whole 10/25/2010    Influenza, AFLURIA (age 1 yrs+), FLUZONE, (age 10 mo+), MDV, 0.5mL 09/15/2012, 10/01/2014    Influenza, FLUAD, (age 72 y+), Adjuvanted, 0.5mL 09/27/2021, 09/14/2022    Influenza, FLUARIX, FLULAVAL, FLUZONE (age 10 mo+) AND AFLURIA, (age 1 y+), PF, 0.5mL 10/21/2016, 09/01/2017    Influenza, FLUZONE (age 72 y+), High Dose, 0.7mL 08/19/2020    Influenza, High Dose (Fluzone 65 yrs and older) 10/11/2018, 09/05/2019, 08/19/2020    PPD Test 06/18/2005    Pneumococcal Conjugate 13-valent ([id number]) 12/01/2014, 01/02/2015    Pneumococcal Polysaccharide ([id number]) 10/19/2011, 01/01/2014    Td, unspecified formulation 11/03/2010    Tdap (Boostrix, Adacel) 11/03/2010    Zoster Live (Zostavax) 07/01/2012, 09/01/2012       Active Problems:  Patient Active Problem List   Diagnosis Code    JOANN (obstructive sleep apnea) G47.33    Chronic GERD K21.9    Type 2 diabetes mellitus with stage 4 chronic kidney disease, with long-term current use of insulin (HCC) E11.22, N18.4, Z79.4    Microcytic anemia D50.9    Microalbuminuria R80.9    Hyperlipidemia E78.5    Paroxysmal atrial fibrillation (HCC) I48.0    Edema R60.9    Acute on chronic respiratory failure with hypoxia (HCC) J96.21    Carotid artery stenosis without cerebral infarction I65.29    Leg swelling M79.89    Chronic venous insufficiency I87.2    Stage 3b chronic kidney disease (HCC) N18.32    Essential hypertension I10    Hemoptysis R04.2    B12 deficiency E53.8    HCAP (healthcare-associated pneumonia) J18.9    Pulmonary nodule R91.1    Moderate COPD (chronic obstructive pulmonary disease) ([facility]) J44.9    Acute kidney injury ([facility] N17.9    Slow transit constipation K59.01    Pancreatic mass K86.89    UTI due to extended-spectrum beta lactamase (ESBL) producing Escherichia coli N39.0, B96.29, Z16.12    Obesity, Class II, BMI 35-39.9 E66.9    Weakness R53.1    Hyperkalemia E87.5    Chronic anemia D64.9    Abnormal CXR R93.89    Elevated brain natriuretic peptide (BNP) level R79.89    Elevated troponin R77.8    Chronic respiratory failure with hypoxia ([facility]) J96.11    Chronic diastolic CHF (congestive heart failure), NYHA class 2 ([facility]) I50.32    Acute on chronic congestive heart failure ([facility]) I50.9    Mucopurulent chronic bronchitis ([facility]) J41.1    Persistent atrial fibrillation ([facility]) I48.19    Hypothermia due to non-environmental cause R68.0       Isolation/Infection:   Isolation            Contact          Patient Infection Status       Infection Onset Added Last Indicated Last Indicated By Review Planned Expiration Resolved Resolved By    ESBL (Extended Spectrum Beta Lactamase) 12/26/18 12/28/18 01/17/23 Culture, Urine        Resolved    COVID-19 (Rule Out) 01/17/23 01/17/23 01/17/23 COVID-19, Rapid (Ordered)   01/17/23 Rule-Out Test Resulted    COVID-19 (Rule Out) 12/23/22 12/23/22 12/23/22 COVID-19, Rapid (Ordered)   12/23/22 Rule-Out Test Resulted    COVID-19 (Rule Out) 11/15/22 11/15/22 11/15/22 COVID-19, Rapid (Ordered)   11/15/22 Rule-Out Test Resulted    COVID-19 (Rule Out) 10/30/22 10/30/22 10/30/22 COVID-19, Rapid (Ordered)   10/30/22 Rule-Out Test Resulted            Nurse Assessment:  Last Vital Signs: BP (!) 159/79   Pulse 78   Temp 97.9 °F (36.6 °C)   Resp 16   Ht 5' 8\" (1.727 m)   Wt 241 lb 13.5 oz (109.7 kg)   SpO2 97%   BMI 36.77 kg/m²     Last documented pain score (0-10 scale): Pain Level: 0  Last Weight:   Wt Readings from Last 1 Encounters:   01/27/23 241 lb 13.5 oz (109.7 kg)     Mental Status:  oriented and alert    IV Access:  - None    Nursing Mobility/ADLs:  Walking   Assisted  Transfer  Assisted  Bathing Assisted  Dressing  Assisted  Toileting  Assisted  Feeding  Assisted  Med Admin  Assisted  Med Delivery   none    Wound Care Documentation and Therapy:  Wound 12/28/22 Buttocks Left (Active)   Wound Image   12/28/22 1400   Wound Etiology Pressure Stage 2 01/26/23 2125   Dressing Status Clean;Dry; Intact 01/26/23 2125   Wound Cleansed Cleansed with saline 01/26/23 2125   Dressing/Treatment Triad hydro/zinc oxide-based hydrophilic paste 51/46/88 7679   Wound Length (cm) 1.5 cm 12/28/22 1400   Wound Width (cm) 1 cm 12/28/22 1400   Wound Surface Area (cm^2) 1.5 cm^2 12/28/22 1400   Wound Assessment Pink/red 01/26/23 2125   Drainage Amount None 01/26/23 2125   Drainage Description Thin 01/26/23 2125   Odor None 01/26/23 2125   Karen-wound Assessment Blanchable erythema 01/26/23 2125   Margins Defined edges 01/26/23 2125   Number of days: 30       Wound 01/22/23 Radial Left;Proximal (Active)   Dressing Status Dry;Clean; Intact 01/26/23 2125   Wound Cleansed Cleansed with saline 01/26/23 2125   Dressing/Treatment Triad hydro/zinc oxide-based hydrophilic paste 87/79/26 4065   Wound Assessment Pink/red 01/26/23 2125   Drainage Amount Scant 01/26/23 2125   Drainage Description Thin 01/26/23 2125   Odor None 01/26/23 2125   Number of days: 4        Elimination:  Continence: Bowel: Yes  Bladder: Yes  Urinary Catheter: None   Colostomy/Ileostomy/Ileal Conduit: No       Date of Last BM: 2/4/23    Intake/Output Summary (Last 24 hours) at 1/27/2023 1545  Last data filed at 1/27/2023 0548  Gross per 24 hour   Intake 180 ml   Output 2650 ml   Net -2470 ml     I/O last 3 completed shifts: In: 56 [P.O.:480; I.V.:10]  Out: 4650 [Urine:4650]    Safety Concerns:      At Risk for Falls    Impairments/Disabilities:      None    Nutrition Therapy:  Current Nutrition Therapy:   - Oral Diet:  Carb Control 5 carbs/meal (2000kcals/day)    Routes of Feeding: Oral  Liquids: No Restrictions  Daily Fluid Restriction: no  Last Modified Barium Swallow with Video (Video Swallowing Test): not done    Treatments at the Time of Hospital Discharge:   Respiratory Treatments: n/a  Oxygen Therapy:  is on oxygen at 3 L/min per nasal cannula. Ventilator:    - No ventilator support    Rehab Therapies: Physical Therapy and Occupational Therapy  Weight Bearing Status/Restrictions: No weight bearing restrictions  Other Medical Equipment (for information only, NOT a DME order):  walker  Other Treatments:     Heart Failure Instructions for Daily Management  Patient was treated for acute on chronic diastolic heart failure. he  will require the following:    Please weigh daily on the same scale and approximately the same time of day. Report weight gain of 3 pounds/day or 5 pounds/week to : genevieve BEARDEN, [address], [address] [facility], and [place] [address] [phone]. Please use hospital discharge weight as baseline reference. Please monitor for signs and symptoms of and report to MD:  Worsening Heart Failure: sudden weight gain, shortness of breath, lower extremity or general edema/swelling, abdominal bloating/swelling, inability to lie flat, intolerance to usual activity, or cough (especially at night). Report these finding even if no increase in weight. Dehydration:  having difficulty or a decrease in urination, dizziness, worsening fatigue, or new onset/worsening of generalized weakness. Please continue a LOW SODIUM diet and LIMIT fluid intake to 48 - 64 ounces ( 1.5 - 2 liters) per day. Call [address] [place] [address] Road, [phone], genevieve BEARDEN, and [place] [address] [phone] and/or Jesi Vazquez @ [phone] with any questions or concerns. Please continue heart failure education to patient and family/support system. See After Visit Summary for hospital follow up appointment details. Consider spiritual care referral for support and/or completion of advance directives [phone].   Consider: having the facility MD complete required 7 day follow up, [place] [address] telehealth program if patient agreeable and able to participate, and palliative care consult for ongoing goals of care, end of life, and/or chronic disease management discussions. Patient's personal belongings (please select all that are sent with patient):  Glasses    RN SIGNATURE:  Electronically signed by Osbaldo Howard RN on 2/4/23 at 3:03 PM EST    CASE MANAGEMENT/SOCIAL WORK SECTION    Inpatient Status Date: 2/4/2023    Readmission Risk Assessment Score:  Readmission Risk              Risk of Unplanned Readmission:  54         Discharging to Facility/ Agency   Name: [facility]  Address:  [address] [place] [address]   Phone:  [phone]  Fax:  [phone]     / signature: Electronically signed by ERENDIRA Robert on 2/4/23 at 1:55 PM EST    PHYSICIAN SECTION    Prognosis: Fair    Condition at Discharge: Stable    Rehab Potential (if transferring to Rehab): Guarded    Recommended Labs or Other Treatments After Discharge: BMP, CBC, Magnesium 0/1/38    Physician Certification: I certify the above information and transfer of Ameena Robin  is necessary for the continuing treatment of the diagnosis listed and that he requires [facility] for less 30 days.      Update Admission H&P: No change in H&P    PHYSICIAN SIGNATURE:  Electronically signed by Abel Eldridge MD on 2/4/23 at 1:38 PM EST

## 2023-01-27 NOTE — PROGRESS NOTES
n  Department of Internal Medicine  Nephrology Progress Note       He is an 79-year-old pleasant gentleman  with past medical history significant for coronary artery disease,  congestive heart failure, morbid obesity, COPD, obstructive sleep apnea,  chronic respiratory failure, hypercholesterolemia, chronic kidney  disease stage IV came to ER complaining of shortness of breath, dyspnea  on exertion, as well as difficulty with urination. The patient was  found to have urinary outlet obstruction with UTI (ESBL). The patient  was also diagnosed with pneumonia and admitted for further workup and management. Renal consultation has been called for worsening kidney Function. Labs reviewed  . Going CT chest   ROS:  Feels weak / tired . No CP,  c.o increase  SOB /  GIBBONS . Rest ROS neg . [address]:  medications reviewed. Physical Exam:    VITALS:  BP (!) 159/79   Pulse 78   Temp 97.9 °F (36.6 °C)   Resp 16   Ht 5' 8\" (1.727 m)   Wt 241 lb 13.5 oz (109.7 kg)   SpO2 97%   BMI 36.77 kg/m²   24HR INTAKE/OUTPUT:    Intake/Output Summary (Last 24 hours) at 1/27/2023 1224  Last data filed at 1/27/2023 0548  Gross per 24 hour   Intake 180 ml   Output 2650 ml   Net -2470 ml         Constitutional:  looks comfortable   Respiratory:  decrease BS at bases / rhonchi Romie Reamer   Gastrointestinal:  no  epigastric tenderness. Normal Bowel Sounds  Cardiovascular:  S1, S2 irregular.   Edema:  + + edema    DATA:    CBC:  Lab Results   Component Value Date/Time    WBC 5.4 01/27/2023 08:12 AM    RBC 3.24 01/27/2023 08:12 AM    HGB 7.9 01/27/2023 08:12 AM    HCT 27.6 01/27/2023 08:12 AM    MCV 85.3 01/27/2023 08:12 AM    MCH 24.5 01/27/2023 08:12 AM    MCHC 28.8 01/27/2023 08:12 AM    RDW 20.7 01/27/2023 08:12 AM    PLT 86 01/27/2023 08:12 AM    MPV 8.7 01/27/2023 08:12 AM     CMP:  Lab Results   Component Value Date/Time     01/27/2023 08:12 AM     01/27/2023 08:12 AM    K 4.7 01/27/2023 08:12 AM    K 4.6 01/27/2023 08:12 AM    K 4.2 01/17/2023 02:53 PM     01/27/2023 08:12 AM     01/27/2023 08:12 AM    CO2 28 01/27/2023 08:12 AM    CO2 27 01/27/2023 08:12 AM    BUN 65 01/27/2023 08:12 AM    BUN 66 01/27/2023 08:12 AM    CREATININE 1.7 01/27/2023 08:12 AM    CREATININE 1.7 01/27/2023 08:12 AM    GFRAA 37 05/27/2022 02:05 PM    GFRAA >60 04/19/2013 10:46 AM    AGRATIO 1.1 01/17/2023 02:53 PM    LABGLOM 39 01/27/2023 08:12 AM    LABGLOM 39 01/27/2023 08:12 AM    GLUCOSE 105 01/27/2023 08:12 AM    GLUCOSE 105 01/27/2023 08:12 AM    GLUCOSE 96 06/16/2011 10:37 AM    PROT 5.9 01/17/2023 02:53 PM    PROT 6.4 12/12/2012 08:05 AM    CALCIUM 8.5 01/27/2023 08:12 AM    CALCIUM 8.6 01/27/2023 08:12 AM    BILITOT 0.5 01/17/2023 02:53 PM    ALKPHOS 81 01/17/2023 02:53 PM    AST 16 01/17/2023 02:53 PM    ALT 30 01/17/2023 02:53 PM      Hepatic Function Panel:   Lab Results   Component Value Date/Time    ALKPHOS 81 01/17/2023 02:53 PM    ALT 30 01/17/2023 02:53 PM    AST 16 01/17/2023 02:53 PM    PROT 5.9 01/17/2023 02:53 PM    PROT 6.4 12/12/2012 08:05 AM    BILITOT 0.5 01/17/2023 02:53 PM    BILIDIR <0.2 01/12/2023 12:23 PM    IBILI see below 01/12/2023 12:23 PM      Phosphorus:   Lab Results   Component Value Date/Time    PHOS 3.0 01/27/2023 08:12 AM       ASSESSMENT/PLAN:    DEISY   - most likely multifactorial ( low  BP/ ARbs /diuretics/UTI )  - renal function slowly improving  - will add iv diuretics and monitor renal function closely . Derrick Mering High risk of EDISY and need RRT soon . HTN   -controlled     ESBL UTI  - on meropenem    HCAP    - on meropenem    Ac /ch resp failure   Stable     Obesity with JOANN     Over all prognosis guarded .       Tobi Myers MD,FACP

## 2023-01-27 NOTE — PROGRESS NOTES
Physical Therapy  Facility/Department: [address] MED SURG  Physical Therapy Daily Note  If patient discharges prior to next session this note will serve as a discharge summary. Please see below for the latest assessment towards goals. Name: Leesa Ramirez  : 1940  MRN: [id number]  Date of Service: 2023    Discharge Recommendations:  Patient would benefit from continued therapy after discharge (3-5)   Leesa Ramirez scored a 16/24 on the AM-PAC short mobility form. Current research shows that an AM-PAC score of 17 or less is typically not associated with a discharge to the patient's home setting. Based on the patient's AM-PAC score and their current functional mobility deficits, it is recommended that the patient have 3-5 sessions per week of Physical Therapy at d/c to increase the patient's independence. Please see assessment section for further patient specific details. PT Equipment Recommendations  Equipment Needed: No  Other: defer      Patient Diagnosis(es): The primary encounter diagnosis was Acute on chronic congestive heart failure, unspecified heart failure type ([address] Diagnoses of Elevated troponin, Chronic kidney disease, unspecified CKD stage, and Urinary tract infection with hematuria, site unspecified were also pertinent to this visit. 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 Past Surgical History:  has a past surgical history that includes Gallbladder surgery ();  Upper gastrointestinal endoscopy ( 7/10/2009); Colonoscopy (7/10/2009); Cataract removal (2/2012); Pain management procedure (Right, 10/20/2021); Pain management procedure (Left, 12/8/2021); CT BIOPSY BONE MARROW (11/1/2022); bronchoscopy (N/A, 12/28/2022); and bronchoscopy (12/28/2022). Assessment   Assessment: Today, the pt was only willing to complete exercises in the chair and would not complete a transfer or ambulate in the room. The pt complaining that he doesn't get any rest \"in this place\" and that he has \"has a hell of a day\". The pt was noted to have weeping coming from his arms; elevated on pillow at end of session. Per Dr. Butcher Lab note, the pt is now agreeable to con't therapy prior to going home. The pt would benefit form con't skilled PT at d/c as he is functioning well below his baseline and does not have access to 24/7 care at home. His wife is cognitively impaired therefore cannot provide adequate assist. The pt will need a slow to moderate intensity skilled facility to address his deficits and to maximize his functional potential with the goal of returning to home. Therapy Prognosis: Fair  Barriers to Learning: motivation, resistance to education  Requires PT Follow-Up: Yes  Activity Tolerance  Activity Tolerance: Patient limited by fatigue;Patient limited by endurance; Other (comment)  Activity Tolerance Comments: low motivation, resistance to education     Plan   Physcial Therapy Plan  General Plan: 3-5 times per week  Current Treatment Recommendations: Strengthening, Balance training, Functional mobility training, Transfer training, Gait training, Therapeutic activities, Safety education & training, Patient/Caregiver education & training  Safety Devices  Type of Devices: Chair alarm in place, Call light within reach, Left in chair, Patient at risk for falls     Restrictions  Restrictions/Precautions  Restrictions/Precautions: Fall Risk, Contact Precautions  Position Activity Restriction  Other position/activity restrictions: Contact Precautions (ESBL); 3L O2     Subjective   General  Chart Reviewed: Yes  Additional Pertinent Hx: Per Smiley Hui MD H&P on 1-: The pt is an 80 yo male who presented to the ED with increasing SOB and O2 needs. Troponins were elevated, he has had increased weight, had elevated BNP, urinalysis suggested infection and CXR: \"shows either multifocal pneumonia or heart failure\". The pt recently in the hospital and on ARU, was d/c on 12-8-2022 and readmitted on 12-. The pt had been d/c to home each time with family. PMHx: asthma, a-fib, CKD, carotid artery stenosis, COPD, CHF, HTN, anemia, JOANN, DM  Response To Previous Treatment: Patient reporting fatigue but able to participate. Family / Caregiver Present: No  Referring Practitioner: Smiley Hui MD  Referral Date : 01/17/23  Diagnosis: Acute on chronic diastolic congestive heart failure, Acute on chronic respiratory failure with hypoxia  Follows Commands: Within Functional Limits  Subjective  Subjective: the pt was found to be up in the chair; initially refusing therapy session; reporting he can't get any sleep \"in this place\" and \"I've had a hell of a day\".  The pt was agreeable to doing exercises in the chair with encouragement         Social/Functional History  Social/Functional History  Lives With: Spouse  Type of Home: House  Home Layout: Able to Live on Main level with bedroom/bathroom, Multi-level  Home Access: Stairs to enter with rails  Entrance Stairs - Number of Steps: 1+1  Entrance Stairs - Rails: Both  Bathroom Shower/Tub: Tub/Shower unit  Bathroom Toilet: Standard  Bathroom Equipment: Shower chair, Grab bars in shower, Tub transfer bench  Home Equipment: Cane, Rollator, Oxygen, Grab bars (2L O2)  Has the patient had two or more falls in the past year or any fall with injury in the past year?: Yes  ADL Assistance: Needs assistance (assist LB)  Homemaking Assistance: Needs assistance (wife cleans, pt manages bill paying)  Homemaking Responsibilities: No  Ambulation Assistance: Independent (with rollator)  Transfer Assistance: Independent  Active : Yes  Occupation: Retired  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, [address]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  IADL Comments: sleeps in a recliner but reports he does get into bed on a regular basis      Objective     Exercise Treatment: Seated Exercises: B ankle pumps,  LAQ x 20 reps , marching, shoulder rows, chest press, elbow flex on the L only and supination and pronation x 10 reps B          AM-PAC Score  AM-PAC Inpatient Mobility Raw Score : 16 (01/27/23 1431)  AM-PAC Inpatient T-Scale Score : 40.78 (01/27/23 1431)  Mobility Inpatient CMS 0-100% Score: 54.16 (01/27/23 1431)  Mobility Inpatient CMS G-Code Modifier : CK (01/27/23 1431)         Goals  Short Term Goals  Time Frame for Short Term Goals: upon d/c  (slow progression toward goals)  Short Term Goal 1: Bed mobility with mod I. Short Term Goal 2: Transfer sit <> stand to a walker with SBA. Short Term Goal 3: Ambulate with wh walker 25 feet with SBA.   Patient Goals   Patient Goals : to go home       Education  Patient Education  Education Given To: Patient  Education Provided: Role of Therapy;Plan of Care;Home Exercise Program;Energy Conservation  Education Provided Comments: importance of mobility and being out of the bed  Education Method: Verbal  Barriers to Learning: Other (Comment) (self-limiting and resistant to education)  Education Outcome: Continued education needed;Demonstrated understanding      Therapy Time   Individual Concurrent Group Co-treatment   Time In 1411         Time Out 1435         Minutes 24               Electronically signed by Amelia Glaser, PT [id number] on 1/27/2023 at 2:44 PM

## 2023-01-27 NOTE — PROGRESS NOTES
Pulmonary Critical Care Progress Note     Patient's name:  Alyssa Yost Record Number: [id number]  Patient's account/billing number: [de-identified]  Patient's YOB: 1940  Age: 79 y.o. Date of Admission: 1/17/2023 12:56 PM  Date of Consult: 1/27/2023      Primary Care Physician: Julián Spear MD      Code Status: Full Code    Chief complaint: Acute on chronic respiratory failure with hypoxia    Assessment and Plan     Acute on chronic respiratory with hypoxia and hypercapnia  History of H influenza and Moraxella pneumonia  History of organizing pneumonia, minor hemoptysis   Acute kidney injury  ESBL positive UTI, treated   diastolic heart failure  Pulmonary HTN secondary  JOANN    Plan:  Prednisone @ 20 mg po daily  Diuresis. per nephrology   Will get ct chest today   bronchodilators. Acapella  Encourage cpap use         Overnight:  No acute events overnight  Up to chair  C/o constipation     REVIEW OF SYSTEMS:  Review of Systems -   General ROS: Weak  Psychological ROS: negative  Ophthalmic ROS: negative  ENT ROS: negative  Allergy and Immunology ROS: negative  Hematological and Lymphatic ROS: negative  Endocrine ROS: negative  Breast ROS: negative  Respiratory ROS: Shortness of breath, lower extremity edema  Cardiovascular ROS: no chest pain or dyspnea on exertion  Gastrointestinal ROS:constipation   Genito-Urinary ROS: negative  Musculoskeletal ROS: negative  Neurological ROS: negative  Dermatological ROS: negative        Physical Exam:    Vitals: BP (!) 103/49   Pulse 78   Temp 97.5 °F (36.4 °C) (Oral)   Resp 16   Ht 5' 8\" (1.727 m)   Wt 241 lb 13.5 oz (109.7 kg)   SpO2 95%   BMI 36.77 kg/m²     Last Body weight:   Wt Readings from Last 3 Encounters:   01/27/23 241 lb 13.5 oz (109.7 kg)   12/30/22 218 lb 4.1 oz (99 kg)   12/16/22 239 lb (108.4 kg)       Body Mass Index : Body mass index is 36.77 kg/m².       Intake and Output summary:   Intake/Output Summary (Last 24 hours) at 1/27/2023 1047  Last data filed at 1/27/2023 0548  Gross per 24 hour   Intake 180 ml   Output 2650 ml   Net -2470 ml       Physical Examination:     Gen: Lethargic, no acute distress  Eyes: PERRL. Anicteric sclera. No conjunctival injection. ENT: No discharge. Posterior oropharynx clear. External appearance of ears and nose normal.  Neck: Trachea midline. No mass   Resp: Diminished bilaterally, no active wheezing or rhonchi  CV: Regular rate. Regular rhythm. No murmur or rub.+++ edema. GI: Soft, Non-tender. Non-distended. +BS  Skin: Warm, dry, w/o erythema. Lymph: No cervical or supraclavicular LAD. M/S: No cyanosis. No clubbing. Neuro:  CN 2-12 tested, no focal neurologic deficit. Moves all extremities  Psych: Awake lethargic/sleepy no focal deficit      Laboratory findings:-    CBC:   Recent Labs     01/27/23  0812   WBC 5.4   HGB 7.9*   PLT 86*     BMP:    Recent Labs     01/25/23  1118 01/26/23  0937 01/27/23  0812    145 142  144   K 5.1 5.1 4.6  4.7    108 104  106   CO2 24 26 27 28   BUN 59* 64* 66*  65*   CREATININE 1.7* 1.8* 1.7*  1.7*   GLUCOSE 239* 187* 105*  105*     S. Calcium:  Recent Labs     01/27/23  0812   CALCIUM 8.6  8.5     S. Magnesium:  Recent Labs     01/25/23  1118   MG 2.40       S. Glucose:  Recent Labs     01/26/23  1707 01/26/23  2041 01/27/23  0805   POCGLU 235* 132* 124*           Radiology Review:  Pertinent images / reports were reviewed as a part of this visit.         Sylvie Goldmann, MD, M.D.            1/27/2023, 10:47 AM

## 2023-01-27 NOTE — CARE COORDINATION
Discharge Planning:  SW spoke with pt to discuss d/c planning. Pt stated he would be agreeable to going to a SNF but would only be agreeable to going to [facility]. The Plan for Transition of Care is related to the following treatment goals: Strengthening    The Patient was provided with a choice of provider and agrees   with the discharge plan. [x] Yes [] No    Freedom of choice list was provided with basic dialogue that supports the patient's individualized plan of care/goals, treatment preferences and shares the quality data associated with the providers. [x] Yes [] No    SW spoke with Neva Harrell at [facility]. Neva Harrell stated she is familiar with this pt and would be able to accept this pt for skilled care upon d/c.    Plan: [facility] upon d/c. No COVID test needed. Will need HENS. Will need stretcher transport.    [facility]  [phone]  Electronically signed by Lolita Benton on 1/27/2023 at 1:30 PM

## 2023-01-27 NOTE — PROGRESS NOTES
[facility]   Daily Progress Note      Admit Date:  1/17/2023    CC: SOB    HPI:   Cyndy Winn is a 79 y.o. male with PMH Hx abn stress>mild CAD, COPD, chronic hypoxic resp failure, DHF, PAFm anemia, HTN. Presented to [facility] with acute on chronic hypoxic resp failure. Treated for PNA with antibiotics, Pulm following  Mild elevated troponin and elevated BNP. Dr. Jolie Sears consulted. Initially diuresed with IV lasix. RHC 1/25 suggestive of euvolemia and pulm HTN. Neph consulted for DEISY on CKD. + UTI. C/O mostly of gen weakness and fatigue today. Continues with SOB but improving, weaning O2 3L NC. Increased SOB when laying flat. Cont with BLE edema, applying compression stockings. Has generalized edema BUE, weeping. He has been OOB to chair. Denied CP, LH, syncope, palpitations. Review of Systems:   General: Denies fever, chills  Skin: Denies skin changes, rash, itching, lesions.   HEENT: Denies headache, dizziness, vision changes, nosebleeds, sore throat, nasal drainage  RESP: Denies cough, sputum,wheeze, snoring  CARD: Denies palpitations,  murmur  GI:Denies nausea, vomiting, heartburn, loss of appetite, change in bowels  : Denies frequency, pain, incontinence, polyuria  VASC: Denies claudication, leg cramps, clots  MUSC/SKEL: Denies pain, stiffness, arthritis  PSYCH: Denies anxiety, depression, stress  NEURO: Denies numbness, tingling, weakness,change in mood or memory  HEME: Denies abn bruising, bleeding, anemia  ENDO: Denies intolerance to heat, cold, excessive thirst or hunger, hx thyroid disease    Objective:   BP (!) 159/79   Pulse 78   Temp 97.9 °F (36.6 °C)   Resp 16   Ht 5' 8\" (1.727 m)   Wt 241 lb 13.5 oz (109.7 kg)   SpO2 97%   BMI 36.77 kg/m²         Intake/Output Summary (Last 24 hours) at 1/27/2023 1156  Last data filed at 1/27/2023 0548  Gross per 24 hour   Intake 180 ml   Output 2650 ml   Net -2470 ml     I/O since adm: +179L    WEIGHT:Admit Weight: 224 lb (101.6 kg)         Today  Weight: 241 lb 13.5 oz (109.7 kg)   DRY WEIGHT:  Wt Readings from Last 3 Encounters:   01/27/23 241 lb 13.5 oz (109.7 kg)   12/30/22 218 lb 4.1 oz (99 kg)   12/16/22 239 lb (108.4 kg)       Physical Exam:  GEN: Appears ill,  no acute distress  SKIN: Pale, warm, dry. HEENT: PERRLA. No adenopathy. LUNG: AP diameter normal. Decreased BS bilateral bases. No wheeze, rales, or ronchi. Respiratory effort increased. HEART: S1S2 A/R. No carotid bruit. No murmur, rub or gallop. ABD: Soft, nontender. +BS X 4 quads. EXT: Radial and pedal pulses 2+ and symmetric. Without varicosities. 2+ BLE edema. MUSCSKEL: Good ROM X4 extremities. No deformity. NEURO: A/O X3. Calm and cooperative.      Telemetry: NSR HR 77    Medications:    epoetin kenny-epbx  40,000 Units SubCUTAneous Once    lactulose  20 g Oral TID    torsemide  40 mg Oral Daily    sodium chloride flush  5-40 mL IntraVENous 2 times per day    predniSONE  20 mg Oral Daily    heparin (porcine)  5,000 Units SubCUTAneous 3 times per day    vitamin B-12  500 mcg Oral Daily    levalbuterol  1.25 mg Nebulization Q4H WA    sodium chloride (Inhalant)  15 mL Nebulization Q4H WA    vitamin C  500 mg Oral Daily    insulin glargine  10 Units SubCUTAneous QAM    tamsulosin  0.4 mg Oral Daily    aspirin EC  162 mg Oral Daily    atorvastatin  40 mg Oral Daily    bisacodyl  5 mg Oral Daily    polyethylene glycol  17 g Oral Daily    docusate sodium  100 mg Oral BID    hydrALAZINE  10 mg Oral 3 times per day    lactobacillus  1 capsule Oral BID WC    linaclotide  145 mcg Oral QAM AC    montelukast  10 mg Oral Daily    pantoprazole  40 mg Oral QAM AC    insulin lispro  0-4 Units SubCUTAneous TID WC    insulin lispro  0-4 Units SubCUTAneous Nightly      sodium chloride      dextrose       sodium chloride flush, sodium chloride, acetaminophen, sodium chloride nebulizer, albuterol sulfate HFA, levalbuterol, glucose, dextrose bolus **OR** dextrose bolus, glucagon (rDNA), dextrose    Lab Data: I have reviewed all labs below today.    CBC:   Recent Labs     01/26/23  0102 01/26/23  0937 01/27/23  0812   WBC 6.0 6.3 5.4   HGB 7.6* 8.5* 7.9*   HCT 26.4* 28.9* 27.6*   MCV 84.3 86.3 85.3   PLT 89* 97* 86*     BMP:   Recent Labs     01/25/23  1118 01/26/23  0937 01/27/23  0812    145 142  144   K 5.1 5.1 4.6  4.7    108 104  106   CO2 24 26 27 28   PHOS 3.2  --  3.0   BUN 59* 64* 66*  65*   CREATININE 1.7* 1.8* 1.7*  1.7*     GLUCOSE:   Recent Labs     01/25/23  1118 01/26/23  0937 01/27/23  0812   GLUCOSE 239* 187* 105*  105*     LIVER PROFILE:   Lab Results   Component Value Date/Time    AST 16 01/17/2023 02:53 PM    ALT 30 01/17/2023 02:53 PM    LIPASE 20.0 12/26/2018 07:48 AM    LABALBU 2.8 01/27/2023 08:12 AM    BILIDIR <0.2 01/12/2023 12:23 PM    BILITOT 0.5 01/17/2023 02:53 PM    ALKPHOS 81 01/17/2023 02:53 PM     PT/INR:   Lab Results   Component Value Date/Time    PROTIME 16.5 01/26/2023 01:02 AM    INR 1.34 01/26/2023 01:02 AM    INR 1.33 01/17/2023 02:53 PM    INR 1.51 11/15/2022 09:04 PM     APTT:   Lab Results   Component Value Date/Time    APTT 28.7 01/17/2023 02:53 PM     Pro-BNP:    Lab Results   Component Value Date/Time    PROBNP 3,502 01/27/2023 08:12 AM    PROBNP 5,031 01/24/2023 05:00 AM    PROBNP 2,497 01/17/2023 02:53 PM     Parameters:   > 450 pg/mL under age 48  > 900 pg/mL ages 54-65  > 1800 pg/mL over age 76    ENZYMES:  Lab Results   Component Value Date/Time    CKTOTAL 47 10/30/2022 09:56 PM    TROPONINI 0.06 01/18/2023 05:39 PM    TROPONINI 0.05 01/18/2023 11:15 AM    TROPONINI 0.06 01/18/2023 05:26 AM     FASTING LIPID PANEL:  Lab Results   Component Value Date/Time    CHOL 116 11/17/2021 10:13 AM    HDL 47 11/17/2021 10:13 AM    HDL 42 12/15/2011 01:57 PM    LDLCALC 54 11/17/2021 10:13 AM    TRIG 76 11/17/2021 10:13 AM       Diagnostics:    EKG:   Atrial fibrillation with rapid ventricular response with premature ventricular or aberrantly conducted complexes  Right bundle branch block  Abnormal ECG  When compared with ECG of 23-DEC-2022 21:22,  Atrial fibrillation has replaced Junctional rhythm  Vent. rate has increased BY 45 BPM  Right bundle branch block has replaced Incomplete right bundle branch block  Confirmed by Clay Montaño MD, ARIELLA ([id number]) on 1/17/2023 2:39:38 PM    NM Stress 6/28/2021  Summary  Moderate sized moderate intensity reversible inferior wall defect suggestive of ischemia  Normal LV size and systolic function. Overall findings represent a intermediate risk study. Recommendation  Cardiac catheterization. LHC/RHC 7/1/2021  R dominant  RCA- mod calcifications, no obstructions  LM- no dz  LCX- no dz  LAD- mod calcification prox , turtuous 50% lesion mid. No ventriculogram.    RAP10  [id number]  PA 50/18(28)  CO 8.53L  CI 3.68  MXV sat normal    ECHO: 11/16/2022  Summary  Ejection fraction is visually estimated to be 60-65%. No regional wall motion abnormalities are noted. Grade I diastolic dysfunction with elevated LV filling pressures. The left atrium is moderately dilated. mildly dilated right ventricle. Estimated pulmonary artery systolic pressure is mildly elevated at 40 mmHg assuming a right atrial pressure of 15 mmHg. RHC: 1/25/2023  FINDINGS:   1. Elevated right atrial pressure at 14 mmHg, but only a mildly   elevated pulmonary capillary wedge pressure at 18 mmHg. 2.  Pulmonary hypertension, likely mixed with an instantaneous pressure   of 53/14 and a mean pulmonary arterial pressure of 33 mmHg. 3.  Normal cardiac output by thermodilution at 6.27 liters per minute   with a cardiac index of 2.81 liters per kilogram per minute. By Larance Martin   equation cardiac index is 4.22 liters per kilogram per minute. 4.  Pulmonary vascular resistance 128 dynes per second per meter   squared.    5.  Normal to mildly reduced mixed venous oxygen saturations with an SVC   of 60 with a right atrium of 65% and pulmonary artery of 62%. The   patient saturating 94% on pulse oximetry on 3 liters at which time the   study was performed. CXR 1/17/2023  Impression   Patchy infiltrates in the lungs bilaterally, greater on the right, increased   when compared to the previous exam.  In this case, multifocal pneumonia and   pulmonary edema both considered. CXR 1/25/2023  Impression   Cardiomegaly with trace effusions and scattered infiltrates. Assessment/Plan:    1.) Acute on chronic hypoxic resp failure: SOB multifactorial with PNA/ infiltrates and DHF/hypervolemia. SOB improving. Pulm following. Cont steroids, Rt tx, supplemental O2, antibiotics  Amio level pending - concern for toxicity contributing to resp failure    2.) Acute on chronic diastolic heart failure: Elevated BNP- improved, +SOB, +edema. RHC- PCWP 18 after diuresis, RAP 14. Mildly elevated suggestive of mild hypervolemia. Difficult to diurese with DEISY on CKD- renal fx stable. Neph following and now on oral torsemide. Diuresed over 2L yesterday. Rec to current torsemide dose  Hydralazine for afterload reduction (no ACEi/ARNI, SGLT2i, AA D/T DEISY/CKD)  He is not on BB (not indicated for EF >40%)  NYHA Class III   Stage C    Cardiac Rehab for EF <35%: NA  ICD counseling: NA   2gm Na diet, daily weight, 64 oz fluid restriction  Avoid NSAIDS and other nephrotoxic meds  [facility]: OP  HF RN referral for education    3.) DEISY on CKD:   Creat 1.8>3>1.8 >1.7today  Neph following, restarted torsemide  May need RRT    3.) Paroxysmal AF: NSR rate controlled  CHADSVASC-     HASBLED-  No OAC D/T hx GIB  No BB or CCB  Was on Amio - stopped with concerns that it is contributing to lung dz- amio level pending    4.) Chronic Anemia:   H/H stable, rec restarting ferrous sulfate  2017 ACC/AHA HF Guidelines:    In patients with NYHA class II and III HF and iron deficiency (ferritin <100 ng/ml or 100-300 ng/ml if transferrin saturation <20%), IV iron replacement might be reasonable to improve functional status and QOL. There is uncertain evidence base for oral iron repletion in the setting of anemia associated with Hf.   Erythropoietin-stimulating agents should not be used to improve morbidity and mortality.       Electronically signed by SILVIANO Krishnan CNP on 1/27/2023 at 11:56 AM

## 2023-01-27 NOTE — PROGRESS NOTES
ONCOLOGY HEMATOLOGY CARE PROGRESS NOTE      SUBJECTIVE:  Patient is doing about the same. He has chronic fatigue and dyspnea on exertion that are stable. He denies any shortness of breath or chest pain currently. ROS:     Constitutional:  No weight loss, No fever, No chills, No night sweats. Energy level good.   Eyes:  No impairment or change in vision  ENT / Mouth:  No pain, abnormal ulceration, bleeding, nasal drip or change in voice or hearing  Cardiovascular:  No chest pain, palpitations, new edema, or calf discomfort  Respiratory:  No pain, hemoptysis, change to breathing  Breast:  No pain, discharge, change in appearance or texture  Gastrointestinal:  No pain, cramping, jaundice, change to eating and bowel habits  Urinary:  No pain, bleeding or change in continence  Genitalia: No pain, bleeding or discharge  Musculoskeletal:  No redness, pain, edema or weakness  Skin:  No pruritus, rash, change to nodules or lesions  Neurologic:  No discomfort, change in mental status, speech, sensory or motor activity  Psychiatric:  No change in concentration or change to affect or mood  Endocrine:  No hot flashes, increased thirst, or change to urine production  Hematologic: No petechiae, ecchymosis or bleeding  Lymphatic:  No lymphadenopathy or lymphedema  Allergy / Immunologic:  No eczema, hives, frequent or recurrent infections    OBJECTIVE        Physical    VITALS:  Patient Vitals for the past 24 hrs:   BP Temp Temp src Pulse Resp SpO2 Weight   01/27/23 0806 (!) 103/49 -- Oral 78 16 95 % --   01/27/23 0748 -- -- -- -- -- 98 % --   01/27/23 0415 -- -- -- -- -- -- 241 lb 13.5 oz (109.7 kg)   01/27/23 0344 (!) 165/83 -- -- 83 19 100 % --   01/26/23 2315 (!) 165/74 97.5 °F (36.4 °C) Oral 79 18 99 % --   01/26/23 2040 (!) 164/74 97.7 °F (36.5 °C) Oral 95 20 93 % --   01/26/23 1706 (!) 181/96 97.3 °F (36.3 °C) Oral 78 16 96 % --   01/26/23 1155 -- -- -- 78 18 94 % --   01/26/23 115 124/75 97.9 °F (36.6 °C) Axillary 77 17 98 % --   01/26/23 1147 -- -- -- 81 -- -- --       24HR INTAKE/OUTPUT:    Intake/Output Summary (Last 24 hours) at 1/27/2023 0959  Last data filed at 1/27/2023 0548  Gross per 24 hour   Intake 180 ml   Output 2650 ml   Net -2470 ml       CONSTITUTIONAL: awake, alert, cooperative, no apparent distress   EYES: pupils equal, round and reactive to light, sclera clear and conjunctiva normal  ENT: Normocephalic, without obvious abnormality, atraumatic  NECK: supple, symmetrical, no jugular venous distension and no carotid bruits   HEMATOLOGIC/LYMPHATIC: no cervical, supraclavicular or axillary lymphadenopathy   LUNGS: no increased work of breathing and clear to auscultation   CARDIOVASCULAR: regular rate and rhythm, normal S1 and S2, no murmur noted  ABDOMEN: normal bowel sounds x 4, soft, non-distended, non-tender, no masses palpated, no hepatosplenomgaly   MUSCULOSKELETAL: full range of motion noted, tone is normal  NEUROLOGIC: awake, alert, oriented to name, place and time. Motor skills grossly intact. SKIN: Normal skin color, texture, turgor and no jaundice.  appears intact   EXTREMITIES: 2+ bilateral LE edema     DATA:  CBC:    Recent Labs     01/27/23  0812 01/26/23  0937 01/26/23  0102 01/25/23  1143 01/24/23  0500 01/18/23  0526 12/29/22  0510 12/28/22  1222   WBC 5.4 6.3 6.0 7.3   < > 8.8   < >  --    LABLYMP  --   --   --   --   --   --   --  3   NEUTROABS 4.2 5.1 5.0  --   --  8.3*   < >  --    LYMPHOPCT 14.5 11.8 9.7  --   --  1.6   < >  --    RBC 3.24* 3.34* 3.14* 3.22*   < > 3.55*   < >  --    HGB 7.9* 8.5* 7.6* 8.0*   < > 9.0*   < >  --    HCT 27.6* 28.9* 26.4* 27.2*   < > 30.0*   < >  --    MCV 85.3 86.3 84.3 84.3   < > 84.4   < >  --    MCH 24.5* 25.3* 24.3* 24.7*   < > 25.3*   < >  --    MCHC 28.8* 29.3* 28.9* 29.3*   < > 30.0*   < >  --    RDW 20.7* 21.6* 21.1* 20.4*   < > 21.1*   < >  --    PLT 86* 97* 89* 88*   < > 134*   < >  --     < > = values in this interval not displayed. PT/INR:    Recent Labs     01/26/23  0102 01/17/23  1453 01/12/23  1223   PROT  --  5.9* 5.5*   INR 1.34* 1.33*  --      PTT:    Recent Labs     01/17/23  1453   APTT 28.7       CMP:    Recent Labs     01/27/23  0812 01/26/23  0937 01/25/23  1118 01/24/23  0500 01/23/23  0612 01/22/23  0740 01/18/23  0526 01/17/23  1453 01/12/23  1223     144 145 141 147* 145 144   < > 143 142   K 4.6  4.7 5.1 5.1 4.8 4.5 4.4   < > 4.2 4.1     106 108 108 109 108 103   < > 103 100   CO2 27  28 26 24 29 26 26   < > 30 28   GLUCOSE 105*  105* 187* 239* 144* 116* 87   < > 143* 166*   BUN 66*  65* 64* 59* 72* 82* 90*   < > 66* 73*   CREATININE 1.7*  1.7* 1.8* 1.7* 2.1* 2.3* 3.0*   < > 1.8* 1.9*   LABGLOM 39*  39* 37* 39* 31* 27* 20*   < > 37* 35*   CALCIUM 8.6  8.5 8.3 8.4 8.1* 7.9* 7.4*   < > 7.7* 8.0*   PROT  --   --   --   --   --   --   --  5.9* 5.5*   LABALBU 2.8*  --  2.5* 2.8*  --   --   --  3.1* 3.2*   AGRATIO  --   --   --   --   --   --   --  1.1  --    BILITOT  --   --   --   --   --   --   --  0.5 <0.2   ALKPHOS  --   --   --   --   --   --   --  81 85   ALT  --   --   --   --   --   --   --  30 33   AST  --   --   --   --   --   --   --  16 20   MG  --   --  2.40 2.30 2.30 2.30   < >  --  1.80    < > = values in this interval not displayed. Lab Results   Component Value Date    CALCIUM 8.5 01/27/2023    CALCIUM 8.6 01/27/2023    PHOS 3.0 01/27/2023       LDH:No results for input(s): LDH in the last 720 hours. Radiology Review:  XR CHEST PORTABLE  Narrative: EXAMINATION:  ONE XRAY VIEW OF THE CHEST    1/25/2023 11:06 pm    COMPARISON:  Chest radiograph performed 01/17/2023. HISTORY:  ORDERING SYSTEM PROVIDED HISTORY: coughing up blood  TECHNOLOGIST PROVIDED HISTORY:  Reason for exam:->coughing up blood  Reason for Exam: coughing up blood    FINDINGS:  There are diffuse infiltrates. There are trace effusions. There is no  pneumothorax. The heart is enlarged. The upper abdomen is unremarkable.  The extrathoracic soft tissues are unremarkable.  Impression: Cardiomegaly with trace effusions and scattered infiltrates.      Problem List  Patient Active Problem List   Diagnosis    JOANN (obstructive sleep apnea)    Chronic GERD    Type 2 diabetes mellitus with stage 4 chronic kidney disease, with long-term current use of insulin (HCC)    Microcytic anemia    Microalbuminuria    Hyperlipidemia    Paroxysmal atrial fibrillation (HCC)    Edema    Acute on chronic respiratory failure with hypoxia (HCC)    Carotid artery stenosis without cerebral infarction    Leg swelling    Chronic venous insufficiency    Stage 3b chronic kidney disease (HCC)    Essential hypertension    Hemoptysis    B12 deficiency    HCAP (healthcare-associated pneumonia)    Pulmonary nodule    Moderate COPD (chronic obstructive pulmonary disease) (HCC)    Acute kidney injury (HCC)    Slow transit constipation    Pancreatic mass    UTI due to extended-spectrum beta lactamase (ESBL) producing Escherichia coli    Obesity, Class II, BMI 35-39.9    Weakness    Hyperkalemia    Chronic anemia    Abnormal CXR    Elevated brain natriuretic peptide (BNP) level    Elevated troponin    Chronic respiratory failure with hypoxia (HCC)    Chronic diastolic CHF (congestive heart failure), NYHA class 2 (HCC)    Acute on chronic congestive heart failure (HCC)    Mucopurulent chronic bronchitis (HCC)    Persistent atrial fibrillation (HCC)    Hypothermia due to non-environmental cause       ASSESSMENT AND PLAN:  1.  Anemia.  His iron studies are consistent with acute\chronic inflammation.  His anemia is a least partially due to his chronic kidney disease.  I will give him his Retacrit weekly while he is in the house.  He will get a dose today.  Transfuse as needed.    2.  CHF.  Per the primary team.    3.  Pneumonia.  Per the primary team.            ONCOLOGIC DISPOSITION:      Rell Cary Jr, MD  Please contact through  [employer]

## 2023-01-27 NOTE — PROGRESS NOTES
[facility] Internal Medicine Note      Chief Complaint: When can I go home    Subjective/Interval History: This morning the patient is sitting up in bed. Renal function is pending, 2 L negative fluid balance yesterday. Still very edematous in his upper extremities. Compression stockings on lower extremities have helped control edema below the knee. No other new problems. Reviewed therapy notes and data with the patient. No chest pain. No nausea, vomiting, diarrhea. No abdominal pain. No dysuria. The remainder of the review of systems is negative. PMH, PSH, FH/SH reviewed and unchanged as documented in the H&P personally documented at admission 23    Medication list reviewed    Objective:    BP (!) 103/49   Pulse 78   Temp 97.5 °F (36.4 °C) (Oral)   Resp 16   Ht 5' 8\" (1.727 m)   Wt 241 lb 13.5 oz (109.7 kg)   SpO2 95%   BMI 36.77 kg/m²   Temp  Av.6 °F (36.4 °C)  Min: 97.3 °F (36.3 °C)  Max: 97.9 °F (36.6 °C)    Exam unchanged:  CV-regular on exam.  Telemetry with sinus rhythm. Pulm-respirations remain easy, currently 3 L of oxygen per nasal cannula. No wheezes noted today. Abd- BS+, soft, NTND  Ext-continued 3+ edema of his upper and lower extremities, although edema below knees is controlled with compression hose.     The Following Labs Were Reviewed Today:    Recent Results (from the past 24 hour(s))   Basic Metabolic Panel    Collection Time: 23  9:37 AM   Result Value Ref Range    Sodium 145 136 - 145 mmol/L    Potassium 5.1 3.5 - 5.1 mmol/L    Chloride 108 99 - 110 mmol/L    CO2 26 21 - 32 mmol/L    Anion Gap 11 3 - 16    Glucose 187 (H) 70 - 99 mg/dL    BUN 64 (H) 7 - 20 mg/dL    Creatinine 1.8 (H) 0.8 - 1.3 mg/dL    Est, Glom Filt Rate 37 (A) >60    Calcium 8.3 8.3 - 10.6 mg/dL   CBC with Auto Differential    Collection Time: 23  9:37 AM   Result Value Ref Range    WBC 6.3 4.0 - 11.0 K/uL    RBC 3.34 (L) 4.20 - 5.90 M/uL    Hemoglobin 8.5 (L) 13.5 - 17.5 g/dL Hematocrit 28.9 (L) 40.5 - 52.5 %    MCV 86.3 80.0 - 100.0 fL    MCH 25.3 (L) 26.0 - 34.0 pg    MCHC 29.3 (L) 31.0 - 36.0 g/dL    RDW 21.6 (H) 12.4 - 15.4 %    Platelets 97 (L) 862 - 450 K/uL    MPV 8.7 5.0 - 10.5 fL    Neutrophils % 81.7 %    Lymphocytes % 11.8 %    Monocytes % 5.0 %    Eosinophils % 1.3 %    Basophils % 0.2 %    Neutrophils Absolute 5.1 1.7 - 7.7 K/uL    Lymphocytes Absolute 0.7 (L) 1.0 - 5.1 K/uL    Monocytes Absolute 0.3 0.0 - 1.3 K/uL    Eosinophils Absolute 0.1 0.0 - 0.6 K/uL    Basophils Absolute 0.0 0.0 - 0.2 K/uL   POCT Glucose    Collection Time: 01/26/23 11:52 AM   Result Value Ref Range    POC Glucose 177 (H) 70 - 99 mg/dl    Performed on ACCU-CHEK    POCT Glucose    Collection Time: 01/26/23  5:07 PM   Result Value Ref Range    POC Glucose 235 (H) 70 - 99 mg/dl    Performed on ACCU-CHEK    POCT Glucose    Collection Time: 01/26/23  8:41 PM   Result Value Ref Range    POC Glucose 132 (H) 70 - 99 mg/dl    Performed on ACCU-CHEK        ASSESSMENT/PLAN:      Principal Problem:    Acute on chronic diastolic congestive heart failure -good diuresis with torsemide 40 mg yesterday. Await renal function today. Active Problems:    Acute on chronic respiratory failure with hypoxia/HCAP (healthcare-associated pneumonia)- antibiotics have been discontinued. Continues with prednisone. DEISY-better. Renal function pending for today. UTI due to extended-spectrum beta lactamase (ESBL) producing Escherichia coli- patient has completed course of meropenem. Chronic anemia-continue to monitor    Chronic GERD-continue Protonix. Type 2 diabetes mellitus with stage 4 chronic kidney disease, with long-term current use of insulin (HCC)-continue to monitor. May need to consider increasing Lantus. Continue sliding scale    Essential hypertension-blood pressure still running a bit high. Increase hydralazine to 25 mg p.o. 3 times daily. B12 deficiency-continue with oral B12 supplementation. Atrial fibrillation-rate is controlled. Patient is not an anticoagulation candidate due to occult bleeding in the past and chronic iron deficiency. Panda Beckett is recommending 24-hour assist at home. Lengthy discussion with the patient's daughter Serina Tate last night. Patient's wife has cognitive impairment and certainly does not have the physical ability either to help the patient at home. Family is unavailable to provide 24-hour care. Austin Dubois discussion with the patient today that it is just not safe for him to be at home with his wife and that the family just does not have the capacity to provide 24-hour care. He understands and is willing to go to a rehab facility in the interim prior to going home. I will asked social work to discuss with him and with his daughter Serina Tate today regarding options for ECF at the time of discharge. We are getting close to discharge.     Haile Zavala MD, [address]  8:07 AM  1/27/2023

## 2023-01-28 LAB
ALBUMIN SERPL-MCNC: 2.8 G/DL (ref 3.4–5)
ALP BLD-CCNC: 80 U/L (ref 40–129)
ALT SERPL-CCNC: 20 U/L (ref 10–40)
ANION GAP SERPL CALCULATED.3IONS-SCNC: 9 MMOL/L (ref 3–16)
AST SERPL-CCNC: 16 U/L (ref 15–37)
BASOPHILS ABSOLUTE: 0 K/UL (ref 0–0.2)
BASOPHILS RELATIVE PERCENT: 0.3 %
BILIRUB SERPL-MCNC: 0.3 MG/DL (ref 0–1)
BILIRUBIN DIRECT: <0.2 MG/DL (ref 0–0.3)
BILIRUBIN, INDIRECT: ABNORMAL MG/DL (ref 0–1)
BUN BLDV-MCNC: 71 MG/DL (ref 7–20)
CALCIUM SERPL-MCNC: 8.7 MG/DL (ref 8.3–10.6)
CHLORIDE BLD-SCNC: 103 MMOL/L (ref 99–110)
CO2: 31 MMOL/L (ref 21–32)
CREAT SERPL-MCNC: 1.7 MG/DL (ref 0.8–1.3)
EOSINOPHILS ABSOLUTE: 0.1 K/UL (ref 0–0.6)
EOSINOPHILS RELATIVE PERCENT: 0.9 %
FERRITIN: 205 NG/ML (ref 30–400)
GFR SERPL CREATININE-BSD FRML MDRD: 39 ML/MIN/{1.73_M2}
GLUCOSE BLD-MCNC: 139 MG/DL (ref 70–99)
GLUCOSE BLD-MCNC: 196 MG/DL (ref 70–99)
GLUCOSE BLD-MCNC: 92 MG/DL (ref 70–99)
GLUCOSE BLD-MCNC: 97 MG/DL (ref 70–99)
HCT VFR BLD CALC: 25.8 % (ref 40.5–52.5)
HEMOGLOBIN: 7.9 G/DL (ref 13.5–17.5)
IRON SATURATION: 16 % (ref 20–50)
IRON: 39 UG/DL (ref 59–158)
LYMPHOCYTES ABSOLUTE: 0.8 K/UL (ref 1–5.1)
LYMPHOCYTES RELATIVE PERCENT: 12.6 %
MAGNESIUM: 1.8 MG/DL (ref 1.8–2.4)
MCH RBC QN AUTO: 25.4 PG (ref 26–34)
MCHC RBC AUTO-ENTMCNC: 30.5 G/DL (ref 31–36)
MCV RBC AUTO: 83.4 FL (ref 80–100)
MONOCYTES ABSOLUTE: 0.3 K/UL (ref 0–1.3)
MONOCYTES RELATIVE PERCENT: 4.4 %
NEUTROPHILS ABSOLUTE: 5 K/UL (ref 1.7–7.7)
NEUTROPHILS RELATIVE PERCENT: 81.8 %
PDW BLD-RTO: 20.9 % (ref 12.4–15.4)
PERFORMED ON: ABNORMAL
PERFORMED ON: ABNORMAL
PERFORMED ON: NORMAL
PHOSPHORUS: 3.3 MG/DL (ref 2.5–4.9)
PLATELET # BLD: 97 K/UL (ref 135–450)
PMV BLD AUTO: 9.3 FL (ref 5–10.5)
POTASSIUM SERPL-SCNC: 5 MMOL/L (ref 3.5–5.1)
PROCALCITONIN: 0.18 NG/ML (ref 0–0.15)
RBC # BLD: 3.1 M/UL (ref 4.2–5.9)
SODIUM BLD-SCNC: 143 MMOL/L (ref 136–145)
TOTAL IRON BINDING CAPACITY: 238 UG/DL (ref 260–445)
TOTAL PROTEIN: 5.5 G/DL (ref 6.4–8.2)
WBC # BLD: 6.1 K/UL (ref 4–11)

## 2023-01-28 PROCEDURE — 83540 ASSAY OF IRON: CPT

## 2023-01-28 PROCEDURE — 6360000002 HC RX W HCPCS: Performed by: INTERNAL MEDICINE

## 2023-01-28 PROCEDURE — 36415 COLL VENOUS BLD VENIPUNCTURE: CPT

## 2023-01-28 PROCEDURE — 82728 ASSAY OF FERRITIN: CPT

## 2023-01-28 PROCEDURE — 80076 HEPATIC FUNCTION PANEL: CPT

## 2023-01-28 PROCEDURE — 94669 MECHANICAL CHEST WALL OSCILL: CPT

## 2023-01-28 PROCEDURE — 2700000000 HC OXYGEN THERAPY PER DAY

## 2023-01-28 PROCEDURE — 6370000000 HC RX 637 (ALT 250 FOR IP): Performed by: INTERNAL MEDICINE

## 2023-01-28 PROCEDURE — 84145 PROCALCITONIN (PCT): CPT

## 2023-01-28 PROCEDURE — 1200000000 HC SEMI PRIVATE

## 2023-01-28 PROCEDURE — 99233 SBSQ HOSP IP/OBS HIGH 50: CPT | Performed by: INTERNAL MEDICINE

## 2023-01-28 PROCEDURE — 2580000003 HC RX 258: Performed by: INTERNAL MEDICINE

## 2023-01-28 PROCEDURE — 51702 INSERT TEMP BLADDER CATH: CPT

## 2023-01-28 PROCEDURE — 94760 N-INVAS EAR/PLS OXIMETRY 1: CPT

## 2023-01-28 PROCEDURE — 99232 SBSQ HOSP IP/OBS MODERATE 35: CPT | Performed by: NURSE PRACTITIONER

## 2023-01-28 PROCEDURE — 83735 ASSAY OF MAGNESIUM: CPT

## 2023-01-28 PROCEDURE — 94640 AIRWAY INHALATION TREATMENT: CPT

## 2023-01-28 PROCEDURE — 83550 IRON BINDING TEST: CPT

## 2023-01-28 PROCEDURE — 85025 COMPLETE CBC W/AUTO DIFF WBC: CPT

## 2023-01-28 PROCEDURE — 80069 RENAL FUNCTION PANEL: CPT

## 2023-01-28 RX ORDER — LEVALBUTEROL 1.25 MG/.5ML
1.25 SOLUTION, CONCENTRATE RESPIRATORY (INHALATION) EVERY 4 HOURS PRN
Status: DISCONTINUED | OUTPATIENT
Start: 2023-01-28 | End: 2023-02-04 | Stop reason: HOSPADM

## 2023-01-28 RX ORDER — HEPARIN SODIUM 5000 [USP'U]/ML
5000 INJECTION, SOLUTION INTRAVENOUS; SUBCUTANEOUS EVERY 12 HOURS SCHEDULED
Status: DISCONTINUED | OUTPATIENT
Start: 2023-01-29 | End: 2023-02-04 | Stop reason: HOSPADM

## 2023-01-28 RX ORDER — SODIUM CHLORIDE FOR INHALATION 3 %
15 VIAL, NEBULIZER (ML) INHALATION 2 TIMES DAILY
Status: DISCONTINUED | OUTPATIENT
Start: 2023-01-28 | End: 2023-02-04 | Stop reason: HOSPADM

## 2023-01-28 RX ORDER — HYDRALAZINE HYDROCHLORIDE 25 MG/1
25 TABLET, FILM COATED ORAL EVERY 8 HOURS SCHEDULED
Status: DISCONTINUED | OUTPATIENT
Start: 2023-01-28 | End: 2023-02-04 | Stop reason: HOSPADM

## 2023-01-28 RX ADMIN — FUROSEMIDE 40 MG: 10 INJECTION, SOLUTION INTRAMUSCULAR; INTRAVENOUS at 09:07

## 2023-01-28 RX ADMIN — SODIUM CHLORIDE SOLN NEBU 3% 4 ML: 3 NEBU SOLN at 08:06

## 2023-01-28 RX ADMIN — PREDNISONE 20 MG: 20 TABLET ORAL at 09:06

## 2023-01-28 RX ADMIN — SODIUM CHLORIDE, PRESERVATIVE FREE 10 ML: 5 INJECTION INTRAVENOUS at 20:42

## 2023-01-28 RX ADMIN — INSULIN GLARGINE 10 UNITS: 100 INJECTION, SOLUTION SUBCUTANEOUS at 09:07

## 2023-01-28 RX ADMIN — DOCUSATE SODIUM 100 MG: 100 CAPSULE, LIQUID FILLED ORAL at 20:42

## 2023-01-28 RX ADMIN — HYDRALAZINE HYDROCHLORIDE 25 MG: 25 TABLET, FILM COATED ORAL at 14:11

## 2023-01-28 RX ADMIN — TAMSULOSIN HYDROCHLORIDE 0.4 MG: 0.4 CAPSULE ORAL at 09:07

## 2023-01-28 RX ADMIN — MONTELUKAST 10 MG: 10 TABLET, FILM COATED ORAL at 09:06

## 2023-01-28 RX ADMIN — CYANOCOBALAMIN TAB 500 MCG 500 MCG: 500 TAB at 09:07

## 2023-01-28 RX ADMIN — BISACODYL 5 MG: 5 TABLET, COATED ORAL at 09:06

## 2023-01-28 RX ADMIN — DOCUSATE SODIUM 100 MG: 100 CAPSULE, LIQUID FILLED ORAL at 09:06

## 2023-01-28 RX ADMIN — HYDRALAZINE HYDROCHLORIDE 25 MG: 25 TABLET, FILM COATED ORAL at 20:42

## 2023-01-28 RX ADMIN — PANTOPRAZOLE SODIUM 40 MG: 40 TABLET, DELAYED RELEASE ORAL at 05:47

## 2023-01-28 RX ADMIN — LEVALBUTEROL HYDROCHLORIDE 1.25 MG: 1.25 SOLUTION, CONCENTRATE RESPIRATORY (INHALATION) at 08:06

## 2023-01-28 RX ADMIN — OXYCODONE HYDROCHLORIDE AND ACETAMINOPHEN 500 MG: 500 TABLET ORAL at 09:06

## 2023-01-28 RX ADMIN — SODIUM CHLORIDE SOLN NEBU 3% 15 ML: 3 NEBU SOLN at 19:41

## 2023-01-28 RX ADMIN — FUROSEMIDE 40 MG: 10 INJECTION, SOLUTION INTRAMUSCULAR; INTRAVENOUS at 17:42

## 2023-01-28 RX ADMIN — ATORVASTATIN CALCIUM 40 MG: 40 TABLET, FILM COATED ORAL at 09:06

## 2023-01-28 RX ADMIN — SODIUM CHLORIDE, PRESERVATIVE FREE 5 ML: 5 INJECTION INTRAVENOUS at 09:02

## 2023-01-28 RX ADMIN — HYDRALAZINE HYDROCHLORIDE 10 MG: 10 TABLET, FILM COATED ORAL at 05:47

## 2023-01-28 RX ADMIN — Medication 1 CAPSULE: at 17:43

## 2023-01-28 RX ADMIN — LACTULOSE 20 G: 20 SOLUTION ORAL at 20:43

## 2023-01-28 RX ADMIN — HEPARIN SODIUM 5000 UNITS: 5000 INJECTION INTRAVENOUS; SUBCUTANEOUS at 05:47

## 2023-01-28 RX ADMIN — POLYETHYLENE GLYCOL 3350 17 G: 17 POWDER, FOR SOLUTION ORAL at 09:07

## 2023-01-28 RX ADMIN — ASPIRIN 162 MG: 81 TABLET, COATED ORAL at 09:06

## 2023-01-28 RX ADMIN — Medication 1 CAPSULE: at 09:06

## 2023-01-28 RX ADMIN — LACTULOSE 20 G: 20 SOLUTION ORAL at 09:07

## 2023-01-28 NOTE — PLAN OF CARE
Problem: Discharge Planning  Goal: Discharge to home or other facility with appropriate resources  Outcome: Progressing  Flowsheets (Taken 1/27/2023 2000)  Discharge to home or other facility with appropriate resources:   Identify barriers to discharge with patient and caregiver   Arrange for needed discharge resources and transportation as appropriate   Identify discharge learning needs (meds, wound care, etc)     Problem: Pain  Goal: Verbalizes/displays adequate comfort level or baseline comfort level  Outcome: Progressing     Problem: Safety - Adult  Goal: Free from fall injury  Outcome: Progressing     Problem: ABCDS Injury Assessment  Goal: Absence of physical injury  Outcome: Progressing     Problem: Skin/Tissue Integrity  Goal: Absence of new skin breakdown  Description: 1. Monitor for areas of redness and/or skin breakdown  2. Assess vascular access sites hourly  3. Every 4-6 hours minimum:  Change oxygen saturation probe site  4. Every 4-6 hours:  If on nasal continuous positive airway pressure, respiratory therapy assess nares and determine need for appliance change or resting period.   Outcome: Progressing     Problem: Infection - Adult  Goal: Absence of infection at discharge  Outcome: Progressing  Flowsheets (Taken 1/27/2023 2000)  Absence of infection at discharge:   Assess and monitor for signs and symptoms of infection   Monitor lab/diagnostic results   Monitor all insertion sites i.e., indwelling lines, tubes and drains  Goal: Absence of infection during hospitalization  Outcome: Progressing  Flowsheets (Taken 1/27/2023 2000)  Absence of infection during hospitalization:   Assess and monitor for signs and symptoms of infection   Monitor lab/diagnostic results   Monitor all insertion sites i.e., indwelling lines, tubes and drains  Goal: Absence of fever/infection during anticipated neutropenic period  Outcome: Progressing  Flowsheets (Taken 1/27/2023 2000)  Absence of fever/infection during anticipated neutropenic period: Monitor white blood cell count     Problem: Genitourinary - Adult  Goal: Absence of urinary retention  Outcome: Progressing  Flowsheets (Taken 1/27/2023 2000)  Absence of urinary retention: Assess patients ability to void and empty bladder  Goal: Urinary catheter remains patent  Outcome: Progressing  Flowsheets (Taken 1/27/2023 2000)  Urinary catheter remains patent: Assess patency of urinary catheter     Problem: Metabolic/Fluid and Electrolytes - Adult  Goal: Electrolytes maintained within normal limits  Outcome: Progressing  Flowsheets (Taken 1/27/2023 2000)  Electrolytes maintained within normal limits: Monitor labs and assess patient for signs and symptoms of electrolyte imbalances  Goal: Hemodynamic stability and optimal renal function maintained  Outcome: Progressing  Flowsheets (Taken 1/27/2023 2000)  Hemodynamic stability and optimal renal function maintained: Monitor labs and assess for signs and symptoms of volume excess or deficit  Goal: Glucose maintained within prescribed range  Outcome: Progressing  Flowsheets (Taken 1/27/2023 2000)  Glucose maintained within prescribed range: Monitor blood glucose as ordered     Problem: Nutrition Deficit:  Goal: Optimize nutritional status  Outcome: Progressing     Problem: Chronic Conditions and Co-morbidities  Goal: Patient's chronic conditions and co-morbidity symptoms are monitored and maintained or improved  Outcome: Progressing  Flowsheets (Taken 1/27/2023 2000)  Care Plan - Patient's Chronic Conditions and Co-Morbidity Symptoms are Monitored and Maintained or Improved: Monitor and assess patient's chronic conditions and comorbid symptoms for stability, deterioration, or improvement

## 2023-01-28 NOTE — PLAN OF CARE
Problem: Discharge Planning  Goal: Discharge to home or other facility with appropriate resources  1/28/2023 1055 by Yadi Cruz RN  Outcome: Progressing  Flowsheets (Taken 1/28/2023 5854)  Discharge to home or other facility with appropriate resources: Identify barriers to discharge with patient and caregiver  1/28/2023 0207 by Robert Swain RN  Outcome: Tere Mayfield (Taken 1/27/2023 2000)  Discharge to home or other facility with appropriate resources:   Identify barriers to discharge with patient and caregiver   Arrange for needed discharge resources and transportation as appropriate   Identify discharge learning needs (meds, wound care, etc)     Problem: Pain  Goal: Verbalizes/displays adequate comfort level or baseline comfort level  1/28/2023 1055 by Yadi Cruz RN  Outcome: Progressing  1/28/2023 0207 by Robert Swain RN  Outcome: Progressing     Problem: Safety - Adult  Goal: Free from fall injury  1/28/2023 1055 by Yadi Cruz RN  Outcome: Progressing  1/28/2023 0207 by Robert Swain RN  Outcome: Progressing     Problem: ABCDS Injury Assessment  Goal: Absence of physical injury  1/28/2023 1055 by Yadi Cruz RN  Outcome: Progressing  1/28/2023 0207 by Robert Swain RN  Outcome: Progressing     Problem: Skin/Tissue Integrity  Goal: Absence of new skin breakdown  Description: 1. Monitor for areas of redness and/or skin breakdown  2. Assess vascular access sites hourly  3. Every 4-6 hours minimum:  Change oxygen saturation probe site  4. Every 4-6 hours:  If on nasal continuous positive airway pressure, respiratory therapy assess nares and determine need for appliance change or resting period.   1/28/2023 1055 by Yadi Cruz RN  Outcome: Progressing  1/28/2023 0207 by Robert Swain RN  Outcome: Progressing     Problem: Infection - Adult  Goal: Absence of infection at discharge  1/28/2023 1055 by Yadi Cruz RN  Outcome: Progressing  Flowsheets (Taken 1/28/2023 0903)  Absence of infection at discharge: Assess and monitor for signs and symptoms of infection  1/28/2023 0207 by Ming Linares RN  Outcome: Progressing  Flowsheets (Taken 1/27/2023 2000)  Absence of infection at discharge:   Assess and monitor for signs and symptoms of infection   Monitor lab/diagnostic results   Monitor all insertion sites i.e., indwelling lines, tubes and drains  Goal: Absence of infection during hospitalization  1/28/2023 1055 by Bea Roland RN  Outcome: Progressing  Flowsheets (Taken 1/28/2023 0903)  Absence of infection during hospitalization:   Monitor lab/diagnostic results   Assess and monitor for signs and symptoms of infection  1/28/2023 0207 by Ming Linares RN  Outcome: Progressing  Flowsheets (Taken 1/27/2023 2000)  Absence of infection during hospitalization:   Assess and monitor for signs and symptoms of infection   Monitor lab/diagnostic results   Monitor all insertion sites i.e., indwelling lines, tubes and drains  Goal: Absence of fever/infection during anticipated neutropenic period  1/28/2023 1055 by Bea Roland RN  Outcome: Progressing  1/28/2023 0207 by Ming Linares RN  Outcome: Progressing  Flowsheets (Taken 1/27/2023 2000)  Absence of fever/infection during anticipated neutropenic period: Monitor white blood cell count     Problem: Genitourinary - Adult  Goal: Absence of urinary retention  1/28/2023 1055 by Bea Roland RN  Outcome: Progressing  [address] (Taken 1/28/2023 0903)  Absence of urinary retention: Assess patients ability to void and empty bladder  1/28/2023 0207 by Ming Linares RN  Outcome: Progressing  Flowsheets (Taken 1/27/2023 2000)  Absence of urinary retention: Assess patients ability to void and empty bladder  Goal: Urinary catheter remains patent  1/28/2023 1055 by Bea Roland RN  Outcome: Progressing  1/28/2023 0207 by Ming Linares RN  Outcome: Progressing  Flowsheets (Taken 1/27/2023 2000)  Urinary catheter remains patent: Assess patency of urinary catheter     Problem: Metabolic/Fluid and Electrolytes - Adult  Goal: Electrolytes maintained within normal limits  1/28/2023 1055 by Skye Rojas RN  Outcome: Progressing  Flowsheets (Taken 1/28/2023 0903)  Electrolytes maintained within normal limits: Monitor labs and assess patient for signs and symptoms of electrolyte imbalances  1/28/2023 0207 by Chase Forbes RN  Outcome: Progressing  Flowsheets (Taken 1/27/2023 2000)  Electrolytes maintained within normal limits: Monitor labs and assess patient for signs and symptoms of electrolyte imbalances  Goal: Hemodynamic stability and optimal renal function maintained  1/28/2023 1055 by Skye Rojas RN  Outcome: Progressing  1/28/2023 0207 by Chase Forbes RN  Outcome: Progressing  Flowsheets (Taken 1/27/2023 2000)  Hemodynamic stability and optimal renal function maintained: Monitor labs and assess for signs and symptoms of volume excess or deficit  Goal: Glucose maintained within prescribed range  1/28/2023 1055 by Skye Rojas RN  Outcome: Progressing  1/28/2023 0207 by Chase Forbes RN  Outcome: Progressing  Flowsheets (Taken 1/27/2023 2000)  Glucose maintained within prescribed range: Monitor blood glucose as ordered     Problem: Nutrition Deficit:  Goal: Optimize nutritional status  1/28/2023 1055 by Skye Rojas RN  Outcome: Progressing  1/28/2023 0207 by Chase Forbes RN  Outcome: Progressing     Problem: Chronic Conditions and Co-morbidities  Goal: Patient's chronic conditions and co-morbidity symptoms are monitored and maintained or improved  1/28/2023 1055 by Skye Rojas RN  Outcome: Progressing  Flowsheets (Taken 1/28/2023 0903)  Care Plan - Patient's Chronic Conditions and Co-Morbidity Symptoms are Monitored and Maintained or Improved: Monitor and assess patient's chronic conditions and comorbid symptoms for stability, deterioration, or improvement  1/28/2023 0207 by Julian Kim, RN  Outcome: Progressing  Flowsheets (Taken 1/27/2023 2000)  Care Plan - Patient's Chronic Conditions and Co-Morbidity Symptoms are Monitored and Maintained or Improved: Monitor and assess patient's chronic conditions and comorbid symptoms for stability, deterioration, or improvement

## 2023-01-28 NOTE — PROGRESS NOTES
Patient's temperature was 94.7 rectally on afternoon vital signs. Placed Ronan hugger on patient. Patient also complaining of hemotypsis. Notified Michael Limon who adjusted heparin and gave okay to use ronan hugger.

## 2023-01-28 NOTE — PROGRESS NOTES
Visit us at [facility]. com or call us at [address] [place] [address] NOTE    Patient: Leesa Ramirez MRN: [id number]     YOB: 1940  Age: 79 y.o. 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 Notes and labs reviewed. There were no complications over night. Patient's review of systems: +SOB, weak      OBJECTIVE:     Vitals:    01/28/23 0518 01/28/23 0737 01/28/23 0806 01/28/23 1330   BP:  (!) 151/72  (!) 171/70   Pulse:  58  76   Resp:  16 18 20   Temp:       TempSrc:    Oral   SpO2:  93% 92% 96%   Weight: 237 lb 14 oz (107.9 kg)      Height:           Intake and Output:      Intake/Output Summary (Last 24 hours) at 1/28/2023 1351  Last data filed at 1/28/2023 0643  Gross per 24 hour   Intake 360 ml   Output 4500 ml   Net -4140 ml       Continuous Infusions:   sodium chloride      dextrose         Exam:   CONSTITUTIONAL/PSYCHIATRY: awake, alert and oriented x3. Not in acute distress   EYES: Conjunctivae: normal.   RESPIRATORY: Respiratory effort: normal. Auscultation: diminished BS at the bases  CARDIOVASCULAR: Auscultation: RRR. Edema: 2-3+ in UE and LE  GASTROINTESTINAL: Soft, nontender, nondistended. Obese abdomen. EXTREMITIES:  No cyanosis or clubbing. SKIN: Warm and dry.  Chronic discoloration in LE, ecchymosis in UE      LABS:   RFP:   Recent Labs     01/26/23  0937 01/27/23  0812 01/28/23  0546    142  144 143   K 5.1 4.6  4.7 5.0    104  106 103   CO2 26 27  28 31   BUN 64* 66*  65* 71*   CREATININE 1.8* 1.7*  1.7* 1.7*   CALCIUM 8.3 8.6  8.5 8.7   MG  --   --  1.80   PHOS  --  3.0 3.3       Liver panel:  Recent Labs     01/28/23  0546   AST 16   ALT 20       Endocrine: @[id number](VitD,PTH,TSH,aldosterone,renin activity,cortisol,metanephrine)@    CBC:   Recent Labs     01/26/23  0937 01/27/23  0812 01/28/23  0546   WBC 6.3 5.4 6.1   HGB 8.5* 7.9* 7.9*   HCT 28.9* 27.6* 25.8*   MCV 86.3 85.3 83.4   PLT 97* 86* 97*       Iron Panel:   @[id number](FERA,FE)@    Serology: @[id number](ANAS,ANCA,C3,C4,RNP,AGBM,DNA,SSA,SSB,SPEP,UPEP,ESR,HEPT)@    Urine studies: @[id number](UAPR,UCOL,UNAR,UUNR,UCRR,UCLR,UKR,UUAR,UOSM,EOS)@        ASSESSMENT and PLAN:     1- 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 He is fluid overloaded ++.  On IV lasix 40mg bid with good response.  -  Continue lasix at current dose     2- ESBL UTI: on meropenem     3- Acute on chronic resp failure/HCAP: on meropenem     4- HTN: BP is elevated    - Increase hydralazine     5- Obesity with JOANN      Over all prognosis guarded        Signed By: Kathie Sheppard MD

## 2023-01-28 NOTE — PROGRESS NOTES
Pulmonary Progress Note    Date of Admission: 1/17/2023   LOS: 11 days       CC:  Chief Complaint   Patient presents with    Shortness of Breath     Pt reports increased SOB, productive cough x few months intermittently; pt reports worsening last night. On 3L n/c at home. Urinary Retention     Reports feeling of urgency but unable to urinate. Pt reports last urinated yesterday \"during the day\". Subjective:  Tired. ROS:   No nausea  No Vomiting  No chest pain       Assessment:          Plan: This note may have been transcribed using [id number] [employer]. Please disregard any translational errors. Hospital Day: 11     Volume   - 3.4 since admission  Lasix 40 mg bid. Bilateral pleural effusions   Attempting fluid removal with lasix. Pneumonia/ UTI   Improved Aeration on CT chest.   ESBL in urine. Change nebs to prn   3% saline neb bid. Follow procal.  antibiotics completed. CV / Acut meena chronic diastolic CHF  Per cards. DEISY   Nephrology following     JOANN  Cpap use. Electrolytes  - K:   - Ca:  - Mg:  - Phos:            Data:        PHYSICAL EXAM:   Blood pressure (!) 151/72, pulse 58, temperature 97.1 °F (36.2 °C), temperature source Oral, resp. rate 18, height 5' 8\" (1.727 m), weight 237 lb 14 oz (107.9 kg), SpO2 92 %.'  Body mass index is 36.17 kg/m². Gen: No distress. ENT:   Resp: No accessory muscle use. No crackles. No wheezes. No rhonchi. CV: Regular rate. Regular rhythm. No murmur or rub. No edema. Skin: Warm, dry, normal texture and turgor. No nodule on exposed extremities. M/S: No cyanosis. No clubbing. No joint deformity. Psych: Oriented x 3. No anxiety. Awake. Alert. Intact judgement and insight. Good Mood / Affect.   Memory appears in tact       Medications:    Scheduled Meds:   furosemide  40 mg IntraVENous BID    lactulose  20 g Oral TID    sodium chloride flush  5-40 mL IntraVENous 2 times per day    predniSONE  20 mg Oral Daily heparin (porcine)  5,000 Units SubCUTAneous 3 times per day    vitamin B-12  500 mcg Oral Daily    levalbuterol  1.25 mg Nebulization Q4H WA    sodium chloride (Inhalant)  15 mL Nebulization Q4H WA    vitamin C  500 mg Oral Daily    insulin glargine  10 Units SubCUTAneous QAM    tamsulosin  0.4 mg Oral Daily    aspirin EC  162 mg Oral Daily    atorvastatin  40 mg Oral Daily    bisacodyl  5 mg Oral Daily    polyethylene glycol  17 g Oral Daily    docusate sodium  100 mg Oral BID    hydrALAZINE  10 mg Oral 3 times per day    lactobacillus  1 capsule Oral BID     linaclotide  145 mcg Oral QAM AC    montelukast  10 mg Oral Daily    pantoprazole  40 mg Oral QAM AC    insulin lispro  0-4 Units SubCUTAneous TID WC    insulin lispro  0-4 Units SubCUTAneous Nightly       Continuous Infusions:   sodium chloride      dextrose         PRN Meds:  sodium chloride flush, sodium chloride, acetaminophen, sodium chloride nebulizer, albuterol sulfate HFA, levalbuterol, glucose, dextrose bolus **OR** dextrose bolus, glucagon (rDNA), dextrose    Labs reviewed:  CBC:   Recent Labs     01/26/23  0937 01/27/23  0812 01/28/23  0546   WBC 6.3 5.4 6.1   HGB 8.5* 7.9* 7.9*   HCT 28.9* 27.6* 25.8*   MCV 86.3 85.3 83.4   PLT 97* 86* 97*     BMP:   Recent Labs     01/26/23  0937 01/27/23  0812 01/28/23  0546    142  144 143   K 5.1 4.6  4.7 5.0    104  106 103   CO2 26 27  28 31   PHOS  --  3.0 3.3   BUN 64* 66*  65* 71*   CREATININE 1.8* 1.7*  1.7* 1.7*     LIVER PROFILE:   Recent Labs     01/28/23  0546   AST 16   ALT 20   BILIDIR <0.2   BILITOT 0.3   ALKPHOS 80     PT/INR:   Recent Labs     01/26/23  0102   PROTIME 16.5*   INR 1.34*     APTT: No results for input(s): APTT in the last 72 hours.   UA:No results for input(s): NITRITE, COLORU, PHUR, LABCAST, WBCUA, RBCUA, MUCUS, TRICHOMONAS, YEAST, BACTERIA, CLARITYU, SPECGRAV, LEUKOCYTESUR, UROBILINOGEN, BILIRUBINUR, BLOODU, GLUCOSEU, AMORPHOUS in the last 72 hours.    Invalid input(s): KETONESU  No results for input(s): PH, PCO2, PO2 in the last 72 hours. Cx:      Films:  Radiology Review:  Pertinent images / reports were reviewed as a part of this visit. CT Chest w/ contrast: No results found for this or any previous visit. CT Chest w/o contrast: Results for orders placed during the hospital encounter of 01/17/23    CT CHEST WO CONTRAST    Narrative  EXAMINATION:  CT OF THE CHEST WITHOUT CONTRAST 1/27/2023 12:00 pm    TECHNIQUE:  CT of the chest was performed without the administration of intravenous  contrast. Multiplanar reformatted images are provided for review. Automated  exposure control, iterative reconstruction, and/or weight based adjustment of  the mA/kV was utilized to reduce the radiation dose to as low as reasonably  achievable. COMPARISON:  12/27/2022    HISTORY:  ORDERING SYSTEM PROVIDED HISTORY: mutlifocal PNA  TECHNOLOGIST PROVIDED HISTORY:  Reason for exam:->mutlifocal PNA    FINDINGS:  Mediastinum: Thyroid gland is unremarkable. Atherosclerotic change seen in  aorta. Severe coronary artery calcification is seen. Aortic annulus  calcification is seen. Ascending aorta measures 3.4 cm. .  Small mediastinal  and hilar nodes are noted    Lungs/pleura: On the left, patchy parenchymal opacity seen in the left upper  lobe, increased compared to prior. There is a small left-sided pleural  effusion seen, increased in size compared to prior. Patchy parenchymal  opacities are seen in the left lung base    On the right, patchy consolidative change is seen in the right upper lobe  slightly decreased compared to prior. Volume loss and parenchymal opacity  seen in the right middle lobe, slightly decreased. There is increased right-sided pleural effusion with increased right basilar  consolidation. Upper Abdomen: Adrenal glands unremarkable. Atherosclerotic change seen in  abdominal aorta. Calcified granuloma seen in the liver.   Calcified granuloma  seen within the spleen. There is a questionable hypodense nodule in the body of the pancreas  measuring 2.6 cm    Soft Tissues/Bones: Spurring is seen in the spine. Spurring is seen in the  shoulder joints    Impression  Multifocal pneumonia and bilateral pleural effusions. There is increased  consolidative change seen in the left upper lobe. There are increased  bilateral pleural effusions seen. There is slight improved aeration of the  right upper lobe and right middle lobe. Hypodense nodule seen in the pancreas. Prior MRCP showed cystic pancreatic  lesions in 2018. The largest lesion appears slightly increased compared to  that time. Consider follow-up abdominal MRI, without IV contrast, MRCP  protocol on a nonurgent basis      CTPA: No results found for this or any previous visit. CXR PA/LAT: Results for orders placed during the hospital encounter of 10/30/22    XR CHEST (2 VW)    Narrative  EXAMINATION:  TWO XRAY VIEWS OF THE CHEST    11/6/2022 9:37 am    COMPARISON:  10/30/2022    HISTORY:  ORDERING SYSTEM PROVIDED HISTORY: productive cough wheeze ho pneumonia  TECHNOLOGIST PROVIDED HISTORY:  Reason for exam:->productive cough wheeze ho pneumonia    FINDINGS:  Increasing basilar opacities and small pleural effusions appear increased. No pneumothorax identified. No acute osseous abnormality appreciated. Cardiac and mediastinal contours appear unchanged. Impression  Increasing basilar opacities and small pleural effusions, for which  multifocal infection or developing edema are considerations. CXR portable: Results for orders placed during the hospital encounter of 01/17/23    XR CHEST PORTABLE    Narrative  EXAMINATION:  ONE XRAY VIEW OF THE CHEST    1/25/2023 11:06 pm    COMPARISON:  Chest radiograph performed 01/17/2023.     HISTORY:  ORDERING SYSTEM PROVIDED HISTORY: coughing up blood  TECHNOLOGIST PROVIDED HISTORY:  Reason for exam:->coughing up blood  Reason for Exam: coughing up blood    FINDINGS:  There are diffuse infiltrates. There are trace effusions. There is no  pneumothorax. The heart is enlarged. The upper abdomen is unremarkable. The extrathoracic soft tissues are unremarkable. Impression  Cardiomegaly with trace effusions and scattered infiltrates. This note was transcribed using [id number]. Please disregard any translational errors.       Saman Rodriguez [facility], [facility] [phone]

## 2023-01-28 NOTE — PROGRESS NOTES
[facility]   Daily Progress Note      Admit Date:  1/17/2023    CC: SOB    HPI:   Gilmar Suresh is a 82 y.o. male with PMH Hx abn stress>mild CAD, COPD, chronic hypoxic resp failure, DHF, PAFm anemia, HTN.    Presented to [facility] with acute on chronic hypoxic resp failure.   Treated for PNA with antibiotics, Pulm following  Mild elevated troponin and elevated BNP.  Dr. Flor consulted. Initially diuresed with IV lasix.   RHC 1/25 suggestive of euvolemia and pulm HTN.   Neph consulted for DEISY on CKD. + UTI.    C/O mostly of gen weakness and fatigue today.   Continues with SOB but improving, weaning O2 3L NC. Increased SOB when laying flat.   Cont with BLE edema, applying compression stockings. Has generalized edema BUE, weeping.  He has been OOB to chair.   Denied CP, LH, syncope, palpitations.     Review of Systems:   General: Denies fever, chills  Skin: Denies skin changes, rash, itching, lesions.  HEENT: Denies headache, dizziness, vision changes, nosebleeds, sore throat, nasal drainage  RESP: Denies cough, sputum,wheeze, snoring  CARD: Denies palpitations,  murmur  GI:Denies nausea, vomiting, heartburn, loss of appetite, change in bowels  : Denies frequency, pain, incontinence, polyuria  VASC: Denies claudication, leg cramps, clots  MUSC/SKEL: Denies pain, stiffness, arthritis  PSYCH: Denies anxiety, depression, stress  NEURO: Denies numbness, tingling, weakness,change in mood or memory  HEME: Denies abn bruising, bleeding, anemia  ENDO: Denies intolerance to heat, cold, excessive thirst or hunger, hx thyroid disease    Objective:   BP (!) 151/72   Pulse 58   Temp 97.1 °F (36.2 °C) (Oral)   Resp 18   Ht 5' 8\" (1.727 m)   Wt 237 lb 14 oz (107.9 kg)   SpO2 92%   BMI 36.17 kg/m²         Intake/Output Summary (Last 24 hours) at 1/28/2023 1102  Last data filed at 1/28/2023 0643  Gross per 24 hour   Intake 600 ml   Output 4500 ml   Net -3900 ml     I/O since adm: neg 3480    WEIGHT:Admit  Weight: 224 lb (101.6 kg)         Today  Weight: 237 lb 14 oz (107.9 kg)   DRY WEIGHT:  Wt Readings from Last 3 Encounters:   01/28/23 237 lb 14 oz (107.9 kg)   12/30/22 218 lb 4.1 oz (99 kg)   12/16/22 239 lb (108.4 kg)       Physical Exam:  GEN: Appears ill,  no acute distress  SKIN: Pale, warm, dry. HEENT: PERRLA. No adenopathy. LUNG: AP diameter normal. Decreased BS bilateral bases. No wheeze, rales, or ronchi. Respiratory effort increased. HEART: S1S2 A/R. No carotid bruit. No murmur, rub or gallop. ABD: Soft, nontender. +BS X 4 quads. EXT: Radial and pedal pulses 2+ and symmetric. Without varicosities. 2+ BLE edema. MUSCSKEL: Good ROM X4 extremities. No deformity. NEURO: A/O X3. Calm and cooperative. Telemetry: NSR HR 77    Medications:    furosemide  40 mg IntraVENous BID    lactulose  20 g Oral TID    sodium chloride flush  5-40 mL IntraVENous 2 times per day    predniSONE  20 mg Oral Daily    heparin (porcine)  5,000 Units SubCUTAneous 3 times per day    vitamin B-12  500 mcg Oral Daily    levalbuterol  1.25 mg Nebulization Q4H WA    sodium chloride (Inhalant)  15 mL Nebulization Q4H WA    vitamin C  500 mg Oral Daily    insulin glargine  10 Units SubCUTAneous QAM    tamsulosin  0.4 mg Oral Daily    aspirin EC  162 mg Oral Daily    atorvastatin  40 mg Oral Daily    bisacodyl  5 mg Oral Daily    polyethylene glycol  17 g Oral Daily    docusate sodium  100 mg Oral BID    hydrALAZINE  10 mg Oral 3 times per day    lactobacillus  1 capsule Oral BID WC    linaclotide  145 mcg Oral QAM AC    montelukast  10 mg Oral Daily    pantoprazole  40 mg Oral QAM AC    insulin lispro  0-4 Units SubCUTAneous TID WC    insulin lispro  0-4 Units SubCUTAneous Nightly      sodium chloride      dextrose       sodium chloride flush, sodium chloride, acetaminophen, sodium chloride nebulizer, albuterol sulfate HFA, levalbuterol, glucose, dextrose bolus **OR** dextrose bolus, glucagon (rDNA), dextrose    Lab Data:  I have reviewed all labs below today.    CBC:   Recent Labs     01/26/23  0937 01/27/23  0812 01/28/23  0546   WBC 6.3 5.4 6.1   HGB 8.5* 7.9* 7.9*   HCT 28.9* 27.6* 25.8*   MCV 86.3 85.3 83.4   PLT 97* 86* 97*     BMP:   Recent Labs     01/25/23  1118 01/26/23  0937 01/27/23  0812 01/28/23  0546    145 142  144 143   K 5.1 5.1 4.6  4.7 5.0    108 104  106 103   CO2 24 26 27 28 31   PHOS 3.2  --  3.0 3.3   BUN 59* 64* 66*  65* 71*   CREATININE 1.7* 1.8* 1.7*  1.7* 1.7*     GLUCOSE:   Recent Labs     01/26/23  0937 01/27/23  0812 01/28/23  0546   GLUCOSE 187* 105*  105* 97     LIVER PROFILE:   Lab Results   Component Value Date/Time    AST 16 01/28/2023 05:46 AM    ALT 20 01/28/2023 05:46 AM    LIPASE 20.0 12/26/2018 07:48 AM    LABALBU 2.8 01/28/2023 05:46 AM    BILIDIR <0.2 01/28/2023 05:46 AM    BILITOT 0.3 01/28/2023 05:46 AM    ALKPHOS 80 01/28/2023 05:46 AM     PT/INR:   Lab Results   Component Value Date/Time    PROTIME 16.5 01/26/2023 01:02 AM    INR 1.34 01/26/2023 01:02 AM    INR 1.33 01/17/2023 02:53 PM    INR 1.51 11/15/2022 09:04 PM     APTT:   Lab Results   Component Value Date/Time    APTT 28.7 01/17/2023 02:53 PM     Pro-BNP:    Lab Results   Component Value Date/Time    PROBNP 3,502 01/27/2023 08:12 AM    PROBNP 5,031 01/24/2023 05:00 AM    PROBNP 2,497 01/17/2023 02:53 PM     Parameters:   > 450 pg/mL under age 48  > 900 pg/mL ages 54-65  > 1800 pg/mL over age 76    ENZYMES:  Lab Results   Component Value Date/Time    CKTOTAL 47 10/30/2022 09:56 PM    TROPONINI 0.06 01/18/2023 05:39 PM    TROPONINI 0.05 01/18/2023 11:15 AM    TROPONINI 0.06 01/18/2023 05:26 AM     FASTING LIPID PANEL:  Lab Results   Component Value Date/Time    CHOL 116 11/17/2021 10:13 AM    HDL 47 11/17/2021 10:13 AM    HDL 42 12/15/2011 01:57 PM    LDLCALC 54 11/17/2021 10:13 AM    TRIG 76 11/17/2021 10:13 AM       Diagnostics:    EKG:   Atrial fibrillation with rapid ventricular response with premature ventricular or aberrantly conducted complexes  Right bundle branch block  Abnormal ECG  When compared with ECG of 23-DEC-2022 21:22,  Atrial fibrillation has replaced Junctional rhythm  Vent. rate has increased BY 45 BPM  Right bundle branch block has replaced Incomplete right bundle branch block  Confirmed by ARIELLA Harrison MD ([id number]) on 1/17/2023 2:39:38 PM    NM Stress 6/28/2021  Summary  Moderate sized moderate intensity reversible inferior wall defect suggestive of ischemia  Normal LV size and systolic function. Overall findings represent a intermediate risk study. Recommendation  Cardiac catheterization. LHC/RHC 7/1/2021  R dominant  RCA- mod calcifications, no obstructions  LM- no dz  LCX- no dz  LAD- mod calcification prox , turtuous 50% lesion mid. No ventriculogram.    RAP10  [id number]  PA 50/18(28)  CO 8.53L  CI 3.68  MXV sat normal    ECHO: 11/16/2022  Summary  Ejection fraction is visually estimated to be 60-65%. No regional wall motion abnormalities are noted. Grade I diastolic dysfunction with elevated LV filling pressures. The left atrium is moderately dilated. mildly dilated right ventricle. Estimated pulmonary artery systolic pressure is mildly elevated at 40 mmHg assuming a right atrial pressure of 15 mmHg. RHC: 1/25/2023  FINDINGS:   1. Elevated right atrial pressure at 14 mmHg, but only a mildly   elevated pulmonary capillary wedge pressure at 18 mmHg. 2.  Pulmonary hypertension, likely mixed with an instantaneous pressure   of 53/14 and a mean pulmonary arterial pressure of 33 mmHg. 3.  Normal cardiac output by thermodilution at 6.27 liters per minute   with a cardiac index of 2.81 liters per kilogram per minute. By Aden Suzanne   equation cardiac index is 4.22 liters per kilogram per minute. 4.  Pulmonary vascular resistance 128 dynes per second per meter   squared.    5.  Normal to mildly reduced mixed venous oxygen saturations with an SVC   of 60 with a right atrium of 65% and pulmonary artery of 62%. The   patient saturating 94% on pulse oximetry on 3 liters at which time the   study was performed. CXR 1/17/2023  Impression   Patchy infiltrates in the lungs bilaterally, greater on the right, increased   when compared to the previous exam.  In this case, multifocal pneumonia and   pulmonary edema both considered. CXR 1/25/2023  Impression   Cardiomegaly with trace effusions and scattered infiltrates. Assessment/Plan:    1.) Acute on chronic hypoxic resp failure: SOB multifactorial with PNA/ infiltrates and DHF/hypervolemia. SOB improving. Pulm following. Cont steroids, Rt tx, supplemental O2, antibiotics  Amio level pending - concern for toxicity contributing to resp failure    2.) Acute on chronic diastolic heart failure: Elevated BNP- improved, +SOB, +edema. RHC- PCWP 18 after diuresis, RAP 14. Mildly elevated suggestive of mild hypervolemia. Difficult to diurese with DEISY on CKD- renal fx stable. Neph following and now on oral torsemide. Neg 3.4L Rec to cont current torsemide dose  Hydralazine for afterload reduction (no ACEi/ARNI, SGLT2i, AA D/T DEISY/CKD)  He is not on BB (not indicated for EF >40%)  NYHA Class III   Stage C    Cardiac Rehab for EF <35%: NA  ICD counseling: NA   2gm Na diet, daily weight, 64 oz fluid restriction  Avoid NSAIDS and other nephrotoxic meds  [facility]: [facility]  HF RN referral for education    3.) DEISY on CKD:   Creat 1.8>3>1.8 >1.7today  Neph following, restarted torsemide  May need RRT    3.) Paroxysmal AF: NSR rate controlled  CHADSVASC-     HASBLED-  No OAC D/T hx GIB  No BB or CCB  Was on Amio - stopped with concerns that it is contributing to lung dz- amio level pending    4.) Chronic Anemia:   H/H stable, rec restarting ferrous sulfate  2017 ACC/AHA HF Guidelines:    In patients with NYHA class II and III HF and iron deficiency (ferritin <100 ng/ml or 100-300 ng/ml if transferrin saturation <20%), IV iron replacement might be reasonable to improve functional status and QOL. There is uncertain evidence base for oral iron repletion in the setting of anemia associated with Hf.   Erythropoietin-stimulating agents should not be used to improve morbidity and mortality. Patient is stable from CV standpoint , cardiology will sign off.      Electronically signed by SILVIANO Elder CNP on 1/28/2023 at 11:02 AM

## 2023-01-28 NOTE — PROGRESS NOTES
Michelle PAIZ [facility] Progress Note  1/28/2023 10:09 AM  Subjective:   Admit Date: 1/17/2023      Chief Complaint: I want to go home and die in my own bed --I am sick of this     Interval History: Good diuresis of 5-6 liters past 2 days--cr stable at 1.6   Sig arm bruising'  He is frustrated and traful   Chest ct--persisting mucus-enc acapella   Diet: ADULT DIET; Regular; No Added Salt (3-4 gm)  ADULT ORAL NUTRITION SUPPLEMENT; Breakfast, Dinner;  Wound Healing Oral Supplement  Medications:   Scheduled Meds:   furosemide  40 mg IntraVENous BID    lactulose  20 g Oral TID    sodium chloride flush  5-40 mL IntraVENous 2 times per day    predniSONE  20 mg Oral Daily    heparin (porcine)  5,000 Units SubCUTAneous 3 times per day    vitamin B-12  500 mcg Oral Daily    levalbuterol  1.25 mg Nebulization Q4H WA    sodium chloride (Inhalant)  15 mL Nebulization Q4H WA    vitamin C  500 mg Oral Daily    insulin glargine  10 Units SubCUTAneous QAM    tamsulosin  0.4 mg Oral Daily    aspirin EC  162 mg Oral Daily    atorvastatin  40 mg Oral Daily    bisacodyl  5 mg Oral Daily    polyethylene glycol  17 g Oral Daily    docusate sodium  100 mg Oral BID    hydrALAZINE  10 mg Oral 3 times per day    lactobacillus  1 capsule Oral BID WC    linaclotide  145 mcg Oral QAM AC    montelukast  10 mg Oral Daily    pantoprazole  40 mg Oral QAM AC    insulin lispro  0-4 Units SubCUTAneous TID WC    insulin lispro  0-4 Units SubCUTAneous Nightly     Continuous Infusions:   sodium chloride      dextrose         Review of Systems -   General ROS: afebrile  Respiratory ROS: positive for - cough and shortness of breath  Cardiovascular ROS: no chest pain  Musculoskeletal ROS:positive for - low back pain   Neurological ROS: no TIA or stroke symptoms    Objective:   Vitals: BP (!) 151/72   Pulse 58   Temp 97.1 °F (36.2 °C) (Oral)   Resp 18   Ht 5' 8\" (1.727 m)   Wt 237 lb 14 oz (107.9 kg)   SpO2 92%   BMI 36.17 kg/m²   General appearance: alert and cooperative with exam  HEENT: Head: Normocephalic, no lesions, without obvious abnormality. Neck: no adenopathy, no carotid bruit, no JVD, supple, symmetrical, trachea midline, and thyroid not enlarged, symmetric, no tenderness/mass/nodules  Lungs: rhonchi bilaterally  Heart: regular rate and rhythm, S1, S2 normal, no murmur, click, rub or gallop  Abdomen: obese-non tender   Extremities: 1= edema   Neurologic: Mental status: tearful and frustrated     No results displayed because visit has over 200 results. Assessment & Plan:   Principal Problem:    Chronic diastolic CHF (congestive heart failure), NYHA class 2 ([facility])--cont diuresis--follow renal panel   Active Problems:    JOANN (obstructive sleep apnea)--cont CPAP    Type 2 diabetes mellitus with stage 4 chronic kidney disease, with long-term current use of insulin ([facility])    Essential hypertension    Chronic anemia    Abnormal CXR--cotn acapella     Elevated troponin    Acute on chronic congestive heart failure (HCC)    Mucopurulent chronic bronchitis (HCC)    Persistent atrial fibrillation ([facility])    Hypothermia due to non-environmental cause    Chronic GERD    Acute on chronic respiratory failure with hypoxia (HCC)    B12 deficiency    HCAP (healthcare-associated pneumonia)    Acute kidney injury ([facility] [address]    UTI due to extended-spectrum beta lactamase (ESBL) producing Escherichia coli  Resolved Problems:    * No resolved hospital problems.  *  Cont diuresis--follow renal panel--laisx 40 bid --  Please note that over 35 minutes was spent in evaluating the patient, review of records and results, discussion with staff/family, etc.    Morena Pickering MD

## 2023-01-29 LAB
AMIODARONE LEVEL: <0.3 UG/ML (ref 0.5–2)
ANION GAP SERPL CALCULATED.3IONS-SCNC: 11 MMOL/L (ref 3–16)
BASOPHILS ABSOLUTE: 0 K/UL (ref 0–0.2)
BASOPHILS RELATIVE PERCENT: 0.3 %
BUN BLDV-MCNC: 70 MG/DL (ref 7–20)
CALCIUM SERPL-MCNC: 8.6 MG/DL (ref 8.3–10.6)
CHLORIDE BLD-SCNC: 102 MMOL/L (ref 99–110)
CO2: 31 MMOL/L (ref 21–32)
CREAT SERPL-MCNC: 1.8 MG/DL (ref 0.8–1.3)
DES-AMIOD: 0.4 UG/ML
EOSINOPHILS ABSOLUTE: 0.1 K/UL (ref 0–0.6)
EOSINOPHILS RELATIVE PERCENT: 0.8 %
GFR SERPL CREATININE-BSD FRML MDRD: 37 ML/MIN/{1.73_M2}
GLUCOSE BLD-MCNC: 110 MG/DL (ref 70–99)
GLUCOSE BLD-MCNC: 124 MG/DL (ref 70–99)
GLUCOSE BLD-MCNC: 222 MG/DL (ref 70–99)
GLUCOSE BLD-MCNC: 228 MG/DL (ref 70–99)
GLUCOSE BLD-MCNC: 92 MG/DL (ref 70–99)
HCT VFR BLD CALC: 27.2 % (ref 40.5–52.5)
HEMOGLOBIN: 8.1 G/DL (ref 13.5–17.5)
LYMPHOCYTES ABSOLUTE: 0.7 K/UL (ref 1–5.1)
LYMPHOCYTES RELATIVE PERCENT: 11.3 %
MCH RBC QN AUTO: 24.7 PG (ref 26–34)
MCHC RBC AUTO-ENTMCNC: 29.6 G/DL (ref 31–36)
MCV RBC AUTO: 83.5 FL (ref 80–100)
MONOCYTES ABSOLUTE: 0.3 K/UL (ref 0–1.3)
MONOCYTES RELATIVE PERCENT: 5.2 %
NEUTROPHILS ABSOLUTE: 5 K/UL (ref 1.7–7.7)
NEUTROPHILS RELATIVE PERCENT: 82.4 %
PDW BLD-RTO: 21.1 % (ref 12.4–15.4)
PERFORMED ON: ABNORMAL
PERFORMED ON: NORMAL
PLATELET # BLD: 102 K/UL (ref 135–450)
PMV BLD AUTO: 9 FL (ref 5–10.5)
POTASSIUM SERPL-SCNC: 4.5 MMOL/L (ref 3.5–5.1)
PROCALCITONIN: 0.21 NG/ML (ref 0–0.15)
RBC # BLD: 3.26 M/UL (ref 4.2–5.9)
SODIUM BLD-SCNC: 144 MMOL/L (ref 136–145)
WBC # BLD: 6.1 K/UL (ref 4–11)

## 2023-01-29 PROCEDURE — 94760 N-INVAS EAR/PLS OXIMETRY 1: CPT

## 2023-01-29 PROCEDURE — 6370000000 HC RX 637 (ALT 250 FOR IP): Performed by: INTERNAL MEDICINE

## 2023-01-29 PROCEDURE — 51702 INSERT TEMP BLADDER CATH: CPT

## 2023-01-29 PROCEDURE — 84145 PROCALCITONIN (PCT): CPT

## 2023-01-29 PROCEDURE — 94640 AIRWAY INHALATION TREATMENT: CPT

## 2023-01-29 PROCEDURE — 80048 BASIC METABOLIC PNL TOTAL CA: CPT

## 2023-01-29 PROCEDURE — 2700000000 HC OXYGEN THERAPY PER DAY

## 2023-01-29 PROCEDURE — 94669 MECHANICAL CHEST WALL OSCILL: CPT

## 2023-01-29 PROCEDURE — 6360000002 HC RX W HCPCS: Performed by: INTERNAL MEDICINE

## 2023-01-29 PROCEDURE — 2580000003 HC RX 258: Performed by: INTERNAL MEDICINE

## 2023-01-29 PROCEDURE — 99232 SBSQ HOSP IP/OBS MODERATE 35: CPT | Performed by: INTERNAL MEDICINE

## 2023-01-29 PROCEDURE — 85025 COMPLETE CBC W/AUTO DIFF WBC: CPT

## 2023-01-29 PROCEDURE — 36415 COLL VENOUS BLD VENIPUNCTURE: CPT

## 2023-01-29 PROCEDURE — 1200000000 HC SEMI PRIVATE

## 2023-01-29 RX ORDER — INSULIN GLARGINE 100 [IU]/ML
14 INJECTION, SOLUTION SUBCUTANEOUS EVERY MORNING
Status: DISCONTINUED | OUTPATIENT
Start: 2023-01-30 | End: 2023-02-04 | Stop reason: HOSPADM

## 2023-01-29 RX ORDER — FUROSEMIDE 10 MG/ML
40 INJECTION INTRAMUSCULAR; INTRAVENOUS 3 TIMES DAILY
Status: DISCONTINUED | OUTPATIENT
Start: 2023-01-29 | End: 2023-02-01

## 2023-01-29 RX ADMIN — HYDRALAZINE HYDROCHLORIDE 25 MG: 25 TABLET, FILM COATED ORAL at 06:25

## 2023-01-29 RX ADMIN — BISACODYL 5 MG: 5 TABLET, COATED ORAL at 08:51

## 2023-01-29 RX ADMIN — FUROSEMIDE 40 MG: 10 INJECTION, SOLUTION INTRAMUSCULAR; INTRAVENOUS at 21:02

## 2023-01-29 RX ADMIN — LEVALBUTEROL 1.25 MG: 1.25 SOLUTION, CONCENTRATE RESPIRATORY (INHALATION) at 19:29

## 2023-01-29 RX ADMIN — PANTOPRAZOLE SODIUM 40 MG: 40 TABLET, DELAYED RELEASE ORAL at 06:25

## 2023-01-29 RX ADMIN — HYDRALAZINE HYDROCHLORIDE 25 MG: 25 TABLET, FILM COATED ORAL at 21:00

## 2023-01-29 RX ADMIN — MONTELUKAST 10 MG: 10 TABLET, FILM COATED ORAL at 08:49

## 2023-01-29 RX ADMIN — HEPARIN SODIUM 5000 UNITS: 5000 INJECTION INTRAVENOUS; SUBCUTANEOUS at 08:50

## 2023-01-29 RX ADMIN — HYDRALAZINE HYDROCHLORIDE 25 MG: 25 TABLET, FILM COATED ORAL at 15:50

## 2023-01-29 RX ADMIN — POLYETHYLENE GLYCOL 3350 17 G: 17 POWDER, FOR SOLUTION ORAL at 08:49

## 2023-01-29 RX ADMIN — OXYCODONE HYDROCHLORIDE AND ACETAMINOPHEN 500 MG: 500 TABLET ORAL at 08:49

## 2023-01-29 RX ADMIN — SODIUM CHLORIDE SOLN NEBU 3% 15 ML: 3 NEBU SOLN at 19:29

## 2023-01-29 RX ADMIN — SODIUM CHLORIDE, PRESERVATIVE FREE 10 ML: 5 INJECTION INTRAVENOUS at 21:01

## 2023-01-29 RX ADMIN — PREDNISONE 20 MG: 20 TABLET ORAL at 08:49

## 2023-01-29 RX ADMIN — DOCUSATE SODIUM 100 MG: 100 CAPSULE, LIQUID FILLED ORAL at 21:00

## 2023-01-29 RX ADMIN — Medication 1 CAPSULE: at 15:50

## 2023-01-29 RX ADMIN — CYANOCOBALAMIN TAB 500 MCG 500 MCG: 500 TAB at 08:50

## 2023-01-29 RX ADMIN — LACTULOSE 20 G: 20 SOLUTION ORAL at 15:51

## 2023-01-29 RX ADMIN — INSULIN GLARGINE 10 UNITS: 100 INJECTION, SOLUTION SUBCUTANEOUS at 09:01

## 2023-01-29 RX ADMIN — SODIUM CHLORIDE SOLN NEBU 3% 15 ML: 3 NEBU SOLN at 09:00

## 2023-01-29 RX ADMIN — INSULIN LISPRO 1 UNITS: 100 INJECTION, SOLUTION INTRAVENOUS; SUBCUTANEOUS at 12:37

## 2023-01-29 RX ADMIN — ASPIRIN 162 MG: 81 TABLET, COATED ORAL at 08:49

## 2023-01-29 RX ADMIN — TAMSULOSIN HYDROCHLORIDE 0.4 MG: 0.4 CAPSULE ORAL at 08:51

## 2023-01-29 RX ADMIN — FUROSEMIDE 40 MG: 10 INJECTION, SOLUTION INTRAMUSCULAR; INTRAVENOUS at 08:49

## 2023-01-29 RX ADMIN — ATORVASTATIN CALCIUM 40 MG: 40 TABLET, FILM COATED ORAL at 08:50

## 2023-01-29 RX ADMIN — SODIUM CHLORIDE, PRESERVATIVE FREE 10 ML: 5 INJECTION INTRAVENOUS at 08:54

## 2023-01-29 RX ADMIN — LACTULOSE 20 G: 20 SOLUTION ORAL at 08:49

## 2023-01-29 RX ADMIN — DOCUSATE SODIUM 100 MG: 100 CAPSULE, LIQUID FILLED ORAL at 08:51

## 2023-01-29 RX ADMIN — Medication 1 CAPSULE: at 08:50

## 2023-01-29 RX ADMIN — LACTULOSE 20 G: 20 SOLUTION ORAL at 21:05

## 2023-01-29 NOTE — FLOWSHEET NOTE
Patient has been upset this shift and very talkative, he states he is refusing to go to a rehab due to his wife is at home and needs him home to care for her. One on one and education provided.

## 2023-01-29 NOTE — PROGRESS NOTES
Visit us at [facility]. com or call us at [address] [place] [address] NOTE    Patient: Hernán Escobar MRN: [id number]     YOB: 1940  Age: 79 y.o. 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 Notes and labs reviewed. There were no complications over night. Patient's review of systems: +SOB, weak      OBJECTIVE:     Vitals:    01/29/23 0600 01/29/23 0831 01/29/23 0900 01/29/23 1122   BP:  (!) 128/47  (!) 176/74   Pulse:  93 73 87   Resp:   18 18   Temp: 97.2 °F (36.2 °C) 97.4 °F (36.3 °C)     TempSrc:       SpO2:  94% 96% 99%   Weight:       Height:           Intake and Output:      Intake/Output Summary (Last 24 hours) at 1/29/2023 1603  Last data filed at 1/29/2023 1122  Gross per 24 hour   Intake 240 ml   Output 2375 ml   Net -2135 ml         Continuous Infusions:   sodium chloride      dextrose         Exam:   CONSTITUTIONAL/PSYCHIATRY: awake, alert and oriented x3. Not in acute distress   EYES: Conjunctivae: normal.   RESPIRATORY: Respiratory effort: normal. Auscultation: diminished BS at the bases  CARDIOVASCULAR: Auscultation: RRR. Edema: 2-3+ in UE and LE  GASTROINTESTINAL: Soft, nontender, nondistended. Obese abdomen. EXTREMITIES:  No cyanosis or clubbing. SKIN: Warm and dry.  Chronic discoloration in LE, ecchymosis in UE      LABS:   RFP:   Recent Labs     01/27/23  0812 01/28/23  0546 01/29/23  0756     144 143 144   K 4.6  4.7 5.0 4.5     106 103 102   CO2 27  28 31 31   BUN 66*  65* 71* 70*   CREATININE 1.7*  1.7* 1.7* 1.8*   CALCIUM 8.6  8.5 8.7 8.6   MG  --  1.80  --    PHOS 3.0 3.3  --          Liver panel:  Recent Labs     01/28/23  0546   AST 16   ALT 20         Endocrine: @[id number](VitD,PTH,TSH,aldosterone,renin activity,cortisol,metanephrine)@    CBC:   Recent Labs     01/27/23  0812 01/28/23  0546 01/29/23  0756   WBC 5.4 6.1 6.1   HGB 7.9* 7.9* 8.1*   HCT 27.6* 25.8* 27.2*   MCV 85.3 83.4 83.5   PLT 86* 97* 102*         Iron Panel:   @[id number](FERA,FE)@    Serology: @[id number](ANAS,ANCA,C3,C4,RNP,AGBM,DNA,SSA,SSB,SPEP,UPEP,ESR,HEPT)@    Urine studies: @[id number](UAPR,UCOL,UNAR,UUNR,UCRR,UCLR,UKR,UUAR,UOSM,EOS)@        ASSESSMENT and PLAN:     1- 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 He is significantly fluid overloaded ++. On IV lasix with some response. He is in -6.3L fluid balance. Wt peak at 246, 237 now. -  Continue lasix and increase to 40mg IV tid. May need to change to lasix drip     2- ESBL UTI: on meropenem     3- Acute on chronic resp failure/HCAP/ADHF on chronic diastolic HF: on meropenem, diuresis per above.      4- HTN: BP is elevated    - Increase hydralazine     5- Obesity with JOANN            Signed By: Tree Mtz MD

## 2023-01-29 NOTE — PROGRESS NOTES
Michelle PAIZ [facility] Progress Note  1/29/2023 1:11 PM  Subjective:   Admit Date: 1/17/2023      Chief Complaint: not conversing with me was in the middle of breathing treatment    Interval History: Good diuresis of 5-6 liters past 2 days--cr stable at 1.6   Sig arm bruising'. He is continuing to have good urine output and improving creatinine. Breathing slightly improved, wght down  He is frustrated and traful   Chest ct--persisting mucus-enc acapella   Diet: ADULT DIET; Regular; No Added Salt (3-4 gm)  ADULT ORAL NUTRITION SUPPLEMENT; Breakfast, Dinner;  Wound Healing Oral Supplement  Medications:   Scheduled Meds:   sodium chloride (Inhalant)  15 mL Nebulization BID    hydrALAZINE  25 mg Oral 3 times per day    heparin (porcine)  5,000 Units SubCUTAneous 2 times per day    furosemide  40 mg IntraVENous BID    lactulose  20 g Oral TID    sodium chloride flush  5-40 mL IntraVENous 2 times per day    predniSONE  20 mg Oral Daily    vitamin B-12  500 mcg Oral Daily    vitamin C  500 mg Oral Daily    insulin glargine  10 Units SubCUTAneous QAM    tamsulosin  0.4 mg Oral Daily    aspirin EC  162 mg Oral Daily    atorvastatin  40 mg Oral Daily    bisacodyl  5 mg Oral Daily    polyethylene glycol  17 g Oral Daily    docusate sodium  100 mg Oral BID    lactobacillus  1 capsule Oral BID WC    linaclotide  145 mcg Oral QAM AC    montelukast  10 mg Oral Daily    pantoprazole  40 mg Oral QAM AC    insulin lispro  0-4 Units SubCUTAneous TID WC    insulin lispro  0-4 Units SubCUTAneous Nightly     Continuous Infusions:   sodium chloride      dextrose         Review of Systems -   General ROS: afebrile  Respiratory ROS: positive for - cough and shortness of breath  Cardiovascular ROS: no chest pain  Musculoskeletal ROS:positive for - low back pain   Neurological ROS: no TIA or stroke symptoms    Objective:   Vitals: BP (!) 128/47   Pulse 73   Temp 97.4 °F (36.3 °C)   Resp 18   Ht 5' 8\" (1.727 m)   Wt 237 lb 14 oz (107.9 kg)   SpO2 96%   BMI 36.17 kg/m²   General appearance: alert and cooperative with exam  HEENT: Head: Normocephalic, no lesions, without obvious abnormality. Neck: no adenopathy, no carotid bruit, no JVD, supple, symmetrical, trachea midline, and thyroid not enlarged, symmetric, no tenderness/mass/nodules  Lungs: rhonchi bilaterally , inspiratory crackles half way up bilaterally  Heart: regular rate and rhythm, S1, S2 normal, no murmur, click, rub or gallop  Abdomen: obese-non tender   Extremities: 1= edema , arms diffusely edematous and weeping and erythematous and bruised, not hot  Neurologic: Mental status: tearful and frustrated     No results displayed because visit has over 200 results. Assessment & Plan:   Principal Problem:    Chronic diastolic CHF (congestive heart failure), NYHA class 2 ([facility])--cont diuresis--follow renal panel, iimproving currently  Active Problems:    JOANN (obstructive sleep apnea)--cont CPAP    Type 2 diabetes mellitus with stage 4 chronic kidney disease, with long-term current use of insulin (HCC)    Essential hypertension    Chronic anemia    Abnormal CXR--continue acapella     Elevated troponin    Acute on chronic congestive heart failure (HCC)    Mucopurulent chronic bronchitis (HCC)    Persistent atrial fibrillation (HCC)    Hypothermia due to non-environmental cause    Chronic GERD    Acute on chronic respiratory failure with hypoxia (HCC)    B12 deficiency    HCAP (healthcare-associated pneumonia)    Acute kidney injury ([facility] [address]    UTI due to extended-spectrum beta lactamase (ESBL) producing Escherichia coli  Resolved Problems:    * No resolved hospital problems.  *  Cont diuresis--follow renal panel--laisx 40 bid --    Malgorzata Abreu MD

## 2023-01-30 ENCOUNTER — APPOINTMENT (OUTPATIENT)
Dept: GENERAL RADIOLOGY | Age: 83
DRG: 286 | End: 2023-01-30
Payer: MEDICARE

## 2023-01-30 LAB
ANION GAP SERPL CALCULATED.3IONS-SCNC: 12 MMOL/L (ref 3–16)
BASOPHILS ABSOLUTE: 0 K/UL (ref 0–0.2)
BASOPHILS RELATIVE PERCENT: 0.2 %
BUN BLDV-MCNC: 61 MG/DL (ref 7–20)
CALCIUM SERPL-MCNC: 8.7 MG/DL (ref 8.3–10.6)
CHLORIDE BLD-SCNC: 103 MMOL/L (ref 99–110)
CO2: 34 MMOL/L (ref 21–32)
CREAT SERPL-MCNC: 1.7 MG/DL (ref 0.8–1.3)
EOSINOPHILS ABSOLUTE: 0 K/UL (ref 0–0.6)
EOSINOPHILS RELATIVE PERCENT: 0.9 %
GFR SERPL CREATININE-BSD FRML MDRD: 39 ML/MIN/{1.73_M2}
GLUCOSE BLD-MCNC: 118 MG/DL (ref 70–99)
GLUCOSE BLD-MCNC: 134 MG/DL (ref 70–99)
GLUCOSE BLD-MCNC: 181 MG/DL (ref 70–99)
GLUCOSE BLD-MCNC: 219 MG/DL (ref 70–99)
GLUCOSE BLD-MCNC: 231 MG/DL (ref 70–99)
HCT VFR BLD CALC: 27.8 % (ref 40.5–52.5)
HEMOGLOBIN: 8.1 G/DL (ref 13.5–17.5)
LYMPHOCYTES ABSOLUTE: 0.6 K/UL (ref 1–5.1)
LYMPHOCYTES RELATIVE PERCENT: 11.8 %
MCH RBC QN AUTO: 24.7 PG (ref 26–34)
MCHC RBC AUTO-ENTMCNC: 29.2 G/DL (ref 31–36)
MCV RBC AUTO: 84.6 FL (ref 80–100)
MONOCYTES ABSOLUTE: 0.3 K/UL (ref 0–1.3)
MONOCYTES RELATIVE PERCENT: 6.3 %
NEUTROPHILS ABSOLUTE: 4.4 K/UL (ref 1.7–7.7)
NEUTROPHILS RELATIVE PERCENT: 80.8 %
PDW BLD-RTO: 21.5 % (ref 12.4–15.4)
PERFORMED ON: ABNORMAL
PLATELET # BLD: 120 K/UL (ref 135–450)
PMV BLD AUTO: 9 FL (ref 5–10.5)
POTASSIUM SERPL-SCNC: 4 MMOL/L (ref 3.5–5.1)
PROCALCITONIN: 0.18 NG/ML (ref 0–0.15)
RBC # BLD: 3.29 M/UL (ref 4.2–5.9)
SODIUM BLD-SCNC: 149 MMOL/L (ref 136–145)
WBC # BLD: 5.4 K/UL (ref 4–11)

## 2023-01-30 PROCEDURE — 6370000000 HC RX 637 (ALT 250 FOR IP): Performed by: INTERNAL MEDICINE

## 2023-01-30 PROCEDURE — 85025 COMPLETE CBC W/AUTO DIFF WBC: CPT

## 2023-01-30 PROCEDURE — 6360000002 HC RX W HCPCS: Performed by: INTERNAL MEDICINE

## 2023-01-30 PROCEDURE — 2700000000 HC OXYGEN THERAPY PER DAY

## 2023-01-30 PROCEDURE — 99232 SBSQ HOSP IP/OBS MODERATE 35: CPT | Performed by: INTERNAL MEDICINE

## 2023-01-30 PROCEDURE — 94669 MECHANICAL CHEST WALL OSCILL: CPT

## 2023-01-30 PROCEDURE — 71045 X-RAY EXAM CHEST 1 VIEW: CPT

## 2023-01-30 PROCEDURE — 94640 AIRWAY INHALATION TREATMENT: CPT

## 2023-01-30 PROCEDURE — 84145 PROCALCITONIN (PCT): CPT

## 2023-01-30 PROCEDURE — 1200000000 HC SEMI PRIVATE

## 2023-01-30 PROCEDURE — 2580000003 HC RX 258: Performed by: INTERNAL MEDICINE

## 2023-01-30 PROCEDURE — 36415 COLL VENOUS BLD VENIPUNCTURE: CPT

## 2023-01-30 PROCEDURE — 94761 N-INVAS EAR/PLS OXIMETRY MLT: CPT

## 2023-01-30 PROCEDURE — 80048 BASIC METABOLIC PNL TOTAL CA: CPT

## 2023-01-30 RX ORDER — LACTULOSE 10 G/15ML
30 SOLUTION ORAL 3 TIMES DAILY
Status: DISCONTINUED | OUTPATIENT
Start: 2023-01-30 | End: 2023-02-04 | Stop reason: HOSPADM

## 2023-01-30 RX ADMIN — SODIUM CHLORIDE, PRESERVATIVE FREE 10 ML: 5 INJECTION INTRAVENOUS at 09:16

## 2023-01-30 RX ADMIN — TAMSULOSIN HYDROCHLORIDE 0.4 MG: 0.4 CAPSULE ORAL at 09:00

## 2023-01-30 RX ADMIN — SODIUM CHLORIDE SOLN NEBU 3% 15 ML: 3 NEBU SOLN at 07:54

## 2023-01-30 RX ADMIN — LACTULOSE 30 G: 20 SOLUTION ORAL at 22:14

## 2023-01-30 RX ADMIN — LACTULOSE 30 G: 20 SOLUTION ORAL at 14:25

## 2023-01-30 RX ADMIN — LACTULOSE 30 G: 20 SOLUTION ORAL at 09:01

## 2023-01-30 RX ADMIN — FUROSEMIDE 40 MG: 10 INJECTION, SOLUTION INTRAMUSCULAR; INTRAVENOUS at 22:15

## 2023-01-30 RX ADMIN — CYANOCOBALAMIN TAB 500 MCG 500 MCG: 500 TAB at 09:01

## 2023-01-30 RX ADMIN — FUROSEMIDE 40 MG: 10 INJECTION, SOLUTION INTRAMUSCULAR; INTRAVENOUS at 09:01

## 2023-01-30 RX ADMIN — FUROSEMIDE 40 MG: 10 INJECTION, SOLUTION INTRAMUSCULAR; INTRAVENOUS at 12:20

## 2023-01-30 RX ADMIN — HYDRALAZINE HYDROCHLORIDE 25 MG: 25 TABLET, FILM COATED ORAL at 22:14

## 2023-01-30 RX ADMIN — PANTOPRAZOLE SODIUM 40 MG: 40 TABLET, DELAYED RELEASE ORAL at 06:19

## 2023-01-30 RX ADMIN — DOCUSATE SODIUM 100 MG: 100 CAPSULE, LIQUID FILLED ORAL at 22:14

## 2023-01-30 RX ADMIN — INSULIN GLARGINE 14 UNITS: 100 INJECTION, SOLUTION SUBCUTANEOUS at 09:14

## 2023-01-30 RX ADMIN — ATORVASTATIN CALCIUM 40 MG: 40 TABLET, FILM COATED ORAL at 09:01

## 2023-01-30 RX ADMIN — INSULIN LISPRO 1 UNITS: 100 INJECTION, SOLUTION INTRAVENOUS; SUBCUTANEOUS at 12:20

## 2023-01-30 RX ADMIN — HYDRALAZINE HYDROCHLORIDE 25 MG: 25 TABLET, FILM COATED ORAL at 06:19

## 2023-01-30 RX ADMIN — HEPARIN SODIUM 5000 UNITS: 5000 INJECTION INTRAVENOUS; SUBCUTANEOUS at 22:15

## 2023-01-30 RX ADMIN — SODIUM CHLORIDE SOLN NEBU 3% 15 ML: 3 NEBU SOLN at 20:12

## 2023-01-30 RX ADMIN — Medication 1 CAPSULE: at 18:14

## 2023-01-30 RX ADMIN — BISACODYL 5 MG: 5 TABLET, COATED ORAL at 09:01

## 2023-01-30 RX ADMIN — PREDNISONE 20 MG: 20 TABLET ORAL at 09:01

## 2023-01-30 RX ADMIN — HEPARIN SODIUM 5000 UNITS: 5000 INJECTION INTRAVENOUS; SUBCUTANEOUS at 10:18

## 2023-01-30 RX ADMIN — POLYETHYLENE GLYCOL 3350 17 G: 17 POWDER, FOR SOLUTION ORAL at 09:01

## 2023-01-30 RX ADMIN — Medication 1 CAPSULE: at 09:01

## 2023-01-30 RX ADMIN — OXYCODONE HYDROCHLORIDE AND ACETAMINOPHEN 500 MG: 500 TABLET ORAL at 09:00

## 2023-01-30 RX ADMIN — MONTELUKAST 10 MG: 10 TABLET, FILM COATED ORAL at 09:01

## 2023-01-30 RX ADMIN — ASPIRIN 162 MG: 81 TABLET, COATED ORAL at 09:00

## 2023-01-30 RX ADMIN — SODIUM CHLORIDE, PRESERVATIVE FREE 10 ML: 5 INJECTION INTRAVENOUS at 22:15

## 2023-01-30 RX ADMIN — HYDRALAZINE HYDROCHLORIDE 25 MG: 25 TABLET, FILM COATED ORAL at 12:20

## 2023-01-30 RX ADMIN — DOCUSATE SODIUM 100 MG: 100 CAPSULE, LIQUID FILLED ORAL at 09:01

## 2023-01-30 ASSESSMENT — PAIN SCALES - GENERAL
PAINLEVEL_OUTOF10: 0
PAINLEVEL_OUTOF10: 3
PAINLEVEL_OUTOF10: 0

## 2023-01-30 ASSESSMENT — PAIN DESCRIPTION - LOCATION: LOCATION: GROIN

## 2023-01-30 NOTE — PROGRESS NOTES
Comprehensive Nutrition Assessment    Type and Reason for Visit:  Reassess    Nutrition Recommendations/Plan:   Continue Regular, LISSETTE diet  Continue Milton BID     Malnutrition Assessment:  Malnutrition Status: At risk for malnutrition (Comment) (01/19/23 1330)    Context:  Acute Illness       Nutrition Assessment:    Follow-up. PO intakes % of meals per chart and pt report. States he is accepting Milton, noted three unopened packets at bedside and offered to help prepare them but pt declined at this time. Re-educated pt and family on importance of Milton for wound healing. Nutrition Related Findings:    -7.6L since admission. Pitting BLE, pitting; weeping BUE edema. LBM 1/29. Wound Type: Stage II, Pressure Injury       Current Nutrition Intake & Therapies:    Average Meal Intake: %  Average Supplements Intake: Unable to assess  ADULT DIET; Regular; No Added Salt (3-4 gm)  ADULT ORAL NUTRITION SUPPLEMENT; Breakfast, Dinner; Wound Healing Oral Supplement    Anthropometric Measures:  Height: 5' 8\" (172.7 cm)  Ideal Body Weight (IBW): 154 lbs (70 kg)    Current Body Weight: 229 lb (103.9 kg), 156.5 % IBW. Weight Source: Bed Scale  Current BMI (kg/m2): 34.8  Usual Body Weight: 274 lb (124.3 kg)  % Weight Change (Calculated): -15.7  BMI Categories: Obese Class 1 (BMI 30.0-34. 9)    Estimated Daily Nutrient Needs:  Energy Requirements Based On: Kcal/kg  Weight Used for Energy Requirements: Current  Energy (kcal/day): 8777-6417 kcal (15-18 kcal/kg CBW)  Weight Used for Protein Requirements: Ideal  Protein (g/day):  gm (1.2-2 g/kg IBW)  Fluid (ml/day): per provider, was on FR    Nutrition Diagnosis:   Increased nutrient needs related to increase demand for energy/nutrients as evidenced by wounds    Nutrition Interventions:   Food and/or Nutrient Delivery: Continue Current Diet, Continue Oral Nutrition Supplement  Nutrition Education/Counseling: Education completed  Coordination of Nutrition Care: Continue to monitor while inpatient    Goals:  Previous Goal Met: Goal(s) Achieved  Goals: PO intake 75% or greater    Nutrition Monitoring and Evaluation:   Behavioral-Environmental Outcomes: None Identified  Food/Nutrient Intake Outcomes: Food and Nutrient Intake, Supplement Intake  Physical Signs/Symptoms Outcomes: Biochemical Data, Nutrition Focused Physical Findings, Weight, Skin, Fluid Status or Edema, Meal Time Behavior    Discharge Planning:    No discharge needs at this time     Sallie Awan RD, LD  Contact: [phone]

## 2023-01-30 NOTE — CARE COORDINATION
Discharge Planning:  KRYSTAL spoke with Brett Thompson at [facility]. Update given. Plan remains for pt to transition to [facility] upon d/c.  Plan: [facility] upon d/c. No COVID test needed. Will need HENS. Will need stretcher transport. Awaiting nephrology clearance.    DUDLEY Ramirez LSHEENA  [phone]  Electronically signed by Nilda Carrillo on 1/30/2023 at 10:28 AM

## 2023-01-30 NOTE — PROGRESS NOTES
Visit us at [facility]. com or call us at [address] [place] [address] NOTE    Patient: Candace Stallings MRN: [id number]     YOB: 1940  Age: 79 y.o. 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 Notes and labs reviewed. There were no complications over night.     Patient's review of systems: +SOB, weak      OBJECTIVE:     Vitals:    01/30/23 0445 01/30/23 0516 01/30/23 0755 01/30/23 0807   BP: 138/71   (!) 155/68   Pulse: 89   98   Resp: 20   18   Temp: 97.5 °F (36.4 °C)   97.6 °F (36.4 °C)   TempSrc: Oral   Oral   SpO2: 98%  100% 93%   Weight:  229 lb 15 oz (104.3 kg)     Height:           Intake and Output:      Intake/Output Summary (Last 24 hours) at 1/30/2023 1201  Last data filed at 1/30/2023 0807  Gross per 24 hour   Intake 510 ml   Output 2000 ml   Net -1490 ml       Medications:   lactulose  30 g Oral TID    insulin glargine  14 Units SubCUTAneous QAM    furosemide  40 mg IntraVENous TID    sodium chloride (Inhalant)  15 mL Nebulization BID    hydrALAZINE  25 mg Oral 3 times per day    heparin (porcine)  5,000 Units SubCUTAneous 2 times per day    sodium chloride flush  5-40 mL IntraVENous 2 times per day    predniSONE  20 mg Oral Daily    vitamin B-12  500 mcg Oral Daily    vitamin C  500 mg Oral Daily    tamsulosin  0.4 mg Oral Daily    [Held by provider] aspirin EC  162 mg Oral Daily    atorvastatin  40 mg Oral Daily    bisacodyl  5 mg Oral Daily    polyethylene glycol  17 g Oral Daily    docusate sodium  100 mg Oral BID    lactobacillus  1 capsule Oral BID WC    linaclotide  145 mcg Oral QAM AC    montelukast  10 mg Oral Daily    pantoprazole  40 mg Oral QAM AC    insulin lispro  0-4 Units SubCUTAneous TID WC insulin lispro  0-4 Units SubCUTAneous Nightly           Exam:   CONSTITUTIONAL/PSYCHIATRY: awake, alert and oriented x3. Not in acute distress   EYES: Conjunctivae: normal.   RESPIRATORY: Respiratory effort: normal. Auscultation: diminished BS at the bases  CARDIOVASCULAR: Auscultation: RRR. Edema: 2-3+ in UE and LE  GASTROINTESTINAL: Soft, nontender, nondistended. Obese abdomen. EXTREMITIES:  No cyanosis or clubbing. SKIN: Warm and dry. Chronic discoloration in LE, ecchymosis in UE      LABS:   RFP:   Recent Labs     01/28/23  0546 01/29/23  0756 01/30/23  0824    144 149*   K 5.0 4.5 4.0    102 103   CO2 31 31 34*   BUN 71* 70* 61*   CREATININE 1.7* 1.8* 1.7*   CALCIUM 8.7 8.6 8.7   MG 1.80  --   --    PHOS 3.3  --   --          Liver panel:  Recent Labs     01/28/23  0546   AST 16   ALT 20         Endocrine:   @[id number](VitD,PTH,TSH,aldosterone,renin activity,cortisol,metanephrine)@    CBC:   Recent Labs     01/28/23  0546 01/29/23  0756 01/30/23  0824   WBC 6.1 6.1 5.4   HGB 7.9* 8.1* 8.1*   HCT 25.8* 27.2* 27.8*   MCV 83.4 83.5 84.6   PLT 97* 102* 120*           ASSESSMENT and PLAN:     1- 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 He is significantly fluid overloaded ++. On IV lasix with some response. He is in -7.6L fluid balance. Wt peak at 246, 229 now. -  Continue lasix. May need to change to lasix drip     2- ESBL UTI: on meropenem     3- Acute on chronic resp failure/HCAP/ADHF on chronic diastolic HF: on meropenem, diuresis per above.      4- HTN: continue current medications     5- Obesity with JOANN            Signed By: Nahomy Caballero MD

## 2023-01-30 NOTE — PROGRESS NOTES
Off the floor. Developing contraction alkalosis. Has sulfa allergy so not able to use diamox.       Defer to nephrology

## 2023-01-30 NOTE — PLAN OF CARE
Problem: Discharge Planning  Goal: Discharge to home or other facility with appropriate resources  Outcome: Progressing  Flowsheets (Taken 1/29/2023 2105)  Discharge to home or other facility with appropriate resources:   Identify barriers to discharge with patient and caregiver   Arrange for needed discharge resources and transportation as appropriate   Identify discharge learning needs (meds, wound care, etc)     Problem: Pain  Goal: Verbalizes/displays adequate comfort level or baseline comfort level  Outcome: Progressing     Problem: Safety - Adult  Goal: Free from fall injury  Outcome: Progressing     Problem: ABCDS Injury Assessment  Goal: Absence of physical injury  Outcome: Progressing     Problem: Skin/Tissue Integrity  Goal: Absence of new skin breakdown  Description: 1. Monitor for areas of redness and/or skin breakdown  2. Assess vascular access sites hourly  3. Every 4-6 hours minimum:  Change oxygen saturation probe site  4. Every 4-6 hours:  If on nasal continuous positive airway pressure, respiratory therapy assess nares and determine need for appliance change or resting period.   Outcome: Progressing     Problem: Infection - Adult  Goal: Absence of infection at discharge  Outcome: Progressing  Flowsheets (Taken 1/29/2023 2105)  Absence of infection at discharge:   Assess and monitor for signs and symptoms of infection   Monitor lab/diagnostic results   Monitor all insertion sites i.e., indwelling lines, tubes and drains  Goal: Absence of infection during hospitalization  Outcome: Progressing  Flowsheets (Taken 1/29/2023 2105)  Absence of infection during hospitalization:   Assess and monitor for signs and symptoms of infection   Monitor lab/diagnostic results   Monitor all insertion sites i.e., indwelling lines, tubes and drains  Goal: Absence of fever/infection during anticipated neutropenic period  Outcome: Progressing  Flowsheets (Taken 1/29/2023 2105)  Absence of fever/infection during anticipated neutropenic period: Monitor white blood cell count     Problem: Genitourinary - Adult  Goal: Absence of urinary retention  Outcome: Progressing  Flowsheets (Taken 1/29/2023 2105)  Absence of urinary retention: Assess patients ability to void and empty bladder  Goal: Urinary catheter remains patent  Outcome: Progressing  Flowsheets (Taken 1/29/2023 2105)  Urinary catheter remains patent: Assess patency of urinary catheter     Problem: Metabolic/Fluid and Electrolytes - Adult  Goal: Electrolytes maintained within normal limits  Outcome: Progressing  Flowsheets (Taken 1/29/2023 2105)  Electrolytes maintained within normal limits: Monitor labs and assess patient for signs and symptoms of electrolyte imbalances  Goal: Hemodynamic stability and optimal renal function maintained  Outcome: Progressing  Flowsheets (Taken 1/29/2023 2105)  Hemodynamic stability and optimal renal function maintained: Monitor labs and assess for signs and symptoms of volume excess or deficit  Goal: Glucose maintained within prescribed range  Outcome: Progressing  Flowsheets (Taken 1/29/2023 2105)  Glucose maintained within prescribed range: Monitor blood glucose as ordered     Problem: Nutrition Deficit:  Goal: Optimize nutritional status  Outcome: Progressing     Problem: Chronic Conditions and Co-morbidities  Goal: Patient's chronic conditions and co-morbidity symptoms are monitored and maintained or improved  Outcome: Progressing  Flowsheets (Taken 1/29/2023 2105)  Care Plan - Patient's Chronic Conditions and Co-Morbidity Symptoms are Monitored and Maintained or Improved: Monitor and assess patient's chronic conditions and comorbid symptoms for stability, deterioration, or improvement

## 2023-01-30 NOTE — PROGRESS NOTES
[address] [facility] Internal Medicine Note      Chief Complaint: When can I leave? Subjective/Interval History:    Events of the weekend reviewed. Patient continues to be diuresed and his renal function has been tolerating. BMP is pending for today. Weight is 229, down from peak weight of 246.  -7.6 L fluid balance since admission, although much of this fluid was given early on in his hospitalization due to his acute kidney injury. Plan is to get the patient to [facility] when medically stable. Salazar with pink-tinged urine. He is eating and drinking okay. Still constipated. No chest pain. No nausea, vomiting, diarrhea. No abdominal pain. No dysuria. The remainder of the review of systems is negative. PMH, PSH, FH/SH reviewed and unchanged as documented in the H&P personally documented at admission 23    Medication list reviewed    Objective:    /71   Pulse 89   Temp 97.5 °F (36.4 °C) (Oral)   Resp 20   Ht 5' 8\" (1.727 m)   Wt 229 lb 15 oz (104.3 kg)   SpO2 98%   BMI 34.96 kg/m²   Temp  Av.5 °F (36.4 °C)  Min: 97.4 °F (36.3 °C)  Max: 97.5 °F (36.4 °C)    CV-regular on exam.  Pulm-respirations remain easy, currently 4 L of oxygen per nasal cannula. No wheezes noted today. Abd- BS+, soft, NTND  Ext-continued 3+ edema of his upper and lower extremities, although edema below knees is controlled with compression hose. The Following Labs Were Reviewed Today:    Labs pending for today.     Recent Results (from the past 24 hour(s))   Basic Metabolic Panel    Collection Time: 23  7:56 AM   Result Value Ref Range    Sodium 144 136 - 145 mmol/L    Potassium 4.5 3.5 - 5.1 mmol/L    Chloride 102 99 - 110 mmol/L    CO2 31 21 - 32 mmol/L    Anion Gap 11 3 - 16    Glucose 110 (H) 70 - 99 mg/dL    BUN 70 (H) 7 - 20 mg/dL    Creatinine 1.8 (H) 0.8 - 1.3 mg/dL    Est, Glom Filt Rate 37 (A) >60    Calcium 8.6 8.3 - 10.6 mg/dL   CBC with Auto Differential    Collection Time: 23  7:56 AM Result Value Ref Range    WBC 6.1 4.0 - 11.0 K/uL    RBC 3.26 (L) 4.20 - 5.90 M/uL    Hemoglobin 8.1 (L) 13.5 - 17.5 g/dL    Hematocrit 27.2 (L) 40.5 - 52.5 %    MCV 83.5 80.0 - 100.0 fL    MCH 24.7 (L) 26.0 - 34.0 pg    MCHC 29.6 (L) 31.0 - 36.0 g/dL    RDW 21.1 (H) 12.4 - 15.4 %    Platelets 074 (L) 354 - 450 K/uL    MPV 9.0 5.0 - 10.5 fL    Neutrophils % 82.4 %    Lymphocytes % 11.3 %    Monocytes % 5.2 %    Eosinophils % 0.8 %    Basophils % 0.3 %    Neutrophils Absolute 5.0 1.7 - 7.7 K/uL    Lymphocytes Absolute 0.7 (L) 1.0 - 5.1 K/uL    Monocytes Absolute 0.3 0.0 - 1.3 K/uL    Eosinophils Absolute 0.1 0.0 - 0.6 K/uL    Basophils Absolute 0.0 0.0 - 0.2 K/uL   Procalcitonin    Collection Time: 01/29/23  7:56 AM   Result Value Ref Range    Procalcitonin 0.21 (H) 0.00 - 0.15 ng/mL   POCT Glucose    Collection Time: 01/29/23  8:26 AM   Result Value Ref Range    POC Glucose 124 (H) 70 - 99 mg/dl    Performed on ACCU-CHEK    POCT Glucose    Collection Time: 01/29/23 11:22 AM   Result Value Ref Range    POC Glucose 228 (H) 70 - 99 mg/dl    Performed on ACCU-CHEK    POCT Glucose    Collection Time: 01/29/23  4:33 PM   Result Value Ref Range    POC Glucose 92 70 - 99 mg/dl    Performed on ACCU-CHEK    POCT Glucose    Collection Time: 01/29/23  8:54 PM   Result Value Ref Range    POC Glucose 222 (H) 70 - 99 mg/dl    Performed on ACCU-CHEK        ASSESSMENT/PLAN:      Principal Problem:    Acute on chronic diastolic congestive heart failure -patient back on IV Lasix and diuresing well. Salazar catheter remains in place due to his aggressive diuresis. Kidney function pending for today. Active Problems:    Acute on chronic respiratory failure with hypoxia/HCAP (healthcare-associated pneumonia)- antibiotics have been discontinued. Continues with prednisone. Procalcitonin has been ordered. Karla Starling function has been stable.     UTI due to extended-spectrum beta lactamase (ESBL) producing Escherichia coli- patient has completed course of meropenem. Chronic anemia-continue to monitor, appreciate Dr. Eastman Solid input. Weekly erythropoietin shots being administered. Chronic GERD-continue Protonix. Type 2 diabetes mellitus with stage 4 chronic kidney disease, with long-term current use of insulin ([facility])-continue to monitor. Continue with Lantus. Continue sliding scale    Essential hypertension-blood pressure is high at times but multiple blood pressure readings are in the 120s and 130s. No change in the hydralazine at this point. B12 deficiency-continue with oral B12 supplementation. Atrial fibrillation-rate is controlled. Patient is not an anticoagulation candidate due to occult bleeding in the past and chronic iron deficiency. Disposition-once stable from a nephrology standpoint we will be able to transition the patient to [facility] for rehab.     Clarice Clinton MD, FACP  7:51 AM  1/30/2023

## 2023-01-31 LAB
ALBUMIN SERPL-MCNC: 3.3 G/DL (ref 3.4–5)
ANION GAP SERPL CALCULATED.3IONS-SCNC: 13 MMOL/L (ref 3–16)
BASOPHILS ABSOLUTE: 0.1 K/UL (ref 0–0.2)
BASOPHILS RELATIVE PERCENT: 0.8 %
BUN BLDV-MCNC: 56 MG/DL (ref 7–20)
CALCIUM SERPL-MCNC: 8.6 MG/DL (ref 8.3–10.6)
CHLORIDE BLD-SCNC: 102 MMOL/L (ref 99–110)
CO2: 34 MMOL/L (ref 21–32)
CREAT SERPL-MCNC: 1.8 MG/DL (ref 0.8–1.3)
EOSINOPHILS ABSOLUTE: 0.1 K/UL (ref 0–0.6)
EOSINOPHILS RELATIVE PERCENT: 1.4 %
GFR SERPL CREATININE-BSD FRML MDRD: 37 ML/MIN/{1.73_M2}
GLUCOSE BLD-MCNC: 119 MG/DL (ref 70–99)
GLUCOSE BLD-MCNC: 127 MG/DL (ref 70–99)
GLUCOSE BLD-MCNC: 172 MG/DL (ref 70–99)
GLUCOSE BLD-MCNC: 181 MG/DL (ref 70–99)
GLUCOSE BLD-MCNC: 206 MG/DL (ref 70–99)
HCT VFR BLD CALC: 29.1 % (ref 40.5–52.5)
HEMOGLOBIN: 8.4 G/DL (ref 13.5–17.5)
LYMPHOCYTES ABSOLUTE: 0.7 K/UL (ref 1–5.1)
LYMPHOCYTES RELATIVE PERCENT: 10.9 %
MAGNESIUM: 1.8 MG/DL (ref 1.8–2.4)
MCH RBC QN AUTO: 24.3 PG (ref 26–34)
MCHC RBC AUTO-ENTMCNC: 29 G/DL (ref 31–36)
MCV RBC AUTO: 83.8 FL (ref 80–100)
MONOCYTES ABSOLUTE: 0.4 K/UL (ref 0–1.3)
MONOCYTES RELATIVE PERCENT: 6.6 %
NEUTROPHILS ABSOLUTE: 5.3 K/UL (ref 1.7–7.7)
NEUTROPHILS RELATIVE PERCENT: 80.3 %
PDW BLD-RTO: 21.7 % (ref 12.4–15.4)
PERFORMED ON: ABNORMAL
PHOSPHORUS: 3.7 MG/DL (ref 2.5–4.9)
PLATELET # BLD: 130 K/UL (ref 135–450)
PMV BLD AUTO: 8.9 FL (ref 5–10.5)
POTASSIUM SERPL-SCNC: 4.1 MMOL/L (ref 3.5–5.1)
PROCALCITONIN: 0.15 NG/ML (ref 0–0.15)
RBC # BLD: 3.47 M/UL (ref 4.2–5.9)
SODIUM BLD-SCNC: 149 MMOL/L (ref 136–145)
URIC ACID, SERUM: 11.1 MG/DL (ref 3.5–7.2)
WBC # BLD: 6.5 K/UL (ref 4–11)

## 2023-01-31 PROCEDURE — 1200000000 HC SEMI PRIVATE

## 2023-01-31 PROCEDURE — 6360000002 HC RX W HCPCS: Performed by: INTERNAL MEDICINE

## 2023-01-31 PROCEDURE — 6370000000 HC RX 637 (ALT 250 FOR IP): Performed by: INTERNAL MEDICINE

## 2023-01-31 PROCEDURE — 94761 N-INVAS EAR/PLS OXIMETRY MLT: CPT

## 2023-01-31 PROCEDURE — 2580000003 HC RX 258: Performed by: INTERNAL MEDICINE

## 2023-01-31 PROCEDURE — 80069 RENAL FUNCTION PANEL: CPT

## 2023-01-31 PROCEDURE — 99232 SBSQ HOSP IP/OBS MODERATE 35: CPT | Performed by: INTERNAL MEDICINE

## 2023-01-31 PROCEDURE — 97530 THERAPEUTIC ACTIVITIES: CPT

## 2023-01-31 PROCEDURE — 84145 PROCALCITONIN (PCT): CPT

## 2023-01-31 PROCEDURE — 94640 AIRWAY INHALATION TREATMENT: CPT

## 2023-01-31 PROCEDURE — 84550 ASSAY OF BLOOD/URIC ACID: CPT

## 2023-01-31 PROCEDURE — 36415 COLL VENOUS BLD VENIPUNCTURE: CPT

## 2023-01-31 PROCEDURE — 85025 COMPLETE CBC W/AUTO DIFF WBC: CPT

## 2023-01-31 PROCEDURE — 83735 ASSAY OF MAGNESIUM: CPT

## 2023-01-31 PROCEDURE — 2700000000 HC OXYGEN THERAPY PER DAY

## 2023-01-31 RX ADMIN — LACTULOSE 30 G: 20 SOLUTION ORAL at 21:50

## 2023-01-31 RX ADMIN — FUROSEMIDE 40 MG: 10 INJECTION, SOLUTION INTRAMUSCULAR; INTRAVENOUS at 08:40

## 2023-01-31 RX ADMIN — PREDNISONE 20 MG: 20 TABLET ORAL at 08:34

## 2023-01-31 RX ADMIN — DOCUSATE SODIUM 100 MG: 100 CAPSULE, LIQUID FILLED ORAL at 21:51

## 2023-01-31 RX ADMIN — MONTELUKAST 10 MG: 10 TABLET, FILM COATED ORAL at 08:35

## 2023-01-31 RX ADMIN — HYDRALAZINE HYDROCHLORIDE 25 MG: 25 TABLET, FILM COATED ORAL at 21:55

## 2023-01-31 RX ADMIN — Medication 1 CAPSULE: at 08:33

## 2023-01-31 RX ADMIN — SODIUM CHLORIDE SOLN NEBU 3% 15 ML: 3 NEBU SOLN at 20:31

## 2023-01-31 RX ADMIN — ATORVASTATIN CALCIUM 40 MG: 40 TABLET, FILM COATED ORAL at 08:37

## 2023-01-31 RX ADMIN — POLYETHYLENE GLYCOL 3350 17 G: 17 POWDER, FOR SOLUTION ORAL at 08:30

## 2023-01-31 RX ADMIN — DOCUSATE SODIUM 100 MG: 100 CAPSULE, LIQUID FILLED ORAL at 09:00

## 2023-01-31 RX ADMIN — CYANOCOBALAMIN TAB 500 MCG 500 MCG: 500 TAB at 08:34

## 2023-01-31 RX ADMIN — HEPARIN SODIUM 5000 UNITS: 5000 INJECTION INTRAVENOUS; SUBCUTANEOUS at 21:51

## 2023-01-31 RX ADMIN — HYDRALAZINE HYDROCHLORIDE 25 MG: 25 TABLET, FILM COATED ORAL at 06:08

## 2023-01-31 RX ADMIN — Medication 1 CAPSULE: at 18:04

## 2023-01-31 RX ADMIN — PANTOPRAZOLE SODIUM 40 MG: 40 TABLET, DELAYED RELEASE ORAL at 06:08

## 2023-01-31 RX ADMIN — TAMSULOSIN HYDROCHLORIDE 0.4 MG: 0.4 CAPSULE ORAL at 08:33

## 2023-01-31 RX ADMIN — FUROSEMIDE 40 MG: 10 INJECTION, SOLUTION INTRAMUSCULAR; INTRAVENOUS at 21:59

## 2023-01-31 RX ADMIN — INSULIN GLARGINE 14 UNITS: 100 INJECTION, SOLUTION SUBCUTANEOUS at 08:38

## 2023-01-31 RX ADMIN — LACTULOSE 30 G: 20 SOLUTION ORAL at 08:31

## 2023-01-31 RX ADMIN — OXYCODONE HYDROCHLORIDE AND ACETAMINOPHEN 500 MG: 500 TABLET ORAL at 08:35

## 2023-01-31 RX ADMIN — SODIUM CHLORIDE, PRESERVATIVE FREE 10 ML: 5 INJECTION INTRAVENOUS at 21:58

## 2023-01-31 RX ADMIN — HYDRALAZINE HYDROCHLORIDE 25 MG: 25 TABLET, FILM COATED ORAL at 14:56

## 2023-01-31 RX ADMIN — HEPARIN SODIUM 5000 UNITS: 5000 INJECTION INTRAVENOUS; SUBCUTANEOUS at 08:38

## 2023-01-31 RX ADMIN — BISACODYL 5 MG: 5 TABLET, COATED ORAL at 08:34

## 2023-01-31 RX ADMIN — FUROSEMIDE 40 MG: 10 INJECTION, SOLUTION INTRAMUSCULAR; INTRAVENOUS at 14:56

## 2023-01-31 RX ADMIN — SODIUM CHLORIDE SOLN NEBU 3% 15 ML: 3 NEBU SOLN at 08:57

## 2023-01-31 RX ADMIN — SODIUM CHLORIDE, PRESERVATIVE FREE 10 ML: 5 INJECTION INTRAVENOUS at 08:42

## 2023-01-31 ASSESSMENT — PAIN SCALES - GENERAL
PAINLEVEL_OUTOF10: 0

## 2023-01-31 NOTE — PLAN OF CARE
Problem: Discharge Planning  Goal: Discharge to home or other facility with appropriate resources  Outcome: Progressing     Problem: Pain  Goal: Verbalizes/displays adequate comfort level or baseline comfort level  Outcome: Progressing     Problem: Safety - Adult  Goal: Free from fall injury  Outcome: Progressing     Problem: ABCDS Injury Assessment  Goal: Absence of physical injury  Outcome: Progressing     Problem: Skin/Tissue Integrity  Goal: Absence of new skin breakdown  Description: 1. Monitor for areas of redness and/or skin breakdown  2. Assess vascular access sites hourly  3. Every 4-6 hours minimum:  Change oxygen saturation probe site  4. Every 4-6 hours:  If on nasal continuous positive airway pressure, respiratory therapy assess nares and determine need for appliance change or resting period.   Outcome: Progressing     Problem: Infection - Adult  Goal: Absence of infection at discharge  Outcome: Progressing     Problem: Infection - Adult  Goal: Absence of infection during hospitalization  Outcome: Progressing     Problem: Infection - Adult  Goal: Absence of fever/infection during anticipated neutropenic period  Outcome: Progressing     Problem: Genitourinary - Adult  Goal: Absence of urinary retention  Outcome: Progressing     Problem: Genitourinary - Adult  Goal: Urinary catheter remains patent  Outcome: Progressing     Problem: Metabolic/Fluid and Electrolytes - Adult  Goal: Electrolytes maintained within normal limits  Outcome: Progressing     Problem: Metabolic/Fluid and Electrolytes - Adult  Goal: Hemodynamic stability and optimal renal function maintained  Outcome: Progressing     Problem: Metabolic/Fluid and Electrolytes - Adult  Goal: Glucose maintained within prescribed range  Outcome: Progressing     Problem: Nutrition Deficit:  Goal: Optimize nutritional status  Outcome: Progressing     Problem: Chronic Conditions and Co-morbidities  Goal: Patient's chronic conditions and co-morbidity symptoms are monitored and maintained or improved  Outcome: Progressing

## 2023-01-31 NOTE — PROGRESS NOTES
Visit us at [id number] [facility] or call us at [facility] NOTE    Patient: Magan Castelan MRN: [id number]     YOB: 1940  Age: 79 y.o. 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 Notes and labs reviewed. There were no complications over night.     Patient's review of systems: +SOB, weak      OBJECTIVE:     Vitals:    01/31/23 0840 01/31/23 0857 01/31/23 0900 01/31/23 0936   BP: (!) 134/52   (!) 134/55   Pulse:   90 88   Resp:  16 16 16   Temp:   97.3 °F (36.3 °C) 97.3 °F (36.3 °C)   TempSrc:   Oral Oral   SpO2:  98% 96% 98%   Weight:       Height:           Intake and Output:      Intake/Output Summary (Last 24 hours) at 1/31/2023 1358  Last data filed at 1/31/2023 0548  Gross per 24 hour   Intake --   Output 1300 ml   Net -1300 ml       Patient Vitals for the past 96 hrs (Last 3 readings):   Weight   01/31/23 0633 219 lb 12.8 oz (99.7 kg)   01/30/23 0516 229 lb 15 oz (104.3 kg)   01/29/23 0500 237 lb 14 oz (107.9 kg)       Medications:   lactulose  30 g Oral TID    insulin glargine  14 Units SubCUTAneous QAM    furosemide  40 mg IntraVENous TID    sodium chloride (Inhalant)  15 mL Nebulization BID    hydrALAZINE  25 mg Oral 3 times per day    heparin (porcine)  5,000 Units SubCUTAneous 2 times per day    sodium chloride flush  5-40 mL IntraVENous 2 times per day    predniSONE  20 mg Oral Daily    vitamin B-12  500 mcg Oral Daily    vitamin C  500 mg Oral Daily    tamsulosin  0.4 mg Oral Daily    [Held by provider] aspirin EC  162 mg Oral Daily    atorvastatin  40 mg Oral Daily    bisacodyl  5 mg Oral Daily    polyethylene glycol  17 g Oral Daily    docusate sodium  100 mg Oral BID    lactobacillus  1 capsule Oral BID WC    linaclotide  145 mcg Oral QAM AC    montelukast  10 mg Oral Daily    pantoprazole  40 mg Oral QAM AC    insulin lispro  0-4 Units SubCUTAneous TID WC    insulin lispro  0-4 Units SubCUTAneous Nightly           Exam:   CONSTITUTIONAL/PSYCHIATRY: awake, alert and oriented x3. Not in acute distress   RESPIRATORY: Respiratory effort: normal. Auscultation: diminished BS at the bases  CARDIOVASCULAR: Auscultation: RRR. Edema: 2+ in UE and LE - improving  GASTROINTESTINAL: Soft, nontender, nondistended. Obese abdomen. EXTREMITIES:  No cyanosis or clubbing. SKIN: Warm and dry. 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 He is significantly fluid overloaded On IV lasix with good response. He is in -9.5L fluid balance. Wt peak at 246, 219 now. -  Continue lasix. If Hypernatremia worsens will need to stop loop diuretic     2- ESBL UTI: on meropenem     3- Acute on chronic resp failure/HCAP/ADHF on chronic diastolic HF: on meropenem, diuresis per above.      4- HTN: continue current medications     5- Obesity with JOANN            Signed By: Lul Rangel MD

## 2023-01-31 NOTE — PROGRESS NOTES
Occupational Therapy  Progress Note    León Reno  1/31/2023    OT to pt's room for therapy treatment session. Pt declining therapy reporting \"I don't feel good. \" Pt c/o nausea, bloating, states he needs to have a BM and inquiring about when he will be getting his enema. RN notified. Pt also c/o pain from sacral wound. Pt educated on use of wedge for pressure relief and pt was agreeable to attempt repositioning with wedge. Pt required max A for rolling to L for placement of wedge. Once wedge in place for ~2 minutes, pt reports too uncomfortable and requested for it to be removed. Pt then required total A x2 to scoot pt up in bed. Pt left in bed, call light within reach, and bed alarm in place. Pt denied any further needs. Will cont to follow. If pt is d/c'd prior to next therapy treatment session, please refer to last therapy progress note for pt's d/c status and OT recommendations.     Time In: 1325  Time Out: 1340  Total Treatment Time: 15 minutes    Kemal Villanueva OTR/L [id number]

## 2023-01-31 NOTE — PROGRESS NOTES
Patient given soap suds enema per MD order patient unable to hold fluid in and immediately went to bathroom results were mostly enema fluid with very small amounts of stool this was done twice with the same results. Patient said he feels a little better. Abdomen remains round not as firm as prior to enema but still firm. Patient has a medium sized formed bowel movement prior to receiving the enemas. Will continue to monitor.

## 2023-01-31 NOTE — CARE COORDINATION
Discharge Planning:  KRYSTAL contacted Jayde Villalobos with [facility] to give her an update on this pt.  [phone]  At this time, plan remains for this pt to go to [facility] upon d/c.      Plan: [facility] upon d/c. No COVID test needed. No pre cert needed. Will need HENS and stretcher transport. Awaiting nephrology clearance. Still fluid overloaded and may need lasix drip per nephrology.    DUDLEY Dallas LS  [phone]  Electronically signed by Nori José on 1/31/2023 at 11:07 AM

## 2023-01-31 NOTE — PROGRESS NOTES
[facility] Internal Medicine Note      Chief Complaint: When can I leave? Subjective/Interval History:    Patient remains unchanged today. Sitting up in bed sleeping. Continues to complain of constipation. Aspirin stopped yesterday for pink-tinged urine and continued hemoptysis. Remains on subcu heparin for DVT prophylaxis    Weight yesterday 219 pounds by way of a standing scale, down from a max of 241 pounds. 9.5 L negative fluid balance this admission. No chest pain. No nausea, vomiting, diarrhea. No abdominal pain. No dysuria. The remainder of the review of systems is negative. PMH, PSH, FH/SH reviewed and unchanged as documented in the H&P personally documented at admission 23    Medication list reviewed    Objective:    BP (!) 160/68   Pulse 79   Temp 97.4 °F (36.3 °C) (Oral)   Resp 16   Ht 5' 8\" (1.727 m)   Wt 219 lb 12.8 oz (99.7 kg)   SpO2 98%   BMI 33.42 kg/m²   Temp  Av.6 °F (36.4 °C)  Min: 97.4 °F (36.3 °C)  Max: 97.7 °F (36.5 °C)    CV-regular on exam.  Pulm-respirations remain easy, currently 4 L of oxygen per nasal cannula. No wheezes noted today. Abd- BS+, soft, NTND  Ext-continued 2-3+ edema of his upper and lower extremities, although edema below knees is controlled with compression hose. Arm edema seems better today. The Following Labs Were Reviewed Today:    Labs pending for today.     Recent Results (from the past 24 hour(s))   Basic Metabolic Panel    Collection Time: 23  8:24 AM   Result Value Ref Range    Sodium 149 (H) 136 - 145 mmol/L    Potassium 4.0 3.5 - 5.1 mmol/L    Chloride 103 99 - 110 mmol/L    CO2 34 (H) 21 - 32 mmol/L    Anion Gap 12 3 - 16    Glucose 118 (H) 70 - 99 mg/dL    BUN 61 (H) 7 - 20 mg/dL    Creatinine 1.7 (H) 0.8 - 1.3 mg/dL    Est, Glom Filt Rate 39 (A) >60    Calcium 8.7 8.3 - 10.6 mg/dL   CBC with Auto Differential    Collection Time: 23  8:24 AM   Result Value Ref Range    WBC 5.4 4.0 - 11.0 K/uL    RBC 3.29 (L) 4.20 - 5.90 M/uL    Hemoglobin 8.1 (L) 13.5 - 17.5 g/dL    Hematocrit 27.8 (L) 40.5 - 52.5 %    MCV 84.6 80.0 - 100.0 fL    MCH 24.7 (L) 26.0 - 34.0 pg    MCHC 29.2 (L) 31.0 - 36.0 g/dL    RDW 21.5 (H) 12.4 - 15.4 %    Platelets 277 (L) 552 - 450 K/uL    MPV 9.0 5.0 - 10.5 fL    Neutrophils % 80.8 %    Lymphocytes % 11.8 %    Monocytes % 6.3 %    Eosinophils % 0.9 %    Basophils % 0.2 %    Neutrophils Absolute 4.4 1.7 - 7.7 K/uL    Lymphocytes Absolute 0.6 (L) 1.0 - 5.1 K/uL    Monocytes Absolute 0.3 0.0 - 1.3 K/uL    Eosinophils Absolute 0.0 0.0 - 0.6 K/uL    Basophils Absolute 0.0 0.0 - 0.2 K/uL   Procalcitonin    Collection Time: 01/30/23  8:24 AM   Result Value Ref Range    Procalcitonin 0.18 (H) 0.00 - 0.15 ng/mL   POCT Glucose    Collection Time: 01/30/23 11:32 AM   Result Value Ref Range    POC Glucose 231 (H) 70 - 99 mg/dl    Performed on ACCU-CHEK    POCT Glucose    Collection Time: 01/30/23  4:01 PM   Result Value Ref Range    POC Glucose 181 (H) 70 - 99 mg/dl    Performed on ACCU-CHEK    POCT Glucose    Collection Time: 01/30/23  9:50 PM   Result Value Ref Range    POC Glucose 219 (H) 70 - 99 mg/dl    Performed on ACCU-CHEK        ASSESSMENT/PLAN:      Principal Problem:    Acute on chronic diastolic congestive heart failure -continues with IV Lasix 40 mg 3 times daily, continues with good diuresis. Renal function pending for today. Active Problems:    Acute on chronic respiratory failure with hypoxia/HCAP (healthcare-associated pneumonia)- antibiotics have been discontinued. Continues with prednisone. Procalcitonin was improved yesterday. Still with some hemoptysis at times. Aspirin was stopped. DEISY-kidney function has been stable with diuresis    UTI due to extended-spectrum beta lactamase (ESBL) producing Escherichia coli- patient has completed course of meropenem. Chronic anemia-continue to monitor, appreciate Dr. Stanton Cunha input. Weekly erythropoietin shots being administered.     Chronic GERD-continue Protonix. Type 2 diabetes mellitus with stage 4 chronic kidney disease, with long-term current use of insulin (HCC)-continue to monitor. Continue with Lantus. Continue sliding scale. Morning sugars have been relatively low. Essential hypertension-blood pressure somewhat labile but does have blood pressures in the 120s at times. Hesitate to be too aggressive with blood pressure as hypotension significantly impacts his renal function. B12 deficiency-continue with oral B12 supplementation. Atrial fibrillation-rate is controlled. Patient is not an anticoagulation candidate due to occult bleeding in the past and chronic iron deficiency. Disposition-once stable from a nephrology standpoint we will be able to transition the patient to [facility] for rehab.     Time > 35 minutes reviewing chart and patient data, examining and interviewing patient, and discussing with nursing staff, family, etc.     Michelle Arellano MD, FACP  8:00 AM  1/31/2023

## 2023-01-31 NOTE — PROGRESS NOTES
Notified Dr. Milind Suarez about bloody urine in the patient's gordon and coughing up bloody sputum. No new orders.

## 2023-01-31 NOTE — PROGRESS NOTES
Physical Therapy  Daily Note  ([facility])  Madiha Cueto  [id number]    Attempted to see the pt with OT for therapy session. The pt was found to be in the bed and reporting repeatedly \"I don't feel good'. Also reporting nausea, bloating and needing to have a BM. Asking to have an enema (RN was notified). Attempted to reposition the pt on his side with a wedge. Rolling to the L with max A of 1 while another placed the wedge. After ~2 minutes the pt could not tolerate the wedge and wanted it removed. The pt declined further therapy and attempts at making him more comfortable. When asked what time he would prefer therapy to check on him tomorrow he stated \"when I feel better\". Will con't to attempt to see this pt and engage him in mobility. Time in: 1325  Time out: 1340  Treatment time: 15 minutes. If pt. is D/C'd prior to next visit please refer back to last daily progress note for D/C status.   Electronically signed by Walter Caballero, PT [id number] on 1/31/2023 at 2:46 PM

## 2023-02-01 LAB
ALBUMIN SERPL-MCNC: 2.9 G/DL (ref 3.4–5)
ANION GAP SERPL CALCULATED.3IONS-SCNC: 12 MMOL/L (ref 3–16)
BASOPHILS ABSOLUTE: 0 K/UL (ref 0–0.2)
BASOPHILS RELATIVE PERCENT: 0.5 %
BUN BLDV-MCNC: 56 MG/DL (ref 7–20)
CALCIUM SERPL-MCNC: 8.2 MG/DL (ref 8.3–10.6)
CHLORIDE BLD-SCNC: 100 MMOL/L (ref 99–110)
CO2: 35 MMOL/L (ref 21–32)
CREAT SERPL-MCNC: 1.8 MG/DL (ref 0.8–1.3)
EOSINOPHILS ABSOLUTE: 0.1 K/UL (ref 0–0.6)
EOSINOPHILS RELATIVE PERCENT: 1.4 %
GFR SERPL CREATININE-BSD FRML MDRD: 37 ML/MIN/{1.73_M2}
GLUCOSE BLD-MCNC: 120 MG/DL (ref 70–99)
GLUCOSE BLD-MCNC: 130 MG/DL (ref 70–99)
GLUCOSE BLD-MCNC: 197 MG/DL (ref 70–99)
GLUCOSE BLD-MCNC: 255 MG/DL (ref 70–99)
GLUCOSE BLD-MCNC: 264 MG/DL (ref 70–99)
HCT VFR BLD CALC: 27.6 % (ref 40.5–52.5)
HEMOGLOBIN: 8.1 G/DL (ref 13.5–17.5)
LYMPHOCYTES ABSOLUTE: 0.7 K/UL (ref 1–5.1)
LYMPHOCYTES RELATIVE PERCENT: 12.2 %
MAGNESIUM: 1.5 MG/DL (ref 1.8–2.4)
MCH RBC QN AUTO: 24.6 PG (ref 26–34)
MCHC RBC AUTO-ENTMCNC: 29.3 G/DL (ref 31–36)
MCV RBC AUTO: 84.1 FL (ref 80–100)
MONOCYTES ABSOLUTE: 0.4 K/UL (ref 0–1.3)
MONOCYTES RELATIVE PERCENT: 8 %
NEUTROPHILS ABSOLUTE: 4.2 K/UL (ref 1.7–7.7)
NEUTROPHILS RELATIVE PERCENT: 77.9 %
PDW BLD-RTO: 22.1 % (ref 12.4–15.4)
PERFORMED ON: ABNORMAL
PHOSPHORUS: 3.4 MG/DL (ref 2.5–4.9)
PLATELET # BLD: 128 K/UL (ref 135–450)
PMV BLD AUTO: 8.9 FL (ref 5–10.5)
POTASSIUM SERPL-SCNC: 3.9 MMOL/L (ref 3.5–5.1)
RBC # BLD: 3.28 M/UL (ref 4.2–5.9)
SODIUM BLD-SCNC: 147 MMOL/L (ref 136–145)
URIC ACID, SERUM: 10.6 MG/DL (ref 3.5–7.2)
WBC # BLD: 5.4 K/UL (ref 4–11)

## 2023-02-01 PROCEDURE — 6370000000 HC RX 637 (ALT 250 FOR IP): Performed by: INTERNAL MEDICINE

## 2023-02-01 PROCEDURE — 94760 N-INVAS EAR/PLS OXIMETRY 1: CPT

## 2023-02-01 PROCEDURE — 85025 COMPLETE CBC W/AUTO DIFF WBC: CPT

## 2023-02-01 PROCEDURE — 36415 COLL VENOUS BLD VENIPUNCTURE: CPT

## 2023-02-01 PROCEDURE — 83735 ASSAY OF MAGNESIUM: CPT

## 2023-02-01 PROCEDURE — 99232 SBSQ HOSP IP/OBS MODERATE 35: CPT | Performed by: INTERNAL MEDICINE

## 2023-02-01 PROCEDURE — 6360000002 HC RX W HCPCS: Performed by: INTERNAL MEDICINE

## 2023-02-01 PROCEDURE — 2700000000 HC OXYGEN THERAPY PER DAY

## 2023-02-01 PROCEDURE — 94640 AIRWAY INHALATION TREATMENT: CPT

## 2023-02-01 PROCEDURE — 80069 RENAL FUNCTION PANEL: CPT

## 2023-02-01 PROCEDURE — 2580000003 HC RX 258: Performed by: INTERNAL MEDICINE

## 2023-02-01 PROCEDURE — 1200000000 HC SEMI PRIVATE

## 2023-02-01 PROCEDURE — 94669 MECHANICAL CHEST WALL OSCILL: CPT

## 2023-02-01 PROCEDURE — 84550 ASSAY OF BLOOD/URIC ACID: CPT

## 2023-02-01 RX ORDER — TORSEMIDE 20 MG/1
100 TABLET ORAL DAILY
Status: DISCONTINUED | OUTPATIENT
Start: 2023-02-01 | End: 2023-02-04 | Stop reason: HOSPADM

## 2023-02-01 RX ORDER — MAGNESIUM SULFATE IN WATER 40 MG/ML
2000 INJECTION, SOLUTION INTRAVENOUS PRN
Status: DISCONTINUED | OUTPATIENT
Start: 2023-02-01 | End: 2023-02-04 | Stop reason: HOSPADM

## 2023-02-01 RX ADMIN — LACTULOSE 30 G: 20 SOLUTION ORAL at 20:05

## 2023-02-01 RX ADMIN — SODIUM CHLORIDE SOLN NEBU 3% 4 ML: 3 NEBU SOLN at 09:16

## 2023-02-01 RX ADMIN — LEVALBUTEROL 1.25 MG: 1.25 SOLUTION, CONCENTRATE RESPIRATORY (INHALATION) at 09:16

## 2023-02-01 RX ADMIN — SODIUM CHLORIDE, PRESERVATIVE FREE 10 ML: 5 INJECTION INTRAVENOUS at 20:06

## 2023-02-01 RX ADMIN — HYDRALAZINE HYDROCHLORIDE 25 MG: 25 TABLET, FILM COATED ORAL at 05:47

## 2023-02-01 RX ADMIN — POLYETHYLENE GLYCOL 3350 17 G: 17 POWDER, FOR SOLUTION ORAL at 09:56

## 2023-02-01 RX ADMIN — HEPARIN SODIUM 5000 UNITS: 5000 INJECTION INTRAVENOUS; SUBCUTANEOUS at 20:05

## 2023-02-01 RX ADMIN — DOCUSATE SODIUM 100 MG: 100 CAPSULE, LIQUID FILLED ORAL at 20:06

## 2023-02-01 RX ADMIN — TORSEMIDE 100 MG: 20 TABLET ORAL at 12:34

## 2023-02-01 RX ADMIN — INSULIN GLARGINE 14 UNITS: 100 INJECTION, SOLUTION SUBCUTANEOUS at 09:57

## 2023-02-01 RX ADMIN — OXYCODONE HYDROCHLORIDE AND ACETAMINOPHEN 500 MG: 500 TABLET ORAL at 09:56

## 2023-02-01 RX ADMIN — PANTOPRAZOLE SODIUM 40 MG: 40 TABLET, DELAYED RELEASE ORAL at 05:47

## 2023-02-01 RX ADMIN — HEPARIN SODIUM 5000 UNITS: 5000 INJECTION INTRAVENOUS; SUBCUTANEOUS at 09:56

## 2023-02-01 RX ADMIN — LACTULOSE 30 G: 20 SOLUTION ORAL at 09:56

## 2023-02-01 RX ADMIN — CYANOCOBALAMIN TAB 500 MCG 500 MCG: 500 TAB at 09:56

## 2023-02-01 RX ADMIN — INSULIN LISPRO 2 UNITS: 100 INJECTION, SOLUTION INTRAVENOUS; SUBCUTANEOUS at 17:50

## 2023-02-01 RX ADMIN — INSULIN LISPRO 2 UNITS: 100 INJECTION, SOLUTION INTRAVENOUS; SUBCUTANEOUS at 12:34

## 2023-02-01 RX ADMIN — ATORVASTATIN CALCIUM 40 MG: 40 TABLET, FILM COATED ORAL at 09:55

## 2023-02-01 RX ADMIN — DOCUSATE SODIUM 100 MG: 100 CAPSULE, LIQUID FILLED ORAL at 10:05

## 2023-02-01 RX ADMIN — EPOETIN ALFA-EPBX 40000 UNITS: 40000 INJECTION, SOLUTION INTRAVENOUS; SUBCUTANEOUS at 10:14

## 2023-02-01 RX ADMIN — TAMSULOSIN HYDROCHLORIDE 0.4 MG: 0.4 CAPSULE ORAL at 09:55

## 2023-02-01 RX ADMIN — HYDRALAZINE HYDROCHLORIDE 25 MG: 25 TABLET, FILM COATED ORAL at 14:30

## 2023-02-01 RX ADMIN — HYDRALAZINE HYDROCHLORIDE 25 MG: 25 TABLET, FILM COATED ORAL at 20:06

## 2023-02-01 RX ADMIN — BISACODYL 5 MG: 5 TABLET, COATED ORAL at 09:56

## 2023-02-01 RX ADMIN — SODIUM CHLORIDE, PRESERVATIVE FREE 10 ML: 5 INJECTION INTRAVENOUS at 10:04

## 2023-02-01 RX ADMIN — PREDNISONE 20 MG: 20 TABLET ORAL at 09:56

## 2023-02-01 RX ADMIN — SODIUM CHLORIDE SOLN NEBU 3% 15 ML: 3 NEBU SOLN at 19:49

## 2023-02-01 RX ADMIN — Medication 1 CAPSULE: at 17:50

## 2023-02-01 RX ADMIN — Medication 1 CAPSULE: at 09:56

## 2023-02-01 RX ADMIN — MONTELUKAST 10 MG: 10 TABLET, FILM COATED ORAL at 09:56

## 2023-02-01 RX ADMIN — LACTULOSE 30 G: 20 SOLUTION ORAL at 14:30

## 2023-02-01 RX ADMIN — MAGNESIUM SULFATE HEPTAHYDRATE 2000 MG: 40 INJECTION, SOLUTION INTRAVENOUS at 10:05

## 2023-02-01 RX ADMIN — FUROSEMIDE 40 MG: 10 INJECTION, SOLUTION INTRAMUSCULAR; INTRAVENOUS at 09:56

## 2023-02-01 ASSESSMENT — PAIN SCALES - GENERAL
PAINLEVEL_OUTOF10: 0

## 2023-02-01 NOTE — PROGRESS NOTES
[facility] Internal Medicine Note      Chief Complaint: I feel ok    Subjective/Interval History:    No new problems overnight. Wife at the bedside. Patient states he is feeling okay. Wants to know when he can go home. Reminded we need to get him stronger at an ECF first.  Patient had an enema yesterday but could not hold the soapsuds at all. Only minimal results. Patient eating okay. Denies pain. Weight today 221 pounds, up 2 pounds, but this was a bed scale not a standing scale. , down from a max of 241 pounds. 10.2 L negative fluid balance this admission. No chest pain. No nausea, vomiting, diarrhea. No abdominal pain. No dysuria. The remainder of the review of systems is negative. PMH, PSH, FH/SH reviewed and unchanged as documented in the H&P personally documented at admission 23    Medication list reviewed    Objective:    /70   Pulse 76   Temp 97.4 °F (36.3 °C) (Oral)   Resp 18   Ht 5' 8\" (1.727 m)   Wt 221 lb 12.5 oz (100.6 kg)   SpO2 98%   BMI 33.72 kg/m²   Temp  Av.5 °F (36.4 °C)  Min: 97.3 °F (36.3 °C)  Max: 97.7 °F (36.5 °C)    CV-regular on exam.  Pulm-respirations remain easy, currently 3 L of oxygen per nasal cannula. Rare scattered wheeze today. Abd- BS+, soft, NTND  Ext-continued 2-3+ edema of his upper and lower extremities. No compression stockings on this morning, lower extremity edema has quickly recurred.     The Following Labs Were Reviewed Today:    Recent Results (from the past 24 hour(s))   POCT Glucose    Collection Time: 23 12:07 PM   Result Value Ref Range    POC Glucose 181 (H) 70 - 99 mg/dl    Performed on ACCU-CHEK    POCT Glucose    Collection Time: 23  5:12 PM   Result Value Ref Range    POC Glucose 172 (H) 70 - 99 mg/dl    Performed on ACCU-CHEK    POCT Glucose    Collection Time: 23  8:55 PM   Result Value Ref Range    POC Glucose 206 (H) 70 - 99 mg/dl    Performed on ACCU-CHEK    CBC with Auto Differential    Collection Time: 02/01/23  4:59 AM   Result Value Ref Range    WBC 5.4 4.0 - 11.0 K/uL    RBC 3.28 (L) 4.20 - 5.90 M/uL    Hemoglobin 8.1 (L) 13.5 - 17.5 g/dL    Hematocrit 27.6 (L) 40.5 - 52.5 %    MCV 84.1 80.0 - 100.0 fL    MCH 24.6 (L) 26.0 - 34.0 pg    MCHC 29.3 (L) 31.0 - 36.0 g/dL    RDW 22.1 (H) 12.4 - 15.4 %    Platelets 354 (L) 023 - 450 K/uL    MPV 8.9 5.0 - 10.5 fL    Neutrophils % 77.9 %    Lymphocytes % 12.2 %    Monocytes % 8.0 %    Eosinophils % 1.4 %    Basophils % 0.5 %    Neutrophils Absolute 4.2 1.7 - 7.7 K/uL    Lymphocytes Absolute 0.7 (L) 1.0 - 5.1 K/uL    Monocytes Absolute 0.4 0.0 - 1.3 K/uL    Eosinophils Absolute 0.1 0.0 - 0.6 K/uL    Basophils Absolute 0.0 0.0 - 0.2 K/uL   Renal Function Panel    Collection Time: 02/01/23  4:59 AM   Result Value Ref Range    Sodium 147 (H) 136 - 145 mmol/L    Potassium 3.9 3.5 - 5.1 mmol/L    Chloride 100 99 - 110 mmol/L    CO2 35 (H) 21 - 32 mmol/L    Anion Gap 12 3 - 16    Glucose 120 (H) 70 - 99 mg/dL    BUN 56 (H) 7 - 20 mg/dL    Creatinine 1.8 (H) 0.8 - 1.3 mg/dL    Est, Glom Filt Rate 37 (A) >60    Calcium 8.2 (L) 8.3 - 10.6 mg/dL    Phosphorus 3.4 2.5 - 4.9 mg/dL    Albumin 2.9 (L) 3.4 - 5.0 g/dL   Magnesium    Collection Time: 02/01/23  4:59 AM   Result Value Ref Range    Magnesium 1.50 (L) 1.80 - 2.40 mg/dL   Uric Acid    Collection Time: 02/01/23  4:59 AM   Result Value Ref Range    Uric Acid, Serum 10.6 (H) 3.5 - 7.2 mg/dL   POCT Glucose    Collection Time: 02/01/23  7:54 AM   Result Value Ref Range    POC Glucose 130 (H) 70 - 99 mg/dl    Performed on ACCU-CHEK        ASSESSMENT/PLAN:      Principal Problem:    Acute on chronic diastolic congestive heart failure -continues with IV Lasix 40 mg 3 times daily, continues with good diuresis. Renal function is stable. Weight is up but this was a bed weight not standing.   Active Problems:    Acute on chronic respiratory failure with hypoxia/HCAP (healthcare-associated pneumonia)- antibiotics have been discontinued. Continues with prednisone. Still with some hemoptysis, aspirin was stopped. DEISY-kidney function has been stable with diuresis    UTI due to extended-spectrum beta lactamase (ESBL) producing Escherichia coli- patient has completed course of meropenem. Chronic anemia-continue to monitor, appreciate Dr. Sang Zeng input. Weekly erythropoietin shots being administered. Chronic GERD-continue Protonix. Type 2 diabetes mellitus with stage 4 chronic kidney disease, with long-term current use of insulin (HCC)-continue to monitor. Continue with Lantus. Continue sliding scale. Morning sugars have been fairly well controlled. Essential hypertension-last 3 blood pressure readings have been fairly good. No changes needed at this time. B12 deficiency-continue with oral B12 supplementation. Atrial fibrillation-rate is controlled. Patient is not an anticoagulation candidate due to occult bleeding in the past and chronic iron deficiency. Disposition-unchanged, once stable from a nephrology standpoint we will be able to transition the patient to [facility] for rehab.     Time > 35 minutes reviewing chart and patient data, examining and interviewing patient, and discussing with nursing staff, family, etc.     Jomar Beebe MD, Amanda Owens  9:37 AM  2/1/2023 Detail Level: Detailed General Sunscreen Counseling: I recommended a broad spectrum sunscreen with a SPF of 30 or higher.  I explained that SPF 30 sunscreens block approximately 97 percent of the sun's harmful rays.  Sunscreens should be applied at least 15 minutes prior to expected sun exposure and then every 2 hours after that as long as sun exposure continues. If swimming or exercising sunscreen should be reapplied every 45 minutes to an hour after getting wet or sweating.  One ounce, or the equivalent of a shot glass full of sunscreen, is adequate to protect the skin not covered by a bathing suit. I also recommended a lip balm with a sunscreen as well. Sun protective clothing can be used in lieu of sunscreen but must be worn the entire time you are exposed to the sun's rays.

## 2023-02-01 NOTE — PROGRESS NOTES
ONCOLOGY HEMATOLOGY CARE PROGRESS NOTE      SUBJECTIVE:  Patient feels about the same. He has dyspnea on exertion and generalized weakness. He denies chest pain. ROS:     Constitutional:  No weight loss, No fever, No chills, No night sweats. Energy level good.   Eyes:  No impairment or change in vision  ENT / Mouth:  No pain, abnormal ulceration, bleeding, nasal drip or change in voice or hearing  Cardiovascular:  No chest pain, palpitations, new edema, or calf discomfort  Respiratory:  No pain, hemoptysis, change to breathing  Breast:  No pain, discharge, change in appearance or texture  Gastrointestinal:  No pain, cramping, jaundice, change to eating and bowel habits  Urinary:  No pain, bleeding or change in continence  Genitalia: No pain, bleeding or discharge  Musculoskeletal:  No redness, pain, edema or weakness  Skin:  No pruritus, rash, change to nodules or lesions  Neurologic:  No discomfort, change in mental status, speech, sensory or motor activity  Psychiatric:  No change in concentration or change to affect or mood  Endocrine:  No hot flashes, increased thirst, or change to urine production  Hematologic: No petechiae, ecchymosis or bleeding  Lymphatic:  No lymphadenopathy or lymphedema  Allergy / Immunologic:  No eczema, hives, frequent or recurrent infections    OBJECTIVE        Physical    VITALS:  Patient Vitals for the past 24 hrs:   BP Temp Temp src Pulse Resp SpO2 Weight   02/01/23 0755 134/70 97.4 °F (36.3 °C) Oral 86 22 97 % --   02/01/23 0538 -- -- -- -- -- -- 221 lb 12.5 oz (100.6 kg)   02/01/23 0449 (!) 115/56 97.3 °F (36.3 °C) -- 92 24 90 % --   01/31/23 2056 137/89 97.5 °F (36.4 °C) Axillary 95 20 96 % --   01/31/23 2033 -- -- -- 95 18 96 % --   01/31/23 1849 (!) 170/73 97.7 °F (36.5 °C) Oral 95 17 97 % --   01/31/23 0936 (!) 134/55 97.3 °F (36.3 °C) Oral 88 16 98 % --   01/31/23 0900 -- 97.3 °F (36.3 °C) Oral 90 16 96 % --   01/31/23 0857 -- -- -- -- 16 98 % --   01/31/23 0840 (!) 134/52 -- -- -- -- -- --       24HR INTAKE/OUTPUT:    Intake/Output Summary (Last 24 hours) at 2/1/2023 0829  Last data filed at 2/1/2023 0435  Gross per 24 hour   Intake 1070 ml   Output 1750 ml   Net -680 ml       CONSTITUTIONAL: awake, alert, cooperative, no apparent distress   EYES: pupils equal, round and reactive to light, sclera clear and conjunctiva normal  ENT: Normocephalic, without obvious abnormality, atraumatic  NECK: supple, symmetrical, no jugular venous distension and no carotid bruits   HEMATOLOGIC/LYMPHATIC: no cervical, supraclavicular or axillary lymphadenopathy   LUNGS: no increased work of breathing and clear to auscultation   CARDIOVASCULAR: regular rate and rhythm, normal S1 and S2, no murmur noted  ABDOMEN: normal bowel sounds x 4, soft, non-distended, non-tender, no masses palpated, no hepatosplenomgaly   MUSCULOSKELETAL: full range of motion noted, tone is normal  NEUROLOGIC: awake, alert, oriented to name, place and time. Motor skills grossly intact. SKIN: Normal skin color, texture, turgor and no jaundice.  appears intact   EXTREMITIES: no LE edema     DATA:  CBC:    Recent Labs     02/01/23  0459 01/31/23  0809 01/30/23  0824 01/29/23  0756   WBC 5.4 6.5 5.4 6.1   NEUTROABS 4.2 5.3 4.4 5.0   LYMPHOPCT 12.2 10.9 11.8 11.3   RBC 3.28* 3.47* 3.29* 3.26*   HGB 8.1* 8.4* 8.1* 8.1*   HCT 27.6* 29.1* 27.8* 27.2*   MCV 84.1 83.8 84.6 83.5   MCH 24.6* 24.3* 24.7* 24.7*   MCHC 29.3* 29.0* 29.2* 29.6*   RDW 22.1* 21.7* 21.5* 21.1*   * 130* 120* 102*       PT/INR:    Recent Labs     01/28/23  0546 01/26/23  0102 01/17/23  1453 01/12/23  1223   PROT 5.5*  --  5.9* 5.5*   INR  --  1.34* 1.33*  --      PTT:    Recent Labs     01/17/23  1453   APTT 28.7       CMP:    Recent Labs     02/01/23  0459 01/31/23  0810 01/30/23  0824 01/29/23  0756 01/28/23  0546 01/27/23  0812 01/26/23  0937 01/25/23  1118 01/18/23  0526 01/17/23  1453 01/12/23  1223   * 149* 149* 144 143 142  144   < > 141   < > 143 142   K 3.9 4.1 4.0 4.5 5.0 4.6  4.7   < > 5.1   < > 4.2 4.1    102 103 102 103 104  106   < > 108   < > 103 100   CO2 35* 34* 34* 31 31 27  28   < > 24   < > 30 28   GLUCOSE 120* 127* 118* 110* 97 105*  105*   < > 239*   < > 143* 166*   BUN 56* 56* 61* 70* 71* 66*  65*   < > 59*   < > 66* 73*   CREATININE 1.8* 1.8* 1.7* 1.8* 1.7* 1.7*  1.7*   < > 1.7*   < > 1.8* 1.9*   LABGLOM 37* 37* 39* 37* 39* 39*  39*   < > 39*   < > 37* 35*   CALCIUM 8.2* 8.6 8.7 8.6 8.7 8.6  8.5   < > 8.4   < > 7.7* 8.0*   PROT  --   --   --   --  5.5*  --   --   --   --  5.9* 5.5*   LABALBU 2.9* 3.3*  --   --  2.8* 2.8*  --  2.5*   < > 3.1* 3.2*   AGRATIO  --   --   --   --   --   --   --   --   --  1.1  --    BILITOT  --   --   --   --  0.3  --   --   --   --  0.5 <0.2   ALKPHOS  --   --   --   --  80  --   --   --   --  81 85   ALT  --   --   --   --  20  --   --   --   --  30 33   AST  --   --   --   --  16  --   --   --   --  16 20   MG 1.50* 1.80  --   --  1.80  --   --  2.40   < >  --  1.80    < > = values in this interval not displayed. Lab Results   Component Value Date    CALCIUM 8.2 (L) 02/01/2023    PHOS 3.4 02/01/2023       LDH:No results for input(s): LDH in the last 720 hours. Radiology Review:  XR CHEST PORTABLE  Narrative: EXAMINATION:  ONE XRAY VIEW OF THE CHEST    1/30/2023 4:55 am    COMPARISON:  01/27/2023    HISTORY:  ORDERING SYSTEM PROVIDED HISTORY: hypoxemia  TECHNOLOGIST PROVIDED HISTORY:  Reason for exam:->hypoxemia  Reason for Exam: hypoxemia    FINDINGS:  Multifocal patchy bilateral airspace opacities and bilateral pleural  effusions, similar to the prior. No evidence of pneumothorax. The cardiac  silhouette and osseous structures are stable. Impression: No significant interval change.       Problem List  Patient Active Problem List   Diagnosis    JOANN (obstructive sleep apnea)    Chronic GERD    Type 2 diabetes mellitus with stage 4 chronic kidney disease, with long-term current use of insulin (HCC)    Microcytic anemia    Microalbuminuria    Hyperlipidemia    Paroxysmal atrial fibrillation (HCC)    Edema    Acute on chronic respiratory failure with hypoxia (HCC)    Carotid artery stenosis without cerebral infarction    Leg swelling    Chronic venous insufficiency    Stage 3b chronic kidney disease (HCC)    Essential hypertension    Hemoptysis    B12 deficiency    HCAP (healthcare-associated pneumonia)    Pulmonary nodule    Moderate COPD (chronic obstructive pulmonary disease) (HCC)    Acute kidney injury ([address]    Slow transit constipation    Pancreatic mass    UTI due to extended-spectrum beta lactamase (ESBL) producing Escherichia coli    Obesity, Class II, BMI 35-39.9    Weakness    Hyperkalemia    Chronic anemia    Abnormal CXR    Elevated brain natriuretic peptide (BNP) level    Elevated troponin    Chronic respiratory failure with hypoxia (HCC)    Chronic diastolic CHF (congestive heart failure), NYHA class 2 (HCC)    Acute on chronic congestive heart failure (HCC)    Mucopurulent chronic bronchitis (HCC)    Persistent atrial fibrillation ([address]    Hypothermia due to non-environmental cause       ASSESSMENT AND PLAN:  1. Anemia. His anemia is multifactorial.  It is at least partially due to his chronic kidney disease. He gets Procrit every other week in my office. I have been giving it weekly for the last 3 weeks but will go back to every other week after today. Transfuse as needed in the meantime. His hemoglobin tends to drop when he is acutely ill and admitted. It tends to do better as an outpatient.   I will check back in in a few weeks if he is still in the hospital.            ONCOLOGIC DISPOSITION:      Mikey Christensen MD  Please contact through [facility]

## 2023-02-01 NOTE — CARE COORDINATION
Discharge Planning:  KRYSTAL contacted Jelly Meyers with [facility] to give her an update on this pt.  [phone]  At this time, plan remains for this pt to go to [facility] upon d/c.       Plan: [facility] upon d/c. No COVID test needed. No pre cert needed. Will need HENS and stretcher transport. Awaiting nephrology clearance. Still fluid overloaded per nephrology.   DUDLEY Dover  [phone]  Electronically signed by Nemo Berg on 2/1/2023 at 10:53 AM

## 2023-02-01 NOTE — PROGRESS NOTES
Pulmonary Progress Note    Date of Admission: 1/17/2023   LOS: 15 days       CC:  Chief Complaint   Patient presents with    Shortness of Breath     Pt reports increased SOB, productive cough x few months intermittently; pt reports worsening last night. On 3L n/c at home. Urinary Retention     Reports feeling of urgency but unable to urinate. Pt reports last urinated yesterday \"during the day\". Subjective:  Slow improvement in shortness of breath   Back to baseline 3 L NC  .     ROS:   No nausea  No Vomiting  No chest pain       Assessment:          Plan: This note may have been transcribed using [id number]. Please disregard any translational errors. Hospital Day: 15     Volume   - 10 since admission  Lasix    Contraction alkalosis noted       Chronic Hypoxemia   3 L . Wean O2 to sat >90%       Bilateral pleural effusions   Attempting fluid removal with lasix. Pneumonia/ UTI  antibiotics completed. Procal resolved. CV / Acut meena chronic diastolic CHF  Per cards. JOANN  Cpap use. Will sign off at this time. Thanks for the consult. Please call with questions. Data:        PHYSICAL EXAM:   Blood pressure 134/70, pulse 76, temperature 97.4 °F (36.3 °C), temperature source Oral, resp. rate 18, height 5' 8\" (1.727 m), weight 221 lb 12.5 oz (100.6 kg), SpO2 98 %.'  Body mass index is 33.72 kg/m². Gen: No distress. ENT:   Resp: No accessory muscle use. No crackles. No wheezes. No rhonchi. CV: Regular rate. Regular rhythm. No murmur or rub. No edema. Skin: Warm, dry, normal texture and turgor. No nodule on exposed extremities. M/S: No cyanosis. No clubbing. No joint deformity. Psych: Oriented x 3. No anxiety. Awake. Alert. Intact judgement and insight. Good Mood / Affect.   Memory appears in tact       Medications:    Scheduled Meds:   torsemide  100 mg Oral Daily    lactulose  30 g Oral TID    insulin glargine  14 Units SubCUTAneous QAM    sodium chloride (Inhalant)  15 mL Nebulization BID    hydrALAZINE  25 mg Oral 3 times per day    heparin (porcine)  5,000 Units SubCUTAneous 2 times per day    sodium chloride flush  5-40 mL IntraVENous 2 times per day    predniSONE  20 mg Oral Daily    vitamin B-12  500 mcg Oral Daily    vitamin C  500 mg Oral Daily    tamsulosin  0.4 mg Oral Daily    [Held by provider] aspirin EC  162 mg Oral Daily    atorvastatin  40 mg Oral Daily    bisacodyl  5 mg Oral Daily    polyethylene glycol  17 g Oral Daily    docusate sodium  100 mg Oral BID    lactobacillus  1 capsule Oral BID WC    linaclotide  145 mcg Oral QAM AC    montelukast  10 mg Oral Daily    pantoprazole  40 mg Oral QAM AC    insulin lispro  0-4 Units SubCUTAneous TID WC    insulin lispro  0-4 Units SubCUTAneous Nightly       Continuous Infusions:   sodium chloride      dextrose         PRN Meds:  magnesium sulfate, levalbuterol, sodium chloride flush, sodium chloride, acetaminophen, sodium chloride nebulizer, glucose, dextrose bolus **OR** dextrose bolus, glucagon (rDNA), dextrose    Labs reviewed:  CBC:   Recent Labs     01/30/23  0824 01/31/23  0809 02/01/23  0459   WBC 5.4 6.5 5.4   HGB 8.1* 8.4* 8.1*   HCT 27.8* 29.1* 27.6*   MCV 84.6 83.8 84.1   * 130* 128*       BMP:   Recent Labs     01/30/23  0824 01/31/23  0810 02/01/23  0459   * 149* 147*   K 4.0 4.1 3.9    102 100   CO2 34* 34* 35*   PHOS  --  3.7 3.4   BUN 61* 56* 56*   CREATININE 1.7* 1.8* 1.8*       LIVER PROFILE:   No results for input(s): AST, ALT, LIPASE, BILIDIR, BILITOT, ALKPHOS in the last 72 hours. Invalid input(s): AMYLASE,  ALB    PT/INR:   No results for input(s): PROTIME, INR in the last 72 hours. APTT: No results for input(s): APTT in the last 72 hours.   UA:No results for input(s): NITRITE, COLORU, PHUR, LABCAST, [address], RBCUA, MUCUS, TRICHOMONAS, YEAST, BACTERIA, CLARITYU, SPECGRAV, LEUKOCYTESUR, UROBILINOGEN, BILIRUBINUR, BLOODU, GLUCOSEU, AMORPHOUS in the last 72 hours. Invalid input(s): Tonja Fallon  No results for input(s): PH, PCO2, PO2 in the last 72 hours. Cx:      Films: This note was transcribed using [id number] [employer]. Please disregard any translational errors.       Saman Rodriguez Pulmonary, Sleep and [phone]

## 2023-02-01 NOTE — PROGRESS NOTES
Visit us at [facility]. com or call us at [address] [place] [address] NOTE    Patient: Andrea Desouza MRN: [id number]     YOB: 1940  Age: 79 y.o. Sex: male    Unit: [facility] 4 MED SURG Room/Bed: [id number]/6533-04 Location: [address] [place] [address]     Admitting Physician: Sandhya Gonzáles    Primary Care Physician: Susan Walter MD          LOS: 15 days       Reason for evaluation: DEISY on CKD3b/4      SUBJECTIVE:      The patient was seen and examined. Notes and labs reviewed. There were no complications over night.     Patient's review of systems: breathing better, sore on lower back hurts, weak      OBJECTIVE:     Vitals:    02/01/23 0449 02/01/23 0538 02/01/23 0755 02/01/23 0919   BP: (!) 115/56  134/70    Pulse: 92  86 76   Resp: 24 22 18   Temp: 97.3 °F (36.3 °C)  97.4 °F (36.3 °C)    TempSrc:   Oral    SpO2: 90%  97% 98%   Weight:  221 lb 12.5 oz (100.6 kg)     Height:           Intake and Output:      Intake/Output Summary (Last 24 hours) at 2/1/2023 1307  Last data filed at 2/1/2023 1248  Gross per 24 hour   Intake 500 ml   Output 2500 ml   Net -2000 ml       Patient Vitals for the past 96 hrs (Last 3 readings):   Weight   02/01/23 0538 221 lb 12.5 oz (100.6 kg)   01/31/23 0633 219 lb 12.8 oz (99.7 kg)   01/30/23 0516 229 lb 15 oz (104.3 kg)         Medications:   torsemide  100 mg Oral Daily    lactulose  30 g Oral TID    insulin glargine  14 Units SubCUTAneous QAM    sodium chloride (Inhalant)  15 mL Nebulization BID    hydrALAZINE  25 mg Oral 3 times per day    heparin (porcine)  5,000 Units SubCUTAneous 2 times per day    sodium chloride flush  5-40 mL IntraVENous 2 times per day    predniSONE  20 mg Oral Daily    vitamin B-12  500 mcg Oral Daily    vitamin C  500 mg Oral Daily    tamsulosin  0.4 mg Oral Daily    [Held by provider] aspirin EC  162 mg Oral Daily    atorvastatin  40 mg Oral Daily    bisacodyl  5 mg Oral Daily    polyethylene glycol  17 g Oral Daily docusate sodium  100 mg Oral BID    lactobacillus  1 capsule Oral BID WC    linaclotide  145 mcg Oral QAM AC    montelukast  10 mg Oral Daily    pantoprazole  40 mg Oral QAM AC    insulin lispro  0-4 Units SubCUTAneous TID WC    insulin lispro  0-4 Units SubCUTAneous Nightly           Exam:   CONSTITUTIONAL/PSYCHIATRY: awake, alert and oriented x3. Not in acute distress   RESPIRATORY: Respiratory effort: normal. Auscultation: diminished BS at the bases  CARDIOVASCULAR: Auscultation: RRR. Edema: 2+ in UE and LE - improving  GASTROINTESTINAL: Soft, nontender, nondistended. Obese abdomen. EXTREMITIES:  No cyanosis or clubbing. SKIN: Warm and dry. Chronic discoloration in LE, ecchymosis in UE      LABS:   RFP:   Recent Labs     01/30/23  0824 01/31/23  0810 02/01/23  0459   * 149* 147*   K 4.0 4.1 3.9    102 100   CO2 34* 34* 35*   BUN 61* 56* 56*   CREATININE 1.7* 1.8* 1.8*   CALCIUM 8.7 8.6 8.2*   MG  --  1.80 1.50*   PHOS  --  3.7 3.4         CBC:   Recent Labs     01/30/23  0824 01/31/23  0809 02/01/23  0459   WBC 5.4 6.5 5.4   HGB 8.1* 8.4* 8.1*   HCT 27.8* 29.1* 27.6*   MCV 84.6 83.8 84.1   * 130* 128*           ASSESSMENT and PLAN:     1- DEISY on CKD3b/4 (baseline SCr ~ 1.8-2.2; followed by dr Dalila Cárdenas): DEISY was hemodynamic/prerenal (multifactorial: low  BP, ARBs, diuretics)  SCr peaked at 3.2 and is back to baseline. He is significantly fluid overloaded On IV lasix with good response. He is in -10.8L fluid balance. Wt peak at 246, 219 now. -  switched to oral Torsemide 2/1/23. If Hypernatremia worsens will need to stop loop diuretic     2- ESBL UTI: treated with Meropenem - completed     3- Acute on chronic resp failure/HCAP/ADHF on chronic diastolic HF: on diuresis per above.      4- HTN: continue current medications     5- Obesity with JOANN            Signed By: Maddi Lei MD

## 2023-02-02 LAB
ALBUMIN SERPL-MCNC: 2.8 G/DL (ref 3.4–5)
ANION GAP SERPL CALCULATED.3IONS-SCNC: 8 MMOL/L (ref 3–16)
BUN BLDV-MCNC: 50 MG/DL (ref 7–20)
CALCIUM SERPL-MCNC: 7.9 MG/DL (ref 8.3–10.6)
CHLORIDE BLD-SCNC: 100 MMOL/L (ref 99–110)
CO2: 38 MMOL/L (ref 21–32)
CREAT SERPL-MCNC: 1.8 MG/DL (ref 0.8–1.3)
GFR SERPL CREATININE-BSD FRML MDRD: 37 ML/MIN/{1.73_M2}
GLUCOSE BLD-MCNC: 115 MG/DL (ref 70–99)
GLUCOSE BLD-MCNC: 131 MG/DL (ref 70–99)
GLUCOSE BLD-MCNC: 146 MG/DL (ref 70–99)
GLUCOSE BLD-MCNC: 257 MG/DL (ref 70–99)
GLUCOSE BLD-MCNC: 344 MG/DL (ref 70–99)
MAGNESIUM: 1.8 MG/DL (ref 1.8–2.4)
PERFORMED ON: ABNORMAL
PHOSPHORUS: 3.5 MG/DL (ref 2.5–4.9)
POTASSIUM SERPL-SCNC: 3.7 MMOL/L (ref 3.5–5.1)
SODIUM BLD-SCNC: 146 MMOL/L (ref 136–145)
URIC ACID, SERUM: 10.8 MG/DL (ref 3.5–7.2)

## 2023-02-02 PROCEDURE — 2700000000 HC OXYGEN THERAPY PER DAY

## 2023-02-02 PROCEDURE — 6370000000 HC RX 637 (ALT 250 FOR IP): Performed by: INTERNAL MEDICINE

## 2023-02-02 PROCEDURE — 94760 N-INVAS EAR/PLS OXIMETRY 1: CPT

## 2023-02-02 PROCEDURE — 80069 RENAL FUNCTION PANEL: CPT

## 2023-02-02 PROCEDURE — 6360000002 HC RX W HCPCS: Performed by: INTERNAL MEDICINE

## 2023-02-02 PROCEDURE — 2580000003 HC RX 258: Performed by: INTERNAL MEDICINE

## 2023-02-02 PROCEDURE — 99232 SBSQ HOSP IP/OBS MODERATE 35: CPT | Performed by: INTERNAL MEDICINE

## 2023-02-02 PROCEDURE — 94669 MECHANICAL CHEST WALL OSCILL: CPT

## 2023-02-02 PROCEDURE — 83735 ASSAY OF MAGNESIUM: CPT

## 2023-02-02 PROCEDURE — 36415 COLL VENOUS BLD VENIPUNCTURE: CPT

## 2023-02-02 PROCEDURE — 1200000000 HC SEMI PRIVATE

## 2023-02-02 PROCEDURE — 84550 ASSAY OF BLOOD/URIC ACID: CPT

## 2023-02-02 PROCEDURE — 94640 AIRWAY INHALATION TREATMENT: CPT

## 2023-02-02 RX ADMIN — HYDRALAZINE HYDROCHLORIDE 25 MG: 25 TABLET, FILM COATED ORAL at 15:59

## 2023-02-02 RX ADMIN — POLYETHYLENE GLYCOL 3350 17 G: 17 POWDER, FOR SOLUTION ORAL at 08:59

## 2023-02-02 RX ADMIN — DOCUSATE SODIUM 100 MG: 100 CAPSULE, LIQUID FILLED ORAL at 09:02

## 2023-02-02 RX ADMIN — SODIUM CHLORIDE SOLN NEBU 3% 15 ML: 3 NEBU SOLN at 20:52

## 2023-02-02 RX ADMIN — LACTULOSE 30 G: 20 SOLUTION ORAL at 09:00

## 2023-02-02 RX ADMIN — LACTULOSE 30 G: 20 SOLUTION ORAL at 15:58

## 2023-02-02 RX ADMIN — Medication 1 CAPSULE: at 15:59

## 2023-02-02 RX ADMIN — Medication 1 CAPSULE: at 09:03

## 2023-02-02 RX ADMIN — HEPARIN SODIUM 5000 UNITS: 5000 INJECTION INTRAVENOUS; SUBCUTANEOUS at 09:05

## 2023-02-02 RX ADMIN — OXYCODONE HYDROCHLORIDE AND ACETAMINOPHEN 500 MG: 500 TABLET ORAL at 09:02

## 2023-02-02 RX ADMIN — BISACODYL 5 MG: 5 TABLET, COATED ORAL at 09:03

## 2023-02-02 RX ADMIN — INSULIN GLARGINE 14 UNITS: 100 INJECTION, SOLUTION SUBCUTANEOUS at 09:05

## 2023-02-02 RX ADMIN — CYANOCOBALAMIN TAB 500 MCG 500 MCG: 500 TAB at 09:04

## 2023-02-02 RX ADMIN — ATORVASTATIN CALCIUM 40 MG: 40 TABLET, FILM COATED ORAL at 09:04

## 2023-02-02 RX ADMIN — LACTULOSE 30 G: 20 SOLUTION ORAL at 21:45

## 2023-02-02 RX ADMIN — PANTOPRAZOLE SODIUM 40 MG: 40 TABLET, DELAYED RELEASE ORAL at 05:43

## 2023-02-02 RX ADMIN — TORSEMIDE 100 MG: 20 TABLET ORAL at 09:03

## 2023-02-02 RX ADMIN — SODIUM CHLORIDE, PRESERVATIVE FREE 10 ML: 5 INJECTION INTRAVENOUS at 21:53

## 2023-02-02 RX ADMIN — HYDRALAZINE HYDROCHLORIDE 25 MG: 25 TABLET, FILM COATED ORAL at 21:47

## 2023-02-02 RX ADMIN — HEPARIN SODIUM 5000 UNITS: 5000 INJECTION INTRAVENOUS; SUBCUTANEOUS at 21:49

## 2023-02-02 RX ADMIN — HYDRALAZINE HYDROCHLORIDE 25 MG: 25 TABLET, FILM COATED ORAL at 05:43

## 2023-02-02 RX ADMIN — MONTELUKAST 10 MG: 10 TABLET, FILM COATED ORAL at 09:04

## 2023-02-02 RX ADMIN — TAMSULOSIN HYDROCHLORIDE 0.4 MG: 0.4 CAPSULE ORAL at 09:04

## 2023-02-02 RX ADMIN — SODIUM CHLORIDE, PRESERVATIVE FREE 10 ML: 5 INJECTION INTRAVENOUS at 12:11

## 2023-02-02 RX ADMIN — INSULIN LISPRO 3 UNITS: 100 INJECTION, SOLUTION INTRAVENOUS; SUBCUTANEOUS at 12:02

## 2023-02-02 RX ADMIN — SODIUM CHLORIDE SOLN NEBU 3% 15 ML: 3 NEBU SOLN at 07:35

## 2023-02-02 RX ADMIN — PREDNISONE 20 MG: 20 TABLET ORAL at 09:04

## 2023-02-02 RX ADMIN — DOCUSATE SODIUM 100 MG: 100 CAPSULE, LIQUID FILLED ORAL at 21:47

## 2023-02-02 ASSESSMENT — PAIN SCALES - GENERAL
PAINLEVEL_OUTOF10: 0

## 2023-02-02 NOTE — PROGRESS NOTES
Visit us at [facility]. com or call us at [address] [place] [address] NOTE    Patient: Roby Doshi MRN: [id number]     YOB: 1940  Age: 79 y.o. Sex: male    Unit: [facility] 4 MED SURG Room/Bed: [id number] Location: [address] [place] [address]     Admitting Physician: Jolene Schmidt    Primary Care Physician: Gordon Marvin MD          LOS: 16 days       Reason for evaluation: DEISY on CKD3b/4      SUBJECTIVE:      The patient was seen and examined. Notes and labs reviewed. There were no complications over night.     Patient's review of systems: breathing better, sore on lower back hurts, weak      OBJECTIVE:     Vitals:    02/01/23 2029 02/02/23 0520 02/02/23 0736 02/02/23 0807   BP: (!) 142/62 (!) 150/76  (!) 144/62   Pulse: 83 71 71 79   Resp: 20 18 18 20   Temp: 97.6 °F (36.4 °C) 97.5 °F (36.4 °C)  97.5 °F (36.4 °C)   TempSrc: Oral Oral  Axillary   SpO2: 95% 98% 97% 94%   Weight:  225 lb 1.4 oz (102.1 kg)  219 lb 12.8 oz (99.7 kg)   Height:           Intake and Output:      Intake/Output Summary (Last 24 hours) at 2/2/2023 1237  Last data filed at 2/2/2023 3954  Gross per 24 hour   Intake 750 ml   Output 2800 ml   Net -2050 ml       Patient Vitals for the past 96 hrs (Last 3 readings):   Weight   02/02/23 0807 219 lb 12.8 oz (99.7 kg)   02/02/23 0520 225 lb 1.4 oz (102.1 kg)   02/01/23 0538 221 lb 12.5 oz (100.6 kg)         Medications:   torsemide  100 mg Oral Daily    lactulose  30 g Oral TID    insulin glargine  14 Units SubCUTAneous QAM    sodium chloride (Inhalant)  15 mL Nebulization BID    hydrALAZINE  25 mg Oral 3 times per day    heparin (porcine)  5,000 Units SubCUTAneous 2 times per day    sodium chloride flush  5-40 mL IntraVENous 2 times per day    predniSONE  20 mg Oral Daily    vitamin B-12  500 mcg Oral Daily    vitamin C  500 mg Oral Daily    tamsulosin  0.4 mg Oral Daily    [Held by provider] aspirin EC  162 mg Oral Daily    atorvastatin  40 mg Oral Daily bisacodyl  5 mg Oral Daily    polyethylene glycol  17 g Oral Daily    docusate sodium  100 mg Oral BID    lactobacillus  1 capsule Oral BID WC    linaclotide  145 mcg Oral QAM AC    montelukast  10 mg Oral Daily    pantoprazole  40 mg Oral QAM AC    insulin lispro  0-4 Units SubCUTAneous TID WC    insulin lispro  0-4 Units SubCUTAneous Nightly           Exam:   CONSTITUTIONAL/PSYCHIATRY: awake, alert and oriented x3. Not in acute distress   RESPIRATORY: Respiratory effort: normal. Auscultation: diminished BS at the bases  CARDIOVASCULAR: Auscultation: RRR. Edema: 2+ in UE and LE - improving  GASTROINTESTINAL: Soft, nontender, nondistended. Obese abdomen. EXTREMITIES:  No cyanosis or clubbing. SKIN: Warm and dry. Chronic discoloration in LE, ecchymosis in UE      LABS:   RFP:   Recent Labs     01/31/23  0810 02/01/23  0459 02/02/23  0549   * 147* 146*   K 4.1 3.9 3.7    100 100   CO2 34* 35* 38*   BUN 56* 56* 50*   CREATININE 1.8* 1.8* 1.8*   CALCIUM 8.6 8.2* 7.9*   MG 1.80 1.50* 1.80   PHOS 3.7 3.4 3.5         CBC:   Recent Labs     01/31/23  0809 02/01/23  0459   WBC 6.5 5.4   HGB 8.4* 8.1*   HCT 29.1* 27.6*   MCV 83.8 84.1   * 128*           ASSESSMENT and PLAN:     1- DEISY on CKD3b/4 (baseline SCr ~ 1.8-2.2; followed by dr dAalberto Locke): DEISY was hemodynamic/prerenal (multifactorial: low  BP, ARBs, diuretics)  SCr peaked at 3.2 and is back to baseline. He is significantly fluid overloaded On IV lasix with good response. He is in -12.5L fluid balance. Wt peak at 246, 219 now. -  switched to oral Torsemide 2/1/23. If Hypernatremia worsens will need to stop loop diuretic     2- ESBL UTI: treated with Meropenem - completed     3- Acute on chronic resp failure/HCAP/ADHF on chronic diastolic HF: on diuresis per above.      4- HTN: continue current medications     5- Obesity with JOANN            Signed By: Rita Bansal MD

## 2023-02-02 NOTE — PLAN OF CARE
Problem: Pain  Goal: Verbalizes/displays adequate comfort level or baseline comfort level  2/1/2023 1145 by Hermes Tomlinson RN  Outcome: Progressing     Problem: Safety - Adult  Goal: Free from fall injury  2/1/2023 2157 by Juana Jacob RN  Outcome: Progressing  2/1/2023 1145 by Hermes Tomlinson RN  Outcome: Progressing     Problem: ABCDS Injury Assessment  Goal: Absence of physical injury  2/1/2023 2157 by Juana Jacob RN  Outcome: Progressing  2/1/2023 1145 by Hermes Tomlinson RN  Outcome: Progressing     Problem: Skin/Tissue Integrity  Goal: Absence of new skin breakdown  Description: 1. Monitor for areas of redness and/or skin breakdown  2. Assess vascular access sites hourly  3. Every 4-6 hours minimum:  Change oxygen saturation probe site  4. Every 4-6 hours:  If on nasal continuous positive airway pressure, respiratory therapy assess nares and determine need for appliance change or resting period.   2/1/2023 2157 by Juana Jacob RN  Outcome: Progressing  2/1/2023 1145 by Hermes Tomlinson RN  Outcome: Progressing

## 2023-02-02 NOTE — CARE COORDINATION
Discharge Planning:  KRYSTAL contacted Debby York with [facility] to give her an update on this pt.  [phone]  At this time, plan remains for this pt to go to [email or web address] upon d/c.       Plan: [facility] upon d/c. No COVID test needed. No pre cert needed. Will need HENS and stretcher transport. Awaiting nephrology clearance. Still fluid overloaded per nephrology.   DUDLEY Weathers LS  [phone]  Electronically signed by Lisa Jones on 2/2/2023 at 1:51 PM

## 2023-02-02 NOTE — PROGRESS NOTES
[facility] Internal Medicine Note      Chief Complaint: I feel ok    Subjective/Interval History: This morning the patient is sitting up in bed eating breakfast  He states he is feeling okay. Wife at the bedside. He remains very edematous  Nursing reports she had multiple good bowel movements yesterday, patient does not recall. Patient without any new complaints this morning  He continues to be agreeable to going to [address] home at discharge. Case discussed with Dr. Daya Kitchen in the hallway today. No new interventions from pulmonary standpoint. Weight today 225 pounds, but this was a bed scale weight, down from a max of 241 pounds. 12.8 L negative fluid balance this admission. No chest pain. No nausea, vomiting, diarrhea. No abdominal pain. No dysuria. The remainder of the review of systems is negative. PMH, PSH, FH/SH reviewed and unchanged as documented in the H&P personally documented at admission 23    Medication list reviewed    Objective:    BP (!) 144/62   Pulse 79   Temp 97.5 °F (36.4 °C) (Axillary)   Resp 20   Ht 5' 8\" (1.727 m)   Wt 225 lb 1.4 oz (102.1 kg)   SpO2 94%   BMI 34.22 kg/m²   Temp  Av.5 °F (36.4 °C)  Min: 97.5 °F (36.4 °C)  Max: 97.6 °F (36.4 °C)    CV-regular on exam.  Pulm-respirations remain easy, currently 2 L of oxygen per nasal cannula. Rare scattered wheeze today. Abd- BS+, soft, NTND  Ext-continued 2-3+ edema of his upper and lower extremities. No compression stockings on this morning, lower extremity edema has quickly recurred. Hematoma of the right upper arm and forearm contributing to the swelling of the right arm.     The Following Labs Were Reviewed Today:    Recent Results (from the past 24 hour(s))   POCT Glucose    Collection Time: 23 11:37 AM   Result Value Ref Range    POC Glucose 255 (H) 70 - 99 mg/dl    Performed on ACCU-CHEK    POCT Glucose    Collection Time: 23  3:34 PM   Result Value Ref Range    POC Glucose 264 (H) 70 - 99 mg/dl    Performed on ACCU-CHEK    POCT Glucose    Collection Time: 02/01/23  8:31 PM   Result Value Ref Range    POC Glucose 197 (H) 70 - 99 mg/dl    Performed on ACCU-CHEK    Renal Function Panel    Collection Time: 02/02/23  5:49 AM   Result Value Ref Range    Sodium 146 (H) 136 - 145 mmol/L    Potassium 3.7 3.5 - 5.1 mmol/L    Chloride 100 99 - 110 mmol/L    CO2 38 (H) 21 - 32 mmol/L    Anion Gap 8 3 - 16    Glucose 115 (H) 70 - 99 mg/dL    BUN 50 (H) 7 - 20 mg/dL    Creatinine 1.8 (H) 0.8 - 1.3 mg/dL    Est, Glom Filt Rate 37 (A) >60    Calcium 7.9 (L) 8.3 - 10.6 mg/dL    Phosphorus 3.5 2.5 - 4.9 mg/dL    Albumin 2.8 (L) 3.4 - 5.0 g/dL   Magnesium    Collection Time: 02/02/23  5:49 AM   Result Value Ref Range    Magnesium 1.80 1.80 - 2.40 mg/dL   Uric Acid    Collection Time: 02/02/23  5:49 AM   Result Value Ref Range    Uric Acid, Serum 10.8 (H) 3.5 - 7.2 mg/dL   POCT Glucose    Collection Time: 02/02/23  8:05 AM   Result Value Ref Range    POC Glucose 146 (H) 70 - 99 mg/dl    Performed on ACCU-CHEK        ASSESSMENT/PLAN:      Principal Problem:    Acute on chronic diastolic congestive heart failure -patient changed to torsemide yesterday due to hypernatremia. Continue to monitor. We will have nurses obtain a standing weight today. Active Problems:    Acute on chronic respiratory failure with hypoxia/HCAP (healthcare-associated pneumonia)- antibiotics have been discontinued. Continues with prednisone. Still with some hemoptysis, aspirin was stopped. Procalcitonin was trending down. DEISY-kidney function has remained stable with diuresis    UTI due to extended-spectrum beta lactamase (ESBL) producing Escherichia coli- patient has completed course of meropenem. Chronic anemia-continue to monitor, appreciate Dr. Tyree Snell input. Weekly erythropoietin shots being administered. Chronic GERD-continue Protonix.     Type 2 diabetes mellitus with stage 4 chronic kidney disease, with long-term current use of insulin (HCC)-continue to monitor. Continue with Lantus. Continue sliding scale. Morning sugars remain fairly well controlled. Essential hypertension-blood pressure is running a little high. Hesitate to be more aggressive so we do not impact his renal function. B12 deficiency-continue with oral B12 supplementation. Atrial fibrillation-rate is controlled. Patient is not an anticoagulation candidate due to occult bleeding in the past and chronic iron deficiency. Disposition-unchanged, once stable from a nephrology standpoint we will be able to transition the patient to [facility] for rehab. Remains quite edematous.       Salo Becker MD, FACP  8:54 AM  2/2/2023

## 2023-02-03 LAB
ALBUMIN SERPL-MCNC: 2.9 G/DL (ref 3.4–5)
ANION GAP SERPL CALCULATED.3IONS-SCNC: 14 MMOL/L (ref 3–16)
BUN BLDV-MCNC: 61 MG/DL (ref 7–20)
CALCIUM SERPL-MCNC: 7.9 MG/DL (ref 8.3–10.6)
CHLORIDE BLD-SCNC: 96 MMOL/L (ref 99–110)
CO2: 33 MMOL/L (ref 21–32)
CREAT SERPL-MCNC: 2 MG/DL (ref 0.8–1.3)
GFR SERPL CREATININE-BSD FRML MDRD: 32 ML/MIN/{1.73_M2}
GLUCOSE BLD-MCNC: 134 MG/DL (ref 70–99)
GLUCOSE BLD-MCNC: 139 MG/DL (ref 70–99)
GLUCOSE BLD-MCNC: 237 MG/DL (ref 70–99)
GLUCOSE BLD-MCNC: 298 MG/DL (ref 70–99)
GLUCOSE BLD-MCNC: 337 MG/DL (ref 70–99)
MAGNESIUM: 1.7 MG/DL (ref 1.8–2.4)
PERFORMED ON: ABNORMAL
PHOSPHORUS: 4 MG/DL (ref 2.5–4.9)
POTASSIUM SERPL-SCNC: 3.7 MMOL/L (ref 3.5–5.1)
SODIUM BLD-SCNC: 143 MMOL/L (ref 136–145)
URIC ACID, SERUM: 10.3 MG/DL (ref 3.5–7.2)

## 2023-02-03 PROCEDURE — 99232 SBSQ HOSP IP/OBS MODERATE 35: CPT | Performed by: INTERNAL MEDICINE

## 2023-02-03 PROCEDURE — 94760 N-INVAS EAR/PLS OXIMETRY 1: CPT

## 2023-02-03 PROCEDURE — 2580000003 HC RX 258: Performed by: INTERNAL MEDICINE

## 2023-02-03 PROCEDURE — 1200000000 HC SEMI PRIVATE

## 2023-02-03 PROCEDURE — 36415 COLL VENOUS BLD VENIPUNCTURE: CPT

## 2023-02-03 PROCEDURE — 6370000000 HC RX 637 (ALT 250 FOR IP): Performed by: INTERNAL MEDICINE

## 2023-02-03 PROCEDURE — 2700000000 HC OXYGEN THERAPY PER DAY

## 2023-02-03 PROCEDURE — 6360000002 HC RX W HCPCS: Performed by: INTERNAL MEDICINE

## 2023-02-03 PROCEDURE — 94669 MECHANICAL CHEST WALL OSCILL: CPT

## 2023-02-03 PROCEDURE — 97530 THERAPEUTIC ACTIVITIES: CPT

## 2023-02-03 PROCEDURE — 80069 RENAL FUNCTION PANEL: CPT

## 2023-02-03 PROCEDURE — 84550 ASSAY OF BLOOD/URIC ACID: CPT

## 2023-02-03 PROCEDURE — 97116 GAIT TRAINING THERAPY: CPT

## 2023-02-03 PROCEDURE — 94640 AIRWAY INHALATION TREATMENT: CPT

## 2023-02-03 PROCEDURE — 97535 SELF CARE MNGMENT TRAINING: CPT

## 2023-02-03 PROCEDURE — 83735 ASSAY OF MAGNESIUM: CPT

## 2023-02-03 RX ADMIN — HEPARIN SODIUM 5000 UNITS: 5000 INJECTION INTRAVENOUS; SUBCUTANEOUS at 08:53

## 2023-02-03 RX ADMIN — INSULIN LISPRO 2 UNITS: 100 INJECTION, SOLUTION INTRAVENOUS; SUBCUTANEOUS at 12:13

## 2023-02-03 RX ADMIN — LACTULOSE 30 G: 20 SOLUTION ORAL at 08:56

## 2023-02-03 RX ADMIN — PREDNISONE 20 MG: 20 TABLET ORAL at 08:55

## 2023-02-03 RX ADMIN — INSULIN LISPRO 4 UNITS: 100 INJECTION, SOLUTION INTRAVENOUS; SUBCUTANEOUS at 21:17

## 2023-02-03 RX ADMIN — SODIUM CHLORIDE SOLN NEBU 3% 15 ML: 3 NEBU SOLN at 21:02

## 2023-02-03 RX ADMIN — POLYETHYLENE GLYCOL 3350 17 G: 17 POWDER, FOR SOLUTION ORAL at 08:55

## 2023-02-03 RX ADMIN — BISACODYL 5 MG: 5 TABLET, COATED ORAL at 08:55

## 2023-02-03 RX ADMIN — Medication 1 CAPSULE: at 17:29

## 2023-02-03 RX ADMIN — LACTULOSE 30 G: 20 SOLUTION ORAL at 21:17

## 2023-02-03 RX ADMIN — INSULIN LISPRO 1 UNITS: 100 INJECTION, SOLUTION INTRAVENOUS; SUBCUTANEOUS at 17:31

## 2023-02-03 RX ADMIN — SODIUM CHLORIDE SOLN NEBU 3% 15 ML: 3 NEBU SOLN at 09:10

## 2023-02-03 RX ADMIN — INSULIN GLARGINE 14 UNITS: 100 INJECTION, SOLUTION SUBCUTANEOUS at 08:57

## 2023-02-03 RX ADMIN — PANTOPRAZOLE SODIUM 40 MG: 40 TABLET, DELAYED RELEASE ORAL at 05:27

## 2023-02-03 RX ADMIN — Medication 1 CAPSULE: at 08:54

## 2023-02-03 RX ADMIN — LEVALBUTEROL 1.25 MG: 1.25 SOLUTION, CONCENTRATE RESPIRATORY (INHALATION) at 21:02

## 2023-02-03 RX ADMIN — HYDRALAZINE HYDROCHLORIDE 25 MG: 25 TABLET, FILM COATED ORAL at 17:30

## 2023-02-03 RX ADMIN — CYANOCOBALAMIN TAB 500 MCG 500 MCG: 500 TAB at 08:56

## 2023-02-03 RX ADMIN — HYDRALAZINE HYDROCHLORIDE 25 MG: 25 TABLET, FILM COATED ORAL at 21:17

## 2023-02-03 RX ADMIN — HEPARIN SODIUM 5000 UNITS: 5000 INJECTION INTRAVENOUS; SUBCUTANEOUS at 21:24

## 2023-02-03 RX ADMIN — DOCUSATE SODIUM 100 MG: 100 CAPSULE, LIQUID FILLED ORAL at 08:54

## 2023-02-03 RX ADMIN — MONTELUKAST 10 MG: 10 TABLET, FILM COATED ORAL at 08:55

## 2023-02-03 RX ADMIN — DOCUSATE SODIUM 100 MG: 100 CAPSULE, LIQUID FILLED ORAL at 21:17

## 2023-02-03 RX ADMIN — LACTULOSE 30 G: 20 SOLUTION ORAL at 17:30

## 2023-02-03 RX ADMIN — OXYCODONE HYDROCHLORIDE AND ACETAMINOPHEN 500 MG: 500 TABLET ORAL at 08:54

## 2023-02-03 RX ADMIN — TORSEMIDE 100 MG: 20 TABLET ORAL at 08:53

## 2023-02-03 RX ADMIN — HYDRALAZINE HYDROCHLORIDE 25 MG: 25 TABLET, FILM COATED ORAL at 05:27

## 2023-02-03 RX ADMIN — ATORVASTATIN CALCIUM 40 MG: 40 TABLET, FILM COATED ORAL at 08:56

## 2023-02-03 RX ADMIN — TAMSULOSIN HYDROCHLORIDE 0.4 MG: 0.4 CAPSULE ORAL at 08:54

## 2023-02-03 RX ADMIN — SODIUM CHLORIDE, PRESERVATIVE FREE 10 ML: 5 INJECTION INTRAVENOUS at 21:23

## 2023-02-03 NOTE — PLAN OF CARE
Problem: Discharge Planning  Goal: Discharge to home or other facility with appropriate resources  Outcome: Progressing     Problem: Pain  Goal: Verbalizes/displays adequate comfort level or baseline comfort level  Outcome: Progressing     Problem: Safety - Adult  Goal: Free from fall injury  Outcome: Progressing     Problem: ABCDS Injury Assessment  Goal: Absence of physical injury  Outcome: Progressing     Problem: Skin/Tissue Integrity  Goal: Absence of new skin breakdown  Description: 1. Monitor for areas of redness and/or skin breakdown  2. Assess vascular access sites hourly  3. Every 4-6 hours minimum:  Change oxygen saturation probe site  4. Every 4-6 hours:  If on nasal continuous positive airway pressure, respiratory therapy assess nares and determine need for appliance change or resting period.   Outcome: Progressing     Problem: Infection - Adult  Goal: Absence of infection at discharge  Outcome: Progressing  Goal: Absence of infection during hospitalization  Outcome: Progressing  Goal: Absence of fever/infection during anticipated neutropenic period  Outcome: Progressing     Problem: Genitourinary - Adult  Goal: Absence of urinary retention  Outcome: Progressing  Goal: Urinary catheter remains patent  Outcome: Progressing     Problem: Metabolic/Fluid and Electrolytes - Adult  Goal: Electrolytes maintained within normal limits  Outcome: Progressing  Goal: Hemodynamic stability and optimal renal function maintained  Outcome: Progressing  Goal: Glucose maintained within prescribed range  Outcome: Progressing     Problem: Nutrition Deficit:  Goal: Optimize nutritional status  Outcome: Progressing  Flowsheets (Taken 2/3/2023 1010 by Gabriel Benitez)  Nutrient intake appropriate for improving, restoring, or maintaining nutritional needs:   Assess nutritional status and recommend course of action   Monitor oral intake, labs, and treatment plans   Recommend appropriate diets, oral nutritional supplements, and vitamin/mineral supplements     Problem: Chronic Conditions and Co-morbidities  Goal: Patient's chronic conditions and co-morbidity symptoms are monitored and maintained or improved  Outcome: Progressing

## 2023-02-03 NOTE — PROGRESS NOTES
Attempted to have pt urinate in urinal to check I&O. Pt non compliant and voiding in the toilet. Not been able to track Voiding amounts.

## 2023-02-03 NOTE — PLAN OF CARE
Problem: Discharge Planning  Goal: Discharge to home or other facility with appropriate resources  2/3/2023 0026 by Andra Mckinnon RN  Outcome: Progressing     Problem: Pain  Goal: Verbalizes/displays adequate comfort level or baseline comfort level  2/3/2023 0026 by Andra Mckinnon RN  Outcome: Progressing     Problem: Safety - Adult  Goal: Free from fall injury  2/3/2023 0026 by Andra Mckinnon RN  Outcome: Progressing     Problem: ABCDS Injury Assessment  Goal: Absence of physical injury  2/3/2023 0026 by Andra Mckinnon RN  Outcome: Progressing     Problem: Skin/Tissue Integrity  Goal: Absence of new skin breakdown  Description: 1. Monitor for areas of redness and/or skin breakdown  2. Assess vascular access sites hourly  3. Every 4-6 hours minimum:  Change oxygen saturation probe site  4. Every 4-6 hours:  If on nasal continuous positive airway pressure, respiratory therapy assess nares and determine need for appliance change or resting period.   2/3/2023 0026 by Andra Mckinnon RN  Outcome: Progressing     Problem: Infection - Adult  Goal: Absence of infection at discharge  2/3/2023 0026 by Andra Mckinnon RN  Outcome: Progressing     Problem: Infection - Adult  Goal: Absence of infection during hospitalization  2/3/2023 0026 by Andra Mckinnon RN  Outcome: Progressing     Problem: Infection - Adult  Goal: Absence of fever/infection during anticipated neutropenic period  2/3/2023 0026 by Andra Mckinnon RN  Outcome: Progressing     Problem: Genitourinary - Adult  Goal: Absence of urinary retention  2/3/2023 0026 by Andra Mckinnon RN  Outcome: Progressing  Flowsheets (Taken 2/2/2023 2145)  Absence of urinary retention: Assess patients ability to void and empty bladder     Problem: Genitourinary - Adult  Goal: Urinary catheter remains patent  2/3/2023 0026 by Andra Mckinnon RN  Outcome: Progressing  Flowsheets (Taken 2/2/2023 2145)  Urinary catheter remains patent: Assess patency of urinary catheter     Problem: Metabolic/Fluid and Electrolytes - Adult  Goal: Electrolytes maintained within normal limits  2/3/2023 0026 by Emma Jones RN  Outcome: Progressing     Problem: Metabolic/Fluid and Electrolytes - Adult  Goal: Hemodynamic stability and optimal renal function maintained  2/3/2023 0026 by Emma Jones RN  Outcome: Progressing     Problem: Nutrition Deficit:  Goal: Optimize nutritional status  2/3/2023 0026 by Emma Jones RN  Outcome: Progressing     Problem: Chronic Conditions and Co-morbidities  Goal: Patient's chronic conditions and co-morbidity symptoms are monitored and maintained or improved  2/3/2023 0026 by Emma Jones RN  Outcome: Progressing

## 2023-02-03 NOTE — PROGRESS NOTES
Physical Therapy  Facility/Department: [address] MED SURG  Physical Therapy Daily Note  If patient discharges prior to next session this note will serve as a discharge summary. Please see below for the latest assessment towards goals. Name: Angeles Kang  : 1940  MRN: [id number]  Date of Service: 2/3/2023    Discharge Recommendations:  Continue to assess pending progress, Home with Home health PT, Patient would benefit from continued therapy after discharge, 24 hour supervision or assist (or 3-5)   Angeles Kang scored a 17/24 on the AM-PAC short mobility form. Please see assessment section for further patient specific details. PT Equipment Recommendations  Equipment Needed: No      Patient Diagnosis(es): The primary encounter diagnosis was Acute on chronic congestive heart failure, unspecified heart failure type ([facility] [address] Diagnoses of Elevated troponin, Chronic kidney disease, unspecified CKD stage, and Urinary tract infection with hematuria, site unspecified were also pertinent to this visit. Past Medical History:  has a past medical history of Allergic rhinitis, Asthma, Atrial fibrillation ([address] Carotid artery stenosis, Chronic kidney disease, COPD (chronic obstructive pulmonary disease) ([facility] [address] CPAP (continuous positive airway pressure) dependence, ESBL (extended spectrum beta-lactamase) producing bacteria infection, Hyperlipidemia, Hypertension, Iron deficiency anemia, Obstructive sleep apnea, and Type II or unspecified type diabetes mellitus without mention of complication, not stated as uncontrolled. Past Surgical History:  has a past surgical history that includes Gallbladder surgery (); Upper gastrointestinal endoscopy (7-2005    7/10/2009); Colonoscopy (7/10/2009); Cataract removal (2012); Pain management procedure (Right, 10/20/2021);  Pain management procedure (Left, 2021); CT BIOPSY BONE MARROW (2022); bronchoscopy (N/A, 2022); and bronchoscopy (12/28/2022). Assessment   Assessment: Today, the pt showed improvement and did not need encouragement to participate in therapy session. The pt completed bed mobility with SBA; transfers with SBA and ambulated multiple times in the room with the walker and CGA/SBA. The pt did request that his O2 be turned up to 5L with activity (from his baseline 3L) and he needed asisst for O2 line management. If the pt con't to make improvement in the next couple of days he may be able to go directly home with home PT, level 3 instead of 3-5. Will con't to follow. Therapy Prognosis: Good  Barriers to Learning: resistance to education  Requires PT Follow-Up: Yes  Activity Tolerance  Activity Tolerance: Patient limited by fatigue;Patient limited by endurance  Activity Tolerance Comments: O2 increased to 5L from 3L with activty per pt's request     Plan   Physcial Therapy Plan  General Plan: 3-5 times per week  Current Treatment Recommendations: Strengthening, Balance training, Functional mobility training, Transfer training, Gait training, Therapeutic activities, Safety education & training, Patient/Caregiver education & training  Safety Devices  Type of Devices: Call light within reach, Bed alarm in place, Patient at risk for falls, Left in bed (the pt refused the use of the gait belt \"I don't need that\")     Restrictions  Restrictions/Precautions  Restrictions/Precautions: Fall Risk, Bed Alarm, Up as Tolerated, Contact Precautions  Position Activity Restriction  Other position/activity restrictions: Contact Precautions (ESBL); 3L O2(up to 5L with activity per pt request)     Subjective   General  Chart Reviewed: Yes  Additional Pertinent Hx: Per Jayna Ryan MD H&P on 1-: The pt is an 80 yo male who presented to the ED with increasing SOB and O2 needs.  Troponins were elevated, he has had increased weight, had elevated BNP, urinalysis suggested infection and CXR: \"shows either multifocal pneumonia or heart failure\". The pt recently in the hospital and on ARU, was d/c on 12-8-2022 and readmitted on 12-. The pt had been d/c to home each time with family. PMHx: asthma, a-fib, CKD, carotid artery stenosis, COPD, CHF, HTN, anemia, JOANN, DM  Response To Previous Treatment: Patient with no complaints from previous session.   Family / Caregiver Present: No  Referring Practitioner: Gabo Montanez MD  Referral Date : 01/17/23  Diagnosis: Acute on chronic diastolic congestive heart failure, Acute on chronic respiratory failure with hypoxia  Follows Commands: Within Functional Limits  Subjective  Subjective: the pt was found to be in the bed and just finished with respiratory treatment; he reports feeling good today and was agreeable to therapy         Social/Functional History  Social/Functional History  Lives With: Spouse  Type of Home: House  Home Layout: Able to Live on Main level with bedroom/bathroom, Multi-level  Home Access: Stairs to enter with rails  Entrance Stairs - Number of Steps: 1+1  Entrance Stairs - Rails: Both  Bathroom Shower/Tub: Tub/Shower unit  Bathroom Toilet: Standard  Bathroom Equipment: Shower chair, Grab bars in shower, Tub transfer bench  Home Equipment: Cane, Rollator, Oxygen, Grab bars (2L O2)  Has the patient had two or more falls in the past year or any fall with injury in the past year?: Yes  ADL Assistance: Needs assistance (assist LB)  Homemaking Assistance: Needs assistance (wife cleans, pt manages bill paying)  Homemaking Responsibilities: No  Ambulation Assistance: Independent (with rollator)  Transfer Assistance: Independent  Active : Yes  Occupation: Retired  Type of Occupation: [employer]  [address] [place] [address] Avenue: [employer], [address] saw  IADL Comments: sleeps in a recliner but reports he does get into bed on a regular basis    Cognition   Orientation  Overall Orientation Status: Within Functional Limits  Cognition  Arousal/Alertness: Appropriate responses to stimuli  Following Commands: Follows one step commands consistently  Safety Judgement: Decreased awareness of need for safety;Decreased awareness of need for assistance  Insights: Decreased awareness of deficits  Initiation: Requires cues for some     Objective     Bed mobility  Supine to Sit: Stand by assistance (HOB slightly elevated, used the rail)  Sit to Supine: Stand by assistance (bed flat; used the rail)  Scooting: Stand by assistance  Transfers  Sit to Stand: Stand by assistance  Stand to Sit: Stand by assistance  Ambulation  Surface: Level tile  Device: Rolling Walker  Other Apparatus: O2 (line managed by therapist)  Assistance: Contact guard assistance;Stand by assistance  Quality of Gait: the pt is impulsive and goes before therapist is ready; assist to safely manage O2 tubing  Distance: 8 feet x 1, 60 feet x 1, 35 feet x 1, 15 feet x 1  Comments: the pt requested to increased O2 from 3L to 5L with walking but put back on 3L at end of session  More Ambulation?: No  Stairs/Curb  Stairs?: No     Balance  Sitting - Static: Good  Sitting - Dynamic: Good  Standing - Static: Good;-  Standing - Dynamic: Fair (with walker)  Comments: the pt had to sit at the sink for grooming due to decreased activity tolerance; knees shaky when 1st standing at the walker but no LOB or knee buckling noted           AM-PAC Score  AM-PAC Inpatient Mobility Raw Score : 17 (02/03/23 0943)  AM-PAC Inpatient T-Scale Score : 42.13 (02/03/23 0943)  Mobility Inpatient CMS 0-100% Score: 50.57 (02/03/23 0943)  Mobility Inpatient CMS G-Code Modifier : CK (02/03/23 0943)          Goals  Short Term Goals  Time Frame for Short Term Goals: upon d/c  (slow progression toward goals)  Short Term Goal 1: Bed mobility with mod I.   Short Term Goal 2: Transfer sit <> stand to a walker with SBA.  (met)  Short Term Goal 3: Ambulate with wh walker 25 feet with SBA. (partially met)  Patient Goals   Patient Goals : to go home       Education  Patient Education  Education Given To: Patient  Education Provided: Role of Therapy;Plan of Care;Home Exercise Program;Energy Conservation  Education Method: Verbal  Barriers to Learning: None  Education Outcome: Demonstrated understanding;Verbalized understanding      Therapy Time   Individual Concurrent Group Co-treatment   Time In 0910         Time Out 1000         Minutes 50               Electronically signed by Kaur Little, PT [id number] on 2/3/2023 at 10:05 AM

## 2023-02-03 NOTE — PROGRESS NOTES
Comprehensive Nutrition Assessment    Type and Reason for Visit:  Reassess    Nutrition Recommendations/Plan:   Continue Regular, No Added Salt (3-4 gm) Diet  Continue Milton BID  Start Nepro BID     Malnutrition Assessment:  Malnutrition Status: At risk for malnutrition (Comment) (01/19/23 4420)    Context:  Acute Illness     Findings of the 6 clinical characteristics of malnutrition:  Energy Intake:  Mild decrease in energy intake (Comment)  Weight Loss:  Greater than 7.5% over 3 months     Body Fat Loss:  No significant body fat loss     Muscle Mass Loss:  No significant muscle mass loss    Fluid Accumulation:  Mild Extremities   Strength:  Not Performed    Nutrition Assessment:    F/U. Pt reports adequate appetite. Per chart review, PO intakes are variable. Pt would benefit from Nepro BID. Reports that he's tolerating Milton. Re-educated on no added salt diet. Will continue to monitor. Nutrition Related Findings:    Labs reviewed. POCG 139. +2 pitting, weeping BLE and BUE edema. LBM 1/30. Wound Type: Stage II, Pressure Injury       Current Nutrition Intake & Therapies:    Average Meal Intake: 1-25%, 26-50%, 51-75% (per EMR)  Average Supplements Intake: % (per pt report)  ADULT DIET; Regular; No Added Salt (3-4 gm)  ADULT ORAL NUTRITION SUPPLEMENT; Breakfast, Dinner; Wound Healing Oral Supplement    Anthropometric Measures:  Height: 5' 8\" (172.7 cm)  Ideal Body Weight (IBW): 154 lbs (70 kg)    Current Body Weight: 220 lb (99.8 kg), 156.5 % IBW. Weight Source: Standing Scale  Current BMI (kg/m2): 33.5  Usual Body Weight: 274 lb (124.3 kg)  % Weight Change (Calculated): -15.7  Weight Adjustment For: No Adjustment  BMI Categories: Obese Class 1 (BMI 30.0-34. 9)    Estimated Daily Nutrient Needs:  Energy Requirements Based On: Kcal/kg  Weight Used for Energy Requirements: Current  Energy (kcal/day): 7741-7468 kcals/day (15-18 kcals/kg CBW)  Weight Used for Protein Requirements: Ideal  Protein (g/day):  gm (1.2-2 g/kg IBW)  Method Used for Fluid Requirements: 1 ml/kcal  Fluid (ml/day): 1034-6757 mL/day    Nutrition Diagnosis:   Increased nutrient needs related to impaired nutrient utilization as evidenced by wounds  Inadequate oral intake related to inadequate protein-energy intake as evidenced by intake 0-25%, intake 26-50%, intake 51-75%    Nutrition Interventions:   Food and/or Nutrient Delivery: Continue Current Diet, Continue Oral Nutrition Supplement  Nutrition Education/Counseling: Education completed  Coordination of Nutrition Care: Continue to monitor while inpatient    Goals:  Previous Goal Met: No Progress toward Goal(s)  Goals: PO intake 50% or greater, by next RD assessment    Nutrition Monitoring and Evaluation:   Behavioral-Environmental Outcomes: None Identified  Food/Nutrient Intake Outcomes: Food and Nutrient Intake, Supplement Intake  Physical Signs/Symptoms Outcomes: Biochemical Data, Nutrition Focused Physical Findings, Skin, Weight, Fluid Status or Edema, Meal Time Behavior    Discharge Planning:     Too soon to determine     [address]: [id number]

## 2023-02-03 NOTE — PROGRESS NOTES
Occupational Therapy  Facility/Department: [facility]  Occupational Therapy Daily Treatment Note    Name: Edyta Piper  : 1940  MRN: [id number]  Date of Service: 2/3/2023    Discharge Recommendations:  Continue to assess pending progress, Patient would benefit from continued therapy after discharge (see assessment for recommendations)  OT Equipment Recommendations  Other: TBD       Patient Diagnosis(es): The primary encounter diagnosis was Acute on chronic congestive heart failure, unspecified heart failure type ([facility] [address] Diagnoses of Elevated troponin, Chronic kidney disease, unspecified CKD stage, and Urinary tract infection with hematuria, site unspecified were also pertinent to this visit. Past Medical History:  has a past medical history of Allergic rhinitis, Asthma, Atrial fibrillation ([facility] [address] Carotid artery stenosis, Chronic kidney disease, COPD (chronic obstructive pulmonary disease) ([facility] [address] CPAP (continuous positive airway pressure) dependence, ESBL (extended spectrum beta-lactamase) producing bacteria infection, Hyperlipidemia, Hypertension, Iron deficiency anemia, Obstructive sleep apnea, and Type II or unspecified type diabetes mellitus without mention of complication, not stated as uncontrolled. Past Surgical History:  has a past surgical history that includes Gallbladder surgery (); Upper gastrointestinal endoscopy (7-2005    7/10/2009); Colonoscopy (7/10/2009); Cataract removal (2012); Pain management procedure (Right, 10/20/2021); Pain management procedure (Left, 2021); CT BIOPSY BONE MARROW (2022); bronchoscopy (N/A, 2022); and bronchoscopy (2022). Assessment   Performance deficits / Impairments: Decreased functional mobility ; Decreased ADL status; Decreased strength;Decreased ROM; Decreased balance;Decreased endurance;Decreased fine motor control;Decreased safe awareness  Assessment: Pt making progress towards goals.  Pt reports feeling better and more motivated to participate today. Pt demo'd improved fxl transfers with SBA, and increased standing tolerance/balance to complete fxl mobility further room distances with RW and SBA/CGA. Pt sat at sink for grooming with mod I. Pt increased LB dressing to min A. Anticipate pt would require overall min/mod A for ADLs. Cont per OT POC to address the above limitations and maximize pt's safety and independence. Discussed d/c plans with pt, who is hopeful he can go home at d/c instead of SNF. Pt did improve today, and anticipate if pt continues to improve he could return home with increased family assist and home OT (level 3 home care) to ensure safe transition home. Prognosis: Fair  History: see above  REQUIRES OT FOLLOW-UP: Yes  Activity Tolerance  Activity Tolerance: Patient Tolerated treatment well;Patient limited by fatigue        Plan   Occupational Therapy Plan  Times Per Week: 3-5  Current Treatment Recommendations: Strengthening, Balance training, Functional mobility training, Endurance training, Self-Care / ADL, Safety education & training, Equipment evaluation, education, & procurement, ROM     Restrictions  Restrictions/Precautions  Restrictions/Precautions: Fall Risk, Bed Alarm, Up as Tolerated, Contact Precautions  Position Activity Restriction  Other position/activity restrictions: Contact Precautions (ESBL); 3L O2(up to 5L with activity per pt request)    Subjective   General  Chart Reviewed: Yes  Additional Pertinent Hx: Per Leland Elizabeth MD's H&P: Loida Palacios is an 70-year-old white male with a history of chronic diastolic heart failure, diabetes, chronic kidney disease stage IIIb who recently was hospitalized with congestive heart failure and hemoptysis and pneumonia growing Moraxella catarrhalis, who presented to the emergency room with increasing shortness of breath and increased oxygen requirement. Malia Brewer Work-up in the emergency room revealed an elevated BNP compared to prior, a procalcitonin that was slightly elevated at 0.37, urinalysis that suggested infection and chest x-ray that shows either multifocal pneumonia or heart failure. \"  Family / Caregiver Present: Yes (wife)  Referring Practitioner: Gabo Montanez MD  Diagnosis: Acute on chronic diastolic CHF  Subjective  Subjective: Pt met b/s for OT tx. Pt in bed on arrival, agreeable to participate in therapy. Pt reports feeling better today. Social/Functional History  Social/Functional History  Lives With: Spouse  Type of Home: House  Home Layout: Able to Live on Main level with bedroom/bathroom, Multi-level  Home Access: Stairs to enter with rails  Entrance Stairs - Number of Steps: 1+1  Entrance Stairs - Rails: Both  Bathroom Shower/Tub: Tub/Shower unit  Bathroom Toilet: Standard  Bathroom Equipment: Shower chair, Grab bars in shower, Tub transfer bench  Home Equipment: Cane, Rollator, Oxygen, Grab bars (2L O2)  Has the patient had two or more falls in the past year or any fall with injury in the past year?: Yes  ADL Assistance: Needs assistance (assist LB)  Homemaking Assistance: Needs assistance (wife cleans, pt manages bill paying)  Homemaking Responsibilities: No  Ambulation Assistance: Independent (with rollator)  Transfer Assistance: Independent  Active : Yes  Occupation: Retired  Type of Occupation: built [employer]  [address] [place] [address] Avenue: wood carving, kindra saw  IADL Comments: sleeps in a recliner but reports he does get into bed on a regular basis       Objective     Safety Devices  Type of Devices: Call light within reach; Bed alarm in place; Patient at risk for falls; Left in bed (the pt refused the use of the gait belt \"I don't need that\")    ADL  Grooming: Modified independent   Grooming Skilled Clinical Factors: seated at sink to brush teeth/use mouthwash, comb hair  UE Dressing: Setup  UE Dressing Skilled Clinical Factors: to don shirt seated EOB  LE Dressing: Minimal assistance  LE Dressing Skilled Clinical Factors: donned boxers with SBA and increased time to thread B LE's, managed over hips SBA at RW, assist for socks  Toileting: Dependent/Total  Toileting Skilled Clinical Factors: catheter    Activity Tolerance  Activity Tolerance: Patient limited by fatigue;Patient limited by endurance  Activity Tolerance Comments: O2 increased to 5L from 3L with activty per pt's request    Bed mobility  Supine to Sit: Stand by assistance (HOB slightly elevated, used the rail)  Sit to Supine: Stand by assistance (bed flat; used the rail)  Scooting: Stand by assistance    Transfers  Sit to stand: Stand by assistance  Stand to sit: Stand by assistance  Transfer Comments: to/from RW    Cognition  Arousal/Alertness: Appropriate responses to stimuli  Following Commands: Follows one step commands consistently  Safety Judgement: Decreased awareness of need for safety;Decreased awareness of need for assistance  Insights: Decreased awareness of deficits  Initiation: Requires cues for some  Cognition Comment: decreased insight, resistant to education  Orientation  Overall Orientation Status: Within Functional Limits    Functional Mobility:  Pt completed fxl mobility ~8 ft, ~60 ft, ~35 ft, ~15 ft with RW and SBA/CGA. Pt requested to increase 02 to 5 L with ambulation (reports he does this at home).   Static Sitting Balance: independent at EOB  Static Standing Balance: SBA  Dynamic Standing Balance : SBA/CGA with RW    Education Given To: Patient  Education Provided: Role of Therapy;Plan of Care;ADL Adaptive Strategies;Transfer Training  Education Method: Demonstration;Verbal  Barriers to Learning: Cognition (decreased insight)  Education Outcome: Continued education needed                            AM-PAC Score        AM-PAC Inpatient Daily Activity Raw Score: 18 (02/03/23 1007)  AM-PAC Inpatient ADL T-Scale Score : 38.66 (02/03/23 1007)  ADL Inpatient CMS 0-100% Score: 46.65 (02/03/23 1007)  ADL Inpatient CMS G-Code Modifier : CK (02/03/23 1007)        Goals  Short Term Goals  Time Frame for Short Term Goals: Prior to d/c: status goals 2/3:   Short Term Goal 1: Pt will complete LB ADLs with mod A. Goal met once 2/3 and ongoing. Short Term Goal 2: Pt will complete UB ADLs with set-up/SBA. -goal met 2/3. Short Term Goal 3: Pt will complete toileting with CGA/min A.-goal ongoing. Short Term Goal 4: Pt will complete fxl mobility and fxl transfers to/from ADL surfaces with SBA using AD.-goal ongoing  Short Term Goal 5: Pt will tolerate standing >3 minutes for functional task with SBA to improve standing tolerance for ADL routine.-goal ongoing  Long Term Goals  Time Frame for Long Term Goals : STGs=LTGs  Patient Goals   Patient goals : to return home. Therapy Time   Individual Concurrent Group Co-treatment   Time In 0910         Time Out 1000         Minutes 50               This note to serve as OT d/c summary if pt is d/c-ed prior to next therapy session.     Bg Hui, OTR/L 2341

## 2023-02-03 NOTE — PROGRESS NOTES
[facility] Internal Medicine Note      Chief Complaint: I feel ok    Subjective/Interval History:    Patient stable, up in chair today. He has no new complaints. Arms remain very edematous. Leg edema improved with compression stockings. No new problems overnight. Discussed with nursing at the bedside. Weight today 220 pounds, standing scale, down from a max of 241 pounds. 14 L negative fluid balance this admission. No chest pain. No nausea, vomiting, diarrhea. No abdominal pain. No dysuria. The remainder of the review of systems is negative. PMH, PSH, FH/SH reviewed and unchanged as documented in the H&P personally documented at admission 23    Medication list reviewed    Objective:    BP (!) 145/74   Pulse 74   Temp 97.4 °F (36.3 °C) (Oral)   Resp 20   Ht 5' 8\" (1.727 m)   Wt 220 lb 3.8 oz (99.9 kg)   SpO2 98%   BMI 33.49 kg/m²   Temp  Av.5 °F (36.4 °C)  Min: 97.4 °F (36.3 °C)  Max: 97.6 °F (36.4 °C)    CV-regular on exam.  Pulm-respirations remain easy, currently 3 L of oxygen per nasal cannula. Rare scattered wheeze today. Abd- BS+, soft, NTND  Ext-continued 2-3+ edema of his upper and lower extremities. Compression stockings in place. Edema below the knee controlled.     The Following Labs Were Reviewed Today:    Recent Results (from the past 24 hour(s))   POCT Glucose    Collection Time: 23 11:17 AM   Result Value Ref Range    POC Glucose 344 (H) 70 - 99 mg/dl    Performed on ACCU-CHEK    POCT Glucose    Collection Time: 23  3:20 PM   Result Value Ref Range    POC Glucose 131 (H) 70 - 99 mg/dl    Performed on ACCU-CHEK    POCT Glucose    Collection Time: 23  9:27 PM   Result Value Ref Range    POC Glucose 257 (H) 70 - 99 mg/dl    Performed on ACCU-CHEK    Renal Function Panel    Collection Time: 23  4:39 AM   Result Value Ref Range    Sodium 143 136 - 145 mmol/L    Potassium 3.7 3.5 - 5.1 mmol/L    Chloride 96 (L) 99 - 110 mmol/L    CO2 33 (H) 21 - 32 mmol/L    Anion Gap 14 3 - 16    Glucose 134 (H) 70 - 99 mg/dL    BUN 61 (H) 7 - 20 mg/dL    Creatinine 2.0 (H) 0.8 - 1.3 mg/dL    Est, Glom Filt Rate 32 (A) >60    Calcium 7.9 (L) 8.3 - 10.6 mg/dL    Phosphorus 4.0 2.5 - 4.9 mg/dL    Albumin 2.9 (L) 3.4 - 5.0 g/dL   Magnesium    Collection Time: 02/03/23  4:39 AM   Result Value Ref Range    Magnesium 1.70 (L) 1.80 - 2.40 mg/dL   Uric Acid    Collection Time: 02/03/23  4:39 AM   Result Value Ref Range    Uric Acid, Serum 10.3 (H) 3.5 - 7.2 mg/dL       ASSESSMENT/PLAN:      Principal Problem:    Acute on chronic diastolic congestive heart failure -patient now on torsemide. Weight is stable. Active Problems:    Acute on chronic respiratory failure with hypoxia/HCAP (healthcare-associated pneumonia)- antibiotics have been discontinued. Continues with prednisone. Still with some hemoptysis, aspirin was stopped. DEISY-kidney function has remained stable with diuresis. No changes needed. UTI due to extended-spectrum beta lactamase (ESBL) producing Escherichia coli- patient has completed course of meropenem. Chronic anemia-continue to monitor, appreciate Dr. Akilah Thomas input. Weekly erythropoietin shots being administered. Chronic GERD-continue Protonix. Hypomagnesemia-replace per protocol. Type 2 diabetes mellitus with stage 4 chronic kidney disease, with long-term current use of insulin ([facility])-continue to monitor. Continue with Lantus. Continue sliding scale. Morning sugars remain fairly well controlled. Essential hypertension-blood pressure is running a little high. Continue to hesitate to be more aggressive so we do not impact his renal function. B12 deficiency-continue with oral B12 supplementation. Atrial fibrillation-rate is controlled. Patient is not an anticoagulation candidate due to occult bleeding in the past and chronic iron deficiency. Disposition-getting close to discharge.   Continue with oral torsemide.       Salo Becker MD, FACP  8:14 AM  2/3/2023

## 2023-02-03 NOTE — PROGRESS NOTES
Visit us at [facility]. com or call us at [address] [place] [address] NOTE    Patient: Cinthya Blanc MRN: [id number]     YOB: 1940  Age: 79 y.o. Sex: male    Unit: [facility] 4 MED SURG Room/Bed: [id number]/2391-85 Location: [address] [place] [address]     Admitting Physician: Verenice Manning    Primary Care Physician: Raudel Milner MD          LOS: 17 days       Reason for evaluation: DEISY on CKD3b/4      SUBJECTIVE:      The patient was seen and examined. Notes and labs reviewed. There were no complications over night.     Patient's review of systems: breathing better, sore on lower back hurts, weak      OBJECTIVE:     Vitals:    02/02/23 2053 02/02/23 2127 02/03/23 0424 02/03/23 0851   BP:  (!) 144/72 (!) 145/74 132/66   Pulse:  92 74 94   Resp:  18 20 17   Temp:  97.6 °F (36.4 °C) 97.4 °F (36.3 °C) 97.5 °F (36.4 °C)   TempSrc:  Oral Oral Oral   SpO2: 98% 97% 98% 96%   Weight:   220 lb 3.8 oz (99.9 kg)    Height:           Intake and Output:      Intake/Output Summary (Last 24 hours) at 2/3/2023 1158  Last data filed at 2/3/2023 0424  Gross per 24 hour   Intake 355 ml   Output 350 ml   Net 5 ml       Patient Vitals for the past 96 hrs (Last 3 readings):   Weight   02/03/23 0424 220 lb 3.8 oz (99.9 kg)   02/02/23 0807 219 lb 12.8 oz (99.7 kg)   02/02/23 0520 225 lb 1.4 oz (102.1 kg)         Medications:   torsemide  100 mg Oral Daily    lactulose  30 g Oral TID    insulin glargine  14 Units SubCUTAneous QAM    sodium chloride (Inhalant)  15 mL Nebulization BID    hydrALAZINE  25 mg Oral 3 times per day    heparin (porcine)  5,000 Units SubCUTAneous 2 times per day    sodium chloride flush  5-40 mL IntraVENous 2 times per day    predniSONE  20 mg Oral Daily    vitamin B-12  500 mcg Oral Daily    vitamin C  500 mg Oral Daily    tamsulosin  0.4 mg Oral Daily    [Held by provider] aspirin EC  162 mg Oral Daily    atorvastatin  40 mg Oral Daily    bisacodyl  5 mg Oral Daily polyethylene glycol  17 g Oral Daily    docusate sodium  100 mg Oral BID    lactobacillus  1 capsule Oral BID WC    linaclotide  145 mcg Oral QAM AC    montelukast  10 mg Oral Daily    pantoprazole  40 mg Oral QAM AC    insulin lispro  0-4 Units SubCUTAneous TID WC    insulin lispro  0-4 Units SubCUTAneous Nightly           Exam:   CONSTITUTIONAL/PSYCHIATRY: awake, alert and oriented x3. Not in acute distress   RESPIRATORY: Respiratory effort: normal. Auscultation: diminished BS at the bases  CARDIOVASCULAR: Auscultation: RRR. Edema: 2+ in UE and LE - improving  GASTROINTESTINAL: Soft, nontender, nondistended. Obese abdomen. EXTREMITIES:  No cyanosis or clubbing. SKIN: Warm and dry. Chronic discoloration in LE, ecchymosis in UE      LABS:   RFP:   Recent Labs     02/01/23  0459 02/02/23  0549 02/03/23  0439   * 146* 143   K 3.9 3.7 3.7    100 96*   CO2 35* 38* 33*   BUN 56* 50* 61*   CREATININE 1.8* 1.8* 2.0*   CALCIUM 8.2* 7.9* 7.9*   MG 1.50* 1.80 1.70*   PHOS 3.4 3.5 4.0         CBC:   Recent Labs     02/01/23 0459   WBC 5.4   HGB 8.1*   HCT 27.6*   MCV 84.1   *           ASSESSMENT and PLAN:     1- DEISY on CKD3b/4 (baseline SCr ~ 1.8-2.2; followed by dr Hermila Brunner): DEISY was hemodynamic/prerenal (multifactorial: low  BP, ARBs, diuretics)  SCr peaked at 3.2 and is back to baseline. He is significantly fluid overloaded He is in -12 L fluid balance. Wt peak at 246, 220 now. -  switched to oral Torsemide 2/1/23. May need prn Metolazone from time to time     2- ESBL UTI: treated with Meropenem - completed     3- Acute on chronic resp failure/HCAP/ADHF on chronic diastolic HF: on diuresis per above.      4- HTN: continue current medications     5- Obesity with JOANN            Signed By: Jimbo Chavira MD

## 2023-02-04 VITALS
WEIGHT: 214.95 LBS | TEMPERATURE: 97.7 F | RESPIRATION RATE: 18 BRPM | HEIGHT: 68 IN | OXYGEN SATURATION: 86 % | HEART RATE: 94 BPM | DIASTOLIC BLOOD PRESSURE: 74 MMHG | BODY MASS INDEX: 32.58 KG/M2 | SYSTOLIC BLOOD PRESSURE: 135 MMHG

## 2023-02-04 LAB
ALBUMIN SERPL-MCNC: 3 G/DL (ref 3.4–5)
ANION GAP SERPL CALCULATED.3IONS-SCNC: 12 MMOL/L (ref 3–16)
BUN BLDV-MCNC: 54 MG/DL (ref 7–20)
CALCIUM SERPL-MCNC: 8 MG/DL (ref 8.3–10.6)
CHLORIDE BLD-SCNC: 96 MMOL/L (ref 99–110)
CO2: 35 MMOL/L (ref 21–32)
CREAT SERPL-MCNC: 1.8 MG/DL (ref 0.8–1.3)
GFR SERPL CREATININE-BSD FRML MDRD: 37 ML/MIN/{1.73_M2}
GLUCOSE BLD-MCNC: 131 MG/DL (ref 70–99)
GLUCOSE BLD-MCNC: 144 MG/DL (ref 70–99)
GLUCOSE BLD-MCNC: 316 MG/DL (ref 70–99)
GLUCOSE BLD-MCNC: 333 MG/DL (ref 70–99)
MAGNESIUM: 1.7 MG/DL (ref 1.8–2.4)
PERFORMED ON: ABNORMAL
PHOSPHORUS: 3.8 MG/DL (ref 2.5–4.9)
POTASSIUM SERPL-SCNC: 3.7 MMOL/L (ref 3.5–5.1)
SODIUM BLD-SCNC: 143 MMOL/L (ref 136–145)
URIC ACID, SERUM: 10.4 MG/DL (ref 3.5–7.2)

## 2023-02-04 PROCEDURE — 83735 ASSAY OF MAGNESIUM: CPT

## 2023-02-04 PROCEDURE — 99239 HOSP IP/OBS DSCHRG MGMT >30: CPT | Performed by: INTERNAL MEDICINE

## 2023-02-04 PROCEDURE — 2700000000 HC OXYGEN THERAPY PER DAY

## 2023-02-04 PROCEDURE — 6370000000 HC RX 637 (ALT 250 FOR IP): Performed by: INTERNAL MEDICINE

## 2023-02-04 PROCEDURE — 2580000003 HC RX 258: Performed by: INTERNAL MEDICINE

## 2023-02-04 PROCEDURE — 6360000002 HC RX W HCPCS: Performed by: INTERNAL MEDICINE

## 2023-02-04 PROCEDURE — 36415 COLL VENOUS BLD VENIPUNCTURE: CPT

## 2023-02-04 PROCEDURE — 94640 AIRWAY INHALATION TREATMENT: CPT

## 2023-02-04 PROCEDURE — 94760 N-INVAS EAR/PLS OXIMETRY 1: CPT

## 2023-02-04 PROCEDURE — 80069 RENAL FUNCTION PANEL: CPT

## 2023-02-04 PROCEDURE — 94669 MECHANICAL CHEST WALL OSCILL: CPT

## 2023-02-04 PROCEDURE — 84550 ASSAY OF BLOOD/URIC ACID: CPT

## 2023-02-04 RX ORDER — HYDRALAZINE HYDROCHLORIDE 25 MG/1
25 TABLET, FILM COATED ORAL EVERY 8 HOURS SCHEDULED
Qty: 90 TABLET | Refills: 3 | DISCHARGE
Start: 2023-02-04

## 2023-02-04 RX ORDER — PREDNISONE 20 MG/1
TABLET ORAL
Qty: 10 TABLET | Refills: 0 | DISCHARGE
Start: 2023-02-04

## 2023-02-04 RX ORDER — INSULIN LISPRO 100 [IU]/ML
0-8 INJECTION, SOLUTION INTRAVENOUS; SUBCUTANEOUS
DISCHARGE
Start: 2023-02-04

## 2023-02-04 RX ORDER — INSULIN LISPRO 100 [IU]/ML
0-8 INJECTION, SOLUTION INTRAVENOUS; SUBCUTANEOUS
Status: DISCONTINUED | OUTPATIENT
Start: 2023-02-04 | End: 2023-02-04 | Stop reason: HOSPADM

## 2023-02-04 RX ORDER — MAGNESIUM SULFATE IN WATER 40 MG/ML
2000 INJECTION, SOLUTION INTRAVENOUS ONCE
Status: DISCONTINUED | OUTPATIENT
Start: 2023-02-04 | End: 2023-02-04

## 2023-02-04 RX ORDER — LACTULOSE 10 G/15ML
20 SOLUTION ORAL 3 TIMES DAILY PRN
Refills: 1 | DISCHARGE
Start: 2023-02-04

## 2023-02-04 RX ORDER — INSULIN LISPRO 100 [IU]/ML
0-4 INJECTION, SOLUTION INTRAVENOUS; SUBCUTANEOUS NIGHTLY
Status: DISCONTINUED | OUTPATIENT
Start: 2023-02-04 | End: 2023-02-04 | Stop reason: HOSPADM

## 2023-02-04 RX ORDER — LANOLIN ALCOHOL/MO/W.PET/CERES
400 CREAM (GRAM) TOPICAL 2 TIMES DAILY
Qty: 30 TABLET | DISCHARGE
Start: 2023-02-04

## 2023-02-04 RX ORDER — INSULIN LISPRO 100 [IU]/ML
0-4 INJECTION, SOLUTION INTRAVENOUS; SUBCUTANEOUS NIGHTLY
DISCHARGE
Start: 2023-02-04

## 2023-02-04 RX ORDER — INSULIN GLARGINE 100 [IU]/ML
14 INJECTION, SOLUTION SUBCUTANEOUS EVERY MORNING
Qty: 10 ML | Refills: 3 | DISCHARGE
Start: 2023-02-05

## 2023-02-04 RX ORDER — LANOLIN ALCOHOL/MO/W.PET/CERES
400 CREAM (GRAM) TOPICAL ONCE
Status: COMPLETED | OUTPATIENT
Start: 2023-02-04 | End: 2023-02-04

## 2023-02-04 RX ORDER — TORSEMIDE 100 MG/1
100 TABLET ORAL DAILY
Qty: 30 TABLET | Refills: 3 | DISCHARGE
Start: 2023-02-05

## 2023-02-04 RX ADMIN — BISACODYL 5 MG: 5 TABLET, COATED ORAL at 08:19

## 2023-02-04 RX ADMIN — LACTULOSE 30 G: 20 SOLUTION ORAL at 08:18

## 2023-02-04 RX ADMIN — HYDRALAZINE HYDROCHLORIDE 25 MG: 25 TABLET, FILM COATED ORAL at 06:01

## 2023-02-04 RX ADMIN — SODIUM CHLORIDE, PRESERVATIVE FREE 10 ML: 5 INJECTION INTRAVENOUS at 15:26

## 2023-02-04 RX ADMIN — CYANOCOBALAMIN TAB 500 MCG 500 MCG: 500 TAB at 08:20

## 2023-02-04 RX ADMIN — INSULIN GLARGINE 14 UNITS: 100 INJECTION, SOLUTION SUBCUTANEOUS at 08:27

## 2023-02-04 RX ADMIN — MONTELUKAST 10 MG: 10 TABLET, FILM COATED ORAL at 08:21

## 2023-02-04 RX ADMIN — ATORVASTATIN CALCIUM 40 MG: 40 TABLET, FILM COATED ORAL at 08:21

## 2023-02-04 RX ADMIN — DOCUSATE SODIUM 100 MG: 100 CAPSULE, LIQUID FILLED ORAL at 08:19

## 2023-02-04 RX ADMIN — POLYETHYLENE GLYCOL 3350 17 G: 17 POWDER, FOR SOLUTION ORAL at 08:17

## 2023-02-04 RX ADMIN — LEVALBUTEROL 1.25 MG: 1.25 SOLUTION, CONCENTRATE RESPIRATORY (INHALATION) at 09:45

## 2023-02-04 RX ADMIN — TORSEMIDE 100 MG: 20 TABLET ORAL at 08:21

## 2023-02-04 RX ADMIN — HYDRALAZINE HYDROCHLORIDE 25 MG: 25 TABLET, FILM COATED ORAL at 15:25

## 2023-02-04 RX ADMIN — HEPARIN SODIUM 5000 UNITS: 5000 INJECTION INTRAVENOUS; SUBCUTANEOUS at 08:20

## 2023-02-04 RX ADMIN — PREDNISONE 20 MG: 20 TABLET ORAL at 08:19

## 2023-02-04 RX ADMIN — SODIUM CHLORIDE SOLN NEBU 3% 4 ML: 3 NEBU SOLN at 09:43

## 2023-02-04 RX ADMIN — Medication 1 CAPSULE: at 08:19

## 2023-02-04 RX ADMIN — INSULIN LISPRO 6 UNITS: 100 INJECTION, SOLUTION INTRAVENOUS; SUBCUTANEOUS at 12:09

## 2023-02-04 RX ADMIN — LACTULOSE 30 G: 20 SOLUTION ORAL at 15:25

## 2023-02-04 RX ADMIN — PANTOPRAZOLE SODIUM 40 MG: 40 TABLET, DELAYED RELEASE ORAL at 06:01

## 2023-02-04 RX ADMIN — SODIUM CHLORIDE, PRESERVATIVE FREE 10 ML: 5 INJECTION INTRAVENOUS at 08:22

## 2023-02-04 RX ADMIN — TAMSULOSIN HYDROCHLORIDE 0.4 MG: 0.4 CAPSULE ORAL at 08:19

## 2023-02-04 RX ADMIN — Medication 1 CAPSULE: at 17:21

## 2023-02-04 RX ADMIN — OXYCODONE HYDROCHLORIDE AND ACETAMINOPHEN 500 MG: 500 TABLET ORAL at 08:20

## 2023-02-04 RX ADMIN — Medication 400 MG: at 17:21

## 2023-02-04 NOTE — CARE COORDINATION
HENS completed. SW left messages for [facility] admissions and daughter Albert Byrnes to confirm discharge plan. KRYSTAL notified by [facility] that wife can ride along to [facility]. No further needs noted at this time. Respectfully submitted,    Shanika BUCKNER, JAMAL-S  [facility]   [phone]    Electronically signed by ERENDIRA Mustafa on 2/4/2023 at 2:05 PM

## 2023-02-04 NOTE — PROGRESS NOTES
[facility] Internal Medicine Note      Chief Complaint: I feel ok    Subjective/Interval History:    Patient up this morning sitting in bed. Denies pain. Feels better. Do little better with therapy yesterday. No new problems. Continues to diurese without difficulty. Weight today 214 pounds, standing scale, down from a max of 241 pounds. 14.5 L negative fluid balance this admission. No chest pain. No nausea, vomiting, diarrhea. No abdominal pain. No dysuria. The remainder of the review of systems is negative. PMH, PSH, FH/SH reviewed and unchanged as documented in the H&P personally documented at admission 23    Medication list reviewed    Objective:    /63   Pulse 92   Temp 97.3 °F (36.3 °C) (Oral)   Resp 18   Ht 5' 8\" (1.727 m)   Wt 214 lb 15.2 oz (97.5 kg)   SpO2 92%   BMI 32.68 kg/m²   Temp  Av.6 °F (36.4 °C)  Min: 97.3 °F (36.3 °C)  Max: 97.7 °F (36.5 °C)    CV-regular on exam.  Pulm-respirations remain easy, remains on 3 L of oxygen per nasal cannula. Clear lungs today  Abd- BS+, soft, NTND  Ext-arm edema improved. Continue thigh edema. Compression stockings in place. Edema below the knee controlled.     The Following Labs Were Reviewed Today:    Recent Results (from the past 24 hour(s))   POCT Glucose    Collection Time: 23 11:56 AM   Result Value Ref Range    POC Glucose 298 (H) 70 - 99 mg/dl    Performed on ACCU-CHEK    POCT Glucose    Collection Time: 23  5:08 PM   Result Value Ref Range    POC Glucose 237 (H) 70 - 99 mg/dl    Performed on ACCU-CHEK    POCT Glucose    Collection Time: 23  8:44 PM   Result Value Ref Range    POC Glucose 337 (H) 70 - 99 mg/dl    Performed on ACCU-CHEK    Renal Function Panel    Collection Time: 23  5:38 AM   Result Value Ref Range    Sodium 143 136 - 145 mmol/L    Potassium 3.7 3.5 - 5.1 mmol/L    Chloride 96 (L) 99 - 110 mmol/L    CO2 35 (H) 21 - 32 mmol/L    Anion Gap 12 3 - 16    Glucose 131 (H) 70 - 99 mg/dL    BUN 54 (H) 7 - 20 mg/dL    Creatinine 1.8 (H) 0.8 - 1.3 mg/dL    Est, Glom Filt Rate 37 (A) >60    Calcium 8.0 (L) 8.3 - 10.6 mg/dL    Phosphorus 3.8 2.5 - 4.9 mg/dL    Albumin 3.0 (L) 3.4 - 5.0 g/dL   Magnesium    Collection Time: 02/04/23  5:38 AM   Result Value Ref Range    Magnesium 1.70 (L) 1.80 - 2.40 mg/dL   Uric Acid    Collection Time: 02/04/23  5:38 AM   Result Value Ref Range    Uric Acid, Serum 10.4 (H) 3.5 - 7.2 mg/dL   POCT Glucose    Collection Time: 02/04/23  7:51 AM   Result Value Ref Range    POC Glucose 144 (H) 70 - 99 mg/dl    Performed on ACCU-CHEK        ASSESSMENT/PLAN:      Principal Problem:    Acute on chronic diastolic congestive heart failure -patient now on torsemide. Weight is improving. Continues to diurese. Probably ready for discharge today. Discussed with nephrology Dr. Becca Lazo. Okay to discharge  Active Problems:    Acute on chronic respiratory failure with hypoxia/HCAP (healthcare-associated pneumonia)- antibiotics have been discontinued. Continues with prednisone. Plan slow taper of prednisone. DEISY-kidney function has remained stable with diuresis. No changes needed. UTI due to extended-spectrum beta lactamase (ESBL) producing Escherichia coli- patient has completed course of meropenem. Chronic anemia-continue to monitor, appreciate Dr. Yesenia Dasilva input. Weekly erythropoietin shots being administered. Chronic GERD-continue Protonix. Hypomagnesemia-replace per protocol. Likely discharge on oral magnesium. Type 2 diabetes mellitus with stage 4 chronic kidney disease, with long-term current use of insulin (HCC)-continue to monitor. Continue with Lantus. Continue sliding scale. Morning sugars remain fairly well controlled. Increased to medium sliding scale. Essential hypertension-blood pressure is running a little high at times. Continue to hesitate to be more aggressive so we do not impact his renal function.     B12 deficiency-continue with oral B12 supplementation. Atrial fibrillation-rate is controlled. Patient is not an anticoagulation candidate due to occult bleeding in the past and chronic iron deficiency. Disposition-will discuss with daughter Eryn Bernabe, hopefully discharge.   Plan discharge to ECF    Time > 35 minutes reviewing chart and patient data, examining and interviewing patient, and discussing with nursing staff, family, etc.       Michelle Arellano MD, FACP  10:18 AM  2/4/2023

## 2023-02-04 NOTE — CARE COORDINATION
DISCHARGE SUMMARY     DATE OF DISCHARGE: 2/4/2023    DISCHARGE DESTINATION:     FACILITY  Name: [address] [facility]  Address:  [address]   Phone:  [phone]  Fax:  [phone]     TRANSPORTATION: [facility] Name:  [address] [place] [address] up Time: 0764  Phone Number: [phone]    COMMENTS: SW left message for wife and spoke to daughter Serina Tate via telephone as instructed by daughter Nettie Morales. No further needs noted unless indicated by patient, family and/or medical staff. Respectfully submitted,    Shanell L. Antoinette Dance MSW, LISW-S  [address] [place] [address]   [phone]    Electronically signed by ERENDIRA Issa on 2/4/2023 at 1:48 PM

## 2023-02-04 NOTE — CARE COORDINATION
KRYSTAL received phone call back from Abel Otero stating that [facility] is [facility]. She stated that they've tried to assist the parents with home needs such as shopping and meal prep but they don't want to eat the prepared food. SW informed that once dad is settled at [facility] she can speak with the  about her concerns. KRYSTAL informed that therapy will likely see him again on Monday. KRYSTAL received phone call back from Rishabh rosas at [facility] stating that all they need is the [facility] for their intake nurse to review. KRYSTAL agreed to fax it directly to the floor. No further needs noted. Respectfully submitted,    ERENDIRA Morrow  [facility]   [phone]    Electronically signed by ERENDIRA Soliman on 2/4/2023 at 3:02 PM

## 2023-02-04 NOTE — PROGRESS NOTES
Pt discharged to [facility]. Transportation provided by [facility]. Wife and daughter folloed to facility.  Electronically signed by Osbaldo Howard RN on 2/4/2023 at 6:19 PM

## 2023-02-04 NOTE — PLAN OF CARE
Problem: Discharge Planning  Goal: Discharge to home or other facility with appropriate resources  2/4/2023 0235 by Emily Bianchi RN  Outcome: Progressing  Flowsheets (Taken 2/3/2023 2120)  Discharge to home or other facility with appropriate resources:   Identify barriers to discharge with patient and caregiver   Arrange for needed discharge resources and transportation as appropriate  2/3/2023 1652 by Diane Collins RN  Outcome: Progressing     Problem: Pain  Goal: Verbalizes/displays adequate comfort level or baseline comfort level  2/4/2023 0235 by Emily Bianchi RN  Outcome: Progressing  2/3/2023 1652 by Diane Collins RN  Outcome: Progressing     Problem: Safety - Adult  Goal: Free from fall injury  2/4/2023 0235 by Emily Bianchi RN  Outcome: Progressing  2/3/2023 1652 by Diane Collins RN  Outcome: Progressing     Problem: ABCDS Injury Assessment  Goal: Absence of physical injury  2/4/2023 0235 by Emily Bianchi RN  Outcome: Progressing  2/3/2023 1652 by Diane Collins RN  Outcome: Progressing     Problem: Skin/Tissue Integrity  Goal: Absence of new skin breakdown  Description: 1. Monitor for areas of redness and/or skin breakdown  2. Assess vascular access sites hourly  3. Every 4-6 hours minimum:  Change oxygen saturation probe site  4. Every 4-6 hours:  If on nasal continuous positive airway pressure, respiratory therapy assess nares and determine need for appliance change or resting period.   2/4/2023 0235 by Emily Bianchi RN  Outcome: Progressing  2/3/2023 1652 by Diane Collins RN  Outcome: Progressing     Problem: Infection - Adult  Goal: Absence of infection at discharge  2/4/2023 0235 by Emily Bianchi RN  Outcome: Progressing  Flowsheets (Taken 2/3/2023 2120)  Absence of infection at discharge:   Assess and monitor for signs and symptoms of infection   Monitor lab/diagnostic results   Monitor all insertion sites i.e., indwelling lines, tubes and drains   Administer medications as ordered Instruct and encourage patient and family to use good hand hygiene technique   Identify and instruct in appropriate isolation precautions for identified infection/condition  2/3/2023 1652 by Patel Barahona RN  Outcome: Progressing  Goal: Absence of infection during hospitalization  2/4/2023 0235 by Tutu Kelly RN  Outcome: Progressing  Flowsheets (Taken 2/3/2023 2120)  Absence of infection during hospitalization:   Assess and monitor for signs and symptoms of infection   Monitor lab/diagnostic results   Monitor all insertion sites i.e., indwelling lines, tubes and drains   Administer medications as ordered   Instruct and encourage patient and family to use good hand hygiene technique   Identify and instruct in appropriate isolation precautions for identified infection/condition  2/3/2023 1652 by Patel Barahona RN  Outcome: Progressing  Goal: Absence of fever/infection during anticipated neutropenic period  2/4/2023 0235 by Tutu Kelly RN  Outcome: Progressing  2/3/2023 [address] by Patel Barahona RN  Outcome: Progressing     Problem: Genitourinary - Adult  Goal: Absence of urinary retention  2/4/2023 0235 by Tutu Kelly RN  Outcome: Progressing  Flowsheets (Taken 2/3/2023 2120)  Absence of urinary retention:   Assess patients ability to void and empty bladder   Monitor intake/output and perform bladder scan as needed  2/3/2023 1652 by Patel Barahona RN  Outcome: Progressing  Flowsheets (Taken 2/3/2023 1200)  Absence of urinary retention: Assess patients ability to void and empty bladder  Goal: Urinary catheter remains patent  2/4/2023 0235 by Tutu Kelly RN  Outcome: Progressing  2/3/2023 1652 by Patel Barahona RN  Outcome: Progressing  Flowsheets (Taken 2/3/2023 1200)  Urinary catheter remains patent: Assess patency of urinary catheter     Problem: Metabolic/Fluid and Electrolytes - Adult  Goal: Electrolytes maintained within normal limits  2/4/2023 0235 by Tutu Kelly RN  Outcome: Progressing  Flowsheets (Taken 2/3/2023 2120)  Electrolytes maintained within normal limits:   Monitor labs and assess patient for signs and symptoms of electrolyte imbalances   Administer electrolyte replacement as ordered   Monitor response to electrolyte replacements, including repeat lab results as appropriate  2/3/2023 1652 by Tony Sauer RN  Outcome: Progressing  Goal: Hemodynamic stability and optimal renal function maintained  2/4/2023 0235 by Michelle Pruett RN  Outcome: Progressing  Flowsheets (Taken 2/3/2023 2120)  Hemodynamic stability and optimal renal function maintained:   Monitor labs and assess for signs and symptoms of volume excess or deficit   Monitor intake, output and patient weight   Monitor response to interventions for patient's volume status, including labs, urine output, blood pressure (other measures as available)  2/3/2023 1652 by Tony Sauer RN  Outcome: Progressing  Goal: Glucose maintained within prescribed range  2/4/2023 0235 by Michelle Pruett RN  Outcome: Progressing  Flowsheets (Taken 2/3/2023 2120)  Glucose maintained within prescribed range:   Monitor blood glucose as ordered   Assess for signs and symptoms of hyperglycemia and hypoglycemia   Administer ordered medications to maintain glucose within target range   Assess barriers to adequate nutritional intake and initiate nutrition consult as needed   Instruct patient on self management of diabetes and initiate consult as needed  2/3/2023 1652 by Tony Sauer RN  Outcome: Progressing     Problem: Nutrition Deficit:  Goal: Optimize nutritional status  2/4/2023 0235 by Michelle Pruett RN  Outcome: Progressing  2/3/2023 1652 by Tony Sauer RN  Outcome: Progressing  Flowsheets (Taken 2/3/2023 1010 by Nayeli Allen)  Nutrient intake appropriate for improving, restoring, or maintaining nutritional needs:   Assess nutritional status and recommend course of action   Monitor oral intake, labs, and treatment plans   Recommend appropriate diets, oral nutritional supplements, and vitamin/mineral supplements     Problem: Chronic Conditions and Co-morbidities  Goal: Patient's chronic conditions and co-morbidity symptoms are monitored and maintained or improved  2/4/2023 0235 by Klarissa Barry RN  Outcome: Progressing  Flowsheets (Taken 2/3/2023 2120)  Care Plan - Patient's Chronic Conditions and Co-Morbidity Symptoms are Monitored and Maintained or Improved:   Collaborate with multidisciplinary team to address chronic and comorbid conditions and prevent exacerbation or deterioration   Monitor and assess patient's chronic conditions and comorbid symptoms for stability, deterioration, or improvement  2/3/2023 1652 by Minnie Cortez RN  Outcome: Progressing

## 2023-02-04 NOTE — PROGRESS NOTES
Pt currently has no IV access. Refusing new access to be placed. IV mag was ordered. Messaged Dr. Vikram Adams and made him aware of no access. Vikram Adams will message Dr. Damian Parks to let him know. Awaiting new orders. PO mag given per [facility]  Electronically signed by Alejandra Veloz RN on 2/4/2023 at 6:17 PM

## 2023-02-04 NOTE — PROGRESS NOTES
Visit us at [facility]. com or call us at [address] [place] [address] NOTE    Patient: Magan Castelan MRN: [id number]     YOB: 1940  Age: 79 y.o. 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 All pertinent ROS negative.   SHx: No visitors present at bedside        OBJECTIVE:     Vitals:    02/04/23 0809 02/04/23 0817 02/04/23 0945 02/04/23 1515   BP: 108/63   136/78   Pulse: 92 97  74   Resp: 18      Temp: 97.3 °F (36.3 °C)      TempSrc: Oral      SpO2: 90%  92%    Weight:       Height:           Intake and Output:      Intake/Output Summary (Last 24 hours) at 2/4/2023 1537  Last data filed at 2/4/2023 2429  Gross per 24 hour   Intake 300 ml   Output 200 ml   Net 100 ml     Patient Vitals for the past 96 hrs (Last 3 readings):   Weight   02/04/23 0552 214 lb 15.2 oz (97.5 kg)   02/03/23 0424 220 lb 3.8 oz (99.9 kg)   02/02/23 0807 219 lb 12.8 oz (99.7 kg)       Medications:   insulin lispro  0-8 Units SubCUTAneous TID WC    insulin lispro  0-4 Units SubCUTAneous Nightly    torsemide  100 mg Oral Daily    lactulose  30 g Oral TID    insulin glargine  14 Units SubCUTAneous QAM    sodium chloride (Inhalant)  15 mL Nebulization BID    hydrALAZINE  25 mg Oral 3 times per day    heparin (porcine)  5,000 Units SubCUTAneous 2 times per day    sodium chloride flush  5-40 mL IntraVENous 2 times per day    predniSONE  20 mg Oral Daily    vitamin B-12  500 mcg Oral Daily    vitamin C  500 mg Oral Daily    tamsulosin  0.4 mg Oral Daily    [Held by provider] aspirin EC  162 mg Oral Daily    atorvastatin  40 mg Oral Daily    bisacodyl  5 mg Oral Daily polyethylene glycol  17 g Oral Daily    docusate sodium  100 mg Oral BID    lactobacillus  1 capsule Oral BID WC    linaclotide  145 mcg Oral QAM AC    montelukast  10 mg Oral Daily    pantoprazole  40 mg Oral QAM AC           Exam:   CONSTITUTIONAL/PSYCHIATRY: awake, alert and oriented x3. Not in acute distress   RESPIRATORY: Respiratory effort: normal. Auscultation: diminished BS at the bases  CARDIOVASCULAR: Auscultation: RRR. Edema: 2+ in UE and LE - improving  GASTROINTESTINAL: Soft, nontender, nondistended. Obese abdomen. EXTREMITIES:  No cyanosis or clubbing. SKIN: Warm and dry. Chronic discoloration in LE, ecchymosis in UE      LABS:   RFP:   Recent Labs     02/02/23  0549 02/03/23  0439 02/04/23  0538   * 143 143   K 3.7 3.7 3.7    96* 96*   CO2 38* 33* 35*   BUN 50* 61* 54*   CREATININE 1.8* 2.0* 1.8*   CALCIUM 7.9* 7.9* 8.0*   MG 1.80 1.70* 1.70*   PHOS 3.5 4.0 3.8       CBC:   No results for input(s): WBC, HGB, HCT, MCV, PLT in the last 72 hours. 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 He is significantly fluid overloaded He is in -14.5 L fluid balance. Wt peak at 246, 214 now. switched to oral Torsemide 2/1/23. May need prn Metolazone from time to time  -OK to DC from Nephrology standpoint  -Outpatient follow up with Dr. 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      4- HTN: Near goal. Continue current regimen given need for further diuresis     5- Obesity with JOANN: Management per Primary Team     6-Hypomagnesemia: Replete Mg++ today prior to DC        Signed By: Sofya Dunham MD

## 2023-02-04 NOTE — PROGRESS NOTES
D/W daughter Robb Meckel. She agrees ECF will be much better for the patient. Discharge order placed.   Electronically signed by Mono Hernández MD on 2/4/2023 at 1:31 PM

## 2023-02-04 NOTE — PLAN OF CARE
Problem: Discharge Planning  Goal: Discharge to home or other facility with appropriate resources  2/4/2023 1105 by Jorge Marie RN  Outcome: Progressing  2/4/2023 0235 by Sylvester Baeza RN  Outcome: Progressing  Flowsheets (Taken 2/3/2023 2120)  Discharge to home or other facility with appropriate resources:   Identify barriers to discharge with patient and caregiver   Arrange for needed discharge resources and transportation as appropriate     Problem: Pain  Goal: Verbalizes/displays adequate comfort level or baseline comfort level  2/4/2023 1105 by Jorge Marie RN  Outcome: Progressing  2/4/2023 0235 by Sylvester Baeza RN  Outcome: Progressing     Problem: Safety - Adult  Goal: Free from fall injury  2/4/2023 1105 by Jorge Marie RN  Outcome: Progressing  2/4/2023 0235 by Sylvester Baeza RN  Outcome: Progressing     Problem: ABCDS Injury Assessment  Goal: Absence of physical injury  2/4/2023 1105 by Jorge Marie RN  Outcome: Progressing  2/4/2023 0235 by Sylvester Baeza RN  Outcome: Progressing     Problem: Skin/Tissue Integrity  Goal: Absence of new skin breakdown  Description: 1. Monitor for areas of redness and/or skin breakdown  2. Assess vascular access sites hourly  3. Every 4-6 hours minimum:  Change oxygen saturation probe site  4. Every 4-6 hours:  If on nasal continuous positive airway pressure, respiratory therapy assess nares and determine need for appliance change or resting period.   2/4/2023 1105 by Jorge Marie RN  Outcome: Progressing  2/4/2023 0235 by Sylvester Baeza RN  Outcome: Progressing     Problem: Infection - Adult  Goal: Absence of infection at discharge  2/4/2023 1105 by Jorge Marie RN  Outcome: Progressing  2/4/2023 0235 by Sylvester Baeza RN  Outcome: Progressing  Flowsheets (Taken 2/3/2023 2120)  Absence of infection at discharge:   Assess and monitor for signs and symptoms of infection   Monitor lab/diagnostic results   Monitor all insertion sites i.e., indwelling lines, tubes and drains   Administer medications as ordered   Instruct and encourage patient and family to use good hand hygiene technique   Identify and instruct in appropriate isolation precautions for identified infection/condition  Goal: Absence of infection during hospitalization  2/4/2023 1105 by Jorge Marie RN  Outcome: Progressing  2/4/2023 0235 by Sylvester Baeza RN  Outcome: Progressing  Flowsheets (Taken 2/3/2023 2120)  Absence of infection during hospitalization:   Assess and monitor for signs and symptoms of infection   Monitor lab/diagnostic results   Monitor all insertion sites i.e., indwelling lines, tubes and drains   Administer medications as ordered   Instruct and encourage patient and family to use good hand hygiene technique   Identify and instruct in appropriate isolation precautions for identified infection/condition  Goal: Absence of fever/infection during anticipated neutropenic period  2/4/2023 1105 by Jorge Marie RN  Outcome: Progressing  2/4/2023 0235 by Sylvester Baeza RN  Outcome: Progressing     Problem: Genitourinary - Adult  Goal: Absence of urinary retention  2/4/2023 1105 by Jorge Marie RN  Outcome: Progressing  2/4/2023 0235 by Sylvester Baeza RN  Outcome: Progressing  Flowsheets (Taken 2/3/2023 2120)  Absence of urinary retention:   Assess patients ability to void and empty bladder   Monitor intake/output and perform bladder scan as needed  Goal: Urinary catheter remains patent  2/4/2023 1105 by Jorge Marie RN  Outcome: Progressing  2/4/2023 0235 by Sylvester Baeza RN  Outcome: Progressing     Problem: Metabolic/Fluid and Electrolytes - Adult  Goal: Electrolytes maintained within normal limits  2/4/2023 1105 by Jorge Marie RN  Outcome: Progressing  2/4/2023 0235 by Sylvester Baeza RN  Outcome: Progressing  Flowsheets (Taken 2/3/2023 2120)  Electrolytes maintained within normal limits:   Monitor labs and assess patient for signs and symptoms of electrolyte imbalances   Administer electrolyte replacement as ordered   Monitor response to electrolyte replacements, including repeat lab results as appropriate  Goal: Hemodynamic stability and optimal renal function maintained  2/4/2023 1105 by Modesto Soliz RN  Outcome: Progressing  2/4/2023 0235 by Anaya Monet RN  Outcome: Progressing  Flowsheets (Taken 2/3/2023 2120)  Hemodynamic stability and optimal renal function maintained:   Monitor labs and assess for signs and symptoms of volume excess or deficit   Monitor intake, output and patient weight   Monitor response to interventions for patient's volume status, including labs, urine output, blood pressure (other measures as available)  Goal: Glucose maintained within prescribed range  2/4/2023 1105 by Modesto Soliz RN  Outcome: Progressing  2/4/2023 0235 by Anaya Monet RN  Outcome: Progressing  Flowsheets (Taken 2/3/2023 2120)  Glucose maintained within prescribed range:   Monitor blood glucose as ordered   Assess for signs and symptoms of hyperglycemia and hypoglycemia   Administer ordered medications to maintain glucose within target range   Assess barriers to adequate nutritional intake and initiate nutrition consult as needed   Instruct patient on self management of diabetes and initiate consult as needed     Problem: Nutrition Deficit:  Goal: Optimize nutritional status  2/4/2023 1105 by Modesto Soliz RN  Outcome: Progressing  2/4/2023 0235 by Anaya Monet RN  Outcome: Progressing     Problem: Chronic Conditions and Co-morbidities  Goal: Patient's chronic conditions and co-morbidity symptoms are monitored and maintained or improved  2/4/2023 1105 by Modesto Soliz RN  Outcome: Progressing  2/4/2023 0235 by Anaya Monet RN  Outcome: Progressing  Flowsheets (Taken 2/3/2023 2120)  Care Plan - Patient's Chronic Conditions and Co-Morbidity Symptoms are Monitored and Maintained or Improved:   Collaborate with multidisciplinary team to address chronic and comorbid conditions and prevent exacerbation or deterioration   Monitor and assess patient's chronic conditions and comorbid symptoms for stability, deterioration, or improvement

## 2023-02-04 NOTE — PROGRESS NOTES
Report called to [facility]. All questions answered at this time. Left call back number if any questions should arise.  Facility aware of ick up time at 5:30 pm.

## 2023-02-05 PROBLEM — S42.222A CLOSED 2-PART DISPLACED FRACTURE OF SURGICAL NECK OF LEFT HUMERUS, INITIAL ENCOUNTER: Status: ACTIVE | Noted: 2023-02-05

## 2023-02-05 PROBLEM — I50.9 ACUTE ON CHRONIC CONGESTIVE HEART FAILURE (HCC): Status: RESOLVED | Noted: 2023-01-01 | Resolved: 2023-01-01

## 2023-02-05 PROBLEM — S42.322A CLOSED DISPLACED TRANSVERSE FRACTURE OF SHAFT OF LEFT HUMERUS: Status: ACTIVE | Noted: 2023-01-01

## 2023-02-05 PROBLEM — R79.89 ELEVATED TROPONIN: Status: RESOLVED | Noted: 2022-01-01 | Resolved: 2023-01-01

## 2023-02-05 PROBLEM — R79.89 ELEVATED BRAIN NATRIURETIC PEPTIDE (BNP) LEVEL: Status: RESOLVED | Noted: 2022-01-01 | Resolved: 2023-01-01

## 2023-02-05 PROBLEM — R77.8 ELEVATED TROPONIN: Status: RESOLVED | Noted: 2022-01-01 | Resolved: 2023-01-01

## 2023-02-05 PROBLEM — R68.0 HYPOTHERMIA DUE TO NON-ENVIRONMENTAL CAUSE: Status: RESOLVED | Noted: 2023-01-25 | Resolved: 2023-02-05

## 2023-02-05 PROBLEM — J18.9 HCAP (HEALTHCARE-ASSOCIATED PNEUMONIA): Status: RESOLVED | Noted: 2018-06-08 | Resolved: 2023-01-01

## 2023-02-05 PROBLEM — R93.89 ABNORMAL CXR: Status: RESOLVED | Noted: 2022-01-01 | Resolved: 2023-01-01

## 2023-02-05 PROBLEM — E87.5 HYPERKALEMIA: Status: RESOLVED | Noted: 2022-01-01 | Resolved: 2023-01-01

## 2023-02-06 PROBLEM — S42.302A CLOSED FRACTURE OF SHAFT OF LEFT HUMERUS: Status: ACTIVE | Noted: 2023-01-01

## 2023-02-06 NOTE — CONSULTS
[facility]  Consult Note         This patient is seen in consultation at the request of Dr Vesta Bartlett MD    Reason for Consult:  Left humerus shaft fracture. CHIEF COMPLAINT:  Left arm pain/ fall. History Obtained From:  patient, electronic medical record    HISTORY OF PRESENT ILLNESS:    Mr. Mookie Gonzalez is a 79 y.o.  male left handed who seen today at [facility] for consultation and evaluation of a left arm injury. The patient reports that this injury occurred when he fell at [facility]. He was first seen and evaluated in [facility], when he was x-rayed and admitted with Orthopedic consultation. The patient denies any other injuries. Movement makes the pain worse, resting makes the pain better. Mild numbness sensation left hand.       Past Medical History:        Diagnosis Date    Allergic rhinitis     Asthma     Atrial fibrillation ([facility])     Carotid artery stenosis     Chronic kidney disease     COPD (chronic obstructive pulmonary disease) ([facility])     CPAP (continuous positive airway pressure) dependence     ESBL (extended spectrum beta-lactamase) producing bacteria infection 12/26/2018    urine    Hyperlipidemia     Hypertension     Iron deficiency anemia     Obstructive sleep apnea     Type II or unspecified type diabetes mellitus without mention of complication, not stated as uncontrolled        Past Surgical History:        Procedure Laterality Date    BRONCHOSCOPY N/A 12/28/2022    BRONCHOSCOPY ALVEOLAR LAVAGE performed by Micaela Gutierrez MD at [address]  12/28/2022    BRONCHOSCOPY DIAGNOSTIC OR CELL [address] ONLY performed by Micaela Gutierrez MD at [facility]  2/2012    bilateral    COLONOSCOPY  7/10/2009    diverticulosis    hemorrhoids    CT BONE MARROW BIOPSY  11/1/2022    CT BONE MARROW BIOPSY 11/1/2022 [facility] CT    GALLBLADDER SURGERY  1981    PAIN MANAGEMENT PROCEDURE Right 10/20/2021    COOLIEF RADIOFREQUENCY ABLATION - RIGHT KNEE performed by Alex Vazquez MD at [address] Left 12/8/2021    [address] - LEFT KNEE performed by Alex Vazquez MD at [address]  7-2005    7/10/2009    normal       Medications prior to admission:   Prior to Admission medications    Medication Sig Start Date End Date Taking?  Authorizing Provider   tamsulosin (FLOMAX) 0.4 MG capsule Take 0.4 mg by mouth daily   Yes Historical Provider, MD   insulin glargine (LANTUS) 100 UNIT/ML injection vial Inject 14 Units into the skin every morning 2/5/23   Yue Leger MD   insulin lispro (HUMALOG) 100 UNIT/ML SOLN injection vial Inject 0-8 Units into the skin 3 times daily (with meals) 2/4/23   Yue Leger MD   insulin lispro (HUMALOG) 100 UNIT/ML SOLN injection vial Inject 0-4 Units into the skin nightly 2/4/23   Yue Leger MD   hydrALAZINE (APRESOLINE) 25 MG tablet Take 1 tablet by mouth every 8 hours 2/4/23   Yue Leger MD   predniSONE (DELTASONE) 20 MG tablet 20 mg po daily for 5 days, then 15 mg po daily for 5 days, then 10 mg daily 2/4/23   Yue Leger MD   torsemide [facility]) 100 MG tablet Take 1 tablet by mouth daily 2/5/23   Yue Leger MD   lactulose [facility]) 10 GM/15ML solution Take 30 mLs by mouth 3 times daily as needed (Constipation) 2/4/23   Yue Leger MD   magnesium oxide (MAG-OX) 400 (240 Mg) MG tablet Take 1 tablet by mouth 2 times daily 2/4/23   Yue Leger MD   vitamin C (ASCORBIC ACID) 500 MG tablet Take 500 mg by mouth daily    Historical Provider, MD   Cholecalciferol 50 MCG (2000 UT) TABS Take 1 tablet by mouth daily    Historical Provider, MD   Multiple Vitamins-Minerals (THERAPEUTIC MULTIVITAMIN-MINERALS) tablet Take 1 tablet by mouth daily    Historical Provider, MD   B Complex Vitamins (B-COMPLEX/B-12) TABS Take 1 tablet by mouth daily    Historical Provider, MD   zinc sulfate (ZINCATE) 220 (50 Zn) MG capsule Take 50 mg by mouth daily    Historical Provider, MD   bisacodyl (DULCOLAX) 5 MG EC tablet Take 5 mg by mouth daily    Historical Provider, MD   amiodarone (CORDARONE) 200 MG tablet Take 0.5 tablets by mouth daily 11/11/22   Kamini Samaniego MD   albuterol sulfate HFA (PROVENTIL;VENTOLIN;PROAIR) 108 (90 Base) MCG/ACT inhaler INHALE 2 PUFFS BY MOUTH EVERY 4 HOURS AS NEEDED FOR WHEEZING 10/21/22   Meghan Conrad MD   montelukast (SINGULAIR) 10 MG tablet TAKE 1 TABLET BY MOUTH EVERY DAY 10/21/22   Meghan Conrad MD   [employer] PURELAX 17 GM/SCOOP powder TAKE 17G BY MOUTH DAILY MIX WITH 8 OZ OF WATER 9/6/22   Meghan Conrad MD   pantoprazole (PROTONIX) 40 MG tablet TAKE 1 TABLET BY MOUTH EVERY DAY 8/15/22   William Zapata DO   B-D UF III MINI PEN NEEDLES 31G X 5 MM MISC USE AS DIRECTED 3 TIMES A DAY 8/13/22   Eduarda Teresa MD   atorvastatin (LIPITOR) 40 MG tablet TAKE 1 TABLET BY MOUTH EVERY DAY 4/26/22   Meghan Conrad MD   Accu-Chek Fast[employer]ix Lancets MISC USE TO TEST 3 TIMES A DAY DX CODE E11.9 7/20/21   Meghan Conrad MD   CPAP Machine MISC by Does not apply route    Historical Provider, MD   docusate sodium (COLACE) 100 MG capsule Take 100 mg by mouth 2 times daily    Historical Provider, MD   Cyanocobalamin (VITAMIN B-12 PO) Take 500 mcg by mouth every other day     Historical Provider, MD   ferrous sulfate 325 (65 FE) MG tablet Take 325 mg by mouth daily     Historical Provider, MD       Current Medications:   Current Facility-Administered Medications: albuterol sulfate HFA (PROVENTIL;VENTOLIN;PROAIR) 108 (90 Base) MCG/ACT inhaler 2 puff, 2 puff, Inhalation, Q4H PRN  amiodarone (CORDARONE) tablet 100 mg, 100 mg, Oral, Daily  atorvastatin (LIPITOR) tablet 40 mg, 40 mg, Oral, Daily  bisacodyl (DULCOLAX) EC tablet 5 mg, 5 mg, Oral, Daily  docusate sodium (COLACE) capsule 100 mg, 100 mg, Oral, BID  ferrous sulfate EC tablet 325 mg, 325 mg, Oral, Daily with breakfast  hydrALAZINE (APRESOLINE) tablet 25 mg, 25 mg, Oral, TID  lactulose (CHRONULAC) 10 GM/15ML solution 20 g, 20 g, Oral, TID PRN  magnesium oxide (MAG-OX) tablet 400 mg, 400 mg, Oral, BID  montelukast (SINGULAIR) tablet 10 mg, 10 mg, Oral, Daily  pantoprazole (PROTONIX) tablet 40 mg, 40 mg, Oral, Daily  predniSONE (DELTASONE) tablet 20 mg, 20 mg, Oral, Daily  tamsulosin (FLOMAX) capsule 0.4 mg, 0.4 mg, Oral, Daily  zinc sulfate (ZINCATE) capsule 50 mg, 50 mg, Oral, Daily  sodium chloride flush 0.9 % injection 5-40 mL, 5-40 mL, IntraVENous, 2 times per day  sodium chloride flush 0.9 % injection 5-40 mL, 5-40 mL, IntraVENous, PRN  0.9 % sodium chloride infusion, , IntraVENous, PRN  ondansetron (ZOFRAN-ODT) disintegrating tablet 4 mg, 4 mg, Oral, Q8H PRN **OR** ondansetron (ZOFRAN) injection 4 mg, 4 mg, IntraVENous, Q6H PRN  acetaminophen (TYLENOL) tablet 650 mg, 650 mg, Oral, Q6H PRN **OR** acetaminophen (TYLENOL) suppository 650 mg, 650 mg, Rectal, Q6H PRN  0.9 % sodium chloride infusion, , IntraVENous, Continuous  heparin (porcine) injection 5,000 Units, 5,000 Units, SubCUTAneous, BID  glucose chewable tablet 16 g, 4 tablet, Oral, PRN  dextrose bolus 10% 125 mL, 125 mL, IntraVENous, PRN **OR** dextrose bolus 10% 250 mL, 250 mL, IntraVENous, PRN  glucagon (rDNA) injection 1 mg, 1 mg, SubCUTAneous, PRN  dextrose 10 % infusion, , IntraVENous, Continuous PRN  insulin lispro (HUMALOG) injection vial 0-4 Units, 0-4 Units, SubCUTAneous, TID WC  insulin lispro (HUMALOG) injection vial 0-4 Units, 0-4 Units, SubCUTAneous, Nightly  polyethylene glycol (GLYCOLAX) packet 17 g, 17 g, Oral, Daily  0.9 % sodium chloride infusion, , IntraVENous, PRN    Allergies:  Valsartan-hydrochlorothiazide, Hytrin [terazosin hcl], Penicillins, Shrimp flavor, Sulfa antibiotics, Sulfasalazine, Tekturna [aliskiren fumarate], Vancomycin, and Ciprofloxacin    Social History     Socioeconomic History    Marital status:      Spouse name: Not on file    Number of children: 5    Years of education: Not on file    Highest education level: Not on file   Occupational History    Occupation: retired   Tobacco Use    Smoking status: Former     Packs/day: 0.50     Years: 18.00     Pack years: 9.00     Types: Cigarettes     Start date: 1958     Quit date: 1988     Years since quittin.1    Smokeless tobacco: Never   Vaping Use    Vaping Use: Never used   Substance and Sexual Activity    Alcohol use: No     Alcohol/week: 0.0 standard drinks    Drug use: No    Sexual activity: Yes     Partners: Female   Other Topics Concern    Not on file   Social History Narrative    Not on file     Social Determinants of Health     Financial Resource Strain: Low Risk     Difficulty of Paying Living Expenses: Not hard at all   Food Insecurity: No Food Insecurity    Worried About Running Out of Food in the Last Year: Never true    Ran Out of Food in the Last Year: Never true   Transportation Needs: Not on file   Physical Activity: Not on file   Stress: Not on file   Social Connections: Not on file   Intimate Partner Violence: Not on file   Housing Stability: Not on file       Family History:  Family History   Problem Relation Age of Onset    Heart Disease Mother     Stroke Mother     Vision Loss Mother     High Blood Pressure Father     High Blood Pressure Brother     Diabetes Brother     Sleep Apnea Brother     Arthritis Paternal Grandmother     Diabetes Paternal Grandmother        REVIEW OF SYSTEMS:   CONSTITUTIONAL: Denies unexplained weight loss, fevers, chills or fatigue  NEUROLOGICAL: Denies unsteady gait or progressive weakness    PSYCHOLOGICAL: Denies anxiety, depression   SKIN: Denies skin changes, delayed healing, rash, itching   HEMATOLOGIC: Denies easy bleeding or bruising  ENDOCRINE: Denies excessive thirst, urination, heat/cold  RESPIRATORY: Denies current dyspnea, cough  CARDIOVASCULAR: Negative for chest pain at this time. EYES: Negative for photophobia and visual disturbance. ENT:  Negative for rhinorrhea, epistaxis, sore throat, or hearing loss. GI: Denies nausea, vomiting, diarrhea   : Denies bowel or bladder issues   MUSCULOSKELETAL: Left arm pain. All other ROS reviewed in chart or with patient or family and are grossly negative. PHYSICAL EXAMINATION:  Mr. Collins Sales is a very pleasant 79 y.o. male who seen today in no acute distress, awake, alert, and oriented. He is well nourished and groomed. Patient with normal affect. Body mass index is 35.13 kg/m². . Skin warm and dry. Resting respiratory rate is 16. Resp deep and easy. Pulse is with regular rate and rhythm    /67   Pulse 77   Temp 97.5 °F (36.4 °C) (Axillary)   Resp 17   Ht 5' 8\" (1.727 m)   Wt 231 lb 0.7 oz (104.8 kg)   SpO2 100%   BMI 35.13 kg/m²        Airway is intact  Chest: chest clear, no wheezing, rales, normal symmetric air entry, no tachypnea, retractions or cyanosis  Heart: regular rate and rhythm ; heart sounds normal   Hearing intact, pupil equal and reactive bilateral  Lymphatics; No groin or axillary enlarged lymph nodes. Neck; No swelling  Abdomen; soft, non distended. MUSCULOSKELETAL:   On evaluation of his bilateral upper extremity, there is moderate deformity left arm. There is moderate swelling and moderate ecchymosis. He is tender to palpation over the arm, and otherwise non tender over the remainder of the extremity. Range of motion is decreased secondary to pain over the left arm. The skin overlying the left arm is intact without evidence of lesion, laceration. Distal pulses are 2+ and symmetric bilaterally. Sensation is grossly intact to light touch and symmetric bilaterally.     NEUROLOGIC:   Sensory:    Touch:                     Right Upper Extremity:  normal                   Left Upper Extremity:  normal                  Right Lower Extremity:  normal                  Left Lower Extremity:  normal          DATA:    CBC:   Lab Results   Component Value Date/Time    WBC 5.0 02/06/2023 06:29 AM    RBC 3.09 02/06/2023 06:29 AM    HGB 7.9 02/06/2023 06:29 AM    HCT 25.2 02/06/2023 06:29 AM    MCV 81.4 02/06/2023 06:29 AM    MCH 25.4 02/06/2023 06:29 AM    MCHC 31.2 02/06/2023 06:29 AM    RDW 21.8 02/06/2023 06:29 AM     02/06/2023 06:29 AM    MPV 8.6 02/06/2023 06:29 AM     WBC:    Lab Results   Component Value Date/Time    WBC 5.0 02/06/2023 06:29 AM     PT/INR:    Lab Results   Component Value Date/Time    PROTIME 16.5 01/26/2023 01:02 AM    INR 1.34 01/26/2023 01:02 AM     PTT:    Lab Results   Component Value Date/Time    APTT 28.7 01/17/2023 02:53 PM   [APTT    IMAGING: Xrays dated 2/5/2023, 3 views of left humerus were reviewed, and showed markedly displaced humerus shaft fracture. IMPRESSION: Left arm pain/ markedly displaced humerus shaft fracture. PLAN:  I discussed with Gilmar Suresh the overall alignment of the fracture and treatment options including both surgical and non-surgical treatment, and that my recommendation is an open reduction and internal fixation given the amount of displacement and comminution of the fracture. I discussed the risks and benefits of surgery with the patient, including but not limited to infection, bleeding, pain, injury to nerves or blood vessels failure of the surgery and need for additional surgery. All the patient's questions were answered. We discussed an expected post-operative course. He  is understanding of this and wishes to proceed. Thank you very much for the kind consultation and allowing me to participate in this patient's care. I will continue to keep you apprised of his progress.         Mila Chapa MD   2/6/2023  7:20 AM

## 2023-02-06 NOTE — ED NOTES
Patient has several bruising that he says is not new, patient says he was also in hospital and was discharged yesterday.      Nnamdi Griffin RN  02/05/23 2034

## 2023-02-06 NOTE — ACP (ADVANCE CARE PLANNING)
Advance Care Planning     Advance Care Planning Activator (Inpatient)  Conversation Note      Date of ACP Conversation: 2/6/2023     Conversation Conducted with:  Healthcare Decision Maker: Next of Kin by law (only applies in absence of above) (name) Spouse   --patient currently in surgery    ACP Activator: [id number] Decision Maker:     Current Designated Health Care Decision Maker:     Primary Decision Maker: Mima Love - Spouse - [phone]    Primary Decision Maker: Daniewaldo Simple - [phone]    Primary Decision Maker: Claricecaitlyn Thurman Child - [phone]    Care Preferences    Ventilation: \"If you were in your present state of health and suddenly became very ill and were unable to breathe on your own, what would your preference be about the use of a ventilator (breathing machine) if it were available to you? \"      Would the patient desire the use of ventilator (breathing machine)?: yes    \"If your health worsens and it becomes clear that your chance of recovery is unlikely, what would your preference be about the use of a ventilator (breathing machine) if it were available to you? \"     Would the patient desire the use of ventilator (breathing machine)?: \"Daughter Lino Lilly will decide on this at that time\"      Resuscitation  \"CPR works best to restart the heart when there is a sudden event, like a heart attack, in someone who is otherwise healthy. Unfortunately, CPR does not typically restart the heart for people who have serious health conditions or who are very sick. \"    \"In the event your heart stopped as a result of an underlying serious health condition, would you want attempts to be made to restart your heart (answer \"yes\" for attempt to resuscitate) or would you prefer a natural death (answer \"no\" for do not attempt to resuscitate)? \" yes       [] Yes   [] No   Educated Patient / Conception Makenna regarding differences between Advance Directives and portable DNR orders.     Length of ACP Conversation in minutes:      Conversation Outcomes:  [x] ACP discussion completed  [] Existing advance directive reviewed with patient; no changes to patient's previously recorded wishes  [] New Advance Directive completed  [] Portable Do Not Rescitate prepared for Provider review and signature  [] POLST/POST/MOLST/MOST prepared for Provider review and signature      Follow-up plan:    [] Schedule follow-up conversation to continue planning  [] Referred individual to Provider for additional questions/concerns   [] Advised patient/agent/surrogate to review completed ACP document and update if needed with changes in condition, patient preferences or care setting    [x] This note routed to one or more involved healthcare providers    Electronically signed by Marylee Guild, MSW, ERINW, Case Management on 2/6/2023 at 4:40 PM  Roshan Chacon [phone]

## 2023-02-06 NOTE — PROGRESS NOTES
Dr Mindy Bradley contacted, pt has new order for morphine 2mg now and may have another dose if he needs, Md will be in about 7am

## 2023-02-06 NOTE — PROGRESS NOTES
Patient has bruises on bilateral upper extremities. Pt refused skin assessment pt stated he was in too much pain and would do it later. charge notified

## 2023-02-06 NOTE — CARE COORDINATION
per chart review- noted patient came from [place] [address] w/ [facility]/ [facility] admissions #[phone] she states they can accept patient at [place] for [place] . She states it may not be the same room but will contact family to discuss. Will follow.   Electronically signed by Beau Herrera on 2/6/2023 at 9:13 AM  #[phone]

## 2023-02-06 NOTE — PROGRESS NOTES
Medication Reconciliation     List of medications patient is currently taking is complete. Source of information:   1. Conversation with [address] [place] [address]  2. EPIC records        Notes regarding home medications:  1. Patient received all of his AM home medications today NANCY.     Vale Woodard, Pharmacy Intern

## 2023-02-06 NOTE — PROGRESS NOTES
Patient admitted to PACU # 5 from OR at 1544 post OPEN REDUCTION INTERNAL FIXATION LEFT HUMERUS SHAFT FRACTURE  per Dr Betito Ferrari. Attached to PACU monitoring system and report received from anesthesia provider. Patient was reported to be hemodynamically stable during procedure. Patient on bipap following procedure.

## 2023-02-06 NOTE — CARE COORDINATION
02/06/23 0908   Readmission Assessment   Number of Days since last admission? 1-7 days   Previous Disposition SNF   Who is being Interviewed Caregiver;Patient   What was the patient's/caregiver's perception as to why they think they needed to return back to the hospital? Other (Comment)  (jv at [facility])   Did you visit your Primary Care Physician after you left the hospital, before you returned this time? No   Why weren't you able to visit your PCP? Did not have an appointment   Did you see a specialist, such as Cardiac, Pulmonary, Orthopedic Physician, etc. after you left the hospital? No   Who advised the patient to return to the hospital? Skilled Unit   Does the patient report anything that got in the way of taking their medications? No   In our efforts to provide the best possible care to you and others like you, can you think of anything that we could have done to help you after you left the hospital the first time, so that you might not have needed to return so soon?  Other (Comment)  (declines)

## 2023-02-06 NOTE — PROGRESS NOTES
[facility]   Progress Note      S/P :  SUBJECTIVE  in bed. Alert and oriented. FAmily at bedside. Pain is   described in left shoulder and with the intensity of moderate. Pain is described as aching. Pt agreeable      OBJECTIVE              Physical                      VITALS:  /63   Pulse 88   Temp 97.5 °F (36.4 °C) (Oral)   Resp 16   Ht 5' 8\" (1.727 m)   Wt 231 lb 0.7 oz (104.8 kg)   SpO2 94%   BMI 35.13 kg/m²                     MUSCULOSKELETAL:  Left wrist with intact flex/ext and hand grasp and extension and thumbs up . Left arm grossly bruised and swollen. No skin interruption or tenting note left arm. There are 3 Mepilex dressing on left elbow and forearm (Pt reports skin tears). The 2 distal ones are saturated with serous fluid.                     NEUROLOGIC:                                  Sensory:  Touch:  Left Upper Extremity:  normal                                                    Data       CBC:   Lab Results   Component Value Date/Time    WBC 5.0 02/06/2023 06:29 AM    RBC 3.09 02/06/2023 06:29 AM    HGB 7.9 02/06/2023 06:29 AM    HCT 25.2 02/06/2023 06:29 AM    MCV 81.4 02/06/2023 06:29 AM    MCH 25.4 02/06/2023 06:29 AM    MCHC 31.2 02/06/2023 06:29 AM    RDW 21.8 02/06/2023 06:29 AM     02/06/2023 06:29 AM    MPV 8.6 02/06/2023 06:29 AM        WBC:    Lab Results   Component Value Date/Time    WBC 5.0 02/06/2023 06:29 AM        Hemoglobin/Hematocrit:    Lab Results   Component Value Date/Time    HGB 7.9 02/06/2023 06:29 AM    HCT 25.2 02/06/2023 06:29 AM        PT/INR:    Lab Results   Component Value Date/Time    PROTIME 16.5 01/26/2023 01:02 AM    INR 1.34 01/26/2023 01:02 AM              Current Inpatient Medications             Current Facility-Administered Medications: albuterol sulfate HFA (PROVENTIL;VENTOLIN;PROAIR) 108 (90 Base) MCG/ACT inhaler 2 puff, 2 puff, Inhalation, Q4H PRN  amiodarone (CORDARONE) tablet 100 mg, 100 mg, Oral, Daily  atorvastatin (LIPITOR) tablet 40 mg, 40 mg, Oral, Daily  bisacodyl (DULCOLAX) EC tablet 5 mg, 5 mg, Oral, Daily  docusate sodium (COLACE) capsule 100 mg, 100 mg, Oral, BID  ferrous sulfate EC tablet 325 mg, 325 mg, Oral, Daily with breakfast  hydrALAZINE (APRESOLINE) tablet 25 mg, 25 mg, Oral, TID  lactulose (CHRONULAC) 10 GM/15ML solution 20 g, 20 g, Oral, TID PRN  magnesium oxide (MAG-OX) tablet 400 mg, 400 mg, Oral, BID  montelukast (SINGULAIR) tablet 10 mg, 10 mg, Oral, Daily  pantoprazole (PROTONIX) tablet 40 mg, 40 mg, Oral, Daily  predniSONE (DELTASONE) tablet 20 mg, 20 mg, Oral, Daily  tamsulosin (FLOMAX) capsule 0.4 mg, 0.4 mg, Oral, Daily  zinc sulfate (ZINCATE) capsule 50 mg, 50 mg, Oral, Daily  sodium chloride flush 0.9 % injection 5-40 mL, 5-40 mL, IntraVENous, 2 times per day  sodium chloride flush 0.9 % injection 5-40 mL, 5-40 mL, IntraVENous, PRN  0.9 % sodium chloride infusion, , IntraVENous, PRN  ondansetron (ZOFRAN-ODT) disintegrating tablet 4 mg, 4 mg, Oral, Q8H PRN **OR** ondansetron (ZOFRAN) injection 4 mg, 4 mg, IntraVENous, Q6H PRN  acetaminophen (TYLENOL) tablet 650 mg, 650 mg, Oral, Q6H PRN **OR** acetaminophen (TYLENOL) suppository 650 mg, 650 mg, Rectal, Q6H PRN  heparin (porcine) injection 5,000 Units, 5,000 Units, SubCUTAneous, BID  glucose chewable tablet 16 g, 4 tablet, Oral, PRN  dextrose bolus 10% 125 mL, 125 mL, IntraVENous, PRN **OR** dextrose bolus 10% 250 mL, 250 mL, IntraVENous, PRN  glucagon (rDNA) injection 1 mg, 1 mg, SubCUTAneous, PRN  dextrose 10 % infusion, , IntraVENous, Continuous PRN  insulin lispro (HUMALOG) injection vial 0-4 Units, 0-4 Units, SubCUTAneous, TID WC  insulin lispro (HUMALOG) injection vial 0-4 Units, 0-4 Units, SubCUTAneous, Nightly  polyethylene glycol (GLYCOLAX) packet 17 g, 17 g, Oral, Daily  sodium chloride 0.45 % 1,000 mL infusion, , IntraVENous, Continuous  morphine (PF) injection 2 mg, 2 mg, IntraVENous, Q2H PRN **OR** morphine (PF) injection 4 mg, 4 mg, IntraVENous, Q2H PRN  tiotropium-olodaterol (STIOLTO) 2.5-2.5 MCG/ACT inhaler 2 puff, 2 puff, Inhalation, Daily  0.9 % sodium chloride infusion, , IntraVENous, PRN    ASSESSMENT AND PLAN    Left arm injury  Left proximal humerus fx, plan ORIF today per DR Linnea Rosario, NPO for surgery  Left elbow and forearm skin tears. Will ask wound care to see.        SILVIANO Callahan - CNP  2/6/2023  9:21 AM

## 2023-02-06 NOTE — ANESTHESIA POSTPROCEDURE EVALUATION
Department of Anesthesiology  Postprocedure Note    Patient: Renetta Dooley  MRN: [id number]  YOB: 1940  Date of evaluation: 2/6/2023      Procedure Summary     Date: 02/06/23 Room / Location:  [facility] [address] [place] [address]    Anesthesia Start: 1350 Anesthesia Stop: 1552    Procedure: OPEN REDUCTION INTERNAL FIXATION LEFT HUMERUS SHAFT FRACTURE (Left: Arm Upper) Diagnosis:       Closed fracture of shaft of left humerus, unspecified fracture morphology, initial encounter      (LEFT HUMERUS FRACTURE)    Surgeons: Xavier Daniels MD Responsible Provider: Jackie Larsen MD    Anesthesia Type: general ASA Status: 3          Anesthesia Type: No value filed.     Clau Phase I: Clau Score: 7    Clau Phase II:        Anesthesia Post Evaluation    Patient location during evaluation: PACU  Level of consciousness: awake and alert  Airway patency: patent  Nausea & Vomiting: no nausea and no vomiting  Complications: no  Cardiovascular status: blood pressure returned to baseline  Respiratory status: acceptable  Hydration status: euvolemic  Comments: Postoperative Anesthesia Note    Name:    Renetta Dooley  MRN:      [id number]    Patient Vitals in the past 12 hrs:  02/06/23 1630, BP:(!) 125/57, Pulse:95, SpO2:95 %  02/06/23 1629, SpO2:94 %  02/06/23 1605, BP:(!) 132/59, Pulse:100, SpO2:97 %  02/06/23 1600, BP:128/65, Pulse:99, SpO2:95 %  02/06/23 1555, BP:109/88, Pulse:(!) 101, SpO2:97 %  02/06/23 1553, BP:119/61, Pulse:100, SpO2:97 %  02/06/23 1548, BP:117/61, Pulse:99, Resp:22, SpO2:97 %  02/06/23 1545, BP:117/61, Temp:97.2 °F (36.2 °C), Pulse:(!) 103, Resp:16, SpO2:95 %  02/06/23 1245, Resp:16, SpO2:94 %  02/06/23 1221, BP:(!) 150/63, Temp:97.6 °F (36.4 °C), Temp src:Temporal, Pulse:93, Resp:17, SpO2:95 %  02/06/23 0826, BP:130/63, Temp:97.5 °F (36.4 °C), Temp src:Oral, Pulse:88, Resp:16, SpO2:94 %  02/06/23 0826, BP:130/63     LABS:    CBC  Lab Results       Component                Value Date/Time                  WBC                      5.0                 02/06/2023 06:29 AM        HGB                      7.9 (L)             02/06/2023 06:29 AM        HCT                      25.2 (L)            02/06/2023 06:29 AM        PLT                      138                 02/06/2023 06:29 AM   RENAL  Lab Results       Component                Value               Date/Time                  NA                       146 (H)             02/06/2023 06:29 AM        K                        3.7                 02/06/2023 06:29 AM        CL                       99                  02/06/2023 06:29 AM        CO2                      37 (H)              02/06/2023 06:29 AM        BUN                      63 (H)              02/06/2023 06:29 AM        CREATININE               2.3 (H)             02/06/2023 06:29 AM        GLUCOSE                  121 (H)             02/06/2023 06:29 AM        GLUCOSE                  96                  06/16/2011 10:37 AM   COAGS  Lab Results       Component                Value               Date/Time                  PROTIME                  16.5 (H)            01/26/2023 01:02 AM        INR                      1.34 (H)            01/26/2023 01:02 AM        APTT                     28.7                01/17/2023 02:53 PM     Intake & Output:  @72HXUB@    Nausea & Vomiting:  No    Level of Consciousness:  Awake    Pain Assessment:  Adequate analgesia    Anesthesia Complications:  No apparent anesthetic complications    SUMMARY      Vital signs stable  OK to discharge from Stage I post anesthesia care.   Care transferred from Anesthesiology department on discharge from perioperative area

## 2023-02-06 NOTE — CONSENT
Informed Consent for Blood Component Transfusion Note    I have discussed with the patient the rationale for blood component transfusion; its benefits in treating or preventing fatigue, organ damage, or death; and its risk which includes mild transfusion reactions, rare risk of blood borne infection, or more serious but rare reactions. I have discussed the alternatives to transfusion, including the risk and consequences of not receiving transfusion. The patient had an opportunity to ask questions and had agreed to proceed with transfusion of blood components.     Electronically signed by SILVIANO Gamino CNP on 2/5/23 at 11:17 PM EST

## 2023-02-06 NOTE — ANESTHESIA PRE PROCEDURE
Department of Anesthesiology  Preprocedure Note       Name:  Andrea Bradford   Age:  79 y.o.  :  1940                                          MRN:  [id number]         Date:  2023      Surgeon: Choco Ashton):  Eliseo Hooks MD    Procedure: Procedure(s):  OPEN REDUCTION INTERNAL FIXATION LEFT HUMERUS SHAFT FRACTURE    Medications prior to admission:   Prior to Admission medications    Medication Sig Start Date End Date Taking?  Authorizing Provider   tamsulosin (FLOMAX) 0.4 MG capsule Take 0.4 mg by mouth daily   Yes Historical Provider, MD   insulin glargine (LANTUS) 100 UNIT/ML injection vial Inject 14 Units into the skin every morning 23   Jaya Clark MD   insulin lispro (HUMALOG) 100 UNIT/ML SOLN injection vial Inject 0-8 Units into the skin 3 times daily (with meals) 23   Jaya Clark MD   insulin lispro (HUMALOG) 100 UNIT/ML SOLN injection vial Inject 0-4 Units into the skin nightly 23   Jaya Clark MD   hydrALAZINE (APRESOLINE) 25 MG tablet Take 1 tablet by mouth every 8 hours 23   Jaya Clark MD   predniSONE (DELTASONE) 20 MG tablet 20 mg po daily for 5 days, then 15 mg po daily for 5 days, then 10 mg daily 23   Jaya Clark MD   torsemide [facility]) 100 MG tablet Take 1 tablet by mouth daily 23   Jaya Clark MD   lactulose [facility]) 10 GM/15ML solution Take 30 mLs by mouth 3 times daily as needed (Constipation) 23   Jaya Clark MD   magnesium oxide (MAG-OX) 400 (240 Mg) MG tablet Take 1 tablet by mouth 2 times daily 23   Jaya Clark MD   vitamin C (ASCORBIC ACID) 500 MG tablet Take 500 mg by mouth daily    Historical Provider, MD   Cholecalciferol 50 MCG (2000) TABS Take 1 tablet by mouth daily    Historical Provider, MD   Multiple Vitamins-Minerals (THERAPEUTIC MULTIVITAMIN-MINERALS) tablet Take 1 tablet by mouth daily    Historical Provider, MD   B Complex Vitamins (B-COMPLEX/B-12) TABS Take 1 tablet by mouth daily    Historical Provider, MD   zinc sulfate (ZINCATE) 220 (50 Zn) MG capsule Take 50 mg by mouth daily    Historical Provider, MD   bisacodyl (DULCOLAX) 5 MG EC tablet Take 5 mg by mouth daily    Historical Provider, MD   amiodarone (CORDARONE) 200 MG tablet Take 0.5 tablets by mouth daily 11/11/22   Fabio Matos MD   albuterol sulfate HFA (PROVENTIL;VENTOLIN;PROAIR) 108 (90 Base) MCG/ACT inhaler INHALE 2 PUFFS BY MOUTH EVERY 4 HOURS AS NEEDED FOR WHEEZING 10/21/22   Mar Batista MD   montelukast (SINGULAIR) 10 MG tablet TAKE 1 TABLET BY MOUTH EVERY DAY 10/21/22   Mar Batista MD   [employer] PURELAX 17 GM/SCOOP powder TAKE 17G BY MOUTH DAILY MIX WITH 8 OZ OF WATER 9/6/22   Mar Batista MD   pantoprazole (PROTONIX) 40 MG tablet TAKE 1 TABLET BY MOUTH EVERY DAY 8/15/22   [place] [facility],    B-D UF III MINI PEN NEEDLES 31G X 5 MM MISC USE AS DIRECTED 3 TIMES A DAY 8/13/22   Aide Rivera MD   atorvastatin (LIPITOR) 40 MG tablet TAKE 1 TABLET BY MOUTH EVERY DAY 4/26/22   Mar Batista MD   Accu-Chek [employer]ix Lancets MISC USE TO TEST 3 TIMES A DAY DX CODE E11.9 7/20/21   Mar Batista MD   CPAP Machine MISC by Does not apply route    Historical Provider, MD   docusate sodium (COLACE) 100 MG capsule Take 100 mg by mouth 2 times daily    Historical Provider, MD   Cyanocobalamin (VITAMIN B-12 PO) Take 500 mcg by mouth every other day     Historical Provider, MD   ferrous sulfate 325 (65 FE) MG tablet Take 325 mg by mouth daily     Historical Provider, MD       Current medications:    Current Facility-Administered Medications   Medication Dose Route Frequency Provider Last Rate Last Admin    albuterol sulfate HFA (PROVENTIL;VENTOLIN;PROAIR) 108 (90 Base) MCG/ACT inhaler 2 puff  2 puff Inhalation Q4H PRN Mar Batista MD        amiodarone (CORDARONE) tablet 100 mg  100 mg Oral Daily Thai Days Kaity Gan MD   100 mg at 02/06/23 0827    atorvastatin (LIPITOR) tablet 40 mg  40 mg Oral Daily Martha Garrison MD        bisacodyl (DULCOLAX) EC tablet 5 mg  5 mg Oral Daily Martha Garrison MD        docusate sodium (COLACE) capsule 100 mg  100 mg Oral BID Martha Garrison MD        ferrous sulfate EC tablet 325 mg  325 mg Oral Daily with breakfast Martha Garrison MD        hydrALAZINE (APRESOLINE) tablet 25 mg  25 mg Oral TID Martha Garrison MD   25 mg at 02/06/23 0826    lactulose (CHRONULAC) 10 GM/15ML solution 20 g  20 g Oral TID PRN Martha Garrison MD        magnesium oxide (MAG-OX) tablet 400 mg  400 mg Oral BID Martha Garrison MD        montelukast (SINGULAIR) tablet 10 mg  10 mg Oral Daily Martha Garrison MD   10 mg at 02/06/23 1159    pantoprazole (PROTONIX) tablet 40 mg  40 mg Oral Daily Martha Garrison MD   40 mg at 02/06/23 6691    predniSONE (DELTASONE) tablet 20 mg  20 mg Oral Daily Martha Garrison MD        tamsulosin [facility]) capsule 0.4 mg  0.4 mg Oral Daily Martha Garrison MD   0.4 mg at 02/06/23 0827    zinc sulfate (ZINCATE) capsule 50 mg  50 mg Oral Daily Martha Garrison MD        sodium chloride flush 0.9 % injection 5-40 mL  5-40 mL IntraVENous 2 times per day Martha Garrison MD   10 mL at 02/06/23 0827    sodium chloride flush 0.9 % injection 5-40 mL  5-40 mL IntraVENous PRN Martha Garrison MD        0.9 % sodium chloride infusion   IntraVENous PRN Martha Garrison MD        ondansetron (ZOFRAN-ODT) disintegrating tablet 4 mg  4 mg Oral Q8H PRN Martha Garrison MD        Or    ondansetron [facility]) injection 4 mg  4 mg IntraVENous Q6H PRN Martha Garrison MD        acetaminophen (TYLENOL) tablet 650 mg  650 mg Oral Q6H PRN Martha Garrison MD   650 mg at 02/06/23 0010    Or    acetaminophen (TYLENOL) suppository 650 mg  650 mg Rectal Q6H PRN Merrilyn Meigs, MD        heparin (porcine) injection 5,000 Units  5,000 Units SubCUTAneous BID Merrilyn Meigs, MD        glucose chewable tablet 16 g  4 tablet Oral PRN Oneita Orris, DO        dextrose bolus 10% 125 mL  125 mL IntraVENous PRN Oneita Orris, DO        Or    dextrose bolus 10% 250 mL  250 mL IntraVENous PRN Oneita Orris, DO        glucagon (rDNA) injection 1 mg  1 mg SubCUTAneous PRN Oneita Orris, DO        dextrose 10 % infusion   IntraVENous Continuous PRN Oneita Orris, DO        insulin lispro (HUMALOG) injection vial 0-4 Units  0-4 Units SubCUTAneous TID WC Merrilyn Meigs, MD        insulin lispro (HUMALOG) injection vial 0-4 Units  0-4 Units SubCUTAneous Nightly Merrilyn Meigs, MD        polyethylene glycol [facility]) packet 17 g  17 g Oral Daily Merrilyn Meigs, MD        morphine (PF) injection 2 mg  2 mg IntraVENous Q2H PRN Merrilyn Meigs, MD   2 mg at 02/06/23 1124    Or    morphine (PF) injection 4 mg  4 mg IntraVENous Q2H PRN Merrilyn Meigs, MD   4 mg at 02/06/23 0903    tiotropium-olodaterol (STIOLTO) 2.5-2.5 MCG/ACT inhaler 2 puff  2 puff Inhalation Daily Lizzie Ocampo MD        0.45 % sodium chloride infusion   IntraVENous Continuous Merrilyn Meigs, MD        ipratropium-albuterol (DUONEB) nebulizer solution 1 ampule  1 ampule Inhalation Once Mable Pfeiffer MD        0.9 % sodium chloride infusion   IntraVENous PRN Emily Ferrara APRN - CNP           Allergies:     Allergies   Allergen Reactions    Valsartan-Hydrochlorothiazide Other (See Comments)     DEISY    Hytrin [Terazosin Hcl]      Ineffective for BP control    Penicillins      Unknown reaction during childhood; Patient tolerated cephalosporins in the past    Shrimp Flavor Hives    Sulfa Antibiotics      Unknown reaction in childhood    Sulfasalazine Other (See Comments)  Tekturna [Aliskiren Fumarate]      Ineffective    Vancomycin      Fever    Ciprofloxacin Rash     Fever and rash        Problem List:    Patient Active Problem List   Diagnosis Code    JOANN (obstructive sleep apnea) G47.33    Chronic GERD K21.9    Type 2 diabetes mellitus with stage 4 chronic kidney disease, with long-term current use of insulin ([facility]) E11.22, N18.4, Z79.4    Microcytic anemia D50.9    Hyperlipidemia E78.5    Essential hypertension I10    B12 deficiency E53.8    Pulmonary nodule R91.1    Moderate COPD (chronic obstructive pulmonary disease) ([facility]) J44.9    Acute kidney injury ([facility]) N17.9    Slow transit constipation K59.01    Pancreatic mass K86.89    Obesity, Class II, BMI 35-39.9 E66.9    Weakness R53.1    Chronic anemia D64.9    Chronic respiratory failure with hypoxia ([facility]) J96.11    Chronic diastolic CHF (congestive heart failure), NYHA class 2 ([facility]) I50.32    Mucopurulent chronic bronchitis ([facility]) J41.1    Persistent atrial fibrillation ([facility]) I48.19    Closed fracture of shaft of left humerus S42.302A    Closed 2-part displaced fracture of surgical neck of left humerus, initial encounter S42.222A       Past Medical History:        Diagnosis Date    Allergic rhinitis     Asthma     Atrial fibrillation ([facility])     Carotid artery stenosis     Chronic kidney disease     COPD (chronic obstructive pulmonary disease) ([facility])     CPAP (continuous positive airway pressure) dependence     ESBL (extended spectrum beta-lactamase) producing bacteria infection 12/26/2018    urine    Hyperlipidemia     Hypertension     Iron deficiency anemia     Obstructive sleep apnea     Type II or unspecified type diabetes mellitus without mention of complication, not stated as uncontrolled        Past Surgical History:        Procedure Laterality Date    BRONCHOSCOPY N/A 12/28/2022    BRONCHOSCOPY ALVEOLAR LAVAGE performed by Rigo Gu MD at [address] [place] [address] 2022    BRONCHOSCOPY DIAGNOSTIC OR CELL [address] ONLY performed by Mookie Brownlee MD at [facility]  2012    bilateral    COLONOSCOPY  7/10/2009    diverticulosis    hemorrhoids    CT BONE MARROW BIOPSY  2022    CT BONE MARROW BIOPSY 2022 [facility]    GALLBLADDER SURGERY  1981    PAIN MANAGEMENT PROCEDURE Right 10/20/2021    COOLIEF RADIOFREQUENCY ABLATION - RIGHT KNEE performed by José Antonio Prakash MD at [address] [facility] [address] [address] Left 2021    [address] - LEFT KNEE performed by José Antonio Prakash MD at [address] [facility]  7-2005    7/10/2009    normal       Social History:    Social History     Tobacco Use    Smoking status: Former     Packs/day: 0.50     Years: 18.00     Pack years: 9.00     Types: Cigarettes     Start date: 1958     Quit date: 1988     Years since quittin.1    Smokeless tobacco: Never   Substance Use Topics    Alcohol use: No     Alcohol/week: 0.0 standard drinks                                Counseling given: Not Answered      Vital Signs (Current):   Vitals:    23 0145 23 0826 23 0826 23 1221   BP: 112/67 130/63 130/63 (!) 150/63   Pulse: 77  88 93   Resp: 17  16 17   Temp:   97.5 °F (36.4 °C) 97.6 °F (36.4 °C)   TempSrc:   Oral Temporal   SpO2: 100%  94% 95%   Weight:       Height:                                                  BP Readings from Last 3 Encounters:   23 (!) 150/63   23 135/74   22 (!) 141/80       NPO Status: Time of last liquid consumption:                         Time of last solid consumption:                         Date of last liquid consumption: 23                        Date of last solid food consumption: 23    BMI:   Wt Readings from Last 3 Encounters:   23 231 lb 0.7 oz (104.8 kg)   23 214 lb 15.2 oz (97.5 kg)   22 218 lb 4.1 oz (99 kg)     Body mass index is 35.13 kg/m².     CBC:   Lab Results   Component Value Date/Time    WBC 5.0 02/06/2023 06:29 AM    RBC 3.09 02/06/2023 06:29 AM    HGB 7.9 02/06/2023 06:29 AM    HCT 25.2 02/06/2023 06:29 AM    MCV 81.4 02/06/2023 06:29 AM    RDW 21.8 02/06/2023 06:29 AM     02/06/2023 06:29 AM       CMP:   Lab Results   Component Value Date/Time     02/06/2023 06:29 AM    K 3.7 02/06/2023 06:29 AM    CL 99 02/06/2023 06:29 AM    CO2 37 02/06/2023 06:29 AM    BUN 63 02/06/2023 06:29 AM    CREATININE 2.3 02/06/2023 06:29 AM    GFRAA 37 05/27/2022 02:05 PM    GFRAA >60 04/19/2013 10:46 AM    AGRATIO 1.1 01/17/2023 02:53 PM    LABGLOM 27 02/06/2023 06:29 AM    GLUCOSE 121 02/06/2023 06:29 AM    GLUCOSE 96 06/16/2011 10:37 AM    PROT 5.5 01/28/2023 05:46 AM    PROT 6.4 12/12/2012 08:05 AM    CALCIUM 7.7 02/06/2023 06:29 AM    BILITOT 0.3 01/28/2023 05:46 AM    ALKPHOS 80 01/28/2023 05:46 AM    AST 16 01/28/2023 05:46 AM    ALT 20 01/28/2023 05:46 AM       POC Tests:   Recent Labs     02/04/23  1719   POCGLU 316*       Coags:   Lab Results   Component Value Date/Time    PROTIME 16.5 01/26/2023 01:02 AM    INR 1.34 01/26/2023 01:02 AM    APTT 28.7 01/17/2023 02:53 PM       HCG (If Applicable): No results found for: PREGTESTUR, PREGSERUM, HCG, HCGQUANT     ABGs:   Lab Results   Component Value Date/Time    PHART 7.402 04/29/2015 04:43 AM    PO2ART 42.2 04/29/2015 04:43 AM    QZQ8SLM 43.3 04/29/2015 04:43 AM    MBI1BJE 26.4 04/29/2015 04:43 AM    BEART 1.9 04/29/2015 04:43 AM    K9IIOKJA 75.7 04/29/2015 04:43 AM        Type & Screen (If Applicable):  No results found for: LABABO, LABRH    Drug/Infectious Status (If Applicable):  No results found for: HIV, HEPCAB    COVID-19 Screening (If Applicable):   Lab Results   Component Value Date/Time    COVID19 Not Detected 01/17/2023 06:53 PM           Anesthesia Evaluation  Patient summary reviewed no history of anesthetic complications:   Airway: Mallampati: II  TM distance: >3 FB   Neck ROM: full  Mouth opening: > = 3 FB   Dental:          Pulmonary:   (+) COPD:  sleep apnea:  decreased breath sounds asthma:                           PE comment: Coughing intermittently and on 2-3L. Cardiovascular:  Exercise tolerance: no interval change,   (+) hypertension:, CAD:, dysrhythmias: atrial fibrillation, CHF: diastolic, peripheral edema (2+), pulmonary hypertension (WHO class II (diastolic HF)):, hyperlipidemia        Rhythm: regular  Rate: normal                 ROS comment: [facility] 1/2023:  FINDINGS:  1. Elevated right atrial pressure at 14 mmHg, but only a mildly  elevated pulmonary capillary wedge pressure at 18 mmHg. 2.  Pulmonary hypertension, likely mixed with an instantaneous pressure  of 53/14 and a mean pulmonary arterial pressure of 33 mmHg. 3.  Normal cardiac output by thermodilution at 6.27 liters per minute  with a cardiac index of 2.81 liters per kilogram per minute. By Timothy Pintos  equation cardiac index is 4.22 liters per kilogram per minute. 4.  Pulmonary vascular resistance 128 dynes per second per meter  squared. 5.  Normal to mildly reduced mixed venous oxygen saturations with an SVC  of 60 with a right atrium of 65% and pulmonary artery of 62%. The  patient saturating 94% on pulse oximetry on 3 liters at which time the  study was performed. TTE 2022:   Conclusions      Summary   Ejection fraction is visually estimated to be 60-65%. No regional wall motion abnormalities are noted. Grade I diastolic dysfunction with elevated LV filling pressures. The left atrium is moderately dilated. mildly dilated right ventricle. Estimated pulmonary artery systolic pressure is mildly elevated at 40 mmHg   assuming a right atrial pressure of 15 mmHg.      Neuro/Psych:   Negative Neuro/Psych ROS              GI/Hepatic/Renal:   (+) GERD:, renal disease: CRI,           Endo/Other:    (+) Diabetesusing insulin, blood dyscrasia (H+H 7.9): anemia:., . Abdominal:             Vascular: negative vascular ROS. Other Findings:           Anesthesia Plan      general     ASA 3     (80 yo M with PMHx of COPD, Asthma, recent HCAP, JOANN, HTN, HLD, pHTN (who class II), afib, CAD, GERD, DM II, CKD, Anemia presenting for ORIF of left humeral shaft fx. He is recovering for pneumonia and has a intermittent cough without phlegm production. Will order a breathing treatment prior to surgery. I discussed with the patient the risks and benefits to general anesthesia including possible anesthetic complications but not limited to: PONV, damage to the airway and surrounding structures (teeth, lips, gums, tongue, etc.), adverse reactions to medications, cardiac complications (MI, CHF, arrhythmias, etc.), respiratory complications (post-op ventilation, respiratory failure, etc.), neurologic complications (nerve damage, stroke, seizure) and death. The patient was given the opportunity to ask questions and all questions were answered to the patient's satisfaction.  )  Induction: intravenous. MIPS: Postoperative opioids intended and Postoperative trial extubation. Anesthetic plan and risks discussed with patient. Plan discussed with CRNA. This pre-anesthesia assessment may be used as a history and physical.    DOS STAFF ADDENDUM:    Pt seen and examined, chart reviewed (including anesthesia, drug and allergy history). No interval changes to history and physical examination. Anesthetic plan, risks, benefits, alternatives, and personnel involved discussed with patient. Patient verbalized an understanding and agrees to proceed.       Mame Jean-Baptiste MD  February 6, 2023  12:40 PM

## 2023-02-06 NOTE — PROGRESS NOTES
PACU Transfer Note    Vitals:    02/06/23 1630   BP: (!) 125/57   Pulse: 95   Resp:    Temp:    SpO2: 95%       In: 800 [I.V.:800]  Out: -     Pain assessment:  none  Pain Level: 5    Report given to Receiving unit RN.    2/6/2023 4:56 PM

## 2023-02-06 NOTE — ED TRIAGE NOTES
Patient was brought in by EMS with a history of having had a fall about an hour ago while trying to sit on the toilet seat. Patient states that his left arm got caught between the toilet seat and the grab bar. Fall was unwitnessed and patient says he was down for about 10 minutes.

## 2023-02-06 NOTE — PROGRESS NOTES
Clinical Pharmacy Note  Subcutaneous Anticoagulant Adjustment     Enoxaparin has been adjusted to 30mg Daily based on [facility] policy. Recent Labs     02/05/23 2129 02/06/23  0629   CREATININE 2.3* 2.3*     Recent Labs     02/06/23  0629   HGB 7.9*   HCT 25.2*        Estimated Creatinine Clearance: 29 mL/min (A) (based on SCr of 2.3 mg/dL (H)). Pharmacist Review of Appropriate Use and Automatic Dose Adjustment of Subcutaneous Anticoagulants (Adult)    The guidance below is to provide initial recommendations for dosing. If recommended dose does not align well with patient's current clinical picture, communications with the care team will occur to determine most appropriate medication and dose. TABLE 1. ENOXAPARIN ROUTINE PROPHYLAXIS DOSING (Medically ill, routine surgery)   Patient Weight (kg)     50.9 and below 51 - 100.9 101 - 150.9 151 - 174.9 175 or greater         Estimated CrCl  (ml/min) 30 or greater   30 mg SUBQ daily   40 mg SUBQ daily 30 mg SUBQ BID  40 mg SUBQ BID 60mg SUBQ BID      15-29 UFH 5000 units SUBQ BID   30 mg SUBQ daily 30 mg SUBQ daily 40 mg SUBQ daily   60 mg SUBQ daily      Less than 15 or Dialysis UFH 5000 units SUBQ BID   UFH 5000 units SUBQ TID UFH 7500 units SUBQ TID       TABLE 2. ENOXAPARIN TREATMENT DOSING   (Based on 1mg/kg BID for DVT/PE/AFib)   Patient Weight (kg)     50.9 and below .9 151-189.9 190 or greater         Estimated CrCl  (ml/min) 30 or greater Recommend [facility] standardized UFH infusion, apixaban or rivaroxaban 1mg/kg SUBQ BID 1mg/kg SUBQ BID if anti-Xa levels are feasible per institution. Alternatively,  recommend switch to [facility] standardized UFH infusion     Recommend switch to [facility] standardized UFH infusion. 15-29 Recommend [facility] standardized UFH infusion or apixaban 1mg/kg SUBQ daily Recommend switch to [facility] standardized UFH infusion     Less than 15 or Dialysis Recommend switch to [facility] standardized UFH infusion.      Barbie Jason, [facility] 2/6/2023 5:49 PM

## 2023-02-06 NOTE — ED PROVIDER NOTES
[address]  [address] [address]  Dept: [address] [facility] [address]: [phone]  eMERGENCYdEPARTMENT eNCOUnter      Pt Name: Renetta Dooley  MRN: [id number]  [birth year]ra 1940  Date of evaluation: 2/5/2023  Provider:Cornelia Sanches, SILVIANO - CNP      Patient seen per myself and my NP preceptor: ERICK Ayala 327       Chief Complaint   Patient presents with    Fall    Arm Injury     Left arm injury due to a fall. CRITICAL CARE TIME         HISTORY OF PRESENT ILLNESS  (Location/Symptom, Timing/Onset, Context/Setting, Quality, Duration,Modifying Factors, Severity.)   Renetta Dooley is a 79 y.o. male who presents to the emergency department : PMHx: CKD, COPD, HTN, HLD, atrial fibs, asthma, type II DM     Patient presents to the emergency department via EMS from [facility]. Patient self endorses that he had gotten up from bed to go to the bathroom. Missed the toilet and fell between the toilet and the wall, trying to grab a hold of the arm rail. He reports that he was trying to sit on the toilet and went to grab the handbar and his hand slipped and he fell becoming wedged between the toilet and the wall injuring his left arm. He reports pain in left upper arm 10/10. Denies hitting head or back. Denies pain to head, neck, or back. This was an unwitnessed scenario. Hospital admission: January 17, 2023 for shortness of breath ->  discharged from [facility] on 2/4 for pneumonia, heart failure, and kidney disease. HPI provided by patient, and ED staff  Lives at nursing home facility for rehab as of 2/4/23  Employment: Retired  Social Indeterminates: None  Code: full code  Anticoagulation therapy: None    Nursing Notes were reviewedand agreed with or any disagreements were addressed in the HPI.     REVIEW OF SYSTEMS    (2-9 systems for level 4, 10 or more for level 5)     Review of Systems Constitutional: Negative. HENT: Negative. Eyes: Negative. Respiratory:  Negative for shortness of breath. Gastrointestinal:  Positive for constipation. Genitourinary:  Positive for difficulty urinating. Musculoskeletal:  Negative for back pain, neck pain and neck stiffness. C/o left upper arm pain   Skin:  Positive for color change. Bruising left upper arm/shoulder   Neurological:  Positive for numbness. Negative for dizziness, light-headedness and headaches. C/o numbness to left arm, like it is floating. Psychiatric/Behavioral: Negative. Except as noted above the remainder of the review of systems was reviewed and negative.        PAST MEDICAL HISTORY         Diagnosis Date    Allergic rhinitis     Asthma     Atrial fibrillation (HCC)     Carotid artery stenosis     Chronic kidney disease     COPD (chronic obstructive pulmonary disease) (HCC)     CPAP (continuous positive airway pressure) dependence     ESBL (extended spectrum beta-lactamase) producing bacteria infection 12/26/2018    urine    Hyperlipidemia     Hypertension     Iron deficiency anemia     Obstructive sleep apnea     Type II or unspecified type diabetes mellitus without mention of complication, not stated as uncontrolled        SURGICAL HISTORY           Procedure Laterality Date    BRONCHOSCOPY N/A 12/28/2022    BRONCHOSCOPY ALVEOLAR LAVAGE performed by Les Farley MD at [address]  12/28/2022    BRONCHOSCOPY DIAGNOSTIC OR CELL [address] ONLY performed by Les Farley MD at [address] [facility]  2/2012    bilateral    COLONOSCOPY  7/10/2009    diverticulosis    hemorrhoids    CT BONE MARROW BIOPSY  11/1/2022    CT BONE MARROW BIOPSY 11/1/2022 [facility]    GALLBLADDER SURGERY  1981    PAIN MANAGEMENT PROCEDURE Right 10/20/2021    COOLIEF RADIOFREQUENCY ABLATION - RIGHT KNEE performed by Girish Oliveira MD at [address] Left 2021    COOLIEF RADIOFREQUENCY ABLATION - LEFT KNEE performed by Raleigh Schwarz MD at [address] [place] [address]  7-2005    7/10/2009    normal       CURRENT MEDICATIONS     [unfilled]    ALLERGIES     Valsartan-hydrochlorothiazide, Hytrin [terazosin hcl], Penicillins, Shrimp flavor, Sulfa antibiotics, Sulfasalazine, Tekturna [aliskiren fumarate], Vancomycin, and Ciprofloxacin    FAMILY HISTORY           Problem Relation Age of Onset    Heart Disease Mother     Stroke Mother     Vision Loss Mother     High Blood Pressure Father     High Blood Pressure Brother     Diabetes Brother     Sleep Apnea Brother     Arthritis Paternal Grandmother     Diabetes Paternal Grandmother      Family Status   Relation Name Status    Mother   at age 80        CVA    Father   at age 66        Pneumonia in [address]  (Not Specified)    PGM          SOCIAL HISTORY      reports that he quit smoking about 35 years ago. His smoking use included cigarettes. He started smoking about 65 years ago. He has a 9.00 pack-year smoking history. He has never used smokeless tobacco. He reports that he does not drink alcohol and does not use drugs. PHYSICAL EXAM    (up to 7 for level 4, 8 or more for level 5)     ED Triage Vitals [23]   Enc Vitals Group      /70      Heart Rate 96      Resp 24      Temp 97.8 °F (36.6 °C)      Temp Source Oral      SpO2 100 %      Weight 231 lb 0.7 oz (104.8 kg)      Height 5' 8\" (1.727 m)      Head Circumference       Peak Flow       Pain Score       Pain Loc       Pain Edu? Excl. in [address]? Physical Exam  Vitals and nursing note reviewed. Constitutional:       Appearance: He is obese. Comments: In distress due to pain to left upper arm   HENT:      Head: Normocephalic and atraumatic. Eyes:      Conjunctiva/sclera: Conjunctivae normal.      Pupils: Pupils are equal, round, and reactive to light. Cardiovascular:      Rate and Rhythm: Normal rate and regular rhythm. Pulses:           Radial pulses are 2+ on the left side. Heart sounds: Normal heart sounds. Pulmonary:      Breath sounds: Normal breath sounds. Abdominal:      General: Bowel sounds are normal.   Musculoskeletal:         General: Swelling, tenderness and signs of injury present. Left shoulder: Swelling and tenderness present. Decreased range of motion. Left upper arm: Swelling and tenderness present. Left forearm: Swelling present. Left wrist: Swelling present. No deformity. Normal range of motion. Normal pulse. Cervical back: No tenderness. Comments: Able to rotate left arm causing pain in left upper arm. Able to flex and extend left wrist causing pain in left upper arm. Skin:     General: Skin is warm. Capillary Refill: Capillary refill takes less than 2 seconds. Findings: Ecchymosis and signs of injury present. Comments: Ecchymosis to left upper arm and shoulder. Skin tears to left forearm x2. Covered with foam bordered dressing. Neurological:      General: No focal deficit present. Mental Status: He is alert and oriented to person, place, and time. DIAGNOSTIC RESULTS     EKG: All EKG's are interpreted by the Emergency Department Physician who either signs or Co-signs this chart in the absence of a cardiologist.    RADIOLOGY:   Non-plain film images such as CT, Ultrasound and MRI are read by the radiologist. Plain radiographic images are visualized and preliminarilyinterpreted by the emergency physician with the below findings:    Interpretation per the Radiologist below,if available at the time of this note:    [address] [place] [address]   Final Result   No acute intracranial abnormality. CT CERVICAL SPINE WO CONTRAST   Final Result   1. No acute cervical spine fracture.       2.  Multilevel degenerative changes in the cervical spine are centered at   C5-C6 and C6-C7.      3.  Partially visualized right pleural effusion. Visualized lungs   demonstrate heterogeneous opacities which may represent edema or infection. XR SHOULDER LEFT (MIN 2 VIEWS)   Final Result   Severely displaced fracture through the midshaft left humerus with associated   soft tissue swelling and probable hematoma. XR RADIUS ULNA LEFT (2 VIEWS)   Final Result   No fracture of the forearm. Diffuse soft tissue swelling and edema. LABS:  Labs Reviewed   BASIC METABOLIC PANEL W/ REFLEX TO MG FOR LOW K - Abnormal; Notable for the following components:       Result Value    Chloride 95 (*)     CO2 35 (*)     Glucose 175 (*)     BUN 66 (*)     Creatinine 2.3 (*)     Est, Glom Filt Rate 27 (*)     Calcium 7.7 (*)     All other components within normal limits   CBC WITH AUTO DIFFERENTIAL - Abnormal; Notable for the following components:    RBC 3.21 (*)     Hemoglobin 7.6 (*)     Hematocrit 25.8 (*)     MCH 23.8 (*)     MCHC 29.6 (*)     RDW 21.8 (*)     Lymphocytes Absolute 0.3 (*)     All other components within normal limits   TYPE AND SCREEN   PREPARE RBC (CROSSMATCH)       All other labs were within normal range or not returned as of this dictation.     EMERGENCY DEPARTMENT COURSE and DIFFERENTIAL DIAGNOSIS/MDM:   Vitals:    Vitals:    02/05/23 2016 02/05/23 2352   BP: 138/70 121/65   Pulse: 96 86   Resp: 24 19   Temp: 97.8 °F (36.6 °C) 97.6 °F (36.4 °C)   TempSrc: Oral Axillary   SpO2: 100% 99%   Weight: 231 lb 0.7 oz (104.8 kg)    Height: 5' 8\" (1.727 m)      Medications   0.9 % sodium chloride infusion (has no administration in time range)   acetaminophen (TYLENOL) tablet 650 mg (has no administration in time range)     Or   acetaminophen (TYLENOL) suppository 650 mg (has no administration in time range)   morphine (PF) injection 2 mg (has no administration in time range)   morphine (PF) injection 2 mg (2 mg IntraVENous Given 2/5/23 2117)   methocarbamol (ROBAXIN) tablet 500 mg (500 mg Oral Given 2/5/23 2118)       [facility]  Patient seen by myself in conjunction with my preceptor Shanel Handler CNP  Dr. Monty Teresa is present and available for consultation. Differential diagnosis: Fractured clavicle, fractured humerus, soft tissue injury, shoulder dislocation. Patient arrived via EMS with c/o a fall at the nursing facility where he resides for rehab. Patient reports he was attempting to grab the handrail in order to sit on the toilet and his hand slipped causing him to fall and get wedged between the wall and toilet. Patient estimates that he was down for 10 minutes prior to help arriving. C/o pain to left upper arm and shoulder. Left arm from shoulder to fingertips swollen. Upper left arm and shoulder with ecchymosis. Unable to lift left arm due to increased pain with movement. Patient able to rotate lower arm and flex and extend wrist which causes pain to left upper arm. Strong +2 left radial pulse. C/o numbness to left arm, feeling like it is floating. Able to feel tactile sensations. Has two skin tears to left lower arm that occurred with the fall. SDM: plan of care discussed with patient, he's in agreement  CT head and cervical spine without contrast  XR radius ulna left   XR shoulder left  Morphine for pain  Robaxin  CBC  BMP    Is this patient to be included in the SEP-1 core measure due to severe sepsis or septic shock? No Exclusion criteria - the patient is NOT to be included for SEP-1 Core Measure due to: Infection is not suspected      Results: X-rays of the left forearm negative. X-rays of the left humerus: Midshaft angulated displaced fracture. Soft tissue swelling noted. Plan: Admit to inpatient: Operative intervention    2147: Consultation with orthopedic on-call: Dr. Thelma Garcia for midshaft humeral fracture that is displaced. He agrees with admission, n.p.o. status, apply sling and plan for OR tomorrow  Results: X-rays of already been discussed and described.   CT head negative, CT cervical spine negative. CBC: Acute on chronic anemia: H&H 7.6/25.8. Appears to be microcytic hypochromic in nature. There may be some increased due to patient's humeral fracture. Also noted on the BNP, evidence of DEISY on CKD: BUN 66, creatinine 2.3 and GFR 27.  Calcium is also low 7.7. Patient will require optimization prior to operative intervention    2315: I spoke with Dr. Joseph Leiva directly, and discussed the patient's presentation. He is agreeable to admission. We also discussed the patient's current H&H: And in order to optimize patient better for surgery will transfuse a unit of blood and follow H&H    Patient was consented per myself for blood transfusion: 1 unit of packed red blood cells ordered per myself. Kamini Mancia NP did see and evaluate patient face-to-face. I attest and agree with the patient's physical exam, MDM and plan of care. CONSULTS:  IP CONSULT TO FAMILY MEDICINE  IP CONSULT TO PULMONOLOGY  IP CONSULT TO NEPHROLOGY    PROCEDURES:  Procedures    FINAL IMPRESSION      1. Closed fracture of shaft of left humerus, unspecified fracture morphology, initial encounter    2. Fall from bed, initial encounter    3. Acute on chronic anemia    4. Acute kidney [facility] superimposed on [facility] ([facility]          DISPOSITION/PLAN   @[facility]@    PATIENT REFERRED TO:  No follow-up provider specified. DISCHARGE MEDICATIONS:  New Prescriptions    No medications on file       (Please note that portions of this note were completed with a voice recognition program.  Efforts were made to edit the dictations but occasionally words are mis-transcribed.)    Sparkle Mojica, SILVIANO - CNP APRN student          [employer] Law  02/05/23 2310    This dictation was performed with a verbal recognition program (DRAGON) and it was checked for errors. It is possible that there are still dictated errors within this office note. If so, please bring any errors to my attention for an addendum.  All efforts were made to ensure that this office note is accurate.        SILVIANO Haney - JUAN  02/06/23 0009

## 2023-02-06 NOTE — DISCHARGE INSTR - COC
[facility]) Continuity of Care Form    Patient Name:  Andrea Bradford  : 1940    MRN:  [id number]    Admit date:  2023  Discharge date:  ***    Code Status Order: Full Code  Advance Directives: {YES OR NO:}    Admitting Physician: Jaya Clark MD  PCP: Marielena Sommers MD    Discharging Nurse: [facility] Unit/Room#: M8W-3423/3128-01  Discharging Unit Phone Number: ***    Emergency Contact:        Past Surgical History:  Past Surgical History:   Procedure Laterality Date    BRONCHOSCOPY N/A 2022    BRONCHOSCOPY ALVEOLAR LAVAGE performed by Radha Zeng MD at [address]  2022    BRONCHOSCOPY DIAGNOSTIC OR CELL [address] performed by Radha Zeng MD at [facility]  2012    bilateral    COLONOSCOPY  7/10/2009    diverticulosis    hemorrhoids    CT BONE MARROW BIOPSY  2022    CT BONE MARROW BIOPSY 2022 [facility] CT    GALLBLADDER SURGERY  1981    HUMERUS FRACTURE SURGERY Left 2023    OPEN REDUCTION INTERNAL FIXATION LEFT HUMERUS SHAFT FRACTURE performed by Eliseo Hooks MD at [address] [facility] Right 10/20/2021    [address] - RIGHT KNEE performed by Katerin Emery MD at [address] Left 2021    [address] - LEFT KNEE performed by Katerin Emery MD at [address] [facility]  7-2005    7/10/2009    normal       Immunization History:   Immunization History   Administered Date(s) Administered    COVID-19, PFIZER GRAY top, DO NOT Dilute, (age 15 y+), IM, 30 mcg/0.3 mL 2022    COVID-19, PFIZER PURPLE top, DILUTE for use, (age 15 y+), 30mcg/0.3mL 2021, 2021, 10/27/2021    Influenza 10/19/2011, 10/09/2013    Influenza A (H3N1-67) Vaccine PF IM 2009    Influenza Vaccine, unspecified formulation 2009, 10/25/2010, 10/19/2011, 10/09/2013, 10/21/2016, 09/01/2017    Influenza Virus Vaccine 09/15/2012, 10/01/2014, 11/07/2014, 09/11/2015    Influenza Whole 10/25/2010    Influenza, AFLURIA (age 1 yrs+), FLUZONE, (age 10 mo+), MDV, 0.5mL 09/15/2012, 10/01/2014    Influenza, FLUAD, (age 72 y+), Adjuvanted, 0.5mL 09/27/2021, 09/14/2022    Influenza, FLUARIX, FLULAVAL, FLUZONE (age 10 mo+) AND AFLURIA, (age 1 y+), PF, 0.5mL 10/21/2016, 09/01/2017    Influenza, FLUZONE (age 72 y+), High Dose, 0.7mL 08/19/2020    Influenza, High Dose (Fluzone 65 yrs and older) 10/11/2018, 09/05/2019, 08/19/2020    PPD Test 06/18/2005    Pneumococcal Conjugate 13-valent (Sabrina Chinchilla) 12/01/2014, 01/02/2015    Pneumococcal Polysaccharide (Aotmudkqm80) 10/19/2011, 01/01/2014    Td, unspecified formulation 11/03/2010    Tdap (Boostrix, Adacel) 11/03/2010    Zoster Live (Zostavax) 07/01/2012, 09/01/2012       Active Problems:  Principal Problem:    Closed fracture of shaft of left humerus  Active Problems:    JOANN (obstructive sleep apnea)    Type 2 diabetes mellitus with stage 4 chronic kidney disease, with long-term current use of insulin (HCC)    Weakness    Chronic anemia    Chronic respiratory failure with hypoxia (HCC)    Chronic diastolic CHF (congestive heart failure), NYHA class 2 (HCC)    Persistent atrial fibrillation (HCC)    Closed 2-part displaced fracture of surgical neck of left humerus, initial encounter    Chronic GERD    Hyperlipidemia    Essential hypertension    Slow transit constipation    Obesity, Class II, BMI 35-39.9  Resolved Problems:    * No resolved hospital problems.  *      Isolation/Infection:       Nurse Assessment:  Last Vital Signs:/74   Pulse 76   Temp 97.4 °F (36.3 °C) (Oral)   Resp 17   Ht 5' 8\" (1.727 m)   Wt 224 lb 10.4 oz (101.9 kg)   SpO2 96%   BMI 34.16 kg/m²   Last documented pain score (0-10 scale): Pain Level: 7  Last Weight:   Wt Readings from Last 1 Encounters:   02/07/23 224 lb 10.4 oz (101.9 kg)     Mental Status: {IP PT MENTAL STATUS:20030}     IV Access:  { TRUONG IV ACCESS:332146521}    Nursing Mobility/ADLs:  Walking   {[facility] DME VQAN:422921921}  Transfer  {[facility] DME OXVU:727592920}  Bathing  {P DME RWMZ:635941931}  Dressing  {[facility] DME HBVW:634317056}  Toileting  {[facility] DME BRVX:647291719}  Feeding  {[facility] DME XMBC:559960145}  Med Admin  {[facility] DME EXMJ:033100594}  Med Delivery   { TRUONG MED Delivery:308716923}    Wound Care Documentation and Therapy:  Keep  Mepilex dressing clean and intact for 7 days after surgery left shoulder then remove and leave wound to air. Mepilex is waterproof for showering. Wound 12/28/22 Buttocks Left (Active)   Wound Etiology Other 02/07/23 0920   Dressing Status Clean;Dry; Intact 02/07/23 0920   Wound Cleansed Cleansed with saline 02/07/23 0013   Dressing/Treatment Zinc paste;Silicone pad 49/31/15 9781   Dressing Change Due 02/09/23 02/07/23 0920   Wound Assessment Pink/red 02/07/23 0920   Drainage Amount Scant 02/07/23 0920   Drainage Description Serosanguinous 02/07/23 0920   Odor None 02/07/23 0920   Karen-wound Assessment Blanchable erythema 02/07/23 0920   Margins Undefined edges 02/02/23 2145   Number of days: 41       Wound 01/22/23 Radial Left;Proximal (Active)   Wound Etiology Skin Tear 02/07/23 0920   Dressing Status Clean;Dry; Intact 02/07/23 0920   Wound Cleansed Not Cleansed 02/07/23 0013   Dressing/Treatment Roll gauze 02/07/23 0920   Wound Assessment Other (Comment) 02/07/23 0920   Drainage Amount None 02/07/23 0920   Drainage Description Other (Comment) 02/07/23 0013   Odor None 02/07/23 0920   Karen-wound Assessment Other (Comment) 02/07/23 0920   Number of days: 15       Wound 02/06/23 Buttocks Right open areas on buttucks (Active)   Wound Etiology Other 02/07/23 0920   Dressing Status Clean;Dry; Intact 02/07/23 0920   Dressing/Treatment Zinc paste;Silicone border 12/43/93 0920   Dressing Change Due 02/09/23 02/07/23 0920   Wound Assessment Pink/red 02/07/23 0920   Drainage Amount Scant 02/07/23 0920   Drainage Description Serosanguinous 02/07/23 0920   Odor None 02/07/23 0920   Karen-wound Assessment Intact;Fragile 02/07/23 0920   Margins Defined edges 02/07/23 0920   Number of days: 0        Elimination:  Urinary Catheter: {Urinary Catheter:512146867}   Colostomy/Ileostomy: {YES / WT:18801}  Continence: Bowel: {YES / FY:24537}  Bladder: {YES / TV:77886}  Date of Last BM: ***    Intake/Output Summary (Last 24 hours)     Intake/Output Summary (Last 24 hours) at 2/7/2023 1227  Last data filed at 2/7/2023 1159  Gross per 24 hour   Intake 2484.77 ml   Output --   Net 2484.77 ml     Safety Concerns:     [address] [employer] Safety Concerns:854777645}    Impairments/Disabilities:      [address] [employer] Impairments/Disabilities:366745429}    Nutrition Therapy:  Current Nutrition Therapy: ADULT ORAL NUTRITION SUPPLEMENT; HS Snack; Snack; prn  ADULT DIET; Regular; 5 carb choices (75 gm/meal); Low Sodium (2 gm); 1500 ml  Routes of Feeding: {CHP DME Other Feedings:186005387}  Liquids: {Slp liquid thickness:84933}  Daily Fluid Restriction: {CHP DME Yes amt example:664606212}  Last Modified Barium Swallow with Video (Video Swallowing Test): {Done Not Done WG:710965821}    Treatments at the Time of Hospital Discharge:   Respiratory Treatments: ***  Oxygen Therapy:  {Therapy; copd oxygen:34133}  Ventilator:    { CC Vent DUNQ:878339635}    Lab orders for discharge:        Rehab Therapies: Physical Therapy, Occupational Therapy and nursing care  Weight Bearing Status/Restrictions: NWB left arm > OK for gentle ROM left elbow and wrist.   Other Medical Equipment (for information only, NOT a DME order):    Other Treatments: ASA 81mg twice at day for 30 days for DVT prophylaxis     Patient's personal belongings (please select all that are sent with patient):  {CHP DME Belongings:166362129}    RN SIGNATURE:  {Esignature:955861530}    PHYSICIAN SECTION    Prognosis: Good    Condition at Discharge: Stable    Rehab Potential (if transferring to Rehab): Good    Physician Certification: I certify the above orders, information, and transfer of Travis Benites is necessary for the continuing treatment of the diagnosis listed and that he requires [facility] for less 30 days.      Update Admission H&P: No change in H&P    PHYSICIAN SIGNATURE:  Electronically signed by Sherryle Chary, APRN - CNP on 2/7/23 at 12:29 PM EST/ Dr Monique Last Status Date: ***    [facility] Score:    Discharging to Facility/ Agency   Name:   Address:  Phone:  Fax:    Dialysis Facility (if applicable)   Name:  Address:  Dialysis Schedule:  Phone:  Fax:    / signature: {Esignature:[id number]}          Followup Dr Alexa Gonzáles in 2 weeks   [place] [address] [facility], [address] [place] [address],   [phone]

## 2023-02-06 NOTE — PROGRESS NOTES
Patient is alert and oriented times 4. He is in severe pain from arm. He can't stand for anyone to touch him. He is scheduled for surgery later today. Some medications were held this morning due to surgery later. His wife and child is in the room with him He wants pain medications every two hours until surgery. I will continue to monitor him for the shift.

## 2023-02-06 NOTE — BRIEF OP NOTE
Brief Postoperative Note      Patient: Maeve Akbar  YOB: 1940  MRN: [id number]    Date of Procedure: 2/6/2023    Pre-Op Diagnosis: LEFT HUMERUS SHAFT FRACTURE    Post-Op Diagnosis: Same       Procedure(s):  OPEN REDUCTION INTERNAL FIXATION LEFT HUMERUS SHAFT FRACTURE    Surgeon(s):  Emmanuel Chavez MD    Assistant: De Johnson, [facility]  Surgical Assistant: Phuong Galan    Anesthesia: General    Estimated Blood Loss (mL): less than 50     Complications: None    Specimens:   * No specimens in log *    Implants:  Implant Name Type Inv. Item Serial No.  Lot No. LRB No. Used Action   PLATE VARIAX COMP BROAD CURVED 10HL - [id number]  PLATE VARIAX COMP BROAD CURVED 10HL  [employer]  Left 1 Implanted   SCREW LK T10 FLTHRD 3.5X26MM - [id number]  SCREW LK T10 FLTHRD 3.5X26MM  [employer]-  Left 2 Implanted   SCREW LK T10 FLTHRD 3.5X28MM - [id number]  SCREW LK T10 FLTHRD 3.5X28MM  [employer]-  Left 2 Implanted   SCREW BONE T10 FLTHRD 3.5X26MM - [id number]  SCREW BONE T10 FLTHRD 3.5X26MM  [employer]-  Left 2 Implanted   SCREW BONE T10 FLTHRD 3.5X28MM - [id number]  SCREW BONE T10 FLTHRD 3.5X28MM  [employer]-  Left 4 Implanted         Drains:   [REMOVED] Urinary Catheter 01/17/23 (Removed)   $ Urethral catheter insertion $ Not inserted for procedure 01/29/23 2105   Catheter Indications Need for fluid volume management of the critically ill patient in a critical care setting 02/02/23 2145   Site Assessment MARTÍNEZ 02/02/23 2145   Urine Color Yellow 02/02/23 2145   Urine Appearance Cloudy 02/02/23 2145   Urine Odor Other (Comment) 02/02/23 2145   Collection Container Standard 02/02/23 2145   Securement Method Leg strap 02/02/23 2145   Catheter Care  Perineal wipes 02/01/23 0800   Catheter Best Practices  Catheter secured to thigh; Tamper seal intact; Bag below bladder;Bag not on floor; Lack of dependent loop in tubing;Drainage bag less than half full 02/02/23 2145   Status Draining;Patent 02/02/23 2145   Output (mL) 350 mL 02/03/23 0424       Findings: Same.     Electronically signed by Gavin Andre MD on 2/6/2023 at 4:00 PM

## 2023-02-06 NOTE — PROGRESS NOTES
Patient was in pain upon assessment this morning. I couldn't assess his buttock and back because his pain was unbearable. He is alert and oriented times 4. He will be having surgery later on and is NPO until after surgery.

## 2023-02-06 NOTE — H&P
[facility] Internal Medicine  History and Physical      CHIEF COMPLAINT:  I broke my arm    History of Present Illness:    Patient was discharged to home or nursing home 2 days ago, yesterday was in the bathroom and reached for the grab bar and fell with his arm propped between a grab bar in the wall. He has a midshaft humerus fracture that is severely displaced. Surgery is planned for today. Patient's functional status is poor, but he has no exertional symptoms. He is on oxygen 3 L chronically, and has been severely volume overloaded, and has easily slips into worsening renal function in the past with attempts at diuresis. When discharged on Saturday he was fairly stable on torsemide 100 mg daily. His weight was improving and his renal function was stable with creatinine of 1.8. Last evening upon arrival his creatinine was 2.3. He has a chronic anemia and receives erythropoietin injections weekly. He did receive unit of blood overnight. He has chronic atrial fibrillation but is not an anticoagulation candidate due to GI bleeding in the past.    Patient has chronic cough with sputum, sometimes hemoptysis. He may have an organizing pneumonia and he is on prednisone daily for this. Pulmonary has been following along with him as well.         Past Medical History:   Diagnosis Date    Allergic rhinitis     Asthma     Atrial fibrillation (HCC)     Carotid artery stenosis     Chronic kidney disease     COPD (chronic obstructive pulmonary disease) ([facility])     CPAP (continuous positive airway pressure) dependence     ESBL (extended spectrum beta-lactamase) producing bacteria infection 12/26/2018    urine    Hyperlipidemia     Hypertension     Iron deficiency anemia     Obstructive sleep apnea     Type II or unspecified type diabetes mellitus without mention of complication, not stated as uncontrolled          Past Surgical History:   Procedure Laterality Date    BRONCHOSCOPY N/A 12/28/2022    BRONCHOSCOPY ALVEOLAR LAVAGE performed by Ruperto Del Cid MD at [address]  12/28/2022    BRONCHOSCOPY DIAGNOSTIC OR CELL [address] ONLY performed by Ruperto Del Cid MD at [address] [facility]  2/2012    bilateral    COLONOSCOPY  7/10/2009    diverticulosis    hemorrhoids    CT BONE MARROW BIOPSY  11/1/2022    CT BONE MARROW BIOPSY 11/1/2022 [facility] CT    GALLBLADDER SURGERY  1981    PAIN MANAGEMENT PROCEDURE Right 10/20/2021    COOLIEF RADIOFREQUENCY ABLATION - RIGHT KNEE performed by Constanza Arriaza MD at [address] Left 12/8/2021    [address] - LEFT KNEE performed by Constanza Arriaza MD at [address] [facility]  7-2005    7/10/2009    normal       Medications Prior to Admission:    Medications Prior to Admission: tamsulosin (FLOMAX) 0.4 MG capsule, Take 0.4 mg by mouth daily  insulin glargine (LANTUS) 100 UNIT/ML injection vial, Inject 14 Units into the skin every morning  insulin lispro (HUMALOG) 100 UNIT/ML SOLN injection vial, Inject 0-8 Units into the skin 3 times daily (with meals)  insulin lispro (HUMALOG) 100 UNIT/ML SOLN injection vial, Inject 0-4 Units into the skin nightly  hydrALAZINE (APRESOLINE) 25 MG tablet, Take 1 tablet by mouth every 8 hours  predniSONE (DELTASONE) 20 MG tablet, 20 mg po daily for 5 days, then 15 mg po daily for 5 days, then 10 mg daily  torsemide (DEMADEX) 100 MG tablet, Take 1 tablet by mouth daily  lactulose (CHRONULAC) 10 GM/15ML solution, Take 30 mLs by mouth 3 times daily as needed (Constipation)  magnesium oxide (MAG-OX) 400 (240 Mg) MG tablet, Take 1 tablet by mouth 2 times daily  vitamin C (ASCORBIC ACID) 500 MG tablet, Take 500 mg by mouth daily  Cholecalciferol 50 MCG (2000 UT) TABS, Take 1 tablet by mouth daily  Multiple Vitamins-Minerals (THERAPEUTIC MULTIVITAMIN-MINERALS) tablet, Take 1 tablet by mouth daily  B Complex Vitamins (B-COMPLEX/B-12) TABS, Take 1 tablet by mouth daily  zinc sulfate (ZINCATE) 220 (50 Zn) MG capsule, Take 50 mg by mouth daily  bisacodyl (DULCOLAX) 5 MG EC tablet, Take 5 mg by mouth daily  amiodarone (CORDARONE) 200 MG tablet, Take 0.5 tablets by mouth daily  albuterol sulfate HFA (PROVENTIL;VENTOLIN;PROAIR) 108 (90 Base) MCG/ACT inhaler, INHALE 2 PUFFS BY MOUTH EVERY 4 HOURS AS NEEDED FOR WHEEZING  montelukast (SINGULAIR) 10 MG tablet, TAKE 1 TABLET BY MOUTH EVERY DAY  [employer] PURELAX 17 GM/SCOOP powder, TAKE 17G BY MOUTH DAILY MIX WITH 8 OZ OF WATER  pantoprazole (PROTONIX) 40 MG tablet, TAKE 1 TABLET BY MOUTH EVERY DAY  B-D UF III MINI PEN NEEDLES 31G X 5 MM MISC, USE AS DIRECTED 3 TIMES A DAY  atorvastatin (LIPITOR) 40 MG tablet, TAKE 1 TABLET BY MOUTH EVERY DAY  Accu-Chek FastClix Lancets MISC, USE TO TEST 3 TIMES A DAY DX CODE E11.9  CPAP Machine MISC, by Does not apply route  docusate sodium (COLACE) 100 MG capsule, Take 100 mg by mouth 2 times daily  Cyanocobalamin (VITAMIN B-12 PO), Take 500 mcg by mouth every other day   ferrous sulfate 325 (65 FE) MG tablet, Take 325 mg by mouth daily     Allergies:    Valsartan-hydrochlorothiazide, Hytrin [terazosin hcl], Penicillins, Shrimp flavor, Sulfa antibiotics, Sulfasalazine, Tekturna [aliskiren fumarate], Vancomycin, and Ciprofloxacin    Social History:    reports that he quit smoking about 35 years ago. His smoking use included cigarettes. He started smoking about 65 years ago. He has a 9.00 pack-year smoking history. He has never used smokeless tobacco. He reports that he does not drink alcohol and does not use drugs. Family History:   family history includes Arthritis in his paternal grandmother; Diabetes in his brother and paternal grandmother; Heart Disease in his mother; High Blood Pressure in his brother and father; Sleep Apnea in his brother; Stroke in his mother; Vision Loss in his mother.     REVIEW OF SYSTEMS:  As above in the HPI, otherwise negative    PHYSICAL EXAM:    Vitals:  /67   Pulse 77   Temp 97.5 °F (36.4 °C) (Axillary)   Resp 17   Ht 5' 8\" (1.727 m)   Wt 231 lb 0.7 oz (104.8 kg)   SpO2 100%   BMI 35.13 kg/m²   Temp  Av.6 °F (36.4 °C)  Min: 97.5 °F (36.4 °C)  Max: 97.8 °F (36.6 °C)    General:  Awake, alert, oriented X 3. Well developed, well nourished. Patient denies pain currently  HEENT:  Normocephalic, atraumatic. Pupils equal, round, reactive to light. No scleral icterus. No conjunctival injection. Normal lips, teeth, and gums. No nasal discharge. Neck:  Supple. No carotid bruit, carotid upstroke normal.  Heart:  RRR, no murmurs, gallops, or rubs. PMI difficult to assess given obesity  Lungs:  CTA bilaterally, bilat symmetrical expansion, no wheeze, rales, or rhonchi. Respirations easy. Abdomen: Bowel sounds present, normoactive. Soft, nontender/nondistended. No masses, no peritoneal signs. Extremities:  No clubbing, cyanosis. Bilateral compression stockings on his lower extremities with no edema below the knees but does have 2+ edema in his thighs. Patient has 2+ edema of his right upper extremity, his left upper extremity is markedly edematous with a large hematoma present in his upper arm. Skin:  Warm and dry, no open lesions or rash. Neuro:  Cranial nerves 2-12 intact, no focal deficits. Left arm mobility is obviously limited. Moves other extremities without difficulty. LABS and diagnostics reviewed:    Humerus x-ray, forearm x-ray, CT of the head and neck reviewed.     Recent Results (from the past 24 hour(s))   BMP w/ Reflex to MG    Collection Time: 23  9:29 PM   Result Value Ref Range    Sodium 141 136 - 145 mmol/L    Potassium reflex Magnesium 3.8 3.5 - 5.1 mmol/L    Chloride 95 (L) 99 - 110 mmol/L    CO2 35 (H) 21 - 32 mmol/L    Anion Gap 11 3 - 16    Glucose 175 (H) 70 - 99 mg/dL    BUN 66 (H) 7 - 20 mg/dL    Creatinine 2.3 (H) 0.8 - 1.3 mg/dL    Est, Glom Filt Rate 27 (A) >60    Calcium 7.7 (L) 8.3 - 10.6 mg/dL   CBC with Auto Differential    Collection Time: 02/05/23  9:29 PM   Result Value Ref Range    WBC 6.9 4.0 - 11.0 K/uL    RBC 3.21 (L) 4.20 - 5.90 M/uL    Hemoglobin 7.6 (L) 13.5 - 17.5 g/dL    Hematocrit 25.8 (L) 40.5 - 52.5 %    MCV 80.4 80.0 - 100.0 fL    MCH 23.8 (L) 26.0 - 34.0 pg    MCHC 29.6 (L) 31.0 - 36.0 g/dL    RDW 21.8 (H) 12.4 - 15.4 %    Platelets 038 415 - 580 K/uL    MPV 8.1 5.0 - 10.5 fL    Neutrophils % 84.7 %    Lymphocytes % 4.4 %    Monocytes % 8.7 %    Eosinophils % 1.1 %    Basophils % 1.1 %    Neutrophils Absolute 5.8 1.7 - 7.7 K/uL    Lymphocytes Absolute 0.3 (L) 1.0 - 5.1 K/uL    Monocytes Absolute 0.6 0.0 - 1.3 K/uL    Eosinophils Absolute 0.1 0.0 - 0.6 K/uL    Basophils Absolute 0.1 0.0 - 0.2 K/uL   TYPE AND SCREEN    Collection Time: 02/05/23  9:41 PM   Result Value Ref Range    ABO/Rh A POS     Antibody Screen NEG    PREPARE RBC (CROSSMATCH), 1 Units    Collection Time: 02/05/23  9:41 PM   Result Value Ref Range    Product Code Blood Bank [id number]     Description Blood Bank Red Blood Cells, Apheresis, Leuko-reduced     Unit Number [id number]     Dispense Status Blood Bank transfused    Basic Metabolic Panel w/ Reflex to MG    Collection Time: 02/06/23  6:29 AM   Result Value Ref Range    Sodium 146 (H) 136 - 145 mmol/L    Potassium reflex Magnesium 3.7 3.5 - 5.1 mmol/L    Chloride 99 99 - 110 mmol/L    CO2 37 (H) 21 - 32 mmol/L    Anion Gap 10 3 - 16    Glucose 121 (H) 70 - 99 mg/dL    BUN 63 (H) 7 - 20 mg/dL    Creatinine 2.3 (H) 0.8 - 1.3 mg/dL    Est, Glom Filt Rate 27 (A) >60    Calcium 7.7 (L) 8.3 - 10.6 mg/dL   Lactic Acid    Collection Time: 02/06/23  6:29 AM   Result Value Ref Range    Lactic Acid 0.8 0.4 - 2.0 mmol/L   CBC with Auto Differential    Collection Time: 02/06/23  6:29 AM   Result Value Ref Range    WBC 5.0 4.0 - 11.0 K/uL    RBC 3.09 (L) 4.20 - 5.90 M/uL    Hemoglobin 7.9 (L) 13.5 - 17.5 g/dL    Hematocrit 25.2 (L) 40.5 - 52.5 %    MCV 81.4 80.0 - 100.0 fL    MCH 25.4 (L) 26.0 - 34.0 pg    MCHC 31.2 31.0 - 36.0 g/dL    RDW 21.8 (H) 12.4 - 15.4 %    Platelets 709 429 - 403 K/uL    MPV 8.6 5.0 - 10.5 fL    Neutrophils % 83.4 %    Lymphocytes % 7.9 %    Monocytes % 6.4 %    Eosinophils % 1.4 %    Basophils % 0.9 %    Neutrophils Absolute 4.2 1.7 - 7.7 K/uL    Lymphocytes Absolute 0.4 (L) 1.0 - 5.1 K/uL    Monocytes Absolute 0.3 0.0 - 1.3 K/uL    Eosinophils Absolute 0.1 0.0 - 0.6 K/uL    Basophils Absolute 0.0 0.0 - 0.2 K/uL       ASSESSMENT:      Principal Problem:    Closed displaced transverse fracture of shaft of left humerus  Active Problems:    Chronic anemia    Chronic respiratory failure with hypoxia ([facility])    Chronic diastolic CHF (congestive heart failure), NYHA class 2 ([facility])    Persistent atrial fibrillation ([facility])    Closed 2-part displaced fracture of surgical neck of left humerus, initial encounter    JOANN (obstructive sleep apnea)    Chronic GERD    Type 2 diabetes mellitus with stage 4 chronic kidney disease, with long-term current use of insulin ([facility])    Hyperlipidemia    Essential hypertension    Slow transit constipation    Weakness    Obesity, Class II, BMI 35-39.9      PLAN:    Patient with a new left humerus fracture that is obviously in need of surgical repair. He has a history of chronic diastolic congestive heart failure but is reasonably well compensated currently. His pulmonary status is stable as well, requiring 3 L of oxygen which is at his baseline. He has been on prednisone for what appears to be an organizing pneumonia, there does not appear to be any acute infectious process. He also has a history of E. coli UTI that is ESBL positive. No evidence of acute infection currently. Continue home medications. The patient was transfused 1 unit of packed red blood cells overnight. Continue to monitor renal function and blood counts. Pulmonary and nephrology have been consulted to follow along.   He will likely need hematology to follow as well. Home meds reviewed and reordered as appropriate. Basal insulin on hold, continue with sliding scale. Patient will need to transition back to ECF.       Francisca Duncan MD  8:22 AM  2/6/2023

## 2023-02-06 NOTE — CONSULTS
REASON FOR CONSULTATION/CC: JOANN, asthma      Consult at request of Shae Moran, *     PCP: Dustin hBat MD  Established Pulmonologist: bertha    Chief Complaint   Patient presents with    Fall    Arm Injury     Left arm injury due to a fall. HISTORY OF PRESENT ILLNESS: Satish Sun is a 79y.o. year old male with a history of JOANN on AutoPap, Gold 2 COPD who presents with humerus fracture after a fall. During my visit patient states his breathing is doing okay. Minimal amount of cough. Reported to the hospital after having a fall found to have a humeral fracture planning repair later today. Patient has no associated symptoms. For respiratory standpoint he is doing well he was recently diagnosed with a pneumonia and is currently on 20 mg of prednisone daily for empiric organizing pneumonia therapy. No so has sleep apnea. He states he did not bring his home AutoPap machine but he can bring it in prior to tonight.       Past Medical History:   Diagnosis Date    Allergic rhinitis     Asthma     Atrial fibrillation (HCC)     Carotid artery stenosis     Chronic kidney disease     COPD (chronic obstructive pulmonary disease) (HCC)     CPAP (continuous positive airway pressure) dependence     ESBL (extended spectrum beta-lactamase) producing bacteria infection 12/26/2018    urine    Hyperlipidemia     Hypertension     Iron deficiency anemia     Obstructive sleep apnea     Type II or unspecified type diabetes mellitus without mention of complication, not stated as uncontrolled          Past Surgical History:   Procedure Laterality Date    BRONCHOSCOPY N/A 12/28/2022    BRONCHOSCOPY ALVEOLAR LAVAGE performed by Alex Mack MD at [address]  12/28/2022    BRONCHOSCOPY DIAGNOSTIC OR CELL [address] ONLY performed by Alex Mack MD at [address] [facility]  2/2012    bilateral    COLONOSCOPY  7/10/2009    diverticulosis    hemorrhoids    CT BONE MARROW BIOPSY  11/1/2022    [facility]    GALLBLADDER SURGERY  1981    PAIN MANAGEMENT PROCEDURE Right 10/20/2021    COOLIEF RADIOFREQUENCY ABLATION - RIGHT KNEE performed by Taya Ferraro MD at [address] Left 12/8/2021    [address] - LEFT KNEE performed by Taya Ferraro MD at [address] [facility]  7-2005    7/10/2009    normal       Family Hx  family history includes Arthritis in his paternal grandmother; Diabetes in his brother and paternal grandmother; Heart Disease in his mother; High Blood Pressure in his brother and father; Sleep Apnea in his brother; Stroke in his mother; Vision Loss in his mother. Social Hx   reports that he quit smoking about 35 years ago. His smoking use included cigarettes. He started smoking about 65 years ago. He has a 9.00 pack-year smoking history.  He has never used smokeless tobacco.    Scheduled Meds:   amiodarone  100 mg Oral Daily    atorvastatin  40 mg Oral Daily    bisacodyl  5 mg Oral Daily    docusate sodium  100 mg Oral BID    ferrous sulfate  325 mg Oral Daily with breakfast    hydrALAZINE  25 mg Oral TID    magnesium oxide  400 mg Oral BID    montelukast  10 mg Oral Daily    pantoprazole  40 mg Oral Daily    predniSONE  20 mg Oral Daily    tamsulosin  0.4 mg Oral Daily    zinc sulfate  50 mg Oral Daily    sodium chloride flush  5-40 mL IntraVENous 2 times per day    heparin (porcine)  5,000 Units SubCUTAneous BID    insulin lispro  0-4 Units SubCUTAneous TID WC    insulin lispro  0-4 Units SubCUTAneous Nightly    polyethylene glycol  17 g Oral Daily       Continuous Infusions:   sodium chloride      dextrose      IV infusion builder      sodium chloride         PRN Meds:  albuterol sulfate HFA, lactulose, sodium chloride flush, sodium chloride, ondansetron **OR** ondansetron, acetaminophen **OR** acetaminophen, glucose, dextrose bolus **OR** dextrose bolus, glucagon (rDNA), dextrose, morphine **OR** morphine, sodium chloride    ALLERGIES:  Patient is allergic to valsartan-hydrochlorothiazide, hytrin [terazosin hcl], penicillins, shrimp flavor, sulfa antibiotics, sulfasalazine, tekturna [aliskiren fumarate], vancomycin, and ciprofloxacin. REVIEW OF SYSTEMS:  Constitutional: Negative for fever  HENT: Negative for sore throat  Eyes: Negative for redness   Respiratory: Negative for dyspnea, cough  Cardiovascular: Negative for chest pain  Gastrointestinal: Negative for vomiting, diarrhea   Genitourinary: Negative for hematuria   Musculoskeletal: Negative for arthralgias   Skin: Negative for rash  Neurological: Negative for syncope  Hematological: Negative for adenopathy  Psychiatric/Behavorial: Negative for anxiety    Objective:   PHYSICAL EXAM:  Blood pressure 130/63, pulse 88, temperature 97.5 °F (36.4 °C), temperature source Oral, resp. rate 16, height 5' 8\" (1.727 m), weight 231 lb 0.7 oz (104.8 kg), SpO2 94 %.'  Gen:  No acute distress. Eyes: PERRL. Anicteric sclera. No conjunctival injection. ENT: No discharge. Posterior oropharynx clear. External appearance of ears and nose normal.  Neck: Trachea midline. No mass   Resp:  No crackles. No wheezes. No rhonchi. No dullness on percussion. CV: Regular rate. Regular rhythm. No murmur or rub. No edema. GI: Soft, Non-tender. Non-distended. +BS  Skin: Warm, dry, w/o erythema. Lymph: No cervical or supraclavicular LAD. M/S: No cyanosis. No clubbing. Neuro:  CN 2-12 tested, no focal neurologic deficit. Moves all extremities  Psych: Awake and alert, Oriented x 3. Judgement and insight appropriate. Mood stable.       Data Reviewed:   LABS:  CBC:   Recent Labs     02/05/23 2129 02/06/23  0629   WBC 6.9 5.0   HGB 7.6* 7.9*   HCT 25.8* 25.2*   MCV 80.4 81.4    138     BMP:   Recent Labs     02/04/23  0538 02/05/23 2129 02/06/23  0629    141 146*   K 3.7 3.8 3.7   CL 96* 95* 99 CO2 35* 35* 37*   PHOS 3.8  --   --    BUN 54* 66* 63*   CREATININE 1.8* 2.3* 2.3*     LIVER PROFILE: No results for input(s): AST, ALT, LIPASE, BILIDIR, BILITOT, ALKPHOS in the last 72 hours. Invalid input(s): AMYLASE,  ALB  PT/INR: No results for input(s): PROTIME, INR in the last 72 hours. APTT: No results for input(s): APTT in the last 72 hours. UA:No results for input(s): NITRITE, COLORU, PHUR, LABCAST, WBCUA, RBCUA, MUCUS, TRICHOMONAS, YEAST, BACTERIA, CLARITYU, SPECGRAV, LEUKOCYTESUR, UROBILINOGEN, BILIRUBINUR, BLOODU, GLUCOSEU, AMORPHOUS in the last 72 hours. Invalid input(s): KETONESU  No results for input(s): PHART, YZB2CFJ, PO2ART in the last 72 hours. Radiology Review:  Pertinent images / reports were reviewed as a part of this visit. Chest CT images reviewed personally by me, interpretation as follows:  -Scattered multifocal groundglass opacities small bilateral pleural effusions      Pulmonary function testing  Moderate COPD, FEV1 71%      Assessment/Plan:       Acute on chronic hypoxemic respiratory failure with SPO2 less than 4090% on home oxygen  -Wean oxygen goal saturation 90%    Multifocal pneumonia  -Was recently admitted and completed antibiotics  -Now on empiric steroids for possible OP/    Gold stage II COPD with chronic bronchitis  -We will put on Stiolto  -Albuterol as needed  -Singulair    JOANN  -Patient can bring in home AutoPap    This note was transcribed using [id number] [address]. Please disregard any translational errors.     Thank you for the consult    [address]  Pulmonary, Sleep and Critical Care Medicine

## 2023-02-06 NOTE — PLAN OF CARE
Problem: Discharge Planning  Goal: Discharge to home or other facility with appropriate resources  Outcome: Progressing  Flowsheets (Taken 2/6/2023 0231 by Danyel Kelley, MAHNAZ)  Discharge to home or other facility with appropriate resources:   Identify barriers to discharge with patient and caregiver   Identify discharge learning needs (meds, wound care, etc)   Refer to discharge planning if patient needs post-hospital services based on physician order or complex needs related to functional status, cognitive ability or social support system     Problem: Pain  Goal: Verbalizes/displays adequate comfort level or baseline comfort level  Outcome: Progressing  Flowsheets (Taken 2/6/2023 0529 by Danyel Kelley RN)  Verbalizes/displays adequate comfort level or baseline comfort level: Encourage patient to monitor pain and request assistance     Problem: Skin/Tissue Integrity  Goal: Absence of new skin breakdown  Description: 1. Monitor for areas of redness and/or skin breakdown  2. Assess vascular access sites hourly  3. Every 4-6 hours minimum:  Change oxygen saturation probe site  4. Every 4-6 hours:  If on nasal continuous positive airway pressure, respiratory therapy assess nares and determine need for appliance change or resting period.   Outcome: Progressing     Problem: Safety - Adult  Goal: Free from fall injury  Outcome: Progressing  Flowsheets  Taken 2/6/2023 0826 by Katie Berg RN  Free From Fall Injury: Based on caregiver fall risk screen, instruct family/caregiver to ask for assistance with transferring infant if caregiver noted to have fall risk factors  Taken 2/6/2023 0240 by Danyel Kelley RN  Free From Fall Injury: Based on caregiver fall risk screen, instruct family/caregiver to ask for assistance with transferring infant if caregiver noted to have fall risk factors     Problem: Chronic Conditions and Co-morbidities  Goal: Patient's chronic conditions and co-morbidity symptoms are monitored and maintained or improved  Outcome: Progressing

## 2023-02-06 NOTE — CARE COORDINATION
INITIAL ASSESSMENT NURSING HOME ADMIT    FACILITY: [facility]    BED HOLD: not held but can accept back    PRECERT REQUIRED TO RETURN: no    COVID TEST REQUIRED TO RETURN & TIMELINE: no     FAMILY PLAN TO RETURN: Spoke with patient's spouse and two daughters present in room regarding discharge plan as patient is still in surgery. They reported that the patient may not want to return to [facility]. He may want another snf. Provided family with snf lists. Will see patient 2/7/2023 regarding snf placement. The Plan for Transition of Care is related to the following treatment goals: snf    The Patient and/or patient representative spouse and daughters was provided with a choice of provider and agrees   with the discharge plan. [x] Yes [] No    Freedom of choice list was provided with basic dialogue that supports the patient's individualized plan of care/goals, treatment preferences and shares the quality data associated with the providers.  [x] Yes [] No    Electronically signed by DUDLEY Fraga, JAMAL, Case Management on 2/6/2023 at 4:37 PM  [place] [phone]

## 2023-02-07 NOTE — PLAN OF CARE
Problem: Discharge Planning  Goal: Discharge to home or other facility with appropriate resources  2/6/2023 1918 by Keri Godoy RN  Outcome: Progressing  2/6/2023 1546 by Keri Godoy RN  Outcome: Progressing  Flowsheets (Taken 2/6/2023 0231 by David Dempsey RN)  Discharge to home or other facility with appropriate resources:   Identify barriers to discharge with patient and caregiver   Identify discharge learning needs (meds, wound care, etc)   Refer to discharge planning if patient needs post-hospital services based on physician order or complex needs related to functional status, cognitive ability or social support system     Problem: Pain  Goal: Verbalizes/displays adequate comfort level or baseline comfort level  2/6/2023 1918 by Keri Godoy RN  Outcome: Progressing  2/6/2023 1546 by Keri Godoy RN  Outcome: Progressing  Flowsheets (Taken 2/6/2023 0529 by David Dempsey RN)  Verbalizes/displays adequate comfort level or baseline comfort level: Encourage patient to monitor pain and request assistance     Problem: Skin/Tissue Integrity  Goal: Absence of new skin breakdown  Description: 1. Monitor for areas of redness and/or skin breakdown  2. Assess vascular access sites hourly  3. Every 4-6 hours minimum:  Change oxygen saturation probe site  4. Every 4-6 hours:  If on nasal continuous positive airway pressure, respiratory therapy assess nares and determine need for appliance change or resting period.   2/6/2023 1918 by Keri Godoy RN  Outcome: Progressing  2/6/2023 1546 by Keri Godoy RN  Outcome: Progressing     Problem: Safety - Adult  Goal: Free from fall injury  2/6/2023 1918 by Keri Godoy RN  Outcome: Progressing  Flowsheets (Taken 2/6/2023 1800)  Free From Fall Injury: Based on caregiver fall risk screen, instruct family/caregiver to ask for assistance with transferring infant if caregiver noted to have fall risk factors  2/6/2023 1546 by Keri Godoy RN  Outcome: Progressing  Flowsheets  Taken 2/6/2023 0826 by Cami Guzman RN  Free From Fall Injury: Based on caregiver fall risk screen, instruct family/caregiver to ask for assistance with transferring infant if caregiver noted to have fall risk factors  Taken 2/6/2023 0240 by Megan Dorsey RN  Free From Fall Injury: Based on caregiver fall risk screen, instruct family/caregiver to ask for assistance with transferring infant if caregiver noted to have fall risk factors     Problem: Chronic Conditions and Co-morbidities  Goal: Patient's chronic conditions and co-morbidity symptoms are monitored and maintained or improved  2/6/2023 1918 by Cami Guzman RN  Outcome: Progressing  2/6/2023 1546 by Cami Guzman RN  Outcome: Progressing

## 2023-02-07 NOTE — PROGRESS NOTES
Pt resting in bed at beginning of shift, wife at bedside. He c/o pain in LUE rated 4/10. He denies having any nausea, numbness, or tingling. Pulses palpable, skin warm to touch. He has moderate  strength in LUE. Sling in place, dressing is CDI. PT/OT in room to work with pt. Will continue to monitor.

## 2023-02-07 NOTE — PROGRESS NOTES
[facility] Internal Medicine Note      Chief Complaint: I feel ok    Subjective/Interval History: This morning the patient is pleasant and cooperative. He states his pain is reasonable  No new problems overnight. Breathing okay. Denies nausea or vomiting. Surgical notes reviewed    No chest pain or shortness breath. No cough or sputum. No nausea, vomiting, diarrhea. No abdominal pain. No dysuria. The remainder of the review of systems is negative. PMH, PSH, FH/SH reviewed and unchanged as documented in the H&P personally documented at admission 23    Medication list reviewed    Objective:    /71   Pulse 86   Temp 97.5 °F (36.4 °C) (Oral)   Resp 16   Ht 5' 8\" (1.727 m)   Wt 224 lb 10.4 oz (101.9 kg)   SpO2 94%   BMI 34.16 kg/m²   Temp  Av.6 °F (36.4 °C)  Min: 97.2 °F (36.2 °C)  Max: 97.8 °F (36.6 °C)    RRR  Pulm-the lungs are clear throughout. Diminished in the bases but no wheezes or rhonchi or crackle  Abd-BS+, soft, NTND  Ext-left arm in a sling. Right arm remains edematous with 3+ edema. Bilateral legs with 1+ edema under compression stockings and 2-3+ edema of his thighs.     The Following Labs Were Reviewed Today:    Recent Results (from the past 24 hour(s))   POCT Glucose    Collection Time: 23 11:59 AM   Result Value Ref Range    POC Glucose 129 (H) 70 - 99 mg/dl    Performed on ACCU-CHEK    POCT Glucose    Collection Time: 23  3:51 PM   Result Value Ref Range    POC Glucose 146 (H) 70 - 99 mg/dl    Performed on ACCU-CHEK    POCT Glucose    Collection Time: 23  8:24 PM   Result Value Ref Range    POC Glucose 218 (H) 70 - 99 mg/dl    Performed on ACCU-CHEK    Basic Metabolic Panel    Collection Time: 23  4:28 AM   Result Value Ref Range    Sodium 137 136 - 145 mmol/L    Potassium 4.5 3.5 - 5.1 mmol/L    Chloride 93 (L) 99 - 110 mmol/L    CO2 31 21 - 32 mmol/L    Anion Gap 13 3 - 16    Glucose 227 (H) 70 - 99 mg/dL    BUN 70 (H) 7 - 20 mg/dL Creatinine 2.5 (H) 0.8 - 1.3 mg/dL    Est, Glom Filt Rate 25 (A) >60    Calcium 7.2 (L) 8.3 - 10.6 mg/dL   CBC with Auto Differential    Collection Time: 02/07/23  4:28 AM   Result Value Ref Range    WBC 7.2 4.0 - 11.0 K/uL    RBC 3.20 (L) 4.20 - 5.90 M/uL    Hemoglobin 8.1 (L) 13.5 - 17.5 g/dL    Hematocrit 25.9 (L) 40.5 - 52.5 %    MCV 80.9 80.0 - 100.0 fL    MCH 25.4 (L) 26.0 - 34.0 pg    MCHC 31.4 31.0 - 36.0 g/dL    RDW 22.1 (H) 12.4 - 15.4 %    Platelets 373 531 - 295 K/uL    MPV 8.5 5.0 - 10.5 fL    Neutrophils % 91.8 %    Lymphocytes % 2.8 %    Monocytes % 4.9 %    Eosinophils % 0.0 %    Basophils % 0.5 %    Neutrophils Absolute 6.6 1.7 - 7.7 K/uL    Lymphocytes Absolute 0.2 (L) 1.0 - 5.1 K/uL    Monocytes Absolute 0.4 0.0 - 1.3 K/uL    Eosinophils Absolute 0.0 0.0 - 0.6 K/uL    Basophils Absolute 0.0 0.0 - 0.2 K/uL   POCT Glucose    Collection Time: 02/07/23  6:20 AM   Result Value Ref Range    POC Glucose 231 (H) 70 - 99 mg/dl    Performed on ACCU-CHEK        ASSESSMENT/PLAN:      Principal Problem:    Closed fracture of shaft of left humerus-postop day #1. Proceed with routine postop care per Ortho. Active Problems:    Chronic anemia-hemoglobin stable today. Continue to monitor. Will asked Dr. Latrice Hughes to follow along. Chronic respiratory failure with hypoxia-appreciate pulmonary input. Continue with supplemental oxygen and inhalers as written. Chronic diastolic CHF (congestive heart failure), NYHA class 2-remains with substantial peripheral edema. IV fluids were administered yesterday due to surgery and his n.p.o. status, IV fluids stopped this morning. Nephrology has been consulted to once again assist with diuretics. Likely need to restart torsemide. Persistent atrial fibrillation-clinically in sinus rhythm. Continue amiodarone. Patient not an anticoagulation candidate due to GI bleeding in the past.    JOANN (obstructive sleep apnea)-patient encouraged to use CPAP.     Chronic GERD-continue with daily PPI. Type 2 diabetes mellitus with stage 4 chronic kidney disease, with long-term current use of insulin-Lantus insulin restarted, continue sliding scale and monitor sugars    Hyperlipidemia-stable on current statin dosing. No changes needed    Essential hypertension-blood pressures running relatively low. Continue to monitor. Slow transit constipation-continue with current bowel regimen. Adjust as needed for constipation. Pain medicine certainly may contribute to this as well. Marti Cronin will need to be involved again.   Will need ECF at discharge      [address], MD, FACP  8:12 AM  2/7/2023

## 2023-02-07 NOTE — PROGRESS NOTES
Pulmonary Progress Note    Date of Admission: 2/5/2023   LOS: 2 days     Chief Complaint   Patient presents with    Fall    Arm Injury     Left arm injury due to a fall. 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       24 Hour Events/Subjective  Humeral repair yesterday,  no complications overnight. ROS:   No nausea  No Vomiting  No chest pain      Intake/Output Summary (Last 24 hours) at 2/7/2023 0911  Last data filed at 2/7/2023 0816  Gross per 24 hour   Intake 2284.77 ml   Output --   Net 2284.77 ml         PHYSICAL EXAM:   Blood pressure 118/71, pulse 86, temperature 97.5 °F (36.4 °C), temperature source Oral, resp. rate 16, height 5' 8\" (1.727 m), weight 224 lb 10.4 oz (101.9 kg), SpO2 94 %.'  Gen:  No acute distress. Eyes: PERRL. Anicteric sclera. No conjunctival injection. ENT: No discharge. Posterior oropharynx clear. External appearance of ears and nose normal.  Neck: Trachea midline. No mass   Resp:  No crackles. No wheezes. No rhonchi. No dullness on percussion. CV: Regular rate. Regular rhythm. No murmur or rub. No edema. GI: Soft, Non-tender. Non-distended. +BS  Skin: Warm, dry, w/o erythema. Lymph: No cervical or supraclavicular LAD. M/S: No cyanosis. No clubbing. Neuro:  no focal neurologic deficit. Moves all extremities  Psych: Awake and alert, Oriented x 3. Judgement and insight appropriate. Mood stable.       Medications:    Scheduled Meds:   insulin glargine  10 Units SubCUTAneous Daily    amiodarone  100 mg Oral Daily    atorvastatin  40 mg Oral Daily    bisacodyl  5 mg Oral Daily    docusate sodium  100 mg Oral BID    ferrous sulfate  325 mg Oral Daily with breakfast    hydrALAZINE  25 mg Oral TID    magnesium oxide  400 mg Oral BID    montelukast  10 mg Oral Daily    pantoprazole  40 mg Oral Daily    predniSONE  20 mg Oral Daily    tamsulosin  0.4 mg Oral Daily    zinc sulfate  50 mg Oral Daily    sodium chloride flush  5-40 mL IntraVENous 2 times per day    insulin lispro  0-4 Units SubCUTAneous TID WC    insulin lispro  0-4 Units SubCUTAneous Nightly    polyethylene glycol  17 g Oral Daily    tiotropium-olodaterol  2 puff Inhalation Daily    sodium chloride flush  5-40 mL IntraVENous 2 times per day    enoxaparin  30 mg SubCUTAneous Daily       Continuous Infusions:   sodium chloride      dextrose      sodium chloride      sodium chloride         PRN Meds:  albuterol sulfate HFA, lactulose, sodium chloride flush, sodium chloride, ondansetron **OR** ondansetron, acetaminophen **OR** acetaminophen, glucose, dextrose bolus **OR** dextrose bolus, glucagon (rDNA), dextrose, morphine **OR** morphine, sodium chloride flush, sodium chloride, sodium chloride    Labs reviewed:  CBC:   Recent Labs     02/05/23 2129 02/06/23 0629 02/07/23 0428   WBC 6.9 5.0 7.2   HGB 7.6* 7.9* 8.1*   HCT 25.8* 25.2* 25.9*   MCV 80.4 81.4 80.9    138 152     BMP:   Recent Labs     02/05/23 2129 02/06/23 0629 02/07/23  0428    146* 137   K 3.8 3.7 4.5   CL 95* 99 93*   CO2 35* 37* 31   BUN 66* 63* 70*   CREATININE 2.3* 2.3* 2.5*     LIVER PROFILE: No results for input(s): AST, ALT, LIPASE, BILIDIR, BILITOT, ALKPHOS in the last 72 hours. Invalid input(s): AMYLASE,  ALB  PT/INR: No results for input(s): PROTIME, INR in the last 72 hours. Films:  Radiology Review:  Pertinent images / reports were reviewed as a part of this visit.             This note was transcribed using [id number] [employer]. Please disregard any translational errors.     Thank you for this consult,    Dyan Mcelroy MD  [facility] Pulmonary, Critical Care, and Sleep Medicine

## 2023-02-07 NOTE — PROGRESS NOTES
Patient has returned back from surgery. He is alert and oriented times 4 but a little groggy. His family is in the room. I did a head to toe after he came back on the unit. Patient is satisfied with the surgery. He has had all medications for the shift.

## 2023-02-07 NOTE — PROGRESS NOTES
[facility] Orthopedic Surgery   Progress Note      S/P :  SUBJECTIVE  in recliner. Eating. . Pain is   described in left shoulder and with the intensity of moderate. Pain is described as aching. OBJECTIVE              Physical                      VITALS:  /74   Pulse 76   Temp 97.4 °F (36.3 °C) (Oral)   Resp 17   Ht 5' 8\" (1.727 m)   Wt 224 lb 10.4 oz (101.9 kg)   SpO2 96%   BMI 34.16 kg/m²                     MUSCULOSKELETAL:Left wrist with intact flex/ext and hand grasp and extension and thumbs up                    NEUROLOGIC:                                  Sensory:  Touch:  Left Upper Extremity:  normal                                                 Surgical wound appears clean and dry left posterior shoulder with Mepilex dressing Arm in sling.      Data       CBC:   Lab Results   Component Value Date/Time    WBC 7.2 02/07/2023 04:28 AM    RBC 3.20 02/07/2023 04:28 AM    HGB 8.1 02/07/2023 04:28 AM    HCT 25.9 02/07/2023 04:28 AM    MCV 80.9 02/07/2023 04:28 AM    MCH 25.4 02/07/2023 04:28 AM    MCHC 31.4 02/07/2023 04:28 AM    RDW 22.1 02/07/2023 04:28 AM     02/07/2023 04:28 AM    MPV 8.5 02/07/2023 04:28 AM        WBC:    Lab Results   Component Value Date/Time    WBC 7.2 02/07/2023 04:28 AM        Hemoglobin/Hematocrit:    Lab Results   Component Value Date/Time    HGB 8.1 02/07/2023 04:28 AM    HCT 25.9 02/07/2023 04:28 AM        PT/INR:    Lab Results   Component Value Date/Time    PROTIME 16.5 01/26/2023 01:02 AM    INR 1.34 01/26/2023 01:02 AM              Current Inpatient Medications             Current Facility-Administered Medications: insulin glargine (LANTUS) injection vial 10 Units, 10 Units, SubCUTAneous, Daily  albuterol sulfate HFA (PROVENTIL;VENTOLIN;PROAIR) 108 (90 Base) MCG/ACT inhaler 2 puff, 2 puff, Inhalation, Q4H PRN  amiodarone (CORDARONE) tablet 100 mg, 100 mg, Oral, Daily  atorvastatin (LIPITOR) tablet 40 mg, 40 mg, Oral, Daily  bisacodyl (DULCOLAX) EC tablet 5 mg, 5 mg, Oral, Daily  docusate sodium (COLACE) capsule 100 mg, 100 mg, Oral, BID  ferrous sulfate EC tablet 325 mg, 325 mg, Oral, Daily with breakfast  hydrALAZINE (APRESOLINE) tablet 25 mg, 25 mg, Oral, TID  lactulose (CHRONULAC) 10 GM/15ML solution 20 g, 20 g, Oral, TID PRN  magnesium oxide (MAG-OX) tablet 400 mg, 400 mg, Oral, BID  montelukast (SINGULAIR) tablet 10 mg, 10 mg, Oral, Daily  pantoprazole (PROTONIX) tablet 40 mg, 40 mg, Oral, Daily  predniSONE (DELTASONE) tablet 20 mg, 20 mg, Oral, Daily  tamsulosin (FLOMAX) capsule 0.4 mg, 0.4 mg, Oral, Daily  zinc sulfate (ZINCATE) capsule 50 mg, 50 mg, Oral, Daily  sodium chloride flush 0.9 % injection 5-40 mL, 5-40 mL, IntraVENous, 2 times per day  sodium chloride flush 0.9 % injection 5-40 mL, 5-40 mL, IntraVENous, PRN  0.9 % sodium chloride infusion, , IntraVENous, PRN  ondansetron (ZOFRAN-ODT) disintegrating tablet 4 mg, 4 mg, Oral, Q8H PRN **OR** ondansetron (ZOFRAN) injection 4 mg, 4 mg, IntraVENous, Q6H PRN  acetaminophen (TYLENOL) tablet 650 mg, 650 mg, Oral, Q6H PRN **OR** acetaminophen (TYLENOL) suppository 650 mg, 650 mg, Rectal, Q6H PRN  glucose chewable tablet 16 g, 4 tablet, Oral, PRN  dextrose bolus 10% 125 mL, 125 mL, IntraVENous, PRN **OR** dextrose bolus 10% 250 mL, 250 mL, IntraVENous, PRN  glucagon (rDNA) injection 1 mg, 1 mg, SubCUTAneous, PRN  dextrose 10 % infusion, , IntraVENous, Continuous PRN  insulin lispro (HUMALOG) injection vial 0-4 Units, 0-4 Units, SubCUTAneous, TID WC  insulin lispro (HUMALOG) injection vial 0-4 Units, 0-4 Units, SubCUTAneous, Nightly  polyethylene glycol (GLYCOLAX) packet 17 g, 17 g, Oral, Daily  morphine (PF) injection 2 mg, 2 mg, IntraVENous, Q2H PRN **OR** morphine (PF) injection 4 mg, 4 mg, IntraVENous, Q2H PRN  tiotropium-olodaterol (STIOLTO) 2.5-2.5 MCG/ACT inhaler 2 puff, 2 puff, Inhalation, Daily  sodium chloride flush 0.9 % injection 5-40 mL, 5-40 mL, IntraVENous, 2 times per day  sodium chloride flush 0.9 % injection 5-40 mL, 5-40 mL, IntraVENous, PRN  0.9 % sodium chloride infusion, , IntraVENous, PRN  enoxaparin Sodium (LOVENOX) injection 30 mg, 30 mg, SubCUTAneous, Daily  0.9 % sodium chloride infusion, , IntraVENous, PRN    ASSESSMENT AND PLAN    Left arm injury  Left proximal humerus fx, ORIF  per DR Ramos Lucas,   NWB left arm and hand  Left arm in sling  DVT prophylaxis ordered, ASA 81mg twice at day for 30 days for DVT prophylaxis   PT OT for ADL's and ambulation as tolerated  SS for DC planning  IV or PO pain med as ordered   Left elbow in Mepilex for skin injury, keep splint on over wound.      Alli Mcmillan, APRN - CNP  2/7/2023  12:22 PM

## 2023-02-07 NOTE — PROGRESS NOTES
Pt sat up in chair for several hours this shift. Utilizing stedy x 2 for transfers, pt tolerating well. He has denied having any pain in LUE this afternoon and has declined any pain medication. Will continue to monitor.

## 2023-02-07 NOTE — PROGRESS NOTES
Patient states home unit is cutting off while wearing. Pt was advised to contact home care company. Patient does feel comfortable enough to take off bipap if need be so pt. was placed on hospital bipap.

## 2023-02-07 NOTE — PROGRESS NOTES
Occupational Therapy  Facility/Department: [address] ORTHOPEDICS  Occupational Therapy Initial Assessment    Name: Lucretia Jaime  : 1940  MRN: [id number]  Date of Service: 2023    Discharge Recommendations:  3-5 sessions per week, Patient would benefit from continued therapy after discharge        Lucretia Jaime scored a 13/24 on the AM-PAC ADL Inpatient form. Current research shows that an AM-PAC score of 17 or less is typically not associated with a discharge to the patient's home setting. Based on the patient's AM-PAC score and their current ADL deficits, it is recommended that the patient have 3-5 sessions per week of Occupational Therapy at d/c to increase the patient's independence. Please see assessment section for further patient specific details. If patient discharges prior to next session this note will serve as a discharge summary. Please see below for the latest assessment towards goals. Patient Diagnosis(es): The primary encounter diagnosis was Closed fracture of shaft of left humerus, unspecified fracture morphology, initial encounter. Diagnoses of Fall from bed, initial encounter, Acute on chronic anemia, and Acute kidney injury superimposed on CKD [facility]) were also pertinent to this visit. Past Medical History:  has a past medical history of Allergic rhinitis, Asthma, Atrial fibrillation ([address] Carotid artery stenosis, Chronic kidney disease, COPD (chronic obstructive pulmonary disease) ([facility] [address] CPAP (continuous positive airway pressure) dependence, ESBL (extended spectrum beta-lactamase) producing bacteria infection, Hyperlipidemia, Hypertension, Iron deficiency anemia, Obstructive sleep apnea, and Type II or unspecified type diabetes mellitus without mention of complication, not stated as uncontrolled. Past Surgical History:  has a past surgical history that includes Gallbladder surgery (); Upper gastrointestinal endoscopy (7-2005    7/10/2009);  Colonoscopy (7/10/2009); Cataract removal (2/2012); Pain management procedure (Right, 10/20/2021); Pain management procedure (Left, 12/8/2021); CT BIOPSY BONE MARROW (11/1/2022); bronchoscopy (N/A, 12/28/2022); bronchoscopy (12/28/2022); and Humerus fracture surgery (Left, 2/6/2023). Treatment Diagnosis: impaired ADL/fxl mobility    Assessment   Performance deficits / Impairments: Decreased strength;Decreased functional mobility ; Decreased endurance;Decreased ADL status; Decreased high-level IADLs;Decreased balance;Decreased ROM  Assessment: 78 yo male admitted for a fall resulting in L humeral shaft fx requiring 2/6 s/p L humerus ORIF now WBAT within a sling. PMH: JOANN, DM, CKD, COPD, HTN. PTA, pt was in SNF setting, prior to January pt lived with spouse with assist for LB ADL and IADL, otherwise IND UB ADL and fxl mobility with 4WW. Today, pt functioning below baseline limited by LUE pain, and decreased endurance and strength. Pt required Mod A x2 bed mobility, Min/Mod A x2 sit><stand within stedy, Total A LB dressing, and Max A toileting in high sitting on stedy pads with urinal. Pt completes LUE wrist and digit AROM exercises x10 reps. Pt educated on LUE NWB, sling wear (allowing arm to rest within sling, pt initially guarding UE), cont LUE wrist/digit AROM within sling throughout day. Cont acute OT to address above deficits. Pt is a high fall risk with low AMPAC score not associated with safe return home.  Rec skilled OT at low-mod pace 3-5x/week  Treatment Diagnosis: impaired ADL/fxl mobility  Prognosis: Good;Fair  Decision Making: Medium Complexity  REQUIRES OT FOLLOW-UP: Yes  Activity Tolerance  Activity Tolerance: Patient limited by fatigue;Patient Tolerated treatment well        Plan   Occupational Therapy Plan  Times Per Week: 3-5  Current Treatment Recommendations: Strengthening, ROM, Balance training, Functional mobility training, Endurance training, Safety education & training, Pain management, Modalities, Positioning, Patient/Caregiver education & training, Self-Care / ADL     Restrictions  Restrictions/Precautions  Restrictions/Precautions: Weight Bearing, Fall Risk  Required Braces or Orthoses?: Yes  Upper Extremity Weight Bearing Restrictions  Left Upper Extremity Weight Bearing: Non Weight Bearing  Required Braces or Orthoses  Left Upper Extremity Brace/Splint: Sling    Subjective   General  Chart Reviewed: Yes  Patient assessed for rehabilitation services?: Yes  Additional Pertinent Hx: 78 yo male admitted for a fall resulting in L humeral shaft fx requiring 2/6 s/p L humerus ORIF now WBAT within a sling. PMH: JOANN, DM, CKD, COPD, HTN. Family / Caregiver Present: Yes (wife)  Referring Practitioner: SILVIANO Smith CNP  Diagnosis: Fall  Subjective  Subjective: Pt resting in bed upon arrival and agreeable to OT/PT eval. Pt reporting 4/10 L shoulder pain. Pt pleasant. General Comment  Comments: RN ok to see       Social/Functional History  Social/Functional History  Lives With: Spouse  Type of Home: House  Home Layout: Able to Live on Main level with bedroom/bathroom, Multi-level  Home Access: Stairs to enter with rails  Entrance Stairs - Number of Steps: 1+1  Entrance Stairs - Rails: Both  Bathroom Shower/Tub: Tub/Shower unit  Bathroom Toilet: Standard  Bathroom Equipment: Shower chair, Grab bars in shower, Tub transfer bench  Home Equipment: Olan Ormond, 4 wheeled, Oxygen, Grab bars (2L)  Has the patient had two or more falls in the past year or any fall with injury in the past year?: Yes  ADL Assistance: Needs assistance (Assist lower level)  Homemaking Assistance: Needs assistance (pt does bills)  Ambulation Assistance: Independent (4WS)  Transfer Assistance: Independent  Active : Yes  Occupation: Retired  Type of Occupation: built homes  Additional Comments: sleeps in a recliner but reports he deos get into bed on a regular basis.  Pt from SNF setting at [facility]       Objective Observation/Palpation  Observation: LUE within sling- adjusted for proper fit. Pt with bruising upper arm  Safety Devices  Type of Devices: Call light within reach; Chair alarm in place;Gait belt;Patient at risk for falls;Nurse notified; Left in chair          Coordination: Generally decreased, functional  Tone: Normal  Sensation: Intact    ADL  LE Dressing: Dependent/Total  LE Dressing Skilled Clinical Factors: donning socks  Toileting: Maximum assistance  Toileting Skilled Clinical Factors: in tall sitting on stedy pads, pt urinates small amount within urinal. assist for placing, holding, and removing urinal       Activity Tolerance  Activity Tolerance: Patient tolerated treatment well    Bed mobility  Supine to Sit: Moderate assistance;2 Person assistance  Scooting: Moderate assistance;2 Person assistance  Bed Mobility Comments: HOB elevated    Transfers  Sit to stand: 2 Person assistance;Minimal assistance; Moderate assistance  Stand to sit: Minimal assistance; Moderate assistance;2 Person assistance  Transfer Comments: within stedy from slightly elevated EOB. does well not reaching with LUE.                 standing balance: Min A PRN within stedy. Fxl mobility: Total A EOB>recliner wtih benoit stedy pt safe lift    Vision  Vision: Impaired  Vision Exceptions: Wears glasses at all times  Hearing  Hearing: Within functional limits    Cognition  Overall Cognitive Status: WFL  Orientation  Overall Orientation Status: Within Functional Limits                 A/AROM Exercises: seated LUE within sling- wrist circumduction and digit flexion/extension- x10 reps  Education Given To: Patient; Family  Education Provided: Role of Therapy;Plan of Care;Transfer Training;Precautions  Education Provided Comments: LUE NWB, sling wear (allowing arm to rest within sling, pt guarding UE), LUE wrist and digit AROM within sling throughout day  Education Method: Demonstration;Verbal  Barriers to Learning: None  Education Outcome: Verbalized understanding;Demonstrated understanding;Continued education needed  LUE AROM (degrees)  LUE General AROM: digit and wrist WFL.  elbow and shoulder not tested (within sling)  RUE AROM (degrees)  RUE AROM : WFL                   AM-PAC Score        AM-PAC Inpatient Daily Activity Raw Score: 13 (02/07/23 1000)  AM-PAC Inpatient ADL T-Scale Score : 32.03 (02/07/23 1000)  ADL Inpatient CMS 0-100% Score: 63.03 (02/07/23 1000)  ADL Inpatient CMS G-Code Modifier : CL (02/07/23 1000)    Goals  Short Term Goals  Time Frame for Short Term Goals: prior to d/c  Short Term Goal 1: bed mobility Min A  Short Term Goal 2: ADL tx Min A  Short Term Goal 3: UB bathing/dressing including sling Min A  Short Term Goal 4: LUE exercises (no shoulder ROM) to improve circulation and eventual function with ADLs  Short Term Goal 5: toileting Mod A  Patient Goals   Patient goals : improve pain and independence       Therapy Time   Individual Concurrent Group Co-treatment   Time In 0910         Time Out 0950         Minutes 40         Timed Code Treatment Minutes: 25 Minutes (15 eval. 15 TA 10 ADL)       DOROTHEA Luevano, OTR/L

## 2023-02-07 NOTE — PLAN OF CARE
Problem: Discharge Planning  Goal: Discharge to home or other facility with appropriate resources  2/7/2023 1236 by Beryle Douglas, RN  Outcome: Progressing  Flowsheets  Taken 2/7/2023 1234 by Beryle Douglas, RN  Discharge to home or other facility with appropriate resources:   Identify barriers to discharge with patient and caregiver   Identify discharge learning needs (meds, wound care, etc)   Refer to discharge planning if patient needs post-hospital services based on physician order or complex needs related to functional status, cognitive ability or social support system   Arrange for needed discharge resources and transportation as appropriate  Taken 2/7/2023 0426 by Isidoro Blizzard, RN  Discharge to home or other facility with appropriate resources:   Identify barriers to discharge with patient and caregiver   Identify discharge learning needs (meds, wound care, etc)  2/7/2023 0426 by Isidoro Blizzard, RN  Outcome: Progressing  Flowsheets (Taken 2/7/2023 1690)  Discharge to home or other facility with appropriate resources:   Identify barriers to discharge with patient and caregiver   Identify discharge learning needs (meds, wound care, etc)     Problem: Pain  Goal: Verbalizes/displays adequate comfort level or baseline comfort level  2/7/2023 1236 by Beryle Douglas, RN  Outcome: Progressing  Flowsheets  Taken 2/7/2023 1234 by Beryle Douglas, RN  Verbalizes/displays adequate comfort level or baseline comfort level:   Administer analgesics based on type and severity of pain and evaluate response   Consider cultural and social influences on pain and pain management   Encourage patient to monitor pain and request assistance   Assess pain using appropriate pain scale   Implement non-pharmacological measures as appropriate and evaluate response   Notify Licensed Independent Practitioner if interventions unsuccessful or patient reports new pain  Taken 2/7/2023 0426 by Isidoro Blizzard, RN  Verbalizes/displays adequate comfort level or baseline comfort level:   Encourage patient to monitor pain and request assistance   Assess pain using appropriate pain scale   Administer analgesics based on type and severity of pain and evaluate response  2/7/2023 0426 by Rock Juan Miguel RN  Outcome: Progressing  Flowsheets (Taken 2/7/2023 5048)  Verbalizes/displays adequate comfort level or baseline comfort level:   Encourage patient to monitor pain and request assistance   Assess pain using appropriate pain scale   Administer analgesics based on type and severity of pain and evaluate response     Problem: Skin/Tissue Integrity  Goal: Absence of new skin breakdown  Description: 1. Monitor for areas of redness and/or skin breakdown  2. Assess vascular access sites hourly  3. Every 4-6 hours minimum:  Change oxygen saturation probe site  4. Every 4-6 hours:  If on nasal continuous positive airway pressure, respiratory therapy assess nares and determine need for appliance change or resting period. 2/7/2023 1236 by Gilberto Douglas RN  Outcome: Progressing  Note: Pt with moisture-associated skin breakdown to buttocks and gluteal cleft. Zinc paste and mepilex border in place. Will monitor for changes. 2/7/2023 0426 by Rock Juan Miguel RN  Outcome: Progressing  Note: Skin assessment complete. No new signs of skin breakdown noted. Assistance provided with repositioning while in bed.       Problem: Safety - Adult  Goal: Free from fall injury  2/7/2023 1236 by Gilberto Douglas RN  Outcome: Progressing  Flowsheets  Taken 2/7/2023 1236 by Gilberto Douglas RN  Free From Fall Injury: Instruct family/caregiver on patient safety  Taken 2/7/2023 0426 by Rock Juan Miguel RN  Free From Fall Injury: Instruct family/caregiver on patient safety  2/7/2023 0426 by Rock Juan Miguel RN  Outcome: Progressing  Flowsheets (Taken 2/7/2023 0426)  Free From Fall Injury: Instruct family/caregiver on patient safety     Problem: Chronic Conditions and Co-morbidities  Goal: Patient's chronic conditions and co-morbidity symptoms are monitored and maintained or improved  2/7/2023 1236 by Army Elisa RN  Outcome: Progressing  Flowsheets  Taken 2/7/2023 1236 by Army Elisa RN  Care Plan - Patient's Chronic Conditions and Co-Morbidity Symptoms are Monitored and Maintained or Improved:   Monitor and assess patient's chronic conditions and comorbid symptoms for stability, deterioration, or improvement   Collaborate with multidisciplinary team to address chronic and comorbid conditions and prevent exacerbation or deterioration   Update acute care plan with appropriate goals if chronic or comorbid symptoms are exacerbated and prevent overall improvement and discharge  Taken 2/7/2023 0426 by Kade Spangler 34 - Patient's Chronic Conditions and Co-Morbidity Symptoms are Monitored and Maintained or Improved: Monitor and assess patient's chronic conditions and comorbid symptoms for stability, deterioration, or improvement  2/7/2023 0426 by Andrew Wise RN  Outcome: Progressing  Flowsheets (Taken 2/7/2023 0426)  Care Plan - Patient's Chronic Conditions and Co-Morbidity Symptoms are Monitored and Maintained or Improved: Monitor and assess patient's chronic conditions and comorbid symptoms for stability, deterioration, or improvement

## 2023-02-07 NOTE — CARE COORDINATION
Met with patient and his daughter Connor Gray and wife at bedside to discuss DC needs- patient and family agreeable to SNF but do not want to return to [facility] . Reviewed SNF list with them and they prefer referrals to [facility] and [facility]. Referrals sent per Epic - will await response .   Electronically signed by Ashleigh Garay on 2/7/2023 at 4:47 PM  #[phone]

## 2023-02-07 NOTE — PROGRESS NOTES
Physical Therapy  Facility/Department: [facility]TH[facility]ED[facility]  Physical Therapy Initial Assessment    Name: Johnny Flores  : 1940  MRN: [id number]  Date of Service: 2023    Assessment / Discharge Recommendations:  -left humeral fracture ORIF NWB   -will need continued PT OT and nursing care in a skilled setting   -anticipate need of a slow pace   Johnny Flores scored a 8/24 on the AM-PAC short mobility form. Current research shows that an AM-PAC score of 17 or less is typically not associated with a discharge to the patient's home setting. Based on the patient's AM-PAC score and their current functional mobility deficits, it is recommended that the patient have 3-5 sessions per week of Physical Therapy at d/c to increase the patient's independence. Patient Diagnosis(es): The primary encounter diagnosis was Closed fracture of shaft of left humerus, unspecified fracture morphology, initial encounter. Diagnoses of Fall from bed, initial encounter, Acute on chronic anemia, and Acute kidney injury superimposed on CKD [facility]) were also pertinent to this visit. 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 Past Surgical History:  has a past surgical history that includes Gallbladder surgery (); Upper gastrointestinal endoscopy (7-2005    7/10/2009); Colonoscopy (7/10/2009); Cataract removal (2012); Pain management procedure (Right, 10/20/2021);  Pain management procedure (Left, 2021); CT BIOPSY BONE MARROW (2022); bronchoscopy (N/A, 12/28/2022); bronchoscopy (12/28/2022); and Humerus fracture surgery (Left, 2/6/2023). Body Structures, Functions, Activity Limitations Requiring Skilled Therapeutic Intervention: Decreased functional mobility ; Decreased ADL status; Decreased balance; Increased pain;Decreased endurance;Decreased ROM  Therapy Prognosis: Good  Decision Making: Medium Complexity  Requires PT Follow-Up: Yes  Activity Tolerance  Activity Tolerance: Patient tolerated treatment well     Plan   Physcial Therapy Plan  General Plan: 3-5 times per week  Current Treatment Recommendations: Functional mobility training, Transfer training, Positioning, Modalities, Therapeutic activities, Patient/Caregiver education & training, ADL/Self-care training  Safety Devices  Type of Devices: Call light within reach, Chair alarm in place, Gait belt, Patient at risk for falls, Nurse notified, Left in chair     Restrictions  Restrictions/Precautions  Restrictions/Precautions: Weight Bearing, Fall Risk  Required Braces or Orthoses?: Yes  Upper Extremity Weight Bearing Restrictions  Left Upper Extremity Weight Bearing: Non Weight Bearing  Required Braces or Orthoses  Left Upper Extremity Brace/Splint: Sling     Subjective   General  Chart Reviewed: Yes  Patient assessed for rehabilitation services?: Yes  Additional Pertinent Hx: here due to fall at [address] [place] [address] yielding left humeral fracture  -was just here last week and discharged to [address] [place] [address] for continued PTOT related to generalized weakness  Response To Previous Treatment: Not applicable  Family / Caregiver Present: Yes (wife Charlene Lester)  Follows Commands: Within Functional Limits  Subjective  Subjective: arrived room along with OT to patient rest in bed with elevated HOB and left UE sling in place- nursing in readying morning medications   -he rates pain at 3-4/10 - he is agreeable to PTOT assessments and attempt to mobilize from bed  Social/Functional History  Social/Functional History  Lives With: Spouse  Type of Home: House  Home Layout: Able to Live on Main level with bedroom/bathroom, Multi-level  Home Access: Stairs to enter with rails  Entrance Stairs - Number of Steps: 1+1  Entrance Stairs - Rails: Both  Bathroom Shower/Tub: Tub/Shower unit  Bathroom Toilet: Standard  Bathroom Equipment: Shower chair, Grab bars in shower, Tub transfer bench  Home Equipment: [place] Chugach, 4 wheeled, Oxygen, Grab bars (2L)  Has the patient had two or more falls in the past year or any fall with injury in the past year?: Yes  ADL Assistance: Needs assistance (Assist lower level)  Homemaking Assistance: Needs assistance (pt does bills)  Ambulation Assistance: Independent (3DO)  Transfer Assistance: Independent  Active : Yes  Occupation: Retired  Type of Occupation: built homes  Additional Comments: sleeps in a recliner but reports he deos get into bed on a regular basis. Pt from SNF setting at [address]: Impaired  Vision Exceptions: Wears glasses at all times  Hearing  Hearing: Within functional limits    Cognition   Orientation  Overall Orientation Status: Within Functional Limits  Cognition  Overall Cognitive Status: WFL     Objective   Observation/Palpation  Observation: LUE within sling- adjusted for proper fit. Pt with bruising upper arm  Gross Assessment  Tone: Normal  Sensation:  (not assessed formally at this session)   Strength Other  Other: grossly wfl non-surgical limbs  Bed mobility  Supine to Sit: Moderate assistance;2 Person assistance  Scooting: Moderate assistance;2 Person assistance  Bed Mobility Comments: HOB elevated  Transfers  Sit to Stand: Minimal Assistance; Moderate Assistance;2 Person Assistance (in [address] [place] [address])  Stand to Sit: Moderate Assistance;2 Person Assistance;Minimal Assistance  Ambulation  More Ambulation?: No  Stairs/Curb  Stairs?: No  Balance  Comments: able to sit midline at the EOB with close SBA for safety  - near midline in static stance in the benoit stedy with good hold of the crossbar with right hand and min assist for safety    AM-PAC Score  AM-PAC Inpatient Mobility Raw Score : 8 (02/07/23 0947)  AM-PAC Inpatient T-Scale Score : 28.52 (02/07/23 0947)  Mobility Inpatient CMS 0-100% Score: 86.62 (02/07/23 0947)  Mobility Inpatient CMS G-Code Modifier : CM (02/07/23 0947)    Goals  Short Term Goals  Time Frame for Short Term Goals: 1-2 days  Short Term Goal 1: bed mobility at min/mod assist  Short Term Goal 2: transfers at min/mod assist  NWB left UE  Short Term Goal 3: stand pivot bed to recliner with min/mod assist while NWB left UE  Patient Goals   Patient Goals : not formulated at this session       Education  Patient Education  Education Given To: Patient  Education Provided: Role of Therapy;Plan of Care;Precautions  Education Method: Verbal;Demonstration  Barriers to Learning: None  Education Outcome: Verbalized understanding;Continued education needed      Therapy Time   Individual Concurrent Group Co-treatment   Time In 0910         Time Out 0950         Minutes 111 Aditya Carlton, PT

## 2023-02-07 NOTE — OP NOTE
[address] [place] [address] Conner De Jesus [address]                                OPERATIVE REPORT    PATIENT NAME: Gigi Magana                    :        1940  MED REC NO:   [id number]                          ROOM:       9516  ACCOUNT NO:   [de-identified]                           ADMIT DATE: 2023  PROVIDER:     Eusebio Stewart MD    DATE OF PROCEDURE:  2023    PRIMARY CARE:  Celos Shields MD    PREOPERATIVE DIAGNOSIS:  Left humerus midshaft displaced fracture. POSTOPERATIVE DIAGNOSIS:  Left humerus midshaft displaced fracture. OPERATION PERFORMED:  Open treatment of left humerus midshaft fracture  and open reduction internal fixation with the plate and screws. SURGEON:  Eusebio Stewart MD    ASSISTANT:  Burton House NP    ANESTHESIA:  General anesthesia. ESTIMATED BLOOD LOSS:  Less than 50 mL. COMPLICATION:  None. APPROACH:  Posterior approach. IMPLANTS USED:  Jean titanium VariAx 10-hole locking plates with  total of 5 screws on each side of the fracture. INDICATIONS:  This is an 79-year-old white male left-hand dominant who  was recovering at the rehab sustained an injury when he fell backwards  with the left arm injury. He was found to have a displaced left humeral  shaft fracture. He was seen initially at the [facility] ER. He was  admitted to the hospital with orthopedic consultation. All risks,  benefits and alternatives were discussed with the patient and he agreed  to proceed with surgical fixation. Given the patient's Body mass index is 35.13 kg/m². and unstable fracture added significant challenge to the procedure. It required significant physical and mental effort. It required 100% more time for such procedure. DESCRIPTION OF PROCEDURE:  The patient's left arm was marked. The  patient received 2 gm Ancef IV preoperatively.   The patient was then  brought to the operating room and underwent general anesthesia. He was  then placed on the right lateral decubitus position with the left  shoulder up. The left shoulder and upper extremity was then prepped and  draped in a regular sterile routine fashion. A time-out was called  confirming the patient's name, site, and procedure. We used a posterior approach. An incision was made over the posterior  aspect of the left arm. Careful dissection was performed. We used the  triceps-splitting approach. Careful dissection was done bluntly until  we found and identified the radial nerve. A vessel loop was placed  around it so it keep it protected throughout the procedure. We then  explored the fracture and found to be markedly displaced. It was  significant challenging, physically and mentally very difficult given  the instability of the fracture and the size of the patient. We  carefully were able to keep the fracture in a good alignment. We  elected to use the plate as a reduction tool. We put the plate which  had a [id number], a VariAx titanium plate from [employer]. We put the plate  on the posterior aspect. We put one screw proximally and we realigned  the fracture in a compression mode. We placed another screw distally. By tightening those screws and keeping the alignment, we achieved a good  anatomic reduction. That was confirmed with the C-arm in two planes. At this point, we ten placed the rest of the screws both locking and nonlocking  screws. Total 5 screws on each side of the fracture. Overall, we were  very satisfied with the anatomic reduction and position of all the  screws. At this point, we irrigated the incision copiously with normal  saline and that was mixed with gentamicin. The radial nerve was  protected throughout the procedure. We then closed the deep fascia with  2-0 Vicryl, subcu with 2-0 and 3-0 Vicryl, and the skin with the 4-0  Monocryl. Steri-strips were then applied.   Dressing was applied in the  form of Mepilex. The patient tolerated the procedure well and was taken to the Recovery  in stable condition. Mari Oropeza CNP was 1st Assist given the nature of the procedure that needed advanced assistance. She assisted in all aspect of the procedure, including but limited to draping in a sterile fashion, exposure of surgical area, controlling bleeding, retracting and protecting important structures, achieve and maintain bone reduction and surgical wound closure with dressing application. POSTOPERATIVE PLAN:  The patient would be readmitted as an inpatient. We will start PT/OT with the nonweightbearing, but he can start range of  motion of the elbow and shoulder. He would be nonweightbearing for at  least six weeks.         Connor Chambers MD    D: 02/06/2023 16:08:24       T: 02/06/2023 18:34:38     [email or web address]  Job#: [id number]     Doc#: [id number]    CC:  Maximino Sosa MD

## 2023-02-07 NOTE — PLAN OF CARE
Problem: Discharge Planning  Goal: Discharge to home or other facility with appropriate resources  2/7/2023 0426 by Justyna Vazquez RN  Outcome: Progressing  Flowsheets (Taken 2/7/2023 8748)  Discharge to home or other facility with appropriate resources:   Identify barriers to discharge with patient and caregiver   Identify discharge learning needs (meds, wound care, etc)     Problem: Pain  Goal: Verbalizes/displays adequate comfort level or baseline comfort level  2/7/2023 0426 by Justyna Vazquez RN  Outcome: Progressing  Flowsheets (Taken 2/7/2023 9925)  Verbalizes/displays adequate comfort level or baseline comfort level:   Encourage patient to monitor pain and request assistance   Assess pain using appropriate pain scale   Administer analgesics based on type and severity of pain and evaluate response     Problem: Skin/Tissue Integrity  Goal: Absence of new skin breakdown  Description: 1. Monitor for areas of redness and/or skin breakdown  2. Assess vascular access sites hourly  3. Every 4-6 hours minimum:  Change oxygen saturation probe site  4. Every 4-6 hours:  If on nasal continuous positive airway pressure, respiratory therapy assess nares and determine need for appliance change or resting period. 2/7/2023 0426 by Justyna Vazquez RN  Outcome: Progressing  Note: Skin assessment complete. No new signs of skin breakdown noted. Assistance provided with repositioning while in bed.       Problem: Safety - Adult  Goal: Free from fall injury  2/7/2023 0426 by Justyna Vazquez RN  Outcome: Progressing  Flowsheets (Taken 2/7/2023 0426)  Free From Fall Injury: Instruct family/caregiver on patient safety     Problem: Chronic Conditions and Co-morbidities  Goal: Patient's chronic conditions and co-morbidity symptoms are monitored and maintained or improved  2/7/2023 0426 by Justyna Vazquez RN  Outcome: Progressing  Flowsheets (Taken 2/7/2023 0426)  Care Plan - Patient's Chronic Conditions and Co-Morbidity Symptoms are Monitored and Maintained or Improved: Monitor and assess patient's chronic conditions and comorbid symptoms for stability, deterioration, or improvement

## 2023-02-08 NOTE — PROGRESS NOTES
Pt c/o LUE draining fluid, saturating sling. On assessment, it was noted that three silicone bandages on left lower arm were saturated and sling was damp. Bandages removed. It was noted that there was a small amount of clear drainage coming from four separate skin tears. ABD pad and roll gauze applied, secured with tape. New sling ordered from supply.

## 2023-02-08 NOTE — PROGRESS NOTES
Pt resting in bed at beginning of shift, wife at bedside. He reports mild discomfort to LUE, rated 2/10. He is declining pain medication at this time. He denies having any nausea, numbness, or tingling. Pulses palpable, skin warm to touch. He has moderate  strength in LUE and strong  strength in RUE. Dressing to LUE is CDI. Fall precautions in place, belongings within reach. Will continue to monitor and assess.

## 2023-02-08 NOTE — PROGRESS NOTES
Pt stayed in bed all night, he denied any pain, asked for his sling to be adjusted once as it got loose, dressing dry, clean and intact.

## 2023-02-08 NOTE — PROGRESS NOTES
Occupational Therapy  Facility/Department: [facility] 5R ORTHOPEDICS  Occupational Therapy Daily Note    Name: Emily Montes  : 1940  MRN: [id number]  Date of Service: 2023    Discharge Recommendations:  3-5 sessions per week, Patient would benefit from continued therapy after discharge      mEily Montes scored a 13/24 on the AM-PAC ADL Inpatient form. Current research shows that an AM-PAC score of 17 or less is typically not associated with a discharge to the patient's home setting. Based on the patient's AM-PAC score and their current ADL deficits, it is recommended that the patient have 3-5 sessions per week of Occupational Therapy at d/c to increase the patient's independence. Please see assessment section for further patient specific details. If patient discharges prior to next session this note will serve as a discharge summary. Please see below for the latest assessment towards goals. Patient Diagnosis(es): The primary encounter diagnosis was Closed fracture of shaft of left humerus, unspecified fracture morphology, initial encounter. Diagnoses of Fall from bed, initial encounter, Acute on chronic anemia, Acute kidney injury superimposed on CKD ([address] and Closed 2-part displaced fracture of surgical neck of left humerus, initial encounter were also pertinent to this visit. 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 Past Surgical History:  has a past surgical history that includes Gallbladder surgery ();  Upper gastrointestinal endoscopy (7-2005    7/10/2009); Colonoscopy (7/10/2009); Cataract removal (2/2012); Pain management procedure (Right, 10/20/2021); Pain management procedure (Left, 12/8/2021); CT BIOPSY BONE MARROW (11/1/2022); bronchoscopy (N/A, 12/28/2022); bronchoscopy (12/28/2022); and Humerus fracture surgery (Left, 2/6/2023). Treatment Diagnosis: impaired ADL/fxl mobility      Assessment   Performance deficits / Impairments: Decreased strength;Decreased functional mobility ; Decreased endurance;Decreased ADL status; Decreased high-level IADLs;Decreased balance;Decreased ROM  Assessment: . Today, pt functioning below baseline limited by LUE pain, and decreased endurance and strength. Pt required Mod A x1 sit><stand using stedy, to.from recliner. Pt stood with CG/Min A on benoit stedy for 3 min and Max A for urinal use. Anticipate pt to need Max/dep for ADL's. Pt completes LUE wrist and digit AROM exercises x10 reps. Pt educated on LUE NWB, sling wear; and to regard WBS(when standing on stedy, not to grasp bar w/L hand). cont LUE wrist/digit AROM within sling throughout day. Cont acute OT to address above deficits. Pt is a high fall risk with low AMPAC score not associated with safe return home. Rec skilled OT at low-mod pace 3-5x/week  Treatment Diagnosis: impaired ADL/fxl mobility  Prognosis: Good;Fair  History: 80 yo male admitted for a fall resulting in L humeral shaft fx requiring 2/6 s/p L humerus ORIF now WBAT within a sling. PMH: JOANN, DM, CKD, COPD, HTN. PTA, pt was in SNF setting, prior to January pt lived with spouse with assist for LB ADL and IADL, otherwise IND UB ADL and fxl mobility with 4WW  REQUIRES OT FOLLOW-UP: Yes  Activity Tolerance  Activity Tolerance: Patient limited by fatigue;Patient Tolerated treatment well  Activity Tolerance Comments: no c/o pain. Pt briefly desating to 88% on 3L after standing bout.         Plan   Occupational Therapy Plan  Times Per Week: 3-5  Current Treatment Recommendations: Strengthening, ROM, Balance training, Functional mobility training, Endurance training, Safety education & training, Pain management, Modalities, Positioning, Patient/Caregiver education & training, Self-Care / ADL     Restrictions  Restrictions/Precautions  Restrictions/Precautions: Weight Bearing, Fall Risk  Required Braces or Orthoses?: Yes  Upper Extremity Weight Bearing Restrictions  Left Upper Extremity Weight Bearing: Non Weight Bearing  Required Braces or Orthoses  Left Upper Extremity Brace/Splint: Sling  Position Activity Restriction  Other position/activity restrictions: 3L 02    Subjective   General  Chart Reviewed: Yes  Patient assessed for rehabilitation services?: Yes  Additional Pertinent Hx: 78 yo male admitted for a fall resulting in L humeral shaft fx requiring 2/6 s/p L humerus ORIF now WBAT within a sling. PMH: JOANN, DM, CKD, COPD, HTN. Family / Caregiver Present: Yes (wife)  Referring Practitioner: SILVIANO Spencer CNP  Diagnosis: Fall  Subjective  Subjective: Pt resting in bed upon arrival and agreeable to OT/PT eval. Pt reporting 4/10 L shoulder pain. Pt pleasant.   General Comment  Comments: RN ok to see     Social/Functional History  Social/Functional History  Lives With: Spouse  Type of Home: House  Home Layout: Able to Live on Main level with bedroom/bathroom, Multi-level  Home Access: Stairs to enter with rails  Entrance Stairs - Number of Steps: 1+1  Entrance Stairs - Rails: Both  Bathroom Shower/Tub: Tub/Shower unit  Bathroom Toilet: Standard  Bathroom Equipment: Shower chair, Grab bars in shower, Tub transfer bench  Home Equipment: [employer], 4 wheeled, Oxygen, Grab bars (2L)  Has the patient had two or more falls in the past year or any fall with injury in the past year?: Yes  ADL Assistance: Needs assistance (Assist lower level)  Homemaking Assistance: Needs assistance (pt does bills)  Ambulation Assistance: Independent (3EJ)  Transfer Assistance: Independent  Active : Yes  Occupation: Retired  Type of Occupation: built homes  Additional Comments: sleeps in a recliner but reports he deos get into bed on a regular basis. Pt from SNF setting at [facility]       Objective      Safety Devices  Type of Devices: Call light within reach; Chair alarm in place;Gait belt;Patient at risk for falls;Nurse notified; Left in chair           ADL  Toileting: Maximum assistance  Toileting Skilled Clinical Factors: Assist to hold urinal in place while pt standing on benoit stedy (RN notified-needing urine sample). Additional Comments: Anticipate pt needing Max/dep for ADL's, based on ROM,balance, endurance observed           Transfers  Sit to stand: Moderate assistance (X1)  Stand to sit: Moderate assistance (X1)  Transfer Comments: Pt needing assist x1 to stand off recliner onto stedy. Cues, assist to regard LUE NWB(arm in sling). standing balance: CG/Min A PRN within stedy. Cognition  Overall Cognitive Status: WFL  Orientation  Overall Orientation Status: Within Functional Limits               Exercise Treatment: pt completed RUE therex to improve strength/endurance for ADLs. A/AROM Exercises: seated LUE within sling- wrist circumduction and digit flexion/extension- x10 reps  Education Given To: Patient; Family (wife)  Education Provided: Role of Therapy;Plan of Care;Transfer Training;Precautions  Education Provided Comments: LUE NWB, sling wear (allowing arm to rest within sling, pt guarding UE), LUE wrist and digit AROM within sling throughout day  Education Method: Demonstration;Verbal  Barriers to Learning: None  Education Outcome: Verbalized understanding;Demonstrated understanding;Continued education needed       AM-PAC Score        AM-PAC Inpatient Daily Activity Raw Score: 13 (02/08/23 1539)  AM-PAC Inpatient ADL T-Scale Score : 32.03 (02/08/23 1539)  ADL Inpatient CMS 0-100% Score: 63.03 (02/08/23 1539)  ADL Inpatient CMS G-Code Modifier : CL (02/08/23 1539)    Goals  Short Term Goals  Time Frame for Short Term Goals: prior to d/c.  Status: goals ongoing  Short Term Goal 1: bed mobility Min A  Short Term Goal 2: ADL tx Min A  Short Term Goal 3: UB bathing/dressing including sling Min A  Short Term Goal 4: LUE exercises (no shoulder ROM) to improve circulation and eventual function with ADLs  Short Term Goal 5: toileting Mod A  Patient Goals   Patient goals : improve pain and independence       Therapy Time   Individual Concurrent Group Co-treatment   Time In 1518         Time Out 1542         Minutes 24             Jerrica ROSS/CLINT,515

## 2023-02-08 NOTE — PROGRESS NOTES
[facility] Orthopedic Surgery   Progress Note      S/P :  SUBJECTIVE  in bed. Asleep when I arrived but aroused to name. Wife at bedside. Pain is   described in left shoulder \"only if I move it\" and with the intensity of moderate. Pain is described as aching, shooting. OBJECTIVE              Physical                      VITALS:  /71   Pulse 90   Temp 97.4 °F (36.3 °C) (Oral)   Resp 18   Ht 5' 8\" (1.727 m)   Wt 225 lb 12 oz (102.4 kg)   SpO2 90%   BMI 34.33 kg/m²                     MUSCULOSKELETAL: Left  wrist with intact flex/ext and hand grasp and extension and thumbs up                    NEUROLOGIC:                                  Sensory:  Touch:  Left Upper Extremity:  normal                                                 Surgical wound appears clean and dry left shoulder with Mepilex AG dressing Bruisingis noted. Left arm in sling.      Data       CBC:   Lab Results   Component Value Date/Time    WBC 4.6 02/08/2023 05:15 AM    RBC 3.10 02/08/2023 05:15 AM    HGB 7.9 02/08/2023 05:15 AM    HCT 25.4 02/08/2023 05:15 AM    MCV 81.8 02/08/2023 05:15 AM    MCH 25.3 02/08/2023 05:15 AM    MCHC 31.0 02/08/2023 05:15 AM    RDW 22.5 02/08/2023 05:15 AM     02/08/2023 05:15 AM    MPV 8.5 02/08/2023 05:15 AM        WBC:    Lab Results   Component Value Date/Time    WBC 4.6 02/08/2023 05:15 AM        Hemoglobin/Hematocrit:    Lab Results   Component Value Date/Time    HGB 7.9 02/08/2023 05:15 AM    HCT 25.4 02/08/2023 05:15 AM        PT/INR:    Lab Results   Component Value Date/Time    PROTIME 16.5 01/26/2023 01:02 AM    INR 1.34 01/26/2023 01:02 AM              Current Inpatient Medications             Current Facility-Administered Medications: insulin glargine (LANTUS) injection vial 10 Units, 10 Units, SubCUTAneous, Daily  traMADol (ULTRAM) tablet 50 mg, 50 mg, Oral, Q6H PRN  albuterol sulfate HFA (PROVENTIL;VENTOLIN;PROAIR) 108 (90 Base) MCG/ACT inhaler 2 puff, 2 puff, Inhalation, Q4H PRN  amiodarone (CORDARONE) tablet 100 mg, 100 mg, Oral, Daily  atorvastatin (LIPITOR) tablet 40 mg, 40 mg, Oral, Daily  bisacodyl (DULCOLAX) EC tablet 5 mg, 5 mg, Oral, Daily  docusate sodium (COLACE) capsule 100 mg, 100 mg, Oral, BID  ferrous sulfate EC tablet 325 mg, 325 mg, Oral, Daily with breakfast  hydrALAZINE (APRESOLINE) tablet 25 mg, 25 mg, Oral, TID  lactulose (CHRONULAC) 10 GM/15ML solution 20 g, 20 g, Oral, TID PRN  magnesium oxide (MAG-OX) tablet 400 mg, 400 mg, Oral, BID  montelukast (SINGULAIR) tablet 10 mg, 10 mg, Oral, Daily  pantoprazole (PROTONIX) tablet 40 mg, 40 mg, Oral, Daily  predniSONE (DELTASONE) tablet 20 mg, 20 mg, Oral, Daily  tamsulosin (FLOMAX) capsule 0.4 mg, 0.4 mg, Oral, Daily  zinc sulfate (ZINCATE) capsule 50 mg, 50 mg, Oral, Daily  sodium chloride flush 0.9 % injection 5-40 mL, 5-40 mL, IntraVENous, 2 times per day  sodium chloride flush 0.9 % injection 5-40 mL, 5-40 mL, IntraVENous, PRN  0.9 % sodium chloride infusion, , IntraVENous, PRN  ondansetron (ZOFRAN-ODT) disintegrating tablet 4 mg, 4 mg, Oral, Q8H PRN **OR** ondansetron (ZOFRAN) injection 4 mg, 4 mg, IntraVENous, Q6H PRN  acetaminophen (TYLENOL) tablet 650 mg, 650 mg, Oral, Q6H PRN **OR** acetaminophen (TYLENOL) suppository 650 mg, 650 mg, Rectal, Q6H PRN  glucose chewable tablet 16 g, 4 tablet, Oral, PRN  dextrose bolus 10% 125 mL, 125 mL, IntraVENous, PRN **OR** dextrose bolus 10% 250 mL, 250 mL, IntraVENous, PRN  glucagon (rDNA) injection 1 mg, 1 mg, SubCUTAneous, PRN  dextrose 10 % infusion, , IntraVENous, Continuous PRN  insulin lispro (HUMALOG) injection vial 0-4 Units, 0-4 Units, SubCUTAneous, TID WC  insulin lispro (HUMALOG) injection vial 0-4 Units, 0-4 Units, SubCUTAneous, Nightly  polyethylene glycol (GLYCOLAX) packet 17 g, 17 g, Oral, Daily  morphine (PF) injection 2 mg, 2 mg, IntraVENous, Q2H PRN **OR** morphine (PF) injection 4 mg, 4 mg, IntraVENous, Q2H PRN  tiotropium-olodaterol (STIOLTO) 2.5-2.5 MCG/ACT inhaler 2 puff, 2 puff, Inhalation, Daily  sodium chloride flush 0.9 % injection 5-40 mL, 5-40 mL, IntraVENous, 2 times per day  sodium chloride flush 0.9 % injection 5-40 mL, 5-40 mL, IntraVENous, PRN  0.9 % sodium chloride infusion, , IntraVENous, PRN  enoxaparin Sodium (LOVENOX) injection 30 mg, 30 mg, SubCUTAneous, Daily  0.9 % sodium chloride infusion, , IntraVENous, PRN    ASSESSMENT AND PLAN    Post left proximal humerus ORIF, stable exam  NWB left arm and hand  Left arm in sling  DVT prophylaxis ordered, ASA 81mg twice at day for 30 days for DVT prophylaxis   PT OT for ADL's and ambulation as tolerated  SS for DC planning. Stable for DC per orthoIV or PO pain med as ordered   Left elbow in Mepilex for skin injury, keep splint on over wound.      Nelson Bonner, SILVIANO - CNP  2/8/2023  11:17 AM

## 2023-02-08 NOTE — CONSULTS
Nephrology Consult Note   [facility]. [facility]      Chief Complaint: left arm pain s/p fall; Left humerus shaft fracture    History of Present Illness: Mr Alejandra Schaefer is an 80 yo obese male with a past medical history significant for HFpEF, COPD, obstructive sleep apnea, chronic respiratory failure, Hyperlipidemia, CKD that presented came to ED after sustaining a fall  Of note patient had been discharged 2 days prior (after an extended hospitalization for volume overload occurring in the setting of DEISY/CKD). Apparently, he was in the bathroom and reached out and fell with his arm propped between a grab bar in the wall. X ray revealed midshaft humerus fracture     Subjective: In recliner; not short of breath    ROS: + edema (chronic), + pain in left arm; + bruising in left arm; no reported obstructive urinary symptoms. Respiratory status at baseline he reports.  All other ROS negative    Scheduled Meds:   insulin glargine  10 Units SubCUTAneous Daily    amiodarone  100 mg Oral Daily    atorvastatin  40 mg Oral Daily    bisacodyl  5 mg Oral Daily    docusate sodium  100 mg Oral BID    ferrous sulfate  325 mg Oral Daily with breakfast    hydrALAZINE  25 mg Oral TID    magnesium oxide  400 mg Oral BID    montelukast  10 mg Oral Daily    pantoprazole  40 mg Oral Daily    predniSONE  20 mg Oral Daily    tamsulosin  0.4 mg Oral Daily    zinc sulfate  50 mg Oral Daily    sodium chloride flush  5-40 mL IntraVENous 2 times per day    insulin lispro  0-4 Units SubCUTAneous TID WC    insulin lispro  0-4 Units SubCUTAneous Nightly    polyethylene glycol  17 g Oral Daily    tiotropium-olodaterol  2 puff Inhalation Daily    sodium chloride flush  5-40 mL IntraVENous 2 times per day    enoxaparin  30 mg SubCUTAneous Daily        sodium chloride      dextrose      sodium chloride      sodium chloride         PRN Meds:.traMADol, albuterol sulfate HFA, lactulose, sodium chloride flush, sodium chloride, ondansetron **OR** ondansetron, acetaminophen **OR** acetaminophen, glucose, dextrose bolus **OR** dextrose bolus, glucagon (rDNA), dextrose, morphine **OR** morphine, sodium chloride flush, sodium chloride, sodium chloride    Physical Exam:    TEMPERATURE:  Current - Temp: 97.5 °F (36.4 °C); Max - Temp  Av.5 °F (36.4 °C)  Min: 97.3 °F (36.3 °C)  Max: 97.6 °F (36.4 °C)  RESPIRATIONS RANGE: Resp  Av.1  Min: 17  Max: 20  PULSE RANGE: Pulse  Av.7  Min: 77  Max: 109  BLOOD PRESSURE RANGE:  Systolic (55JZM), IGX:267 , Min:105 , TID:166   ; Diastolic (31XGR), PCC:90, Min:50, Max:80    24HR INTAKE/OUTPUT:    Intake/Output Summary (Last 24 hours) at 2023 1445  Last data filed at 2023 1226  Gross per 24 hour   Intake 826.71 ml   Output 100 ml   Net 726.71 ml       Patient Vitals for the past 96 hrs (Last 3 readings):   Weight   23 0644 225 lb 12 oz (102.4 kg)   23 0748 224 lb 10.4 oz (101.9 kg)   23 2016 231 lb 0.7 oz (104.8 kg)       General: Alert, Awake, NAD, Obese  HEENT: Normocephalic, atraumatic  Eyes: EOMI, ANGELINA  Chest: clear to auscultation, no intercostal retractions  CVS: RRR, no murmur, no rub  Abdomen: soft, non tender, no organomegaly  Extremities: 1/2+ edema, no cyanosis. Left arm in sling  Neurological: CN intact, no focal motor neurological deficit  Psych: normal affect; O x 3      Allergies:   Allergies   Allergen Reactions    Valsartan-Hydrochlorothiazide Other (See Comments)     DEISY    Hytrin [Terazosin Hcl]      Ineffective for BP control    Penicillins      Unknown reaction during childhood; Patient tolerated cephalosporins in the past    Shrimp Flavor Hives    Sulfa Antibiotics      Unknown reaction in childhood    Sulfasalazine Other (See Comments)    Tekturna [Aliskiren Fumarate]      Ineffective    Vancomycin      Fever    Ciprofloxacin Rash     Fever and rash       Past Medical History:   Diagnosis Date    Allergic rhinitis     Asthma     Atrial fibrillation (HCC)     Carotid artery stenosis Chronic kidney disease     COPD (chronic obstructive pulmonary disease) ([facility])     CPAP (continuous positive airway pressure) dependence     ESBL (extended spectrum beta-lactamase) producing bacteria infection 12/26/2018    urine    Hyperlipidemia     Hypertension     Iron deficiency anemia     Obstructive sleep apnea     Type II or unspecified type diabetes mellitus without mention of complication, not stated as uncontrolled      Past Surgical History:   Procedure Laterality Date    BRONCHOSCOPY N/A 12/28/2022    BRONCHOSCOPY ALVEOLAR LAVAGE performed by Alex Mack MD at [address]  12/28/2022    BRONCHOSCOPY DIAGNOSTIC OR CELL [address] ONLY performed by Alex Mack MD at [facility]  2/2012    bilateral    COLONOSCOPY  7/10/2009    diverticulosis    hemorrhoids    CT BONE MARROW BIOPSY  11/1/2022    CT BONE MARROW BIOPSY 11/1/2022 [facility] CT    GALLBLADDER SURGERY  1981    HUMERUS FRACTURE SURGERY Left 2/6/2023    OPEN REDUCTION INTERNAL FIXATION LEFT HUMERUS SHAFT FRACTURE performed by Jt Carrillo MD at [address] [facility] Right 10/20/2021    COOLIEF RADIOFREQUENCY ABLATION - RIGHT KNEE performed by Darling Wheat MD at [address] Left 12/8/2021    [address] - LEFT KNEE performed by Darling Wheat MD at [address] [id number]    7/10/2009    normal     Family History   Problem Relation Age of Onset    Heart Disease Mother     Stroke Mother     Vision Loss Mother     High Blood Pressure Father     High Blood Pressure Brother     Diabetes Brother     Sleep Apnea Brother     Arthritis Paternal Grandmother     Diabetes Paternal Grandmother      Social History     Socioeconomic History    Marital status:      Spouse name: Not on file    Number of children: 5    Years of education: Not on file    Highest education level: Not on file   Occupational History    Occupation: retired   Tobacco Use    Smoking status: Former     Packs/day: 0.50     Years: 18.00     Pack years: 9.00     Types: Cigarettes     Start date: 1958     Quit date: 1988     Years since quittin.1    Smokeless tobacco: Never   Vaping Use    Vaping Use: Never used   Substance and Sexual Activity    Alcohol use: No     Alcohol/week: 0.0 standard drinks    Drug use: No    Sexual activity: Yes     Partners: Female   Other Topics Concern    Not on file   Social History Narrative    Not on file     Social Determinants of Health     Financial Resource Strain: Low Risk     Difficulty of Paying Living Expenses: Not hard at all   Food Insecurity: No Food Insecurity    Worried About Running Out of Food in the Last Year: Never true    Ran Out of Food in the Last Year: Never true   Transportation Needs: Not on file   Physical Activity: Not on file   Stress: Not on file   Social Connections: Not on file   Intimate Partner Violence: Not on file   Housing Stability: Not on file         LAB DATA:    CBC:   Lab Results   Component Value Date/Time    WBC 4.6 2023 05:15 AM    RBC 3.10 2023 05:15 AM    HGB 7.9 2023 05:15 AM    HCT 25.4 2023 05:15 AM    MCV 81.8 2023 05:15 AM    MCH 25.3 2023 05:15 AM    MCHC 31.0 2023 05:15 AM    RDW 22.5 2023 05:15 AM     2023 05:15 AM    MPV 8.5 2023 05:15 AM     BMP:    Lab Results   Component Value Date/Time     2023 05:15 AM    K 4.4 2023 05:15 AM    K 3.7 2023 06:29 AM    CL 92 2023 05:15 AM    CO2 32 2023 05:15 AM    BUN 83 2023 05:15 AM    CREATININE 3.0 2023 05:15 AM    CALCIUM 7.3 2023 05:15 AM    GFRAA 37 2022 02:05 PM    GFRAA >60 2013 10:46 AM    LABGLOM 20 2023 05:15 AM    GLUCOSE 124 2023 05:15 AM    GLUCOSE 96 2011 10:37 AM     Ionized Calcium:  No results found for: IONCA  Magnesium: Lab Results   Component Value Date/Time    MG 1.70 02/04/2023 05:38 AM     Phosphorus:    Lab Results   Component Value Date/Time    PHOS 3.8 02/04/2023 05:38 AM     U/A:    Lab Results   Component Value Date/Time    COLORU ORANGE 01/23/2023 03:30 PM    PHUR 5.0 01/23/2023 03:30 PM    WBCUA 12 01/23/2023 03:30 PM    RBCUA 860 01/23/2023 03:30 PM    BACTERIA None Seen 01/23/2023 03:30 PM    CLARITYU Clear 01/23/2023 03:30 PM    SPECGRAV 1.015 01/23/2023 03:30 PM    LEUKOCYTESUR SMALL 01/23/2023 03:30 PM    UROBILINOGEN 0.2 01/23/2023 03:30 PM    BILIRUBINUR Negative 01/23/2023 03:30 PM    [address] NEGATIVE 01/05/2010 08:56 PM    BILIRUBINUR NEGATIVE 01/05/2010 08:56 PM    BLOODU LARGE 01/23/2023 03:30 PM    GLUCOSEU Negative 01/23/2023 03:30 PM    GLUCOSEU NEGATIVE 01/05/2010 08:56 PM    GLUCOSEU NEGATIVE 01/05/2010 08:56 PM         IMPRESSION/RECOMMENDATIONS:      Principal Problem:    Closed fracture of shaft of left humerus  Active Problems:    JOANN (obstructive sleep apnea)    Type 2 diabetes mellitus with stage 4 chronic kidney disease, with long-term current use of insulin ([facility])    Weakness    Chronic anemia    Chronic respiratory failure with hypoxia ([facility])    Chronic diastolic CHF (congestive heart failure), NYHA class 2 ([facility])    Persistent atrial fibrillation ([facility])    Closed 2-part displaced fracture of surgical neck of left humerus, initial encounter    Chronic GERD    Hyperlipidemia    Essential hypertension    Slow transit constipation    Obesity, Class II, BMI 35-39.9  Resolved Problems:    * No resolved hospital problems. *    DEISY / CKD (baseline 1.8-2 mg/dl) - likely hemodynamic in nature due to relative hypotension  - Hold Hydralazine   - Start Midodrine  - Check urine lytes  - No need for imaging studies  - No immediate indication for RRT at this point in time    2. Hypertension - several episodes of relative hypotension in last 24 hours  - Held Hydralazine    3. Left humerus shaft fracture    4. CKD-MBD - monitor phosphorus    5. Anemia CKD - check iron panel    6.  HFpEF (diastolic dysfunction) - appears compensated at this point in time  - Previous history of DEISY occurring in the setting of CRS  - Start Lasix gtt / scheduled IV albumin

## 2023-02-08 NOTE — CARE COORDINATION
2/8 spoke w/ patient with his daughter Diaz Buckner at bedside- they seem to prefer [facility] at this time due to proximity -   Electronically signed by Hubert Tang on 2/8/2023 at 5:48 PM  #[phone]

## 2023-02-08 NOTE — PROGRESS NOTES
Pulmonary Progress Note    Date of Admission: 2/5/2023   LOS: 3 days     Chief Complaint   Patient presents with    Fall    Arm Injury     Left arm injury due to a fall. Assessment/Plan:     Acute on chronic hypoxemic respiratory failure  -Up to 3 L today, likely fluid shifts due to postop status, agree with discontinuation of IV fluids  -Wean oxygen goal saturation 90%    Organizing pneumonia  -on empiric steroids for possible OP/     Gold stage II COPD with chronic bronchitis  -Stiolto  -Albuterol as needed  -Singulair    DEISY on CKD    JOANN  -home [employer]    Future Appointments   Date Time Provider Damian Ivelisse   2/17/2023 11:30 AM SCHEDULE, VASCULAR LAB 1 [place] VASC/EN [facility]   2/17/2023 12:45 PM Emma Bearden APRN - CNP [place] V[facility]/EN [facility]   2/23/2023  1:45 PM Darian Hernandez MD [facility]       24 Hour Events/Subjective  No events overnight. Breathing stable. On 2 L oxygen    ROS:   No nausea  No Vomiting  No chest pain      Intake/Output Summary (Last 24 hours) at 2/8/2023 9509  Last data filed at 2/7/2023 1932  Gross per 24 hour   Intake 986.71 ml   Output 100 ml   Net 886.71 ml         PHYSICAL EXAM:   Blood pressure 129/71, pulse 90, temperature 97.4 °F (36.3 °C), temperature source Oral, resp. rate 18, height 5' 8\" (1.727 m), weight 225 lb 12 oz (102.4 kg), SpO2 91 %.'  Gen:  No acute distress. Eyes: PERRL. Anicteric sclera. No conjunctival injection. ENT: No discharge. Posterior oropharynx clear. External appearance of ears and nose normal.  Neck: Trachea midline. No mass   Resp:  No crackles. No wheezes. No rhonchi. No dullness on percussion. CV: Regular rate. Regular rhythm. No murmur or rub. No edema. GI: Soft, Non-tender. Non-distended. +BS  Skin: Warm, dry, w/o erythema. Lymph: No cervical or supraclavicular LAD. M/S: No cyanosis. No clubbing. Neuro:  no focal neurologic deficit. Moves all extremities  Psych: Awake and alert, Oriented x 3.   Judgement and insight appropriate. Mood stable. Medications:    Scheduled Meds:   insulin glargine  10 Units SubCUTAneous Daily    amiodarone  100 mg Oral Daily    atorvastatin  40 mg Oral Daily    bisacodyl  5 mg Oral Daily    docusate sodium  100 mg Oral BID    ferrous sulfate  325 mg Oral Daily with breakfast    hydrALAZINE  25 mg Oral TID    magnesium oxide  400 mg Oral BID    montelukast  10 mg Oral Daily    pantoprazole  40 mg Oral Daily    predniSONE  20 mg Oral Daily    tamsulosin  0.4 mg Oral Daily    zinc sulfate  50 mg Oral Daily    sodium chloride flush  5-40 mL IntraVENous 2 times per day    insulin lispro  0-4 Units SubCUTAneous TID WC    insulin lispro  0-4 Units SubCUTAneous Nightly    polyethylene glycol  17 g Oral Daily    tiotropium-olodaterol  2 puff Inhalation Daily    sodium chloride flush  5-40 mL IntraVENous 2 times per day    enoxaparin  30 mg SubCUTAneous Daily       Continuous Infusions:   sodium chloride      dextrose      sodium chloride      sodium chloride         PRN Meds:  traMADol, albuterol sulfate HFA, lactulose, sodium chloride flush, sodium chloride, ondansetron **OR** ondansetron, acetaminophen **OR** acetaminophen, glucose, dextrose bolus **OR** dextrose bolus, glucagon (rDNA), dextrose, morphine **OR** morphine, sodium chloride flush, sodium chloride, sodium chloride    Labs reviewed:  CBC:   Recent Labs     02/06/23 0629 02/07/23 0428 02/08/23  0515   WBC 5.0 7.2 4.6   HGB 7.9* 8.1* 7.9*   HCT 25.2* 25.9* 25.4*   MCV 81.4 80.9 81.8    152 150     BMP:   Recent Labs     02/06/23 0629 02/07/23 0428 02/08/23  0515   * 137 137   K 3.7 4.5 4.4   CL 99 93* 92*   CO2 37* 31 32   BUN 63* 70* 83*   CREATININE 2.3* 2.5* 3.0*     LIVER PROFILE: No results for input(s): AST, ALT, LIPASE, BILIDIR, BILITOT, ALKPHOS in the last 72 hours. Invalid input(s): AMYLASE,  ALB  PT/INR: No results for input(s): PROTIME, INR in the last 72 hours.         Films:  Radiology Review:  Pertinent images / reports were reviewed as a part of this visit. This note was transcribed using [id number] [employer]. Please disregard any translational errors.     Thank you for this consult,    Chuy Templeton MD  [facility], Critical Care, and Sleep Medicine

## 2023-02-08 NOTE — TELEPHONE ENCOUNTER
----- Message from Blane Emery MD sent at 2/8/2023  8:34 AM EST -----  Mr. Mookie Gonzalez will need follow-up with Dr. Yvan Akers in the office 2 to 6 weeks

## 2023-02-08 NOTE — PROGRESS NOTES
Pt sat up in chair for several hours this shift. He has had no c/o pain or numbness in LUE. Dressings to L lower arm remain CDI after applying new dressings. Utilizing stedy for transfers, pt tolerating well. Will continue to monitor.

## 2023-02-08 NOTE — PLAN OF CARE
Problem: Discharge Planning  Goal: Discharge to home or other facility with appropriate resources  Outcome: Progressing  Flowsheets (Taken 2/8/2023 1219)  Discharge to home or other facility with appropriate resources:   Identify barriers to discharge with patient and caregiver   Identify discharge learning needs (meds, wound care, etc)   Refer to discharge planning if patient needs post-hospital services based on physician order or complex needs related to functional status, cognitive ability or social support system   Arrange for needed discharge resources and transportation as appropriate     Problem: Pain  Goal: Verbalizes/displays adequate comfort level or baseline comfort level  2/8/2023 1219 by Quin Valentine RN  Outcome: Progressing  Flowsheets (Taken 2/8/2023 1219)  Verbalizes/displays adequate comfort level or baseline comfort level:   Encourage patient to monitor pain and request assistance   Administer analgesics based on type and severity of pain and evaluate response   Consider cultural and social influences on pain and pain management   Assess pain using appropriate pain scale   Notify Licensed Independent Practitioner if interventions unsuccessful or patient reports new pain   Implement non-pharmacological measures as appropriate and evaluate response  2/8/2023 0439 by Idalia Remy RN  Outcome: Progressing     Problem: Skin/Tissue Integrity  Goal: Absence of new skin breakdown  Description: 1. Monitor for areas of redness and/or skin breakdown  2. Assess vascular access sites hourly  3. Every 4-6 hours minimum:  Change oxygen saturation probe site  4. Every 4-6 hours:  If on nasal continuous positive airway pressure, respiratory therapy assess nares and determine need for appliance change or resting period. Outcome: Progressing  Note: Pt with moisture-associated skin breakdown on buttocks and in gluteal cleft. Tear noted to LUE. No other areas of new skin breakdown noted.  Will monitor for changes.      Problem: Safety - Adult  Goal: Free from fall injury  Outcome: Progressing     Problem: Chronic Conditions and Co-morbidities  Goal: Patient's chronic conditions and co-morbidity symptoms are monitored and maintained or improved  Outcome: Progressing  Flowsheets (Taken 2/8/2023 1219)  Care Plan - Patient's Chronic Conditions and Co-Morbidity Symptoms are Monitored and Maintained or Improved:   Monitor and assess patient's chronic conditions and comorbid symptoms for stability, deterioration, or improvement   Collaborate with multidisciplinary team to address chronic and comorbid conditions and prevent exacerbation or deterioration   Update acute care plan with appropriate goals if chronic or comorbid symptoms are exacerbated and prevent overall improvement and discharge

## 2023-02-08 NOTE — PROGRESS NOTES
[facility] Internal Medicine Note      Chief Complaint: I feel ok    Subjective/Interval History: This morning the patient is sitting up in bed. He states his pain is reasonably well controlled. His appetite is good. He has no new complaints today. Patient continues to have sputum, denies much hemoptysis. Kidney function is a little worse today. No chest pain or shortness breath. No cough or sputum. No nausea, vomiting, diarrhea. No abdominal pain. No dysuria. The remainder of the review of systems is negative. PMH, PSH, FH/SH reviewed and unchanged as documented in the H&P personally documented at admission 23    Medication list reviewed    Objective:    /71   Pulse 90   Temp 97.4 °F (36.3 °C) (Oral)   Resp 18   Ht 5' 8\" (1.727 m)   Wt 225 lb 12 oz (102.4 kg)   SpO2 90%   BMI 34.33 kg/m²   Temp  Av.4 °F (36.3 °C)  Min: 97.3 °F (36.3 °C)  Max: 97.6 °F (36.4 °C)    Exam unchanged today:  RRR  Pulm-the lungs are clear throughout. Diminished in the bases but no wheezes or rhonchi or crackle  Abd-BS+, soft, NTND  Ext-left arm in a sling. Right arm remains edematous with 3+ edema. Bilateral legs with 1+ edema under compression stockings and 2-3+ edema of his thighs.     The Following Labs Were Reviewed Today:    Recent Results (from the past 24 hour(s))   POCT Glucose    Collection Time: 23 11:14 AM   Result Value Ref Range    POC Glucose 214 (H) 70 - 99 mg/dl    Performed on ACCU-CHEK    POCT Glucose    Collection Time: 23  4:04 PM   Result Value Ref Range    POC Glucose 229 (H) 70 - 99 mg/dl    Performed on ACCU-CHEK    POCT Glucose    Collection Time: 23  8:26 PM   Result Value Ref Range    POC Glucose 320 (H) 70 - 99 mg/dl    Performed on ACCU-CHEK    Basic Metabolic Panel    Collection Time: 23  5:15 AM   Result Value Ref Range    Sodium 137 136 - 145 mmol/L    Potassium 4.4 3.5 - 5.1 mmol/L    Chloride 92 (L) 99 - 110 mmol/L    CO2 32 21 - 32 mmol/L Anion Gap 13 3 - 16    Glucose 124 (H) 70 - 99 mg/dL    BUN 83 (HH) 7 - 20 mg/dL    Creatinine 3.0 (H) 0.8 - 1.3 mg/dL    Est, Glom Filt Rate 20 (A) >60    Calcium 7.3 (L) 8.3 - 10.6 mg/dL   CBC with Auto Differential    Collection Time: 02/08/23  5:15 AM   Result Value Ref Range    WBC 4.6 4.0 - 11.0 K/uL    RBC 3.10 (L) 4.20 - 5.90 M/uL    Hemoglobin 7.9 (L) 13.5 - 17.5 g/dL    Hematocrit 25.4 (L) 40.5 - 52.5 %    MCV 81.8 80.0 - 100.0 fL    MCH 25.3 (L) 26.0 - 34.0 pg    MCHC 31.0 31.0 - 36.0 g/dL    RDW 22.5 (H) 12.4 - 15.4 %    Platelets 462 453 - 149 K/uL    MPV 8.5 5.0 - 10.5 fL    Neutrophils % 79.6 %    Lymphocytes % 10.7 %    Monocytes % 8.5 %    Eosinophils % 1.1 %    Basophils % 0.1 %    Neutrophils Absolute 3.7 1.7 - 7.7 K/uL    Lymphocytes Absolute 0.5 (L) 1.0 - 5.1 K/uL    Monocytes Absolute 0.4 0.0 - 1.3 K/uL    Eosinophils Absolute 0.0 0.0 - 0.6 K/uL    Basophils Absolute 0.0 0.0 - 0.2 K/uL   POCT Glucose    Collection Time: 02/08/23  6:09 AM   Result Value Ref Range    POC Glucose 131 (H) 70 - 99 mg/dl    Performed on ACCU-CHEK        ASSESSMENT/PLAN:      Principal Problem:    Closed fracture of shaft of left humerus-postop day #2. Proceed with routine postop care per Ortho. Active Problems:    Chronic anemia-hemoglobin stable today. Continue to monitor. Will asked Dr. Latrice Hughes to follow along. Chronic respiratory failure with hypoxia-appreciate pulmonary input. Continue with supplemental oxygen and inhalers as written. Chronic diastolic CHF (congestive heart failure), NYHA class 2-awaiting nephrology consultation. Remains with substantial peripheral edema. IV Fluids stopped yesterday. Persistent atrial fibrillation-clinically in sinus rhythm. Continue amiodarone. Patient not an anticoagulation candidate due to GI bleeding in the past.      JOANN (obstructive sleep apnea)-patient encouraged to use CPAP. Chronic GERD-continue with daily PPI.     Type 2 diabetes mellitus with stage 4 chronic kidney disease, with long-term current use of insulin-Lantus insulin restarted, continue sliding scale and monitor sugars. Glucose this morning was reasonable. Hyperlipidemia-stable on current statin dosing. No changes needed    Essential hypertension-blood pressures running relatively low. Continue to monitor. Slow transit constipation-continue with current bowel regimen. Adjust as needed for constipation. Pain medicine certainly may contribute to this as well. Meghan Roth will need to be involved again.   Will need ECF at discharge      Gasper Thakkar MD, Keerthi Trinh  10:04 AM  2/8/2023

## 2023-02-08 NOTE — CARE COORDINATION
Received call from [place] w/ [facility] #[phone]-- they have a bed available today (private room) and can accept if pt chooses them. Electronically signed by Jarod Baez on 2/8/2023 at 11:16 AM  #[phone]    Also received call from [address] [place] [address],[address] w/ [facility] # [phone]-- they can accept if pt chooses     Informed patient of the above and he will discuss options with his daughter .   Electronically signed by Jarod Baez on 2/8/2023 at 1:44 PM  #[phone]

## 2023-02-08 NOTE — TELEPHONE ENCOUNTER
SW patient and he is scheduled for a follow up with Dr. Eloisa Manrique on Wednesday March 29th at 11:20 am.

## 2023-02-09 NOTE — PROGRESS NOTES
Not all urine studies have resulted, I contacted lab and the saff will check the sample and have all urine studies rustles in chart, will check in half an hour before starting the lasix drip.

## 2023-02-09 NOTE — PROGRESS NOTES
Physical Therapy  Pt transfer to ICU following Code Blue. Will sign off at this time; please reconsult PT when approp.   Thank you, Venkata Chapa, PT

## 2023-02-09 NOTE — PROGRESS NOTES
Family left bedside with pt's belongings including CPAP, hearing aids, cell phone, chargers, and clothing.  home information received. Family has contacted the  home already. No further needs at this time.

## 2023-02-09 NOTE — CODE DOCUMENTATION
Patient went through 3 separate CODE BLUE events after intubation,    0500, patient's oxygen was not able to stay intact, was given some Versed to sedate further as well as patient was having his ET tube retracted 4 cm due to x-ray findings showing that it was in the right mainstem bronchus. At the time patient went into bradycardic rhythm and then lost a pulse and went into PEA arrest.  1 dose of epinephrine as well as CPR was done and patient then did have a pulse afterwards, total arrest time was about 2 to 3 minutes. We did also give patient 1 dose of bicarbonate IV as well as 1 g of calcium chloride at the time. Second CODE BLUE event was noted this time that patient's QRS per nursing was widening, I was not witness to the whole event initially, Dewayne Sandralaanthony was then called by nursing when he went more bradycardic and then to the point of pulseless electrical activity. Upon my arrival patient was already put through CPR for at least 3 minutes or so and also had 1 dose of epinephrine given. Decided to again give patient 1 dose of bicarbonate and calcium chloride. Family discussion was being had when the third CODE BLUE event was called, CPR was initiated 1 dose of epinephrine was given, this happened at [phone]. Decided to terminate resuscitative efforts although patient did regain a pulse. Family then decided to make patient DNR CC and terminally extubate afterwards.

## 2023-02-09 NOTE — PROGRESS NOTES
Patient RN called about the patient not being able to tolerate bipap due to large amounts of mucus/sputum production. Upon arrival patient was on 8L NC SpO2 was in the low to mid 80s. Patient was placed on a 15 LHFNC and a NRB SpO2 was still in the low 80s. Rapid Response was called.

## 2023-02-09 NOTE — PROGRESS NOTES
Message was left for Dr Karen Rodriguez about the increase in O2 demand and patient being placed on BIPAP.

## 2023-02-09 NOTE — PROGRESS NOTES
Called home phone number for wife, Dean Lora. Left HIPAA compliant voicemail. Called next contact- daughter, Jose M Montero. She answered. Explained patient's current status- agreed to intubation, 1 round CPR. She was accepting of update and asked that we call patient's other daughter, Carmen Bush. Carmen Bush is primary decision maker followed by Karen Wilson. Carmen Bush is a pediatrician. Called Carmen Bush and updated on current condition. Verbalized understanding. She, Jose M Montero, and Dean Lora will come to unit.

## 2023-02-09 NOTE — PROGRESS NOTES
Called wife cell phone  , got a voicemail, I left a message letting her know that that patient was transferred to the ICU and left the unit number of [phone] for her to call for updates on patient.

## 2023-02-09 NOTE — PROGRESS NOTES
Pt increasingly couching, productive cough having to take the BIPAP off to spit out secretions. SPo2 is down to 88%. Unable to get Spo2 sat higher. ET was called for further intervention. RT place patient on high flow nasal cannula and non-re breather, with no improvement in spo2 A rapid was called.

## 2023-02-09 NOTE — SIGNIFICANT EVENT
Rapid response was called at this time, patient was significantly more increased short of breath. Was apparently being diuresed with a Lasix drip. Unfortunately chest x-ray showed worsening right pleural effusion or loss of airspace disease in general on the right lung field. Patient was already on BiPAP but was not tolerating and even on nonrebreather and high flow cannula he was only saturating in the low 70s on his pulse oximetry. Decided to transfer patient to ICU. Would then consider intubating patient if he continued to show low oxygen saturation.

## 2023-02-09 NOTE — PROGRESS NOTES
Received pt from floor around 0320 this am, received report from [facility]. Pt came up on 15 liter non-rebreather only sating low to mid 80s, did not improve on bipap. Chest x-ray showed severe large right pleural effusion with consolidation. Decision made to intubate, pt intubated by Dr. Vikash Kim using RSI, see note. Oxygenation continued to deteriorate despite intubation and max vent settings:   Latest Reference Range & Units 2/9/23 04:34   pH, Arterial 7.350 - 7.450  7.228 (L)   pCO2, Arterial 35.0 - 45.0 mm Hg 79.3 (HH)   pO2, Arterial 75.0 - 108.0 mm Hg 54.1 (L)   HCO3, Arterial 21.0 - 29.0 mmol/L 33.1 (H)   TCO2 (calc), Art Not Established mmol/L 36   Base Excess, Arterial -3 - 3  6 (H)   O2 Sat, Arterial 93 - 100 % 79 (L)   (HH): Data is critically high  (L): Data is abnormally low  (H): Data is abnormally high    Dr. Sienna Barcenas was notified vent settings changed to AC/VC 22 500 100% peep increased to 15. Wanted pt heavily sedated, was on Fentanyl infusion at 75 mcg/hr, Propofol @ 15 mcg/kg/min, received Fentanyl bolus x2 and 2 mg of Versed. SBP dropped into the 40s, pt was started on Levophed, bradied HR and went PEA code called at 0509. ROSC obtained after 1 cycle of CPR/Epi. Proceeded to code twice more. See code documentation for further details. Pt wife and 3 daughters at bedside, decision made to change code status and terminally extubate. TOD [id number]. [facility] notified.     Carolina Alvarez, BSN, CCRN

## 2023-02-09 NOTE — CODE DOCUMENTATION
Rapid response called. Pt oxygen saturation at 87-88% on BiPAP. Pt not tolerating Bipap, oxygen saturations not improving. Pt placed on NRB. O2 at 79% on NRB. [place] at bedside.

## 2023-02-09 NOTE — PROCEDURES
Central Line Placement Procedure Note    Indication: poor peripheral access    Consent: Unable to be obtained due to the emergent nature of this procedure. Procedure: The patient was positioned appropriately and the skin over the left internal jugular vein was prepped with betadine and draped in a sterile fashion. Local anesthesia was not performed due to the emergent nature of this procedure. A large bore needle was used to identify the vein. A guide wire was then inserted into the vein through the needle. A triple lumen catheter was then inserted into the vessel over the guide wire using the Seldinger technique. All ports showed good, free flowing blood return and were flushed with saline solution. The catheter was then securely fastened to the skin with sutures and covered with a sterile dressing. A post procedure X-ray was ordered and showed good line position. The patient tolerated the procedure well.     Complications: None, blood loss 25 cc

## 2023-02-09 NOTE — CODE DOCUMENTATION
Code Blue called 6451 CPR initiated immediately   1 mg EPI administered IVP  Pulse Check- Pulse Present 0511   1 AMP BiCarb given 0514  1 g calcium gluconate given IVP 0515

## 2023-02-09 NOTE — CODE DOCUMENTATION
Code Blue called, CPR initiated at 0509.  1 mg EPI given   Pulse Check - Pulse present   1 amp of BiCarb given 0514  1 g calcium

## 2023-02-09 NOTE — PROGRESS NOTES
Physician Progress Note      Trang Merida  CSN #:                  [id number]  :                       1940  ADMIT DATE:       2023 8:05 PM  [address] DATE:        2023 11:59 AM  RESPONDING  PROVIDER #:        Jenna Winn MD          QUERY TEXT:    Pt admitted with humerus fracture . Pt noted to have pulmonary edema . If   possible, please document in the progress notes and discharge summary if you   are evaluating and/or treating any of the following: The medical record reflects the following:  Risk Factors: dchf, resp failure, copd, pna, htn  Clinical Indicators: Documentation per pn  of Acute respiratory failure due   to Multifocal pneumonia, Pleural effusions and pulmonary edema. CXR   . Interval enlargement of a now moderate right pleural effusion, with a  stable   small left pleural effusion. Interval progression of diffuse airspace   consolidation throughout the  right lung, likely a combination of pulmonary   edema and multifocal pneumonia. Treatment: lasix gtt, intubated, comfort care    Thank you, Nasir Rosa RN CDS CRCR RISSA [email or web address]  Options provided:  -- Acute pulmonary edema due to acute on chronic diastolic  heart failure  -- Chronic pulmonary edema due to heart failure  -- Other - I will add my own diagnosis  -- Disagree - Not applicable / Not valid  -- Disagree - Clinically unable to determine / Unknown  -- Refer to Clinical Documentation Reviewer    PROVIDER RESPONSE TEXT:    This patient has acute pulmonary edema due to acute on chronic diastolic heart   failure    Query created by:  Sang Rosa on 2023 12:06 PM      Electronically signed by:  Jenna Winn MD 2023 1:00 PM

## 2023-02-09 NOTE — DEATH NOTES
Death Pronouncement Note  Patient's Name: Connie Catherine   Patient's YOB: 1940  MRN Number: [id number]    Admitting Provider: Mar Batista MD  Attending Provider: Mar Batista, *    Patient was examined and the following were absent: Pulses, Blood Pressure, and Respiratory effort    I declared the patient dead on at 0720    Preliminary Cause of Death:   Acute respiratory failure due to Multifocal pneumonia, Pleural effusions and pulmonary edema    Electronically signed by Davonte Dickens MD on 2/9/23 at 7:25 AM EST

## 2023-02-09 NOTE — PROGRESS NOTES
Tried to turn patient on his sides every 2 hours to help offload pressure of his niko area. Pt is unable to tolerate being turned, to either side , he said he is unable to breath easily, and was stressed out. He wanted back on his back.

## 2023-02-09 NOTE — CONSULTS
Clinical Pharmacy Note  Vancomycin Consult    aSrina Akers is a 79 y.o. male ordered Vancomycin for pneumonia; consult received from Dr. Estrella Paulson to manage therapy. Also receiving cefepime and flagyl. Allergies:  Valsartan-hydrochlorothiazide, Hytrin [terazosin hcl], Penicillins, Shrimp flavor, Sulfa antibiotics, Sulfasalazine, Tekturna [aliskiren fumarate], Vancomycin, and Ciprofloxacin     Temp max:  Temp (24hrs), Av.6 °F (36.4 °C), Min:97.4 °F (36.3 °C), Max:98.1 °F (36.7 °C)      Recent Labs     23  0515 23  0348 23  0502   WBC 4.6 3.2* 1.8*       Recent Labs     23  0515 23  0348 23  0502   BUN 83* 90* 87*   CREATININE 3.0* 2.9* 3.0*         Intake/Output Summary (Last 24 hours) at 2023 9742  Last data filed at 2023 0403  Gross per 24 hour   Intake 680 ml   Output 275 ml   Net 405 ml       Culture Results:      Ht Readings from Last 1 Encounters:   23 5' 8\" (1.727 m)        Wt Readings from Last 1 Encounters:   23 225 lb 12 oz (102.4 kg)         Estimated Creatinine Clearance: 22 mL/min (A) (based on SCr of 3 mg/dL (H)). Assessment/Plan:  Day # 1 of vancomycin. DEISY/CKD  SCr at 3.0  Vancomycin 1500 mg IV x 1 dose ordered. Goal -600    Thank you for the consult.    Calvin Glover, DiandraD

## 2023-02-09 NOTE — PROGRESS NOTES
Patient is on hospital bipap unit due to desaturation on 3L, patient is alert and oriented, no respiratory distress noted.  Patient RN is attempting to contact primary MD.

## 2023-02-09 NOTE — PROGRESS NOTES
Occupational Therapy  Pt transfer to ICU following Code Blue. Will sign off at this time; please reconsult OT when medically appropriate.      Kalie Milian, OTR/L

## 2023-02-09 NOTE — CODE DOCUMENTATION
Monica [address] called   0093- CPR initiated   Minna Moat down, BP dropped, patient in PEA   0549- 1 mg EPI given   0549- Pulse check, pulse present

## 2023-02-09 NOTE — PROCEDURES
Intubation Procedure Note    Indication: Respiratory failure    Consent: The patient provided verbal consent for this procedure. Medications Used: etomidate intravenously and rocuronium intravenously    Procedure: The patient was placed in the appropriate position. Cricoid pressure was not required. Intubation was performed by direct laryngoscopy using a laryngoscope and a 7.5 cuffed endotracheal tube. The cuff was then inflated and the tube was secured appropriately at a distance of 25 cm to the dental ridge. Initial confirmation of placement included bilateral breath sounds, an end tidal CO2 detector, and tube fogging. A chest x-ray to verify correct placement of the tube showed a right mainstem intubation and the tube was repositioned appropriately. The patient tolerated the procedure well.      Complications: None

## 2023-02-09 NOTE — PROGRESS NOTES
0425:ABG drawn per RN; ran on [facility]. Perfectserve changes per Dr. Lobo Isabel made: VC+ 20/420/1.0/100%/+15 d/t low sats post ABG. 0445: RR increased to 22 per Dr. Imelda Petersen.  0500:ETT retracted to 22lip per Dr. Imelda Petersen per CXR.

## 2023-02-10 LAB
EKG ATRIAL RATE: 178 BPM
EKG DIAGNOSIS: NORMAL
EKG Q-T INTERVAL: 324 MS
EKG QRS DURATION: 144 MS
EKG QTC CALCULATION (BAZETT): 465 MS
EKG R AXIS: 3 DEGREES
EKG T AXIS: 61 DEGREES
EKG VENTRICULAR RATE: 124 BPM

## 2023-02-11 LAB
BLOOD CULTURE, ROUTINE: ABNORMAL
CULTURE, BLOOD 2: ABNORMAL
CULTURE, BLOOD 2: ABNORMAL
CULTURE, RESPIRATORY: ABNORMAL
CULTURE, RESPIRATORY: ABNORMAL
GRAM STAIN RESULT: ABNORMAL
ORGANISM: ABNORMAL
URINE CULTURE, ROUTINE: ABNORMAL

## 2023-10-13 NOTE — PROGRESS NOTES
Dr. Alexander Guerra contacted via secure messaged regarding NPO status and potential time for bone marrow biopsy per pt request. Dr. Toan Tracy called regarding blood glucose level trending back down. New orders to be placed, per hypoglycemic protocols. Occupational Therapy   Treatment    Name: Jose Kumar  MRN: [id number]  Admitting Diagnosis:  Severe aortic stenosis  3 Days Post-Op    Recommendations:     Discharge Recommendations: other (see comments)  Discharge Equipment Recommendations:  none  Barriers to discharge:  None    Assessment:     Jose Kumar is a 63 y.o. male with a medical diagnosis of Severe aortic stenosis. Performance deficits affecting function are weakness, impaired endurance, impaired self care skills, impaired functional mobility, gait instability, impaired balance, decreased safety awareness, pain, impaired cardiopulmonary response to activity. Patient agreed to therapy and motivated to participate in therapy on this date. Patient on 50L 80%HFNC. Patient oxygen saturation remained WNL throughout session. Patient did have a drop in MAP to 52 on first standing during activity (MAP goal 60-80). MAP increased to WNL once reclined in bedside chair and patient performed ankle pumps. Patient agreed to a second sit<>stand and tolerated more upright activity with self care tasks performed and MAP stayed WNL. As soon as patient sat down in chair, MAP dropped as low as 47, patient was able to recover with ankle pumps and chair reclined. Patient was asymptomatic. Nursing notified. Patient would benefit from continued skilled acute OT 5x/wk to improve functional mobility, increase independence with ADLs, and address established goals prior to discharge.     Rehab Prognosis:  Good; patient would benefit from acute skilled OT services to address these deficits and reach maximum level of function.       Plan:     Patient to be seen 5 x/week to address the above listed problems via self-care/home management, therapeutic activities, therapeutic exercises  Plan of Care Reviewed with: patient    Subjective     Chief Complaint: Can I take a few steps?  Patient/Family Comments/goals: patient agreed to therapy  Pain/Comfort:  Pain Rating 1: 3/10  Location -  Side 1: Left  Location - Orientation 1: lower  Location 1: chest  Pain Addressed 1: Reposition, Distraction (PCA pump)  Pain Rating Post-Intervention 1:  (patient did not rate increased pain with mobility)    Objective:     Communicated with: NSG prior to session.  Patient found up in chair with telemetry, blood pressure cuff, pulse ox (continuous), oxygen, arterial line, peripheral IV, central line, external fixator, chance catheter, PCA upon OT entry to room.    General Precautions: Standard, fall, sternal    Orthopedic Precautions:N/A  Braces: N/A  Respiratory Status: High flow 80% at 50L     Occupational Performance:     Functional Mobility/Transfers:  Patient completed Sit <> Stand Transfer with contact guard assistance  with  hand-held assist   Functional Mobility: Patient took steps forwards and backwards L and R within range of line management close to bedside chair on second sit<>stand with CGA and HHA    Activities of Daily Living:  Grooming: contact guard assistance HHA for balance oral care and washing face standing in front of bedside chair with tray table in front       [facility] 6 Click ADL: 9    Treatment & Education:  Role of OT and POC  ADL retraining  Functional mobility training  Safety  Importance EOB/OOB activity    Co-treatment performed due to patient's multiple deficits requiring two skilled therapists to appropriately and safely assess patient's strength and endurance while facilitating functional tasks in addition to accommodating for patient's activity tolerance.     Patient left up in chair with all lines intact, call button in reach, and all needs met.     GOALS:   Multidisciplinary Problems       Occupational Therapy Goals          Problem: Occupational Therapy    Goal Priority Disciplines Outcome Interventions   Occupational Therapy Goal     OT, PT/OT Ongoing, Progressing    Description: Goals to be met by: 7 days 10/18/23      Patient will increase functional independence with ADLs by  performing:    Pt to complete UE dressing with set-up  Pt to complete LE dressing with SBA  Pt to complete toileting with SBA  Pt to complete standing g/h skills with supervision.   Pt to complete t/f bed, chair and commode with supervision.                        Time Tracking:     OT Date of Treatment: 10/13/23  OT Start Time: 0926  OT Stop Time: 0959  OT Total Time (min): 33 min    Billable Minutes:Self Care/Home Management 3               10/13/2023

## 2025-03-18 NOTE — PLAN OF CARE
IP MH Referral Acuity Rating Score (RARS)    LMHP complete at referral to IP MH, with DEC; and, daily while awaiting IP MH placement. Call score to PPS.  CRITERIA SCORING   New 72 HH and Involuntary for IP MH (not adolescent) 0/3   Boarding over 24 hours 0/1   Vulnerable adult at least 55+ with multiple co morbidities; or, Patient age 11 or under 0/1   Suicide ideation without relief of precipitating factors 1/1   Current plan for suicide 1/1   Current plan for homicide 0/1   Imminent risk or actual attempt to seriously harm another without relief of factors precipitating the attempt 0/1   Severe dysfunction in daily living (ex: complete neglect for self care, extreme disruption in vegetative function, extreme deterioration in social interactions) 1/1   Recent (last 2 weeks) or current physical aggression in the ED 0/1   Restraints or seclusion episode in ED 0/1   Verbal aggression, agitation, yelling, etc., while in the ED 0/1   Active psychosis with psychomotor agitation or catatonia 0/1   Need for constant or near constant redirection (from leaving, from others, etc).  0/1   Intrusive or disruptive behaviors 0/1   TOTAL 3       Problem: Discharge Planning  Goal: Discharge to home or other facility with appropriate resources  Outcome: Progressing  Flowsheets (Taken 12/26/2022 0820)  Discharge to home or other facility with appropriate resources:   Identify barriers to discharge with patient and caregiver   Identify discharge learning needs (meds, wound care, etc)   Arrange for needed discharge resources and transportation as appropriate     Problem: Pain  Goal: Verbalizes/displays adequate comfort level or baseline comfort level  12/26/2022 0820 by Jimena Akers RN  Outcome: Progressing  Flowsheets (Taken 12/26/2022 0820)  Verbalizes/displays adequate comfort level or baseline comfort level:   Administer analgesics based on type and severity of pain and evaluate response   Encourage patient to monitor pain and request assistance   Assess pain using appropriate pain scale   Implement non-pharmacological measures as appropriate and evaluate response     Problem: Skin/Tissue Integrity  Goal: Absence of new skin breakdown  Description: 1. Monitor for areas of redness and/or skin breakdown  2. Assess vascular access sites hourly  3. Every 4-6 hours minimum:  Change oxygen saturation probe site  4. Every 4-6 hours:  If on nasal continuous positive airway pressure, respiratory therapy assess nares and determine need for appliance change or resting period.   12/26/2022 0820 by Jimena Akers RN  Outcome: Progressing     Problem: Safety - Adult  Goal: Free from fall injury  12/26/2022 0820 by Jimena Akers RN  Outcome: Progressing  Flowsheets (Taken 12/26/2022 0820)  Free From Fall Injury:   Instruct family/caregiver on patient safety   Based on caregiver fall risk screen, instruct family/caregiver to ask for assistance with transferring infant if caregiver noted to have fall risk factors     Problem: ABCDS Injury Assessment  Goal: Absence of physical injury  Outcome: Progressing  Flowsheets  Taken 12/26/2022 0820 by Jimena Akers RN  Absence of Physical Injury: Implement safety measures based on patient assessment  Taken 12/25/2022 2214 by Jesse Sauceda  Absence of Physical Injury: Implement safety measures based on patient assessment     Problem: Chronic Conditions and Co-morbidities  Goal: Patient's chronic conditions and co-morbidity symptoms are monitored and maintained or improved  Outcome: Progressing  Flowsheets (Taken 12/26/2022 0820)  Care Plan - Patient's Chronic Conditions and Co-Morbidity Symptoms are Monitored and Maintained or Improved:   Collaborate with multidisciplinary team to address chronic and comorbid conditions and prevent exacerbation or deterioration   Monitor and assess patient's chronic conditions and comorbid symptoms for stability, deterioration, or improvement

## (undated) DEVICE — 3M™ STERI-STRIP™ COMPOUND BENZOIN TINCTURE 40 BAGS/CARTON 4 CARTONS/CASE C1544: Brand: 3M™ STERI-STRIP™

## (undated) DEVICE — SYRINGE, LUER LOCK, 5ML: Brand: MEDLINE

## (undated) DEVICE — STRIP,CLOSURE,WOUND,MEDI-STRIP,1/2X4: Brand: MEDLINE

## (undated) DEVICE — GLOVE SURG SZ 65 THK91MIL LTX FREE SYN POLYISOPRENE

## (undated) DEVICE — T-DRAPE,EXTREMITY,STERILE: Brand: MEDLINE

## (undated) DEVICE — SHEET,DRAPE,53X77,STERILE: Brand: MEDLINE

## (undated) DEVICE — GOWN SIRUS NONREIN LG W/TWL: Brand: MEDLINE INDUSTRIES, INC.

## (undated) DEVICE — NDL CNTR 20CT FM MAG: Brand: MEDLINE INDUSTRIES, INC.

## (undated) DEVICE — DRESSING ALG W4XL8IN AG FOAM SUPERABSORBENT SIL ANTIMIC

## (undated) DEVICE — MARKER,SKIN,WI/RULER AND LABELS: Brand: MEDLINE

## (undated) DEVICE — NEEDLE, QUINCKE, 25GX3.5": Brand: MEDLINE

## (undated) DEVICE — PADDING UNDERCAST W4INXL4YD 100% COT CRIMPED FINISH WBRL II

## (undated) DEVICE — PAD,ABDOMINAL,8"X7.5",STERILE,LF,1/PK: Brand: MEDLINE

## (undated) DEVICE — 60 ML SYRINGE REGULAR TIP: Brand: MONOJECT

## (undated) DEVICE — SUTURE VCRL + SZ 3-0 L18IN ABSRB UD SH 1/2 CIR TAPERCUT NDL VCP864D

## (undated) DEVICE — GLOVE SURG SZ 75 CRM LTX FREE POLYISOPRENE POLYMER BEAD ANTI

## (undated) DEVICE — SUTURE MCRYL + SZ 4-0 L18IN ABSRB UD L19MM PS-2 3/8 CIR MCP496G

## (undated) DEVICE — COOLIEF* COOLED RADIOFREQUENCY PROBE KIT: Brand: AVANOS

## (undated) DEVICE — SUTURE VCRL + SZ 2-0 L18IN ABSRB UD CT1 L36MM 1/2 CIR VCP839D

## (undated) DEVICE — SYRINGE MED 10ML LUERLOCK TIP W/O SFTY DISP

## (undated) DEVICE — SOLUTION IRRIG 250ML STRL H2O PLAS POUR BTL USP

## (undated) DEVICE — GLOVE SURG SZ 65 L12IN FNGR THK79MIL GRN LTX FREE

## (undated) DEVICE — DRAPE,SPLIT ,77X120: Brand: MEDLINE

## (undated) DEVICE — COVER US PRB W10XL61CM W/ GEL RUBBERBAND TAPE STRP FLD GEN

## (undated) DEVICE — SPONGE LAP W18XL18IN WHT COT 4 PLY FLD STRUNG RADPQ DISP ST

## (undated) DEVICE — SYRINGE MED 10ML POLYPR LUERSLIP TIP FLAT TOP W/O SFTY DISP

## (undated) DEVICE — MAJOR SET UP: Brand: MEDLINE INDUSTRIES, INC.

## (undated) DEVICE — GLOVE SURG SZ 6 CRM LTX FREE POLYISOPRENE POLYMER BEAD ANTI

## (undated) DEVICE — GEL US 20GM NONIRRITATING OVERWRAPPED FILE PCH TRNSMIT

## (undated) DEVICE — ZINACTIVE USE 2537982 PAD GRND FOR RF PAIN MGMT DISP

## (undated) DEVICE — TOTAL BASIC: Brand: MEDLINE INDUSTRIES, INC.

## (undated) DEVICE — C-ARM: Brand: UNBRANDED

## (undated) DEVICE — GLOVE SURG SZ 8 CRM LTX FREE POLYISOPRENE POLYMER BEAD ANTI

## (undated) DEVICE — MAJ-1414 SINGLE USE ADPATER BIOPSY VALV: Brand: SINGLE USE ADAPTOR BIOPSY VALVE

## (undated) DEVICE — APPLICATOR MEDICATED 10.5 CC SOLUTION CLR STRL CHLORAPREP

## (undated) DEVICE — BANDAGE,SELF ADHRNT,COFLEX,4"X5YD,STRL: Brand: COLABEL

## (undated) DEVICE — DRILL BIT, AO, SCALED: Brand: VARIAX

## (undated) DEVICE — SINGLE USE SUCTION VALVE MAJ-209: Brand: SINGLE USE SUCTION VALVE (STERILE)

## (undated) DEVICE — LOOP,VESSEL,MAXI,BLUE,2/PK,STERILE: Brand: MEDLINE

## (undated) DEVICE — NEBULIZER,KIT,T-MOUTHPIECE,6"RESER,7'TUB: Brand: MEDLINE

## (undated) DEVICE — DRESSING,GAUZE,XEROFORM,CURAD,5"X9",ST: Brand: CURAD

## (undated) DEVICE — STOCKINETTE,DOUBLE PLY,4X48,STERILE: Brand: MEDLINE

## (undated) DEVICE — NEEDLE HYPO 25GA L1.5IN BLU POLYPR HUB S STL REG BVL STR

## (undated) DEVICE — DRAPE,U/SHT,SPLIT,FILM,60X84,STERILE: Brand: MEDLINE

## (undated) DEVICE — COVER,MAYO STAND,STERILE: Brand: MEDLINE

## (undated) DEVICE — SINGLE USE BIOPSY VALVE MAJ-210: Brand: SINGLE USE BIOPSY VALVE (STERILE)

## (undated) DEVICE — DUAL CUT SAGITTAL BLADE

## (undated) DEVICE — Device

## (undated) DEVICE — GOWN SIRUS NONREIN XL W/TWL: Brand: MEDLINE INDUSTRIES, INC.

## (undated) DEVICE — MASK, AEROSOL, ELONGATED, ADULT: Brand: MEDLINE

## (undated) DEVICE — NEEDLE HYPO 22GA L1 1/2IN PIVOTING SHLD FOR LUERLOCK SYR

## (undated) DEVICE — SOLUTION IV IRRIG POUR BRL 0.9% SODIUM CHL 2F7124

## (undated) DEVICE — TRAP,MUCUS SPECIMEN, 80CC: Brand: MEDLINE